# Patient Record
Sex: FEMALE | Race: WHITE | Employment: OTHER | URBAN - NONMETROPOLITAN AREA
[De-identification: names, ages, dates, MRNs, and addresses within clinical notes are randomized per-mention and may not be internally consistent; named-entity substitution may affect disease eponyms.]

---

## 2017-01-04 ENCOUNTER — TELEPHONE (OUTPATIENT)
Dept: PRIMARY CARE CLINIC | Age: 47
End: 2017-01-04

## 2017-01-09 ENCOUNTER — TELEPHONE (OUTPATIENT)
Dept: PRIMARY CARE CLINIC | Age: 47
End: 2017-01-09

## 2017-01-16 ENCOUNTER — OFFICE VISIT (OUTPATIENT)
Dept: PRIMARY CARE CLINIC | Age: 47
End: 2017-01-16
Payer: MEDICAID

## 2017-01-16 VITALS
DIASTOLIC BLOOD PRESSURE: 80 MMHG | TEMPERATURE: 98 F | OXYGEN SATURATION: 98 % | RESPIRATION RATE: 20 BRPM | SYSTOLIC BLOOD PRESSURE: 136 MMHG | WEIGHT: 267 LBS | BODY MASS INDEX: 50.41 KG/M2 | HEIGHT: 61 IN | HEART RATE: 70 BPM

## 2017-01-16 DIAGNOSIS — G89.29 CHRONIC PAIN OF LEFT KNEE: ICD-10-CM

## 2017-01-16 DIAGNOSIS — E11.65 TYPE 2 DIABETES MELLITUS WITH HYPERGLYCEMIA, WITHOUT LONG-TERM CURRENT USE OF INSULIN (HCC): ICD-10-CM

## 2017-01-16 DIAGNOSIS — M54.31 BILATERAL SCIATICA: Primary | ICD-10-CM

## 2017-01-16 DIAGNOSIS — M23.204 OLD COMPLEX TEAR OF MEDIAL MENISCUS OF LEFT KNEE: Primary | ICD-10-CM

## 2017-01-16 DIAGNOSIS — M54.32 BILATERAL SCIATICA: Primary | ICD-10-CM

## 2017-01-16 DIAGNOSIS — E66.01 MORBID OBESITY DUE TO EXCESS CALORIES (HCC): ICD-10-CM

## 2017-01-16 DIAGNOSIS — M25.562 CHRONIC PAIN OF LEFT KNEE: ICD-10-CM

## 2017-01-16 PROCEDURE — 99214 OFFICE O/P EST MOD 30 MIN: CPT | Performed by: FAMILY MEDICINE

## 2017-01-18 RX ORDER — HYDROCODONE BITARTRATE AND ACETAMINOPHEN 7.5; 325 MG/1; MG/1
1 TABLET ORAL EVERY 6 HOURS PRN
Qty: 45 TABLET | Refills: 0 | Status: SHIPPED | OUTPATIENT
Start: 2017-01-18 | End: 2017-01-31 | Stop reason: SDUPTHER

## 2017-01-18 RX ORDER — HYDROCODONE BITARTRATE AND ACETAMINOPHEN 10; 325 MG/1; MG/1
1 TABLET ORAL EVERY 6 HOURS PRN
Qty: 45 TABLET | Refills: 0 | Status: CANCELLED | OUTPATIENT
Start: 2017-01-18 | End: 2017-01-25

## 2017-01-30 RX ORDER — VERAPAMIL HYDROCHLORIDE 240 MG/1
240 TABLET, FILM COATED, EXTENDED RELEASE ORAL NIGHTLY
Qty: 30 TABLET | Refills: 11 | Status: SHIPPED | OUTPATIENT
Start: 2017-01-30 | End: 2018-02-18 | Stop reason: SDUPTHER

## 2017-02-03 RX ORDER — HYDROCODONE BITARTRATE AND ACETAMINOPHEN 7.5; 325 MG/1; MG/1
1 TABLET ORAL EVERY 6 HOURS PRN
Qty: 45 TABLET | Refills: 0 | Status: SHIPPED | OUTPATIENT
Start: 2017-02-03 | End: 2017-02-14 | Stop reason: SDUPTHER

## 2017-02-06 DIAGNOSIS — M23.204 DERANGEMENT OF MEDIAL MENISCUS OF LEFT KNEE DUE TO OLD INJURY: Primary | ICD-10-CM

## 2017-02-14 ENCOUNTER — TELEPHONE (OUTPATIENT)
Dept: PRIMARY CARE CLINIC | Age: 47
End: 2017-02-14

## 2017-02-15 ENCOUNTER — TELEPHONE (OUTPATIENT)
Dept: PRIMARY CARE CLINIC | Age: 47
End: 2017-02-15

## 2017-02-15 RX ORDER — HYDROCODONE BITARTRATE AND ACETAMINOPHEN 7.5; 325 MG/1; MG/1
1 TABLET ORAL EVERY 6 HOURS PRN
Qty: 45 TABLET | Refills: 0 | Status: SHIPPED | OUTPATIENT
Start: 2017-02-15 | End: 2017-02-27 | Stop reason: SDUPTHER

## 2017-02-27 ENCOUNTER — TELEPHONE (OUTPATIENT)
Dept: PRIMARY CARE CLINIC | Age: 47
End: 2017-02-27

## 2017-02-27 ENCOUNTER — CARE COORDINATOR VISIT (OUTPATIENT)
Dept: PRIMARY CARE CLINIC | Age: 47
End: 2017-02-27

## 2017-02-27 ENCOUNTER — OFFICE VISIT (OUTPATIENT)
Dept: PRIMARY CARE CLINIC | Age: 47
End: 2017-02-27
Payer: MEDICAID

## 2017-02-27 VITALS
HEIGHT: 61 IN | SYSTOLIC BLOOD PRESSURE: 128 MMHG | HEART RATE: 79 BPM | OXYGEN SATURATION: 98 % | RESPIRATION RATE: 18 BRPM | WEIGHT: 268 LBS | DIASTOLIC BLOOD PRESSURE: 76 MMHG | BODY MASS INDEX: 50.6 KG/M2

## 2017-02-27 DIAGNOSIS — E11.65 TYPE 2 DIABETES MELLITUS WITH HYPERGLYCEMIA, WITHOUT LONG-TERM CURRENT USE OF INSULIN (HCC): ICD-10-CM

## 2017-02-27 DIAGNOSIS — Z13.9 SCREENING: Primary | ICD-10-CM

## 2017-02-27 DIAGNOSIS — I10 ESSENTIAL HYPERTENSION: ICD-10-CM

## 2017-02-27 LAB
AMPHETAMINE SCREEN, URINE: NORMAL
BARBITURATE SCREEN, URINE: NORMAL
BENZODIAZEPINE SCREEN, URINE: NORMAL
COCAINE METABOLITE SCREEN URINE: NORMAL
HGB, POC: 12.2
MDMA URINE: NORMAL
METHADONE SCREEN, URINE: NORMAL
METHAMPHETAMINE, URINE: NORMAL
OPIATE SCREEN URINE: NORMAL
OXYCODONE SCREEN URINE: NORMAL
PHENCYCLIDINE SCREEN URINE: NORMAL
PROPOXYPHENE SCREEN, URINE: NORMAL
THC: NORMAL
TRICYCLIC ANTIDEPRESSANTS, UR: NORMAL

## 2017-02-27 PROCEDURE — 80305 DRUG TEST PRSMV DIR OPT OBS: CPT | Performed by: INTERNAL MEDICINE

## 2017-02-27 PROCEDURE — 99214 OFFICE O/P EST MOD 30 MIN: CPT | Performed by: INTERNAL MEDICINE

## 2017-02-27 PROCEDURE — 85018 HEMOGLOBIN: CPT | Performed by: INTERNAL MEDICINE

## 2017-02-27 RX ORDER — HYDROCODONE BITARTRATE AND ACETAMINOPHEN 7.5; 325 MG/1; MG/1
1 TABLET ORAL EVERY 6 HOURS PRN
Qty: 45 TABLET | Refills: 0 | Status: SHIPPED | OUTPATIENT
Start: 2017-02-27 | End: 2017-03-08 | Stop reason: SDUPTHER

## 2017-02-27 ASSESSMENT — ENCOUNTER SYMPTOMS
NAUSEA: 0
SHORTNESS OF BREATH: 0
BACK PAIN: 1
VOMITING: 0
CONSTIPATION: 0
COUGH: 0
DIARRHEA: 0

## 2017-02-28 ENCOUNTER — TELEPHONE (OUTPATIENT)
Dept: PRIMARY CARE CLINIC | Age: 47
End: 2017-02-28

## 2017-03-10 RX ORDER — HYDROCODONE BITARTRATE AND ACETAMINOPHEN 7.5; 325 MG/1; MG/1
1 TABLET ORAL EVERY 6 HOURS PRN
Qty: 45 TABLET | Refills: 0 | Status: SHIPPED | OUTPATIENT
Start: 2017-03-10 | End: 2017-03-21 | Stop reason: SDUPTHER

## 2017-03-14 ENCOUNTER — HOSPITAL ENCOUNTER (EMERGENCY)
Facility: HOSPITAL | Age: 47
Discharge: HOME | End: 2017-03-14
Admitting: EMERGENCY MEDICINE

## 2017-03-14 VITALS
SYSTOLIC BLOOD PRESSURE: 137 MMHG | RESPIRATION RATE: 18 BRPM | WEIGHT: 270 LBS | HEART RATE: 81 BPM | OXYGEN SATURATION: 97 % | TEMPERATURE: 98.4 F | HEIGHT: 61 IN | DIASTOLIC BLOOD PRESSURE: 97 MMHG | BODY MASS INDEX: 50.98 KG/M2

## 2017-03-14 DIAGNOSIS — J01.01 ACUTE RECURRENT MAXILLARY SINUSITIS: Primary | ICD-10-CM

## 2017-03-14 DIAGNOSIS — G43.009 MIGRAINE WITHOUT AURA AND WITHOUT STATUS MIGRAINOSUS, NOT INTRACTABLE: ICD-10-CM

## 2017-03-14 PROCEDURE — 99283 EMERGENCY DEPT VISIT LOW MDM: CPT

## 2017-03-14 PROCEDURE — 25010000002 PROMETHAZINE PER 50 MG: Performed by: EMERGENCY MEDICINE

## 2017-03-14 PROCEDURE — 96372 THER/PROPH/DIAG INJ SC/IM: CPT

## 2017-03-14 PROCEDURE — 25010000002 DEXAMETHASONE PER 1 MG: Performed by: EMERGENCY MEDICINE

## 2017-03-14 PROCEDURE — 25010000002 DIPHENHYDRAMINE PER 50 MG: Performed by: EMERGENCY MEDICINE

## 2017-03-14 RX ORDER — AZITHROMYCIN 250 MG/1
TABLET, FILM COATED ORAL
Qty: 6 TABLET | Refills: 0 | Status: SHIPPED | OUTPATIENT
Start: 2017-03-14 | End: 2017-05-06

## 2017-03-14 RX ORDER — PROMETHAZINE HYDROCHLORIDE 25 MG/ML
25 INJECTION, SOLUTION INTRAMUSCULAR; INTRAVENOUS ONCE
Status: COMPLETED | OUTPATIENT
Start: 2017-03-14 | End: 2017-03-14

## 2017-03-14 RX ORDER — CETIRIZINE HYDROCHLORIDE 10 MG/1
10 TABLET ORAL DAILY
Qty: 21 TABLET | Refills: 0 | Status: SHIPPED | OUTPATIENT
Start: 2017-03-14 | End: 2019-08-06

## 2017-03-14 RX ORDER — DEXAMETHASONE SODIUM PHOSPHATE 10 MG/ML
10 INJECTION INTRAMUSCULAR; INTRAVENOUS ONCE
Status: COMPLETED | OUTPATIENT
Start: 2017-03-14 | End: 2017-03-14

## 2017-03-14 RX ORDER — DIPHENHYDRAMINE HYDROCHLORIDE 50 MG/ML
25 INJECTION INTRAMUSCULAR; INTRAVENOUS ONCE
Status: COMPLETED | OUTPATIENT
Start: 2017-03-14 | End: 2017-03-14

## 2017-03-14 RX ADMIN — DIPHENHYDRAMINE HYDROCHLORIDE 25 MG: 50 INJECTION, SOLUTION INTRAMUSCULAR; INTRAVENOUS at 12:51

## 2017-03-14 RX ADMIN — PROMETHAZINE HYDROCHLORIDE 25 MG: 25 INJECTION INTRAMUSCULAR; INTRAVENOUS at 12:51

## 2017-03-14 RX ADMIN — DEXAMETHASONE SODIUM PHOSPHATE 10 MG: 10 INJECTION, SOLUTION INTRAMUSCULAR; INTRAVENOUS at 12:51

## 2017-03-14 NOTE — ED PROVIDER NOTES
Subjective   Patient is a 46 y.o. female presenting with URI.   URI   Presenting symptoms: congestion and cough    Presenting symptoms: no ear pain and no sore throat    Associated symptoms: headaches      Patient is a 46 year  female with chief complaint of sinus infection the progressed into a migraine headache.  Patient reports she has had a sinus infection past 2 days with head congestion, sinus pressure, and persistent postnasal drip.  She reports that often progresses to her having a migraine which developed last night.  She reports thjs is a typical migraine. She has been experiencnig migraines for years. She reports usually is controlled with verapamil and Dyazide.  She reports she does not need a CT scan of the head.  She states a sinus infection often causes a migraine exacerbation.  She does state Dilaudid or Stadol usually provides relief for her migraine.    Patient denies any fever, body aches, or chills.  She has ear pain or sore throat.  She denies any sick exposure. She reports she has Flonase at home which sometimes helps.       Review of Systems   Constitutional: Negative.    HENT: Positive for congestion and sinus pressure. Negative for ear discharge, ear pain and sore throat.    Eyes: Negative.    Respiratory: Positive for cough.         Mild dry cough.    Cardiovascular: Negative.    Genitourinary: Negative.    Neurological: Positive for headaches. Negative for weakness.       Past Medical History   Diagnosis Date   • Hypertension    • Knee pain    • Migraine    • Restless leg        Allergies   Allergen Reactions   • Augmentin [Amoxicillin-Pot Clavulanate]    • Codeine    • Hydroxyzine Hcl Itching   • Ibuprofen    • Meperidine Hives   • Norflex [Orphenadrine]    • Penicillins    • Toradol [Ketorolac Tromethamine]    • Vistaril [Hydroxyzine]    • Cephalexin Rash       Past Surgical History   Procedure Laterality Date   • Corneal transplant     • Cataract extraction     • Tubal  abdominal ligation     • Ectopic pregnancy     • Knee meniscal repair         History reviewed. No pertinent family history.    Social History     Social History   • Marital status: Single     Spouse name: N/A   • Number of children: N/A   • Years of education: N/A     Social History Main Topics   • Smoking status: Never Smoker   • Smokeless tobacco: None   • Alcohol use Yes   • Drug use: No   • Sexual activity: Defer     Other Topics Concern   • None     Social History Narrative   • None           Objective   Physical Exam   Constitutional: She is oriented to person, place, and time. She appears well-developed and well-nourished. No distress.   HENT:   Head: Normocephalic and atraumatic.   Left Ear: External ear normal.   Nose: Nose normal.   Mouth/Throat: Oropharynx is clear and moist. No oropharyngeal exudate.   estela has a double uvula.. She is tender to palpate on all her sinuses.    Eyes: Conjunctivae and EOM are normal. Pupils are equal, round, and reactive to light.   No photophobia   Neck: Normal range of motion. Neck supple. No tracheal deviation present.   Cardiovascular: Normal rate, regular rhythm, normal heart sounds and intact distal pulses.    No murmur heard.  Pulmonary/Chest: Effort normal and breath sounds normal.   Abdominal: Soft. Bowel sounds are normal. She exhibits no distension and no mass. There is no tenderness. There is no rebound and no guarding.   Musculoskeletal: Normal range of motion. She exhibits no edema.   Neurological: She is alert and oriented to person, place, and time.   Skin: Skin is warm and dry. She is not diaphoretic.   Psychiatric: She has a normal mood and affect. Her behavior is normal.   Nursing note and vitals reviewed.      Procedures         ED Course  ED Course                  MDM    Final diagnoses:   Acute recurrent maxillary sinusitis   Migraine without aura and without status migrainosus, not intractable            COLT Arguello  03/14/17  [id number]

## 2017-03-16 ENCOUNTER — TELEPHONE (OUTPATIENT)
Dept: PRIMARY CARE CLINIC | Age: 47
End: 2017-03-16

## 2017-03-16 RX ORDER — GUAIFENESIN AND DEXTROMETHORPHAN HYDROBROMIDE 100; 10 MG/5ML; MG/5ML
5 SOLUTION ORAL EVERY 4 HOURS PRN
Qty: 120 ML | Refills: 0 | Status: SHIPPED | OUTPATIENT
Start: 2017-03-16 | End: 2017-07-10 | Stop reason: ALTCHOICE

## 2017-03-20 ENCOUNTER — HOSPITAL ENCOUNTER (EMERGENCY)
Facility: HOSPITAL | Age: 47
Discharge: HOME | End: 2017-03-20
Attending: EMERGENCY MEDICINE | Admitting: EMERGENCY MEDICINE

## 2017-03-20 VITALS
TEMPERATURE: 97.9 F | BODY MASS INDEX: 50.98 KG/M2 | HEIGHT: 61 IN | WEIGHT: 270 LBS | OXYGEN SATURATION: 99 % | HEART RATE: 80 BPM | SYSTOLIC BLOOD PRESSURE: 159 MMHG | RESPIRATION RATE: 16 BRPM | DIASTOLIC BLOOD PRESSURE: 85 MMHG

## 2017-03-20 DIAGNOSIS — R51.9 CHRONIC NONINTRACTABLE HEADACHE, UNSPECIFIED HEADACHE TYPE: Primary | ICD-10-CM

## 2017-03-20 DIAGNOSIS — G89.29 CHRONIC NONINTRACTABLE HEADACHE, UNSPECIFIED HEADACHE TYPE: Primary | ICD-10-CM

## 2017-03-20 PROCEDURE — 25010000002 PROMETHAZINE PER 50 MG: Performed by: EMERGENCY MEDICINE

## 2017-03-20 PROCEDURE — 96372 THER/PROPH/DIAG INJ SC/IM: CPT

## 2017-03-20 PROCEDURE — 25010000002 HYDROMORPHONE PER 4 MG: Performed by: EMERGENCY MEDICINE

## 2017-03-20 PROCEDURE — 99283 EMERGENCY DEPT VISIT LOW MDM: CPT

## 2017-03-20 RX ORDER — PROMETHAZINE HYDROCHLORIDE 25 MG/ML
25 INJECTION, SOLUTION INTRAMUSCULAR; INTRAVENOUS ONCE
Status: COMPLETED | OUTPATIENT
Start: 2017-03-20 | End: 2017-03-20

## 2017-03-20 RX ADMIN — PROMETHAZINE HYDROCHLORIDE 25 MG: 25 INJECTION INTRAMUSCULAR; INTRAVENOUS at 04:05

## 2017-03-20 RX ADMIN — HYDROMORPHONE HYDROCHLORIDE 1 MG: 1 INJECTION, SOLUTION INTRAMUSCULAR; INTRAVENOUS; SUBCUTANEOUS at 04:05

## 2017-03-20 NOTE — ED PROVIDER NOTES
Subjective   Patient is a 46 y.o. female presenting with migraines.   Migraine   Pain location:  Generalized  Quality:  Dull  Radiates to:  Does not radiate  Severity currently:  9/10  Severity at highest:  9/10  Onset quality:  Gradual  Timing:  Constant  Progression:  Worsening  Chronicity:  New  Similar to prior headaches: yes    Context: bright light and loud noise    Context: not activity, not caffeine, not coughing, not eating, not stress, not exposure to cold air and not straining    Relieved by:  Nothing  Worsened by:  Nothing  Ineffective treatments:  None tried  Associated symptoms: no abdominal pain, no back pain, no blurred vision, no congestion, no cough, no diarrhea, no dizziness, no drainage, no fever, no focal weakness, no hearing loss, no loss of balance, no nausea, no neck pain, no neck stiffness, no numbness, no syncope, no tingling, no URI and no vomiting    Risk factors: no anger, no family hx of SAH, does not have insomnia and lifestyle not sedentary        Review of Systems   Constitutional: Negative.  Negative for fever.   HENT: Negative.  Negative for congestion, hearing loss and postnasal drip.    Eyes: Negative.  Negative for blurred vision.   Respiratory: Negative.  Negative for cough.    Cardiovascular: Negative.  Negative for syncope.   Gastrointestinal: Negative.  Negative for abdominal pain, diarrhea, nausea and vomiting.   Musculoskeletal: Negative.  Negative for back pain, neck pain and neck stiffness.   Skin: Negative.    Neurological: Negative.  Negative for dizziness, focal weakness, numbness and loss of balance.   All other systems reviewed and are negative.      Past Medical History   Diagnosis Date   • Diabetes mellitus    • Hypertension    • Knee pain    • Migraine    • Restless leg        Allergies   Allergen Reactions   • Augmentin [Amoxicillin-Pot Clavulanate]    • Codeine    • Hydroxyzine Hcl Itching   • Ibuprofen    • Meperidine Hives   • Norflex [Orphenadrine]    •  Penicillins    • Toradol [Ketorolac Tromethamine]    • Vistaril [Hydroxyzine]    • Cephalexin Rash       Past Surgical History   Procedure Laterality Date   • Corneal transplant     • Cataract extraction     • Tubal abdominal ligation     • Ectopic pregnancy     • Knee meniscal repair     • Knee arthroscopy w/ meniscal repair     • Cataract extraction         History reviewed. No pertinent family history.    Social History     Social History   • Marital status: Single     Spouse name: N/A   • Number of children: N/A   • Years of education: N/A     Social History Main Topics   • Smoking status: Never Smoker   • Smokeless tobacco: None   • Alcohol use Yes   • Drug use: No   • Sexual activity: Defer     Other Topics Concern   • None     Social History Narrative   • None           Objective   Physical Exam   Constitutional: She is oriented to person, place, and time. She appears well-developed.  Non-toxic appearance. No distress.   HENT:   Head: Normocephalic and atraumatic.   Mouth/Throat: Uvula is midline and mucous membranes are normal.   Eyes: Lids are normal. Pupils are equal, round, and reactive to light. Lids are everted and swept, no foreign bodies found.   Neck: Trachea normal, normal range of motion, full passive range of motion without pain and phonation normal. Neck supple. Normal carotid pulses and no JVD present. Carotid bruit is not present. No rigidity. No tracheal deviation present. No Brudzinski's sign and no Kernig's sign noted. No thyromegaly present.   Cardiovascular: Normal rate, regular rhythm, normal heart sounds, intact distal pulses and normal pulses.    Pulmonary/Chest: Effort normal and breath sounds normal. No stridor. No apnea and no tachypnea. No respiratory distress.   Abdominal: Soft. Normal appearance, normal aorta and bowel sounds are normal. She exhibits no distension and no mass. There is no hepatosplenomegaly. There is no tenderness. There is no guarding.   Musculoskeletal: Normal  range of motion.       Vascular Status -  Her exam exhibits right foot vasculature normal. Her exam exhibits no right foot edema. Her exam exhibits left foot vasculature normal. Her exam exhibits no left foot edema.  Neurological: She is alert and oriented to person, place, and time. She has normal strength and normal reflexes. She is not disoriented. She displays normal reflexes. No cranial nerve deficit or sensory deficit. She exhibits normal muscle tone. Coordination normal. GCS eye subscore is 4. GCS verbal subscore is 5. GCS motor subscore is 6.   Reflex Scores:       Tricep reflexes are 2+ on the right side and 2+ on the left side.       Bicep reflexes are 2+ on the right side and 2+ on the left side.       Patellar reflexes are 2+ on the right side and 2+ on the left side.       Achilles reflexes are 2+ on the right side and 2+ on the left side.  Skin: Skin is warm, dry and intact. She is not diaphoretic. No cyanosis. No pallor. Nails show no clubbing.   Nursing note and vitals reviewed.      Procedures         ED Course  ED Course   Comment By Time   Chronic headache John Mendoza MD 03/20 2487                  [facility]    Final diagnoses:   Chronic nonintractable headache, unspecified headache type            John Mendoza MD  03/20/17 [id number]

## 2017-03-22 RX ORDER — HYDROCODONE BITARTRATE AND ACETAMINOPHEN 7.5; 325 MG/1; MG/1
1 TABLET ORAL EVERY 6 HOURS PRN
Qty: 45 TABLET | Refills: 0 | Status: SHIPPED | OUTPATIENT
Start: 2017-03-22 | End: 2017-04-18 | Stop reason: SDUPTHER

## 2017-03-24 ENCOUNTER — OFFICE VISIT (OUTPATIENT)
Dept: PRIMARY CARE CLINIC | Age: 47
End: 2017-03-24
Payer: MEDICAID

## 2017-03-24 VITALS
HEART RATE: 94 BPM | RESPIRATION RATE: 18 BRPM | WEIGHT: 267 LBS | TEMPERATURE: 97.5 F | HEIGHT: 61 IN | OXYGEN SATURATION: 97 % | DIASTOLIC BLOOD PRESSURE: 90 MMHG | SYSTOLIC BLOOD PRESSURE: 144 MMHG | BODY MASS INDEX: 50.41 KG/M2

## 2017-03-24 DIAGNOSIS — J01.40 ACUTE NON-RECURRENT PANSINUSITIS: Primary | ICD-10-CM

## 2017-03-24 PROCEDURE — 99213 OFFICE O/P EST LOW 20 MIN: CPT | Performed by: NURSE PRACTITIONER

## 2017-03-24 RX ORDER — FLUCONAZOLE 150 MG/1
150 TABLET ORAL DAILY
Qty: 1 TABLET | Refills: 0 | Status: SHIPPED | OUTPATIENT
Start: 2017-03-24 | End: 2017-03-25

## 2017-03-24 RX ORDER — METHYLPREDNISOLONE 4 MG/1
TABLET ORAL
Qty: 1 KIT | Refills: 0 | Status: SHIPPED | OUTPATIENT
Start: 2017-03-24 | End: 2017-11-15 | Stop reason: SDUPTHER

## 2017-03-24 RX ORDER — FLUTICASONE PROPIONATE 50 MCG
1 SPRAY, SUSPENSION (ML) NASAL DAILY
Qty: 1 BOTTLE | Refills: 3 | Status: SHIPPED | OUTPATIENT
Start: 2017-03-24 | End: 2017-06-26 | Stop reason: SDUPTHER

## 2017-03-24 RX ORDER — CLINDAMYCIN HYDROCHLORIDE 300 MG/1
300 CAPSULE ORAL 2 TIMES DAILY
Qty: 20 CAPSULE | Refills: 0 | Status: SHIPPED | OUTPATIENT
Start: 2017-03-24 | End: 2017-04-03

## 2017-03-24 RX ORDER — INSULIN GLARGINE 100 [IU]/ML
20 INJECTION, SOLUTION SUBCUTANEOUS NIGHTLY
COMMUNITY
Start: 2017-02-28 | End: 2018-01-07

## 2017-03-24 ASSESSMENT — ENCOUNTER SYMPTOMS
GASTROINTESTINAL NEGATIVE: 1
SINUS PRESSURE: 1
RESPIRATORY NEGATIVE: 1
SINUS COMPLAINT: 1
ALLERGIC/IMMUNOLOGIC NEGATIVE: 1
SORE THROAT: 1
EYES NEGATIVE: 1

## 2017-03-31 RX ORDER — HYDROCODONE BITARTRATE AND ACETAMINOPHEN 7.5; 325 MG/1; MG/1
1 TABLET ORAL EVERY 6 HOURS PRN
Qty: 45 TABLET | Refills: 0 | Status: CANCELLED | OUTPATIENT
Start: 2017-03-31 | End: 2017-04-30

## 2017-04-03 DIAGNOSIS — B37.9 YEAST INFECTION: Primary | ICD-10-CM

## 2017-04-03 RX ORDER — FLUCONAZOLE 100 MG/1
150 TABLET ORAL DAILY
Qty: 1 TABLET | Refills: 1 | Status: SHIPPED | OUTPATIENT
Start: 2017-04-03 | End: 2017-04-05

## 2017-04-19 RX ORDER — HYDROCODONE BITARTRATE AND ACETAMINOPHEN 7.5; 325 MG/1; MG/1
1 TABLET ORAL EVERY 6 HOURS PRN
Qty: 45 TABLET | Refills: 0 | Status: SHIPPED | OUTPATIENT
Start: 2017-04-19 | End: 2017-05-09 | Stop reason: SDUPTHER

## 2017-05-06 ENCOUNTER — OFFICE VISIT (OUTPATIENT)
Dept: RETAIL CLINIC | Facility: CLINIC | Age: 47
End: 2017-05-06

## 2017-05-06 VITALS
WEIGHT: 262 LBS | TEMPERATURE: 98.2 F | HEIGHT: 61 IN | OXYGEN SATURATION: 99 % | RESPIRATION RATE: 20 BRPM | HEART RATE: 88 BPM | BODY MASS INDEX: 49.47 KG/M2 | DIASTOLIC BLOOD PRESSURE: 88 MMHG | SYSTOLIC BLOOD PRESSURE: 138 MMHG

## 2017-05-06 DIAGNOSIS — H66.005 RECURRENT ACUTE SUPPURATIVE OTITIS MEDIA WITHOUT SPONTANEOUS RUPTURE OF LEFT TYMPANIC MEMBRANE: ICD-10-CM

## 2017-05-06 DIAGNOSIS — J06.9 VIRAL UPPER RESPIRATORY TRACT INFECTION: Primary | ICD-10-CM

## 2017-05-06 PROCEDURE — 99213 OFFICE O/P EST LOW 20 MIN: CPT | Performed by: NURSE PRACTITIONER

## 2017-05-06 RX ORDER — AZITHROMYCIN 250 MG/1
TABLET, FILM COATED ORAL
Qty: 6 TABLET | Refills: 0 | Status: SHIPPED | OUTPATIENT
Start: 2017-05-06 | End: 2017-10-08

## 2017-05-06 RX ORDER — BENZONATATE 100 MG/1
200 CAPSULE ORAL 3 TIMES DAILY PRN
Qty: 14 CAPSULE | Refills: 0 | Status: SHIPPED | OUTPATIENT
Start: 2017-05-06 | End: 2017-10-08

## 2017-05-09 RX ORDER — HYDROCODONE BITARTRATE AND ACETAMINOPHEN 7.5; 325 MG/1; MG/1
1 TABLET ORAL EVERY 8 HOURS PRN
Qty: 10 TABLET | Refills: 0 | Status: SHIPPED | OUTPATIENT
Start: 2017-05-09 | End: 2017-05-16

## 2017-05-19 ENCOUNTER — OFFICE VISIT (OUTPATIENT)
Dept: PRIMARY CARE CLINIC | Age: 47
End: 2017-05-19
Payer: MEDICAID

## 2017-05-19 VITALS
RESPIRATION RATE: 18 BRPM | OXYGEN SATURATION: 98 % | BODY MASS INDEX: 50.31 KG/M2 | HEART RATE: 108 BPM | SYSTOLIC BLOOD PRESSURE: 124 MMHG | DIASTOLIC BLOOD PRESSURE: 86 MMHG | HEIGHT: 61 IN | WEIGHT: 266.5 LBS | TEMPERATURE: 98.5 F

## 2017-05-19 DIAGNOSIS — M79.605 PAIN IN BOTH LOWER EXTREMITIES: Primary | ICD-10-CM

## 2017-05-19 DIAGNOSIS — G62.9 NEUROPATHY: ICD-10-CM

## 2017-05-19 DIAGNOSIS — Z13.9 SCREENING: ICD-10-CM

## 2017-05-19 DIAGNOSIS — M79.604 PAIN IN BOTH LOWER EXTREMITIES: Primary | ICD-10-CM

## 2017-05-19 LAB
AMPHETAMINE SCREEN, URINE: ABNORMAL
BARBITURATE SCREEN, URINE: ABNORMAL
BENZODIAZEPINE SCREEN, URINE: ABNORMAL
COCAINE METABOLITE SCREEN URINE: ABNORMAL
MDMA URINE: ABNORMAL
METHADONE SCREEN, URINE: ABNORMAL
METHAMPHETAMINE, URINE: ABNORMAL
OPIATE SCREEN URINE: ABNORMAL
OXYCODONE SCREEN URINE: ABNORMAL
PHENCYCLIDINE SCREEN URINE: ABNORMAL
PROPOXYPHENE SCREEN, URINE: ABNORMAL
THC: ABNORMAL
TRICYCLIC ANTIDEPRESSANTS, UR: ABNORMAL

## 2017-05-19 PROCEDURE — 99214 OFFICE O/P EST MOD 30 MIN: CPT | Performed by: NURSE PRACTITIONER

## 2017-05-19 PROCEDURE — 80305 DRUG TEST PRSMV DIR OPT OBS: CPT | Performed by: NURSE PRACTITIONER

## 2017-05-19 RX ORDER — GABAPENTIN 100 MG/1
100 CAPSULE ORAL DAILY
Qty: 90 CAPSULE | Refills: 1 | Status: SHIPPED | OUTPATIENT
Start: 2017-05-19 | End: 2017-09-26 | Stop reason: DRUGHIGH

## 2017-05-19 ASSESSMENT — ENCOUNTER SYMPTOMS
RESPIRATORY NEGATIVE: 1
EYES NEGATIVE: 1
ALLERGIC/IMMUNOLOGIC NEGATIVE: 1
GASTROINTESTINAL NEGATIVE: 1

## 2017-05-23 ENCOUNTER — TELEPHONE (OUTPATIENT)
Dept: PRIMARY CARE CLINIC | Age: 47
End: 2017-05-23

## 2017-05-23 RX ORDER — SUMATRIPTAN 100 MG/1
TABLET, FILM COATED ORAL
Qty: 9 TABLET | Refills: 0 | Status: SHIPPED | OUTPATIENT
Start: 2017-05-23 | End: 2017-07-14 | Stop reason: SDUPTHER

## 2017-05-23 RX ORDER — SUMATRIPTAN 20 MG/1
SPRAY NASAL
Qty: 1 INHALER | Refills: 0 | Status: SHIPPED | OUTPATIENT
Start: 2017-05-23 | End: 2017-07-14 | Stop reason: SDUPTHER

## 2017-05-23 RX ORDER — PHENAZOPYRIDINE HYDROCHLORIDE 100 MG/1
100 TABLET, FILM COATED ORAL 3 TIMES DAILY PRN
Qty: 9 TABLET | Refills: 0 | Status: SHIPPED | OUTPATIENT
Start: 2017-05-23 | End: 2017-07-10 | Stop reason: ALTCHOICE

## 2017-05-24 ENCOUNTER — OFFICE VISIT (OUTPATIENT)
Dept: PRIMARY CARE CLINIC | Age: 47
End: 2017-05-24
Payer: MEDICAID

## 2017-05-24 DIAGNOSIS — N39.0 URINARY TRACT INFECTION, SITE UNSPECIFIED: Primary | ICD-10-CM

## 2017-05-24 LAB
BILIRUBIN, POC: NEGATIVE
BLOOD URINE, POC: ABNORMAL
CLARITY, POC: CLEAR
COLOR, POC: ABNORMAL
GLUCOSE URINE, POC: ABNORMAL
KETONES, POC: NEGATIVE
LEUKOCYTE EST, POC: NEGATIVE
NITRITE, POC: ABNORMAL
PH, POC: 5
PROTEIN, POC: NEGATIVE
SPECIFIC GRAVITY, POC: 1.02
UROBILINOGEN, POC: ABNORMAL

## 2017-05-24 PROCEDURE — 81002 URINALYSIS NONAUTO W/O SCOPE: CPT | Performed by: FAMILY MEDICINE

## 2017-05-24 RX ORDER — NITROFURANTOIN 25; 75 MG/1; MG/1
100 CAPSULE ORAL 2 TIMES DAILY
Qty: 10 CAPSULE | Refills: 0 | Status: SHIPPED | OUTPATIENT
Start: 2017-05-24 | End: 2017-05-29

## 2017-05-26 LAB — URINE CULTURE, ROUTINE: NORMAL

## 2017-06-26 DIAGNOSIS — J01.40 ACUTE NON-RECURRENT PANSINUSITIS: ICD-10-CM

## 2017-06-27 RX ORDER — FLUTICASONE PROPIONATE 50 MCG
SPRAY, SUSPENSION (ML) NASAL
Qty: 1 BOTTLE | Refills: 3 | Status: SHIPPED | OUTPATIENT
Start: 2017-06-27 | End: 2017-12-25 | Stop reason: SDUPTHER

## 2017-07-10 ENCOUNTER — HOSPITAL ENCOUNTER (OUTPATIENT)
Dept: PAIN MANAGEMENT | Age: 47
Discharge: HOME | End: 2017-07-10
Payer: MEDICAID

## 2017-07-10 VITALS
SYSTOLIC BLOOD PRESSURE: 132 MMHG | DIASTOLIC BLOOD PRESSURE: 99 MMHG | TEMPERATURE: 98 F | OXYGEN SATURATION: 98 % | WEIGHT: 257 LBS | RESPIRATION RATE: 18 BRPM | BODY MASS INDEX: 48.52 KG/M2 | HEIGHT: 61 IN | HEART RATE: 85 BPM

## 2017-07-10 DIAGNOSIS — M25.562 CHRONIC PAIN OF LEFT KNEE: Primary | ICD-10-CM

## 2017-07-10 DIAGNOSIS — M70.61 TROCHANTERIC BURSITIS OF RIGHT HIP: ICD-10-CM

## 2017-07-10 DIAGNOSIS — G89.29 CHRONIC PAIN OF LEFT KNEE: Primary | ICD-10-CM

## 2017-07-10 PROCEDURE — 99205 OFFICE O/P NEW HI 60 MIN: CPT

## 2017-07-10 PROCEDURE — 99215 OFFICE O/P EST HI 40 MIN: CPT | Performed by: NURSE PRACTITIONER

## 2017-07-10 RX ORDER — CYCLOBENZAPRINE HCL 10 MG
10 TABLET ORAL 3 TIMES DAILY PRN
COMMUNITY
End: 2018-01-19 | Stop reason: SDUPTHER

## 2017-07-10 RX ORDER — HYDROCODONE BITARTRATE AND ACETAMINOPHEN 5; 325 MG/1; MG/1
1 TABLET ORAL EVERY 8 HOURS PRN
Qty: 90 TABLET | Refills: 0 | Status: SHIPPED | OUTPATIENT
Start: 2017-07-10 | End: 2017-08-10 | Stop reason: SDUPTHER

## 2017-07-11 DIAGNOSIS — M70.61 TROCHANTERIC BURSITIS OF RIGHT HIP: ICD-10-CM

## 2017-07-12 RX ORDER — EMOLLIENT BASE
CREAM (GRAM) TOPICAL
Qty: 360 G | Refills: 3 | OUTPATIENT
Start: 2017-07-12 | End: 2020-09-11

## 2017-07-14 RX ORDER — SUMATRIPTAN 100 MG/1
TABLET, FILM COATED ORAL
Qty: 9 TABLET | Refills: 0 | Status: SHIPPED | OUTPATIENT
Start: 2017-07-14 | End: 2017-08-28 | Stop reason: SDUPTHER

## 2017-07-14 RX ORDER — SUMATRIPTAN 20 MG/1
SPRAY NASAL
Qty: 1 INHALER | Refills: 0 | Status: SHIPPED | OUTPATIENT
Start: 2017-07-14 | End: 2017-08-28 | Stop reason: SDUPTHER

## 2017-07-28 ENCOUNTER — HOSPITAL ENCOUNTER (OUTPATIENT)
Dept: GENERAL RADIOLOGY | Age: 47
Discharge: HOME | End: 2017-07-28
Payer: MEDICAID

## 2017-07-28 DIAGNOSIS — G89.29 CHRONIC PAIN OF LEFT KNEE: ICD-10-CM

## 2017-07-28 DIAGNOSIS — M25.562 CHRONIC PAIN OF LEFT KNEE: ICD-10-CM

## 2017-07-28 PROCEDURE — 73560 X-RAY EXAM OF KNEE 1 OR 2: CPT

## 2017-08-04 DIAGNOSIS — E13.9 DIABETES 1.5, MANAGED AS TYPE 2 (HCC): Primary | ICD-10-CM

## 2017-08-09 DIAGNOSIS — M79.605 PAIN IN BOTH LOWER EXTREMITIES: ICD-10-CM

## 2017-08-09 DIAGNOSIS — Z13.9 SCREENING FOR CONDITION: ICD-10-CM

## 2017-08-09 DIAGNOSIS — M79.604 PAIN IN BOTH LOWER EXTREMITIES: ICD-10-CM

## 2017-08-09 LAB
ALBUMIN SERPL-MCNC: 4.1 G/DL (ref 3.5–5.2)
ALP BLD-CCNC: 62 U/L (ref 35–104)
ALT SERPL-CCNC: 29 U/L (ref 5–33)
ANION GAP SERPL CALCULATED.3IONS-SCNC: 25 MMOL/L (ref 7–19)
AST SERPL-CCNC: 28 U/L (ref 5–32)
BILIRUB SERPL-MCNC: 0.6 MG/DL (ref 0.2–1.2)
BUN BLDV-MCNC: 10 MG/DL (ref 6–20)
CALCIUM SERPL-MCNC: 9.8 MG/DL (ref 8.6–10)
CHLORIDE BLD-SCNC: 92 MMOL/L (ref 98–111)
CO2: 22 MMOL/L (ref 22–29)
CREAT SERPL-MCNC: 0.8 MG/DL (ref 0.5–0.9)
GFR NON-AFRICAN AMERICAN: >60
GLUCOSE BLD-MCNC: 491 MG/DL (ref 74–109)
MAGNESIUM: 1.8 MG/DL (ref 1.6–2.6)
POTASSIUM SERPL-SCNC: 4.2 MMOL/L (ref 3.5–5)
SODIUM BLD-SCNC: 139 MMOL/L (ref 136–145)
TOTAL PROTEIN: 7.7 G/DL (ref 6.6–8.7)

## 2017-08-10 DIAGNOSIS — M70.61 TROCHANTERIC BURSITIS OF RIGHT HIP: ICD-10-CM

## 2017-08-14 RX ORDER — HYDROCODONE BITARTRATE AND ACETAMINOPHEN 5; 325 MG/1; MG/1
1 TABLET ORAL EVERY 8 HOURS PRN
Qty: 90 TABLET | Refills: 0 | Status: SHIPPED | OUTPATIENT
Start: 2017-08-14 | End: 2017-09-11 | Stop reason: SDUPTHER

## 2017-08-18 ENCOUNTER — TELEPHONE (OUTPATIENT)
Dept: PRIMARY CARE CLINIC | Age: 47
End: 2017-08-18

## 2017-08-22 ENCOUNTER — TELEPHONE (OUTPATIENT)
Dept: PRIMARY CARE CLINIC | Age: 47
End: 2017-08-22

## 2017-08-24 ENCOUNTER — TELEPHONE (OUTPATIENT)
Dept: PAIN MANAGEMENT | Age: 47
End: 2017-08-24

## 2017-08-24 DIAGNOSIS — M70.61 TROCHANTERIC BURSITIS OF RIGHT HIP: ICD-10-CM

## 2017-08-28 DIAGNOSIS — B37.9 YEAST INFECTION: ICD-10-CM

## 2017-08-28 RX ORDER — SUMATRIPTAN 20 MG/1
SPRAY NASAL
Qty: 1 INHALER | Refills: 0 | Status: SHIPPED | OUTPATIENT
Start: 2017-08-28 | End: 2017-11-07 | Stop reason: SDUPTHER

## 2017-08-28 RX ORDER — FLUCONAZOLE 100 MG/1
TABLET ORAL
Qty: 3 TABLET | Refills: 0 | OUTPATIENT
Start: 2017-08-28

## 2017-08-28 RX ORDER — PRAMIPEXOLE DIHYDROCHLORIDE 0.5 MG/1
TABLET ORAL
Qty: 60 TABLET | Refills: 0 | Status: SHIPPED | OUTPATIENT
Start: 2017-08-28 | End: 2017-11-13 | Stop reason: SDUPTHER

## 2017-08-28 RX ORDER — SUMATRIPTAN 100 MG/1
TABLET, FILM COATED ORAL
Qty: 9 TABLET | Refills: 0 | Status: SHIPPED | OUTPATIENT
Start: 2017-08-28 | End: 2017-11-07 | Stop reason: SDUPTHER

## 2017-09-11 DIAGNOSIS — M70.61 TROCHANTERIC BURSITIS OF RIGHT HIP: ICD-10-CM

## 2017-09-12 RX ORDER — HYDROCODONE BITARTRATE AND ACETAMINOPHEN 5; 325 MG/1; MG/1
1 TABLET ORAL EVERY 8 HOURS PRN
Qty: 42 TABLET | Refills: 0 | Status: SHIPPED | OUTPATIENT
Start: 2017-09-13 | End: 2017-10-19 | Stop reason: SDUPTHER

## 2017-09-26 ENCOUNTER — HOSPITAL ENCOUNTER (OUTPATIENT)
Dept: PAIN MANAGEMENT | Age: 47
Discharge: HOME | End: 2017-09-26
Payer: MEDICAID

## 2017-09-26 VITALS
OXYGEN SATURATION: 97 % | DIASTOLIC BLOOD PRESSURE: 87 MMHG | WEIGHT: 253 LBS | HEIGHT: 61 IN | BODY MASS INDEX: 47.77 KG/M2 | HEART RATE: 81 BPM | SYSTOLIC BLOOD PRESSURE: 134 MMHG | TEMPERATURE: 97.9 F | RESPIRATION RATE: 20 BRPM

## 2017-09-26 DIAGNOSIS — M70.61 TROCHANTERIC BURSITIS OF RIGHT HIP: ICD-10-CM

## 2017-09-26 LAB — SUMMARY COMPLIANCE DRUG ANAL, UR: NORMAL ML

## 2017-09-26 PROCEDURE — 99214 OFFICE O/P EST MOD 30 MIN: CPT | Performed by: NURSE PRACTITIONER

## 2017-09-26 PROCEDURE — 80307 DRUG TEST PRSMV CHEM ANLYZR: CPT

## 2017-09-26 PROCEDURE — 99213 OFFICE O/P EST LOW 20 MIN: CPT

## 2017-09-26 RX ORDER — HYDROCODONE BITARTRATE AND ACETAMINOPHEN 5; 325 MG/1; MG/1
1 TABLET ORAL EVERY 8 HOURS PRN
Qty: 90 TABLET | Refills: 0 | Status: SHIPPED | OUTPATIENT
Start: 2017-09-26 | End: 2017-10-19 | Stop reason: SDUPTHER

## 2017-09-26 RX ORDER — GABAPENTIN 300 MG/1
300 CAPSULE ORAL 3 TIMES DAILY
Qty: 90 CAPSULE | Refills: 2 | Status: SHIPPED | OUTPATIENT
Start: 2017-09-26 | End: 2017-11-20 | Stop reason: DRUGHIGH

## 2017-10-08 ENCOUNTER — OFFICE VISIT (OUTPATIENT)
Dept: RETAIL CLINIC | Facility: CLINIC | Age: 47
End: 2017-10-08

## 2017-10-08 VITALS
BODY MASS INDEX: 47.2 KG/M2 | HEIGHT: 61 IN | OXYGEN SATURATION: 97 % | HEART RATE: 90 BPM | SYSTOLIC BLOOD PRESSURE: 135 MMHG | DIASTOLIC BLOOD PRESSURE: 82 MMHG | TEMPERATURE: 98.8 F | RESPIRATION RATE: 20 BRPM | WEIGHT: 250 LBS

## 2017-10-08 DIAGNOSIS — J20.9 ACUTE BRONCHITIS, UNSPECIFIED ORGANISM: Primary | ICD-10-CM

## 2017-10-08 DIAGNOSIS — J02.9 PHARYNGITIS, UNSPECIFIED ETIOLOGY: ICD-10-CM

## 2017-10-08 PROCEDURE — 99213 OFFICE O/P EST LOW 20 MIN: CPT | Performed by: NURSE PRACTITIONER

## 2017-10-08 RX ORDER — DEXTROMETHORPHAN HYDROBROMIDE AND PROMETHAZINE HYDROCHLORIDE 15; 6.25 MG/5ML; MG/5ML
5 SYRUP ORAL 4 TIMES DAILY PRN
Qty: 180 ML | Refills: 0 | Status: SHIPPED | OUTPATIENT
Start: 2017-10-08 | End: 2019-08-06

## 2017-10-08 RX ORDER — METHYLPREDNISOLONE 4 MG/1
TABLET ORAL
Qty: 21 TABLET | Refills: 0 | Status: SHIPPED | OUTPATIENT
Start: 2017-10-08 | End: 2020-04-15

## 2017-10-08 RX ORDER — ALBUTEROL SULFATE 90 UG/1
2 AEROSOL, METERED RESPIRATORY (INHALATION) EVERY 4 HOURS PRN
Qty: 1 INHALER | Refills: 0 | Status: SHIPPED | OUTPATIENT
Start: 2017-10-08 | End: 2020-04-15

## 2017-10-08 RX ORDER — AZITHROMYCIN 250 MG/1
TABLET, FILM COATED ORAL
Qty: 6 TABLET | Refills: 0 | Status: SHIPPED | OUTPATIENT
Start: 2017-10-08 | End: 2019-08-06

## 2017-10-08 NOTE — PATIENT INSTRUCTIONS
Pharyngitis  Pharyngitis is redness, pain, and swelling (inflammation) of your pharynx.   CAUSES   Pharyngitis is usually caused by infection. Most of the time, these infections are from viruses (viral) and are part of a cold. However, sometimes pharyngitis is caused by bacteria (bacterial). Pharyngitis can also be caused by allergies. Viral pharyngitis may be spread from person to person by coughing, sneezing, and personal items or utensils (cups, forks, spoons, toothbrushes). Bacterial pharyngitis may be spread from person to person by more intimate contact, such as kissing.   SIGNS AND SYMPTOMS   Symptoms of pharyngitis include:    · Sore throat.    · Tiredness (fatigue).    · Low-grade fever.    · Headache.  · Joint pain and muscle aches.  · Skin rashes.  · Swollen lymph nodes.  · Plaque-like film on throat or tonsils (often seen with bacterial pharyngitis).  DIAGNOSIS   Your health care provider will ask you questions about your illness and your symptoms. Your medical history, along with a physical exam, is often all that is needed to diagnose pharyngitis. Sometimes, a rapid strep test is done. Other lab tests may also be done, depending on the suspected cause.   TREATMENT   Viral pharyngitis will usually get better in 3-4 days without the use of medicine. Bacterial pharyngitis is treated with medicines that kill germs (antibiotics).   HOME CARE INSTRUCTIONS   · Drink enough water and fluids to keep your urine clear or pale yellow.    · Only take over-the-counter or prescription medicines as directed by your health care provider:      If you are prescribed antibiotics, make sure you finish them even if you start to feel better.      Do not take aspirin.    · Get lots of rest.    · Gargle with 8 oz of salt water (½ tsp of salt per 1 qt of water) as often as every 1-2 hours to soothe your throat.    · Throat lozenges (if you are not at risk for choking) or sprays may be used to soothe your throat.  SEEK MEDICAL  CARE IF:   · You have large, tender lumps in your neck.  · You have a rash.  · You cough up green, yellow-brown, or bloody spit.  SEEK IMMEDIATE MEDICAL CARE IF:   · Your neck becomes stiff.  · You drool or are unable to swallow liquids.  · You vomit or are unable to keep medicines or liquids down.  · You have severe pain that does not go away with the use of recommended medicines.  · You have trouble breathing (not caused by a stuffy nose).  MAKE SURE YOU:   · Understand these instructions.  · Will watch your condition.  · Will get help right away if you are not doing well or get worse.     This information is not intended to replace advice given to you by your health care provider. Make sure you discuss any questions you have with your health care provider.     Document Released: 12/18/2006 Document Revised: 10/08/2014 Document Reviewed: 08/25/2014  [employer] Interactive Patient Education ©2017 [employer]  Acute Bronchitis  Bronchitis is inflammation of the airways that extend from the windpipe into the lungs (bronchi). The inflammation often causes mucus to develop. This leads to a cough, which is the most common symptom of bronchitis.   In acute bronchitis, the condition usually develops suddenly and goes away over time, usually in a couple weeks. Smoking, allergies, and asthma can make bronchitis worse. Repeated episodes of bronchitis may cause further lung problems.   CAUSES  Acute bronchitis is most often caused by the same virus that causes a cold. The virus can spread from person to person (contagious) through coughing, sneezing, and touching contaminated objects.  SIGNS AND SYMPTOMS   · Cough.    · Fever.    · Coughing up mucus.    · Body aches.    · Chest congestion.    · Chills.    · Shortness of breath.    · Sore throat.    DIAGNOSIS   Acute bronchitis is usually diagnosed through a physical exam. Your health care provider will also ask you questions about your medical history. Tests, such as chest  X-rays, are sometimes done to rule out other conditions.   TREATMENT   Acute bronchitis usually goes away in a couple weeks. Oftentimes, no medical treatment is necessary. Medicines are sometimes given for relief of fever or cough. Antibiotic medicines are usually not needed but may be prescribed in certain situations. In some cases, an inhaler may be recommended to help reduce shortness of breath and control the cough. A cool mist vaporizer may also be used to help thin bronchial secretions and make it easier to clear the chest.   HOME CARE INSTRUCTIONS  · Get plenty of rest.    · Drink enough fluids to keep your urine clear or pale yellow (unless you have a medical condition that requires fluid restriction). Increasing fluids may help thin your respiratory secretions (sputum) and reduce chest congestion, and it will prevent dehydration.    · Take medicines only as directed by your health care provider.  · If you were prescribed an antibiotic medicine, finish it all even if you start to feel better.  · Avoid smoking and secondhand smoke. Exposure to cigarette smoke or irritating chemicals will make bronchitis worse. If you are a smoker, consider using nicotine gum or skin patches to help control withdrawal symptoms. Quitting smoking will help your lungs heal faster.    · Reduce the chances of another bout of acute bronchitis by washing your hands frequently, avoiding people with cold symptoms, and trying not to touch your hands to your mouth, nose, or eyes.    · Keep all follow-up visits as directed by your health care provider.    SEEK MEDICAL CARE IF:  Your symptoms do not improve after 1 week of treatment.   SEEK IMMEDIATE MEDICAL CARE IF:  · You develop an increased fever or chills.    · You have chest pain.    · You have severe shortness of breath.  · You have bloody sputum.    · You develop dehydration.  · You faint or repeatedly feel like you are going to pass out.  · You develop repeated vomiting.  · You  develop a severe headache.  MAKE SURE YOU:   · Understand these instructions.  · Will watch your condition.  · Will get help right away if you are not doing well or get worse.     This information is not intended to replace advice given to you by your health care provider. Make sure you discuss any questions you have with your health care provider.     Document Released: 01/25/2006 Document Revised: 01/08/2016 Document Reviewed: 06/10/2014  [employer] Interactive Patient Education ©2017 [employer]

## 2017-10-08 NOTE — PROGRESS NOTES
Chief Complaint   Patient presents with   • Sore Throat     Subjective   Valeria Wilson is a 47 y.o. female who presents to the clinic today with complaints sore throat. She is coughing every few minutes. She has this in May and she had ventolin inhaler that she tried with some relief. She would like some promethazine DM for cough at night so she can sleep. 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   Sore Throat    This is a new problem. The current episode started in the past 7 days. The problem has been gradually worsening. Neither side of throat is experiencing more pain than the other. There has been no fever. The pain is moderate. Associated symptoms include congestion, coughing, headaches, a hoarse voice and shortness of breath. Pertinent negatives include no abdominal pain, diarrhea, drooling, ear discharge, ear pain, plugged ear sensation, stridor, swollen glands, trouble swallowing or vomiting. She has had no exposure to strep or mono. She has tried NSAIDs and cool liquids (proair inhaler) for the symptoms. The treatment provided mild relief.   Cough   This is a new problem. The current episode started in the past 7 days. The problem has been gradually worsening. The problem occurs every few minutes. The cough is non-productive. Associated symptoms include headaches, a sore throat, shortness of breath and wheezing. Pertinent negatives include no chest pain, chills, ear congestion, ear pain, fever, heartburn, myalgias, nasal congestion, postnasal drip, rash, rhinorrhea, sweats or weight loss. The symptoms are aggravated by lying down and exercise. She has tried a beta-agonist inhaler for the symptoms. The treatment provided mild relief. Her past medical history is significant for bronchitis. There is no history of asthma, bronchiectasis, COPD, emphysema, environmental allergies or pneumonia.         Current Outpatient Prescriptions:   •  Canagliflozin-Metformin HCl (INVOKAMET  PO), Take  by mouth., Disp: , Rfl:   •  cetirizine (zyrTEC) 10 MG tablet, Take 1 tablet by mouth Daily., Disp: 21 tablet, Rfl: 0  •  HYDROcodone-acetaminophen (NORCO) 7.5-325 MG per tablet, Take 1 tablet by mouth As Needed for moderate pain (4-6)., Disp: , Rfl:   •  pramipexole (MIRAPEX) 0.5 MG tablet, Take 0.5 mg by mouth Every Night., Disp: , Rfl:   •  SITagliptin (JANUVIA) 100 MG tablet, Take 100 mg by mouth Daily., Disp: , Rfl:   •  SUMAtriptan (IMITREX) 25 MG tablet, Take  by mouth 1 (One) Time As Needed for migraine., Disp: , Rfl:   •  triamterene-hydrochlorothiazide (DYAZIDE) 37.5-25 MG per capsule, Take 1 capsule by mouth Daily., Disp: , Rfl:   •  verapamil SR (CALAN-SR) 240 MG CR tablet, Take 240 mg by mouth Daily., Disp: , Rfl:   •  albuterol (PROVENTIL HFA;VENTOLIN HFA) 108 (90 Base) MCG/ACT inhaler, Inhale 2 puffs Every 4 (Four) Hours As Needed for Wheezing or Shortness of Air., Disp: 1 inhaler, Rfl: 0  •  azithromycin (ZITHROMAX Z-HENRIQUE) 250 MG tablet, Take 2 tablets the first day, then 1 tablet daily for 4 days., Disp: 6 tablet, Rfl: 0  •  MethylPREDNISolone (MEDROL, HENRIQUE,) 4 MG tablet, Take as directed on package instructions., Disp: 21 tablet, Rfl: 0  •  promethazine-dextromethorphan (PROMETHAZINE-DM) 6.25-15 MG/5ML syrup, Take 5 mL by mouth 4 (Four) Times a Day As Needed for Cough., Disp: 180 mL, Rfl: 0    Allergies:  Augmentin [amoxicillin-pot clavulanate]; Codeine; Hydroxyzine hcl; Ibuprofen; Meperidine; Norflex [orphenadrine]; Penicillins; Toradol [ketorolac tromethamine]; Vistaril [hydroxyzine]; and Cephalexin    Past Medical History:   Diagnosis Date   • Diabetes mellitus    • Hypertension    • Knee pain    • Migraine    • Restless leg      Past Surgical History:   Procedure Laterality Date   • CATARACT EXTRACTION     • CATARACT EXTRACTION     • CORNEAL TRANSPLANT     • ECTOPIC PREGNANCY     • JOINT REPLACEMENT     • KNEE ARTHROSCOPY W/ MENISCAL REPAIR     • KNEE MENISCAL REPAIR     • MENISCECTOMY  "Left 12/20/2016   • TUBAL ABDOMINAL LIGATION       Family History   Problem Relation Age of Onset   • No Known Problems Mother    • No Known Problems Father      Social History   Substance Use Topics   • Smoking status: Never Smoker   • Smokeless tobacco: Never Used   • Alcohol use Yes       Review of Systems  Review of Systems   Constitutional: Negative for chills, fever and weight loss.   HENT: Positive for congestion, hoarse voice and sore throat. Negative for drooling, ear discharge, ear pain, postnasal drip, rhinorrhea and trouble swallowing.    Respiratory: Positive for cough, shortness of breath and wheezing. Negative for stridor.    Cardiovascular: Negative for chest pain.   Gastrointestinal: Negative for abdominal pain, diarrhea, heartburn and vomiting.   Musculoskeletal: Negative for myalgias.   Skin: Negative for rash.   Allergic/Immunologic: Negative for environmental allergies.   Neurological: Positive for headaches.       Objective   /82 (BP Location: Left arm, Patient Position: Sitting, Cuff Size: Adult)  Pulse 90  Temp 98.8 °F (37.1 °C) (Tympanic)   Resp 20  Ht 61\" (154.9 cm)  Wt 250 lb (113 kg)  SpO2 97%  BMI 47.24 kg/m2      Physical Exam   Constitutional: She is oriented to person, place, and time. She appears well-developed and well-nourished.   HENT:   Head: Normocephalic.   Right Ear: Hearing, tympanic membrane, external ear and ear canal normal.   Left Ear: Hearing, tympanic membrane, external ear and ear canal normal.   Nose: Nose normal. Right sinus exhibits no maxillary sinus tenderness and no frontal sinus tenderness. Left sinus exhibits no maxillary sinus tenderness and no frontal sinus tenderness.   Mouth/Throat: Uvula is midline and mucous membranes are normal. Uvula swelling: bifurcated uvula. Posterior oropharyngeal erythema present. No oropharyngeal exudate or posterior oropharyngeal edema. Tonsils are 1+ on the right. Tonsils are 1+ on the left. No tonsillar exudate. "   Eyes: Pupils are equal, round, and reactive to light.   Neck: Normal range of motion. Neck supple.   Cardiovascular: Normal rate, regular rhythm and normal heart sounds.    Pulmonary/Chest: Effort normal and breath sounds normal. No stridor.   Lymphadenopathy:     She has no cervical adenopathy.   Neurological: She is alert and oriented to person, place, and time.   Psychiatric: She has a normal mood and affect. Her behavior is normal.   Vitals reviewed.      Assessment/Plan     Valeria was seen today for sore throat.    Diagnoses and all orders for this visit:    Bronchitis    Pharyngitis, unspecified etiology    Other orders  -     albuterol (PROVENTIL HFA;VENTOLIN HFA) 108 (90 Base) MCG/ACT inhaler; Inhale 2 puffs Every 4 (Four) Hours As Needed for Wheezing or Shortness of Air.  -     azithromycin (ZITHROMAX Z-HENRIQUE) 250 MG tablet; Take 2 tablets the first day, then 1 tablet daily for 4 days.  -     MethylPREDNISolone (MEDROL, HENRIQUE,) 4 MG tablet; Take as directed on package instructions.  -     promethazine-dextromethorphan (PROMETHAZINE-DM) 6.25-15 MG/5ML syrup; Take 5 mL by mouth 4 (Four) Times a Day As Needed for Cough.    Follow up if symptoms worsen or persist.

## 2017-10-19 DIAGNOSIS — M70.61 TROCHANTERIC BURSITIS OF RIGHT HIP: ICD-10-CM

## 2017-10-19 NOTE — TELEPHONE ENCOUNTER
Pt called to request refill on [facility]. Per rusty and pharmacy verification, pt due for refill on 10/27/2017

## 2017-10-23 RX ORDER — HYDROCODONE BITARTRATE AND ACETAMINOPHEN 5; 325 MG/1; MG/1
1 TABLET ORAL EVERY 8 HOURS PRN
Qty: 90 TABLET | Refills: 0 | Status: SHIPPED | OUTPATIENT
Start: 2017-10-27 | End: 2017-12-18 | Stop reason: DRUGHIGH

## 2017-10-24 ENCOUNTER — HOSPITAL ENCOUNTER (EMERGENCY)
Facility: HOSPITAL | Age: 47
Discharge: HOME | End: 2017-10-24
Admitting: EMERGENCY MEDICINE

## 2017-10-24 VITALS
HEIGHT: 61 IN | OXYGEN SATURATION: 100 % | SYSTOLIC BLOOD PRESSURE: 117 MMHG | WEIGHT: 256 LBS | HEART RATE: 89 BPM | TEMPERATURE: 98.2 F | RESPIRATION RATE: 18 BRPM | DIASTOLIC BLOOD PRESSURE: 91 MMHG | BODY MASS INDEX: 48.33 KG/M2

## 2017-10-24 DIAGNOSIS — B37.31 CANDIDA VAGINITIS: Primary | ICD-10-CM

## 2017-10-24 LAB
BILIRUB UR QL STRIP: NEGATIVE
CLARITY UR: CLEAR
COLOR UR: YELLOW
GLUCOSE UR STRIP-MCNC: ABNORMAL MG/DL
HGB UR QL STRIP.AUTO: NEGATIVE
KETONES UR QL STRIP: NEGATIVE
LEUKOCYTE ESTERASE UR QL STRIP.AUTO: NEGATIVE
NITRITE UR QL STRIP: NEGATIVE
PH UR STRIP.AUTO: 5.5 [PH] (ref 5–8)
PROT UR QL STRIP: NEGATIVE
SP GR UR STRIP: >1.03 (ref 1–1.03)
UROBILINOGEN UR QL STRIP: ABNORMAL

## 2017-10-24 PROCEDURE — 81003 URINALYSIS AUTO W/O SCOPE: CPT | Performed by: NURSE PRACTITIONER

## 2017-10-24 PROCEDURE — 99283 EMERGENCY DEPT VISIT LOW MDM: CPT

## 2017-10-24 RX ORDER — FLUCONAZOLE 150 MG/1
150 TABLET ORAL DAILY
Qty: 5 TABLET | Refills: 0 | Status: SHIPPED | OUTPATIENT
Start: 2017-10-24 | End: 2017-10-29

## 2017-10-24 NOTE — ED PROVIDER NOTES
Subjective   HPI Comments: Patient is a 47-year-old white female presents with dysuria for the past 3-4 days.  She denies any fever or chills.  She denies any nausea or vomiting.  She states she is also having some vaginal itching she states she just finished Zithromax about a week ago and the symptoms started after that.    Patient is a 47 y.o. female presenting with dysuria.   History provided by:  Patient   used: No    Female Dysuria       Review of Systems   Constitutional: Negative.    HENT: Negative.    Eyes: Negative.    Respiratory: Negative.    Cardiovascular: Negative.    Gastrointestinal: Negative.    Endocrine: Negative.    Genitourinary: Positive for dysuria.        Pt presents with dysuria for the past 3-4 days. She denies any nausea or vomiting. She denies fever or chills. She states that she also has had some vaginal itching within the last 3-4 days after completing zithromax   Musculoskeletal: Negative.    Skin: Negative.    Allergic/Immunologic: Negative.    Neurological: Negative.    Hematological: Negative.    Psychiatric/Behavioral: Negative.    All other systems reviewed and are negative.      Past Medical History:   Diagnosis Date   • Diabetes mellitus    • Hypertension    • Knee pain    • Migraine    • Restless leg        Allergies   Allergen Reactions   • Augmentin [Amoxicillin-Pot Clavulanate]    • Codeine    • Hydroxyzine Hcl Itching   • Ibuprofen    • Meperidine Hives   • Norflex [Orphenadrine]    • Penicillins    • Toradol [Ketorolac Tromethamine]    • Vistaril [Hydroxyzine]    • Cephalexin Rash       Past Surgical History:   Procedure Laterality Date   • CATARACT EXTRACTION     • CATARACT EXTRACTION     • CORNEAL TRANSPLANT     • ECTOPIC PREGNANCY     • JOINT REPLACEMENT     • KNEE ARTHROSCOPY W/ MENISCAL REPAIR     • KNEE MENISCAL REPAIR     • MENISCECTOMY Left 12/20/2016   • TUBAL ABDOMINAL LIGATION         Family History   Problem Relation Age of Onset   • No  Known Problems Mother    • No Known Problems Father        Social History     Social History   • Marital status: Single     Spouse name: N/A   • Number of children: N/A   • Years of education: N/A     Social History Main Topics   • Smoking status: Never Smoker   • Smokeless tobacco: Never Used   • Alcohol use Yes   • Drug use: No   • Sexual activity: Defer     Other Topics Concern   • None     Social History Narrative       Prior to Admission medications    Medication Sig Start Date End Date Taking? Authorizing Provider   albuterol (PROVENTIL HFA;VENTOLIN HFA) 108 (90 Base) MCG/ACT inhaler Inhale 2 puffs Every 4 (Four) Hours As Needed for Wheezing or Shortness of Air. 10/8/17   SHOLA Sanders   azithromycin (ZITHROMAX Z-HENRIQUE) 250 MG tablet Take 2 tablets the first day, then 1 tablet daily for 4 days. 10/8/17   SHOLA Sanders   Canagliflozin-Metformin HCl (INVOKAMET PO) Take  by mouth.    Historical Provider, MD   cetirizine (zyrTEC) 10 MG tablet Take 1 tablet by mouth Daily. 3/14/17   AsaCOLT Bowen   HYDROcodone-acetaminophen (NORCO) 7.5-325 MG per tablet Take 1 tablet by mouth As Needed for moderate pain (4-6).    Historical Provider, MD   MethylPREDNISolone (MEDROL, HENRIQUE,) 4 MG tablet Take as directed on package instructions. 10/8/17   SHOLA Sanders   pramipexole (MIRAPEX) 0.5 MG tablet Take 0.5 mg by mouth Every Night.    Historical Provider, MD   promethazine-dextromethorphan (PROMETHAZINE-DM) 6.25-15 MG/5ML syrup Take 5 mL by mouth 4 (Four) Times a Day As Needed for Cough. 10/8/17   SHOLA Sanders   SITagliptin (JANUVIA) 100 MG tablet Take 100 mg by mouth Daily.    Historical Provider, MD   SUMAtriptan (IMITREX) 25 MG tablet Take  by mouth 1 (One) Time As Needed for migraine.    Historical Provider, MD   triamterene-hydrochlorothiazide (DYAZIDE) 37.5-25 MG per capsule Take 1 capsule by mouth Daily.    Historical Provider, MD   verapamil SR  "(CALAN-SR) 240 MG CR tablet Take 240 mg by mouth Daily.    Historical Provider, MD       /91  Pulse 89  Temp 98.2 °F (36.8 °C)  Resp 18  Ht 61\" (154.9 cm)  Wt 256 lb (116 kg)  SpO2 100%  BMI 48.37 kg/m2    Objective   Physical Exam   Constitutional: She is oriented to person, place, and time. She appears well-developed and well-nourished.   HENT:   Head: Normocephalic and atraumatic.   Eyes: Conjunctivae and EOM are normal. Pupils are equal, round, and reactive to light.   Neck: Normal range of motion. Neck supple. No tracheal deviation present. No thyromegaly present.   Cardiovascular: Normal rate, regular rhythm, normal heart sounds and intact distal pulses.    Pulmonary/Chest: Effort normal and breath sounds normal. No respiratory distress. She has no wheezes. She has no rales. She exhibits no tenderness.   Abdominal: Soft. Bowel sounds are normal.   Musculoskeletal: Normal range of motion.   Neurological: She is alert and oriented to person, place, and time. She has normal reflexes. No cranial nerve deficit.   Skin: Skin is warm and dry.   Psychiatric: She has a normal mood and affect. Her behavior is normal. Judgment and thought content normal.   Nursing note and vitals reviewed.      Procedures         Lab Results (last 24 hours)     Procedure Component Value Units Date/Time    Urinalysis With / Culture If Indicated - Urine, Clean Catch [[id number]]  (Abnormal) Collected:  10/24/17 1025    Specimen:  Urine from Urine, Clean Catch Updated:  10/24/17 1041     Color, UA Yellow     Appearance, UA Clear     pH, UA 5.5     Specific Gravity, UA >1.030 (H)     Glucose, UA >=1000 mg/dL (3+) (A)     Ketones, UA Negative     Bilirubin, UA Negative     Blood, UA Negative     Protein, UA Negative     Leuk Esterase, UA Negative     Nitrite, UA Negative     Urobilinogen, UA 0.2 E.U./dL    Narrative:       Urine microscopic not indicated.          No orders to display       ED Course  ED Course   Comment By Time "   Reviewed results with pt. Will treat for candida. Will be discharged home soon in stable condition Janett Whittington, SHOLA 10/24 1047          MDM  Number of Diagnoses or Management Options  Candida vaginitis: minor     Amount and/or Complexity of Data Reviewed  Clinical lab tests: ordered and reviewed    Patient Progress  Patient progress: stable      Final diagnoses:   Candida vaginitis          Janett Whittington, SHOLA  10/24/17 5345

## 2017-10-27 ENCOUNTER — OFFICE VISIT (OUTPATIENT)
Dept: PRIMARY CARE CLINIC | Age: 47
End: 2017-10-27
Payer: MEDICAID

## 2017-10-27 VITALS
HEIGHT: 61 IN | BODY MASS INDEX: 48.33 KG/M2 | RESPIRATION RATE: 20 BRPM | HEART RATE: 88 BPM | SYSTOLIC BLOOD PRESSURE: 136 MMHG | OXYGEN SATURATION: 98 % | TEMPERATURE: 98 F | DIASTOLIC BLOOD PRESSURE: 84 MMHG | WEIGHT: 256 LBS

## 2017-10-27 DIAGNOSIS — R35.0 FREQUENCY OF URINATION: Primary | ICD-10-CM

## 2017-10-27 DIAGNOSIS — B37.31 VAGINAL YEAST INFECTION: ICD-10-CM

## 2017-10-27 DIAGNOSIS — R30.0 DYSURIA: ICD-10-CM

## 2017-10-27 LAB
BILIRUBIN, POC: ABNORMAL
BLOOD URINE, POC: ABNORMAL
CLARITY, POC: CLEAR
COLOR, POC: YELLOW
GLUCOSE URINE, POC: 500
KETONES, POC: ABNORMAL
LEUKOCYTE EST, POC: ABNORMAL
NITRITE, POC: ABNORMAL
PH, POC: 5.5
PROTEIN, POC: ABNORMAL
SPECIFIC GRAVITY, POC: 1.01
UROBILINOGEN, POC: 0.2

## 2017-10-27 PROCEDURE — G8427 DOCREV CUR MEDS BY ELIG CLIN: HCPCS | Performed by: NURSE PRACTITIONER

## 2017-10-27 PROCEDURE — 99213 OFFICE O/P EST LOW 20 MIN: CPT | Performed by: NURSE PRACTITIONER

## 2017-10-27 PROCEDURE — G8484 FLU IMMUNIZE NO ADMIN: HCPCS | Performed by: NURSE PRACTITIONER

## 2017-10-27 PROCEDURE — 81002 URINALYSIS NONAUTO W/O SCOPE: CPT | Performed by: NURSE PRACTITIONER

## 2017-10-27 PROCEDURE — 1036F TOBACCO NON-USER: CPT | Performed by: NURSE PRACTITIONER

## 2017-10-27 PROCEDURE — G8417 CALC BMI ABV UP PARAM F/U: HCPCS | Performed by: NURSE PRACTITIONER

## 2017-10-27 RX ORDER — NYSTATIN 100000 U/G
OINTMENT TOPICAL
Qty: 30 G | Refills: 1 | Status: SHIPPED | OUTPATIENT
Start: 2017-10-27 | End: 2018-04-03 | Stop reason: ALTCHOICE

## 2017-10-27 NOTE — PROGRESS NOTES
UA trace     Spec Grav, UA 1.010     Blood, UA POC -     pH, UA 5.5     Protein, UA POC -     Urobilinogen, UA 0.2     Leukocytes, UA -     Nitrite, UA -        Plan:     UA was negative for bacteria. Glucose was noted in urine. I did want to have A1c checked, but patient declined it at this time. We discussed the glucose in her urine and she still declined the a1c check. I am sending urine off to [facility] to check for STD and yeast.  We will call patient with these results once obtained. I am treating with nystatin. Educated patient on use of these ointment. She is to follow up within the next week for her DM management. Return in about 1 week (around 11/3/2017) for Diabetic Follow up. Orders Placed This Encounter   Procedures    POCT Urinalysis no Micro       Orders Placed This Encounter   Medications    nystatin (MYCOSTATIN) 365895 UNIT/GM ointment     Sig: Apply topically 2 times daily. Dispense:  30 g     Refill:  1        Patient given educational materials - see patient instructions. Discussed use, benefit, and side effects of prescribed medications. All patient questions answered. Pt voiced understanding. Reviewed health maintenance. Instructed to continue current medications, diet and exercise. Patient agreed with treatment plan. Follow up as directed.            Electronically signed by NANETTE Andrews on 10/30/2017 at 8:26 AM

## 2017-10-30 ASSESSMENT — ENCOUNTER SYMPTOMS
GASTROINTESTINAL NEGATIVE: 1
RESPIRATORY NEGATIVE: 1
EYES NEGATIVE: 1

## 2017-11-01 ENCOUNTER — TELEPHONE (OUTPATIENT)
Dept: PRIMARY CARE CLINIC | Age: 47
End: 2017-11-01

## 2017-11-01 RX ORDER — FLUCONAZOLE 150 MG/1
TABLET ORAL
Qty: 2 TABLET | Refills: 0 | Status: SHIPPED | OUTPATIENT
Start: 2017-11-01 | End: 2017-12-11

## 2017-11-01 RX ORDER — AZITHROMYCIN 500 MG/1
1000 TABLET, FILM COATED ORAL ONCE
Qty: 2 TABLET | Refills: 0 | Status: SHIPPED | OUTPATIENT
Start: 2017-11-01 | End: 2017-11-01

## 2017-11-01 RX ORDER — METRONIDAZOLE 500 MG/1
500 TABLET ORAL 2 TIMES DAILY
Qty: 20 TABLET | Refills: 0 | Status: SHIPPED | OUTPATIENT
Start: 2017-11-01 | End: 2017-11-11

## 2017-11-07 RX ORDER — SUMATRIPTAN 20 MG/1
SPRAY NASAL
Qty: 1 EACH | Refills: 1 | Status: SHIPPED | OUTPATIENT
Start: 2017-11-07 | End: 2017-12-11 | Stop reason: SDUPTHER

## 2017-11-07 RX ORDER — SUMATRIPTAN 100 MG/1
TABLET, FILM COATED ORAL
Qty: 9 TABLET | Refills: 0 | Status: SHIPPED | OUTPATIENT
Start: 2017-11-07 | End: 2017-12-02 | Stop reason: SDUPTHER

## 2017-11-10 ENCOUNTER — OFFICE VISIT (OUTPATIENT)
Dept: PRIMARY CARE CLINIC | Age: 47
End: 2017-11-10
Payer: MEDICAID

## 2017-11-10 VITALS
DIASTOLIC BLOOD PRESSURE: 86 MMHG | BODY MASS INDEX: 44.78 KG/M2 | TEMPERATURE: 98.6 F | SYSTOLIC BLOOD PRESSURE: 138 MMHG | WEIGHT: 237 LBS | OXYGEN SATURATION: 96 % | RESPIRATION RATE: 20 BRPM | HEART RATE: 76 BPM

## 2017-11-10 DIAGNOSIS — E11.65 TYPE 2 DIABETES MELLITUS WITH HYPERGLYCEMIA, WITHOUT LONG-TERM CURRENT USE OF INSULIN (HCC): ICD-10-CM

## 2017-11-10 DIAGNOSIS — M17.12 PRIMARY OSTEOARTHRITIS OF LEFT KNEE: Primary | ICD-10-CM

## 2017-11-10 PROCEDURE — 20610 DRAIN/INJ JOINT/BURSA W/O US: CPT | Performed by: FAMILY MEDICINE

## 2017-11-10 PROCEDURE — 1036F TOBACCO NON-USER: CPT | Performed by: FAMILY MEDICINE

## 2017-11-10 PROCEDURE — G8427 DOCREV CUR MEDS BY ELIG CLIN: HCPCS | Performed by: FAMILY MEDICINE

## 2017-11-10 PROCEDURE — G8417 CALC BMI ABV UP PARAM F/U: HCPCS | Performed by: FAMILY MEDICINE

## 2017-11-10 PROCEDURE — G8484 FLU IMMUNIZE NO ADMIN: HCPCS | Performed by: FAMILY MEDICINE

## 2017-11-10 PROCEDURE — 99213 OFFICE O/P EST LOW 20 MIN: CPT | Performed by: FAMILY MEDICINE

## 2017-11-10 PROCEDURE — 3046F HEMOGLOBIN A1C LEVEL >9.0%: CPT | Performed by: FAMILY MEDICINE

## 2017-11-10 NOTE — PATIENT INSTRUCTIONS
Get your labs checked in [address] of [address] building. Take it easy the first 2-3 days. Use ice and elevate the affected joint as needed. Call if you experience any fever or chills, spreading redness and rash from the injected area, or bleeding or large bruise or swelling or other abnormalities. Follow up in 1 months or sooner if needed.

## 2017-11-11 ENCOUNTER — HOSPITAL ENCOUNTER (EMERGENCY)
Age: 47
Discharge: HOME | End: 2017-11-11
Payer: MEDICAID

## 2017-11-11 VITALS
WEIGHT: 252 LBS | OXYGEN SATURATION: 97 % | BODY MASS INDEX: 47.58 KG/M2 | HEIGHT: 61 IN | DIASTOLIC BLOOD PRESSURE: 76 MMHG | RESPIRATION RATE: 20 BRPM | TEMPERATURE: 97.5 F | HEART RATE: 77 BPM | SYSTOLIC BLOOD PRESSURE: 119 MMHG

## 2017-11-11 DIAGNOSIS — R73.9 HYPERGLYCEMIA: Primary | ICD-10-CM

## 2017-11-11 DIAGNOSIS — G43.709 CHRONIC MIGRAINE WITHOUT AURA WITHOUT STATUS MIGRAINOSUS, NOT INTRACTABLE: ICD-10-CM

## 2017-11-11 LAB
GLUCOSE BLD-MCNC: 349 MG/DL
GLUCOSE BLD-MCNC: 349 MG/DL (ref 70–99)
GLUCOSE BLD-MCNC: 402 MG/DL
GLUCOSE BLD-MCNC: 402 MG/DL (ref 70–99)
PERFORMED ON: ABNORMAL
PERFORMED ON: ABNORMAL

## 2017-11-11 PROCEDURE — 99282 EMERGENCY DEPT VISIT SF MDM: CPT | Performed by: NURSE PRACTITIONER

## 2017-11-11 PROCEDURE — 96374 THER/PROPH/DIAG INJ IV PUSH: CPT

## 2017-11-11 PROCEDURE — 6360000002 HC RX W HCPCS: Performed by: NURSE PRACTITIONER

## 2017-11-11 PROCEDURE — 99283 EMERGENCY DEPT VISIT LOW MDM: CPT

## 2017-11-11 PROCEDURE — 6370000000 HC RX 637 (ALT 250 FOR IP): Performed by: NURSE PRACTITIONER

## 2017-11-11 PROCEDURE — 82948 REAGENT STRIP/BLOOD GLUCOSE: CPT

## 2017-11-11 PROCEDURE — 96372 THER/PROPH/DIAG INJ SC/IM: CPT

## 2017-11-11 RX ORDER — MORPHINE SULFATE 10 MG/ML
4 INJECTION, SOLUTION INTRAMUSCULAR; INTRAVENOUS ONCE
Status: DISCONTINUED | OUTPATIENT
Start: 2017-11-11 | End: 2017-11-11

## 2017-11-11 RX ORDER — ONDANSETRON 4 MG/1
4 TABLET, ORALLY DISINTEGRATING ORAL ONCE
Status: COMPLETED | OUTPATIENT
Start: 2017-11-11 | End: 2017-11-11

## 2017-11-11 RX ADMIN — ONDANSETRON 4 MG: 4 TABLET, ORALLY DISINTEGRATING ORAL at 21:37

## 2017-11-11 RX ADMIN — BUTORPHANOL TARTRATE 1 MG: 2 INJECTION, SOLUTION INTRAMUSCULAR; INTRAVENOUS at 21:36

## 2017-11-11 RX ADMIN — INSULIN HUMAN 10 UNITS: 100 INJECTION, SOLUTION PARENTERAL at 21:37

## 2017-11-11 RX ADMIN — BUTORPHANOL TARTRATE 1 MG: 2 INJECTION, SOLUTION INTRAMUSCULAR; INTRAVENOUS at 23:16

## 2017-11-11 ASSESSMENT — PAIN DESCRIPTION - FREQUENCY: FREQUENCY: CONTINUOUS

## 2017-11-11 ASSESSMENT — PAIN SCALES - GENERAL
PAINLEVEL_OUTOF10: 7
PAINLEVEL_OUTOF10: 6
PAINLEVEL_OUTOF10: 7
PAINLEVEL_OUTOF10: 6

## 2017-11-11 ASSESSMENT — ENCOUNTER SYMPTOMS
EYE PAIN: 1
VOMITING: 1
SORE THROAT: 0
BLURRED VISION: 1
NAUSEA: 1

## 2017-11-11 ASSESSMENT — PAIN DESCRIPTION - PAIN TYPE: TYPE: ACUTE PAIN

## 2017-11-11 ASSESSMENT — PAIN DESCRIPTION - LOCATION: LOCATION: HEAD

## 2017-11-12 NOTE — ED PROVIDER NOTES
[facility]  eMERGENCY dEPARTMENT eNCOUnter      Pt Name: Shorty Delgado  MRN: [id number]  [place] 1970  Date of evaluation: 11/11/2017  Provider: NANETTE Torres    CHIEF COMPLAINT       Chief Complaint   Patient presents with    Migraine         HISTORY OF PRESENT ILLNESS   (Location/Symptom, Timing/Onset, Context/Setting, Quality, Duration, Modifying Factors, Severity)  Note limiting factors. Shorty Delgado is a 41 y.o. female who presents to the emergency department with a headache that started yesterday. It is behind her left eye. SHe has a history of migraines. She took immitrex without relief. This feels like her usual. SHe has been under a lot of stress. She also had a steroid shot to her knee and her BS has been elevated. No neck pain. NO fever. No concerning neurologic symptoms    The history is provided by the patient. Headache   Pain location:  L temporal  Quality:  Sharp and stabbing  Radiates to:  Does not radiate  Onset quality:  Sudden  Duration:  1 day  Timing:  Constant  Progression:  Worsening  Chronicity:  New  Similar to prior headaches: yes    Ineffective treatments:  Prescription medications, NSAIDs and resting in a darkened room  Associated symptoms: blurred vision, eye pain, nausea and vomiting    Associated symptoms: no congestion, no loss of balance, no neck pain, no neck stiffness, no seizures and no sore throat        Nursing Notes were reviewed. REVIEW OF SYSTEMS    (2-9 systems for level 4, 10 or more for level 5)     Review of Systems   HENT: Negative for congestion and sore throat. Eyes: Positive for blurred vision and pain. Gastrointestinal: Positive for nausea and vomiting. Musculoskeletal: Negative for neck pain and neck stiffness. Neurological: Positive for headaches. Negative for seizures and loss of balance. Except as noted above the remainder of the review of systems was reviewed and negative.        PAST MEDICAL HISTORY     Past Medical History:   Diagnosis Date    Anxiety     Constipation     Depression     DM [facility]diabetes mellitus) ([facility] [address]     Headache(784.0)     Hyperlipidemia     Hypertension     Kidney stones     Kidney stones     Restless leg     Restless legs syndrome          SURGICAL HISTORY       Past Surgical History:   Procedure Laterality Date    ECTOPIC PREGNANCY SURGERY      EYE SURGERY      cornea transplant and cataracts removed    KNEE CARTILAGE SURGERY Left 12/20/2016    KNEE ARTHROSCOPIC PARTIAL MEDIAL MENISCECTOMY performed by Stacey Hannon MD at [address]       Previous Medications    BASAGLAR KWIKPEN 100 UNIT/ML INJECTION PEN    Inject 20 Units into the skin nightly     BLOOD GLUCOSE MONITORING SUPPL ([address]) W/DEVICE KIT    1 kit by Does not apply route daily as needed (Dx 250.00)    CANAGLIFLOZIN-METFORMIN HCL (INVOKAMET) 150-1000 MG TABLET    Take 1 tablet by mouth 2 times daily (with meals)    CELECOXIB (CELEBREX) 200 MG CAPSULE    TAKE 1 CAPSULE BY MOUTH DAILY    CREAM BASE CREA    Apply 1-2 pumps to affected area 3-4 times daily    CYCLOBENZAPRINE (FLEXERIL) 10 MG TABLET    Take 10 mg by mouth 3 times daily as needed for Muscle spasms    DICLOFENAC SODIUM (VOLTAREN) 1 % GEL    Apply 4 g topically 4 times daily    FLUCONAZOLE (DIFLUCAN) 150 MG TABLET    Take 1 tablet now and then 1 tablet after completion of antibiotics    FLUOXETINE (PROZAC) 40 MG CAPSULE    TAKE 1 CAPSULE BY MOUTH DAILY    FLUTICASONE (FLONASE) 50 MCG/ACT NASAL SPRAY    SHAKE LIQUID AND USE 1 SPRAY IN EACH NOSTRIL DAILY    FREESTYLE LANCETS [facility]    DISPENSE BRAND PER INSURANCE BENEFITS. .00    FUROSEMIDE (LASIX) 20 MG TABLET    Take 1 tablet by mouth daily    GABAPENTIN (NEURONTIN) 300 MG CAPSULE    Take 1 capsule by mouth 3 times daily    GLUCOSE BLOOD VI TEST STRIPS (ASCENSIA AUTODISC VI;ONE TOUCH ULTRA TEST VI) STRIP    1 each by In Vitro route daily As needed. HYDROCODONE-ACETAMINOPHEN (NORCO) 5-325 MG PER TABLET    Take 1 tablet by mouth every 8 hours as needed for Pain . INSULIN PEN NEEDLE 31G X 8 MM MISC    1 each by Does not apply route daily    METRONIDAZOLE (FLAGYL) 500 MG TABLET    Take 1 tablet by mouth 2 times daily for 10 days    NYSTATIN (MYCOSTATIN) 846819 UNIT/GM OINTMENT    Apply topically 2 times daily. ONE TOUCH LANCETS MISC    1 each by Does not apply route daily    POLYETHYLENE GLYCOL (MIRALAX) POWDER    Take 17 g by mouth daily for chronic constipation. PRAMIPEXOLE (MIRAPEX) 0.5 MG TABLET    TAKE 1 TABLET BY MOUTH TWICE DAILY    PROMETHAZINE (PHENERGAN) 25 MG TABLET    TAKE 1 TABLET BY MOUTH EVERY 6 HOURS AS NEEDED FOR NAUSEA    SITAGLIPTIN (JANUVIA) 100 MG TABLET    TAKE 1 TABLET BY MOUTH DAILY    SUMATRIPTAN (IMITREX) 100 MG TABLET    TAKE 1 TABLET BY MOUTH AS NEEDED FOR HEADACHE, MAY REPEAT 1 DOSE IF NEEDED. DO NOT EXCEED 2 TABLETS IN A 24 HOUR PERIOD    SUMATRIPTAN (IMITREX) 20 MG/ACT NASAL SPRAY    USE ONE SPRAY AT ONSET OF HEADACHE, MAY REPEAT IN ONE HOUR IF NEEDED. MAX TWO SPRAYS PER 24 HOURS    TRIAMTERENE-HYDROCHLOROTHIAZIDE (DYAZIDE) 37.5-25 MG PER CAPSULE    TAKE 1 CAPSULE BY MOUTH ONCE DAILY IN THE MORNING    VERAPAMIL (CALAN SR) 240 MG EXTENDED RELEASE TABLET    Take 1 tablet by mouth nightly    VITAMIN B-12 (CYANOCOBALAMIN) 1000 MCG TABLET    Take 1,000 mcg by mouth daily    VITAMIN D (CHOLECALCIFEROL) 1000 UNITS CAPS CAPSULE    Take 1,000 Units by mouth daily       ALLERGIES     Compazine [prochlorperazine maleate]; Demerol; Ibuprofen; Orphenadrine citrate; Penicillins; Vistaril [hydroxyzine hcl]; Avelox [moxifloxacin];  Codeine; Doxycycline; Keflex [cephalexin]; and Ketorolac tromethamine    FAMILY HISTORY       Family History   Problem Relation Age of Onset    Cancer Father     Cancer Maternal Grandmother     COPD Maternal Grandmother     Cancer Maternal Grandfather           SOCIAL HISTORY       Social History     Social (36.8 °C)   TempSrc: Temporal   SpO2: 99%   Weight: 252 lb (114.3 kg)   Height: 5' 1\" (1.549 m)           MDM      CONSULTS:  None    PROCEDURES:  Unless otherwise noted below, none     Procedures    FINAL IMPRESSION      1. Hyperglycemia    2.  Chronic migraine without aura without status migrainosus, not intractable          DISPOSITION/PLAN   DISPOSITION Decision To Discharge    PATIENT REFERRED TO:  Ginette Estrada MD  [address] [place] [address] [id number]  [place] [phone]            DISCHARGE MEDICATIONS:  New Prescriptions    No medications on file          (Please note that portions of this note were completed with a voice recognition program.  Efforts were made to edit the dictations but occasionally words are mis-transcribed.)    NANETTE Jacobsen (electronically signed)          NANETTE Jacobsen  11/11/17 0354

## 2017-11-14 RX ORDER — PRAMIPEXOLE DIHYDROCHLORIDE 0.5 MG/1
TABLET ORAL
Qty: 60 TABLET | Refills: 0 | Status: SHIPPED | OUTPATIENT
Start: 2017-11-14 | End: 2017-12-02 | Stop reason: SDUPTHER

## 2017-11-15 DIAGNOSIS — J01.40 ACUTE NON-RECURRENT PANSINUSITIS: ICD-10-CM

## 2017-11-16 RX ORDER — METHYLPREDNISOLONE 4 MG/1
TABLET ORAL
Qty: 1 KIT | Refills: 0 | Status: SHIPPED | OUTPATIENT
Start: 2017-11-16 | End: 2017-11-21

## 2017-11-18 RX ORDER — TRIAMCINOLONE ACETONIDE 40 MG/ML
40 INJECTION, SUSPENSION INTRA-ARTICULAR; INTRAMUSCULAR ONCE
Status: COMPLETED | OUTPATIENT
Start: 2017-11-18 | End: 2017-11-22

## 2017-11-18 ASSESSMENT — ENCOUNTER SYMPTOMS
COUGH: 0
VOMITING: 0
NAUSEA: 0
CHEST TIGHTNESS: 0
WHEEZING: 0
SHORTNESS OF BREATH: 0
DIARRHEA: 0
CONSTIPATION: 0
ABDOMINAL PAIN: 0

## 2017-11-19 NOTE — PROGRESS NOTES
Bran Ann is a 41 y.o. female who presents today for   Chief Complaint   Patient presents with    Joint Pain       HPI  Patient is here for L knee pain. Chronic, has h/o medial meniscus surgery which has helped overall but still remains. Also due for f/u DM as it is overdue. Not checking sugars daily. No change in PMH, family, social, or surgical history unless mentioned above. Review of Systems   Constitutional: Negative for chills and fever. Respiratory: Negative for cough, chest tightness, shortness of breath and wheezing. Cardiovascular: Negative for chest pain, palpitations and leg swelling. Gastrointestinal: Negative for abdominal pain, constipation, diarrhea, nausea and vomiting. Genitourinary: Negative for difficulty urinating, dysuria and frequency. Musculoskeletal: Positive for arthralgias and gait problem. Past Medical History:   Diagnosis Date    Anxiety     Constipation     Depression     DM [facility]diabetes mellitus) ([facility] [address]     Headache(784.0)     Hyperlipidemia     Hypertension     Kidney stones     Kidney stones     Restless leg     Restless legs syndrome        Current Outpatient Prescriptions   Medication Sig Dispense Refill    SUMAtriptan (IMITREX) 20 MG/ACT nasal spray USE ONE SPRAY AT ONSET OF HEADACHE, MAY REPEAT IN ONE HOUR IF NEEDED. MAX TWO SPRAYS PER 24 HOURS 1 each 1    SUMAtriptan (IMITREX) 100 MG tablet TAKE 1 TABLET BY MOUTH AS NEEDED FOR HEADACHE, MAY REPEAT 1 DOSE IF NEEDED. DO NOT EXCEED 2 TABLETS IN A 24 HOUR PERIOD 9 tablet 0    fluconazole (DIFLUCAN) 150 MG tablet Take 1 tablet now and then 1 tablet after completion of antibiotics 2 tablet 0    nystatin (MYCOSTATIN) 722649 UNIT/GM ointment Apply topically 2 times daily. 30 g 1    HYDROcodone-acetaminophen (NORCO) 5-325 MG per tablet Take 1 tablet by mouth every 8 hours as needed for Pain .  90 tablet 0    gabapentin (NEURONTIN) 300 MG capsule Take 1 capsule by mouth 3 times daily 90 mouth daily      Vitamin D (CHOLECALCIFEROL) 1000 UNITS CAPS capsule Take 1,000 Units by mouth daily      methylPREDNISolone (MEDROL DOSEPACK) 4 MG tablet Take by mouth. 1 kit 0    pramipexole (MIRAPEX) 0.5 MG tablet TAKE 1 TABLET BY MOUTH TWICE DAILY 60 tablet 0    diclofenac sodium (VOLTAREN) 1 % GEL Apply 4 g topically 4 times daily 5 Tube 0     No current facility-administered medications for this visit. Allergies   Allergen Reactions    Compazine [Prochlorperazine Maleate] Shortness Of Breath    Demerol Hives    Ibuprofen Nausea Only    Orphenadrine Citrate Itching    Penicillins Hives and Nausea Only    Vistaril [Hydroxyzine Hcl] Itching    Avelox [Moxifloxacin] Nausea And Vomiting    Codeine Nausea And Vomiting and Rash    Doxycycline Nausea And Vomiting    Keflex [Cephalexin] Rash    Ketorolac Tromethamine Itching and Nausea And Vomiting       Past Surgical History:   Procedure Laterality Date    ECTOPIC PREGNANCY SURGERY      EYE SURGERY      cornea transplant and cataracts removed    KNEE CARTILAGE SURGERY Left 12/20/2016    KNEE ARTHROSCOPIC PARTIAL MEDIAL MENISCECTOMY performed by Silvino Sarkar MD at [address] [place] [address]         Social History   Substance Use Topics    Smoking status: Never Smoker    Smokeless tobacco: Never Used    Alcohol use Yes      Comment: social       Family History   Problem Relation Age of Onset    Cancer Father     Cancer Maternal Grandmother     COPD Maternal Grandmother     Cancer Maternal Grandfather        /86   Pulse 76   Temp 98.6 °F (37 °C) (Temporal)   Resp 20   Wt 237 lb (107.5 kg)   SpO2 96%   BMI 44.78 kg/m²     Physical Exam   Constitutional: She is oriented to person, place, and time. She appears well-developed and well-nourished. No distress. HENT:   Head: Normocephalic and atraumatic. Cardiovascular: Normal rate, regular rhythm and normal heart sounds. No murmur heard.   Pulmonary/Chest: Effort normal and w/o issue or resistance. 8.0 cc of 1% lidocaine w/o epinephrine and 1.0 cc of 40 mg/mL kenalog was administered. A bandage was applied directly thereafter. All bleeding stopped. EBL - 0 cc. Pt was instructed on basic cleansing and rest of affected area. Pt noted some relief prior to leaving the office. Orders Placed This Encounter   Procedures    Hemoglobin A1C     Standing Status:   Future     Standing Expiration Date:   11/10/2018    Microalbumin / Creatinine Urine Ratio     Standing Status:   Future     Standing Expiration Date:   11/10/2018    Comprehensive Metabolic Panel     Standing Status:   Future     Standing Expiration Date:   11/10/2018     No orders of the defined types were placed in this encounter. There are no discontinued medications. Patient Instructions   Get your labs checked in [address]. Take it easy the first 2-3 days. Use ice and elevate the affected joint as needed. Call if you experience any fever or chills, spreading redness and rash from the injected area, or bleeding or large bruise or swelling or other abnormalities. Follow up in 1 months or sooner if needed. Patient given educational handouts and has had all questions answered. Patient voices understanding and agrees to plans along with risks and benefits of plan. Patient is instructed to continue prior meds, diet, and exercise plans unless instructed otherwise. Patient agrees to follow up as instructed and sooner if needed. Patient agrees to go to ER if condition becomes emergent. Notes may be completed with dictation device and spelling errors may occur. Return in about 4 weeks (around 12/8/2017) for DM, f/u labs.

## 2017-11-20 DIAGNOSIS — M70.61 TROCHANTERIC BURSITIS OF RIGHT HIP: ICD-10-CM

## 2017-11-20 NOTE — TELEPHONE ENCOUNTER
Pt called to report that Neurontin 300mg tid was causing her to have swelling. Pt states that she has taken 100mg in past with no difficulty. Pt also requested increase in pain medications. Per [facility] pt to be prescribed Neurontin 100mg 1 tablet in AM 1 tablet at noon and 2 tablets at HS. Pt also notified that per [facility] FITZPATRICK pt will be increased to Norco 5 QID prn at next refill. Pt verbalized understanding of this.  Pt due for refill on 11/26/2017

## 2017-11-21 RX ORDER — HYDROCODONE BITARTRATE AND ACETAMINOPHEN 5; 325 MG/1; MG/1
1 TABLET ORAL EVERY 6 HOURS PRN
Qty: 120 TABLET | Refills: 0 | Status: SHIPPED | OUTPATIENT
Start: 2017-11-26 | End: 2017-12-18 | Stop reason: SDUPTHER

## 2017-11-21 RX ORDER — GABAPENTIN 100 MG/1
CAPSULE ORAL
Qty: 120 CAPSULE | Refills: 2 | Status: SHIPPED | OUTPATIENT
Start: 2017-11-21 | End: 2018-03-01 | Stop reason: SDUPTHER

## 2017-11-22 RX ADMIN — TRIAMCINOLONE ACETONIDE 40 MG: 40 INJECTION, SUSPENSION INTRA-ARTICULAR; INTRAMUSCULAR at 16:50

## 2017-12-02 ENCOUNTER — TELEPHONE (OUTPATIENT)
Dept: PRIMARY CARE CLINIC | Age: 47
End: 2017-12-02

## 2017-12-03 RX ORDER — PRAMIPEXOLE DIHYDROCHLORIDE 0.5 MG/1
TABLET ORAL
Qty: 60 TABLET | Refills: 5 | Status: SHIPPED | OUTPATIENT
Start: 2017-12-03 | End: 2018-05-30 | Stop reason: SDUPTHER

## 2017-12-03 RX ORDER — SUMATRIPTAN 100 MG/1
TABLET, FILM COATED ORAL
Qty: 9 TABLET | Refills: 0 | Status: SHIPPED | OUTPATIENT
Start: 2017-12-03 | End: 2018-01-08 | Stop reason: SDUPTHER

## 2017-12-11 ENCOUNTER — OFFICE VISIT (OUTPATIENT)
Dept: PRIMARY CARE CLINIC | Age: 47
End: 2017-12-11
Payer: MEDICAID

## 2017-12-11 VITALS
OXYGEN SATURATION: 98 % | BODY MASS INDEX: 46.63 KG/M2 | DIASTOLIC BLOOD PRESSURE: 82 MMHG | SYSTOLIC BLOOD PRESSURE: 110 MMHG | RESPIRATION RATE: 20 BRPM | TEMPERATURE: 98 F | HEART RATE: 89 BPM | WEIGHT: 247 LBS | HEIGHT: 61 IN

## 2017-12-11 DIAGNOSIS — E11.65 TYPE 2 DIABETES MELLITUS WITH HYPERGLYCEMIA, WITHOUT LONG-TERM CURRENT USE OF INSULIN (HCC): ICD-10-CM

## 2017-12-11 DIAGNOSIS — E11.65 UNCONTROLLED TYPE 2 DIABETES MELLITUS WITH HYPERGLYCEMIA, WITHOUT LONG-TERM CURRENT USE OF INSULIN (HCC): Primary | ICD-10-CM

## 2017-12-11 DIAGNOSIS — M25.562 CHRONIC PAIN OF LEFT KNEE: ICD-10-CM

## 2017-12-11 DIAGNOSIS — I10 ESSENTIAL HYPERTENSION: ICD-10-CM

## 2017-12-11 DIAGNOSIS — G89.29 CHRONIC PAIN OF LEFT KNEE: ICD-10-CM

## 2017-12-11 PROBLEM — M70.61 TROCHANTERIC BURSITIS OF RIGHT HIP: Status: RESOLVED | Noted: 2017-07-10 | Resolved: 2017-12-11

## 2017-12-11 LAB
ALBUMIN SERPL-MCNC: 4.3 G/DL (ref 3.5–5.2)
ALP BLD-CCNC: 55 U/L (ref 35–104)
ALT SERPL-CCNC: 17 U/L (ref 5–33)
ANION GAP SERPL CALCULATED.3IONS-SCNC: 21 MMOL/L (ref 7–19)
AST SERPL-CCNC: 13 U/L (ref 5–32)
BILIRUB SERPL-MCNC: 0.5 MG/DL (ref 0.2–1.2)
BUN BLDV-MCNC: 10 MG/DL (ref 6–20)
CALCIUM SERPL-MCNC: 9.5 MG/DL (ref 8.6–10)
CHLORIDE BLD-SCNC: 93 MMOL/L (ref 98–111)
CO2: 24 MMOL/L (ref 22–29)
CREAT SERPL-MCNC: 0.7 MG/DL (ref 0.5–0.9)
CREATININE URINE: 68.6 MG/DL (ref 4.2–622)
GFR NON-AFRICAN AMERICAN: >60
GLUCOSE BLD-MCNC: 436 MG/DL (ref 74–109)
HBA1C MFR BLD: 13.5 %
MICROALBUMIN UR-MCNC: <1.2 MG/DL (ref 0–19)
MICROALBUMIN/CREAT UR-RTO: NORMAL MG/G
POTASSIUM SERPL-SCNC: 3.8 MMOL/L (ref 3.5–5)
SODIUM BLD-SCNC: 138 MMOL/L (ref 136–145)
TOTAL PROTEIN: 7.4 G/DL (ref 6.6–8.7)

## 2017-12-11 PROCEDURE — 99214 OFFICE O/P EST MOD 30 MIN: CPT | Performed by: FAMILY MEDICINE

## 2017-12-11 PROCEDURE — G8417 CALC BMI ABV UP PARAM F/U: HCPCS | Performed by: FAMILY MEDICINE

## 2017-12-11 PROCEDURE — G8484 FLU IMMUNIZE NO ADMIN: HCPCS | Performed by: FAMILY MEDICINE

## 2017-12-11 PROCEDURE — G8427 DOCREV CUR MEDS BY ELIG CLIN: HCPCS | Performed by: FAMILY MEDICINE

## 2017-12-11 PROCEDURE — 3046F HEMOGLOBIN A1C LEVEL >9.0%: CPT | Performed by: FAMILY MEDICINE

## 2017-12-11 PROCEDURE — 1036F TOBACCO NON-USER: CPT | Performed by: FAMILY MEDICINE

## 2017-12-11 RX ORDER — PROMETHAZINE HYDROCHLORIDE 25 MG/1
TABLET ORAL
Qty: 30 TABLET | Refills: 3 | Status: SHIPPED | OUTPATIENT
Start: 2017-12-11 | End: 2018-06-25 | Stop reason: SDUPTHER

## 2017-12-11 RX ORDER — SUMATRIPTAN 20 MG/1
SPRAY NASAL
Qty: 1 EACH | Refills: 1 | Status: SHIPPED | OUTPATIENT
Start: 2017-12-11 | End: 2018-03-12 | Stop reason: SDUPTHER

## 2017-12-11 RX ORDER — LANCETS 30 GAUGE
EACH MISCELLANEOUS
Qty: 100 EACH | Refills: 3 | Status: SHIPPED | OUTPATIENT
Start: 2017-12-11 | End: 2018-04-03 | Stop reason: SDUPTHER

## 2017-12-11 ASSESSMENT — ENCOUNTER SYMPTOMS
CHEST TIGHTNESS: 0
CONSTIPATION: 0
SHORTNESS OF BREATH: 0
VOMITING: 0
NAUSEA: 0
ABDOMINAL PAIN: 0
COUGH: 0
WHEEZING: 0
DIARRHEA: 0

## 2017-12-11 NOTE — PROGRESS NOTES
KWIKPEN 100 UNIT/ML injection pen Inject 20 Units into the skin nightly       Insulin Pen Needle 31G X 8 MM MISC 1 each by Does not apply route daily 100 each 3    verapamil (CALAN SR) 240 MG extended release tablet Take 1 tablet by mouth nightly 30 tablet 11    furosemide (LASIX) 20 MG tablet Take 1 tablet by mouth daily (Patient taking differently: Take 20 mg by mouth daily as needed ) 30 tablet 5    celecoxib (CELEBREX) 200 MG capsule TAKE 1 CAPSULE BY MOUTH DAILY 60 capsule 3    FLUoxetine (PROZAC) 40 MG capsule TAKE 1 CAPSULE BY MOUTH DAILY 30 capsule 11    polyethylene glycol (MIRALAX) powder Take 17 g by mouth daily for chronic constipation. 510 g 5    FREESTYLE LANCETS [facility] DISPENSE BRAND PER INSURANCE BENEFITS. .00 100 each 3    vitamin B-12 (CYANOCOBALAMIN) 1000 MCG tablet Take 1,000 mcg by mouth daily      Vitamin D (CHOLECALCIFEROL) 1000 UNITS CAPS capsule Take 1,000 Units by mouth daily      diclofenac sodium (VOLTAREN) 1 % GEL Apply 4 g topically 4 times daily 5 Tube 0     No current facility-administered medications for this visit.         Allergies   Allergen Reactions    Compazine [Prochlorperazine Maleate] Shortness Of Breath    Demerol Hives    Ibuprofen Nausea Only    Orphenadrine Citrate Itching    Penicillins Hives and Nausea Only    Vistaril [Hydroxyzine Hcl] Itching    Avelox [Moxifloxacin] Nausea And Vomiting    Codeine Nausea And Vomiting and Rash    Doxycycline Nausea And Vomiting    Keflex [Cephalexin] Rash    Ketorolac Tromethamine Itching and Nausea And Vomiting       Past Surgical History:   Procedure Laterality Date    ECTOPIC PREGNANCY SURGERY      EYE SURGERY      cornea transplant and cataracts removed    KNEE CARTILAGE SURGERY Left 12/20/2016    KNEE ARTHROSCOPIC PARTIAL MEDIAL MENISCECTOMY performed by Starr Moreno MD at [address] [place] [address]         Social History   Substance Use Topics    Smoking status: Never Smoker    Smokeless tobacco: Never Used    Alcohol use Yes      Comment: social       Family History   Problem Relation Age of Onset    Cancer Father     Cancer Maternal Grandmother     COPD Maternal Grandmother     Cancer Maternal Grandfather        /82   Pulse 89   Temp 98 °F (36.7 °C) (Temporal)   Resp 20   Ht 5' 1\" (1.549 m)   Wt 247 lb (112 kg)   LMP 10/20/2017   SpO2 98%   BMI 46.67 kg/m²     Physical Exam   Constitutional: She is oriented to person, place, and time. She appears well-developed and well-nourished. No distress. HENT:   Head: Normocephalic and atraumatic. Cardiovascular: Normal rate, regular rhythm and normal heart sounds. No murmur heard. Pulmonary/Chest: Effort normal and breath sounds normal. No respiratory distress. She has no wheezes. She has no rales. Abdominal: Soft. Bowel sounds are normal. She exhibits no distension. There is no tenderness. Musculoskeletal:        Left knee: She exhibits decreased range of motion. Tenderness found. Medial joint line and lateral joint line tenderness noted. No pretibial edema b/l. Neurological: She is alert and oriented to person, place, and time. Skin: Skin is warm and dry. Psychiatric: Her speech is normal and behavior is normal. Judgment normal. Her mood appears not anxious. Cognition and memory are normal. She does not exhibit a depressed mood. She expresses no homicidal and no suicidal ideation. Nursing note and vitals reviewed. Monofilament Exam Reveals:  Pulses: normal  Edema:normal  Skin Lesions:normal  Right Foot:    Left Foot:  Normal sensation at 1-10   Normal sensation at 1-10   Diminished sensation at n/a   Diminished sensation at n/a   No sensation at n/a    No sensation at n/a     Assessment:    ICD-10-CM ICD-9-CM    1. Uncontrolled type 2 diabetes mellitus with hyperglycemia, without long-term current use of insulin ([facility]) E11.65 250.02    2. Chronic pain of left knee M25.562 719.46     G89.29 338.29    3.  Essential Medications Discontinued During This Encounter   Medication Reason    fluconazole (DIFLUCAN) 150 MG tablet     sitaGLIPtin (JANUVIA) 100 MG tablet     SUMAtriptan (IMITREX) 20 MG/ACT nasal spray Reorder    promethazine (PHENERGAN) 25 MG tablet Reorder    canagliflozin-metformin HCl (INVOKAMET) 150-1000 MG tablet      Patient Instructions   Start insulin at night at 10 units. Increase by 2 units at night until 20 units obtained and continue. Check your blood sugar in the morning before breakfast every day. Start Behzad Danes in the morning before breakfast at 0.6 units. After 4 weeks increase to 1.2 units. Start metformin 1 pill a day with breakfast.  You may have some loose stools and nausea but this typically resolves after 2 weeks. Please arrive 15 minutes early to next follow up appointment in 1 months or schedule an appointment sooner if needed. Patient given educational handouts and has had all questions answered. Patient voices understanding and agrees to plans along with risks and benefits of plan. Patient is instructed to continue prior meds, diet, and exercise plans unless instructed otherwise. Patient agrees to follow up as instructed and sooner if needed. Patient agrees to go to ER if condition becomes emergent. Notes may be completed with dictation device and spelling errors may occur. Return in about 4 weeks (around 1/8/2018) for f/u insulin.

## 2017-12-14 ENCOUNTER — HOSPITAL ENCOUNTER (EMERGENCY)
Age: 47
Discharge: HOME | End: 2017-12-14
Payer: MEDICAID

## 2017-12-14 VITALS
HEIGHT: 61 IN | DIASTOLIC BLOOD PRESSURE: 94 MMHG | RESPIRATION RATE: 20 BRPM | OXYGEN SATURATION: 94 % | BODY MASS INDEX: 45.31 KG/M2 | SYSTOLIC BLOOD PRESSURE: 137 MMHG | HEART RATE: 91 BPM | WEIGHT: 240 LBS | TEMPERATURE: 96.3 F

## 2017-12-14 DIAGNOSIS — E11.65 TYPE 2 DIABETES MELLITUS WITH HYPERGLYCEMIA, WITHOUT LONG-TERM CURRENT USE OF INSULIN (HCC): ICD-10-CM

## 2017-12-14 DIAGNOSIS — G43.709 CHRONIC MIGRAINE WITHOUT AURA WITHOUT STATUS MIGRAINOSUS, NOT INTRACTABLE: Primary | ICD-10-CM

## 2017-12-14 LAB
GLUCOSE BLD-MCNC: 289 MG/DL
GLUCOSE BLD-MCNC: 289 MG/DL (ref 70–99)
PERFORMED ON: ABNORMAL

## 2017-12-14 PROCEDURE — 99283 EMERGENCY DEPT VISIT LOW MDM: CPT | Performed by: NURSE PRACTITIONER

## 2017-12-14 PROCEDURE — 99283 EMERGENCY DEPT VISIT LOW MDM: CPT

## 2017-12-14 PROCEDURE — 6360000002 HC RX W HCPCS: Performed by: NURSE PRACTITIONER

## 2017-12-14 PROCEDURE — 96372 THER/PROPH/DIAG INJ SC/IM: CPT

## 2017-12-14 PROCEDURE — 82948 REAGENT STRIP/BLOOD GLUCOSE: CPT

## 2017-12-14 RX ORDER — PROMETHAZINE HYDROCHLORIDE 25 MG/ML
25 INJECTION, SOLUTION INTRAMUSCULAR; INTRAVENOUS ONCE
Status: COMPLETED | OUTPATIENT
Start: 2017-12-14 | End: 2017-12-14

## 2017-12-14 RX ADMIN — PROMETHAZINE HYDROCHLORIDE 25 MG: 25 INJECTION INTRAMUSCULAR; INTRAVENOUS at 10:59

## 2017-12-14 RX ADMIN — BUTORPHANOL TARTRATE 2 MG: 2 INJECTION, SOLUTION INTRAMUSCULAR; INTRAVENOUS at 10:59

## 2017-12-14 ASSESSMENT — ENCOUNTER SYMPTOMS
DIARRHEA: 0
BACK PAIN: 0
SHORTNESS OF BREATH: 0
SORE THROAT: 0
VOMITING: 0
COUGH: 0
WHEEZING: 0
ABDOMINAL PAIN: 0
EYE DISCHARGE: 0
PHOTOPHOBIA: 1
NAUSEA: 0

## 2017-12-14 ASSESSMENT — PAIN SCALES - GENERAL: PAINLEVEL_OUTOF10: 7

## 2017-12-14 NOTE — ED PROVIDER NOTES
 Drug use: No    Sexual activity: Not on file     Other Topics Concern    Not on file     Social History Narrative    No narrative on file       SCREENINGS           PHYSICAL EXAM    (up to 7 for level 4, 8 or more for level 5)     ED Triage Vitals   BP Temp Temp Source Pulse Resp SpO2 Height Weight   12/14/17 1018 12/14/17 1017 12/14/17 1017 12/14/17 1017 12/14/17 1017 12/14/17 1017 12/14/17 1017 12/14/17 1017   (!) 137/94 96.3 °F (35.7 °C) Temporal 91 20 94 % 5' 1\" (1.549 m) 240 lb (108.9 kg)       Physical Exam   Constitutional: She is oriented to person, place, and time. She appears well-developed and well-nourished. No distress. HENT:   Head: Normocephalic and atraumatic. Right Ear: External ear normal.   Left Ear: External ear normal.   Mouth/Throat:       Eyes: Conjunctivae and EOM are normal. Pupils are equal, round, and reactive to light. Right eye exhibits no discharge. Left eye exhibits no discharge. Musculoskeletal: Normal range of motion. Neurological: She is alert and oriented to person, place, and time. No cranial nerve deficit. Coordination normal.   No neurological deficit. Normal finger to nose test without dysmetria. No pronator drift. Skin: Skin is warm and dry. She is not diaphoretic. No erythema. Psychiatric: She has a normal mood and affect. Nursing note and vitals reviewed.         DIAGNOSTIC RESULTS     RADIOLOGY:   Non-plain film images such as CT, Ultrasound and MRI are read by the radiologist. Plain radiographic images are visualized and preliminarily interpreted by No att. providers found with the below findings:        Interpretation per the Radiologist below, if available at the time of this note:    No orders to display       LABS:  Labs Reviewed   POCT GLUCOSE - Abnormal; Notable for the following:        Result Value    POC Glucose 289 (*)     All other components within normal limits   POCT GLUCOSE - Normal       All other labs were within normal range or not

## 2017-12-18 ENCOUNTER — TELEPHONE (OUTPATIENT)
Dept: PRIMARY CARE CLINIC | Age: 47
End: 2017-12-18

## 2017-12-18 RX ORDER — ONDANSETRON 4 MG/1
4 TABLET, FILM COATED ORAL DAILY PRN
Qty: 15 TABLET | Refills: 0 | Status: ON HOLD | OUTPATIENT
Start: 2017-12-18 | End: 2019-08-20

## 2017-12-18 NOTE — TELEPHONE ENCOUNTER
Patient called and request zofran rx for nausea until metformin kicks in and stops making her nauseated. Can't use phenergan during the day.  Rx sent to pharmacy

## 2017-12-18 NOTE — TELEPHONE ENCOUNTER
Pt called to request refill on Norco and Neurontin. Per rusty pt due for refill on 12/26/2017. Pt has refills on neurontin.

## 2017-12-19 RX ORDER — HYDROCODONE BITARTRATE AND ACETAMINOPHEN 5; 325 MG/1; MG/1
1 TABLET ORAL EVERY 6 HOURS PRN
Qty: 120 TABLET | Refills: 0 | Status: SHIPPED | OUTPATIENT
Start: 2017-12-26 | End: 2018-01-22 | Stop reason: SDUPTHER

## 2017-12-21 ENCOUNTER — TELEPHONE (OUTPATIENT)
Dept: PRIMARY CARE CLINIC | Age: 47
End: 2017-12-21

## 2017-12-21 NOTE — TELEPHONE ENCOUNTER
[facility] pharmacy called stating pts Angela Romo is approved and has been she can only recve 30 day supply at a time they had a request and wasn't sure why they were doing a courtesy call she said this is approved and until further notice.

## 2017-12-23 DIAGNOSIS — J01.40 ACUTE NON-RECURRENT PANSINUSITIS: ICD-10-CM

## 2017-12-25 RX ORDER — FLUTICASONE PROPIONATE 50 MCG
SPRAY, SUSPENSION (ML) NASAL
Qty: 1 BOTTLE | Refills: 3 | Status: SHIPPED | OUTPATIENT
Start: 2017-12-25 | End: 2018-04-03

## 2017-12-26 ENCOUNTER — HOSPITAL ENCOUNTER (EMERGENCY)
Age: 47
Discharge: HOME | End: 2017-12-26
Payer: MEDICAID

## 2017-12-26 VITALS
TEMPERATURE: 98.1 F | HEIGHT: 61 IN | HEART RATE: 83 BPM | DIASTOLIC BLOOD PRESSURE: 90 MMHG | SYSTOLIC BLOOD PRESSURE: 125 MMHG | WEIGHT: 250 LBS | BODY MASS INDEX: 47.2 KG/M2 | OXYGEN SATURATION: 97 %

## 2017-12-26 DIAGNOSIS — R51.9 CHRONIC NONINTRACTABLE HEADACHE, UNSPECIFIED HEADACHE TYPE: Primary | ICD-10-CM

## 2017-12-26 DIAGNOSIS — G89.29 CHRONIC NONINTRACTABLE HEADACHE, UNSPECIFIED HEADACHE TYPE: Primary | ICD-10-CM

## 2017-12-26 PROCEDURE — 99283 EMERGENCY DEPT VISIT LOW MDM: CPT

## 2017-12-26 PROCEDURE — 99282 EMERGENCY DEPT VISIT SF MDM: CPT | Performed by: NURSE PRACTITIONER

## 2017-12-26 PROCEDURE — 6360000002 HC RX W HCPCS: Performed by: NURSE PRACTITIONER

## 2017-12-26 PROCEDURE — 96372 THER/PROPH/DIAG INJ SC/IM: CPT

## 2017-12-26 RX ORDER — PROMETHAZINE HYDROCHLORIDE 25 MG/ML
25 INJECTION, SOLUTION INTRAMUSCULAR; INTRAVENOUS ONCE
Status: COMPLETED | OUTPATIENT
Start: 2017-12-26 | End: 2017-12-26

## 2017-12-26 RX ADMIN — PROMETHAZINE HYDROCHLORIDE 25 MG: 25 INJECTION INTRAMUSCULAR; INTRAVENOUS at 17:38

## 2017-12-26 RX ADMIN — BUTORPHANOL TARTRATE 1 MG: 2 INJECTION, SOLUTION INTRAMUSCULAR; INTRAVENOUS at 17:38

## 2017-12-26 ASSESSMENT — PAIN SCALES - GENERAL
PAINLEVEL_OUTOF10: 8
PAINLEVEL_OUTOF10: 7
PAINLEVEL_OUTOF10: 10

## 2017-12-26 ASSESSMENT — ENCOUNTER SYMPTOMS
PHOTOPHOBIA: 1
NAUSEA: 1
VOMITING: 0

## 2017-12-26 ASSESSMENT — PAIN DESCRIPTION - PAIN TYPE
TYPE: ACUTE PAIN
TYPE: ACUTE PAIN

## 2017-12-26 NOTE — ED PROVIDER NOTES
R-3, NormalMay substitute to generic for insurance      SUMAtriptan (IMITREX) 100 MG tablet TAKE 1 TABLET BY MOUTH AS NEEDED FOR HEADACHE, MAY REPEAT 1 DOSE IF NEEDED. DO NOT EXCEED 2 TABLETS IN A 24 HOUR PERIOD, Disp-9 tablet, R-0Normal      pramipexole (MIRAPEX) 0.5 MG tablet TAKE 1 TABLET BY MOUTH TWICE DAILY, Disp-60 tablet, R-5Normal      gabapentin (NEURONTIN) 100 MG capsule 1 tablet in AM, 1 tablet at noon and 2 tablets at HS, Disp-120 capsule, R-2Normal      nystatin (MYCOSTATIN) 147202 UNIT/GM ointment Apply topically 2 times daily. , Disp-30 g, R-1, Normal      !! glucose blood VI test strips (ASCENSIA AUTODISC VI;ONE TOUCH ULTRA TEST VI) strip DAILY Starting 8/4/2017, Until Discontinued, Disp-100 each, R-3, NormalAs needed. Blood Glucose Monitoring Suppl ([address]) w/Device KIT DAILY PRN Starting 8/4/2017, Until Discontinued, Disp-1 kit, R-0, Normal      !! ONE TOUCH LANCETS MISC DAILY Starting 8/4/2017, Until Discontinued, Disp-100 each, R-3, Normal      Cream Base CREA Disp-360 g, R-3, Phone InApply 1-2 pumps to affected area 3-4 times daily      cyclobenzaprine (FLEXERIL) 10 MG tablet Take 10 mg by mouth 3 times daily as needed for Muscle spasmsHistorical Med      diclofenac sodium (VOLTAREN) 1 % GEL Apply 4 g topically 4 times daily, Topical, 4 TIMES DAILY Starting 7/10/2017, Until Wed 8/9/17, Disp-5 Tube, R-0, Normal      triamterene-hydrochlorothiazide (DYAZIDE) 37.5-25 MG per capsule TAKE 1 CAPSULE BY MOUTH ONCE DAILY IN THE MORNING, Disp-30 capsule, R-11Normal      !! BASAGLAR KWIKPEN 100 UNIT/ML injection pen Inject 20 Units into the skin nightly , DAWHistorical Med      !!  Insulin Pen Needle 31G X 8 MM MISC DAILY Starting 2/28/2017, Until Discontinued, Disp-100 each, R-3, Normal      verapamil (CALAN SR) 240 MG extended release tablet Take 1 tablet by mouth nightly, Disp-30 tablet, R-11      furosemide (LASIX) 20 MG tablet Take 1 tablet by mouth daily, Disp-30 tablet, R-5 celecoxib (CELEBREX) 200 MG capsule TAKE 1 CAPSULE BY MOUTH DAILY, Disp-60 capsule, R-3      FLUoxetine (PROZAC) 40 MG capsule TAKE 1 CAPSULE BY MOUTH DAILY, Disp-30 capsule, R-11      polyethylene glycol (MIRALAX) powder Take 17 g by mouth daily for chronic constipation. , Disp-510 g, R-5      !! FREESTYLE LANCETS MISC Disp-100 each, R-3, NormalDISPENSE BRAND PER INSURANCE BENEFITS. .00      vitamin B-12 (CYANOCOBALAMIN) 1000 MCG tablet Take 1,000 mcg by mouth daily      Vitamin D (CHOLECALCIFEROL) 1000 UNITS CAPS capsule Take 1,000 Units by mouth daily       ! ! - Potential duplicate medications found. Please discuss with provider. ALLERGIES     Compazine [prochlorperazine maleate]; Demerol; Ibuprofen; Orphenadrine citrate; Penicillins; Vistaril [hydroxyzine hcl]; Avelox [moxifloxacin];  Codeine; Doxycycline; Keflex [cephalexin]; and Ketorolac tromethamine    FAMILY HISTORY       Family History   Problem Relation Age of Onset    Cancer Father     Cancer Maternal Grandmother     COPD Maternal Grandmother     Cancer Maternal Grandfather           SOCIAL HISTORY       Social History     Social History    Marital status: Single     Spouse name: N/A    Number of children: N/A    Years of education: N/A     Social History Main Topics    Smoking status: Never Smoker    Smokeless tobacco: Never Used    Alcohol use Yes      Comment: social    Drug use: No    Sexual activity: Not Asked     Other Topics Concern    None     Social History Narrative    None       SCREENINGS   NIH Stroke Scale  NIH Stroke Scale Assessed: NoGlasgow Coma Scale  Eye Opening: Spontaneous  Best Verbal Response: Oriented  Best Motor Response: Obeys commands  Manfred Coma Scale Score: 15      PHYSICAL EXAM    (up to 7 for level 4, 8 or more for level 5)     ED Triage Vitals [12/26/17 1358]   BP Temp Temp Source Pulse Resp SpO2 Height Weight   (!) 139/93 97.2 °F (36.2 °C) Oral 82 -- 92 % 5' 1\" (1.549 m) 250 lb (113.4 kg) Physical Exam   Constitutional: She is oriented to person, place, and time. She appears well-developed and well-nourished. No distress. HENT:   Head: Normocephalic and atraumatic. Right Ear: External ear normal.   Left Ear: External ear normal.   Mouth/Throat: No oropharyngeal exudate. Eyes: Conjunctivae and EOM are normal. Pupils are equal, round, and reactive to light. Right eye exhibits no discharge. Left eye exhibits no discharge. No scleral icterus. Neck: Normal range of motion. Neck supple. No thyromegaly present. Musculoskeletal: Normal range of motion. Neurological: She is oriented to person, place, and time. No cranial nerve deficit. Coordination normal.   Normal finger to nose test, no evidence of dysmetria. Skin: Skin is warm and dry. No rash noted. She is not diaphoretic. No erythema. Psychiatric: She has a normal mood and affect. Nursing note and vitals reviewed. DIAGNOSTIC RESULTS     RADIOLOGY:   Non-plain film images such as CT, Ultrasound and MRI are read by the radiologist. Plain radiographic images are visualized and preliminarily interpreted by No att. providers found with the below findings:        Interpretation per the Radiologist below, if available at the time of this note:    No orders to display       LABS:  Labs Reviewed - No data to display    All other labs were within normal range or not returned as of this dictation.     RE-ASSESSMENT          EMERGENCY DEPARTMENT COURSE and DIFFERENTIAL DIAGNOSIS/MDM:   Vitals:    Vitals:    12/26/17 1358 12/26/17 1601   BP: (!) 139/93 (!) 125/90   Pulse: 82 83   Temp: 97.2 °F (36.2 °C) 98.1 °F (36.7 °C)   TempSrc: Oral Temporal   SpO2: 92% 97%   Weight: 250 lb (113.4 kg)    Height: 5' 1\" (1.549 m)            MDM  Number of Diagnoses or Management Options  Chronic nonintractable headache, unspecified headache type:   Diagnosis management comments: Patient is seen in the ED, for chronic HA's no new neurological finding

## 2018-01-07 DIAGNOSIS — E11.65 UNCONTROLLED TYPE 2 DIABETES MELLITUS WITH HYPERGLYCEMIA, WITHOUT LONG-TERM CURRENT USE OF INSULIN (HCC): ICD-10-CM

## 2018-01-07 RX ORDER — INSULIN GLARGINE 100 [IU]/ML
INJECTION, SOLUTION SUBCUTANEOUS
Qty: 5 PEN | Refills: 1 | Status: SHIPPED | OUTPATIENT
Start: 2018-01-07 | End: 2018-04-03 | Stop reason: SDUPTHER

## 2018-01-08 RX ORDER — SUMATRIPTAN 100 MG/1
TABLET, FILM COATED ORAL
Qty: 9 TABLET | Refills: 0 | Status: SHIPPED | OUTPATIENT
Start: 2018-01-08 | End: 2018-01-26 | Stop reason: SDUPTHER

## 2018-01-08 NOTE — TELEPHONE ENCOUNTER
Patient called for a refill on Imitrex until appointment. Appointment had to be moved due to provider. E-script sent to pharmacy.

## 2018-01-10 ENCOUNTER — TELEPHONE (OUTPATIENT)
Dept: PAIN MANAGEMENT | Age: 48
End: 2018-01-10

## 2018-01-10 NOTE — TELEPHONE ENCOUNTER
Pt had office visit appointment today to discuss medications. Appointment cancelled due to provider being out. Pt rescheduled for 03/07/2018 pt requested that she receive an earlier appointment because she wants to discuss medications. Pt was offered an appointment on 01/22/2018 and 01/23/2018 however, pt states that she feels like she  cannot come to either of those appointment but if she changes her mind she will call office back.  Pt kept appointment on 03/07/2018

## 2018-01-15 RX ORDER — CELECOXIB 200 MG/1
CAPSULE ORAL
Qty: 60 CAPSULE | Refills: 0 | Status: SHIPPED | OUTPATIENT
Start: 2018-01-15 | End: 2019-02-08 | Stop reason: SDUPTHER

## 2018-01-17 ENCOUNTER — HOSPITAL ENCOUNTER (EMERGENCY)
Age: 48
Discharge: HOME | End: 2018-01-17
Payer: MEDICAID

## 2018-01-17 ENCOUNTER — APPOINTMENT (OUTPATIENT)
Dept: GENERAL RADIOLOGY | Age: 48
End: 2018-01-17
Payer: MEDICAID

## 2018-01-17 VITALS
HEIGHT: 61 IN | RESPIRATION RATE: 20 BRPM | HEART RATE: 90 BPM | SYSTOLIC BLOOD PRESSURE: 153 MMHG | BODY MASS INDEX: 45.31 KG/M2 | OXYGEN SATURATION: 98 % | DIASTOLIC BLOOD PRESSURE: 86 MMHG | WEIGHT: 240 LBS | TEMPERATURE: 97.9 F

## 2018-01-17 DIAGNOSIS — M25.552 LEFT HIP PAIN: Primary | ICD-10-CM

## 2018-01-17 PROCEDURE — 99283 EMERGENCY DEPT VISIT LOW MDM: CPT

## 2018-01-17 PROCEDURE — 6370000000 HC RX 637 (ALT 250 FOR IP): Performed by: NURSE PRACTITIONER

## 2018-01-17 PROCEDURE — 99283 EMERGENCY DEPT VISIT LOW MDM: CPT | Performed by: NURSE PRACTITIONER

## 2018-01-17 PROCEDURE — 73502 X-RAY EXAM HIP UNI 2-3 VIEWS: CPT

## 2018-01-17 RX ORDER — OXYCODONE HYDROCHLORIDE AND ACETAMINOPHEN 5; 325 MG/1; MG/1
2 TABLET ORAL ONCE
Status: COMPLETED | OUTPATIENT
Start: 2018-01-17 | End: 2018-01-17

## 2018-01-17 RX ORDER — CYCLOBENZAPRINE HCL 10 MG
10 TABLET ORAL 3 TIMES DAILY PRN
Qty: 30 TABLET | Refills: 0 | Status: SHIPPED | OUTPATIENT
Start: 2018-01-17 | End: 2018-01-19 | Stop reason: SDUPTHER

## 2018-01-17 RX ADMIN — OXYCODONE HYDROCHLORIDE AND ACETAMINOPHEN 2 TABLET: 5; 325 TABLET ORAL at 16:06

## 2018-01-17 ASSESSMENT — PAIN SCALES - GENERAL
PAINLEVEL_OUTOF10: 7
PAINLEVEL_OUTOF10: 7
PAINLEVEL_OUTOF10: 5

## 2018-01-17 ASSESSMENT — PAIN DESCRIPTION - LOCATION: LOCATION: HIP

## 2018-01-17 ASSESSMENT — PAIN DESCRIPTION - PAIN TYPE: TYPE: CHRONIC PAIN

## 2018-01-17 ASSESSMENT — PAIN DESCRIPTION - ORIENTATION: ORIENTATION: RIGHT

## 2018-01-17 NOTE — ED PROVIDER NOTES
capsule Take 1,000 Units by mouth daily       ! ! - Potential duplicate medications found. Please discuss with provider. ALLERGIES     Compazine [prochlorperazine maleate]; Demerol; Ibuprofen; Orphenadrine citrate; Penicillins; Vistaril [hydroxyzine hcl]; Avelox [moxifloxacin]; Codeine; Doxycycline; Keflex [cephalexin]; and Ketorolac tromethamine    FAMILY HISTORY       Family History   Problem Relation Age of Onset    Cancer Father     Cancer Maternal Grandmother     COPD Maternal Grandmother     Cancer Maternal Grandfather           SOCIAL HISTORY       Social History     Social History    Marital status: Single     Spouse name: N/A    Number of children: N/A    Years of education: N/A     Social History Main Topics    Smoking status: Never Smoker    Smokeless tobacco: Never Used    Alcohol use Yes      Comment: social    Drug use: No    Sexual activity: Not Asked     Other Topics Concern    None     Social History Narrative    None       SCREENINGS    Manfred Coma Scale  Eye Opening: Spontaneous  Best Verbal Response: Oriented  Best Motor Response: Obeys commands  Manfred Coma Scale Score: 15      PHYSICAL EXAM    (up to 7 for level 4, 8 or more for level 5)     ED Triage Vitals [01/17/18 1536]   BP Temp Temp Source Pulse Resp SpO2 Height Weight   (!) 153/86 97.9 °F (36.6 °C) Temporal 90 20 98 % 5' 1\" (1.549 m) 240 lb (108.9 kg)       Physical Exam   Constitutional: She is oriented to person, place, and time. She appears well-developed and well-nourished. No distress. HENT:   Head: Normocephalic. Mouth/Throat: No oropharyngeal exudate. Eyes: EOM are normal. Pupils are equal, round, and reactive to light. Right eye exhibits no discharge. Left eye exhibits no discharge. No scleral icterus. Neck: Normal range of motion. No tracheal deviation present. Cardiovascular: Normal rate, regular rhythm and normal heart sounds. No murmur heard.   Pulmonary/Chest: Effort normal and breath sounds normal. No stridor. No respiratory distress. She has no wheezes. Abdominal: Soft. Bowel sounds are normal. She exhibits no distension. There is no tenderness. Musculoskeletal: Normal range of motion. She exhibits tenderness. She exhibits no edema or deformity. Legs:  Lymphadenopathy:     She has no cervical adenopathy. Neurological: She is alert and oriented to person, place, and time. Skin: Skin is warm and dry. No rash noted. She is not diaphoretic. No erythema. No pallor. Psychiatric: She has a normal mood and affect. Her behavior is normal. Judgment and thought content normal.   Nursing note and vitals reviewed. DIAGNOSTIC RESULTS     RADIOLOGY:   Non-plain film images such as CT, Ultrasound and MRI are read by the radiologist. Plain radiographic images are visualized and preliminarily interpreted by No att. providers found with the below findings:        Interpretation per the Radiologist below, if available at the time of this note:    XR HIP 2-3 VW W PELVIS RIGHT   Final Result   1. No acute bony abnormality is seen. No evidence of acute fracture. 2. Mild enthesopathy of the pelvis. Soft tissue ossification adjacent   to the right iliac wing laterally is present on previous CT and may   represent an old injury. Signed by Dr Cece Edwards on 1/17/2018 4:19 PM          LABS:  Labs Reviewed - No data to display    All other labs were within normal range or not returned as of this dictation. RE-ASSESSMENT          EMERGENCY DEPARTMENT COURSE and DIFFERENTIAL DIAGNOSIS/MDM:   Vitals:    Vitals:    01/17/18 1536   BP: (!) 153/86   Pulse: 90   Resp: 20   Temp: 97.9 °F (36.6 °C)   TempSrc: Temporal   SpO2: 98%   Weight: 240 lb (108.9 kg)   Height: 5' 1\" (1.549 m)           MDM  Number of Diagnoses or Management Options  Diagnosis management comments: Briefly the patient presents to the ED with right hip pain.  Her x-rays are unremarkable for any acute bony abnormality, mild and

## 2018-01-19 ENCOUNTER — OFFICE VISIT (OUTPATIENT)
Dept: PRIMARY CARE CLINIC | Age: 48
End: 2018-01-19
Payer: MEDICAID

## 2018-01-19 VITALS
SYSTOLIC BLOOD PRESSURE: 108 MMHG | DIASTOLIC BLOOD PRESSURE: 76 MMHG | WEIGHT: 253 LBS | HEART RATE: 82 BPM | BODY MASS INDEX: 47.8 KG/M2 | OXYGEN SATURATION: 99 % | RESPIRATION RATE: 20 BRPM | TEMPERATURE: 96.1 F

## 2018-01-19 DIAGNOSIS — G43.909 MIGRAINE WITHOUT STATUS MIGRAINOSUS, NOT INTRACTABLE, UNSPECIFIED MIGRAINE TYPE: ICD-10-CM

## 2018-01-19 DIAGNOSIS — Z12.39 BREAST CANCER SCREENING: ICD-10-CM

## 2018-01-19 DIAGNOSIS — E11.65 UNCONTROLLED TYPE 2 DIABETES MELLITUS WITH HYPERGLYCEMIA, WITHOUT LONG-TERM CURRENT USE OF INSULIN (HCC): Primary | ICD-10-CM

## 2018-01-19 DIAGNOSIS — M25.562 CHRONIC PAIN OF LEFT KNEE: ICD-10-CM

## 2018-01-19 DIAGNOSIS — G89.29 CHRONIC PAIN OF LEFT KNEE: ICD-10-CM

## 2018-01-19 DIAGNOSIS — M70.61 GREATER TROCHANTERIC BURSITIS OF RIGHT HIP: ICD-10-CM

## 2018-01-19 PROCEDURE — G8427 DOCREV CUR MEDS BY ELIG CLIN: HCPCS | Performed by: FAMILY MEDICINE

## 2018-01-19 PROCEDURE — G8484 FLU IMMUNIZE NO ADMIN: HCPCS | Performed by: FAMILY MEDICINE

## 2018-01-19 PROCEDURE — 20610 DRAIN/INJ JOINT/BURSA W/O US: CPT | Performed by: FAMILY MEDICINE

## 2018-01-19 PROCEDURE — G8417 CALC BMI ABV UP PARAM F/U: HCPCS | Performed by: FAMILY MEDICINE

## 2018-01-19 PROCEDURE — 1036F TOBACCO NON-USER: CPT | Performed by: FAMILY MEDICINE

## 2018-01-19 PROCEDURE — 3046F HEMOGLOBIN A1C LEVEL >9.0%: CPT | Performed by: FAMILY MEDICINE

## 2018-01-19 PROCEDURE — 99214 OFFICE O/P EST MOD 30 MIN: CPT | Performed by: FAMILY MEDICINE

## 2018-01-19 RX ORDER — ONDANSETRON 4 MG/1
4 TABLET, ORALLY DISINTEGRATING ORAL EVERY 8 HOURS PRN
Qty: 20 TABLET | Refills: 0 | Status: SHIPPED | OUTPATIENT
Start: 2018-01-19 | End: 2018-04-03 | Stop reason: SDUPTHER

## 2018-01-19 RX ORDER — CYCLOBENZAPRINE HCL 10 MG
10 TABLET ORAL 3 TIMES DAILY PRN
Qty: 90 TABLET | Refills: 0 | Status: SHIPPED | OUTPATIENT
Start: 2018-01-19 | End: 2018-03-12 | Stop reason: SDUPTHER

## 2018-01-19 NOTE — PATIENT INSTRUCTIONS
Patient Education   increase your insulin 2 units per night until you reach 30 units. Call if blood sugar is less than 70 in the morning. Increase metformin to twice per day. In 2 weeks, increase victoza to 1.8 mg daily. Please call if you do not hear about your referal to neurology within 10 days. Take it easy the first 2-3 days. Use ice and elevate the affected joint as needed. Call if you experience any fever or chills, spreading redness and rash from the injected area, or bleeding or large bruise or swelling or other abnormalities. Get mammogram and x ray of L knee. Iliotibial Band Syndrome: Exercises  Your Care Instructions  Here are some examples of typical rehabilitation exercises for your condition. Start each exercise slowly. Ease off the exercise if you start to have pain. Your doctor or physical therapist will tell you when you can start these exercises and which ones will work best for you. How to do the exercises  Iliotibial band stretch    1. Lean sideways against a wall. If you are not steady on your feet, hold on to a chair or counter. 2. Stand on the leg with the affected hip, with that leg close to the wall. Then cross your other leg in front of it. 3. Let your affected hip drop out to the side of your body and against the wall. Then lean away from your affected hip until you feel a stretch. 4. Hold the stretch for 15 to 30 seconds. 5. Repeat 2 to 4 times. Piriformis stretch    1. Lie on your back with your legs straight. 2. Lift your affected leg and bend your knee. With your opposite hand, reach across your body, and then gently pull your knee toward your opposite shoulder. 3. Hold the stretch for 15 to 30 seconds. 4. Repeat 2 to 4 times. Hamstring wall stretch    1. Lie on your back in a doorway, with your good leg through the open door. 2. Slide your affected leg up the wall to straighten your knee.  You should feel a gentle stretch down the back of your

## 2018-01-22 ENCOUNTER — HOSPITAL ENCOUNTER (OUTPATIENT)
Dept: GENERAL RADIOLOGY | Age: 48
Discharge: HOME | End: 2018-01-22
Payer: MEDICAID

## 2018-01-22 ENCOUNTER — HOSPITAL ENCOUNTER (OUTPATIENT)
Dept: WOMENS IMAGING | Age: 48
Discharge: HOME | End: 2018-01-22
Payer: MEDICAID

## 2018-01-22 ENCOUNTER — TELEPHONE (OUTPATIENT)
Dept: PRIMARY CARE CLINIC | Age: 48
End: 2018-01-22

## 2018-01-22 DIAGNOSIS — M25.562 CHRONIC PAIN OF LEFT KNEE: ICD-10-CM

## 2018-01-22 DIAGNOSIS — Z12.39 BREAST CANCER SCREENING: ICD-10-CM

## 2018-01-22 DIAGNOSIS — G89.29 CHRONIC PAIN OF LEFT KNEE: ICD-10-CM

## 2018-01-22 PROCEDURE — 73560 X-RAY EXAM OF KNEE 1 OR 2: CPT

## 2018-01-22 PROCEDURE — 77063 BREAST TOMOSYNTHESIS BI: CPT

## 2018-01-22 NOTE — TELEPHONE ENCOUNTER
Patient called,left message that Eder Saul did an injection for hip pain and told her if no better to call and he would send in a medrol dose pack.

## 2018-01-23 ENCOUNTER — TELEPHONE (OUTPATIENT)
Dept: NEUROSURGERY | Age: 48
End: 2018-01-23

## 2018-01-23 RX ORDER — HYDROCODONE BITARTRATE AND ACETAMINOPHEN 5; 325 MG/1; MG/1
1 TABLET ORAL EVERY 6 HOURS PRN
Qty: 120 TABLET | Refills: 0 | Status: SHIPPED | OUTPATIENT
Start: 2018-01-25 | End: 2018-02-21 | Stop reason: SDUPTHER

## 2018-01-23 RX ORDER — PREDNISONE 20 MG/1
20 TABLET ORAL DAILY
Qty: 10 TABLET | Refills: 0 | Status: SHIPPED | OUTPATIENT
Start: 2018-01-23 | End: 2018-02-02

## 2018-01-25 RX ORDER — TRIAMCINOLONE ACETONIDE 40 MG/ML
40 INJECTION, SUSPENSION INTRA-ARTICULAR; INTRAMUSCULAR ONCE
Status: DISCONTINUED | OUTPATIENT
Start: 2018-01-25 | End: 2018-12-05

## 2018-01-25 ASSESSMENT — ENCOUNTER SYMPTOMS
CONSTIPATION: 0
CHEST TIGHTNESS: 0
WHEEZING: 0
SHORTNESS OF BREATH: 0
COUGH: 0
DIARRHEA: 0
ABDOMINAL PAIN: 0
VOMITING: 0
NAUSEA: 0

## 2018-01-25 NOTE — PROGRESS NOTES
times daily as needed for Muscle spasms 90 tablet 0    metFORMIN (GLUCOPHAGE) 500 MG tablet Take 1 tablet by mouth 2 times daily (with meals) 60 tablet 2    ondansetron (ZOFRAN ODT) 4 MG disintegrating tablet Take 1 tablet by mouth every 8 hours as needed for Nausea or Vomiting 20 tablet 0    insulin glargine (BASAGLAR KWIKPEN) 100 UNIT/ML injection pen Inject 30 Units into the skin nightly 5 pen 3    Liraglutide (VICTOZA) 18 MG/3ML SOPN SC injection Inject 1.8 mg into the skin daily Start 0.6 mg daily in the morning and increase to 1.2 mg daily after 4 weeks 3 pen 1    celecoxib (CELEBREX) 200 MG capsule TAKE 1 CAPSULE BY MOUTH EVERY DAY 60 capsule 0    SUMAtriptan (IMITREX) 100 MG tablet TAKE 1 TABLET BY MOUTH AS NEEDED FOR HEADACHE, MAY REPEAT 1 DOSE IF NEEDED. DO NOT EXCEED 2 TABLETS IN A 24 HOUR PERIOD 9 tablet 0    BASAGLAR KWIKPEN 100 UNIT/ML injection pen START 10 UNITS EVERY NIGHT AT BEDTIME INCREASE BY 2 UNITS EACH NIGHT UNTIL YOU REACH 20 UNITS NIGHTLY 5 pen 1    fluticasone (FLONASE) 50 MCG/ACT nasal spray SHAKE LIQUID AND USE 1 SPRAY IN EACH NOSTRIL DAILY 1 Bottle 3    ondansetron (ZOFRAN) 4 MG tablet Take 1 tablet by mouth daily as needed for Nausea or Vomiting 15 tablet 0    SUMAtriptan (IMITREX) 20 MG/ACT nasal spray USE ONE SPRAY AT ONSET OF HEADACHE, MAY REPEAT IN ONE HOUR IF NEEDED. MAX TWO SPRAYS PER 24 HOURS 1 each 1    promethazine (PHENERGAN) 25 MG tablet TAKE 1 TABLET BY MOUTH EVERY 6 HOURS AS NEEDED FOR NAUSEA 30 tablet 3    glucose blood VI test strips (EXACTECH TEST) strip 1 each by In Vitro route daily As needed.  100 each 3    Lancets MISC Daily Accu-Chek 100 each 3    Insulin Pen Needle 31G X 6 MM MISC 1 each by Does not apply route daily May substitute to generic for insurance 100 each 3    pramipexole (MIRAPEX) 0.5 MG tablet TAKE 1 TABLET BY MOUTH TWICE DAILY 60 tablet 5    gabapentin (NEURONTIN) 100 MG capsule 1 tablet in AM, 1 tablet at noon and 2 tablets at  into the skin nightly     Dispense:  5 pen     Refill:  3    Liraglutide (VICTOZA) 18 MG/3ML SOPN SC injection     Sig: Inject 1.8 mg into the skin daily Start 0.6 mg daily in the morning and increase to 1.2 mg daily after 4 weeks     Dispense:  3 pen     Refill:  1     Medications Discontinued During This Encounter   Medication Reason    cyclobenzaprine (FLEXERIL) 10 MG tablet Duplicate Order    Insulin Pen Needle 31G X 8 MM MISC Dose adjustment    Liraglutide (VICTOZA) 18 MG/3ML SOPN SC injection Duplicate Order    Vitamin D (CHOLECALCIFEROL) 1000 UNITS CAPS capsule Duplicate Order    FLUoxetine (PROZAC) 40 MG capsule     cyclobenzaprine (FLEXERIL) 10 MG tablet Reorder    metFORMIN (GLUCOPHAGE) 500 MG tablet Reorder    Liraglutide (VICTOZA) 18 MG/3ML SOPN SC injection Reorder    Liraglutide (VICTOZA) 18 MG/3ML SOPN SC injection Reorder     Patient Instructions   Patient Education   increase your insulin 2 units per night until you reach 30 units. Call if blood sugar is less than 70 in the morning. Increase metformin to twice per day. In 2 weeks, increase victoza to 1.8 mg daily. Please call if you do not hear about your referal to neurology within 10 days. Take it easy the first 2-3 days. Use ice and elevate the affected joint as needed. Call if you experience any fever or chills, spreading redness and rash from the injected area, or bleeding or large bruise or swelling or other abnormalities. Get mammogram and x ray of L knee. Iliotibial Band Syndrome: Exercises  Your Care Instructions  Here are some examples of typical rehabilitation exercises for your condition. Start each exercise slowly. Ease off the exercise if you start to have pain. Your doctor or physical therapist will tell you when you can start these exercises and which ones will work best for you. How to do the exercises  Iliotibial band stretch    1. Lean sideways against a wall.  If you are not steady on your take.  Where can you learn more? Go to [email or web address] and sign in to your [employer] account. Enter P252 in the [place] box to learn more about \"Iliotibial Band Syndrome: Exercises. \"     If you do not have an account, please click on the \"Sign Up Now\" link. Current as of: March 21, 2017  Content Version: 11.5  © 8576-2893 "[employer]. Care instructions adapted under license by [facility] ([facility]). If you have questions about a medical condition or this instruction, always ask your healthcare professional. [place] [address] any warranty or liability for your use of this information. Patient given educational handouts and has had all questions answered. Patient voices understanding and agrees to plans along with risks and benefits of plan. Patient is instructed to continue prior meds, diet, and exercise plans unless instructed otherwise. Patient agrees to follow up as instructed and sooner if needed. Patient agrees to go to ER if condition becomes emergent. Notes may be completed with dictation device and spelling errors may occur. Return for foot exam, f/u insulin, knee, and hip.

## 2018-01-26 NOTE — TELEPHONE ENCOUNTER
Requested Prescriptions     Pending Prescriptions Disp Refills    SUMAtriptan (IMITREX) 100 MG tablet [Pharmacy Med Name: SUMATRIPTAN 100MG TABLETS] 9 tablet 0     Sig: TAKE 1 TABLET BY MOUTH AS NEEDED FOR HEADACHE, MAY REPEAT 1 DOSE IF NEEDED.  DO NOT EXCEED 2 TABLETS IN A 24 HOUR PERIOD

## 2018-01-29 ENCOUNTER — TELEPHONE (OUTPATIENT)
Dept: PRIMARY CARE CLINIC | Age: 48
End: 2018-01-29

## 2018-01-30 RX ORDER — SUMATRIPTAN 100 MG/1
TABLET, FILM COATED ORAL
Qty: 9 TABLET | Refills: 0 | Status: SHIPPED | OUTPATIENT
Start: 2018-01-30 | End: 2018-03-12 | Stop reason: SDUPTHER

## 2018-02-19 ENCOUNTER — OFFICE VISIT (OUTPATIENT)
Dept: PRIMARY CARE CLINIC | Age: 48
End: 2018-02-19
Payer: MEDICAID

## 2018-02-19 VITALS
SYSTOLIC BLOOD PRESSURE: 118 MMHG | DIASTOLIC BLOOD PRESSURE: 80 MMHG | BODY MASS INDEX: 46.44 KG/M2 | WEIGHT: 246 LBS | TEMPERATURE: 98.3 F | OXYGEN SATURATION: 98 % | HEIGHT: 61 IN | HEART RATE: 84 BPM

## 2018-02-19 DIAGNOSIS — M25.551 CHRONIC PAIN OF RIGHT HIP: ICD-10-CM

## 2018-02-19 DIAGNOSIS — E11.65 UNCONTROLLED TYPE 2 DIABETES MELLITUS WITH HYPERGLYCEMIA, WITHOUT LONG-TERM CURRENT USE OF INSULIN (HCC): Primary | ICD-10-CM

## 2018-02-19 DIAGNOSIS — G89.29 CHRONIC PAIN OF RIGHT HIP: ICD-10-CM

## 2018-02-19 DIAGNOSIS — G89.29 CHRONIC PAIN OF RIGHT KNEE: ICD-10-CM

## 2018-02-19 DIAGNOSIS — M25.561 CHRONIC PAIN OF RIGHT KNEE: ICD-10-CM

## 2018-02-19 DIAGNOSIS — M23.204 OLD COMPLEX TEAR OF MEDIAL MENISCUS OF LEFT KNEE: ICD-10-CM

## 2018-02-19 PROCEDURE — G8427 DOCREV CUR MEDS BY ELIG CLIN: HCPCS | Performed by: FAMILY MEDICINE

## 2018-02-19 PROCEDURE — G8484 FLU IMMUNIZE NO ADMIN: HCPCS | Performed by: FAMILY MEDICINE

## 2018-02-19 PROCEDURE — 1036F TOBACCO NON-USER: CPT | Performed by: FAMILY MEDICINE

## 2018-02-19 PROCEDURE — G8417 CALC BMI ABV UP PARAM F/U: HCPCS | Performed by: FAMILY MEDICINE

## 2018-02-19 PROCEDURE — 3046F HEMOGLOBIN A1C LEVEL >9.0%: CPT | Performed by: FAMILY MEDICINE

## 2018-02-19 PROCEDURE — 99214 OFFICE O/P EST MOD 30 MIN: CPT | Performed by: FAMILY MEDICINE

## 2018-02-19 RX ORDER — PREDNISONE 10 MG/1
30 TABLET ORAL DAILY
Qty: 15 TABLET | Refills: 0 | Status: SHIPPED | OUTPATIENT
Start: 2018-02-19 | End: 2019-10-30 | Stop reason: SDUPTHER

## 2018-02-19 ASSESSMENT — ENCOUNTER SYMPTOMS
CONSTIPATION: 0
WHEEZING: 0
VOMITING: 0
ABDOMINAL PAIN: 0
CHEST TIGHTNESS: 0
NAUSEA: 0
SHORTNESS OF BREATH: 0
COUGH: 0
DIARRHEA: 0

## 2018-02-19 NOTE — PROGRESS NOTES
Francisco Michael is a 41 y.o. female who presents today for   Chief Complaint   Patient presents with    Follow-up     foot exam, knee and hip       HPI  Patient is here for pain of the foot, knee, and hip. She is still having pain. Steroids have helped. She is seeing pain management. She is doing better on basaglar 30 units and her sugars are improving on it. She is c/o foot pain but has bgood sensation. No change in PMH, family, social, or surgical history unless mentioned above. Review of Systems   Constitutional: Negative for chills and fever. Respiratory: Negative for cough, chest tightness, shortness of breath and wheezing. Cardiovascular: Negative for chest pain, palpitations and leg swelling. Gastrointestinal: Negative for abdominal pain, constipation, diarrhea, nausea and vomiting. Genitourinary: Negative for difficulty urinating, dysuria and frequency. Musculoskeletal: Positive for arthralgias and gait problem. Past Medical History:   Diagnosis Date    Anxiety     Constipation     Depression     DM (diabetes mellitus) ([facility] [address]     Headache(784.0)     Hyperlipidemia     Hypertension     Kidney stones     Kidney stones     Restless leg     Restless legs syndrome        Current Outpatient Prescriptions   Medication Sig Dispense Refill    insulin glargine (BASAGLAR KWIKPEN) 100 UNIT/ML injection pen Inject 35 Units into the skin nightly 5 pen 3    verapamil (CALAN SR) 240 MG extended release tablet TAKE 1 TABLET BY MOUTH EVERY NIGHT 30 tablet 5    SUMAtriptan (IMITREX) 100 MG tablet TAKE 1 TABLET BY MOUTH AS NEEDED FOR HEADACHE, MAY REPEAT 1 DOSE IF NEEDED. DO NOT EXCEED 2 TABLETS IN A 24 HOUR PERIOD 9 tablet 0    HYDROcodone-acetaminophen (NORCO) 5-325 MG per tablet Take 1 tablet by mouth every 6 hours as needed for Pain for up to 30 days.  120 tablet 0    cyclobenzaprine (FLEXERIL) 10 MG tablet Take 1 tablet by mouth 3 times daily as needed for Muscle spasms 90 tablet 0

## 2018-02-21 RX ORDER — HYDROCODONE BITARTRATE AND ACETAMINOPHEN 5; 325 MG/1; MG/1
1 TABLET ORAL EVERY 6 HOURS PRN
Qty: 120 TABLET | Refills: 0 | Status: SHIPPED | OUTPATIENT
Start: 2018-02-24 | End: 2018-03-19 | Stop reason: SDUPTHER

## 2018-03-05 RX ORDER — GABAPENTIN 100 MG/1
CAPSULE ORAL
Qty: 120 CAPSULE | Refills: 2 | Status: SHIPPED | OUTPATIENT
Start: 2018-03-05 | End: 2018-03-19 | Stop reason: DRUGHIGH

## 2018-03-07 ENCOUNTER — HOSPITAL ENCOUNTER (OUTPATIENT)
Dept: PAIN MANAGEMENT | Age: 48
Discharge: HOME | End: 2018-03-07
Payer: MEDICAID

## 2018-03-07 VITALS
HEIGHT: 61 IN | HEART RATE: 84 BPM | DIASTOLIC BLOOD PRESSURE: 99 MMHG | BODY MASS INDEX: 48.55 KG/M2 | RESPIRATION RATE: 16 BRPM | TEMPERATURE: 97.8 F | OXYGEN SATURATION: 100 % | WEIGHT: 257.13 LBS | SYSTOLIC BLOOD PRESSURE: 159 MMHG

## 2018-03-07 PROCEDURE — 99212 OFFICE O/P EST SF 10 MIN: CPT

## 2018-03-07 PROCEDURE — 99214 OFFICE O/P EST MOD 30 MIN: CPT | Performed by: NURSE PRACTITIONER

## 2018-03-07 NOTE — PROGRESS NOTES
Current Medication Regimen Working? Pt is currently taking Norco 5 #120, Gabapentin 100mg 1 tab in am, 1 tab at noon, and 2 tabs at HS, celebrex, and flexeril. Pt is wanting to discuss increasing Norco to 7.5mg and increasing Gabapentin to 300mg at HS. Current Pain Level (1-10 Scale)/Location: 4/10 right hip and left knee pain    Current Health Issues/Falls/ER Visit: pt has been seen by Dr. Judy Romeo for right hip pain    Previous Back or Neck Surgeries: No    Last Procedure Performed: None                                                                 Percentage of Pain Relief from Last procedure: NA%    How Long it Lasted: NA    Recent Radiology/Labs/ Physical Therapy: x-ray right hip 01/17/2018, x-ray right hip per Dr. Judy Romeo    Any change in bowel habits if on chronic narcotics No      If yes, do you use laxatives regularly     Current Visit Notes : Pt here for followup. Pt is wanting to discuss increase in medications. Pt has been seeing Dr. Judy Romeo for right hip pain.  States that he recommded injections but pt  Is unsure of what type for right hip    Has patient considered harming themselves to escape stress, pain , or problems:  ( ) YES or ( x) NO    (  ) Suspected Physical Abuse or Suicide Risk assessed : (See Suicide Risk Assessment progress note if checked)            Education Provided: (x  ) Review of [place]  (  ) Agreement Review    (  ) Compliance Issues Discussed  (  ) Cognitive Behavioral Needs   (  ) Exercise  (  ) Review of Test  (  ) Financial Issues  (  ) Tobacco/Alcohol Use  ( x ) Teaching  (  ) New Patient  (  ) Picture Obtained  ( ) Test Ordered    (  ) Outgoing Referral  ( ) Records Obtained
injection     CC: MD Shawn Da Silva MSN,CNOR,RNFA,ARNP,FNP-C  Narciso Fritz

## 2018-03-13 RX ORDER — SUMATRIPTAN 20 MG/1
SPRAY NASAL
Qty: 1 INHALER | Refills: 0 | Status: SHIPPED | OUTPATIENT
Start: 2018-03-13 | End: 2018-04-20

## 2018-03-13 RX ORDER — CYCLOBENZAPRINE HCL 10 MG
10 TABLET ORAL 3 TIMES DAILY PRN
Qty: 90 TABLET | Refills: 0 | Status: SHIPPED | OUTPATIENT
Start: 2018-03-13 | End: 2018-06-25 | Stop reason: SDUPTHER

## 2018-03-13 RX ORDER — SUMATRIPTAN 100 MG/1
TABLET, FILM COATED ORAL
Qty: 9 TABLET | Refills: 0 | Status: SHIPPED | OUTPATIENT
Start: 2018-03-13 | End: 2018-04-07 | Stop reason: SDUPTHER

## 2018-03-20 ENCOUNTER — OFFICE VISIT (OUTPATIENT)
Dept: PRIMARY CARE CLINIC | Age: 48
End: 2018-03-20
Payer: MEDICAID

## 2018-03-20 VITALS
HEART RATE: 90 BPM | WEIGHT: 256 LBS | BODY MASS INDEX: 48.37 KG/M2 | TEMPERATURE: 98 F | RESPIRATION RATE: 18 BRPM | SYSTOLIC BLOOD PRESSURE: 162 MMHG | DIASTOLIC BLOOD PRESSURE: 98 MMHG | OXYGEN SATURATION: 96 %

## 2018-03-20 DIAGNOSIS — F41.0 PANIC ATTACK AS REACTION TO STRESS: ICD-10-CM

## 2018-03-20 DIAGNOSIS — F42.8 OBSESSIONAL THOUGHTS: ICD-10-CM

## 2018-03-20 DIAGNOSIS — E11.65 UNCONTROLLED TYPE 2 DIABETES MELLITUS WITH HYPERGLYCEMIA, WITHOUT LONG-TERM CURRENT USE OF INSULIN (HCC): Primary | ICD-10-CM

## 2018-03-20 DIAGNOSIS — F43.0 PANIC ATTACK AS REACTION TO STRESS: ICD-10-CM

## 2018-03-20 DIAGNOSIS — F43.9 STRESS AT HOME: ICD-10-CM

## 2018-03-20 DIAGNOSIS — E66.01 MORBID OBESITY DUE TO EXCESS CALORIES (HCC): ICD-10-CM

## 2018-03-20 LAB — HBA1C MFR BLD: 6.9 %

## 2018-03-20 PROCEDURE — G8417 CALC BMI ABV UP PARAM F/U: HCPCS | Performed by: FAMILY MEDICINE

## 2018-03-20 PROCEDURE — 3044F HG A1C LEVEL LT 7.0%: CPT | Performed by: FAMILY MEDICINE

## 2018-03-20 PROCEDURE — G8484 FLU IMMUNIZE NO ADMIN: HCPCS | Performed by: FAMILY MEDICINE

## 2018-03-20 PROCEDURE — G8427 DOCREV CUR MEDS BY ELIG CLIN: HCPCS | Performed by: FAMILY MEDICINE

## 2018-03-20 PROCEDURE — 1036F TOBACCO NON-USER: CPT | Performed by: FAMILY MEDICINE

## 2018-03-20 PROCEDURE — 99214 OFFICE O/P EST MOD 30 MIN: CPT | Performed by: FAMILY MEDICINE

## 2018-03-20 PROCEDURE — 83036 HEMOGLOBIN GLYCOSYLATED A1C: CPT | Performed by: FAMILY MEDICINE

## 2018-03-20 RX ORDER — HYDROCODONE BITARTRATE AND ACETAMINOPHEN 5; 325 MG/1; MG/1
1 TABLET ORAL EVERY 6 HOURS PRN
Qty: 120 TABLET | Refills: 0 | Status: SHIPPED | OUTPATIENT
Start: 2018-03-26 | End: 2018-04-19 | Stop reason: SDUPTHER

## 2018-03-20 RX ORDER — GABAPENTIN 100 MG/1
CAPSULE ORAL
Qty: 60 CAPSULE | Refills: 2 | Status: SHIPPED | OUTPATIENT
Start: 2018-03-20 | End: 2019-06-17

## 2018-03-20 RX ORDER — GABAPENTIN 300 MG/1
300 CAPSULE ORAL NIGHTLY
Qty: 30 CAPSULE | Refills: 2 | Status: SHIPPED | OUTPATIENT
Start: 2018-03-20 | End: 2019-06-17

## 2018-03-20 RX ORDER — LORAZEPAM 1 MG/1
1 TABLET ORAL EVERY 8 HOURS PRN
Qty: 8 TABLET | Refills: 0 | Status: SHIPPED | OUTPATIENT
Start: 2018-03-20 | End: 2018-04-03

## 2018-03-20 ASSESSMENT — PATIENT HEALTH QUESTIONNAIRE - PHQ9
SUM OF ALL RESPONSES TO PHQ QUESTIONS 1-9: 1
SUM OF ALL RESPONSES TO PHQ9 QUESTIONS 1 & 2: 1
1. LITTLE INTEREST OR PLEASURE IN DOING THINGS: 1
2. FEELING DOWN, DEPRESSED OR HOPELESS: 0

## 2018-03-20 NOTE — PROGRESS NOTES
meds, diet, and exercise plans unless instructed otherwise. Patient agrees to follow up as instructed and sooner if needed. Patient agrees to go to ER if condition becomes emergent. Notes may be completed with dictation device and spelling errors may occur. Return in about 3 months (around 6/20/2018) for POCT A1C, DM.

## 2018-03-21 ENCOUNTER — TELEPHONE (OUTPATIENT)
Dept: PRIMARY CARE CLINIC | Age: 48
End: 2018-03-21

## 2018-03-21 DIAGNOSIS — E11.65 UNCONTROLLED TYPE 2 DIABETES MELLITUS WITH HYPERGLYCEMIA, WITHOUT LONG-TERM CURRENT USE OF INSULIN (HCC): ICD-10-CM

## 2018-03-21 RX ORDER — LIRAGLUTIDE 6 MG/ML
INJECTION SUBCUTANEOUS
Qty: 9 ML | Refills: 2 | Status: SHIPPED | OUTPATIENT
Start: 2018-03-21 | End: 2018-06-28 | Stop reason: SDUPTHER

## 2018-03-21 NOTE — TELEPHONE ENCOUNTER
Pt is requesting a refill on victoza. Last office visit 3/20/2018. Next office visit 6/22/18. Last fill per Dr. Jj Andersen 1/19/18.

## 2018-03-26 ENCOUNTER — TELEPHONE (OUTPATIENT)
Dept: PRIMARY CARE CLINIC | Age: 48
End: 2018-03-26

## 2018-03-26 DIAGNOSIS — G89.4 CHRONIC PAIN SYNDROME: Primary | ICD-10-CM

## 2018-03-26 ASSESSMENT — ENCOUNTER SYMPTOMS
WHEEZING: 0
ABDOMINAL PAIN: 0
CHEST TIGHTNESS: 0
COUGH: 0
NAUSEA: 0
CONSTIPATION: 0
DIARRHEA: 0
VOMITING: 0
SHORTNESS OF BREATH: 0

## 2018-03-28 RX ORDER — TRAZODONE HYDROCHLORIDE 50 MG/1
TABLET ORAL
Qty: 60 TABLET | Refills: 1 | Status: SHIPPED | OUTPATIENT
Start: 2018-03-28 | End: 2019-04-24

## 2018-04-03 ENCOUNTER — TELEPHONE (OUTPATIENT)
Dept: NEUROSURGERY | Age: 48
End: 2018-04-03

## 2018-04-03 ENCOUNTER — OFFICE VISIT (OUTPATIENT)
Dept: NEUROSURGERY | Age: 48
End: 2018-04-03
Payer: MEDICAID

## 2018-04-03 VITALS
DIASTOLIC BLOOD PRESSURE: 85 MMHG | HEART RATE: 77 BPM | BODY MASS INDEX: 48.33 KG/M2 | HEIGHT: 61 IN | WEIGHT: 256 LBS | SYSTOLIC BLOOD PRESSURE: 124 MMHG

## 2018-04-03 DIAGNOSIS — G43.009 MIGRAINE WITHOUT AURA AND WITHOUT STATUS MIGRAINOSUS, NOT INTRACTABLE: Primary | ICD-10-CM

## 2018-04-03 DIAGNOSIS — F41.1 GAD (GENERALIZED ANXIETY DISORDER): Chronic | ICD-10-CM

## 2018-04-03 DIAGNOSIS — G43.009 MIGRAINE WITHOUT AURA AND WITHOUT STATUS MIGRAINOSUS, NOT INTRACTABLE: ICD-10-CM

## 2018-04-03 LAB
ALBUMIN SERPL-MCNC: 3.9 G/DL (ref 3.5–5.2)
ALP BLD-CCNC: 43 U/L (ref 35–104)
ALT SERPL-CCNC: 12 U/L (ref 5–33)
ANION GAP SERPL CALCULATED.3IONS-SCNC: 11 MMOL/L (ref 7–19)
AST SERPL-CCNC: 14 U/L (ref 5–32)
BILIRUB SERPL-MCNC: 0.3 MG/DL (ref 0.2–1.2)
BUN BLDV-MCNC: 12 MG/DL (ref 6–20)
CALCIUM SERPL-MCNC: 9.5 MG/DL (ref 8.6–10)
CHLORIDE BLD-SCNC: 100 MMOL/L (ref 98–111)
CO2: 30 MMOL/L (ref 22–29)
CREAT SERPL-MCNC: 0.7 MG/DL (ref 0.5–0.9)
GFR NON-AFRICAN AMERICAN: >60
GLUCOSE BLD-MCNC: 116 MG/DL (ref 74–109)
HCT VFR BLD CALC: 42.8 % (ref 37–47)
HEMOGLOBIN: 14.2 G/DL (ref 12–16)
MCH RBC QN AUTO: 30.3 PG (ref 27–31)
MCHC RBC AUTO-ENTMCNC: 33.2 G/DL (ref 33–37)
MCV RBC AUTO: 91.5 FL (ref 81–99)
PDW BLD-RTO: 12.4 % (ref 11.5–14.5)
PLATELET # BLD: 173 K/UL (ref 130–400)
PMV BLD AUTO: 9.7 FL (ref 9.4–12.3)
POTASSIUM SERPL-SCNC: 4.4 MMOL/L (ref 3.5–5)
RBC # BLD: 4.68 M/UL (ref 4.2–5.4)
SODIUM BLD-SCNC: 141 MMOL/L (ref 136–145)
T4 FREE: 1.1 NG/DL (ref 0.9–1.7)
TOTAL PROTEIN: 7 G/DL (ref 6.6–8.7)
TSH SERPL DL<=0.05 MIU/L-ACNC: 2.41 UIU/ML (ref 0.27–4.2)
WBC # BLD: 6.7 K/UL (ref 4.8–10.8)

## 2018-04-03 PROCEDURE — 99204 OFFICE O/P NEW MOD 45 MIN: CPT | Performed by: NURSE PRACTITIONER

## 2018-04-03 RX ORDER — NORTRIPTYLINE HYDROCHLORIDE 10 MG/1
10 CAPSULE ORAL NIGHTLY
Qty: 30 CAPSULE | Refills: 2 | Status: SHIPPED | OUTPATIENT
Start: 2018-04-03 | End: 2018-05-04

## 2018-04-03 RX ORDER — ZOLMITRIPTAN 5 MG/1
5 TABLET, FILM COATED ORAL
Qty: 6 TABLET | Refills: 1 | Status: SHIPPED | OUTPATIENT
Start: 2018-04-03 | End: 2018-04-20

## 2018-04-04 ENCOUNTER — HOSPITAL ENCOUNTER (EMERGENCY)
Age: 48
Discharge: HOME | End: 2018-04-04
Payer: MEDICAID

## 2018-04-04 VITALS
WEIGHT: 256 LBS | TEMPERATURE: 98.6 F | SYSTOLIC BLOOD PRESSURE: 124 MMHG | HEIGHT: 61 IN | RESPIRATION RATE: 16 BRPM | BODY MASS INDEX: 48.33 KG/M2 | OXYGEN SATURATION: 96 % | HEART RATE: 88 BPM | DIASTOLIC BLOOD PRESSURE: 83 MMHG

## 2018-04-04 DIAGNOSIS — G43.709 CHRONIC MIGRAINE WITHOUT AURA WITHOUT STATUS MIGRAINOSUS, NOT INTRACTABLE: Primary | ICD-10-CM

## 2018-04-04 PROCEDURE — 99282 EMERGENCY DEPT VISIT SF MDM: CPT | Performed by: NURSE PRACTITIONER

## 2018-04-04 PROCEDURE — 99282 EMERGENCY DEPT VISIT SF MDM: CPT

## 2018-04-04 PROCEDURE — 96374 THER/PROPH/DIAG INJ IV PUSH: CPT

## 2018-04-04 PROCEDURE — 6360000002 HC RX W HCPCS: Performed by: NURSE PRACTITIONER

## 2018-04-04 PROCEDURE — 96375 TX/PRO/DX INJ NEW DRUG ADDON: CPT

## 2018-04-04 RX ORDER — PROMETHAZINE HYDROCHLORIDE 25 MG/ML
25 INJECTION, SOLUTION INTRAMUSCULAR; INTRAVENOUS ONCE
Status: COMPLETED | OUTPATIENT
Start: 2018-04-04 | End: 2018-04-04

## 2018-04-04 RX ADMIN — PROMETHAZINE HYDROCHLORIDE 25 MG: 25 INJECTION INTRAMUSCULAR; INTRAVENOUS at 12:00

## 2018-04-04 RX ADMIN — BUTORPHANOL TARTRATE 0.5 MG: 2 INJECTION, SOLUTION INTRAMUSCULAR; INTRAVENOUS at 12:00

## 2018-04-04 ASSESSMENT — PAIN DESCRIPTION - LOCATION: LOCATION: HEAD

## 2018-04-04 ASSESSMENT — PAIN DESCRIPTION - PAIN TYPE: TYPE: ACUTE PAIN

## 2018-04-04 ASSESSMENT — ENCOUNTER SYMPTOMS
EYE PAIN: 0
RESPIRATORY NEGATIVE: 1
EYE REDNESS: 0
NAUSEA: 1
PHOTOPHOBIA: 1

## 2018-04-04 ASSESSMENT — PAIN SCALES - GENERAL
PAINLEVEL_OUTOF10: 8
PAINLEVEL_OUTOF10: 7

## 2018-04-06 RX ORDER — ONDANSETRON 4 MG/1
TABLET, ORALLY DISINTEGRATING ORAL
Qty: 20 TABLET | Refills: 0 | Status: SHIPPED | OUTPATIENT
Start: 2018-04-06 | End: 2018-05-04 | Stop reason: SDUPTHER

## 2018-04-11 ENCOUNTER — OFFICE VISIT (OUTPATIENT)
Dept: PRIMARY CARE CLINIC | Age: 48
End: 2018-04-11
Payer: MEDICAID

## 2018-04-11 VITALS
WEIGHT: 266 LBS | DIASTOLIC BLOOD PRESSURE: 94 MMHG | SYSTOLIC BLOOD PRESSURE: 158 MMHG | HEART RATE: 94 BPM | BODY MASS INDEX: 50.26 KG/M2 | RESPIRATION RATE: 18 BRPM | OXYGEN SATURATION: 98 %

## 2018-04-11 DIAGNOSIS — G89.4 CHRONIC PAIN SYNDROME: ICD-10-CM

## 2018-04-11 DIAGNOSIS — R30.0 DYSURIA: ICD-10-CM

## 2018-04-11 DIAGNOSIS — G43.909 MIGRAINE SYNDROME: ICD-10-CM

## 2018-04-11 DIAGNOSIS — N30.01 ACUTE CYSTITIS WITH HEMATURIA: Primary | ICD-10-CM

## 2018-04-11 DIAGNOSIS — F41.9 ANXIETY AND DEPRESSION: ICD-10-CM

## 2018-04-11 DIAGNOSIS — N76.0 BACTERIAL VAGINOSIS: ICD-10-CM

## 2018-04-11 DIAGNOSIS — R53.82 CHRONIC FATIGUE: ICD-10-CM

## 2018-04-11 DIAGNOSIS — B96.89 BACTERIAL VAGINOSIS: ICD-10-CM

## 2018-04-11 DIAGNOSIS — F32.A ANXIETY AND DEPRESSION: ICD-10-CM

## 2018-04-11 DIAGNOSIS — R35.0 FREQUENCY OF URINATION: ICD-10-CM

## 2018-04-11 LAB
BILIRUBIN, POC: NORMAL
BLOOD URINE, POC: NORMAL
CLARITY, POC: NORMAL
COLOR, POC: YELLOW
GLUCOSE URINE, POC: NORMAL
KETONES, POC: NORMAL
LEUKOCYTE EST, POC: NORMAL
NITRITE, POC: NORMAL
PH, POC: 5.5
PROTEIN, POC: NORMAL
SPECIFIC GRAVITY, POC: 1.02
UROBILINOGEN, POC: 0.2

## 2018-04-11 PROCEDURE — 81002 URINALYSIS NONAUTO W/O SCOPE: CPT | Performed by: FAMILY MEDICINE

## 2018-04-11 PROCEDURE — 99214 OFFICE O/P EST MOD 30 MIN: CPT | Performed by: FAMILY MEDICINE

## 2018-04-11 PROCEDURE — 1036F TOBACCO NON-USER: CPT | Performed by: FAMILY MEDICINE

## 2018-04-11 PROCEDURE — G8427 DOCREV CUR MEDS BY ELIG CLIN: HCPCS | Performed by: FAMILY MEDICINE

## 2018-04-11 PROCEDURE — G8417 CALC BMI ABV UP PARAM F/U: HCPCS | Performed by: FAMILY MEDICINE

## 2018-04-11 RX ORDER — CIPROFLOXACIN 500 MG/1
500 TABLET, FILM COATED ORAL 2 TIMES DAILY
Qty: 6 TABLET | Refills: 0 | Status: SHIPPED | OUTPATIENT
Start: 2018-04-11 | End: 2018-04-14

## 2018-04-11 RX ORDER — FLUCONAZOLE 100 MG/1
100 TABLET ORAL
Qty: 2 TABLET | Refills: 0 | Status: SHIPPED | OUTPATIENT
Start: 2018-04-11 | End: 2018-04-15

## 2018-04-11 RX ORDER — METRONIDAZOLE 500 MG/1
500 TABLET ORAL 2 TIMES DAILY
Qty: 14 TABLET | Refills: 0 | Status: SHIPPED | OUTPATIENT
Start: 2018-04-11 | End: 2018-04-18

## 2018-04-11 RX ORDER — TOPIRAMATE 25 MG/1
TABLET ORAL
Qty: 70 TABLET | Refills: 0 | Status: SHIPPED | OUTPATIENT
Start: 2018-04-11 | End: 2018-05-08 | Stop reason: SDUPTHER

## 2018-04-17 ASSESSMENT — ENCOUNTER SYMPTOMS
DIARRHEA: 0
BACK PAIN: 1
WHEEZING: 0
COUGH: 0
SHORTNESS OF BREATH: 0
CONSTIPATION: 0
NAUSEA: 0
CHEST TIGHTNESS: 0
VOMITING: 0
ABDOMINAL PAIN: 0

## 2018-04-19 ENCOUNTER — TELEPHONE (OUTPATIENT)
Dept: NEUROLOGY | Age: 48
End: 2018-04-19

## 2018-04-20 RX ORDER — NARATRIPTAN 2.5 MG/1
2.5 TABLET ORAL
Qty: 9 TABLET | Refills: 3 | Status: SHIPPED | OUTPATIENT
Start: 2018-04-20 | End: 2018-12-05 | Stop reason: SDUPTHER

## 2018-04-22 DIAGNOSIS — E11.65 UNCONTROLLED TYPE 2 DIABETES MELLITUS WITH HYPERGLYCEMIA, WITHOUT LONG-TERM CURRENT USE OF INSULIN (HCC): ICD-10-CM

## 2018-04-23 RX ORDER — HYDROCODONE BITARTRATE AND ACETAMINOPHEN 5; 325 MG/1; MG/1
1 TABLET ORAL EVERY 6 HOURS PRN
Qty: 120 TABLET | Refills: 0 | Status: SHIPPED | OUTPATIENT
Start: 2018-04-25 | End: 2018-05-21 | Stop reason: SDUPTHER

## 2018-04-30 DIAGNOSIS — E11.65 UNCONTROLLED TYPE 2 DIABETES MELLITUS WITH HYPERGLYCEMIA, WITHOUT LONG-TERM CURRENT USE OF INSULIN (HCC): ICD-10-CM

## 2018-05-04 ENCOUNTER — OFFICE VISIT (OUTPATIENT)
Dept: PRIMARY CARE CLINIC | Age: 48
End: 2018-05-04
Payer: MEDICAID

## 2018-05-04 ENCOUNTER — TELEPHONE (OUTPATIENT)
Dept: PRIMARY CARE CLINIC | Age: 48
End: 2018-05-04

## 2018-05-04 VITALS
DIASTOLIC BLOOD PRESSURE: 88 MMHG | WEIGHT: 255 LBS | TEMPERATURE: 97.9 F | BODY MASS INDEX: 48.18 KG/M2 | SYSTOLIC BLOOD PRESSURE: 138 MMHG | OXYGEN SATURATION: 98 % | HEART RATE: 88 BPM

## 2018-05-04 DIAGNOSIS — L03.119 CELLULITIS OF LOWER EXTREMITY, UNSPECIFIED LATERALITY: Primary | Chronic | ICD-10-CM

## 2018-05-04 DIAGNOSIS — L03.119 CELLULITIS OF LOWER EXTREMITY, UNSPECIFIED LATERALITY: ICD-10-CM

## 2018-05-04 DIAGNOSIS — I10 RESISTANT HYPERTENSION: ICD-10-CM

## 2018-05-04 DIAGNOSIS — R60.0 EDEMA OF BOTH LEGS: ICD-10-CM

## 2018-05-04 LAB
ALBUMIN SERPL-MCNC: 4.4 G/DL (ref 3.5–5.2)
ALP BLD-CCNC: 42 U/L (ref 35–104)
ALT SERPL-CCNC: 13 U/L (ref 5–33)
ANION GAP SERPL CALCULATED.3IONS-SCNC: 13 MMOL/L (ref 7–19)
AST SERPL-CCNC: 17 U/L (ref 5–32)
BILIRUB SERPL-MCNC: <0.2 MG/DL (ref 0.2–1.2)
BUN BLDV-MCNC: 14 MG/DL (ref 6–20)
CALCIUM SERPL-MCNC: 9.5 MG/DL (ref 8.6–10)
CHLORIDE BLD-SCNC: 104 MMOL/L (ref 98–111)
CO2: 25 MMOL/L (ref 22–29)
CREAT SERPL-MCNC: 0.7 MG/DL (ref 0.5–0.9)
D DIMER: <0.27 UG/ML FEU (ref 0–0.48)
GFR NON-AFRICAN AMERICAN: >60
GLUCOSE BLD-MCNC: 167 MG/DL (ref 74–109)
HCT VFR BLD CALC: 44.7 % (ref 37–47)
HEMOGLOBIN: 14.5 G/DL (ref 12–16)
MCH RBC QN AUTO: 29.3 PG (ref 27–31)
MCHC RBC AUTO-ENTMCNC: 32.4 G/DL (ref 33–37)
MCV RBC AUTO: 90.3 FL (ref 81–99)
PDW BLD-RTO: 12.5 % (ref 11.5–14.5)
PLATELET # BLD: 185 K/UL (ref 130–400)
PMV BLD AUTO: 9.7 FL (ref 9.4–12.3)
POTASSIUM SERPL-SCNC: 4.2 MMOL/L (ref 3.5–5)
RBC # BLD: 4.95 M/UL (ref 4.2–5.4)
SODIUM BLD-SCNC: 142 MMOL/L (ref 136–145)
TOTAL PROTEIN: 7.6 G/DL (ref 6.6–8.7)
WBC # BLD: 6.4 K/UL (ref 4.8–10.8)

## 2018-05-04 PROCEDURE — G8427 DOCREV CUR MEDS BY ELIG CLIN: HCPCS | Performed by: NURSE PRACTITIONER

## 2018-05-04 PROCEDURE — G8417 CALC BMI ABV UP PARAM F/U: HCPCS | Performed by: NURSE PRACTITIONER

## 2018-05-04 PROCEDURE — 1036F TOBACCO NON-USER: CPT | Performed by: NURSE PRACTITIONER

## 2018-05-04 PROCEDURE — 99214 OFFICE O/P EST MOD 30 MIN: CPT | Performed by: NURSE PRACTITIONER

## 2018-05-04 RX ORDER — FUROSEMIDE 20 MG/1
20 TABLET ORAL DAILY
Qty: 30 TABLET | Refills: 2 | Status: SHIPPED | OUTPATIENT
Start: 2018-05-04 | End: 2019-08-16 | Stop reason: SDUPTHER

## 2018-05-04 RX ORDER — AZITHROMYCIN 500 MG/1
500 TABLET, FILM COATED ORAL DAILY
Qty: 1 PACKET | Refills: 0 | Status: SHIPPED | OUTPATIENT
Start: 2018-05-04 | End: 2018-05-07

## 2018-05-04 RX ORDER — ONDANSETRON 4 MG/1
TABLET, ORALLY DISINTEGRATING ORAL
Qty: 20 TABLET | Refills: 1 | Status: SHIPPED | OUTPATIENT
Start: 2018-05-04 | End: 2018-09-21 | Stop reason: SDUPTHER

## 2018-05-06 ASSESSMENT — ENCOUNTER SYMPTOMS
WHEEZING: 0
COUGH: 0
SHORTNESS OF BREATH: 0

## 2018-05-07 ENCOUNTER — TELEPHONE (OUTPATIENT)
Dept: NEUROLOGY | Age: 48
End: 2018-05-07

## 2018-05-08 ENCOUNTER — TELEPHONE (OUTPATIENT)
Dept: PRIMARY CARE CLINIC | Age: 48
End: 2018-05-08

## 2018-05-08 RX ORDER — TOPIRAMATE 25 MG/1
25 TABLET ORAL 2 TIMES DAILY
Qty: 60 TABLET | Refills: 1 | Status: SHIPPED | OUTPATIENT
Start: 2018-05-08 | End: 2018-06-25 | Stop reason: SDUPTHER

## 2018-05-11 ENCOUNTER — HOSPITAL ENCOUNTER (OUTPATIENT)
Dept: VASCULAR LAB | Age: 48
Discharge: HOME | End: 2018-05-11
Payer: MEDICAID

## 2018-05-11 DIAGNOSIS — L03.119 CELLULITIS OF LOWER EXTREMITY, UNSPECIFIED LATERALITY: Chronic | ICD-10-CM

## 2018-05-11 DIAGNOSIS — R60.0 EDEMA OF BOTH LEGS: ICD-10-CM

## 2018-05-11 PROCEDURE — 93970 EXTREMITY STUDY: CPT

## 2018-05-21 RX ORDER — HYDROCODONE BITARTRATE AND ACETAMINOPHEN 5; 325 MG/1; MG/1
1 TABLET ORAL EVERY 6 HOURS PRN
Qty: 120 TABLET | Refills: 0 | Status: SHIPPED | OUTPATIENT
Start: 2018-05-25 | End: 2018-06-24

## 2018-05-23 DIAGNOSIS — E11.65 UNCONTROLLED TYPE 2 DIABETES MELLITUS WITH HYPERGLYCEMIA, WITHOUT LONG-TERM CURRENT USE OF INSULIN (HCC): ICD-10-CM

## 2018-05-24 ENCOUNTER — OFFICE VISIT (OUTPATIENT)
Dept: PSYCHIATRY | Age: 48
End: 2018-05-24
Payer: MEDICAID

## 2018-05-24 VITALS
DIASTOLIC BLOOD PRESSURE: 98 MMHG | SYSTOLIC BLOOD PRESSURE: 142 MMHG | WEIGHT: 256 LBS | OXYGEN SATURATION: 97 % | HEART RATE: 90 BPM | BODY MASS INDEX: 48.33 KG/M2 | HEIGHT: 61 IN

## 2018-05-24 DIAGNOSIS — F41.1 GAD (GENERALIZED ANXIETY DISORDER): Primary | ICD-10-CM

## 2018-05-24 PROCEDURE — 90791 PSYCH DIAGNOSTIC EVALUATION: CPT | Performed by: COUNSELOR

## 2018-05-30 RX ORDER — PRAMIPEXOLE DIHYDROCHLORIDE 0.5 MG/1
TABLET ORAL
Qty: 60 TABLET | Refills: 0 | Status: SHIPPED | OUTPATIENT
Start: 2018-05-30 | End: 2018-06-28 | Stop reason: SDUPTHER

## 2018-06-10 ENCOUNTER — HOSPITAL ENCOUNTER (EMERGENCY)
Age: 48
Discharge: HOME | End: 2018-06-10
Payer: MEDICAID

## 2018-06-10 VITALS
HEART RATE: 73 BPM | RESPIRATION RATE: 18 BRPM | BODY MASS INDEX: 37.76 KG/M2 | OXYGEN SATURATION: 98 % | TEMPERATURE: 97.3 F | DIASTOLIC BLOOD PRESSURE: 84 MMHG | HEIGHT: 61 IN | WEIGHT: 200 LBS | SYSTOLIC BLOOD PRESSURE: 155 MMHG

## 2018-06-10 DIAGNOSIS — G43.709 CHRONIC MIGRAINE WITHOUT AURA WITHOUT STATUS MIGRAINOSUS, NOT INTRACTABLE: Primary | ICD-10-CM

## 2018-06-10 PROCEDURE — 99282 EMERGENCY DEPT VISIT SF MDM: CPT | Performed by: NURSE PRACTITIONER

## 2018-06-10 PROCEDURE — 99282 EMERGENCY DEPT VISIT SF MDM: CPT

## 2018-06-10 PROCEDURE — 96372 THER/PROPH/DIAG INJ SC/IM: CPT

## 2018-06-10 PROCEDURE — 6360000002 HC RX W HCPCS: Performed by: NURSE PRACTITIONER

## 2018-06-10 RX ORDER — PROMETHAZINE HYDROCHLORIDE 25 MG/ML
25 INJECTION, SOLUTION INTRAMUSCULAR; INTRAVENOUS ONCE
Status: COMPLETED | OUTPATIENT
Start: 2018-06-10 | End: 2018-06-10

## 2018-06-10 RX ADMIN — BUTORPHANOL TARTRATE 1 MG: 2 INJECTION, SOLUTION INTRAMUSCULAR; INTRAVENOUS at 20:50

## 2018-06-10 RX ADMIN — PROMETHAZINE HYDROCHLORIDE 25 MG: 25 INJECTION INTRAMUSCULAR; INTRAVENOUS at 20:50

## 2018-06-10 ASSESSMENT — PAIN DESCRIPTION - PAIN TYPE: TYPE: ACUTE PAIN

## 2018-06-10 ASSESSMENT — PAIN SCALES - GENERAL
PAINLEVEL_OUTOF10: 6
PAINLEVEL_OUTOF10: 7

## 2018-06-10 ASSESSMENT — ENCOUNTER SYMPTOMS
BLURRED VISION: 0
VOMITING: 1
PHOTOPHOBIA: 1

## 2018-06-10 ASSESSMENT — PAIN DESCRIPTION - LOCATION: LOCATION: HEAD

## 2018-06-15 DIAGNOSIS — E11.65 UNCONTROLLED TYPE 2 DIABETES MELLITUS WITH HYPERGLYCEMIA, WITHOUT LONG-TERM CURRENT USE OF INSULIN (HCC): ICD-10-CM

## 2018-06-22 ENCOUNTER — TELEPHONE (OUTPATIENT)
Dept: PRIMARY CARE CLINIC | Age: 48
End: 2018-06-22

## 2018-06-25 ENCOUNTER — OFFICE VISIT (OUTPATIENT)
Dept: PRIMARY CARE CLINIC | Age: 48
End: 2018-06-25
Payer: MEDICAID

## 2018-06-25 VITALS
DIASTOLIC BLOOD PRESSURE: 78 MMHG | WEIGHT: 242 LBS | HEART RATE: 88 BPM | OXYGEN SATURATION: 99 % | TEMPERATURE: 98 F | BODY MASS INDEX: 45.73 KG/M2 | SYSTOLIC BLOOD PRESSURE: 138 MMHG | RESPIRATION RATE: 20 BRPM

## 2018-06-25 DIAGNOSIS — Z13.220 SCREENING FOR HYPERLIPIDEMIA: ICD-10-CM

## 2018-06-25 DIAGNOSIS — E11.42 TYPE 2 DIABETES, CONTROLLED, WITH PERIPHERAL NEUROPATHY (HCC): Primary | ICD-10-CM

## 2018-06-25 DIAGNOSIS — E66.01 MORBID OBESITY WITH BMI OF 45.0-49.9, ADULT (HCC): ICD-10-CM

## 2018-06-25 DIAGNOSIS — M25.562 CHRONIC PAIN OF LEFT KNEE: ICD-10-CM

## 2018-06-25 DIAGNOSIS — E11.65 UNCONTROLLED TYPE 2 DIABETES MELLITUS WITH HYPERGLYCEMIA, WITHOUT LONG-TERM CURRENT USE OF INSULIN (HCC): Chronic | ICD-10-CM

## 2018-06-25 DIAGNOSIS — G89.29 CHRONIC PAIN OF LEFT KNEE: ICD-10-CM

## 2018-06-25 DIAGNOSIS — G43.009 MIGRAINE WITHOUT AURA AND WITHOUT STATUS MIGRAINOSUS, NOT INTRACTABLE: ICD-10-CM

## 2018-06-25 DIAGNOSIS — Z23 NEED FOR PROPHYLACTIC VACCINATION AGAINST STREPTOCOCCUS PNEUMONIAE (PNEUMOCOCCUS): ICD-10-CM

## 2018-06-25 LAB — HBA1C MFR BLD: 6.9 %

## 2018-06-25 PROCEDURE — G8427 DOCREV CUR MEDS BY ELIG CLIN: HCPCS | Performed by: FAMILY MEDICINE

## 2018-06-25 PROCEDURE — G8417 CALC BMI ABV UP PARAM F/U: HCPCS | Performed by: FAMILY MEDICINE

## 2018-06-25 PROCEDURE — 2022F DILAT RTA XM EVC RTNOPTHY: CPT | Performed by: FAMILY MEDICINE

## 2018-06-25 PROCEDURE — 99214 OFFICE O/P EST MOD 30 MIN: CPT | Performed by: FAMILY MEDICINE

## 2018-06-25 PROCEDURE — 1036F TOBACCO NON-USER: CPT | Performed by: FAMILY MEDICINE

## 2018-06-25 PROCEDURE — 3044F HG A1C LEVEL LT 7.0%: CPT | Performed by: FAMILY MEDICINE

## 2018-06-25 PROCEDURE — 83036 HEMOGLOBIN GLYCOSYLATED A1C: CPT | Performed by: FAMILY MEDICINE

## 2018-06-25 RX ORDER — PREDNISONE 20 MG/1
TABLET ORAL
Qty: 20 TABLET | Refills: 0 | Status: SHIPPED | OUTPATIENT
Start: 2018-06-25 | End: 2018-08-09 | Stop reason: SDUPTHER

## 2018-06-25 RX ORDER — HYDROCODONE BITARTRATE AND ACETAMINOPHEN 7.5; 325 MG/1; MG/1
1 TABLET ORAL EVERY 8 HOURS PRN
Qty: 12 TABLET | Refills: 0 | Status: SHIPPED | OUTPATIENT
Start: 2018-06-25 | End: 2018-06-29

## 2018-06-25 RX ORDER — CYCLOBENZAPRINE HCL 10 MG
10 TABLET ORAL 3 TIMES DAILY PRN
Qty: 90 TABLET | Refills: 0 | Status: SHIPPED | OUTPATIENT
Start: 2018-06-25 | End: 2019-01-05 | Stop reason: RX

## 2018-06-25 RX ORDER — PROMETHAZINE HYDROCHLORIDE 25 MG/1
TABLET ORAL
Qty: 30 TABLET | Refills: 3 | Status: SHIPPED | OUTPATIENT
Start: 2018-06-25 | End: 2018-07-27 | Stop reason: SDUPTHER

## 2018-06-25 RX ORDER — TOPIRAMATE 50 MG/1
50 TABLET, FILM COATED ORAL 2 TIMES DAILY
Qty: 60 TABLET | Refills: 2 | Status: SHIPPED | OUTPATIENT
Start: 2018-06-25 | End: 2018-12-10 | Stop reason: SDUPTHER

## 2018-06-25 ASSESSMENT — ENCOUNTER SYMPTOMS
CHEST TIGHTNESS: 0
NAUSEA: 0
ABDOMINAL PAIN: 0
CONSTIPATION: 0
SHORTNESS OF BREATH: 0
WHEEZING: 0
DIARRHEA: 0
VOMITING: 0
COUGH: 0

## 2018-06-28 DIAGNOSIS — E11.65 UNCONTROLLED TYPE 2 DIABETES MELLITUS WITH HYPERGLYCEMIA, WITHOUT LONG-TERM CURRENT USE OF INSULIN (HCC): ICD-10-CM

## 2018-06-28 RX ORDER — LIRAGLUTIDE 6 MG/ML
INJECTION SUBCUTANEOUS
Qty: 9 ML | Refills: 0 | Status: SHIPPED | OUTPATIENT
Start: 2018-06-28 | End: 2018-07-27 | Stop reason: SDUPTHER

## 2018-06-28 RX ORDER — PRAMIPEXOLE DIHYDROCHLORIDE 0.5 MG/1
TABLET ORAL
Qty: 60 TABLET | Refills: 0 | Status: SHIPPED | OUTPATIENT
Start: 2018-06-28 | End: 2018-07-27 | Stop reason: SDUPTHER

## 2018-07-13 DIAGNOSIS — E11.65 UNCONTROLLED TYPE 2 DIABETES MELLITUS WITH HYPERGLYCEMIA, WITHOUT LONG-TERM CURRENT USE OF INSULIN (HCC): ICD-10-CM

## 2018-07-27 DIAGNOSIS — E11.65 UNCONTROLLED TYPE 2 DIABETES MELLITUS WITH HYPERGLYCEMIA, WITHOUT LONG-TERM CURRENT USE OF INSULIN (HCC): ICD-10-CM

## 2018-07-27 RX ORDER — PRAMIPEXOLE DIHYDROCHLORIDE 0.5 MG/1
TABLET ORAL
Qty: 60 TABLET | Refills: 3 | Status: SHIPPED | OUTPATIENT
Start: 2018-07-27 | End: 2019-01-08 | Stop reason: SDUPTHER

## 2018-07-27 RX ORDER — TRIAMTERENE AND HYDROCHLOROTHIAZIDE 37.5; 25 MG/1; MG/1
CAPSULE ORAL
Qty: 30 CAPSULE | Refills: 3 | Status: SHIPPED | OUTPATIENT
Start: 2018-07-27 | End: 2019-02-08 | Stop reason: SDUPTHER

## 2018-07-27 RX ORDER — LIRAGLUTIDE 6 MG/ML
INJECTION SUBCUTANEOUS
Qty: 9 ML | Refills: 3 | Status: SHIPPED | OUTPATIENT
Start: 2018-07-27 | End: 2018-12-28 | Stop reason: SDUPTHER

## 2018-07-27 RX ORDER — PROMETHAZINE HYDROCHLORIDE 25 MG/1
TABLET ORAL
Qty: 30 TABLET | Refills: 3 | Status: SHIPPED | OUTPATIENT
Start: 2018-07-27 | End: 2019-03-30 | Stop reason: SDUPTHER

## 2018-08-09 ENCOUNTER — TELEPHONE (OUTPATIENT)
Dept: PRIMARY CARE CLINIC | Age: 48
End: 2018-08-09

## 2018-08-09 DIAGNOSIS — G89.29 CHRONIC PAIN OF LEFT KNEE: ICD-10-CM

## 2018-08-09 DIAGNOSIS — M25.562 CHRONIC PAIN OF LEFT KNEE: ICD-10-CM

## 2018-08-09 RX ORDER — PREDNISONE 20 MG/1
TABLET ORAL
Qty: 20 TABLET | Refills: 0 | Status: SHIPPED | OUTPATIENT
Start: 2018-08-09 | End: 2018-10-31 | Stop reason: SDUPTHER

## 2018-08-09 NOTE — TELEPHONE ENCOUNTER
Patient called with request for prednisone- has ordered in past and it is for her knee problems which at this time she is working with Ortho also. \" Simi Phelps

## 2018-08-10 DIAGNOSIS — E11.65 UNCONTROLLED TYPE 2 DIABETES MELLITUS WITH HYPERGLYCEMIA, WITHOUT LONG-TERM CURRENT USE OF INSULIN (HCC): ICD-10-CM

## 2018-09-01 ENCOUNTER — HOSPITAL ENCOUNTER (EMERGENCY)
Age: 48
Discharge: HOME | End: 2018-09-01
Payer: MEDICAID

## 2018-09-01 VITALS
HEIGHT: 61 IN | HEART RATE: 90 BPM | OXYGEN SATURATION: 95 % | DIASTOLIC BLOOD PRESSURE: 83 MMHG | SYSTOLIC BLOOD PRESSURE: 122 MMHG | BODY MASS INDEX: 49.09 KG/M2 | WEIGHT: 260 LBS | RESPIRATION RATE: 18 BRPM | TEMPERATURE: 98.2 F

## 2018-09-01 DIAGNOSIS — G43.909 NONINTRACTABLE CHRONIC MIGRAINE: Primary | ICD-10-CM

## 2018-09-01 PROCEDURE — 99284 EMERGENCY DEPT VISIT MOD MDM: CPT | Performed by: NURSE PRACTITIONER

## 2018-09-01 PROCEDURE — 99283 EMERGENCY DEPT VISIT LOW MDM: CPT

## 2018-09-01 PROCEDURE — 96372 THER/PROPH/DIAG INJ SC/IM: CPT

## 2018-09-01 PROCEDURE — 6360000002 HC RX W HCPCS: Performed by: NURSE PRACTITIONER

## 2018-09-01 RX ORDER — PROMETHAZINE HYDROCHLORIDE 25 MG/ML
25 INJECTION, SOLUTION INTRAMUSCULAR; INTRAVENOUS ONCE
Status: COMPLETED | OUTPATIENT
Start: 2018-09-01 | End: 2018-09-01

## 2018-09-01 RX ADMIN — BUTORPHANOL TARTRATE 1 MG: 2 INJECTION, SOLUTION INTRAMUSCULAR; INTRAVENOUS at 20:54

## 2018-09-01 RX ADMIN — PROMETHAZINE HYDROCHLORIDE 25 MG: 25 INJECTION INTRAMUSCULAR; INTRAVENOUS at 20:54

## 2018-09-01 ASSESSMENT — PAIN SCALES - GENERAL: PAINLEVEL_OUTOF10: 10

## 2018-09-01 ASSESSMENT — ENCOUNTER SYMPTOMS: PHOTOPHOBIA: 1

## 2018-09-02 NOTE — ED PROVIDER NOTES
SURGICAL HISTORY       Past Surgical History:   Procedure Laterality Date    ECTOPIC PREGNANCY SURGERY      EYE SURGERY      cornea transplant and cataracts removed    KNEE CARTILAGE SURGERY Left 12/20/2016    KNEE ARTHROSCOPIC PARTIAL MEDIAL MENISCECTOMY performed by Naima Weiss MD at [address]       Discharge Medication List as of 9/1/2018  8:35 PM      CONTINUE these medications which have NOT CHANGED    Details   ONE TOUCH ULTRA TEST strip TEST BLOOD SUGAR ONCE DAILY, Disp-100 strip, R-0Normal      predniSONE (DELTASONE) 20 MG tablet Take 4 tabs for 2 days, then 3 tabs for 2 days, then 2 tabs for 2 days, then 1 tab for 2 days. , Disp-20 tablet, R-0Normal      triamterene-hydrochlorothiazide (DYAZIDE) 37.5-25 MG per capsule TAKE 1 CAPSULE BY MOUTH ONCE DAILY IN THE MORNING, Disp-30 capsule, R-3Normal      promethazine (PHENERGAN) 25 MG tablet TAKE 1 TABLET BY MOUTH EVERY 6 HOURS AS NEEDED FOR NAUSEA, Disp-30 tablet, R-3Normal      pramipexole (MIRAPEX) 0.5 MG tablet TAKE 1 TABLET BY MOUTH TWICE DAILY, Disp-60 tablet, R-3Normal      VICTOZA 18 MG/3ML SOPN SC injection ADMINISTER 1.8 MG UNDER THE SKIN DAILY. , Disp-9 mL, R-3Normal      cyclobenzaprine (FLEXERIL) 10 MG tablet Take 1 tablet by mouth 3 times daily as needed for Muscle spasms, Disp-90 tablet, R-0Normal      topiramate (TOPAMAX) 50 MG tablet Take 1 tablet by mouth 2 times daily, Disp-60 tablet, R-2Normal      metFORMIN (GLUCOPHAGE) 500 MG tablet TAKE 1 TABLET BY MOUTH TWICE DAILY WITH MEALS, Disp-60 tablet, R-3Normal      insulin glargine (BASAGLAR KWIKPEN) 100 UNIT/ML injection pen Inject 40 Units into the skin nightly, Disp-5 pen, R-1Normal      ondansetron (ZOFRAN-ODT) 4 MG disintegrating tablet DISSOLVE 1 TABLET ON THE TONGUE EVERY 8 HOURS AS NEEDED FOR NAUSEA OR VOMITING, Disp-20 tablet, R-1Normal      furosemide (LASIX) 20 MG tablet Take 1 tablet by mouth daily, Disp-30 tablet, R-2Normal

## 2018-09-08 DIAGNOSIS — E11.65 UNCONTROLLED TYPE 2 DIABETES MELLITUS WITH HYPERGLYCEMIA, WITHOUT LONG-TERM CURRENT USE OF INSULIN (HCC): ICD-10-CM

## 2018-09-11 RX ORDER — INSULIN GLARGINE 100 [IU]/ML
INJECTION, SOLUTION SUBCUTANEOUS
Qty: 15 PEN | Refills: 0 | Status: SHIPPED | OUTPATIENT
Start: 2018-09-11 | End: 2018-10-18 | Stop reason: SDUPTHER

## 2018-09-23 RX ORDER — ONDANSETRON 4 MG/1
TABLET, ORALLY DISINTEGRATING ORAL
Qty: 20 TABLET | Refills: 0 | Status: SHIPPED | OUTPATIENT
Start: 2018-09-23 | End: 2018-12-10 | Stop reason: SDUPTHER

## 2018-10-03 ENCOUNTER — OFFICE VISIT (OUTPATIENT)
Dept: PRIMARY CARE CLINIC | Age: 48
End: 2018-10-03
Payer: MEDICAID

## 2018-10-03 VITALS
SYSTOLIC BLOOD PRESSURE: 126 MMHG | RESPIRATION RATE: 20 BRPM | TEMPERATURE: 98 F | DIASTOLIC BLOOD PRESSURE: 88 MMHG | HEART RATE: 97 BPM | OXYGEN SATURATION: 99 %

## 2018-10-03 DIAGNOSIS — L60.0 INFECTION, NAIL, INGROWING: Primary | ICD-10-CM

## 2018-10-03 PROCEDURE — G8427 DOCREV CUR MEDS BY ELIG CLIN: HCPCS | Performed by: FAMILY MEDICINE

## 2018-10-03 PROCEDURE — G8417 CALC BMI ABV UP PARAM F/U: HCPCS | Performed by: FAMILY MEDICINE

## 2018-10-03 PROCEDURE — G8484 FLU IMMUNIZE NO ADMIN: HCPCS | Performed by: FAMILY MEDICINE

## 2018-10-03 PROCEDURE — 99213 OFFICE O/P EST LOW 20 MIN: CPT | Performed by: FAMILY MEDICINE

## 2018-10-03 PROCEDURE — 1036F TOBACCO NON-USER: CPT | Performed by: FAMILY MEDICINE

## 2018-10-03 PROCEDURE — 11730 AVULSION NAIL PLATE SIMPLE 1: CPT | Performed by: FAMILY MEDICINE

## 2018-10-03 RX ORDER — CIPROFLOXACIN 500 MG/1
500 TABLET, FILM COATED ORAL 2 TIMES DAILY
Qty: 20 TABLET | Refills: 0 | Status: SHIPPED | OUTPATIENT
Start: 2018-10-03 | End: 2018-10-13

## 2018-10-03 RX ORDER — FLUCONAZOLE 100 MG/1
100 TABLET ORAL DAILY
Qty: 2 TABLET | Refills: 1 | Status: SHIPPED | OUTPATIENT
Start: 2018-10-03 | End: 2018-10-10

## 2018-10-08 NOTE — PROGRESS NOTES
SKIN DAILY. 9 mL 3    cyclobenzaprine (FLEXERIL) 10 MG tablet Take 1 tablet by mouth 3 times daily as needed for Muscle spasms 90 tablet 0    topiramate (TOPAMAX) 50 MG tablet Take 1 tablet by mouth 2 times daily 60 tablet 2    metFORMIN (GLUCOPHAGE) 500 MG tablet TAKE 1 TABLET BY MOUTH TWICE DAILY WITH MEALS 60 tablet 3    furosemide (LASIX) 20 MG tablet Take 1 tablet by mouth daily 30 tablet 2    traZODone (DESYREL) 50 MG tablet Take 1-2 tabs at night as directed 60 tablet 1    verapamil (CALAN SR) 240 MG extended release tablet TAKE 1 TABLET BY MOUTH EVERY NIGHT 30 tablet 5    celecoxib (CELEBREX) 200 MG capsule TAKE 1 CAPSULE BY MOUTH EVERY DAY 60 capsule 0    ondansetron (ZOFRAN) 4 MG tablet Take 1 tablet by mouth daily as needed for Nausea or Vomiting 15 tablet 0    Insulin Pen Needle 31G X 6 MM MISC 1 each by Does not apply route daily May substitute to generic for insurance 100 each 3    Blood Glucose Monitoring Suppl ([address]) w/Device KIT 1 kit by Does not apply route daily as needed (Dx 250.00) 1 kit 0    ONE TOUCH LANCETS MISC 1 each by Does not apply route daily 100 each 3    Cream Base CREA Apply 1-2 pumps to affected area 3-4 times daily 360 g 3    naratriptan (AMERGE) 2.5 MG tablet Take 1 tablet by mouth once as needed for Migraine 2.5 mg at onset of headache, may repeat in 4 hours if needed 9 tablet 3    gabapentin (NEURONTIN) 300 MG capsule Take 1 capsule by mouth nightly for 90 days. 30 capsule 2    gabapentin (NEURONTIN) 100 MG capsule Take 1 capsule in the am and take 1 capsule in the afternoon.  60 capsule 2     Current Facility-Administered Medications   Medication Dose Route Frequency Provider Last Rate Last Dose    triamcinolone acetonide (KENALOG-40) injection 40 mg  40 mg Intra-articular Once Herminio Higgins MD           Allergies   Allergen Reactions    Compazine [Prochlorperazine Maleate] Shortness Of Breath    Demerol Hives    Ibuprofen Nausea Only   

## 2018-10-12 DIAGNOSIS — E11.65 UNCONTROLLED TYPE 2 DIABETES MELLITUS WITH HYPERGLYCEMIA, WITHOUT LONG-TERM CURRENT USE OF INSULIN (HCC): ICD-10-CM

## 2018-10-15 DIAGNOSIS — E11.65 UNCONTROLLED TYPE 2 DIABETES MELLITUS WITH HYPERGLYCEMIA, WITHOUT LONG-TERM CURRENT USE OF INSULIN (HCC): ICD-10-CM

## 2018-10-17 ENCOUNTER — TELEPHONE (OUTPATIENT)
Dept: PRIMARY CARE CLINIC | Age: 48
End: 2018-10-17

## 2018-10-30 ENCOUNTER — OFFICE VISIT (OUTPATIENT)
Dept: PRIMARY CARE CLINIC | Age: 48
End: 2018-10-30
Payer: MEDICAID

## 2018-10-30 VITALS
OXYGEN SATURATION: 98 % | DIASTOLIC BLOOD PRESSURE: 80 MMHG | WEIGHT: 264 LBS | RESPIRATION RATE: 20 BRPM | TEMPERATURE: 97.8 F | HEIGHT: 61 IN | SYSTOLIC BLOOD PRESSURE: 122 MMHG | BODY MASS INDEX: 49.84 KG/M2 | HEART RATE: 98 BPM

## 2018-10-30 DIAGNOSIS — R05.9 COUGH: ICD-10-CM

## 2018-10-30 DIAGNOSIS — J01.10 ACUTE NON-RECURRENT FRONTAL SINUSITIS: Primary | ICD-10-CM

## 2018-10-30 PROCEDURE — 99213 OFFICE O/P EST LOW 20 MIN: CPT | Performed by: NURSE PRACTITIONER

## 2018-10-30 PROCEDURE — G8484 FLU IMMUNIZE NO ADMIN: HCPCS | Performed by: NURSE PRACTITIONER

## 2018-10-30 PROCEDURE — 1036F TOBACCO NON-USER: CPT | Performed by: NURSE PRACTITIONER

## 2018-10-30 PROCEDURE — G8417 CALC BMI ABV UP PARAM F/U: HCPCS | Performed by: NURSE PRACTITIONER

## 2018-10-30 PROCEDURE — G8427 DOCREV CUR MEDS BY ELIG CLIN: HCPCS | Performed by: NURSE PRACTITIONER

## 2018-10-30 RX ORDER — METHYLPREDNISOLONE ACETATE 40 MG/ML
40 INJECTION, SUSPENSION INTRA-ARTICULAR; INTRALESIONAL; INTRAMUSCULAR; SOFT TISSUE ONCE
Status: COMPLETED | OUTPATIENT
Start: 2018-10-30 | End: 2018-10-30

## 2018-10-30 RX ORDER — AZITHROMYCIN 250 MG/1
TABLET, FILM COATED ORAL
Qty: 1 PACKET | Refills: 0 | Status: SHIPPED | OUTPATIENT
Start: 2018-10-30 | End: 2018-11-03

## 2018-10-30 RX ORDER — FLUTICASONE PROPIONATE 50 MCG
1 SPRAY, SUSPENSION (ML) NASAL DAILY
Qty: 1 BOTTLE | Refills: 3 | Status: SHIPPED | OUTPATIENT
Start: 2018-10-30 | End: 2019-02-22 | Stop reason: SDUPTHER

## 2018-10-30 RX ORDER — PROMETHAZINE HYDROCHLORIDE AND PHENYLEPHRINE HYDROCHLORIDE 6.25; 5 MG/5ML; MG/5ML
5 SYRUP ORAL EVERY 6 HOURS
Qty: 280 ML | Refills: 0 | Status: SHIPPED | OUTPATIENT
Start: 2018-10-30 | End: 2019-02-22 | Stop reason: SDUPTHER

## 2018-10-30 RX ADMIN — METHYLPREDNISOLONE ACETATE 40 MG: 40 INJECTION, SUSPENSION INTRA-ARTICULAR; INTRALESIONAL; INTRAMUSCULAR; SOFT TISSUE at 16:11

## 2018-10-30 ASSESSMENT — ENCOUNTER SYMPTOMS
RHINORRHEA: 1
NAUSEA: 0
WHEEZING: 0
ABDOMINAL PAIN: 0
DIARRHEA: 0
SINUS PRESSURE: 1
COUGH: 1
SHORTNESS OF BREATH: 0
SINUS PAIN: 1
VOMITING: 0
SORE THROAT: 1

## 2018-10-30 NOTE — PROGRESS NOTES
[facility]   [address] [facility], [facility]  Phone:  [phone]  Fax:  [phone] is a 57 y.o. female who presents today for hermedical conditions/complaints as noted below. Tianna  is c/o of Pharyngitis and Cough      Chief Complaint   Patient presents with    Pharyngitis    Cough       HPI:     HPI    Tianna Trivedi presents today for laryngitis, pharyngitis, cough, and congestion. She has tried home remedies and these have not helped. This started Saturday. She denies fever. She also has a headache. She states she is unable to take many medications. She can only take azithromycin. She is unable to take Zyrtec D.      Past Medical History:   Diagnosis Date    Anxiety     Constipation     Depression     DM (diabetes mellitus) ([address]     Headache(784.0)     Hyperlipidemia     Hypertension     Kidney stones     Kidney stones     Restless leg     Restless legs syndrome         Past Surgical History:   Procedure Laterality Date    ECTOPIC PREGNANCY SURGERY      EYE SURGERY      cornea transplant and cataracts removed    KNEE CARTILAGE SURGERY Left 12/20/2016    KNEE ARTHROSCOPIC PARTIAL MEDIAL MENISCECTOMY performed by Irene Yin MD at [address] [place] [address] History   Substance Use Topics    Smoking status: Never Smoker    Smokeless tobacco: Never Used    Alcohol use No        Current Outpatient Prescriptions   Medication Sig Dispense Refill    fluticasone (FLONASE) 50 MCG/ACT nasal spray 1 spray by Each Nare route daily 1 Bottle 3    azithromycin (ZITHROMAX Z-JOSÉ) 250 MG tablet Take 2 tablets (500 mg) on Day 1, and then take 1 tablet (250 mg) on days 2 through 5. 1 packet 0    Promethazine-Phenylephrine (PHENERGAN-VC) 6.25-5 MG/5ML syrup Take 5 mLs by mouth every 6 hours 280 mL 0    insulin glargine (BASAGLAR KWIKPEN) 100 UNIT/ML injection pen Inject 45 Units into the skin nightly 15 pen 0    ONE TOUCH ULTRA TEST strip TEST Placed This Encounter   Medications    methylPREDNISolone acetate (DEPO-MEDROL) injection 40 mg    fluticasone (FLONASE) 50 MCG/ACT nasal spray     Si spray by Each Nare route daily     Dispense:  1 Bottle     Refill:  3    azithromycin (ZITHROMAX Z-JOSÉ) 250 MG tablet     Sig: Take 2 tablets (500 mg) on Day 1, and then take 1 tablet (250 mg) on days 2 through 5. Dispense:  1 packet     Refill:  0    Promethazine-Phenylephrine (PHENERGAN-VC) 6.25-5 MG/5ML syrup     Sig: Take 5 mLs by mouth every 6 hours     Dispense:  280 mL     Refill:  0        Patient offered educational materials - see patient instructions. Discussed use, benefit, and side effectsof prescribed medications. All patient questions answered. Pt voiced understanding. Reviewed health maintenance. Instructed to continue current medications, diet and exercise. Patient agreed with treatment plan. Followup as directed.            Electronically signed by NANETTE Johnson on 10/30/2018 at 2:51 PM

## 2018-10-31 DIAGNOSIS — G89.29 CHRONIC PAIN OF LEFT KNEE: ICD-10-CM

## 2018-10-31 DIAGNOSIS — M25.562 CHRONIC PAIN OF LEFT KNEE: ICD-10-CM

## 2018-11-01 RX ORDER — PREDNISONE 20 MG/1
TABLET ORAL
Qty: 20 TABLET | Refills: 0 | Status: SHIPPED | OUTPATIENT
Start: 2018-11-01 | End: 2018-12-05

## 2018-11-01 RX ORDER — EPINEPHRINE 0.3 MG/.3ML
0.3 INJECTION SUBCUTANEOUS ONCE
Qty: 0.3 ML | Refills: 0 | Status: SHIPPED | OUTPATIENT
Start: 2018-11-01 | End: 2020-10-27

## 2018-11-19 DIAGNOSIS — E11.65 UNCONTROLLED TYPE 2 DIABETES MELLITUS WITH HYPERGLYCEMIA, WITHOUT LONG-TERM CURRENT USE OF INSULIN (HCC): ICD-10-CM

## 2018-11-19 RX ORDER — INSULIN GLARGINE 100 [IU]/ML
INJECTION, SOLUTION SUBCUTANEOUS
Qty: 15 PEN | Refills: 0 | Status: SHIPPED | OUTPATIENT
Start: 2018-11-19 | End: 2018-12-28 | Stop reason: SDUPTHER

## 2018-12-05 ENCOUNTER — OFFICE VISIT (OUTPATIENT)
Dept: PRIMARY CARE CLINIC | Age: 48
End: 2018-12-05
Payer: MEDICAID

## 2018-12-05 VITALS
OXYGEN SATURATION: 98 % | SYSTOLIC BLOOD PRESSURE: 124 MMHG | RESPIRATION RATE: 14 BRPM | WEIGHT: 266.4 LBS | DIASTOLIC BLOOD PRESSURE: 84 MMHG | HEIGHT: 61 IN | BODY MASS INDEX: 50.3 KG/M2 | TEMPERATURE: 98 F | HEART RATE: 88 BPM

## 2018-12-05 DIAGNOSIS — B34.9 VIRAL INFECTION: Primary | ICD-10-CM

## 2018-12-05 DIAGNOSIS — G43.009 MIGRAINE WITHOUT AURA AND WITHOUT STATUS MIGRAINOSUS, NOT INTRACTABLE: ICD-10-CM

## 2018-12-05 PROCEDURE — G8427 DOCREV CUR MEDS BY ELIG CLIN: HCPCS | Performed by: NURSE PRACTITIONER

## 2018-12-05 PROCEDURE — G8417 CALC BMI ABV UP PARAM F/U: HCPCS | Performed by: NURSE PRACTITIONER

## 2018-12-05 PROCEDURE — 99213 OFFICE O/P EST LOW 20 MIN: CPT | Performed by: NURSE PRACTITIONER

## 2018-12-05 PROCEDURE — 1036F TOBACCO NON-USER: CPT | Performed by: NURSE PRACTITIONER

## 2018-12-05 PROCEDURE — G8484 FLU IMMUNIZE NO ADMIN: HCPCS | Performed by: NURSE PRACTITIONER

## 2018-12-05 RX ORDER — PREDNISONE 10 MG/1
10 TABLET ORAL DAILY
Qty: 7 TABLET | Refills: 0 | Status: SHIPPED | OUTPATIENT
Start: 2018-12-05 | End: 2019-06-07 | Stop reason: SDUPTHER

## 2018-12-05 RX ORDER — NARATRIPTAN 2.5 MG/1
2.5 TABLET ORAL
Qty: 9 TABLET | Refills: 3 | Status: SHIPPED | OUTPATIENT
Start: 2018-12-05 | End: 2019-09-24

## 2018-12-05 RX ORDER — ZOLMITRIPTAN 2.5 MG/1
2.5 TABLET, FILM COATED ORAL PRN
COMMUNITY
End: 2019-02-08

## 2018-12-05 ASSESSMENT — ENCOUNTER SYMPTOMS
SINUS PRESSURE: 1
GASTROINTESTINAL NEGATIVE: 1
SINUS PAIN: 1
EYES NEGATIVE: 1
COUGH: 1

## 2018-12-05 NOTE — PROGRESS NOTES
[facility]  [address]  [address] [place] [address]  Dept: [phone]  Dept Fax: [phone]  Loc: [phone]    Do Ansari is a 57 y.o. female who presents today for her medical conditions/complaints as noted below. Tianna  is c/o of Sinus Problem; Headache; and Congestion      Chief Complaint   Patient presents with    Sinus Problem    Headache    Congestion       HPI:     HPI  Patient here with complaints of sinus congestion, cough, and headaches. She reports that symptoms have been ongoing for a few days. She has tried some otc meds without relief. Patient did see Rachael Ceja on Oct 30 for sinusitis and was given zpack, flonase, and steroid injection. She reports symptoms improved and these just recently returned.      Past Medical History:   Diagnosis Date    Anxiety     Constipation     Depression     DM [facility]diabetes mellitus) ([facility] [address]     Headache(784.0)     Hyperlipidemia     Hypertension     Kidney stones     Kidney stones     Restless leg     Restless legs syndrome         Past Surgical History:   Procedure Laterality Date    ECTOPIC PREGNANCY SURGERY      EYE SURGERY      cornea transplant and cataracts removed    KNEE CARTILAGE SURGERY Left 12/20/2016    KNEE ARTHROSCOPIC PARTIAL MEDIAL MENISCECTOMY performed by Flores Capps MD at [facility] History   Substance Use Topics    Smoking status: Never Smoker    Smokeless tobacco: Never Used    Alcohol use No        Current Outpatient Prescriptions   Medication Sig Dispense Refill    ZOLMitriptan (ZOMIG) 2.5 MG tablet Take 2.5 mg by mouth as needed for Migraine      naratriptan (AMERGE) 2.5 MG tablet Take 1 tablet by mouth once as needed for Migraine 2.5 mg at onset of headache, may repeat in 4 hours if needed 9 tablet 3    predniSONE (DELTASONE) 10 MG tablet Take 1 tablet by mouth daily for 7 days 7 tablet 0    metFORMIN kit 0    ONE TOUCH LANCETS MISC 1 each by Does not apply route daily 100 each 3    Cream Base CREA Apply 1-2 pumps to affected area 3-4 times daily 360 g 3    EPINEPHrine (EPIPEN 2-JOSÉ) 0.3 MG/0.3ML SOAJ injection Inject 0.3 mLs into the muscle once for 1 dose Use as directed for allergic reaction 0.3 mL 0    gabapentin (NEURONTIN) 300 MG capsule Take 1 capsule by mouth nightly for 90 days. 30 capsule 2    gabapentin (NEURONTIN) 100 MG capsule Take 1 capsule in the am and take 1 capsule in the afternoon. 60 capsule 2     No current facility-administered medications for this visit. Allergies   Allergen Reactions    Compazine [Prochlorperazine Maleate] Shortness Of Breath    Demerol Hives    Ibuprofen Nausea Only    Nortriptyline Other (See Comments)    Orphenadrine Citrate Itching    Penicillins Hives and Nausea Only    Vistaril [Hydroxyzine Hcl] Itching    Avelox [Moxifloxacin] Nausea And Vomiting    Codeine Nausea And Vomiting and Rash    Doxycycline Nausea And Vomiting    Keflex [Cephalexin] Rash    Ketorolac Tromethamine Itching and Nausea And Vomiting       Family History   Problem Relation Age of Onset    Cancer Father     Cancer Maternal Grandmother     COPD Maternal Grandmother     Cancer Maternal Grandfather                Subjective:      Review of Systems   Constitutional: Negative. HENT: Positive for congestion, ear pain (left), sinus pain and sinus pressure. Eyes: Negative. Respiratory: Positive for cough. Cardiovascular: Negative. Gastrointestinal: Negative. Endocrine: Negative. Genitourinary: Negative. Musculoskeletal: Negative. Skin: Negative. Neurological: Negative. Hematological: Negative. Psychiatric/Behavioral: Negative. Objective:     Physical Exam   Constitutional: She is oriented to person, place, and time. Vital signs are normal. She appears well-developed and well-nourished. HENT:   Head: Normocephalic and atraumatic. Right Ear: Hearing and external ear normal.   Left Ear: Hearing and external ear normal.   Nose: Nose normal.   Mouth/Throat: Mucous membranes are normal. Posterior oropharyngeal erythema present. Eyes: Pupils are equal, round, and reactive to light. Conjunctivae, EOM and lids are normal.   Neck: Trachea normal and normal range of motion. Neck supple. No thyroid mass and no thyromegaly present. Cardiovascular: Normal rate, regular rhythm, normal heart sounds and intact distal pulses. Pulmonary/Chest: Effort normal and breath sounds normal.   Abdominal: Soft. Normal appearance and bowel sounds are normal.   Musculoskeletal: Normal range of motion. Cervical back: Normal. She exhibits normal range of motion and no tenderness. Thoracic back: Normal. She exhibits normal range of motion and no tenderness. Lumbar back: Normal. She exhibits normal range of motion and no tenderness. Neurological: She is alert and oriented to person, place, and time. She has normal strength. Skin: Skin is warm, dry and intact. Psychiatric: She has a normal mood and affect. Her speech is normal and behavior is normal. Judgment and thought content normal. Cognition and memory are normal.   Nursing note and vitals reviewed. /84   Pulse 88   Temp 98 °F (36.7 °C) (Temporal)   Resp 14   Ht 5' 1\" (1.549 m)   Wt 266 lb 6.4 oz (120.8 kg)   SpO2 98%   BMI 50.34 kg/m²     Assessment:      Diagnosis Orders   1. Viral infection  predniSONE (DELTASONE) 10 MG tablet   2. Migraine without aura and without status migrainosus, not intractable  naratriptan (AMERGE) 2.5 MG tablet       No results found for this visit on 12/05/18. Plan:       The patient that symptoms related to a viral infection. She has not a normal exam at this time. If symptoms continue over the next 5-7 days can prescribe if needed. I'm sending in a refill for Amerge for migraines.   Patient is to keep follow up appointment later

## 2018-12-10 DIAGNOSIS — E11.65 UNCONTROLLED TYPE 2 DIABETES MELLITUS WITH HYPERGLYCEMIA, WITHOUT LONG-TERM CURRENT USE OF INSULIN (HCC): ICD-10-CM

## 2018-12-10 RX ORDER — TOPIRAMATE 50 MG/1
TABLET, FILM COATED ORAL
Qty: 60 TABLET | Refills: 0 | Status: SHIPPED | OUTPATIENT
Start: 2018-12-10 | End: 2019-01-08 | Stop reason: SDUPTHER

## 2018-12-10 RX ORDER — ONDANSETRON 4 MG/1
TABLET, ORALLY DISINTEGRATING ORAL
Qty: 20 TABLET | Refills: 0 | Status: SHIPPED | OUTPATIENT
Start: 2018-12-10 | End: 2019-09-10

## 2018-12-17 ENCOUNTER — OFFICE VISIT (OUTPATIENT)
Dept: PRIMARY CARE CLINIC | Age: 48
End: 2018-12-17
Payer: MEDICAID

## 2018-12-17 VITALS
RESPIRATION RATE: 20 BRPM | HEIGHT: 61 IN | TEMPERATURE: 98 F | SYSTOLIC BLOOD PRESSURE: 110 MMHG | DIASTOLIC BLOOD PRESSURE: 68 MMHG | WEIGHT: 268 LBS | HEART RATE: 68 BPM | BODY MASS INDEX: 50.6 KG/M2

## 2018-12-17 DIAGNOSIS — E11.42 TYPE 2 DIABETES, CONTROLLED, WITH PERIPHERAL NEUROPATHY (HCC): ICD-10-CM

## 2018-12-17 DIAGNOSIS — E66.01 MORBID OBESITY WITH BMI OF 45.0-49.9, ADULT (HCC): ICD-10-CM

## 2018-12-17 DIAGNOSIS — Z13.220 SCREENING FOR HYPERLIPIDEMIA: ICD-10-CM

## 2018-12-17 DIAGNOSIS — Z00.00 ROUTINE GENERAL MEDICAL EXAMINATION AT A HEALTH CARE FACILITY: Primary | ICD-10-CM

## 2018-12-17 DIAGNOSIS — F41.1 GAD (GENERALIZED ANXIETY DISORDER): ICD-10-CM

## 2018-12-17 DIAGNOSIS — G89.29 CHRONIC KNEE PAIN, UNSPECIFIED LATERALITY: ICD-10-CM

## 2018-12-17 DIAGNOSIS — F32.0 MILD SINGLE CURRENT EPISODE OF MAJOR DEPRESSIVE DISORDER (HCC): Chronic | ICD-10-CM

## 2018-12-17 DIAGNOSIS — M25.569 CHRONIC KNEE PAIN, UNSPECIFIED LATERALITY: ICD-10-CM

## 2018-12-17 LAB
CREATININE URINE: 50.2 MG/DL (ref 4.2–622)
HBA1C MFR BLD: 11.3 % (ref 4–6)
MICROALBUMIN UR-MCNC: <1.2 MG/DL (ref 0–19)
MICROALBUMIN/CREAT UR-RTO: NORMAL MG/G

## 2018-12-17 PROCEDURE — 90756 CCIIV4 VACC ABX FREE IM: CPT | Performed by: FAMILY MEDICINE

## 2018-12-17 PROCEDURE — 90471 IMMUNIZATION ADMIN: CPT | Performed by: FAMILY MEDICINE

## 2018-12-17 PROCEDURE — 99396 PREV VISIT EST AGE 40-64: CPT | Performed by: FAMILY MEDICINE

## 2018-12-17 PROCEDURE — 90472 IMMUNIZATION ADMIN EACH ADD: CPT | Performed by: FAMILY MEDICINE

## 2018-12-17 PROCEDURE — 90732 PPSV23 VACC 2 YRS+ SUBQ/IM: CPT | Performed by: FAMILY MEDICINE

## 2018-12-17 PROCEDURE — G8482 FLU IMMUNIZE ORDER/ADMIN: HCPCS | Performed by: FAMILY MEDICINE

## 2018-12-17 ASSESSMENT — ENCOUNTER SYMPTOMS
NAUSEA: 0
COUGH: 0
CHEST TIGHTNESS: 0
DIARRHEA: 0
SHORTNESS OF BREATH: 0
ABDOMINAL PAIN: 0
CONSTIPATION: 0
WHEEZING: 0
VOMITING: 0

## 2018-12-17 NOTE — PROGRESS NOTES
Standing Status:   Future     Standing Expiration Date:   12/17/2019    Hemoglobin A1C     Every 6 months     Standing Status:   Standing     Number of Occurrences:   20     Standing Expiration Date:   12/17/2028    Ambulatory referral to Orthopedic Surgery     Referral Priority:   Routine     Referral Type:   Surgical     Requested Specialty:   Orthopedic Surgery     Number of Visits Requested:   [facility] [address] NANETTE Null     Referral Priority:   Routine     Referral Type:   Eval and Treat     Referral Reason:   Specialty Services Required     Referred to Provider:   NANETTE Cooley     Requested Specialty:   Psychiatry     Number of Visits Requested:   1     No orders of the defined types were placed in this encounter. There are no discontinued medications. Patient Instructions   Keep your appt w/ Dr. Latha Álvarez for discussion for weight loss. Get your labs checked in [address]. Your referral is in for Dr. Navjot Dhaliwal. Take 800 ibuprofen three times a day with 500 mg tylenol at each dose. Take with meals. Patient given educational handouts and has had all questions answered. Patient voices understanding and agrees to plans along with risks and benefits of plan. Patient isinstructed to continue prior meds, diet, and exercise plans unless instructed otherwise. Patient agrees to follow up as instructed and sooner if needed. Patient agrees to go to ER if condition becomes emergent. Notesmay be completed with dictation device and spelling errors may occur. Return in about 6 months (around 6/17/2019) for POCT A1C.

## 2018-12-28 ENCOUNTER — OFFICE VISIT (OUTPATIENT)
Dept: PRIMARY CARE CLINIC | Age: 48
End: 2018-12-28
Payer: MEDICAID

## 2018-12-28 DIAGNOSIS — N39.0 URINARY TRACT INFECTION WITHOUT HEMATURIA, SITE UNSPECIFIED: ICD-10-CM

## 2018-12-28 DIAGNOSIS — N30.01 ACUTE CYSTITIS WITH HEMATURIA: Primary | ICD-10-CM

## 2018-12-28 DIAGNOSIS — E11.65 UNCONTROLLED TYPE 2 DIABETES MELLITUS WITH HYPERGLYCEMIA, WITHOUT LONG-TERM CURRENT USE OF INSULIN (HCC): ICD-10-CM

## 2018-12-28 DIAGNOSIS — N30.01 ACUTE CYSTITIS WITH HEMATURIA: ICD-10-CM

## 2018-12-28 LAB
APPEARANCE FLUID: ABNORMAL
BILIRUBIN, POC: ABNORMAL
BLOOD URINE, POC: ABNORMAL
CLARITY, POC: CLEAR
COLOR, POC: ABNORMAL
GLUCOSE URINE, POC: 1000
KETONES, POC: ABNORMAL
LEUKOCYTE EST, POC: ABNORMAL
NITRITE, POC: ABNORMAL
PH, POC: 5
PROTEIN, POC: ABNORMAL
SPECIFIC GRAVITY, POC: 1.01
UROBILINOGEN, POC: 1

## 2018-12-28 PROCEDURE — 81002 URINALYSIS NONAUTO W/O SCOPE: CPT | Performed by: NURSE PRACTITIONER

## 2018-12-28 RX ORDER — LIRAGLUTIDE 6 MG/ML
INJECTION SUBCUTANEOUS
Qty: 9 ML | Refills: 0 | Status: SHIPPED | OUTPATIENT
Start: 2018-12-28 | End: 2019-05-18 | Stop reason: SDUPTHER

## 2018-12-28 RX ORDER — PHENAZOPYRIDINE HYDROCHLORIDE 100 MG/1
100 TABLET, FILM COATED ORAL 3 TIMES DAILY PRN
Qty: 9 TABLET | Refills: 0 | Status: SHIPPED | OUTPATIENT
Start: 2018-12-28 | End: 2018-12-31

## 2018-12-28 RX ORDER — NITROFURANTOIN 25; 75 MG/1; MG/1
100 CAPSULE ORAL 2 TIMES DAILY
Qty: 10 CAPSULE | Refills: 0 | Status: SHIPPED | OUTPATIENT
Start: 2018-12-28 | End: 2019-01-02

## 2018-12-28 RX ORDER — INSULIN GLARGINE 100 [IU]/ML
INJECTION, SOLUTION SUBCUTANEOUS
Qty: 3 PEN | Refills: 5 | Status: SHIPPED | OUTPATIENT
Start: 2018-12-28 | End: 2019-06-17 | Stop reason: SDUPTHER

## 2018-12-30 LAB — URINE CULTURE, ROUTINE: NORMAL

## 2019-01-03 ENCOUNTER — TELEPHONE (OUTPATIENT)
Dept: PRIMARY CARE CLINIC | Age: 49
End: 2019-01-03

## 2019-01-05 ENCOUNTER — HOSPITAL ENCOUNTER (EMERGENCY)
Age: 49
Discharge: HOME | End: 2019-01-05
Payer: MEDICAID

## 2019-01-05 VITALS
DIASTOLIC BLOOD PRESSURE: 78 MMHG | RESPIRATION RATE: 18 BRPM | TEMPERATURE: 98.6 F | WEIGHT: 247 LBS | OXYGEN SATURATION: 90 % | BODY MASS INDEX: 46.63 KG/M2 | SYSTOLIC BLOOD PRESSURE: 120 MMHG | HEART RATE: 78 BPM | HEIGHT: 61 IN

## 2019-01-05 DIAGNOSIS — G43.001 MIGRAINE WITHOUT AURA AND WITH STATUS MIGRAINOSUS, NOT INTRACTABLE: Primary | ICD-10-CM

## 2019-01-05 PROCEDURE — 2580000003 HC RX 258: Performed by: PHYSICIAN ASSISTANT

## 2019-01-05 PROCEDURE — 96374 THER/PROPH/DIAG INJ IV PUSH: CPT

## 2019-01-05 PROCEDURE — 96375 TX/PRO/DX INJ NEW DRUG ADDON: CPT

## 2019-01-05 PROCEDURE — 99284 EMERGENCY DEPT VISIT MOD MDM: CPT | Performed by: PHYSICIAN ASSISTANT

## 2019-01-05 PROCEDURE — 6360000002 HC RX W HCPCS: Performed by: PHYSICIAN ASSISTANT

## 2019-01-05 PROCEDURE — 99282 EMERGENCY DEPT VISIT SF MDM: CPT

## 2019-01-05 RX ORDER — CYCLOBENZAPRINE HCL 5 MG
5 TABLET ORAL 2 TIMES DAILY PRN
Qty: 30 TABLET | Refills: 0 | Status: SHIPPED | OUTPATIENT
Start: 2019-01-05 | End: 2019-01-05 | Stop reason: CLARIF

## 2019-01-05 RX ORDER — BUTORPHANOL TARTRATE 1 MG/ML
1 INJECTION, SOLUTION INTRAMUSCULAR; INTRAVENOUS ONCE
Status: COMPLETED | OUTPATIENT
Start: 2019-01-05 | End: 2019-01-05

## 2019-01-05 RX ORDER — 0.9 % SODIUM CHLORIDE 0.9 %
1000 INTRAVENOUS SOLUTION INTRAVENOUS ONCE
Status: COMPLETED | OUTPATIENT
Start: 2019-01-05 | End: 2019-01-05

## 2019-01-05 RX ORDER — HYDROCODONE BITARTRATE AND ACETAMINOPHEN 7.5; 325 MG/1; MG/1
1 TABLET ORAL 2 TIMES DAILY
COMMUNITY
End: 2020-10-27

## 2019-01-05 RX ORDER — LIDOCAINE 50 MG/G
1 PATCH TOPICAL DAILY
Qty: 30 PATCH | Refills: 0 | Status: SHIPPED | OUTPATIENT
Start: 2019-01-05 | End: 2019-01-05 | Stop reason: CLARIF

## 2019-01-05 RX ORDER — PROMETHAZINE HYDROCHLORIDE 25 MG/ML
25 INJECTION, SOLUTION INTRAMUSCULAR; INTRAVENOUS ONCE
Status: COMPLETED | OUTPATIENT
Start: 2019-01-05 | End: 2019-01-05

## 2019-01-05 RX ORDER — ORPHENADRINE CITRATE 30 MG/ML
60 INJECTION INTRAMUSCULAR; INTRAVENOUS ONCE
Status: DISCONTINUED | OUTPATIENT
Start: 2019-01-05 | End: 2019-01-05 | Stop reason: HOSPADM

## 2019-01-05 RX ORDER — METOCLOPRAMIDE HYDROCHLORIDE 5 MG/ML
10 INJECTION INTRAMUSCULAR; INTRAVENOUS ONCE
Status: COMPLETED | OUTPATIENT
Start: 2019-01-05 | End: 2019-01-05

## 2019-01-05 RX ORDER — DEXAMETHASONE SODIUM PHOSPHATE 10 MG/ML
10 INJECTION, SOLUTION INTRAMUSCULAR; INTRAVENOUS ONCE
Status: COMPLETED | OUTPATIENT
Start: 2019-01-05 | End: 2019-01-05

## 2019-01-05 RX ORDER — DIPHENHYDRAMINE HYDROCHLORIDE 50 MG/ML
25 INJECTION INTRAMUSCULAR; INTRAVENOUS ONCE
Status: COMPLETED | OUTPATIENT
Start: 2019-01-05 | End: 2019-01-05

## 2019-01-05 RX ORDER — MELOXICAM 15 MG/1
15 TABLET ORAL DAILY
Qty: 30 TABLET | Refills: 0 | Status: SHIPPED | OUTPATIENT
Start: 2019-01-05 | End: 2019-01-05 | Stop reason: CLARIF

## 2019-01-05 RX ADMIN — DEXAMETHASONE SODIUM PHOSPHATE 10 MG: 10 INJECTION, SOLUTION INTRAMUSCULAR; INTRAVENOUS at 13:29

## 2019-01-05 RX ADMIN — BUTORPHANOL TARTRATE 1 MG: 1 INJECTION, SOLUTION INTRAMUSCULAR; INTRAVENOUS at 14:30

## 2019-01-05 RX ADMIN — SODIUM CHLORIDE 1000 ML: 9 INJECTION, SOLUTION INTRAVENOUS at 13:21

## 2019-01-05 RX ADMIN — METOCLOPRAMIDE 10 MG: 5 INJECTION, SOLUTION INTRAMUSCULAR; INTRAVENOUS at 15:18

## 2019-01-05 RX ADMIN — DIPHENHYDRAMINE HYDROCHLORIDE 25 MG: 50 INJECTION, SOLUTION INTRAMUSCULAR; INTRAVENOUS at 13:27

## 2019-01-05 RX ADMIN — PROMETHAZINE HYDROCHLORIDE 25 MG: 25 INJECTION, SOLUTION INTRAMUSCULAR; INTRAVENOUS at 13:25

## 2019-01-05 ASSESSMENT — ENCOUNTER SYMPTOMS
EYE PAIN: 1
PHOTOPHOBIA: 1
COUGH: 0
RHINORRHEA: 0
BACK PAIN: 0
APNEA: 0
ABDOMINAL DISTENTION: 0
NAUSEA: 0
EYE REDNESS: 0
SORE THROAT: 0
SHORTNESS OF BREATH: 0
ABDOMINAL PAIN: 0
EYE DISCHARGE: 0
EYE ITCHING: 0
COLOR CHANGE: 0

## 2019-01-05 ASSESSMENT — PAIN DESCRIPTION - LOCATION: LOCATION: HEAD

## 2019-01-05 ASSESSMENT — PAIN DESCRIPTION - DESCRIPTORS: DESCRIPTORS: ACHING

## 2019-01-05 ASSESSMENT — PAIN SCALES - GENERAL
PAINLEVEL_OUTOF10: 8
PAINLEVEL_OUTOF10: 8

## 2019-01-05 ASSESSMENT — PAIN DESCRIPTION - PAIN TYPE: TYPE: ACUTE PAIN

## 2019-01-07 ENCOUNTER — TELEPHONE (OUTPATIENT)
Dept: PRIMARY CARE CLINIC | Age: 49
End: 2019-01-07

## 2019-01-10 DIAGNOSIS — E11.65 UNCONTROLLED TYPE 2 DIABETES MELLITUS WITH HYPERGLYCEMIA, WITHOUT LONG-TERM CURRENT USE OF INSULIN (HCC): ICD-10-CM

## 2019-01-10 RX ORDER — PEN NEEDLE, DIABETIC 31 GX5/16"
NEEDLE, DISPOSABLE MISCELLANEOUS
Qty: 100 EACH | Refills: 3 | Status: SHIPPED | OUTPATIENT
Start: 2019-01-10 | End: 2020-01-31

## 2019-02-08 ENCOUNTER — OFFICE VISIT (OUTPATIENT)
Dept: PRIMARY CARE CLINIC | Age: 49
End: 2019-02-08
Payer: MEDICAID

## 2019-02-08 VITALS
TEMPERATURE: 98 F | HEART RATE: 83 BPM | SYSTOLIC BLOOD PRESSURE: 130 MMHG | OXYGEN SATURATION: 99 % | DIASTOLIC BLOOD PRESSURE: 76 MMHG | BODY MASS INDEX: 48.71 KG/M2 | WEIGHT: 258 LBS | HEIGHT: 61 IN

## 2019-02-08 DIAGNOSIS — M54.42 CHRONIC LOW BACK PAIN WITH BILATERAL SCIATICA, UNSPECIFIED BACK PAIN LATERALITY: ICD-10-CM

## 2019-02-08 DIAGNOSIS — M54.41 CHRONIC LOW BACK PAIN WITH BILATERAL SCIATICA, UNSPECIFIED BACK PAIN LATERALITY: ICD-10-CM

## 2019-02-08 DIAGNOSIS — I10 ESSENTIAL HYPERTENSION: ICD-10-CM

## 2019-02-08 DIAGNOSIS — E11.42 TYPE 2 DIABETES, CONTROLLED, WITH PERIPHERAL NEUROPATHY (HCC): Primary | ICD-10-CM

## 2019-02-08 DIAGNOSIS — G89.29 CHRONIC LOW BACK PAIN WITH BILATERAL SCIATICA, UNSPECIFIED BACK PAIN LATERALITY: ICD-10-CM

## 2019-02-08 DIAGNOSIS — G43.909 MIGRAINE SYNDROME: ICD-10-CM

## 2019-02-08 DIAGNOSIS — Z13.220 SCREENING FOR HYPERLIPIDEMIA: ICD-10-CM

## 2019-02-08 DIAGNOSIS — M17.0 PRIMARY OSTEOARTHRITIS OF BOTH KNEES: ICD-10-CM

## 2019-02-08 DIAGNOSIS — E66.01 MORBID OBESITY WITH BMI OF 45.0-49.9, ADULT (HCC): ICD-10-CM

## 2019-02-08 DIAGNOSIS — Z71.3 DIETARY COUNSELING: ICD-10-CM

## 2019-02-08 PROCEDURE — 3046F HEMOGLOBIN A1C LEVEL >9.0%: CPT | Performed by: FAMILY MEDICINE

## 2019-02-08 PROCEDURE — 99214 OFFICE O/P EST MOD 30 MIN: CPT | Performed by: FAMILY MEDICINE

## 2019-02-08 PROCEDURE — 1036F TOBACCO NON-USER: CPT | Performed by: FAMILY MEDICINE

## 2019-02-08 PROCEDURE — 99401 PREV MED CNSL INDIV APPRX 15: CPT | Performed by: FAMILY MEDICINE

## 2019-02-08 PROCEDURE — G8482 FLU IMMUNIZE ORDER/ADMIN: HCPCS | Performed by: FAMILY MEDICINE

## 2019-02-08 PROCEDURE — 2022F DILAT RTA XM EVC RTNOPTHY: CPT | Performed by: FAMILY MEDICINE

## 2019-02-08 PROCEDURE — G8427 DOCREV CUR MEDS BY ELIG CLIN: HCPCS | Performed by: FAMILY MEDICINE

## 2019-02-08 PROCEDURE — G8417 CALC BMI ABV UP PARAM F/U: HCPCS | Performed by: FAMILY MEDICINE

## 2019-02-08 RX ORDER — TRIAMTERENE AND HYDROCHLOROTHIAZIDE 37.5; 25 MG/1; MG/1
CAPSULE ORAL
Qty: 30 CAPSULE | Refills: 3 | Status: SHIPPED | OUTPATIENT
Start: 2019-02-08 | End: 2019-07-11 | Stop reason: SDUPTHER

## 2019-02-08 RX ORDER — ZOLMITRIPTAN 5 MG/1
5 TABLET, FILM COATED ORAL PRN
Qty: 6 TABLET | Refills: 3 | Status: SHIPPED | OUTPATIENT
Start: 2019-02-08 | End: 2019-06-30 | Stop reason: SDUPTHER

## 2019-02-08 RX ORDER — PHENTERMINE HYDROCHLORIDE 37.5 MG/1
37.5 TABLET ORAL
Qty: 30 TABLET | Refills: 0 | Status: SHIPPED | OUTPATIENT
Start: 2019-02-08 | End: 2019-03-10

## 2019-02-08 RX ORDER — BUTORPHANOL TARTRATE 10 MG/ML
1 SPRAY, METERED NASAL EVERY 4 HOURS PRN
Qty: 1 BOTTLE | Refills: 0 | Status: SHIPPED | OUTPATIENT
Start: 2019-02-08 | End: 2019-03-01 | Stop reason: SDUPTHER

## 2019-02-08 RX ORDER — CELECOXIB 200 MG/1
CAPSULE ORAL
Qty: 60 CAPSULE | Refills: 0 | Status: SHIPPED | OUTPATIENT
Start: 2019-02-08 | End: 2019-04-01 | Stop reason: SDUPTHER

## 2019-02-08 ASSESSMENT — ENCOUNTER SYMPTOMS
DIARRHEA: 0
COUGH: 0
NAUSEA: 0
WHEEZING: 0
CONSTIPATION: 0
CHEST TIGHTNESS: 0
VOMITING: 0
ABDOMINAL PAIN: 0
BACK PAIN: 1
SHORTNESS OF BREATH: 0

## 2019-02-13 DIAGNOSIS — E11.65 UNCONTROLLED TYPE 2 DIABETES MELLITUS WITH HYPERGLYCEMIA, WITHOUT LONG-TERM CURRENT USE OF INSULIN (HCC): ICD-10-CM

## 2019-02-13 RX ORDER — PROMETHAZINE HYDROCHLORIDE 25 MG/1
25 SUPPOSITORY RECTAL EVERY 6 HOURS PRN
Qty: 12 SUPPOSITORY | Refills: 1 | Status: SHIPPED | OUTPATIENT
Start: 2019-02-13 | End: 2019-02-20

## 2019-02-15 ENCOUNTER — HOSPITAL ENCOUNTER (OUTPATIENT)
Dept: GENERAL RADIOLOGY | Age: 49
Discharge: HOME | End: 2019-02-15
Payer: MEDICAID

## 2019-02-15 DIAGNOSIS — M54.41 CHRONIC LOW BACK PAIN WITH BILATERAL SCIATICA, UNSPECIFIED BACK PAIN LATERALITY: ICD-10-CM

## 2019-02-15 DIAGNOSIS — G89.29 CHRONIC LOW BACK PAIN WITH BILATERAL SCIATICA, UNSPECIFIED BACK PAIN LATERALITY: ICD-10-CM

## 2019-02-15 DIAGNOSIS — M54.42 CHRONIC LOW BACK PAIN WITH BILATERAL SCIATICA, UNSPECIFIED BACK PAIN LATERALITY: ICD-10-CM

## 2019-02-15 PROCEDURE — 72100 X-RAY EXAM L-S SPINE 2/3 VWS: CPT

## 2019-02-22 ENCOUNTER — OFFICE VISIT (OUTPATIENT)
Dept: PRIMARY CARE CLINIC | Age: 49
End: 2019-02-22
Payer: MEDICAID

## 2019-02-22 VITALS
SYSTOLIC BLOOD PRESSURE: 126 MMHG | OXYGEN SATURATION: 97 % | TEMPERATURE: 97.3 F | HEIGHT: 61 IN | DIASTOLIC BLOOD PRESSURE: 80 MMHG | WEIGHT: 261 LBS | HEART RATE: 85 BPM | BODY MASS INDEX: 49.28 KG/M2

## 2019-02-22 DIAGNOSIS — J02.0 STREP PHARYNGITIS: Primary | ICD-10-CM

## 2019-02-22 LAB — S PYO AG THROAT QL: POSITIVE

## 2019-02-22 PROCEDURE — G8482 FLU IMMUNIZE ORDER/ADMIN: HCPCS | Performed by: NURSE PRACTITIONER

## 2019-02-22 PROCEDURE — 87880 STREP A ASSAY W/OPTIC: CPT | Performed by: NURSE PRACTITIONER

## 2019-02-22 PROCEDURE — G8427 DOCREV CUR MEDS BY ELIG CLIN: HCPCS | Performed by: NURSE PRACTITIONER

## 2019-02-22 PROCEDURE — 1036F TOBACCO NON-USER: CPT | Performed by: NURSE PRACTITIONER

## 2019-02-22 PROCEDURE — 99213 OFFICE O/P EST LOW 20 MIN: CPT | Performed by: NURSE PRACTITIONER

## 2019-02-22 PROCEDURE — G8417 CALC BMI ABV UP PARAM F/U: HCPCS | Performed by: NURSE PRACTITIONER

## 2019-02-22 RX ORDER — AZITHROMYCIN 250 MG/1
250 TABLET, FILM COATED ORAL SEE ADMIN INSTRUCTIONS
Qty: 6 TABLET | Refills: 0 | Status: SHIPPED | OUTPATIENT
Start: 2019-02-22 | End: 2019-02-27

## 2019-02-22 RX ORDER — METHYLPREDNISOLONE ACETATE 40 MG/ML
40 INJECTION, SUSPENSION INTRA-ARTICULAR; INTRALESIONAL; INTRAMUSCULAR; SOFT TISSUE ONCE
Status: COMPLETED | OUTPATIENT
Start: 2019-02-22 | End: 2019-02-22

## 2019-02-22 RX ORDER — PROMETHAZINE HYDROCHLORIDE AND PHENYLEPHRINE HYDROCHLORIDE 6.25; 5 MG/5ML; MG/5ML
5 SYRUP ORAL EVERY 6 HOURS
Qty: 280 ML | Refills: 0 | Status: SHIPPED | OUTPATIENT
Start: 2019-02-22 | End: 2019-05-13 | Stop reason: SDUPTHER

## 2019-02-22 RX ORDER — FLUTICASONE PROPIONATE 50 MCG
1 SPRAY, SUSPENSION (ML) NASAL DAILY
Qty: 1 BOTTLE | Refills: 3 | Status: SHIPPED | OUTPATIENT
Start: 2019-02-22 | End: 2020-01-10 | Stop reason: SDUPTHER

## 2019-02-22 RX ADMIN — METHYLPREDNISOLONE ACETATE 40 MG: 40 INJECTION, SUSPENSION INTRA-ARTICULAR; INTRALESIONAL; INTRAMUSCULAR; SOFT TISSUE at 16:16

## 2019-02-22 ASSESSMENT — ENCOUNTER SYMPTOMS
SINUS PAIN: 1
GASTROINTESTINAL NEGATIVE: 1
SORE THROAT: 1
SHORTNESS OF BREATH: 0
SINUS PRESSURE: 1
WHEEZING: 0
COUGH: 1

## 2019-02-27 ENCOUNTER — TELEPHONE (OUTPATIENT)
Dept: PRIMARY CARE CLINIC | Age: 49
End: 2019-02-27

## 2019-02-27 DIAGNOSIS — G43.909 MIGRAINE SYNDROME: ICD-10-CM

## 2019-02-27 RX ORDER — BUTORPHANOL TARTRATE 10 MG/ML
1 SPRAY, METERED NASAL EVERY 4 HOURS PRN
Qty: 1 BOTTLE | Refills: 0 | Status: CANCELLED | OUTPATIENT
Start: 2019-02-27 | End: 2019-03-29

## 2019-02-28 ENCOUNTER — TELEPHONE (OUTPATIENT)
Dept: PRIMARY CARE CLINIC | Age: 49
End: 2019-02-28

## 2019-03-01 DIAGNOSIS — R05.9 COUGH: Primary | ICD-10-CM

## 2019-03-01 DIAGNOSIS — G43.909 MIGRAINE SYNDROME: ICD-10-CM

## 2019-03-01 RX ORDER — BENZONATATE 100 MG/1
100-200 CAPSULE ORAL 3 TIMES DAILY PRN
Qty: 60 CAPSULE | Refills: 0 | Status: SHIPPED | OUTPATIENT
Start: 2019-03-01 | End: 2019-03-08

## 2019-03-05 RX ORDER — BUTORPHANOL TARTRATE 10 MG/ML
1 SPRAY, METERED NASAL EVERY 4 HOURS PRN
Qty: 1 BOTTLE | Refills: 0 | Status: SHIPPED | OUTPATIENT
Start: 2019-03-05 | End: 2019-04-15 | Stop reason: SDUPTHER

## 2019-03-13 ENCOUNTER — OFFICE VISIT (OUTPATIENT)
Dept: PRIMARY CARE CLINIC | Age: 49
End: 2019-03-13
Payer: MEDICAID

## 2019-03-13 VITALS
HEART RATE: 75 BPM | WEIGHT: 267 LBS | HEIGHT: 61 IN | DIASTOLIC BLOOD PRESSURE: 82 MMHG | RESPIRATION RATE: 20 BRPM | SYSTOLIC BLOOD PRESSURE: 128 MMHG | BODY MASS INDEX: 50.41 KG/M2 | OXYGEN SATURATION: 100 % | TEMPERATURE: 97.8 F

## 2019-03-13 DIAGNOSIS — F41.1 GAD (GENERALIZED ANXIETY DISORDER): Chronic | ICD-10-CM

## 2019-03-13 DIAGNOSIS — E66.01 MORBID OBESITY WITH BMI OF 45.0-49.9, ADULT (HCC): Primary | ICD-10-CM

## 2019-03-13 DIAGNOSIS — E11.42 TYPE 2 DIABETES, CONTROLLED, WITH PERIPHERAL NEUROPATHY (HCC): ICD-10-CM

## 2019-03-13 DIAGNOSIS — M23.204 OLD COMPLEX TEAR OF MEDIAL MENISCUS OF LEFT KNEE: ICD-10-CM

## 2019-03-13 PROCEDURE — G8427 DOCREV CUR MEDS BY ELIG CLIN: HCPCS | Performed by: FAMILY MEDICINE

## 2019-03-13 PROCEDURE — 3046F HEMOGLOBIN A1C LEVEL >9.0%: CPT | Performed by: FAMILY MEDICINE

## 2019-03-13 PROCEDURE — 2022F DILAT RTA XM EVC RTNOPTHY: CPT | Performed by: FAMILY MEDICINE

## 2019-03-13 PROCEDURE — G8482 FLU IMMUNIZE ORDER/ADMIN: HCPCS | Performed by: FAMILY MEDICINE

## 2019-03-13 PROCEDURE — 1036F TOBACCO NON-USER: CPT | Performed by: FAMILY MEDICINE

## 2019-03-13 PROCEDURE — G8417 CALC BMI ABV UP PARAM F/U: HCPCS | Performed by: FAMILY MEDICINE

## 2019-03-13 PROCEDURE — 99214 OFFICE O/P EST MOD 30 MIN: CPT | Performed by: FAMILY MEDICINE

## 2019-03-13 RX ORDER — PHENTERMINE HYDROCHLORIDE 37.5 MG/1
37.5 TABLET ORAL
Qty: 30 TABLET | Refills: 0 | Status: SHIPPED | OUTPATIENT
Start: 2019-03-13 | End: 2019-04-15 | Stop reason: SDUPTHER

## 2019-03-28 ASSESSMENT — ENCOUNTER SYMPTOMS
CHEST TIGHTNESS: 0
COUGH: 0
WHEEZING: 0
NAUSEA: 0
DIARRHEA: 0
VOMITING: 0
CONSTIPATION: 0
ABDOMINAL PAIN: 0
SHORTNESS OF BREATH: 0

## 2019-03-30 DIAGNOSIS — E11.65 UNCONTROLLED TYPE 2 DIABETES MELLITUS WITH HYPERGLYCEMIA, WITHOUT LONG-TERM CURRENT USE OF INSULIN (HCC): ICD-10-CM

## 2019-03-31 RX ORDER — PROMETHAZINE HYDROCHLORIDE 25 MG/1
TABLET ORAL
Qty: 30 TABLET | Refills: 0 | Status: SHIPPED | OUTPATIENT
Start: 2019-03-31 | End: 2020-01-02

## 2019-03-31 RX ORDER — VERAPAMIL HYDROCHLORIDE 240 MG/1
TABLET, FILM COATED, EXTENDED RELEASE ORAL
Qty: 30 TABLET | Refills: 0 | Status: SHIPPED | OUTPATIENT
Start: 2019-03-31 | End: 2019-07-11 | Stop reason: SDUPTHER

## 2019-04-01 DIAGNOSIS — M17.0 PRIMARY OSTEOARTHRITIS OF BOTH KNEES: ICD-10-CM

## 2019-04-01 RX ORDER — CELECOXIB 200 MG/1
CAPSULE ORAL
Qty: 60 CAPSULE | Refills: 0 | Status: SHIPPED | OUTPATIENT
Start: 2019-04-01 | End: 2019-06-10 | Stop reason: SDUPTHER

## 2019-04-12 DIAGNOSIS — E66.01 MORBID OBESITY WITH BMI OF 45.0-49.9, ADULT (HCC): ICD-10-CM

## 2019-04-12 DIAGNOSIS — G43.909 MIGRAINE SYNDROME: ICD-10-CM

## 2019-04-12 NOTE — TELEPHONE ENCOUNTER
Patient called requesting refill on medications below. Requested Prescriptions     Pending Prescriptions Disp Refills    phentermine (ADIPEX-P) 37.5 MG tablet 30 tablet 0     Sig: Take 1 tablet by mouth every morning (before breakfast) for 30 days.  butorphanol (STADOL) 10 MG/ML nasal spray 1 Bottle 0     Si spray by Nasal route every 4 hours as needed for Pain for up to 30 days.        Last UDS:   Last Th[facility] [place]: 19

## 2019-04-15 RX ORDER — PHENTERMINE HYDROCHLORIDE 37.5 MG/1
37.5 TABLET ORAL
Qty: 30 TABLET | Refills: 0 | Status: SHIPPED | OUTPATIENT
Start: 2019-04-15 | End: 2019-04-24 | Stop reason: SDUPTHER

## 2019-04-15 RX ORDER — BUTORPHANOL TARTRATE 10 MG/ML
1 SPRAY, METERED NASAL EVERY 4 HOURS PRN
Qty: 1 BOTTLE | Refills: 0 | Status: SHIPPED | OUTPATIENT
Start: 2019-04-15 | End: 2019-05-13 | Stop reason: SDUPTHER

## 2019-04-15 NOTE — TELEPHONE ENCOUNTER
GEN was reviewed today per office protocol. Report shows No discrepancies. Fill pattern is consistent from single provider(s) at single pharmacy(s).     Presciption Escribed

## 2019-04-22 ENCOUNTER — TELEPHONE (OUTPATIENT)
Dept: BARIATRICS/WEIGHT MGMT | Facility: CLINIC | Age: 49
End: 2019-04-22

## 2019-04-22 NOTE — TELEPHONE ENCOUNTER
Patient called in and wanted to r/s appointment that she cancelled 11/2018. I informed the patient that our new patient policy has since changed and that I would mail her a new patient packet today and as soon as we receive that back in the mail we will call her to schedule an appointment.

## 2019-04-24 ENCOUNTER — OFFICE VISIT (OUTPATIENT)
Dept: PRIMARY CARE CLINIC | Age: 49
End: 2019-04-24
Payer: MEDICAID

## 2019-04-24 VITALS
OXYGEN SATURATION: 98 % | DIASTOLIC BLOOD PRESSURE: 84 MMHG | HEIGHT: 61 IN | RESPIRATION RATE: 14 BRPM | HEART RATE: 87 BPM | WEIGHT: 256 LBS | SYSTOLIC BLOOD PRESSURE: 126 MMHG | TEMPERATURE: 98.1 F | BODY MASS INDEX: 48.33 KG/M2

## 2019-04-24 DIAGNOSIS — T50.905A MEDICATION SIDE EFFECT, INITIAL ENCOUNTER: ICD-10-CM

## 2019-04-24 DIAGNOSIS — M23.204 OLD COMPLEX TEAR OF MEDIAL MENISCUS OF LEFT KNEE: ICD-10-CM

## 2019-04-24 DIAGNOSIS — Z71.3 DIETARY COUNSELING: ICD-10-CM

## 2019-04-24 DIAGNOSIS — F32.0 MILD SINGLE CURRENT EPISODE OF MAJOR DEPRESSIVE DISORDER (HCC): Primary | Chronic | ICD-10-CM

## 2019-04-24 DIAGNOSIS — E66.01 MORBID OBESITY WITH BMI OF 45.0-49.9, ADULT (HCC): ICD-10-CM

## 2019-04-24 PROCEDURE — G8427 DOCREV CUR MEDS BY ELIG CLIN: HCPCS | Performed by: FAMILY MEDICINE

## 2019-04-24 PROCEDURE — 99401 PREV MED CNSL INDIV APPRX 15: CPT | Performed by: FAMILY MEDICINE

## 2019-04-24 PROCEDURE — 99214 OFFICE O/P EST MOD 30 MIN: CPT | Performed by: FAMILY MEDICINE

## 2019-04-24 PROCEDURE — 1036F TOBACCO NON-USER: CPT | Performed by: FAMILY MEDICINE

## 2019-04-24 PROCEDURE — G8417 CALC BMI ABV UP PARAM F/U: HCPCS | Performed by: FAMILY MEDICINE

## 2019-04-24 NOTE — PROGRESS NOTES
per tablet Take 1 tablet by mouth every 6 hours as needed for Pain. .      VICTOZA 18 MG/3ML SOPN SC injection ADMINISTER 1.8 MG UNDER THE SKIN DAILY. 9 mL 0    BASAGLAR KWIKPEN 100 UNIT/ML injection pen INJECT 45 UNITS SUBCUTANEOUSLY AT BEDTIME AS DIRECTED 3 pen 5    ondansetron (ZOFRAN-ODT) 4 MG disintegrating tablet DISSOLVE 1 TABLET ON THE TONGUE EVERY 8 HOURS AS NEEDED FOR NAUSEA OR VOMITING 20 tablet 0    furosemide (LASIX) 20 MG tablet Take 1 tablet by mouth daily 30 tablet 2    ondansetron (ZOFRAN) 4 MG tablet Take 1 tablet by mouth daily as needed for Nausea or Vomiting 15 tablet 0    Blood Glucose Monitoring Suppl ([address]) w/Device KIT 1 kit by Does not apply route daily as needed (Dx 250.00) 1 kit 0    ONE TOUCH LANCETS MISC 1 each by Does not apply route daily 100 each 3    Cream Base CREA Apply 1-2 pumps to affected area 3-4 times daily 360 g 3    ondansetron (ZOFRAN-ODT) 4 MG disintegrating tablet DISSOLVE 1 TABLET ON THE TONGUE EVERY 8 HOURS AS NEEDED FOR NAUSEA OR VOMITING 20 tablet 0    naratriptan (AMERGE) 2.5 MG tablet Take 1 tablet by mouth once as needed for Migraine 2.5 mg at onset of headache, may repeat in 4 hours if needed 9 tablet 3    EPINEPHrine (EPIPEN 2-JOSÉ) 0.3 MG/0.3ML SOAJ injection Inject 0.3 mLs into the muscle once for 1 dose Use as directed for allergic reaction 0.3 mL 0    gabapentin (NEURONTIN) 300 MG capsule Take 1 capsule by mouth nightly for 90 days. 30 capsule 2    gabapentin (NEURONTIN) 100 MG capsule Take 1 capsule in the am and take 1 capsule in the afternoon.  60 capsule 2     Current Facility-Administered Medications   Medication Dose Route Frequency Provider Last Rate Last Dose    tuberculin injection 5 Units  5 Units Intradermal Once Honor MD Jaimie           Allergies   Allergen Reactions    Compazine [Prochlorperazine Maleate] Shortness Of Breath    Demerol Hives    Ibuprofen Nausea Only    Nortriptyline Other (See Comments)    Orphenadrine Citrate Itching    Penicillins Hives and Nausea Only    Vistaril [Hydroxyzine Hcl] Itching    Avelox [Moxifloxacin] Nausea And Vomiting    Codeine Nausea And Vomiting and Rash    Doxycycline Nausea And Vomiting    Keflex [Cephalexin] Rash    Ketorolac Tromethamine Itching and Nausea And Vomiting       Past Surgical History:   Procedure Laterality Date    ECTOPIC PREGNANCY SURGERY      EYE SURGERY      cornea transplant and cataracts removed    KNEE CARTILAGE SURGERY Left 12/20/2016    KNEE ARTHROSCOPIC PARTIAL MEDIAL MENISCECTOMY performed by Reinaldo Ansari MD at [address] [place] [address]         Social History     Tobacco Use    Smoking status: Never Smoker    Smokeless tobacco: Never Used   Substance Use Topics    Alcohol use: No    Drug use: No       Family History   Problem Relation Age of Onset    Cancer Father     Cancer Maternal Grandmother     COPD Maternal Grandmother     Cancer Maternal Grandfather        /84   Pulse 87   Temp 98.1 °F (36.7 °C) (Temporal)   Resp 14   Ht 5' 1\" (1.549 m)   Wt 256 lb (116.1 kg)   SpO2 98%   BMI 48.37 kg/m²     Physical Exam   Constitutional: She is oriented to person, place, and time. She appears well-developed and well-nourished. No distress. HENT:   Head: Normocephalic and atraumatic. Cardiovascular: Normal rate, regular rhythm and normal heart sounds. No murmur heard. Pulmonary/Chest: Effort normal and breath sounds normal. No respiratory distress. She has no wheezes. She has no rales. Abdominal: Soft. Bowel sounds are normal. She exhibits no distension. There is no tenderness. Central obesity   Musculoskeletal:   No pretibial edema b/l. Neurological: She is alert and oriented to person, place, and time. Skin: Skin is warm and dry. She is not diaphoretic. No cyanosis. Psychiatric: Her affect is labile. She exhibits a depressed mood. Nursing note and vitals reviewed.       Assessment:    ICD-10-CM    1. Mild Dispense:  60 tablet     Refill:  1    tuberculin injection 5 Units        Medications Discontinued During This Encounter   Medication Reason    traZODone (DESYREL) 50 MG tablet     phentermine (ADIPEX-P) 37.5 MG tablet REORDER    sertraline (ZOLOFT) 50 MG tablet REORDER     Patient Instructions   Continue phentermine     Patient given educational handouts and has had all questions answered. Patient voices understanding and agrees to plans along with risks and benefits of plan. Patient isinstructed to continue prior meds, diet, and exercise plans unless instructed otherwise. Patient agrees to follow up as instructed and sooner if needed. Patient agrees to go to ER if condition becomes emergent. Notesmay be completed with dictation device and spelling errors may occur. Return in about 3 months (around 7/24/2019) for f/u phentermine.

## 2019-04-25 RX ORDER — PHENTERMINE HYDROCHLORIDE 37.5 MG/1
37.5 TABLET ORAL
Qty: 30 TABLET | Refills: 0 | Status: SHIPPED | OUTPATIENT
Start: 2019-04-25 | End: 2019-05-25

## 2019-04-25 RX ORDER — TRAZODONE HYDROCHLORIDE 50 MG/1
TABLET ORAL
Qty: 60 TABLET | Refills: 1 | Status: SHIPPED | OUTPATIENT
Start: 2019-04-25 | End: 2019-08-15

## 2019-04-25 RX ORDER — SERTRALINE HYDROCHLORIDE 100 MG/1
100 TABLET, FILM COATED ORAL DAILY
Qty: 30 TABLET | Refills: 1 | Status: SHIPPED | OUTPATIENT
Start: 2019-04-25 | End: 2019-05-29

## 2019-04-25 RX ORDER — PHENTERMINE HYDROCHLORIDE 37.5 MG/1
37.5 TABLET ORAL
Qty: 30 TABLET | Refills: 0 | Status: SHIPPED | OUTPATIENT
Start: 2019-05-24 | End: 2019-07-26 | Stop reason: SDUPTHER

## 2019-04-25 ASSESSMENT — ENCOUNTER SYMPTOMS
CHEST TIGHTNESS: 0
CONSTIPATION: 0
VOMITING: 0
ABDOMINAL PAIN: 0
WHEEZING: 0
DIARRHEA: 0
NAUSEA: 0
COUGH: 0
SHORTNESS OF BREATH: 0

## 2019-04-28 RX ORDER — ONDANSETRON 4 MG/1
TABLET, ORALLY DISINTEGRATING ORAL
Qty: 20 TABLET | Refills: 0 | Status: SHIPPED | OUTPATIENT
Start: 2019-04-28 | End: 2019-05-23 | Stop reason: SDUPTHER

## 2019-05-13 ENCOUNTER — OFFICE VISIT (OUTPATIENT)
Dept: PRIMARY CARE CLINIC | Age: 49
End: 2019-05-13
Payer: MEDICAID

## 2019-05-13 VITALS
TEMPERATURE: 97.4 F | OXYGEN SATURATION: 97 % | SYSTOLIC BLOOD PRESSURE: 124 MMHG | HEIGHT: 61 IN | DIASTOLIC BLOOD PRESSURE: 86 MMHG | BODY MASS INDEX: 47.35 KG/M2 | WEIGHT: 250.8 LBS | RESPIRATION RATE: 14 BRPM | HEART RATE: 86 BPM

## 2019-05-13 DIAGNOSIS — G43.909 MIGRAINE SYNDROME: ICD-10-CM

## 2019-05-13 DIAGNOSIS — J32.9 SINOBRONCHITIS: Primary | ICD-10-CM

## 2019-05-13 DIAGNOSIS — J40 SINOBRONCHITIS: Primary | ICD-10-CM

## 2019-05-13 PROCEDURE — 1036F TOBACCO NON-USER: CPT | Performed by: NURSE PRACTITIONER

## 2019-05-13 PROCEDURE — G8428 CUR MEDS NOT DOCUMENT: HCPCS | Performed by: NURSE PRACTITIONER

## 2019-05-13 PROCEDURE — 99213 OFFICE O/P EST LOW 20 MIN: CPT | Performed by: NURSE PRACTITIONER

## 2019-05-13 PROCEDURE — G8417 CALC BMI ABV UP PARAM F/U: HCPCS | Performed by: NURSE PRACTITIONER

## 2019-05-13 RX ORDER — BUTORPHANOL TARTRATE 10 MG/ML
1 SPRAY, METERED NASAL EVERY 4 HOURS PRN
Qty: 1 BOTTLE | Refills: 0 | Status: SHIPPED | OUTPATIENT
Start: 2019-05-13 | End: 2019-05-30 | Stop reason: SDUPTHER

## 2019-05-13 RX ORDER — METHYLPREDNISOLONE ACETATE 40 MG/ML
40 INJECTION, SUSPENSION INTRA-ARTICULAR; INTRALESIONAL; INTRAMUSCULAR; SOFT TISSUE ONCE
Status: COMPLETED | OUTPATIENT
Start: 2019-05-13 | End: 2019-05-13

## 2019-05-13 RX ORDER — PROMETHAZINE HYDROCHLORIDE AND PHENYLEPHRINE HYDROCHLORIDE 6.25; 5 MG/5ML; MG/5ML
5 SYRUP ORAL EVERY 6 HOURS
Qty: 280 ML | Refills: 0 | Status: ON HOLD | OUTPATIENT
Start: 2019-05-13 | End: 2019-08-20

## 2019-05-13 RX ORDER — AZITHROMYCIN 250 MG/1
250 TABLET, FILM COATED ORAL SEE ADMIN INSTRUCTIONS
Qty: 6 TABLET | Refills: 0 | Status: SHIPPED | OUTPATIENT
Start: 2019-05-13 | End: 2019-05-18

## 2019-05-13 RX ADMIN — METHYLPREDNISOLONE ACETATE 40 MG: 40 INJECTION, SUSPENSION INTRA-ARTICULAR; INTRALESIONAL; INTRAMUSCULAR; SOFT TISSUE at 17:25

## 2019-05-13 NOTE — PROGRESS NOTES
History of Present Illness     Patient Identification  Peter Trivedi is a 57 y.o. female. Chief Complaint   Sinus Problem (since Saturday ) and Headache      The patient complains of nasal congestion and headache. Onset of symptoms was gradual a few days ago and has been gradually worsening since that time. The patient denies been seen by another provider for this illness. Other symptoms include congestion, cough or Headache and nausea. Care prior to arrival consisted of rest and acetaminophen, with no relief. patient is complaining of significant maxillary tenderness. She does have a history of migraine headaches and states that she needs a refill for her state all my spray, she feels that the sinusitis is exacerbated her migraines. migraines. Past Medical History:   Diagnosis Date    Anxiety     Constipation     Depression     DM (diabetes mellitus) ([address]     Headache(784.0)     Hyperlipidemia     Hypertension     Kidney stones     Kidney stones     Restless leg     Restless legs syndrome      Family History   Problem Relation Age of Onset    Cancer Father     Cancer Maternal Grandmother     COPD Maternal Grandmother     Cancer Maternal Grandfather      Current Outpatient Medications   Medication Sig Dispense Refill    ondansetron (ZOFRAN-ODT) 4 MG disintegrating tablet DISSOLVE 1 TABLET ON THE TONGUE EVERY 8 HOURS AS NEEDED FOR NAUSEA OR VOMITING 20 tablet 0    [START ON 5/24/2019] phentermine (ADIPEX-P) 37.5 MG tablet Take 1 tablet by mouth every morning (before breakfast) for 30 days. 30 tablet 0    phentermine (ADIPEX-P) 37.5 MG tablet Take 1 tablet by mouth every morning (before breakfast) for 30 days.  30 tablet 0    sertraline (ZOLOFT) 100 MG tablet Take 1 tablet by mouth daily 30 tablet 1    traZODone (DESYREL) 50 MG tablet Take 1-2 tabs at night as directed 60 tablet 1    butorphanol (STADOL) 10 MG/ML nasal spray 1 spray by Nasal route every 4 hours as needed for Pain for up to not apply route daily as needed (Dx 250.00) 1 kit 0    ONE TOUCH LANCETS MISC 1 each by Does not apply route daily 100 each 3    Cream Base CREA Apply 1-2 pumps to affected area 3-4 times daily 360 g 3    ONE TOUCH ULTRA TEST strip TEST BLOOD SUGAR ONCE DAILY 100 strip 0    metFORMIN (GLUCOPHAGE) 500 MG tablet TAKE 1 TABLET BY MOUTH TWICE DAILY WITH MEALS 60 tablet 0    naratriptan (AMERGE) 2.5 MG tablet Take 1 tablet by mouth once as needed for Migraine 2.5 mg at onset of headache, may repeat in 4 hours if needed 9 tablet 3    EPINEPHrine (EPIPEN 2-JOSÉ) 0.3 MG/0.3ML SOAJ injection Inject 0.3 mLs into the muscle once for 1 dose Use as directed for allergic reaction 0.3 mL 0    gabapentin (NEURONTIN) 300 MG capsule Take 1 capsule by mouth nightly for 90 days. 30 capsule 2    gabapentin (NEURONTIN) 100 MG capsule Take 1 capsule in the am and take 1 capsule in the afternoon. 60 capsule 2     No current facility-administered medications for this visit.       Allergies   Allergen Reactions    Compazine [Prochlorperazine Maleate] Shortness Of Breath    Demerol Hives    Ibuprofen Nausea Only    Nortriptyline Other (See Comments)    Orphenadrine Citrate Itching    Penicillins Hives and Nausea Only    Vistaril [Hydroxyzine Hcl] Itching    Avelox [Moxifloxacin] Nausea And Vomiting    Codeine Nausea And Vomiting and Rash    Doxycycline Nausea And Vomiting    Keflex [Cephalexin] Rash    Ketorolac Tromethamine Itching and Nausea And Vomiting     Social History     Socioeconomic History    Marital status: Single     Spouse name: Not on file    Number of children: Not on file    Years of education: Not on file    Highest education level: Not on file   Occupational History    Not on file   Social Needs    Financial resource strain: Not on file    Food insecurity:     Worry: Not on file     Inability: Not on file    Transportation needs:     Medical: Not on file     Non-medical: Not on file   Tobacco Use  Smoking status: Never Smoker    Smokeless tobacco: Never Used   Substance and Sexual Activity    Alcohol use: No    Drug use: No    Sexual activity: Not on file   Lifestyle    Physical activity:     Days per week: Not on file     Minutes per session: Not on file    Stress: Not on file   Relationships    Social connections:     Talks on phone: Not on file     Gets together: Not on file     Attends Islam service: Not on file     Active member of club or organization: Not on file     Attends meetings of clubs or organizations: Not on file     Relationship status: Not on file    Intimate partner violence:     Fear of current or ex partner: Not on file     Emotionally abused: Not on file     Physically abused: Not on file     Forced sexual activity: Not on file   Other Topics Concern    Not on file   Social History Narrative    Not on file     Review of Systems   Constitutional: Negative for activity change, chills, fever and unexpected weight change. HENT: Positive for congestion, postnasal drip, sinus pressure and sinus pain. Respiratory: Positive for cough and wheezing. Negative for apnea, shortness of breath and stridor. Cardiovascular: Negative for chest pain, palpitations and leg swelling. Gastrointestinal: Positive for nausea. Musculoskeletal: Negative. Neurological: Positive for headaches. Sinus versus migraine         Physical Exam     /86   Pulse 86   Temp 97.4 °F (36.3 °C) (Temporal)   Resp 14   Ht 5' 1\" (1.549 m)   Wt 250 lb 12.8 oz (113.8 kg)   SpO2 97%   BMI 47.39 kg/m²   Physical Exam   Constitutional: She is oriented to person, place, and time. She appears well-developed and well-nourished. HENT:   Head: Normocephalic and atraumatic. Right Ear: Hearing, tympanic membrane, external ear and ear canal normal.   Left Ear: Hearing, tympanic membrane, external ear and ear canal normal.   Nose: Rhinorrhea present.  Right sinus exhibits maxillary sinus

## 2019-05-14 ASSESSMENT — ENCOUNTER SYMPTOMS
SHORTNESS OF BREATH: 0
COUGH: 1
SINUS PRESSURE: 1
SINUS PAIN: 1
WHEEZING: 1
NAUSEA: 1
APNEA: 0
STRIDOR: 0

## 2019-05-18 DIAGNOSIS — E11.65 UNCONTROLLED TYPE 2 DIABETES MELLITUS WITH HYPERGLYCEMIA, WITHOUT LONG-TERM CURRENT USE OF INSULIN (HCC): ICD-10-CM

## 2019-05-19 RX ORDER — LIRAGLUTIDE 6 MG/ML
INJECTION SUBCUTANEOUS
Qty: 9 ML | Refills: 0 | Status: SHIPPED | OUTPATIENT
Start: 2019-05-19 | End: 2019-07-24 | Stop reason: SDUPTHER

## 2019-05-23 RX ORDER — ONDANSETRON 4 MG/1
TABLET, ORALLY DISINTEGRATING ORAL
Qty: 20 TABLET | Refills: 0 | Status: SHIPPED | OUTPATIENT
Start: 2019-05-23 | End: 2019-06-10 | Stop reason: SDUPTHER

## 2019-05-28 ENCOUNTER — OFFICE VISIT (OUTPATIENT)
Dept: URGENT CARE | Age: 49
End: 2019-05-28
Payer: MEDICAID

## 2019-05-28 VITALS
HEIGHT: 61 IN | RESPIRATION RATE: 16 BRPM | HEART RATE: 78 BPM | OXYGEN SATURATION: 98 % | SYSTOLIC BLOOD PRESSURE: 120 MMHG | TEMPERATURE: 98 F | DIASTOLIC BLOOD PRESSURE: 80 MMHG | WEIGHT: 252 LBS | BODY MASS INDEX: 47.58 KG/M2

## 2019-05-28 DIAGNOSIS — Z87.442 PERSONAL HISTORY OF KIDNEY STONES: ICD-10-CM

## 2019-05-28 DIAGNOSIS — R35.0 URINE FREQUENCY: Primary | ICD-10-CM

## 2019-05-28 DIAGNOSIS — M54.50 ACUTE RIGHT-SIDED LOW BACK PAIN WITHOUT SCIATICA: ICD-10-CM

## 2019-05-28 LAB
APPEARANCE FLUID: CLEAR
BILIRUBIN, POC: NORMAL
BLOOD URINE, POC: NORMAL
CLARITY, POC: CLEAR
COLOR, POC: YELLOW
GLUCOSE URINE, POC: NORMAL
KETONES, POC: NORMAL
LEUKOCYTE EST, POC: NORMAL
NITRITE, POC: NORMAL
PH, POC: 7
PROTEIN, POC: NORMAL
SPECIFIC GRAVITY, POC: 1.02
UROBILINOGEN, POC: 0.2

## 2019-05-28 PROCEDURE — 81002 URINALYSIS NONAUTO W/O SCOPE: CPT | Performed by: NURSE PRACTITIONER

## 2019-05-28 PROCEDURE — 99213 OFFICE O/P EST LOW 20 MIN: CPT | Performed by: NURSE PRACTITIONER

## 2019-05-28 RX ORDER — TAMSULOSIN HYDROCHLORIDE 0.4 MG/1
0.4 CAPSULE ORAL DAILY
Qty: 10 CAPSULE | Refills: 0 | Status: SHIPPED | OUTPATIENT
Start: 2019-05-28 | End: 2019-08-15

## 2019-05-28 RX ORDER — GABAPENTIN 300 MG/1
400 CAPSULE ORAL 3 TIMES DAILY
Refills: 5 | COMMUNITY
Start: 2019-05-19

## 2019-05-28 RX ORDER — GABAPENTIN 100 MG/1
CAPSULE ORAL
Refills: 5 | COMMUNITY
Start: 2019-05-19 | End: 2019-06-17

## 2019-05-28 ASSESSMENT — ENCOUNTER SYMPTOMS
DIARRHEA: 0
BACK PAIN: 1
ABDOMINAL PAIN: 0
SINUS PRESSURE: 0
COUGH: 0
SHORTNESS OF BREATH: 0
NAUSEA: 0
ALLERGIC/IMMUNOLOGIC NEGATIVE: 1
EYES NEGATIVE: 1
SORE THROAT: 0
VOMITING: 0

## 2019-05-28 NOTE — PATIENT INSTRUCTIONS
Drink plenty of fluids  Follow-up tomorrow with x ray L spine, will call results  Strain urine to check for kidney stone  Discuss medication adjustment with Topamax with PCP next office visit  Follow-up with PCP if pain worsens or persists

## 2019-05-28 NOTE — PROGRESS NOTES
MG per tablet Take 1 tablet by mouth every 6 hours as needed for Pain. Carlo MAYFIELD 100 UNIT/ML injection pen INJECT 45 UNITS SUBCUTANEOUSLY AT BEDTIME AS DIRECTED 3 pen 5    ondansetron (ZOFRAN-ODT) 4 MG disintegrating tablet DISSOLVE 1 TABLET ON THE TONGUE EVERY 8 HOURS AS NEEDED FOR NAUSEA OR VOMITING 20 tablet 0    furosemide (LASIX) 20 MG tablet Take 1 tablet by mouth daily 30 tablet 2    ondansetron (ZOFRAN) 4 MG tablet Take 1 tablet by mouth daily as needed for Nausea or Vomiting 15 tablet 0    Blood Glucose Monitoring Suppl ([address]) w/Device KIT 1 kit by Does not apply route daily as needed (Dx 250.00) 1 kit 0    ONE TOUCH LANCETS MISC 1 each by Does not apply route daily 100 each 3    ondansetron (ZOFRAN-ODT) 4 MG disintegrating tablet DISSOLVE 1 TABLET ON THE TONGUE EVERY 8 HOURS AS NEEDED FOR NAUSEA OR VOMITING 20 tablet 0    sertraline (ZOLOFT) 100 MG tablet Take 1 tablet by mouth daily 30 tablet 1    traZODone (DESYREL) 50 MG tablet Take 1-2 tabs at night as directed 60 tablet 1    ONE TOUCH ULTRA TEST strip TEST BLOOD SUGAR ONCE DAILY 100 strip 0    metFORMIN (GLUCOPHAGE) 500 MG tablet TAKE 1 TABLET BY MOUTH TWICE DAILY WITH MEALS 60 tablet 0    cetaphil (CETAPHIL) cream May substitute for insurance best product with gabapentin if possible 3 Bottle 5    naratriptan (AMERGE) 2.5 MG tablet Take 1 tablet by mouth once as needed for Migraine 2.5 mg at onset of headache, may repeat in 4 hours if needed 9 tablet 3    EPINEPHrine (EPIPEN 2-JOSÉ) 0.3 MG/0.3ML SOAJ injection Inject 0.3 mLs into the muscle once for 1 dose Use as directed for allergic reaction 0.3 mL 0    gabapentin (NEURONTIN) 300 MG capsule Take 1 capsule by mouth nightly for 90 days. 30 capsule 2    gabapentin (NEURONTIN) 100 MG capsule Take 1 capsule in the am and take 1 capsule in the afternoon.  60 capsule 2    Cream Base CREA Apply 1-2 pumps to affected area 3-4 times daily 360 g 3     No current facility-administered medications for this visit. Allergies   Allergen Reactions    Compazine [Prochlorperazine Maleate] Shortness Of Breath    Ciprofloxacin Hives    Demerol Hives    Ibuprofen Nausea Only    Nortriptyline Other (See Comments)    Orphenadrine Citrate Itching    Penicillins Hives and Nausea Only    Vistaril [Hydroxyzine Hcl] Itching    Avelox [Moxifloxacin] Nausea And Vomiting    Codeine Nausea And Vomiting and Rash    Doxycycline Nausea And Vomiting    Keflex [Cephalexin] Rash    Ketorolac Tromethamine Itching and Nausea And Vomiting       Health Maintenance   Topic Date Due    Diabetic retinal exam  09/08/1982    HIV screen  09/08/1987    Hepatitis B Vaccine (1 of 3 - Risk 3-dose series) 09/08/1991    Cervical cancer screen  05/21/2017    Lipid screen  10/31/2017    A1C test (Diabetic or Prediabetic)  03/17/2019    Potassium monitoring  05/04/2019    Creatinine monitoring  05/04/2019    Diabetic microalbuminuria test  12/17/2019    Diabetic foot exam  02/08/2020    DTaP/Tdap/Td vaccine (2 - Td) 09/22/2021    Flu vaccine  Completed    Pneumococcal 0-64 years Vaccine  Completed       Subjective:     Review of Systems   Constitutional: Positive for chills. Negative for activity change, appetite change, fever and weight loss. HENT: Negative for congestion, ear discharge, sinus pressure and sore throat. Eyes: Negative. Respiratory: Negative for cough and shortness of breath. Cardiovascular: Negative. Negative for chest pain and leg swelling. Gastrointestinal: Negative for abdominal pain, diarrhea, nausea and vomiting. Endocrine: Negative. Genitourinary: Positive for dysuria. Negative for bladder incontinence, decreased urine volume, frequency, urgency, vaginal bleeding and vaginal pain. Musculoskeletal: Positive for arthralgias, back pain and myalgias. Low back and right hip pain   Skin: Negative for rash. Allergic/Immunologic: Negative. refill takes less than 2 seconds. No rash noted. No cyanosis or erythema. No pallor. Psychiatric: She has a normal mood and affect. Her speech is normal and behavior is normal. Judgment normal. Cognition and memory are normal.   Nursing note and vitals reviewed. /80   Pulse 78   Temp 98 °F (36.7 °C) (Temporal)   Resp 16   Ht 5' 1\" (1.549 m)   Wt 252 lb (114.3 kg)   SpO2 98%   BMI 47.61 kg/m²     :Assessment       Diagnosis Orders   1. Urine frequency  POCT Urinalysis no Micro   2. Acute right-sided low back pain without sciatica  XR LUMBAR SPINE (2-3 VIEWS)   3. Personal history of kidney stones         :Plan      Orders Placed This Encounter   Procedures    XR LUMBAR SPINE (2-3 VIEWS)     Standing Status:   Future     Standing Expiration Date:   5/28/2020     Order Specific Question:   Reason for exam:     Answer:   low back pain    POCT Urinalysis no Micro     Results for orders placed or performed in visit on 05/28/19   POCT Urinalysis no Micro   Result Value Ref Range    Color, UA yellow     Clarity, UA clear     Glucose, UA POC neg     Bilirubin, UA neg     Ketones, UA neg     Spec Grav, UA 1.020     Blood, UA POC neg     pH, UA 7.0     Protein, UA POC neg     Urobilinogen, UA 0.2     Leukocytes, UA neg     Nitrite, UA neg     Appearance, Fluid Clear Clear, Slightly Cloudy       Return if symptoms worsen or fail to improve. Orders Placed This Encounter   Medications    tamsulosin (FLOMAX) 0.4 MG capsule     Sig: Take 1 capsule by mouth daily for 10 days     Dispense:  10 capsule     Refill:  0        Patient Instructions   Drink plenty of fluids  Follow-up tomorrow with x ray L spine, will call results  Strain urine to check for kidney stone  Discuss medication adjustment with Topamax with PCP next office visit  Follow-up with PCP if pain worsens or persists       Patient given educational materials- see patient instructions.   Discussed use, benefit, and side effects of

## 2019-05-29 ENCOUNTER — HOSPITAL ENCOUNTER (OUTPATIENT)
Dept: GENERAL RADIOLOGY | Age: 49
Discharge: HOME | End: 2019-05-29
Payer: MEDICAID

## 2019-05-29 DIAGNOSIS — M54.50 ACUTE RIGHT-SIDED LOW BACK PAIN WITHOUT SCIATICA: ICD-10-CM

## 2019-05-29 DIAGNOSIS — G43.909 MIGRAINE SYNDROME: ICD-10-CM

## 2019-05-29 PROCEDURE — 72100 X-RAY EXAM L-S SPINE 2/3 VWS: CPT

## 2019-05-30 RX ORDER — BUTORPHANOL TARTRATE 10 MG/ML
1 SPRAY, METERED NASAL EVERY 4 HOURS PRN
Qty: 1 BOTTLE | Refills: 0 | Status: SHIPPED | OUTPATIENT
Start: 2019-05-30 | End: 2019-06-29

## 2019-05-30 RX ORDER — SERTRALINE HYDROCHLORIDE 100 MG/1
50 TABLET, FILM COATED ORAL DAILY
Qty: 30 TABLET | Refills: 1 | Status: SHIPPED | OUTPATIENT
Start: 2019-05-30 | End: 2019-08-15

## 2019-06-05 DIAGNOSIS — E11.65 UNCONTROLLED TYPE 2 DIABETES MELLITUS WITH HYPERGLYCEMIA, WITHOUT LONG-TERM CURRENT USE OF INSULIN (HCC): ICD-10-CM

## 2019-06-07 DIAGNOSIS — B34.9 VIRAL INFECTION: ICD-10-CM

## 2019-06-07 RX ORDER — PREDNISONE 10 MG/1
10 TABLET ORAL DAILY
Qty: 7 TABLET | Refills: 0 | Status: SHIPPED | OUTPATIENT
Start: 2019-06-07 | End: 2019-06-14 | Stop reason: ALTCHOICE

## 2019-06-10 DIAGNOSIS — M17.0 PRIMARY OSTEOARTHRITIS OF BOTH KNEES: ICD-10-CM

## 2019-06-10 DIAGNOSIS — E11.65 UNCONTROLLED TYPE 2 DIABETES MELLITUS WITH HYPERGLYCEMIA, WITHOUT LONG-TERM CURRENT USE OF INSULIN (HCC): ICD-10-CM

## 2019-06-11 RX ORDER — ONDANSETRON 4 MG/1
TABLET, ORALLY DISINTEGRATING ORAL
Qty: 20 TABLET | Refills: 0 | Status: SHIPPED | OUTPATIENT
Start: 2019-06-11 | End: 2019-06-30 | Stop reason: SDUPTHER

## 2019-06-11 RX ORDER — CELECOXIB 200 MG/1
CAPSULE ORAL
Qty: 60 CAPSULE | Refills: 0 | Status: SHIPPED | OUTPATIENT
Start: 2019-06-11 | End: 2019-09-03 | Stop reason: SDUPTHER

## 2019-06-14 ENCOUNTER — OFFICE VISIT (OUTPATIENT)
Dept: URGENT CARE | Age: 49
End: 2019-06-14
Payer: MEDICAID

## 2019-06-14 VITALS
OXYGEN SATURATION: 98 % | SYSTOLIC BLOOD PRESSURE: 139 MMHG | HEART RATE: 80 BPM | BODY MASS INDEX: 47.8 KG/M2 | WEIGHT: 253 LBS | RESPIRATION RATE: 18 BRPM | TEMPERATURE: 97.6 F | DIASTOLIC BLOOD PRESSURE: 89 MMHG

## 2019-06-14 DIAGNOSIS — J06.9 URI WITH COUGH AND CONGESTION: Primary | ICD-10-CM

## 2019-06-14 PROCEDURE — G8427 DOCREV CUR MEDS BY ELIG CLIN: HCPCS | Performed by: NURSE PRACTITIONER

## 2019-06-14 PROCEDURE — 99213 OFFICE O/P EST LOW 20 MIN: CPT | Performed by: NURSE PRACTITIONER

## 2019-06-14 PROCEDURE — G8417 CALC BMI ABV UP PARAM F/U: HCPCS | Performed by: NURSE PRACTITIONER

## 2019-06-14 PROCEDURE — 1036F TOBACCO NON-USER: CPT | Performed by: NURSE PRACTITIONER

## 2019-06-14 RX ORDER — PREDNISONE 10 MG/1
TABLET ORAL
Qty: 42 TABLET | Refills: 0 | Status: SHIPPED | OUTPATIENT
Start: 2019-06-14 | End: 2019-09-24

## 2019-06-14 RX ORDER — AZITHROMYCIN 250 MG/1
TABLET, FILM COATED ORAL
Qty: 1 PACKET | Refills: 0 | Status: SHIPPED | OUTPATIENT
Start: 2019-06-14 | End: 2019-06-24

## 2019-06-14 ASSESSMENT — ENCOUNTER SYMPTOMS
COUGH: 1
SHORTNESS OF BREATH: 1
SORE THROAT: 0

## 2019-06-14 NOTE — PROGRESS NOTES
[address] [facility]  [address] [facility] [place] [address]  Dept: [phone]  Loc: [phone]    Anahy Trivedi is a 57 y.o. female who presents today for her medical conditions/complaintsas noted below. Tianna  is c/o of Cough (X 2 weeks/ nonproductive ) and Fatigue        HPI:     Cough   This is a new problem. Episode onset: a week and a half ago. The problem has been gradually worsening. The problem occurs constantly. The cough is non-productive. Associated symptoms include chills, myalgias and shortness of breath. Pertinent negatives include no fever or sore throat. The symptoms are aggravated by lying down. Treatments tried: promethazine  The treatment provided mild relief. Fatigue   Associated symptoms include chills, coughing, fatigue and myalgias. Pertinent negatives include no fever or sore throat.        Past Medical History:   Diagnosis Date    Anxiety     Constipation     Depression     DM (diabetes mellitus) ([address]     Headache(784.0)     Hyperlipidemia     Hypertension     Kidney stones     Kidney stones     Restless leg     Restless legs syndrome      Past Surgical History:   Procedure Laterality Date    ECTOPIC PREGNANCY SURGERY      EYE SURGERY      cornea transplant and cataracts removed    KNEE CARTILAGE SURGERY Left 12/20/2016    KNEE ARTHROSCOPIC PARTIAL MEDIAL MENISCECTOMY performed by Angelica Cervantes MD at [facility] OR    TUBAL LIGATION         Family History   Problem Relation Age of Onset    Cancer Father     Cancer Maternal Grandmother     COPD Maternal Grandmother     Cancer Maternal Grandfather        Social History     Tobacco Use    Smoking status: Never Smoker    Smokeless tobacco: Never Used   Substance Use Topics    Alcohol use: No      Current Outpatient Medications   Medication Sig Dispense Refill    predniSONE (DELTASONE) 10 MG tablet Take 6 tablets for 2 days, 5 tabs for 2 days, 4 tabs for 2 days, 3 tabs for 2 days, 2 tabs for 2 days, 1 tab for 2 days. 42 tablet 0    azithromycin (ZITHROMAX Z-JOSÉ) 250 MG tablet Take as directed 1 packet 0    celecoxib (CELEBREX) 200 MG capsule TAKE 1 CAPSULE BY MOUTH EVERY DAY 60 capsule 0    metFORMIN (GLUCOPHAGE) 500 MG tablet TAKE 1 TABLET BY MOUTH TWICE DAILY WITH MEALS 60 tablet 0    ondansetron (ZOFRAN-ODT) 4 MG disintegrating tablet DISSOLVE 1 TABLET ON THE TONGUE EVERY 8 HOURS AS NEEDED FOR NAUSEA OR VOMITING 20 tablet 0    ONE TOUCH ULTRA TEST strip TEST BLOOD SUGAR ONCE DAILY 100 strip 0    sertraline (ZOLOFT) 100 MG tablet Take 0.5 tablets by mouth daily 30 tablet 1    butorphanol (STADOL) 10 MG/ML nasal spray 1 spray by Nasal route every 4 hours as needed for Pain for up to 30 days. 1 Bottle 0    gabapentin (NEURONTIN) 100 MG capsule TK ONE C PO Q 12 H  5    gabapentin (NEURONTIN) 300 MG capsule TK ONE C PO QHS  5    VICTOZA 18 MG/3ML SOPN SC injection ADMINISTER 1.8 MG UNDER THE SKIN DAILY. 9 mL 0    phentermine (ADIPEX-P) 37.5 MG tablet Take 1 tablet by mouth every morning (before breakfast) for 30 days.  30 tablet 0    verapamil (CALAN SR) 240 MG extended release tablet TAKE 1 TABLET BY MOUTH EVERY NIGHT 30 tablet 0    promethazine (PHENERGAN) 25 MG tablet TAKE 1 TABLET BY MOUTH EVERY 6 HOURS AS NEEDED FOR NAUSEA 30 tablet 0    fluticasone (FLONASE) 50 MCG/ACT nasal spray 1 spray by Each Nare route daily 1 Bottle 3    triamterene-hydrochlorothiazide (DYAZIDE) 37.5-25 MG per capsule TAKE 1 CAPSULE BY MOUTH ONCE DAILY IN THE MORNING 30 capsule 3    ZOLMitriptan (ZOMIG) 5 MG tablet Take 1 tablet by mouth as needed for Migraine 6 tablet 3    cetaphil (CETAPHIL) cream May substitute for insurance best product with gabapentin if possible 3 Bottle 5    B-D ULTRAFINE III SHORT PEN 31G X 8 MM MISC USE TO INJECT INSULIN ONCE DAILY 100 each 3    ONETOUCH DELICA LANCETS FINE MISC USE TO CHECK BLOOD SUGAR AS DIRECTED 100 each 3    pramipexole (MIRAPEX) 0.5 MG tablet TAKE 1 TABLET BY MOUTH TWICE DAILY 60 tablet 5    topiramate (TOPAMAX) 50 MG tablet TAKE 1 TABLET BY MOUTH TWICE DAILY 60 tablet 5    HYDROcodone-acetaminophen (NORCO) 7.5-325 MG per tablet Take 1 tablet by mouth every 6 hours as needed for Pain. Terrell Kwok KWIKPEN 100 UNIT/ML injection pen INJECT 45 UNITS SUBCUTANEOUSLY AT BEDTIME AS DIRECTED 3 pen 5    ondansetron (ZOFRAN-ODT) 4 MG disintegrating tablet DISSOLVE 1 TABLET ON THE TONGUE EVERY 8 HOURS AS NEEDED FOR NAUSEA OR VOMITING 20 tablet 0    furosemide (LASIX) 20 MG tablet Take 1 tablet by mouth daily 30 tablet 2    ondansetron (ZOFRAN) 4 MG tablet Take 1 tablet by mouth daily as needed for Nausea or Vomiting 15 tablet 0    Blood Glucose Monitoring Suppl ([address]) w/Device KIT 1 kit by Does not apply route daily as needed (Dx 250.00) 1 kit 0    ONE TOUCH LANCETS MISC 1 each by Does not apply route daily 100 each 3    Cream Base CREA Apply 1-2 pumps to affected area 3-4 times daily 360 g 3    tamsulosin (FLOMAX) 0.4 MG capsule Take 1 capsule by mouth daily for 10 days 10 capsule 0    Promethazine-Phenylephrine (PHENERGAN-VC) 6.25-5 MG/5ML syrup Take 5 mLs by mouth every 6 hours 280 mL 0    traZODone (DESYREL) 50 MG tablet Take 1-2 tabs at night as directed 60 tablet 1    naratriptan (AMERGE) 2.5 MG tablet Take 1 tablet by mouth once as needed for Migraine 2.5 mg at onset of headache, may repeat in 4 hours if needed 9 tablet 3    EPINEPHrine (EPIPEN 2-JOSÉ) 0.3 MG/0.3ML SOAJ injection Inject 0.3 mLs into the muscle once for 1 dose Use as directed for allergic reaction 0.3 mL 0    gabapentin (NEURONTIN) 300 MG capsule Take 1 capsule by mouth nightly for 90 days. 30 capsule 2    gabapentin (NEURONTIN) 100 MG capsule Take 1 capsule in the am and take 1 capsule in the afternoon. 60 capsule 2     No current facility-administered medications for this visit.       Allergies   Allergen Reactions    Compazine [Prochlorperazine Maleate] Shortness Of Breath    Ciprofloxacin Hives    Demerol Hives    Ibuprofen Nausea Only    Nortriptyline Other (See Comments)    Orphenadrine Citrate Itching    Penicillins Hives and Nausea Only    Vistaril [Hydroxyzine Hcl] Itching    Avelox [Moxifloxacin] Nausea And Vomiting    Codeine Nausea And Vomiting and Rash    Doxycycline Nausea And Vomiting    Keflex [Cephalexin] Rash    Ketorolac Tromethamine Itching and Nausea And Vomiting       Health Maintenance   Topic Date Due    Diabetic retinal exam  09/08/1982    HIV screen  09/08/1987    Hepatitis B Vaccine (1 of 3 - Risk 3-dose series) 09/08/1991    Cervical cancer screen  05/21/2017    Lipid screen  10/31/2017    A1C test (Diabetic or Prediabetic)  03/17/2019    Potassium monitoring  05/04/2019    Creatinine monitoring  05/04/2019    Diabetic microalbuminuria test  12/17/2019    Diabetic foot exam  02/08/2020    DTaP/Tdap/Td vaccine (2 - Td) 09/22/2021    Flu vaccine  Completed    Pneumococcal 0-64 years Vaccine  Completed       Subjective:     Review of Systems   Constitutional: Positive for activity change, chills and fatigue. Negative for fever. HENT: Negative for sore throat. Respiratory: Positive for cough and shortness of breath. Musculoskeletal: Positive for myalgias. All other systems reviewed and are negative.      :Objective      Physical Exam   Constitutional: She is oriented to person, place, and time. She appears well-developed and well-nourished. No distress. HENT:   Head: Normocephalic and atraumatic. Right Ear: Hearing, tympanic membrane, external ear and ear canal normal.   Left Ear: Hearing, tympanic membrane, external ear and ear canal normal.   Nose: Nose normal.   Mouth/Throat: Uvula is midline, oropharynx is clear and moist and mucous membranes are normal. No oropharyngeal exudate. Eyes: Pupils are equal, round, and reactive to light.  Conjunctivae are normal.   Neck: Normal causing your pleurisy. Treatment depends on the cause. Pleurisy may come and go for a few days, or it may continue if the cause has not been treated. Home treatment can help ease symptoms. Follow-up care is a key part of your treatment and safety. Be sure to make and go to all appointments, and call your doctor if you are having problems. It's also a good idea to know your test results and keep a list of the medicines you take. How can you care for yourself at home? · Take an over-the-counter pain medicine, such as acetaminophen (Tylenol), ibuprofen (Advil, Motrin), or naproxen (Aleve). Read and follow all instructions on the label. · Do not take two or more pain medicines at the same time unless the doctor told you to. Many pain medicines have acetaminophen, which is Tylenol. Too much acetaminophen (Tylenol) can be harmful. · If your doctor prescribed antibiotics, take them as directed. Do not stop taking them just because you feel better. You need to take the full course of antibiotics. · Take cough medicine as directed if your doctor recommends it. · Avoid activities that make the pain worse. When should you call for help? Call 911 anytime you think you may need emergency care. For example, call if:    · You have severe trouble breathing.     · You have severe chest pain.     · You passed out (lost consciousness).    Call your doctor now or seek immediate medical care if:    · You have a new or higher fever.    Watch closely for changes in your health, and be sure to contact your doctor if:    · You begin to cough up yellow or green mucus.     · You cough up blood.     · Your symptoms are not better in 3 or 4 days. Where can you learn more? Go to https://Funding GatespeFixes [employer] Kids.Linkovery. org and sign in to your [employer] account. Enter F346 in the [employer] box to learn more about \"Pleurisy: Care Instructions. \"     If you do not have an account, please click on the \"Sign Up Now\" link.   Current as of: September 5, 2018  Content Version: 12.0  © 9577-7680 Healthwise, Incorporated. Care instructions adapted under license by [facility] ([facility]). If you have questions about a medical condition or this instruction, always ask your healthcare professional. [address] any warranty or liability for your use of this information. 1. Take zithromax for full course  2. Medrol dose pack  3. Rest and stay hydrated   4. Monitor fever and treat as needed with Tylenol/Motrin  5.  If patient is not improving or developing any new/worsening symptoms then return to clinic as needed or follow up with PCP          Electronically signed by NANETTE Carmona on 6/14/2019 at 9:46 AM

## 2019-06-14 NOTE — PATIENT INSTRUCTIONS
Patient Education        Upper Respiratory Infection (Cold): Care Instructions  Your Care Instructions    An upper respiratory infection, or URI, is an infection of the nose, sinuses, or throat. URIs are spread by coughs, sneezes, and direct contact. The common cold is the most frequent kind of URI. The flu and sinus infections are other kinds of URIs. Almost all URIs are caused by viruses. Antibiotics won't cure them. But you can treat most infections with home care. This may include drinking lots of fluids and taking over-the-counter pain medicine. You will probably feel better in 4 to 10 days. The doctor has checked you carefully, but problems can develop later. If you notice any problems or new symptoms, get medical treatment right away. Follow-up care is a key part of your treatment and safety. Be sure to make and go to all appointments, and call your doctor if you are having problems. It's also a good idea to know your test results and keep a list of the medicines you take. How can you care for yourself at home? · To prevent dehydration, drink plenty of fluids, enough so that your urine is light yellow or clear like water. Choose water and other caffeine-free clear liquids until you feel better. If you have kidney, heart, or liver disease and have to limit fluids, talk with your doctor before you increase the amount of fluids you drink. · Take an over-the-counter pain medicine, such as acetaminophen (Tylenol), ibuprofen (Advil, Motrin), or naproxen (Aleve). Read and follow all instructions on the label. · Before you use cough and cold medicines, check the label. These medicines may not be safe for young children or for people with certain health problems. · Be careful when taking over-the-counter cold or flu medicines and Tylenol at the same time. Many of these medicines have acetaminophen, which is Tylenol. Read the labels to make sure that you are not taking more than the recommended dose.  Too much acetaminophen (Tylenol) can be harmful. · Get plenty of rest.  · Do not smoke or allow others to smoke around you. If you need help quitting, talk to your doctor about stop-smoking programs and medicines. These can increase your chances of quitting for good. When should you call for help? Call 911 anytime you think you may need emergency care. For example, call if:    · You have severe trouble breathing.    Call your doctor now or seek immediate medical care if:    · You seem to be getting much sicker.     · You have new or worse trouble breathing.     · You have a new or higher fever.     · You have a new rash.    Watch closely for changes in your health, and be sure to contact your doctor if:    · You have a new symptom, such as a sore throat, an earache, or sinus pain.     · You cough more deeply or more often, especially if you notice more mucus or a change in the color of your mucus.     · You do not get better as expected. Where can you learn more? Go to https://[employer].Sellf. org and sign in to your [employer] account. Enter S306 in the icomply box to learn more about \"Upper Respiratory Infection (Cold): Care Instructions. \"     If you do not have an account, please click on the \"Sign Up Now\" link. Current as of: September 5, 2018  Content Version: 12.0  © 5234-1851 Healthwise, Incorporated. Care instructions adapted under license by [facility] ([facility]). If you have questions about a medical condition or this instruction, always ask your healthcare professional. [place] [address] any warranty or liability for your use of this information. Patient Education        Pleurisy: Care Instructions  Your Care Instructions  Pleurisy is inflammation of the tissue that lines the inside of the chest and covers the lungs (pleura). Pleurisy is often caused by an infection, usually a virus. It also can be caused by other health problems, such as pneumonia or lupus. Pleurisy can cause sharp chest pain that gets worse when you cough or take a deep breath. You may need more tests to find out what is causing your pleurisy. Treatment depends on the cause. Pleurisy may come and go for a few days, or it may continue if the cause has not been treated. Home treatment can help ease symptoms. Follow-up care is a key part of your treatment and safety. Be sure to make and go to all appointments, and call your doctor if you are having problems. It's also a good idea to know your test results and keep a list of the medicines you take. How can you care for yourself at home? · Take an over-the-counter pain medicine, such as acetaminophen (Tylenol), ibuprofen (Advil, Motrin), or naproxen (Aleve). Read and follow all instructions on the label. · Do not take two or more pain medicines at the same time unless the doctor told you to. Many pain medicines have acetaminophen, which is Tylenol. Too much acetaminophen (Tylenol) can be harmful. · If your doctor prescribed antibiotics, take them as directed. Do not stop taking them just because you feel better. You need to take the full course of antibiotics. · Take cough medicine as directed if your doctor recommends it. · Avoid activities that make the pain worse. When should you call for help? Call 911 anytime you think you may need emergency care. For example, call if:    · You have severe trouble breathing.     · You have severe chest pain.     · You passed out (lost consciousness).    Call your doctor now or seek immediate medical care if:    · You have a new or higher fever.    Watch closely for changes in your health, and be sure to contact your doctor if:    · You begin to cough up yellow or green mucus.     · You cough up blood.     · Your symptoms are not better in 3 or 4 days. Where can you learn more? Go to https://chиринаeb.51fanli. org and sign in to your [employer] account.  Enter F346 in the Tamr box to learn more about \"Pleurisy: Care Instructions. \"     If you do not have an account, please click on the \"Sign Up Now\" link. Current as of: September 5, 2018  Content Version: 12.0  © 4265-9488 Healthwise, Incorporated. Care instructions adapted under license by [facility] ([facility]). If you have questions about a medical condition or this instruction, always ask your healthcare professional. [address] any warranty or liability for your use of this information. 1. Take zithromax for full course  2. Medrol dose pack  3. Rest and stay hydrated   4. Monitor fever and treat as needed with Tylenol/Motrin  5.  If patient is not improving or developing any new/worsening symptoms then return to clinic as needed or follow up with PCP

## 2019-06-17 ENCOUNTER — OFFICE VISIT (OUTPATIENT)
Dept: PRIMARY CARE CLINIC | Age: 49
End: 2019-06-17
Payer: MEDICAID

## 2019-06-17 VITALS
TEMPERATURE: 97.5 F | RESPIRATION RATE: 16 BRPM | BODY MASS INDEX: 46.93 KG/M2 | SYSTOLIC BLOOD PRESSURE: 124 MMHG | OXYGEN SATURATION: 99 % | HEIGHT: 61 IN | DIASTOLIC BLOOD PRESSURE: 84 MMHG | WEIGHT: 248.6 LBS | HEART RATE: 97 BPM

## 2019-06-17 DIAGNOSIS — E66.01 MORBID OBESITY WITH BMI OF 45.0-49.9, ADULT (HCC): ICD-10-CM

## 2019-06-17 DIAGNOSIS — M79.604 PAIN IN BOTH LOWER EXTREMITIES: ICD-10-CM

## 2019-06-17 DIAGNOSIS — M79.605 PAIN IN BOTH LOWER EXTREMITIES: ICD-10-CM

## 2019-06-17 LAB — HBA1C MFR BLD: 9.5 %

## 2019-06-17 PROCEDURE — G8427 DOCREV CUR MEDS BY ELIG CLIN: HCPCS | Performed by: FAMILY MEDICINE

## 2019-06-17 PROCEDURE — 99214 OFFICE O/P EST MOD 30 MIN: CPT | Performed by: FAMILY MEDICINE

## 2019-06-17 PROCEDURE — 1036F TOBACCO NON-USER: CPT | Performed by: FAMILY MEDICINE

## 2019-06-17 PROCEDURE — 83036 HEMOGLOBIN GLYCOSYLATED A1C: CPT | Performed by: FAMILY MEDICINE

## 2019-06-17 PROCEDURE — 2022F DILAT RTA XM EVC RTNOPTHY: CPT | Performed by: FAMILY MEDICINE

## 2019-06-17 PROCEDURE — 3046F HEMOGLOBIN A1C LEVEL >9.0%: CPT | Performed by: FAMILY MEDICINE

## 2019-06-17 PROCEDURE — G8417 CALC BMI ABV UP PARAM F/U: HCPCS | Performed by: FAMILY MEDICINE

## 2019-06-17 ASSESSMENT — ENCOUNTER SYMPTOMS
SHORTNESS OF BREATH: 0
CONSTIPATION: 0
DIARRHEA: 0
NAUSEA: 0
VOMITING: 0
COUGH: 0
ABDOMINAL PAIN: 0
CHEST TIGHTNESS: 0
WHEEZING: 0

## 2019-06-17 NOTE — PROGRESS NOTES
Mark Bonilla is a 57 y.o. female who presents today for   Chief Complaint   Patient presents with    Diabetes     6 month FU    Shortness of Breath    Leg Problem     cramping       HPI  Patient is here for f/u DM. Pt notes upcoming appt for knee pain next month. Saw Joy Enriquez recently for hip pain and pain mgt and she is wanting to start injections. Having some increased leg cramping. She is noting some inc dyspnea as well. Has had some naggy cough and chest tightness w/ this. DM was 11.3 recently and uncontrolled m 12/2018. It is uncontrolled today at 9.4 as well. She is debating on seeing bariatric surgery. She notes that she turned in her paperwork to have this done. She also notes that her pain is severe on her feet and is concerned about her ability to work. No change in PMH, family, social, or surgical history unless mentioned above. Review of Systems   Constitutional: Negative for chills and fever. Respiratory: Negative for cough, chest tightness, shortness of breath and wheezing. Cardiovascular: Negative for chest pain, palpitations and leg swelling. Gastrointestinal: Negative for abdominal pain, constipation, diarrhea, nausea and vomiting. Genitourinary: Negative for difficulty urinating, dysuria and frequency. Musculoskeletal: Positive for arthralgias and gait problem.           Past Medical History:   Diagnosis Date    Anxiety     Constipation     Depression     DM (diabetes mellitus) ([address]     Headache(784.0)     Hyperlipidemia     Hypertension     Kidney stones     Kidney stones     Restless leg     Restless legs syndrome        Current Outpatient Medications   Medication Sig Dispense Refill    insulin glargine (BASAGLAR KWIKPEN) 100 UNIT/ML injection pen Start 45 U and inc by 3 U nightly utnil 60 U obtained and continue 5 pen 5    predniSONE (DELTASONE) 10 MG tablet Take 6 tablets for 2 days, 5 tabs for 2 days, 4 tabs for 2 days, 3 tabs for 2 days, 2 tabs for 2 days, 1 tab for 2 days. 42 tablet 0    celecoxib (CELEBREX) 200 MG capsule TAKE 1 CAPSULE BY MOUTH EVERY DAY 60 capsule 0    metFORMIN (GLUCOPHAGE) 500 MG tablet TAKE 1 TABLET BY MOUTH TWICE DAILY WITH MEALS 60 tablet 0    ONE TOUCH ULTRA TEST strip TEST BLOOD SUGAR ONCE DAILY 100 strip 0    sertraline (ZOLOFT) 100 MG tablet Take 0.5 tablets by mouth daily 30 tablet 1    butorphanol (STADOL) 10 MG/ML nasal spray 1 spray by Nasal route every 4 hours as needed for Pain for up to 30 days. 1 Bottle 0    gabapentin (NEURONTIN) 300 MG capsule TK ONE C PO QHS  5    VICTOZA 18 MG/3ML SOPN SC injection ADMINISTER 1.8 MG UNDER THE SKIN DAILY. 9 mL 0    phentermine (ADIPEX-P) 37.5 MG tablet Take 1 tablet by mouth every morning (before breakfast) for 30 days.  30 tablet 0    traZODone (DESYREL) 50 MG tablet Take 1-2 tabs at night as directed 60 tablet 1    verapamil (CALAN SR) 240 MG extended release tablet TAKE 1 TABLET BY MOUTH EVERY NIGHT 30 tablet 0    promethazine (PHENERGAN) 25 MG tablet TAKE 1 TABLET BY MOUTH EVERY 6 HOURS AS NEEDED FOR NAUSEA 30 tablet 0    fluticasone (FLONASE) 50 MCG/ACT nasal spray 1 spray by Each Nare route daily 1 Bottle 3    triamterene-hydrochlorothiazide (DYAZIDE) 37.5-25 MG per capsule TAKE 1 CAPSULE BY MOUTH ONCE DAILY IN THE MORNING 30 capsule 3    ZOLMitriptan (ZOMIG) 5 MG tablet Take 1 tablet by mouth as needed for Migraine 6 tablet 3    cetaphil (CETAPHIL) cream May substitute for insurance best product with gabapentin if possible 3 Bottle 5    B-D ULTRAFINE III SHORT PEN 31G X 8 MM MISC USE TO INJECT INSULIN ONCE DAILY 100 each 3    ONETOUCH DELICA LANCETS FINE MISC USE TO CHECK BLOOD SUGAR AS DIRECTED 100 each 3    pramipexole (MIRAPEX) 0.5 MG tablet TAKE 1 TABLET BY MOUTH TWICE DAILY 60 tablet 5    topiramate (TOPAMAX) 50 MG tablet TAKE 1 TABLET BY MOUTH TWICE DAILY 60 tablet 5    HYDROcodone-acetaminophen (NORCO) 7.5-325 MG per tablet Take 1 tablet by mouth every 6 hours as needed for Pain. Daun Skiff furosemide (LASIX) 20 MG tablet Take 1 tablet by mouth daily 30 tablet 2    ondansetron (ZOFRAN) 4 MG tablet Take 1 tablet by mouth daily as needed for Nausea or Vomiting 15 tablet 0    Blood Glucose Monitoring Suppl ([address]) w/Device KIT 1 kit by Does not apply route daily as needed (Dx 250.00) 1 kit 0    ONE TOUCH LANCETS MISC 1 each by Does not apply route daily 100 each 3    Cream Base CREA Apply 1-2 pumps to affected area 3-4 times daily 360 g 3    azithromycin (ZITHROMAX Z-JOSÉ) 250 MG tablet Take as directed 1 packet 0    ondansetron (ZOFRAN-ODT) 4 MG disintegrating tablet DISSOLVE 1 TABLET ON THE TONGUE EVERY 8 HOURS AS NEEDED FOR NAUSEA OR VOMITING 20 tablet 0    tamsulosin (FLOMAX) 0.4 MG capsule Take 1 capsule by mouth daily for 10 days 10 capsule 0    Promethazine-Phenylephrine (PHENERGAN-VC) 6.25-5 MG/5ML syrup Take 5 mLs by mouth every 6 hours 280 mL 0    ondansetron (ZOFRAN-ODT) 4 MG disintegrating tablet DISSOLVE 1 TABLET ON THE TONGUE EVERY 8 HOURS AS NEEDED FOR NAUSEA OR VOMITING 20 tablet 0    naratriptan (AMERGE) 2.5 MG tablet Take 1 tablet by mouth once as needed for Migraine 2.5 mg at onset of headache, may repeat in 4 hours if needed 9 tablet 3    EPINEPHrine (EPIPEN 2-JOSÉ) 0.3 MG/0.3ML SOAJ injection Inject 0.3 mLs into the muscle once for 1 dose Use as directed for allergic reaction 0.3 mL 0     No current facility-administered medications for this visit.         Allergies   Allergen Reactions    Compazine [Prochlorperazine Maleate] Shortness Of Breath    Ciprofloxacin Hives    Demerol Hives    Ibuprofen Nausea Only    Nortriptyline Other (See Comments)    Orphenadrine Citrate Itching    Penicillins Hives and Nausea Only    Vistaril [Hydroxyzine Hcl] Itching    Avelox [Moxifloxacin] Nausea And Vomiting    Codeine Nausea And Vomiting and Rash    Doxycycline Nausea And Vomiting    Keflex [Cephalexin] Rash    Ketorolac M79.605        Plan:   I will do temporary disability paperwork for her as she does have severe osteoarthritis of the lower extremities. I do not believe that she is able to tolerate gainful employment at this time based on her mobility. She is to follow-up with bariatric surgery as this would also help her diabetes but also help her have possible joint replacement at a young age. I will increase her insulin from 45 units to 60 units at night and continue her other modifying insulin regimen. Orders Placed This Encounter   Procedures    Lipid Panel     Standing Status:   Standing     Number of Occurrences:   15     Standing Expiration Date:   5/30/2030     Order Specific Question:   Is Patient Fasting?/# of Hours     Answer:   yes/8 hrs    CBC     Standing Status:   Standing     Number of Occurrences:   15     Standing Expiration Date:   5/30/2030    Comprehensive Metabolic Panel     Every 11 months     Standing Status:   Standing     Number of Occurrences:   15     Standing Expiration Date:   5/30/2030    Urinalysis     Standing Status:   Standing     Number of Occurrences:   15     Standing Expiration Date:   6/14/2029    POCT glycosylated hemoglobin (Hb A1C)     Orders Placed This Encounter   Medications    insulin glargine (BASAGLAR KWIKPEN) 100 UNIT/ML injection pen     Sig: Start 45 U and inc by 3 U nightly utnil 60 U obtained and continue     Dispense:  5 pen     Refill:  5        Medications Discontinued During This Encounter   Medication Reason    gabapentin (NEURONTIN) 100 MG capsule     gabapentin (NEURONTIN) 300 MG capsule     gabapentin (NEURONTIN) 100 MG capsule     BASAGLAR KWIKPEN 100 UNIT/ML injection pen REORDER     Patient Instructions   Start a vitamin B6 supplement along with a magnesium supplement over-the-counter daily. Also increase your nighttime insulin by 3 units nightly. Increase it from 45 units to 60 units and continue at 60 units. Continue your other diabetes medicine.

## 2019-06-17 NOTE — PROGRESS NOTES
Advised pt to increase her nighttime insulin by 3 U nightly until 60 U obtained.   cont other meds and see print out for supplements otc, per Dr Massey Ards

## 2019-06-18 ENCOUNTER — TELEPHONE (OUTPATIENT)
Dept: PRIMARY CARE CLINIC | Age: 49
End: 2019-06-18

## 2019-06-18 DIAGNOSIS — E66.9 OBESITY, UNSPECIFIED CLASSIFICATION, UNSPECIFIED OBESITY TYPE, UNSPECIFIED WHETHER SERIOUS COMORBIDITY PRESENT: Primary | ICD-10-CM

## 2019-06-18 NOTE — TELEPHONE ENCOUNTER
----- Message from Parker Dumont MD sent at 6/17/2019  1:36 PM CDT -----  Regarding: can we check on Dr. Aman Cui appt for bariatrics please  Can we check around end of June for possible appt for Dr. Aman Cui bariatric surgery please?

## 2019-06-24 DIAGNOSIS — M79.604 PAIN IN BOTH LOWER EXTREMITIES: Primary | ICD-10-CM

## 2019-06-24 DIAGNOSIS — M79.605 PAIN IN BOTH LOWER EXTREMITIES: Primary | ICD-10-CM

## 2019-06-24 RX ORDER — LANOLIN ALCOHOL/MO/W.PET/CERES
50 CREAM (GRAM) TOPICAL DAILY
Qty: 30 TABLET | Refills: 3 | Status: SHIPPED | OUTPATIENT
Start: 2019-06-24 | End: 2019-10-03 | Stop reason: SDUPTHER

## 2019-06-24 RX ORDER — MULTIVITAMIN WITH IRON
250 TABLET ORAL DAILY
Qty: 90 TABLET | Refills: 0 | Status: SHIPPED | OUTPATIENT
Start: 2019-06-24 | End: 2020-06-10

## 2019-06-26 ENCOUNTER — TELEPHONE (OUTPATIENT)
Dept: PRIMARY CARE CLINIC | Age: 49
End: 2019-06-26

## 2019-06-27 RX ORDER — METHYLPREDNISOLONE 4 MG/1
TABLET ORAL
Qty: 1 KIT | Refills: 0 | Status: SHIPPED | OUTPATIENT
Start: 2019-06-27 | End: 2019-07-03

## 2019-06-28 RX ORDER — AZITHROMYCIN 250 MG/1
250 TABLET, FILM COATED ORAL DAILY
Qty: 6 TABLET | Refills: 0 | Status: SHIPPED | OUTPATIENT
Start: 2019-06-28 | End: 2019-07-03

## 2019-06-30 DIAGNOSIS — G43.909 MIGRAINE SYNDROME: Primary | ICD-10-CM

## 2019-06-30 DIAGNOSIS — G43.909 MIGRAINE SYNDROME: ICD-10-CM

## 2019-07-01 RX ORDER — ZOLMITRIPTAN 5 MG/1
TABLET, FILM COATED ORAL
Qty: 6 TABLET | Refills: 0 | Status: SHIPPED | OUTPATIENT
Start: 2019-07-01 | End: 2019-08-15

## 2019-07-01 RX ORDER — ONDANSETRON 4 MG/1
TABLET, ORALLY DISINTEGRATING ORAL
Qty: 20 TABLET | Refills: 0 | Status: ON HOLD | OUTPATIENT
Start: 2019-07-01 | End: 2019-08-20

## 2019-07-02 RX ORDER — BUTORPHANOL TARTRATE 10 MG/ML
SPRAY, METERED NASAL
Qty: 2.5 ML | Refills: 0 | OUTPATIENT
Start: 2019-07-02 | End: 2019-07-31 | Stop reason: SDUPTHER

## 2019-07-10 DIAGNOSIS — E11.65 UNCONTROLLED TYPE 2 DIABETES MELLITUS WITH HYPERGLYCEMIA, WITHOUT LONG-TERM CURRENT USE OF INSULIN (HCC): ICD-10-CM

## 2019-07-11 DIAGNOSIS — E11.65 UNCONTROLLED TYPE 2 DIABETES MELLITUS WITH HYPERGLYCEMIA, WITHOUT LONG-TERM CURRENT USE OF INSULIN (HCC): ICD-10-CM

## 2019-07-11 DIAGNOSIS — I10 ESSENTIAL HYPERTENSION: ICD-10-CM

## 2019-07-11 RX ORDER — VERAPAMIL HYDROCHLORIDE 240 MG/1
TABLET, FILM COATED, EXTENDED RELEASE ORAL
Qty: 30 TABLET | Refills: 0 | Status: SHIPPED | OUTPATIENT
Start: 2019-07-11 | End: 2019-08-04 | Stop reason: SDUPTHER

## 2019-07-11 RX ORDER — TRIAMTERENE AND HYDROCHLOROTHIAZIDE 37.5; 25 MG/1; MG/1
CAPSULE ORAL
Qty: 90 CAPSULE | Refills: 0 | Status: SHIPPED | OUTPATIENT
Start: 2019-07-11 | End: 2019-10-03 | Stop reason: SDUPTHER

## 2019-07-18 ENCOUNTER — HOSPITAL ENCOUNTER (OUTPATIENT)
Dept: GENERAL RADIOLOGY | Age: 49
Discharge: HOME | End: 2019-07-18
Payer: MEDICAID

## 2019-07-18 ENCOUNTER — OFFICE VISIT (OUTPATIENT)
Dept: PRIMARY CARE CLINIC | Age: 49
End: 2019-07-18
Payer: MEDICAID

## 2019-07-18 VITALS
DIASTOLIC BLOOD PRESSURE: 70 MMHG | OXYGEN SATURATION: 98 % | SYSTOLIC BLOOD PRESSURE: 110 MMHG | WEIGHT: 255 LBS | TEMPERATURE: 98 F | BODY MASS INDEX: 48.15 KG/M2 | HEART RATE: 85 BPM | HEIGHT: 61 IN

## 2019-07-18 DIAGNOSIS — K21.9 GASTROESOPHAGEAL REFLUX DISEASE, ESOPHAGITIS PRESENCE NOT SPECIFIED: ICD-10-CM

## 2019-07-18 DIAGNOSIS — J06.9 UPPER RESPIRATORY TRACT INFECTION, UNSPECIFIED TYPE: ICD-10-CM

## 2019-07-18 DIAGNOSIS — R09.1 PLEURISY: ICD-10-CM

## 2019-07-18 DIAGNOSIS — R06.00 DYSPNEA, UNSPECIFIED TYPE: ICD-10-CM

## 2019-07-18 DIAGNOSIS — R06.00 DYSPNEA, UNSPECIFIED TYPE: Primary | ICD-10-CM

## 2019-07-18 PROCEDURE — 71046 X-RAY EXAM CHEST 2 VIEWS: CPT

## 2019-07-18 PROCEDURE — G8427 DOCREV CUR MEDS BY ELIG CLIN: HCPCS | Performed by: NURSE PRACTITIONER

## 2019-07-18 PROCEDURE — 99213 OFFICE O/P EST LOW 20 MIN: CPT | Performed by: NURSE PRACTITIONER

## 2019-07-18 PROCEDURE — G8417 CALC BMI ABV UP PARAM F/U: HCPCS | Performed by: NURSE PRACTITIONER

## 2019-07-18 PROCEDURE — 1036F TOBACCO NON-USER: CPT | Performed by: NURSE PRACTITIONER

## 2019-07-18 RX ORDER — DEXAMETHASONE SODIUM PHOSPHATE 10 MG/ML
8 INJECTION INTRAMUSCULAR; INTRAVENOUS ONCE
Status: COMPLETED | OUTPATIENT
Start: 2019-07-18 | End: 2019-07-18

## 2019-07-18 RX ORDER — OMEPRAZOLE 20 MG/1
20 CAPSULE, DELAYED RELEASE ORAL
Qty: 180 CAPSULE | Refills: 1 | Status: SHIPPED | OUTPATIENT
Start: 2019-07-18 | End: 2020-06-28

## 2019-07-18 RX ORDER — DEXAMETHASONE SODIUM PHOSPHATE 4 MG/ML
4 INJECTION, SOLUTION INTRA-ARTICULAR; INTRALESIONAL; INTRAMUSCULAR; INTRAVENOUS; SOFT TISSUE ONCE
Qty: 1 ML | Refills: 0
Start: 2019-07-18 | End: 2019-07-18 | Stop reason: CLARIF

## 2019-07-18 RX ORDER — GABAPENTIN 100 MG/1
100 CAPSULE ORAL 2 TIMES DAILY
COMMUNITY
Start: 2019-07-14 | End: 2021-05-03

## 2019-07-18 RX ADMIN — DEXAMETHASONE SODIUM PHOSPHATE 8 MG: 10 INJECTION INTRAMUSCULAR; INTRAVENOUS at 16:31

## 2019-07-18 ASSESSMENT — ENCOUNTER SYMPTOMS
SHORTNESS OF BREATH: 1
COUGH: 1
SORE THROAT: 0

## 2019-07-18 NOTE — PROGRESS NOTES
None     Non-medical: None   Tobacco Use    Smoking status: Never Smoker    Smokeless tobacco: Never Used   Substance and Sexual Activity    Alcohol use: No    Drug use: No    Sexual activity: None   Lifestyle    Physical activity:     Days per week: None     Minutes per session: None    Stress: None   Relationships    Social connections:     Talks on phone: None     Gets together: None     Attends Mandaeism service: None     Active member of club or organization: None     Attends meetings of clubs or organizations: None     Relationship status: None    Intimate partner violence:     Fear of current or ex partner: None     Emotionally abused: None     Physically abused: None     Forced sexual activity: None   Other Topics Concern    None   Social History Narrative    None       Family History   Problem Relation Age of Onset    Cancer Father     Cancer Maternal Grandmother     COPD Maternal Grandmother     Cancer Maternal Grandfather        Current Outpatient Medications   Medication Sig Dispense Refill    gabapentin (NEURONTIN) 100 MG capsule Take 100 mg by mouth daily.       omeprazole (PRILOSEC) 20 MG delayed release capsule Take 1 capsule by mouth 2 times daily (before meals) 180 capsule 1    verapamil (CALAN SR) 240 MG extended release tablet TAKE 1 TABLET BY MOUTH EVERY NIGHT 30 tablet 0    metFORMIN (GLUCOPHAGE) 500 MG tablet TAKE 1 TABLET BY MOUTH TWICE DAILY WITH MEALS 180 tablet 0    triamterene-hydrochlorothiazide (DYAZIDE) 37.5-25 MG per capsule TAKE 1 CAPSULE BY MOUTH EVERY DAY IN THE MORNING 90 capsule 0    ONE TOUCH ULTRA TEST strip TEST BLOOD SUGAR ONCE DAILY 100 strip 0    butorphanol (STADOL) 10 MG/ML nasal spray USE ONE SPRAY IN ONE NOTSRIL AS DIRECTED EVERY 4 HOURS AS NEEDED 2.5 mL 0    ZOLMitriptan (ZOMIG) 5 MG tablet TAKE ONE TABLET BY MOUTH AS NEEDED FOR MIGRAINE 6 tablet 0    ondansetron (ZOFRAN-ODT) 4 MG disintegrating tablet DISSOLVE 1 TABLET ON THE TONGUE EVERY 8 Coordination normal.   Skin: Skin is warm and dry. She is not diaphoretic. No cyanosis. Nursing note and vitals reviewed. /70   Pulse 85   Temp 98 °F (36.7 °C)   Ht 5' 1\" (1.549 m)   Wt 255 lb (115.7 kg)   SpO2 98%   BMI 48.18 kg/m²     Assessment:      Diagnosis Orders   1. Dyspnea, unspecified type  XR CHEST STANDARD (2 VW)   2. Upper respiratory tract infection, unspecified type  DISCONTINUED: dexamethasone (DECADRON) 4 MG/ML injection   3. Pleurisy  XR CHEST STANDARD (2 VW)   4. Gastroesophageal reflux disease, esophagitis presence not specified  omeprazole (PRILOSEC) 20 MG delayed release capsule     No results found for this visit on 07/18/19. Plan:     1. Dyspnea, unspecified type    2. Upper respiratory tract infection, unspecified type    3. Pleurisy    4. Gastroesophageal reflux disease, esophagitis presence not specified      Discussion: Than 30 minutes of time was spent with patient discussing her symptoms. Due to elevations of blood sugar with steroids did not wish to proceed with additional steroids at time of appointment. With patient starting Dulera inhaler 2 inhalations twice daily to help with symptoms. Was in agreement with this plan and to report to the first floor for chest x-ray. Was also started on omeprazole 20 mg twice daily for Tums could possibly be related to esophageal spasm caused by out-of-control acid reflux. Then called office on Friday being steroid Dosepak. Staff  Discussed with patient that she had voiced issue with steroids and blood sugar. Pt states that she doesn't feel she will get better without steroids. Agreed to give pt medrol dose pack and she is to take less medication than directed by package instruction. Return in about 1 month (around 8/18/2019) for 30 min appointment, medication recheck.   Orders Placed This Encounter   Procedures    XR CHEST STANDARD (2 VW)     Standing Status:   Future     Number of Occurrences:   1     Standing Expiration Date:   7/17/2020     Order Specific Question:   Reason for exam:     Answer:   CHEST PAIN     Orders Placed This Encounter   Medications    DISCONTD: dexamethasone (DECADRON) 4 MG/ML injection     Sig: Inject 1 mL into the muscle once for 1 dose     Dispense:  1 mL     Refill:  0    omeprazole (PRILOSEC) 20 MG delayed release capsule     Sig: Take 1 capsule by mouth 2 times daily (before meals)     Dispense:  180 capsule     Refill:  1    dexamethasone (DECADRON) injection 8 mg       Patient Instructions   Take omeprazole 20mg twice daily    Have cxr on the 1st floor. Use inhaler twice daily, rinse mouth after use. Patient/family given educational materials - see patient instructions. Discussed use, benefit, and side effects of prescribed medications. All patient/family questions answered and voiced understanding. Instructed to continue current medications, diet and exercise. Pt/family agreed with treatment plan. Follow up as directed and sooner if needed. Patient/ family instructed that is symptoms worsen or persist they are to contact office or report to nearest ER. They voice understanding and agreement with this plan.      Electronically signed by NANETTE Handy on 7/19/2019 at 3:58 PM

## 2019-07-19 ENCOUNTER — TELEPHONE (OUTPATIENT)
Dept: PRIMARY CARE CLINIC | Age: 49
End: 2019-07-19

## 2019-07-19 PROBLEM — R09.1 PLEURISY: Status: ACTIVE | Noted: 2019-07-19

## 2019-07-19 PROBLEM — K21.9 GASTROESOPHAGEAL REFLUX DISEASE: Status: ACTIVE | Noted: 2019-07-19

## 2019-07-19 PROBLEM — R06.00 DYSPNEA: Status: ACTIVE | Noted: 2019-07-19

## 2019-07-19 PROBLEM — J06.9 UPPER RESPIRATORY TRACT INFECTION: Status: ACTIVE | Noted: 2019-07-19

## 2019-07-19 RX ORDER — METHYLPREDNISOLONE 4 MG/1
TABLET ORAL
Qty: 1 KIT | Refills: 0 | Status: SHIPPED | OUTPATIENT
Start: 2019-07-19 | End: 2019-07-25

## 2019-07-19 ASSESSMENT — ENCOUNTER SYMPTOMS
VOMITING: 0
ABDOMINAL PAIN: 0
CONSTIPATION: 0
RHINORRHEA: 0
EYE REDNESS: 0
WHEEZING: 0
CHEST TIGHTNESS: 0
BLOOD IN STOOL: 0
DIARRHEA: 0
VOICE CHANGE: 0
NAUSEA: 0
TROUBLE SWALLOWING: 0

## 2019-07-19 NOTE — TELEPHONE ENCOUNTER
Patient called stating that while she was in the office yesterday (7/18/19) that Damaris Wren told her she would send in a medrol-dose pack but it was not at the pharmacy. I reviewed the office not and it is not mentioned in the note about this. Pt states that she needs this because it is the only thing that is really helpful for her.

## 2019-07-19 NOTE — TELEPHONE ENCOUNTER
Contacted pt and clarified and sent in medrol dose pack per provider with agreement that pt will only take half tablet.

## 2019-07-26 DIAGNOSIS — E66.01 MORBID OBESITY WITH BMI OF 45.0-49.9, ADULT (HCC): ICD-10-CM

## 2019-07-26 RX ORDER — PHENTERMINE HYDROCHLORIDE 37.5 MG/1
37.5 TABLET ORAL
Qty: 30 TABLET | Refills: 0 | Status: SHIPPED | OUTPATIENT
Start: 2019-07-26 | End: 2019-08-29 | Stop reason: SDUPTHER

## 2019-07-26 RX ORDER — RIZATRIPTAN BENZOATE 10 MG/1
10 TABLET ORAL
Qty: 30 TABLET | Refills: 3 | Status: ON HOLD | OUTPATIENT
Start: 2019-07-26 | End: 2020-06-12

## 2019-07-31 DIAGNOSIS — G43.909 MIGRAINE SYNDROME: ICD-10-CM

## 2019-07-31 RX ORDER — BUTORPHANOL TARTRATE 10 MG/ML
1 SPRAY, METERED NASAL EVERY 6 HOURS PRN
Qty: 2.5 ML | Refills: 0 | OUTPATIENT
Start: 2019-07-31 | End: 2019-08-29 | Stop reason: SDUPTHER

## 2019-07-31 NOTE — TELEPHONE ENCOUNTER
Requested Prescriptions     Signed Prescriptions Disp Refills    butorphanol (STADOL) 10 MG/ML nasal spray 2.5 mL 0     Si spray by Nasal route every 6 hours as needed for Pain for up to 30 days.      Authorizing Provider: Silvio Russ     Ordering User: Ernesto Doan

## 2019-08-06 ENCOUNTER — APPOINTMENT (OUTPATIENT)
Dept: BARIATRICS/WEIGHT MGMT | Facility: HOSPITAL | Age: 49
End: 2019-08-06

## 2019-08-06 ENCOUNTER — OFFICE VISIT (OUTPATIENT)
Dept: BARIATRICS/WEIGHT MGMT | Facility: CLINIC | Age: 49
End: 2019-08-06

## 2019-08-06 VITALS
OXYGEN SATURATION: 97 % | SYSTOLIC BLOOD PRESSURE: 117 MMHG | WEIGHT: 254.8 LBS | HEIGHT: 60 IN | DIASTOLIC BLOOD PRESSURE: 76 MMHG | HEART RATE: 97 BPM | TEMPERATURE: 97.7 F | BODY MASS INDEX: 50.03 KG/M2

## 2019-08-06 DIAGNOSIS — I10 ESSENTIAL HYPERTENSION: ICD-10-CM

## 2019-08-06 DIAGNOSIS — E11.8 TYPE 2 DIABETES MELLITUS WITH COMPLICATION, UNSPECIFIED WHETHER LONG TERM INSULIN USE: ICD-10-CM

## 2019-08-06 DIAGNOSIS — E66.01 CLASS 3 SEVERE OBESITY DUE TO EXCESS CALORIES WITH SERIOUS COMORBIDITY AND BODY MASS INDEX (BMI) OF 45.0 TO 49.9 IN ADULT (HCC): Primary | ICD-10-CM

## 2019-08-06 PROBLEM — E66.813 CLASS 3 SEVERE OBESITY DUE TO EXCESS CALORIES WITH SERIOUS COMORBIDITY AND BODY MASS INDEX (BMI) OF 45.0 TO 49.9 IN ADULT: Status: ACTIVE | Noted: 2019-08-06

## 2019-08-06 PROBLEM — E11.9 DIABETES MELLITUS (HCC): Status: ACTIVE | Noted: 2019-08-06

## 2019-08-06 PROCEDURE — 99204 OFFICE O/P NEW MOD 45 MIN: CPT | Performed by: SURGERY

## 2019-08-06 RX ORDER — BUTORPHANOL TARTRATE 10 MG/ML
1 SPRAY, METERED NASAL AS NEEDED
COMMUNITY
Start: 2019-07-31 | End: 2019-08-30

## 2019-08-06 RX ORDER — OMEPRAZOLE 20 MG/1
20 CAPSULE, DELAYED RELEASE ORAL 2 TIMES DAILY
Status: ON HOLD | COMMUNITY
End: 2020-09-26

## 2019-08-06 RX ORDER — CYCLOBENZAPRINE HCL 10 MG
10 TABLET ORAL 3 TIMES DAILY PRN
COMMUNITY
End: 2021-11-03

## 2019-08-06 RX ORDER — GABAPENTIN 300 MG/1
600 CAPSULE ORAL 3 TIMES DAILY
Status: ON HOLD | COMMUNITY
Start: 2019-05-19 | End: 2022-11-22

## 2019-08-06 RX ORDER — PHENTERMINE HYDROCHLORIDE 37.5 MG/1
37.5 CAPSULE ORAL EVERY MORNING
Status: ON HOLD | COMMUNITY
End: 2020-09-26

## 2019-08-06 RX ORDER — GABAPENTIN 100 MG/1
100 CAPSULE ORAL 2 TIMES DAILY
Refills: 5 | Status: ON HOLD | COMMUNITY
Start: 2019-07-14 | End: 2020-09-26

## 2019-08-06 NOTE — PROGRESS NOTES
Patient Care Team:  Mode Rubio MD as PCP - General (Family Medicine)    Reason for Visit:  Surgical Weight loss    Subjective     Patient is a 48 y.o. female presents with morbid obesity and her Body mass index is 49.76 kg/m².     She is here for discussion of surgical weight loss options.  She stated she has been with the disease of obesity for year(s).  She stated she suffers from hypertension, diabetes and morbid obesity due to her weight gain.  She stated that weight loss helps alleviate these symptoms.   She stated that she has tried no structured dietary regimens and has been on phentermine to help with weight loss.  She stated that she has attempted these conservative methods for weight loss without maintaining long term success.  Today she would like to discuss surgical weight loss options such as the Laparoscopic Sleeve Gastrectomy or the Laparoscopic R - Y Gastric Bypass.     Review of Systems  Negative except the below listed  General ROS: positive for  - fatigue  Endocrine ROS: positive for - malaise/lethargy  Respiratory ROS: positive for - cough  Musculoskeletal ROS: positive for - joint pain  Neurological ROS: positive for - numbness/tingling and weakness    History  Past Medical History:   Diagnosis Date   • Arthritis     Osteoarthritis primarily left knee    • Back pain    • Diabetes mellitus (CMS/HCC)    • Hx of tear of meniscus of knee joint 11/21/2016   • Hypertension    • Joint pain    • Knee pain    • Migraine    • Migraine headache    • Pain     knee, left ankle, left ankle     • Pes anserinus bursitis 04/28/2015   • Restless leg      Past Surgical History:   Procedure Laterality Date   • CATARACT EXTRACTION     • CATARACT EXTRACTION     • CATARACT EXTRACTION     • CORNEAL TRANSPLANT      bilateral    • ECTOPIC PREGNANCY     • JOINT REPLACEMENT     • KNEE ARTHROSCOPY W/ MENISCAL REPAIR     • KNEE MENISCAL REPAIR     • MENISCECTOMY Left 12/20/2016   • TUBAL ABDOMINAL LIGATION        Family History   Problem Relation Age of Onset   • No Known Problems Mother    • No Known Problems Father      Social History     Tobacco Use   • Smoking status: Never Smoker   • Smokeless tobacco: Never Used   Substance Use Topics   • Alcohol use: No     Frequency: Never   • Drug use: No       (Not in a hospital admission)  Allergies:  Compazine [prochlorperazine edisylate]; Meperidine; Norflex [orphenadrine]; Nortriptyline; Prochlorperazine maleate; Codeine; Penicillins; Augmentin [amoxicillin-pot clavulanate]; Avelox [moxifloxacin hcl]; Cephalexin; Ciprofloxacin; Clindamycin/lincomycin; Doxycycline; Hydroxyzine hcl; Ibuprofen; Orphenadrine citrate; Tobramycin; Toradol [ketorolac tromethamine]; Tramadol; and Vistaril [hydroxyzine]      Current Outpatient Medications:   •  butorphanol (STADOL) 10 MG/ML nasal spray, 1 spray into the nostril(s) as directed by provider As Needed., Disp: , Rfl:   •  Casanthranol-Docusate Sodium (STOOL SOFTENER PLUS PO), Take  by mouth As Needed., Disp: , Rfl:   •  cyclobenzaprine (FLEXERIL) 10 MG tablet, Take 10 mg by mouth 3 (Three) Times a Day As Needed for Muscle Spasms., Disp: , Rfl:   •  gabapentin (NEURONTIN) 100 MG capsule, 100 mg As Needed., Disp: , Rfl: 5  •  gabapentin (NEURONTIN) 300 MG capsule, 300 mg Every Night., Disp: , Rfl:   •  HYDROcodone-acetaminophen (NORCO) 7.5-325 MG per tablet, Take 1 tablet by mouth As Needed for moderate pain (4-6)., Disp: , Rfl:   •  Insulin Glargine (BASAGLAR KWIKPEN SC), Inject 4.5 Units under the skin into the appropriate area as directed Every Night., Disp: , Rfl:   •  Liraglutide (VICTOZA) 18 MG/3ML solution pen-injector injection, Daily., Disp: , Rfl:   •  MethylPREDNISolone (MEDROL, HENRIQUE,) 4 MG tablet, Take as directed on package instructions., Disp: 21 tablet, Rfl: 0  •  NON FORMULARY, As Needed. [place] Pain Cream, Disp: , Rfl: 5  •  omeprazole (priLOSEC) 20 MG capsule, Take 20 mg by mouth 2 (Two) Times a Day., Disp: , Rfl:   •   phentermine 37.5 MG capsule, Take 37.5 mg by mouth Every Morning., Disp: , Rfl:   •  pramipexole (MIRAPEX) 0.5 MG tablet, Take 0.5 mg by mouth Every Night., Disp: , Rfl:   •  triamterene-hydrochlorothiazide (DYAZIDE) 37.5-25 MG per capsule, Take 1 capsule by mouth Daily., Disp: , Rfl:   •  verapamil SR (CALAN-SR) 240 MG CR tablet, Take 240 mg by mouth Daily., Disp: , Rfl:   •  albuterol (PROVENTIL HFA;VENTOLIN HFA) 108 (90 Base) MCG/ACT inhaler, Inhale 2 puffs Every 4 (Four) Hours As Needed for Wheezing or Shortness of Air., Disp: 1 inhaler, Rfl: 0    Objective     Vital Signs  Temp:  [97.7 °F (36.5 °C)] 97.7 °F (36.5 °C)  Heart Rate:  [97] 97  BP: (117)/(76) 117/76  Body mass index is 49.76 kg/m².      08/06/19  1435   Weight: 116 kg (254 lb 12.8 oz)       Physical Exam:      HEENT: extra ocular movement intact  Respiratory: appears well, vitals normal, no respiratory distress, acyanotic, normal RR, chest clear, no wheezing, crepitations, rhonchi, normal symmetric air entry  Cardiovascular: Regular rate and rhythm, S1, S2 normal, no murmur, click, rub or gallop  GI: Soft, non-tender, normal bowel sounds; no bruits, organomegaly or masses.  Abnormal shape: obese  Musculoskeletal: inspection - no abnormality  Neurologic: alert, oriented, normal speech, no focal findings or movement disorder noted       Results Review:   None        Assessment/Plan   Encounter Diagnoses   Name Primary?   • Class 3 severe obesity due to excess calories with serious comorbidity and body mass index (BMI) of 45.0 to 49.9 in adult (CMS/[facility]) Yes   • Essential hypertension    • Type 2 diabetes mellitus with complication, unspecified whether long term insulin use (CMS/[facility])        I believe this patient will be a good candidate for weight loss surgery.    She has chosen laparoscopic sleeve gastrectomy. I agree with this decision.  I have discussed the [place] - Y Gastric Bypass, laparoscopic sleeve gastrectomy and the Laparoscopic Gastric Band  "procedures to provide the alternatives which also includes non surgical weight loss options as well.  We discussed the benefits of the surgeries including the benefit of weight loss and the possible reversal of co-morbid conditions associated with morbid obesity. I explained to her that prior to making a definitive decision on the type of surgery she will require a study of the upper GI system.  I explained to her why the EGD is recommended.  She has been provided a structured dietary regimen based off of her behavior.  I discussed with the patient the etiology of the disease of obesity and the potential comorbid conditions associated with this disease.  She was instructed to follow the dietary regimen and follow-up with our program in 1 month's time with any additional questions as they may arise during this time.  We emphasized on focusing on proteins and meals high in fiber as well as adequate hydration that exceed 64 ounces of water daily.    I explained that I anticipate the patient to lose 6 pounds prior to her next monthly visit.  I have also explained that they need to record or document when they are going to have the \"cheat day\".  The patient reports her hypertension and diabetes have remained stable on her current echo regimen for treatment.  Anticipate reversal of these disease processes as we correct her morbid obesity.  I discussed the patient's findings and my recommendations with patient.     I have also recommended that she obtain preoperative laboratory work-up completion of a medically supervised weight loss program and psychological evaluation prior to surgery.    Dr. Tay Mcdonald MD [facility]    08/06/19  3:06 PM  Patient Care Team:  Mode Rubio MD as PCP - General (Family Medicine)    "

## 2019-08-15 ENCOUNTER — APPOINTMENT (OUTPATIENT)
Dept: GENERAL RADIOLOGY | Age: 49
End: 2019-08-15
Payer: MEDICAID

## 2019-08-15 ENCOUNTER — HOSPITAL ENCOUNTER (EMERGENCY)
Age: 49
Discharge: HOME | End: 2019-08-16
Attending: EMERGENCY MEDICINE
Payer: MEDICAID

## 2019-08-15 DIAGNOSIS — R60.9 PERIPHERAL EDEMA: Primary | ICD-10-CM

## 2019-08-15 DIAGNOSIS — I10 RESISTANT HYPERTENSION: ICD-10-CM

## 2019-08-15 LAB
ALBUMIN SERPL-MCNC: 4.3 G/DL (ref 3.5–5.2)
ALP BLD-CCNC: 60 U/L (ref 35–104)
ALT SERPL-CCNC: 16 U/L (ref 5–33)
ANION GAP SERPL CALCULATED.3IONS-SCNC: 14 MMOL/L (ref 7–19)
AST SERPL-CCNC: 16 U/L (ref 5–32)
BASOPHILS ABSOLUTE: 0 K/UL (ref 0–0.2)
BASOPHILS RELATIVE PERCENT: 0.4 % (ref 0–1)
BILIRUB SERPL-MCNC: <0.2 MG/DL (ref 0.2–1.2)
BUN BLDV-MCNC: 20 MG/DL (ref 6–20)
CALCIUM SERPL-MCNC: 9.6 MG/DL (ref 8.6–10)
CHLORIDE BLD-SCNC: 94 MMOL/L (ref 98–111)
CO2: 29 MMOL/L (ref 22–29)
CREAT SERPL-MCNC: 1.1 MG/DL (ref 0.5–0.9)
EOSINOPHILS ABSOLUTE: 0 K/UL (ref 0–0.6)
EOSINOPHILS RELATIVE PERCENT: 0.3 % (ref 0–5)
GFR NON-AFRICAN AMERICAN: 53
GLUCOSE BLD-MCNC: 407 MG/DL (ref 74–109)
HCT VFR BLD CALC: 41.2 % (ref 37–47)
HEMOGLOBIN: 13.9 G/DL (ref 12–16)
IMMATURE GRANULOCYTES #: 0 K/UL
LYMPHOCYTES ABSOLUTE: 1.9 K/UL (ref 1.1–4.5)
LYMPHOCYTES RELATIVE PERCENT: 25.7 % (ref 20–40)
MCH RBC QN AUTO: 29.7 PG (ref 27–31)
MCHC RBC AUTO-ENTMCNC: 33.7 G/DL (ref 33–37)
MCV RBC AUTO: 88 FL (ref 81–99)
MONOCYTES ABSOLUTE: 0.3 K/UL (ref 0–0.9)
MONOCYTES RELATIVE PERCENT: 4.5 % (ref 0–10)
NEUTROPHILS ABSOLUTE: 5 K/UL (ref 1.5–7.5)
NEUTROPHILS RELATIVE PERCENT: 68.8 % (ref 50–65)
PDW BLD-RTO: 12.6 % (ref 11.5–14.5)
PLATELET # BLD: 192 K/UL (ref 130–400)
PMV BLD AUTO: 10.7 FL (ref 9.4–12.3)
POTASSIUM SERPL-SCNC: 3.6 MMOL/L (ref 3.5–5)
PRO-BNP: 69 PG/ML (ref 0–450)
RBC # BLD: 4.68 M/UL (ref 4.2–5.4)
SODIUM BLD-SCNC: 137 MMOL/L (ref 136–145)
TOTAL PROTEIN: 7.5 G/DL (ref 6.6–8.7)
WBC # BLD: 7.3 K/UL (ref 4.8–10.8)

## 2019-08-15 PROCEDURE — 85025 COMPLETE CBC W/AUTO DIFF WBC: CPT

## 2019-08-15 PROCEDURE — 83880 ASSAY OF NATRIURETIC PEPTIDE: CPT

## 2019-08-15 PROCEDURE — 93005 ELECTROCARDIOGRAM TRACING: CPT

## 2019-08-15 PROCEDURE — 80053 COMPREHEN METABOLIC PANEL: CPT

## 2019-08-15 PROCEDURE — 36415 COLL VENOUS BLD VENIPUNCTURE: CPT

## 2019-08-15 PROCEDURE — 71045 X-RAY EXAM CHEST 1 VIEW: CPT

## 2019-08-15 PROCEDURE — 99285 EMERGENCY DEPT VISIT HI MDM: CPT

## 2019-08-16 ENCOUNTER — TELEPHONE (OUTPATIENT)
Dept: PRIMARY CARE CLINIC | Age: 49
End: 2019-08-16

## 2019-08-16 VITALS
BODY MASS INDEX: 47.2 KG/M2 | WEIGHT: 250 LBS | HEIGHT: 61 IN | HEART RATE: 75 BPM | TEMPERATURE: 97.9 F | OXYGEN SATURATION: 98 % | DIASTOLIC BLOOD PRESSURE: 82 MMHG | SYSTOLIC BLOOD PRESSURE: 118 MMHG | RESPIRATION RATE: 21 BRPM

## 2019-08-16 DIAGNOSIS — R53.83 FATIGUE, UNSPECIFIED TYPE: ICD-10-CM

## 2019-08-16 DIAGNOSIS — G47.33 OSA (OBSTRUCTIVE SLEEP APNEA): Primary | ICD-10-CM

## 2019-08-16 DIAGNOSIS — R60.9 EDEMA, UNSPECIFIED TYPE: ICD-10-CM

## 2019-08-16 LAB
EKG P AXIS: 44 DEGREES
EKG P-R INTERVAL: 196 MS
EKG Q-T INTERVAL: 400 MS
EKG QRS DURATION: 92 MS
EKG QTC CALCULATION (BAZETT): 424 MS
EKG T AXIS: 23 DEGREES

## 2019-08-16 PROCEDURE — 6370000000 HC RX 637 (ALT 250 FOR IP): Performed by: EMERGENCY MEDICINE

## 2019-08-16 RX ORDER — FUROSEMIDE 20 MG/1
40 TABLET ORAL DAILY
Qty: 30 TABLET | Refills: 2 | Status: SHIPPED | OUTPATIENT
Start: 2019-08-16 | End: 2019-09-10

## 2019-08-16 RX ORDER — HYDROCODONE BITARTRATE AND ACETAMINOPHEN 7.5; 325 MG/1; MG/1
1 TABLET ORAL ONCE
Status: COMPLETED | OUTPATIENT
Start: 2019-08-16 | End: 2019-08-16

## 2019-08-16 RX ADMIN — HYDROCODONE BITARTRATE AND ACETAMINOPHEN 1 TABLET: 7.5; 325 TABLET ORAL at 00:30

## 2019-08-16 ASSESSMENT — ENCOUNTER SYMPTOMS
COUGH: 0
VOMITING: 0
CHEST TIGHTNESS: 0
RHINORRHEA: 0
CONSTIPATION: 0
COLOR CHANGE: 0
EYE PAIN: 0
WHEEZING: 0
TROUBLE SWALLOWING: 0
BACK PAIN: 0
SHORTNESS OF BREATH: 1
ABDOMINAL PAIN: 0
PHOTOPHOBIA: 0
ABDOMINAL DISTENTION: 0
NAUSEA: 0
DIARRHEA: 0
SORE THROAT: 0

## 2019-08-16 ASSESSMENT — PAIN SCALES - GENERAL: PAINLEVEL_OUTOF10: 8

## 2019-08-16 NOTE — ED PROVIDER NOTES
[facility] EMERGENCY DEPT  eMERGENCY dEPARTMENT eNCOUnter      Pt Name: Arlin William  MRN: [id number]  [place] 1970  Date of evaluation: 8/15/2019  Provider: Cb Mitchell MD    [address] [place] [address]       Chief Complaint   Patient presents with    Leg Swelling     bilateral feet    Shortness of Breath         HISTORY OF PRESENT ILLNESS   (Location/Symptom, Timing/Onset,Context/Setting, Quality, Duration, Modifying Factors, Severity)  Note limiting factors. Arlin William is a 57 y.o. female who presents to the emergency department for bilateral LE edema and some \"mild\" SOB. She has taken Lasix for the LE edema. The swelling began about 5 days ago. She started taking Lasix 20 mg about 3 days after the initial edema. Pt has had a total of 60 mg of Lasix today. Pt reports that she began having SOB while she was at work. No prior DVT or PE. Is not having any CP. HPI    NursingNotes were reviewed. REVIEW OF SYSTEMS    (2-9 systems for level 4, 10 or more for level 5)     Review of Systems   Constitutional: Negative for activity change, appetite change, chills, fatigue and fever. HENT: Negative for congestion, ear pain, rhinorrhea, sore throat and trouble swallowing. Eyes: Negative for photophobia, pain and visual disturbance. Respiratory: Positive for shortness of breath. Negative for cough, chest tightness and wheezing. Cardiovascular: Positive for leg swelling (bilateral). Negative for chest pain and palpitations. Gastrointestinal: Negative for abdominal distention, abdominal pain, constipation, diarrhea, nausea and vomiting. Genitourinary: Negative for difficulty urinating, dysuria, flank pain, urgency, vaginal bleeding and vaginal discharge. Musculoskeletal: Negative for back pain, myalgias and neck stiffness. Skin: Negative for color change, pallor and rash. Neurological: Negative for dizziness, weakness, light-headedness, numbness and headaches.    Psychiatric/Behavioral: Negative for agitation, behavioral problems, confusion, hallucinations and suicidal ideas. PAST MEDICALHISTORY     Past Medical History:   Diagnosis Date    Anxiety     Constipation     Depression     DM (diabetes mellitus) ([facility]     Headache(784.0)     Hyperlipidemia     Hypertension     Kidney stones     Kidney stones     Restless leg     Restless legs syndrome          SURGICAL HISTORY       Past Surgical History:   Procedure Laterality Date    ECTOPIC PREGNANCY SURGERY      EYE SURGERY      cornea transplant and cataracts removed    KNEE CARTILAGE SURGERY Left 12/20/2016    KNEE ARTHROSCOPIC PARTIAL MEDIAL MENISCECTOMY performed by Kishan Navarro MD at [address]     Current Discharge Medication List      CONTINUE these medications which have NOT CHANGED    Details   topiramate (TOPAMAX) 50 MG tablet TAKE 1 TABLET BY MOUTH TWICE DAILY  Qty: 60 tablet, Refills: 5      verapamil (CALAN SR) 240 MG extended release tablet TAKE 1 TABLET BY MOUTH EVERY NIGHT  Qty: 30 tablet, Refills: 5      butorphanol (STADOL) 10 MG/ML nasal spray 1 spray by Nasal route every 6 hours as needed for Pain for up to 30 days. Qty: 2.5 mL, Refills: 0    Comments: Reduce doses taken as pain becomes manageable  Associated Diagnoses: Migraine syndrome      rizatriptan (MAXALT) 10 MG tablet Take 1 tablet by mouth once as needed for Migraine May repeat in 2 hours if needed  Qty: 30 tablet, Refills: 3      phentermine (ADIPEX-P) 37.5 MG tablet Take 1 tablet by mouth every morning (before breakfast) for 30 days. Qty: 30 tablet, Refills: 0    Associated Diagnoses: Morbid obesity with BMI of 45.0-49.9, adult (HCC)      VICTOZA 18 MG/3ML SOPN SC injection ADMINISTER 1.8 MG INTO THE SKIN EVERY DAY  Qty: 9 mL, Refills: 3    Associated Diagnoses: Uncontrolled type 2 diabetes mellitus with hyperglycemia, without long-term current use of insulin ([facility]      ! ! gabapentin exhibits edema (2+ bilateral) and tenderness. Neurological: She is alert and oriented to person, place, and time. No cranial nerve deficit. Skin: Skin is warm and dry. Capillary refill takes less than 2 seconds. Psychiatric: She has a normal mood and affect. Her behavior is normal. Thought content normal.   Nursing note and vitals reviewed. DIAGNOSTIC RESULTS     EKG: All EKG's areinterpreted by the Emergency Department Physician who either signs or Co-signs this chart in the absence of a cardiologist.    Sinus rhythm with a rate of 74, left axis deviation, no acute ST elevations or depressions noted. RADIOLOGY:  Non-plain film images such as CT, Ultrasound and MRI are read by the radiologist. Plain radiographic images are visualized and preliminarily interpreted bythe emergency physician with the below findings:    Normal cardiac silhouette, clear lung fields bilaterally, no pneumonia or pneumothorax. XR CHEST PORTABLE    (Results Pending)           LABS:  Labs Reviewed   CBC WITH AUTO DIFFERENTIAL - Abnormal; Notable for the following components:       Result Value    Neutrophils % 68.8 (*)     All other components within normal limits   COMPREHENSIVE METABOLIC PANEL - Abnormal; Notable for the following components:    Chloride 94 (*)     Glucose 407 (*)     CREATININE 1.1 (*)     GFR Non- 53 (*)     All other components within normal limits   BRAIN NATRIURETIC PEPTIDE       All other labs were within normal range or not returned as of this dictation.     EMERGENCY DEPARTMENT COURSE and DIFFERENTIAL DIAGNOSIS/MDM:   Vitals:    Vitals:    08/15/19 2230 08/15/19 2300 08/15/19 2330 08/16/19 0031   BP: 117/76 119/82 124/85 118/82   Pulse: 73 85 77 75   Resp: 17 17 20 21   Temp:   97.9 °F (36.6 °C) 97.9 °F (36.6 °C)   TempSrc:       SpO2: 94% 96% 97% 98%   Weight:       Height:           MDM  Number of Diagnoses or Management Options  Peripheral edema: new and requires

## 2019-08-19 ENCOUNTER — OFFICE VISIT (OUTPATIENT)
Dept: URGENT CARE | Age: 49
End: 2019-08-19
Payer: MEDICAID

## 2019-08-19 ENCOUNTER — HOSPITAL ENCOUNTER (OUTPATIENT)
Age: 49
Setting detail: OBSERVATION
Discharge: HOME | End: 2019-08-21
Attending: EMERGENCY MEDICINE | Admitting: FAMILY MEDICINE
Payer: MEDICAID

## 2019-08-19 ENCOUNTER — APPOINTMENT (OUTPATIENT)
Dept: GENERAL RADIOLOGY | Age: 49
End: 2019-08-19
Payer: MEDICAID

## 2019-08-19 VITALS
SYSTOLIC BLOOD PRESSURE: 126 MMHG | TEMPERATURE: 98.2 F | DIASTOLIC BLOOD PRESSURE: 88 MMHG | OXYGEN SATURATION: 98 % | HEIGHT: 61 IN | WEIGHT: 251 LBS | BODY MASS INDEX: 47.39 KG/M2 | HEART RATE: 91 BPM | RESPIRATION RATE: 16 BRPM

## 2019-08-19 DIAGNOSIS — Z79.4 TYPE 2 DIABETES MELLITUS WITH HYPOGLYCEMIA WITHOUT COMA, WITH LONG-TERM CURRENT USE OF INSULIN (HCC): ICD-10-CM

## 2019-08-19 DIAGNOSIS — E11.649 TYPE 2 DIABETES MELLITUS WITH HYPOGLYCEMIA WITHOUT COMA, WITH LONG-TERM CURRENT USE OF INSULIN (HCC): ICD-10-CM

## 2019-08-19 DIAGNOSIS — E87.6 HYPOKALEMIA: ICD-10-CM

## 2019-08-19 DIAGNOSIS — R60.9 PERIPHERAL EDEMA: ICD-10-CM

## 2019-08-19 DIAGNOSIS — E87.6 HYPOKALEMIA: Primary | ICD-10-CM

## 2019-08-19 DIAGNOSIS — M79.89 LOCALIZED SWELLING OF LOWER EXTREMITY: ICD-10-CM

## 2019-08-19 DIAGNOSIS — E16.2 HYPOGLYCEMIA: ICD-10-CM

## 2019-08-19 DIAGNOSIS — R51.9 ACUTE NONINTRACTABLE HEADACHE, UNSPECIFIED HEADACHE TYPE: ICD-10-CM

## 2019-08-19 DIAGNOSIS — R73.9 HYPERGLYCEMIA: Primary | ICD-10-CM

## 2019-08-19 LAB
ANION GAP SERPL CALCULATED.3IONS-SCNC: 13 MMOL/L (ref 7–19)
ANION GAP SERPL CALCULATED.3IONS-SCNC: 16 MMOL/L (ref 7–19)
BUN BLDV-MCNC: 16 MG/DL (ref 6–20)
BUN BLDV-MCNC: 17 MG/DL
BUN BLDV-MCNC: 17 MG/DL (ref 6–20)
CALCIUM SERPL-MCNC: 1.18 MG/DL
CALCIUM SERPL-MCNC: 10.1 MG/DL (ref 8.6–10)
CALCIUM SERPL-MCNC: 9.4 MG/DL (ref 8.6–10)
CHLORIDE BLD-SCNC: 89 MMOL/L
CHLORIDE BLD-SCNC: 94 MMOL/L (ref 98–111)
CHLORIDE BLD-SCNC: 95 MMOL/L (ref 98–111)
CO2: 30 MMOL/L (ref 22–29)
CO2: 32 MMOL/L (ref 22–29)
CREAT SERPL-MCNC: 1 MG/DL (ref 0.5–0.9)
CREAT SERPL-MCNC: 1 MG/DL (ref 0.5–0.9)
CREATININE: 1 MG/DL
GFR NON-AFRICAN AMERICAN: 59
GFR NON-AFRICAN AMERICAN: 59
GLUCOSE BLD-MCNC: 161 MG/DL (ref 74–109)
GLUCOSE BLD-MCNC: 43 MG/DL
GLUCOSE BLD-MCNC: 43 MG/DL (ref 70–99)
GLUCOSE BLD-MCNC: 50 MG/DL (ref 74–109)
GLUCOSE BLD-MCNC: 591 MG/DL
HCT VFR BLD CALC: 42.5 % (ref 37–47)
HCT, POC: 41
HEMOGLOBIN: 14 G/DL (ref 12–16)
HGB, POC: 13.9
MAGNESIUM: 1.9 MG/DL (ref 1.6–2.6)
MCH RBC QN AUTO: 29.2 PG (ref 27–31)
MCHC RBC AUTO-ENTMCNC: 32.9 G/DL (ref 33–37)
MCV RBC AUTO: 88.7 FL (ref 81–99)
PDW BLD-RTO: 12.3 % (ref 11.5–14.5)
PERFORMED ON: ABNORMAL
PLATELET # BLD: 230 K/UL (ref 130–400)
PMV BLD AUTO: 10.5 FL (ref 9.4–12.3)
POTASSIUM SERPL-SCNC: 2.3 MMOL/L (ref 3.5–5)
POTASSIUM SERPL-SCNC: 2.4 MMOL/L (ref 3.5–5)
POTASSIUM SERPL-SCNC: 2.9 MMOL/L
PRO-BNP: 23 PG/ML (ref 0–450)
RBC # BLD: 4.79 M/UL (ref 4.2–5.4)
SODIUM BLD-SCNC: 135 MMOL/L
SODIUM BLD-SCNC: 140 MMOL/L (ref 136–145)
SODIUM BLD-SCNC: 140 MMOL/L (ref 136–145)
WBC # BLD: 11.6 K/UL (ref 4.8–10.8)

## 2019-08-19 PROCEDURE — 83880 ASSAY OF NATRIURETIC PEPTIDE: CPT

## 2019-08-19 PROCEDURE — 36415 COLL VENOUS BLD VENIPUNCTURE: CPT

## 2019-08-19 PROCEDURE — 2580000003 HC RX 258: Performed by: EMERGENCY MEDICINE

## 2019-08-19 PROCEDURE — G8417 CALC BMI ABV UP PARAM F/U: HCPCS | Performed by: SPECIALIST

## 2019-08-19 PROCEDURE — 83735 ASSAY OF MAGNESIUM: CPT

## 2019-08-19 PROCEDURE — G8427 DOCREV CUR MEDS BY ELIG CLIN: HCPCS | Performed by: SPECIALIST

## 2019-08-19 PROCEDURE — 80048 BASIC METABOLIC PNL TOTAL CA: CPT

## 2019-08-19 PROCEDURE — 99285 EMERGENCY DEPT VISIT HI MDM: CPT

## 2019-08-19 PROCEDURE — 85027 COMPLETE CBC AUTOMATED: CPT

## 2019-08-19 PROCEDURE — 96365 THER/PROPH/DIAG IV INF INIT: CPT

## 2019-08-19 PROCEDURE — 6370000000 HC RX 637 (ALT 250 FOR IP): Performed by: EMERGENCY MEDICINE

## 2019-08-19 PROCEDURE — 99214 OFFICE O/P EST MOD 30 MIN: CPT | Performed by: SPECIALIST

## 2019-08-19 PROCEDURE — 71046 X-RAY EXAM CHEST 2 VIEWS: CPT

## 2019-08-19 PROCEDURE — 1036F TOBACCO NON-USER: CPT | Performed by: SPECIALIST

## 2019-08-19 PROCEDURE — 82948 REAGENT STRIP/BLOOD GLUCOSE: CPT

## 2019-08-19 PROCEDURE — 80047 BASIC METABLC PNL IONIZED CA: CPT | Performed by: SPECIALIST

## 2019-08-19 PROCEDURE — 6360000002 HC RX W HCPCS: Performed by: EMERGENCY MEDICINE

## 2019-08-19 RX ORDER — DIPHENHYDRAMINE HYDROCHLORIDE 50 MG/ML
25 INJECTION INTRAMUSCULAR; INTRAVENOUS ONCE
Status: COMPLETED | OUTPATIENT
Start: 2019-08-20 | End: 2019-08-20

## 2019-08-19 RX ORDER — POTASSIUM CHLORIDE 20 MEQ/1
40 TABLET, EXTENDED RELEASE ORAL ONCE
Status: COMPLETED | OUTPATIENT
Start: 2019-08-19 | End: 2019-08-19

## 2019-08-19 RX ORDER — DEXTROSE MONOHYDRATE 25 G/50ML
25 INJECTION, SOLUTION INTRAVENOUS ONCE
Status: DISCONTINUED | OUTPATIENT
Start: 2019-08-19 | End: 2019-08-19

## 2019-08-19 RX ORDER — DEXTROSE MONOHYDRATE 25 G/50ML
12.5 INJECTION, SOLUTION INTRAVENOUS ONCE
Status: COMPLETED | OUTPATIENT
Start: 2019-08-19 | End: 2019-08-19

## 2019-08-19 RX ORDER — POTASSIUM CHLORIDE 7.45 MG/ML
10 INJECTION INTRAVENOUS ONCE
Status: COMPLETED | OUTPATIENT
Start: 2019-08-19 | End: 2019-08-20

## 2019-08-19 RX ORDER — MAGNESIUM SULFATE IN WATER 40 MG/ML
2 INJECTION, SOLUTION INTRAVENOUS ONCE
Status: COMPLETED | OUTPATIENT
Start: 2019-08-19 | End: 2019-08-20

## 2019-08-19 RX ORDER — ACETAMINOPHEN 325 MG/1
650 TABLET ORAL ONCE
Status: COMPLETED | OUTPATIENT
Start: 2019-08-19 | End: 2019-08-19

## 2019-08-19 RX ADMIN — POTASSIUM CHLORIDE 10 MEQ: 7.46 INJECTION, SOLUTION INTRAVENOUS at 23:41

## 2019-08-19 RX ADMIN — DEXTROSE MONOHYDRATE 12.5 G: 500 INJECTION PARENTERAL at 22:42

## 2019-08-19 RX ADMIN — ACETAMINOPHEN 650 MG: 325 TABLET ORAL at 23:41

## 2019-08-19 RX ADMIN — POTASSIUM CHLORIDE 40 MEQ: 20 TABLET, EXTENDED RELEASE ORAL at 23:41

## 2019-08-19 ASSESSMENT — ENCOUNTER SYMPTOMS
SHORTNESS OF BREATH: 1
EYE PAIN: 0
RHINORRHEA: 0
VOICE CHANGE: 0
ABDOMINAL PAIN: 0
RESPIRATORY NEGATIVE: 1
EYE REDNESS: 0
DIARRHEA: 0
VOMITING: 0
COUGH: 0

## 2019-08-19 ASSESSMENT — PAIN DESCRIPTION - LOCATION: LOCATION: FOOT

## 2019-08-19 ASSESSMENT — PAIN SCALES - GENERAL
PAINLEVEL_OUTOF10: 6
PAINLEVEL_OUTOF10: 6

## 2019-08-19 NOTE — PROGRESS NOTES
[address] [facility] URGENT CARE  [address]  [place] [address]  Dept: [phone]  Loc: [phone]    Malka Trivedi is a 57 y.o. female who presents today for her medical conditions/complaintsas noted below. Tianna  is c/o of Leg Swelling (bilateral )        HPI:     Patient c/o she was seen in [facility] ED last week but swelling has not improved. Leg Pain    There was no injury mechanism. The pain is present in the right leg and left leg. The quality of the pain is described as aching. Pertinent negatives include no inability to bear weight, loss of motion, loss of sensation, muscle weakness, numbness or tingling. She reports no foreign bodies present. The symptoms are aggravated by weight bearing. She has tried nothing for the symptoms.        Past Medical History:   Diagnosis Date    Anxiety     Constipation     Depression     DM (diabetes mellitus) ([address]     Headache(784.0)     Hyperlipidemia     Hypertension     Kidney stones     Kidney stones     Restless leg     Restless legs syndrome      Past Surgical History:   Procedure Laterality Date    ECTOPIC PREGNANCY SURGERY      EYE SURGERY      cornea transplant and cataracts removed    KNEE CARTILAGE SURGERY Left 12/20/2016    KNEE ARTHROSCOPIC PARTIAL MEDIAL MENISCECTOMY performed by Theresa Catherine [facility] at [facility] OR    TUBAL LIGATION         Family History   Problem Relation Age of Onset    Cancer Father     Cancer Maternal Grandmother     COPD Maternal Grandmother     Cancer Maternal Grandfather        Social History     Tobacco Use    Smoking status: Never Smoker    Smokeless tobacco: Never Used   Substance Use Topics    Alcohol use: No      Current Outpatient Medications   Medication Sig Dispense Refill    furosemide (LASIX) 20 MG tablet Take 2 tablets by mouth daily 30 tablet 2    topiramate (TOPAMAX) 50 MG tablet TAKE 1 TABLET BY MOUTH TWICE DAILY 60 tablet 5    ONE TOUCH ULTRA TEST strip TEST BLOOD SUGAR ONCE DAILY 100 strip 5    verapamil (CALAN SR) 240 MG extended release tablet TAKE 1 TABLET BY MOUTH EVERY NIGHT 30 tablet 5    butorphanol (STADOL) 10 MG/ML nasal spray 1 spray by Nasal route every 6 hours as needed for Pain for up to 30 days. 2.5 mL 0    phentermine (ADIPEX-P) 37.5 MG tablet Take 1 tablet by mouth every morning (before breakfast) for 30 days. 30 tablet 0    VICTOZA 18 MG/3ML SOPN SC injection ADMINISTER 1.8 MG INTO THE SKIN EVERY DAY 9 mL 3    gabapentin (NEURONTIN) 100 MG capsule Take 100 mg by mouth daily.  triamterene-hydrochlorothiazide (DYAZIDE) 37.5-25 MG per capsule TAKE 1 CAPSULE BY MOUTH EVERY DAY IN THE MORNING 90 capsule 0    ondansetron (ZOFRAN-ODT) 4 MG disintegrating tablet DISSOLVE 1 TABLET ON THE TONGUE EVERY 8 HOURS AS NEEDED FOR NAUSEA OR VOMITING 20 tablet 0    pramipexole (MIRAPEX) 0.5 MG tablet TAKE 1 TABLET BY MOUTH TWICE DAILY 60 tablet 2    vitamin B-6 (PYRIDOXINE) 50 MG tablet Take 1 tablet by mouth daily 30 tablet 3    magnesium (MAGNESIUM-OXIDE) 250 MG TABS tablet Take 1 tablet by mouth daily 90 tablet 0    insulin glargine (BASAGLAR KWIKPEN) 100 UNIT/ML injection pen Start 45 U and inc by 3 U nightly utnil 60 U obtained and continue 5 pen 5    predniSONE (DELTASONE) 10 MG tablet Take 6 tablets for 2 days, 5 tabs for 2 days, 4 tabs for 2 days, 3 tabs for 2 days, 2 tabs for 2 days, 1 tab for 2 days.  42 tablet 0    celecoxib (CELEBREX) 200 MG capsule TAKE 1 CAPSULE BY MOUTH EVERY DAY 60 capsule 0    gabapentin (NEURONTIN) 300 MG capsule TK ONE C PO QHS  5    Promethazine-Phenylephrine (PHENERGAN-VC) 6.25-5 MG/5ML syrup Take 5 mLs by mouth every 6 hours 280 mL 0    promethazine (PHENERGAN) 25 MG tablet TAKE 1 TABLET BY MOUTH EVERY 6 HOURS AS NEEDED FOR NAUSEA 30 tablet 0    fluticasone (FLONASE) 50 MCG/ACT nasal spray 1 spray by Each Nare route daily (Patient taking differently: 1 spray by Each Nostril route daily as needed ) and affect. Her speech is normal and behavior is normal.   Nursing note and vitals reviewed. /88   Pulse 91   Temp 98.2 °F (36.8 °C)   Resp 16   Ht 5' 1\" (1.549 m)   Wt 251 lb (113.9 kg)   SpO2 98%   BMI 47.43 kg/m²     ASSESSMENT:       Diagnosis Orders   1. Hyperglycemia     2. Hypokalemia     3. Localized swelling of lower extremity  POCT [id number]     Results for orders placed or performed in visit on 08/19/19   POCT [id number]   Result Value Ref Range    Sodium 135 mmol/L    Potassium 2.9 mmol/L    Chloride 89 mmol/L    Calcium 1.18 mg/dL    Glucose 591 mg/dL    BUN 17 mg/dL    Creatinine 1.0 mg/dL    Hemoglobin 13.9     Hematocrit 41          PLAN:      I have reviewed patient's ED visit/labs from 8/15/19 at [facility]. I instructed her she needed to go to ED for further evaluation. She does not wish to do this. I advised her I could do istat in the office, however, according to results, she may still need to proceed to [facility] ED. She voices understanding. After istat was obtained, results were discussed with patient. I instructed her to go to [facility] ED for higher level of care, her glucose is 591, potassium is 2.9 and BUN/Creat are abnormal. She voices understanding. However, she states, \"I won't go right now but I will later. \"  I reiterated to patient again how she needs to go to ED. She voiced understanding. She left in stable condition. Orders Placed This Encounter   Procedures    POCT [id number]       Return if symptoms worsen or fail to improve. Orders Placed This Encounter   Procedures    POCT [id number]     No orders of the defined types were placed in this encounter. Patient given educationalmaterials - see patient instructions. Discussed use, benefit, and side effects of prescribed medications. All patient questions answered. Pt voiced understanding. Patient agreed with treatment plan. Follow up as directed.

## 2019-08-19 NOTE — PATIENT INSTRUCTIONS
Patient Education        Hypokalemia: Care Instructions  Your Care Instructions    Hypokalemia (say \"ld-vi-wgj-RONAL-ralph-uh\") is a low level of potassium. The heart, muscles, kidneys, and nervous system all need potassium to work well. This problem has many different causes. Kidney problems, diet, and medicines like diuretics and laxatives can cause it. So can vomiting or diarrhea. In some cases, cancer is the cause. Your doctor may do tests to find the cause of your low potassium levels. You may need medicines to bring your potassium levels back to normal. You may also need regular blood tests to check your potassium. If you have very low potassium, you may need intravenous (IV) medicines. You also may need tests to check the electrical activity of your heart. Heart problems caused by low potassium levels can be very serious. Follow-up care is a key part of your treatment and safety. Be sure to make and go to all appointments, and call your doctor if you are having problems. It's also a good idea to know your test results and keep a list of the medicines you take. How can you care for yourself at home? · If your doctor recommends it, eat foods that have a lot of potassium. These include fresh fruits, juices, and vegetables. They also include nuts, beans, and milk. · Be safe with medicines. If your doctor prescribes medicines or potassium supplements, take them exactly as directed. Call your doctor if you have any problems with your medicines. · Get your potassium levels tested as often as your doctor tells you. When should you call for help? Call 911 anytime you think you may need emergency care. For example, call if:    · You feel like your heart is missing beats.  Heart problems caused by low potassium can cause death.     · You passed out (lost consciousness).     · You have a seizure.    Call your doctor now or seek immediate medical care if:    · You feel weak or unusually tired.     · You have severe blood sugar may not cause any symptoms at all. Or it may make you feel very thirsty or very hungry. You may also urinate more often than usual, have blurry vision, or lose weight without trying. How is high blood sugar treated? You can take steps to lower your blood sugar level if you understand what makes it get higher. Your doctor may want you to learn how to test your blood sugar level at home. Then you can see how illness, stress, or different kinds of food or medicine raise or lower your blood sugar level. Other tests may be needed to see if you have diabetes. How can you prevent high blood sugar? · Watch your weight. If you're overweight, losing just a small amount of weight may help. Reducing fat around your waist is most important. · Limit the amount of calories, sweets, and unhealthy fat you eat. Ask your doctor if a dietitian can help you. A registered dietitian can help you create meal plans that fit your lifestyle. · Get at least 30 minutes of exercise on most days of the week. Exercise helps control your blood sugar. It also helps you maintain a healthy weight. Walking is a good choice. You also may want to do other activities, such as running, swimming, cycling, or playing tennis or team sports. · If your doctor prescribed medicines, take them exactly as prescribed. Call your doctor if you think you are having a problem with your medicine. You will get more details on the specific medicines your doctor prescribes. Follow-up care is a key part of your treatment and safety. Be sure to make and go to all appointments, and call your doctor if you are having problems. It's also a good idea to know your test results and keep a list of the medicines you take. Where can you learn more? Go to https://reanna.[employer]. org and sign in to your Site Tour account. Enter O108 in the Gtxh box to learn more about \"Learning About High Blood Sugar. \"     If you do not have an

## 2019-08-20 PROBLEM — E87.6 HYPOKALEMIA: Status: ACTIVE | Noted: 2019-08-20

## 2019-08-20 PROBLEM — E16.2 HYPOGLYCEMIA: Status: ACTIVE | Noted: 2019-08-20

## 2019-08-20 LAB
ANION GAP SERPL CALCULATED.3IONS-SCNC: 13 MMOL/L (ref 7–19)
BILIRUBIN URINE: NEGATIVE
BLOOD, URINE: NEGATIVE
BUN BLDV-MCNC: 14 MG/DL (ref 6–20)
CALCIUM SERPL-MCNC: 9.1 MG/DL (ref 8.6–10)
CHLORIDE BLD-SCNC: 98 MMOL/L (ref 98–111)
CLARITY: CLEAR
CO2: 30 MMOL/L (ref 22–29)
COLOR: YELLOW
CREAT SERPL-MCNC: 0.8 MG/DL (ref 0.5–0.9)
GFR NON-AFRICAN AMERICAN: >60
GLUCOSE BLD-MCNC: 107 MG/DL (ref 70–99)
GLUCOSE BLD-MCNC: 117 MG/DL (ref 70–99)
GLUCOSE BLD-MCNC: 169 MG/DL (ref 70–99)
GLUCOSE BLD-MCNC: 236 MG/DL (ref 74–109)
GLUCOSE BLD-MCNC: 246 MG/DL (ref 70–99)
GLUCOSE BLD-MCNC: 247 MG/DL (ref 70–99)
GLUCOSE BLD-MCNC: 264 MG/DL (ref 70–99)
GLUCOSE BLD-MCNC: 265 MG/DL (ref 70–99)
GLUCOSE BLD-MCNC: 267 MG/DL (ref 70–99)
GLUCOSE BLD-MCNC: 335 MG/DL (ref 70–99)
GLUCOSE BLD-MCNC: 335 MG/DL (ref 70–99)
GLUCOSE BLD-MCNC: 59 MG/DL (ref 70–99)
GLUCOSE URINE: NEGATIVE MG/DL
KETONES, URINE: NEGATIVE MG/DL
LEUKOCYTE ESTERASE, URINE: NEGATIVE
MAGNESIUM: 2.4 MG/DL (ref 1.6–2.6)
NITRITE, URINE: NEGATIVE
PERFORMED ON: ABNORMAL
PH UA: 6 (ref 5–8)
POTASSIUM REFLEX MAGNESIUM: 3.2 MMOL/L (ref 3.5–5)
PROTEIN UA: NEGATIVE MG/DL
SODIUM BLD-SCNC: 141 MMOL/L (ref 136–145)
SPECIFIC GRAVITY UA: 1.02 (ref 1–1.03)
UROBILINOGEN, URINE: 1 E.U./DL

## 2019-08-20 PROCEDURE — 6370000000 HC RX 637 (ALT 250 FOR IP): Performed by: INTERNAL MEDICINE

## 2019-08-20 PROCEDURE — 82948 REAGENT STRIP/BLOOD GLUCOSE: CPT

## 2019-08-20 PROCEDURE — 83735 ASSAY OF MAGNESIUM: CPT

## 2019-08-20 PROCEDURE — 96375 TX/PRO/DX INJ NEW DRUG ADDON: CPT

## 2019-08-20 PROCEDURE — G0378 HOSPITAL OBSERVATION PER HR: HCPCS

## 2019-08-20 PROCEDURE — 96366 THER/PROPH/DIAG IV INF ADDON: CPT

## 2019-08-20 PROCEDURE — 36415 COLL VENOUS BLD VENIPUNCTURE: CPT

## 2019-08-20 PROCEDURE — 81003 URINALYSIS AUTO W/O SCOPE: CPT

## 2019-08-20 PROCEDURE — 2580000003 HC RX 258: Performed by: INTERNAL MEDICINE

## 2019-08-20 PROCEDURE — 80048 BASIC METABOLIC PNL TOTAL CA: CPT

## 2019-08-20 PROCEDURE — 87081 CULTURE SCREEN ONLY: CPT

## 2019-08-20 PROCEDURE — 2580000003 HC RX 258: Performed by: EMERGENCY MEDICINE

## 2019-08-20 PROCEDURE — 6360000002 HC RX W HCPCS: Performed by: EMERGENCY MEDICINE

## 2019-08-20 PROCEDURE — 96376 TX/PRO/DX INJ SAME DRUG ADON: CPT

## 2019-08-20 PROCEDURE — 96367 TX/PROPH/DG ADDL SEQ IV INF: CPT

## 2019-08-20 PROCEDURE — 6360000002 HC RX W HCPCS: Performed by: INTERNAL MEDICINE

## 2019-08-20 RX ORDER — GABAPENTIN 300 MG/1
300 CAPSULE ORAL NIGHTLY
Status: DISCONTINUED | OUTPATIENT
Start: 2019-08-20 | End: 2019-08-21 | Stop reason: HOSPADM

## 2019-08-20 RX ORDER — DEXTROSE MONOHYDRATE 100 MG/ML
INJECTION, SOLUTION INTRAVENOUS CONTINUOUS
Status: DISCONTINUED | OUTPATIENT
Start: 2019-08-20 | End: 2019-08-20

## 2019-08-20 RX ORDER — POTASSIUM CHLORIDE 20 MEQ/1
40 TABLET, EXTENDED RELEASE ORAL PRN
Status: DISCONTINUED | OUTPATIENT
Start: 2019-08-20 | End: 2019-08-21 | Stop reason: HOSPADM

## 2019-08-20 RX ORDER — POTASSIUM CHLORIDE 20 MEQ/1
20 TABLET, EXTENDED RELEASE ORAL ONCE
Status: COMPLETED | OUTPATIENT
Start: 2019-08-20 | End: 2019-08-20

## 2019-08-20 RX ORDER — OMEPRAZOLE 20 MG/1
20 CAPSULE, DELAYED RELEASE ORAL
Status: DISCONTINUED | OUTPATIENT
Start: 2019-08-20 | End: 2019-08-20 | Stop reason: CLARIF

## 2019-08-20 RX ORDER — POTASSIUM CHLORIDE 7.45 MG/ML
10 INJECTION INTRAVENOUS PRN
Status: DISCONTINUED | OUTPATIENT
Start: 2019-08-20 | End: 2019-08-21 | Stop reason: HOSPADM

## 2019-08-20 RX ORDER — HYDROCODONE BITARTRATE AND ACETAMINOPHEN 7.5; 325 MG/1; MG/1
1 TABLET ORAL EVERY 6 HOURS PRN
Status: DISCONTINUED | OUTPATIENT
Start: 2019-08-20 | End: 2019-08-21 | Stop reason: HOSPADM

## 2019-08-20 RX ORDER — NICOTINE POLACRILEX 4 MG
15 LOZENGE BUCCAL PRN
Status: DISCONTINUED | OUTPATIENT
Start: 2019-08-20 | End: 2019-08-21 | Stop reason: HOSPADM

## 2019-08-20 RX ORDER — NICOTINE POLACRILEX 4 MG
15 LOZENGE BUCCAL PRN
Status: DISCONTINUED | OUTPATIENT
Start: 2019-08-20 | End: 2019-08-20 | Stop reason: SDUPTHER

## 2019-08-20 RX ORDER — PRAMIPEXOLE DIHYDROCHLORIDE 0.25 MG/1
0.5 TABLET ORAL 2 TIMES DAILY
Status: DISCONTINUED | OUTPATIENT
Start: 2019-08-20 | End: 2019-08-21 | Stop reason: HOSPADM

## 2019-08-20 RX ORDER — DEXTROSE MONOHYDRATE 50 MG/ML
100 INJECTION, SOLUTION INTRAVENOUS PRN
Status: DISCONTINUED | OUTPATIENT
Start: 2019-08-20 | End: 2019-08-21 | Stop reason: HOSPADM

## 2019-08-20 RX ORDER — BUTORPHANOL TARTRATE 1 MG/ML
1 INJECTION, SOLUTION INTRAMUSCULAR; INTRAVENOUS EVERY 4 HOURS PRN
Status: COMPLETED | OUTPATIENT
Start: 2019-08-20 | End: 2019-08-20

## 2019-08-20 RX ORDER — METOCLOPRAMIDE HYDROCHLORIDE 5 MG/ML
10 INJECTION INTRAMUSCULAR; INTRAVENOUS ONCE
Status: COMPLETED | OUTPATIENT
Start: 2019-08-20 | End: 2019-08-20

## 2019-08-20 RX ORDER — TOPIRAMATE 25 MG/1
50 TABLET ORAL 2 TIMES DAILY
Status: DISCONTINUED | OUTPATIENT
Start: 2019-08-20 | End: 2019-08-21 | Stop reason: HOSPADM

## 2019-08-20 RX ORDER — SUMATRIPTAN 100 MG/1
100 TABLET, FILM COATED ORAL PRN
Status: DISCONTINUED | OUTPATIENT
Start: 2019-08-20 | End: 2019-08-20

## 2019-08-20 RX ORDER — ONDANSETRON 2 MG/ML
4 INJECTION INTRAMUSCULAR; INTRAVENOUS EVERY 6 HOURS PRN
Status: DISCONTINUED | OUTPATIENT
Start: 2019-08-20 | End: 2019-08-21 | Stop reason: HOSPADM

## 2019-08-20 RX ORDER — DEXTROSE MONOHYDRATE 25 G/50ML
12.5 INJECTION, SOLUTION INTRAVENOUS PRN
Status: DISCONTINUED | OUTPATIENT
Start: 2019-08-20 | End: 2019-08-20 | Stop reason: SDUPTHER

## 2019-08-20 RX ORDER — RIZATRIPTAN BENZOATE 10 MG/1
10 TABLET ORAL
Status: DISCONTINUED | OUTPATIENT
Start: 2019-08-20 | End: 2019-08-20 | Stop reason: CLARIF

## 2019-08-20 RX ORDER — PANTOPRAZOLE SODIUM 40 MG/1
40 TABLET, DELAYED RELEASE ORAL
Status: DISCONTINUED | OUTPATIENT
Start: 2019-08-20 | End: 2019-08-21 | Stop reason: HOSPADM

## 2019-08-20 RX ORDER — DEXTROSE MONOHYDRATE 25 G/50ML
12.5 INJECTION, SOLUTION INTRAVENOUS PRN
Status: DISCONTINUED | OUTPATIENT
Start: 2019-08-20 | End: 2019-08-21 | Stop reason: HOSPADM

## 2019-08-20 RX ORDER — NARATRIPTAN 2.5 MG/1
2.5 TABLET ORAL
Status: DISCONTINUED | OUTPATIENT
Start: 2019-08-20 | End: 2019-08-20 | Stop reason: SDUPTHER

## 2019-08-20 RX ORDER — ACETAMINOPHEN 500 MG
500 TABLET ORAL EVERY 8 HOURS PRN
Status: DISCONTINUED | OUTPATIENT
Start: 2019-08-20 | End: 2019-08-21 | Stop reason: HOSPADM

## 2019-08-20 RX ORDER — GABAPENTIN 100 MG/1
100 CAPSULE ORAL DAILY
Status: DISCONTINUED | OUTPATIENT
Start: 2019-08-20 | End: 2019-08-21 | Stop reason: HOSPADM

## 2019-08-20 RX ORDER — LANOLIN ALCOHOL/MO/W.PET/CERES
250 CREAM (GRAM) TOPICAL DAILY
Status: DISCONTINUED | OUTPATIENT
Start: 2019-08-20 | End: 2019-08-21 | Stop reason: HOSPADM

## 2019-08-20 RX ORDER — DEXTROSE MONOHYDRATE 25 G/50ML
12.5 INJECTION, SOLUTION INTRAVENOUS ONCE
Status: COMPLETED | OUTPATIENT
Start: 2019-08-20 | End: 2019-08-20

## 2019-08-20 RX ORDER — SODIUM CHLORIDE 9 MG/ML
INJECTION, SOLUTION INTRAVENOUS CONTINUOUS
Status: DISCONTINUED | OUTPATIENT
Start: 2019-08-20 | End: 2019-08-20

## 2019-08-20 RX ORDER — PROMETHAZINE HYDROCHLORIDE 25 MG/1
25 TABLET ORAL EVERY 6 HOURS PRN
Status: DISCONTINUED | OUTPATIENT
Start: 2019-08-20 | End: 2019-08-21 | Stop reason: HOSPADM

## 2019-08-20 RX ORDER — BUTORPHANOL TARTRATE 10 MG/ML
1 SPRAY, METERED NASAL EVERY 6 HOURS PRN
Status: DISCONTINUED | OUTPATIENT
Start: 2019-08-20 | End: 2019-08-20

## 2019-08-20 RX ORDER — PYRIDOXINE HCL (VITAMIN B6) 25 MG
50 TABLET ORAL DAILY
Status: DISCONTINUED | OUTPATIENT
Start: 2019-08-20 | End: 2019-08-21 | Stop reason: HOSPADM

## 2019-08-20 RX ADMIN — PRAMIPEXOLE DIHYDROCHLORIDE 0.5 MG: 0.25 TABLET ORAL at 20:50

## 2019-08-20 RX ADMIN — DEXTROSE MONOHYDRATE 12.5 G: 500 INJECTION PARENTERAL at 01:45

## 2019-08-20 RX ADMIN — HYDROCODONE BITARTRATE AND ACETAMINOPHEN 1 TABLET: 7.5; 325 TABLET ORAL at 05:47

## 2019-08-20 RX ADMIN — GABAPENTIN 100 MG: 100 CAPSULE ORAL at 08:35

## 2019-08-20 RX ADMIN — DEXTROSE MONOHYDRATE: 100 INJECTION, SOLUTION INTRAVENOUS at 03:36

## 2019-08-20 RX ADMIN — GABAPENTIN 300 MG: 300 CAPSULE ORAL at 20:51

## 2019-08-20 RX ADMIN — BUTORPHANOL TARTRATE 1 MG: 1 INJECTION, SOLUTION INTRAMUSCULAR; INTRAVENOUS at 13:29

## 2019-08-20 RX ADMIN — PRAMIPEXOLE DIHYDROCHLORIDE 0.5 MG: 0.25 TABLET ORAL at 08:36

## 2019-08-20 RX ADMIN — Medication 50 MG: at 08:36

## 2019-08-20 RX ADMIN — TOPIRAMATE 50 MG: 25 TABLET, FILM COATED ORAL at 20:51

## 2019-08-20 RX ADMIN — Medication 300 MG: at 08:36

## 2019-08-20 RX ADMIN — POTASSIUM CHLORIDE 40 MEQ: 20 TABLET, EXTENDED RELEASE ORAL at 13:29

## 2019-08-20 RX ADMIN — POTASSIUM CHLORIDE 20 MEQ: 20 TABLET, EXTENDED RELEASE ORAL at 14:01

## 2019-08-20 RX ADMIN — HYDROCODONE BITARTRATE AND ACETAMINOPHEN 1 TABLET: 7.5; 325 TABLET ORAL at 21:40

## 2019-08-20 RX ADMIN — DIPHENHYDRAMINE HYDROCHLORIDE 25 MG: 50 INJECTION, SOLUTION INTRAMUSCULAR; INTRAVENOUS at 00:05

## 2019-08-20 RX ADMIN — TOPIRAMATE 50 MG: 25 TABLET, FILM COATED ORAL at 08:36

## 2019-08-20 RX ADMIN — PANTOPRAZOLE SODIUM 40 MG: 40 TABLET, DELAYED RELEASE ORAL at 08:40

## 2019-08-20 RX ADMIN — INSULIN LISPRO 8 UNITS: 100 INJECTION, SOLUTION INTRAVENOUS; SUBCUTANEOUS at 17:53

## 2019-08-20 RX ADMIN — HYDROCODONE BITARTRATE AND ACETAMINOPHEN 1 TABLET: 7.5; 325 TABLET ORAL at 15:14

## 2019-08-20 RX ADMIN — MAGNESIUM SULFATE HEPTAHYDRATE 2 G: 40 INJECTION, SOLUTION INTRAVENOUS at 02:48

## 2019-08-20 RX ADMIN — BUTORPHANOL TARTRATE 1 MG: 1 INJECTION, SOLUTION INTRAMUSCULAR; INTRAVENOUS at 19:16

## 2019-08-20 RX ADMIN — METOCLOPRAMIDE 10 MG: 5 INJECTION, SOLUTION INTRAMUSCULAR; INTRAVENOUS at 01:12

## 2019-08-20 RX ADMIN — INSULIN LISPRO 4 UNITS: 100 INJECTION, SOLUTION INTRAVENOUS; SUBCUTANEOUS at 20:58

## 2019-08-20 ASSESSMENT — ENCOUNTER SYMPTOMS
DOUBLE VISION: 0
HEARTBURN: 0
ORTHOPNEA: 0
ABDOMINAL PAIN: 0
SHORTNESS OF BREATH: 0
NAUSEA: 0
COUGH: 0
VOMITING: 0
PHOTOPHOBIA: 0
SPUTUM PRODUCTION: 0
DIARRHEA: 0
HEMOPTYSIS: 0
BLURRED VISION: 0
EYE PAIN: 0

## 2019-08-20 ASSESSMENT — PAIN SCALES - GENERAL
PAINLEVEL_OUTOF10: 0
PAINLEVEL_OUTOF10: 8
PAINLEVEL_OUTOF10: 4
PAINLEVEL_OUTOF10: 0
PAINLEVEL_OUTOF10: 6
PAINLEVEL_OUTOF10: 5
PAINLEVEL_OUTOF10: 5
PAINLEVEL_OUTOF10: 8
PAINLEVEL_OUTOF10: 6
PAINLEVEL_OUTOF10: 0
PAINLEVEL_OUTOF10: 4
PAINLEVEL_OUTOF10: 7

## 2019-08-20 ASSESSMENT — PAIN DESCRIPTION - FREQUENCY: FREQUENCY: INTERMITTENT

## 2019-08-20 ASSESSMENT — PAIN DESCRIPTION - PAIN TYPE
TYPE: ACUTE PAIN

## 2019-08-20 ASSESSMENT — PAIN - FUNCTIONAL ASSESSMENT: PAIN_FUNCTIONAL_ASSESSMENT: ACTIVITIES ARE NOT PREVENTED

## 2019-08-20 ASSESSMENT — PAIN DESCRIPTION - LOCATION
LOCATION: HEAD

## 2019-08-20 ASSESSMENT — PAIN DESCRIPTION - DESCRIPTORS
DESCRIPTORS: HEADACHE
DESCRIPTORS: ACHING;THROBBING

## 2019-08-20 ASSESSMENT — PAIN DESCRIPTION - PROGRESSION: CLINICAL_PROGRESSION: GRADUALLY WORSENING

## 2019-08-20 ASSESSMENT — PAIN DESCRIPTION - ONSET: ONSET: GRADUAL

## 2019-08-20 NOTE — ED NOTES
Pt ambulated to bathroom without assist. Pt tolerated without difficulty or distress.      Vahid Chandler RN  08/20/19 2784

## 2019-08-20 NOTE — ED PROVIDER NOTES
Psychiatric/Behavioral: Negative. All other systems reviewed and are negative. A complete review of systems was performed and is negative except as noted above in the HPI. PAST MEDICAL HISTORY     Past Medical History:   Diagnosis Date    Anxiety     Constipation     Depression     DM (diabetes mellitus) ([address]     Headache(784.0)     Hyperlipidemia     Hypertension     Kidney stones     Kidney stones     Restless leg     Restless legs syndrome          SURGICAL HISTORY       Past Surgical History:   Procedure Laterality Date    ECTOPIC PREGNANCY SURGERY      EYE SURGERY      cornea transplant and cataracts removed    KNEE CARTILAGE SURGERY Left 12/20/2016    KNEE ARTHROSCOPIC PARTIAL MEDIAL MENISCECTOMY performed by Sangeetha Whitehead MD at [address]       Previous Medications    B-D ULTRAFINE III SHORT PEN 31G X 8 MM MISC    USE TO INJECT INSULIN ONCE DAILY    BLOOD GLUCOSE MONITORING SUPPL (ONE TOUCH BASIC SYSTEM) W/DEVICE KIT    1 kit by Does not apply route daily as needed (Dx 250.00)    BUTORPHANOL (STADOL) 10 MG/ML NASAL SPRAY    1 spray by Nasal route every 6 hours as needed for Pain for up to 30 days. CELECOXIB (CELEBREX) 200 MG CAPSULE    TAKE 1 CAPSULE BY MOUTH EVERY DAY    CETAPHIL (CETAPHIL) CREAM    May substitute for insurance best product with gabapentin if possible    CREAM BASE CREA    Apply 1-2 pumps to affected area 3-4 times daily    EPINEPHRINE (EPIPEN 2-JOSÉ) 0.3 MG/0.3ML SOAJ INJECTION    Inject 0.3 mLs into the muscle once for 1 dose Use as directed for allergic reaction    FLUTICASONE (FLONASE) 50 MCG/ACT NASAL SPRAY    1 spray by Each Nare route daily    FUROSEMIDE (LASIX) 20 MG TABLET    Take 2 tablets by mouth daily    GABAPENTIN (NEURONTIN) 100 MG CAPSULE    Take 100 mg by mouth daily.     GABAPENTIN (NEURONTIN) 300 MG CAPSULE    TK ONE C PO QHS    HYDROCODONE-ACETAMINOPHEN (NORCO) 7.5-325 MG PER TABLET    Take 1 tablet by mouth every 6 hours as needed for Pain. .    INSULIN GLARGINE (BASAGLAR KWIKPEN) 100 UNIT/ML INJECTION PEN    Start 45 U and inc by 3 U nightly utnil 60 U obtained and continue    MAGNESIUM (MAGNESIUM-OXIDE) 250 MG TABS TABLET    Take 1 tablet by mouth daily    METFORMIN (GLUCOPHAGE) 500 MG TABLET    TAKE 1 TABLET BY MOUTH TWICE DAILY WITH MEALS    NARATRIPTAN (AMERGE) 2.5 MG TABLET    Take 1 tablet by mouth once as needed for Migraine 2.5 mg at onset of headache, may repeat in 4 hours if needed    OMEPRAZOLE (PRILOSEC) 20 MG DELAYED RELEASE CAPSULE    Take 1 capsule by mouth 2 times daily (before meals)    ONDANSETRON (ZOFRAN) 4 MG TABLET    Take 1 tablet by mouth daily as needed for Nausea or Vomiting    ONDANSETRON (ZOFRAN-ODT) 4 MG DISINTEGRATING TABLET    DISSOLVE 1 TABLET ON THE TONGUE EVERY 8 HOURS AS NEEDED FOR NAUSEA OR VOMITING    ONDANSETRON (ZOFRAN-ODT) 4 MG DISINTEGRATING TABLET    DISSOLVE 1 TABLET ON THE TONGUE EVERY 8 HOURS AS NEEDED FOR NAUSEA OR VOMITING    ONE TOUCH LANCETS MISC    1 each by Does not apply route daily    ONE TOUCH ULTRA TEST STRIP    TEST BLOOD SUGAR ONCE DAILY    ONETOUCH DELICA LANCETS FINE MISC    USE TO CHECK BLOOD SUGAR AS DIRECTED    PHENTERMINE (ADIPEX-P) 37.5 MG TABLET    Take 1 tablet by mouth every morning (before breakfast) for 30 days. PRAMIPEXOLE (MIRAPEX) 0.5 MG TABLET    TAKE 1 TABLET BY MOUTH TWICE DAILY    PREDNISONE (DELTASONE) 10 MG TABLET    Take 6 tablets for 2 days, 5 tabs for 2 days, 4 tabs for 2 days, 3 tabs for 2 days, 2 tabs for 2 days, 1 tab for 2 days.     PROMETHAZINE (PHENERGAN) 25 MG TABLET    TAKE 1 TABLET BY MOUTH EVERY 6 HOURS AS NEEDED FOR NAUSEA    PROMETHAZINE-PHENYLEPHRINE (PHENERGAN-VC) 6.25-5 MG/5ML SYRUP    Take 5 mLs by mouth every 6 hours    RIZATRIPTAN (MAXALT) 10 MG TABLET    Take 1 tablet by mouth once as needed for Migraine May repeat in 2 hours if needed    TOPIRAMATE (TOPAMAX) 50 MG TABLET    TAKE 1 TABLET BY MOUTH TWICE Intimate partner violence:     Fear of current or ex partner: None     Emotionally abused: None     Physically abused: None     Forced sexual activity: None   Other Topics Concern    None   Social History Narrative    None       SCREENINGS             PHYSICAL EXAM    (up to 7 for level 4, 8 or more for level 5)     ED Triage Vitals   BP Temp Temp Source Pulse Resp SpO2 Height Weight   08/19/19 2100 08/19/19 2100 08/19/19 2140 08/19/19 2100 08/19/19 2100 08/19/19 2100 -- --   (!) 145/89 98.9 °F (37.2 °C) Oral 95 15 99 %         Physical Exam   Constitutional: She is oriented to person, place, and time. She appears well-developed and well-nourished. Non-toxic appearance. No distress. HENT:   Head: Normocephalic and atraumatic. Mouth/Throat: Oropharynx is clear and moist.   Eyes: Pupils are equal, round, and reactive to light. EOM are normal. Right eye exhibits no discharge. Left eye exhibits no discharge. No scleral icterus. Neck: Normal range of motion. Cardiovascular: Normal rate, regular rhythm and normal pulses. Pulmonary/Chest: Effort normal and breath sounds normal. No stridor. No respiratory distress. She has no wheezes. Abdominal: Soft. Bowel sounds are normal. She exhibits no distension. Musculoskeletal: Normal range of motion. She exhibits no deformity. 1-2+ bilateral lower extremity edema to the distal calf   Neurological: She is alert and oriented to person, place, and time. No cranial nerve deficit. She exhibits normal muscle tone. GCS eye subscore is 4. GCS verbal subscore is 5. GCS motor subscore is 6. Skin: Skin is warm and dry. She is not diaphoretic. Psychiatric: She has a normal mood and affect. Her behavior is normal. Judgment and thought content normal.   Nursing note and vitals reviewed.       DIAGNOSTIC RESULTS     EKG: All EKG's are interpreted by the Emergency Department Physician who either signs or Co-signs this chart in the absence of a cardiologist.      RADIOLOGY: Non-plain film images such as CT, Ultrasound and MRI are read by the radiologist. Redd Bi images are visualized and preliminarily interpreted by the emergency physician with the below findings:    Interpretation per the Radiologist below, if available at the time of this note:    XR CHEST STANDARD (2 VW)    (Results Pending)         ED BEDSIDE ULTRASOUND:   Performed by ED Physician - none    LABS:  Labs Reviewed   BASIC METABOLIC PANEL - Abnormal; Notable for the following components:       Result Value    Potassium 2.4 (*)     Chloride 94 (*)     CO2 30 (*)     Glucose 50 (*)     CREATININE 1.0 (*)     GFR Non-African American 59 (*)     Calcium 10.1 (*)     All other components within normal limits    Narrative:     CALL  Wagner  KLED tel. ,  Chemistry results called to and read back by Davie Swenson RN in ED, 08/19/2019 22:34,  by [facility]   CBC - Abnormal; Notable for the following components:    WBC 11.6 (*)     MCHC 32.9 (*)     All other components within normal limits   BASIC METABOLIC PANEL - Abnormal; Notable for the following components:    Potassium 2.3 (*)     Chloride 95 (*)     CO2 32 (*)     Glucose 161 (*)     CREATININE 1.0 (*)     GFR Non- 59 (*)     All other components within normal limits    Narrative:     CALL  Wagner  KLED tel. ,  Chemistry results called to and read back by Davie Swenson RN in ED, 08/19/2019 23:19,  by [facility]   POCT GLUCOSE - Abnormal; Notable for the following components:    POC Glucose 43 (*)     All other components within normal limits   POCT GLUCOSE - Abnormal; Notable for the following components:    POC Glucose 59 (*)     All other components within normal limits   POCT GLUCOSE - Normal   URINALYSIS   BRAIN NATRIURETIC PEPTIDE   MAGNESIUM   BASIC METABOLIC PANEL W/ REFLEX TO MG FOR LOW K   POCT GLUCOSE   POCT GLUCOSE   POCT GLUCOSE       All other labs were within normal range or not returned as of this dictation.     Medications   magnesium sulfate 2 g in 50 mL IVPB premix (has no administration in time range)   glucose (GLUTOSE) 40 % oral gel 15 g (has no administration in time range)   dextrose 50 % IV solution (has no administration in time range)   glucagon (rDNA) injection 1 mg (has no administration in time range)   dextrose 5 % solution (has no administration in time range)   0.9 % sodium chloride infusion (has no administration in time range)   ondansetron (ZOFRAN) injection 4 mg (has no administration in time range)   enoxaparin (LOVENOX) injection 40 mg (has no administration in time range)   acetaminophen (TYLENOL) tablet 500 mg (has no administration in time range)   potassium chloride (KLOR-CON M) extended release tablet 40 mEq (has no administration in time range)     Or   potassium bicarb-citric acid (EFFER-K) effervescent tablet 40 mEq (has no administration in time range)     Or   potassium chloride 10 mEq/100 mL IVPB (Peripheral Line) (has no administration in time range)   dextrose 50 % IV solution (has no administration in time range)   potassium chloride 10 mEq/100 mL IVPB (Peripheral Line) (10 mEq Intravenous New Bag 8/19/19 2341)   potassium chloride (KLOR-CON M) extended release tablet 40 mEq (40 mEq Oral Given 8/19/19 2341)   dextrose 50 % IV solution (12.5 g Intravenous Given 8/19/19 2242)   acetaminophen (TYLENOL) tablet 650 mg (650 mg Oral Given 8/19/19 2341)   diphenhydrAMINE (BENADRYL) injection 25 mg (25 mg Intravenous Given 8/20/19 0005)   metoclopramide (REGLAN) injection 10 mg (10 mg Intravenous Given 8/20/19 0112)       EMERGENCY DEPARTMENT COURSE and DIFFERENTIALDIAGNOSIS/MDM:   Vitals:    Vitals:    08/19/19 2140 08/19/19 2203 08/19/19 2356 08/20/19 0001   BP: 130/64 (!) 142/84 (!) 143/80 129/77   Pulse: 92 86     Resp: 17      Temp: 98.3 °F (36.8 °C)      TempSrc: Oral      SpO2: 97% 95% 98% 99%       MDM  Number of Diagnoses or Management Options  Acute nonintractable headache, unspecified headache type: new and does not require workup  Hypoglycemia: new and requires workup  Hypokalemia: new and requires workup  Peripheral edema: established and worsening  Type 2 diabetes mellitus with hypoglycemia without coma, with long-term current use of insulin ([address]): new and does not require workup     Amount and/or Complexity of Data Reviewed  Clinical lab tests: ordered and reviewed  Tests in the radiology section of CPT®: ordered and reviewed  Tests in the medicine section of CPT®: ordered and reviewed  Obtain history from someone other than the patient: yes  Review and summarize past medical records: yes  Discuss the patient with other providers: yes  Independent visualization of images, tracings, or specimens: yes    Risk of Complications, Morbidity, and/or Mortality  Presenting problems: high  Diagnostic procedures: low  Management options: high    Patient Progress  Patient progress: stable           CONSULTS:  None    PROCEDURES:  Unless otherwise notedbelow, none     Procedures      FINAL IMPRESSION     1. Hypokalemia    2. Hypoglycemia    3. Type 2 diabetes mellitus with hypoglycemia without coma, with long-term current use of insulin ([facility]    4. Peripheral edema    5. Acute nonintractable headache, unspecified headache type        On arrival here, repeat labs were drawn which again demonstrated hypokalemia, found in clinic earlier today. Patient also now found to be hypoglycemic. Repeat BMP was sent for confirmation given the dramatic drop in potassium and blood glucose which was confirmatory. Patient did have D50 after fingerstick confirmed hypoglycemia after initial BMP. Blood glucose had improved on subsequent checks after administration of D50. Based on the evaluation and work-up here patient is felt to require further monitoring, work-up, or treatment that is available in the emergency department. Case was discussed with hospitalist who agrees for observation or admission for further management.   Treatment and stabilization as

## 2019-08-21 VITALS
RESPIRATION RATE: 20 BRPM | HEART RATE: 86 BPM | WEIGHT: 258.5 LBS | TEMPERATURE: 97.6 F | HEIGHT: 61 IN | OXYGEN SATURATION: 97 % | DIASTOLIC BLOOD PRESSURE: 88 MMHG | SYSTOLIC BLOOD PRESSURE: 130 MMHG | BODY MASS INDEX: 48.8 KG/M2

## 2019-08-21 LAB
ANION GAP SERPL CALCULATED.3IONS-SCNC: 8 MMOL/L (ref 7–19)
BASOPHILS ABSOLUTE: 0 K/UL (ref 0–0.2)
BASOPHILS RELATIVE PERCENT: 0.4 % (ref 0–1)
BUN BLDV-MCNC: 14 MG/DL (ref 6–20)
CALCIUM SERPL-MCNC: 8.7 MG/DL (ref 8.6–10)
CHLORIDE BLD-SCNC: 106 MMOL/L (ref 98–111)
CO2: 30 MMOL/L (ref 22–29)
CREAT SERPL-MCNC: 0.9 MG/DL (ref 0.5–0.9)
EOSINOPHILS ABSOLUTE: 0.2 K/UL (ref 0–0.6)
EOSINOPHILS RELATIVE PERCENT: 3.1 % (ref 0–5)
GFR NON-AFRICAN AMERICAN: >60
GLUCOSE BLD-MCNC: 131 MG/DL (ref 70–99)
GLUCOSE BLD-MCNC: 62 MG/DL (ref 74–109)
HCT VFR BLD CALC: 38.3 % (ref 37–47)
HEMOGLOBIN: 12.4 G/DL (ref 12–16)
IMMATURE GRANULOCYTES #: 0 K/UL
LYMPHOCYTES ABSOLUTE: 2.9 K/UL (ref 1.1–4.5)
LYMPHOCYTES RELATIVE PERCENT: 41.5 % (ref 20–40)
MAGNESIUM: 2.1 MG/DL (ref 1.6–2.6)
MCH RBC QN AUTO: 29.1 PG (ref 27–31)
MCHC RBC AUTO-ENTMCNC: 32.4 G/DL (ref 33–37)
MCV RBC AUTO: 89.9 FL (ref 81–99)
MONOCYTES ABSOLUTE: 0.6 K/UL (ref 0–0.9)
MONOCYTES RELATIVE PERCENT: 8.3 % (ref 0–10)
MRSA CULTURE ONLY: NORMAL
NEUTROPHILS ABSOLUTE: 3.3 K/UL (ref 1.5–7.5)
NEUTROPHILS RELATIVE PERCENT: 46.6 % (ref 50–65)
PDW BLD-RTO: 12.5 % (ref 11.5–14.5)
PERFORMED ON: ABNORMAL
PLATELET # BLD: 181 K/UL (ref 130–400)
PMV BLD AUTO: 10.5 FL (ref 9.4–12.3)
POTASSIUM REFLEX MAGNESIUM: 3.5 MMOL/L (ref 3.5–5)
RBC # BLD: 4.26 M/UL (ref 4.2–5.4)
SODIUM BLD-SCNC: 144 MMOL/L (ref 136–145)
WBC # BLD: 7 K/UL (ref 4.8–10.8)

## 2019-08-21 PROCEDURE — 85025 COMPLETE CBC W/AUTO DIFF WBC: CPT

## 2019-08-21 PROCEDURE — 36415 COLL VENOUS BLD VENIPUNCTURE: CPT

## 2019-08-21 PROCEDURE — 6370000000 HC RX 637 (ALT 250 FOR IP): Performed by: INTERNAL MEDICINE

## 2019-08-21 PROCEDURE — 80048 BASIC METABOLIC PNL TOTAL CA: CPT

## 2019-08-21 PROCEDURE — 82948 REAGENT STRIP/BLOOD GLUCOSE: CPT

## 2019-08-21 PROCEDURE — 83735 ASSAY OF MAGNESIUM: CPT

## 2019-08-21 PROCEDURE — G0378 HOSPITAL OBSERVATION PER HR: HCPCS

## 2019-08-21 RX ORDER — LANOLIN ALCOHOL/MO/W.PET/CERES
6 CREAM (GRAM) TOPICAL NIGHTLY PRN
Status: DISCONTINUED | OUTPATIENT
Start: 2019-08-21 | End: 2019-08-21

## 2019-08-21 RX ORDER — LANOLIN ALCOHOL/MO/W.PET/CERES
6 CREAM (GRAM) TOPICAL NIGHTLY PRN
Status: DISCONTINUED | OUTPATIENT
Start: 2019-08-21 | End: 2019-08-21 | Stop reason: HOSPADM

## 2019-08-21 RX ADMIN — GABAPENTIN 100 MG: 100 CAPSULE ORAL at 08:53

## 2019-08-21 RX ADMIN — TOPIRAMATE 50 MG: 25 TABLET, FILM COATED ORAL at 08:53

## 2019-08-21 RX ADMIN — Medication 300 MG: at 08:53

## 2019-08-21 RX ADMIN — ACETAMINOPHEN 500 MG: 500 TABLET, FILM COATED ORAL at 01:14

## 2019-08-21 RX ADMIN — Medication 50 MG: at 08:53

## 2019-08-21 RX ADMIN — PRAMIPEXOLE DIHYDROCHLORIDE 0.5 MG: 0.25 TABLET ORAL at 08:53

## 2019-08-21 RX ADMIN — HYDROCODONE BITARTRATE AND ACETAMINOPHEN 1 TABLET: 7.5; 325 TABLET ORAL at 04:15

## 2019-08-21 ASSESSMENT — PAIN SCALES - GENERAL
PAINLEVEL_OUTOF10: 4
PAINLEVEL_OUTOF10: 6

## 2019-08-21 NOTE — TELEPHONE ENCOUNTER
Called and notified her of the echo appointment and pt was currently admitted in the hospital.  Pt stated that her swelling became worse and she went to the ED and was admitted due to low K and her Glucose was in the 500\"s. Pt was taking 40 mg of lasix from a different provider but her swelling had gotten better but it lowered her potassium. She will have to reschedule the ECHO but is willing to do that along with the sleep study.

## 2019-08-22 ENCOUNTER — TELEPHONE (OUTPATIENT)
Dept: INTERNAL MEDICINE | Age: 49
End: 2019-08-22

## 2019-08-29 DIAGNOSIS — G43.909 MIGRAINE SYNDROME: ICD-10-CM

## 2019-08-29 DIAGNOSIS — E66.01 MORBID OBESITY WITH BMI OF 45.0-49.9, ADULT (HCC): ICD-10-CM

## 2019-08-29 RX ORDER — BUTORPHANOL TARTRATE 10 MG/ML
1 SPRAY, METERED NASAL EVERY 6 HOURS PRN
Qty: 2.5 ML | Refills: 0 | OUTPATIENT
Start: 2019-08-29 | End: 2019-09-24 | Stop reason: SDUPTHER

## 2019-08-29 RX ORDER — PHENTERMINE HYDROCHLORIDE 37.5 MG/1
37.5 TABLET ORAL
Qty: 30 TABLET | Refills: 0 | OUTPATIENT
Start: 2019-08-29 | End: 2019-09-28

## 2019-08-29 NOTE — TELEPHONE ENCOUNTER
Tianna  called to request a refill on her medication. Last office visit : 2019   Next office visit : 9/10/2019     Requested Prescriptions     Signed Prescriptions Disp Refills    butorphanol (STADOL) 10 MG/ML nasal spray 2.5 mL 0     Si spray by Nasal route every 6 hours as needed for Pain for up to 30 days. Authorizing Provider: Darien Neumann     Ordering User: POLO WINTERS    phentermine (ADIPEX-P) 37.5 MG tablet 30 tablet 0     Sig: Take 1 tablet by mouth every morning (before breakfast) for 30 days. Authorizing Provider: Darien Krishna User: Leyla WINTERS MA     Medication called into the pharmacy.

## 2019-09-03 DIAGNOSIS — M17.0 PRIMARY OSTEOARTHRITIS OF BOTH KNEES: ICD-10-CM

## 2019-09-03 RX ORDER — CELECOXIB 200 MG/1
CAPSULE ORAL
Qty: 60 CAPSULE | Refills: 5 | Status: SHIPPED | OUTPATIENT
Start: 2019-09-03 | End: 2020-09-04

## 2019-09-06 ENCOUNTER — TELEPHONE (OUTPATIENT)
Dept: PRIMARY CARE CLINIC | Age: 49
End: 2019-09-06

## 2019-09-10 ENCOUNTER — OFFICE VISIT (OUTPATIENT)
Dept: PRIMARY CARE CLINIC | Age: 49
End: 2019-09-10
Payer: MEDICAID

## 2019-09-10 VITALS
RESPIRATION RATE: 20 BRPM | WEIGHT: 264 LBS | BODY MASS INDEX: 49.88 KG/M2 | DIASTOLIC BLOOD PRESSURE: 82 MMHG | SYSTOLIC BLOOD PRESSURE: 120 MMHG

## 2019-09-10 DIAGNOSIS — T80.1XXA PHLEBITIS AFTER INFUSION, INITIAL ENCOUNTER: ICD-10-CM

## 2019-09-10 DIAGNOSIS — R53.82 CHRONIC FATIGUE: ICD-10-CM

## 2019-09-10 DIAGNOSIS — R60.0 LOWER EXTREMITY EDEMA: ICD-10-CM

## 2019-09-10 DIAGNOSIS — E87.6 HYPOKALEMIA: ICD-10-CM

## 2019-09-10 DIAGNOSIS — I80.9 PHLEBITIS AFTER INFUSION, INITIAL ENCOUNTER: ICD-10-CM

## 2019-09-10 DIAGNOSIS — L02.415 CELLULITIS AND ABSCESS OF RIGHT LOWER EXTREMITY: ICD-10-CM

## 2019-09-10 DIAGNOSIS — L03.115 CELLULITIS AND ABSCESS OF RIGHT LOWER EXTREMITY: ICD-10-CM

## 2019-09-10 LAB
ALBUMIN SERPL-MCNC: 4.2 G/DL (ref 3.5–5.2)
ALP BLD-CCNC: 51 U/L (ref 35–104)
ALT SERPL-CCNC: 19 U/L (ref 5–33)
ANION GAP SERPL CALCULATED.3IONS-SCNC: 12 MMOL/L (ref 7–19)
AST SERPL-CCNC: 19 U/L (ref 5–32)
BILIRUB SERPL-MCNC: <0.2 MG/DL (ref 0.2–1.2)
BUN BLDV-MCNC: 14 MG/DL (ref 6–20)
CALCIUM SERPL-MCNC: 9.4 MG/DL (ref 8.6–10)
CHLORIDE BLD-SCNC: 99 MMOL/L (ref 98–111)
CO2: 30 MMOL/L (ref 22–29)
CREAT SERPL-MCNC: 0.9 MG/DL (ref 0.5–0.9)
FOLATE: 12.2 NG/ML (ref 4.8–37.3)
GFR NON-AFRICAN AMERICAN: >60
GLUCOSE BLD-MCNC: 390 MG/DL (ref 74–109)
HBA1C MFR BLD: 9.5 % (ref 4–6)
MAGNESIUM: 1.8 MG/DL (ref 1.6–2.6)
POTASSIUM SERPL-SCNC: 4 MMOL/L (ref 3.5–5)
SODIUM BLD-SCNC: 141 MMOL/L (ref 136–145)
TOTAL PROTEIN: 7.3 G/DL (ref 6.6–8.7)
VITAMIN B-12: 554 PG/ML (ref 211–946)

## 2019-09-10 PROCEDURE — 2022F DILAT RTA XM EVC RTNOPTHY: CPT | Performed by: FAMILY MEDICINE

## 2019-09-10 PROCEDURE — G8427 DOCREV CUR MEDS BY ELIG CLIN: HCPCS | Performed by: FAMILY MEDICINE

## 2019-09-10 PROCEDURE — 1111F DSCHRG MED/CURRENT MED MERGE: CPT | Performed by: FAMILY MEDICINE

## 2019-09-10 PROCEDURE — 3046F HEMOGLOBIN A1C LEVEL >9.0%: CPT | Performed by: FAMILY MEDICINE

## 2019-09-10 PROCEDURE — 1036F TOBACCO NON-USER: CPT | Performed by: FAMILY MEDICINE

## 2019-09-10 PROCEDURE — G8417 CALC BMI ABV UP PARAM F/U: HCPCS | Performed by: FAMILY MEDICINE

## 2019-09-10 PROCEDURE — 99215 OFFICE O/P EST HI 40 MIN: CPT | Performed by: FAMILY MEDICINE

## 2019-09-10 RX ORDER — FUROSEMIDE 10 MG/ML
80 INJECTION INTRAMUSCULAR; INTRAVENOUS ONCE
Status: COMPLETED | OUTPATIENT
Start: 2019-09-10 | End: 2019-09-10

## 2019-09-10 RX ORDER — ONDANSETRON 4 MG/1
4 TABLET, ORALLY DISINTEGRATING ORAL EVERY 8 HOURS PRN
Qty: 20 TABLET | Refills: 0 | Status: SHIPPED | OUTPATIENT
Start: 2019-09-10 | End: 2019-10-15 | Stop reason: SDUPTHER

## 2019-09-10 RX ORDER — PRAMIPEXOLE DIHYDROCHLORIDE 0.5 MG/1
0.5 TABLET ORAL 2 TIMES DAILY
Qty: 60 TABLET | Refills: 2 | Status: SHIPPED | OUTPATIENT
Start: 2019-09-10 | End: 2019-11-07 | Stop reason: SDUPTHER

## 2019-09-10 RX ORDER — DOXYCYCLINE 100 MG/1
100 CAPSULE ORAL 2 TIMES DAILY
Qty: 20 CAPSULE | Refills: 0 | Status: SHIPPED | OUTPATIENT
Start: 2019-09-10 | End: 2019-09-20

## 2019-09-10 RX ORDER — TIZANIDINE 4 MG/1
4 TABLET ORAL NIGHTLY PRN
Qty: 10 TABLET | Refills: 0 | Status: SHIPPED | OUTPATIENT
Start: 2019-09-10 | End: 2019-09-19 | Stop reason: SDUPTHER

## 2019-09-10 RX ORDER — FUROSEMIDE 40 MG/1
40 TABLET ORAL 2 TIMES DAILY
Qty: 60 TABLET | Refills: 3 | Status: SHIPPED | OUTPATIENT
Start: 2019-09-10 | End: 2019-10-10 | Stop reason: SDUPTHER

## 2019-09-10 RX ADMIN — FUROSEMIDE 80 MG: 10 INJECTION INTRAMUSCULAR; INTRAVENOUS at 10:51

## 2019-09-10 ASSESSMENT — ENCOUNTER SYMPTOMS
CONSTIPATION: 0
COLOR CHANGE: 1
EYE ITCHING: 0
VOMITING: 0
DIARRHEA: 0
WHEEZING: 0
EYE DISCHARGE: 0
VOICE CHANGE: 0
SHORTNESS OF BREATH: 0
ABDOMINAL PAIN: 0
CHEST TIGHTNESS: 0
NAUSEA: 0
COUGH: 0

## 2019-09-10 NOTE — PROGRESS NOTES
naratriptan (AMERGE) 2.5 MG tablet Take 1 tablet by mouth once as needed for Migraine 2.5 mg at onset of headache, may repeat in 4 hours if needed 9 tablet 3    EPINEPHrine (EPIPEN 2-JOSÉ) 0.3 MG/0.3ML SOAJ injection Inject 0.3 mLs into the muscle once for 1 dose Use as directed for allergic reaction 0.3 mL 0     No current facility-administered medications for this visit.         Allergies   Allergen Reactions    Compazine [Prochlorperazine Maleate] Shortness Of Breath    Ciprofloxacin Hives    Amoxicillin-Pot Clavulanate     Demerol Hives    Hydroxyzine     Hydroxyzine Hcl Itching    Ibuprofen Nausea Only    Ketorolac Tromethamine     Meperidine Hives    Nortriptyline Other (See Comments)    Orphenadrine     Orphenadrine Citrate Itching    Penicillins Hives and Nausea Only    Prochlorperazine Edisylate      Will discuss with patient at next visit     Tobramycin      Will discuss with patient at next visit     Tramadol      Will discuss with patient at next visit     Vistaril [Hydroxyzine Hcl] Itching    Cephalexin Rash    Clindamycin/Lincomycin Rash    Codeine Nausea And Vomiting and Rash    Doxycycline Nausea And Vomiting    Keflex [Cephalexin] Rash    Ketorolac Tromethamine Itching and Nausea And Vomiting    Moxifloxacin Nausea And Vomiting     Will discuss with patient at visit        Past Surgical History:   Procedure Laterality Date    ECTOPIC PREGNANCY SURGERY      EYE SURGERY      cornea transplant and cataracts removed    KNEE CARTILAGE SURGERY Left 12/20/2016    KNEE ARTHROSCOPIC PARTIAL MEDIAL MENISCECTOMY performed by Deloris Carlos MD at [address] [place] [address]         Social History     Tobacco Use    Smoking status: Never Smoker    Smokeless tobacco: Never Used   Substance Use Topics    Alcohol use: No    Drug use: No       Family History   Problem Relation Age of Onset    Cancer Father     Cancer Maternal Grandmother     COPD Maternal Grandmother     Cancer Maternal Grandfather        /82   Resp 20   Wt 264 lb (119.7 kg)   BMI 49.88 kg/m²     Physical Exam   Constitutional: She is oriented to person, place, and time. She appears well-developed and well-nourished. No distress. HENT:   Head: Normocephalic and atraumatic. Right Ear: External ear normal.   Left Ear: External ear normal.   Nose: Nose normal.   Eyes: Conjunctivae are normal. Right eye exhibits no discharge. Left eye exhibits no discharge. No scleral icterus. Neck: No tracheal deviation present. No thyromegaly present. Cardiovascular: Normal rate, regular rhythm and normal heart sounds. No murmur heard. Pulmonary/Chest: Effort normal and breath sounds normal. No respiratory distress. She has no wheezes. She has no rales. Abdominal: Soft. Bowel sounds are normal. She exhibits no distension. There is no tenderness. Musculoskeletal: She exhibits edema (1+ b/l pitting) and tenderness (b/l LE ). Left hand: She exhibits tenderness (L vein swelling on L wrist on dorsal side) and swelling. She exhibits normal range of motion. No pretibial edema b/l. Neurological: She is alert and oriented to person, place, and time. Coordination normal.   Skin: Skin is warm and dry. Lesion noted. She is not diaphoretic. There is erythema (R mid distal tibial and distally). No cyanosis. Psychiatric: Her speech is normal and behavior is normal. Judgment normal. Her mood appears anxious. Cognition and memory are normal. She expresses no homicidal and no suicidal ideation. Nursing note and vitals reviewed. Assessment:    ICD-10-CM    1. Uncontrolled type 2 diabetes mellitus with diabetic neuropathy, with long-term current use of insulin ([facility]) E11.40 Hemoglobin A1C    Z79.4     E11.65    2. Hypokalemia E87.6 Comprehensive Metabolic Panel     Magnesium   3. Lower extremity edema R60.0    4. Cellulitis and abscess of right lower extremity L03.115     L02.415    5.  Chronic fatigue R53.82 Vitamin

## 2019-09-17 ENCOUNTER — TELEPHONE (OUTPATIENT)
Dept: PRIMARY CARE CLINIC | Age: 49
End: 2019-09-17

## 2019-09-19 RX ORDER — TIZANIDINE 4 MG/1
TABLET ORAL
Qty: 10 TABLET | Refills: 0 | Status: SHIPPED | OUTPATIENT
Start: 2019-09-19 | End: 2019-10-10 | Stop reason: SDUPTHER

## 2019-09-24 ENCOUNTER — HOSPITAL ENCOUNTER (EMERGENCY)
Age: 49
Discharge: HOME | End: 2019-09-25
Attending: EMERGENCY MEDICINE
Payer: MEDICAID

## 2019-09-24 DIAGNOSIS — M79.89 LEG SWELLING: Primary | ICD-10-CM

## 2019-09-24 DIAGNOSIS — E87.6 HYPOKALEMIA: ICD-10-CM

## 2019-09-24 DIAGNOSIS — G43.909 MIGRAINE SYNDROME: ICD-10-CM

## 2019-09-24 DIAGNOSIS — E11.42 DIABETIC POLYNEUROPATHY ASSOCIATED WITH TYPE 2 DIABETES MELLITUS (HCC): ICD-10-CM

## 2019-09-24 LAB
ALBUMIN SERPL-MCNC: 4.2 G/DL (ref 3.5–5.2)
ALP BLD-CCNC: 53 U/L (ref 35–104)
ALT SERPL-CCNC: 6 U/L (ref 5–33)
ANION GAP SERPL CALCULATED.3IONS-SCNC: 13 MMOL/L (ref 7–19)
AST SERPL-CCNC: 17 U/L (ref 5–32)
BASOPHILS ABSOLUTE: 0 K/UL (ref 0–0.2)
BASOPHILS RELATIVE PERCENT: 0.4 % (ref 0–1)
BILIRUB SERPL-MCNC: 0.3 MG/DL (ref 0.2–1.2)
BUN BLDV-MCNC: 25 MG/DL (ref 6–20)
CALCIUM SERPL-MCNC: 9.3 MG/DL (ref 8.6–10)
CHLORIDE BLD-SCNC: 96 MMOL/L (ref 98–111)
CO2: 32 MMOL/L (ref 22–29)
CREAT SERPL-MCNC: 1 MG/DL (ref 0.5–0.9)
D DIMER: 0.32 UG/ML FEU (ref 0–0.48)
EOSINOPHILS ABSOLUTE: 0.2 K/UL (ref 0–0.6)
EOSINOPHILS RELATIVE PERCENT: 2.1 % (ref 0–5)
GFR NON-AFRICAN AMERICAN: 59
GLUCOSE BLD-MCNC: 78 MG/DL (ref 74–109)
HCT VFR BLD CALC: 39.8 % (ref 37–47)
HEMOGLOBIN: 13.1 G/DL (ref 12–16)
IMMATURE GRANULOCYTES #: 0 K/UL
LYMPHOCYTES ABSOLUTE: 2.3 K/UL (ref 1.1–4.5)
LYMPHOCYTES RELATIVE PERCENT: 27.3 % (ref 20–40)
MAGNESIUM: 2 MG/DL (ref 1.6–2.6)
MCH RBC QN AUTO: 28.8 PG (ref 27–31)
MCHC RBC AUTO-ENTMCNC: 32.9 G/DL (ref 33–37)
MCV RBC AUTO: 87.5 FL (ref 81–99)
MONOCYTES ABSOLUTE: 0.6 K/UL (ref 0–0.9)
MONOCYTES RELATIVE PERCENT: 7.5 % (ref 0–10)
NEUTROPHILS ABSOLUTE: 5.3 K/UL (ref 1.5–7.5)
NEUTROPHILS RELATIVE PERCENT: 62.5 % (ref 50–65)
PDW BLD-RTO: 12.6 % (ref 11.5–14.5)
PLATELET # BLD: 231 K/UL (ref 130–400)
PMV BLD AUTO: 9.9 FL (ref 9.4–12.3)
POTASSIUM REFLEX MAGNESIUM: 2.8 MMOL/L (ref 3.5–5)
PRO-BNP: 24 PG/ML (ref 0–450)
RBC # BLD: 4.55 M/UL (ref 4.2–5.4)
SODIUM BLD-SCNC: 141 MMOL/L (ref 136–145)
TOTAL PROTEIN: 7.3 G/DL (ref 6.6–8.7)
WBC # BLD: 8.4 K/UL (ref 4.8–10.8)

## 2019-09-24 PROCEDURE — 6360000002 HC RX W HCPCS: Performed by: EMERGENCY MEDICINE

## 2019-09-24 PROCEDURE — 96365 THER/PROPH/DIAG IV INF INIT: CPT

## 2019-09-24 PROCEDURE — 96366 THER/PROPH/DIAG IV INF ADDON: CPT

## 2019-09-24 PROCEDURE — 36415 COLL VENOUS BLD VENIPUNCTURE: CPT

## 2019-09-24 PROCEDURE — 85025 COMPLETE CBC W/AUTO DIFF WBC: CPT

## 2019-09-24 PROCEDURE — 83880 ASSAY OF NATRIURETIC PEPTIDE: CPT

## 2019-09-24 PROCEDURE — 6370000000 HC RX 637 (ALT 250 FOR IP): Performed by: EMERGENCY MEDICINE

## 2019-09-24 PROCEDURE — 96375 TX/PRO/DX INJ NEW DRUG ADDON: CPT

## 2019-09-24 PROCEDURE — 85379 FIBRIN DEGRADATION QUANT: CPT

## 2019-09-24 PROCEDURE — 80053 COMPREHEN METABOLIC PANEL: CPT

## 2019-09-24 PROCEDURE — 99283 EMERGENCY DEPT VISIT LOW MDM: CPT

## 2019-09-24 PROCEDURE — 83735 ASSAY OF MAGNESIUM: CPT

## 2019-09-24 RX ORDER — BUTORPHANOL TARTRATE 10 MG/ML
1 SPRAY, METERED NASAL EVERY 6 HOURS PRN
Qty: 2.5 ML | Refills: 0 | OUTPATIENT
Start: 2019-09-29 | End: 2019-10-28 | Stop reason: SDUPTHER

## 2019-09-24 RX ORDER — ONDANSETRON 2 MG/ML
4 INJECTION INTRAMUSCULAR; INTRAVENOUS ONCE
Status: COMPLETED | OUTPATIENT
Start: 2019-09-24 | End: 2019-09-24

## 2019-09-24 RX ORDER — POTASSIUM CHLORIDE 7.45 MG/ML
10 INJECTION INTRAVENOUS ONCE
Status: COMPLETED | OUTPATIENT
Start: 2019-09-24 | End: 2019-09-25

## 2019-09-24 RX ADMIN — ONDANSETRON 4 MG: 2 INJECTION INTRAMUSCULAR; INTRAVENOUS at 21:15

## 2019-09-24 RX ADMIN — BUTORPHANOL TARTRATE 1 MG: 2 INJECTION, SOLUTION INTRAMUSCULAR; INTRAVENOUS at 21:15

## 2019-09-24 RX ADMIN — POTASSIUM BICARBONATE 40 MEQ: 782 TABLET, EFFERVESCENT ORAL at 22:52

## 2019-09-24 RX ADMIN — POTASSIUM CHLORIDE 10 MEQ: 7.46 INJECTION, SOLUTION INTRAVENOUS at 22:53

## 2019-09-24 ASSESSMENT — ENCOUNTER SYMPTOMS
ABDOMINAL PAIN: 0
APNEA: 0
CHOKING: 0
BLOOD IN STOOL: 0
SINUS PRESSURE: 0
DIARRHEA: 0
NAUSEA: 0
CONSTIPATION: 0
EYE DISCHARGE: 0
FACIAL SWELLING: 0
SORE THROAT: 0
SHORTNESS OF BREATH: 0
VOICE CHANGE: 0

## 2019-09-24 ASSESSMENT — PAIN SCALES - GENERAL
PAINLEVEL_OUTOF10: 6
PAINLEVEL_OUTOF10: 3
PAINLEVEL_OUTOF10: 10

## 2019-09-24 ASSESSMENT — PAIN DESCRIPTION - LOCATION: LOCATION: GENERALIZED

## 2019-09-25 ENCOUNTER — OFFICE VISIT (OUTPATIENT)
Dept: PRIMARY CARE CLINIC | Age: 49
End: 2019-09-25
Payer: MEDICAID

## 2019-09-25 VITALS
DIASTOLIC BLOOD PRESSURE: 58 MMHG | BODY MASS INDEX: 49.88 KG/M2 | HEIGHT: 61 IN | OXYGEN SATURATION: 98 % | HEART RATE: 70 BPM | RESPIRATION RATE: 15 BRPM | SYSTOLIC BLOOD PRESSURE: 112 MMHG | TEMPERATURE: 98.2 F

## 2019-09-25 DIAGNOSIS — R60.0 LOWER EXTREMITY EDEMA: Primary | ICD-10-CM

## 2019-09-25 LAB — POTASSIUM SERPL-SCNC: 3.3 MMOL/L (ref 3.5–5)

## 2019-09-25 PROCEDURE — 6360000002 HC RX W HCPCS: Performed by: EMERGENCY MEDICINE

## 2019-09-25 PROCEDURE — 84132 ASSAY OF SERUM POTASSIUM: CPT

## 2019-09-25 PROCEDURE — 96375 TX/PRO/DX INJ NEW DRUG ADDON: CPT

## 2019-09-25 PROCEDURE — 36415 COLL VENOUS BLD VENIPUNCTURE: CPT

## 2019-09-25 PROCEDURE — 96376 TX/PRO/DX INJ SAME DRUG ADON: CPT

## 2019-09-25 RX ORDER — POTASSIUM CHLORIDE 20 MEQ/1
20 TABLET, EXTENDED RELEASE ORAL 2 TIMES DAILY
Qty: 20 TABLET | Refills: 0 | Status: SHIPPED | OUTPATIENT
Start: 2019-09-25 | End: 2019-10-10 | Stop reason: SDUPTHER

## 2019-09-25 RX ORDER — FUROSEMIDE 10 MG/ML
40 INJECTION INTRAMUSCULAR; INTRAVENOUS ONCE
Status: COMPLETED | OUTPATIENT
Start: 2019-09-25 | End: 2019-09-25

## 2019-09-25 RX ADMIN — BUTORPHANOL TARTRATE 1 MG: 2 INJECTION, SOLUTION INTRAMUSCULAR; INTRAVENOUS at 00:50

## 2019-09-25 RX ADMIN — FUROSEMIDE 40 MG: 10 INJECTION, SOLUTION INTRAMUSCULAR; INTRAVENOUS at 00:50

## 2019-09-25 ASSESSMENT — PAIN SCALES - GENERAL: PAINLEVEL_OUTOF10: 8

## 2019-09-25 NOTE — ED PROVIDER NOTES
syncope. Psychiatric/Behavioral: Negative for behavioral problems, hallucinations and suicidal ideas. All other systems reviewed and are negative. A complete review of systems was performed and is negative except as noted above in the HPI. PAST MEDICAL HISTORY     Past Medical History:   Diagnosis Date    Anxiety     Constipation     Depression     DM (diabetes mellitus) ([address]     Headache(784.0)     Hyperlipidemia     Hypertension     Kidney stones     Kidney stones     Restless leg     Restless legs syndrome          SURGICAL HISTORY       Past Surgical History:   Procedure Laterality Date    ECTOPIC PREGNANCY SURGERY      EYE SURGERY      cornea transplant and cataracts removed    KNEE CARTILAGE SURGERY Left 12/20/2016    KNEE ARTHROSCOPIC PARTIAL MEDIAL MENISCECTOMY performed by Jessie Miranda MD at [address]       Previous Medications    B-D ULTRAFINE III SHORT PEN 31G X 8 MM MISC    USE TO INJECT INSULIN ONCE DAILY    BLOOD GLUCOSE MONITOR KIT AND SUPPLIES    Test 1 times a day & as needed for symptoms of irregular blood glucose. BLOOD GLUCOSE MONITORING SUPPL (ONE TOUCH BASIC SYSTEM) W/DEVICE KIT    1 kit by Does not apply route daily as needed (Dx 250.00)    BUTORPHANOL (STADOL) 10 MG/ML NASAL SPRAY    1 spray by Nasal route every 6 hours as needed for Pain for up to 30 days. CELECOXIB (CELEBREX) 200 MG CAPSULE    TAKE 1 CAPSULE BY MOUTH EVERY DAY    CREAM BASE CREA    Apply 1-2 pumps to affected area 3-4 times daily    EPINEPHRINE (EPIPEN 2-JOSÉ) 0.3 MG/0.3ML SOAJ INJECTION    Inject 0.3 mLs into the muscle once for 1 dose Use as directed for allergic reaction    FLUTICASONE (FLONASE) 50 MCG/ACT NASAL SPRAY    1 spray by Each Nare route daily    FUROSEMIDE (LASIX) 40 MG TABLET    Take 1 tablet by mouth 2 times daily    GABAPENTIN (NEURONTIN) 100 MG CAPSULE    Take 100 mg by mouth daily.     GABAPENTIN (NEURONTIN) 300 MG CAPSULE    TK ONE C PO QHS    HYDROCODONE-ACETAMINOPHEN (NORCO) 7.5-325 MG PER TABLET    Take 1 tablet by mouth every 6 hours as needed for Pain. .    INSULIN GLARGINE (BASAGLAR KWIKPEN) 100 UNIT/ML INJECTION PEN    Inject 30 U Nightly    MAGNESIUM (MAGNESIUM-OXIDE) 250 MG TABS TABLET    Take 1 tablet by mouth daily    METFORMIN (GLUCOPHAGE) 500 MG TABLET    TAKE 1 TABLET BY MOUTH TWICE DAILY WITH MEALS    OMEPRAZOLE (PRILOSEC) 20 MG DELAYED RELEASE CAPSULE    Take 1 capsule by mouth 2 times daily (before meals)    ONDANSETRON (ZOFRAN ODT) 4 MG DISINTEGRATING TABLET    Take 1 tablet by mouth every 8 hours as needed for Nausea or Vomiting    ONE TOUCH LANCETS MISC    1 each by Does not apply route daily    ONE TOUCH ULTRA TEST STRIP    TEST BLOOD SUGAR ONCE DAILY    ONETOUCH DELICA LANCETS FINE MISC    USE TO CHECK BLOOD SUGAR AS DIRECTED    PHENTERMINE (ADIPEX-P) 37.5 MG TABLET    Take 1 tablet by mouth every morning (before breakfast) for 30 days. PRAMIPEXOLE (MIRAPEX) 0.5 MG TABLET    Take 1 tablet by mouth 2 times daily    PROMETHAZINE (PHENERGAN) 25 MG TABLET    TAKE 1 TABLET BY MOUTH EVERY 6 HOURS AS NEEDED FOR NAUSEA    RIZATRIPTAN (MAXALT) 10 MG TABLET    Take 1 tablet by mouth once as needed for Migraine May repeat in 2 hours if needed    TIZANIDINE (ZANAFLEX) 4 MG TABLET    TAKE 1 TABLET BY MOUTH EVERY NIGHT AS NEEDED FOR CRAMPING    TOPIRAMATE (TOPAMAX) 50 MG TABLET    TAKE 1 TABLET BY MOUTH TWICE DAILY    TRIAMTERENE-HYDROCHLOROTHIAZIDE (DYAZIDE) 37.5-25 MG PER CAPSULE    TAKE 1 CAPSULE BY MOUTH EVERY DAY IN THE MORNING    VERAPAMIL (CALAN SR) 240 MG EXTENDED RELEASE TABLET    TAKE 1 TABLET BY MOUTH EVERY NIGHT    VICTOZA 18 MG/3ML SOPN SC INJECTION    ADMINISTER 1.8 MG INTO THE SKIN EVERY DAY    VITAMIN B-6 (PYRIDOXINE) 50 MG TABLET    Take 1 tablet by mouth daily       ALLERGIES     Compazine [prochlorperazine maleate]; Ciprofloxacin; Amoxicillin-pot clavulanate; Demerol; Hydroxyzine;  Hydroxyzine hcl;

## 2019-09-26 PROCEDURE — 96372 THER/PROPH/DIAG INJ SC/IM: CPT | Performed by: FAMILY MEDICINE

## 2019-09-26 RX ORDER — FUROSEMIDE 10 MG/ML
80 INJECTION INTRAMUSCULAR; INTRAVENOUS ONCE
Status: COMPLETED | OUTPATIENT
Start: 2019-09-25 | End: 2019-09-26

## 2019-09-26 RX ADMIN — FUROSEMIDE 80 MG: 10 INJECTION INTRAMUSCULAR; INTRAVENOUS at 08:03

## 2019-09-30 LAB
EKG P AXIS: 46 DEGREES
EKG P-R INTERVAL: 198 MS
EKG Q-T INTERVAL: 412 MS
EKG QRS DURATION: 104 MS
EKG QTC CALCULATION (BAZETT): 447 MS
EKG T AXIS: 43 DEGREES

## 2019-10-03 DIAGNOSIS — M79.604 PAIN IN BOTH LOWER EXTREMITIES: ICD-10-CM

## 2019-10-03 DIAGNOSIS — E11.65 UNCONTROLLED TYPE 2 DIABETES MELLITUS WITH HYPERGLYCEMIA, WITHOUT LONG-TERM CURRENT USE OF INSULIN (HCC): ICD-10-CM

## 2019-10-03 DIAGNOSIS — I10 ESSENTIAL HYPERTENSION: ICD-10-CM

## 2019-10-03 DIAGNOSIS — M79.605 PAIN IN BOTH LOWER EXTREMITIES: ICD-10-CM

## 2019-10-03 RX ORDER — TRIAMTERENE AND HYDROCHLOROTHIAZIDE 37.5; 25 MG/1; MG/1
CAPSULE ORAL
Qty: 90 CAPSULE | Refills: 0 | Status: SHIPPED | OUTPATIENT
Start: 2019-10-03 | End: 2020-06-21

## 2019-10-03 RX ORDER — LANOLIN ALCOHOL/MO/W.PET/CERES
CREAM (GRAM) TOPICAL
Qty: 30 TABLET | Refills: 0 | Status: SHIPPED | OUTPATIENT
Start: 2019-10-03 | End: 2020-06-10

## 2019-10-08 ENCOUNTER — TELEPHONE (OUTPATIENT)
Dept: BARIATRICS/WEIGHT MGMT | Facility: CLINIC | Age: 49
End: 2019-10-08

## 2019-10-08 NOTE — TELEPHONE ENCOUNTER
Called to confirm patient's apt for 10/09/2019. She said that she wants to wait until Jan to come back in as she is wanting to get her diabetes straightened out.

## 2019-10-10 ENCOUNTER — OFFICE VISIT (OUTPATIENT)
Dept: PRIMARY CARE CLINIC | Age: 49
End: 2019-10-10
Payer: MEDICAID

## 2019-10-10 VITALS
BODY MASS INDEX: 49.88 KG/M2 | RESPIRATION RATE: 20 BRPM | HEIGHT: 61 IN | SYSTOLIC BLOOD PRESSURE: 122 MMHG | OXYGEN SATURATION: 98 % | HEART RATE: 85 BPM | DIASTOLIC BLOOD PRESSURE: 78 MMHG

## 2019-10-10 DIAGNOSIS — M70.61 GREATER TROCHANTERIC BURSITIS OF BOTH HIPS: ICD-10-CM

## 2019-10-10 DIAGNOSIS — F41.1 GAD (GENERALIZED ANXIETY DISORDER): ICD-10-CM

## 2019-10-10 DIAGNOSIS — M70.62 GREATER TROCHANTERIC BURSITIS OF BOTH HIPS: ICD-10-CM

## 2019-10-10 DIAGNOSIS — M79.7 FIBROMYALGIA: ICD-10-CM

## 2019-10-10 DIAGNOSIS — I87.2 VENOUS INSUFFICIENCY (CHRONIC) (PERIPHERAL): ICD-10-CM

## 2019-10-10 DIAGNOSIS — F42.2 MIXED OBSESSIONAL THOUGHTS AND ACTS: ICD-10-CM

## 2019-10-10 PROCEDURE — 99214 OFFICE O/P EST MOD 30 MIN: CPT | Performed by: FAMILY MEDICINE

## 2019-10-10 PROCEDURE — 1036F TOBACCO NON-USER: CPT | Performed by: FAMILY MEDICINE

## 2019-10-10 PROCEDURE — G8417 CALC BMI ABV UP PARAM F/U: HCPCS | Performed by: FAMILY MEDICINE

## 2019-10-10 PROCEDURE — G8427 DOCREV CUR MEDS BY ELIG CLIN: HCPCS | Performed by: FAMILY MEDICINE

## 2019-10-10 PROCEDURE — G8484 FLU IMMUNIZE NO ADMIN: HCPCS | Performed by: FAMILY MEDICINE

## 2019-10-10 PROCEDURE — 2022F DILAT RTA XM EVC RTNOPTHY: CPT | Performed by: FAMILY MEDICINE

## 2019-10-10 PROCEDURE — 3046F HEMOGLOBIN A1C LEVEL >9.0%: CPT | Performed by: FAMILY MEDICINE

## 2019-10-10 RX ORDER — TRIAMCINOLONE ACETONIDE 40 MG/ML
40 INJECTION, SUSPENSION INTRA-ARTICULAR; INTRAMUSCULAR ONCE
Status: COMPLETED | OUTPATIENT
Start: 2019-10-10 | End: 2019-10-10

## 2019-10-10 RX ORDER — TIZANIDINE 4 MG/1
TABLET ORAL
Qty: 10 TABLET | Refills: 0 | Status: SHIPPED | OUTPATIENT
Start: 2019-10-10 | End: 2019-12-04 | Stop reason: SDUPTHER

## 2019-10-10 RX ORDER — POTASSIUM CHLORIDE 20 MEQ/1
20 TABLET, EXTENDED RELEASE ORAL 2 TIMES DAILY
Qty: 20 TABLET | Refills: 0 | Status: SHIPPED | OUTPATIENT
Start: 2019-10-10 | End: 2020-06-10

## 2019-10-10 RX ORDER — FUROSEMIDE 80 MG
80 TABLET ORAL 2 TIMES DAILY
Qty: 60 TABLET | Refills: 2 | Status: SHIPPED | OUTPATIENT
Start: 2019-10-10 | End: 2020-03-30

## 2019-10-10 RX ADMIN — TRIAMCINOLONE ACETONIDE 40 MG: 40 INJECTION, SUSPENSION INTRA-ARTICULAR; INTRAMUSCULAR at 10:47

## 2019-10-10 RX ADMIN — TRIAMCINOLONE ACETONIDE 40 MG: 40 INJECTION, SUSPENSION INTRA-ARTICULAR; INTRAMUSCULAR at 10:46

## 2019-10-10 ASSESSMENT — ENCOUNTER SYMPTOMS
DIARRHEA: 0
NAUSEA: 0
VOMITING: 0
CHEST TIGHTNESS: 0
SHORTNESS OF BREATH: 0
ABDOMINAL PAIN: 0
COUGH: 0
WHEEZING: 0
CONSTIPATION: 0

## 2019-10-15 RX ORDER — ONDANSETRON 4 MG/1
TABLET, ORALLY DISINTEGRATING ORAL
Qty: 20 TABLET | Refills: 0 | Status: SHIPPED | OUTPATIENT
Start: 2019-10-15 | End: 2020-03-31

## 2019-10-25 ENCOUNTER — OFFICE VISIT (OUTPATIENT)
Dept: URGENT CARE | Age: 49
End: 2019-10-25
Payer: MEDICAID

## 2019-10-25 ENCOUNTER — HOSPITAL ENCOUNTER (OUTPATIENT)
Dept: GENERAL RADIOLOGY | Age: 49
Discharge: HOME | End: 2019-10-25
Payer: MEDICAID

## 2019-10-25 VITALS
WEIGHT: 259 LBS | HEIGHT: 61 IN | HEART RATE: 80 BPM | TEMPERATURE: 98.5 F | RESPIRATION RATE: 18 BRPM | OXYGEN SATURATION: 99 % | BODY MASS INDEX: 48.9 KG/M2 | DIASTOLIC BLOOD PRESSURE: 82 MMHG | SYSTOLIC BLOOD PRESSURE: 128 MMHG

## 2019-10-25 DIAGNOSIS — M79.671 PAIN OF RIGHT HEEL: ICD-10-CM

## 2019-10-25 DIAGNOSIS — M77.31 HEEL SPUR, RIGHT: Primary | ICD-10-CM

## 2019-10-25 DIAGNOSIS — M79.671 ACUTE FOOT PAIN, RIGHT: ICD-10-CM

## 2019-10-25 PROCEDURE — 1036F TOBACCO NON-USER: CPT | Performed by: SPECIALIST

## 2019-10-25 PROCEDURE — G8484 FLU IMMUNIZE NO ADMIN: HCPCS | Performed by: SPECIALIST

## 2019-10-25 PROCEDURE — 73650 X-RAY EXAM OF HEEL: CPT

## 2019-10-25 PROCEDURE — 99213 OFFICE O/P EST LOW 20 MIN: CPT | Performed by: SPECIALIST

## 2019-10-25 PROCEDURE — 73620 X-RAY EXAM OF FOOT: CPT

## 2019-10-25 PROCEDURE — G8417 CALC BMI ABV UP PARAM F/U: HCPCS | Performed by: SPECIALIST

## 2019-10-25 PROCEDURE — G8427 DOCREV CUR MEDS BY ELIG CLIN: HCPCS | Performed by: SPECIALIST

## 2019-10-25 RX ORDER — DEXAMETHASONE SODIUM PHOSPHATE 10 MG/ML
10 INJECTION INTRAMUSCULAR; INTRAVENOUS ONCE
Status: COMPLETED | OUTPATIENT
Start: 2019-10-25 | End: 2019-10-25

## 2019-10-25 RX ADMIN — DEXAMETHASONE SODIUM PHOSPHATE 10 MG: 10 INJECTION INTRAMUSCULAR; INTRAVENOUS at 13:05

## 2019-10-28 DIAGNOSIS — G43.909 MIGRAINE SYNDROME: ICD-10-CM

## 2019-10-30 RX ORDER — BUTORPHANOL TARTRATE 10 MG/ML
1 SPRAY, METERED NASAL EVERY 6 HOURS PRN
Qty: 2.5 ML | Refills: 0 | Status: SHIPPED | OUTPATIENT
Start: 2019-10-30 | End: 2019-11-21 | Stop reason: SDUPTHER

## 2019-10-30 RX ORDER — BUTORPHANOL TARTRATE 10 MG/ML
1 SPRAY, METERED NASAL EVERY 6 HOURS PRN
Qty: 2.5 ML | Refills: 0 | Status: SHIPPED | OUTPATIENT
Start: 2019-11-28 | End: 2019-12-18 | Stop reason: SDUPTHER

## 2019-10-30 RX ORDER — PREDNISONE 10 MG/1
30 TABLET ORAL DAILY
Qty: 30 TABLET | Refills: 0 | Status: SHIPPED | OUTPATIENT
Start: 2019-10-30 | End: 2019-11-09

## 2019-11-02 DIAGNOSIS — E11.65 UNCONTROLLED TYPE 2 DIABETES MELLITUS WITH HYPERGLYCEMIA, WITHOUT LONG-TERM CURRENT USE OF INSULIN (HCC): ICD-10-CM

## 2019-11-03 RX ORDER — LANCETS 33 GAUGE
EACH MISCELLANEOUS
Qty: 100 EACH | Refills: 1 | Status: SHIPPED | OUTPATIENT
Start: 2019-11-03 | End: 2021-10-21 | Stop reason: SDUPTHER

## 2019-11-07 ENCOUNTER — OFFICE VISIT (OUTPATIENT)
Dept: PRIMARY CARE CLINIC | Age: 49
End: 2019-11-07
Payer: MEDICAID

## 2019-11-07 VITALS
HEIGHT: 61 IN | DIASTOLIC BLOOD PRESSURE: 78 MMHG | HEART RATE: 79 BPM | SYSTOLIC BLOOD PRESSURE: 128 MMHG | OXYGEN SATURATION: 98 % | BODY MASS INDEX: 50.03 KG/M2 | WEIGHT: 265 LBS | TEMPERATURE: 97.6 F

## 2019-11-07 DIAGNOSIS — M72.2 PLANTAR FASCIITIS: Primary | ICD-10-CM

## 2019-11-07 DIAGNOSIS — G25.81 RLS (RESTLESS LEGS SYNDROME): ICD-10-CM

## 2019-11-07 PROCEDURE — 1036F TOBACCO NON-USER: CPT | Performed by: FAMILY MEDICINE

## 2019-11-07 PROCEDURE — G8417 CALC BMI ABV UP PARAM F/U: HCPCS | Performed by: FAMILY MEDICINE

## 2019-11-07 PROCEDURE — 2022F DILAT RTA XM EVC RTNOPTHY: CPT | Performed by: FAMILY MEDICINE

## 2019-11-07 PROCEDURE — G8427 DOCREV CUR MEDS BY ELIG CLIN: HCPCS | Performed by: FAMILY MEDICINE

## 2019-11-07 PROCEDURE — G8484 FLU IMMUNIZE NO ADMIN: HCPCS | Performed by: FAMILY MEDICINE

## 2019-11-07 PROCEDURE — 3046F HEMOGLOBIN A1C LEVEL >9.0%: CPT | Performed by: FAMILY MEDICINE

## 2019-11-07 PROCEDURE — 99214 OFFICE O/P EST MOD 30 MIN: CPT | Performed by: FAMILY MEDICINE

## 2019-11-07 RX ORDER — PRAMIPEXOLE DIHYDROCHLORIDE 0.75 MG/1
0.75 TABLET ORAL 2 TIMES DAILY
Qty: 60 TABLET | Refills: 1 | Status: SHIPPED | OUTPATIENT
Start: 2019-11-07 | End: 2020-01-01 | Stop reason: SDUPTHER

## 2019-11-07 ASSESSMENT — ENCOUNTER SYMPTOMS
WHEEZING: 0
CHEST TIGHTNESS: 0
DIARRHEA: 0
SHORTNESS OF BREATH: 0
VOMITING: 0
COUGH: 0
NAUSEA: 0
CONSTIPATION: 0
ABDOMINAL PAIN: 0

## 2019-11-08 ENCOUNTER — TELEPHONE (OUTPATIENT)
Dept: PRIMARY CARE CLINIC | Age: 49
End: 2019-11-08

## 2019-11-21 DIAGNOSIS — G43.909 MIGRAINE SYNDROME: ICD-10-CM

## 2019-11-21 RX ORDER — BUTORPHANOL TARTRATE 10 MG/ML
1 SPRAY, METERED NASAL EVERY 6 HOURS PRN
Qty: 2.5 ML | Refills: 0 | Status: SHIPPED | OUTPATIENT
Start: 2019-11-26 | End: 2019-12-04 | Stop reason: SDUPTHER

## 2019-12-04 ENCOUNTER — OFFICE VISIT (OUTPATIENT)
Dept: PRIMARY CARE CLINIC | Age: 49
End: 2019-12-04
Payer: MEDICAID

## 2019-12-04 ENCOUNTER — HOSPITAL ENCOUNTER (EMERGENCY)
Age: 49
Discharge: HOME | End: 2019-12-05
Payer: MEDICAID

## 2019-12-04 ENCOUNTER — NURSE TRIAGE (OUTPATIENT)
Dept: OTHER | Facility: CLINIC | Age: 49
End: 2019-12-04

## 2019-12-04 ENCOUNTER — TELEPHONE (OUTPATIENT)
Dept: PRIMARY CARE CLINIC | Age: 49
End: 2019-12-04

## 2019-12-04 VITALS
BODY MASS INDEX: 52.11 KG/M2 | DIASTOLIC BLOOD PRESSURE: 82 MMHG | WEIGHT: 276 LBS | TEMPERATURE: 97.9 F | OXYGEN SATURATION: 97 % | HEIGHT: 61 IN | SYSTOLIC BLOOD PRESSURE: 130 MMHG | HEART RATE: 87 BPM

## 2019-12-04 DIAGNOSIS — R60.9 EDEMA, UNSPECIFIED TYPE: ICD-10-CM

## 2019-12-04 DIAGNOSIS — E16.2 HYPOGLYCEMIA: Primary | ICD-10-CM

## 2019-12-04 DIAGNOSIS — R60.0 LOWER EXTREMITY EDEMA: ICD-10-CM

## 2019-12-04 DIAGNOSIS — R60.0 LOWER EXTREMITY EDEMA: Primary | ICD-10-CM

## 2019-12-04 LAB
ALBUMIN SERPL-MCNC: 4.2 G/DL (ref 3.5–5.2)
ALP BLD-CCNC: 59 U/L (ref 35–104)
ALT SERPL-CCNC: 18 U/L (ref 5–33)
ANION GAP SERPL CALCULATED.3IONS-SCNC: 13 MMOL/L (ref 7–19)
AST SERPL-CCNC: 22 U/L (ref 5–32)
BASOPHILS ABSOLUTE: 0 K/UL (ref 0–0.2)
BASOPHILS RELATIVE PERCENT: 0.4 % (ref 0–1)
BILIRUB SERPL-MCNC: <0.2 MG/DL (ref 0.2–1.2)
BUN BLDV-MCNC: 14 MG/DL (ref 6–20)
CALCIUM SERPL-MCNC: 9.8 MG/DL (ref 8.6–10)
CHLORIDE BLD-SCNC: 101 MMOL/L (ref 98–111)
CO2: 30 MMOL/L (ref 22–29)
CREAT SERPL-MCNC: 0.8 MG/DL (ref 0.5–0.9)
EOSINOPHILS ABSOLUTE: 0.1 K/UL (ref 0–0.6)
EOSINOPHILS RELATIVE PERCENT: 0.8 % (ref 0–5)
GFR NON-AFRICAN AMERICAN: >60
GLUCOSE BLD-MCNC: 19 MG/DL (ref 74–109)
GLUCOSE BLD-MCNC: 47 MG/DL
GLUCOSE BLD-MCNC: 47 MG/DL (ref 70–99)
HCT VFR BLD CALC: 44.4 % (ref 37–47)
HEMOGLOBIN: 14.3 G/DL (ref 12–16)
IMMATURE GRANULOCYTES #: 0 K/UL
LYMPHOCYTES ABSOLUTE: 2.6 K/UL (ref 1.1–4.5)
LYMPHOCYTES RELATIVE PERCENT: 26.8 % (ref 20–40)
MCH RBC QN AUTO: 28.7 PG (ref 27–31)
MCHC RBC AUTO-ENTMCNC: 32.2 G/DL (ref 33–37)
MCV RBC AUTO: 89.2 FL (ref 81–99)
MONOCYTES ABSOLUTE: 0.5 K/UL (ref 0–0.9)
MONOCYTES RELATIVE PERCENT: 5 % (ref 0–10)
NEUTROPHILS ABSOLUTE: 6.4 K/UL (ref 1.5–7.5)
NEUTROPHILS RELATIVE PERCENT: 66.7 % (ref 50–65)
PDW BLD-RTO: 12.7 % (ref 11.5–14.5)
PERFORMED ON: ABNORMAL
PLATELET # BLD: 224 K/UL (ref 130–400)
PMV BLD AUTO: 10.2 FL (ref 9.4–12.3)
POTASSIUM SERPL-SCNC: 3.3 MMOL/L (ref 3.5–5)
RBC # BLD: 4.98 M/UL (ref 4.2–5.4)
SODIUM BLD-SCNC: 144 MMOL/L (ref 136–145)
TOTAL PROTEIN: 7.4 G/DL (ref 6.6–8.7)
WBC # BLD: 9.7 K/UL (ref 4.8–10.8)

## 2019-12-04 PROCEDURE — 1036F TOBACCO NON-USER: CPT | Performed by: NURSE PRACTITIONER

## 2019-12-04 PROCEDURE — 99214 OFFICE O/P EST MOD 30 MIN: CPT | Performed by: NURSE PRACTITIONER

## 2019-12-04 PROCEDURE — G8427 DOCREV CUR MEDS BY ELIG CLIN: HCPCS | Performed by: NURSE PRACTITIONER

## 2019-12-04 PROCEDURE — 99284 EMERGENCY DEPT VISIT MOD MDM: CPT

## 2019-12-04 PROCEDURE — G8417 CALC BMI ABV UP PARAM F/U: HCPCS | Performed by: NURSE PRACTITIONER

## 2019-12-04 PROCEDURE — 82948 REAGENT STRIP/BLOOD GLUCOSE: CPT

## 2019-12-04 PROCEDURE — G8484 FLU IMMUNIZE NO ADMIN: HCPCS | Performed by: NURSE PRACTITIONER

## 2019-12-04 RX ORDER — TIZANIDINE 4 MG/1
TABLET ORAL
Qty: 10 TABLET | Refills: 0 | Status: ON HOLD | OUTPATIENT
Start: 2019-12-04 | End: 2020-09-21

## 2019-12-04 ASSESSMENT — ENCOUNTER SYMPTOMS
VOMITING: 0
COUGH: 0
COLOR CHANGE: 0
SHORTNESS OF BREATH: 0
NAUSEA: 0
BACK PAIN: 0
VOICE CHANGE: 0
PHOTOPHOBIA: 0
RHINORRHEA: 0

## 2019-12-04 ASSESSMENT — PAIN SCALES - GENERAL: PAINLEVEL_OUTOF10: 6

## 2019-12-04 ASSESSMENT — PAIN DESCRIPTION - DESCRIPTORS: DESCRIPTORS: PATIENT UNABLE TO DESCRIBE

## 2019-12-04 ASSESSMENT — PAIN DESCRIPTION - LOCATION: LOCATION: HEAD

## 2019-12-05 VITALS
RESPIRATION RATE: 17 BRPM | HEIGHT: 61 IN | DIASTOLIC BLOOD PRESSURE: 70 MMHG | WEIGHT: 272 LBS | SYSTOLIC BLOOD PRESSURE: 133 MMHG | HEART RATE: 83 BPM | BODY MASS INDEX: 51.35 KG/M2 | TEMPERATURE: 98 F | OXYGEN SATURATION: 98 %

## 2019-12-05 LAB
ALBUMIN SERPL-MCNC: 3.8 G/DL (ref 3.5–5.2)
ALP BLD-CCNC: 54 U/L (ref 35–104)
ALT SERPL-CCNC: 16 U/L (ref 5–33)
ANION GAP SERPL CALCULATED.3IONS-SCNC: 9 MMOL/L (ref 7–19)
AST SERPL-CCNC: 23 U/L (ref 5–32)
BASOPHILS ABSOLUTE: 0 K/UL (ref 0–0.2)
BASOPHILS RELATIVE PERCENT: 0.4 % (ref 0–1)
BILIRUB SERPL-MCNC: <0.2 MG/DL (ref 0.2–1.2)
BUN BLDV-MCNC: 14 MG/DL (ref 6–20)
CALCIUM SERPL-MCNC: 9.5 MG/DL (ref 8.6–10)
CHLORIDE BLD-SCNC: 101 MMOL/L (ref 98–111)
CO2: 30 MMOL/L (ref 22–29)
CREAT SERPL-MCNC: 0.9 MG/DL (ref 0.5–0.9)
EOSINOPHILS ABSOLUTE: 0.1 K/UL (ref 0–0.6)
EOSINOPHILS RELATIVE PERCENT: 1.1 % (ref 0–5)
GFR NON-AFRICAN AMERICAN: >60
GLUCOSE BLD-MCNC: 129 MG/DL (ref 70–99)
GLUCOSE BLD-MCNC: 202 MG/DL (ref 70–99)
GLUCOSE BLD-MCNC: 76 MG/DL
GLUCOSE BLD-MCNC: 76 MG/DL (ref 70–99)
GLUCOSE BLD-MCNC: 91 MG/DL (ref 74–109)
HCT VFR BLD CALC: 39.5 % (ref 37–47)
HEMOGLOBIN: 12.9 G/DL (ref 12–16)
IMMATURE GRANULOCYTES #: 0 K/UL
LYMPHOCYTES ABSOLUTE: 2.4 K/UL (ref 1.1–4.5)
LYMPHOCYTES RELATIVE PERCENT: 29.4 % (ref 20–40)
MCH RBC QN AUTO: 29.1 PG (ref 27–31)
MCHC RBC AUTO-ENTMCNC: 32.7 G/DL (ref 33–37)
MCV RBC AUTO: 89 FL (ref 81–99)
MONOCYTES ABSOLUTE: 0.5 K/UL (ref 0–0.9)
MONOCYTES RELATIVE PERCENT: 6.3 % (ref 0–10)
NEUTROPHILS ABSOLUTE: 5.2 K/UL (ref 1.5–7.5)
NEUTROPHILS RELATIVE PERCENT: 62.4 % (ref 50–65)
PDW BLD-RTO: 12.6 % (ref 11.5–14.5)
PERFORMED ON: ABNORMAL
PERFORMED ON: ABNORMAL
PERFORMED ON: NORMAL
PLATELET # BLD: 194 K/UL (ref 130–400)
PMV BLD AUTO: 10.5 FL (ref 9.4–12.3)
POTASSIUM SERPL-SCNC: 3.6 MMOL/L (ref 3.5–5)
RBC # BLD: 4.44 M/UL (ref 4.2–5.4)
SODIUM BLD-SCNC: 140 MMOL/L (ref 136–145)
TOTAL PROTEIN: 6.5 G/DL (ref 6.6–8.7)
WBC # BLD: 8.3 K/UL (ref 4.8–10.8)

## 2019-12-05 PROCEDURE — 96374 THER/PROPH/DIAG INJ IV PUSH: CPT

## 2019-12-05 PROCEDURE — 85025 COMPLETE CBC W/AUTO DIFF WBC: CPT

## 2019-12-05 PROCEDURE — 80053 COMPREHEN METABOLIC PANEL: CPT

## 2019-12-05 PROCEDURE — 6370000000 HC RX 637 (ALT 250 FOR IP): Performed by: NURSE PRACTITIONER

## 2019-12-05 PROCEDURE — 36415 COLL VENOUS BLD VENIPUNCTURE: CPT

## 2019-12-05 PROCEDURE — 82948 REAGENT STRIP/BLOOD GLUCOSE: CPT

## 2019-12-05 PROCEDURE — 6360000002 HC RX W HCPCS: Performed by: NURSE PRACTITIONER

## 2019-12-05 PROCEDURE — 96375 TX/PRO/DX INJ NEW DRUG ADDON: CPT

## 2019-12-05 RX ORDER — FUROSEMIDE 10 MG/ML
40 INJECTION INTRAMUSCULAR; INTRAVENOUS ONCE
Status: COMPLETED | OUTPATIENT
Start: 2019-12-05 | End: 2019-12-05

## 2019-12-05 RX ORDER — ONDANSETRON 4 MG/1
4 TABLET, ORALLY DISINTEGRATING ORAL ONCE
Status: COMPLETED | OUTPATIENT
Start: 2019-12-05 | End: 2019-12-05

## 2019-12-05 RX ORDER — DEXTROSE MONOHYDRATE 25 G/50ML
12.5 INJECTION, SOLUTION INTRAVENOUS ONCE
Status: DISCONTINUED | OUTPATIENT
Start: 2019-12-05 | End: 2019-12-05

## 2019-12-05 RX ADMIN — ONDANSETRON 4 MG: 4 TABLET, ORALLY DISINTEGRATING ORAL at 01:55

## 2019-12-05 RX ADMIN — FUROSEMIDE 40 MG: 40 INJECTION, SOLUTION INTRAMUSCULAR; INTRAVENOUS at 03:20

## 2019-12-05 RX ADMIN — BUTORPHANOL TARTRATE 1 MG: 2 INJECTION, SOLUTION INTRAMUSCULAR; INTRAVENOUS at 01:55

## 2019-12-05 ASSESSMENT — ENCOUNTER SYMPTOMS
VISUAL CHANGE: 0
DIARRHEA: 0
VOMITING: 0

## 2019-12-05 ASSESSMENT — PAIN DESCRIPTION - LOCATION: LOCATION: HEAD

## 2019-12-05 ASSESSMENT — PAIN SCALES - GENERAL: PAINLEVEL_OUTOF10: 7

## 2019-12-05 ASSESSMENT — PAIN - FUNCTIONAL ASSESSMENT: PAIN_FUNCTIONAL_ASSESSMENT: 0-10

## 2019-12-13 ENCOUNTER — HOSPITAL ENCOUNTER (OUTPATIENT)
Dept: NON INVASIVE DIAGNOSTICS | Age: 49
Discharge: HOME | End: 2019-12-13
Payer: MEDICAID

## 2019-12-13 DIAGNOSIS — R60.9 EDEMA, UNSPECIFIED TYPE: ICD-10-CM

## 2019-12-13 DIAGNOSIS — R53.83 FATIGUE, UNSPECIFIED TYPE: ICD-10-CM

## 2019-12-13 LAB
LV EF: 58 %
LVEF MODALITY: NORMAL

## 2019-12-13 PROCEDURE — 93306 TTE W/DOPPLER COMPLETE: CPT

## 2019-12-17 DIAGNOSIS — G43.909 MIGRAINE SYNDROME: ICD-10-CM

## 2019-12-18 DIAGNOSIS — G43.909 MIGRAINE SYNDROME: ICD-10-CM

## 2019-12-18 RX ORDER — BUTORPHANOL TARTRATE 10 MG/ML
1 SPRAY, METERED NASAL EVERY 6 HOURS PRN
Qty: 2.5 ML | Refills: 0 | Status: SHIPPED | OUTPATIENT
Start: 2019-12-18 | End: 2019-12-18 | Stop reason: SDUPTHER

## 2019-12-18 RX ORDER — BUTORPHANOL TARTRATE 10 MG/ML
1 SPRAY, METERED NASAL EVERY 6 HOURS PRN
Qty: 2.5 ML | Refills: 0 | Status: SHIPPED | OUTPATIENT
Start: 2019-12-18 | End: 2020-04-30 | Stop reason: SDUPTHER

## 2019-12-23 DIAGNOSIS — R60.0 LOWER EXTREMITY EDEMA: Primary | ICD-10-CM

## 2019-12-23 DIAGNOSIS — M79.605 PAIN IN BOTH LOWER EXTREMITIES: ICD-10-CM

## 2019-12-23 DIAGNOSIS — M79.604 PAIN IN BOTH LOWER EXTREMITIES: ICD-10-CM

## 2019-12-23 DIAGNOSIS — E66.9 OBESITY WITH SERIOUS COMORBIDITY, UNSPECIFIED CLASSIFICATION, UNSPECIFIED OBESITY TYPE: ICD-10-CM

## 2020-01-01 RX ORDER — PRAMIPEXOLE DIHYDROCHLORIDE 0.75 MG/1
0.75 TABLET ORAL 2 TIMES DAILY
Qty: 60 TABLET | Refills: 1 | Status: SHIPPED | OUTPATIENT
Start: 2020-01-01 | End: 2020-02-28

## 2020-01-01 RX ORDER — PRAMIPEXOLE DIHYDROCHLORIDE 0.5 MG/1
TABLET ORAL
Qty: 60 TABLET | Refills: 5 | OUTPATIENT
Start: 2020-01-01

## 2020-01-02 RX ORDER — PROMETHAZINE HYDROCHLORIDE 25 MG/1
TABLET ORAL
Qty: 30 TABLET | Refills: 0 | Status: SHIPPED | OUTPATIENT
Start: 2020-01-02 | End: 2020-10-27

## 2020-01-07 ENCOUNTER — OFFICE VISIT (OUTPATIENT)
Dept: URGENT CARE | Age: 50
End: 2020-01-07
Payer: MEDICAID

## 2020-01-07 VITALS
HEART RATE: 85 BPM | WEIGHT: 270 LBS | RESPIRATION RATE: 18 BRPM | DIASTOLIC BLOOD PRESSURE: 82 MMHG | OXYGEN SATURATION: 99 % | SYSTOLIC BLOOD PRESSURE: 140 MMHG | BODY MASS INDEX: 50.98 KG/M2 | HEIGHT: 61 IN | TEMPERATURE: 98.1 F

## 2020-01-07 PROCEDURE — 99213 OFFICE O/P EST LOW 20 MIN: CPT | Performed by: NURSE PRACTITIONER

## 2020-01-07 RX ORDER — DEXAMETHASONE SODIUM PHOSPHATE 10 MG/ML
10 INJECTION INTRAMUSCULAR; INTRAVENOUS ONCE
Status: COMPLETED | OUTPATIENT
Start: 2020-01-07 | End: 2020-01-07

## 2020-01-07 RX ADMIN — DEXAMETHASONE SODIUM PHOSPHATE 10 MG: 10 INJECTION INTRAMUSCULAR; INTRAVENOUS at 12:19

## 2020-01-07 ASSESSMENT — ENCOUNTER SYMPTOMS
RHINORRHEA: 1
SORE THROAT: 0
SHORTNESS OF BREATH: 0
ABDOMINAL PAIN: 0
EYES NEGATIVE: 1
VOMITING: 0
EYE REDNESS: 0
NAUSEA: 0
COUGH: 1
SINUS PRESSURE: 1
DIARRHEA: 0
EYE PAIN: 0
BLURRED VISION: 0

## 2020-01-07 ASSESSMENT — VISUAL ACUITY: OU: 1

## 2020-01-07 NOTE — PROGRESS NOTES
[address] [facility] URGENT CARE  [address]  UNIT [place] [address]  Dept: [phone]  Loc: [phone]    Derek Tolliver  is a 54 y.o. female who presents today for her medical conditions/complaintsas noted below. Tianna  is c/o of Headache and Nasal Congestion        HPI:     Headache    This is a new problem. The current episode started in the past 7 days (3 days). The problem occurs daily. The problem has been unchanged. The pain is located in the frontal region. The pain does not radiate. The pain quality is similar to prior headaches. The quality of the pain is described as dull and throbbing. The pain is at a severity of 3/10. The pain is mild. Associated symptoms include coughing, drainage, rhinorrhea and sinus pressure. Pertinent negatives include no abdominal pain, anorexia, blurred vision, ear pain, eye pain, eye redness, fever, muscle aches, nausea, sore throat or vomiting. Associated symptoms comments: Nasal congestion. Nothing aggravates the symptoms. Treatments tried: Aleve cold and sinus. The treatment provided mild relief. Her past medical history is significant for migraine headaches. She says she was exposed to smoke over the weekend and has had headache and cough with runny nose since then. She took some Aleve cold and sinus but says normally these medications make her headache worse. She has been afebrile.   Past Medical History:   Diagnosis Date    Anxiety     Constipation     Depression     DM (diabetes mellitus) ([address]     Headache(784.0)     Hyperlipidemia     Hypertension     Kidney stones     Kidney stones     Restless leg     Restless legs syndrome      Past Surgical History:   Procedure Laterality Date    ECTOPIC PREGNANCY SURGERY      EYE SURGERY      cornea transplant and cataracts removed    KNEE CARTILAGE SURGERY Left 12/20/2016    KNEE ARTHROSCOPIC PARTIAL MEDIAL MENISCECTOMY performed by Edmond Florez MD at [address] [place] [address] Compazine [Prochlorperazine Maleate] Shortness Of Breath    Ciprofloxacin Hives    Amoxicillin-Pot Clavulanate     Demerol Hives    Hydroxyzine     Hydroxyzine Hcl Itching    Ibuprofen Nausea Only    Ketorolac Tromethamine     Meperidine Hives    Nortriptyline Other (See Comments)    Orphenadrine     Orphenadrine Citrate Itching    Penicillins Hives and Nausea Only    Prochlorperazine Edisylate      Will discuss with patient at next visit     Tobramycin      Will discuss with patient at next visit     Tramadol      Will discuss with patient at next visit     Vistaril [Hydroxyzine Hcl] Itching    Cephalexin Rash    Clindamycin/Lincomycin Rash    Codeine Nausea And Vomiting and Rash    Doxycycline Nausea And Vomiting    Keflex [Cephalexin] Rash    Ketorolac Tromethamine Itching and Nausea And Vomiting    Moxifloxacin Nausea And Vomiting     Will discuss with patient at visit        Health Maintenance   Topic Date Due    Diabetic retinal exam  09/08/1982    HIV screen  09/08/1987    Hepatitis B vaccine (1 of 3 - Risk 3-dose series) 09/08/1991    Cervical cancer screen  05/21/2017    Lipid screen  10/31/2017    Flu vaccine (1) 09/01/2019    A1C test (Diabetic or Prediabetic)  12/10/2019    Diabetic microalbuminuria test  12/17/2019    Diabetic foot exam  11/11/2020    Potassium monitoring  12/04/2020    Creatinine monitoring  12/04/2020    DTaP/Tdap/Td vaccine (2 - Td) 09/22/2021    Pneumococcal 0-64 years Vaccine  Completed       Subjective:     Review of Systems   Constitutional: Negative for activity change, appetite change, chills and fever. HENT: Positive for rhinorrhea and sinus pressure. Negative for congestion, ear discharge, ear pain and sore throat. Eyes: Negative. Negative for blurred vision, pain and redness. Respiratory: Positive for cough. Negative for shortness of breath. Cardiovascular: Negative.     Gastrointestinal: Negative for abdominal pain, anorexia, diarrhea, nausea and vomiting. Musculoskeletal: Negative for arthralgias and myalgias. Skin: Negative for rash. Allergic/Immunologic: Positive for environmental allergies. Neurological: Positive for headaches.       :Objective      Physical Exam  Vitals signs and nursing note reviewed. Constitutional:       General: She is awake. She is not in acute distress. Appearance: Normal appearance. She is well-developed and well-groomed. She is morbidly obese. She is not ill-appearing. HENT:      Head: Normocephalic. Right Ear: Hearing, tympanic membrane, ear canal and external ear normal.      Left Ear: Hearing, tympanic membrane, ear canal and external ear normal.      Nose: Rhinorrhea present. Rhinorrhea is clear. Right Sinus: No maxillary sinus tenderness or frontal sinus tenderness. Left Sinus: No maxillary sinus tenderness or frontal sinus tenderness. Mouth/Throat:      Lips: Pink. Mouth: Mucous membranes are moist.      Pharynx: Oropharynx is clear. Uvula midline. Posterior oropharyngeal erythema present. Tonsils: Swellin on the right. 0 on the left. Eyes:      General: Lids are normal. Vision grossly intact. Neck:      Musculoskeletal: Neck supple. Trachea: Phonation normal.   Cardiovascular:      Rate and Rhythm: Normal rate and regular rhythm. Heart sounds: Normal heart sounds, S1 normal and S2 normal. No murmur. No friction rub. No gallop. Pulmonary:      Effort: Pulmonary effort is normal. No respiratory distress. Breath sounds: Normal breath sounds and air entry. No wheezing, rhonchi or rales. Musculoskeletal:         General: No tenderness or deformity. Lymphadenopathy:      Head:      Right side of head: No tonsillar, preauricular or posterior auricular adenopathy. Left side of head: No tonsillar, preauricular or posterior auricular adenopathy. Skin:     General: Skin is warm and dry.       Capillary Refill: Capillary refill takes less than 2 seconds. Neurological:      General: No focal deficit present. Mental Status: She is alert, oriented to person, place, and time and easily aroused. Mental status is at baseline. Psychiatric:         Attention and Perception: Attention normal.         Mood and Affect: Mood normal.         Speech: Speech normal.         Behavior: Behavior normal. Behavior is cooperative. BP (!) 140/82   Pulse 85   Temp 98.1 °F (36.7 °C) (Oral)   Resp 18   Ht 5' 1\" (1.549 m)   Wt 270 lb (122.5 kg)   SpO2 99%   BMI 51.02 kg/m²     :Assessment       Diagnosis Orders   1. Viral URI with cough  dexamethasone (DECADRON) injection 10 mg       :Plan    No orders of the defined types were placed in this encounter. Return if symptoms worsen or fail to improve. Orders Placed This Encounter   Medications    dexamethasone (DECADRON) injection 10 mg        Patient Instructions     Plenty of fluids  Rest  OTC Tylenol or Motrin as needed  Dexamethasone 10 mg IM today- pt reports no previous reaction to this  Monitor glucose as this may cause your glucose to be elevated  Follow up with PCP or return to Urgent Care for worsening or unresolved symptoms. Patient Education        Viral Respiratory Infection: Care Instructions  Your Care Instructions    Viruses are very small organisms. They grow in number after they enter your body. There are many types that cause different illnesses, such as colds and the mumps. The symptoms of a viral respiratory infection often start quickly. They include a fever, sore throat, and runny nose. You may also just not feel well. Or you may not want to eat much. Most viral respiratory infections are not serious. They usually get better with time and self-care. Antibiotics are not used to treat a viral infection. That's because antibiotics will not help cure a viral illness. In some cases, antiviral medicine can help your body fight a serious viral infection.   Follow-up care is a key part of your treatment and safety. Be sure to make and go to all appointments, and call your doctor if you are having problems. It's also a good idea to know your test results and keep a list of the medicines you take. How can you care for yourself at home? · Rest as much as possible until you feel better. · Be safe with medicines. Take your medicine exactly as prescribed. Call your doctor if you think you are having a problem with your medicine. You will get more details on the specific medicine your doctor prescribes. · Take an over-the-counter pain medicine, such as acetaminophen (Tylenol), ibuprofen (Advil, Motrin), or naproxen (Aleve), as needed for pain and fever. Read and follow all instructions on the label. Do not give aspirin to anyone younger than 20. It has been linked to Reye syndrome, a serious illness. · Drink plenty of fluids, enough so that your urine is light yellow or clear like water. Hot fluids, such as tea or soup, may help relieve congestion in your nose and throat. If you have kidney, heart, or liver disease and have to limit fluids, talk with your doctor before you increase the amount of fluids you drink. · Try to clear mucus from your lungs by breathing deeply and coughing. · Gargle with warm salt water once an hour. This can help reduce swelling and throat pain. Use 1 teaspoon of salt mixed in 1 cup of warm water. · Do not smoke or allow others to smoke around you. If you need help quitting, talk to your doctor about stop-smoking programs and medicines. These can increase your chances of quitting for good. To avoid spreading the virus  · Cough or sneeze into a tissue. Then throw the tissue away. · If you don't have a tissue, use your hand to cover your cough or sneeze. Then clean your hand. You can also cough into your sleeve. · Wash your hands often. Use soap and warm water. Wash for 15 to 20 seconds each time.   · If you don't have soap and water near you, you can clean your hands with alcohol wipes or gel. When should you call for help? Call your doctor now or seek immediate medical care if:    · You have a new or higher fever.     · Your fever lasts more than 48 hours.     · You have trouble breathing.     · You have a fever with a stiff neck or a severe headache.     · You are sensitive to light.     · You feel very sleepy or confused.    Watch closely for changes in your health, and be sure to contact your doctor if:    · You do not get better as expected. Where can you learn more? Go to https://[employer]eb.[employer]ytRx. org and sign in to your [facility] account. Enter Y408 in the [employer] box to learn more about \"Viral Respiratory Infection: Care Instructions. \"     If you do not have an account, please click on the \"Sign Up Now\" link. Current as of: September 5, 2018  Content Version: 12.1  © 6783-9082 [employer]. Care instructions adapted under license by [facility] ([facility]). If you have questions about a medical condition or this instruction, always ask your healthcare professional. [place] [address] any warranty or liability for your use of this information. Patient given educational materials- see patient instructions. Discussed use, benefit, and side effects of prescribedmedications. All patient questions answered. Pt voiced understanding.        Electronically signed by NANETTE Zarco CNP on 1/7/2020 at 12:30 PM

## 2020-01-07 NOTE — PATIENT INSTRUCTIONS
Care instructions adapted under license by [facility] ([facility]). If you have questions about a medical condition or this instruction, always ask your healthcare professional. [address] any warranty or liability for your use of this information.

## 2020-01-09 ENCOUNTER — TELEPHONE (OUTPATIENT)
Dept: PRIMARY CARE CLINIC | Age: 50
End: 2020-01-09

## 2020-01-10 ENCOUNTER — OFFICE VISIT (OUTPATIENT)
Dept: PRIMARY CARE CLINIC | Age: 50
End: 2020-01-10
Payer: MEDICAID

## 2020-01-10 VITALS
HEART RATE: 68 BPM | WEIGHT: 281 LBS | TEMPERATURE: 96.6 F | RESPIRATION RATE: 20 BRPM | BODY MASS INDEX: 53.09 KG/M2 | OXYGEN SATURATION: 98 % | DIASTOLIC BLOOD PRESSURE: 68 MMHG | SYSTOLIC BLOOD PRESSURE: 110 MMHG

## 2020-01-10 LAB — HBA1C MFR BLD: 8 %

## 2020-01-10 PROCEDURE — 83036 HEMOGLOBIN GLYCOSYLATED A1C: CPT | Performed by: NURSE PRACTITIONER

## 2020-01-10 PROCEDURE — G8417 CALC BMI ABV UP PARAM F/U: HCPCS | Performed by: NURSE PRACTITIONER

## 2020-01-10 PROCEDURE — 99213 OFFICE O/P EST LOW 20 MIN: CPT | Performed by: NURSE PRACTITIONER

## 2020-01-10 PROCEDURE — G8484 FLU IMMUNIZE NO ADMIN: HCPCS | Performed by: NURSE PRACTITIONER

## 2020-01-10 PROCEDURE — G8427 DOCREV CUR MEDS BY ELIG CLIN: HCPCS | Performed by: NURSE PRACTITIONER

## 2020-01-10 PROCEDURE — 1036F TOBACCO NON-USER: CPT | Performed by: NURSE PRACTITIONER

## 2020-01-10 PROCEDURE — 2022F DILAT RTA XM EVC RTNOPTHY: CPT | Performed by: NURSE PRACTITIONER

## 2020-01-10 RX ORDER — FLUTICASONE PROPIONATE 50 MCG
1 SPRAY, SUSPENSION (ML) NASAL DAILY
Qty: 1 BOTTLE | Refills: 3 | Status: ON HOLD | OUTPATIENT
Start: 2020-01-10 | End: 2020-06-12

## 2020-01-10 RX ORDER — AZITHROMYCIN 250 MG/1
250 TABLET, FILM COATED ORAL SEE ADMIN INSTRUCTIONS
Qty: 6 TABLET | Refills: 0 | Status: SHIPPED | OUTPATIENT
Start: 2020-01-10 | End: 2020-01-20

## 2020-01-10 RX ORDER — METHYLPREDNISOLONE 4 MG/1
TABLET ORAL
Qty: 1 KIT | Refills: 0 | Status: SHIPPED | OUTPATIENT
Start: 2020-01-10 | End: 2020-01-16

## 2020-01-10 NOTE — PROGRESS NOTES
eSema Fernandez is a 54 y.o. female who presents today for   Chief Complaint   Patient presents with    Illness     sinus issues     Sinusitis       HPI  Patient is here for    Illness (sinus issues ) and Sinusitis  Patient reports she was seen by urgent care on 1/7/2020. Patient reports she has been having sinus pressure and pain that does not radiate and quality is like other headaches like she has had before. Pain is 5/10 now, worsened since last visit with urgent care. Denies nausea, vomiting, sore throat, and vision changes. Patient states she has nasal congestion. Patient was previously exposed to smoke and this has made the problem worse. Patient previously tried aleve cold and sinus, but it made her headache worse. No change in PMH, family, social, or surgical history unless mentioned above. Review of Systems   Constitutional: Positive for fatigue. HENT: Positive for congestion, postnasal drip, sinus pressure and sinus pain. Eyes: Negative. Respiratory: Positive for cough. Negative for shortness of breath. Cardiovascular: Negative. Gastrointestinal: Negative. Endocrine: Negative. Genitourinary: Negative. Musculoskeletal: Negative. Skin: Negative. Allergic/Immunologic: Positive for environmental allergies. Neurological: Positive for dizziness and headaches. Hematological: Negative. Psychiatric/Behavioral: Negative.         Past Medical History:   Diagnosis Date    Anxiety     Constipation     Depression     DM (diabetes mellitus) ([facility] [address]     Headache(784.0)     Hyperlipidemia     Hypertension     Kidney stones     Kidney stones     Restless leg     Restless legs syndrome        Current Outpatient Medications   Medication Sig Dispense Refill    azithromycin (ZITHROMAX) 250 MG tablet Take 1 tablet by mouth See Admin Instructions for 10 days 6 tablet 0    fluticasone (FLONASE) 50 MCG/ACT nasal spray 1 spray by Each Nostril route daily 1 Bottle 3    methylPREDNISolone (MEDROL DOSEPACK) 4 MG tablet Take by mouth. 1 kit 0    promethazine (PHENERGAN) 25 MG tablet TAKE 1 TABLET BY MOUTH EVERY 6 HOURS AS NEEDED FOR NAUSEA 30 tablet 0    pramipexole (MIRAPEX) 0.75 MG tablet Take 1 tablet by mouth 2 times daily 60 tablet 1    butorphanol (STADOL) 10 MG/ML nasal spray 1 spray by Nasal route every 6 hours as needed for Pain for up to 30 days. 2.5 mL 0    blood glucose test strips (ASCENSIA AUTODISC VI;ONE TOUCH ULTRA TEST VI) strip Check glucose TID and as needed for hypoglycemic events Dx E11.40 E11.66 Z79.4 150 each 5    tiZANidine (ZANAFLEX) 4 MG tablet TAKE 1 TABLET BY MOUTH EVERY NIGHT AS NEEDED FOR CRAMPING 10 tablet 0    Diabetic Shoe MISC by Does not apply route DISPENSE ONE PAIR OF DIABETIC SHOE AND 3 PAIRS HEAT MOLDED INSERTS 1 each 0    Lancets (ONETOUCH DELICA PLUS [id number]) MISC USE TO CHECK BLLOD SUGAR AS DIRECTED 100 each 1    ondansetron (ZOFRAN-ODT) 4 MG disintegrating tablet DISSOLVE 1 TABLET ON THE TONGUE EVERY 8 HOURS AS NEEDED FOR NAUSEA OR VOMITING 20 tablet 0    potassium chloride (KLOR-CON M) 20 MEQ extended release tablet Take 1 tablet by mouth 2 times daily 20 tablet 0    furosemide (LASIX) 80 MG tablet Take 1 tablet by mouth 2 times daily 60 tablet 2    vitamin B-6 (PYRIDOXINE) 50 MG tablet TAKE 1 TABLET BY MOUTH EVERY DAY 30 tablet 0    triamterene-hydrochlorothiazide (DYAZIDE) 37.5-25 MG per capsule TAKE 1 CAPSULE BY MOUTH EVERY DAY IN THE MORNING 90 capsule 0    celecoxib (CELEBREX) 200 MG capsule TAKE 1 CAPSULE BY MOUTH EVERY DAY 60 capsule 5    blood glucose monitor kit and supplies Test 1 times a day & as needed for symptoms of irregular blood glucose.  1 kit 0    insulin glargine (BASAGLAR KWIKPEN) 100 UNIT/ML injection pen Inject 30 U Nightly 5 pen 5    topiramate (TOPAMAX) 50 MG tablet TAKE 1 TABLET BY MOUTH TWICE DAILY 60 tablet 5    verapamil (CALAN SR) 240 MG extended release tablet TAKE 1 TABLET BY MOUTH EVERY NIGHT 30 tablet 5    VICTOZA 18 MG/3ML SOPN SC injection ADMINISTER 1.8 MG INTO THE SKIN EVERY DAY 9 mL 3    gabapentin (NEURONTIN) 100 MG capsule Take 100 mg by mouth daily.  magnesium (MAGNESIUM-OXIDE) 250 MG TABS tablet Take 1 tablet by mouth daily 90 tablet 0    gabapentin (NEURONTIN) 300 MG capsule TK ONE C PO QHS  5    B-D ULTRAFINE III SHORT PEN 31G X 8 MM MISC USE TO INJECT INSULIN ONCE DAILY 100 each 3    HYDROcodone-acetaminophen (NORCO) 7.5-325 MG per tablet Take 1 tablet by mouth every 6 hours as needed for Pain. Guy Valerie Blood Glucose Monitoring Suppl ([address]) w/Device KIT 1 kit by Does not apply route daily as needed (Dx 250.00) 1 kit 0    ONE TOUCH LANCETS MISC 1 each by Does not apply route daily 100 each 3    Cream Base CREA Apply 1-2 pumps to affected area 3-4 times daily 360 g 3    metFORMIN (GLUCOPHAGE) 500 MG tablet TAKE 1 TABLET BY MOUTH TWICE DAILY WITH MEALS 180 tablet 0    ONE TOUCH ULTRA TEST strip TEST BLOOD SUGAR ONCE DAILY 100 strip 5    rizatriptan (MAXALT) 10 MG tablet Take 1 tablet by mouth once as needed for Migraine May repeat in 2 hours if needed 30 tablet 3    omeprazole (PRILOSEC) 20 MG delayed release capsule Take 1 capsule by mouth 2 times daily (before meals) 180 capsule 1    EPINEPHrine (EPIPEN 2-JOSÉ) 0.3 MG/0.3ML SOAJ injection Inject 0.3 mLs into the muscle once for 1 dose Use as directed for allergic reaction 0.3 mL 0     No current facility-administered medications for this visit.         Allergies   Allergen Reactions    Compazine [Prochlorperazine Maleate] Shortness Of Breath    Ciprofloxacin Hives    Amoxicillin-Pot Clavulanate     Demerol Hives    Hydroxyzine     Hydroxyzine Hcl Itching    Ibuprofen Nausea Only    Ketorolac Tromethamine     Meperidine Hives    Nortriptyline Other (See Comments)    Orphenadrine     Orphenadrine Citrate Itching    Penicillins Hives and Nausea Only    Prochlorperazine Edisylate      Will discuss with patient at next visit     Tobramycin      Will discuss with patient at next visit     Tramadol      Will discuss with patient at next visit     Vistaril [Hydroxyzine Hcl] Itching    Cephalexin Rash    Clindamycin/Lincomycin Rash    Codeine Nausea And Vomiting and Rash    Doxycycline Nausea And Vomiting    Keflex [Cephalexin] Rash    Ketorolac Tromethamine Itching and Nausea And Vomiting    Moxifloxacin Nausea And Vomiting     Will discuss with patient at visit        Past Surgical History:   Procedure Laterality Date    ECTOPIC PREGNANCY SURGERY      EYE SURGERY      cornea transplant and cataracts removed    KNEE CARTILAGE SURGERY Left 12/20/2016    KNEE ARTHROSCOPIC PARTIAL MEDIAL MENISCECTOMY performed by Yadiel Giron MD at [address] [place] [address]         Social History     Tobacco Use    Smoking status: Never Smoker    Smokeless tobacco: Never Used   Substance Use Topics    Alcohol use: No    Drug use: No       Family History   Problem Relation Age of Onset    Cancer Father     Cancer Maternal Grandmother     COPD Maternal Grandmother     Cancer Maternal Grandfather        /68   Pulse 68   Temp 96.6 °F (35.9 °C)   Resp 20   Wt 281 lb (127.5 kg)   SpO2 98%   BMI 53.09 kg/m²     Physical Exam  Vitals signs and nursing note reviewed. Constitutional:       General: She is not in acute distress. Appearance: Normal appearance. She is well-developed. She is not diaphoretic. HENT:      Head: Normocephalic and atraumatic. Right Ear: Hearing, ear canal and external ear normal. A middle ear effusion is present. There is no impacted cerumen. Left Ear: Hearing, ear canal and external ear normal. A middle ear effusion is present. There is no impacted cerumen. Nose: Congestion present. Right Sinus: Frontal sinus tenderness present. Left Sinus: Frontal sinus tenderness present.       Mouth/Throat:      Mouth: Mucous membranes are moist.

## 2020-01-11 ASSESSMENT — ENCOUNTER SYMPTOMS
COUGH: 1
SINUS PRESSURE: 1
EYES NEGATIVE: 1
SINUS PAIN: 1
GASTROINTESTINAL NEGATIVE: 1
SHORTNESS OF BREATH: 0

## 2020-01-14 ENCOUNTER — OFFICE VISIT (OUTPATIENT)
Dept: PRIMARY CARE CLINIC | Age: 50
End: 2020-01-14
Payer: MEDICAID

## 2020-01-14 VITALS
OXYGEN SATURATION: 96 % | TEMPERATURE: 98 F | DIASTOLIC BLOOD PRESSURE: 78 MMHG | HEIGHT: 61 IN | SYSTOLIC BLOOD PRESSURE: 124 MMHG | HEART RATE: 91 BPM | BODY MASS INDEX: 53.09 KG/M2

## 2020-01-14 PROCEDURE — G8484 FLU IMMUNIZE NO ADMIN: HCPCS | Performed by: NURSE PRACTITIONER

## 2020-01-14 PROCEDURE — G8417 CALC BMI ABV UP PARAM F/U: HCPCS | Performed by: NURSE PRACTITIONER

## 2020-01-14 PROCEDURE — G8427 DOCREV CUR MEDS BY ELIG CLIN: HCPCS | Performed by: NURSE PRACTITIONER

## 2020-01-14 PROCEDURE — 1036F TOBACCO NON-USER: CPT | Performed by: NURSE PRACTITIONER

## 2020-01-14 PROCEDURE — 99214 OFFICE O/P EST MOD 30 MIN: CPT | Performed by: NURSE PRACTITIONER

## 2020-01-14 RX ORDER — BUMETANIDE 1 MG/1
1 TABLET ORAL 2 TIMES DAILY
Qty: 60 TABLET | Refills: 0 | Status: SHIPPED | OUTPATIENT
Start: 2020-01-14 | End: 2020-04-20

## 2020-01-14 NOTE — PROGRESS NOTES
Weight - 281 lb - 270 lb - -   Height 5' 1\" - - 5' 1\" - -   BMI (wt*703/ht~2) - - - 51.01 kg/m2 - -   Pain Level - - - - 7 6   Some recent data might be hidden       Family History   Problem Relation Age of Onset    Cancer Father     Cancer Maternal Grandmother     COPD Maternal Grandmother     Cancer Maternal Grandfather        Social History     Tobacco Use    Smoking status: Never Smoker    Smokeless tobacco: Never Used   Substance Use Topics    Alcohol use: No      Current Outpatient Medications   Medication Sig Dispense Refill    bumetanide (BUMEX) 1 MG tablet Take 1 tablet by mouth 2 times daily 60 tablet 0    azithromycin (ZITHROMAX) 250 MG tablet Take 1 tablet by mouth See Admin Instructions for 10 days 6 tablet 0    fluticasone (FLONASE) 50 MCG/ACT nasal spray 1 spray by Each Nostril route daily 1 Bottle 3    methylPREDNISolone (MEDROL DOSEPACK) 4 MG tablet Take by mouth. 1 kit 0    promethazine (PHENERGAN) 25 MG tablet TAKE 1 TABLET BY MOUTH EVERY 6 HOURS AS NEEDED FOR NAUSEA 30 tablet 0    pramipexole (MIRAPEX) 0.75 MG tablet Take 1 tablet by mouth 2 times daily 60 tablet 1    butorphanol (STADOL) 10 MG/ML nasal spray 1 spray by Nasal route every 6 hours as needed for Pain for up to 30 days.  2.5 mL 0    blood glucose test strips (ASCENSIA AUTODISC VI;ONE TOUCH ULTRA TEST VI) strip Check glucose TID and as needed for hypoglycemic events Dx E11.40 E11.66 Z79.4 150 each 5    tiZANidine (ZANAFLEX) 4 MG tablet TAKE 1 TABLET BY MOUTH EVERY NIGHT AS NEEDED FOR CRAMPING 10 tablet 0    Diabetic Shoe MISC by Does not apply route DISPENSE ONE PAIR OF DIABETIC SHOE AND 3 PAIRS HEAT MOLDED INSERTS 1 each 0    Lancets (ONETOUCH DELICA PLUS [id number]) MISC USE TO CHECK BLLOD SUGAR AS DIRECTED 100 each 1    ondansetron (ZOFRAN-ODT) 4 MG disintegrating tablet DISSOLVE 1 TABLET ON THE TONGUE EVERY 8 HOURS AS NEEDED FOR NAUSEA OR VOMITING 20 tablet 0    potassium chloride (KLOR-CON M) 20 MEQ extended nonsensical words or phrases may be inadvertentlytranscribed.   Although I have reviewed the note for such errors, some may still exist.

## 2020-01-14 NOTE — PATIENT INSTRUCTIONS
1. Discontinue Lasix 80 mg twice daily. 2. Begin Bumex 1 mg twice daily. 3. Labs in 4 weeks in [address].  4. Follow-up with Dr Manish Hubbard in 3 months. 5. Referral to [facility] neuro- will call with appointment.

## 2020-01-15 ASSESSMENT — ENCOUNTER SYMPTOMS
COLOR CHANGE: 0
VOICE CHANGE: 0
RHINORRHEA: 0
SHORTNESS OF BREATH: 0
BACK PAIN: 0
COUGH: 0
NAUSEA: 0
VOMITING: 0
PHOTOPHOBIA: 0

## 2020-01-22 ENCOUNTER — TELEPHONE (OUTPATIENT)
Dept: PRIMARY CARE CLINIC | Age: 50
End: 2020-01-22

## 2020-01-31 RX ORDER — PEN NEEDLE, DIABETIC 31 GX5/16"
NEEDLE, DISPOSABLE MISCELLANEOUS
Qty: 100 EACH | Refills: 3 | Status: SHIPPED | OUTPATIENT
Start: 2020-01-31 | End: 2021-10-21

## 2020-02-05 NOTE — PROGRESS NOTES
Patient in office with son for his appointment and is inquiring about vascular referral. In reviewing notes it appears vascular requires a venous scan prior to seeing patient. I have placed order for this.  Should it be negative they recommend lymphedema clinic referral.

## 2020-02-10 ENCOUNTER — TELEPHONE (OUTPATIENT)
Dept: PRIMARY CARE CLINIC | Age: 50
End: 2020-02-10

## 2020-02-10 ENCOUNTER — OFFICE VISIT (OUTPATIENT)
Dept: PRIMARY CARE CLINIC | Age: 50
End: 2020-02-10
Payer: MEDICAID

## 2020-02-10 VITALS
OXYGEN SATURATION: 98 % | HEIGHT: 61 IN | HEART RATE: 90 BPM | WEIGHT: 288 LBS | DIASTOLIC BLOOD PRESSURE: 70 MMHG | BODY MASS INDEX: 54.37 KG/M2 | RESPIRATION RATE: 16 BRPM | SYSTOLIC BLOOD PRESSURE: 122 MMHG | TEMPERATURE: 98.9 F

## 2020-02-10 LAB
INFLUENZA A ANTIBODY: ABNORMAL
INFLUENZA B ANTIBODY: ABNORMAL

## 2020-02-10 PROCEDURE — G8484 FLU IMMUNIZE NO ADMIN: HCPCS | Performed by: NURSE PRACTITIONER

## 2020-02-10 PROCEDURE — 87804 INFLUENZA ASSAY W/OPTIC: CPT | Performed by: NURSE PRACTITIONER

## 2020-02-10 PROCEDURE — 99214 OFFICE O/P EST MOD 30 MIN: CPT | Performed by: NURSE PRACTITIONER

## 2020-02-10 PROCEDURE — 1036F TOBACCO NON-USER: CPT | Performed by: NURSE PRACTITIONER

## 2020-02-10 PROCEDURE — G8417 CALC BMI ABV UP PARAM F/U: HCPCS | Performed by: NURSE PRACTITIONER

## 2020-02-10 PROCEDURE — G8427 DOCREV CUR MEDS BY ELIG CLIN: HCPCS | Performed by: NURSE PRACTITIONER

## 2020-02-10 RX ORDER — DEXTROMETHORPHAN HYDROBROMIDE AND PROMETHAZINE HYDROCHLORIDE 15; 6.25 MG/5ML; MG/5ML
5 SYRUP ORAL 4 TIMES DAILY PRN
Qty: 140 ML | Refills: 0 | Status: SHIPPED | OUTPATIENT
Start: 2020-02-10 | End: 2020-02-17

## 2020-02-10 RX ORDER — OSELTAMIVIR PHOSPHATE 75 MG/1
75 CAPSULE ORAL 2 TIMES DAILY
Qty: 10 CAPSULE | Refills: 0 | Status: SHIPPED | OUTPATIENT
Start: 2020-02-10 | End: 2020-02-15

## 2020-02-10 RX ORDER — PROMETHAZINE HYDROCHLORIDE AND PHENYLEPHRINE HYDROCHLORIDE 6.25; 5 MG/5ML; MG/5ML
5 SYRUP ORAL 4 TIMES DAILY PRN
Qty: 280 ML | Refills: 0 | Status: SHIPPED | OUTPATIENT
Start: 2020-02-10 | End: 2020-02-10

## 2020-02-10 ASSESSMENT — ENCOUNTER SYMPTOMS
COUGH: 1
SHORTNESS OF BREATH: 1
WHEEZING: 1
EYES NEGATIVE: 1
GASTROINTESTINAL NEGATIVE: 1
ALLERGIC/IMMUNOLOGIC NEGATIVE: 1

## 2020-02-10 NOTE — TELEPHONE ENCOUNTER
Patient called with request for Selena to change her cough medicine to Promethazine DM related to pharmacy is out of the other one she ordered today:Promethazine-Phenylephrine

## 2020-02-10 NOTE — PROGRESS NOTES
Kristie Ferraro is a 54 y.o. female who presents today for   Chief Complaint   Patient presents with    Headache     exposure to the flu    Congestion    Chills    Fever       Patient is here for:    URI    This is a new problem. The current episode started in the past 7 days. The problem has been gradually worsening. The maximum temperature recorded prior to her arrival was 101 - 101.9 F. The fever has been present for 1 to 2 days. Associated symptoms include congestion, coughing, headaches, joint pain, rhinorrhea, a sore throat and wheezing. She has tried nothing for the symptoms. The treatment provided no relief. Fever    This is a new problem. The current episode started in the past 7 days. The problem occurs daily. The problem has been unchanged. The maximum temperature noted was 101 to 101.9 F. The temperature was taken using an oral thermometer. Associated symptoms include congestion, coughing, headaches, muscle aches, a sore throat and wheezing. She has tried nothing for the symptoms. The treatment provided no relief. Risk factors: sick contacts (influenza)        No change in [facility] [address], family, social, or surgical history unless mentioned above. Review of Systems   Constitutional: Positive for fatigue and fever. HENT: Positive for congestion, postnasal drip, rhinorrhea and sore throat. Eyes: Negative. Respiratory: Positive for cough, shortness of breath and wheezing. Cardiovascular: Negative. Gastrointestinal: Negative. Endocrine: Negative. Genitourinary: Negative. Musculoskeletal: Positive for arthralgias, joint pain and myalgias. Skin: Negative. Allergic/Immunologic: Negative. Neurological: Positive for headaches. Psychiatric/Behavioral: Negative.         Past Medical History:   Diagnosis Date    Anxiety     Constipation     Depression     DM (diabetes mellitus) ([facility] [address]     Headache(784.0)     Hyperlipidemia     Hypertension     Kidney stones     Kidney stones  Restless leg     Restless legs syndrome        Current Outpatient Medications   Medication Sig Dispense Refill    oseltamivir (TAMIFLU) 75 MG capsule Take 1 capsule by mouth 2 times daily for 5 days 10 capsule 0    promethazine-dextromethorphan (PROMETHAZINE-DM) 6.25-15 MG/5ML syrup Take 5 mLs by mouth 4 times daily as needed for Cough 140 mL 0    B-D ULTRAFINE III SHORT PEN 31G X 8 MM MISC USE TO INJECT INSULIN ONCE DAILY 100 each 3    bumetanide (BUMEX) 1 MG tablet Take 1 tablet by mouth 2 times daily 60 tablet 0    fluticasone (FLONASE) 50 MCG/ACT nasal spray 1 spray by Each Nostril route daily 1 Bottle 3    promethazine (PHENERGAN) 25 MG tablet TAKE 1 TABLET BY MOUTH EVERY 6 HOURS AS NEEDED FOR NAUSEA 30 tablet 0    pramipexole (MIRAPEX) 0.75 MG tablet Take 1 tablet by mouth 2 times daily 60 tablet 1    blood glucose test strips (ASCENSIA AUTODISC VI;ONE TOUCH ULTRA TEST VI) strip Check glucose TID and as needed for hypoglycemic events Dx E11.40 E11.66 Z79.4 150 each 5    tiZANidine (ZANAFLEX) 4 MG tablet TAKE 1 TABLET BY MOUTH EVERY NIGHT AS NEEDED FOR CRAMPING 10 tablet 0    Diabetic Shoe MISC by Does not apply route DISPENSE ONE PAIR OF DIABETIC SHOE AND 3 PAIRS HEAT MOLDED INSERTS 1 each 0    Lancets (ONETOUCH DELICA PLUS [id number]) MISC USE TO CHECK BLLOD SUGAR AS DIRECTED 100 each 1    ondansetron (ZOFRAN-ODT) 4 MG disintegrating tablet DISSOLVE 1 TABLET ON THE TONGUE EVERY 8 HOURS AS NEEDED FOR NAUSEA OR VOMITING 20 tablet 0    potassium chloride (KLOR-CON M) 20 MEQ extended release tablet Take 1 tablet by mouth 2 times daily 20 tablet 0    furosemide (LASIX) 80 MG tablet Take 1 tablet by mouth 2 times daily 60 tablet 2    vitamin B-6 (PYRIDOXINE) 50 MG tablet TAKE 1 TABLET BY MOUTH EVERY DAY 30 tablet 0    triamterene-hydrochlorothiazide (DYAZIDE) 37.5-25 MG per capsule TAKE 1 CAPSULE BY MOUTH EVERY DAY IN THE MORNING 90 capsule 0    celecoxib (CELEBREX) 200 MG capsule TAKE 1 Amoxicillin-Pot Clavulanate     Demerol Hives    Hydroxyzine     Hydroxyzine Hcl Itching    Ibuprofen Nausea Only    Ketorolac Tromethamine     Meperidine Hives    Nortriptyline Other (See Comments)    Orphenadrine     Orphenadrine Citrate Itching    Penicillins Hives and Nausea Only    Prochlorperazine Edisylate      Will discuss with patient at next visit     Tobramycin      Will discuss with patient at next visit     Tramadol      Will discuss with patient at next visit     Vistaril [Hydroxyzine Hcl] Itching    Cephalexin Rash    Clindamycin/Lincomycin Rash    Codeine Nausea And Vomiting and Rash    Doxycycline Nausea And Vomiting    Keflex [Cephalexin] Rash    Ketorolac Tromethamine Itching and Nausea And Vomiting    Moxifloxacin Nausea And Vomiting     Will discuss with patient at visit        Past Surgical History:   Procedure Laterality Date    ECTOPIC PREGNANCY SURGERY      EYE SURGERY      cornea transplant and cataracts removed    KNEE CARTILAGE SURGERY Left 12/20/2016    KNEE ARTHROSCOPIC PARTIAL MEDIAL MENISCECTOMY performed by Veena Gutierrez MD at [address] [place] [address]         Social History     Tobacco Use    Smoking status: Never Smoker    Smokeless tobacco: Never Used   Substance Use Topics    Alcohol use: No    Drug use: No       Family History   Problem Relation Age of Onset    Cancer Father     Cancer Maternal Grandmother     COPD Maternal Grandmother     Cancer Maternal Grandfather        /70   Pulse 90   Temp 98.9 °F (37.2 °C) (Temporal)   Resp 16   Ht 5' 1\" (1.549 m)   Wt 288 lb (130.6 kg)   SpO2 98%   BMI 54.42 kg/m²     Physical Exam  Vitals signs and nursing note reviewed. Constitutional:       General: She is not in acute distress. Appearance: She is well-developed. She is ill-appearing. She is not diaphoretic. HENT:      Head: Normocephalic.       Right Ear: External ear normal.      Left Ear: External ear normal.      Nose: Congestion and rhinorrhea present. Mouth/Throat:      Pharynx: Posterior oropharyngeal erythema present. No oropharyngeal exudate. Eyes:      General:         Right eye: No discharge. Left eye: No discharge. Conjunctiva/sclera: Conjunctivae normal.      Pupils: Pupils are equal, round, and reactive to light. Neck:      Musculoskeletal: Normal range of motion. Trachea: No tracheal deviation. Cardiovascular:      Rate and Rhythm: Normal rate and regular rhythm. Pulses: Normal pulses. Heart sounds: Normal heart sounds. No murmur. Pulmonary:      Effort: Pulmonary effort is normal. No respiratory distress. Breath sounds: Normal breath sounds. No stridor. Abdominal:      General: Bowel sounds are normal.      Palpations: Abdomen is soft. Tenderness: There is no abdominal tenderness. Musculoskeletal: Normal range of motion. General: No tenderness or deformity. Lymphadenopathy:      Cervical: No cervical adenopathy. Skin:     General: Skin is warm and dry. Capillary Refill: Capillary refill takes less than 2 seconds. Findings: No rash. Neurological:      Mental Status: She is alert and oriented to person, place, and time. Cranial Nerves: No cranial nerve deficit. Psychiatric:         Behavior: Behavior normal.         Thought Content: Thought content normal.         Judgment: Judgment normal.         Results for orders placed or performed in visit on 02/10/20   POCT Influenza A/B   Result Value Ref Range    Influenza A Ab Pos     Influenza B Ab Neg        Assessment:    ICD-10-CM    1. Influenza A J10.1 oseltamivir (TAMIFLU) 75 MG capsule   2. Exposure to influenza Z20.828 POCT Influenza A/B   3. Fever, unspecified fever cause R50.9 POCT Influenza A/B   4. FEDERICA (generalized anxiety disorder) F41.1 External Referral To Psychiatry   5. Mood disorder ([facility] [address] F39 External Referral To Psychiatry   6.  Cough R05 promethazine-dextromethorphan (PROMETHAZINE-DM) 6.25-15 MG/5ML syrup       Plan:   1. Influenza A  - prescribed tamiflu    2. Exposure to influenza  - POCT Influenza A/B    3. Fever, unspecified fever cause  - POCT Influenza A/B  - supportive treatment    4. FEDERICA (generalized anxiety disorder)  - External Referral To Psychiatry - Dr. Fede Nguyen    5. Mood disorder [facility])  - External Referral To Psychiatry - Dr. Fede Ngyuen    6. Cough  - promethazine-DM syrup as prescribed     Over 50% of the total visit time of 25 minutes was spent on counseling and or coordination of care of influenza A, exposure to influenza, fever, FEDERICA, mood disorder, cough, POCT influenza test, external referral to psych, medications, and follow-up. .    Orders Placed This Encounter   Procedures    External Referral To Psychiatry     Referral Priority:   Routine     Referral Type:   Eval and Treat     Referral Reason:   Specialty Services Required     Referred to Provider:   Ryder Klein MD     Requested Specialty:   Psychiatry     Number of Visits Requested:   1    POCT Influenza A/B     Orders Placed This Encounter   Medications    oseltamivir (TAMIFLU) 75 MG capsule     Sig: Take 1 capsule by mouth 2 times daily for 5 days     Dispense:  10 capsule     Refill:  0    DISCONTD: Promethazine-Phenylephrine (PROMETHAZINE-PHENYLEPHRINE) 6.25-5 MG/5ML syrup     Sig: Take 5 mLs by mouth 4 times daily as needed for Cough     Dispense:  280 mL     Refill:  0    promethazine-dextromethorphan (PROMETHAZINE-DM) 6.25-15 MG/5ML syrup     Sig: Take 5 mLs by mouth 4 times daily as needed for Cough     Dispense:  140 mL     Refill:  0     Medications Discontinued During This Encounter   Medication Reason    Promethazine-Phenylephrine (PROMETHAZINE-PHENYLEPHRINE) 6.25-5 MG/5ML syrup Cost of medication     There are no Patient Instructions on file for this visit. Patient given educational handouts and has had all questions answered.   Patient voices understanding and agrees to plans along

## 2020-02-12 ASSESSMENT — ENCOUNTER SYMPTOMS
RHINORRHEA: 1
SORE THROAT: 1

## 2020-02-14 ENCOUNTER — TELEPHONE (OUTPATIENT)
Dept: PRIMARY CARE CLINIC | Age: 50
End: 2020-02-14

## 2020-02-14 NOTE — TELEPHONE ENCOUNTER
Per Selena no refill on cough syrup. Pt may take something OTC. Pt notified and voiced understanding.

## 2020-02-28 RX ORDER — PRAMIPEXOLE DIHYDROCHLORIDE 0.75 MG/1
TABLET ORAL
Qty: 60 TABLET | Refills: 1 | Status: SHIPPED | OUTPATIENT
Start: 2020-02-28 | End: 2020-04-27

## 2020-03-02 RX ORDER — INSULIN LISPRO 100 [IU]/ML
INJECTION, SOLUTION INTRAVENOUS; SUBCUTANEOUS
Qty: 18 ML | Refills: 1 | Status: SHIPPED | OUTPATIENT
Start: 2020-03-02 | End: 2020-06-21

## 2020-03-02 NOTE — TELEPHONE ENCOUNTER
[address] [facility] called to request a refill on her medication.       Last office visit : 2/10/2020   Next office visit : 4/14/2020     Requested Prescriptions     Pending Prescriptions Disp Refills    insulin lispro, 1 Unit Dial, (ADMELOG SOLOSTAR) 100 UNIT/ML SOPN [Pharmacy Med Name: ADMELOG 100U/ML SOLOSTAR INJ 3ML] 18 mL      Sig: INJECT [facility] AND [facility]CREASE [facility] Rajesh Aquino, [address] [facility]

## 2020-03-11 ENCOUNTER — OFFICE VISIT (OUTPATIENT)
Dept: URGENT CARE | Age: 50
End: 2020-03-11
Payer: MEDICAID

## 2020-03-11 ENCOUNTER — HOSPITAL ENCOUNTER (OUTPATIENT)
Dept: GENERAL RADIOLOGY | Age: 50
Discharge: HOME | End: 2020-03-11
Payer: MEDICAID

## 2020-03-11 VITALS
HEIGHT: 61 IN | BODY MASS INDEX: 53.05 KG/M2 | SYSTOLIC BLOOD PRESSURE: 121 MMHG | HEART RATE: 93 BPM | DIASTOLIC BLOOD PRESSURE: 79 MMHG | OXYGEN SATURATION: 97 % | RESPIRATION RATE: 18 BRPM | TEMPERATURE: 98.1 F | WEIGHT: 281 LBS

## 2020-03-11 LAB — POTASSIUM SERPL-SCNC: 2.8 MMOL/L (ref 3.5–5)

## 2020-03-11 PROCEDURE — 36415 COLL VENOUS BLD VENIPUNCTURE: CPT | Performed by: NURSE PRACTITIONER

## 2020-03-11 PROCEDURE — 99214 OFFICE O/P EST MOD 30 MIN: CPT | Performed by: NURSE PRACTITIONER

## 2020-03-11 PROCEDURE — 73630 X-RAY EXAM OF FOOT: CPT

## 2020-03-11 RX ORDER — POTASSIUM CHLORIDE 20 MEQ/1
40 TABLET, EXTENDED RELEASE ORAL DAILY
Qty: 6 TABLET | Refills: 0 | Status: ON HOLD | OUTPATIENT
Start: 2020-03-11 | End: 2020-06-12

## 2020-03-11 ASSESSMENT — ENCOUNTER SYMPTOMS
COUGH: 0
ABDOMINAL PAIN: 0
RESPIRATORY NEGATIVE: 1

## 2020-03-11 ASSESSMENT — VISUAL ACUITY: OU: 1

## 2020-03-11 NOTE — PROGRESS NOTES
[address] [facility] URGENT CARE  [address] [facility] [address]  [place] [address]  Dept: [phone]  Loc: [address] [place] is a 54 y.o. female who presents today for her medical conditions/complaintsas noted below. Alejandro Ramos is c/o of Foot Pain (swelling and pain in right foot; n known injury)        HPI:     Foot Pain    The pain is present in the right foot. This is a new problem. The current episode started in the past 7 days (since Saturday). There has been a history of trauma. The problem occurs constantly. The problem has been unchanged. The quality of the pain is described as dull and aching. The pain is at a severity of 4/10. The pain is moderate. Associated symptoms include joint swelling. Pertinent negatives include no fever, inability to bear weight, itching, joint locking, limited range of motion, stiffness or tingling. Associated symptoms comments: Bilateral lower ext edema. The symptoms are aggravated by standing. She has tried rest for the symptoms. The treatment provided mild relief. She also is here with complaint of fatigue and wants to have her potassium checked. She is not due to see Dr. Will Daniels until April and would like all of her lab work done today if possible. She has bilateral lower ext edema 2+ but feels like something is wrong with the top of her right foot but denies fall or injury. She recently changed from Bumex to Lasix and then back to Lasix 80 mg twice daily and was told she could take up to 100 mg  prn sparingly.   Past Medical History:   Diagnosis Date    Anxiety     Constipation     Depression     DM (diabetes mellitus) ([address]     Headache(784.0)     Hyperlipidemia     Hypertension     Kidney stones     Kidney stones     Restless leg     Restless legs syndrome      Past Surgical History:   Procedure Laterality Date    ECTOPIC PREGNANCY SURGERY      EYE SURGERY      cornea transplant and cataracts removed    KNEE

## 2020-03-13 DIAGNOSIS — E87.6 HYPOKALEMIA: ICD-10-CM

## 2020-03-13 LAB — POTASSIUM SERPL-SCNC: 3.1 MMOL/L (ref 3.5–5)

## 2020-03-16 ENCOUNTER — TELEPHONE (OUTPATIENT)
Dept: PRIMARY CARE CLINIC | Age: 50
End: 2020-03-16

## 2020-03-16 NOTE — TELEPHONE ENCOUNTER
Patient called requesting a referral to the orthopedic for bone spurs in heels on both feet.  [place] [facility] [place] and they tried to send a referral but her insurance would not accept it, it has to be from her PCP

## 2020-03-16 NOTE — TELEPHONE ENCOUNTER
MEDICATIONS:  No orders of the defined types were placed in this encounter.       ORDERS:  Orders Placed This Encounter   Procedures    Ambulatory referral to Orthopedic Surgery   Referral to Ortho has been made and they should call her with an appointment

## 2020-03-23 DIAGNOSIS — R60.0 LOWER EXTREMITY EDEMA: ICD-10-CM

## 2020-03-23 LAB
ALBUMIN SERPL-MCNC: 4.1 G/DL (ref 3.5–5.2)
ALP BLD-CCNC: 56 U/L (ref 35–104)
ALT SERPL-CCNC: 13 U/L (ref 5–33)
ANION GAP SERPL CALCULATED.3IONS-SCNC: 12 MMOL/L (ref 7–19)
AST SERPL-CCNC: 14 U/L (ref 5–32)
BILIRUB SERPL-MCNC: 0.5 MG/DL (ref 0.2–1.2)
BUN BLDV-MCNC: 18 MG/DL (ref 6–20)
CALCIUM SERPL-MCNC: 9.5 MG/DL (ref 8.6–10)
CHLORIDE BLD-SCNC: 91 MMOL/L (ref 98–111)
CO2: 35 MMOL/L (ref 22–29)
CREAT SERPL-MCNC: 0.9 MG/DL (ref 0.5–0.9)
GFR NON-AFRICAN AMERICAN: >60
GLUCOSE BLD-MCNC: 254 MG/DL (ref 74–109)
POTASSIUM SERPL-SCNC: 3.4 MMOL/L (ref 3.5–5)
SODIUM BLD-SCNC: 138 MMOL/L (ref 136–145)
TOTAL PROTEIN: 6.5 G/DL (ref 6.6–8.7)

## 2020-03-31 RX ORDER — ONDANSETRON 4 MG/1
TABLET, ORALLY DISINTEGRATING ORAL
Qty: 20 TABLET | Refills: 0 | Status: ON HOLD | OUTPATIENT
Start: 2020-03-31 | End: 2020-06-18 | Stop reason: SDUPTHER

## 2020-03-31 NOTE — TELEPHONE ENCOUNTER
[address] [facility] called to request a refill on her medication.       Last office visit : 2/10/2020   Next office visit : 4/14/2020     Requested Prescriptions     Pending Prescriptions Disp Refills    ondansetron (ZOFRAN-ODT) 4 MG disintegrating tablet [Pharmacy Med Name: ONDANSETRON ODT 4MG TABLETS] 20 tablet 0     Sig: DISSOLVE 1 TABLET ON THE TONGUE EVERY 8 HOURS AS NEEDED FOR NAUSEA OR VOMITING            Judy Carter, [address] [facility]

## 2020-04-03 ENCOUNTER — TELEPHONE (OUTPATIENT)
Dept: PRIMARY CARE CLINIC | Age: 50
End: 2020-04-03

## 2020-04-03 NOTE — TELEPHONE ENCOUNTER
Patient called and reports that her glucose meter broke and she needs a new one and to early for insurance to pay for  Will come to second floor and will get her a one touch meter

## 2020-04-04 ENCOUNTER — OFFICE VISIT (OUTPATIENT)
Dept: URGENT CARE | Age: 50
End: 2020-04-04
Payer: MEDICAID

## 2020-04-04 VITALS
RESPIRATION RATE: 18 BRPM | SYSTOLIC BLOOD PRESSURE: 132 MMHG | HEART RATE: 94 BPM | OXYGEN SATURATION: 99 % | BODY MASS INDEX: 55.17 KG/M2 | TEMPERATURE: 97.9 F | DIASTOLIC BLOOD PRESSURE: 72 MMHG | WEIGHT: 292 LBS

## 2020-04-04 PROCEDURE — 99212 OFFICE O/P EST SF 10 MIN: CPT | Performed by: NURSE PRACTITIONER

## 2020-04-04 PROCEDURE — G8428 CUR MEDS NOT DOCUMENT: HCPCS | Performed by: NURSE PRACTITIONER

## 2020-04-04 PROCEDURE — 1036F TOBACCO NON-USER: CPT | Performed by: NURSE PRACTITIONER

## 2020-04-04 PROCEDURE — G8417 CALC BMI ABV UP PARAM F/U: HCPCS | Performed by: NURSE PRACTITIONER

## 2020-04-04 RX ORDER — PERMETHRIN 50 MG/G
CREAM TOPICAL
Qty: 1 TUBE | Refills: 0 | Status: SHIPPED | OUTPATIENT
Start: 2020-04-04 | End: 2020-06-10

## 2020-04-04 ASSESSMENT — ENCOUNTER SYMPTOMS: SORE THROAT: 0

## 2020-04-04 NOTE — PATIENT INSTRUCTIONS
help?  Call your doctor now or seek immediate medical care if:    · You have signs of infection, such as:  ? Increased pain, swelling, warmth, or redness. ? Red streaks leading from the mite bites. ? Pus draining from a bite area. ? A fever.    Watch closely for changes in your health, and be sure to contact your doctor if:    · Anyone else in your family has itching.     · You do not get better within 2 weeks. Where can you learn more? Go to https://BellabeatpeTalentClick.Nabto. org and sign in to your [employer] account. Enter X200 in the [employer] box to learn more about \"Scabies: Care Instructions. \"     If you do not have an account, please click on the \"Sign Up Now\" link. Current as of: October 30, 2019Content Version: 12.4  © 8244-4852 Healthwise, Incorporated. Care instructions adapted under license by [facility] ([facility]). If you have questions about a medical condition or this instruction, always ask your healthcare professional. [address] any warranty or liability for your use of this information. 1. Thoroughly massage cream (30 g for average adult) from head to soles of feet; leave on for 8 to 14 hours before removing (shower or bath); 2. thoroughly clean and wash all linens. Vacuum carpets   3. If patient is not improving or developing any new/worsening symptoms then return to clinic as needed. Patient is to follow up with PCP as needed.

## 2020-04-04 NOTE — PROGRESS NOTES
Ciprofloxacin Hives    Amoxicillin-Pot Clavulanate     Demerol Hives    Hydroxyzine     Hydroxyzine Hcl Itching    Ibuprofen Nausea Only    Ketorolac Tromethamine     Meperidine Hives    Nortriptyline Other (See Comments)    Orphenadrine     Orphenadrine Citrate Itching    Penicillins Hives and Nausea Only    Prochlorperazine Edisylate      Will discuss with patient at next visit     Tobramycin      Will discuss with patient at next visit     Tramadol      Will discuss with patient at next visit     Vistaril [Hydroxyzine Hcl] Itching    Cephalexin Rash    Clindamycin/Lincomycin Rash    Codeine Nausea And Vomiting and Rash    Doxycycline Nausea And Vomiting    Keflex [Cephalexin] Rash    Ketorolac Tromethamine Itching and Nausea And Vomiting    Moxifloxacin Nausea And Vomiting     Will discuss with patient at visit        Health Maintenance   Topic Date Due    Diabetic retinal exam  09/08/1982    HIV screen  09/08/1987    Hepatitis B vaccine (1 of 3 - Risk 3-dose series) 09/08/1991    Cervical cancer screen  05/21/2017    Lipid screen  10/31/2017    Diabetic microalbuminuria test  12/17/2019    Flu vaccine (Season Ended) 09/01/2020    Diabetic foot exam  11/11/2020    A1C test (Diabetic or Prediabetic)  01/10/2021    Potassium monitoring  03/23/2021    Creatinine monitoring  03/23/2021    DTaP/Tdap/Td vaccine (2 - Td) 09/22/2021    Pneumococcal 0-64 years Vaccine  Completed    Hepatitis A vaccine  Aged Out    Hib vaccine  Aged Out    Meningococcal (ACWY) vaccine  Aged Out       Subjective:     Review of Systems   Constitutional: Negative for fever. HENT: Negative for sore throat. Skin: Positive for rash.       :Objective      Physical Exam  Vitals signs and nursing note reviewed. Constitutional:       Appearance: Normal appearance. She is not ill-appearing. HENT:      Head: Normocephalic and atraumatic.       Nose: Nose normal.   Eyes:      Conjunctiva/sclera: clothes, and bedding. Everyone in your household should be treated. Scabies is treated with medicine. Itching may last for several weeks after treatment. Follow-up care is a key part of your treatment and safety. Be sure to make and go to all appointments, and call your doctor if you are having problems. It's also a good idea to know your test results and keep a list of the medicines you take. How can you care for yourself at home? · Use the lotion or cream your doctor recommends or prescribes. One treatment usually cures scabies. Do not use the cream again unless your doctor tells you to. · Wash all clothes, bedding, and towels that you used in the 3 days before you started treatment. Use hot water, and use the hot cycle in the dryer. Another option is to dry-clean these items. Or seal them in a plastic bag for 3 to 7 days. · Take an oral antihistamine, such as loratadine (Claritin) or diphenhydramine (Benadryl), to help stop itching. You also can use a nonprescription anti-itch cream. Read and follow all instructions on the label. · Do not have physical contact with other people or let anyone use your personal items until you have finished treatment. Do not use other people's personal items until your treatment is done. Tell people with whom you have close contact that they will need treatment if they have symptoms. · Take an oatmeal bath to help relieve itching. Add a handful of oatmeal (ground to a powder) to your bath. Or you can try an oatmeal bath product, such as Aveeno. When should you call for help? Call your doctor now or seek immediate medical care if:    · You have signs of infection, such as:  ? Increased pain, swelling, warmth, or redness. ? Red streaks leading from the mite bites. ? Pus draining from a bite area.   ? A fever.    Watch closely for changes in your health, and be sure to contact your doctor if:    · Anyone else in your family has itching.     · You do not get better within 2 weeks.   Where can you learn more? Go to https://chpepiceweb.healthFoundation Software. org and sign in to your [employer] account. Enter W442 in the [facility] box to learn more about \"Scabies: Care Instructions. \"     If you do not have an account, please click on the \"Sign Up Now\" link. Current as of: October 30, 2019Content Version: 12.4  © 6806-3066 Healthwise, Incorporated. Care instructions adapted under license by [facility] ([facility]). If you have questions about a medical condition or this instruction, always ask your healthcare professional. [address] any warranty or liability for your use of this information. 1. Thoroughly massage cream (30 g for average adult) from head to soles of feet; leave on for 8 to 14 hours before removing (shower or bath); 2. thoroughly clean and wash all linens. Vacuum carpets   3. If patient is not improving or developing any new/worsening symptoms then return to clinic as needed. Patient is to follow up with PCP as needed.          Electronically signed by NANETTE Pratt on 4/4/2020 at 2:49 PM

## 2020-04-08 ENCOUNTER — VIRTUAL VISIT (OUTPATIENT)
Dept: PRIMARY CARE CLINIC | Age: 50
End: 2020-04-08
Payer: MEDICAID

## 2020-04-08 ENCOUNTER — NURSE TRIAGE (OUTPATIENT)
Dept: OTHER | Facility: CLINIC | Age: 50
End: 2020-04-08

## 2020-04-08 PROCEDURE — G8428 CUR MEDS NOT DOCUMENT: HCPCS | Performed by: NURSE PRACTITIONER

## 2020-04-08 PROCEDURE — G8417 CALC BMI ABV UP PARAM F/U: HCPCS | Performed by: NURSE PRACTITIONER

## 2020-04-08 PROCEDURE — 1036F TOBACCO NON-USER: CPT | Performed by: NURSE PRACTITIONER

## 2020-04-08 PROCEDURE — 99214 OFFICE O/P EST MOD 30 MIN: CPT | Performed by: NURSE PRACTITIONER

## 2020-04-08 RX ORDER — FUROSEMIDE 10 MG/ML
40 INJECTION INTRAMUSCULAR; INTRAVENOUS ONCE
Status: COMPLETED | OUTPATIENT
Start: 2020-04-08 | End: 2020-04-08

## 2020-04-08 RX ADMIN — FUROSEMIDE 40 MG: 10 INJECTION INTRAMUSCULAR; INTRAVENOUS at 12:08

## 2020-04-08 ASSESSMENT — ENCOUNTER SYMPTOMS
SHORTNESS OF BREATH: 0
COUGH: 0
PHOTOPHOBIA: 0
VOMITING: 0
RHINORRHEA: 0
BACK PAIN: 0
NAUSEA: 0
COLOR CHANGE: 0
VOICE CHANGE: 0

## 2020-04-08 NOTE — PROGRESS NOTES
[address] [address]             Phone:  [phone]  Fax:  [phone]       2020    TELEHEALTH EVALUATION -- Audio/Visual (During MPOLJ-16 public health emergency)    HPI:  Chief Complaint   Patient presents with    Leg Swelling         Tianna MAURER  (:  [birth year]) has requested an audio/video evaluation for the following concern(s):    Patient presents today for evaluation of lower extremity edema. She reports this has been present for 2 weeks. She is currently taking lasix 80 mg BID. She states she has tried calling and has not had anyone \"willing to help her\". She denies shortness of breath. She is voiding as usual. She has bilateral hand swelling now she states. She feels like her legs are so tight she can barely walk. She states typically lasix IM helps her. Review of Systems   Constitutional: Negative for chills and fever. HENT: Negative for ear pain, hearing loss, rhinorrhea and voice change. Eyes: Negative for photophobia and visual disturbance. Respiratory: Negative for cough and shortness of breath. Cardiovascular: Positive for leg swelling. Negative for chest pain and palpitations. Gastrointestinal: Negative for nausea and vomiting. Endocrine: Negative. Negative for cold intolerance and heat intolerance. Genitourinary: Negative for difficulty urinating and flank pain. Musculoskeletal: Negative for back pain and neck pain. Skin: Negative for color change and rash. Allergic/Immunologic: Negative for environmental allergies and food allergies. Neurological: Negative for dizziness, speech difficulty and headaches. Hematological: Does not bruise/bleed easily. Psychiatric/Behavioral: Negative for sleep disturbance and suicidal ideas. Prior to Visit Medications    Medication Sig Taking?  Authorizing Provider   permethrin (ELIMITE) 5 % cream Thoroughly massage cream (30 g for average adult) from head to soles of feet; leave

## 2020-04-11 ENCOUNTER — HOSPITAL ENCOUNTER (EMERGENCY)
Age: 50
Discharge: HOME | End: 2020-04-11
Attending: EMERGENCY MEDICINE
Payer: MEDICAID

## 2020-04-11 VITALS
HEIGHT: 61 IN | DIASTOLIC BLOOD PRESSURE: 87 MMHG | HEART RATE: 98 BPM | BODY MASS INDEX: 52.87 KG/M2 | TEMPERATURE: 98.1 F | SYSTOLIC BLOOD PRESSURE: 164 MMHG | WEIGHT: 280 LBS | RESPIRATION RATE: 20 BRPM | OXYGEN SATURATION: 96 %

## 2020-04-11 PROCEDURE — 6360000002 HC RX W HCPCS: Performed by: EMERGENCY MEDICINE

## 2020-04-11 PROCEDURE — 96372 THER/PROPH/DIAG INJ SC/IM: CPT

## 2020-04-11 PROCEDURE — 99283 EMERGENCY DEPT VISIT LOW MDM: CPT

## 2020-04-11 RX ORDER — PROMETHAZINE HYDROCHLORIDE 25 MG/ML
12.5 INJECTION, SOLUTION INTRAMUSCULAR; INTRAVENOUS ONCE
Status: COMPLETED | OUTPATIENT
Start: 2020-04-11 | End: 2020-04-11

## 2020-04-11 RX ADMIN — PROMETHAZINE HYDROCHLORIDE 12.5 MG: 25 INJECTION INTRAMUSCULAR; INTRAVENOUS at 03:10

## 2020-04-11 RX ADMIN — BUTORPHANOL TARTRATE 2 MG: 2 INJECTION, SOLUTION INTRAMUSCULAR; INTRAVENOUS at 03:10

## 2020-04-11 ASSESSMENT — PAIN SCALES - GENERAL
PAINLEVEL_OUTOF10: 3
PAINLEVEL_OUTOF10: 7
PAINLEVEL_OUTOF10: 7

## 2020-04-11 ASSESSMENT — ENCOUNTER SYMPTOMS
BLURRED VISION: 0
VOMITING: 0
VISUAL CHANGE: 0
TINGLING: 0
PHOTOPHOBIA: 1

## 2020-04-11 ASSESSMENT — PAIN DESCRIPTION - PAIN TYPE: TYPE: ACUTE PAIN

## 2020-04-11 ASSESSMENT — PAIN DESCRIPTION - LOCATION: LOCATION: HEAD

## 2020-04-11 ASSESSMENT — PAIN DESCRIPTION - ORIENTATION: ORIENTATION: ANTERIOR

## 2020-04-11 NOTE — ED PROVIDER NOTES
[facility] EMERGENCY DEPT  eMERGENCY dEPARTMENT eNCOUnter      Pt Name: Waldo Falcon  MRN: [id number]  [place] 1970  Date of evaluation: 4/11/2020  Provider: MD Elaina Dangelo       Chief Complaint   Patient presents with    Headache         HISTORY OF PRESENT ILLNESS   (Location/Symptom, Timing/Onset,Context/Setting, Quality, Duration, Modifying Factors, Severity)  Note limiting factors. Waldo Falcon is a 54 y.o. female who presents to the emergency department      The history is provided by the patient. Headache   Location: bilateral retroorbital.  Quality: throbbing. Severity currently:  7/10  Severity at highest:  7/10  Onset quality:  Sudden  Duration: \"this afternoon\"  Timing:  Intermittent  Chronicity:  Chronic  Similar to prior headaches: yes    Relieved by:  Nothing  Worsened by:  Light  Associated symptoms: photophobia    Associated symptoms: no blurred vision, no fever, no focal weakness, no loss of balance, no neck pain, no neck stiffness, no numbness, no paresthesias, no tingling, no visual change and no vomiting        NursingNotes were reviewed. REVIEW OF SYSTEMS    (2-9 systems for level 4, 10 or more for level 5)     Review of Systems   Constitutional: Negative for fever. Eyes: Positive for photophobia. Negative for blurred vision. Gastrointestinal: Negative for vomiting. Musculoskeletal: Negative for neck pain and neck stiffness. Neurological: Positive for headaches. Negative for focal weakness, numbness, paresthesias and loss of balance. All other systems reviewed and are negative. Except as noted above the remainder of the review of systems was reviewed and negative.        PAST MEDICAL HISTORY     Past Medical History:   Diagnosis Date    Anxiety     Constipation     Depression     DM (diabetes mellitus) ([address]     Headache(784.0)     Hyperlipidemia     Hypertension     Kidney stones     Kidney stones     Restless leg     Restless legs syndrome          SURGICALHISTORY       Past Surgical History:   Procedure Laterality Date    ECTOPIC PREGNANCY SURGERY      EYE SURGERY      cornea transplant and cataracts removed    KNEE CARTILAGE SURGERY Left 12/20/2016    KNEE ARTHROSCOPIC PARTIAL MEDIAL MENISCECTOMY performed by Maurizio Phillips MD at [address]       Discharge Medication List as of 4/11/2020  3:09 AM      CONTINUE these medications which have NOT CHANGED    Details   permethrin (ELIMITE) 5 % cream Thoroughly massage cream (30 g for average adult) from head to soles of feet; leave on for 8 to 14 hours before removing (shower or bath), Disp-1 Tube, R-0, Normal      blood glucose test strips (ASCENSIA AUTODISC VI;ONE TOUCH ULTRA TEST VI) strip Disp-150 each, R-5, NormalCheck glucose TID and as needed for hypoglycemic events Dx E11.40 E11.66 Z79.4  Use one touch verio flex      phentermine (ADIPEX-P) 37.5 MG tablet TAKE 1 TABLET BY MOUTH EVERY DAY, Disp-30 tablet, R-0Normal      ondansetron (ZOFRAN-ODT) 4 MG disintegrating tablet DISSOLVE 1 TABLET ON THE TONGUE EVERY 8 HOURS AS NEEDED FOR NAUSEA OR VOMITING, Disp-20 tablet, R-0Normal      furosemide (LASIX) 80 MG tablet TAKE 1 TABLET BY MOUTH TWICE DAILY, Disp-60 tablet, R-2Normal      insulin lispro, 1 Unit Dial, (ADMELOG SOLOSTAR) 100 UNIT/ML SOPN INJECT 45 UNITS INTO THE SKIN EVERY EVENING AND INCREASE BY 3 UNITS EACH NIGHT UNTIL 60 UNITS IS REACHED AND CONTINUE 60 UNITS EVERY EVENING, Disp-18 mL, R-1Normal      pramipexole (MIRAPEX) 0.75 MG tablet TAKE 1 TABLET BY MOUTH TWICE DAILY, Disp-60 tablet, R-1Normal      B-D ULTRAFINE III SHORT PEN 31G X 8 MM MISC Disp-100 each, R-3, Normal      bumetanide (BUMEX) 1 MG tablet Take 1 tablet by mouth 2 times daily, Disp-60 tablet, R-0Normal      fluticasone (FLONASE) 50 MCG/ACT nasal spray 1 spray by Each Nostril route daily, Disp-1 Bottle, R-3Normal      promethazine (PHENERGAN) 25 MG tablet TAKE 1 TABLET BY MOUTH EVERY 6 HOURS AS NEEDED FOR NAUSEA, Disp-30 tablet, R-0Normal      tiZANidine (ZANAFLEX) 4 MG tablet TAKE 1 TABLET BY MOUTH EVERY NIGHT AS NEEDED FOR CRAMPING, Disp-10 tablet, R-0Normal      Diabetic Shoe MISC Starting Wed 11/20/2019, Disp-1 each, R-0, PrintDISPENSE ONE PAIR OF DIABETIC SHOE AND 3 PAIRS HEAT MOLDED INSERTS      !! Lancets (ONETOUCH DELICA PLUS [id number]) MISC USE TO CHECK BLLOD SUGAR AS DIRECTED, Disp-100 each, R-1Normal      potassium chloride (KLOR-CON M) 20 MEQ extended release tablet Take 1 tablet by mouth 2 times daily, Disp-20 tablet, R-0Normal      vitamin B-6 (PYRIDOXINE) 50 MG tablet TAKE 1 TABLET BY MOUTH EVERY DAY, Disp-30 tablet, R-0Normal      triamterene-hydrochlorothiazide (DYAZIDE) 37.5-25 MG per capsule TAKE 1 CAPSULE BY MOUTH EVERY DAY IN THE MORNING, Disp-90 capsule, R-0Normal      metFORMIN (GLUCOPHAGE) 500 MG tablet TAKE 1 TABLET BY MOUTH TWICE DAILY WITH MEALS, Disp-180 tablet, R-0Normal      celecoxib (CELEBREX) 200 MG capsule TAKE 1 CAPSULE BY MOUTH EVERY DAY, Disp-60 capsule, R-5Normal      blood glucose monitor kit and supplies Test 1 times a day & as needed for symptoms of irregular blood glucose., Disp-1 kit, R-0, Normal      insulin glargine (BASAGLAR KWIKPEN) 100 UNIT/ML injection pen Inject 30 U Nightly, Disp-5 pen, R-5Normal      topiramate (TOPAMAX) 50 MG tablet TAKE 1 TABLET BY MOUTH TWICE DAILY, Disp-60 tablet, R-5Normal      verapamil (CALAN SR) 240 MG extended release tablet TAKE 1 TABLET BY MOUTH EVERY NIGHT, Disp-30 tablet, R-5Normal      rizatriptan (MAXALT) 10 MG tablet Take 1 tablet by mouth once as needed for Migraine May repeat in 2 hours if needed, Disp-30 tablet, R-3Normal      VICTOZA 18 MG/3ML SOPN SC injection ADMINISTER 1.8 MG INTO THE SKIN EVERY DAY, Disp-9 mL, R-3Normal      !! gabapentin (NEURONTIN) 100 MG capsule Take 100 mg by mouth daily. Historical Med      omeprazole (PRILOSEC) 20 MG delayed release capsule Take 1 capsule by unspecified headache type          DISPOSITION/PLAN   DISPOSITION        PATIENT REFERRED TO:  Loki Massey MD  [facility] [id number]  [place] [phone]      As needed      DISCHARGE MEDICATIONS:  Discharge Medication List as of 4/11/2020  3:09 AM             (Please note that portions of this note were completed with a voice recognition program.  Efforts were made to edit the dictations but occasionally words are mis-transcribed.)    Dav Guido MD (electronically signed)  Attending Emergency Physician          Dav Guido MD  04/11/20 [id number]

## 2020-04-13 ENCOUNTER — CARE COORDINATION (OUTPATIENT)
Dept: CARE COORDINATION | Age: 50
End: 2020-04-13

## 2020-04-13 NOTE — CARE COORDINATION
Patient contacted regarding recent discharge and COVID-19 risk   Care Transition Nurse/ Ambulatory Care Manager contacted the patient by telephone to perform post discharge assessment. Verified name and  with patient as identifiers. Patient has following risk factors of: Diabetes Mellitus. CTN/ACM reviewed discharge instructions, medical action plan and red flags related to discharge diagnosis. Reviewed and educated them on any new and changed medications related to discharge diagnosis. Advised obtaining a 90-day supply of all daily and as-needed medications. Education provided regarding infection prevention, and signs and symptoms of COVID-19 and when to seek medical attention with patient who verbalized understanding. Discussed exposure protocols and quarantine from [facility] you at higher risk for severe illness  and given an opportunity for questions and concerns. The patient agrees to contact the COVID-19 hotline [phone] or PCP office for questions related to their healthcare. CTN/ACM provided contact information for future reference. Patient/family/caregiver given [facility] and agrees to enroll yes  Patient's preferred e-mail:  [email or web address]  Patient's preferred phone number:   Based on Loop alert triggers, patient will be contacted by nurse care manager for worsening symptoms. Spoke with Tianna via phone call today for ED f/u. She states her symptoms are much improved today. She had an appt with Dr. Román Eagle, neuro, last week, but when she got to office and she was told he was off for the week. She is supposed to see him later this month. She normally takes Verapamil and topamax and has used stadol nasal spray in the past.  She would like a rx for this, as she hates going to ED and tries to avoid it unless necessary.   We discussed her risk factors for COVID-19, symptoms to watch for, hand/cough hygiene, home sanitization, and social

## 2020-04-15 ENCOUNTER — TELEPHONE (OUTPATIENT)
Dept: NEUROLOGY | Facility: CLINIC | Age: 50
End: 2020-04-15

## 2020-04-15 ENCOUNTER — OFFICE VISIT (OUTPATIENT)
Dept: NEUROLOGY | Facility: CLINIC | Age: 50
End: 2020-04-15

## 2020-04-15 VITALS — WEIGHT: 254 LBS | HEIGHT: 60 IN | BODY MASS INDEX: 49.87 KG/M2

## 2020-04-15 DIAGNOSIS — G43.819 OTHER MIGRAINE WITHOUT STATUS MIGRAINOSUS, INTRACTABLE: Primary | ICD-10-CM

## 2020-04-15 DIAGNOSIS — I10 ESSENTIAL HYPERTENSION: ICD-10-CM

## 2020-04-15 PROCEDURE — 99203 OFFICE O/P NEW LOW 30 MIN: CPT | Performed by: PHYSICIAN ASSISTANT

## 2020-04-15 RX ORDER — FLUTICASONE PROPIONATE 50 MCG
SPRAY, SUSPENSION (ML) NASAL
Status: ON HOLD | COMMUNITY
Start: 2020-01-10 | End: 2020-09-26

## 2020-04-15 RX ORDER — ONDANSETRON 4 MG/1
TABLET, ORALLY DISINTEGRATING ORAL
Status: ON HOLD | COMMUNITY
Start: 2020-03-31 | End: 2020-09-26

## 2020-04-15 RX ORDER — ZOLMITRIPTAN 5 MG/1
1 SPRAY NASAL AS NEEDED
Qty: 9 EACH | Refills: 0 | Status: SHIPPED | OUTPATIENT
Start: 2020-04-15 | End: 2020-04-15

## 2020-04-15 RX ORDER — RIZATRIPTAN BENZOATE 10 MG/1
10 TABLET, ORALLY DISINTEGRATING ORAL ONCE AS NEEDED
Status: ON HOLD | COMMUNITY
End: 2020-09-26

## 2020-04-15 RX ORDER — FUROSEMIDE 80 MG
80 TABLET ORAL 2 TIMES DAILY
Status: ON HOLD | COMMUNITY
Start: 2020-03-30 | End: 2020-09-26

## 2020-04-15 RX ORDER — INSULIN LISPRO 100 [IU]/ML
INJECTION, SOLUTION INTRAVENOUS; SUBCUTANEOUS
Status: ON HOLD | COMMUNITY
Start: 2020-03-02 | End: 2020-09-26

## 2020-04-15 RX ORDER — PERMETHRIN 50 MG/G
CREAM TOPICAL
Status: ON HOLD | COMMUNITY
Start: 2020-04-04 | End: 2020-09-26

## 2020-04-15 RX ORDER — POTASSIUM CHLORIDE 20 MEQ/1
TABLET, EXTENDED RELEASE ORAL
Status: ON HOLD | COMMUNITY
Start: 2020-03-11 | End: 2020-09-26

## 2020-04-15 NOTE — PROGRESS NOTES
"You have chosen to receive care through a telehealth visit.  Do you consent to use a video/audio connection for your medical care today? Yes        Neurology Progress Note    TELEMEDICINE ENCOUNTER    Primary Care:  Mode Rubio MD    Chief Complaint:    Migraine headache disorder    Subjective     Subjective:  Is a very pleasant 49-year-old female who is routinely cared for by Mode Rubio MD, who has longstanding history of headaches beginning approximately age 14.  Patient states that she has a known history of migraine starting around the time of her pregnancy and seemed to worsen following the birth of her children.  It also escalated following bilateral tubal ligation.  Patient now is experiencing approximately 3 severe migraines per month associated with triggers of smells of perfume or cigarette smoke and associated blurred vision, nausea and emesis, light and sound sensitivity.    In the past patient has tried the following medications:    Amerge  Imitrex  Fioricet (made headaches worse)  Maxalt (helped mildly)  Zomig (helped the most, but insurance would not cover)  Topamax 50 mg twice daily (marginal benefit)  Verapamil  Stadol    The patient recently came off of topiramate at her primary care providers recommendation.  Patient was referred here for further recommendations and evaluation.  Additionally, she uses concurrent promethazine suppository to help with the nausea at the onset of a headache along with her current triptan which is Maxalt.  She states she is unable to tolerate Compazine as she was given this as a \"migraine cocktail\" in the emergency room a while back and she had persistent feelings of chest tightness the following day.  She does not have a clear cut allergy to this.      Past Medical History:   Diagnosis Date   • Arthritis     Osteoarthritis primarily left knee    • Back pain    • Diabetes mellitus (CMS/[facility])    • Hx of tear of meniscus of knee joint 11/21/2016   • Hypertension    • " Joint pain    • Knee pain    • Migraine    • Migraine headache    • Pain     knee, left ankle, left ankle     • Pes anserinus bursitis 04/28/2015   • Restless leg      Past Surgical History:   Procedure Laterality Date   • CATARACT EXTRACTION     • CATARACT EXTRACTION     • CATARACT EXTRACTION     • CORNEAL TRANSPLANT      bilateral    • ECTOPIC PREGNANCY     • JOINT REPLACEMENT     • KNEE ARTHROSCOPY W/ MENISCAL REPAIR     • KNEE MENISCAL REPAIR     • MENISCECTOMY Left 12/20/2016   • TUBAL ABDOMINAL LIGATION       Family History   Problem Relation Age of Onset   • No Known Problems Mother    • No Known Problems Father      Social History     Tobacco Use   • Smoking status: Never Smoker   • Smokeless tobacco: Never Used   Substance Use Topics   • Alcohol use: No     Frequency: Never   • Drug use: No       Medications:  Current Outpatient Medications   Medication Sig Dispense Refill   • Blood Glucose Monitoring Suppl device Test 1 times a day & as needed for symptoms of irregular blood glucose.     • Casanthranol-Docusate Sodium (STOOL SOFTENER PLUS PO) Take  by mouth As Needed.     • cyclobenzaprine (FLEXERIL) 10 MG tablet Take 10 mg by mouth At Night As Needed for Muscle Spasms.     • diclofenac (VOLTAREN) 1 % gel gel      • fluticasone (FLONASE) 50 MCG/ACT nasal spray SHAKE LQ AND U 1 SPR IEN D     • furosemide (LASIX) 80 MG tablet Take 80 mg by mouth 2 (Two) Times a Day.     • gabapentin (NEURONTIN) 100 MG capsule Take 100 mg by mouth 2 (Two) Times a Day.  5   • gabapentin (NEURONTIN) 300 MG capsule 300 mg Every Night.     • HYDROcodone-acetaminophen (NORCO) 7.5-325 MG per tablet Take 1 tablet by mouth As Needed for moderate pain (4-6).     • Insulin Glargine (BASAGLAR KWIKPEN SC) Inject 4.5 Units under the skin into the appropriate area as directed Every Night.     • Insulin Lispro, 1 Unit Dial, (HUMALOG) 100 UNIT/ML solution pen-injector INJECT 45 UNITS INTO THE SKIN EVERY EVENING AND INCREASE BY 3 UNITS EACH  NIGHT UNTIL 60 UNITS IS REACHED AND CONTINUE 60 UNITS EVERY EVENING     • Liraglutide (VICTOZA) 18 MG/3ML solution pen-injector injection Daily.     • metFORMIN (GLUCOPHAGE) 500 MG tablet TAKE 1 TABLET BY MOUTH TWICE DAILY WITH MEALS     • NON FORMULARY As Needed. [place] Pain Cream  5   • omeprazole (priLOSEC) 20 MG capsule Take 20 mg by mouth 2 (Two) Times a Day.     • ondansetron ODT (ZOFRAN-ODT) 4 MG disintegrating tablet DIS 1 T ON THE TONGUE Q 8 H PRF NAUSEA OR VOM     • ONE TOUCH ULTRA TEST test strip USE TO CHECK BS TID  AND AS NEEDED FOR HYPOGLYCEMIC     • permethrin (ELIMITE) 5 % cream THOROUGHLY MASSAGE CREAM FROM HEAD TO SOLES OF FEET. LEAVE ON FOR 8 TO 14 H BEFORE SHOWER OR BATH     • phentermine 37.5 MG capsule Take 37.5 mg by mouth Every Morning.     • potassium chloride (K-DUR,KLOR-CON) 20 MEQ CR tablet TK 2 TS PO D FOR 3 DAYS     • pramipexole (MIRAPEX) 0.5 MG tablet Take 0.5 mg by mouth 2 (Two) Times a Day.     • rizatriptan MLT (MAXALT-MLT) 10 MG disintegrating tablet Place 10 mg on the tongue 1 (One) Time As Needed for Migraine. May repeat in 2 hours if needed     • triamterene-hydrochlorothiazide (DYAZIDE) 37.5-25 MG per capsule Take 1 capsule by mouth Daily.     • verapamil SR (CALAN-SR) 240 MG CR tablet Take 240 mg by mouth Daily.       No current facility-administered medications for this visit.        Allergies:    Compazine [prochlorperazine edisylate]; Meperidine; Norflex [orphenadrine]; Nortriptyline; Prochlorperazine maleate; Codeine; Penicillins; Augmentin [amoxicillin-pot clavulanate]; Avelox [moxifloxacin hcl]; Cephalexin; Ciprofloxacin; Clindamycin/lincomycin; Doxycycline; Hydroxyzine hcl; Ibuprofen; Orphenadrine citrate; Tobramycin; Toradol [ketorolac tromethamine]; Tramadol; and Vistaril [hydroxyzine]    Review of Systems:   -A 14 point review of systems is completed and is negative.      Objective      General Exam:  Head:  Normocephalic, atraumatic.  HEENT: LUCIANA.  Full EOM.   Hearing intact.  Neck: Full range of motion.  No mass.  No visible goiter.  Lungs:  No audible wheeze, rales or rhonchi.  Normal respiratory effort.  No distress.  Musculoskeletal: No crepitus.  No joint swelling.  No erythema.  Full extremities.  Extremities: No edema.  No cyanosis.  Skin: No rash.  Psych: Normal affect, mood, interaction and eye contact.  Normal judgement.      Neurologic Exam:  Mental Status:    -Awake. Alert. Oriented to person, place & time.  -No word finding difficulties.  -No aphasia.  -No dysarthria.  -Follows simple commands.      Assessment/Plan     Impression:  1.  Migraine headache disorder      Plan:  1.  Continue verapamil as a prophylactic agent  2.  Recommend trying to prior authorize Zomig as she had benefited most greatly from this as an abortive agent.  I would recommend giving a 5 mg nasal spray as the means of aborting this along with concurrent promethazine.  3.  Recommend prior authorization for Emgality 120 mg subcutaneous monthly to treat chronic migraine.  Discussed potential side effects, administration, benefit, mechanism of action.  Questions answered.  4.  Follow-up in 1 month and 3 months from the institution of Emgality to evaluate tolerability and clinical response.  5.  Discontinue other triptans including her current Maxalt if we get Zomig approved.  6.  Recommend daily anti-inflammatory diet.  Recommend good glycemic control.  7.  Recommend regular cardiovascular exercise in the form of walking 3-4 times per week at 30-40 minutes at a time.  Work on controlling weight and improving BMI as well as controlling hypertension.  8.  Call for acute migraine not successfully aborted with the aforementioned measures at which time I would recommend bringing her in for consideration of sphenopalatine ganglion block.    Patient will follow-up with me in 1 month either in person or via telephone conference.  Greater than 30 minutes spent in direct contact via telemedicine  today in the form of a telephone conversation.  Discussed the aforementioned measures detailed above.        Thank you, Dr. Rubio, for the referral and the opportunity to participate in the care of your patient.  I truly appreciate it.  Please call with any concerns or questions in the interim.          FANNY Mehta, PA-C, MT([facility])    04/15/20  08:36

## 2020-04-16 RX ORDER — ZOLMITRIPTAN 5 MG/1
SPRAY NASAL
Qty: 9 EACH | Refills: 0 | Status: SHIPPED | OUTPATIENT
Start: 2020-04-16 | End: 2020-05-15 | Stop reason: SDUPTHER

## 2020-04-20 ENCOUNTER — VIRTUAL VISIT (OUTPATIENT)
Dept: PRIMARY CARE CLINIC | Age: 50
End: 2020-04-20
Payer: MEDICAID

## 2020-04-20 ENCOUNTER — TELEPHONE (OUTPATIENT)
Dept: PRIMARY CARE CLINIC | Age: 50
End: 2020-04-20

## 2020-04-20 DIAGNOSIS — R25.2 LEG CRAMPING: ICD-10-CM

## 2020-04-20 DIAGNOSIS — R60.0 BILATERAL LOWER EXTREMITY EDEMA: ICD-10-CM

## 2020-04-20 LAB
ALBUMIN SERPL-MCNC: 3.8 G/DL (ref 3.5–5.2)
ALP BLD-CCNC: 60 U/L (ref 35–104)
ALT SERPL-CCNC: 14 U/L (ref 5–33)
ANION GAP SERPL CALCULATED.3IONS-SCNC: 15 MMOL/L (ref 7–19)
AST SERPL-CCNC: 17 U/L (ref 5–32)
BILIRUB SERPL-MCNC: 0.4 MG/DL (ref 0.2–1.2)
BUN BLDV-MCNC: 16 MG/DL (ref 6–20)
CALCIUM SERPL-MCNC: 9.4 MG/DL (ref 8.6–10)
CHLORIDE BLD-SCNC: 95 MMOL/L (ref 98–111)
CO2: 27 MMOL/L (ref 22–29)
CREAT SERPL-MCNC: 1 MG/DL (ref 0.5–0.9)
GFR NON-AFRICAN AMERICAN: 59
GLUCOSE BLD-MCNC: 395 MG/DL (ref 74–109)
POTASSIUM SERPL-SCNC: 4.2 MMOL/L (ref 3.5–5)
SODIUM BLD-SCNC: 137 MMOL/L (ref 136–145)
TOTAL PROTEIN: 7 G/DL (ref 6.6–8.7)

## 2020-04-20 PROCEDURE — G8417 CALC BMI ABV UP PARAM F/U: HCPCS | Performed by: NURSE PRACTITIONER

## 2020-04-20 PROCEDURE — 1036F TOBACCO NON-USER: CPT | Performed by: NURSE PRACTITIONER

## 2020-04-20 PROCEDURE — G8427 DOCREV CUR MEDS BY ELIG CLIN: HCPCS | Performed by: NURSE PRACTITIONER

## 2020-04-20 PROCEDURE — 99215 OFFICE O/P EST HI 40 MIN: CPT | Performed by: NURSE PRACTITIONER

## 2020-04-20 RX ORDER — CELECOXIB 100 MG/1
100 CAPSULE ORAL DAILY
Qty: 60 CAPSULE | Refills: 3 | Status: SHIPPED | OUTPATIENT
Start: 2020-04-20 | End: 2020-10-27

## 2020-04-20 RX ORDER — METOLAZONE 5 MG/1
5 TABLET ORAL DAILY
Qty: 3 TABLET | Refills: 0 | Status: SHIPPED | OUTPATIENT
Start: 2020-04-20 | End: 2020-06-10

## 2020-04-20 RX ORDER — BUSPIRONE HYDROCHLORIDE 5 MG/1
5 TABLET ORAL 3 TIMES DAILY
Qty: 90 TABLET | Refills: 0 | Status: SHIPPED | OUTPATIENT
Start: 2020-04-20 | End: 2020-05-20

## 2020-04-20 ASSESSMENT — ENCOUNTER SYMPTOMS
NAUSEA: 0
BACK PAIN: 0
RHINORRHEA: 0
SHORTNESS OF BREATH: 0
COLOR CHANGE: 0
VOICE CHANGE: 0
VOMITING: 0
COUGH: 0
PHOTOPHOBIA: 0

## 2020-04-20 NOTE — PROGRESS NOTES
environmental allergies and food allergies. Neurological: Negative for dizziness, speech difficulty and headaches. Hematological: Does not bruise/bleed easily. Psychiatric/Behavioral: Negative for sleep disturbance and suicidal ideas. Prior to Visit Medications    Medication Sig Taking?  Authorizing Provider   busPIRone (BUSPAR) 5 MG tablet Take 1 tablet by mouth 3 times daily Yes Roxianisabel Andersenen APRN   celecoxib (CELEBREX) 100 MG capsule Take 1 capsule by mouth daily Yes Roxianne Ambrose, APRN   metOLazone (ZAROXOLYN) 5 MG tablet Take 1 tablet by mouth daily  Roxianne Ambrose, APRN   permethrin (ELIMITE) 5 % cream Thoroughly massage cream (30 g for average adult) from head to soles of feet; leave on for 8 to 14 hours before removing (shower or bath)  NANETTE Bain   blood glucose test strips (ASCENSIA AUTODISC VI;ONE TOUCH ULTRA TEST VI) strip Check glucose TID and as needed for hypoglycemic events Dx E11.40 E11.66 Z79.4  Use one touch verio flex  Marielle Garcia MD   phentermine (ADIPEX-P) 37.5 MG tablet TAKE 1 TABLET BY MOUTH EVERY DAY  Marielle Garcia MD   ondansetron (ZOFRAN-ODT) 4 MG disintegrating tablet DISSOLVE 1 TABLET ON THE TONGUE EVERY 8 HOURS AS NEEDED FOR NAUSEA OR VOMITING  Marielle Garcia MD   furosemide (LASIX) 80 MG tablet TAKE 1 TABLET BY MOUTH TWICE DAILY  Marielle Garcia MD   potassium chloride (KLOR-CON M) 20 MEQ extended release tablet Take 2 tablets by mouth daily for 3 days  NANETTE Barber - CNP   insulin lispro, 1 Unit Dial, (ADMELOG SOLOSTAR) 100 UNIT/ML SOPN INJECT 45 UNITS INTO THE SKIN EVERY EVENING AND INCREASE BY 3 UNITS EACH NIGHT UNTIL 60 UNITS IS REACHED AND CONTINUE 60 UNITS EVERY Bess MD [place]   pramipexole (MIRAPEX) 0.75 MG tablet TAKE 1 TABLET BY MOUTH TWICE DAILY  MD DOMINICK Ambrose-MARNIE ULTRAFINE III SHORT PEN 31G X 8 MM MISC USE TO INJECT INSULIN ONCE DAILY  Marielle Garcia MD   fluticasone (FLONASE) 50 MCG/ACT nasal spray 1 spray by Each Moxifloxacin Nausea And Vomiting     Will discuss with patient at visit    ,   Past Medical History:   Diagnosis Date    Anxiety     Constipation     Depression     DM (diabetes mellitus) ([facility] [address]     Headache(784.0)     Hyperlipidemia     Hypertension     Kidney stones     Kidney stones     Restless leg     Restless legs syndrome    ,   Past Surgical History:   Procedure Laterality Date    ECTOPIC PREGNANCY SURGERY      EYE SURGERY      cornea transplant and cataracts removed    KNEE CARTILAGE SURGERY Left 12/20/2016    KNEE ARTHROSCOPIC PARTIAL MEDIAL MENISCECTOMY performed by Netta Camarillo MD at [address] [place] [address]     ,   Social History     Tobacco Use    Smoking status: Never Smoker    Smokeless tobacco: Never Used   Substance Use Topics    Alcohol use: No    Drug use: No   ,   Family History   Problem Relation Age of Onset    Cancer Father     Cancer Maternal Grandmother     COPD Maternal Grandmother     Cancer Maternal Grandfather    ,   Immunization History   Administered Date(s) Administered    Influenza, MDCK Quadv, with preserv IM (Flucelvax 4 yrs and older) 12/17/2018    Influenza, Quadv, IM, PF (6 mo and older Fluzone, Flulaval, Fluarix, and 3 yrs and older Afluria) 09/22/2016    PPD Test 05/09/2019    Pneumococcal Polysaccharide ([id number]) 12/17/2018    Tdap (Boostrix, Adacel) 09/22/2011   ,   Health Maintenance   Topic Date Due    Diabetic retinal exam  09/08/1982    HIV screen  09/08/1987    Hepatitis B vaccine (1 of 3 - Risk 3-dose series) 09/08/1991    Cervical cancer screen  05/21/2017    Lipid screen  10/31/2017    Diabetic microalbuminuria test  12/17/2019    Flu vaccine (Season Ended) 09/01/2020    Diabetic foot exam  11/11/2020    A1C test (Diabetic or Prediabetic)  01/10/2021    Potassium monitoring  03/23/2021    Creatinine monitoring  03/23/2021    DTaP/Tdap/Td vaccine (2 - Td) 09/22/2021    Pneumococcal 0-64 years Vaccine  Completed    Hepatitis A vaccine  Aged Out    Hib vaccine  Aged Out    Meningococcal (ACWY) vaccine  Aged Out       PHYSICAL EXAMINATION:  [ INSTRUCTIONS:  \"[x]\" Indicates a positive item  \"[]\" Indicates a negative item  -- DELETE ALL ITEMS NOT EXAMINED]  [x] Alert  [x] Oriented to person/place/time    [x] No apparent distress  [] Toxic appearing    [] Face flushed appearing [x] Sclera clear  [] Lips are cyanotic      [x] Breathing appears normal  [] Appears tachypneic      [] Rash on visible skin    [x] Cranial Nerves II-XII grossly intact    [x] Motor grossly intact in visible upper extremities    [x] Motor grossly intact in visible lower extremities    [x] Normal Mood  [] Anxious appearing    [] Depressed appearing  [] Confused appearing      [] Poor short term memory  [] Poor long term memory    [] OTHER:      Due to this being a TeleHealth encounter, evaluation of the following organ systems is limited: Vitals/Constitutional/EENT/Resp/CV/GI//MS/Neuro/Skin/Heme-Lymph-Imm. ASSESSMENT/PLAN:  1. Bilateral lower extremity edema  - Will need CMP before making medication adjustments. Patient non compliant with diet, elevating and wearing compression stockings. We could try metolazone for a few days; she reports bumex was less effective than lasix. - Comprehensive Metabolic Panel; Future    2. Leg cramping    - Comprehensive Metabolic Panel; Future    3. [place] (generalized anxiety disorder)  - Begin Buspar 5 mg TID. Return in about 4 weeks (around 5/18/2020) for anxiety/depression check. An  electronic signature was used to authenticate this note.     --NANETTE Wren on 4/20/2020 at 4:47 PM        Pursuant to the emergency declaration under the [facility] waiver authority and the [employer] Act, this Virtual  Visit was conducted, with patient's consent, to reduce the patient's risk of exposure to COVID-19 and provide continuity of care for an established patient. Services were provided through a video synchronous discussion virtually to substitute for in-person clinic visit.

## 2020-04-20 NOTE — TELEPHONE ENCOUNTER
----- Message from NANETTE Lou sent at 4/20/2020  3:15 PM CDT -----  Please inform patient of stable labs other than very high glucose which needs to be better controlled. We can have her hold lasix for 3 days and substitute with Metolazone 5 mg for 3 days then resume lasix to see if this will help with fluid retention.

## 2020-04-21 ENCOUNTER — TELEPHONE (OUTPATIENT)
Dept: PRIMARY CARE CLINIC | Age: 50
End: 2020-04-21

## 2020-04-21 NOTE — TELEPHONE ENCOUNTER
Pt states she tried the new water pill you prescribed and has only gone to the bathroom 5 times today. She does not feel it is working. She would like to know how you would like to proceed?

## 2020-04-24 ENCOUNTER — TELEPHONE (OUTPATIENT)
Dept: NEUROLOGY | Facility: CLINIC | Age: 50
End: 2020-04-24

## 2020-04-24 NOTE — TELEPHONE ENCOUNTER
PT CALLED IN AND SAID THE ZOMIG NASAL SPRAY IS NOT HELPING HER. SHE SAID  WANTED TO SEE WHAT COLT MCKEE WANTED HER TO DO.  HER FATHER IS CURRENTLY IN THE NURSING HOME AND SHE DOESN'T WANT TO GO TO THE ER TO GET THE SHOT, SHE SAYS.    CALL [phone]

## 2020-04-24 NOTE — TELEPHONE ENCOUNTER
PATIENT CALLED BECAUSE SHE HAS BEEN PRESCRIBED BUPROPION NASAL SPRAY BY DR HARTMAN HER PCP AND SHE WAS NEEDED A REFILL AND DR HARTMAN IS OUT OF OFFICE AND HAS BEEN FOR AWHILE. SHE IS EXPERIENCING A BAD HEADACHE AND ZOMIG IS NOT HELPING .     PLEASE SEND TO [facility] ON [address] IN [place]

## 2020-04-24 NOTE — TELEPHONE ENCOUNTER
To my knowledge, the bupropion does not come in a nasal spray.  Please clarify the medication to which she is referring.

## 2020-04-27 NOTE — TELEPHONE ENCOUNTER
Able to contact Valeria today, she requested a script for Stadol NS.  Notified Juan Luis declined prescribing this.  She said she just wanted to have it in case she needed it.  Her father is in the nursing home and is dying.  She doesn't want to go to ER.  She is better today and said she will contact Dr. Rubio's office and ask them for a script.  She said she thinks Zomig NS will work if she uses it at the onset of a migraine.

## 2020-04-28 ENCOUNTER — TELEPHONE (OUTPATIENT)
Dept: PRIMARY CARE CLINIC | Age: 50
End: 2020-04-28

## 2020-04-28 NOTE — TELEPHONE ENCOUNTER
Pt called state her father is in [facility] actively dying. She states she is having horrible HA due to the added stress, and really cannot afford to end up in the hospital for it right now she is requesting a one time refill on the stadol nasal spray. Please advise. Thank you.

## 2020-05-05 ENCOUNTER — HOSPITAL ENCOUNTER (OUTPATIENT)
Dept: ULTRASOUND IMAGING | Facility: HOSPITAL | Age: 50
Discharge: HOME | End: 2020-05-05
Admitting: PODIATRIST

## 2020-05-05 ENCOUNTER — TRANSCRIBE ORDERS (OUTPATIENT)
Dept: ADMINISTRATIVE | Facility: HOSPITAL | Age: 50
End: 2020-05-05

## 2020-05-05 DIAGNOSIS — I82.491: Primary | ICD-10-CM

## 2020-05-05 PROCEDURE — 93970 EXTREMITY STUDY: CPT

## 2020-05-08 ENCOUNTER — VIRTUAL VISIT (OUTPATIENT)
Dept: PRIMARY CARE CLINIC | Age: 50
End: 2020-05-08
Payer: MEDICAID

## 2020-05-08 PROCEDURE — 99213 OFFICE O/P EST LOW 20 MIN: CPT | Performed by: NURSE PRACTITIONER

## 2020-05-08 PROCEDURE — G8427 DOCREV CUR MEDS BY ELIG CLIN: HCPCS | Performed by: NURSE PRACTITIONER

## 2020-05-08 PROCEDURE — G8417 CALC BMI ABV UP PARAM F/U: HCPCS | Performed by: NURSE PRACTITIONER

## 2020-05-08 PROCEDURE — 1036F TOBACCO NON-USER: CPT | Performed by: NURSE PRACTITIONER

## 2020-05-08 ASSESSMENT — ENCOUNTER SYMPTOMS
COLOR CHANGE: 0
RHINORRHEA: 0
BACK PAIN: 0
VOMITING: 0
PHOTOPHOBIA: 0
SHORTNESS OF BREATH: 0
NAUSEA: 0
COUGH: 0
VOICE CHANGE: 0

## 2020-05-08 NOTE — PROGRESS NOTES
[address] [address]             Phone:  [phone]  Fax:  [phone]       2020    TELEHEALTH EVALUATION -- Audio/Visual (During AODHR-10 public health emergency)    HPI:  Chief Complaint   Patient presents with    Leg Swelling         Tianna Trivedi (:  [id number]) has requested an audio/video evaluation for the following concern(s):    Patient presents today for evaluation of lower extremity swelling. She states her legs are painful and she can barely walk. Patient has chronic concerns with BLE and noncompliance with diet and medications. She often requires diuresing. However, sh did have recent negative bilateral venous scan. Uncertain if some of the tissue swelling is more of a lymphedema than fluid excess. Patient will likely need evaluation in clinic in the future to assess this. Review of Systems   Constitutional: Negative for chills and fever. HENT: Negative for ear pain, hearing loss, rhinorrhea and voice change. Eyes: Negative for photophobia and visual disturbance. Respiratory: Negative for cough and shortness of breath. Cardiovascular: Positive for leg swelling. Negative for chest pain and palpitations. Gastrointestinal: Negative for nausea and vomiting. Endocrine: Negative. Negative for cold intolerance and heat intolerance. Genitourinary: Negative for difficulty urinating and flank pain. Musculoskeletal: Negative for back pain and neck pain. Skin: Negative for color change and rash. Allergic/Immunologic: Negative for environmental allergies and food allergies. Neurological: Negative for dizziness, speech difficulty and headaches. Hematological: Does not bruise/bleed easily. Psychiatric/Behavioral: Negative for sleep disturbance and suicidal ideas. Prior to Visit Medications    Medication Sig Taking?  Authorizing Provider   butorphanol (STADOL) 10 MG/ML nasal spray 1 spray by Nasal route every 6 hours as needed for PAIRS HEAT MOLDED INSERTS  Jacki Martinez MD   Lancets ([place]) Eula Zapata MD   potassium chloride (KLOR-CON M) 20 MEQ extended release tablet Take 1 tablet by mouth 2 times daily  Jacki Martinez MD   vitamin B-6 (PYRIDOXINE) 50 MG tablet TAKE 1 TABLET BY MOUTH EVERY DAY  Jacki Martinez MD   triamterene-hydrochlorothiazide (DYAZIDE) 37.5-25 MG per capsule TAKE 1 CAPSULE BY MOUTH EVERY DAY IN THE [place]   metFORMIN (GLUCOPHAGE) 500 MG tablet TAKE 1 TABLET BY MOUTH TWICE DAILY WITH MEALS  Jacki Martinez MD   celecoxib (CELEBREX) 200 MG capsule TAKE 1 CAPSULE BY MOUTH EVERY DAY  Jacki Martinez MD   blood glucose monitor kit and supplies Test 1 times a day & as needed for symptoms of irregular blood glucose. Jacki Martinez MD   insulin glargine [facility]) 100 UNIT/ML injection pen Inject 30 U Nightly  Harmony Noel MD   topiramate (TOPAMAX) 50 MG tablet TAKE 1 TABLET BY MOUTH TWICE DAILY  Jacki Martinez MD   ONE TOUCH ULTRA TEST strip TEST BLOOD SUGAR ONCE DAILY  Jacki Martinez MD   verapamil (CALAN SR) 240 MG extended release tablet TAKE 1 TABLET BY MOUTH EVERY NIGHT  Jacki Martinez MD   rizatriptan (MAXALT) 10 MG tablet Take 1 tablet by mouth once as needed for Migraine May repeat in 2 hours if needed  Jacki Martinez MD   VICTOZA 18 MG/3ML SOPN SC injection ADMINISTER 1.8 MG INTO THE SKIN EVERY DAY  Jacki Martinez MD   gabapentin (NEURONTIN) 100 MG capsule Take 100 mg by mouth daily.   Historical Provider, MD   omeprazole (PRILOSEC) 20 MG delayed release capsule Take 1 capsule by mouth 2 times daily (before meals)  NANETTE Easton   magnesium (MAGNESIUM-OXIDE) 250 MG TABS tablet Take 1 tablet by mouth daily  Jacki Martinez MD   gabapentin (NEURONTIN) 300 MG capsule TK ONE C PO QHS  Historical Provider, MD   HYDROcodone-acetaminophen (NORCO) 7.5-325 MG per tablet Take 1 tablet by mouth every 6 hours as needed for Pain. Jenelle Jackson   Historical Provider, MD   EPINEPHrine (EPIPEN 2-JOSÉ) 0.3 MG/0.3ML SOAJ injection Inject 0.3 mLs into the muscle once for 1 dose Use as directed for allergic reaction  Nedra Rivera MD   Blood Glucose Monitoring Suppl ([address]) w/Device KIT 1 kit by Does not apply route daily as needed (Dx 250.00)  Nedra Rivera MD   ONE TOUCH LANCETS MISC 1 each by Does not apply route daily  Nedra Rivera MD   Cream [employer] CREA Apply 1-2 pumps to affected area 3-4 times daily  NANETTE Membreno       Social History     Tobacco Use    Smoking status: Never Smoker    Smokeless tobacco: Never Used   Substance Use Topics    Alcohol use: No    Drug use: No        Allergies   Allergen Reactions    Compazine [Prochlorperazine Maleate] Shortness Of Breath    Ciprofloxacin Hives    Amoxicillin-Pot Clavulanate     Demerol Hives    Hydroxyzine     Hydroxyzine Hcl Itching    Ibuprofen Nausea Only    Ketorolac Tromethamine     Meperidine Hives    Nortriptyline Other (See Comments)    Orphenadrine     Orphenadrine Citrate Itching    Penicillins Hives and Nausea Only    Prochlorperazine Edisylate      Will discuss with patient at next visit     Tobramycin      Will discuss with patient at next visit     Tramadol      Will discuss with patient at next visit     Vistaril [Hydroxyzine Hcl] Itching    Cephalexin Rash    Clindamycin/Lincomycin Rash    Codeine Nausea And Vomiting and Rash    Doxycycline Nausea And Vomiting    Keflex [Cephalexin] Rash    Ketorolac Tromethamine Itching and Nausea And Vomiting    Moxifloxacin Nausea And Vomiting     Will discuss with patient at visit    ,   Past Medical History:   Diagnosis Date    Anxiety     Constipation     Depression     DM (diabetes mellitus) ([facility] [address]     Headache(784.0)     Hyperlipidemia     Hypertension     Kidney stones     Kidney stones     Restless leg     Restless legs syndrome    ,   Past Surgical History:   Procedure

## 2020-05-12 ENCOUNTER — APPOINTMENT (OUTPATIENT)
Dept: CT IMAGING | Age: 50
End: 2020-05-12
Payer: MEDICAID

## 2020-05-12 ENCOUNTER — HOSPITAL ENCOUNTER (EMERGENCY)
Age: 50
Discharge: HOME | End: 2020-05-12
Attending: EMERGENCY MEDICINE
Payer: MEDICAID

## 2020-05-12 VITALS
TEMPERATURE: 98.6 F | RESPIRATION RATE: 18 BRPM | DIASTOLIC BLOOD PRESSURE: 86 MMHG | SYSTOLIC BLOOD PRESSURE: 126 MMHG | OXYGEN SATURATION: 99 % | HEART RATE: 80 BPM

## 2020-05-12 LAB
BILIRUBIN URINE: NEGATIVE
BLOOD, URINE: NEGATIVE
CLARITY: CLEAR
COLOR: YELLOW
GLUCOSE URINE: NEGATIVE MG/DL
KETONES, URINE: NEGATIVE MG/DL
LEUKOCYTE ESTERASE, URINE: NEGATIVE
NITRITE, URINE: NEGATIVE
PH UA: 6 (ref 5–8)
PROTEIN UA: NEGATIVE MG/DL
SPECIFIC GRAVITY UA: 1.03 (ref 1–1.03)
UROBILINOGEN, URINE: 0.2 E.U./DL

## 2020-05-12 PROCEDURE — 6370000000 HC RX 637 (ALT 250 FOR IP): Performed by: EMERGENCY MEDICINE

## 2020-05-12 PROCEDURE — 72131 CT LUMBAR SPINE W/O DYE: CPT

## 2020-05-12 PROCEDURE — 81003 URINALYSIS AUTO W/O SCOPE: CPT

## 2020-05-12 PROCEDURE — 6360000002 HC RX W HCPCS: Performed by: EMERGENCY MEDICINE

## 2020-05-12 PROCEDURE — 99284 EMERGENCY DEPT VISIT MOD MDM: CPT

## 2020-05-12 PROCEDURE — 96372 THER/PROPH/DIAG INJ SC/IM: CPT

## 2020-05-12 RX ORDER — METHYLPREDNISOLONE 4 MG/1
TABLET ORAL
Qty: 1 KIT | Refills: 0 | Status: SHIPPED | OUTPATIENT
Start: 2020-05-12 | End: 2020-05-18

## 2020-05-12 RX ORDER — HYDROCODONE BITARTRATE AND ACETAMINOPHEN 5; 325 MG/1; MG/1
1 TABLET ORAL ONCE
Status: COMPLETED | OUTPATIENT
Start: 2020-05-12 | End: 2020-05-12

## 2020-05-12 RX ORDER — HYDROMORPHONE HYDROCHLORIDE 1 MG/ML
1 INJECTION, SOLUTION INTRAMUSCULAR; INTRAVENOUS; SUBCUTANEOUS ONCE
Status: COMPLETED | OUTPATIENT
Start: 2020-05-12 | End: 2020-05-12

## 2020-05-12 RX ORDER — LIDOCAINE 4 G/G
1 PATCH TOPICAL ONCE
Status: DISCONTINUED | OUTPATIENT
Start: 2020-05-12 | End: 2020-05-12 | Stop reason: HOSPADM

## 2020-05-12 RX ADMIN — HYDROCODONE BITARTRATE AND ACETAMINOPHEN 1 TABLET: 5; 325 TABLET ORAL at 13:31

## 2020-05-12 RX ADMIN — HYDROMORPHONE HYDROCHLORIDE 1 MG: 1 INJECTION, SOLUTION INTRAMUSCULAR; INTRAVENOUS; SUBCUTANEOUS at 12:36

## 2020-05-12 ASSESSMENT — ENCOUNTER SYMPTOMS
ABDOMINAL PAIN: 0
SHORTNESS OF BREATH: 0

## 2020-05-12 ASSESSMENT — PAIN DESCRIPTION - PAIN TYPE: TYPE: ACUTE PAIN

## 2020-05-12 ASSESSMENT — PAIN SCALES - GENERAL
PAINLEVEL_OUTOF10: 7
PAINLEVEL_OUTOF10: 7
PAINLEVEL_OUTOF10: 6

## 2020-05-15 DIAGNOSIS — G43.819 OTHER MIGRAINE WITHOUT STATUS MIGRAINOSUS, INTRACTABLE: ICD-10-CM

## 2020-05-15 RX ORDER — ZOLMITRIPTAN 5 MG/1
SPRAY NASAL
Qty: 6 EACH | Refills: 0 | Status: SHIPPED | OUTPATIENT
Start: 2020-05-15 | End: 2020-08-21 | Stop reason: SDUPTHER

## 2020-05-18 ENCOUNTER — TRANSCRIBE ORDERS (OUTPATIENT)
Dept: ADMINISTRATIVE | Facility: HOSPITAL | Age: 50
End: 2020-05-18

## 2020-05-18 DIAGNOSIS — R60.0 BILATERAL LEG EDEMA: Primary | ICD-10-CM

## 2020-05-20 ENCOUNTER — HOSPITAL ENCOUNTER (OUTPATIENT)
Dept: ULTRASOUND IMAGING | Facility: HOSPITAL | Age: 50
Discharge: HOME | End: 2020-05-20
Admitting: FAMILY MEDICINE

## 2020-05-20 DIAGNOSIS — R60.0 BILATERAL LEG EDEMA: ICD-10-CM

## 2020-05-20 PROCEDURE — 93923 UPR/LXTR ART STDY 3+ LVLS: CPT | Performed by: SURGERY

## 2020-05-20 PROCEDURE — 93923 UPR/LXTR ART STDY 3+ LVLS: CPT

## 2020-06-04 ENCOUNTER — TELEPHONE (OUTPATIENT)
Dept: PRIMARY CARE CLINIC | Age: 50
End: 2020-06-04

## 2020-06-05 ENCOUNTER — TRANSCRIBE ORDERS (OUTPATIENT)
Dept: ADMINISTRATIVE | Facility: HOSPITAL | Age: 50
End: 2020-06-05

## 2020-06-05 ENCOUNTER — LAB (OUTPATIENT)
Dept: LAB | Facility: HOSPITAL | Age: 50
End: 2020-06-05

## 2020-06-05 ENCOUNTER — TRANSCRIBE ORDERS (OUTPATIENT)
Dept: PHYSICAL THERAPY | Facility: CLINIC | Age: 50
End: 2020-06-05

## 2020-06-05 DIAGNOSIS — R60.0 BILATERAL LOWER EXTREMITY EDEMA: Primary | ICD-10-CM

## 2020-06-05 DIAGNOSIS — R60.0 LOCALIZED EDEMA: Primary | ICD-10-CM

## 2020-06-05 LAB
ALBUMIN SERPL-MCNC: 4.4 G/DL (ref 3.5–5)
ALBUMIN/GLOB SERPL: 1.3 G/DL (ref 1.1–2.5)
ALP SERPL-CCNC: 54 U/L (ref 24–120)
ALT SERPL W P-5'-P-CCNC: 19 U/L (ref 0–35)
ANION GAP SERPL CALCULATED.3IONS-SCNC: 13 MMOL/L (ref 4–13)
AST SERPL-CCNC: 27 U/L (ref 7–45)
AUTO MIXED CELLS #: 0.6 10*3/MM3 (ref 0.1–2.6)
AUTO MIXED CELLS %: 10 % (ref 0.1–24)
BACTERIA UR QL AUTO: ABNORMAL /HPF
BILIRUB SERPL-MCNC: 0.4 MG/DL (ref 0.1–1)
BILIRUB UR QL STRIP: NEGATIVE
BUN BLD-MCNC: 15 MG/DL (ref 5–21)
BUN/CREAT SERPL: 15.2
CALCIUM SPEC-SCNC: 9.9 MG/DL (ref 8.4–10.4)
CHLORIDE SERPL-SCNC: 97 MMOL/L (ref 98–110)
CLARITY UR: CLEAR
CO2 SERPL-SCNC: 33 MMOL/L (ref 24–31)
COLOR UR: YELLOW
CREAT BLD-MCNC: 0.99 MG/DL (ref 0.5–1.4)
ERYTHROCYTE [DISTWIDTH] IN BLOOD BY AUTOMATED COUNT: 13.2 % (ref 12.3–15.4)
ERYTHROCYTE [SEDIMENTATION RATE] IN BLOOD: 16 MM/HR (ref 0–20)
GFR SERPL CREATININE-BSD FRML MDRD: 60 ML/MIN/1.73
GLOBULIN UR ELPH-MCNC: 3.4 GM/DL
GLUCOSE BLD-MCNC: 96 MG/DL (ref 70–100)
GLUCOSE UR STRIP-MCNC: NEGATIVE MG/DL
HBA1C MFR BLD: 8.3 % (ref 4.8–5.9)
HCT VFR BLD AUTO: 41.3 % (ref 34–46.6)
HGB BLD-MCNC: 13.8 G/DL (ref 12–15.9)
HGB UR QL STRIP.AUTO: NEGATIVE
HYALINE CASTS UR QL AUTO: ABNORMAL /LPF
KETONES UR QL STRIP: NEGATIVE
LEUKOCYTE ESTERASE UR QL STRIP.AUTO: ABNORMAL
LYMPHOCYTES # BLD AUTO: 2.3 10*3/MM3 (ref 0.7–3.1)
LYMPHOCYTES NFR BLD AUTO: 36.7 % (ref 19.6–45.3)
MCH RBC QN AUTO: 28.6 PG (ref 26.6–33)
MCHC RBC AUTO-ENTMCNC: 33.4 G/DL (ref 31.5–35.7)
MCV RBC AUTO: 85.7 FL (ref 79–97)
NEUTROPHILS # BLD AUTO: 3.4 10*3/MM3 (ref 1.7–7)
NEUTROPHILS NFR BLD AUTO: 53.3 % (ref 42.7–76)
NITRITE UR QL STRIP: NEGATIVE
PH UR STRIP.AUTO: 5.5 [PH] (ref 5–8)
PLATELET # BLD AUTO: 188 10*3/MM3 (ref 140–450)
PMV BLD AUTO: 8.9 FL (ref 6–12)
POTASSIUM BLD-SCNC: 3.8 MMOL/L (ref 3.5–5.3)
PROT SERPL-MCNC: 7.8 G/DL (ref 6.3–8.7)
PROT UR QL STRIP: NEGATIVE
RBC # BLD AUTO: 4.82 10*6/MM3 (ref 3.77–5.28)
RBC # UR: ABNORMAL /HPF
REF LAB TEST METHOD: ABNORMAL
SODIUM BLD-SCNC: 143 MMOL/L (ref 135–145)
SP GR UR STRIP: 1.02 (ref 1–1.03)
SQUAMOUS #/AREA URNS HPF: ABNORMAL /HPF
UROBILINOGEN UR QL STRIP: ABNORMAL
WBC NRBC COR # BLD: 6.3 10*3/MM3 (ref 3.4–10.8)
WBC UR QL AUTO: ABNORMAL /HPF

## 2020-06-05 PROCEDURE — 87086 URINE CULTURE/COLONY COUNT: CPT | Performed by: NURSE PRACTITIONER

## 2020-06-05 PROCEDURE — 80053 COMPREHEN METABOLIC PANEL: CPT | Performed by: NURSE PRACTITIONER

## 2020-06-05 PROCEDURE — 85652 RBC SED RATE AUTOMATED: CPT | Performed by: NURSE PRACTITIONER

## 2020-06-05 PROCEDURE — 81001 URINALYSIS AUTO W/SCOPE: CPT | Performed by: NURSE PRACTITIONER

## 2020-06-05 PROCEDURE — 85025 COMPLETE CBC W/AUTO DIFF WBC: CPT | Performed by: NURSE PRACTITIONER

## 2020-06-05 PROCEDURE — 36415 COLL VENOUS BLD VENIPUNCTURE: CPT | Performed by: NURSE PRACTITIONER

## 2020-06-05 PROCEDURE — 83036 HEMOGLOBIN GLYCOSYLATED A1C: CPT | Performed by: NURSE PRACTITIONER

## 2020-06-06 LAB — BACTERIA SPEC AEROBE CULT: NO GROWTH

## 2020-06-10 ENCOUNTER — TREATMENT (OUTPATIENT)
Dept: PHYSICAL THERAPY | Facility: CLINIC | Age: 50
End: 2020-06-10

## 2020-06-10 ENCOUNTER — HOSPITAL ENCOUNTER (EMERGENCY)
Age: 50
Discharge: HOME | End: 2020-06-10
Attending: EMERGENCY MEDICINE
Payer: MEDICAID

## 2020-06-10 VITALS
DIASTOLIC BLOOD PRESSURE: 84 MMHG | RESPIRATION RATE: 16 BRPM | TEMPERATURE: 98.3 F | OXYGEN SATURATION: 98 % | HEIGHT: 61 IN | WEIGHT: 293 LBS | SYSTOLIC BLOOD PRESSURE: 156 MMHG | HEART RATE: 89 BPM | BODY MASS INDEX: 55.32 KG/M2

## 2020-06-10 DIAGNOSIS — I89.0 LYMPHEDEMA OF BOTH LOWER EXTREMITIES: Primary | ICD-10-CM

## 2020-06-10 LAB
ANION GAP SERPL CALCULATED.3IONS-SCNC: 10 MMOL/L (ref 7–19)
BASOPHILS ABSOLUTE: 0 K/UL (ref 0–0.2)
BASOPHILS RELATIVE PERCENT: 0.4 % (ref 0–1)
BILIRUBIN URINE: ABNORMAL
BLOOD, URINE: NEGATIVE
BUN BLDV-MCNC: 14 MG/DL (ref 6–20)
CALCIUM SERPL-MCNC: 9.5 MG/DL (ref 8.6–10)
CHLORIDE BLD-SCNC: 103 MMOL/L (ref 98–111)
CLARITY: CLEAR
CO2: 29 MMOL/L (ref 22–29)
COLOR: YELLOW
CREAT SERPL-MCNC: 1 MG/DL (ref 0.5–0.9)
EOSINOPHILS ABSOLUTE: 0.2 K/UL (ref 0–0.6)
EOSINOPHILS RELATIVE PERCENT: 4.3 % (ref 0–5)
GFR NON-AFRICAN AMERICAN: 59
GLUCOSE BLD-MCNC: 147 MG/DL (ref 70–99)
GLUCOSE BLD-MCNC: 54 MG/DL (ref 70–99)
GLUCOSE BLD-MCNC: 57 MG/DL (ref 74–109)
GLUCOSE URINE: NEGATIVE MG/DL
HCT VFR BLD CALC: 38.9 % (ref 37–47)
HEMOGLOBIN: 12.7 G/DL (ref 12–16)
IMMATURE GRANULOCYTES #: 0 K/UL
KETONES, URINE: NEGATIVE MG/DL
LEUKOCYTE ESTERASE, URINE: NEGATIVE
LYMPHOCYTES ABSOLUTE: 1.7 K/UL (ref 1.1–4.5)
LYMPHOCYTES RELATIVE PERCENT: 34.1 % (ref 20–40)
MCH RBC QN AUTO: 29.2 PG (ref 27–31)
MCHC RBC AUTO-ENTMCNC: 32.6 G/DL (ref 33–37)
MCV RBC AUTO: 89.4 FL (ref 81–99)
MONOCYTES ABSOLUTE: 0.4 K/UL (ref 0–0.9)
MONOCYTES RELATIVE PERCENT: 7.1 % (ref 0–10)
NEUTROPHILS ABSOLUTE: 2.7 K/UL (ref 1.5–7.5)
NEUTROPHILS RELATIVE PERCENT: 53.9 % (ref 50–65)
NITRITE, URINE: NEGATIVE
PDW BLD-RTO: 13.2 % (ref 11.5–14.5)
PERFORMED ON: ABNORMAL
PERFORMED ON: ABNORMAL
PH UA: 5.5 (ref 5–8)
PLATELET # BLD: 182 K/UL (ref 130–400)
PMV BLD AUTO: 9.4 FL (ref 9.4–12.3)
POTASSIUM REFLEX MAGNESIUM: 4.1 MMOL/L (ref 3.5–5)
PROTEIN UA: NEGATIVE MG/DL
RBC # BLD: 4.35 M/UL (ref 4.2–5.4)
SODIUM BLD-SCNC: 142 MMOL/L (ref 136–145)
SPECIFIC GRAVITY UA: 1.02 (ref 1–1.03)
UROBILINOGEN, URINE: 0.2 E.U./DL
WBC # BLD: 5.1 K/UL (ref 4.8–10.8)

## 2020-06-10 PROCEDURE — 99284 EMERGENCY DEPT VISIT MOD MDM: CPT

## 2020-06-10 PROCEDURE — 82947 ASSAY GLUCOSE BLOOD QUANT: CPT

## 2020-06-10 PROCEDURE — 99999 PR OFFICE/OUTPT VISIT,PROCEDURE ONLY: CPT | Performed by: EMERGENCY MEDICINE

## 2020-06-10 PROCEDURE — 97163 PT EVAL HIGH COMPLEX 45 MIN: CPT | Performed by: PHYSICAL THERAPIST

## 2020-06-10 PROCEDURE — 96375 TX/PRO/DX INJ NEW DRUG ADDON: CPT

## 2020-06-10 PROCEDURE — 81003 URINALYSIS AUTO W/O SCOPE: CPT

## 2020-06-10 PROCEDURE — 85025 COMPLETE CBC W/AUTO DIFF WBC: CPT

## 2020-06-10 PROCEDURE — 96374 THER/PROPH/DIAG INJ IV PUSH: CPT

## 2020-06-10 PROCEDURE — 80048 BASIC METABOLIC PNL TOTAL CA: CPT

## 2020-06-10 PROCEDURE — 36415 COLL VENOUS BLD VENIPUNCTURE: CPT

## 2020-06-10 PROCEDURE — 2580000003 HC RX 258: Performed by: EMERGENCY MEDICINE

## 2020-06-10 PROCEDURE — 6360000002 HC RX W HCPCS: Performed by: EMERGENCY MEDICINE

## 2020-06-10 RX ORDER — MORPHINE SULFATE 4 MG/ML
2 INJECTION, SOLUTION INTRAMUSCULAR; INTRAVENOUS ONCE
Status: COMPLETED | OUTPATIENT
Start: 2020-06-10 | End: 2020-06-10

## 2020-06-10 RX ORDER — ONDANSETRON 2 MG/ML
4 INJECTION INTRAMUSCULAR; INTRAVENOUS ONCE
Status: COMPLETED | OUTPATIENT
Start: 2020-06-10 | End: 2020-06-10

## 2020-06-10 RX ORDER — CEFDINIR 300 MG/1
300 CAPSULE ORAL 2 TIMES DAILY
Qty: 20 CAPSULE | Refills: 0 | Status: SHIPPED | OUTPATIENT
Start: 2020-06-10 | End: 2020-06-20

## 2020-06-10 RX ADMIN — CEFAZOLIN 1 G: 1 INJECTION, POWDER, FOR SOLUTION INTRAMUSCULAR; INTRAVENOUS at 13:32

## 2020-06-10 RX ADMIN — ONDANSETRON 4 MG: 2 INJECTION INTRAMUSCULAR; INTRAVENOUS at 14:51

## 2020-06-10 RX ADMIN — MORPHINE SULFATE 2 MG: 4 INJECTION INTRAVENOUS at 14:51

## 2020-06-10 ASSESSMENT — PAIN SCALES - GENERAL
PAINLEVEL_OUTOF10: 8
PAINLEVEL_OUTOF10: 7

## 2020-06-10 ASSESSMENT — ENCOUNTER SYMPTOMS
EYE REDNESS: 0
COUGH: 0
RHINORRHEA: 0
VOMITING: 0
ABDOMINAL PAIN: 0
SHORTNESS OF BREATH: 0
EYE PAIN: 0
DIARRHEA: 0
VOICE CHANGE: 0

## 2020-06-10 ASSESSMENT — PAIN DESCRIPTION - LOCATION: LOCATION: LEG

## 2020-06-10 ASSESSMENT — PAIN DESCRIPTION - DESCRIPTORS: DESCRIPTORS: BURNING;SORE

## 2020-06-10 ASSESSMENT — PAIN DESCRIPTION - PAIN TYPE: TYPE: ACUTE PAIN

## 2020-06-10 ASSESSMENT — PAIN DESCRIPTION - ORIENTATION: ORIENTATION: RIGHT;LEFT

## 2020-06-10 NOTE — PROGRESS NOTES
"Physical Therapy Lymphedema Initial Evaluation       Patient Name: Valeria Wilson  : 1970  MRN: [id number]  Today's Date: 6/10/2020      Visit Date: 06/10/2020    Visit Dx:    ICD-10-CM ICD-9-CM   1. Lymphedema of both lower extremities I89.0 457.1       Patient Active Problem List   Diagnosis   • Class 3 severe obesity due to excess calories with serious comorbidity and body mass index (BMI) of 45.0 to 49.9 in adult (CMS/[facility])   • Hypertension   • Diabetes mellitus (CMS/[facility])        Past Medical History:   Diagnosis Date   • Arthritis     Osteoarthritis primarily left knee    • Back pain    • Diabetes mellitus (CMS/[facility])    • Hx of tear of meniscus of knee joint 2016   • Hypertension    • Joint pain    • Knee pain    • Migraine    • Migraine headache    • Pain     knee, left ankle, left ankle     • Pes anserinus bursitis 2015   • Restless leg         Past Surgical History:   Procedure Laterality Date   • CATARACT EXTRACTION     • CATARACT EXTRACTION     • CATARACT EXTRACTION     • CORNEAL TRANSPLANT      bilateral    • ECTOPIC PREGNANCY     • JOINT REPLACEMENT     • KNEE ARTHROSCOPY W/ MENISCAL REPAIR     • KNEE MENISCAL REPAIR     • MENISCECTOMY Left 2016   • TUBAL ABDOMINAL LIGATION         Visit Dx:    ICD-10-CM ICD-9-CM   1. Lymphedema of both lower extremities I89.0 457.1       Patient History     Row Name 06/10/20 0800             History    Chief Complaint  Swelling;Pain  -HR      Type of Pain  Lower Extremity / Leg  -HR      Date Current Problem(s) Began  19  -HR      Brief Description of Current Complaint  She reports she has always been \"a big girl\" but her legs started swelling about 2 years ago which was new. She has had multiple other issues including difficulty controlling DMII. She currently has redness to both anterior lower legs with vesicles present but not currently weeping.   -HR      Previous treatment for THIS PROBLEM  Medication  -HR      Patient's Rating of " "General Health  Fair  -HR      Hand Dominance  right-handed  -HR         Pain     Pain Location  Leg;Knee  -HR      Pain at Present  7  -HR      Pain at Best  5  -HR      Pain at Worst  9  -HR      Pain Frequency  Constant/continuous  -HR      Pain Description  Tightness;Burning;Spasm  -HR        User Key  (r) = Recorded By, (t) = Taken By, (c) = Cosigned By    Initials Name Provider Type    HR Pauline Marks, PT, DPT, CLT-ESTEFANY Physical Therapist          Lymphedema     Row Name 06/10/20 0800             Subjective Pain    Able to rate subjective pain?  yes  -HR      Pre-Treatment Pain Level  7  -HR      Subjective Pain Comment  \"bad knee day\"  -HR         Subjective Comments    Subjective Comments  Other family members have had lymphedema in their legs too.   -HR         Lymphedema Assessment    Lymphedema Classification  RLE:;LLE:;Trunk:;stage 2 (Spontaneously Irreversible);stage 3 (Lymphostatic Elephantiasis)  -HR      Infections or Cellulitis?  yes  -HR      Infection/Cellulitis Treatment  just had a round of antibiotics but legs are still very red and blistering. Toild her to go to ER  -HR      Lymphedema Precautions  kidney disease;active infection not on antibiotics  -HR         General ROM    GENERAL ROM COMMENTS  restrictions from obesity and swelling in LEs  -HR         MMT (Manual Muscle Testing)    General MMT Comments  Functional for home and limited community mobility  -HR         Lymphedema Edema Assessment    Ptting Edema Category  By grade out of 3  -HR      Pitting Edema  + 2/3  -HR      Stemmer Sign  bilateral:;positive  -HR         Skin Changes/Observations    Location/Assessment  Lower Extremity  -HR      Lower Extremity Conditions  bilateral:;inflamed;fragile  -HR      Lower Extremity Color/Pigment  bilateral:;red;erythema;blanchable  -HR      Lower Extremity Skin Details  vesicles present and photographed on B lower legs  -HR      Skin Observations Comment  see pictures  -HR         " "Lymphedema Sensation    Lymphedema Sensation Comments  sensitive and sore to both Lower legs  -HR         Lymphedema Measurements    Measurement Type(s)  Circumferential  -HR      Circumferential Areas  Lower extremities  -HR         BLE Circumferential (cm)    Measurement Location 1  BOT  -HR      Left 1  24 cm  -HR      Right 1  24.8 cm  -HR      Measurement Location 2  +10  -HR      Left 2  30.2 cm  -HR      Right 2  29.1 cm  -HR      Measurement Location 3  +10  -HR      Left 3  45.4 cm  -HR      Right 3  45.6 cm  -HR      Measurement Location 4  +10  -HR      Left 4  56.6 cm  -HR      Right 4  56.2 cm  -HR      Measurement Location 5  +10  -HR      Left 5  56 cm  -HR      Right 5  52.9 cm  -HR      Measurement Location 6  +10  -HR      Left 6  59.8 cm  -HR      Right 6  67 cm  -HR      Measurement Location 7  +10  -HR      Left 7  75.4 cm  -HR      Right 7  75.5 cm  -HR      LLE Circumferential Total  347.4 cm  -HR      RLE Circumferential Total  351.1 cm  -HR        User Key  (r) = Recorded By, (t) = Taken By, (c) = Cosigned By    Initials Name Provider Type    HR Pauline Marks PT, DPT, CLJENNIFER Physical Therapist                          Therapy Education  Education Details: [employer] basic garments, compression leggings, Youtube videos for self massage  Given: Symptoms/condition management, Edema management  Program: New  How Provided: Verbal, Written  Provided to: Patient  Level of Understanding: Verbalized      OP Exercises     Row Name 06/10/20 0800             Subjective Comments    Subjective Comments  Other family members have had lymphedema in their legs too.   -HR         Subjective Pain    Able to rate subjective pain?  yes  -HR      Pre-Treatment Pain Level  7  -HR      Subjective Pain Comment  \"bad knee day\"  -HR        User Key  (r) = Recorded By, (t) = Taken By, (c) = Cosigned By    Initials Name Provider Type    HR Pauline Marks, PT, DPT, CLINGA-ESTEFANY Physical Therapist           R " lower medial leg      R lower leg      L anterior/medial lower leg      L lower leg                  PT OP Goals     Row Name 06/10/20 0800          PT Short Term Goals    STG Date to Achieve  06/24/20  -HR     STG 1  Patient will have no signs of infection in the legs or will actively be on antibiotic that is showing improvement before MLD initiated.  -HR     STG 1 Progress  New  -HR     STG 2  She will be independent with remedial HEP for BLE lymphedema.  -HR     STG 2 Progress  New  -HR     STG 3  She will be educated about the benefits of ketogenic diet for lymphedema.   -HR     STG 3 Progress  New  -HR        Long Term Goals    LTG 1  She will have appropriate compression garments for LEs and abdomen,  -HR     LTG 1 Progress  New  -HR     LTG 2  She will be independent with self massage with help from SO as needed.  -HR     LTG 2 Progress  New  -HR     LTG 3  She will be independent with use of FLexitouch pump for maintenance of lymphedema.  -HR     LTG 3 Progress  New  -HR     LTG 4  She will have a reduction in legs and weight loss of at least 10 pounds.  -HR     LTG 4 Progress  New  -HR        Time Calculation    PT Goal Re-Cert Due Date  07/10/20  -HR       User Key  (r) = Recorded By, (t) = Taken By, (c) = Cosigned By    Initials Name Provider Type    HR Pauline Marks, PT, DPT, CLT-ESTEFANY Physical Therapist          PT Assessment/Plan     Row Name 06/10/20 0800          PT Assessment    Functional Limitations  Impaired gait;Decreased safety during functional activities;Impaired locomotion;Limitation in home management;Limitations in community activities;Limitations in functional capacity and performance;Performance in leisure activities  -HR     Impairments  Edema;Pain;Impaired lymphatic circulation;Integumentary integrity  -HR     Assessment Comments  Valeria would benefit from a course of CDT to address her lymphedema. She has completed antibiotics but appears to still have cellulitis in both lower  legs today. She will go to ER today to address this as we can't begin treatment until this is addressed. We will work with her on MLD and self massage. I also gave her information about some compression options that she can look imto obtaining. She has had a 55 lb weight gain since last August which could be a result of the lymphedema or vice versa. We will work with her on all aspects of CDT and she should see a big improvement.   -HR     Rehab Potential  Good  -HR     Patient/caregiver participated in establishment of treatment plan and goals  Yes  -HR     Patient would benefit from skilled therapy intervention  Yes  -HR        PT Plan    PT Frequency  2x/week  -HR     Predicted Duration of Therapy Intervention (Therapy Eval)  8 weeks  -HR     Planned CPT's?  PT RE-EVAL: 13774;PT THER PROC EA 15 MIN: 79337;PT MANUAL THERAPY EA 15 MIN: 87936;PT SELF CARE/HOME MGMT/TRAIN EA 15: 61779  -HR     PT Plan Comments  PT will start once cellulitis subsides. We will see her for CDT to address lymphedema of BLEs and abdomen.  -HR       User Key  (r) = Recorded By, (t) = Taken By, (c) = Cosigned By    Initials Name Provider Type    HR Pauline Marks, PT, DPT, MAXWELL Physical Therapist                       Time Calculation:                     Pauline Marks, PT, DPT, MAXWELL  6/10/2020

## 2020-06-12 ENCOUNTER — HOSPITAL ENCOUNTER (OUTPATIENT)
Age: 50
Setting detail: OBSERVATION
Discharge: LEFT BEFORE DISCHARGE | End: 2020-06-12
Attending: EMERGENCY MEDICINE | Admitting: FAMILY MEDICINE
Payer: MEDICAID

## 2020-06-12 VITALS
WEIGHT: 293 LBS | TEMPERATURE: 96.5 F | HEIGHT: 61 IN | OXYGEN SATURATION: 96 % | SYSTOLIC BLOOD PRESSURE: 134 MMHG | DIASTOLIC BLOOD PRESSURE: 77 MMHG | RESPIRATION RATE: 16 BRPM | HEART RATE: 86 BPM | BODY MASS INDEX: 55.32 KG/M2

## 2020-06-12 PROBLEM — L03.119 RECURRENT CELLULITIS OF LOWER EXTREMITY: Status: ACTIVE | Noted: 2020-06-12

## 2020-06-12 LAB
ALBUMIN SERPL-MCNC: 4.4 G/DL (ref 3.5–5.2)
ALP BLD-CCNC: 58 U/L (ref 35–104)
ALT SERPL-CCNC: 13 U/L (ref 5–33)
ANION GAP SERPL CALCULATED.3IONS-SCNC: 11 MMOL/L (ref 7–19)
AST SERPL-CCNC: 18 U/L (ref 5–32)
BASOPHILS ABSOLUTE: 0 K/UL (ref 0–0.2)
BASOPHILS RELATIVE PERCENT: 0.6 % (ref 0–1)
BILIRUB SERPL-MCNC: <0.2 MG/DL (ref 0.2–1.2)
BUN BLDV-MCNC: 14 MG/DL (ref 6–20)
C-REACTIVE PROTEIN: 2.94 MG/DL (ref 0–0.5)
CALCIUM SERPL-MCNC: 9.9 MG/DL (ref 8.6–10)
CHLORIDE BLD-SCNC: 101 MMOL/L (ref 98–111)
CO2: 30 MMOL/L (ref 22–29)
CREAT SERPL-MCNC: 0.9 MG/DL (ref 0.5–0.9)
EOSINOPHILS ABSOLUTE: 0.2 K/UL (ref 0–0.6)
EOSINOPHILS RELATIVE PERCENT: 3.7 % (ref 0–5)
GFR NON-AFRICAN AMERICAN: >60
GLUCOSE BLD-MCNC: 135 MG/DL (ref 74–109)
GLUCOSE BLD-MCNC: 147 MG/DL (ref 70–99)
HBA1C MFR BLD: 7.8 % (ref 4–6)
HCT VFR BLD CALC: 42.2 % (ref 37–47)
HEMOGLOBIN: 13.5 G/DL (ref 12–16)
IMMATURE GRANULOCYTES #: 0 K/UL
LACTIC ACID: 1.1 MMOL/L (ref 0.5–1.9)
LYMPHOCYTES ABSOLUTE: 1.9 K/UL (ref 1.1–4.5)
LYMPHOCYTES RELATIVE PERCENT: 35.4 % (ref 20–40)
MCH RBC QN AUTO: 28.7 PG (ref 27–31)
MCHC RBC AUTO-ENTMCNC: 32 G/DL (ref 33–37)
MCV RBC AUTO: 89.6 FL (ref 81–99)
MONOCYTES ABSOLUTE: 0.5 K/UL (ref 0–0.9)
MONOCYTES RELATIVE PERCENT: 8.5 % (ref 0–10)
NEUTROPHILS ABSOLUTE: 2.8 K/UL (ref 1.5–7.5)
NEUTROPHILS RELATIVE PERCENT: 51.2 % (ref 50–65)
PDW BLD-RTO: 13.1 % (ref 11.5–14.5)
PERFORMED ON: ABNORMAL
PLATELET # BLD: 219 K/UL (ref 130–400)
PMV BLD AUTO: 9.3 FL (ref 9.4–12.3)
POTASSIUM SERPL-SCNC: 4.4 MMOL/L (ref 3.5–5)
PRO-BNP: 93 PG/ML (ref 0–450)
RBC # BLD: 4.71 M/UL (ref 4.2–5.4)
SEDIMENTATION RATE, ERYTHROCYTE: 33 MM/HR (ref 0–20)
SODIUM BLD-SCNC: 142 MMOL/L (ref 136–145)
TOTAL PROTEIN: 7.4 G/DL (ref 6.6–8.7)
WBC # BLD: 5.4 K/UL (ref 4.8–10.8)

## 2020-06-12 PROCEDURE — 83880 ASSAY OF NATRIURETIC PEPTIDE: CPT

## 2020-06-12 PROCEDURE — 87040 BLOOD CULTURE FOR BACTERIA: CPT

## 2020-06-12 PROCEDURE — 99284 EMERGENCY DEPT VISIT MOD MDM: CPT

## 2020-06-12 PROCEDURE — 6360000002 HC RX W HCPCS: Performed by: EMERGENCY MEDICINE

## 2020-06-12 PROCEDURE — 96372 THER/PROPH/DIAG INJ SC/IM: CPT

## 2020-06-12 PROCEDURE — 85025 COMPLETE CBC W/AUTO DIFF WBC: CPT

## 2020-06-12 PROCEDURE — 83036 HEMOGLOBIN GLYCOSYLATED A1C: CPT

## 2020-06-12 PROCEDURE — 6360000002 HC RX W HCPCS: Performed by: FAMILY MEDICINE

## 2020-06-12 PROCEDURE — 85652 RBC SED RATE AUTOMATED: CPT

## 2020-06-12 PROCEDURE — 36415 COLL VENOUS BLD VENIPUNCTURE: CPT

## 2020-06-12 PROCEDURE — 82947 ASSAY GLUCOSE BLOOD QUANT: CPT

## 2020-06-12 PROCEDURE — 80053 COMPREHEN METABOLIC PANEL: CPT

## 2020-06-12 PROCEDURE — 93923 UPR/LXTR ART STDY 3+ LVLS: CPT

## 2020-06-12 PROCEDURE — 86140 C-REACTIVE PROTEIN: CPT

## 2020-06-12 PROCEDURE — 83605 ASSAY OF LACTIC ACID: CPT

## 2020-06-12 PROCEDURE — G0378 HOSPITAL OBSERVATION PER HR: HCPCS

## 2020-06-12 PROCEDURE — 6370000000 HC RX 637 (ALT 250 FOR IP)

## 2020-06-12 PROCEDURE — 96365 THER/PROPH/DIAG IV INF INIT: CPT

## 2020-06-12 RX ORDER — ROPINIROLE 0.25 MG/1
0.5 TABLET, FILM COATED ORAL NIGHTLY
Status: DISCONTINUED | OUTPATIENT
Start: 2020-06-12 | End: 2020-06-12 | Stop reason: HOSPADM

## 2020-06-12 RX ORDER — HYDROCODONE BITARTRATE AND ACETAMINOPHEN 7.5; 325 MG/1; MG/1
1 TABLET ORAL EVERY 6 HOURS PRN
Status: DISCONTINUED | OUTPATIENT
Start: 2020-06-12 | End: 2020-06-12 | Stop reason: HOSPADM

## 2020-06-12 RX ORDER — SODIUM CHLORIDE 0.9 % (FLUSH) 0.9 %
10 SYRINGE (ML) INJECTION EVERY 12 HOURS SCHEDULED
Status: DISCONTINUED | OUTPATIENT
Start: 2020-06-12 | End: 2020-06-12 | Stop reason: HOSPADM

## 2020-06-12 RX ORDER — SODIUM CHLORIDE 0.9 % (FLUSH) 0.9 %
10 SYRINGE (ML) INJECTION PRN
Status: DISCONTINUED | OUTPATIENT
Start: 2020-06-12 | End: 2020-06-12 | Stop reason: HOSPADM

## 2020-06-12 RX ORDER — GABAPENTIN 300 MG/1
300 CAPSULE ORAL NIGHTLY
Status: DISCONTINUED | OUTPATIENT
Start: 2020-06-12 | End: 2020-06-12 | Stop reason: HOSPADM

## 2020-06-12 RX ORDER — POTASSIUM CHLORIDE 20 MEQ/1
40 TABLET, EXTENDED RELEASE ORAL PRN
Status: DISCONTINUED | OUTPATIENT
Start: 2020-06-12 | End: 2020-06-12 | Stop reason: HOSPADM

## 2020-06-12 RX ORDER — INSULIN GLARGINE 100 [IU]/ML
0.25 INJECTION, SOLUTION SUBCUTANEOUS NIGHTLY
Status: DISCONTINUED | OUTPATIENT
Start: 2020-06-12 | End: 2020-06-12 | Stop reason: HOSPADM

## 2020-06-12 RX ORDER — CYCLOBENZAPRINE HCL 10 MG
10 TABLET ORAL 3 TIMES DAILY PRN
Status: ON HOLD | COMMUNITY
End: 2021-04-12

## 2020-06-12 RX ORDER — POTASSIUM CHLORIDE 7.45 MG/ML
10 INJECTION INTRAVENOUS PRN
Status: DISCONTINUED | OUTPATIENT
Start: 2020-06-12 | End: 2020-06-12 | Stop reason: HOSPADM

## 2020-06-12 RX ORDER — FLUTICASONE PROPIONATE 50 MCG
1 SPRAY, SUSPENSION (ML) NASAL DAILY
Status: DISCONTINUED | OUTPATIENT
Start: 2020-06-12 | End: 2020-06-12 | Stop reason: HOSPADM

## 2020-06-12 RX ORDER — GABAPENTIN 100 MG/1
100 CAPSULE ORAL 2 TIMES DAILY
Status: DISCONTINUED | OUTPATIENT
Start: 2020-06-12 | End: 2020-06-12 | Stop reason: HOSPADM

## 2020-06-12 RX ORDER — BUSPIRONE HYDROCHLORIDE 10 MG/1
5 TABLET ORAL 2 TIMES DAILY
Status: DISCONTINUED | OUTPATIENT
Start: 2020-06-12 | End: 2020-06-12 | Stop reason: HOSPADM

## 2020-06-12 RX ORDER — DIPHENHYDRAMINE HYDROCHLORIDE 50 MG/ML
25 INJECTION INTRAMUSCULAR; INTRAVENOUS EVERY 6 HOURS PRN
Status: DISCONTINUED | OUTPATIENT
Start: 2020-06-12 | End: 2020-06-12 | Stop reason: HOSPADM

## 2020-06-12 RX ORDER — FUROSEMIDE 10 MG/ML
60 INJECTION INTRAMUSCULAR; INTRAVENOUS ONCE
Status: DISCONTINUED | OUTPATIENT
Start: 2020-06-12 | End: 2020-06-12 | Stop reason: HOSPADM

## 2020-06-12 RX ORDER — TIZANIDINE 4 MG/1
4 TABLET ORAL NIGHTLY
Status: DISCONTINUED | OUTPATIENT
Start: 2020-06-12 | End: 2020-06-12 | Stop reason: HOSPADM

## 2020-06-12 RX ORDER — BUSPIRONE HYDROCHLORIDE 5 MG/1
5 TABLET ORAL 2 TIMES DAILY
COMMUNITY
End: 2020-07-13

## 2020-06-12 RX ORDER — KETOROLAC TROMETHAMINE 30 MG/ML
15 INJECTION, SOLUTION INTRAMUSCULAR; INTRAVENOUS EVERY 6 HOURS PRN
Status: DISCONTINUED | OUTPATIENT
Start: 2020-06-12 | End: 2020-06-12 | Stop reason: HOSPADM

## 2020-06-12 RX ORDER — SULFAMETHOXAZOLE AND TRIMETHOPRIM 800; 160 MG/1; MG/1
TABLET ORAL
Status: ON HOLD | COMMUNITY
End: 2020-06-12

## 2020-06-12 RX ORDER — ROPINIROLE 0.5 MG/1
TABLET, FILM COATED ORAL
Status: ON HOLD | COMMUNITY
End: 2020-06-12

## 2020-06-12 RX ORDER — SODIUM CHLORIDE 9 MG/ML
INJECTION, SOLUTION INTRAVENOUS CONTINUOUS
Status: DISCONTINUED | OUTPATIENT
Start: 2020-06-12 | End: 2020-06-12

## 2020-06-12 RX ORDER — ZOLMITRIPTAN 5 MG/1
SPRAY, METERED NASAL
Status: ON HOLD | COMMUNITY
Start: 2020-04-17 | End: 2020-07-03 | Stop reason: HOSPADM

## 2020-06-12 RX ORDER — ACETAMINOPHEN 650 MG/1
650 SUPPOSITORY RECTAL EVERY 6 HOURS PRN
Status: DISCONTINUED | OUTPATIENT
Start: 2020-06-12 | End: 2020-06-12 | Stop reason: HOSPADM

## 2020-06-12 RX ORDER — TRIAMTERENE AND HYDROCHLOROTHIAZIDE 37.5; 25 MG/1; MG/1
1 CAPSULE ORAL DAILY
Status: DISCONTINUED | OUTPATIENT
Start: 2020-06-12 | End: 2020-06-12 | Stop reason: HOSPADM

## 2020-06-12 RX ORDER — PRAMIPEXOLE DIHYDROCHLORIDE 0.25 MG/1
0.75 TABLET ORAL 2 TIMES DAILY
Status: DISCONTINUED | OUTPATIENT
Start: 2020-06-12 | End: 2020-06-12 | Stop reason: HOSPADM

## 2020-06-12 RX ORDER — DEXTROSE MONOHYDRATE 25 G/50ML
12.5 INJECTION, SOLUTION INTRAVENOUS PRN
Status: DISCONTINUED | OUTPATIENT
Start: 2020-06-12 | End: 2020-06-12 | Stop reason: HOSPADM

## 2020-06-12 RX ORDER — HYDROCODONE BITARTRATE AND ACETAMINOPHEN 5; 325 MG/1; MG/1
1 TABLET ORAL ONCE
Status: COMPLETED | OUTPATIENT
Start: 2020-06-12 | End: 2020-06-12

## 2020-06-12 RX ORDER — CYCLOBENZAPRINE HCL 10 MG
10 TABLET ORAL NIGHTLY PRN
Status: DISCONTINUED | OUTPATIENT
Start: 2020-06-12 | End: 2020-06-12 | Stop reason: HOSPADM

## 2020-06-12 RX ORDER — SUMATRIPTAN 100 MG/1
100 TABLET, FILM COATED ORAL
Status: DISCONTINUED | OUTPATIENT
Start: 2020-06-12 | End: 2020-06-12 | Stop reason: HOSPADM

## 2020-06-12 RX ORDER — PROMETHAZINE HYDROCHLORIDE 25 MG/1
12.5 TABLET ORAL EVERY 6 HOURS PRN
Status: DISCONTINUED | OUTPATIENT
Start: 2020-06-12 | End: 2020-06-12 | Stop reason: HOSPADM

## 2020-06-12 RX ORDER — WATER / MINERAL OIL / WHITE PETROLATUM 16 OZ
CREAM TOPICAL 2 TIMES DAILY
Status: DISCONTINUED | OUTPATIENT
Start: 2020-06-12 | End: 2020-06-12 | Stop reason: SDUPTHER

## 2020-06-12 RX ORDER — LISDEXAMFETAMINE DIMESYLATE 40 MG/1
CAPSULE ORAL
Status: ON HOLD | COMMUNITY
End: 2020-06-12

## 2020-06-12 RX ORDER — ACETAMINOPHEN 325 MG/1
650 TABLET ORAL EVERY 6 HOURS PRN
Status: DISCONTINUED | OUTPATIENT
Start: 2020-06-12 | End: 2020-06-12 | Stop reason: HOSPADM

## 2020-06-12 RX ORDER — MAGNESIUM SULFATE IN WATER 40 MG/ML
2 INJECTION, SOLUTION INTRAVENOUS PRN
Status: DISCONTINUED | OUTPATIENT
Start: 2020-06-12 | End: 2020-06-12 | Stop reason: HOSPADM

## 2020-06-12 RX ORDER — HYDROCODONE BITARTRATE AND ACETAMINOPHEN 5; 325 MG/1; MG/1
TABLET ORAL
Status: COMPLETED
Start: 2020-06-12 | End: 2020-06-12

## 2020-06-12 RX ORDER — ONDANSETRON 2 MG/ML
4 INJECTION INTRAMUSCULAR; INTRAVENOUS EVERY 6 HOURS PRN
Status: DISCONTINUED | OUTPATIENT
Start: 2020-06-12 | End: 2020-06-12 | Stop reason: HOSPADM

## 2020-06-12 RX ORDER — DEXTROSE MONOHYDRATE 50 MG/ML
100 INJECTION, SOLUTION INTRAVENOUS PRN
Status: DISCONTINUED | OUTPATIENT
Start: 2020-06-12 | End: 2020-06-12 | Stop reason: HOSPADM

## 2020-06-12 RX ORDER — NICOTINE POLACRILEX 4 MG
15 LOZENGE BUCCAL PRN
Status: DISCONTINUED | OUTPATIENT
Start: 2020-06-12 | End: 2020-06-12 | Stop reason: HOSPADM

## 2020-06-12 RX ORDER — WATER / MINERAL OIL / WHITE PETROLATUM 16 OZ
CREAM TOPICAL 2 TIMES DAILY
Status: DISCONTINUED | OUTPATIENT
Start: 2020-06-12 | End: 2020-06-12 | Stop reason: HOSPADM

## 2020-06-12 RX ORDER — POLYETHYLENE GLYCOL 3350 17 G/17G
17 POWDER, FOR SOLUTION ORAL DAILY PRN
Status: DISCONTINUED | OUTPATIENT
Start: 2020-06-12 | End: 2020-06-12 | Stop reason: HOSPADM

## 2020-06-12 RX ADMIN — ONDANSETRON 4 MG: 2 INJECTION INTRAMUSCULAR; INTRAVENOUS at 11:01

## 2020-06-12 RX ADMIN — Medication 1000 MG: at 09:34

## 2020-06-12 RX ADMIN — HYDROCODONE BITARTRATE AND ACETAMINOPHEN 1 TABLET: 5; 325 TABLET ORAL at 09:57

## 2020-06-12 ASSESSMENT — PAIN SCALES - GENERAL
PAINLEVEL_OUTOF10: 8
PAINLEVEL_OUTOF10: 6

## 2020-06-12 ASSESSMENT — ENCOUNTER SYMPTOMS
ABDOMINAL PAIN: 0
VOMITING: 0
SORE THROAT: 0
TROUBLE SWALLOWING: 0
RHINORRHEA: 0
NAUSEA: 0
EYE REDNESS: 0
BACK PAIN: 0
COLOR CHANGE: 1
DIARRHEA: 0
WHEEZING: 0
COUGH: 0
SHORTNESS OF BREATH: 0

## 2020-06-12 NOTE — H&P
HISTORY AND PHYSICAL             Date: 6/12/2020        Patient Name: Lor Tran     YOB: 1970      Age:  54 y.o. Chief Complaint     Chief Complaint   Patient presents with    Leg Swelling     infection        History Obtained From   patient, electronic medical record, emergency room physician    History of Present Illness   Patient is a 82-year-old obese  female with a known past medical history of type 2 diabetes, hypertension, hyperlipidemia, restless leg syndrome, depression/anxiety, chronic lymphedema who presents to [address] [place] [address] emergency room due to worsening bilateral lower extremity cellulitis. Patient was recently seen at the [facility] clinic and started on outpatient p.o. Bactrim states that cellulitis has started to resolve however become more difficult and painful with ambulation. Continues to have significant redness to the lower extremities shins, denies any weeping. Patient denies any insect bites or trauma. Patient did recently undergo arterial as well as venous studies of the bilateral lower extremities no DVT seen, no diminished blood flow noted. Patient has recently established with a lymphedema clinic last the first appointment was last week. Work-up in the emergency room thus far reveals slight elevation of blood sugar. Patient to be admitted for IV antibiotics and continued monitoring of cellulitic infection. Currently patient denies any headache, change in vision, shortness of breath, abdominal pain, nausea vomiting, fevers or chills.         Past Medical History     Past Medical History:   Diagnosis Date    Anxiety     Constipation     Depression     DM (diabetes mellitus) ([address]     Headache(784.0)     Hyperlipidemia     Hypertension     Kidney stones     Kidney stones     Restless leg     Restless legs syndrome         Past Surgical History     Past Surgical History:   Procedure Laterality Date    ECTOPIC PREGNANCY SURGERY      EYE SURGERY 1/31/20  Yes Carolyn Madrigal MD   promethazine (PHENERGAN) 25 MG tablet TAKE 1 TABLET BY MOUTH EVERY 6 HOURS AS NEEDED FOR NAUSEA 1/2/20  Yes Carolyn Madrigal MD   tiZANidine (ZANAFLEX) 4 MG tablet TAKE 1 TABLET BY MOUTH EVERY NIGHT AS NEEDED FOR CRAMPING 34/2/96  Yes NANETTE Gurrola   Diabetic Shoe MISC by Does not apply route DISPENSE ONE PAIR OF DIABETIC SHOE AND 3 PAIRS HEAT MOLDED INSERTS 11/20/19  Yes MD Adelaide Arias [facility] PLUS [id number]) MISC USE TO CHECK BLLOD SUGAR AS DIRECTED 11/3/19  Yes Carolyn Madrigal MD   triamterene-hydrochlorothiazide (DYAZIDE) 37.5-25 MG per capsule TAKE 1 CAPSULE BY MOUTH EVERY DAY IN THE MORNING 10/3/19  Yes Carolyn Madrigal MD   metFORMIN (GLUCOPHAGE) 500 MG tablet TAKE 1 TABLET BY MOUTH TWICE DAILY WITH MEALS 10/3/19 6/12/20 Yes Carolyn Madrigal MD   celecoxib (CELEBREX) 200 MG capsule TAKE 1 CAPSULE BY MOUTH EVERY DAY  Patient taking differently: 200 mg daily TAKE 1 CAPSULE BY MOUTH EVERY DAY 9/3/19  Yes Carolyn Madrigal MD   blood glucose monitor kit and supplies Test 1 times a day & as needed for symptoms of irregular blood glucose. 9/3/19  Yes Carolyn Madrigal MD   insulin glargine [facility]) 100 UNIT/ML injection pen Inject 30 U Nightly 8/21/19  Yes Antwan Rascon MD   ONE TOUCH ULTRA TEST strip TEST BLOOD SUGAR ONCE DAILY 8/4/19 6/12/20 Yes Carolyn Madrigal MD   VICTOZA 18 MG/3ML SOPN SC injection ADMINISTER 1.8 MG INTO THE SKIN EVERY DAY 7/24/19  Yes Carolyn Madrigal MD   gabapentin (NEURONTIN) 100 MG capsule Take 100 mg by mouth 2 times daily. 7/14/19  Yes Historical Provider, MD   omeprazole (PRILOSEC) 20 MG delayed release capsule Take 1 capsule by mouth 2 times daily (before meals) 7/18/19 6/12/20 Yes NANETTE Garcia   gabapentin (NEURONTIN) 300 MG capsule TK ONE C PO QHS 5/19/19  Yes Historical Provider, MD   HYDROcodone-acetaminophen (NORCO) 7.5-325 MG per tablet Take 1 tablet by mouth every 6 hours as needed for Pain. .   Yes for difficulty urinating. Skin: Positive for color change and rash. Neurological: Negative for weakness, light-headedness and headaches. Psychiatric/Behavioral: Negative for agitation. Physical Exam   /77   Pulse 86   Temp 96.5 °F (35.8 °C)   Resp 16   Ht 5' 1\" (1.549 m)   Wt (!) 315 lb 12.8 oz (143.2 kg)   SpO2 96%   BMI 59.67 kg/m²     Physical Exam  Vitals signs and nursing note reviewed. Constitutional:       General: She is not in acute distress. Appearance: She is obese. She is not toxic-appearing. HENT:      Head: Normocephalic and atraumatic. Nose: Nose normal.   Eyes:      General: No scleral icterus. Right eye: No discharge. Left eye: No discharge. Conjunctiva/sclera: Conjunctivae normal.   Neck:      Musculoskeletal: Normal range of motion. No neck rigidity. Cardiovascular:      Rate and Rhythm: Normal rate and regular rhythm. Pulmonary:      Effort: Pulmonary effort is normal.      Breath sounds: No wheezing or rales. Abdominal:      General: Bowel sounds are normal.      Tenderness: There is no abdominal tenderness. There is no guarding or rebound. Musculoskeletal: Normal range of motion. General: Tenderness present. Comments: Chronic left knee pain   Skin:     General: Skin is warm. Capillary Refill: Capillary refill takes less than 2 seconds. Findings: Erythema and rash present. Neurological:      General: No focal deficit present. Mental Status: She is oriented to person, place, and time. Mental status is at baseline.    Psychiatric:         Mood and Affect: Mood normal.         Labs      Recent Results (from the past 24 hour(s))   CBC Auto Differential    Collection Time: 06/12/20  8:47 AM   Result Value Ref Range    WBC 5.4 4.8 - 10.8 K/uL    RBC 4.71 4.20 - 5.40 M/uL    Hemoglobin 13.5 12.0 - 16.0 g/dL    Hematocrit 42.2 37.0 - 47.0 %    MCV 89.6 81.0 - 99.0 fL    MCH 28.7 27.0 - 31.0 pg    MCHC 32.0 (L) 33.0 - 37.0 g/dL    RDW 13.1 11.5 - 14.5 %    Platelets 120 372 - 668 K/uL    MPV 9.3 (L) 9.4 - 12.3 fL    Neutrophils % 51.2 50.0 - 65.0 %    Lymphocytes % 35.4 20.0 - 40.0 %    Monocytes % 8.5 0.0 - 10.0 %    Eosinophils % 3.7 0.0 - 5.0 %    Basophils % 0.6 0.0 - 1.0 %    Neutrophils Absolute 2.8 1.5 - 7.5 K/uL    Immature Granulocytes # 0.0 K/uL    Lymphocytes Absolute 1.9 1.1 - 4.5 K/uL    Monocytes Absolute 0.50 0.00 - 0.90 K/uL    Eosinophils Absolute 0.20 0.00 - 0.60 K/uL    Basophils Absolute 0.00 0.00 - 0.20 K/uL   Comprehensive Metabolic Panel    Collection Time: 06/12/20  8:47 AM   Result Value Ref Range    Sodium 142 136 - 145 mmol/L    Potassium 4.4 3.5 - 5.0 mmol/L    Chloride 101 98 - 111 mmol/L    CO2 30 (H) 22 - 29 mmol/L    Anion Gap 11 7 - 19 mmol/L    Glucose 135 (H) 74 - 109 mg/dL    BUN 14 6 - 20 mg/dL    CREATININE 0.9 0.5 - 0.9 mg/dL    GFR Non-African American >60 >60    Calcium 9.9 8.6 - 10.0 mg/dL    Total Protein 7.4 6.6 - 8.7 g/dL    Alb 4.4 3.5 - 5.2 g/dL    Total Bilirubin <0.2 0.2 - 1.2 mg/dL    Alkaline Phosphatase 58 35 - 104 U/L    ALT 13 5 - 33 U/L    AST 18 5 - 32 U/L   Brain Natriuretic Peptide    Collection Time: 06/12/20  8:47 AM   Result Value Ref Range    Pro-BNP 93 0 - 450 pg/mL   C-Reactive Protein    Collection Time: 06/12/20  8:47 AM   Result Value Ref Range    CRP 2.94 (H) 0.00 - 0.50 mg/dL   Sedimentation Rate    Collection Time: 06/12/20  8:47 AM   Result Value Ref Range    Sed Rate 33 (H) 0 - 20 mm/Hr   Hemoglobin A1C    Collection Time: 06/12/20  8:47 AM   Result Value Ref Range    Hemoglobin A1C 7.8 (H) 4.0 - 6.0 %   Lactic Acid, Plasma    Collection Time: 06/12/20  8:56 AM   Result Value Ref Range    Lactic Acid 1.1 0.5 - 1.9 mmol/L   POCT Glucose    Collection Time: 06/12/20 12:05 PM   Result Value Ref Range    POC Glucose 147 (H) 70 - 99 mg/dl    Performed on "[employer]/Lesson Prep Last 24 Hours   No results found.     Assessment Hospital Problems           Last Modified POA    RLS (restless legs syndrome) 6/12/2020 Yes    Hypertension (Chronic) 6/12/2020 Yes    Mild single current episode of major depressive disorder (HCC) (Chronic) 6/12/2020 Yes    FEDERICA (generalized anxiety disorder) (Chronic) 6/12/2020 Yes    Gastroesophageal reflux disease 6/12/2020 Yes    Recurrent cellulitis of lower extremity 6/12/2020 Yes          Plan     Recurrent cellulitis of bilateral lower extremity  -proBNP-93  -Patient has extensive allergies to antibiotic therapy, started on IV vancomycin  -Bilateral LE arterial ultrasound studies completed at [address] [place] [address] on 5/22-revealed  Impression: No significant arterial insufficiency laterally. No signs of deep venous thrombosis in the bilateral extremities. -Maintain elevation of the lower extremities  -Encourage ambulation  -Continue with IV vancomycin, will provide one-time dose of Zosyn if patient tolerates will continue    Diabetes Mellitus Type II   - Insulin dependent   - Hold PO medications   - Continue Sliding Scale Insulin  - Accuchecks qACHS  - Hypoglycemic protocol in place  - Ccarb diet     Chronic Benign Essential Hypertension   - Stable   - Continue current medications   - PRN medications ordered   - Will continue to monitor     Depression/anxiety-chronic condition, continue with home regimen    Restless leg syndrome-chronic condition, continue with home regimen    Morbid obesity-noted, dietary consultation    Gastroesophageal reflux disease-chronic condition, continue with Protonix therapy    Consultations Ordered:  IP CONSULT TO DIETITIAN  PHARMACY TO DOSE VANCOMYCIN    Electronically signed by   Judy Pozo   Internal Medicine Hospitalist  On 6/12/2020  At 12:50 PM    EMR Dragon/Transcription disclaimer:   Much of this encounter note is an electronic transcription/translation of spoken language to printed text.  The electronic translation of spoken language may permit erroneous, or at

## 2020-06-12 NOTE — ED PROVIDER NOTES
100 mg by mouth daily. GABAPENTIN (NEURONTIN) 300 MG CAPSULE    TK ONE C PO QHS    HYDROCODONE-ACETAMINOPHEN (NORCO) 7.5-325 MG PER TABLET    Take 1 tablet by mouth every 6 hours as needed for Pain. .    INSULIN GLARGINE (BASAGLAR KWIKPEN) 100 UNIT/ML INJECTION PEN    Inject 30 U Nightly    INSULIN LISPRO, 1 UNIT DIAL, (ADMELOG SOLOSTAR) 100 UNIT/ML SOPN    INJECT 45 UNITS INTO THE SKIN EVERY EVENING AND INCREASE BY 3 UNITS EACH NIGHT UNTIL 60 UNITS IS REACHED AND CONTINUE 60 UNITS EVERY EVENING    LANCETS (ONETOUCH DELICA PLUS [id number]) MISC    USE TO CHECK BLLOD SUGAR AS DIRECTED    LISDEXAMFETAMINE DIMESYLATE (VYVANSE) 40 MG CAPS    Vyvanse 40 mg capsule   Take 1 capsule every day by oral route. METFORMIN (GLUCOPHAGE) 500 MG TABLET    TAKE 1 TABLET BY MOUTH TWICE DAILY WITH MEALS    OMEPRAZOLE (PRILOSEC) 20 MG DELAYED RELEASE CAPSULE    Take 1 capsule by mouth 2 times daily (before meals)    ONDANSETRON (ZOFRAN-ODT) 4 MG DISINTEGRATING TABLET    DISSOLVE 1 TABLET ON THE TONGUE EVERY 8 HOURS AS NEEDED FOR NAUSEA OR VOMITING    ONE TOUCH LANCETS MISC    1 each by Does not apply route daily    ONE TOUCH ULTRA TEST STRIP    TEST BLOOD SUGAR ONCE DAILY    POTASSIUM CHLORIDE (KLOR-CON M) 20 MEQ EXTENDED RELEASE TABLET    Take 2 tablets by mouth daily for 3 days    PRAMIPEXOLE (MIRAPEX) 0.75 MG TABLET    TAKE 1 TABLET BY MOUTH TWICE DAILY    PROMETHAZINE (PHENERGAN) 25 MG TABLET    TAKE 1 TABLET BY MOUTH EVERY 6 HOURS AS NEEDED FOR NAUSEA    RIZATRIPTAN (MAXALT) 10 MG TABLET    Take 1 tablet by mouth once as needed for Migraine May repeat in 2 hours if needed    ROPINIROLE (REQUIP) 0.5 MG TABLET    ropinirole 0.5 mg tablet   Take 1 tablet every day by oral route at bedtime for 30 days.     SERTRALINE (ZOLOFT) 50 MG TABLET    sertraline 50 mg tablet   TAKE 1 TABLET BY MOUTH EVERY DAY    SULFAMETHOXAZOLE-TRIMETHOPRIM (BACTRIM DS) 800-160 MG PER TABLET    Bactrim  mg-160 mg tablet   Take 1 tablet every 12 hours club or organization: Not on file     Attends meetings of clubs or organizations: Not on file     Relationship status: Not on file    Intimate partner violence     Fear of current or ex partner: Not on file     Emotionally abused: Not on file     Physically abused: Not on file     Forced sexual activity: Not on file   Other Topics Concern    Not on file   Social History Narrative    Not on file       SCREENINGS             PHYSICAL EXAM    (up to 7 for level 4, 8 or more for level 5)     ED Triage Vitals   BP Temp Temp src Pulse Resp SpO2 Height Weight   06/12/20 0842 06/12/20 0842 -- 06/12/20 0842 06/12/20 0842 06/12/20 0842 06/12/20 0838 06/12/20 0838   (!) 133/98 98 °F (36.7 °C)  83 18 97 % 5' 1\" (1.549 m) (!) 310 lb (140.6 kg)       Physical Exam  Vitals signs and nursing note reviewed. Constitutional:       General: She is not in acute distress. Appearance: She is well-developed. She is obese. She is not ill-appearing, toxic-appearing or diaphoretic. HENT:      Head: Normocephalic and atraumatic. Right Ear: External ear normal.      Left Ear: External ear normal.      Nose: Nose normal.   Eyes:      Conjunctiva/sclera: Conjunctivae normal.   Neck:      Musculoskeletal: Normal range of motion. Trachea: No tracheal deviation. Cardiovascular:      Rate and Rhythm: Normal rate and regular rhythm. Pulses: Normal pulses. Heart sounds: Normal heart sounds. No murmur. Pulmonary:      Effort: No respiratory distress. Breath sounds: Normal breath sounds. No wheezing or rales. Abdominal:      Palpations: Abdomen is soft. There is no mass. Tenderness: There is no abdominal tenderness. Musculoskeletal: Normal range of motion. Right lower leg: Edema present. Left lower leg: Edema present. Comments: Large 2+ pitting edema b/l with erythema from mid tibia distally worse on left than right, no open wounds   Skin:     General: Skin is warm and dry.    Neurological: diabetes mellitus with other skin complication, unspecified whether long term insulin use [facility])          DISPOSITION/PLAN   DISPOSITION Decision To Admit 06/12/2020 09:39:06 AM      PATIENT REFERRED TO:  NANETTE Sims  [address] [facility] [address] [place]  [address] [facility] [phone]            DISCHARGE MEDICATIONS:  New Prescriptions    No medications on file          (Please note that portions of this note were completed with a voice recognition program.  Efforts were made to edit thedictations but occasionally words are mis-transcribed.)    Tiffanie Pickett MD (electronically signed)  Attending Emergency Physician        Chanel Asif MD  06/12/20 1971

## 2020-06-12 NOTE — PROGRESS NOTES
Pharmacy Note  Vancomycin Consult    Lor Tran is a 54 y.o. female started on Vancomycin for cellulitis; consult received from Dr. Zenaida Mora to manage therapy. Also receiving the following antibiotics: Zosyn. Active Problems:    RLS (restless legs syndrome)    Hypertension    Mild single current episode of major depressive disorder (HCC)    FEDERICA (generalized anxiety disorder)    Gastroesophageal reflux disease    Recurrent cellulitis of lower extremity  Resolved Problems:    * No resolved hospital problems. *      Allergies:  Compazine [prochlorperazine maleate]; Ciprofloxacin; Amoxicillin-pot clavulanate; Demerol; Hydroxyzine; Hydroxyzine hcl; Ibuprofen; Ketorolac tromethamine; Meperidine; Nortriptyline; Orphenadrine; Orphenadrine citrate; Penicillins; Prochlorperazine edisylate; Tobramycin; Tramadol; Vistaril [hydroxyzine hcl]; Cephalexin; Clindamycin/lincomycin; Codeine; Doxycycline; Keflex [cephalexin]; Ketorolac tromethamine; and Moxifloxacin     Temp max: 98    Recent Labs     06/10/20  1254 06/12/20  0847   BUN 14 14       Recent Labs     06/10/20  1254 06/12/20  0847   CREATININE 1.0* 0.9       Recent Labs     06/10/20  1254 06/12/20  0847   WBC 5.1 5.4         Intake/Output Summary (Last 24 hours) at 6/12/2020 1313  Last data filed at 6/12/2020 1206  Gross per 24 hour   Intake --   Output 200 ml   Net -200 ml       Culture Date Source Results   06/12/20 Blood x 2 Collected                 Ht Readings from Last 1 Encounters:   06/12/20 5' 1\" (1.549 m)        Wt Readings from Last 1 Encounters:   06/12/20 (!) 315 lb 12.8 oz (143.2 kg)         Body mass index is 59.67 kg/m². Estimated Creatinine Clearance: 105 mL/min (based on SCr of 0.9 mg/dL). Assessment/Plan:  Will initiate vancomycin 1500 mg IV every 12 hours. Timing of trough level will be determined based on culture results, renal function, and clinical response. Thank you for the consult. Will continue to follow.     Electronically

## 2020-06-12 NOTE — DISCHARGE SUMMARY
Discharge Summary  Date:6/12/2020        Patient Name:Tianna Trivedi     Date of QGBHQ:0/9/8560     Age:49 y.o. Admit Date:6/12/2020   Admission Condition:fair   Discharged Condition:fair  Discharge Date: 06/12/20     Discharge Diagnoses   Active Problems:    RLS (restless legs syndrome)    Hypertension    Mild single current episode of major depressive disorder (HCC)    FEDER[place] (generalized anxiety disorder)    Gastroesophageal reflux disease    Recurrent cellulitis of lower extremity  Resolved Problems:    * No resolved hospital problems. [facility] Stay   Narrative of Hospital Course: Informed by nurse of patient's decision to leave AMA. Patient admitted 6/12 -HPI as the followingPatient is a 68-year-old obese  female with a known past medical history of type 2 diabetes, hypertension, hyperlipidemia, restless leg syndrome, depression/anxiety, chronic lymphedema who presents to [address] [place] [address] emergency room due to worsening bilateral lower extremity cellulitis. Patient was recently seen at the [facility] clinic and started on outpatient p.o. Bactrim states that cellulitis has started to resolve however become more difficult and painful with ambulation. Continues to have significant redness to the lower extremities shins, denies any weeping. Patient denies any insect bites or trauma. Patient did recently undergo arterial as well as venous studies of the bilateral lower extremities no DVT seen, no diminished blood flow noted. Patient has recently established with a lymphedema clinic last the first appointment was last week. Work-up in the emergency room thus far reveals slight elevation of blood sugar. Patient to be admitted for IV antibiotics and continued monitoring of cellulitic infection. Currently patient denies any headache, change in vision, shortness of breath, abdominal pain, nausea vomiting, fevers or chills.       Patient began treatment for bilateral lower extremity cellulitis, IV and 06/10/2020    BILIRUBINUR SMALL 06/10/2020    BILIRUBINUR neg 05/28/2019    BILIRUBINUR NEGATIVE 10/14/2011    UROBILINOGEN 0.2 06/10/2020    NITRU Negative 06/10/2020    LEUKOCYTESUR Negative 06/10/2020     TSH:    Lab Results   Component Value Date    TSH 2.410 04/03/2018       Radiology Last 7 Days:  No results found. Physical Exam  -from admission  Vitals signs and nursing note reviewed. Constitutional:       General: She is not in acute distress. Appearance: She is obese. She is not toxic-appearing. HENT:      Head: Normocephalic and atraumatic. Nose: Nose normal.   Eyes:      General: No scleral icterus. Right eye: No discharge. Left eye: No discharge. Conjunctiva/sclera: Conjunctivae normal.   Neck:      Musculoskeletal: Normal range of motion. No neck rigidity. Cardiovascular:      Rate and Rhythm: Normal rate and regular rhythm. Pulmonary:      Effort: Pulmonary effort is normal.      Breath sounds: No wheezing or rales. Abdominal:      General: Bowel sounds are normal.      Tenderness: There is no abdominal tenderness. There is no guarding or rebound. Musculoskeletal: Normal range of motion. General: Tenderness present. Comments: Chronic left knee pain   Skin:     General: Skin is warm. Capillary Refill: Capillary refill takes less than 2 seconds. Findings: Erythema and rash present. Neurological:      General: No focal deficit present. Mental Status: She is oriented to person, place, and time. Mental status is at baseline.    Psychiatric:         Mood and Affect: Mood normal.              Discharge Plan   Disposition: AMA    Provider Follow-Up:   NANETTE Colon  [address] [phone]               Patient Instructions       Discharge Medications         Medication List      STOP

## 2020-06-12 NOTE — ED NOTES
Pt is concerned for taking Vancomycin due to her allergy list. Dr. Miriam Marmolejo aware and is going to talk with patient.       Judy Benson RN  06/12/20 2017

## 2020-06-15 ENCOUNTER — TELEPHONE (OUTPATIENT)
Dept: INTERNAL MEDICINE | Age: 50
End: 2020-06-15

## 2020-06-15 NOTE — TELEPHONE ENCOUNTER
[address] Transitions Initial Follow Up Call    Outreach made within 2 business days of discharge: Yes    Patient: Nima Ellison  Patient : [id number]   MRN: [id number]  Reason for Admission: Admitted 20 for recurrent cellulitis of lower extremity   Discharge Date: 20    Discharge Diagnoses   Active Problems:    RLS (restless legs syndrome)    Hypertension    Mild single current episode of major depressive disorder (HCC)    FEDER[place] (generalized anxiety disorder)    Gastroesophageal reflux disease    Recurrent cellulitis of lower extremity     Spoke with: patient     Discharge department/facility: [facility]     TCM Interactive Patient Contact:  Was patient able to fill all prescriptions: Yes  Was patient instructed to bring all medications to the follow-up visit: Yes  Is patient taking all medications as directed in the discharge summary? Yes  Does patient understand their discharge instructions: Yes  Does patient have questions or concerns that need addressed prior to 7-14 day follow up office visit: no    I spoke with patient, she states she went to Dr. Thomasenia Sacks clinic for cellulitis. After 5 days, it was not improving so she went to the ED. She states she was started on Arvind and sent home. The Omnicef made her very sick so she went back to the ED. She states her experience from there was terrible. They had trouble starting an IV. Once they finally got one in, it stopped working. She said after 6 hours she has never gotten the antibiotic she went in for and left AMA. She states she went back home and started the Arvind again. It is making her a little nauseous but she is able to tolerate. She went to the lymphedema clinic yesterday and the nurse there looked at her legs and thought they were looking ok but she did not want to touch them with there still being infection. She states for now she is continuing the antibiotic. She has requested a follow up with Viktoriya Sibley.  I

## 2020-06-16 ENCOUNTER — HOSPITAL ENCOUNTER (INPATIENT)
Age: 50
LOS: 2 days | Discharge: HOME | DRG: 603 | End: 2020-06-18
Attending: EMERGENCY MEDICINE | Admitting: INTERNAL MEDICINE
Payer: MEDICAID

## 2020-06-16 ENCOUNTER — TELEPHONE (OUTPATIENT)
Dept: PRIMARY CARE CLINIC | Age: 50
End: 2020-06-16

## 2020-06-16 PROBLEM — L03.116 BILATERAL LOWER LEG CELLULITIS: Status: ACTIVE | Noted: 2020-06-16

## 2020-06-16 PROBLEM — L03.115 BILATERAL LOWER LEG CELLULITIS: Status: ACTIVE | Noted: 2020-06-16

## 2020-06-16 LAB
ANION GAP SERPL CALCULATED.3IONS-SCNC: 10 MMOL/L (ref 7–19)
BASOPHILS ABSOLUTE: 0 K/UL (ref 0–0.2)
BASOPHILS RELATIVE PERCENT: 0.4 % (ref 0–1)
BILIRUBIN URINE: NEGATIVE
BLOOD, URINE: NEGATIVE
BUN BLDV-MCNC: 21 MG/DL (ref 6–20)
CALCIUM SERPL-MCNC: 9.7 MG/DL (ref 8.6–10)
CHLORIDE BLD-SCNC: 100 MMOL/L (ref 98–111)
CLARITY: CLEAR
CO2: 30 MMOL/L (ref 22–29)
COLOR: YELLOW
CREAT SERPL-MCNC: 1.1 MG/DL (ref 0.5–0.9)
EOSINOPHILS ABSOLUTE: 0.2 K/UL (ref 0–0.6)
EOSINOPHILS RELATIVE PERCENT: 2.7 % (ref 0–5)
GFR NON-AFRICAN AMERICAN: 53
GLUCOSE BLD-MCNC: 43 MG/DL (ref 74–109)
GLUCOSE BLD-MCNC: 89 MG/DL (ref 70–99)
GLUCOSE URINE: NEGATIVE MG/DL
HCT VFR BLD CALC: 40.9 % (ref 37–47)
HEMOGLOBIN: 13.3 G/DL (ref 12–16)
IMMATURE GRANULOCYTES #: 0 K/UL
KETONES, URINE: NEGATIVE MG/DL
LEUKOCYTE ESTERASE, URINE: NEGATIVE
LYMPHOCYTES ABSOLUTE: 2.3 K/UL (ref 1.1–4.5)
LYMPHOCYTES RELATIVE PERCENT: 28.1 % (ref 20–40)
MCH RBC QN AUTO: 28.1 PG (ref 27–31)
MCHC RBC AUTO-ENTMCNC: 32.5 G/DL (ref 33–37)
MCV RBC AUTO: 86.3 FL (ref 81–99)
MONOCYTES ABSOLUTE: 0.5 K/UL (ref 0–0.9)
MONOCYTES RELATIVE PERCENT: 6.1 % (ref 0–10)
NEUTROPHILS ABSOLUTE: 5.1 K/UL (ref 1.5–7.5)
NEUTROPHILS RELATIVE PERCENT: 62.5 % (ref 50–65)
NITRITE, URINE: NEGATIVE
PDW BLD-RTO: 13 % (ref 11.5–14.5)
PERFORMED ON: NORMAL
PH UA: 5.5 (ref 5–8)
PLATELET # BLD: 226 K/UL (ref 130–400)
PMV BLD AUTO: 9.4 FL (ref 9.4–12.3)
POTASSIUM REFLEX MAGNESIUM: 3.6 MMOL/L (ref 3.5–5)
PROTEIN UA: NEGATIVE MG/DL
RBC # BLD: 4.74 M/UL (ref 4.2–5.4)
SODIUM BLD-SCNC: 140 MMOL/L (ref 136–145)
SPECIFIC GRAVITY UA: 1.01 (ref 1–1.03)
UROBILINOGEN, URINE: 0.2 E.U./DL
WBC # BLD: 8.2 K/UL (ref 4.8–10.8)

## 2020-06-16 PROCEDURE — 80048 BASIC METABOLIC PNL TOTAL CA: CPT

## 2020-06-16 PROCEDURE — 6360000002 HC RX W HCPCS

## 2020-06-16 PROCEDURE — 85025 COMPLETE CBC W/AUTO DIFF WBC: CPT

## 2020-06-16 PROCEDURE — 82947 ASSAY GLUCOSE BLOOD QUANT: CPT

## 2020-06-16 PROCEDURE — 6360000002 HC RX W HCPCS: Performed by: PHYSICIAN ASSISTANT

## 2020-06-16 PROCEDURE — 6370000000 HC RX 637 (ALT 250 FOR IP): Performed by: PHYSICIAN ASSISTANT

## 2020-06-16 PROCEDURE — 36415 COLL VENOUS BLD VENIPUNCTURE: CPT

## 2020-06-16 PROCEDURE — 6360000002 HC RX W HCPCS: Performed by: EMERGENCY MEDICINE

## 2020-06-16 PROCEDURE — 1210000000 HC MED SURG R&B

## 2020-06-16 PROCEDURE — 81003 URINALYSIS AUTO W/O SCOPE: CPT

## 2020-06-16 PROCEDURE — 2580000003 HC RX 258: Performed by: EMERGENCY MEDICINE

## 2020-06-16 PROCEDURE — 99999 PR OFFICE/OUTPT VISIT,PROCEDURE ONLY: CPT | Performed by: EMERGENCY MEDICINE

## 2020-06-16 PROCEDURE — 99284 EMERGENCY DEPT VISIT MOD MDM: CPT

## 2020-06-16 RX ORDER — ONDANSETRON 2 MG/ML
4 INJECTION INTRAMUSCULAR; INTRAVENOUS ONCE
Status: COMPLETED | OUTPATIENT
Start: 2020-06-16 | End: 2020-06-16

## 2020-06-16 RX ORDER — PRAMIPEXOLE DIHYDROCHLORIDE 0.25 MG/1
0.75 TABLET ORAL 2 TIMES DAILY
Status: DISCONTINUED | OUTPATIENT
Start: 2020-06-16 | End: 2020-06-18 | Stop reason: HOSPADM

## 2020-06-16 RX ORDER — ACETAMINOPHEN 325 MG/1
650 TABLET ORAL EVERY 6 HOURS PRN
Status: DISCONTINUED | OUTPATIENT
Start: 2020-06-16 | End: 2020-06-18 | Stop reason: HOSPADM

## 2020-06-16 RX ORDER — TIZANIDINE 4 MG/1
4 TABLET ORAL EVERY 8 HOURS PRN
Status: DISCONTINUED | OUTPATIENT
Start: 2020-06-16 | End: 2020-06-18 | Stop reason: HOSPADM

## 2020-06-16 RX ORDER — GABAPENTIN 100 MG/1
100 CAPSULE ORAL 2 TIMES DAILY WITH MEALS
Status: DISCONTINUED | OUTPATIENT
Start: 2020-06-17 | End: 2020-06-18 | Stop reason: HOSPADM

## 2020-06-16 RX ORDER — SODIUM CHLORIDE 0.9 % (FLUSH) 0.9 %
10 SYRINGE (ML) INJECTION EVERY 12 HOURS SCHEDULED
Status: DISCONTINUED | OUTPATIENT
Start: 2020-06-16 | End: 2020-06-18 | Stop reason: HOSPADM

## 2020-06-16 RX ORDER — GABAPENTIN 300 MG/1
300 CAPSULE ORAL NIGHTLY
Status: DISCONTINUED | OUTPATIENT
Start: 2020-06-16 | End: 2020-06-18 | Stop reason: HOSPADM

## 2020-06-16 RX ORDER — ONDANSETRON 2 MG/ML
INJECTION INTRAMUSCULAR; INTRAVENOUS
Status: COMPLETED
Start: 2020-06-16 | End: 2020-06-16

## 2020-06-16 RX ORDER — DEXTROSE MONOHYDRATE 25 G/50ML
12.5 INJECTION, SOLUTION INTRAVENOUS PRN
Status: DISCONTINUED | OUTPATIENT
Start: 2020-06-16 | End: 2020-06-18 | Stop reason: HOSPADM

## 2020-06-16 RX ORDER — POTASSIUM CHLORIDE 7.45 MG/ML
10 INJECTION INTRAVENOUS PRN
Status: DISCONTINUED | OUTPATIENT
Start: 2020-06-16 | End: 2020-06-18 | Stop reason: HOSPADM

## 2020-06-16 RX ORDER — POTASSIUM CHLORIDE 20 MEQ/1
40 TABLET, EXTENDED RELEASE ORAL PRN
Status: DISCONTINUED | OUTPATIENT
Start: 2020-06-16 | End: 2020-06-18 | Stop reason: HOSPADM

## 2020-06-16 RX ORDER — MORPHINE SULFATE 4 MG/ML
2 INJECTION, SOLUTION INTRAMUSCULAR; INTRAVENOUS ONCE
Status: COMPLETED | OUTPATIENT
Start: 2020-06-16 | End: 2020-06-16

## 2020-06-16 RX ORDER — HEPARIN SODIUM 5000 [USP'U]/ML
5000 INJECTION, SOLUTION INTRAVENOUS; SUBCUTANEOUS EVERY 8 HOURS SCHEDULED
Status: DISCONTINUED | OUTPATIENT
Start: 2020-06-16 | End: 2020-06-18 | Stop reason: HOSPADM

## 2020-06-16 RX ORDER — HYDRALAZINE HYDROCHLORIDE 20 MG/ML
5 INJECTION INTRAMUSCULAR; INTRAVENOUS EVERY 6 HOURS PRN
Status: DISCONTINUED | OUTPATIENT
Start: 2020-06-16 | End: 2020-06-18 | Stop reason: HOSPADM

## 2020-06-16 RX ORDER — NICOTINE POLACRILEX 4 MG
15 LOZENGE BUCCAL PRN
Status: DISCONTINUED | OUTPATIENT
Start: 2020-06-16 | End: 2020-06-18 | Stop reason: HOSPADM

## 2020-06-16 RX ORDER — BUSPIRONE HYDROCHLORIDE 5 MG/1
5 TABLET ORAL 2 TIMES DAILY
Status: DISCONTINUED | OUTPATIENT
Start: 2020-06-16 | End: 2020-06-18 | Stop reason: HOSPADM

## 2020-06-16 RX ORDER — ACETAMINOPHEN 650 MG/1
650 SUPPOSITORY RECTAL EVERY 6 HOURS PRN
Status: DISCONTINUED | OUTPATIENT
Start: 2020-06-16 | End: 2020-06-18 | Stop reason: HOSPADM

## 2020-06-16 RX ORDER — DEXTROSE MONOHYDRATE 50 MG/ML
100 INJECTION, SOLUTION INTRAVENOUS PRN
Status: DISCONTINUED | OUTPATIENT
Start: 2020-06-16 | End: 2020-06-18 | Stop reason: HOSPADM

## 2020-06-16 RX ORDER — PANTOPRAZOLE SODIUM 40 MG/1
40 TABLET, DELAYED RELEASE ORAL
Status: DISCONTINUED | OUTPATIENT
Start: 2020-06-17 | End: 2020-06-18 | Stop reason: HOSPADM

## 2020-06-16 RX ORDER — DEXTROSE, SODIUM CHLORIDE, SODIUM LACTATE, POTASSIUM CHLORIDE, AND CALCIUM CHLORIDE 5; .6; .31; .03; .02 G/100ML; G/100ML; G/100ML; G/100ML; G/100ML
INJECTION, SOLUTION INTRAVENOUS CONTINUOUS
Status: DISCONTINUED | OUTPATIENT
Start: 2020-06-16 | End: 2020-06-17

## 2020-06-16 RX ORDER — BUTORPHANOL TARTRATE 1 MG/ML
1 INJECTION, SOLUTION INTRAMUSCULAR; INTRAVENOUS EVERY 4 HOURS PRN
Status: DISCONTINUED | OUTPATIENT
Start: 2020-06-16 | End: 2020-06-18 | Stop reason: HOSPADM

## 2020-06-16 RX ORDER — HYDROCODONE BITARTRATE AND ACETAMINOPHEN 7.5; 325 MG/1; MG/1
1 TABLET ORAL EVERY 6 HOURS PRN
Status: DISCONTINUED | OUTPATIENT
Start: 2020-06-16 | End: 2020-06-18 | Stop reason: HOSPADM

## 2020-06-16 RX ORDER — SODIUM CHLORIDE 0.9 % (FLUSH) 0.9 %
10 SYRINGE (ML) INJECTION PRN
Status: DISCONTINUED | OUTPATIENT
Start: 2020-06-16 | End: 2020-06-18 | Stop reason: HOSPADM

## 2020-06-16 RX ADMIN — BUTORPHANOL TARTRATE 1 MG: 1 INJECTION, SOLUTION INTRAMUSCULAR; INTRAVENOUS at 21:30

## 2020-06-16 RX ADMIN — BUSPIRONE HYDROCHLORIDE 5 MG: 5 TABLET ORAL at 21:30

## 2020-06-16 RX ADMIN — MORPHINE SULFATE 2 MG: 4 INJECTION INTRAVENOUS at 18:11

## 2020-06-16 RX ADMIN — WATER 1 G: 1 INJECTION INTRAMUSCULAR; INTRAVENOUS; SUBCUTANEOUS at 18:10

## 2020-06-16 RX ADMIN — PRAMIPEXOLE DIHYDROCHLORIDE 0.75 MG: 0.25 TABLET ORAL at 21:30

## 2020-06-16 RX ADMIN — GABAPENTIN 300 MG: 300 CAPSULE ORAL at 21:29

## 2020-06-16 RX ADMIN — HYDROCODONE BITARTRATE AND ACETAMINOPHEN 1 TABLET: 7.5; 325 TABLET ORAL at 22:52

## 2020-06-16 RX ADMIN — SODIUM CHLORIDE, SODIUM LACTATE, POTASSIUM CHLORIDE, CALCIUM CHLORIDE AND DEXTROSE MONOHYDRATE 150 ML/HR: 5; 600; 310; 30; 20 INJECTION, SOLUTION INTRAVENOUS at 17:22

## 2020-06-16 RX ADMIN — ONDANSETRON 4 MG: 2 INJECTION INTRAMUSCULAR; INTRAVENOUS at 18:11

## 2020-06-16 RX ADMIN — CELECOXIB 300 MG: 100 CAPSULE ORAL at 21:29

## 2020-06-16 RX ADMIN — Medication 1000 MG: at 18:11

## 2020-06-16 ASSESSMENT — PAIN SCALES - GENERAL
PAINLEVEL_OUTOF10: 6
PAINLEVEL_OUTOF10: 6
PAINLEVEL_OUTOF10: 7
PAINLEVEL_OUTOF10: 4
PAINLEVEL_OUTOF10: 6
PAINLEVEL_OUTOF10: 6

## 2020-06-16 ASSESSMENT — ENCOUNTER SYMPTOMS
VOICE CHANGE: 0
VOMITING: 0
EYE PAIN: 0
DIARRHEA: 0
COUGH: 0
SHORTNESS OF BREATH: 0
RHINORRHEA: 0
EYE REDNESS: 0
ABDOMINAL PAIN: 0

## 2020-06-16 NOTE — ED PROVIDER NOTES
the HPI.        PAST MEDICAL HISTORY     Past Medical History:   Diagnosis Date    Anxiety     Constipation     Depression     DM [facility]diabetes mellitus) ([facility] [address]     Headache(784.0)     Hyperlipidemia     Hypertension     Kidney stones     Kidney stones     Restless leg     Restless legs syndrome          SURGICAL HISTORY       Past Surgical History:   Procedure Laterality Date    ECTOPIC PREGNANCY SURGERY      EYE SURGERY      cornea transplant and cataracts removed    KNEE CARTILAGE SURGERY Left 12/20/2016    KNEE ARTHROSCOPIC PARTIAL MEDIAL MENISCECTOMY performed by Antonio Nicholas MD at [address]       Current Discharge Medication List      CONTINUE these medications which have NOT CHANGED    Details   ZOMIG 5 MG nasal solution       diclofenac sodium (VOLTAREN) 1 % GEL Apply 2 g topically 2 times daily      cyclobenzaprine (FLEXERIL) 10 MG tablet Take 10 mg by mouth nightly as needed for Muscle spasms      Butorphanol Tartrate (STADOL NS NA) by Nasal route      busPIRone (BUSPAR) 5 MG tablet Take 5 mg by mouth 2 times daily      cefdinir (OMNICEF) 300 MG capsule Take 1 capsule by mouth 2 times daily for 10 days  Qty: 20 capsule, Refills: 0      pramipexole (MIRAPEX) 0.75 MG tablet TAKE 1 TABLET BY MOUTH TWICE DAILY  Qty: 60 tablet, Refills: 5    Associated Diagnoses: RLS (restless legs syndrome)      !! celecoxib (CELEBREX) 100 MG capsule Take 1 capsule by mouth daily  Qty: 60 capsule, Refills: 3      ondansetron (ZOFRAN-ODT) 4 MG disintegrating tablet DISSOLVE 1 TABLET ON THE TONGUE EVERY 8 HOURS AS NEEDED FOR NAUSEA OR VOMITING  Qty: 20 tablet, Refills: 0      insulin lispro, 1 Unit Dial, (ADMELOG SOLOSTAR) 100 UNIT/ML SOPN INJECT 45 UNITS INTO THE SKIN EVERY EVENING AND INCREASE BY 3 UNITS EACH NIGHT UNTIL 60 UNITS IS REACHED AND CONTINUE 60 UNITS EVERY EVENING  Qty: 18 mL, Refills: 1    Associated Diagnoses: Uncontrolled type 2 diabetes mellitus with discuss with provider. ALLERGIES     Compazine [prochlorperazine maleate]; Ciprofloxacin; Amoxicillin-pot clavulanate; Demerol; Hydroxyzine; Hydroxyzine hcl; Ibuprofen; Ketorolac tromethamine; Meperidine; Nortriptyline; Orphenadrine; Orphenadrine citrate; Penicillins; Prochlorperazine edisylate; Tobramycin; Tramadol; Vistaril [hydroxyzine hcl]; Cephalexin; Clindamycin/lincomycin; Codeine; Doxycycline; Keflex [cephalexin];  Ketorolac tromethamine; and Moxifloxacin    FAMILY HISTORY       Family History   Problem Relation Age of Onset    Cancer Father     Cancer Maternal Grandmother     COPD Maternal Grandmother     Cancer Maternal Grandfather           SOCIAL HISTORY       Social History     Socioeconomic History    Marital status: Single     Spouse name: None    Number of children: None    Years of education: None    Highest education level: None   Occupational History    None   Social Needs    Financial resource strain: None    Food insecurity     Worry: None     Inability: None    Transportation needs     Medical: None     Non-medical: None   Tobacco Use    Smoking status: Never Smoker    Smokeless tobacco: Never Used   Substance and Sexual Activity    Alcohol use: No    Drug use: No    Sexual activity: Yes     Partners: Male   Lifestyle    Physical activity     Days per week: None     Minutes per session: None    Stress: None   Relationships    Social connections     Talks on phone: None     Gets together: None     Attends Baptism service: None     Active member of club or organization: None     Attends meetings of clubs or organizations: None     Relationship status: None    Intimate partner violence     Fear of current or ex partner: None     Emotionally abused: None     Physically abused: None     Forced sexual activity: None   Other Topics Concern    None   Social History Narrative    None       SCREENINGS    [place] Coma Scale  Eye Opening: Spontaneous  Best Verbal Response: signs or Co-signs this chart in the absence of a cardiologist.      RADIOLOGY:   Non-plain film images such as CT, Ultrasound and MRI are read by the radiologist. Plainradiographic images are visualized and preliminarily interpreted by the emergency physician with the below findings:    Interpretation per the Radiologist below, if available at the time of this note:    No orders to display         ED BEDSIDE ULTRASOUND:   Performed by ED Physician - none    LABS:  Labs Reviewed   CBC WITH AUTO DIFFERENTIAL - Abnormal; Notable for the following components:       Result Value    MCHC 32.5 (*)     All other components within normal limits   BASIC METABOLIC PANEL W/ REFLEX TO MG FOR LOW K - Abnormal; Notable for the following components:    CO2 30 (*)     Glucose 43 (*)     BUN 21 (*)     CREATININE 1.1 (*)     GFR Non- 53 (*)     All other components within normal limits    Narrative:     [address] ,  Chemistry results called to and read back by Rosalee Choi RN in ED, 06/16/2020  17:03, by [facility]   URINE RT REFLEX TO CULTURE   BASIC METABOLIC PANEL W/ REFLEX TO MG FOR LOW K   CBC WITH AUTO DIFFERENTIAL   POCT GLUCOSE   POCT GLUCOSE   POCT GLUCOSE       All other labs were within normal range or not returned as of this dictation.     Medications   dextrose 5 % in lactated ringers infusion (150 mL/hr Intravenous New Bag 6/16/20 1722)   sodium chloride flush 0.9 % injection 10 mL (10 mLs Intravenous Not Given 6/16/20 2131)   sodium chloride flush 0.9 % injection 10 mL (has no administration in time range)   potassium chloride (KLOR-CON M) extended release tablet 40 mEq (has no administration in time range)     Or   potassium bicarb-citric acid (EFFER-K) effervescent tablet 40 mEq (has no administration in time range)     Or   potassium chloride 10 mEq/100 mL IVPB (Peripheral Line) (has no administration in time range)   acetaminophen (TYLENOL) tablet 650 mg (has no administration in time range)     Or

## 2020-06-16 NOTE — ED TRIAGE NOTES
Pt to ED with c/o bilat lower extremity edema Pt seen for symptoms recently and admitted for ABX therapy.  But pt left AMA prior to completion

## 2020-06-16 NOTE — H&P
capsule TAKE 1 CAPSULE BY MOUTH EVERY DAY  Patient taking differently: 200 mg daily TAKE 1 CAPSULE BY MOUTH EVERY DAY 9/3/19  Yes Lucian Bright MD   insulin glargine [facility] KWIKPEN) 100 UNIT/ML injection pen Inject 30 U Nightly 8/21/19  Yes Jud Niño MD   VICTOZA 18 MG/3ML SOPN SC injection ADMINISTER 1.8 MG INTO THE SKIN EVERY DAY 7/24/19  Yes Lucian Bright MD   gabapentin (NEURONTIN) 100 MG capsule Take 100 mg by mouth 2 times daily. 7/14/19  Yes Historical Provider, MD   omeprazole (PRILOSEC) 20 MG delayed release capsule Take 1 capsule by mouth 2 times daily (before meals) 7/18/19 6/16/20 Yes NANETTE Gardner   gabapentin (NEURONTIN) 300 MG capsule TK ONE C PO QHS 5/19/19  Yes Historical Provider, MD   HYDROcodone-acetaminophen (NORCO) 7.5-325 MG per tablet Take 1 tablet by mouth every 6 hours as needed for Pain. .   Yes Historical Provider, MD   EPINEPHrine (EPIPEN 2-JOSÉ) 0.3 MG/0.3ML SOAJ injection Inject 0.3 mLs into the muscle once for 1 dose Use as directed for allergic reaction 11/1/18 6/16/20 Yes Lucian Bright MD   blood glucose test strips (ASCENSIA AUTODISC VI;ONE TOUCH ULTRA TEST VI) strip Check glucose TID and as needed for hypoglycemic events Dx E11.40 E11.66 Z79.4  Use one touch verio flex 4/3/20   Lucian Bright MD   B-D ULTRAFINE III SHORT PEN 31G X 8 MM MISC USE TO INJECT INSULIN ONCE DAILY 1/31/20   Lucian Bright MD   Diabetic Shoe MISC by Does not apply route DISPENSE ONE PAIR OF DIABETIC SHOE AND 3 PAIRS HEAT MOLDED INSERTS 11/20/19   Lucian Bright MD   Lancets ([address]) [address] USE TO [facility] AS DIRECTED 11/3/19   Lucian Bright MD   blood glucose monitor kit and supplies Test 1 times a day & as needed for symptoms of irregular blood glucose.  9/3/19   Lucian Bright MD   ONE TOUCH ULTRA TEST strip TEST BLOOD SUGAR ONCE DAILY 8/4/19 6/12/20  Lucian Bright MD   Blood Glucose Monitoring Suppl ([address]) w/Device KIT 1 kit by Does not apply route daily as needed (Dx 250.00) 8/4/17   Lucian Bright MD   ONE TOUCH LANCETS MISC 1 each by Does not apply route daily 8/4/17   Lucian Bright MD   Cream Base CREA Apply 1-2 pumps to affected area 3-4 times daily 7/12/17   NANETTE Sanford     Allergies:    Compazine [prochlorperazine maleate]; Ciprofloxacin; Amoxicillin-pot clavulanate; Demerol; Hydroxyzine; Hydroxyzine hcl; Ibuprofen; Ketorolac tromethamine; Meperidine; Nortriptyline; Orphenadrine; Orphenadrine citrate; Penicillins; Prochlorperazine edisylate; Tobramycin; Tramadol; Vistaril [hydroxyzine hcl]; Cephalexin; Clindamycin/lincomycin; Codeine; Doxycycline; Keflex [cephalexin]; Ketorolac tromethamine; and Moxifloxacin    Social History:    The patient currently lives at home. Tobacco:   reports that she has never smoked. She has never used smokeless tobacco.  Alcohol:   reports no history of alcohol use.   Illicit Drugs: denies    Family History:      Problem Relation Age of Onset    Cancer Father     Cancer Maternal Grandmother     COPD Maternal Grandmother     Cancer Maternal Grandfather      Review of Systems:   Constitutional / general:  Denies fever / +chills / sweats +myalgias   Head:  Denies headache / neck stiffness / trauma / visual change  Eyes:  Denies blurry vision / acute visual change or loss / itching / redness  ENT: Denies sore throat / hoarseness / nasal drainage / ear pain  CV:  Denies chest pain / palpitations/ orthopnea   Respiratory:  Denies cough / shortness of breath / sputum / hemoptysis  GI: + nausea /+ vomiting / Denies abdominal pain / diarrhea / constipation  :  Denies dysuria / hesitancy / urgency / hematuria   Neuro: Denies paralysis / syncope / seizure / dysphagia / headache / paresthesias  Musculoskeletal:  Denies muscle weakness /+ joint stiffness / + pain  Vascular: Denies edema / claudication / varicosities  Heme / endocrine: Denies easy bruising / bleeding / excessive sweating / heat or cold intolerance  Psychiatric:  Denies depression / anxiety / insomnia / mood changes  Skin:  Denies new rashes / +cellulitic changes to BLE's    14 point review of systems is negative except as specifically addressed above. Physical Examination:  BP (!) 169/88   Pulse 102   Temp 98 °F (36.7 °C)   Resp (!) 93   Ht 5' 1\" (1.549 m)   Wt (!) 305 lb (138.3 kg)   LMP 05/27/2020   SpO2 93%   BMI 57.63 kg/m²   General appearance: alert, appears stated age, cooperative and no distress  Head: Normocephalic, without obvious abnormality, atraumatic  Eyes: conjunctivae/corneas clear. PERRL, EOM's intact. Ears: normal external ears and nose, throat without exudate  Neck: no adenopathy, no carotid bruit, no JVD, supple, symmetrical, trachea midline   Lungs: clear to auscultation bilaterally,no rales or wheezes   Heart: regular rate and rhythm, S1, S2 normal, no murmur  Abdomen:soft, obese, non-tender; non-distended, normal bowel sounds no masses, no organomegaly  Extremities:2+ bilateral lower extremity edema,  Circumferential erythema BLE's extending to knee with warmth, small scattered blisters with sanguinous drainage, mild BLE tenderness to palpation. IV RUE  Skin: Skin color, texture, turgor normal. No rashes. Cellulitic changes as above  Lymphatic: No palpable lymph node enlargment  Neurologic: Alert and oriented X 3, normal strength and tone. No focal deficits. Fluent speech. Follows commands. Answers questions appropriately  Psychiatric: Alert and oriented, thought content appropriate, normal insight, mood appropriate    Diagnostic Data:  CBC:  Recent Labs     06/16/20  1633   WBC 8.2   HGB 13.3   HCT 40.9        BMP:  Recent Labs     06/16/20  1633      K 3.6      CO2 30*   BUN 21*   CREATININE 1.1*   CALCIUM 9.7     Assessment/Plan:  Principal Problem:    Bilateral lower leg cellulitis-continue pharmacy to dose Vancomycin. Keep BLE's elevated.  If no improvement in the next 24 hours consider ID

## 2020-06-16 NOTE — TELEPHONE ENCOUNTER
Patient called. She stated she wanted to come into the office today to be seen. She has been recently hospitalized with lymph edema and cellulitis. At this time she described what sounded like wet edema, water coming out of the blisters on her leg. She was very rude and demanding. I tried to explain to her that an office visit would not be possible today. She continuously told me to transfer her to the  . I explained that she is not in today. She would not let me speak and rambled on about coming into the office. Niru Valdez finally came to the phone and spoke with her and advised her to return to the ER.    Amrita GAVIRIA

## 2020-06-17 LAB
ANION GAP SERPL CALCULATED.3IONS-SCNC: 12 MMOL/L (ref 7–19)
BASOPHILS ABSOLUTE: 0 K/UL (ref 0–0.2)
BASOPHILS RELATIVE PERCENT: 0.3 % (ref 0–1)
BLOOD CULTURE, ROUTINE: NORMAL
BUN BLDV-MCNC: 21 MG/DL (ref 6–20)
CALCIUM SERPL-MCNC: 9.2 MG/DL (ref 8.6–10)
CHLORIDE BLD-SCNC: 97 MMOL/L (ref 98–111)
CO2: 30 MMOL/L (ref 22–29)
CREAT SERPL-MCNC: 1.1 MG/DL (ref 0.5–0.9)
CULTURE, BLOOD 2: NORMAL
EOSINOPHILS ABSOLUTE: 0.3 K/UL (ref 0–0.6)
EOSINOPHILS RELATIVE PERCENT: 4.4 % (ref 0–5)
GFR NON-AFRICAN AMERICAN: 53
GLUCOSE BLD-MCNC: 177 MG/DL (ref 70–99)
GLUCOSE BLD-MCNC: 192 MG/DL (ref 70–99)
GLUCOSE BLD-MCNC: 203 MG/DL (ref 70–99)
GLUCOSE BLD-MCNC: 208 MG/DL (ref 70–99)
GLUCOSE BLD-MCNC: 382 MG/DL (ref 74–109)
GLUCOSE BLD-MCNC: 390 MG/DL (ref 70–99)
HCT VFR BLD CALC: 39.8 % (ref 37–47)
HEMOGLOBIN: 12.5 G/DL (ref 12–16)
IMMATURE GRANULOCYTES #: 0 K/UL
LYMPHOCYTES ABSOLUTE: 2.1 K/UL (ref 1.1–4.5)
LYMPHOCYTES RELATIVE PERCENT: 32.1 % (ref 20–40)
MCH RBC QN AUTO: 28.1 PG (ref 27–31)
MCHC RBC AUTO-ENTMCNC: 31.4 G/DL (ref 33–37)
MCV RBC AUTO: 89.4 FL (ref 81–99)
MONOCYTES ABSOLUTE: 0.4 K/UL (ref 0–0.9)
MONOCYTES RELATIVE PERCENT: 6.9 % (ref 0–10)
NEUTROPHILS ABSOLUTE: 3.6 K/UL (ref 1.5–7.5)
NEUTROPHILS RELATIVE PERCENT: 56 % (ref 50–65)
PDW BLD-RTO: 13.2 % (ref 11.5–14.5)
PERFORMED ON: ABNORMAL
PLATELET # BLD: 216 K/UL (ref 130–400)
PMV BLD AUTO: 9.8 FL (ref 9.4–12.3)
POTASSIUM REFLEX MAGNESIUM: 4.7 MMOL/L (ref 3.5–5)
RBC # BLD: 4.45 M/UL (ref 4.2–5.4)
SODIUM BLD-SCNC: 139 MMOL/L (ref 136–145)
WBC # BLD: 6.4 K/UL (ref 4.8–10.8)

## 2020-06-17 PROCEDURE — 6370000000 HC RX 637 (ALT 250 FOR IP): Performed by: PHYSICIAN ASSISTANT

## 2020-06-17 PROCEDURE — 36415 COLL VENOUS BLD VENIPUNCTURE: CPT

## 2020-06-17 PROCEDURE — 80048 BASIC METABOLIC PNL TOTAL CA: CPT

## 2020-06-17 PROCEDURE — 82947 ASSAY GLUCOSE BLOOD QUANT: CPT

## 2020-06-17 PROCEDURE — 2580000003 HC RX 258: Performed by: INTERNAL MEDICINE

## 2020-06-17 PROCEDURE — 85025 COMPLETE CBC W/AUTO DIFF WBC: CPT

## 2020-06-17 PROCEDURE — 6360000002 HC RX W HCPCS: Performed by: INTERNAL MEDICINE

## 2020-06-17 PROCEDURE — 6360000002 HC RX W HCPCS: Performed by: PHYSICIAN ASSISTANT

## 2020-06-17 PROCEDURE — 1210000000 HC MED SURG R&B

## 2020-06-17 RX ORDER — ONDANSETRON 2 MG/ML
4 INJECTION INTRAMUSCULAR; INTRAVENOUS EVERY 6 HOURS PRN
Status: DISCONTINUED | OUTPATIENT
Start: 2020-06-17 | End: 2020-06-18 | Stop reason: HOSPADM

## 2020-06-17 RX ORDER — INSULIN GLARGINE 100 [IU]/ML
10 INJECTION, SOLUTION SUBCUTANEOUS NIGHTLY
Status: DISCONTINUED | OUTPATIENT
Start: 2020-06-17 | End: 2020-06-18

## 2020-06-17 RX ADMIN — HYDROCODONE BITARTRATE AND ACETAMINOPHEN 1 TABLET: 7.5; 325 TABLET ORAL at 15:15

## 2020-06-17 RX ADMIN — VANCOMYCIN HYDROCHLORIDE 1250 MG: 10 INJECTION, POWDER, LYOPHILIZED, FOR SOLUTION INTRAVENOUS at 17:57

## 2020-06-17 RX ADMIN — PRAMIPEXOLE DIHYDROCHLORIDE 0.75 MG: 0.25 TABLET ORAL at 08:34

## 2020-06-17 RX ADMIN — BUTORPHANOL TARTRATE 1 MG: 1 INJECTION, SOLUTION INTRAMUSCULAR; INTRAVENOUS at 20:41

## 2020-06-17 RX ADMIN — GABAPENTIN 100 MG: 100 CAPSULE ORAL at 08:34

## 2020-06-17 RX ADMIN — VANCOMYCIN HYDROCHLORIDE 1250 MG: 10 INJECTION, POWDER, LYOPHILIZED, FOR SOLUTION INTRAVENOUS at 01:56

## 2020-06-17 RX ADMIN — INSULIN GLARGINE 10 UNITS: 100 INJECTION, SOLUTION SUBCUTANEOUS at 20:34

## 2020-06-17 RX ADMIN — BUTORPHANOL TARTRATE 1 MG: 1 INJECTION, SOLUTION INTRAMUSCULAR; INTRAVENOUS at 01:56

## 2020-06-17 RX ADMIN — HYDROCODONE BITARTRATE AND ACETAMINOPHEN 1 TABLET: 7.5; 325 TABLET ORAL at 23:12

## 2020-06-17 RX ADMIN — SODIUM CHLORIDE, PRESERVATIVE FREE 10 ML: 5 INJECTION INTRAVENOUS at 20:05

## 2020-06-17 RX ADMIN — GABAPENTIN 300 MG: 300 CAPSULE ORAL at 20:06

## 2020-06-17 RX ADMIN — GABAPENTIN 100 MG: 100 CAPSULE ORAL at 17:57

## 2020-06-17 RX ADMIN — INSULIN LISPRO 4 UNITS: 100 INJECTION, SOLUTION INTRAVENOUS; SUBCUTANEOUS at 17:58

## 2020-06-17 RX ADMIN — BUTORPHANOL TARTRATE 1 MG: 1 INJECTION, SOLUTION INTRAMUSCULAR; INTRAVENOUS at 09:50

## 2020-06-17 RX ADMIN — PANTOPRAZOLE SODIUM 40 MG: 40 TABLET, DELAYED RELEASE ORAL at 09:50

## 2020-06-17 RX ADMIN — BUSPIRONE HYDROCHLORIDE 5 MG: 5 TABLET ORAL at 08:34

## 2020-06-17 RX ADMIN — PRAMIPEXOLE DIHYDROCHLORIDE 0.75 MG: 0.25 TABLET ORAL at 20:05

## 2020-06-17 RX ADMIN — INSULIN LISPRO 2 UNITS: 100 INJECTION, SOLUTION INTRAVENOUS; SUBCUTANEOUS at 20:34

## 2020-06-17 RX ADMIN — BUSPIRONE HYDROCHLORIDE 5 MG: 5 TABLET ORAL at 20:05

## 2020-06-17 RX ADMIN — TIZANIDINE 4 MG: 4 TABLET ORAL at 18:52

## 2020-06-17 RX ADMIN — INSULIN LISPRO 2 UNITS: 100 INJECTION, SOLUTION INTRAVENOUS; SUBCUTANEOUS at 12:18

## 2020-06-17 RX ADMIN — HYDROCODONE BITARTRATE AND ACETAMINOPHEN 1 TABLET: 7.5; 325 TABLET ORAL at 08:33

## 2020-06-17 RX ADMIN — CELECOXIB 300 MG: 100 CAPSULE ORAL at 08:33

## 2020-06-17 RX ADMIN — TIZANIDINE 4 MG: 4 TABLET ORAL at 00:55

## 2020-06-17 RX ADMIN — VANCOMYCIN HYDROCHLORIDE 1250 MG: 10 INJECTION, POWDER, LYOPHILIZED, FOR SOLUTION INTRAVENOUS at 09:50

## 2020-06-17 ASSESSMENT — PAIN SCALES - GENERAL
PAINLEVEL_OUTOF10: 6
PAINLEVEL_OUTOF10: 1
PAINLEVEL_OUTOF10: 6
PAINLEVEL_OUTOF10: 1
PAINLEVEL_OUTOF10: 9
PAINLEVEL_OUTOF10: 5
PAINLEVEL_OUTOF10: 6
PAINLEVEL_OUTOF10: 4
PAINLEVEL_OUTOF10: 2
PAINLEVEL_OUTOF10: 5

## 2020-06-17 ASSESSMENT — PAIN DESCRIPTION - ORIENTATION: ORIENTATION: RIGHT;LEFT

## 2020-06-17 ASSESSMENT — PAIN DESCRIPTION - LOCATION
LOCATION: HEAD
LOCATION: LEG

## 2020-06-17 ASSESSMENT — PAIN DESCRIPTION - DESCRIPTORS
DESCRIPTORS: HEADACHE
DESCRIPTORS: BURNING;SORE

## 2020-06-17 NOTE — PROGRESS NOTES
Currently she is afebrile with normal white count. Complains of tension headache and was also hypoglycemic in ED. She was admitted to Hospitalist service, placed on IV Vancomycin.    06/17/2020 no acute overnight events. ID consulted     Review of Systems:   14 point review of systems is negative except as specifically addressed above. Objective:   VITALS:  /74   Pulse 74   Temp 97.4 °F (36.3 °C) (Temporal)   Resp 20   Ht 5' 1\" (1.549 m)   Wt (!) 312 lb (141.5 kg)   LMP 05/27/2020   SpO2 98%   BMI 58.95 kg/m²   24HR INTAKE/OUTPUT:  No intake or output data in the 24 hours ending 06/17/20 0907    General appearance: alert, appears stated age, cooperative and no distress, resting comfortably in bed   Head: Normocephalic, without obvious abnormality, atraumatic  Eyes: conjunctivae/corneas clear. PERRL, EOM's intact. Ears: normal external ears and nose, throat without exudate  Neck: no adenopathy, no carotid bruit, no JVD, supple, symmetrical, trachea midline   Lungs: clear throughout, no wheezes, rales or rhonchi   Heart: RRR, S1, S2 normal, no murmur  Abdomen:soft, obese, non-tender; non-distended, normal bowel sounds no masses, no organomegaly  Extremities:1-2+ bilateral lower extremity edema, erythema much improved overnight, mild tenderness to palpation BLE  Skin: Skin color, texture, turgor normal. No rashes  Lymphatic: BLE lymphedema  Neurologic: Alert and oriented X 3, normal strength and tone.  No focal deficits  Psychiatric: Alert and oriented, thought content appropriate, normal insight, mood appropriate    Medications:      dextrose        sodium chloride flush  10 mL Intravenous 2 times per day    heparin (porcine)  5,000 Units Subcutaneous 3 times per day    busPIRone  5 mg Oral BID    celecoxib  300 mg Oral Daily    gabapentin  100 mg Oral BID WC    gabapentin  300 mg Oral Nightly    pantoprazole  40 mg Oral QAM AC    pramipexole  0.75 mg Oral BID    insulin lispro  0-12 Units as OP by Orthopedic surgery      Uncontrolled type 2 diabetes mellitus with diabetic neuropathy, with long-term current use of insulin (HCC)-SSI, resume long acting as hypoglycemia has resolved.  Continue accuchecks, prn hypoglycemia tx orders      Gastroesophageal reflux disease-PPI      Hypoglycemia-resolved      Chronic headaches-prn Stadol    Further orders per clinical course/attending     Antibiotic: Vancomycin    DVT Prophylaxis: Heparin     GI prophylaxis: Protonix     Margarita Kamara PA-C

## 2020-06-17 NOTE — CONSULTS
CONSULTATION NOTE :ID       Patient - Cass Morales,  Age - 54 y.o.    - 1970      Room Number - 3831/164-47   MRN -  [id number]   Acct # - [de-identified]  Date of Admission -  2020  3:43 PM  Patient's PCP: Caryle Legions, APRN     Requesting Physician: Razia Fuentes MD    REASON FOR CONSULTATION   Lateral lower extremity cellulitis    HISTORY OF PRESENT ILLNESS       This is a very pleasant 54 y.o. female who was admitted to the hospital with a chief complaints of increased erythema bilateral lower extremities, blisters with water and lower extremities. Patient reports she was seen in the emergency department recently with bilateral lower extremity erythema. She states she was given a dose of cefazolin and then discharged on cefdinir. She originally had some diarrhea that she thought she could tolerate and was likely just secondary to the antibiotic. She then developed some nausea and vomiting. She presented back to the emergency department and was subsequently admitted. She reports she had problems with an antecubital IV and never actually received any of the vancomycin dose. She was frustrated by this and ended up signing out  [address] She went back home and try to take the cefdinir again but did not improve any and came back to the hospital.    Since admission she has received vancomycin, she has had the foot of the bed elevated some, she is allowed some wrapping of her lower extremities. She reports that the intensive the erythema is significantly improved. She did report having some chills at night before.     PAST MEDICAL AND SURGICAL HISTORY       Past Medical History:   Diagnosis Date    Anxiety     Constipation     Depression     DM (diabetes mellitus) ([address]     Headache(784.0)     Hyperlipidemia     Hypertension     Kidney stones     Kidney stones     Restless leg     Restless legs syndrome        Past Surgical History: Problem Relation Age of Onset    Cancer Father     Cancer Maternal Grandmother     COPD Maternal Grandmother     Cancer Maternal Grandfather        REVIEW OF SYSTEMS:     See HPI    She does have some chronic pain and joint stiffness. She sees pain management. PHYSICAL EXAM:     /75   Pulse 81   Temp 96.6 °F (35.9 °C) (Temporal)   Resp 14   Ht 5' 1\" (1.549 m)   Wt (!) 312 lb (141.5 kg)   LMP 2020   SpO2 99%   BMI 58.95 kg/m²  Temp (24hrs), Av.9 °F (36.1 °C), Min:96.4 °F (35.8 °C), Max:97.4 °F (36.3 °C)    General: Romeo obese female sitting in bed in no acute distress  HEENT: pink conjunctiva, anicteric sclera, moist oral mucosa. Neck: Supple  Respiratory: Effort even and unlabored  Extremities: Bilateral very symmetric edema greatest below the knee. Pale erythema involving the pretibial region. The patient does have 1 area of more intense erythema that is rounded about the size of a eraser tip and does not have any vesicular component to it. 3 similar areas on the left leg. Skin:  Warm and dry. See extremities above. CNS: Moves all extremities, speech clear, follows commands  PSYCH: alert, pleasant and cooperative. I      LABS:     CBC with DIFF:   Recent Labs     20  1633 20  0324   WBC 8.2 6.4   RBC 4.74 4.45   HGB 13.3 12.5   HCT 40.9 39.8   MCV 86.3 89.4   MCH 28.1 28.1   MCHC 32.5* 31.4*   RDW 13.0 13.2    216   MPV 9.4 9.8   NEUTOPHILPCT 62.5 56.0   LYMPHOPCT 28.1 32.1   MONOPCT 6.1 6.9   EOSRELPCT 2.7 4.4   BASOPCT 0.4 0.3   NEUTROABS 5.1 3.6   LYMPHSABS 2.3 2.1   MONOSABS 0.50 0.40   EOSABS 0.20 0.30   BASOSABS 0.00 0.00       CMP/BMP:  Recent Labs     20  1633 20  0324    139   K 3.6 4.7    97*   CO2 30* 30*   ANIONGAP 10 12   GLUCOSE 43* 382*   BUN 21* 21*   CREATININE 1.1* 1.1*   LABGLOM 53* 53*   CALCIUM 9.7 9.2          Culture: No results for input(s): BC, BLOODCULT2, ORG in the last 72 hours.           IMAGING: Result Impression   Impression:  1. No significant arterial insufficiency of the right lower extremity at  rest.  2. No significant arterial insufficiency of the left lower extremity at  rest.      This report was finalized on 05/22/2020 12:45 by Dr. Chalo Rodas MD.     Result Impression   1. No deep vein thrombosis identified within the bilateral lower  extremities. Bilateral lower extremity soft tissue edema. .    This report was finalized on 05/05/2020 13:20 by Dr. Chalo Rodas MD.   Result Narrative             ASSESSMENT AND PLAN     Patient Active Problem List   Diagnosis    RLS (restless legs syndrome)    Hypertension    Venous insufficiency of both lower extremities    Mild single current episode of major depressive disorder ([facility] [address]    FEDERICA (generalized anxiety disorder)    Old complex tear of medial meniscus of left knee    Migraine without aura and without status migrainosus, not intractable    Morbid obesity with BMI of 45.0-49.9, adult ([facility] [address]    Uncontrolled type 2 diabetes mellitus with diabetic neuropathy, with long-term current use of insulin (HCC)    Dyspnea    Upper respiratory tract infection    Pleurisy    Gastroesophageal reflux disease    Hypoglycemia    Hypokalemia    Recurrent cellulitis of lower extremity    Bilateral lower leg cellulitis   Unclear if she actually has a cellulitis given the fact that it is bilateral, she clearly has uncontrolled edema white count and no fever. She did however report chills at home. Additionally, she reports that the intensity of the erythema is significantly improved since hospitalization    · ELEVATE LEs  ABOVE THE LEVEL OF THE HEART - WHILE IN BED OR UP IN RECLINER                Explained to the patient that I do not think if she will ever improve if she is not able to consistently keep her legs above the level of her heart.   Explained to her how to place books, bricks, any firm item underneath the top mattress of her bed so that

## 2020-06-17 NOTE — PROGRESS NOTES
4 Eyes Skin Assessment    Tianna Trivedi is being assessed upon: Admission    I agree that I, Cassy Ortega, along with Lauren Burgos (either 2 RN's or 1 LPN and 1 RN) have performed a thorough Head to Toe Skin Assessment on the patient. ALL assessment sites listed below have been assessed. Areas assessed by both nurses:     [x]   Head, Face, and Ears   [x]   Shoulders, Back, and Chest  [x]   Arms, Elbows, and Hands   [x]   Coccyx, Sacrum, and Ischium  [x]   Legs, Feet, and Heels    Does the Patient have Skin Breakdown?  No    Prieto Prevention initiated: No  Wound Care Orders initiated: No    WO nurse consulted for Pressure Injury (Stage 3,4, Unstageable, DTI, NWPT, and Complex wounds) and New or Established Ostomies: No        Primary Nurse eSignature: Cassy Ortega RN on 6/16/2020 at 10:24 PM      Co-Signer eSignature: Electronically signed by Star Sung RN on 6/17/20 at 1:19 AM CDT

## 2020-06-18 VITALS
HEIGHT: 61 IN | DIASTOLIC BLOOD PRESSURE: 83 MMHG | WEIGHT: 293 LBS | RESPIRATION RATE: 22 BRPM | SYSTOLIC BLOOD PRESSURE: 136 MMHG | BODY MASS INDEX: 55.32 KG/M2 | TEMPERATURE: 98.1 F | HEART RATE: 83 BPM | OXYGEN SATURATION: 97 %

## 2020-06-18 LAB
ANION GAP SERPL CALCULATED.3IONS-SCNC: 10 MMOL/L (ref 7–19)
BASOPHILS ABSOLUTE: 0 K/UL (ref 0–0.2)
BASOPHILS RELATIVE PERCENT: 0.5 % (ref 0–1)
BUN BLDV-MCNC: 20 MG/DL (ref 6–20)
CALCIUM SERPL-MCNC: 8.9 MG/DL (ref 8.6–10)
CHLORIDE BLD-SCNC: 102 MMOL/L (ref 98–111)
CO2: 30 MMOL/L (ref 22–29)
CREAT SERPL-MCNC: 0.9 MG/DL (ref 0.5–0.9)
EOSINOPHILS ABSOLUTE: 0.2 K/UL (ref 0–0.6)
EOSINOPHILS RELATIVE PERCENT: 3.7 % (ref 0–5)
GFR NON-AFRICAN AMERICAN: >60
GLUCOSE BLD-MCNC: 113 MG/DL (ref 70–99)
GLUCOSE BLD-MCNC: 127 MG/DL (ref 70–99)
GLUCOSE BLD-MCNC: 188 MG/DL (ref 70–99)
GLUCOSE BLD-MCNC: 195 MG/DL (ref 70–99)
GLUCOSE BLD-MCNC: 48 MG/DL (ref 74–109)
HCT VFR BLD CALC: 35.9 % (ref 37–47)
HEMOGLOBIN: 11.8 G/DL (ref 12–16)
IMMATURE GRANULOCYTES #: 0 K/UL
LYMPHOCYTES ABSOLUTE: 1.8 K/UL (ref 1.1–4.5)
LYMPHOCYTES RELATIVE PERCENT: 33.5 % (ref 20–40)
MCH RBC QN AUTO: 28.8 PG (ref 27–31)
MCHC RBC AUTO-ENTMCNC: 32.9 G/DL (ref 33–37)
MCV RBC AUTO: 87.6 FL (ref 81–99)
MONOCYTES ABSOLUTE: 0.4 K/UL (ref 0–0.9)
MONOCYTES RELATIVE PERCENT: 8 % (ref 0–10)
NEUTROPHILS ABSOLUTE: 3 K/UL (ref 1.5–7.5)
NEUTROPHILS RELATIVE PERCENT: 54.1 % (ref 50–65)
PDW BLD-RTO: 13.1 % (ref 11.5–14.5)
PERFORMED ON: ABNORMAL
PLATELET # BLD: 190 K/UL (ref 130–400)
PMV BLD AUTO: 9.6 FL (ref 9.4–12.3)
POTASSIUM REFLEX MAGNESIUM: 3.8 MMOL/L (ref 3.5–5)
RBC # BLD: 4.1 M/UL (ref 4.2–5.4)
SODIUM BLD-SCNC: 142 MMOL/L (ref 136–145)
VANCOMYCIN TROUGH: 25.5 UG/ML (ref 10–20)
WBC # BLD: 5.5 K/UL (ref 4.8–10.8)

## 2020-06-18 PROCEDURE — 6370000000 HC RX 637 (ALT 250 FOR IP): Performed by: PHYSICIAN ASSISTANT

## 2020-06-18 PROCEDURE — 82947 ASSAY GLUCOSE BLOOD QUANT: CPT

## 2020-06-18 PROCEDURE — 6360000002 HC RX W HCPCS: Performed by: PHYSICIAN ASSISTANT

## 2020-06-18 PROCEDURE — 2580000003 HC RX 258: Performed by: INTERNAL MEDICINE

## 2020-06-18 PROCEDURE — 80048 BASIC METABOLIC PNL TOTAL CA: CPT

## 2020-06-18 PROCEDURE — 80202 ASSAY OF VANCOMYCIN: CPT

## 2020-06-18 PROCEDURE — 6360000002 HC RX W HCPCS: Performed by: INTERNAL MEDICINE

## 2020-06-18 PROCEDURE — 85025 COMPLETE CBC W/AUTO DIFF WBC: CPT

## 2020-06-18 PROCEDURE — 36415 COLL VENOUS BLD VENIPUNCTURE: CPT

## 2020-06-18 RX ORDER — ONDANSETRON 4 MG/1
4 TABLET, ORALLY DISINTEGRATING ORAL ONCE
Status: DISCONTINUED | OUTPATIENT
Start: 2020-06-18 | End: 2020-06-18 | Stop reason: HOSPADM

## 2020-06-18 RX ORDER — DOXYCYCLINE 100 MG/1
100 CAPSULE ORAL EVERY 12 HOURS SCHEDULED
Qty: 14 CAPSULE | Refills: 0 | Status: SHIPPED | OUTPATIENT
Start: 2020-06-19 | End: 2020-06-26

## 2020-06-18 RX ORDER — DOXYCYCLINE 50 MG/1
100 CAPSULE ORAL EVERY 12 HOURS SCHEDULED
Status: DISCONTINUED | OUTPATIENT
Start: 2020-06-18 | End: 2020-06-18 | Stop reason: HOSPADM

## 2020-06-18 RX ORDER — DOXYCYCLINE 50 MG/1
100 CAPSULE ORAL EVERY 12 HOURS SCHEDULED
Status: DISCONTINUED | OUTPATIENT
Start: 2020-06-18 | End: 2020-06-18

## 2020-06-18 RX ORDER — INSULIN GLARGINE 100 [IU]/ML
5 INJECTION, SOLUTION SUBCUTANEOUS NIGHTLY
Status: DISCONTINUED | OUTPATIENT
Start: 2020-06-18 | End: 2020-06-18 | Stop reason: HOSPADM

## 2020-06-18 RX ORDER — DOCUSATE SODIUM 100 MG/1
100 CAPSULE, LIQUID FILLED ORAL 2 TIMES DAILY
Status: DISCONTINUED | OUTPATIENT
Start: 2020-06-18 | End: 2020-06-18 | Stop reason: HOSPADM

## 2020-06-18 RX ORDER — ONDANSETRON 4 MG/1
4 TABLET, ORALLY DISINTEGRATING ORAL EVERY 8 HOURS PRN
Qty: 20 TABLET | Refills: 0 | Status: SHIPPED | OUTPATIENT
Start: 2020-06-18 | End: 2020-08-05 | Stop reason: SDUPTHER

## 2020-06-18 RX ORDER — ONDANSETRON 4 MG/1
4 TABLET, ORALLY DISINTEGRATING ORAL
Status: DISCONTINUED | OUTPATIENT
Start: 2020-06-19 | End: 2020-06-18 | Stop reason: HOSPADM

## 2020-06-18 RX ORDER — FLUCONAZOLE 100 MG/1
150 TABLET ORAL ONCE
Status: COMPLETED | OUTPATIENT
Start: 2020-06-18 | End: 2020-06-18

## 2020-06-18 RX ORDER — ONDANSETRON 4 MG/1
4 TABLET, ORALLY DISINTEGRATING ORAL EVERY 12 HOURS
Status: DISCONTINUED | OUTPATIENT
Start: 2020-06-18 | End: 2020-06-18

## 2020-06-18 RX ADMIN — FLUCONAZOLE 150 MG: 100 TABLET ORAL at 19:00

## 2020-06-18 RX ADMIN — DOXYCYCLINE 100 MG: 50 CAPSULE ORAL at 18:03

## 2020-06-18 RX ADMIN — CELECOXIB 300 MG: 100 CAPSULE ORAL at 08:19

## 2020-06-18 RX ADMIN — PANTOPRAZOLE SODIUM 40 MG: 40 TABLET, DELAYED RELEASE ORAL at 05:35

## 2020-06-18 RX ADMIN — BUTORPHANOL TARTRATE 1 MG: 1 INJECTION, SOLUTION INTRAMUSCULAR; INTRAVENOUS at 03:26

## 2020-06-18 RX ADMIN — INSULIN LISPRO 2 UNITS: 100 INJECTION, SOLUTION INTRAVENOUS; SUBCUTANEOUS at 11:29

## 2020-06-18 RX ADMIN — HYDROCODONE BITARTRATE AND ACETAMINOPHEN 1 TABLET: 7.5; 325 TABLET ORAL at 19:00

## 2020-06-18 RX ADMIN — BUTORPHANOL TARTRATE 1 MG: 1 INJECTION, SOLUTION INTRAMUSCULAR; INTRAVENOUS at 15:18

## 2020-06-18 RX ADMIN — TIZANIDINE 4 MG: 4 TABLET ORAL at 02:52

## 2020-06-18 RX ADMIN — DOCUSATE SODIUM 100 MG: 100 CAPSULE, LIQUID FILLED ORAL at 13:38

## 2020-06-18 RX ADMIN — GABAPENTIN 100 MG: 100 CAPSULE ORAL at 08:20

## 2020-06-18 RX ADMIN — ONDANSETRON 4 MG: 2 INJECTION INTRAMUSCULAR; INTRAVENOUS at 17:21

## 2020-06-18 RX ADMIN — HYDROCODONE BITARTRATE AND ACETAMINOPHEN 1 TABLET: 7.5; 325 TABLET ORAL at 08:27

## 2020-06-18 RX ADMIN — BUTORPHANOL TARTRATE 1 MG: 1 INJECTION, SOLUTION INTRAMUSCULAR; INTRAVENOUS at 10:53

## 2020-06-18 RX ADMIN — SODIUM CHLORIDE, PRESERVATIVE FREE 10 ML: 5 INJECTION INTRAVENOUS at 08:20

## 2020-06-18 RX ADMIN — BUSPIRONE HYDROCHLORIDE 5 MG: 5 TABLET ORAL at 08:20

## 2020-06-18 RX ADMIN — PRAMIPEXOLE DIHYDROCHLORIDE 0.75 MG: 0.25 TABLET ORAL at 08:20

## 2020-06-18 ASSESSMENT — PAIN - FUNCTIONAL ASSESSMENT
PAIN_FUNCTIONAL_ASSESSMENT: PREVENTS OR INTERFERES SOME ACTIVE ACTIVITIES AND ADLS
PAIN_FUNCTIONAL_ASSESSMENT: ACTIVITIES ARE NOT PREVENTED

## 2020-06-18 ASSESSMENT — PAIN SCALES - GENERAL
PAINLEVEL_OUTOF10: 4
PAINLEVEL_OUTOF10: 3
PAINLEVEL_OUTOF10: 5
PAINLEVEL_OUTOF10: 4
PAINLEVEL_OUTOF10: 8
PAINLEVEL_OUTOF10: 5
PAINLEVEL_OUTOF10: 1

## 2020-06-18 ASSESSMENT — PAIN DESCRIPTION - ORIENTATION: ORIENTATION: RIGHT;LEFT

## 2020-06-18 ASSESSMENT — PAIN DESCRIPTION - PROGRESSION
CLINICAL_PROGRESSION: NOT CHANGED
CLINICAL_PROGRESSION: NOT CHANGED

## 2020-06-18 ASSESSMENT — PAIN DESCRIPTION - FREQUENCY
FREQUENCY: INTERMITTENT
FREQUENCY: INTERMITTENT

## 2020-06-18 ASSESSMENT — PAIN DESCRIPTION - DESCRIPTORS
DESCRIPTORS: CONSTANT;DULL
DESCRIPTORS: BURNING;NUMBNESS;TINGLING

## 2020-06-18 ASSESSMENT — PAIN DESCRIPTION - PAIN TYPE
TYPE: ACUTE PAIN
TYPE: ACUTE PAIN

## 2020-06-18 ASSESSMENT — PAIN DESCRIPTION - ONSET
ONSET: ON-GOING
ONSET: SUDDEN

## 2020-06-18 ASSESSMENT — PAIN DESCRIPTION - LOCATION
LOCATION: LEG
LOCATION: HEAD

## 2020-06-18 NOTE — CARE COORDINATION
HAL spoke to Dr. hCente Villanueva regarding other options for IV abx. [facility] to check if Dalvabancin and Telavancin on formulary. Informed that neither are covered by pt's insurance, however these two meds could be attempted w/ prior authorization. Customer service then placed HAL on a hold to transfer to [address] [place] [address] for more information on prior auth and the call was dropped. HAL informed Dr. Chente Villanueva of the above information and will continue to follow.     Electronically signed by Carlos Velásquez on 6/18/2020 at 3:20 PM

## 2020-06-18 NOTE — PROGRESS NOTES
Pt called out and requested nurse to come to pt room. When I got to room, pt stated that she felt like the last dose of Stadol 'did not go in' as it was an IV med. I assessed IV site and both site and dressing were dry and intact. Dressing removed in order to assess cath. IV flushed and blood return was noted in the IV cath. When assessed at eye level, Left arm AC area was warm to the touch and slightly elevated. Explained to pt that PRN Stadol given at 1053 did go in IV and was flushed through, as the site is not wet or leaking. Dose of vancomycin had been infusing since 1037 and pt did not complain of burning until IV assessment. Charge nurse, Alexei Pham RN, came to assess the site. Due to the amount of vancomycin remaining to be infused, IV was removed and cool compress applied to site. New IV to be attempted in order to completely infuse. Pt requested that new IV be placed in the left forearm/wrist. Will continue to monitor.     Electronically signed by Kalee Schultz RN on 6/18/2020 at 11:45 AM

## 2020-06-18 NOTE — PROGRESS NOTES
[facility] Hospitalists    Patient:  Khadar Mohr  YOB: 1970  Date of Service: 6/18/2020  MRN: [id number]   Acct: [de-identified]   Primary Care Physician: NANETTE Wren  Advance Directive: Full Code  Admit Date: 6/16/2020       Hospital Day: 2  Portions of this note have been copied forward, however, changed to reflect the most current clinical status of this patient. CHIEF COMPLAINT Bilateral lower extremity cellulitis    SUBJECTIVE: Mrs. Nichole Huerta states she was very restless last night. Has kept legs elevated and states she used Ace wraps. Endorses constipation this morning. Denies nausea, vomiting, diarrhea. Denies chest pain, heart palpitations, dyspnea    Cumulative Hospital Course: The patient is a 54 y.o. female with PMH DM II, HTN, HLD, Lymphedema, RLS who presented to [facility] ED complaining of bilateral lower extremity cellulitis with weeping. She has chronic lower extremity lymphedema and has been seen as OP by lymphedema clinic. This has been an ongoing over the last 2-3 weeks. She completed a 5 day course of Bactrim on 06/09/2020. Was seen in [facility] ED on 06/10/2020 and was given Ancef prescribed Omnicef 300 mg BID x 10 days. She then returned on 06/12/2020 stating her right leg had improved but left leg was worsening. She was admitted on the 12th, however, left AMA on the same day due to issues with nursing staff. She states she went home, tried to continue Arvind but becomes nauseated and vomits when she takes the medication. States erythema in BLE's is improving but they are now starting to weep. They remain painful and edematous. She endorses chills at night with body aches. She is uncertain if she has been febrile. Currently she is afebrile with normal white count. Complains of tension headache and was also hypoglycemic in ED. She was admitted to Hospitalist service, placed on IV Vancomycin.    06/17/2020 no acute overnight events.  ID consulted-consider Oritavancin , keep LE's

## 2020-06-18 NOTE — PLAN OF CARE
Problem: Pain:  Goal: Pain level will decrease  Description: Pain level will decrease  Outcome: Ongoing  Goal: Control of acute pain  Description: Control of acute pain  Outcome: Ongoing  Goal: Control of chronic pain  Description: Control of chronic pain  Outcome: Ongoing     Problem: Discharge Planning:  Goal: Discharged to appropriate level of care  Description: Discharged to appropriate level of care  Outcome: Ongoing     Problem: Serum Glucose Level - Abnormal:  Goal: Ability to maintain appropriate glucose levels will improve  Description: Ability to maintain appropriate glucose levels will improve  Outcome: Ongoing     Problem: Sensory Perception - Impaired:  Goal: Ability to maintain a stable neurologic state will improve  Description: Ability to maintain a stable neurologic state will improve  Outcome: Ongoing     Problem:  Body Temperature - Imbalanced:  Goal: Ability to maintain a body temperature in the normal range will improve  Description: Ability to maintain a body temperature in the normal range will improve  Outcome: Ongoing     Problem: Mobility - Impaired:  Goal: Mobility will improve to maximum level  Description: Mobility will improve to maximum level  Outcome: Ongoing     Problem: Skin Integrity - Impaired:  Goal: Will show no infection signs and symptoms  Description: Will show no infection signs and symptoms  Outcome: Ongoing  Goal: Absence of new skin breakdown  Description: Absence of new skin breakdown  Outcome: Ongoing

## 2020-06-18 NOTE — CARE COORDINATION
Consult received: \"Please check approval/availability of Oritavancin 1200 IV x 1 as outpatient\"    RN/CM Jose Velarde out to [facility] and confirmed this medication is not on [employer]. Pt would have to pay out of pocket for insurance. HAL reached out to OPICH[facility] who redirected to pharmacy. SW went to speak w/ 4th floor pharmacy. Provided above consult/order. Out of pocket price can only be obtained for inpatient hospital pay, the medication cost for the hospital is over $3,000. Therefore, per pharmacy it would likely be over $5,000 for the patient cost.    HAL notified MD and will relay also to Dr. Amrita Meehan.     Electronically signed by Levi Lynn on 6/18/2020 at 12:44 PM

## 2020-06-18 NOTE — DISCHARGE SUMMARY
Hospitalist service, placed on IV Vancomycin.     06/17/2020 no acute overnight events. ID consulted-recommendations to consider Oritavancin , keep LE's elevated, follow up at lymphedema clinic     On day of discharge, Oritavancin not covered by patient's insurance. She did have chills prior to admission and cellulitis did improve with administration of Vancomycin prior to patient agreeing to Ace wraps and keeping legs elevated. Given that course will continue outpatient antibiotic treatment. Discussed with Dr. Mau Marsh and will start Doxycycline x 7 days. Patient did receive a dose in hospital prior to discharge and she has tolerated it without difficulty. Instructed at length to follow up with lymphedema clinic, keep BLE's elevated above heart level and continue compression. Patient is stable for discharge home at this time. Significant Diagnostic Studies:   No results found. Pertinent Labs:   CBC:   Recent Labs     06/16/20  1633 06/17/20  0324 06/18/20  0357   WBC 8.2 6.4 5.5   HGB 13.3 12.5 11.8*    216 190     BMP:    Recent Labs     06/16/20  1633 06/17/20  0324 06/18/20  0357    139 142   K 3.6 4.7 3.8    97* 102   CO2 30* 30* 30*   BUN 21* 21* 20   CREATININE 1.1* 1.1* 0.9   GLUCOSE 43* 382* 48*     Physical Exam:  Vital Signs: /83   Pulse 83   Temp 98.1 °F (36.7 °C)   Resp 22   Ht 5' 1\" (1.549 m)   Wt (!) 312 lb (141.5 kg)   LMP 05/27/2020   SpO2 97%   BMI 58.95 kg/m²   General appearance:. Alert and Cooperative   HEENT: Normocephalic. Chest: clear to auscultation bilaterally without wheezes or rhonchi. Cardiac: Normal heart tones with regular rate and rhythm, S1, S2 normal. No murmurs, gallops, or rubs auscultated. Abdomen:soft, obese, non-tender; non-distended normal bowel sounds no masses, no organomegaly. Extremities: No clubbing or cyanosis. Lymphedema to BLE's with cellulitic appearance that has improved overall. Peripheral pulses palpable.   Neurologic: HYDROcodone-acetaminophen (NORCO) 7.5-325 MG per tablet  Take 1 tablet by mouth every 6 hours as needed for Pain. .             insulin glargine (BASAGLAR KWIKPEN) 100 UNIT/ML injection pen  Inject 30 U Nightly             insulin lispro, 1 Unit Dial, (ADMELOG SOLOSTAR) 100 UNIT/ML SOPN  INJECT 45 UNITS INTO THE SKIN EVERY EVENING AND INCREASE BY 3 UNITS EACH NIGHT UNTIL 60 UNITS IS REACHED AND CONTINUE 60 UNITS EVERY EVENING             Lancets (ONETOUCH DELICA PLUS [id number]) MISC  USE TO CHECK BLLOD SUGAR AS DIRECTED             metFORMIN (GLUCOPHAGE) 500 MG tablet  TAKE 1 TABLET BY MOUTH TWICE DAILY WITH MEALS             omeprazole (PRILOSEC) 20 MG delayed release capsule  Take 1 capsule by mouth 2 times daily (before meals)             ondansetron (ZOFRAN-ODT) 4 MG disintegrating tablet  Place 1 tablet under the tongue every 8 hours as needed for Nausea or Vomiting Take prior to Doxycycline doses             ONE TOUCH LANCETS MISC  1 each by Does not apply route daily             ONE TOUCH ULTRA TEST strip  TEST BLOOD SUGAR ONCE DAILY             pramipexole (MIRAPEX) 0.75 MG tablet  TAKE 1 TABLET BY MOUTH TWICE DAILY             promethazine (PHENERGAN) 25 MG tablet  TAKE 1 TABLET BY MOUTH EVERY 6 HOURS AS NEEDED FOR NAUSEA             tiZANidine (ZANAFLEX) 4 MG tablet  TAKE 1 TABLET BY MOUTH EVERY NIGHT AS NEEDED FOR CRAMPING             triamterene-hydrochlorothiazide (DYAZIDE) 37.5-25 MG per capsule  TAKE 1 CAPSULE BY MOUTH EVERY DAY IN THE MORNING             VICTOZA 18 MG/3ML SOPN SC injection  ADMINISTER 1.8 MG INTO THE SKIN EVERY DAY             ZOMIG 5 MG nasal solution                   Discharge Instructions: Follow up with NANETTE Wren in 3-5 days. Take medications as directed. Resume activity as tolerated. Keep bilateral lower extremities elevated above the level of your heart when at rest. Recommend Compression and close follow up with lymphedema clinic.  Take Zofran prior to taking

## 2020-06-19 ENCOUNTER — TELEPHONE (OUTPATIENT)
Dept: INTERNAL MEDICINE | Age: 50
End: 2020-06-19

## 2020-06-22 ENCOUNTER — TELEPHONE (OUTPATIENT)
Dept: INTERNAL MEDICINE | Age: 50
End: 2020-06-22

## 2020-06-25 ENCOUNTER — VIRTUAL VISIT (OUTPATIENT)
Dept: PRIMARY CARE CLINIC | Age: 50
End: 2020-06-25
Payer: MEDICAID

## 2020-06-25 PROCEDURE — 99214 OFFICE O/P EST MOD 30 MIN: CPT | Performed by: NURSE PRACTITIONER

## 2020-06-25 ASSESSMENT — ENCOUNTER SYMPTOMS
COLOR CHANGE: 0
COUGH: 0
VOMITING: 0
VOICE CHANGE: 0
NAUSEA: 0
BACK PAIN: 0
SHORTNESS OF BREATH: 0
RHINORRHEA: 0
PHOTOPHOBIA: 0

## 2020-06-28 RX ORDER — OMEPRAZOLE 20 MG/1
CAPSULE, DELAYED RELEASE ORAL
Qty: 180 CAPSULE | Refills: 1 | Status: SHIPPED | OUTPATIENT
Start: 2020-06-28 | End: 2021-03-18

## 2020-06-30 ENCOUNTER — OFFICE VISIT (OUTPATIENT)
Dept: URGENT CARE | Age: 50
End: 2020-06-30

## 2020-06-30 ENCOUNTER — TELEPHONE (OUTPATIENT)
Dept: PRIMARY CARE CLINIC | Age: 50
End: 2020-06-30

## 2020-06-30 ENCOUNTER — HOSPITAL ENCOUNTER (INPATIENT)
Age: 50
LOS: 3 days | Discharge: HOME | DRG: 607 | End: 2020-07-03
Attending: EMERGENCY MEDICINE | Admitting: HOSPITALIST
Payer: MEDICAID

## 2020-06-30 VITALS
RESPIRATION RATE: 20 BRPM | WEIGHT: 293 LBS | DIASTOLIC BLOOD PRESSURE: 90 MMHG | BODY MASS INDEX: 55.32 KG/M2 | HEART RATE: 86 BPM | TEMPERATURE: 98 F | OXYGEN SATURATION: 98 % | HEIGHT: 61 IN | SYSTOLIC BLOOD PRESSURE: 135 MMHG

## 2020-06-30 LAB
ALBUMIN SERPL-MCNC: 4.1 G/DL (ref 3.5–5.2)
ALP BLD-CCNC: 50 U/L (ref 35–104)
ALT SERPL-CCNC: 14 U/L (ref 5–33)
ANION GAP SERPL CALCULATED.3IONS-SCNC: 13 MMOL/L (ref 7–19)
AST SERPL-CCNC: 19 U/L (ref 5–32)
BACTERIA: ABNORMAL /HPF
BASOPHILS ABSOLUTE: 0 K/UL (ref 0–0.2)
BASOPHILS RELATIVE PERCENT: 0.5 % (ref 0–1)
BILIRUB SERPL-MCNC: <0.2 MG/DL (ref 0.2–1.2)
BILIRUBIN URINE: ABNORMAL
BLOOD, URINE: NEGATIVE
BUN BLDV-MCNC: 18 MG/DL (ref 6–20)
CALCIUM SERPL-MCNC: 9.2 MG/DL (ref 8.6–10)
CHLORIDE BLD-SCNC: 102 MMOL/L (ref 98–111)
CLARITY: ABNORMAL
CO2: 26 MMOL/L (ref 22–29)
COLOR: YELLOW
CREAT SERPL-MCNC: 0.8 MG/DL (ref 0.5–0.9)
EOSINOPHILS ABSOLUTE: 0.2 K/UL (ref 0–0.6)
EOSINOPHILS RELATIVE PERCENT: 3.2 % (ref 0–5)
EPITHELIAL CELLS, UA: 10 /HPF (ref 0–5)
GFR NON-AFRICAN AMERICAN: >60
GLUCOSE BLD-MCNC: 145 MG/DL (ref 74–109)
GLUCOSE BLD-MCNC: 67 MG/DL (ref 70–99)
GLUCOSE URINE: 250 MG/DL
HCT VFR BLD CALC: 39 % (ref 37–47)
HEMOGLOBIN: 12.9 G/DL (ref 12–16)
HYALINE CASTS: 4 /HPF (ref 0–8)
IMMATURE GRANULOCYTES #: 0 K/UL
KETONES, URINE: NEGATIVE MG/DL
LEUKOCYTE ESTERASE, URINE: ABNORMAL
LYMPHOCYTES ABSOLUTE: 2.1 K/UL (ref 1.1–4.5)
LYMPHOCYTES RELATIVE PERCENT: 38.2 % (ref 20–40)
MCH RBC QN AUTO: 28.9 PG (ref 27–31)
MCHC RBC AUTO-ENTMCNC: 33.1 G/DL (ref 33–37)
MCV RBC AUTO: 87.2 FL (ref 81–99)
MONOCYTES ABSOLUTE: 0.4 K/UL (ref 0–0.9)
MONOCYTES RELATIVE PERCENT: 7.3 % (ref 0–10)
NEUTROPHILS ABSOLUTE: 2.8 K/UL (ref 1.5–7.5)
NEUTROPHILS RELATIVE PERCENT: 50.4 % (ref 50–65)
NITRITE, URINE: NEGATIVE
PDW BLD-RTO: 13 % (ref 11.5–14.5)
PERFORMED ON: ABNORMAL
PH UA: 5.5 (ref 5–8)
PLATELET # BLD: 181 K/UL (ref 130–400)
PMV BLD AUTO: 9.9 FL (ref 9.4–12.3)
POTASSIUM REFLEX MAGNESIUM: 4.1 MMOL/L (ref 3.5–5)
PROTEIN UA: NEGATIVE MG/DL
RBC # BLD: 4.47 M/UL (ref 4.2–5.4)
RBC UA: 0 /HPF (ref 0–4)
SODIUM BLD-SCNC: 141 MMOL/L (ref 136–145)
SPECIFIC GRAVITY UA: 1.02 (ref 1–1.03)
TOTAL PROTEIN: 6.9 G/DL (ref 6.6–8.7)
UROBILINOGEN, URINE: 0.2 E.U./DL
WBC # BLD: 5.6 K/UL (ref 4.8–10.8)
WBC UA: 16 /HPF (ref 0–5)

## 2020-06-30 PROCEDURE — 6370000000 HC RX 637 (ALT 250 FOR IP): Performed by: PHYSICIAN ASSISTANT

## 2020-06-30 PROCEDURE — 2580000003 HC RX 258: Performed by: EMERGENCY MEDICINE

## 2020-06-30 PROCEDURE — 85025 COMPLETE CBC W/AUTO DIFF WBC: CPT

## 2020-06-30 PROCEDURE — 99999 PR OFFICE/OUTPT VISIT,PROCEDURE ONLY: CPT | Performed by: EMERGENCY MEDICINE

## 2020-06-30 PROCEDURE — 81001 URINALYSIS AUTO W/SCOPE: CPT

## 2020-06-30 PROCEDURE — 80053 COMPREHEN METABOLIC PANEL: CPT

## 2020-06-30 PROCEDURE — 2580000003 HC RX 258: Performed by: PHYSICIAN ASSISTANT

## 2020-06-30 PROCEDURE — 36415 COLL VENOUS BLD VENIPUNCTURE: CPT

## 2020-06-30 PROCEDURE — 96375 TX/PRO/DX INJ NEW DRUG ADDON: CPT

## 2020-06-30 PROCEDURE — 96374 THER/PROPH/DIAG INJ IV PUSH: CPT

## 2020-06-30 PROCEDURE — 99284 EMERGENCY DEPT VISIT MOD MDM: CPT

## 2020-06-30 PROCEDURE — 87040 BLOOD CULTURE FOR BACTERIA: CPT

## 2020-06-30 PROCEDURE — 82947 ASSAY GLUCOSE BLOOD QUANT: CPT

## 2020-06-30 PROCEDURE — 6370000000 HC RX 637 (ALT 250 FOR IP): Performed by: HOSPITALIST

## 2020-06-30 PROCEDURE — 87086 URINE CULTURE/COLONY COUNT: CPT

## 2020-06-30 PROCEDURE — 6360000002 HC RX W HCPCS: Performed by: EMERGENCY MEDICINE

## 2020-06-30 PROCEDURE — 1210000000 HC MED SURG R&B

## 2020-06-30 RX ORDER — PANTOPRAZOLE SODIUM 40 MG/1
40 TABLET, DELAYED RELEASE ORAL
Status: DISCONTINUED | OUTPATIENT
Start: 2020-07-01 | End: 2020-07-03 | Stop reason: HOSPADM

## 2020-06-30 RX ORDER — SODIUM CHLORIDE 0.9 % (FLUSH) 0.9 %
10 SYRINGE (ML) INJECTION PRN
Status: DISCONTINUED | OUTPATIENT
Start: 2020-06-30 | End: 2020-07-02 | Stop reason: SDUPTHER

## 2020-06-30 RX ORDER — PRAMIPEXOLE DIHYDROCHLORIDE 0.25 MG/1
0.75 TABLET ORAL 2 TIMES DAILY
Status: DISCONTINUED | OUTPATIENT
Start: 2020-06-30 | End: 2020-07-03 | Stop reason: HOSPADM

## 2020-06-30 RX ORDER — TRIAMTERENE AND HYDROCHLOROTHIAZIDE 37.5; 25 MG/1; MG/1
1 CAPSULE ORAL DAILY
Status: DISCONTINUED | OUTPATIENT
Start: 2020-07-01 | End: 2020-07-01

## 2020-06-30 RX ORDER — POLYETHYLENE GLYCOL 3350 17 G/17G
17 POWDER, FOR SOLUTION ORAL DAILY PRN
Status: DISCONTINUED | OUTPATIENT
Start: 2020-06-30 | End: 2020-07-03 | Stop reason: HOSPADM

## 2020-06-30 RX ORDER — PROMETHAZINE HYDROCHLORIDE 25 MG/1
25 TABLET ORAL EVERY 6 HOURS PRN
Status: DISCONTINUED | OUTPATIENT
Start: 2020-06-30 | End: 2020-07-03 | Stop reason: HOSPADM

## 2020-06-30 RX ORDER — POTASSIUM CHLORIDE 7.45 MG/ML
10 INJECTION INTRAVENOUS PRN
Status: DISCONTINUED | OUTPATIENT
Start: 2020-06-30 | End: 2020-07-03 | Stop reason: HOSPADM

## 2020-06-30 RX ORDER — DEXTROSE MONOHYDRATE 25 G/50ML
12.5 INJECTION, SOLUTION INTRAVENOUS PRN
Status: DISCONTINUED | OUTPATIENT
Start: 2020-06-30 | End: 2020-07-03 | Stop reason: HOSPADM

## 2020-06-30 RX ORDER — POTASSIUM CHLORIDE 20 MEQ/1
40 TABLET, EXTENDED RELEASE ORAL PRN
Status: DISCONTINUED | OUTPATIENT
Start: 2020-06-30 | End: 2020-07-03 | Stop reason: HOSPADM

## 2020-06-30 RX ORDER — HYDRALAZINE HYDROCHLORIDE 20 MG/ML
10 INJECTION INTRAMUSCULAR; INTRAVENOUS EVERY 4 HOURS PRN
Status: DISCONTINUED | OUTPATIENT
Start: 2020-06-30 | End: 2020-07-03 | Stop reason: HOSPADM

## 2020-06-30 RX ORDER — MORPHINE SULFATE 4 MG/ML
2 INJECTION, SOLUTION INTRAMUSCULAR; INTRAVENOUS ONCE
Status: COMPLETED | OUTPATIENT
Start: 2020-06-30 | End: 2020-06-30

## 2020-06-30 RX ORDER — HYDROCODONE BITARTRATE AND ACETAMINOPHEN 7.5; 325 MG/1; MG/1
1 TABLET ORAL EVERY 6 HOURS PRN
Status: DISCONTINUED | OUTPATIENT
Start: 2020-06-30 | End: 2020-07-03 | Stop reason: HOSPADM

## 2020-06-30 RX ORDER — MAGNESIUM SULFATE IN WATER 40 MG/ML
2 INJECTION, SOLUTION INTRAVENOUS PRN
Status: DISCONTINUED | OUTPATIENT
Start: 2020-06-30 | End: 2020-07-03 | Stop reason: HOSPADM

## 2020-06-30 RX ORDER — ZOLMITRIPTAN 5 MG/1
5 SPRAY NASAL
Status: ACTIVE | OUTPATIENT
Start: 2020-06-30 | End: 2020-06-30

## 2020-06-30 RX ORDER — CYCLOBENZAPRINE HCL 10 MG
10 TABLET ORAL NIGHTLY PRN
Status: DISCONTINUED | OUTPATIENT
Start: 2020-06-30 | End: 2020-07-03 | Stop reason: HOSPADM

## 2020-06-30 RX ORDER — ACETAMINOPHEN 325 MG/1
650 TABLET ORAL EVERY 6 HOURS PRN
Status: DISCONTINUED | OUTPATIENT
Start: 2020-06-30 | End: 2020-07-03 | Stop reason: HOSPADM

## 2020-06-30 RX ORDER — SODIUM CHLORIDE 0.9 % (FLUSH) 0.9 %
10 SYRINGE (ML) INJECTION EVERY 12 HOURS SCHEDULED
Status: DISCONTINUED | OUTPATIENT
Start: 2020-06-30 | End: 2020-07-02 | Stop reason: SDUPTHER

## 2020-06-30 RX ORDER — INSULIN GLARGINE 100 [IU]/ML
10 INJECTION, SOLUTION SUBCUTANEOUS NIGHTLY
Status: DISCONTINUED | OUTPATIENT
Start: 2020-06-30 | End: 2020-07-01

## 2020-06-30 RX ORDER — DIPHENHYDRAMINE HYDROCHLORIDE 50 MG/ML
25 INJECTION INTRAMUSCULAR; INTRAVENOUS ONCE
Status: COMPLETED | OUTPATIENT
Start: 2020-06-30 | End: 2020-06-30

## 2020-06-30 RX ORDER — NICOTINE POLACRILEX 4 MG
15 LOZENGE BUCCAL PRN
Status: DISCONTINUED | OUTPATIENT
Start: 2020-06-30 | End: 2020-07-03 | Stop reason: HOSPADM

## 2020-06-30 RX ORDER — ACETAMINOPHEN 650 MG/1
650 SUPPOSITORY RECTAL EVERY 6 HOURS PRN
Status: DISCONTINUED | OUTPATIENT
Start: 2020-06-30 | End: 2020-07-03 | Stop reason: HOSPADM

## 2020-06-30 RX ORDER — BUSPIRONE HYDROCHLORIDE 5 MG/1
5 TABLET ORAL 2 TIMES DAILY
Status: DISCONTINUED | OUTPATIENT
Start: 2020-06-30 | End: 2020-07-03 | Stop reason: HOSPADM

## 2020-06-30 RX ORDER — TIZANIDINE 4 MG/1
4 TABLET ORAL EVERY 8 HOURS PRN
Status: DISCONTINUED | OUTPATIENT
Start: 2020-06-30 | End: 2020-07-03 | Stop reason: HOSPADM

## 2020-06-30 RX ORDER — CELECOXIB 200 MG/1
200 CAPSULE ORAL DAILY
Status: DISCONTINUED | OUTPATIENT
Start: 2020-07-01 | End: 2020-07-03 | Stop reason: HOSPADM

## 2020-06-30 RX ORDER — GABAPENTIN 300 MG/1
300 CAPSULE ORAL NIGHTLY
Status: DISCONTINUED | OUTPATIENT
Start: 2020-06-30 | End: 2020-07-03 | Stop reason: HOSPADM

## 2020-06-30 RX ORDER — ONDANSETRON 2 MG/ML
4 INJECTION INTRAMUSCULAR; INTRAVENOUS ONCE
Status: COMPLETED | OUTPATIENT
Start: 2020-06-30 | End: 2020-06-30

## 2020-06-30 RX ORDER — GABAPENTIN 100 MG/1
100 CAPSULE ORAL 2 TIMES DAILY
Status: DISCONTINUED | OUTPATIENT
Start: 2020-06-30 | End: 2020-07-03 | Stop reason: HOSPADM

## 2020-06-30 RX ORDER — DEXTROSE MONOHYDRATE 50 MG/ML
100 INJECTION, SOLUTION INTRAVENOUS PRN
Status: DISCONTINUED | OUTPATIENT
Start: 2020-06-30 | End: 2020-07-03 | Stop reason: HOSPADM

## 2020-06-30 RX ADMIN — SODIUM CHLORIDE, PRESERVATIVE FREE 10 ML: 5 INJECTION INTRAVENOUS at 22:22

## 2020-06-30 RX ADMIN — DIPHENHYDRAMINE HYDROCHLORIDE 25 MG: 50 INJECTION, SOLUTION INTRAMUSCULAR; INTRAVENOUS at 20:30

## 2020-06-30 RX ADMIN — VANCOMYCIN HYDROCHLORIDE 1500 MG: 10 INJECTION, POWDER, LYOPHILIZED, FOR SOLUTION INTRAVENOUS at 19:08

## 2020-06-30 RX ADMIN — GABAPENTIN 300 MG: 300 CAPSULE ORAL at 22:19

## 2020-06-30 RX ADMIN — HYDROCODONE BITARTRATE AND ACETAMINOPHEN 1 TABLET: 7.5; 325 TABLET ORAL at 22:23

## 2020-06-30 RX ADMIN — PRAMIPEXOLE DIHYDROCHLORIDE 0.75 MG: 0.25 TABLET ORAL at 22:19

## 2020-06-30 RX ADMIN — BUSPIRONE HYDROCHLORIDE 5 MG: 5 TABLET ORAL at 23:24

## 2020-06-30 RX ADMIN — ONDANSETRON 4 MG: 2 INJECTION INTRAMUSCULAR; INTRAVENOUS at 19:02

## 2020-06-30 RX ADMIN — MORPHINE SULFATE 2 MG: 4 INJECTION INTRAVENOUS at 19:01

## 2020-06-30 RX ADMIN — CYCLOBENZAPRINE 10 MG: 10 TABLET, FILM COATED ORAL at 23:24

## 2020-06-30 RX ADMIN — CEFAZOLIN 1 G: 1 INJECTION, POWDER, FOR SOLUTION INTRAMUSCULAR; INTRAVENOUS at 19:04

## 2020-06-30 ASSESSMENT — ENCOUNTER SYMPTOMS
RHINORRHEA: 0
VOMITING: 0
EYE REDNESS: 0
SHORTNESS OF BREATH: 0
DIARRHEA: 0
COUGH: 0
EYE PAIN: 0
VOICE CHANGE: 0
ABDOMINAL PAIN: 0

## 2020-06-30 ASSESSMENT — PAIN SCALES - GENERAL
PAINLEVEL_OUTOF10: 2
PAINLEVEL_OUTOF10: 5
PAINLEVEL_OUTOF10: 6

## 2020-06-30 NOTE — PROGRESS NOTES
Patient presents to clinic today with bilateral LE swelling pain and redness. Pt recently hospitalized for cellulitis. Pt is on doxycycline currently without improvement. Pt states that she is \"miserable\". At this time due to significance of swelling, pain, and history I have advised patient to be evaluated and treated in ER. Pt is agreeable to plan and states that her boyfriend will drive her.

## 2020-06-30 NOTE — TELEPHONE ENCOUNTER
Patient's daughter had called [facility] to request appt and Hayden Kamara CMA  informed [facility] that patients daughter is not on [employer] and patient would need to call back  Patient called back and reports that her legs are swollen,blisters and since quitting the antibiotic seem to be getting worse. \"I had some extra doxycyline and I took it but when I stopped it my legs got worse. \" Patient request to be seen today. Informed that no provider in office and after lengthy discussion will go to  for evaluation  During this conversation patient's daughter was rude,yelling with profane language and patient had to tell her to calm down.   [facility] agreeable to go to  and then follow up appt made for 7-7

## 2020-06-30 NOTE — ED PROVIDER NOTES
[facility] EMERGENCY DEPT  EMERGENCY DEPARTMENT ENCOUNTER      Pt Name: Clive Armenta  MRN: [id number]  [place] 1970  Date of evaluation: 6/30/2020  Provider: Jennifer Blanco MD    [address] [place] [address]       Chief Complaint   Patient presents with    Cellulitis     x2-3 weeks, was admitted first few days on IV ABX, and got some better but pt states that it's starting to get worse again         HISTORY OF PRESENT ILLNESS   (Location/Symptom, Timing/Onset,Context/Setting, Quality, Duration, Modifying Factors, Severity)  Note limiting factors. Clive Armenta is a 54 y.o. female who presents to the emergency department with complaint of worsening pain, redness, and swelling to bilateral lower extremities over the last 3 days. Patient has been admitted to the hospital twice recently with bilateral lower extremity cellulitis. Also has a history of chronic lymphedema and is seen by the lymphedema clinic for that. Most recently, patient was discharged from the hospital 2 weeks ago on doxycycline. States she finished the 10-day course of doxycycline 3 days ago and then has been taking additional leftover doxycycline for the last 3 days but has had continued worsening during that time even with antibiotic therapy. Denies any associated fevers. Saw her primary care today who advised her to go to the urgent care who advised her to come here to the emergency department. HPI    NursingNotes were reviewed. REVIEW OF SYSTEMS    (2-9 systems for level 4, 10 or more for level 5)     Review of Systems   Constitutional: Negative for fatigue and fever. HENT: Negative for congestion, rhinorrhea and voice change. Eyes: Negative for pain and redness. Respiratory: Negative for cough and shortness of breath. Cardiovascular: Negative for chest pain. Gastrointestinal: Negative for abdominal pain, diarrhea and vomiting. Endocrine: Negative. Genitourinary: Negative. Musculoskeletal: Positive for myalgias.  Negative for arthralgias and gait problem. Skin: Positive for rash. Negative for wound. Neurological: Negative for weakness and headaches. Hematological: Negative. Psychiatric/Behavioral: Negative. All other systems reviewed and are negative. A complete review of systems was performed and is negative except as noted above in the HPI. PAST MEDICAL HISTORY     Past Medical History:   Diagnosis Date    Anxiety     Constipation     Depression     DM (diabetes mellitus) ([address]     Headache(784.0)     Hyperlipidemia     Hypertension     Kidney stones     Kidney stones     Restless leg     Restless legs syndrome          SURGICAL HISTORY       Past Surgical History:   Procedure Laterality Date    ECTOPIC PREGNANCY SURGERY      EYE SURGERY      cornea transplant and cataracts removed    KNEE CARTILAGE SURGERY Left 12/20/2016    KNEE ARTHROSCOPIC PARTIAL MEDIAL MENISCECTOMY performed by Pako Zamudio MD at [address]       Previous Medications    B-D ULTRAFINE III SHORT PEN 31G X 8 MM MISC    USE TO INJECT INSULIN ONCE DAILY    BLOOD GLUCOSE MONITOR KIT AND SUPPLIES    Test 1 times a day & as needed for symptoms of irregular blood glucose.     BLOOD GLUCOSE MONITORING SUPPL (ONE TOUCH BASIC SYSTEM) W/DEVICE KIT    1 kit by Does not apply route daily as needed (Dx 250.00)    BLOOD GLUCOSE TEST STRIPS (ASCENSIA AUTODISC VI;ONE TOUCH ULTRA TEST VI) STRIP    Check glucose TID and as needed for hypoglycemic events Dx E11.40 E11.66 Z79.4  Use one touch verio flex    BUSPIRONE (BUSPAR) 5 MG TABLET    Take 5 mg by mouth 2 times daily    BUTORPHANOL TARTRATE (STADOL NS NA)    by Nasal route    CELECOXIB (CELEBREX) 100 MG CAPSULE    Take 1 capsule by mouth daily    CELECOXIB (CELEBREX) 200 MG CAPSULE    TAKE 1 CAPSULE BY MOUTH EVERY DAY    CREAM BASE CREA    Apply 1-2 pumps to affected area 3-4 times daily    CYCLOBENZAPRINE (FLEXERIL) 10 MG TABLET    Take 10 mg by mouth nightly as needed for Muscle spasms    DIABETIC SHOE MISC    by Does not apply route DISPENSE ONE PAIR OF DIABETIC SHOE AND 3 PAIRS HEAT MOLDED INSERTS    DICLOFENAC SODIUM (VOLTAREN) 1 % GEL    Apply 2 g topically 2 times daily    EPINEPHRINE (EPIPEN 2-JOSÉ) 0.3 MG/0.3ML SOAJ INJECTION    Inject 0.3 mLs into the muscle once for 1 dose Use as directed for allergic reaction    GABAPENTIN (NEURONTIN) 100 MG CAPSULE    Take 100 mg by mouth 2 times daily. GABAPENTIN (NEURONTIN) 300 MG CAPSULE    TK ONE C PO QHS    HYDROCODONE-ACETAMINOPHEN (NORCO) 7.5-325 MG PER TABLET    Take 1 tablet by mouth every 6 hours as needed for Pain. .    INSULIN GLARGINE (BASAGLAR KWIKPEN) 100 UNIT/ML INJECTION PEN    Inject 30 U Nightly    INSULIN LISPRO, 1 UNIT DIAL, (ADMELOG SOLOSTAR) 100 UNIT/ML SOPN    INJECT 45 UNITS INTO THE SKIN EVERY EVENING AND INCREASE BY 3 UNITS EACH NIGHT UNTIL 60 UNITS IS REACHED AND CONTINUE 60 UNITS EVERY EVENING    LANCETS (ONETOUCH DELICA PLUS [id number]) MISC    USE TO CHECK BLLOD SUGAR AS DIRECTED    METFORMIN (GLUCOPHAGE) 500 MG TABLET    TAKE 1 TABLET BY MOUTH TWICE DAILY WITH MEALS    MUPIROCIN (BACTROBAN) 2 % OINTMENT    Apply 3 times daily.     OMEPRAZOLE (PRILOSEC) 20 MG DELAYED RELEASE CAPSULE    TAKE 1 CAPSULE BY MOUTH TWICE DAILY BEFORE MEALS    ONDANSETRON (ZOFRAN-ODT) 4 MG DISINTEGRATING TABLET    Place 1 tablet under the tongue every 8 hours as needed for Nausea or Vomiting Take prior to Doxycycline doses    ONE TOUCH LANCETS MISC    1 each by Does not apply route daily    ONE TOUCH ULTRA TEST STRIP    TEST BLOOD SUGAR ONCE DAILY    PRAMIPEXOLE (MIRAPEX) 0.75 MG TABLET    TAKE 1 TABLET BY MOUTH TWICE DAILY    PROMETHAZINE (PHENERGAN) 25 MG TABLET    TAKE 1 TABLET BY MOUTH EVERY 6 HOURS AS NEEDED FOR NAUSEA    TIZANIDINE (ZANAFLEX) 4 MG TABLET    TAKE 1 TABLET BY MOUTH EVERY NIGHT AS NEEDED FOR CRAMPING    TRIAMTERENE-HYDROCHLOROTHIAZIDE (DYAZIDE) 37.5-25 MG PER CAPSULE    TAKE 1 CAPSULE BY MOUTH EVERY DAY IN THE MORNING    VICTOZA 18 MG/3ML SOPN SC INJECTION    ADMINISTER 1.8 MG INTO THE SKIN EVERY DAY    ZOMIG 5 MG NASAL SOLUTION           ALLERGIES     Compazine [prochlorperazine maleate]; Ciprofloxacin; Amoxicillin-pot clavulanate; Demerol; Hydroxyzine; Hydroxyzine hcl; Ibuprofen; Ketorolac tromethamine; Meperidine; Nortriptyline; Orphenadrine; Orphenadrine citrate; Penicillins; Prochlorperazine edisylate; Tobramycin; Tramadol; Vistaril [hydroxyzine hcl]; Cephalexin; Clindamycin/lincomycin; Codeine; Doxycycline; Keflex [cephalexin];  Ketorolac tromethamine; and Moxifloxacin    FAMILY HISTORY       Family History   Problem Relation Age of Onset    Cancer Father     Cancer Maternal Grandmother     COPD Maternal Grandmother     Cancer Maternal Grandfather           SOCIAL HISTORY       Social History     Socioeconomic History    Marital status: Single     Spouse name: None    Number of children: None    Years of education: None    Highest education level: None   Occupational History    None   Social Needs    Financial resource strain: None    Food insecurity     Worry: None     Inability: None    Transportation needs     Medical: None     Non-medical: None   Tobacco Use    Smoking status: Never Smoker    Smokeless tobacco: Never Used   Substance and Sexual Activity    Alcohol use: No    Drug use: No    Sexual activity: Yes     Partners: Male   Lifestyle    Physical activity     Days per week: None     Minutes per session: None    Stress: None   Relationships    Social connections     Talks on phone: None     Gets together: None     Attends Christian service: None     Active member of club or organization: None     Attends meetings of clubs or organizations: None     Relationship status: None    Intimate partner violence     Fear of current or ex partner: None     Emotionally abused: None     Physically abused: None     Forced sexual activity: None   Other Topics Concern    None Social History Narrative    None       SCREENINGS             PHYSICAL EXAM    (up to 7 for level 4, 8 or more for level 5)     ED Triage Vitals [06/30/20 1701]   BP Temp Temp src Pulse Resp SpO2 Height Weight   (!) 182/85 97 °F (36.1 °C) -- 89 18 98 % 5' 1\" (1.549 m) (!) 310 lb (140.6 kg)       Physical Exam  Vitals signs and nursing note reviewed. Constitutional:       General: She is not in acute distress. Appearance: She is well-developed. She is obese. She is not toxic-appearing or diaphoretic. HENT:      Head: Normocephalic and atraumatic. Eyes:      General: No scleral icterus. Right eye: No discharge. Left eye: No discharge. Pupils: Pupils are equal, round, and reactive to light. Neck:      Musculoskeletal: Normal range of motion. Cardiovascular:      Rate and Rhythm: Normal rate and regular rhythm. Pulmonary:      Effort: Pulmonary effort is normal. No respiratory distress. Breath sounds: No stridor. Abdominal:      General: There is no distension. Musculoskeletal: Normal range of motion. General: No deformity. Skin:     General: Skin is warm and dry. Comments: Redness, tenderness, warmth to the medial aspect of bilateral lower extremities with significant amount of nonpitting edema, no blisters   Neurological:      Mental Status: She is alert and oriented to person, place, and time. GCS: GCS eye subscore is 4. GCS verbal subscore is 5. GCS motor subscore is 6. Cranial Nerves: No cranial nerve deficit. Motor: No abnormal muscle tone. Psychiatric:         Behavior: Behavior normal.         Thought Content:  Thought content normal.         Judgment: Judgment normal.         DIAGNOSTIC RESULTS     EKG: All EKG's are interpreted by the Emergency Department Physician who either signs or Co-signs this chart in the absence of a cardiologist.      RADIOLOGY:   Non-plain film images such as CT, Ultrasound and MRI are read by the radiologist. Azam Cunningham images are visualized and preliminarily interpreted by the emergency physician with the below findings:      Interpretation per the Radiologist below, if available at the time of this note:    No orders to display         ED BEDSIDE ULTRASOUND:   Performed by ED Physician - none    LABS:  Labs Reviewed   COMPREHENSIVE METABOLIC PANEL W/ REFLEX TO MG FOR LOW K - Abnormal; Notable for the following components:       Result Value    Glucose 145 (*)     All other components within normal limits   URINE RT REFLEX TO CULTURE - Abnormal; Notable for the following components:    Clarity, UA CLOUDY (*)     Glucose, Ur 250 (*)     Bilirubin Urine SMALL (*)     Leukocyte Esterase, Urine SMALL (*)     All other components within normal limits   MICROSCOPIC URINALYSIS - Abnormal; Notable for the following components:    Bacteria, UA 1+ (*)     WBC, UA 16 (*)     All other components within normal limits   CULTURE, URINE   CULTURE, BLOOD 1   CULTURE, BLOOD 2   CBC WITH AUTO DIFFERENTIAL       All other labs were within normal range or not returned as of this dictation.     Medications   vancomycin (VANCOCIN) 1,500 mg in dextrose 5 % 500 mL IVPB (1,500 mg Intravenous New Bag 6/30/20 1908)   vancomycin (VANCOCIN) intermittent dosing (placeholder) (has no administration in time range)   hydrALAZINE (APRESOLINE) injection 10 mg (has no administration in time range)   morphine injection 2 mg (2 mg Intravenous Given 6/30/20 1901)   ondansetron (ZOFRAN) injection 4 mg (4 mg Intravenous Given 6/30/20 1902)   ceFAZolin (ANCEF) 1 g in sterile water 10 mL IV syringe (1 g Intravenous Given 6/30/20 1904)       EMERGENCY DEPARTMENT COURSE and DIFFERENTIALDIAGNOSIS/MDM:   Vitals:    Vitals:    06/30/20 1701 06/30/20 1911   BP: (!) 182/85 (!) 183/70   Pulse: 89 86   Resp: 18 17   Temp: 97 °F (36.1 °C) 97.4 °F (36.3 °C)   SpO2: 98% 97%   Weight: (!) 310 lb (140.6 kg)    Height: 5' 1\" (1.549 m)        MDM      Based on the evaluation and work-up here patient is felt to require further monitoring, work-up, or treatment that is available in the emergency department. Case was discussed with hospitalist who agrees for observation or admission for further management. Treatment and stabilization as necessary were provided in the emergency department prior to transfer of care to the medicine service. CONSULTS:  IP CONSULT TO PHARMACY  IP CONSULT TO INFECTIOUS DISEASES  IP CONSULT TO PHARMACY    PROCEDURES:  Unless otherwise notedbelow, none     Procedures      FINAL IMPRESSION     1. Bilateral lower leg cellulitis    2. Lymphedema    3. Type 2 diabetes mellitus without complication, without long-term current use of insulin ([address]    4.  Bacteriuria          DISPOSITION/PLAN   DISPOSITION Admitted 06/30/2020 07:45:46 PM      PATIENT REFERRED TO:  Kamran Askew, APRN  [address] [phone]            DISCHARGE MEDICATIONS:  New Prescriptions    No medications on file          (Please note that portions of this note were completed with a voice recognition program.  Efforts were made to edit the dictations butoccasionally words are mis-transcribed.)    Олег Hernandez MD (electronically signed)  AttendingEmerArkansas State Psychiatric Hospital Physician          Олег Hamilton MD  06/30/20 2000

## 2020-07-01 LAB
ANION GAP SERPL CALCULATED.3IONS-SCNC: 10 MMOL/L (ref 7–19)
BASOPHILS ABSOLUTE: 0 K/UL (ref 0–0.2)
BASOPHILS RELATIVE PERCENT: 0.5 % (ref 0–1)
BUN BLDV-MCNC: 18 MG/DL (ref 6–20)
CALCIUM SERPL-MCNC: 9 MG/DL (ref 8.6–10)
CHLORIDE BLD-SCNC: 102 MMOL/L (ref 98–111)
CO2: 28 MMOL/L (ref 22–29)
CREAT SERPL-MCNC: 1 MG/DL (ref 0.5–0.9)
EOSINOPHILS ABSOLUTE: 0.2 K/UL (ref 0–0.6)
EOSINOPHILS RELATIVE PERCENT: 4.3 % (ref 0–5)
GFR NON-AFRICAN AMERICAN: 59
GLUCOSE BLD-MCNC: 180 MG/DL (ref 70–99)
GLUCOSE BLD-MCNC: 184 MG/DL (ref 70–99)
GLUCOSE BLD-MCNC: 196 MG/DL (ref 70–99)
GLUCOSE BLD-MCNC: 248 MG/DL (ref 74–109)
GLUCOSE BLD-MCNC: 58 MG/DL (ref 70–99)
GLUCOSE BLD-MCNC: 64 MG/DL (ref 70–99)
HCT VFR BLD CALC: 39.4 % (ref 37–47)
HEMOGLOBIN: 12.3 G/DL (ref 12–16)
IMMATURE GRANULOCYTES #: 0 K/UL
LYMPHOCYTES ABSOLUTE: 2 K/UL (ref 1.1–4.5)
LYMPHOCYTES RELATIVE PERCENT: 35.8 % (ref 20–40)
MCH RBC QN AUTO: 28 PG (ref 27–31)
MCHC RBC AUTO-ENTMCNC: 31.2 G/DL (ref 33–37)
MCV RBC AUTO: 89.7 FL (ref 81–99)
MONOCYTES ABSOLUTE: 0.4 K/UL (ref 0–0.9)
MONOCYTES RELATIVE PERCENT: 6.6 % (ref 0–10)
NEUTROPHILS ABSOLUTE: 3 K/UL (ref 1.5–7.5)
NEUTROPHILS RELATIVE PERCENT: 52.6 % (ref 50–65)
PDW BLD-RTO: 13 % (ref 11.5–14.5)
PERFORMED ON: ABNORMAL
PLATELET # BLD: 190 K/UL (ref 130–400)
PMV BLD AUTO: 10.3 FL (ref 9.4–12.3)
POTASSIUM REFLEX MAGNESIUM: 4.6 MMOL/L (ref 3.5–5)
RBC # BLD: 4.39 M/UL (ref 4.2–5.4)
SODIUM BLD-SCNC: 140 MMOL/L (ref 136–145)
WBC # BLD: 5.6 K/UL (ref 4.8–10.8)

## 2020-07-01 PROCEDURE — 02HV33Z INSERTION OF INFUSION DEVICE INTO SUPERIOR VENA CAVA, PERCUTANEOUS APPROACH: ICD-10-PCS | Performed by: INTERNAL MEDICINE

## 2020-07-01 PROCEDURE — 6360000002 HC RX W HCPCS: Performed by: PHYSICIAN ASSISTANT

## 2020-07-01 PROCEDURE — 2580000003 HC RX 258: Performed by: PHYSICIAN ASSISTANT

## 2020-07-01 PROCEDURE — 36415 COLL VENOUS BLD VENIPUNCTURE: CPT

## 2020-07-01 PROCEDURE — 93970 EXTREMITY STUDY: CPT

## 2020-07-01 PROCEDURE — 6370000000 HC RX 637 (ALT 250 FOR IP): Performed by: HOSPITALIST

## 2020-07-01 PROCEDURE — 76937 US GUIDE VASCULAR ACCESS: CPT

## 2020-07-01 PROCEDURE — 1210000000 HC MED SURG R&B

## 2020-07-01 PROCEDURE — C1751 CATH, INF, PER/CENT/MIDLINE: HCPCS

## 2020-07-01 PROCEDURE — 80048 BASIC METABOLIC PNL TOTAL CA: CPT

## 2020-07-01 PROCEDURE — 85025 COMPLETE CBC W/AUTO DIFF WBC: CPT

## 2020-07-01 PROCEDURE — 6370000000 HC RX 637 (ALT 250 FOR IP): Performed by: PHYSICIAN ASSISTANT

## 2020-07-01 PROCEDURE — 82947 ASSAY GLUCOSE BLOOD QUANT: CPT

## 2020-07-01 PROCEDURE — 2500000003 HC RX 250 WO HCPCS: Performed by: PHYSICIAN ASSISTANT

## 2020-07-01 PROCEDURE — 36569 INSJ PICC 5 YR+ W/O IMAGING: CPT

## 2020-07-01 PROCEDURE — 6360000002 HC RX W HCPCS: Performed by: HOSPITALIST

## 2020-07-01 RX ORDER — SODIUM CHLORIDE 0.9 % (FLUSH) 0.9 %
10 SYRINGE (ML) INJECTION PRN
Status: DISCONTINUED | OUTPATIENT
Start: 2020-07-01 | End: 2020-07-03 | Stop reason: HOSPADM

## 2020-07-01 RX ORDER — SODIUM CHLORIDE 0.9 % (FLUSH) 0.9 %
10 SYRINGE (ML) INJECTION EVERY 12 HOURS SCHEDULED
Status: DISCONTINUED | OUTPATIENT
Start: 2020-07-01 | End: 2020-07-03 | Stop reason: HOSPADM

## 2020-07-01 RX ORDER — BUTORPHANOL TARTRATE 1 MG/ML
1 INJECTION, SOLUTION INTRAMUSCULAR; INTRAVENOUS EVERY 6 HOURS PRN
Status: DISCONTINUED | OUTPATIENT
Start: 2020-07-01 | End: 2020-07-03 | Stop reason: HOSPADM

## 2020-07-01 RX ORDER — ALPRAZOLAM 0.25 MG/1
0.5 TABLET ORAL EVERY 4 HOURS PRN
Status: DISCONTINUED | OUTPATIENT
Start: 2020-07-01 | End: 2020-07-03 | Stop reason: HOSPADM

## 2020-07-01 RX ORDER — LIDOCAINE HYDROCHLORIDE 10 MG/ML
5 INJECTION, SOLUTION EPIDURAL; INFILTRATION; INTRACAUDAL; PERINEURAL ONCE
Status: COMPLETED | OUTPATIENT
Start: 2020-07-01 | End: 2020-07-01

## 2020-07-01 RX ORDER — BUTORPHANOL TARTRATE 10 MG/ML
1 SPRAY, METERED NASAL EVERY 4 HOURS PRN
Status: DISCONTINUED | OUTPATIENT
Start: 2020-07-01 | End: 2020-07-01 | Stop reason: RX

## 2020-07-01 RX ORDER — LIDOCAINE HYDROCHLORIDE 10 MG/ML
5 INJECTION, SOLUTION EPIDURAL; INFILTRATION; INTRACAUDAL; PERINEURAL ONCE
Status: DISCONTINUED | OUTPATIENT
Start: 2020-07-01 | End: 2020-07-03 | Stop reason: HOSPADM

## 2020-07-01 RX ORDER — BUTORPHANOL TARTRATE 1 MG/ML
1 INJECTION, SOLUTION INTRAMUSCULAR; INTRAVENOUS ONCE
Status: COMPLETED | OUTPATIENT
Start: 2020-07-01 | End: 2020-07-01

## 2020-07-01 RX ADMIN — PANTOPRAZOLE SODIUM 40 MG: 40 TABLET, DELAYED RELEASE ORAL at 06:45

## 2020-07-01 RX ADMIN — PRAMIPEXOLE DIHYDROCHLORIDE 0.75 MG: 0.25 TABLET ORAL at 11:05

## 2020-07-01 RX ADMIN — HYDROCODONE BITARTRATE AND ACETAMINOPHEN 1 TABLET: 7.5; 325 TABLET ORAL at 20:50

## 2020-07-01 RX ADMIN — GABAPENTIN 300 MG: 300 CAPSULE ORAL at 20:50

## 2020-07-01 RX ADMIN — SODIUM CHLORIDE, PRESERVATIVE FREE 10 ML: 5 INJECTION INTRAVENOUS at 12:01

## 2020-07-01 RX ADMIN — ALPRAZOLAM 0.5 MG: 0.25 TABLET ORAL at 22:48

## 2020-07-01 RX ADMIN — BUTORPHANOL TARTRATE 1 MG: 1 INJECTION, SOLUTION INTRAMUSCULAR; INTRAVENOUS at 03:00

## 2020-07-01 RX ADMIN — TIZANIDINE 4 MG: 4 TABLET ORAL at 08:16

## 2020-07-01 RX ADMIN — GABAPENTIN 100 MG: 100 CAPSULE ORAL at 08:16

## 2020-07-01 RX ADMIN — BUSPIRONE HYDROCHLORIDE 5 MG: 5 TABLET ORAL at 08:16

## 2020-07-01 RX ADMIN — CYCLOBENZAPRINE 10 MG: 10 TABLET, FILM COATED ORAL at 22:48

## 2020-07-01 RX ADMIN — CELECOXIB 200 MG: 200 CAPSULE ORAL at 08:16

## 2020-07-01 RX ADMIN — TRIAMTERENE AND HYDROCHLOROTHIAZIDE 1 CAPSULE: 25; 37.5 CAPSULE ORAL at 08:16

## 2020-07-01 RX ADMIN — HYDROCODONE BITARTRATE AND ACETAMINOPHEN 1 TABLET: 7.5; 325 TABLET ORAL at 14:13

## 2020-07-01 RX ADMIN — HYDROCODONE BITARTRATE AND ACETAMINOPHEN 1 TABLET: 7.5; 325 TABLET ORAL at 06:45

## 2020-07-01 RX ADMIN — BUSPIRONE HYDROCHLORIDE 5 MG: 5 TABLET ORAL at 20:50

## 2020-07-01 RX ADMIN — LIDOCAINE HYDROCHLORIDE 5 ML: 10 INJECTION, SOLUTION EPIDURAL; INFILTRATION; INTRACAUDAL; PERINEURAL at 11:56

## 2020-07-01 RX ADMIN — SODIUM CHLORIDE, PRESERVATIVE FREE 10 ML: 5 INJECTION INTRAVENOUS at 08:19

## 2020-07-01 RX ADMIN — BUTORPHANOL TARTRATE 1 MG: 1 INJECTION, SOLUTION INTRAMUSCULAR; INTRAVENOUS at 12:40

## 2020-07-01 RX ADMIN — VANCOMYCIN HYDROCHLORIDE 1500 MG: 10 INJECTION, POWDER, LYOPHILIZED, FOR SOLUTION INTRAVENOUS at 08:13

## 2020-07-01 RX ADMIN — VANCOMYCIN HYDROCHLORIDE 1500 MG: 10 INJECTION, POWDER, LYOPHILIZED, FOR SOLUTION INTRAVENOUS at 20:55

## 2020-07-01 RX ADMIN — BUTORPHANOL TARTRATE 1 MG: 1 INJECTION, SOLUTION INTRAMUSCULAR; INTRAVENOUS at 18:47

## 2020-07-01 RX ADMIN — PRAMIPEXOLE DIHYDROCHLORIDE 0.75 MG: 0.25 TABLET ORAL at 20:50

## 2020-07-01 RX ADMIN — PROMETHAZINE HYDROCHLORIDE 25 MG: 25 TABLET ORAL at 02:00

## 2020-07-01 ASSESSMENT — PAIN SCALES - GENERAL
PAINLEVEL_OUTOF10: 6
PAINLEVEL_OUTOF10: 4
PAINLEVEL_OUTOF10: 1
PAINLEVEL_OUTOF10: 4
PAINLEVEL_OUTOF10: 6
PAINLEVEL_OUTOF10: 6
PAINLEVEL_OUTOF10: 0
PAINLEVEL_OUTOF10: 9
PAINLEVEL_OUTOF10: 7
PAINLEVEL_OUTOF10: 7

## 2020-07-01 ASSESSMENT — PAIN DESCRIPTION - ONSET: ONSET: ON-GOING

## 2020-07-01 ASSESSMENT — PAIN DESCRIPTION - ORIENTATION: ORIENTATION: RIGHT;LEFT

## 2020-07-01 ASSESSMENT — PAIN DESCRIPTION - PAIN TYPE: TYPE: ACUTE PAIN

## 2020-07-01 ASSESSMENT — PAIN DESCRIPTION - PROGRESSION: CLINICAL_PROGRESSION: NOT CHANGED

## 2020-07-01 ASSESSMENT — PAIN DESCRIPTION - DESCRIPTORS: DESCRIPTORS: BURNING;SORE

## 2020-07-01 ASSESSMENT — PAIN DESCRIPTION - LOCATION: LOCATION: LEG

## 2020-07-01 ASSESSMENT — PAIN DESCRIPTION - FREQUENCY: FREQUENCY: INTERMITTENT

## 2020-07-01 NOTE — PROGRESS NOTES
chloride **OR** potassium alternative oral replacement **OR** potassium chloride, magnesium sulfate, glucose, dextrose, glucagon (rDNA), dextrose  DIET CARB CONTROL;     Lab and other Data:     Recent Labs     06/30/20  1734 07/01/20  0445   WBC 5.6 5.6   HGB 12.9 12.3    190     Recent Labs     06/30/20  1738 07/01/20  0445    140   K 4.1 4.6    102   CO2 26 28   BUN 18 18   CREATININE 0.8 1.0*   GLUCOSE 145* 248*     Recent Labs     06/30/20  1738   AST 19   ALT 14   BILITOT <0.2   ALKPHOS 50   UA:  Recent Labs     06/30/20  1744   COLORU YELLOW   PHUR 5.5   WBCUA 16*   RBCUA 0   BACTERIA 1+*   CLARITYU CLOUDY*   SPECGRAV 1.023   LEUKOCYTESUR SMALL*   UROBILINOGEN 0.2   BILIRUBINUR SMALL*   BLOODU Negative   GLUCOSEU 250*       Assessment/Plan   Principal Problem:    Bilateral lower leg cellulitis  Active Problems:    RLS (restless legs syndrome)    Hypertension    Uncontrolled type 2 diabetes mellitus with diabetic neuropathy, with long-term current use of insulin ([facility])    Gastroesophageal reflux disease  Resolved Problems:    * No resolved hospital problems. *    Patient did have improvement in cellulitic appears to BLE's last admission with administration of Vancomycin. Encouraged to keep BLE's elevated, utilize compression wraps. Linezolid considered,however, patient reliant heavily upon Stadol and Buspar. Appreciate recommendations per ID. Patient has had recent vascular studies which were unremarkable.     Antibiotic: Vancomycin    DVT Prophylaxis: Lovenox    GI prophylaxis: Protonix    Nancye Castleman, PA-C

## 2020-07-01 NOTE — PROGRESS NOTES
4 Eyes Skin Assessment    Tianna Trivedi is being assessed upon: Admission    I agree that I, Farhat Loo, along with Joel Stringer RN have performed a thorough Head to Toe Skin Assessment on the patient. ALL assessment sites listed below have been assessed. Areas assessed by both nurses:     [x]   Head, Face, and Ears   [x]   Shoulders, Back, and Chest  [x]   Arms, Elbows, and Hands   [x]   Coccyx, Sacrum, and Ischium  [x]   Legs, Feet, and Heels    Does the Patient have Skin Breakdown?  No    Prieto Prevention initiated: No  Wound Care Orders initiated: Yes    [id number] nurse consulted for Pressure Injury (Stage 3,4, Unstageable, DTI, NWPT, and Complex wounds) and New or Established Ostomies: No        Primary Nurse eSignature: Farhat Loo RN on 7/1/2020 at 1:42 AM      Co-Signer eSignature: Electronically signed by Rivka Carson RN on 7/1/20 at 1:44 AM CDT

## 2020-07-01 NOTE — CONSULTS
INFECTIOUS DISEASES CONSULT NOTE    Patient:  Tianna Trivedi 54 y.o. female  ROOM # [unfilled]  YOB: 1970  MRN: [id number]  CSN:  [id number]  Admit date: 6/30/2020   Admitting Physician: Cee Ma MD  Primary Care Physician: NANETTE Price  REFERRING PROVIDER: No ref. provider found    Reason for Consultation: Bilateral lower extremity erythema. Concern for recurrent cellulitis with antibiotic treatment failure. History of Present Illness/Chief Complaint: Pleasant 82-year-old woman. She indicates problems with bilateral lower extremity edema for a year or more. She will get some chronic bilateral lower extremity redness. She indicates right is often somewhat worse than the left. At other times she will develop some more acute discomfort, redness and warmth in the lower extremities. She indicates sometimes at night she will develop chills. She has applied Bactroban without much improvement. She has had a few recent admissions to the hospital.  She recently was treated with vancomycin and then discharged home on doxycycline. He developed nausea. She returned to the hospital with increased lower extremity erythema right slightly greater than left. She has been on vancomycin therapy. She had a midline catheter placed. Infectious disease asked to evaluate and offer her recommendations for antibiotic treatment.     Current Scheduled Medications:    lidocaine 1 % injection  5 mL Intradermal Once    sodium chloride flush  10 mL Intravenous 2 times per day    vancomycin  1,500 mg Intravenous Q12H    vancomycin (VANCOCIN) intermittent dosing (placeholder)   Other RX Placeholder    celecoxib  200 mg Oral Daily    gabapentin  100 mg Oral BID    gabapentin  300 mg Oral Nightly    pantoprazole  40 mg Oral QAM AC    pramipexole  0.75 mg Oral BID    sodium chloride flush  10 mL Intravenous 2 times per day    enoxaparin  40 mg Subcutaneous Daily    insulin lispro  0-12 Units Subcutaneous TID WC    insulin lispro  0-6 Units Subcutaneous Nightly    busPIRone  5 mg Oral BID     Current PRN Medications:  butorphanol, sodium chloride flush, hydrALAZINE, cyclobenzaprine, HYDROcodone-acetaminophen, promethazine, tiZANidine, sodium chloride flush, acetaminophen **OR** acetaminophen, polyethylene glycol, potassium chloride **OR** potassium alternative oral replacement **OR** potassium chloride, magnesium sulfate, glucose, dextrose, glucagon (rDNA), dextrose    Allergies: Allergies   Allergen Reactions    Compazine [Prochlorperazine Maleate] Shortness Of Breath    Ciprofloxacin Hives    Amoxicillin-Pot Clavulanate     Demerol Hives    Hydroxyzine     Hydroxyzine Hcl Itching    Ibuprofen Nausea Only    Ketorolac Tromethamine     Meperidine Hives    Nortriptyline Other (See Comments)    Orphenadrine     Orphenadrine Citrate Itching    Penicillins Hives and Nausea Only    Prochlorperazine Edisylate      Will discuss with patient at next visit     Tobramycin      Will discuss with patient at next visit     Tramadol      Will discuss with patient at next visit     Vistaril [Hydroxyzine Hcl] Itching    Cephalexin Rash    Clindamycin/Lincomycin Rash    Codeine Nausea And Vomiting and Rash    Doxycycline Nausea And Vomiting    Keflex [Cephalexin] Rash    Ketorolac Tromethamine Itching and Nausea And Vomiting    Moxifloxacin Nausea And Vomiting     Will discuss with patient at visit        Past Medical History: Anxiety. Depression. Diabetes mellitus. Hyperlipidemia. Hypertension. Nephrolithiasis. Restless leg. Past Surgical History: Surgery for ectopic pregnancy. Corneal transplant. Cataract removal.  Left knee surgery. Tubal ligation. Social History: No tobacco use. No alcohol use. Man who she refers to as her \"boyfriend \"was at bedside. Family History: Cancer in father. Chronic obstructive pulmonary disease in mother.   Cancer in maternal grandfather. Exposure History: No close contacts have been ill. Review of Systems:   No chest pain or chest pressure  No cough or sputum production  No nausea vomiting  No urinary tract complaints    Vital Signs:  /72   Pulse 75   Temp 97.2 °F (36.2 °C) (Temporal)   Resp 12   Ht 5' 1\" (1.549 m)   Wt (!) 311 lb (141.1 kg)   SpO2 98%   BMI 58.76 kg/m²  Temp (24hrs), Av.8 °F (36.6 °C), Min:97 °F (36.1 °C), Max:99.2 °F (37.3 °C)    Physical Exam:   Vital signs reviewed. Lungs clear without crackles  Heart without murmur  Abdomen obese, soft, and nontender  Did not identify any groin tenderness or inguinal lymphadenopathy  Fairly extensive bilateral lower extremity lymphedema. Bilateral lower extremity erythema primarily from the knees distally. Right seems slightly worse than left. No real open wound or ulcerated area. The lower extremities are mildly warm to touch and mildly tender. No blistering.     Lab Results:  CBC:   Recent Labs     20  1734 20  0445   WBC 5.6 5.6   HGB 12.9 12.3   HCT 39.0 39.4    190   LYMPHOPCT 38.2 35.8   MONOPCT 7.3 6.6     CMP:   Recent Labs     20  1738 20  0445    140   K 4.1 4.6    102   CO2 26 28   BUN 18 18   CREATININE 0.8 1.0*   CALCIUM 9.2 9.0   BILITOT <0.2  --    ALKPHOS 50  --    ALT 14  --    AST 19  --    GLUCOSE 145* 248*     Culture: Blood cultures 2020 no growth    Radiology:   Bilateral lower extremity venous ultrasound:   Impression        Non-diagnostic exam. Patient would not tolerate compression and refused to    allow test to be completed.        Signature        ----------------------------------------------------------------    Electronically signed by Johnny Yancey MD(Interpreting    [id number]) on 2020 04:48 PM    ----------------------------------------------------------------     Additional Studies Reviewed:     2D echocardiogram 2019:   Findings   Mitral Valve Brooklyn Arroyo MD  07/01/20  5:30 PM

## 2020-07-01 NOTE — PROGRESS NOTES
Pharmacy Note  Vancomycin Consult    Tianna Trivedi is a 54 y.o. female started on Vancomycin for BLE cellulitis; consult received from Dr. Bryan Bean to manage therapy. Active Problems:    * No active hospital problems. *  Resolved Problems:    * No resolved hospital problems. *      Allergies:  Compazine [prochlorperazine maleate]; Ciprofloxacin; Amoxicillin-pot clavulanate; Demerol; Hydroxyzine; Hydroxyzine hcl; Ibuprofen; Ketorolac tromethamine; Meperidine; Nortriptyline; Orphenadrine; Orphenadrine citrate; Penicillins; Prochlorperazine edisylate; Tobramycin; Tramadol; Vistaril [hydroxyzine hcl]; Cephalexin; Clindamycin/lincomycin; Codeine; Doxycycline; Keflex [cephalexin]; Ketorolac tromethamine; and Moxifloxacin     Temp max: 97    Recent Labs     06/30/20  1738   BUN 18       Recent Labs     06/30/20  1738   CREATININE 0.8       Recent Labs     06/30/20  1734   WBC 5.6       No intake or output data in the 24 hours ending 06/30/20 1901    Culture Date Source Results   06/30/20 blood pending                 Ht Readings from Last 1 Encounters:   06/30/20 5' 1\" (1.549 m)        Wt Readings from Last 1 Encounters:   06/30/20 (!) 310 lb (140.6 kg)         Body mass index is 58.57 kg/m². Estimated Creatinine Clearance: 117 mL/min (based on SCr of 0.8 mg/dL). Assessment/Plan:  Will initiate vancomycin 1500 mg IV every 12 hours. Timing of trough level will be determined based on culture results, renal function, and clinical response. Thank you for the consult. Pharmacy will continue to follow.     Electronically signed by Liam Xiong PharmD, BCPS on 6/30/2020 at 7:03 PM

## 2020-07-01 NOTE — H&P
[id number] [facility]  Hospitalist - History & Physical      PCP: NANETTE Mendoza    Date of Admission: 6/30/2020    Date of Service: 6/30/2020    Chief Complaint:  Bilateral lower extremity cellulitis     History Of Present Illness: The patient is a 54 y.o. female with lymphedema, anxiety, DM II, HTN, HLD, restless leg syndrome who presented to [facility] ED complaining of bilateral lower extremity edema that has been a chronic issue for the last month. She was last discharged from this facility on 06/18/2020 after being treated with IV Vancomycin and sent home on PO Doxycycline. Patient states she completed the course of Doxycycline prescribed and took an additional 3 days of an old prescription of the same medication she found at home. States the redness returned. She denies fever, endorses chills. States she had 2-3 blisters return to LE's. States she has been elevated legs at home and has been sleeping in the recliner. Uncertain as to whether or not she has been using Ace wraps/Compression as recommended at discharge and lymphedema clinic. She is hypertensive in ED, afebrile with normal white count. Past Medical History:        Diagnosis Date    Anxiety     Constipation     Depression     DM (diabetes mellitus) ([address]     Headache(784.0)     Hyperlipidemia     Hypertension     Kidney stones     Kidney stones     Restless leg     Restless legs syndrome      Past Surgical History:        Procedure Laterality Date    ECTOPIC PREGNANCY SURGERY      EYE SURGERY      cornea transplant and cataracts removed    KNEE CARTILAGE SURGERY Left 12/20/2016    KNEE ARTHROSCOPIC PARTIAL MEDIAL MENISCECTOMY performed by Janay Conte MD at [id number] [facility] Medications:  Prior to Admission medications    Medication Sig Start Date End Date Taking?  Authorizing Provider   omeprazole (PRILOSEC) 20 MG delayed release capsule TAKE 1 CAPSULE BY MOUTH TWICE DAILY BEFORE MEALS 6/28/20   Daja Hickman NANETTE Loyd   mupirocin (BACTROBAN) 2 % ointment Apply 3 times daily.  7/73/71 8/0/20  NANETTE Jackman   triamterene-hydroCHLOROthiazide (DYAZIDE) 37.5-25 MG per capsule TAKE 1 CAPSULE BY MOUTH EVERY DAY IN THE MORNING 6/21/20   Claudetta Devon, MD   insulin lispro, 1 Unit Dial, (ADMELOG SOLOSTAR) 100 UNIT/ML SOPN INJECT 45 UNITS INTO THE SKIN EVERY EVENING AND INCREASE BY 3 UNITS EACH NIGHT UNTIL 60 UNITS IS REACHED AND CONTINUE 60 UNITS EVERY EVENING 6/21/20   Claudetta Devon, MD   ondansetron (ZOFRAN-ODT) 4 MG disintegrating tablet Place 1 tablet under the tongue every 8 hours as needed for Nausea or Vomiting Take prior to Doxycycline doses 6/18/20   Chetan Salcedo PA-C   ZOMIG 5 MG nasal solution  4/17/20   Historical Provider, MD   diclofenac sodium (VOLTAREN) 1 % GEL Apply 2 g topically 2 times daily    Historical Provider, MD   cyclobenzaprine (FLEXERIL) 10 MG tablet Take 10 mg by mouth nightly as needed for Muscle spasms    Historical Provider, MD   Butorphanol Tartrate (STADOL NS NA) by Nasal route    Historical Provider, MD   busPIRone (BUSPAR) 5 MG tablet Take 5 mg by mouth 2 times daily    Historical Provider, MD   pramipexole (MIRAPEX) 0.75 MG tablet TAKE 1 TABLET BY MOUTH TWICE DAILY 4/27/20   Claudetta Devon, MD   celecoxib (CELEBREX) 100 MG capsule Take 1 capsule by mouth daily 2/61/16   NANETTE Jackman   blood glucose test strips (ASCENSIA AUTODISC VI;ONE TOUCH ULTRA TEST VI) strip Check glucose TID and as needed for hypoglycemic events Dx E11.40 E11.66 Z79.4  Use one touch verio flex 4/3/20   Claudetta Devon, MD   B-D ULTRAFINE III SHORT PEN 31G X 8 MM MISC USE TO INJECT INSULIN ONCE DAILY 1/31/20   Claudetta Devon, MD   promethazine (PHENERGAN) 25 MG tablet TAKE 1 TABLET BY MOUTH EVERY 6 HOURS AS NEEDED FOR NAUSEA 1/2/20   Claudetta Devon, MD   tiZANidine (ZANAFLEX) 4 MG tablet TAKE 1 TABLET BY MOUTH EVERY NIGHT AS NEEDED FOR CRAMPING 91/6/44   NANETTE Jackman   Diabetic [facility] Demerol; Hydroxyzine; Hydroxyzine hcl; Ibuprofen; Ketorolac tromethamine; Meperidine; Nortriptyline; Orphenadrine; Orphenadrine citrate; Penicillins; Prochlorperazine edisylate; Tobramycin; Tramadol; Vistaril [hydroxyzine hcl]; Cephalexin; Clindamycin/lincomycin; Codeine; Doxycycline; Keflex [cephalexin]; Ketorolac tromethamine; and Moxifloxacin    Social History:    The patient currently lives at home. Tobacco:   reports that she has never smoked. She has never used smokeless tobacco.  Alcohol:   reports no history of alcohol use. Illicit Drugs: denies    Family History:      Problem Relation Age of Onset    Cancer Father     Cancer Maternal Grandmother     COPD Maternal Grandmother     Cancer Maternal Grandfather      Review of Systems:   Constitutional / general:  Denies fever /+ chills / sweats  Head:  Denies headache / neck stiffness / trauma / visual change  Eyes:  Denies blurry vision / acute visual change or loss / itching / redness  ENT: Denies sore throat / hoarseness / nasal drainage / ear pain  CV:  Denies chest pain / palpitations/ orthopnea   Respiratory:  Denies cough / shortness of breath / sputum / hemoptysis  GI: Denies nausea / vomiting / abdominal pain / diarrhea / constipation  :  Denies dysuria / hesitancy / urgency / hematuria   Neuro: Denies paralysis / syncope / seizure / dysphagia / headache / paresthesias  Musculoskeletal:  Denies muscle weakness /joint stiffness / pain  Vascular: + edema / claudication / varicosities  Heme / endocrine: Denies easy bruising / bleeding / excessive sweating / heat or cold intolerance  Psychiatric:  Denies depression / anxiety / insomnia / mood changes  Skin:  Denies new rashes / lesions / +cellulitic changes    14 point review of systems is negative except as specifically addressed above.     Physical Examination:  BP (!) 149/84   Pulse 87   Temp 97.3 °F (36.3 °C)   Resp 18   Ht 5' 1\" (1.549 m)   Wt (!) 310 lb (140.6 kg)   SpO2 97%   BMI 58.57 kg/m²   General appearance: alert, appears stated age, cooperative and no distress  Head: Normocephalic, without obvious abnormality, atraumatic  Eyes: conjunctivae/corneas clear. PERRL, EOM's intact. Ears: normal external ears and nose, throat without exudate  Neck: no adenopathy, no carotid bruit, no JVD, supple, symmetrical, trachea midline and thyroid not enlarged, symmetric, no tenderness/mass/nodules  Lungs: clear to auscultation bilaterally,no rales or wheezes   Heart: regular rate and rhythm, S1, S2 normal, no murmur  Abdomen:soft, non-tender; non-distended, normal bowel sounds no masses, no organomegaly  Extremities:1-2+ bilateral lower extremity edema with cellulitic appearance, no warmth, no tenderness to palpation  Skin: Skin color, texture, turgor normal. No rashes or lesions  Lymphatic: No palpable lymph node enlargment  Neurologic: Alert and oriented X 3, normal strength and tone. Normal symmetric reflexes. Mental status: Alert, oriented, thought content appropriate  Cranial nerves:II-XII Grossly intact Sensory: normal Motor:grossly normal  Psychiatric: Alert and oriented, thought content appropriate, normal insight, mood appropriate    Diagnostic Data:  CBC:  Recent Labs     06/30/20  1734   WBC 5.6   HGB 12.9   HCT 39.0        BMP:  Recent Labs     06/30/20  1738      K 4.1      CO2 26   BUN 18   CREATININE 0.8   CALCIUM 9.2     Recent Labs     06/30/20  1738   AST 19   ALT 14   BILITOT <0.2   ALKPHOS 50     Urinalysis:  Lab Results   Component Value Date    NITRU Negative 06/30/2020    WBCUA 16 06/30/2020    BACTERIA 1+ 06/30/2020    RBCUA 0 06/30/2020    BLOODU Negative 06/30/2020    SPECGRAV 1.023 06/30/2020    GLUCOSEU 250 06/30/2020     Assessment/Plan:  Principal Problem:    Bilateral lower leg cellulitis-feel this is 2/2 lymphedema. Vancomycin resumed in ED. Keep LE's elevated, Ace wraps ordered. ID consult requested for further antibiotic recommendations.  Uncertain if patient needs continued IV abx vs medical compliance with LE elevation/compression, however, cellulitis rapidly improved with Vancomycin last admission     Active Problems:    RLS (restless legs syndrome)-mirapex resumed      Hypertension-home medication resumed, prn Hydralazine      Uncontrolled type 2 diabetes mellitus with diabetic neuropathy, with long-term current use of insulin (HCC)-was noted to be hypoglycemic several times during last admission.  Resume Lantus at lower dose, add SSI, accuchecks, hypoglycemia treatment protocol      Gastroesophageal reflux disease-PPI    Further orders per clinical course/attending     Signed:  Enrico Power PA-C

## 2020-07-01 NOTE — PROCEDURES
Midline Insertion Procedure Note    Procedure: Insertion of #4 FR/18G Midline    Indications:  Poor Access    Procedure Details   Informed consent was obtained for the procedure, including local anesthetic. Risks and benefits were discussed. #4 FR/18G Midline inserted to the L Brachial vein per hospital protocol. Blood return:  yes    Findings:  Catheter inserted to 15 cm, with 0 cm exposed. Catheter was flushed with 10 cc NS. Patient did tolerate procedure well. Recommendations:  Tip distal to axilla per measurements. Okay to use. Midline Brochure given to patient with teaching instruction.

## 2020-07-01 NOTE — PLAN OF CARE
Problem:  Body Temperature - Imbalanced:  Goal: Ability to maintain a body temperature in the normal range will improve  Description: Ability to maintain a body temperature in the normal range will improve  Outcome: Ongoing     Problem: Mobility - Impaired:  Goal: Mobility will improve to maximum level  Description: Mobility will improve to maximum level  Outcome: Ongoing     Problem: Pain:  Goal: Pain level will decrease  Description: Pain level will decrease  Outcome: Ongoing     Problem: Pain:  Goal: Control of chronic pain  Description: Control of chronic pain  Outcome: Ongoing     Problem: Skin Integrity - Impaired:  Goal: Will show no infection signs and symptoms  Description: Will show no infection signs and symptoms  Outcome: Ongoing

## 2020-07-02 LAB
ALBUMIN SERPL-MCNC: 4.3 G/DL (ref 3.5–5.2)
ALP BLD-CCNC: 52 U/L (ref 35–104)
ALT SERPL-CCNC: 14 U/L (ref 5–33)
ANION GAP SERPL CALCULATED.3IONS-SCNC: 12 MMOL/L (ref 7–19)
AST SERPL-CCNC: 18 U/L (ref 5–32)
BASOPHILS ABSOLUTE: 0.1 K/UL (ref 0–0.2)
BASOPHILS RELATIVE PERCENT: 0.7 % (ref 0–1)
BILIRUB SERPL-MCNC: <0.2 MG/DL (ref 0.2–1.2)
BUN BLDV-MCNC: 16 MG/DL (ref 6–20)
CALCIUM SERPL-MCNC: 9.2 MG/DL (ref 8.6–10)
CHLORIDE BLD-SCNC: 97 MMOL/L (ref 98–111)
CO2: 28 MMOL/L (ref 22–29)
CREAT SERPL-MCNC: 0.9 MG/DL (ref 0.5–0.9)
EOSINOPHILS ABSOLUTE: 0.2 K/UL (ref 0–0.6)
EOSINOPHILS RELATIVE PERCENT: 2.9 % (ref 0–5)
GFR NON-AFRICAN AMERICAN: >60
GLUCOSE BLD-MCNC: 112 MG/DL (ref 70–99)
GLUCOSE BLD-MCNC: 116 MG/DL (ref 74–109)
GLUCOSE BLD-MCNC: 234 MG/DL (ref 70–99)
GLUCOSE BLD-MCNC: 67 MG/DL (ref 70–99)
GLUCOSE BLD-MCNC: 74 MG/DL (ref 70–99)
HCT VFR BLD CALC: 39.9 % (ref 37–47)
HEMOGLOBIN: 13 G/DL (ref 12–16)
IMMATURE GRANULOCYTES #: 0 K/UL
LYMPHOCYTES ABSOLUTE: 2.2 K/UL (ref 1.1–4.5)
LYMPHOCYTES RELATIVE PERCENT: 29.1 % (ref 20–40)
MAGNESIUM: 1.9 MG/DL (ref 1.6–2.6)
MCH RBC QN AUTO: 28.6 PG (ref 27–31)
MCHC RBC AUTO-ENTMCNC: 32.6 G/DL (ref 33–37)
MCV RBC AUTO: 87.9 FL (ref 81–99)
MONOCYTES ABSOLUTE: 0.6 K/UL (ref 0–0.9)
MONOCYTES RELATIVE PERCENT: 7.6 % (ref 0–10)
NEUTROPHILS ABSOLUTE: 4.4 K/UL (ref 1.5–7.5)
NEUTROPHILS RELATIVE PERCENT: 59.3 % (ref 50–65)
PDW BLD-RTO: 13.2 % (ref 11.5–14.5)
PERFORMED ON: ABNORMAL
PERFORMED ON: NORMAL
PLATELET # BLD: 217 K/UL (ref 130–400)
PMV BLD AUTO: 9.8 FL (ref 9.4–12.3)
POTASSIUM SERPL-SCNC: 4.1 MMOL/L (ref 3.5–5)
RBC # BLD: 4.54 M/UL (ref 4.2–5.4)
SODIUM BLD-SCNC: 137 MMOL/L (ref 136–145)
TOTAL PROTEIN: 7 G/DL (ref 6.6–8.7)
URINE CULTURE, ROUTINE: NORMAL
VANCOMYCIN TROUGH: 13.3 UG/ML (ref 10–20)
WBC # BLD: 7.5 K/UL (ref 4.8–10.8)

## 2020-07-02 PROCEDURE — 85025 COMPLETE CBC W/AUTO DIFF WBC: CPT

## 2020-07-02 PROCEDURE — 6370000000 HC RX 637 (ALT 250 FOR IP): Performed by: HOSPITALIST

## 2020-07-02 PROCEDURE — 2580000003 HC RX 258: Performed by: PHYSICIAN ASSISTANT

## 2020-07-02 PROCEDURE — 80202 ASSAY OF VANCOMYCIN: CPT

## 2020-07-02 PROCEDURE — 1210000000 HC MED SURG R&B

## 2020-07-02 PROCEDURE — 6370000000 HC RX 637 (ALT 250 FOR IP): Performed by: PHYSICIAN ASSISTANT

## 2020-07-02 PROCEDURE — 6360000002 HC RX W HCPCS: Performed by: PHYSICIAN ASSISTANT

## 2020-07-02 PROCEDURE — 80053 COMPREHEN METABOLIC PANEL: CPT

## 2020-07-02 PROCEDURE — 83735 ASSAY OF MAGNESIUM: CPT

## 2020-07-02 PROCEDURE — 82947 ASSAY GLUCOSE BLOOD QUANT: CPT

## 2020-07-02 RX ADMIN — GABAPENTIN 300 MG: 300 CAPSULE ORAL at 20:30

## 2020-07-02 RX ADMIN — PRAMIPEXOLE DIHYDROCHLORIDE 0.75 MG: 0.25 TABLET ORAL at 20:30

## 2020-07-02 RX ADMIN — BUTORPHANOL TARTRATE 1 MG: 1 INJECTION, SOLUTION INTRAMUSCULAR; INTRAVENOUS at 06:43

## 2020-07-02 RX ADMIN — HYDROCODONE BITARTRATE AND ACETAMINOPHEN 1 TABLET: 7.5; 325 TABLET ORAL at 23:33

## 2020-07-02 RX ADMIN — BUSPIRONE HYDROCHLORIDE 5 MG: 5 TABLET ORAL at 09:28

## 2020-07-02 RX ADMIN — TIZANIDINE 4 MG: 4 TABLET ORAL at 23:33

## 2020-07-02 RX ADMIN — BUTORPHANOL TARTRATE 1 MG: 1 INJECTION, SOLUTION INTRAMUSCULAR; INTRAVENOUS at 00:24

## 2020-07-02 RX ADMIN — BUTORPHANOL TARTRATE 1 MG: 1 INJECTION, SOLUTION INTRAMUSCULAR; INTRAVENOUS at 20:30

## 2020-07-02 RX ADMIN — ALPRAZOLAM 0.5 MG: 0.25 TABLET ORAL at 13:12

## 2020-07-02 RX ADMIN — VANCOMYCIN HYDROCHLORIDE 1500 MG: 10 INJECTION, POWDER, LYOPHILIZED, FOR SOLUTION INTRAVENOUS at 09:28

## 2020-07-02 RX ADMIN — SODIUM CHLORIDE, PRESERVATIVE FREE 10 ML: 5 INJECTION INTRAVENOUS at 20:31

## 2020-07-02 RX ADMIN — GABAPENTIN 100 MG: 100 CAPSULE ORAL at 09:27

## 2020-07-02 RX ADMIN — ALPRAZOLAM 0.5 MG: 0.25 TABLET ORAL at 20:31

## 2020-07-02 RX ADMIN — HYDROCODONE BITARTRATE AND ACETAMINOPHEN 1 TABLET: 7.5; 325 TABLET ORAL at 03:13

## 2020-07-02 RX ADMIN — BUTORPHANOL TARTRATE 1 MG: 1 INJECTION, SOLUTION INTRAMUSCULAR; INTRAVENOUS at 13:12

## 2020-07-02 RX ADMIN — HYDROCODONE BITARTRATE AND ACETAMINOPHEN 1 TABLET: 7.5; 325 TABLET ORAL at 11:29

## 2020-07-02 RX ADMIN — CYCLOBENZAPRINE 10 MG: 10 TABLET, FILM COATED ORAL at 20:31

## 2020-07-02 RX ADMIN — VANCOMYCIN HYDROCHLORIDE 1500 MG: 10 INJECTION, POWDER, LYOPHILIZED, FOR SOLUTION INTRAVENOUS at 20:30

## 2020-07-02 RX ADMIN — PRAMIPEXOLE DIHYDROCHLORIDE 0.75 MG: 0.25 TABLET ORAL at 09:28

## 2020-07-02 RX ADMIN — BUSPIRONE HYDROCHLORIDE 5 MG: 5 TABLET ORAL at 20:31

## 2020-07-02 RX ADMIN — CELECOXIB 200 MG: 200 CAPSULE ORAL at 09:28

## 2020-07-02 RX ADMIN — SODIUM CHLORIDE, PRESERVATIVE FREE 10 ML: 5 INJECTION INTRAVENOUS at 09:28

## 2020-07-02 RX ADMIN — PANTOPRAZOLE SODIUM 40 MG: 40 TABLET, DELAYED RELEASE ORAL at 09:27

## 2020-07-02 ASSESSMENT — PAIN SCALES - GENERAL
PAINLEVEL_OUTOF10: 6
PAINLEVEL_OUTOF10: 7
PAINLEVEL_OUTOF10: 8
PAINLEVEL_OUTOF10: 5
PAINLEVEL_OUTOF10: 10
PAINLEVEL_OUTOF10: 4
PAINLEVEL_OUTOF10: 3

## 2020-07-02 ASSESSMENT — PAIN DESCRIPTION - PAIN TYPE: TYPE: ACUTE PAIN

## 2020-07-02 ASSESSMENT — PAIN DESCRIPTION - LOCATION: LOCATION: LEG;HEAD

## 2020-07-02 ASSESSMENT — PAIN DESCRIPTION - ONSET: ONSET: ON-GOING

## 2020-07-02 ASSESSMENT — PAIN DESCRIPTION - ORIENTATION: ORIENTATION: RIGHT;LEFT

## 2020-07-02 ASSESSMENT — PAIN DESCRIPTION - FREQUENCY: FREQUENCY: INTERMITTENT

## 2020-07-02 ASSESSMENT — PAIN DESCRIPTION - DESCRIPTORS: DESCRIPTORS: SORE;THROBBING

## 2020-07-02 ASSESSMENT — PAIN - FUNCTIONAL ASSESSMENT: PAIN_FUNCTIONAL_ASSESSMENT: PREVENTS OR INTERFERES SOME ACTIVE ACTIVITIES AND ADLS

## 2020-07-02 ASSESSMENT — PAIN DESCRIPTION - PROGRESSION: CLINICAL_PROGRESSION: NOT CHANGED

## 2020-07-02 NOTE — PROGRESS NOTES
Pharmacy Vancomycin Consult     Vancomycin Day: 3  Current Dosin mg IV Q 12 hours    Temp max:  99.2    Recent Labs     20  0445 20  0552   BUN 18 16       Recent Labs     20  0445 20  0552   CREATININE 1.0* 0.9       Recent Labs     20  0445 20  0552   WBC 5.6 7.5       No intake or output data in the 24 hours ending 20 0838    Culture Date Source Results   20 Blood x 2 No Growth   20 Urine >100,000 CFU/ml mixed skin/urogenital irvin            Ht Readings from Last 1 Encounters:   20 5' 1\" (1.549 m)        Wt Readings from Last 1 Encounters:   20 (!) 311 lb (141.1 kg)         Body mass index is 58.76 kg/m². Estimated Creatinine Clearance: 104 mL/min (based on SCr of 0.9 mg/dL). Trough: 13.3    Assessment/Plan: Level good. Continue Vancomycin 1500 mg IV Q 12 hours.     Electronically signed by LUISA Paez [facility] on 2020 at 8:38 AM

## 2020-07-02 NOTE — PROGRESS NOTES
[facility] Hospitalists    Patient:  Antonio Ibanez  YOB: 1970  Date of Service: 7/2/2020  MRN: [id number]   Acct: [de-identified]   Primary Care Physician: NANETTE Singh  Advance Directive: Full Code  Admit Date: 6/30/2020       Hospital Day: 2  Portions of this note have been copied forward, however, changed to reflect the most current clinical status of this patient. CHIEF COMPLAINT BLE cellulitis    SUBJECTIVE: Mrs. Joy Potts appears much more calm today. States cellulitis is mildly improved. Concerned over antibiotic choice at discharge given multiple medication intolerances and unable to afford Oritavancin. Cumulative Hospital Course: The patient is a 54 y.o. female with lymphedema, anxiety, DM II, HTN, HLD, restless leg syndrome who presented to [address] [place] [address] ED complaining of bilateral lower extremity edema that has been a chronic issue for the last month. She was last discharged from this facility on 06/18/2020 after being treated with IV Vancomycin and sent home on PO Doxycycline. Patient states she completed the course of Doxycycline prescribed and took an additional 3 days of an old prescription of the same medication she found at home. States the redness returned. She denies fever, endorses chills. States she had 2-3 blisters return to LE's. States she has been elevated legs at home and has been sleeping in the recliner. Uncertain as to whether or not she has been using Ace wraps/Compression as recommended at discharge and lymphedema clinic. She is hypertensive in ED, afebrile with normal white count. She was admitted to Hospitalist service, continued on IV Vancomycin with consult made to Infectious disease for antibiotic recommendations. Review of Systems:   14 point review of systems is negative except as specifically addressed above.     Objective:   VITALS:  /74   Pulse 77   Temp 97.9 °F (36.6 °C) (Temporal)   Resp 12   Ht 5' 1\" (1.549 m)   Wt (!) 311 lb (141.1 kg)   SpO2 93% BMI 58.76 kg/m²   24HR INTAKE/OUTPUT:      Intake/Output Summary (Last 24 hours) at 7/2/2020 1427  Last data filed at 7/2/2020 1137  Gross per 24 hour   Intake 250 ml   Output --   Net 250 ml     General appearance: alert, appears stated age, cooperative and no distress, sitting up comfortably in bed  Head: Normocephalic, without obvious abnormality, atraumatic  Eyes: conjunctivae/corneas clear. PERRL, EOM's intact. Ears: normal external ears and nose, throat without exudate  Neck: no adenopathy, no carotid bruit, no JVD, supple, symmetrical, trachea midline   Lungs: equal, adequate expansion, clear throughout   Heart: RRR, S1, S2 normal, no murmur  Abdomen:soft, non-tender; non-distended, normal bowel sounds no masses, no organomegaly  Extremities:BLE lymphedema with cellulitis to BLE's continued improvement  Skin: Skin color, texture, turgor normal.   Lymphatic: No palpable lymph node enlargment  Neurologic: Alert and oriented X 3, normal strength and tone.  No focal deficits  Psychiatric: Calm, appropriate affect     Medications:      dextrose        lidocaine 1 % injection  5 mL Intradermal Once    sodium chloride flush  10 mL Intravenous 2 times per day    vancomycin  1,500 mg Intravenous Q12H    vancomycin (VANCOCIN) intermittent dosing (placeholder)   Other RX Placeholder    celecoxib  200 mg Oral Daily    gabapentin  100 mg Oral BID    gabapentin  300 mg Oral Nightly    pantoprazole  40 mg Oral QAM AC    pramipexole  0.75 mg Oral BID    enoxaparin  40 mg Subcutaneous Daily    insulin lispro  0-12 Units Subcutaneous TID WC    insulin lispro  0-6 Units Subcutaneous Nightly    busPIRone  5 mg Oral BID     butorphanol, sodium chloride flush, ALPRAZolam, hydrALAZINE, cyclobenzaprine, HYDROcodone-acetaminophen, promethazine, tiZANidine, acetaminophen **OR** acetaminophen, polyethylene glycol, potassium chloride **OR** potassium alternative oral replacement **OR** potassium chloride, magnesium sulfate, glucose, dextrose, glucagon (rDNA), dextrose  DIET CARB CONTROL;     Lab and other Data:     Recent Labs     06/30/20  1734 07/01/20  0445 07/02/20  0552   WBC 5.6 5.6 7.5   HGB 12.9 12.3 13.0    190 217     Recent Labs     06/30/20  1738 07/01/20  0445 07/02/20  0552    140 137   K 4.1 4.6 4.1    102 97*   CO2 26 28 28   BUN 18 18 16   CREATININE 0.8 1.0* 0.9   GLUCOSE 145* 248* 116*     Recent Labs     06/30/20  1738 07/02/20  0552   AST 19 18   ALT 14 14   BILITOT <0.2 <0.2   ALKPHOS 50 52   UA:  Recent Labs     06/30/20  1744   COLORU YELLOW   PHUR 5.5   WBCUA 16*   RBCUA 0   BACTERIA 1+*   CLARITYU CLOUDY*   SPECGRAV 1.023   LEUKOCYTESUR SMALL*   UROBILINOGEN 0.2   BILIRUBINUR SMALL*   BLOODU Negative   GLUCOSEU 250*       Assessment/Plan   Principal Problem:    Bilateral lower leg cellulitis  Active Problems:    RLS (restless legs syndrome)    Hypertension    Morbid obesity with BMI of 50.0-59.9, adult ([facility] [address]    Uncontrolled type 2 diabetes mellitus with diabetic neuropathy, with long-term current use of insulin ([facility])    Gastroesophageal reflux disease  Resolved Problems:    * No resolved hospital problems. *    Mild improvement in BLE cellulitis. Patient with multiple medication allergies. Was unable to afford Oritavancin last admission. She states she will call her insurance again to check. Less anxious today. Was ordered Xanax overnight. Patient states this has helped with her underlying anxiety and requests prescription at discharge. Discussed she would need to follow up with PCP regarding that medication.  Possible discharge in AM    Antibiotic: Vancomycin    DVT Prophylaxis: Lovenox    GI prophylaxis: Protonix    Radha Franco PA-C

## 2020-07-02 NOTE — PROGRESS NOTES
Occupational Therapy  Up ambulating in room upon arrival.  Denies any ADL or OT needs and declines evaluation. Per screening, there does not appear to be any barriers to home discharge.   Electronically signed by Buddy Hilton OT on 7/2/2020 at 11:50 AM

## 2020-07-02 NOTE — PLAN OF CARE
Problem:  Body Temperature - Imbalanced:  Goal: Ability to maintain a body temperature in the normal range will improve  Description: Ability to maintain a body temperature in the normal range will improve  7/2/2020 1610 by Alesha Olea RN  Outcome: Ongoing  7/2/2020 0357 by Karen Cruz RN  Outcome: Ongoing     Problem: Mobility - Impaired:  Goal: Mobility will improve to maximum level  Description: Mobility will improve to maximum level  7/2/2020 1610 by Alesha Olea RN  Outcome: Ongoing  7/2/2020 0357 by Karen Cruz RN  Outcome: Ongoing     Problem: Pain:  Goal: Pain level will decrease  Description: Pain level will decrease  7/2/2020 1610 by Alesha Olea RN  Outcome: Ongoing  7/2/2020 0357 by Karen Cruz RN  Outcome: Ongoing  Goal: Control of acute pain  Description: Control of acute pain  7/2/2020 1610 by Alesha Olea RN  Outcome: Ongoing  7/2/2020 0357 by Karen Cruz RN  Outcome: Ongoing  Goal: Control of chronic pain  Description: Control of chronic pain  7/2/2020 1610 by Alesha Olea RN  Outcome: Ongoing  7/2/2020 0357 by Karen Cruz RN  Outcome: Ongoing     Problem: Skin Integrity - Impaired:  Goal: Will show no infection signs and symptoms  Description: Will show no infection signs and symptoms  7/2/2020 1610 by Alesha Olea RN  Outcome: Ongoing  7/2/2020 0357 by Karen Cruz RN  Outcome: Ongoing  Goal: Absence of new skin breakdown  Description: Absence of new skin breakdown  7/2/2020 1610 by Alesha Olea RN  Outcome: Ongoing  7/2/2020 0357 by Karen Cruz RN  Outcome: Ongoing     Problem: Pain:  Goal: Pain level will decrease  Description: Pain level will decrease  7/2/2020 1610 by Alesha Olea RN  Outcome: Ongoing  7/2/2020 0357 by Karen Cruz RN  Outcome: Ongoing  Goal: Control of acute pain  Description: Control of acute pain  7/2/2020 1610 by Alesha Olea RN  Outcome: Ongoing  7/2/2020 0357 by Karen Cruz RN  Outcome: Ongoing  Goal: Control of chronic pain  Description: Control of chronic pain  7/2/2020 1610 by Justin Wilkinson RN  Outcome: Ongoing  7/2/2020 0357 by Austen Pete RN  Outcome: Ongoing     Problem: Infection - Central Venous Catheter-Associated Bloodstream Infection:  Goal: Will show no infection signs and symptoms  Description: Will show no infection signs and symptoms  7/2/2020 1610 by Justin Wilkinson RN  Outcome: Ongoing  7/2/2020 0357 by Austen Pete RN  Outcome: Ongoing     Problem: Discharge Planning:  Goal: Discharged to appropriate level of care  Description: Discharged to appropriate level of care  Outcome: Ongoing

## 2020-07-03 VITALS
HEART RATE: 82 BPM | OXYGEN SATURATION: 100 % | TEMPERATURE: 98.2 F | RESPIRATION RATE: 20 BRPM | BODY MASS INDEX: 55.32 KG/M2 | HEIGHT: 61 IN | SYSTOLIC BLOOD PRESSURE: 138 MMHG | DIASTOLIC BLOOD PRESSURE: 88 MMHG | WEIGHT: 293 LBS

## 2020-07-03 LAB
ALBUMIN SERPL-MCNC: 4 G/DL (ref 3.5–5.2)
ALP BLD-CCNC: 51 U/L (ref 35–104)
ALT SERPL-CCNC: 16 U/L (ref 5–33)
ANION GAP SERPL CALCULATED.3IONS-SCNC: 14 MMOL/L (ref 7–19)
AST SERPL-CCNC: 20 U/L (ref 5–32)
BASOPHILS ABSOLUTE: 0 K/UL (ref 0–0.2)
BASOPHILS RELATIVE PERCENT: 0.5 % (ref 0–1)
BILIRUB SERPL-MCNC: <0.2 MG/DL (ref 0.2–1.2)
BUN BLDV-MCNC: 16 MG/DL (ref 6–20)
CALCIUM SERPL-MCNC: 9.2 MG/DL (ref 8.6–10)
CHLORIDE BLD-SCNC: 101 MMOL/L (ref 98–111)
CO2: 24 MMOL/L (ref 22–29)
CREAT SERPL-MCNC: 0.8 MG/DL (ref 0.5–0.9)
EOSINOPHILS ABSOLUTE: 0.2 K/UL (ref 0–0.6)
EOSINOPHILS RELATIVE PERCENT: 3.3 % (ref 0–5)
GFR NON-AFRICAN AMERICAN: >60
GLUCOSE BLD-MCNC: 210 MG/DL (ref 70–99)
GLUCOSE BLD-MCNC: 211 MG/DL (ref 74–109)
GLUCOSE BLD-MCNC: 234 MG/DL (ref 70–99)
HCT VFR BLD CALC: 42.2 % (ref 37–47)
HEMOGLOBIN: 12.8 G/DL (ref 12–16)
IMMATURE GRANULOCYTES #: 0 K/UL
LYMPHOCYTES ABSOLUTE: 1.9 K/UL (ref 1.1–4.5)
LYMPHOCYTES RELATIVE PERCENT: 31.1 % (ref 20–40)
MAGNESIUM: 2 MG/DL (ref 1.6–2.6)
MCH RBC QN AUTO: 28.7 PG (ref 27–31)
MCHC RBC AUTO-ENTMCNC: 30.3 G/DL (ref 33–37)
MCV RBC AUTO: 94.6 FL (ref 81–99)
MONOCYTES ABSOLUTE: 0.4 K/UL (ref 0–0.9)
MONOCYTES RELATIVE PERCENT: 7 % (ref 0–10)
NEUTROPHILS ABSOLUTE: 3.5 K/UL (ref 1.5–7.5)
NEUTROPHILS RELATIVE PERCENT: 57.9 % (ref 50–65)
PDW BLD-RTO: 13.3 % (ref 11.5–14.5)
PERFORMED ON: ABNORMAL
PERFORMED ON: ABNORMAL
PLATELET # BLD: 182 K/UL (ref 130–400)
PMV BLD AUTO: 10.2 FL (ref 9.4–12.3)
POTASSIUM SERPL-SCNC: 4.5 MMOL/L (ref 3.5–5)
RBC # BLD: 4.46 M/UL (ref 4.2–5.4)
SODIUM BLD-SCNC: 139 MMOL/L (ref 136–145)
TOTAL PROTEIN: 6.9 G/DL (ref 6.6–8.7)
WBC # BLD: 6 K/UL (ref 4.8–10.8)

## 2020-07-03 PROCEDURE — 6360000002 HC RX W HCPCS: Performed by: PHYSICIAN ASSISTANT

## 2020-07-03 PROCEDURE — 80053 COMPREHEN METABOLIC PANEL: CPT

## 2020-07-03 PROCEDURE — 6370000000 HC RX 637 (ALT 250 FOR IP): Performed by: HOSPITALIST

## 2020-07-03 PROCEDURE — 82947 ASSAY GLUCOSE BLOOD QUANT: CPT

## 2020-07-03 PROCEDURE — 2580000003 HC RX 258: Performed by: PHYSICIAN ASSISTANT

## 2020-07-03 PROCEDURE — 85025 COMPLETE CBC W/AUTO DIFF WBC: CPT

## 2020-07-03 PROCEDURE — 6370000000 HC RX 637 (ALT 250 FOR IP): Performed by: PHYSICIAN ASSISTANT

## 2020-07-03 PROCEDURE — 83735 ASSAY OF MAGNESIUM: CPT

## 2020-07-03 RX ORDER — DOXYCYCLINE HYCLATE 100 MG/1
100 CAPSULE ORAL 2 TIMES DAILY
Qty: 20 CAPSULE | Refills: 0 | Status: SHIPPED | OUTPATIENT
Start: 2020-07-03 | End: 2020-07-13

## 2020-07-03 RX ADMIN — INSULIN LISPRO 4 UNITS: 100 INJECTION, SOLUTION INTRAVENOUS; SUBCUTANEOUS at 09:14

## 2020-07-03 RX ADMIN — BUSPIRONE HYDROCHLORIDE 5 MG: 5 TABLET ORAL at 08:35

## 2020-07-03 RX ADMIN — PANTOPRAZOLE SODIUM 40 MG: 40 TABLET, DELAYED RELEASE ORAL at 08:34

## 2020-07-03 RX ADMIN — PROMETHAZINE HYDROCHLORIDE 25 MG: 25 TABLET ORAL at 04:00

## 2020-07-03 RX ADMIN — SODIUM CHLORIDE, PRESERVATIVE FREE 10 ML: 5 INJECTION INTRAVENOUS at 08:41

## 2020-07-03 RX ADMIN — BUTORPHANOL TARTRATE 1 MG: 1 INJECTION, SOLUTION INTRAMUSCULAR; INTRAVENOUS at 03:43

## 2020-07-03 RX ADMIN — HYDROCODONE BITARTRATE AND ACETAMINOPHEN 1 TABLET: 7.5; 325 TABLET ORAL at 08:35

## 2020-07-03 RX ADMIN — GABAPENTIN 100 MG: 100 CAPSULE ORAL at 08:35

## 2020-07-03 RX ADMIN — VANCOMYCIN HYDROCHLORIDE 1500 MG: 10 INJECTION, POWDER, LYOPHILIZED, FOR SOLUTION INTRAVENOUS at 09:31

## 2020-07-03 RX ADMIN — CELECOXIB 200 MG: 200 CAPSULE ORAL at 08:35

## 2020-07-03 RX ADMIN — PRAMIPEXOLE DIHYDROCHLORIDE 0.75 MG: 0.25 TABLET ORAL at 08:34

## 2020-07-03 RX ADMIN — BUTORPHANOL TARTRATE 1 MG: 1 INJECTION, SOLUTION INTRAMUSCULAR; INTRAVENOUS at 11:50

## 2020-07-03 ASSESSMENT — PAIN SCALES - GENERAL
PAINLEVEL_OUTOF10: 3
PAINLEVEL_OUTOF10: 6
PAINLEVEL_OUTOF10: 9
PAINLEVEL_OUTOF10: 2
PAINLEVEL_OUTOF10: 5

## 2020-07-03 NOTE — PLAN OF CARE
RN  Outcome: Ongoing  Goal: Control of chronic pain  Description: Control of chronic pain  7/3/2020 1014 by Eva Jasso RN  Outcome: Ongoing  7/3/2020 0311 by Aly Mcgovern RN  Outcome: Ongoing     Problem: Infection - Central Venous Catheter-Associated Bloodstream Infection:  Goal: Will show no infection signs and symptoms  Description: Will show no infection signs and symptoms  7/3/2020 1014 by Eva Jasso RN  Outcome: Ongoing  7/3/2020 0311 by Aly Mcgovern RN  Outcome: Ongoing     Problem: Discharge Planning:  Goal: Discharged to appropriate level of care  Description: Discharged to appropriate level of care  7/3/2020 1014 by Eva Jasso RN  Outcome: Ongoing  7/3/2020 0311 by Aly Mcgovern RN  Outcome: Ongoing

## 2020-07-03 NOTE — PROGRESS NOTES
Infectious Diseases Progress Note    Patient:  Tianna Trivedi  YOB: 1970  MRN: [id number]   Admit date: 6/30/2020   Admitting Physician: Ellen Kang MD  Primary Care Physician: NANETTE Quezada    Chief Complaint/Interval History: She feels her legs are getting better. She had already seen physical therapy for lymphedema massage. They had not yet started treatment due to cellulitis. She has had no fevers or chills. No diarrhea or rash. No new symptoms. In/Out    Intake/Output Summary (Last 24 hours) at 7/3/2020 0948  Last data filed at 7/2/2020 1200  Gross per 24 hour   Intake 250 ml   Output --   Net 250 ml     Allergies:    Allergies   Allergen Reactions    Compazine [Prochlorperazine Maleate] Shortness Of Breath    Ciprofloxacin Hives    Amoxicillin-Pot Clavulanate     Demerol Hives    Hydroxyzine     Hydroxyzine Hcl Itching    Ibuprofen Nausea Only    Ketorolac Tromethamine     Meperidine Hives    Nortriptyline Other (See Comments)    Orphenadrine     Orphenadrine Citrate Itching    Penicillins Hives and Nausea Only    Prochlorperazine Edisylate      Will discuss with patient at next visit     Tobramycin      Will discuss with patient at next visit     Tramadol      Will discuss with patient at next visit     Vistaril [Hydroxyzine Hcl] Itching    Cephalexin Rash    Clindamycin/Lincomycin Rash    Codeine Nausea And Vomiting and Rash    Doxycycline Nausea And Vomiting    Keflex [Cephalexin] Rash    Ketorolac Tromethamine Itching and Nausea And Vomiting    Moxifloxacin Nausea And Vomiting     Will discuss with patient at visit      Current Meds: butorphanol (STADOL) injection 1 mg, Q6H PRN  lidocaine PF 1 % injection 5 mL, Once  sodium chloride flush 0.9 % injection 10 mL, 2 times per day  sodium chloride flush 0.9 % injection 10 mL, PRN  ALPRAZolam (XANAX) tablet 0.5 mg, Q4H PRN  vancomycin (VANCOCIN) 1,500 mg in dextrose 5 % 500 mL IVPB, Q12H  vancomycin (VANCOCIN) treatment. Lab Results:  CBC:   Recent Labs     07/01/20  0445 07/02/20  0552 07/03/20  0821   WBC 5.6 7.5 6.0   HGB 12.3 13.0 12.8    217 182     BMP:  Recent Labs     06/30/20  1738 07/01/20  0445 07/02/20  0552    140 137   K 4.1 4.6 4.1    102 97*   CO2 26 28 28   BUN 18 18 16   CREATININE 0.8 1.0* 0.9   GLUCOSE 145* 248* 116*     CultureResults: Blood cultures no growth      Radiology: None    Additional Studies Reviewed:  None    Impression:  1. Bilateral lower extremity lymphedema  2. Bilateral lower extremity cellulitis right greater than left-improved  3. Faint bilateral lower extremity erythema-likely more of a stasis dermatitis/dependent rubor. Feel cellulitis has improved significantly. 4.  Obesity  5. Diabetes mellitus    Recommendations:  Ideally would put her on suppressive/preventive Keflex or amoxicillin but she is reporting allergy. She also lists allergy to clindamycin.   She lists quinolone allergy  She is on BuSpar which makes it more difficult to use linezolid  Very limited options based on her allergy history    Would suggest:  Okay with me to discharge after this morning's dose of vancomycin  Suggest doxycycline 100 mg orally every 12 hours for 3 days  Suggest oritavancin 1200 mg IV 1 dose weekly for 2 weeks (if oritavancin can be arranged for today then she could receive oritavancin post discharge through outpatient area and you could cancel doxycycline recommendation)  Would begin oritavancin Monday or Tuesday of next week when it can be arranged through [facility] outpatient  Suggested she follow-up with [facility] physical therapy next week whom she has already seen-feel they can begin lymphedema massage treatment  Encouraged her to continue elevation of her legs at home to help improve edema  Suggested moisturization of her skin with Cetaphil, Lubriderm, or Eucerin to avoid dry cracked areas on her skin  Would like her to follow-up in 2 to 3 weeks    VIRI Rani Riojas MD

## 2020-07-03 NOTE — DISCHARGE SUMMARY
Tianna   :  [birth year]  MRN:  [id number]    Admit date:  2020  Discharge date:  2020    Discharging Physician: Lani Mckeon MD    Advance Directive: Full Code    Consults: Dr. Branch Christine disease     Primary Care Physician:  Savanah Garcia, NANETTE    Discharge Diagnoses:    Principal Problem:    Bilateral lower leg cellulitis  Active Problems:    RLS (restless legs syndrome)    Hypertension    Morbid obesity with BMI of 50.0-59.9, adult ([facility] [address]    Uncontrolled type 2 diabetes mellitus with diabetic neuropathy, with long-term current use of insulin ([facility])    Gastroesophageal reflux disease  Resolved Problems:    * No resolved hospital problems. *    Portions of this note have been copied forward, however, changed to reflect the most current clinical status of this patient. Hospital Course: The patient is a 54 y.o. female with lymphedema, anxiety, DM II, HTN, HLD, restless leg syndrome who presented to [facility] ED complaining of bilateral lower extremity edema that has been a chronic issue for the last month. She was last discharged from this facility on 2020 after being treated with IV Vancomycin and sent home on PO Doxycycline. Patient states she completed the course of Doxycycline prescribed and took an additional 3 days of an old prescription of the same medication she found at home. States the redness returned. She denies fever, endorses chills. States she had 2-3 blisters return to LE's. States she has been elevated legs at home and has been sleeping in the recliner. Uncertain as to whether or not she has been using Ace wraps/Compression as recommended at discharge and lymphedema clinic. She is hypertensive in ED, afebrile with normal white count. She was admitted to Hospitalist service, continued on IV Vancomycin with consult made to Infectious disease for antibiotic recommendations.     Significant Diagnostic Studies:   Vl Extremity Venous Bilateral   Impression   Non-diagnostic exam. (CELEBREX) 100 MG capsule  Take 1 capsule by mouth daily             celecoxib (CELEBREX) 200 MG capsule  TAKE 1 CAPSULE BY MOUTH EVERY DAY             [employer] CREA  Apply 1-2 pumps to affected area 3-4 times daily             cyclobenzaprine (FLEXERIL) 10 MG tablet  Take 10 mg by mouth nightly as needed for Muscle spasms             Diabetic Shoe MISC  by Does not apply route DISPENSE ONE PAIR OF DIABETIC SHOE AND 3 PAIRS HEAT MOLDED INSERTS             diclofenac sodium (VOLTAREN) 1 % GEL  Apply 2 g topically 2 times daily             doxycycline hyclate (VIBRAMYCIN) 100 MG capsule  Take 1 capsule by mouth 2 times daily for 10 days             EPINEPHrine (EPIPEN 2-JOSÉ) 0.3 MG/0.3ML SOAJ injection  Inject 0.3 mLs into the muscle once for 1 dose Use as directed for allergic reaction             gabapentin (NEURONTIN) 100 MG capsule  Take 100 mg by mouth 2 times daily. gabapentin (NEURONTIN) 300 MG capsule  TK ONE C PO QHS             HYDROcodone-acetaminophen (NORCO) 7.5-325 MG per tablet  Take 1 tablet by mouth every 6 hours as needed for Pain. .             Lancets (ONETOUCH DELICA PLUS [id number]) MISC  USE TO CHECK BLLOD SUGAR AS DIRECTED             metFORMIN (GLUCOPHAGE) 500 MG tablet  TAKE 1 TABLET BY MOUTH TWICE DAILY WITH MEALS             omeprazole (PRILOSEC) 20 MG delayed release capsule  TAKE 1 CAPSULE BY MOUTH TWICE DAILY BEFORE MEALS             ondansetron (ZOFRAN-ODT) 4 MG disintegrating tablet  Place 1 tablet under the tongue every 8 hours as needed for Nausea or Vomiting Take prior to Doxycycline doses             ONE TOUCH LANCETS MISC  1 each by Does not apply route daily             ONE TOUCH ULTRA TEST strip  TEST BLOOD SUGAR ONCE DAILY             oritavancin diphosphate (ORBACTIV) 400 MG SOLR  Infuse 1,200 mg intravenously once a week for 2 doses             pramipexole (MIRAPEX) 0.75 MG tablet  TAKE 1 TABLET BY MOUTH TWICE DAILY             promethazine (PHENERGAN) 25 MG tablet  TAKE 1 TABLET BY MOUTH EVERY 6 HOURS AS NEEDED FOR NAUSEA             tiZANidine (ZANAFLEX) 4 MG tablet  TAKE 1 TABLET BY MOUTH EVERY NIGHT AS NEEDED FOR CRAMPING             triamterene-hydroCHLOROthiazide (DYAZIDE) 37.5-25 MG per capsule  TAKE 1 CAPSULE BY MOUTH EVERY DAY IN THE MORNING             VICTOZA 18 MG/3ML SOPN SC injection  ADMINISTER 1.8 MG INTO THE SKIN EVERY DAY               Discharge Instructions: Follow up with NANETTE Pabon in 3-5 days. Take medications as directed. Resume activity as tolerated. Follow up with [facility] physical therapy next week -Infectious Disease feels you can begin lymphedema massage treatment  Continue elevation of legs at home to help improve edema  Suggested moisturization of skin with Cetaphil, Lubriderm, or Eucerin to avoid dry cracked areas  Follow-up in 2 to 3 weeks with      Continue Doxycycline until Oritavancin can be arranged as outpatient. Stop taking Doxycycline the evening prior to your Oritavancin infusion. Your insurance office was unable to be reached today by case management. On Monday, 07/06/2020, call [phone] and case management will assist you with Oritavancin prescription/outpatient infusion. Diet: DIET CARB CONTROL;     Disposition: Patient is medically stable and will be discharged home. Time spent on discharge 35 minutes spent in assessing patient, reviewing medications, discussion with nursing, confirming safe discharge plan and preparation of discharge summary.     Signed:  Venkata Ewing PA-C

## 2020-07-03 NOTE — PLAN OF CARE
and symptoms  7/3/2020 0311 by Anabelle Staples RN  Outcome: Ongoing  7/2/2020 1610 by Shana Morrison RN  Outcome: Ongoing  Goal: Absence of new skin breakdown  Description: Absence of new skin breakdown  7/3/2020 0311 by Anabelle Staples RN  Outcome: Ongoing  7/2/2020 1610 by Shana Morrison RN  Outcome: Ongoing     Problem: Discharge Planning:  Goal: Discharged to appropriate level of care  Description: Discharged to appropriate level of care  7/3/2020 0311 by Anabelle Staples RN  Outcome: Ongoing  7/2/2020 1610 by Shana Morrison RN  Outcome: Ongoing     Problem: Pain:  Description: Pain management should include both nonpharmacologic and pharmacologic interventions.   Goal: Pain level will decrease  Description: Pain level will decrease  7/3/2020 0311 by Anabelle Staples RN  Outcome: Ongoing  7/2/2020 1610 by Shana Morrison RN  Outcome: Ongoing  Goal: Control of acute pain  Description: Control of acute pain  7/3/2020 0311 by Anabelle Staples RN  Outcome: Ongoing  7/2/2020 1610 by Shana Morrison RN  Outcome: Ongoing  Goal: Control of chronic pain  Description: Control of chronic pain  7/3/2020 0311 by Anabelle Staples RN  Outcome: Ongoing  7/2/2020 1610 by Shaan Morrison RN  Outcome: Ongoing     Problem: Infection - Central Venous Catheter-Associated Bloodstream Infection:  Goal: Will show no infection signs and symptoms  Description: Will show no infection signs and symptoms  7/3/2020 0311 by Anabelle Staples RN  Outcome: Ongoing  7/2/2020 1610 by Shana Morrison RN  Outcome: Ongoing

## 2020-07-03 NOTE — PLAN OF CARE
Problem:  Body Temperature - Imbalanced:  Goal: Ability to maintain a body temperature in the normal range will improve  Description: Ability to maintain a body temperature in the normal range will improve  7/3/2020 1139 by Christiano Fall RN  Outcome: Completed  7/3/2020 1014 by Carie Yousif RN  Outcome: Ongoing  7/3/2020 0311 by Delroy Hardy RN  Outcome: Ongoing     Problem: Mobility - Impaired:  Goal: Mobility will improve to maximum level  Description: Mobility will improve to maximum level  7/3/2020 1139 by Christiano Fall RN  Outcome: Completed  7/3/2020 1014 by Carie Yousif RN  Outcome: Ongoing  7/3/2020 0311 by Delroy Hardy RN  Outcome: Ongoing     Problem: Pain:  Goal: Pain level will decrease  Description: Pain level will decrease  7/3/2020 1139 by Christiano Fall RN  Outcome: Completed  7/3/2020 1014 by Carie Yousif RN  Outcome: Ongoing  7/3/2020 0311 by Delroy Hardy RN  Outcome: Ongoing  Goal: Control of acute pain  Description: Control of acute pain  7/3/2020 1139 by Christiano Fall RN  Outcome: Completed  7/3/2020 1014 by Carie Yousif RN  Outcome: Ongoing  7/3/2020 0311 by Delroy Hardy RN  Outcome: Ongoing  Goal: Control of chronic pain  Description: Control of chronic pain  7/3/2020 1139 by Christiano Fall RN  Outcome: Completed  7/3/2020 1014 by Carie Yousif RN  Outcome: Ongoing  7/3/2020 0311 by Delroy Hardy RN  Outcome: Ongoing     Problem: Skin Integrity - Impaired:  Goal: Will show no infection signs and symptoms  Description: Will show no infection signs and symptoms  7/3/2020 1139 by Christiano Fall RN  Outcome: Completed  7/3/2020 1014 by Carie Yousif RN  Outcome: Ongoing  7/3/2020 0311 by Delroy Hardy RN  Outcome: Ongoing  Goal: Absence of new skin breakdown  Description: Absence of new skin breakdown  7/3/2020 1139 by Christiano Fall RN  Outcome: Completed  7/3/2020 1014 by Carie Yousif RN  Outcome: Ongoing  7/3/2020 0311 by Poly Owen RN  Outcome: Ongoing     Problem: Pain:  Goal: Pain level will decrease  Description: Pain level will decrease  7/3/2020 1139 by Nanci Berman RN  Outcome: Completed  7/3/2020 1014 by Cas Morales RN  Outcome: Ongoing  7/3/2020 0311 by Poly Owen RN  Outcome: Ongoing  Goal: Control of acute pain  Description: Control of acute pain  7/3/2020 1139 by Nanci Berman RN  Outcome: Completed  7/3/2020 1014 by Cas Morales RN  Outcome: Ongoing  7/3/2020 0311 by Poly Owen RN  Outcome: Ongoing  Goal: Control of chronic pain  Description: Control of chronic pain  7/3/2020 1139 by Nanci Berman RN  Outcome: Completed  7/3/2020 1014 by Cas Morales RN  Outcome: Ongoing  7/3/2020 0311 by Poly Owen RN  Outcome: Ongoing     Problem: Infection - Central Venous Catheter-Associated Bloodstream Infection:  Goal: Will show no infection signs and symptoms  Description: Will show no infection signs and symptoms  7/3/2020 1139 by Nanci Berman RN  Outcome: Completed  7/3/2020 1014 by Cas Morales RN  Outcome: Ongoing  7/3/2020 0311 by Poly Owen RN  Outcome: Ongoing     Problem: Discharge Planning:  Goal: Discharged to appropriate level of care  Description: Discharged to appropriate level of care  7/3/2020 1139 by Nanci Berman RN  Outcome: Completed  7/3/2020 1014 by Cas Morales RN  Outcome: Ongoing  7/3/2020 0311 by Poly Owen RN  Outcome: Ongoing

## 2020-07-05 LAB
BLOOD CULTURE, ROUTINE: NORMAL
CULTURE, BLOOD 2: NORMAL

## 2020-07-06 ENCOUNTER — TELEPHONE (OUTPATIENT)
Dept: INTERNAL MEDICINE | Age: 50
End: 2020-07-06

## 2020-07-06 NOTE — TELEPHONE ENCOUNTER
Aj 45 Transitions Initial Follow Up Call     Outreach made within 2 business days of discharge: Yes     Patient: [address]   Patient : 1970   MRN: [id number]  Reason for Admission: Admitted 20 harper bilateral lower leg cellulitis    Discharge Date: 7/3/20            Discharge Diagnoses:    Principal Problem:    Bilateral lower leg cellulitis  Active Problems:    RLS (restless legs syndrome)    Hypertension    Morbid obesity with BMI of 50.0-59.9, adult ([facility])    Uncontrolled type 2 diabetes mellitus with diabetic neuropathy, with long-term current use of insulin ([facility])    Gastroesophageal reflux disease                Spoke with: 2nd attempt to reach patient, no answer and no voicemail to leave a message.      Discharge department/facility: [facility]            Scheduled appointment with PCP within 7-14 days     Follow Up         Future Appointments   Date Time Provider Shital Landry   2541  4:79 AM NANETTE Mulligan [place], [place]

## 2020-07-06 NOTE — TELEPHONE ENCOUNTER
Aj 45 Transitions Initial Follow Up Call    Outreach made within 2 business days of discharge: Yes    Patient: [address]  Patient : [id number]   MRN: [id number]  Reason for Admission: Admitted 20 harper bilateral lower leg cellulitis    Discharge Date: 7/3/20    Discharge Diagnoses:    Principal Problem:    Bilateral lower leg cellulitis  Active Problems:    RLS (restless legs syndrome)    Hypertension    Morbid obesity with BMI of 50.0-59.9, adult ([facility] [address]    Uncontrolled type 2 diabetes mellitus with diabetic neuropathy, with long-term current use of insulin ([facility])    Gastroesophageal reflux disease     Spoke with: attempted to reach patient, no answer. No voicemail to leave a message. I will reach out again later today.      Discharge department/facility: [facility]         Scheduled appointment with PCP within 7-14 days    Follow Up  Future Appointments   Date Time Provider Shital Landry     0:77 AM NANETTE Horner [address], [address] [facility]

## 2020-07-07 ENCOUNTER — TELEPHONE (OUTPATIENT)
Dept: PRIMARY CARE CLINIC | Age: 50
End: 2020-07-07

## 2020-07-07 ENCOUNTER — OFFICE VISIT (OUTPATIENT)
Dept: PRIMARY CARE CLINIC | Age: 50
End: 2020-07-07
Payer: MEDICAID

## 2020-07-07 VITALS
HEIGHT: 61 IN | BODY MASS INDEX: 55.32 KG/M2 | TEMPERATURE: 97.6 F | WEIGHT: 293 LBS | HEART RATE: 87 BPM | OXYGEN SATURATION: 99 % | DIASTOLIC BLOOD PRESSURE: 86 MMHG | SYSTOLIC BLOOD PRESSURE: 128 MMHG

## 2020-07-07 DIAGNOSIS — L03.119 CELLULITIS OF LOWER EXTREMITY, UNSPECIFIED LATERALITY: ICD-10-CM

## 2020-07-07 LAB
ALBUMIN SERPL-MCNC: 4.4 G/DL (ref 3.5–5.2)
ALP BLD-CCNC: 56 U/L (ref 35–104)
ALT SERPL-CCNC: 13 U/L (ref 5–33)
ANION GAP SERPL CALCULATED.3IONS-SCNC: 19 MMOL/L (ref 7–19)
AST SERPL-CCNC: 15 U/L (ref 5–32)
BASOPHILS ABSOLUTE: 0 K/UL (ref 0–0.2)
BASOPHILS RELATIVE PERCENT: 0.5 % (ref 0–1)
BILIRUB SERPL-MCNC: <0.2 MG/DL (ref 0.2–1.2)
BUN BLDV-MCNC: 18 MG/DL (ref 6–20)
CALCIUM SERPL-MCNC: 9.8 MG/DL (ref 8.6–10)
CHLORIDE BLD-SCNC: 101 MMOL/L (ref 98–111)
CO2: 25 MMOL/L (ref 22–29)
CREAT SERPL-MCNC: 0.8 MG/DL (ref 0.5–0.9)
EOSINOPHILS ABSOLUTE: 0.3 K/UL (ref 0–0.6)
EOSINOPHILS RELATIVE PERCENT: 4.8 % (ref 0–5)
GFR NON-AFRICAN AMERICAN: >60
GLUCOSE BLD-MCNC: 93 MG/DL (ref 74–109)
HCT VFR BLD CALC: 39.1 % (ref 37–47)
HEMOGLOBIN: 12.8 G/DL (ref 12–16)
IMMATURE GRANULOCYTES #: 0 K/UL
LYMPHOCYTES ABSOLUTE: 2 K/UL (ref 1.1–4.5)
LYMPHOCYTES RELATIVE PERCENT: 33.5 % (ref 20–40)
MCH RBC QN AUTO: 29 PG (ref 27–31)
MCHC RBC AUTO-ENTMCNC: 32.7 G/DL (ref 33–37)
MCV RBC AUTO: 88.7 FL (ref 81–99)
MONOCYTES ABSOLUTE: 0.4 K/UL (ref 0–0.9)
MONOCYTES RELATIVE PERCENT: 7.2 % (ref 0–10)
NEUTROPHILS ABSOLUTE: 3.1 K/UL (ref 1.5–7.5)
NEUTROPHILS RELATIVE PERCENT: 53.7 % (ref 50–65)
PDW BLD-RTO: 13.2 % (ref 11.5–14.5)
PLATELET # BLD: 185 K/UL (ref 130–400)
PMV BLD AUTO: 10.2 FL (ref 9.4–12.3)
POTASSIUM SERPL-SCNC: 3.9 MMOL/L (ref 3.5–5)
RBC # BLD: 4.41 M/UL (ref 4.2–5.4)
SODIUM BLD-SCNC: 145 MMOL/L (ref 136–145)
TOTAL PROTEIN: 7.3 G/DL (ref 6.6–8.7)
WBC # BLD: 5.8 K/UL (ref 4.8–10.8)

## 2020-07-07 PROCEDURE — 3051F HG A1C>EQUAL 7.0%<8.0%: CPT | Performed by: NURSE PRACTITIONER

## 2020-07-07 PROCEDURE — G8427 DOCREV CUR MEDS BY ELIG CLIN: HCPCS | Performed by: NURSE PRACTITIONER

## 2020-07-07 PROCEDURE — 2022F DILAT RTA XM EVC RTNOPTHY: CPT | Performed by: NURSE PRACTITIONER

## 2020-07-07 PROCEDURE — 1111F DSCHRG MED/CURRENT MED MERGE: CPT | Performed by: NURSE PRACTITIONER

## 2020-07-07 PROCEDURE — G8417 CALC BMI ABV UP PARAM F/U: HCPCS | Performed by: NURSE PRACTITIONER

## 2020-07-07 PROCEDURE — 1036F TOBACCO NON-USER: CPT | Performed by: NURSE PRACTITIONER

## 2020-07-07 PROCEDURE — 99215 OFFICE O/P EST HI 40 MIN: CPT | Performed by: NURSE PRACTITIONER

## 2020-07-07 RX ORDER — CLONAZEPAM 0.5 MG/1
0.5 TABLET ORAL 2 TIMES DAILY
Qty: 45 TABLET | Refills: 0 | Status: SHIPPED | OUTPATIENT
Start: 2020-07-07 | End: 2020-08-19 | Stop reason: SDUPTHER

## 2020-07-07 RX ORDER — ZOLMITRIPTAN 5 MG/1
1 SPRAY NASAL
Qty: 1 EACH | Refills: 3 | Status: ON HOLD | OUTPATIENT
Start: 2020-07-07 | End: 2021-04-12

## 2020-07-07 ASSESSMENT — ENCOUNTER SYMPTOMS
BACK PAIN: 0
COUGH: 0
COLOR CHANGE: 0
VOICE CHANGE: 0
RHINORRHEA: 0
NAUSEA: 0
PHOTOPHOBIA: 0
VOMITING: 0
SHORTNESS OF BREATH: 0

## 2020-07-07 NOTE — TELEPHONE ENCOUNTER
Pt called said that she has not heard anything about this medication being approved. I told the pt on the phone that it typically only takes them about 24 hours for the approval. She made it sound as if Shen Cabrera was going to order this for her. I read pt discharge summary from the hospital and found that Dr. Crescencio Melvin is the one who recommended it. Pt then made it sound like she did not have an order for this. I called over to Infusion center to see if there was an order that had been sent there and to check the status of it. Infusion center stated they did not have the order. I then spoke with Shen Cabrera who informed pt pt actually has order in her hand. I attempted to reach pt to tell her to take that order over to the infusion center so that they can get the medication approved and infusions can be started ASAP. I was unable to reach pt and was unable to LM.

## 2020-07-07 NOTE — PROGRESS NOTES
Post-Discharge Transitional Care Management Services or Hospital Follow Up      Tianna Trivedi   YOB: 1970    Date of Office Visit:  7/7/2020  Date of Hospital Admission: 6/30/20  Date of Hospital Discharge: 7/3/20  Readmission Risk Score(high >=14%.  Medium >=10%):Readmission Risk Score: 18      Care management risk score Rising risk (score 2-5) and Complex Care (Scores >=6): 5     Non face to face  following discharge, date last encounter closed (first attempt may have been earlier): 7/6/2020  2:23 PM 7/6/2020  2:23 PM    Call initiated 2 business days of discharge: Yes     Patient Active Problem List   Diagnosis    RLS (restless legs syndrome)    Hypertension    Venous insufficiency of both lower extremities    Mild single current episode of major depressive disorder ([facility] [address]    FEDERICA (generalized anxiety disorder)    Old complex tear of medial meniscus of left knee    Migraine without aura and without status migrainosus, not intractable    Morbid obesity with BMI of 50.0-59.9, adult ([facility] [address]    Uncontrolled type 2 diabetes mellitus with diabetic neuropathy, with long-term current use of insulin (HCC)    Dyspnea    Upper respiratory tract infection    Pleurisy    Gastroesophageal reflux disease    Hypoglycemia    Hypokalemia    Recurrent cellulitis of lower extremity    Bilateral lower leg cellulitis       Allergies   Allergen Reactions    Compazine [Prochlorperazine Maleate] Shortness Of Breath    Ciprofloxacin Hives    Amoxicillin-Pot Clavulanate     Demerol Hives    Hydroxyzine     Hydroxyzine Hcl Itching    Ibuprofen Nausea Only    Ketorolac Tromethamine     Meperidine Hives    Nortriptyline Other (See Comments)    Orphenadrine     Orphenadrine Citrate Itching    Penicillins Hives and Nausea Only    Prochlorperazine Edisylate      Will discuss with patient at next visit     Tobramycin      Will discuss with patient at next visit     Tramadol      Will discuss with patient at next visit     Vistaril [Hydroxyzine Hcl] Itching    Cephalexin Rash    Clindamycin/Lincomycin Rash    Codeine Nausea And Vomiting and Rash    Doxycycline Nausea And Vomiting    Keflex [Cephalexin] Rash    Ketorolac Tromethamine Itching and Nausea And Vomiting    Moxifloxacin Nausea And Vomiting     Will discuss with patient at visit        Medications listed as ordered at the time of discharge from hospital   Tianna Trivedi Medication Instructions ADRIEL:    Printed on:07/07/20 8632   Medication Information                      B-D ULTRAFINE III SHORT PEN 31G X 8 MM MISC  USE TO INJECT INSULIN ONCE DAILY             blood glucose monitor kit and supplies  Test 1 times a day & as needed for symptoms of irregular blood glucose. Blood Glucose Monitoring Suppl ([address]) w/Device KIT  1 kit by Does not apply route daily as needed (Dx 250.00)             blood glucose test strips (ASCENSIA AUTODISC VI;ONE TOUCH ULTRA TEST VI) strip  Check glucose TID and as needed for hypoglycemic events Dx E11.40 E11.66 Z79.4  Use one touch verio flex             busPIRone (BUSPAR) 5 MG tablet  Take 5 mg by mouth 2 times daily             Butorphanol Tartrate (STADOL NS NA)  by Nasal route             celecoxib (CELEBREX) 100 MG capsule  Take 1 capsule by mouth daily             celecoxib (CELEBREX) 200 MG capsule  TAKE 1 CAPSULE BY MOUTH EVERY DAY             clonazePAM (KLONOPIN) 0.5 MG tablet  Take 1 tablet by mouth 2 times daily for 30 days.              Cream Base CREA  Apply 1-2 pumps to affected area 3-4 times daily             cyclobenzaprine (FLEXERIL) 10 MG tablet  Take 10 mg by mouth nightly as needed for Muscle spasms             Diabetic Shoe MISC  by Does not apply route DISPENSE ONE PAIR OF DIABETIC SHOE AND 3 PAIRS HEAT MOLDED INSERTS             diclofenac sodium (VOLTAREN) 1 % GEL  Apply 2 g topically 2 times daily             doxycycline hyclate (VIBRAMYCIN) 100 MG capsule  Take 1 capsule by mouth 2 times daily for 10 days             EPINEPHrine (EPIPEN 2-JOSÉ) 0.3 MG/0.3ML SOAJ injection  Inject 0.3 mLs into the muscle once for 1 dose Use as directed for allergic reaction             gabapentin (NEURONTIN) 100 MG capsule  Take 100 mg by mouth 2 times daily. gabapentin (NEURONTIN) 300 MG capsule  TK ONE C PO QHS             HYDROcodone-acetaminophen (NORCO) 7.5-325 MG per tablet  Take 1 tablet by mouth every 6 hours as needed for Pain. .             Lancets (ONETOUCH DELICA PLUS [id number]) MISC  USE TO CHECK BLLOD SUGAR AS DIRECTED             metFORMIN (GLUCOPHAGE) 500 MG tablet  TAKE 1 TABLET BY MOUTH TWICE DAILY WITH MEALS             omeprazole (PRILOSEC) 20 MG delayed release capsule  TAKE 1 CAPSULE BY MOUTH TWICE DAILY BEFORE MEALS             ondansetron (ZOFRAN-ODT) 4 MG disintegrating tablet  Place 1 tablet under the tongue every 8 hours as needed for Nausea or Vomiting Take prior to Doxycycline doses             ONE TOUCH LANCETS MISC  1 each by Does not apply route daily             ONE TOUCH ULTRA TEST strip  TEST BLOOD SUGAR ONCE DAILY             oritavancin diphosphate (ORBACTIV) 400 MG SOLR  Infuse 1,200 mg intravenously once a week for 2 doses             pramipexole (MIRAPEX) 0.75 MG tablet  TAKE 1 TABLET BY MOUTH TWICE DAILY             promethazine (PHENERGAN) 25 MG tablet  TAKE 1 TABLET BY MOUTH EVERY 6 HOURS AS NEEDED FOR NAUSEA             tiZANidine (ZANAFLEX) 4 MG tablet  TAKE 1 TABLET BY MOUTH EVERY NIGHT AS NEEDED FOR CRAMPING             triamterene-hydroCHLOROthiazide (DYAZIDE) 37.5-25 MG per capsule  TAKE 1 CAPSULE BY MOUTH EVERY DAY IN THE MORNING             VICTOZA 18 MG/3ML SOPN SC injection  ADMINISTER 1.8 MG INTO THE SKIN EVERY DAY             ZOLMitriptan (ZOMIG) 5 MG nasal solution  0.1 mLs by Nasal route once as needed for Migraine                   Medications marked \"taking\" at this time  Outpatient Medications Marked as 10 tablet 0    Diabetic Shoe MISC by Does not apply route DISPENSE ONE PAIR OF DIABETIC SHOE AND 3 PAIRS HEAT MOLDED INSERTS 1 each 0    Lancets (ONETOUCH DELICA PLUS [id number]) MISC USE TO CHECK BLLOD SUGAR AS DIRECTED 100 each 1    celecoxib (CELEBREX) 200 MG capsule TAKE 1 CAPSULE BY MOUTH EVERY DAY (Patient taking differently: 200 mg daily TAKE 1 CAPSULE BY MOUTH EVERY DAY) 60 capsule 5    blood glucose monitor kit and supplies Test 1 times a day & as needed for symptoms of irregular blood glucose. 1 kit 0    VICTOZA 18 MG/3ML SOPN SC injection ADMINISTER 1.8 MG INTO THE SKIN EVERY DAY 9 mL 3    gabapentin (NEURONTIN) 100 MG capsule Take 100 mg by mouth 2 times daily.  gabapentin (NEURONTIN) 300 MG capsule TK ONE C PO QHS  5    HYDROcodone-acetaminophen (NORCO) 7.5-325 MG per tablet Take 1 tablet by mouth every 6 hours as needed for Pain. Savana Hernandez Blood Glucose Monitoring Suppl ([address]) w/Device KIT 1 kit by Does not apply route daily as needed (Dx 250.00) 1 kit 0    ONE TOUCH LANCETS MISC 1 each by Does not apply route daily 100 each 3    Cream Base CREA Apply 1-2 pumps to affected area 3-4 times daily 360 g 3        Medications patient taking as of now reconciled against medications ordered at time of hospital discharge: Yes    Chief Complaint   Patient presents with    Follow-Up from Hospital     not doing well       HPI    Inpatient course: Discharge summary reviewed- see chart. Interval history/Current status:     Patient presents today for hospital discharge follow-up; she was recently admitted to [facility] for lower extremity cellulitis. She was given IV vancomycin which improved symptoms. She reports redness has begun to appear again, however, since discharge and last night she complains of having chills. Legs are warm to touch. There is no drainage or wounds. Blood sugars have been in 200s.      Patient has follow-up with Dr Briana Pka later this month; he has recommended she begin [place] IV infusions twice weekly. She is working with case management to get this approved through [facility]. Patient has also been set up with Lymphedema [facility] at [facility]; she has been wrapping legs with ace wraps. She states she will be returning once redness lessens and infection improves. She is to call both Dr Salvatore Holley office today and communicate with insurance about approval of antibiotics; she will need to get an alternative to [place] if insurance will not cover. Patient reports she is having increasing anxiety with mild to moderate events that seem to panic attacks. Buspar typically is effective but lately has not been helping. She has been on SSRIs and wellbutrin in the past which all seemed to trigger worsening migraines. She is requesting migraine medication refill today. Review of Systems   Constitutional: Negative for chills and fever. HENT: Negative for ear pain, hearing loss, rhinorrhea and voice change. Eyes: Negative for photophobia and visual disturbance. Respiratory: Negative for cough and shortness of breath. Cardiovascular: Negative for chest pain and palpitations. Gastrointestinal: Negative for nausea and vomiting. Endocrine: Negative. Negative for cold intolerance and heat intolerance. Genitourinary: Negative for difficulty urinating and flank pain. Musculoskeletal: Negative for back pain and neck pain. Skin: Negative for color change and rash. Allergic/Immunologic: Negative for environmental allergies and food allergies. Neurological: Negative for dizziness, speech difficulty and headaches. Hematological: Does not bruise/bleed easily. Psychiatric/Behavioral: Negative for sleep disturbance and suicidal ideas. Vitals:    07/07/20 0837   BP: 128/86   Pulse: 87   Temp: 97.6 °F (36.4 °C)   TempSrc: Temporal   SpO2: 99%   Weight: (!) 323 lb 6.4 oz (146.7 kg)   Height: 5' 1\" (1.549 m)     Body mass index is 61.11 kg/m².    Wt Readings from Last Patient to return in 1 month for weight check; discussed clean eating, diabetic diet, meal prepping and managing hunger and cravings. 5. Migraine without aura and without status migrainosus, not intractable  - Zomig refilled. 6. Uncontrolled type 2 diabetes mellitus with diabetic neuropathy, with long-term current use of insulin (HCC)  - Goal FBS <130. A1C is currently 7.8 which is an improvement.          Medical Decision Making: high complexity

## 2020-07-08 ENCOUNTER — TELEPHONE (OUTPATIENT)
Dept: PRIMARY CARE CLINIC | Age: 50
End: 2020-07-08

## 2020-07-08 NOTE — TELEPHONE ENCOUNTER
Zomig is not working pt requesting refill on Stadol nasal spray. However, Dr. Paradise Monahan did that as a courtesy to her the last time it was filled. I told pt I would send you a message to see if you would be willing to refill but it was likely you would not and she VU.

## 2020-07-09 ENCOUNTER — HOSPITAL ENCOUNTER (OUTPATIENT)
Dept: INFUSION THERAPY | Age: 50
Setting detail: INFUSION SERIES
Discharge: HOME | End: 2020-07-09
Payer: MEDICAID

## 2020-07-09 VITALS
OXYGEN SATURATION: 98 % | TEMPERATURE: 97.6 F | HEART RATE: 89 BPM | RESPIRATION RATE: 16 BRPM | BODY MASS INDEX: 55.32 KG/M2 | WEIGHT: 293 LBS | DIASTOLIC BLOOD PRESSURE: 74 MMHG | HEIGHT: 61 IN | SYSTOLIC BLOOD PRESSURE: 134 MMHG

## 2020-07-09 DIAGNOSIS — L03.115 BILATERAL LOWER LEG CELLULITIS: Primary | ICD-10-CM

## 2020-07-09 DIAGNOSIS — L03.116 BILATERAL LOWER LEG CELLULITIS: Primary | ICD-10-CM

## 2020-07-09 PROCEDURE — 2580000003 HC RX 258: Performed by: PHYSICIAN ASSISTANT

## 2020-07-09 PROCEDURE — 6360000002 HC RX W HCPCS: Performed by: PHYSICIAN ASSISTANT

## 2020-07-09 PROCEDURE — 96366 THER/PROPH/DIAG IV INF ADDON: CPT

## 2020-07-09 PROCEDURE — 96365 THER/PROPH/DIAG IV INF INIT: CPT

## 2020-07-09 RX ADMIN — ORITAVANCIN 1200 MG: 400 INJECTION, POWDER, LYOPHILIZED, FOR SOLUTION INTRAVENOUS at 11:03

## 2020-07-13 ENCOUNTER — OFFICE VISIT (OUTPATIENT)
Dept: URGENT CARE | Age: 50
End: 2020-07-13
Payer: MEDICAID

## 2020-07-13 VITALS
SYSTOLIC BLOOD PRESSURE: 122 MMHG | DIASTOLIC BLOOD PRESSURE: 74 MMHG | TEMPERATURE: 98.4 F | HEART RATE: 80 BPM | OXYGEN SATURATION: 100 % | RESPIRATION RATE: 18 BRPM

## 2020-07-13 LAB
BILIRUBIN, POC: NORMAL
BLOOD URINE, POC: NORMAL
CLARITY, POC: CLEAR
COLOR, POC: YELLOW
GLUCOSE URINE, POC: NORMAL
KETONES, POC: NORMAL
LEUKOCYTE EST, POC: NORMAL
NITRITE, POC: NORMAL
PH, POC: 5.5
PROTEIN, POC: NORMAL
SPECIFIC GRAVITY, POC: 1.01
UROBILINOGEN, POC: 0.2

## 2020-07-13 PROCEDURE — 81002 URINALYSIS NONAUTO W/O SCOPE: CPT | Performed by: NURSE PRACTITIONER

## 2020-07-13 PROCEDURE — 99213 OFFICE O/P EST LOW 20 MIN: CPT | Performed by: NURSE PRACTITIONER

## 2020-07-13 RX ORDER — BUSPIRONE HYDROCHLORIDE 5 MG/1
TABLET ORAL
Qty: 90 TABLET | Refills: 0 | Status: SHIPPED | OUTPATIENT
Start: 2020-07-13 | End: 2020-08-14

## 2020-07-13 ASSESSMENT — ENCOUNTER SYMPTOMS: BACK PAIN: 1

## 2020-07-13 NOTE — PROGRESS NOTES
[facility]  [address]  [address] [address]  Dept: [phone]  Dept Fax: [phone]: [phone]    Tianna  is a 54 y.o. female who presents today for her medical conditions/complaintsas noted below. Tianna  is c/o of Lower Back Pain        HPI:     Pt presents to clinic today with complaints of low back pain. History of kidney stones. Pt has recently been in hospital for BLE cellulitis. She states that she is unable to sleep. She is having anxiety. Past Medical History:   Diagnosis Date    Anxiety     Constipation     Depression     DM (diabetes mellitus) ([address]     Headache(784.0)     Hyperlipidemia     Hypertension     Kidney stones     Kidney stones     Restless leg     Restless legs syndrome      Past Surgical History:   Procedure Laterality Date    ECTOPIC PREGNANCY SURGERY      EYE SURGERY      cornea transplant and cataracts removed    KNEE CARTILAGE SURGERY Left 12/20/2016    KNEE ARTHROSCOPIC PARTIAL MEDIAL MENISCECTOMY performed by Joanne Bird MD at [address] [place] [address]         Family History   Problem Relation Age of Onset    Cancer Father     Cancer Maternal Grandmother     COPD Maternal Grandmother     Cancer Maternal Grandfather        Social History     Tobacco Use    Smoking status: Never Smoker    Smokeless tobacco: Never Used   Substance Use Topics    Alcohol use: No      Current Outpatient Medications   Medication Sig Dispense Refill    busPIRone (BUSPAR) 5 MG tablet TAKE 1 TABLET BY MOUTH THREE TIMES DAILY 90 tablet 0    clonazePAM (KLONOPIN) 0.5 MG tablet Take 1 tablet by mouth 2 times daily for 30 days.  45 tablet 0    ZOLMitriptan (ZOMIG) 5 MG nasal solution 0.1 mLs by Nasal route once as needed for Migraine 1 each 3    doxycycline hyclate (VIBRAMYCIN) 100 MG capsule Take 1 capsule by mouth 2 times daily for 10 days 20 capsule 0    omeprazole (PRILOSEC) 20 MG delayed release capsule TAKE 1 CAPSULE BY MOUTH TWICE DAILY BEFORE MEALS 180 capsule 1    triamterene-hydroCHLOROthiazide (DYAZIDE) 37.5-25 MG per capsule TAKE 1 CAPSULE BY MOUTH EVERY DAY IN THE MORNING 90 capsule 0    ondansetron (ZOFRAN-ODT) 4 MG disintegrating tablet Place 1 tablet under the tongue every 8 hours as needed for Nausea or Vomiting Take prior to Doxycycline doses 20 tablet 0    diclofenac sodium (VOLTAREN) 1 % GEL Apply 2 g topically 2 times daily      cyclobenzaprine (FLEXERIL) 10 MG tablet Take 10 mg by mouth nightly as needed for Muscle spasms      Butorphanol Tartrate (STADOL NS NA) by Nasal route      pramipexole (MIRAPEX) 0.75 MG tablet TAKE 1 TABLET BY MOUTH TWICE DAILY 60 tablet 5    celecoxib (CELEBREX) 100 MG capsule Take 1 capsule by mouth daily 60 capsule 3    blood glucose test strips (ASCENSIA AUTODISC VI;ONE TOUCH ULTRA TEST VI) strip Check glucose TID and as needed for hypoglycemic events Dx E11.40 E11.66 Z79.4  Use one touch verio flex 150 each 5    B-D ULTRAFINE III SHORT PEN 31G X 8 MM MISC USE TO INJECT INSULIN ONCE DAILY 100 each 3    promethazine (PHENERGAN) 25 MG tablet TAKE 1 TABLET BY MOUTH EVERY 6 HOURS AS NEEDED FOR NAUSEA 30 tablet 0    tiZANidine (ZANAFLEX) 4 MG tablet TAKE 1 TABLET BY MOUTH EVERY NIGHT AS NEEDED FOR CRAMPING 10 tablet 0    Diabetic Shoe MISC by Does not apply route DISPENSE ONE PAIR OF DIABETIC SHOE AND 3 PAIRS HEAT MOLDED INSERTS 1 each 0    Lancets (ONETOUCH DELICA PLUS [id number]) MISC USE TO CHECK BLLOD SUGAR AS DIRECTED 100 each 1    metFORMIN (GLUCOPHAGE) 500 MG tablet TAKE 1 TABLET BY MOUTH TWICE DAILY WITH MEALS 180 tablet 0    celecoxib (CELEBREX) 200 MG capsule TAKE 1 CAPSULE BY MOUTH EVERY DAY (Patient taking differently: 200 mg daily TAKE 1 CAPSULE BY MOUTH EVERY DAY) 60 capsule 5    blood glucose monitor kit and supplies Test 1 times a day & as needed for symptoms of irregular blood glucose.  1 kit 0    ONE TOUCH ULTRA TEST strip TEST BLOOD SUGAR ONCE DAILY 100 strip 5    VICTOZA 18 MG/3ML SOPN SC injection ADMINISTER 1.8 MG INTO THE SKIN EVERY DAY 9 mL 3    gabapentin (NEURONTIN) 100 MG capsule Take 100 mg by mouth 2 times daily.  gabapentin (NEURONTIN) 300 MG capsule TK ONE C PO QHS  5    HYDROcodone-acetaminophen (NORCO) 7.5-325 MG per tablet Take 1 tablet by mouth every 6 hours as needed for Pain. Fredrich Fetch EPINEPHrine (EPIPEN 2-JOSÉ) 0.3 MG/0.3ML SOAJ injection Inject 0.3 mLs into the muscle once for 1 dose Use as directed for allergic reaction 0.3 mL 0    Blood Glucose Monitoring Suppl ([address]) w/Device KIT 1 kit by Does not apply route daily as needed (Dx 250.00) 1 kit 0    ONE TOUCH LANCETS MISC 1 each by Does not apply route daily 100 each 3    Cream Base CREA Apply 1-2 pumps to affected area 3-4 times daily 360 g 3     No current facility-administered medications for this visit.       Allergies   Allergen Reactions    Compazine [Prochlorperazine Maleate] Shortness Of Breath    Ciprofloxacin Hives    Amoxicillin-Pot Clavulanate     Demerol Hives    Hydroxyzine     Hydroxyzine Hcl Itching    Ibuprofen Nausea Only    Ketorolac Tromethamine     Meperidine Hives    Nortriptyline Other (See Comments)    Orphenadrine     Orphenadrine Citrate Itching    Penicillins Hives and Nausea Only    Prochlorperazine Edisylate      Will discuss with patient at next visit     Tobramycin      Will discuss with patient at next visit     Tramadol      Will discuss with patient at next visit     Vistaril [Hydroxyzine Hcl] Itching    Cephalexin Rash    Clindamycin/Lincomycin Rash    Codeine Nausea And Vomiting and Rash    Doxycycline Nausea And Vomiting    Keflex [Cephalexin] Rash    Ketorolac Tromethamine Itching and Nausea And Vomiting    Moxifloxacin Nausea And Vomiting     Will discuss with patient at visit        Health Maintenance   Topic Date Due    Diabetic retinal exam  09/08/1982    HIV screen Results for orders placed or performed in visit on 07/13/20   POCT urinalysis dipstick   Result Value Ref Range    Color, UA yellow     Clarity, UA clear     Glucose, UA POC neg     Bilirubin, UA neg     Ketones, UA neg     Spec Grav, UA 1.010     Blood, UA POC neg     pH, UA 5.5     Protein, UA POC neg     Urobilinogen, UA 0.2     Leukocytes, UA neg     Nitrite, UA neg      Long discussion regarding patients symptoms and that we are unable to do imaging for kidney stone. She states that she is not going to the hospital. I feel that she needs to contact PCP in the am for further evaluation and maybe a VV to discuss patients anxiety. Return if symptoms worsen or fail to improve. No orders of the defined types were placed in this encounter. Patient given educational materials- see patient instructions. Discussed use, benefit, and side effects of prescribedmedications. All patient questions answered. Pt voiced understanding. Reviewedhealth maintenance. Instructed to continue current medications, diet and exercise. Patient agreed with treatment plan. Follow up as directed. Patient Instructions     Patient Education        Back Pain: Care Instructions  Your Care Instructions     Back pain has many possible causes. It is often related to problems with muscles and ligaments of the back. It may also be related to problems with the nerves, discs, or bones of the back. Moving, lifting, standing, sitting, or sleeping in an awkward way can strain the back. Sometimes you don't notice the injury until later. Arthritis is another common cause of back pain. Although it may hurt a lot, back pain usually improves on its own within several weeks. Most people recover in 12 weeks or less. Using good home treatment and being careful not to stress your back can help you feel better sooner. Follow-up care is a key part of your treatment and safety.  Be sure to make and go to all appointments, and call your doctor if you call for help? Call your doctor now or seek immediate medical care if:  · You have new or worsening numbness in your legs. · You have new or worsening weakness in your legs. (This could make it hard to stand up.)  · You lose control of your bladder or bowels. Watch closely for changes in your health, and be sure to contact your doctor if:  · You have a fever, lose weight, or don't feel well. · You do not get better as expected. Where can you learn more? Go to [email or web address]. org and sign in to your [employer] account. Enter U255 in the Vicamp[employer] box to learn more about \"Back Pain: Care Instructions. \"     If you do not have an account, please click on the \"Sign Up Now\" link. Current as of: March 2, 2020               Content Version: 12.5  © 2982-2251 [employer], [employer]. Care instructions adapted under license by [facility] ([facility]). If you have questions about a medical condition or this instruction, always ask your healthcare professional. [address] any warranty or liability for your use of this information.                Electronically signed by Jeannine Cushing, APRN - CNP on 7/13/2020 at 5:02 PM

## 2020-07-13 NOTE — PATIENT INSTRUCTIONS

## 2020-07-15 ENCOUNTER — TELEPHONE (OUTPATIENT)
Dept: PRIMARY CARE CLINIC | Age: 50
End: 2020-07-15

## 2020-07-15 NOTE — TELEPHONE ENCOUNTER
Pt called states her legs is still very infected she states they mostly only do 1 infusion for this and now she is having to do additional.  She states the clonazePam isnt working and she wants to see if its possible to get whatever they gave to her in the hospital for just a weeks worth until she can get over this hump she thinks it was xanax or valium please advise

## 2020-07-16 ENCOUNTER — HOSPITAL ENCOUNTER (OUTPATIENT)
Dept: INFUSION THERAPY | Age: 50
Setting detail: INFUSION SERIES
Discharge: HOME | End: 2020-07-16
Payer: MEDICAID

## 2020-07-16 VITALS
HEART RATE: 91 BPM | SYSTOLIC BLOOD PRESSURE: 138 MMHG | TEMPERATURE: 97.6 F | RESPIRATION RATE: 16 BRPM | OXYGEN SATURATION: 100 % | DIASTOLIC BLOOD PRESSURE: 81 MMHG

## 2020-07-16 DIAGNOSIS — L03.115 BILATERAL LOWER LEG CELLULITIS: Primary | ICD-10-CM

## 2020-07-16 DIAGNOSIS — L03.116 BILATERAL LOWER LEG CELLULITIS: Primary | ICD-10-CM

## 2020-07-16 PROCEDURE — 6360000002 HC RX W HCPCS: Performed by: PHYSICIAN ASSISTANT

## 2020-07-16 PROCEDURE — 96366 THER/PROPH/DIAG IV INF ADDON: CPT

## 2020-07-16 PROCEDURE — 2580000003 HC RX 258: Performed by: PHYSICIAN ASSISTANT

## 2020-07-16 PROCEDURE — 96365 THER/PROPH/DIAG IV INF INIT: CPT

## 2020-07-16 RX ADMIN — ORITAVANCIN 1200 MG: 400 INJECTION, POWDER, LYOPHILIZED, FOR SOLUTION INTRAVENOUS at 09:45

## 2020-07-17 ENCOUNTER — OFFICE VISIT (OUTPATIENT)
Age: 50
End: 2020-07-17

## 2020-07-17 VITALS — HEART RATE: 87 BPM | TEMPERATURE: 97.3 F | OXYGEN SATURATION: 97 %

## 2020-07-20 ENCOUNTER — TELEPHONE (OUTPATIENT)
Dept: PRIMARY CARE CLINIC | Age: 50
End: 2020-07-20

## 2020-07-20 LAB
REPORT: NORMAL
SARS-COV-2: NOT DETECTED
THIS TEST SENT TO: NORMAL

## 2020-07-20 NOTE — TELEPHONE ENCOUNTER
Pt mother called here stating that she is really concerned about the pt and states she needs something for her anxiety. Pt mother states that current medication is not working. I told pt mother if in fact the current medication is not working then the pt needs to call our office and set up for a VV with her provider to discuss this. She then tells me pt does not have ability to do video visit. I then told her telephone visits was an option as well at this time. She said she was \"afraid that pt was going to do something bad if she did not get help\". When she said that I told her since she feels the pt is in crisis then the patient needs to go to the ED for evaluation or to [facility] center, and she quickly said responded with no she is not going to do anything bad. I again told her that if pt was in crisis she needed to either go to ED or to .[facility], if pt was not in crisis then she need to schedule a Virtual visit with provider to discuss medication not working. I told her medication changes would not be made without a virtual visit with provider.

## 2020-07-20 NOTE — TELEPHONE ENCOUNTER
This is a correct response; thank you! She will need to be evaluated for medication adjustments or go to ED for crisis situation, suicidal complaints.

## 2020-07-21 ENCOUNTER — VIRTUAL VISIT (OUTPATIENT)
Dept: PRIMARY CARE CLINIC | Age: 50
End: 2020-07-21
Payer: MEDICAID

## 2020-07-21 PROCEDURE — 99443 PR PHYS/QHP TELEPHONE EVALUATION 21-30 MIN: CPT | Performed by: NURSE PRACTITIONER

## 2020-07-21 RX ORDER — MIRTAZAPINE 15 MG/1
15 TABLET, FILM COATED ORAL NIGHTLY
Qty: 30 TABLET | Refills: 0 | Status: SHIPPED | OUTPATIENT
Start: 2020-07-21 | End: 2020-08-23

## 2020-07-21 RX ORDER — FLUCONAZOLE 150 MG/1
TABLET ORAL
Qty: 2 TABLET | Refills: 0 | Status: ON HOLD | OUTPATIENT
Start: 2020-07-21 | End: 2020-09-21

## 2020-07-21 ASSESSMENT — ENCOUNTER SYMPTOMS
VOICE CHANGE: 0
NAUSEA: 0
COUGH: 0
VOMITING: 0
PHOTOPHOBIA: 0
RHINORRHEA: 0
COLOR CHANGE: 0
SHORTNESS OF BREATH: 0
BACK PAIN: 0

## 2020-07-21 NOTE — PROGRESS NOTES
[address] [address]            Phone:  [phone]  Fax:  [phone] is a 54 y.o. female evaluated via telephone on 7/21/2020. Consent:    She and/or health care decision maker is aware that that she may receive a bill for this telephone service, depending on her insurance coverage, and has provided verbal consent to proceed: Yes      HPI  Chief Complaint   Patient presents with    Anxiety    Leg Swelling       I communicated with the patient and/or health care decision maker about:    Patient presents today for evaluation of anxiety and depression. She reports she is crying all the time. She is currently being treated for cellulitis/dermatitis of lower extremities. She has been on IV antibiotics; she is seeing infectious disease for this. She reports this has her very upset. She is done with IV antibiotics; she will return in 2 months to see ID. She has been taking klonopin and reports this is not helping. She has also tried buspar without relief. She also has developed yeast infection due to so many antibiotics and needs medication for this. Review of Systems   Constitutional: Negative for chills and fever. HENT: Negative for ear pain, hearing loss, rhinorrhea and voice change. Eyes: Negative for photophobia and visual disturbance. Respiratory: Negative for cough and shortness of breath. Cardiovascular: Negative for chest pain and palpitations. Gastrointestinal: Negative for nausea and vomiting. Endocrine: Negative. Negative for cold intolerance and heat intolerance. Genitourinary: Negative for difficulty urinating and flank pain. Musculoskeletal: Negative for back pain and neck pain. Skin: Negative for color change and rash. Allergic/Immunologic: Negative for environmental allergies and food allergies. Neurological: Negative for dizziness, speech difficulty and headaches.    Hematological: Does not bruise/bleed easily. Psychiatric/Behavioral: Negative for sleep disturbance and suicidal ideas. Patient location: home    PLAN    Details of this discussion including any medical advice provided:     1. Lymphedema  - Follow-up with lymphedema clinic as scheduled. - silver sulfADIAZINE (SILVADENE) 1 % cream; Apply topically daily. Dispense: 400 g; Refill: 0    2. Anxiety and depression  - Referral to [facility].   - mirtazapine (REMERON) 15 MG tablet; Take 1 tablet by mouth nightly  Dispense: 30 tablet; Refill: [facility], Elaina Nu[facility]ez, [facility], Behavioral Medicine, [place]    3. Candidiasis    - fluconazole (DIFLUCAN) 150 MG tablet; Take 1 dose and may repeat in 3 days. Dispense: 2 tablet; Refill: 0       I affirm this is a Patient Initiated Episode with an Established Patient who has not had a related appointment within my department in the past 7 days or scheduled within the next 24 hours. Total Time: minutes: 21-30 minutes      Note: not billable if this call serves to triage the patient into an appointment for the relevant concern      [address] to the emergency declaration under the [facility], [facility]5 waiver authority and the [employer] Act, this Virtual  Visit was conducted, with patient's consent, to reduce the patient's risk of exposure to COVID-19 and provide continuity of care for an established patient.

## 2020-07-28 ENCOUNTER — HOSPITAL ENCOUNTER (INPATIENT)
Age: 50
LOS: 3 days | Discharge: HOME | DRG: 603 | End: 2020-07-31
Attending: EMERGENCY MEDICINE | Admitting: FAMILY MEDICINE
Payer: MEDICAID

## 2020-07-28 PROBLEM — L03.116 CELLULITIS OF BOTH LOWER EXTREMITIES: Status: ACTIVE | Noted: 2020-07-28

## 2020-07-28 PROBLEM — L03.115 CELLULITIS OF BOTH LOWER EXTREMITIES: Status: ACTIVE | Noted: 2020-07-28

## 2020-07-28 LAB
ALBUMIN SERPL-MCNC: 4.3 G/DL (ref 3.5–5.2)
ALP BLD-CCNC: 57 U/L (ref 35–104)
ALT SERPL-CCNC: 13 U/L (ref 5–33)
ANION GAP SERPL CALCULATED.3IONS-SCNC: 12 MMOL/L (ref 7–19)
AST SERPL-CCNC: 17 U/L (ref 5–32)
BASOPHILS ABSOLUTE: 0 K/UL (ref 0–0.2)
BASOPHILS RELATIVE PERCENT: 0.3 % (ref 0–1)
BILIRUB SERPL-MCNC: <0.2 MG/DL (ref 0.2–1.2)
BUN BLDV-MCNC: 14 MG/DL (ref 6–20)
C-REACTIVE PROTEIN: 5.84 MG/DL (ref 0–0.5)
CALCIUM SERPL-MCNC: 9.5 MG/DL (ref 8.6–10)
CHLORIDE BLD-SCNC: 96 MMOL/L (ref 98–111)
CO2: 31 MMOL/L (ref 22–29)
CREAT SERPL-MCNC: 1 MG/DL (ref 0.5–0.9)
EOSINOPHILS ABSOLUTE: 0.3 K/UL (ref 0–0.6)
EOSINOPHILS RELATIVE PERCENT: 4.8 % (ref 0–5)
GFR AFRICAN AMERICAN: >59
GFR NON-AFRICAN AMERICAN: 59
GLUCOSE BLD-MCNC: 150 MG/DL (ref 70–99)
GLUCOSE BLD-MCNC: 99 MG/DL (ref 74–109)
HCT VFR BLD CALC: 38.2 % (ref 37–47)
HEMOGLOBIN: 12.3 G/DL (ref 12–16)
IMMATURE GRANULOCYTES #: 0 K/UL
LACTIC ACID: 1.9 MMOL/L (ref 0.5–1.9)
LYMPHOCYTES ABSOLUTE: 2 K/UL (ref 1.1–4.5)
LYMPHOCYTES RELATIVE PERCENT: 32.6 % (ref 20–40)
MCH RBC QN AUTO: 27.6 PG (ref 27–31)
MCHC RBC AUTO-ENTMCNC: 32.2 G/DL (ref 33–37)
MCV RBC AUTO: 85.8 FL (ref 81–99)
MONOCYTES ABSOLUTE: 0.4 K/UL (ref 0–0.9)
MONOCYTES RELATIVE PERCENT: 7.1 % (ref 0–10)
NEUTROPHILS ABSOLUTE: 3.4 K/UL (ref 1.5–7.5)
NEUTROPHILS RELATIVE PERCENT: 54.9 % (ref 50–65)
PDW BLD-RTO: 13.2 % (ref 11.5–14.5)
PERFORMED ON: ABNORMAL
PLATELET # BLD: 252 K/UL (ref 130–400)
PMV BLD AUTO: 9.6 FL (ref 9.4–12.3)
POTASSIUM SERPL-SCNC: 3 MMOL/L (ref 3.5–5)
PRO-BNP: 156 PG/ML (ref 0–450)
RBC # BLD: 4.45 M/UL (ref 4.2–5.4)
SEDIMENTATION RATE, ERYTHROCYTE: 44 MM/HR (ref 0–20)
SODIUM BLD-SCNC: 139 MMOL/L (ref 136–145)
TOTAL PROTEIN: 7.9 G/DL (ref 6.6–8.7)
WBC # BLD: 6.2 K/UL (ref 4.8–10.8)

## 2020-07-28 PROCEDURE — 85652 RBC SED RATE AUTOMATED: CPT

## 2020-07-28 PROCEDURE — 6360000002 HC RX W HCPCS: Performed by: EMERGENCY MEDICINE

## 2020-07-28 PROCEDURE — 2580000003 HC RX 258: Performed by: PHYSICIAN ASSISTANT

## 2020-07-28 PROCEDURE — 1210000000 HC MED SURG R&B

## 2020-07-28 PROCEDURE — 96374 THER/PROPH/DIAG INJ IV PUSH: CPT

## 2020-07-28 PROCEDURE — 85025 COMPLETE CBC W/AUTO DIFF WBC: CPT

## 2020-07-28 PROCEDURE — 6370000000 HC RX 637 (ALT 250 FOR IP): Performed by: PHYSICIAN ASSISTANT

## 2020-07-28 PROCEDURE — 6360000002 HC RX W HCPCS: Performed by: PHYSICIAN ASSISTANT

## 2020-07-28 PROCEDURE — 86140 C-REACTIVE PROTEIN: CPT

## 2020-07-28 PROCEDURE — 2580000003 HC RX 258: Performed by: EMERGENCY MEDICINE

## 2020-07-28 PROCEDURE — 83880 ASSAY OF NATRIURETIC PEPTIDE: CPT

## 2020-07-28 PROCEDURE — 82947 ASSAY GLUCOSE BLOOD QUANT: CPT

## 2020-07-28 PROCEDURE — 93970 EXTREMITY STUDY: CPT

## 2020-07-28 PROCEDURE — 83605 ASSAY OF LACTIC ACID: CPT

## 2020-07-28 PROCEDURE — 6370000000 HC RX 637 (ALT 250 FOR IP): Performed by: EMERGENCY MEDICINE

## 2020-07-28 PROCEDURE — 87040 BLOOD CULTURE FOR BACTERIA: CPT

## 2020-07-28 PROCEDURE — 80053 COMPREHEN METABOLIC PANEL: CPT

## 2020-07-28 PROCEDURE — 36415 COLL VENOUS BLD VENIPUNCTURE: CPT

## 2020-07-28 PROCEDURE — 96375 TX/PRO/DX INJ NEW DRUG ADDON: CPT

## 2020-07-28 PROCEDURE — 99284 EMERGENCY DEPT VISIT MOD MDM: CPT

## 2020-07-28 RX ORDER — SODIUM CHLORIDE 0.9 % (FLUSH) 0.9 %
10 SYRINGE (ML) INJECTION PRN
Status: DISCONTINUED | OUTPATIENT
Start: 2020-07-28 | End: 2020-07-31 | Stop reason: HOSPADM

## 2020-07-28 RX ORDER — ACETAMINOPHEN 325 MG/1
650 TABLET ORAL EVERY 6 HOURS PRN
Status: DISCONTINUED | OUTPATIENT
Start: 2020-07-28 | End: 2020-07-31 | Stop reason: HOSPADM

## 2020-07-28 RX ORDER — DIPHENHYDRAMINE HYDROCHLORIDE 50 MG/ML
25 INJECTION INTRAMUSCULAR; INTRAVENOUS ONCE
Status: COMPLETED | OUTPATIENT
Start: 2020-07-28 | End: 2020-07-28

## 2020-07-28 RX ORDER — PROMETHAZINE HYDROCHLORIDE 25 MG/1
25 TABLET ORAL EVERY 6 HOURS PRN
Status: DISCONTINUED | OUTPATIENT
Start: 2020-07-28 | End: 2020-07-31 | Stop reason: HOSPADM

## 2020-07-28 RX ORDER — MIRTAZAPINE 15 MG/1
15 TABLET, FILM COATED ORAL NIGHTLY
Status: DISCONTINUED | OUTPATIENT
Start: 2020-07-28 | End: 2020-07-31 | Stop reason: HOSPADM

## 2020-07-28 RX ORDER — MAGNESIUM SULFATE IN WATER 40 MG/ML
2 INJECTION, SOLUTION INTRAVENOUS PRN
Status: DISCONTINUED | OUTPATIENT
Start: 2020-07-28 | End: 2020-07-31 | Stop reason: HOSPADM

## 2020-07-28 RX ORDER — POLYETHYLENE GLYCOL 3350 17 G/17G
17 POWDER, FOR SOLUTION ORAL DAILY PRN
Status: DISCONTINUED | OUTPATIENT
Start: 2020-07-28 | End: 2020-07-31 | Stop reason: HOSPADM

## 2020-07-28 RX ORDER — HYDROCODONE BITARTRATE AND ACETAMINOPHEN 7.5; 325 MG/1; MG/1
1 TABLET ORAL EVERY 6 HOURS PRN
Status: DISCONTINUED | OUTPATIENT
Start: 2020-07-28 | End: 2020-07-31 | Stop reason: HOSPADM

## 2020-07-28 RX ORDER — ONDANSETRON 2 MG/ML
4 INJECTION INTRAMUSCULAR; INTRAVENOUS ONCE
Status: COMPLETED | OUTPATIENT
Start: 2020-07-28 | End: 2020-07-28

## 2020-07-28 RX ORDER — CLONAZEPAM 0.5 MG/1
0.5 TABLET ORAL 2 TIMES DAILY
Status: DISCONTINUED | OUTPATIENT
Start: 2020-07-28 | End: 2020-07-31 | Stop reason: HOSPADM

## 2020-07-28 RX ORDER — CELECOXIB 200 MG/1
200 CAPSULE ORAL DAILY
Status: DISCONTINUED | OUTPATIENT
Start: 2020-07-29 | End: 2020-07-29

## 2020-07-28 RX ORDER — PANTOPRAZOLE SODIUM 40 MG/1
40 TABLET, DELAYED RELEASE ORAL
Status: DISCONTINUED | OUTPATIENT
Start: 2020-07-29 | End: 2020-07-31 | Stop reason: HOSPADM

## 2020-07-28 RX ORDER — GABAPENTIN 100 MG/1
100 CAPSULE ORAL 2 TIMES DAILY
Status: DISCONTINUED | OUTPATIENT
Start: 2020-07-28 | End: 2020-07-31 | Stop reason: HOSPADM

## 2020-07-28 RX ORDER — POTASSIUM CHLORIDE 7.45 MG/ML
10 INJECTION INTRAVENOUS PRN
Status: DISCONTINUED | OUTPATIENT
Start: 2020-07-28 | End: 2020-07-31 | Stop reason: HOSPADM

## 2020-07-28 RX ORDER — FUROSEMIDE 10 MG/ML
60 INJECTION INTRAMUSCULAR; INTRAVENOUS ONCE
Status: COMPLETED | OUTPATIENT
Start: 2020-07-28 | End: 2020-07-28

## 2020-07-28 RX ORDER — DEXTROSE MONOHYDRATE 25 G/50ML
12.5 INJECTION, SOLUTION INTRAVENOUS PRN
Status: DISCONTINUED | OUTPATIENT
Start: 2020-07-28 | End: 2020-07-31 | Stop reason: HOSPADM

## 2020-07-28 RX ORDER — SODIUM CHLORIDE 0.9 % (FLUSH) 0.9 %
10 SYRINGE (ML) INJECTION EVERY 12 HOURS SCHEDULED
Status: DISCONTINUED | OUTPATIENT
Start: 2020-07-28 | End: 2020-07-31 | Stop reason: HOSPADM

## 2020-07-28 RX ORDER — ACETAMINOPHEN 325 MG/1
650 TABLET ORAL ONCE
Status: COMPLETED | OUTPATIENT
Start: 2020-07-28 | End: 2020-07-28

## 2020-07-28 RX ORDER — GABAPENTIN 300 MG/1
300 CAPSULE ORAL NIGHTLY
Status: DISCONTINUED | OUTPATIENT
Start: 2020-07-28 | End: 2020-07-31 | Stop reason: HOSPADM

## 2020-07-28 RX ORDER — NICOTINE POLACRILEX 4 MG
15 LOZENGE BUCCAL PRN
Status: DISCONTINUED | OUTPATIENT
Start: 2020-07-28 | End: 2020-07-31 | Stop reason: HOSPADM

## 2020-07-28 RX ORDER — HYDROMORPHONE HYDROCHLORIDE 1 MG/ML
1 INJECTION, SOLUTION INTRAMUSCULAR; INTRAVENOUS; SUBCUTANEOUS ONCE
Status: COMPLETED | OUTPATIENT
Start: 2020-07-28 | End: 2020-07-28

## 2020-07-28 RX ORDER — PRAMIPEXOLE DIHYDROCHLORIDE 0.25 MG/1
0.75 TABLET ORAL 2 TIMES DAILY
Status: DISCONTINUED | OUTPATIENT
Start: 2020-07-28 | End: 2020-07-31 | Stop reason: HOSPADM

## 2020-07-28 RX ORDER — FUROSEMIDE 10 MG/ML
40 INJECTION INTRAMUSCULAR; INTRAVENOUS 2 TIMES DAILY
Status: DISCONTINUED | OUTPATIENT
Start: 2020-07-28 | End: 2020-07-29

## 2020-07-28 RX ORDER — BUTORPHANOL TARTRATE 1 MG/ML
1 INJECTION, SOLUTION INTRAMUSCULAR; INTRAVENOUS EVERY 6 HOURS PRN
Status: DISCONTINUED | OUTPATIENT
Start: 2020-07-28 | End: 2020-07-29

## 2020-07-28 RX ORDER — ONDANSETRON 2 MG/ML
4 INJECTION INTRAMUSCULAR; INTRAVENOUS EVERY 6 HOURS PRN
Status: DISCONTINUED | OUTPATIENT
Start: 2020-07-28 | End: 2020-07-31 | Stop reason: HOSPADM

## 2020-07-28 RX ORDER — DEXTROSE MONOHYDRATE 50 MG/ML
100 INJECTION, SOLUTION INTRAVENOUS PRN
Status: DISCONTINUED | OUTPATIENT
Start: 2020-07-28 | End: 2020-07-31 | Stop reason: HOSPADM

## 2020-07-28 RX ORDER — POTASSIUM CHLORIDE 20 MEQ/1
40 TABLET, EXTENDED RELEASE ORAL PRN
Status: DISCONTINUED | OUTPATIENT
Start: 2020-07-28 | End: 2020-07-31 | Stop reason: HOSPADM

## 2020-07-28 RX ORDER — PROMETHAZINE HYDROCHLORIDE 25 MG/1
12.5 TABLET ORAL EVERY 6 HOURS PRN
Status: DISCONTINUED | OUTPATIENT
Start: 2020-07-28 | End: 2020-07-31 | Stop reason: HOSPADM

## 2020-07-28 RX ORDER — CYCLOBENZAPRINE HCL 10 MG
10 TABLET ORAL NIGHTLY PRN
Status: DISCONTINUED | OUTPATIENT
Start: 2020-07-28 | End: 2020-07-31 | Stop reason: HOSPADM

## 2020-07-28 RX ORDER — BUSPIRONE HYDROCHLORIDE 10 MG/1
5 TABLET ORAL 3 TIMES DAILY
Status: DISCONTINUED | OUTPATIENT
Start: 2020-07-28 | End: 2020-07-31 | Stop reason: HOSPADM

## 2020-07-28 RX ORDER — ACETAMINOPHEN 650 MG/1
650 SUPPOSITORY RECTAL EVERY 6 HOURS PRN
Status: DISCONTINUED | OUTPATIENT
Start: 2020-07-28 | End: 2020-07-31 | Stop reason: HOSPADM

## 2020-07-28 RX ADMIN — FUROSEMIDE 60 MG: 10 INJECTION, SOLUTION INTRAMUSCULAR; INTRAVENOUS at 16:58

## 2020-07-28 RX ADMIN — CYCLOBENZAPRINE 10 MG: 10 TABLET, FILM COATED ORAL at 22:07

## 2020-07-28 RX ADMIN — BUTORPHANOL TARTRATE 1 MG: 1 INJECTION, SOLUTION INTRAMUSCULAR; INTRAVENOUS at 22:21

## 2020-07-28 RX ADMIN — GABAPENTIN 100 MG: 100 CAPSULE ORAL at 22:10

## 2020-07-28 RX ADMIN — INSULIN LISPRO 1 UNITS: 100 INJECTION, SOLUTION INTRAVENOUS; SUBCUTANEOUS at 22:08

## 2020-07-28 RX ADMIN — ACETAMINOPHEN 650 MG: 325 TABLET, FILM COATED ORAL at 19:38

## 2020-07-28 RX ADMIN — ONDANSETRON 4 MG: 2 INJECTION INTRAMUSCULAR; INTRAVENOUS at 15:51

## 2020-07-28 RX ADMIN — DIPHENHYDRAMINE HYDROCHLORIDE 25 MG: 50 INJECTION, SOLUTION INTRAMUSCULAR; INTRAVENOUS at 18:34

## 2020-07-28 RX ADMIN — CLONAZEPAM 0.5 MG: 0.5 TABLET ORAL at 22:07

## 2020-07-28 RX ADMIN — SODIUM CHLORIDE, PRESERVATIVE FREE 10 ML: 5 INJECTION INTRAVENOUS at 22:15

## 2020-07-28 RX ADMIN — VANCOMYCIN HYDROCHLORIDE 1750 MG: 1 INJECTION, POWDER, LYOPHILIZED, FOR SOLUTION INTRAVENOUS at 16:37

## 2020-07-28 RX ADMIN — FUROSEMIDE 40 MG: 10 INJECTION, SOLUTION INTRAMUSCULAR; INTRAVENOUS at 22:10

## 2020-07-28 RX ADMIN — DIPHENHYDRAMINE HYDROCHLORIDE 25 MG: 50 INJECTION, SOLUTION INTRAMUSCULAR; INTRAVENOUS at 16:37

## 2020-07-28 RX ADMIN — MIRTAZAPINE 15 MG: 15 TABLET, FILM COATED ORAL at 22:07

## 2020-07-28 RX ADMIN — DICLOFENAC SODIUM 2 G: 10 GEL TOPICAL at 22:08

## 2020-07-28 RX ADMIN — HYDROMORPHONE HYDROCHLORIDE 1 MG: 1 INJECTION, SOLUTION INTRAMUSCULAR; INTRAVENOUS; SUBCUTANEOUS at 15:52

## 2020-07-28 RX ADMIN — BUSPIRONE HYDROCHLORIDE 5 MG: 10 TABLET ORAL at 22:07

## 2020-07-28 RX ADMIN — GABAPENTIN 300 MG: 300 CAPSULE ORAL at 22:07

## 2020-07-28 RX ADMIN — PRAMIPEXOLE DIHYDROCHLORIDE 0.75 MG: 0.25 TABLET ORAL at 22:21

## 2020-07-28 ASSESSMENT — PAIN SCALES - GENERAL
PAINLEVEL_OUTOF10: 3
PAINLEVEL_OUTOF10: 9
PAINLEVEL_OUTOF10: 7
PAINLEVEL_OUTOF10: 10
PAINLEVEL_OUTOF10: 9
PAINLEVEL_OUTOF10: 7

## 2020-07-28 ASSESSMENT — ENCOUNTER SYMPTOMS
SORE THROAT: 0
COLOR CHANGE: 1
DIARRHEA: 0
BACK PAIN: 0
RHINORRHEA: 0
ABDOMINAL PAIN: 0
SHORTNESS OF BREATH: 0
VOMITING: 0
NAUSEA: 0

## 2020-07-28 ASSESSMENT — PAIN DESCRIPTION - LOCATION: LOCATION: LEG

## 2020-07-28 ASSESSMENT — PAIN DESCRIPTION - PAIN TYPE: TYPE: ACUTE PAIN

## 2020-07-28 ASSESSMENT — PAIN DESCRIPTION - ORIENTATION: ORIENTATION: LEFT;RIGHT;LOWER

## 2020-07-28 NOTE — ED PROVIDER NOTES
[facility]  eMERGENCY dEPARTMENT eNCOUnter      Pt Name: Dav Jo  MRN: [id number]  [place] 1970  Date of evaluation: 7/28/2020  Provider: Juan Davis MD    [address] [place] [address]       Chief Complaint   Patient presents with    Cellulitis     lymphadema bilateral lower extremities         HISTORY OF PRESENT ILLNESS   (Location/Symptom, Timing/Onset,Context/Setting, Quality, Duration, Modifying Factors, Severity)  Note limiting factors. Dav Jo is a 54 y.o. female who presents to the emergency department with bilateral lower extremity swelling. Patient has history of lymphedema. Her symptoms have been present since last August however have worsened recently. She was getting some oritavancin infusions with Dr. Ayala Harkins. Her last dose was last week but her symptoms have worsened. She has had subjective fever. Increased swelling and weeping of her legs as well as redness and warmth. HPI    NursingNotes were reviewed. REVIEW OF SYSTEMS    (2-9 systems for level 4, 10 or more for level 5)     Review of Systems   Constitutional: Negative for chills and fever. HENT: Negative for rhinorrhea and sore throat. Respiratory: Negative for shortness of breath. Cardiovascular: Positive for leg swelling. Negative for chest pain. Gastrointestinal: Negative for abdominal pain, diarrhea, nausea and vomiting. Genitourinary: Negative for difficulty urinating. Musculoskeletal: Negative for back pain and neck pain. Skin: Positive for color change and wound. Negative for rash. Neurological: Negative for weakness and headaches. Psychiatric/Behavioral: Negative for confusion. A complete review of systems was performed and is negative except as noted above in the HPI.        PAST MEDICAL HISTORY     Past Medical History:   Diagnosis Date    Anxiety     Constipation     Depression     DM (diabetes mellitus) ([address]     Headache(784.0)     Hyperlipidemia     Hypertension     Kidney stones     Kidney stones     Restless leg     Restless legs syndrome          SURGICAL HISTORY       Past Surgical History:   Procedure Laterality Date    ECTOPIC PREGNANCY SURGERY      EYE SURGERY      cornea transplant and cataracts removed    KNEE CARTILAGE SURGERY Left 12/20/2016    KNEE ARTHROSCOPIC PARTIAL MEDIAL MENISCECTOMY performed by Juliette Jarvis MD at [address]       Previous Medications    B-D ULTRAFINE III SHORT PEN 31G X 8 MM MISC    USE TO INJECT INSULIN ONCE DAILY    BLOOD GLUCOSE MONITOR KIT AND SUPPLIES    Test 1 times a day & as needed for symptoms of irregular blood glucose. BLOOD GLUCOSE MONITORING SUPPL (ONE TOUCH BASIC SYSTEM) W/DEVICE KIT    1 kit by Does not apply route daily as needed (Dx 250.00)    BLOOD GLUCOSE TEST STRIPS (ASCENSIA AUTODISC VI;ONE TOUCH ULTRA TEST VI) STRIP    Check glucose TID and as needed for hypoglycemic events Dx E11.40 E11.66 Z79.4  Use one touch verio flex    BUSPIRONE (BUSPAR) 5 MG TABLET    TAKE 1 TABLET BY MOUTH THREE TIMES DAILY    BUTORPHANOL TARTRATE (STADOL NS NA)    by Nasal route    CELECOXIB (CELEBREX) 100 MG CAPSULE    Take 1 capsule by mouth daily    CELECOXIB (CELEBREX) 200 MG CAPSULE    TAKE 1 CAPSULE BY MOUTH EVERY DAY    CLONAZEPAM (KLONOPIN) 0.5 MG TABLET    Take 1 tablet by mouth 2 times daily for 30 days.     CREAM BASE CREA    Apply 1-2 pumps to affected area 3-4 times daily    CYCLOBENZAPRINE (FLEXERIL) 10 MG TABLET    Take 10 mg by mouth nightly as needed for Muscle spasms    DIABETIC SHOE MISC    by Does not apply route DISPENSE ONE PAIR OF DIABETIC SHOE AND 3 PAIRS HEAT MOLDED INSERTS    DICLOFENAC SODIUM (VOLTAREN) 1 % GEL    Apply 2 g topically 2 times daily    EPINEPHRINE (EPIPEN 2-JOSÉ) 0.3 MG/0.3ML SOAJ INJECTION    Inject 0.3 mLs into the muscle once for 1 dose Use as directed for allergic reaction    FLUCONAZOLE (DIFLUCAN) 150 MG TABLET    Take 1 dose and may repeat in 3 days.    GABAPENTIN (NEURONTIN) 100 MG CAPSULE    Take 100 mg by mouth 2 times daily. GABAPENTIN (NEURONTIN) 300 MG CAPSULE    TK ONE C PO QHS    HYDROCODONE-ACETAMINOPHEN (NORCO) 7.5-325 MG PER TABLET    Take 1 tablet by mouth every 6 hours as needed for Pain. Roly Lester LANCETS (ONETOUCH DELICA PLUS [id number]) MISC    USE TO CHECK BLLOD SUGAR AS DIRECTED    METFORMIN (GLUCOPHAGE) 500 MG TABLET    TAKE 1 TABLET BY MOUTH TWICE DAILY WITH MEALS    MIRTAZAPINE (REMERON) 15 MG TABLET    Take 1 tablet by mouth nightly    MUPIROCIN (BACTROBAN) 2 % OINTMENT    APPLY EXTERNALLY TO THE AFFECTED AREA THREE TIMES DAILY    OMEPRAZOLE (PRILOSEC) 20 MG DELAYED RELEASE CAPSULE    TAKE 1 CAPSULE BY MOUTH TWICE DAILY BEFORE MEALS    ONDANSETRON (ZOFRAN-ODT) 4 MG DISINTEGRATING TABLET    Place 1 tablet under the tongue every 8 hours as needed for Nausea or Vomiting Take prior to Doxycycline doses    ONE TOUCH LANCETS MISC    1 each by Does not apply route daily    ONE TOUCH ULTRA TEST STRIP    TEST BLOOD SUGAR ONCE DAILY    PRAMIPEXOLE (MIRAPEX) 0.75 MG TABLET    TAKE 1 TABLET BY MOUTH TWICE DAILY    PROMETHAZINE (PHENERGAN) 25 MG TABLET    TAKE 1 TABLET BY MOUTH EVERY 6 HOURS AS NEEDED FOR NAUSEA    SILVER SULFADIAZINE (SILVADENE) 1 % CREAM    Apply topically daily. TIZANIDINE (ZANAFLEX) 4 MG TABLET    TAKE 1 TABLET BY MOUTH EVERY NIGHT AS NEEDED FOR CRAMPING    TRIAMTERENE-HYDROCHLOROTHIAZIDE (DYAZIDE) 37.5-25 MG PER CAPSULE    TAKE 1 CAPSULE BY MOUTH EVERY DAY IN THE MORNING    VICTOZA 18 MG/3ML SOPN SC INJECTION    ADMINISTER 1.8 MG INTO THE SKIN EVERY DAY    ZOLMITRIPTAN (ZOMIG) 5 MG NASAL SOLUTION    0.1 mLs by Nasal route once as needed for Migraine       ALLERGIES     Compazine [prochlorperazine maleate]; Ciprofloxacin; Amoxicillin-pot clavulanate; Demerol; Hydroxyzine;  Hydroxyzine hcl; Ibuprofen; Ketorolac tromethamine; Meperidine; Nortriptyline; Orphenadrine; Orphenadrine citrate; Penicillins; Prochlorperazine edisylate; Tobramycin; Tramadol; Vistaril [hydroxyzine hcl]; Cephalexin; Clindamycin/lincomycin; Codeine; Doxycycline; Keflex [cephalexin];  Ketorolac tromethamine; and Moxifloxacin    FAMILY HISTORY       Family History   Problem Relation Age of Onset    Cancer Father     Cancer Maternal Grandmother     COPD Maternal Grandmother     Cancer Maternal Grandfather           SOCIAL HISTORY       Social History     Socioeconomic History    Marital status: Single     Spouse name: Not on file    Number of children: Not on file    Years of education: Not on file    Highest education level: Not on file   Occupational History    Not on file   Social Needs    Financial resource strain: Not on file    Food insecurity     Worry: Not on file     Inability: Not on file    Transportation needs     Medical: Not on file     Non-medical: Not on file   Tobacco Use    Smoking status: Never Smoker    Smokeless tobacco: Never Used   Substance and Sexual Activity    Alcohol use: No    Drug use: No    Sexual activity: Yes     Partners: Male   Lifestyle    Physical activity     Days per week: Not on file     Minutes per session: Not on file    Stress: Not on file   Relationships    Social connections     Talks on phone: Not on file     Gets together: Not on file     Attends Buddhism service: Not on file     Active member of club or organization: Not on file     Attends meetings of clubs or organizations: Not on file     Relationship status: Not on file    Intimate partner violence     Fear of current or ex partner: Not on file     Emotionally abused: Not on file     Physically abused: Not on file     Forced sexual activity: Not on file   Other Topics Concern    Not on file   Social History Narrative    Not on file       SCREENINGS    Ada Coma Scale  Eye Opening: Spontaneous  Best Verbal Response: Oriented  Best Motor Response: Obeys commands  Ada Coma Scale Score: 15        PHYSICAL EXAM    (up to 7 for level 4, 8 or more for level 5)     ED Triage Vitals [07/28/20 1423]   BP Temp Temp Source Pulse Resp SpO2 Height Weight   -- 98 °F (36.7 °C) Oral 96 20 93 % 5' 1\" (1.549 m) (!) 323 lb (146.5 kg)       Physical Exam  Vitals signs and nursing note reviewed. Constitutional:       General: She is not in acute distress. Appearance: She is well-developed. She is not diaphoretic. HENT:      Head: Normocephalic and atraumatic. Eyes:      Pupils: Pupils are equal, round, and reactive to light. Neck:      Musculoskeletal: Normal range of motion and neck supple. Cardiovascular:      Rate and Rhythm: Normal rate and regular rhythm. Heart sounds: Normal heart sounds. Pulmonary:      Effort: Pulmonary effort is normal. No respiratory distress. Breath sounds: Normal breath sounds. Abdominal:      General: Bowel sounds are normal. There is no distension. Palpations: Abdomen is soft. Tenderness: There is no abdominal tenderness. Musculoskeletal: Normal range of motion. General: Swelling and tenderness present. Right lower leg: Edema present. Left lower leg: Edema present. Comments: Erythema, warmth, Pitting edema bilateral LE with weeping noted L>R   Skin:     General: Skin is warm and dry. Findings: No rash. Neurological:      Mental Status: She is alert and oriented to person, place, and time. Cranial Nerves: No cranial nerve deficit. Motor: No abnormal muscle tone.       Coordination: Coordination normal.   Psychiatric:         Behavior: Behavior normal.         DIAGNOSTIC RESULTS     EKG: All EKG's are interpreted by the Emergency Department Physician who either signs or Co-signs this chart in the absence of a cardiologist.        RADIOLOGY:   Non-plain film images such as CT, Ultrasound and MRI are read by the radiologist. Plainradiographic images are visualized and preliminarily interpreted by the emergency physician with the below findings:        Interpretation per the Radiologist below, if available at the time of this note:    No orders to display         ED BEDSIDE ULTRASOUND:   Performed by ED Physician - none    LABS:  Labs Reviewed   CBC WITH AUTO DIFFERENTIAL - Abnormal; Notable for the following components:       Result Value    MCHC 32.2 (*)     All other components within normal limits   COMPREHENSIVE METABOLIC PANEL - Abnormal; Notable for the following components:    Potassium 3.0 (*)     Chloride 96 (*)     CO2 31 (*)     CREATININE 1.0 (*)     GFR Non- 59 (*)     All other components within normal limits   C-REACTIVE PROTEIN - Abnormal; Notable for the following components:    CRP 5.84 (*)     All other components within normal limits   CULTURE, BLOOD 1   CULTURE, BLOOD 2   LACTIC ACID, PLASMA   BRAIN NATRIURETIC PEPTIDE   SEDIMENTATION RATE       All other labs were within normal range or not returned as of this dictation. EMERGENCY DEPARTMENT COURSE and DIFFERENTIALDIAGNOSIS/MDM:   Vitals:    Vitals:    07/28/20 1423 07/28/20 1534 07/28/20 1546   BP:  128/65    Pulse: 96  94   Resp: 20     Temp: 98 °F (36.7 °C)     TempSrc: Oral     SpO2: 93% 97%    Weight: (!) 323 lb (146.5 kg)     Height: 5' 1\" (1.549 m)         MDM  Ordered vascular studies, but pt refused. Looks like she had suboptimal and nondiagnostic study at this beginning of this month    D/w Dr Grecia Wilkerson with infectious disease. Recommended vanc, elevation of legs, and diuresis. She may eventually need referral to tertiary care facility. D/w Dr Donal Nam for admission    CONSULTS:  Xavi 82 TO INFECTIOUS DISEASES    PROCEDURES:  Unless otherwise notedbelow, none     Procedures    FINAL IMPRESSION     1. Cellulitis of lower extremity, unspecified laterality    2.  Lymphedema          DISPOSITION/PLAN   DISPOSITION Decision To Admit 07/28/2020 04:50:11 PM      PATIENT REFERRED TO:  @FUP@    DISCHARGE MEDICATIONS:  New Prescriptions    No medications on file (Please note that portions of this note were completed with a voice recognition program.  Efforts were made to edit the dictations butoccasionally words are mis-transcribed.)    Steve Enriquez MD (electronically signed)  AttendingEmergency Physician         Steve Enriquez MD  07/28/20 [id number]

## 2020-07-28 NOTE — H&P
[facility]  Hospitalist - History & Physical      PCP: NANETTE Jackman    Date of Admission: 7/28/2020    Date of Service: 7/28/2020    Chief Complaint:  Bilateral lower extremity cellulitis     History Of Present Illness: The patient is a 54 y.o. female with PMH lymphedema, anxiety, DM II, HTN, HLD, restless leg syndrome who presented to [facility] ED complaining of recurrent bilateral lower extremity cellulitis. She was last discharged from this facility on 07/03/2020 after receiving a course of IV Vancomycin. She was seen by Infectious Disease that admission was set up with 2 doses of Oritavancin. States her last dose was last Tuesday or Wednesday and had recurrence of erythema 4 days ago, worse in LLE with blister formation. She has had subjective fever and nightly chills. Has been using bactroban cream on creases in legs where skin breakdown is starting to occur. Prior to discharge on 07/03/2020 she was also seen for the same complaint and discharged on 06/18/2020 after receiving a course of Vancomycin and was discharged that admission on Doxycycline. She is followed as an outpatient by the lymphedema clinic. Past Medical History:        Diagnosis Date    Anxiety     Constipation     Depression     DM (diabetes mellitus) ([address]     Headache(784.0)     Hyperlipidemia     Hypertension     Kidney stones     Kidney stones     Restless leg     Restless legs syndrome      Past Surgical History:        Procedure Laterality Date    ECTOPIC PREGNANCY SURGERY      EYE SURGERY      cornea transplant and cataracts removed    KNEE CARTILAGE SURGERY Left 12/20/2016    KNEE ARTHROSCOPIC PARTIAL MEDIAL MENISCECTOMY performed by Joanne Bird MD at [address] [facility] Medications:  Prior to Admission medications    Medication Sig Start Date End Date Taking? Authorizing Provider   fluconazole (DIFLUCAN) 150 MG tablet Take 1 dose and may repeat in 3 days.  7/88/96   Macho Sanders 8/4/17   Lexa Motley MD   [employer] CREA Apply 1-2 pumps to affected area 3-4 times daily 7/12/17   NANETTE Hong     Allergies:    Compazine [prochlorperazine maleate]; Ciprofloxacin; Amoxicillin-pot clavulanate; Demerol; Hydroxyzine; Hydroxyzine hcl; Ibuprofen; Ketorolac tromethamine; Meperidine; Nortriptyline; Orphenadrine; Orphenadrine citrate; Penicillins; Prochlorperazine edisylate; Tobramycin; Tramadol; Vistaril [hydroxyzine hcl]; Cephalexin; Clindamycin/lincomycin; Codeine; Doxycycline; Keflex [cephalexin]; Ketorolac tromethamine; and Moxifloxacin    Social History:    The patient currently lives at home. Tobacco:   reports that she has never smoked. She has never used smokeless tobacco.  Alcohol:   reports no history of alcohol use.   Illicit Drugs: denies    Family History:      Problem Relation Age of Onset    Cancer Father     Cancer Maternal Grandmother     COPD Maternal Grandmother     Cancer Maternal Grandfather      Review of Systems:   Constitutional / general: + fever / + chills / Denies sweats  Head:  Denies headache / neck stiffness / trauma / visual change  Eyes:  Denies blurry vision / acute visual change or loss / itching / redness  ENT: Denies sore throat / hoarseness / nasal drainage / ear pain  CV:  Denies chest pain / palpitations/ orthopnea   Respiratory:  Denies cough / shortness of breath / sputum / hemoptysis  GI: Denies nausea / vomiting / abdominal pain / diarrhea / constipation  :  Denies dysuria / hesitancy / urgency / hematuria   Neuro: Denies paralysis / syncope / seizure / dysphagia / headache / paresthesias  Musculoskeletal:  Denies muscle weakness /joint stiffness / pain  Vascular: +edema / Denies claudication / varicosities  Heme / endocrine: Denies easy bruising / bleeding / excessive sweating / heat or cold intolerance  Psychiatric:  Denies depression / anxiety / insomnia / mood changes  Skin:  Denies new rashes / +skin changes    14 point review of systems is negative except as specifically addressed above. Physical Examination:  /65   Pulse 94   Temp 98 °F (36.7 °C) (Oral)   Resp 20   Ht 5' 1\" (1.549 m)   Wt (!) 323 lb (146.5 kg)   SpO2 97%   BMI 61.03 kg/m²   General appearance: alert, appears stated age, cooperative and no distress  Head: Normocephalic, without obvious abnormality, atraumatic  Eyes: conjunctivae/corneas clear. PERRL, EOM's intact. Ears: normal external ears and nose, throat without exudate  Neck: no adenopathy, no carotid bruit, no JVD, supple, symmetrical, trachea midline   Lungs: clear to auscultation bilaterally,no rales or wheezes   Heart: regular rate and rhythm, S1, S2 normal, no murmur  Abdomen:soft,obese, non-tender; non-distended, normal bowel sounds no masses, no organomegaly  Extremities:BLE lymphedema with cellulitic changes below knee bilaterally, L>R with a few small blisters noted on anterior tibial area on left,  There is tenderness to slight palpation  Skin: Warm, dry. Lymphatic: No palpable lymph node enlargment  Neurologic: Alert and oriented X 3, normal strength and tone.  No focal deficits  Psychiatric: Anxious    Diagnostic Data:  CBC:  Recent Labs     07/28/20  1530   WBC 6.2   HGB 12.3   HCT 38.2        BMP:  Recent Labs     07/28/20  1530      K 3.0*   CL 96*   CO2 31*   BUN 14   CREATININE 1.0*   CALCIUM 9.5     Recent Labs     07/28/20  1530   AST 17   ALT 13   BILITOT <0.2   ALKPHOS 57     Assessment/Plan:  Principal Problem:    Cellulitis of both lower extremities-IV Vancomycin, IV Lasix, Elevated BLE's, Ace wraps, ID consult    Active Problems:    RLS (restless legs syndrome)-home medications resumed      [place] (generalized anxiety disorder)-Klonopin, Buspar resumed      Migraine without aura and without status migrainosus, not intractable-Stadol prn      Uncontrolled type 2 diabetes mellitus with diabetic neuropathy, with long-term current use of insulin (HCC)-Has been hypoglycemic when receiving Lantus on previous admissions.  Will order SSI, accuchecks, hypoglycemia tx orders      Gastroesophageal reflux disease-PPI    Further orders per clinical course/attending   Signed:  Davida Suárez PA-C

## 2020-07-29 LAB
ANION GAP SERPL CALCULATED.3IONS-SCNC: 17 MMOL/L (ref 7–19)
BUN BLDV-MCNC: 14 MG/DL (ref 6–20)
CALCIUM SERPL-MCNC: 8.5 MG/DL (ref 8.6–10)
CHLORIDE BLD-SCNC: 98 MMOL/L (ref 98–111)
CO2: 30 MMOL/L (ref 22–29)
CREAT SERPL-MCNC: 1 MG/DL (ref 0.5–0.9)
GFR AFRICAN AMERICAN: >59
GFR NON-AFRICAN AMERICAN: 59
GLUCOSE BLD-MCNC: 165 MG/DL (ref 70–99)
GLUCOSE BLD-MCNC: 305 MG/DL (ref 70–99)
GLUCOSE BLD-MCNC: 316 MG/DL (ref 70–99)
GLUCOSE BLD-MCNC: 64 MG/DL (ref 74–109)
HCT VFR BLD CALC: 33.6 % (ref 37–47)
HEMOGLOBIN: 10.8 G/DL (ref 12–16)
MAGNESIUM: 1.5 MG/DL (ref 1.6–2.6)
MCH RBC QN AUTO: 27.6 PG (ref 27–31)
MCHC RBC AUTO-ENTMCNC: 32.1 G/DL (ref 33–37)
MCV RBC AUTO: 85.9 FL (ref 81–99)
PDW BLD-RTO: 13.2 % (ref 11.5–14.5)
PERFORMED ON: ABNORMAL
PLATELET # BLD: 217 K/UL (ref 130–400)
PMV BLD AUTO: 9.7 FL (ref 9.4–12.3)
POTASSIUM REFLEX MAGNESIUM: 3.1 MMOL/L (ref 3.5–5)
RBC # BLD: 3.91 M/UL (ref 4.2–5.4)
SODIUM BLD-SCNC: 145 MMOL/L (ref 136–145)
WBC # BLD: 5.5 K/UL (ref 4.8–10.8)

## 2020-07-29 PROCEDURE — 83735 ASSAY OF MAGNESIUM: CPT

## 2020-07-29 PROCEDURE — 2580000003 HC RX 258: Performed by: PHYSICIAN ASSISTANT

## 2020-07-29 PROCEDURE — 6360000002 HC RX W HCPCS: Performed by: HOSPITALIST

## 2020-07-29 PROCEDURE — 80048 BASIC METABOLIC PNL TOTAL CA: CPT

## 2020-07-29 PROCEDURE — 6370000000 HC RX 637 (ALT 250 FOR IP): Performed by: PHYSICIAN ASSISTANT

## 2020-07-29 PROCEDURE — 85027 COMPLETE CBC AUTOMATED: CPT

## 2020-07-29 PROCEDURE — 6360000002 HC RX W HCPCS: Performed by: PHYSICIAN ASSISTANT

## 2020-07-29 PROCEDURE — 82947 ASSAY GLUCOSE BLOOD QUANT: CPT

## 2020-07-29 PROCEDURE — 1210000000 HC MED SURG R&B

## 2020-07-29 PROCEDURE — 6370000000 HC RX 637 (ALT 250 FOR IP): Performed by: FAMILY MEDICINE

## 2020-07-29 PROCEDURE — 36415 COLL VENOUS BLD VENIPUNCTURE: CPT

## 2020-07-29 PROCEDURE — 2580000003 HC RX 258: Performed by: EMERGENCY MEDICINE

## 2020-07-29 PROCEDURE — 6360000002 HC RX W HCPCS: Performed by: EMERGENCY MEDICINE

## 2020-07-29 RX ORDER — CLONAZEPAM 0.5 MG/1
0.5 TABLET ORAL ONCE
Status: COMPLETED | OUTPATIENT
Start: 2020-07-29 | End: 2020-07-29

## 2020-07-29 RX ORDER — BUTORPHANOL TARTRATE 1 MG/ML
1 INJECTION, SOLUTION INTRAMUSCULAR; INTRAVENOUS EVERY 6 HOURS PRN
Status: DISCONTINUED | OUTPATIENT
Start: 2020-07-29 | End: 2020-07-31 | Stop reason: HOSPADM

## 2020-07-29 RX ORDER — FUROSEMIDE 10 MG/ML
40 INJECTION INTRAMUSCULAR; INTRAVENOUS 2 TIMES DAILY
Status: DISCONTINUED | OUTPATIENT
Start: 2020-07-29 | End: 2020-07-30

## 2020-07-29 RX ORDER — POTASSIUM CHLORIDE 20 MEQ/1
20 TABLET, EXTENDED RELEASE ORAL DAILY
Status: DISCONTINUED | OUTPATIENT
Start: 2020-07-29 | End: 2020-07-31 | Stop reason: HOSPADM

## 2020-07-29 RX ORDER — GABAPENTIN 100 MG/1
100 CAPSULE ORAL ONCE
Status: COMPLETED | OUTPATIENT
Start: 2020-07-29 | End: 2020-07-29

## 2020-07-29 RX ORDER — BUMETANIDE 0.25 MG/ML
1 INJECTION, SOLUTION INTRAMUSCULAR; INTRAVENOUS 2 TIMES DAILY
Status: DISCONTINUED | OUTPATIENT
Start: 2020-07-29 | End: 2020-07-29 | Stop reason: ALTCHOICE

## 2020-07-29 RX ADMIN — POTASSIUM CHLORIDE 40 MEQ: 1500 TABLET, EXTENDED RELEASE ORAL at 10:14

## 2020-07-29 RX ADMIN — HYDROCODONE BITARTRATE AND ACETAMINOPHEN 1 TABLET: 7.5; 325 TABLET ORAL at 10:15

## 2020-07-29 RX ADMIN — BUTORPHANOL TARTRATE 1 MG: 1 INJECTION, SOLUTION INTRAMUSCULAR; INTRAVENOUS at 05:29

## 2020-07-29 RX ADMIN — MAGNESIUM SULFATE HEPTAHYDRATE 2 G: 40 INJECTION, SOLUTION INTRAVENOUS at 15:06

## 2020-07-29 RX ADMIN — BUSPIRONE HYDROCHLORIDE 5 MG: 10 TABLET ORAL at 15:06

## 2020-07-29 RX ADMIN — ONDANSETRON 4 MG: 2 INJECTION INTRAMUSCULAR; INTRAVENOUS at 03:24

## 2020-07-29 RX ADMIN — CLONAZEPAM 0.5 MG: 0.5 TABLET ORAL at 17:31

## 2020-07-29 RX ADMIN — BUTORPHANOL TARTRATE 1 MG: 1 INJECTION, SOLUTION INTRAMUSCULAR; INTRAVENOUS at 12:31

## 2020-07-29 RX ADMIN — POTASSIUM CHLORIDE 20 MEQ: 1500 TABLET, EXTENDED RELEASE ORAL at 22:15

## 2020-07-29 RX ADMIN — INSULIN LISPRO 2 UNITS: 100 INJECTION, SOLUTION INTRAVENOUS; SUBCUTANEOUS at 20:28

## 2020-07-29 RX ADMIN — SODIUM CHLORIDE, PRESERVATIVE FREE 10 ML: 5 INJECTION INTRAVENOUS at 10:17

## 2020-07-29 RX ADMIN — VANCOMYCIN HYDROCHLORIDE 1750 MG: 1 INJECTION, POWDER, LYOPHILIZED, FOR SOLUTION INTRAVENOUS at 20:24

## 2020-07-29 RX ADMIN — MIRTAZAPINE 15 MG: 15 TABLET, FILM COATED ORAL at 20:24

## 2020-07-29 RX ADMIN — GABAPENTIN 300 MG: 300 CAPSULE ORAL at 20:25

## 2020-07-29 RX ADMIN — CLONAZEPAM 0.5 MG: 0.5 TABLET ORAL at 10:15

## 2020-07-29 RX ADMIN — BUSPIRONE HYDROCHLORIDE 5 MG: 10 TABLET ORAL at 10:15

## 2020-07-29 RX ADMIN — HYDROCODONE BITARTRATE AND ACETAMINOPHEN 1 TABLET: 7.5; 325 TABLET ORAL at 22:16

## 2020-07-29 RX ADMIN — PANTOPRAZOLE SODIUM 40 MG: 40 TABLET, DELAYED RELEASE ORAL at 05:30

## 2020-07-29 RX ADMIN — GABAPENTIN 100 MG: 100 CAPSULE ORAL at 17:31

## 2020-07-29 RX ADMIN — GABAPENTIN 100 MG: 100 CAPSULE ORAL at 10:15

## 2020-07-29 RX ADMIN — CLONAZEPAM 0.5 MG: 0.5 TABLET ORAL at 20:25

## 2020-07-29 RX ADMIN — HYDROCODONE BITARTRATE AND ACETAMINOPHEN 1 TABLET: 7.5; 325 TABLET ORAL at 15:06

## 2020-07-29 RX ADMIN — GABAPENTIN 100 MG: 100 CAPSULE ORAL at 15:06

## 2020-07-29 RX ADMIN — MUPIROCIN: 20 OINTMENT TOPICAL at 10:18

## 2020-07-29 RX ADMIN — BUSPIRONE HYDROCHLORIDE 5 MG: 10 TABLET ORAL at 20:25

## 2020-07-29 RX ADMIN — PRAMIPEXOLE DIHYDROCHLORIDE 0.75 MG: 0.25 TABLET ORAL at 10:14

## 2020-07-29 RX ADMIN — PRAMIPEXOLE DIHYDROCHLORIDE 0.75 MG: 0.25 TABLET ORAL at 20:25

## 2020-07-29 RX ADMIN — CYCLOBENZAPRINE 10 MG: 10 TABLET, FILM COATED ORAL at 20:25

## 2020-07-29 RX ADMIN — BUTORPHANOL TARTRATE 1 MG: 1 INJECTION, SOLUTION INTRAMUSCULAR; INTRAVENOUS at 20:25

## 2020-07-29 RX ADMIN — HYDROCODONE BITARTRATE AND ACETAMINOPHEN 1 TABLET: 7.5; 325 TABLET ORAL at 03:24

## 2020-07-29 RX ADMIN — VANCOMYCIN HYDROCHLORIDE 1750 MG: 1 INJECTION, POWDER, LYOPHILIZED, FOR SOLUTION INTRAVENOUS at 05:29

## 2020-07-29 ASSESSMENT — PAIN SCALES - GENERAL
PAINLEVEL_OUTOF10: 3
PAINLEVEL_OUTOF10: 0
PAINLEVEL_OUTOF10: 7
PAINLEVEL_OUTOF10: 3
PAINLEVEL_OUTOF10: 1
PAINLEVEL_OUTOF10: 7
PAINLEVEL_OUTOF10: 6
PAINLEVEL_OUTOF10: 5
PAINLEVEL_OUTOF10: 6
PAINLEVEL_OUTOF10: 0
PAINLEVEL_OUTOF10: 9
PAINLEVEL_OUTOF10: 0
PAINLEVEL_OUTOF10: 10
PAINLEVEL_OUTOF10: 7

## 2020-07-29 NOTE — PLAN OF CARE
Problem: Falls - Risk of:  Goal: Will remain free from falls  Description: Will remain free from falls  7/29/2020 0111 by [address], LPN  Outcome: Ongoing  7/29/2020 0108 by [address], LPN  Outcome: Ongoing  Goal: Absence of physical injury  Description: Absence of physical injury  7/29/2020 0111 by [address], LPN  Outcome: Ongoing  7/29/2020 0108 by [address], LPN  Outcome: Ongoing     Problem: Pain:  Goal: Pain level will decrease  Description: Pain level will decrease  7/29/2020 0111 by [address], LPN  Outcome: Ongoing  7/29/2020 0108 by [address], LPN  Outcome: Ongoing  Goal: Control of acute pain  Description: Control of acute pain  7/29/2020 0111 by [address], LPN  Outcome: Ongoing  7/29/2020 0108 by [address], LPN  Outcome: Ongoing  Goal: Control of chronic pain  Description: Control of chronic pain  7/29/2020 0111 by [address], LPN  Outcome: Ongoing  7/29/2020 0108 by [address], LPN  Outcome: Ongoing     Problem: Discharge Planning:  Goal: Discharged to appropriate level of care  Description: Discharged to appropriate level of care  Outcome: Ongoing     Problem:  Body Temperature - Imbalanced:  Goal: Ability to maintain a body temperature in the normal range will improve  Description: Ability to maintain a body temperature in the normal range will improve  Outcome: Ongoing     Problem: Mobility - Impaired:  Goal: Mobility will improve to maximum level  Description: Mobility will improve to maximum level  Outcome: Ongoing     Problem: Pain:  Goal: Pain level will decrease  Description: Pain level will decrease  7/29/2020 0111 by [address], LPN  Outcome: Ongoing  7/29/2020 0108 by [address], LPN  Outcome: Ongoing  Goal: Control of acute pain  Description: Control of acute pain  Outcome: Ongoing  Goal: Control of chronic pain  Description: Control of chronic pain  Outcome: Ongoing     Problem: Skin Integrity - Impaired:  Goal: Will show no infection signs and symptoms  Description: Will show no infection signs and symptoms  Outcome: Ongoing  Goal: Absence of new skin breakdown  Description: Absence of new skin breakdown  Outcome: Ongoing

## 2020-07-29 NOTE — PROGRESS NOTES
[id number] [facility]    Patient:  Andie Melgar  YOB: 1970  Date of Service: 7/29/2020  MRN: [id number]   Acct: [de-identified]   Primary Care Physician: NANETTE Salas  Advance Directive: Full Code  Admit Date: 7/28/2020       Hospital Day: 1  Portions of this note have been copied forward, however, changed to reflect the most current clinical status of this patient. CHIEF COMPLAINT BLE cellulitis    SUBJECTIVE: Mrs. Pepe Munoz is tearful stating her legs are painful. She describes stinging/burning to bilateral feet/calves. She has been ambulatory today and seen walking the floor with spouse by this PA-C. States she elevated her legs after lunch and that's when the stinging sensation started. Cumulative Hospital Course: The patient is a 54 y.o. female with PMH lymphedema, anxiety, DM II, HTN, HLD, restless leg syndrome who presented to [address] [place] [address] ED complaining of recurrent bilateral lower extremity cellulitis. She was last discharged from this facility on 07/03/2020 after receiving a course of IV Vancomycin. She was seen by Infectious Disease that admission was set up with 2 doses of Oritavancin. States her last dose was last Tuesday or Wednesday and had recurrence of erythema 4 days ago, worse in LLE with blister formation. She has had subjective fever and nightly chills. Has been using bactroban cream on creases in legs where skin breakdown is starting to occur. Prior to discharge on 07/03/2020 she was also seen for the same complaint and discharged on 06/18/2020 after receiving a course of Vancomycin and was discharged that admission on Doxycycline. She is followed as an outpatient by the lymphedema clinic. Mrs. Trivedi was admitted to Hospitalist service, started on IV Vancomycin as well as IV diuretic therapy with orders to keep BLE's elevated. Consult placed to ID for continuity of care.      Review of Systems:   14 point review of systems is negative except as specifically addressed above. Objective:   VITALS:  /65   Pulse 82   Temp 97.7 °F (36.5 °C) (Temporal)   Resp 16   Ht 5' 1\" (1.549 m)   Wt (!) 323 lb (146.5 kg)   SpO2 96%   BMI 61.03 kg/m²   24HR INTAKE/OUTPUT:      Intake/Output Summary (Last 24 hours) at 7/29/2020 1445  Last data filed at 7/29/2020 1017  Gross per 24 hour   Intake 130 ml   Output --   Net 130 ml     General appearance: alert, appears stated age, cooperative, laying in bed with BLE's elevated  Head: Normocephalic, without obvious abnormality, atraumatic  Eyes: conjunctivae/corneas clear. PERRL, EOM's intact. Ears: normal external ears and nose, throat without exudate  Neck: no adenopathy, no carotid bruit, no JVD, supple, symmetrical, trachea midline   Lungs: clear to auscultation bilaterally,no rales or wheezes   Heart: regular rate and rhythm, S1, S2 normal, no murmur  Abdomen:soft,obese, non-tender; non-distended, normal bowel sounds no masses, no organomegaly  Extremities:BLE lymphedema-unchanged,  Mild improvement in erythema. Skin: Warm, dry. Cellulitic changes to BLE's-improving  Lymphatic: No palpable lymph node enlargment  Neurologic: Alert and oriented X 3, normal strength and tone.  No focal deficits  Psychiatric: Anxious, tearful    Medications:      dextrose        bumetanide  1 mg Intravenous BID    vancomycin  1,750 mg Intravenous Q12H    vancomycin (VANCOCIN) intermittent dosing (placeholder)   Other RX Placeholder    busPIRone  5 mg Oral TID    clonazePAM  0.5 mg Oral BID    diclofenac sodium  2 g Topical BID    gabapentin  100 mg Oral BID    gabapentin  300 mg Oral Nightly    mirtazapine  15 mg Oral Nightly    mupirocin   Topical Daily    pantoprazole  40 mg Oral QAM AC    pramipexole  0.75 mg Oral BID    sodium chloride flush  10 mL Intravenous 2 times per day    enoxaparin  40 mg Subcutaneous Daily    insulin lispro  0-6 Units Subcutaneous TID WC    insulin lispro  0-3 Units Subcutaneous Nightly butorphanol, cyclobenzaprine, HYDROcodone-acetaminophen, promethazine, sodium chloride flush, acetaminophen **OR** acetaminophen, polyethylene glycol, promethazine **OR** ondansetron, glucose, dextrose, glucagon (rDNA), dextrose, potassium chloride **OR** potassium alternative oral replacement **OR** potassium chloride, magnesium sulfate  DIET CARB CONTROL;     Lab and other Data:     Recent Labs     07/28/20  1530 07/29/20  0258   WBC 6.2 5.5   HGB 12.3 10.8*    217     Recent Labs     07/28/20  1530 07/29/20  0258    145   K 3.0* 3.1*   CL 96* 98   CO2 31* 30*   BUN 14 14   CREATININE 1.0* 1.0*   GLUCOSE 99 64*     Recent Labs     07/28/20  1530   AST 17   ALT 13   BILITOT <0.2   ALKPHOS 57     Micro: Blood cultures pendng    Assessment/Plan   Principal Problem:    Cellulitis of both lower extremities-increase neuropathic pain today. Give second dose Neurontin now.  Continue Vancomycin, keep BLE's elevated, Ace wraps    Active Problems:    RLS (restless legs syndrome)-home medications continued      FEDERICA (generalized anxiety disorder)-Klonopin, Buspar resumed, would recommend outpatient Psychiatry follow up      Migraine without aura and without status migrainosus, not intractable-prn Stadol      Uncontrolled type 2 diabetes mellitus with diabetic neuropathy, with long-term current use of insulin ([facility])-SSI, hypoglycemia tx orders, accuchecks      Gastroesophageal reflux disease-PPI      Hypokalemia-replace      Hypomagnesemia-replace     Antibiotic: Vancomycin    DVT Prophylaxis: Lovenox    GI prophylaxis: Protonix    Lalo Lea PA-C

## 2020-07-29 NOTE — CONSULTS
INFECTIOUS DISEASES CONSULT NOTE    Patient:  Tianna Trivedi 54 y.o. female  ROOM # [unfilled]  YOB: 1970  MRN: [id number]  CSN:  [id number]  Admit date: 7/28/2020   Admitting Physician: Suzy Fuentes DO  Primary Care Physician: NANETTE Orellana  REFERRING PROVIDER: No ref. provider found    Reason for Consultation: Cellulitis, lymphedema. History of Present Illness/Chief Complaint: Pleasant 28-year-old woman. Known to me from previous consultation. She has had severe bilateral lower extremity lymphedema. She has had problems with bilateral chronic lower extremity erythema. At times she feels the erythema gets somewhat worse. Her legs become more uncomfortable. She has been treated for cellulitis. Her treatment is complicated because she lists multiple antibiotic allergies. Following her last admission she received 2 doses of oritavancin as an outpatient. She had been stable. She had been referred for lymphedema massage treatment. She had not yet started treatment. She felt some increasing lower extremity tightness. She indicates 2 very small open areas developed on her anterior left leg. There was some clear fluid weeping. She has not had significant fever. She has had discomfort. She presented to the emergency room yesterday. She was admitted for further management. She has not elevated her legs above her heart consistently. She did elevate somewhat last night. She also received Lasix. Legs feel a little bit less tight today. She has been started empirically on vancomycin. She was without fever and was hemodynamically stable overnight.     Current Scheduled Medications:    furosemide  40 mg Intravenous BID    potassium chloride  20 mEq Oral Daily    vancomycin  1,750 mg Intravenous Q12H    vancomycin (VANCOCIN) intermittent dosing (placeholder)   Other RX Placeholder    busPIRone  5 mg Oral TID    clonazePAM  0.5 mg Oral BID    diclofenac sodium  2 g Topical BID    gabapentin  100 mg Oral BID    gabapentin  300 mg Oral Nightly    mirtazapine  15 mg Oral Nightly    mupirocin   Topical Daily    pantoprazole  40 mg Oral QAM AC    pramipexole  0.75 mg Oral BID    sodium chloride flush  10 mL Intravenous 2 times per day    enoxaparin  40 mg Subcutaneous Daily    insulin lispro  0-6 Units Subcutaneous TID WC    insulin lispro  0-3 Units Subcutaneous Nightly     Current PRN Medications:  butorphanol, cyclobenzaprine, HYDROcodone-acetaminophen, promethazine, sodium chloride flush, acetaminophen **OR** acetaminophen, polyethylene glycol, promethazine **OR** ondansetron, glucose, dextrose, glucagon (rDNA), dextrose, potassium chloride **OR** potassium alternative oral replacement **OR** potassium chloride, magnesium sulfate    Allergies: Allergies   Allergen Reactions    Compazine [Prochlorperazine Maleate] Shortness Of Breath    Ciprofloxacin Hives    Amoxicillin-Pot Clavulanate     Demerol Hives    Hydroxyzine     Hydroxyzine Hcl Itching    Ibuprofen Nausea Only    Ketorolac Tromethamine     Meperidine Hives    Nortriptyline Other (See Comments)    Orphenadrine     Orphenadrine Citrate Itching    Penicillins Hives and Nausea Only    Prochlorperazine Edisylate      Will discuss with patient at next visit     Tobramycin      Will discuss with patient at next visit     Tramadol      Will discuss with patient at next visit     Vistaril [Hydroxyzine Hcl] Itching    Cephalexin Rash    Clindamycin/Lincomycin Rash    Codeine Nausea And Vomiting and Rash    Doxycycline Nausea And Vomiting    Keflex [Cephalexin] Rash    Ketorolac Tromethamine Itching and Nausea And Vomiting    Moxifloxacin Nausea And Vomiting     Will discuss with patient at visit      Past Medical History: Anxiety. Depression. Diabetes mellitus. Hyperlipidemia. Hypertension. Nephrolithiasis. Restless leg.     Past Surgical History: Surgery for ectopic pregnancy. Corneal transplant. Cataract removal.  Left knee surgery. Tubal ligation.     Social History: No tobacco use. No alcohol use. Man who she refers to as her \"boyfriend \"was at bedside.     Family History: Cancer in father. Chronic obstructive pulmonary disease in mother. Cancer in maternal grandfather.     Exposure History: No close contacts have been ill. Review of Systems: No cardiopulmonary symptoms. No GI symptoms. Vital Signs:  /65   Pulse 82   Temp 97.7 °F (36.5 °C) (Temporal)   Resp 16   Ht 5' 1\" (1.549 m)   Wt (!) 323 lb (146.5 kg)   SpO2 96%   BMI 61.03 kg/m²  Temp (24hrs), Av.8 °F (36.6 °C), Min:97.6 °F (36.4 °C), Max:98 °F (36.7 °C)    Physical Exam:   Vital signs reviewed. Alert, oriented, no distress  Lungs without crackles  Heart without murmur  Abdomen obese. Soft. Nontender. Extremities severe bilateral lower extremity lymphedema. There are some skin wrinkles developing distal left lower extremity. I would suggest there was some improvement with Lasix and elevation last night. Her legs are symmetrical.  The erythema is mildly tender. The area is not severely tender. There is not a lot of warmth to touch. There is some blanching of the erythema. With elevation of her legs above the heart erythema does show some fading. Lab Results:  CBC:   Recent Labs     20  1530 20  0258   WBC 6.2 5.5   HGB 12.3 10.8*   HCT 38.2 33.6*    217   LYMPHOPCT 32.6  --    MONOPCT 7.1  --      CMP:   Recent Labs     20  1530 20  0258    145   K 3.0* 3.1*   CL 96* 98   CO2 31* 30*   BUN 14 14   CREATININE 1.0* 1.0*   CALCIUM 9.5 8.5*   BILITOT <0.2  --    ALKPHOS 57  --    ALT 13  --    AST 17  --    GLUCOSE 99 64*     Culture:     Radiology:     Additional Studies Reviewed:     Impression:   1. Severe bilateral lower extremity lymphedema which is chronic. 2.  Chronic bilateral lower extremity erythema. Feel a lot of the issue of stasis dermatitis/dependent rubor. There may be some element of cellulitis as well. 3.  Multiple antibiotic allergies    Recommendations:    Would recommend continuing vancomycin at present  Would suggest vigilance with elevation of her legs above the level of the heart to help improve lymphedema  Would continue diuresis  If over the next few days we get significant improvement in her lower extremity swelling and erythema, would recommend discharge on oral antibiotic treatment with close outpatient early follow-up for lymphedema massage. I feel as if the primary issue here is trying to help her control edema more than it is an antibiotic related issue. When ready for discharge we could consider oritavancin again. Oral doxycycline might be an option as well. She has had this previously. She tolerated doxycycline in the past without evidence of allergy.     Bhavana Carver MD  07/29/20  6:09 PM

## 2020-07-29 NOTE — PROGRESS NOTES
4 Eyes Skin Assessment    Tianna Trivedi is being assessed upon: Admission    I agree that I, Allen Harris, along with [employer] (either 2 RN's or 1 LPN and 1 RN) have performed a thorough Head to Toe Skin Assessment on the patient. ALL assessment sites listed below have been assessed. Areas assessed by both nurses:     [x]   Head, Face, and Ears   [x]   Shoulders, Back, and Chest  [x]   Arms, Elbows, and Hands   [x]   Coccyx, Sacrum, and Ischium  [x]   Legs, Feet, and Heels    Does the Patient have Skin Breakdown?  No    Bilateral cellulitis and edema    Prieto Prevention initiated: No  Wound Care Orders initiated: No    WOC nurse consulted for Pressure Injury (Stage 3,4, Unstageable, DTI, NWPT, and Complex wounds) and New or Established Ostomies: No        Primary Nurse eSignature: Allen Harris RN on 7/28/2020 at 10:57 PM      Co-Signer eSignature: {Esignature:[id number]}

## 2020-07-29 NOTE — PROGRESS NOTES
Tianna  arrived to room # 320. Presented with: cellulitis and bilateral edema  Mental Status: Patient is oriented, alert, coherent, logical, thought processes intact and able to concentrate and follow conversation. Vitals:    07/28/20 1940   BP: 120/69   Pulse: 90   Resp: 18   Temp: 98 °F (36.7 °C)   SpO2: 96%     Patient safety contract and falls prevention contract reviewed with patient Yes. Oriented Patient and Family to room. Call light within reach. Yes.   Needs, issues or concerns expressed at this time: no.      Electronically signed by Onelia Hahn RN on 7/28/2020 at 10:57 PM

## 2020-07-30 LAB
ANION GAP SERPL CALCULATED.3IONS-SCNC: 15 MMOL/L (ref 7–19)
BUN BLDV-MCNC: 12 MG/DL (ref 6–20)
CALCIUM SERPL-MCNC: 9.2 MG/DL (ref 8.6–10)
CHLORIDE BLD-SCNC: 103 MMOL/L (ref 98–111)
CO2: 25 MMOL/L (ref 22–29)
CREAT SERPL-MCNC: 1 MG/DL (ref 0.5–0.9)
GFR AFRICAN AMERICAN: >59
GFR NON-AFRICAN AMERICAN: 59
GLUCOSE BLD-MCNC: 144 MG/DL (ref 70–99)
GLUCOSE BLD-MCNC: 298 MG/DL (ref 70–99)
GLUCOSE BLD-MCNC: 338 MG/DL (ref 70–99)
GLUCOSE BLD-MCNC: 354 MG/DL (ref 70–99)
GLUCOSE BLD-MCNC: 96 MG/DL (ref 74–109)
PERFORMED ON: ABNORMAL
POTASSIUM REFLEX MAGNESIUM: 4.2 MMOL/L (ref 3.5–5)
SODIUM BLD-SCNC: 143 MMOL/L (ref 136–145)

## 2020-07-30 PROCEDURE — 36415 COLL VENOUS BLD VENIPUNCTURE: CPT

## 2020-07-30 PROCEDURE — 6360000002 HC RX W HCPCS: Performed by: PHYSICIAN ASSISTANT

## 2020-07-30 PROCEDURE — 2580000003 HC RX 258: Performed by: PHYSICIAN ASSISTANT

## 2020-07-30 PROCEDURE — 6360000002 HC RX W HCPCS: Performed by: HOSPITALIST

## 2020-07-30 PROCEDURE — 2580000003 HC RX 258: Performed by: EMERGENCY MEDICINE

## 2020-07-30 PROCEDURE — 6360000002 HC RX W HCPCS: Performed by: EMERGENCY MEDICINE

## 2020-07-30 PROCEDURE — 6370000000 HC RX 637 (ALT 250 FOR IP): Performed by: INTERNAL MEDICINE

## 2020-07-30 PROCEDURE — 1210000000 HC MED SURG R&B

## 2020-07-30 PROCEDURE — 80048 BASIC METABOLIC PNL TOTAL CA: CPT

## 2020-07-30 PROCEDURE — 82947 ASSAY GLUCOSE BLOOD QUANT: CPT

## 2020-07-30 PROCEDURE — 6370000000 HC RX 637 (ALT 250 FOR IP): Performed by: FAMILY MEDICINE

## 2020-07-30 PROCEDURE — 6370000000 HC RX 637 (ALT 250 FOR IP): Performed by: PHYSICIAN ASSISTANT

## 2020-07-30 RX ORDER — DOXYCYCLINE HYCLATE 100 MG/1
100 CAPSULE ORAL EVERY 12 HOURS SCHEDULED
Status: DISCONTINUED | OUTPATIENT
Start: 2020-07-30 | End: 2020-07-31 | Stop reason: HOSPADM

## 2020-07-30 RX ORDER — DULOXETIN HYDROCHLORIDE 30 MG/1
30 CAPSULE, DELAYED RELEASE ORAL 2 TIMES DAILY
Status: DISCONTINUED | OUTPATIENT
Start: 2020-07-30 | End: 2020-07-31 | Stop reason: HOSPADM

## 2020-07-30 RX ORDER — FUROSEMIDE 40 MG/1
80 TABLET ORAL 2 TIMES DAILY
Status: DISCONTINUED | OUTPATIENT
Start: 2020-07-30 | End: 2020-07-31 | Stop reason: HOSPADM

## 2020-07-30 RX ADMIN — CLONAZEPAM 0.5 MG: 0.5 TABLET ORAL at 09:46

## 2020-07-30 RX ADMIN — CLONAZEPAM 0.5 MG: 0.5 TABLET ORAL at 20:11

## 2020-07-30 RX ADMIN — GABAPENTIN 100 MG: 100 CAPSULE ORAL at 09:48

## 2020-07-30 RX ADMIN — FUROSEMIDE 40 MG: 10 INJECTION, SOLUTION INTRAMUSCULAR; INTRAVENOUS at 09:47

## 2020-07-30 RX ADMIN — BUSPIRONE HYDROCHLORIDE 5 MG: 10 TABLET ORAL at 20:10

## 2020-07-30 RX ADMIN — PANTOPRAZOLE SODIUM 40 MG: 40 TABLET, DELAYED RELEASE ORAL at 06:31

## 2020-07-30 RX ADMIN — PRAMIPEXOLE DIHYDROCHLORIDE 0.75 MG: 0.25 TABLET ORAL at 09:47

## 2020-07-30 RX ADMIN — DICLOFENAC SODIUM 2 G: 10 GEL TOPICAL at 23:14

## 2020-07-30 RX ADMIN — DULOXETINE HYDROCHLORIDE 30 MG: 30 CAPSULE, DELAYED RELEASE ORAL at 10:04

## 2020-07-30 RX ADMIN — BUSPIRONE HYDROCHLORIDE 5 MG: 10 TABLET ORAL at 15:45

## 2020-07-30 RX ADMIN — FUROSEMIDE 80 MG: 40 TABLET ORAL at 20:11

## 2020-07-30 RX ADMIN — MIRTAZAPINE 15 MG: 15 TABLET, FILM COATED ORAL at 20:11

## 2020-07-30 RX ADMIN — BUTORPHANOL TARTRATE 1 MG: 1 INJECTION, SOLUTION INTRAMUSCULAR; INTRAVENOUS at 04:00

## 2020-07-30 RX ADMIN — BUSPIRONE HYDROCHLORIDE 5 MG: 10 TABLET ORAL at 09:48

## 2020-07-30 RX ADMIN — PRAMIPEXOLE DIHYDROCHLORIDE 0.75 MG: 0.25 TABLET ORAL at 20:10

## 2020-07-30 RX ADMIN — POTASSIUM CHLORIDE 20 MEQ: 1500 TABLET, EXTENDED RELEASE ORAL at 09:47

## 2020-07-30 RX ADMIN — GABAPENTIN 300 MG: 300 CAPSULE ORAL at 20:11

## 2020-07-30 RX ADMIN — INSULIN LISPRO 3 UNITS: 100 INJECTION, SOLUTION INTRAVENOUS; SUBCUTANEOUS at 23:03

## 2020-07-30 RX ADMIN — VANCOMYCIN HYDROCHLORIDE 1750 MG: 1 INJECTION, POWDER, LYOPHILIZED, FOR SOLUTION INTRAVENOUS at 09:48

## 2020-07-30 RX ADMIN — MUPIROCIN: 20 OINTMENT TOPICAL at 10:28

## 2020-07-30 RX ADMIN — GABAPENTIN 100 MG: 100 CAPSULE ORAL at 15:45

## 2020-07-30 RX ADMIN — DULOXETINE HYDROCHLORIDE 30 MG: 30 CAPSULE, DELAYED RELEASE ORAL at 20:11

## 2020-07-30 RX ADMIN — BUTORPHANOL TARTRATE 1 MG: 1 INJECTION, SOLUTION INTRAMUSCULAR; INTRAVENOUS at 10:04

## 2020-07-30 RX ADMIN — HYDROCODONE BITARTRATE AND ACETAMINOPHEN 1 TABLET: 7.5; 325 TABLET ORAL at 15:44

## 2020-07-30 RX ADMIN — DOXYCYCLINE HYCLATE 100 MG: 100 CAPSULE ORAL at 20:11

## 2020-07-30 RX ADMIN — SODIUM CHLORIDE, PRESERVATIVE FREE 10 ML: 5 INJECTION INTRAVENOUS at 10:04

## 2020-07-30 RX ADMIN — BUTORPHANOL TARTRATE 1 MG: 1 INJECTION, SOLUTION INTRAMUSCULAR; INTRAVENOUS at 23:03

## 2020-07-30 ASSESSMENT — PAIN SCALES - GENERAL
PAINLEVEL_OUTOF10: 5
PAINLEVEL_OUTOF10: 7
PAINLEVEL_OUTOF10: 0
PAINLEVEL_OUTOF10: 5
PAINLEVEL_OUTOF10: 0
PAINLEVEL_OUTOF10: 6

## 2020-07-30 NOTE — PROGRESS NOTES
Patient called this nurse into the room where I found her with her IV half out. She was crying and stated her boyfriend had been taken down to the ER. She said \"Do you want to finish taking this out or do you want me to? \" I encouraged patient to keep the IV in, but she stated she no longer wanted it. She stated she will stay in the room for now but may leave if need be d/t her boyfriend. Also patient has been found walking around the floor and in her room many times today. Reminded patient that she needs to keep her feet elevated. She stated she thought she \"over did it. \"    Electronically signed by Lynette Majano RN on 7/30/2020 at 6:30 PM

## 2020-07-30 NOTE — PROGRESS NOTES
[facility] Hospitalists    Patient:  Sharda Jon  YOB: 1970  Date of Service: 7/30/2020  MRN: [id number]   Acct: [de-identified]   Primary Care Physician: NANETTE Bruce  Advance Directive: Full Code  Admit Date: 7/28/2020       Hospital Day: 2  Portions of this note have been copied forward, however, changed to reflect the most current clinical status of this patient. CHIEF COMPLAINT BLE cellulitis    SUBJECTIVE: Mrs. Yarelis Fisher is tearful again this morning. States she feels depressed. Neuropathic pain mildly improved today. Discussed addition of Cymbalta. States she has taken it before and done well. Unsure why she stopped taking it. Would like to try it again. She has kept her legs elevated overnight. Lost IV access yesterday. Discussed PICC line. Would like to hold off for now unless needs prolonged IV medication. Cumulative Hospital Course: The patient is a 54 y.o. female with PMH lymphedema, anxiety, DM II, HTN, HLD, restless leg syndrome who presented to [facility] ED complaining of recurrent bilateral lower extremity cellulitis. She was last discharged from this facility on 07/03/2020 after receiving a course of IV Vancomycin. She was seen by Infectious Disease that admission was set up with 2 doses of Oritavancin. States her last dose was last Tuesday or Wednesday and had recurrence of erythema 4 days ago, worse in LLE with blister formation. She has had subjective fever and nightly chills. Has been using bactroban cream on creases in legs where skin breakdown is starting to occur. Prior to discharge on 07/03/2020 she was also seen for the same complaint and discharged on 06/18/2020 after receiving a course of Vancomycin and was discharged that admission on Doxycycline. She is followed as an outpatient by the lymphedema clinic. Mrs. Trivedi was admitted to Hospitalist service, started on IV Vancomycin as well as IV diuretic therapy with orders to keep BLE's elevated.  Consult placed to ID for continuity of care. Cymbalta added 07/30/2020. Review of Systems:   14 point review of systems is negative except as specifically addressed above. Objective:   VITALS:  /68   Pulse 88   Temp 98.9 °F (37.2 °C) (Temporal)   Resp 16   Ht 5' 1\" (1.549 m)   Wt (!) 323 lb (146.5 kg)   SpO2 94%   BMI 61.03 kg/m²   24HR INTAKE/OUTPUT:      Intake/Output Summary (Last 24 hours) at 7/30/2020 0915  Last data filed at 7/30/2020 0244  Gross per 24 hour   Intake 1100 ml   Output 300 ml   Net 800 ml     General appearance: alert, appears stated age, cooperative, sitting up in bed with legs elevated  Head: Normocephalic, without obvious abnormality, atraumatic  Eyes: conjunctivae/corneas clear. PERRL, EOM's intact. Ears: normal external ears and nose, throat without exudate  Neck: no adenopathy, no carotid bruit, no JVD, supple, symmetrical, trachea midline   Lungs: clear throughout no wheezes, rales or rhonchi   Heart: RRR, S1, S2 normal, no murmur  Abdomen:soft,obese, non-tender; non-distended, normal bowel sounds no masses, no organomegaly  Extremities:BLE lymphedema-slowly improving,  Continued improvement in erythema. Skin: Warm, dry. Cellulitic changes to BLE's-improving  Lymphatic: No palpable lymph node enlargment  Neurologic: Alert and oriented X 3, normal strength and tone.  No focal deficits  Psychiatric: Anxious, tearful    Medications:      dextrose        DULoxetine  30 mg Oral BID    furosemide  40 mg Intravenous BID    potassium chloride  20 mEq Oral Daily    vancomycin  1,750 mg Intravenous Q12H    vancomycin (VANCOCIN) intermittent dosing (placeholder)   Other RX Placeholder    busPIRone  5 mg Oral TID    clonazePAM  0.5 mg Oral BID    diclofenac sodium  2 g Topical BID    gabapentin  100 mg Oral BID    gabapentin  300 mg Oral Nightly    mirtazapine  15 mg Oral Nightly    mupirocin   Topical Daily    pantoprazole  40 mg Oral QAM AC    pramipexole  0.75 mg Oral BID    sodium chloride flush  10 mL Intravenous 2 times per day    enoxaparin  40 mg Subcutaneous Daily    insulin lispro  0-6 Units Subcutaneous TID WC    insulin lispro  0-3 Units Subcutaneous Nightly     butorphanol, cyclobenzaprine, HYDROcodone-acetaminophen, promethazine, sodium chloride flush, acetaminophen **OR** acetaminophen, polyethylene glycol, promethazine **OR** ondansetron, glucose, dextrose, glucagon (rDNA), dextrose, potassium chloride **OR** potassium alternative oral replacement **OR** potassium chloride, magnesium sulfate  DIET CARB CONTROL;     Lab and other Data:     Recent Labs     07/28/20  1530 07/29/20  0258   WBC 6.2 5.5   HGB 12.3 10.8*    217     Recent Labs     07/28/20  1530 07/29/20  0258    145   K 3.0* 3.1*   CL 96* 98   CO2 31* 30*   BUN 14 14   CREATININE 1.0* 1.0*   GLUCOSE 99 64*     Recent Labs     07/28/20  1530   AST 17   ALT 13   BILITOT <0.2   ALKPHOS 57     Micro: Blood cultures pendng    Assessment/Plan   Principal Problem:    Cellulitis of both lower extremities-slow to improve, continue IV Vancomycin for now, await ID recommendations regarding timing of discharge/discharge meds. Encouraged patient to keep BLE's elevated. Cymbalta started.      Active Problems:    RLS (restless legs syndrome)-home medications continued      FEDER[place] (generalized anxiety disorder)-Klonopin, Buspar resumed, would recommend outpatient Psychiatry follow up, Cymbalta started       Migraine without aura and without status migrainosus, not intractable-prn Stadol      Uncontrolled type 2 diabetes mellitus with diabetic neuropathy, with long-term current use of insulin ([facility])-SSI, hypoglycemia tx orders, accuchecks, stable      Gastroesophageal reflux disease-PPI      Hypokalemia-repeat BMP refused by patient this AM      Hypomagnesemia-replaced, repeat BMP refused     Antibiotic: Vancomycin    DVT Prophylaxis: Lovenox    GI prophylaxis: Protonix    Benjie Mccall PA-C

## 2020-07-30 NOTE — PROGRESS NOTES
Infectious Diseases Progress Note    Patient:  Tianna Trivedi  YOB: 1970  MRN: [id number]   Admit date: 7/28/2020   Admitting Physician: Lien Krishnamurthy DO  Primary Care Physician: NANETTE Gil    Chief Complaint/Interval History: She is improving. Less edema. She feels legs are less tight. She does not have IV access. She has a difficult stick. I do not think she needs long-term antibiotic treatment. I explained to her I think the issue is primarily edema and stasis dermatitis more than cellulitis. In/Out    Intake/Output Summary (Last 24 hours) at 7/30/2020 1748  Last data filed at 7/30/2020 1300  Gross per 24 hour   Intake 1470 ml   Output 300 ml   Net 1170 ml     Allergies:    Allergies   Allergen Reactions    Compazine [Prochlorperazine Maleate] Shortness Of Breath    Ciprofloxacin Hives    Amoxicillin-Pot Clavulanate     Demerol Hives    Hydroxyzine     Hydroxyzine Hcl Itching    Ibuprofen Nausea Only    Ketorolac Tromethamine     Meperidine Hives    Nortriptyline Other (See Comments)    Orphenadrine     Orphenadrine Citrate Itching    Penicillins Hives and Nausea Only    Prochlorperazine Edisylate      Will discuss with patient at next visit     Tobramycin      Will discuss with patient at next visit     Tramadol      Will discuss with patient at next visit     Vistaril [Hydroxyzine Hcl] Itching    Cephalexin Rash    Clindamycin/Lincomycin Rash    Codeine Nausea And Vomiting and Rash    Doxycycline Nausea And Vomiting    Keflex [Cephalexin] Rash    Ketorolac Tromethamine Itching and Nausea And Vomiting    Moxifloxacin Nausea And Vomiting     Will discuss with patient at visit      Current Meds: DULoxetine (CYMBALTA) extended release capsule 30 mg, BID  butorphanol (STADOL) injection 1 mg, Q6H PRN  furosemide (LASIX) injection 40 mg, BID  potassium chloride (KLOR-CON M) extended release tablet 20 mEq, Daily  vancomycin (VANCOCIN) 1,750 mg in dextrose 5 % 500 mL IVPB, Q12H  vancomycin (VANCOCIN) intermittent dosing (placeholder), RX Placeholder  busPIRone (BUSPAR) tablet 5 mg, TID  clonazePAM (KLONOPIN) tablet 0.5 mg, BID  cyclobenzaprine (FLEXERIL) tablet 10 mg, Nightly PRN  diclofenac sodium (VOLTAREN) 1 % gel 2 g, BID  gabapentin (NEURONTIN) capsule 100 mg, BID  gabapentin (NEURONTIN) capsule 300 mg, Nightly  HYDROcodone-acetaminophen (NORCO) 7.5-325 MG per tablet 1 tablet, Q6H PRN  mirtazapine (REMERON) tablet 15 mg, Nightly  mupirocin (BACTROBAN) 2 % ointment, Daily  pantoprazole (PROTONIX) tablet 40 mg, QAM AC  pramipexole (MIRAPEX) tablet 0.75 mg, BID  promethazine (PHENERGAN) tablet 25 mg, Q6H PRN  sodium chloride flush 0.9 % injection 10 mL, 2 times per day  sodium chloride flush 0.9 % injection 10 mL, PRN  acetaminophen (TYLENOL) tablet 650 mg, Q6H PRN    Or  acetaminophen (TYLENOL) suppository 650 mg, Q6H PRN  polyethylene glycol (GLYCOLAX) packet 17 g, Daily PRN  promethazine (PHENERGAN) tablet 12.5 mg, Q6H PRN    Or  ondansetron (ZOFRAN) injection 4 mg, Q6H PRN  enoxaparin (LOVENOX) injection 40 mg, Daily  insulin lispro (HUMALOG) injection vial 0-6 Units, TID WC  insulin lispro (HUMALOG) injection vial 0-3 Units, Nightly  glucose (GLUTOSE) 40 % oral gel 15 g, PRN  dextrose 50 % IV solution, PRN  glucagon (rDNA) injection 1 mg, PRN  dextrose 5 % solution, PRN  potassium chloride (KLOR-CON M) extended release tablet 40 mEq, PRN    Or  potassium bicarb-citric acid (EFFER-K) effervescent tablet 40 mEq, PRN    Or  potassium chloride 10 mEq/100 mL IVPB (Peripheral Line), PRN  magnesium sulfate 2 g in 50 mL IVPB premix, PRN      Review of Systems no diarrhea or rash    VitalSigns:  BP (!) 144/86   Pulse 93   Temp 99.6 °F (37.6 °C) (Temporal)   Resp 18   Ht 5' 1\" (1.549 m)   Wt (!) 323 lb (146.5 kg)   SpO2 96%   BMI 61.03 kg/m²      Physical Exam  Line/IV site: No erythema, warmth, induration, or tenderness.   Definite improvement in bilateral lower extremity erythema. Findings yesterday were mild. Improved today. More wrinkles and skin involving both lower extremities. Decrease in edema. Lab Results:  CBC:   Recent Labs     07/28/20  1530 07/29/20  0258   WBC 6.2 5.5   HGB 12.3 10.8*    217     BMP:  Recent Labs     07/28/20  1530 07/29/20  0258 07/30/20  1004    145 143   K 3.0* 3.1* 4.2   CL 96* 98 103   CO2 31* 30* 25   BUN 14 14 12   CREATININE 1.0* 1.0* 1.0*   GLUCOSE 99 64* 96     Radiology: None    Additional Studies Reviewed:  None    Impression:  1. Bilateral lower extremity edema  2. Bilateral lower extremity stasis dermatitis  3. Resolving mild element of cellulitis    Recommendations:  No IV access currently  She indicates she is on Lasix 80 mg twice daily at home  I went ahead and changed her Lasix to p.o.   Discontinue vancomycin  Transition to oral doxycycline  Encouraged elevation of her legs  Nurses going to try some graded gentle Ace wraps tonight as well  Okay with me for discharge tomorrow if ongoing improvement  Would really like her to get started with outpatient lymphedema massage therapy first thing next week    Sanjana Bhatt MD

## 2020-07-31 VITALS
WEIGHT: 293 LBS | HEIGHT: 61 IN | DIASTOLIC BLOOD PRESSURE: 93 MMHG | SYSTOLIC BLOOD PRESSURE: 146 MMHG | HEART RATE: 87 BPM | BODY MASS INDEX: 55.32 KG/M2 | RESPIRATION RATE: 18 BRPM | TEMPERATURE: 97.7 F | OXYGEN SATURATION: 95 %

## 2020-07-31 LAB
GLUCOSE BLD-MCNC: 77 MG/DL (ref 70–99)
GLUCOSE BLD-MCNC: 88 MG/DL (ref 70–99)
PERFORMED ON: NORMAL
PERFORMED ON: NORMAL

## 2020-07-31 PROCEDURE — 6370000000 HC RX 637 (ALT 250 FOR IP): Performed by: FAMILY MEDICINE

## 2020-07-31 PROCEDURE — 6360000002 HC RX W HCPCS: Performed by: PHYSICIAN ASSISTANT

## 2020-07-31 PROCEDURE — 6370000000 HC RX 637 (ALT 250 FOR IP): Performed by: INTERNAL MEDICINE

## 2020-07-31 PROCEDURE — 82947 ASSAY GLUCOSE BLOOD QUANT: CPT

## 2020-07-31 PROCEDURE — 2580000003 HC RX 258: Performed by: PHYSICIAN ASSISTANT

## 2020-07-31 PROCEDURE — 6360000002 HC RX W HCPCS: Performed by: HOSPITALIST

## 2020-07-31 PROCEDURE — 6370000000 HC RX 637 (ALT 250 FOR IP): Performed by: PHYSICIAN ASSISTANT

## 2020-07-31 RX ORDER — FUROSEMIDE 80 MG
80 TABLET ORAL 2 TIMES DAILY
Qty: 60 TABLET | Refills: 0 | Status: SHIPPED | OUTPATIENT
Start: 2020-07-31 | End: 2021-05-21 | Stop reason: DRUGHIGH

## 2020-07-31 RX ORDER — DULOXETIN HYDROCHLORIDE 30 MG/1
30 CAPSULE, DELAYED RELEASE ORAL 2 TIMES DAILY
Qty: 60 CAPSULE | Refills: 0 | Status: SHIPPED | OUTPATIENT
Start: 2020-07-31 | End: 2020-09-02 | Stop reason: SDUPTHER

## 2020-07-31 RX ORDER — POTASSIUM CHLORIDE 20 MEQ/1
20 TABLET, EXTENDED RELEASE ORAL DAILY
Qty: 30 TABLET | Refills: 0 | Status: SHIPPED | OUTPATIENT
Start: 2020-07-31 | End: 2021-01-20 | Stop reason: SDUPTHER

## 2020-07-31 RX ORDER — DOXYCYCLINE HYCLATE 100 MG/1
100 CAPSULE ORAL EVERY 12 HOURS SCHEDULED
Qty: 20 CAPSULE | Refills: 0 | Status: SHIPPED | OUTPATIENT
Start: 2020-07-31 | End: 2020-08-10

## 2020-07-31 RX ADMIN — DULOXETINE HYDROCHLORIDE 30 MG: 30 CAPSULE, DELAYED RELEASE ORAL at 09:40

## 2020-07-31 RX ADMIN — SODIUM CHLORIDE, PRESERVATIVE FREE 10 ML: 5 INJECTION INTRAVENOUS at 10:04

## 2020-07-31 RX ADMIN — PANTOPRAZOLE SODIUM 40 MG: 40 TABLET, DELAYED RELEASE ORAL at 05:26

## 2020-07-31 RX ADMIN — CLONAZEPAM 0.5 MG: 0.5 TABLET ORAL at 09:40

## 2020-07-31 RX ADMIN — HYDROCODONE BITARTRATE AND ACETAMINOPHEN 1 TABLET: 7.5; 325 TABLET ORAL at 10:09

## 2020-07-31 RX ADMIN — GABAPENTIN 100 MG: 100 CAPSULE ORAL at 09:40

## 2020-07-31 RX ADMIN — POTASSIUM CHLORIDE 20 MEQ: 1500 TABLET, EXTENDED RELEASE ORAL at 09:40

## 2020-07-31 RX ADMIN — BUSPIRONE HYDROCHLORIDE 5 MG: 10 TABLET ORAL at 09:40

## 2020-07-31 RX ADMIN — PRAMIPEXOLE DIHYDROCHLORIDE 0.75 MG: 0.25 TABLET ORAL at 09:39

## 2020-07-31 RX ADMIN — DOXYCYCLINE HYCLATE 100 MG: 100 CAPSULE ORAL at 09:39

## 2020-07-31 RX ADMIN — BUTORPHANOL TARTRATE 1 MG: 1 INJECTION, SOLUTION INTRAMUSCULAR; INTRAVENOUS at 05:26

## 2020-07-31 RX ADMIN — FUROSEMIDE 80 MG: 40 TABLET ORAL at 05:37

## 2020-07-31 ASSESSMENT — PAIN SCALES - GENERAL
PAINLEVEL_OUTOF10: 7
PAINLEVEL_OUTOF10: 6
PAINLEVEL_OUTOF10: 0

## 2020-07-31 NOTE — PROGRESS NOTES
Patient's boyfriend was tested for COVID-19 in ER and there was concern regarding him potentially being positive. The patient's boyfriend has been rooming in during her hospital stay. Due to potential exposure patient is being moved to [facility] for COVID rule out.    Electronically signed by Sumanth Arenas RN on 7/30/2020 at 7:00 PM

## 2020-07-31 NOTE — PROGRESS NOTES
Ace wrap applied to bilateral lower extremities from ankle to just below knee as per order; foot of bed elevated

## 2020-07-31 NOTE — PROGRESS NOTES
Infectious Diseases Progress Note    Patient:  Tianna Trivedi  YOB: 1970  MRN: [id number]   Admit date: 7/28/2020   Admitting Physician: Ricky Higgins MD  Primary Care Physician: NANETTE Price    Chief Complaint/Interval History: She feels okay. She was moved to the [facility] rule out unit overnight. She is not sure what is going on. She wondered if her boyfriend who was admitted to the hospital yesterday had tested positive. She just does not know at this point. She has no cough, sputum production, or dyspnea. She has no shortness of breath  She has no sore throat or other upper respiratory symptoms  She remains without fever  She feels her legs are improving. She indicates they feel much less tight. They are not uncomfortable at present. She try to keep the Ace wraps on. Unable to keep the Ace wrap on her right leg. In/Out    Intake/Output Summary (Last 24 hours) at 7/31/2020 0755  Last data filed at 7/30/2020 1300  Gross per 24 hour   Intake 240 ml   Output --   Net 240 ml     Allergies:    Allergies   Allergen Reactions    Compazine [Prochlorperazine Maleate] Shortness Of Breath    Ciprofloxacin Hives    Amoxicillin-Pot Clavulanate     Demerol Hives    Hydroxyzine     Hydroxyzine Hcl Itching    Ibuprofen Nausea Only    Ketorolac Tromethamine     Meperidine Hives    Nortriptyline Other (See Comments)    Orphenadrine     Orphenadrine Citrate Itching    Penicillins Hives and Nausea Only    Prochlorperazine Edisylate      Will discuss with patient at next visit     Tobramycin      Will discuss with patient at next visit     Tramadol      Will discuss with patient at next visit     Vistaril [Hydroxyzine Hcl] Itching    Cephalexin Rash    Clindamycin/Lincomycin Rash    Codeine Nausea And Vomiting and Rash    Doxycycline Nausea And Vomiting    Keflex [Cephalexin] Rash    Ketorolac Tromethamine Itching and Nausea And Vomiting    Moxifloxacin Nausea And Vomiting Will discuss with patient at visit      Current Meds: DULoxetine (CYMBALTA) extended release capsule 30 mg, BID  furosemide (LASIX) tablet 80 mg, BID  doxycycline hyclate (VIBRAMYCIN) capsule 100 mg, 2 times per day  butorphanol (STADOL) injection 1 mg, Q6H PRN  potassium chloride (KLOR-CON M) extended release tablet 20 mEq, Daily  busPIRone (BUSPAR) tablet 5 mg, TID  clonazePAM (KLONOPIN) tablet 0.5 mg, BID  cyclobenzaprine (FLEXERIL) tablet 10 mg, Nightly PRN  diclofenac sodium (VOLTAREN) 1 % gel 2 g, BID  gabapentin (NEURONTIN) capsule 100 mg, BID  gabapentin (NEURONTIN) capsule 300 mg, Nightly  HYDROcodone-acetaminophen (NORCO) 7.5-325 MG per tablet 1 tablet, Q6H PRN  mirtazapine (REMERON) tablet 15 mg, Nightly  mupirocin (BACTROBAN) 2 % ointment, Daily  pantoprazole (PROTONIX) tablet 40 mg, QAM AC  pramipexole (MIRAPEX) tablet 0.75 mg, BID  promethazine (PHENERGAN) tablet 25 mg, Q6H PRN  sodium chloride flush 0.9 % injection 10 mL, 2 times per day  sodium chloride flush 0.9 % injection 10 mL, PRN  acetaminophen (TYLENOL) tablet 650 mg, Q6H PRN    Or  acetaminophen (TYLENOL) suppository 650 mg, Q6H PRN  polyethylene glycol (GLYCOLAX) packet 17 g, Daily PRN  promethazine (PHENERGAN) tablet 12.5 mg, Q6H PRN    Or  ondansetron (ZOFRAN) injection 4 mg, Q6H PRN  enoxaparin (LOVENOX) injection 40 mg, Daily  insulin lispro (HUMALOG) injection vial 0-6 Units, TID WC  insulin lispro (HUMALOG) injection vial 0-3 Units, Nightly  glucose (GLUTOSE) 40 % oral gel 15 g, PRN  dextrose 50 % IV solution, PRN  glucagon (rDNA) injection 1 mg, PRN  dextrose 5 % solution, PRN  potassium chloride (KLOR-CON M) extended release tablet 40 mEq, PRN    Or  potassium bicarb-citric acid (EFFER-K) effervescent tablet 40 mEq, PRN    Or  potassium chloride 10 mEq/100 mL IVPB (Peripheral Line), PRN  magnesium sulfate 2 g in 50 mL IVPB premix, PRN      Review of Systems    VitalSigns:  BP (!) 141/78   Pulse 94   Temp 97.7 °F (36.5 °C) (Oral) Resp 20   Ht 5' 1\" (1.549 m)   Wt (!) 328 lb 4.8 oz (148.9 kg)   SpO2 96%   BMI 62.03 kg/m²      Physical Exam  Both lower extremities continue have significant edema but they look improved. There is less erythema. There is no tenderness. Feel any contribution from cellulitis minimal.    Lab Results:  CBC:   Recent Labs     07/28/20  1530 07/29/20  0258   WBC 6.2 5.5   HGB 12.3 10.8*    217     BMP:  Recent Labs     07/28/20  1530 07/29/20  0258 07/30/20  1004    145 143   K 3.0* 3.1* 4.2   CL 96* 98 103   CO2 31* 30* 25   BUN 14 14 12   CREATININE 1.0* 1.0* 1.0*   GLUCOSE 99 64* 96     Radiology: None    Additional Studies Reviewed:  None    Impression:  1. Bilateral lower extremity edema-improving  2. Bilateral lower extremity stasis dermatitis  3.   Resolved mild cellulitis    Recommendations:  Okay with me for discharge home  If she has had COVID exposure, nature of the exposure needs to be clarified and she needs to receive guidance from the health department as to how to proceed with quarantine at discharge  Would suggest doxycycline 100 mg every 12 hours for 10 days  Avoid sun exposure on doxycycline  If out in the sun should wear high potency sunscreen and should avoid peak sun times and should wear protective hat and clothing  Feel it would be very beneficial for her to get into lymphedema massage therapy as soon as possible depending upon what would be allowed from quarantine standpoint  Follow-up appointment with me in 3 weeks  She should keep follow-up with her primary care physician for ongoing diuretic adjustments    Melania Veliz MD

## 2020-07-31 NOTE — PLAN OF CARE
Problem: Falls - Risk of:  Goal: Will remain free from falls  Description: Will remain free from falls  Outcome: Ongoing  Goal: Absence of physical injury  Description: Absence of physical injury  Outcome: Ongoing     Problem: Pain:  Goal: Pain level will decrease  Description: Pain level will decrease  Outcome: Ongoing  Goal: Control of acute pain  Description: Control of acute pain  Outcome: Ongoing  Goal: Control of chronic pain  Description: Control of chronic pain  Outcome: Ongoing     Problem: Discharge Planning:  Goal: Discharged to appropriate level of care  Description: Discharged to appropriate level of care  Outcome: Ongoing     Problem:  Body Temperature - Imbalanced:  Goal: Ability to maintain a body temperature in the normal range will improve  Description: Ability to maintain a body temperature in the normal range will improve  Outcome: Ongoing     Problem: Mobility - Impaired:  Goal: Mobility will improve to maximum level  Description: Mobility will improve to maximum level  Outcome: Ongoing     Problem: Pain:  Goal: Pain level will decrease  Description: Pain level will decrease  Outcome: Ongoing  Goal: Control of acute pain  Description: Control of acute pain  Outcome: Ongoing  Goal: Control of chronic pain  Description: Control of chronic pain  Outcome: Ongoing     Problem: Skin Integrity - Impaired:  Goal: Will show no infection signs and symptoms  Description: Will show no infection signs and symptoms  Outcome: Ongoing  Goal: Absence of new skin breakdown  Description: Absence of new skin breakdown  Outcome: Ongoing

## 2020-07-31 NOTE — DISCHARGE SUMMARY
Tianna Trivedi  :  [birth year]  MRN:  [id number]    Admit date:  2020  Discharge date:  2020    Discharging Physician:  Ophelia Yarbrough MD    Advance Directive: Full Code    Consults: Dr. Jacky Cannon    Primary Care Physician:  NANETTE Torres    Discharge Diagnoses:    Principal Problem:    Cellulitis of both lower extremities  Active Problems:    RLS (restless legs syndrome)    FEDERICA (generalized anxiety disorder)    Migraine without aura and without status migrainosus, not intractable    Uncontrolled type 2 diabetes mellitus with diabetic neuropathy, with long-term current use of insulin (HCC)    Gastroesophageal reflux disease  Resolved Problems:    * No resolved hospital problems. *    Portions of this note have been copied forward, however, changed to reflect the most current clinical status of this patient. Hospital Course: The patient is a 54 y.o. female with PMH lymphedema, anxiety, DM II, HTN, HLD, restless leg syndrome who presented to [facility] ED complaining of recurrent bilateral lower extremity cellulitis. She was last discharged from this facility on 2020 after receiving a course of IV Vancomycin. She was seen by Infectious Disease that admission was set up with 2 doses of Oritavancin. States her last dose was last Tuesday or Wednesday and had recurrence of erythema 4 days ago, worse in LLE with blister formation. She has had subjective fever and nightly chills. Has been using bactroban cream on creases in legs where skin breakdown is starting to occur.               Prior to discharge on 2020 she was also seen for the same complaint and discharged on 2020 after receiving a course of Vancomycin and was discharged that admission on Doxycycline. She is followed as an outpatient by the lymphedema clinic. Mrs. Trivedi was admitted to Hospitalist service, started on IV Vancomycin as well as IV diuretic therapy with orders to keep BLE's elevated.  Consult placed to ID for continuity of care. Cymbalta added 07/30/2020. Ms. Radha Walls was placed in the COVID unit on the evening of 07/30/2020 when her significant other started vomiting, complained of headache and was subsequently sent to ED COVID rule out. Patient has no signs or symptoms of COVID-19. She believes patient's vomiting and headache were caused from chronic issues and they have had no other known exposure. At this time she is stable for discharge home and has been counseled on need to self isolate until certain her significant other is negative for COVID-19. She will be discharged home on Lasix 80 mg BID, Cymbalta 30 mg BID, Doxycycline 100 mg BID x 10 days and potassium supplementation. She has been encouraged to continue to elevate BLE's, use compression wraps and follow up with lymphedema clinic as well as Dr. Yanni Loving. Significant Diagnostic Studies:   No results found. Pertinent Labs:   CBC:   Recent Labs     07/28/20  1530 07/29/20  0258   WBC 6.2 5.5   HGB 12.3 10.8*    217     BMP:    Recent Labs     07/28/20  1530 07/29/20  0258 07/30/20  1004    145 143   K 3.0* 3.1* 4.2   CL 96* 98 103   CO2 31* 30* 25   BUN 14 14 12   CREATININE 1.0* 1.0* 1.0*   GLUCOSE 99 64* 96     Physical Exam:  Vital Signs: BP (!) 146/93   Pulse 87   Temp 97.7 °F (36.5 °C) (Oral)   Resp 18   Ht 5' 1\" (1.549 m)   Wt (!) 328 lb 4.8 oz (148.9 kg)   SpO2 95%   BMI 62.03 kg/m²   General appearance:. Alert and Cooperative   HEENT: Normocephalic. Chest: clear to auscultation bilaterally without wheezes or rhonchi. Cardiac: Normal heart tones with regular rate and rhythm, S1, S2 normal. No murmurs, gallops, or rubs auscultated. Abdomen:soft,obese, non-tender; non-distended normal bowel sounds no masses, no organomegaly. Extremities: No clubbing or cyanosis. Lymphedema BLE's, improving cellulitic changes. Peripheral pulses palpable. Neurologic: Grossly intact.     Discharge Medications:       [facility] Medication Instructions ADRIEL:[id number]    Printed on:07/31/20 0315   Medication Information                      B-D ULTRAFINE III SHORT PEN 31G X 8 MM MISC  USE TO INJECT INSULIN ONCE DAILY             blood glucose monitor kit and supplies  Test 1 times a day & as needed for symptoms of irregular blood glucose. Blood Glucose Monitoring Suppl ([address]) w/Device KIT  1 kit by Does not apply route daily as needed (Dx 250.00)             blood glucose test strips (ASCENSIA AUTODISC VI;ONE TOUCH ULTRA TEST VI) strip  Check glucose TID and as needed for hypoglycemic events Dx E11.40 E11.66 Z79.4  Use one touch verio flex             busPIRone (BUSPAR) 5 MG tablet  TAKE 1 TABLET BY MOUTH THREE TIMES DAILY             Butorphanol Tartrate (STADOL NS NA)  by Nasal route             celecoxib (CELEBREX) 100 MG capsule  Take 1 capsule by mouth daily             celecoxib (CELEBREX) 200 MG capsule  TAKE 1 CAPSULE BY MOUTH EVERY DAY             clonazePAM (KLONOPIN) 0.5 MG tablet  Take 1 tablet by mouth 2 times daily for 30 days. Cream Base CREA  Apply 1-2 pumps to affected area 3-4 times daily             cyclobenzaprine (FLEXERIL) 10 MG tablet  Take 10 mg by mouth nightly as needed for Muscle spasms             Diabetic Shoe MISC  by Does not apply route DISPENSE ONE PAIR OF DIABETIC SHOE AND 3 PAIRS HEAT MOLDED INSERTS             diclofenac sodium (VOLTAREN) 1 % GEL  Apply 2 g topically 2 times daily             doxycycline hyclate (VIBRAMYCIN) 100 MG capsule  Take 1 capsule by mouth every 12 hours for 10 days             DULoxetine (CYMBALTA) 30 MG extended release capsule  Take 1 capsule by mouth 2 times daily             EPINEPHrine (EPIPEN 2-JOSÉ) 0.3 MG/0.3ML SOAJ injection  Inject 0.3 mLs into the muscle once for 1 dose Use as directed for allergic reaction             fluconazole (DIFLUCAN) 150 MG tablet  Take 1 dose and may repeat in 3 days.              furosemide (LASIX) 80 MG tablet  Take 1 tablet by mouth 2 times daily             gabapentin (NEURONTIN) 100 MG capsule  Take 100 mg by mouth 2 times daily. gabapentin (NEURONTIN) 300 MG capsule  TK ONE C PO QHS             HYDROcodone-acetaminophen (NORCO) 7.5-325 MG per tablet  Take 1 tablet by mouth every 6 hours as needed for Pain. .             Lancets (ONETOUCH DELICA PLUS [id number]) MISC  USE TO CHECK BLLOD SUGAR AS DIRECTED             metFORMIN (GLUCOPHAGE) 500 MG tablet  TAKE 1 TABLET BY MOUTH TWICE DAILY WITH MEALS             mirtazapine (REMERON) 15 MG tablet  Take 1 tablet by mouth nightly             mupirocin (BACTROBAN) 2 % ointment  APPLY EXTERNALLY TO THE AFFECTED AREA THREE TIMES DAILY             omeprazole (PRILOSEC) 20 MG delayed release capsule  TAKE 1 CAPSULE BY MOUTH TWICE DAILY BEFORE MEALS             ondansetron (ZOFRAN-ODT) 4 MG disintegrating tablet  Place 1 tablet under the tongue every 8 hours as needed for Nausea or Vomiting Take prior to Doxycycline doses             ONE TOUCH LANCETS MISC  1 each by Does not apply route daily             ONE TOUCH ULTRA TEST strip  TEST BLOOD SUGAR ONCE DAILY             potassium chloride (KLOR-CON M) 20 MEQ extended release tablet  Take 1 tablet by mouth daily             pramipexole (MIRAPEX) 0.75 MG tablet  TAKE 1 TABLET BY MOUTH TWICE DAILY             promethazine (PHENERGAN) 25 MG tablet  TAKE 1 TABLET BY MOUTH EVERY 6 HOURS AS NEEDED FOR NAUSEA             silver sulfADIAZINE (SILVADENE) 1 % cream  Apply topically daily. tiZANidine (ZANAFLEX) 4 MG tablet  TAKE 1 TABLET BY MOUTH EVERY NIGHT AS NEEDED FOR CRAMPING             VICTOZA 18 MG/3ML SOPN SC injection  ADMINISTER 1.8 MG INTO THE SKIN EVERY DAY             ZOLMitriptan (ZOMIG) 5 MG nasal solution  0.1 mLs by Nasal route once as needed for Migraine               Discharge Instructions: Follow up with NANETTE Jackman in 3-5 days. Take medications as directed.   Resume activity as tolerated. Diet: DIET CARB CONTROL; Safety Tray; Safety Tray (Disposables)     Disposition: Patient is medically stable and will be discharged home. Time spent on discharge 35 minutes spent in assessing patient, reviewing medications, discussion with nursing, confirming safe discharge plan and preparation of discharge summary.     Signed:  Ene Patel PA-C

## 2020-08-02 LAB
BLOOD CULTURE, ROUTINE: NORMAL
CULTURE, BLOOD 2: NORMAL

## 2020-08-03 ENCOUNTER — TELEPHONE (OUTPATIENT)
Dept: INTERNAL MEDICINE | Age: 50
End: 2020-08-03

## 2020-08-03 NOTE — TELEPHONE ENCOUNTER
Patient report she was prescribed Lasix 80mg twice daily at discharge. She is concerned about taking this much and is only taking it once daily. She would like to know what dose Jordan Alves feels is appropriate without harming her kidneys. Please advise. ..

## 2020-08-03 NOTE — TELEPHONE ENCOUNTER
[facility] Transitions Initial Follow Up Call    Outreach made within 2 business days of discharge: Yes    Patient: Antonio Ibanez Patient : [birth year]   MRN: [id number]    Reason for Admission:   Admitted 20 for cellulitis of lower extremity lymphedema   Discharge Date: 20    Discharge Diagnoses:    Principal Problem:    Cellulitis of both lower extremities  Active Problems:    RLS (restless legs syndrome)    FEDERICA (generalized anxiety disorder)    Migraine without aura and without status migrainosus, not intractable    Uncontrolled type 2 diabetes mellitus with diabetic neuropathy, with long-term current use of insulin ([facility])    Gastroesophageal reflux disease     Spoke with: patient     Discharge department/facility: [facility]     TCM Interactive Patient Contact:  Was patient able to fill all prescriptions: Yes  Was patient instructed to bring all medications to the follow-up visit: Yes  Is patient taking all medications as directed in the discharge summary? Yes  Does patient understand their discharge instructions: Yes  Does patient have questions or concerns that need addressed prior to 7-14 day follow up office visit: no    I spoke with Mrs. Trivedi. She states her legs are still very swollen. She states the doctor does not feel like it is an infection. She was set up with the lymphedema clinic. She reports her legs are still weeping. She is keeping them elevated as much as possible. She states she was prescribed Lasix 80mg twice daily but is afraid to take this much. She is only taking it once daily. She would like to ask Roenl Nichols about this. I am sending an encounter over to \[facility]\"". Patient is otherwise doing well. Appetite is good. Bowels and bladder are moving ok. She report she has lost 10 pounds of what she feels like is fluid. She will follow up next week as scheduled.      Scheduled appointment with PCP within 7-14 days    Follow Up  Future Appointments   Date Time Provider Shital Landry   0/4/4582  0:86 AM NANETTE Babin [facility]   3/2/6646  1:38 PM NANETTE Babin [facility]   8/18/2020  9:30 AM Cecilia Pringle [place] [id number] [address] [facility], [address] [facility]

## 2020-08-03 NOTE — TELEPHONE ENCOUNTER
Attempted to notify patient, no answer. Voicemail has not been set up so I was unable to leave a message.

## 2020-08-05 RX ORDER — ONDANSETRON 4 MG/1
4 TABLET, ORALLY DISINTEGRATING ORAL EVERY 8 HOURS PRN
Qty: 20 TABLET | Refills: 0 | Status: SHIPPED | OUTPATIENT
Start: 2020-08-05 | End: 2020-10-11

## 2020-08-07 ENCOUNTER — TREATMENT (OUTPATIENT)
Dept: PHYSICAL THERAPY | Facility: CLINIC | Age: 50
End: 2020-08-07

## 2020-08-07 DIAGNOSIS — I89.0 LYMPHEDEMA OF BOTH LOWER EXTREMITIES: Primary | ICD-10-CM

## 2020-08-07 PROCEDURE — 97164 PT RE-EVAL EST PLAN CARE: CPT | Performed by: PHYSICAL THERAPIST

## 2020-08-07 PROCEDURE — 97140 MANUAL THERAPY 1/> REGIONS: CPT | Performed by: PHYSICAL THERAPIST

## 2020-08-07 NOTE — PROGRESS NOTES
Physical Therapy Lymphedema Re-Evaluation       Patient Name: Valeria CORADO   : 1970  MRN: [id number]  Today's Date: 2020      Visit Date: 2020    Visit Dx:    ICD-10-CM ICD-9-CM   1. Lymphedema of both lower extremities I89.0 457.1       Patient Active Problem List   Diagnosis   • Class 3 severe obesity due to excess calories with serious comorbidity and body mass index (BMI) of 45.0 to 49.9 in adult (CMS/[facility])   • Hypertension   • Diabetes mellitus (CMS/[facility])        Past Medical History:   Diagnosis Date   • Arthritis     Osteoarthritis primarily left knee    • Back pain    • Diabetes mellitus (CMS/[facility])    • Hx of tear of meniscus of knee joint 2016   • Hypertension    • Joint pain    • Knee pain    • Migraine    • Migraine headache    • Pain     knee, left ankle, left ankle     • Pes anserinus bursitis 2015   • Restless leg         Past Surgical History:   Procedure Laterality Date   • CATARACT EXTRACTION     • CATARACT EXTRACTION     • CATARACT EXTRACTION     • CORNEAL TRANSPLANT      bilateral    • ECTOPIC PREGNANCY     • JOINT REPLACEMENT     • KNEE ARTHROSCOPY W/ MENISCAL REPAIR     • KNEE MENISCAL REPAIR     • MENISCECTOMY Left 2016   • TUBAL ABDOMINAL LIGATION         Visit Dx:    ICD-10-CM ICD-9-CM   1. Lymphedema of both lower extremities I89.0 457.1           Lymphedema     Row Name 20 1300             Subjective Pain    Able to rate subjective pain?  yes  -HR      Pre-Treatment Pain Level  4  -HR         Subjective Comments    Subjective Comments  States she has been in the hospital 3 times since she was here last time.   -HR         Lymphedema Measurements    Measurement Type(s)  Circumferential  -HR      Circumferential Areas  Lower extremities  -HR         BLE Circumferential (cm)    Measurement Location 1  BOT  -HR      Left 1  24.3 cm  -HR      Right 1  24 cm  -HR      Measurement Location 2  +10  -HR      Left 2  31.5 cm  -HR      Right 2  30.5 cm  -HR       "Measurement Location 3  +10  -HR      Left 3  44.3 cm  -HR      Right 3  46.8 cm  -HR      Measurement Location 4  +10  -HR      Left 4  56 cm  -HR      Right 4  55.9 cm  -HR      Measurement Location 5  +10  -HR      Left 5  50 cm  -HR      Right 5  50.2 cm  -HR      Measurement Location 6  +10  -HR      Left 6  52.7 cm  -HR      Right 6  53.7 cm  -HR      Measurement Location 7  +10  -HR      Left 7  67 cm  -HR      Right 7  70.2 cm  -HR      LLE Circumferential Total  325.8 cm  -HR      RLE Circumferential Total  331.3 cm  -HR         Manual Lymphatic Drainage    Manual Lymphatic Drainage  initial sequence;opened regional lymph nodes;opened anastamoses;extremity treatment  -HR      Initial Sequence  short neck;abdomen;diaphragmatic breathing  -HR      Abdomen  superficial;deep  -HR      Abdomen Comment  deep with abdominal breathing  -HR      Diaphragmatic Breathing  9  -HR      Opened Regional Lymph Nodes  axillary;inguinal  -HR      Axillary  right;left  -HR      Inguinal  right;left  -HR      Opened Anastamoses  inguino-axillary  -HR      Inguino-Axillary  right;left  -HR      Extremity Treatment  MLD to full limb  -HR      MLD to Full Limb  BLE  -HR      Manual Lymphatic Drainage Comments  Lighter touch to L lower leg but she tolerated full sequence without much discomfort  -HR         Compression/Skin Care    Compression/Skin Care  skin care;wrapping location;bandaging;remove bandages  -HR      Skin Care  lotion applied  -HR      Wrapping Location  lower extremity  -HR      Wrapping Location LE  left:;foot to knee  -HR      Bandage Layers  cotton liner;padding/fluff layer;short-stretch bandages (comment size/quantity)  -HR      Bandaging Comments  4\" stockinette, 2 rolls artiflex, one 8cm, one 10cm and one 12 cm short stretch bandage from foot to knee   -HR      Bandaging Technique  moderate compression  -HR      Remove Bandages  24 hours to assess skin  -HR        User Key  (r) = Recorded By, (t) = Taken " By, (c) = Cosigned By    Initials Name Provider Type    HR Pauline Marks, PT, DPT, CLT-ESTEFANY Physical Therapist                          Therapy Education  Education Details: wear wraps only 24 hours and tehn inspect skin.Also asked about flexitouch so I had her sign consent to send info to [employer] to check coverage      OP Exercises     Row Name 08/07/20 1300             Subjective Comments    Subjective Comments  States she has been in the hospital 3 times since she was here last time.   -HR         Subjective Pain    Able to rate subjective pain?  yes  -HR      Pre-Treatment Pain Level  4  -HR         Total Minutes    19319 - PT Manual Therapy Minutes  70  -HR        User Key  (r) = Recorded By, (t) = Taken By, (c) = Cosigned By    Initials Name Provider Type    HR Pauline Marks, PT, DPT, CLT-ESTEFANY Physical Therapist                     Manual Rx (last 36 hours)      Manual Treatments     Row Name 08/07/20 1300             Total Minutes    91988 - PT Manual Therapy Minutes  70  -HR        User Key  (r) = Recorded By, (t) = Taken By, (c) = Cosigned By    Initials Name Provider Type    HR Pauline Marks, PT, DPT, CLT-ESTEFANY Physical Therapist          PT OP Goals     Row Name 08/07/20 1300          PT Short Term Goals    STG Date to Achieve  09/06/20  -HR     STG 1  Patient will have no signs of infection in the legs or will actively be on antibiotic that is showing improvement before MLD initiated.  -HR     STG 1 Progress  Ongoing  -HR     STG 2  She will be independent with remedial HEP for BLE lymphedema.  -HR     STG 2 Progress  New  -HR     STG 3  She will be educated about the benefits of ketogenic diet for lymphedema.   -HR     STG 3 Progress  Ongoing  -HR     STG 3 Progress Comments  Explained this to her today  -HR        Long Term Goals    LTG Date to Achieve  11/05/20  -HR     LTG 1  She will have appropriate compression garments for LEs and abdomen,  -HR     LTG 1 Progress  Ongoing   -HR     LTG 1 Progress Comments  If she tolerates the wraps, she will need to order farrow garments  -HR     LTG 2  She will be independent with self massage with help from SO as needed.  -HR     LTG 2 Progress  New  -HR     LTG 3  She will be independent with use of FLexitouch pump for maintenance of lymphedema.  -HR     LTG 3 Progress  New  -HR     LTG 4  She will have a reduction in legs and weight loss of at least 10 pounds.  -HR     LTG 4 Progress  New  -HR        Time Calculation    PT Goal Re-Cert Due Date  09/06/20  -HR       User Key  (r) = Recorded By, (t) = Taken By, (c) = Cosigned By    Initials Name Provider Type    HR Pauline Marks, PT, DPT, CLT-ESTEFANY Physical Therapist          PT Assessment/Plan     Row Name 08/07/20 1400          PT Assessment    Functional Limitations  Impaired gait;Decreased safety during functional activities;Impaired locomotion;Limitation in home management;Limitations in community activities;Limitations in functional capacity and performance;Performance in leisure activities  -HR     Impairments  Edema;Pain;Impaired lymphatic circulation;Integumentary integrity  -HR     Assessment Comments  Her legs are not as red and irritated upon assessment today. In fact, they are down about 20cm each since 6/10/2020. She has been hospitalized and on strong antibiotics since then. She is now being followed by Dr. Mann, an infectious disease doctor, which is a good thing.  Her L lower leg still has a couple spots that want to break open and weep occasionally. The L lower leg is much more tender to touch than the R. I was able to do full MLD sequence today and apply muilti-layer compression bandages to the L lower leg today. Will see how she tolerates this to move forward with compression.   -HR     Rehab Potential  Good  -HR     Patient/caregiver participated in establishment of treatment plan and goals  Yes  -HR     Patient would benefit from skilled therapy intervention  Yes  -HR         PT Plan    PT Frequency  2x/week  -HR     Predicted Duration of Therapy Intervention (Therapy Eval)  8 weeks  -HR     Planned CPT's?  PT RE-EVAL: 66806;PT THER PROC EA 15 MIN: 22028;PT MANUAL THERAPY EA 15 MIN: 33929;PT SELF CARE/HOME MGMT/TRAIN EA 15: 96258  -HR     PT Plan Comments  We will see her for CDT to address lymphedema of BLEs and abdomen.  -HR       User Key  (r) = Recorded By, (t) = Taken By, (c) = Cosigned By    Initials Name Provider Type    HR Pauline Marks, PT, DPT, CLINGA-ESTEFANY Physical Therapist                       Time Calculation:                     Pauline Marks PT, DPT, CLJENNIFER  8/7/2020

## 2020-08-08 ENCOUNTER — TELEPHONE (OUTPATIENT)
Dept: PRIMARY CARE CLINIC | Age: 50
End: 2020-08-08

## 2020-08-08 RX ORDER — PHENAZOPYRIDINE HYDROCHLORIDE 100 MG/1
100 TABLET, FILM COATED ORAL 3 TIMES DAILY PRN
Qty: 9 TABLET | Refills: 0 | Status: SHIPPED | OUTPATIENT
Start: 2020-08-08 | End: 2020-08-11

## 2020-08-08 RX ORDER — NITROFURANTOIN 25; 75 MG/1; MG/1
100 CAPSULE ORAL 2 TIMES DAILY
Qty: 20 CAPSULE | Refills: 0 | Status: SHIPPED | OUTPATIENT
Start: 2020-08-08 | End: 2020-08-18

## 2020-08-09 PROCEDURE — 99441 PR PHYS/QHP TELEPHONE EVALUATION 5-10 MIN: CPT | Performed by: NURSE PRACTITIONER

## 2020-08-09 NOTE — PROGRESS NOTES
Patient called with complaints of dysuria, urinary frequency, with lessened cloudy urine. She has recently been hospitalized with cellulitis and is seeing Dr. Cesia Miramontes. She has recently taken vancomycin and clindamycin. She states she has also been treated for yeast infection with diflucan. She denies any flank pain. She has multiple allergies. She has had a UTI before. Macrobid and pyridium typically help these symptoms, per patient. Diagnosis Orders   1. Acute cystitis without hematuria       Approximately 6 minutes was spent on telephone encounter. Return if symptoms worsen or fail to improve.

## 2020-08-10 ENCOUNTER — VIRTUAL VISIT (OUTPATIENT)
Dept: PRIMARY CARE CLINIC | Age: 50
End: 2020-08-10
Payer: MEDICAID

## 2020-08-10 PROCEDURE — 99215 OFFICE O/P EST HI 40 MIN: CPT | Performed by: NURSE PRACTITIONER

## 2020-08-10 PROCEDURE — 1111F DSCHRG MED/CURRENT MED MERGE: CPT | Performed by: NURSE PRACTITIONER

## 2020-08-10 RX ORDER — POLYETHYLENE GLYCOL 3350 17 G/17G
17 POWDER, FOR SOLUTION ORAL DAILY
Qty: 1530 G | Refills: 1 | Status: SHIPPED | OUTPATIENT
Start: 2020-08-10 | End: 2020-09-09

## 2020-08-10 NOTE — PROGRESS NOTES
Post-Discharge Transitional Care Management Services or Hospital Follow Up      Tianna Trivedi   YOB: 1970    Date of Office Visit:  8/10/2020  Date of Hospital Admission: 7/28/20  Date of Hospital Discharge: 7/31/20  Readmission Risk Score(high >=14%.  Medium >=10%):Readmission Risk Score: 27      Care management risk score Rising risk (score 2-5) and Complex Care (Scores >=6): 5     Non face to face  following discharge, date last encounter closed (first attempt may have been earlier): 8/3/2020  1:45 PM 8/3/2020  1:45 PM    Call initiated 2 business days of discharge: Yes     Patient Active Problem List   Diagnosis    RLS (restless legs syndrome)    Hypertension    Venous insufficiency of both lower extremities    Mild single current episode of major depressive disorder ([facility]    FEDERICA (generalized anxiety disorder)    Old complex tear of medial meniscus of left knee    Migraine without aura and without status migrainosus, not intractable    Morbid obesity with BMI of 50.0-59.9, adult ([facility]    Uncontrolled type 2 diabetes mellitus with diabetic neuropathy, with long-term current use of insulin (HCC)    Dyspnea    Upper respiratory tract infection    Pleurisy    Gastroesophageal reflux disease    Hypoglycemia    Hypokalemia    Recurrent cellulitis of lower extremity    Bilateral lower leg cellulitis    Cellulitis of both lower extremities       Allergies   Allergen Reactions    Compazine [Prochlorperazine Maleate] Shortness Of Breath    Ciprofloxacin Hives    Amoxicillin-Pot Clavulanate     Demerol Hives    Hydroxyzine     Hydroxyzine Hcl Itching    Ibuprofen Nausea Only    Ketorolac Tromethamine     Meperidine Hives    Nortriptyline Other (See Comments)    Orphenadrine     Orphenadrine Citrate Itching    Penicillins Hives and Nausea Only    Prochlorperazine Edisylate      Will discuss with patient at next visit     Tobramycin      Will discuss with patient at next visit DULoxetine (CYMBALTA) 30 MG extended release capsule  Take 1 capsule by mouth 2 times daily             EPINEPHrine (EPIPEN 2-JOSÉ) 0.3 MG/0.3ML SOAJ injection  Inject 0.3 mLs into the muscle once for 1 dose Use as directed for allergic reaction             fluconazole (DIFLUCAN) 150 MG tablet  Take 1 dose and may repeat in 3 days. furosemide (LASIX) 80 MG tablet  Take 1 tablet by mouth 2 times daily             gabapentin (NEURONTIN) 100 MG capsule  Take 100 mg by mouth 2 times daily. gabapentin (NEURONTIN) 300 MG capsule  TK ONE C PO QHS             HYDROcodone-acetaminophen (NORCO) 7.5-325 MG per tablet  Take 1 tablet by mouth every 6 hours as needed for Pain. .             Lancets (ONETOUCH DELICA PLUS [id number]) MISC  USE TO CHECK BLLOD SUGAR AS DIRECTED             metFORMIN (GLUCOPHAGE) 500 MG tablet  TAKE 1 TABLET BY MOUTH TWICE DAILY WITH MEALS             mirtazapine (REMERON) 15 MG tablet  Take 1 tablet by mouth nightly             mupirocin (BACTROBAN) 2 % ointment  APPLY EXTERNALLY TO THE AFFECTED AREA THREE TIMES DAILY             nitrofurantoin, macrocrystal-monohydrate, (MACROBID) 100 MG capsule  Take 1 capsule by mouth 2 times daily for 10 days             omeprazole (PRILOSEC) 20 MG delayed release capsule  TAKE 1 CAPSULE BY MOUTH TWICE DAILY BEFORE MEALS             ondansetron (ZOFRAN-ODT) 4 MG disintegrating tablet  Place 1 tablet under the tongue every 8 hours as needed for Nausea or Vomiting Take prior to Doxycycline doses             ONE TOUCH LANCETS MISC  1 each by Does not apply route daily             ONE TOUCH ULTRA TEST strip  TEST BLOOD SUGAR ONCE DAILY             polyethylene glycol (GLYCOLAX) 17 GM/SCOOP powder  Take 17 g by mouth daily             potassium chloride (KLOR-CON M) 20 MEQ extended release tablet  Take 1 tablet by mouth daily             pramipexole (MIRAPEX) 0.75 MG tablet  TAKE 1 TABLET BY MOUTH TWICE DAILY             promethazine Review of Systems   Constitutional: Negative for chills and fever. HENT: Negative for ear pain, hearing loss, rhinorrhea and voice change. Eyes: Negative for photophobia and visual disturbance. Respiratory: Negative for cough and shortness of breath. Cardiovascular: Positive for leg swelling. Negative for chest pain and palpitations. Gastrointestinal: Positive for constipation. Negative for nausea and vomiting. Endocrine: Negative. Negative for cold intolerance and heat intolerance. Genitourinary: Negative for difficulty urinating and flank pain. Musculoskeletal: Negative for back pain and neck pain. Skin: Negative for color change and rash. Allergic/Immunologic: Negative for environmental allergies and food allergies. Neurological: Positive for numbness and headaches. Negative for dizziness and speech difficulty. Hematological: Does not bruise/bleed easily. Psychiatric/Behavioral: Negative for sleep disturbance and suicidal ideas. There were no vitals filed for this visit. There is no height or weight on file to calculate BMI. Wt Readings from Last 3 Encounters:   07/31/20 (!) 328 lb 4.8 oz (148.9 kg)   07/09/20 (!) 323 lb (146.5 kg)   07/07/20 (!) 323 lb 6.4 oz (146.7 kg)     BP Readings from Last 3 Encounters:   07/31/20 (!) 146/93   07/16/20 138/81   07/13/20 122/74       Physical Exam  Constitutional:       Appearance: Normal appearance. HENT:      Nose: Nose normal.   Neck:      Musculoskeletal: Normal range of motion. Pulmonary:      Effort: Pulmonary effort is normal.   Musculoskeletal: Normal range of motion. Right lower leg: Edema present. Left lower leg: Edema present. Neurological:      Mental Status: She is alert and oriented to person, place, and time. Psychiatric:         Mood and Affect: Mood normal.         Behavior: Behavior normal.             Assessment/Plan:  1.  Hospital discharge follow-up    - [place] DISCHARGE MEDS RECONCILED W/ CURRENT OUTPATIENT MED LIST    2. Cellulitis, unspecified cellulitis site  - Patient has follow-up with infectious disease and lymphedema clinic.   - [place] DISCHARGE MEDS RECONCILED W/ CURRENT OUTPATIENT MED LIST  - Comprehensive Metabolic Panel; Future    3. Numbness and tingling in both hands    - Amb External Referral To Neurology    4. Constipation, unspecified constipation type    - polyethylene glycol (GLYCOLAX) 17 GM/SCOOP powder;  Take 17 g by mouth daily  Dispense: 1530 g; Refill: 1        Medical Decision Making: high complexity

## 2020-08-11 ASSESSMENT — ENCOUNTER SYMPTOMS
CONSTIPATION: 1
VOICE CHANGE: 0
PHOTOPHOBIA: 0
VOMITING: 0
RHINORRHEA: 0
BACK PAIN: 0
COUGH: 0
NAUSEA: 0
SHORTNESS OF BREATH: 0
COLOR CHANGE: 0

## 2020-08-13 ENCOUNTER — TELEPHONE (OUTPATIENT)
Dept: PHYSICAL THERAPY | Facility: CLINIC | Age: 50
End: 2020-08-13

## 2020-08-14 RX ORDER — BUSPIRONE HYDROCHLORIDE 5 MG/1
TABLET ORAL
Qty: 90 TABLET | Refills: 0 | Status: ON HOLD | OUTPATIENT
Start: 2020-08-14 | End: 2020-09-21

## 2020-08-17 ENCOUNTER — TREATMENT (OUTPATIENT)
Dept: PHYSICAL THERAPY | Facility: CLINIC | Age: 50
End: 2020-08-17

## 2020-08-17 DIAGNOSIS — L03.90 CELLULITIS, UNSPECIFIED CELLULITIS SITE: ICD-10-CM

## 2020-08-17 DIAGNOSIS — I89.0 LYMPHEDEMA OF BOTH LOWER EXTREMITIES: Primary | ICD-10-CM

## 2020-08-17 LAB
ALBUMIN SERPL-MCNC: 3.8 G/DL (ref 3.5–5.2)
ALP BLD-CCNC: 57 U/L (ref 35–104)
ALT SERPL-CCNC: 14 U/L (ref 5–33)
ANION GAP SERPL CALCULATED.3IONS-SCNC: 10 MMOL/L (ref 7–19)
AST SERPL-CCNC: 19 U/L (ref 5–32)
BILIRUB SERPL-MCNC: 0.3 MG/DL (ref 0.2–1.2)
BILIRUBIN URINE: NEGATIVE
BLOOD, URINE: NEGATIVE
BUN BLDV-MCNC: 16 MG/DL (ref 6–20)
CALCIUM SERPL-MCNC: 9.4 MG/DL (ref 8.6–10)
CHLORIDE BLD-SCNC: 103 MMOL/L (ref 98–111)
CHOLESTEROL, TOTAL: 169 MG/DL (ref 160–199)
CLARITY: ABNORMAL
CO2: 29 MMOL/L (ref 22–29)
COLOR: ABNORMAL
CREAT SERPL-MCNC: 0.9 MG/DL (ref 0.5–0.9)
GFR AFRICAN AMERICAN: >59
GFR NON-AFRICAN AMERICAN: >60
GLUCOSE BLD-MCNC: 117 MG/DL (ref 74–109)
GLUCOSE URINE: NEGATIVE MG/DL
HCT VFR BLD CALC: 38.6 % (ref 37–47)
HDLC SERPL-MCNC: 44 MG/DL (ref 65–121)
HEMOGLOBIN: 12.1 G/DL (ref 12–16)
KETONES, URINE: NEGATIVE MG/DL
LDL CHOLESTEROL CALCULATED: 98 MG/DL
LEUKOCYTE ESTERASE, URINE: NEGATIVE
MCH RBC QN AUTO: 27.7 PG (ref 27–31)
MCHC RBC AUTO-ENTMCNC: 31.3 G/DL (ref 33–37)
MCV RBC AUTO: 88.3 FL (ref 81–99)
NITRITE, URINE: NEGATIVE
PDW BLD-RTO: 13.5 % (ref 11.5–14.5)
PH UA: 5.5 (ref 5–8)
PLATELET # BLD: 195 K/UL (ref 130–400)
PMV BLD AUTO: 9.9 FL (ref 9.4–12.3)
POTASSIUM SERPL-SCNC: 3.7 MMOL/L (ref 3.5–5)
PROTEIN UA: NEGATIVE MG/DL
RBC # BLD: 4.37 M/UL (ref 4.2–5.4)
SODIUM BLD-SCNC: 142 MMOL/L (ref 136–145)
SPECIFIC GRAVITY UA: 1.02 (ref 1–1.03)
TOTAL PROTEIN: 7.2 G/DL (ref 6.6–8.7)
TRIGL SERPL-MCNC: 134 MG/DL (ref 0–149)
UROBILINOGEN, URINE: 0.2 E.U./DL
WBC # BLD: 5.4 K/UL (ref 4.8–10.8)

## 2020-08-17 PROCEDURE — 97140 MANUAL THERAPY 1/> REGIONS: CPT | Performed by: PHYSICAL THERAPIST

## 2020-08-17 PROCEDURE — 97535 SELF CARE MNGMENT TRAINING: CPT | Performed by: PHYSICAL THERAPIST

## 2020-08-17 NOTE — PROGRESS NOTES
Outpatient Physical Therapy Lymphedema Treatment Note       Patient Name: Valeria Wilson  : 1970  MRN: [id number]  Today's Date: 2020        Visit Date: 2020    Visit Dx:    ICD-10-CM ICD-9-CM   1. Lymphedema of both lower extremities I89.0 457.1       Patient Active Problem List   Diagnosis   • Class 3 severe obesity due to excess calories with serious comorbidity and body mass index (BMI) of 45.0 to 49.9 in adult (CMS/[facility])   • Hypertension   • Diabetes mellitus (CMS/HCC)        Lymphedema     Row Name 20 0930             Subjective Pain    Able to rate subjective pain?  yes  -AL      Pre-Treatment Pain Level  5  -AL      Subjective Pain Comment  Ankles and the topf of her feet.   -AL         Subjective Comments    Subjective Comments  Her ankles hurt and the top of her feet.  The wraps felt like they were digging into her feet after six and a half hours so she took them off. She states her leg looked smaller after she took the wraps off.  Her S/O has been wrapping the L lower leg.   The legs are still red, she sees Dr. Blackburn this week.  Her legs have not drained in the past week.  She states she is running late today because she had blood work done and they were running behind.   -AL         Manual Lymphatic Drainage    Manual Lymphatic Drainage  initial sequence;opened regional lymph nodes;opened anastamoses;extremity treatment  -AL      Initial Sequence  short neck;abdomen;diaphragmatic breathing  -AL      Abdomen  superficial;deep  -AL      Diaphragmatic Breathing  x 9 with superficial abdominals  -AL      Opened Regional Lymph Nodes  axillary;inguinal  -AL      Axillary  right;left  -AL      Inguinal  right;left  -AL      Opened Anastamoses  inguino-axillary  -AL      Inguino-Axillary  right;left  -AL      Extremity Treatment  MLD to full limb  -AL      MLD to Full Limb  B LE  -AL      Manual Lymphatic Drainage Comments  Instructed on self MLD and HEP  -AL         Compression/Skin  "Care    Compression/Skin Care  skin care;wrapping location;bandaging;remove bandages  -AL      Skin Care  lotion applied  -AL      Wrapping Location  lower extremity  -AL      Wrapping Location LE  left:;foot to knee  -AL      Bandage Layers  cotton liner;padding/fluff layer;short-stretch bandages (comment size/quantity)  -AL      Bandaging Comments  4\" stockinette, 2 rolls artiflex, one 8cm, one 10cm and one 12 cm short stretch bandage from foot to knee   -AL      Bandaging Technique  moderate compression  -AL      Remove Bandages  24 hours to assess skin,  Have S/O wrap leg as needed.   -AL        User Key  (r) = Recorded By, (t) = Taken By, (c) = Cosigned By    Initials Name Provider Type    Lien Martini PTA, CLT-LANA Physical Therapy Assistant                        PT Assessment/Plan     Row Name 08/17/20 3713          PT Assessment    Assessment Comments  Patient will start working on the MLD and HEP.  She will have her S/O wrap the L leg.  Will start wrapping the R lower leg if she does ok with the L lower leg wraps.  Will measure the LE's next treatment.   -AL        PT Plan    PT Plan Comments  Continue with CDT  -AL       User Key  (r) = Recorded By, (t) = Taken By, (c) = Cosigned By    Initials Name Provider Type    Lien Martini PTA, CLT-LANA Physical Therapy Assistant             OP Exercises     Row Name 08/17/20 9513             Subjective Comments    Subjective Comments  Her ankles hurt and the top of her feet.  The wraps felt like they were digging into her feet after six and a half hours so she took them off. She states her leg looked smaller after she took the wraps off.  Her S/O has been wrapping the L lower leg.   The legs are still red, she sees Dr. Blackburn this week.  Her legs have not drained in the past week.  She states she is running late today because she had blood work done and they were running behind.   -AL         Subjective Pain    Able to rate subjective pain?  yes  -AL   "    Pre-Treatment Pain Level  5  -AL      Subjective Pain Comment  Ankles and the topf of her feet.   -AL         Total Minutes    76340 - PT Manual Therapy Minutes  60  -AL        User Key  (r) = Recorded By, (t) = Taken By, (c) = Cosigned By    Initials Name Provider Type    Lien Martini KEYANNA, PTA, CLT-ESTEFANY Physical Therapy Assistant                     Manual Rx (last 36 hours)      Manual Treatments     Row Name 08/17/20 0930             Total Minutes    46368 - PT Manual Therapy Minutes  60  -AL        User Key  (r) = Recorded By, (t) = Taken By, (c) = Cosigned By    Initials Name Provider Type    Lien Martini KEYANNA, PTA, CLT-ESTEFANY Physical Therapy Assistant          PT OP Goals     Row Name 08/17/20 0930          PT Short Term Goals    STG Date to Achieve  09/06/20  -AL     STG 1  Patient will have no signs of infection in the legs or will actively be on antibiotic that is showing improvement before MLD initiated.  -AL     STG 1 Progress  Ongoing  -AL     STG 2  She will be independent with remedial HEP for BLE lymphedema.  -AL     STG 2 Progress  Ongoing  -AL     STG 2 Progress Comments  Instructed on the HEP  -AL     STG 3  She will be educated about the benefits of ketogenic diet for lymphedema.   -AL     STG 3 Progress  Ongoing  -AL        Long Term Goals    LTG Date to Achieve  11/05/20  -AL     LTG 1  She will have appropriate compression garments for LEs and abdomen,  -AL     LTG 1 Progress  Ongoing  -AL     LTG 2  She will be independent with self massage with help from SO as needed.  -AL     LTG 2 Progress  Ongoing  -AL     LTG 2 Progress Comments  Instructed on the MLD  -AL     LTG 3  She will be independent with use of FLexitouch pump for maintenance of lymphedema.  -AL     LTG 3 Progress  New  -AL     LTG 4  She will have a reduction in legs and weight loss of at least 10 pounds.  -AL     LTG 4 Progress  New  -AL        Time Calculation    PT Goal Re-Cert Due Date  09/05/20  -AL       User Key  (r) =  Recorded By, (t) = Taken By, (c) = Cosigned By    Initials Name Provider Type    Lien Martini PTA, CLT-LANA Physical Therapy Assistant          Therapy Education  Education Details: Work on MLD and HEP  Given: HEP, Edema management  Program: New  How Provided: Verbal, Demonstration, Written  Provided to: Patient  Level of Understanding: Verbalized, Demonstrated  29957 - PT Self Care/Mgmt Minutes: 15              Time Calculation:                     Lien Glover PTA, CLT-LANA  8/17/2020

## 2020-08-18 ENCOUNTER — TELEPHONE (OUTPATIENT)
Dept: INTERNAL MEDICINE | Age: 50
End: 2020-08-18

## 2020-08-19 ENCOUNTER — TELEPHONE (OUTPATIENT)
Dept: PHYSICAL THERAPY | Facility: CLINIC | Age: 50
End: 2020-08-19

## 2020-08-19 RX ORDER — CLONAZEPAM 0.5 MG/1
0.5 TABLET ORAL 2 TIMES DAILY
Qty: 45 TABLET | Refills: 0 | Status: SHIPPED | OUTPATIENT
Start: 2020-08-19 | End: 2020-09-28 | Stop reason: SDUPTHER

## 2020-08-19 NOTE — TELEPHONE ENCOUNTER
Daphne from Dr. Mann's office called to make sure Valeria had been referred to [facility] for a pump.  I told her that she had and that a form may have been faxed to his office today for signature from [employer].  She also asked about the inelastic garments that they have used in the past. I gave her the name of [employer] and that [facility] is the  that comes to a pharmacy in [facility].

## 2020-08-19 NOTE — TELEPHONE ENCOUNTER
Tianna  called to request a refill on her medication. Last office visit : 8/10/2020   Next office visit : 11/9/2020     Last Moralesdanni Liebermanfelter: 08/19/2020  Medication Contract: [id number]  Last Fill: 07/07/2020    Last UDS:   Amphetamine Screen, Urine   Date Value Ref Range Status   05/19/2017 neg  Final     Barbiturate Screen, Urine   Date Value Ref Range Status   05/19/2017 neg  Final     Benzodiazepine Screen, Urine   Date Value Ref Range Status   05/19/2017 neg  Final     Cocaine Metabolite Screen, Urine   Date Value Ref Range Status   05/19/2017 neg  Final     MDMA, Urine   Date Value Ref Range Status   05/19/2017 neg  Final     Methamphetamine, Urine   Date Value Ref Range Status   05/19/2017 neg  Final     Opiate Scrn, Ur   Date Value Ref Range Status   05/19/2017 neg  Final     Oxycodone Screen, Ur   Date Value Ref Range Status   05/19/2017 pos  Final     Comment:     MOP - Positive     PCP Screen, Urine   Date Value Ref Range Status   05/19/2017 neg  Final     Propoxyphene Screen, Urine   Date Value Ref Range Status   05/19/2017 neg  Final     Tricyclic Antidepressants, Urine   Date Value Ref Range Status   05/19/2017 neg  Final                     Requested Prescriptions     Pending Prescriptions Disp Refills    clonazePAM (KLONOPIN) 0.5 MG tablet 45 tablet 0     Sig: Take 1 tablet by mouth 2 times daily for 30 days. Please approve or refuse this medication.    Cece Martinez MA

## 2020-08-19 NOTE — TELEPHONE ENCOUNTER
GEN was reviewed today per office protocol. Report shows No discrepancies. Fill pattern is consistent from single provider(s) at single pharmacy(s).

## 2020-08-20 ENCOUNTER — TREATMENT (OUTPATIENT)
Dept: PHYSICAL THERAPY | Facility: CLINIC | Age: 50
End: 2020-08-20

## 2020-08-20 DIAGNOSIS — I89.0 LYMPHEDEMA OF BOTH LOWER EXTREMITIES: Primary | ICD-10-CM

## 2020-08-20 PROCEDURE — 97140 MANUAL THERAPY 1/> REGIONS: CPT | Performed by: PHYSICAL THERAPIST

## 2020-08-20 NOTE — PROGRESS NOTES
Outpatient Physical Therapy Lymphedema Treatment Note       Patient Name: Valeria Wilson  : 1970  MRN: [id number]  Today's Date: 2020        Visit Date: 2020    Visit Dx:    ICD-10-CM ICD-9-CM   1. Lymphedema of both lower extremities I89.0 457.1       Patient Active Problem List   Diagnosis   • Class 3 severe obesity due to excess calories with serious comorbidity and body mass index (BMI) of 45.0 to 49.9 in adult (CMS/[facility])   • Hypertension   • Diabetes mellitus (CMS/[facility])        Lymphedema     Row Name 20 5770             Subjective Pain    Able to rate subjective pain?  yes  -AL      Pre-Treatment Pain Level  7  -AL      Subjective Pain Comment  anterior lower legs  -AL         Subjective Comments    Subjective Comments  She states she has more pain today and feels like her legs are more red.  She did see Dr. Blackburn yesterday and he took her off of the antibiotic.  She states he was happy with the way the legs looked.   She states she did take a water pill yesterday.  She did a few exercises as well as working on the MLD. She was able to keep the wraps on for 8-9 hours after her last treatment.   -AL         Lymphedema Measurements    Measurement Type(s)  Circumferential  -AL      Circumferential Areas  Lower extremities  -AL         BLE Circumferential (cm)    Measurement Location 1  BOT  -AL      Left 1  25.3 cm  -AL      Right 1  24.9 cm  -AL      Measurement Location 2  +10  -AL      Left 2  31.5 cm  -AL      Right 2  30.9 cm  -AL      Measurement Location 3  +10  -AL      Left 3  44.8 cm  -AL      Right 3  42.8 cm  -AL      Measurement Location 4  +10  -AL      Left 4  57.4 cm  -AL      Right 4  55.9 cm  -AL      Measurement Location 5  +10  -AL      Left 5  57 cm  -AL      Right 5  51 cm  -AL      Measurement Location 6  +10  -AL      Left 6  53 cm  -AL      Right 6  65.5 cm  -AL      Measurement Location 7  +10  -AL      Left 7  68.2 cm  -AL      Right 7  73 cm  -AL      LLE  "Circumferential Total  337.2 cm  -AL      RLE Circumferential Total  344 cm  -AL         RLE Circumferential (cm)    Measurement Location 1  BOT  -AL      Measurement Location 2  +10  -AL      Measurement Location 3  +10  -AL      Measurement Location 4  +10  -AL      Measurement Location 5  +10  -AL      Measurement Location 6  +10  -AL      Measurement Location 7  +10  -AL         Manual Lymphatic Drainage    Manual Lymphatic Drainage  initial sequence;opened regional lymph nodes;opened anastamoses;extremity treatment  -AL      Initial Sequence  short neck;abdomen;diaphragmatic breathing  -AL      Abdomen  superficial;deep  -AL      Diaphragmatic Breathing  x 9 with superficial abdominals  -AL      Opened Regional Lymph Nodes  axillary;inguinal  -AL      Axillary  right;left  -AL      Inguinal  right;left  -AL      Opened Anastamoses  inguino-axillary  -AL      Inguino-Axillary  right;left  -AL      Extremity Treatment  MLD to full limb  -AL      MLD to Full Limb  B LE  -AL      Manual Lymphatic Drainage Comments  Used a lighter touch anterior lower legs as she was sensitive in these areas today.   -AL      Manual Therapy  She had the Flexitouch demo just beofre treatment today.   -AL         Compression/Skin Care    Compression/Skin Care  skin care;wrapping location;bandaging;remove bandages  -AL      Skin Care  lotion applied  -AL      Wrapping Location  lower extremity  -AL      Wrapping Location LE  bilateral:;foot to knee  -AL      Bandage Layers  cotton liner;padding/fluff layer;short-stretch bandages (comment size/quantity)  -AL      Bandaging Comments  4\" stockinette, 2 rolls artiflex, one 8cm, one 10cm and one 12 cm short stretch bandage from foot to knee   -AL      Bandaging Technique  moderate compression  -AL      Remove Bandages  24 hours to assess skin,  Have S/O wrap leg as needed.   -AL        User Key  (r) = Recorded By, (t) = Taken By, (c) = Cosigned By    Initials Name Provider Type    AL Belen, " Lien BRICEÑO PTA, [facility] Physical Therapy Assistant                        PT Assessment/Plan     Row Name 08/20/20 1250          PT Assessment    Assessment Comments  She has more pain today.  She did see Dr. Blackburn yesterday and he took her off of the antibiotic.  She knows to watch for cellulitis.  She has been on her legs running errands today.  She was able to keep the compression wraps on longer after her last treatment.  Will instruct S/O on the MLD next treament.   Patient had the Flexitouch demo today.   She has had an increase in size both LE's.   -AL        PT Plan    PT Plan Comments  Continue with CDT, have S/O wrap the lower legs  -AL       User Key  (r) = Recorded By, (t) = Taken By, (c) = Cosigned By    Initials Name Provider Type    Lien Martini PTA, [facility] Physical Therapy Assistant             OP Exercises     Row Name 08/20/20 1250             Subjective Comments    Subjective Comments  She states she has more pain today and feels like her legs are more red.  She did see Dr. Blackburn yesterday and he took her off of the antibiotic.  She states he was happy with the way the legs looked.   She states she did take a water pill yesterday.  She did a few exercises as well as working on the MLD. She was able to keep the wraps on for 8-9 hours after her last treatment.   -AL         Subjective Pain    Able to rate subjective pain?  yes  -AL      Pre-Treatment Pain Level  7  -AL      Subjective Pain Comment  anterior lower legs  -AL         Total Minutes    85318 - PT Manual Therapy Minutes  90  -AL        User Key  (r) = Recorded By, (t) = Taken By, (c) = Cosigned By    Initials Name Provider Type    Lien Martini PTA, [facility] Physical Therapy Assistant                     Manual Rx (last 36 hours)      Manual Treatments     Row Name 08/20/20 1250             Total Minutes    57403 - PT Manual Therapy Minutes  90  -AL        User Key  (r) = Recorded By, (t) = Taken By, (c) = Cosigned By     Initials Name Provider Type    Lien Martini PTA, [facility] Physical Therapy Assistant          PT OP Goals     Row Name 08/20/20 1250          PT Short Term Goals    STG Date to Achieve  09/06/20  -AL     STG 1  Patient will have no signs of infection in the legs or will actively be on antibiotic that is showing improvement before MLD initiated.  -AL     STG 1 Progress  Ongoing  -AL     STG 1 Progress Comments  She has redness both lower legs today  -AL     STG 2  She will be independent with remedial HEP for BLE lymphedema.  -AL     STG 2 Progress  Ongoing  -AL     STG 2 Progress Comments  She is working on the HEP  -AL     STG 3  She will be educated about the benefits of ketogenic diet for lymphedema.   -AL     STG 3 Progress  Ongoing  -AL        Long Term Goals    LTG Date to Achieve  11/05/20  -AL     LTG 1  She will have appropriate compression garments for LEs and abdomen,  -AL     LTG 1 Progress  Ongoing  -AL     LTG 2  She will be independent with self massage with help from SO as needed.  -AL     LTG 2 Progress  Ongoing  -AL     LTG 2 Progress Comments  Her S/O will come next treatment to learn how to do the MLD  -AL     LTG 3  She will be independent with use of FLexitouch pump for maintenance of lymphedema.  -AL     LTG 3 Progress  Ongoing  -AL     LTG 3 Progress Comments  She had th Flexitouch demo today  -AL     LTG 4  She will have a reduction in legs and weight loss of at least 10 pounds.  -AL     LTG 4 Progress  New  -AL        Time Calculation    PT Goal Re-Cert Due Date  09/05/20  -AL       User Key  (r) = Recorded By, (t) = Taken By, (c) = Cosigned By    Initials Name Provider Type    Lien Martini PTA, [facility] Physical Therapy Assistant          Therapy Education  Education Details: Work on CDT, have S/O wrap the lower legs over the weekend.   Given: Edema management  Program: Reinforced, Progressed  How Provided: Verbal, Demonstration, Written  Provided to: Patient  Level of  Understanding: Verbalized, Demonstrated              Time Calculation:                     Lien Glover, PTA, [facility]  8/20/2020

## 2020-08-21 DIAGNOSIS — G43.819 OTHER MIGRAINE WITHOUT STATUS MIGRAINOSUS, INTRACTABLE: ICD-10-CM

## 2020-08-21 RX ORDER — ZOLMITRIPTAN 5 MG/1
SPRAY NASAL
Qty: 6 EACH | Refills: 0 | Status: ON HOLD | OUTPATIENT
Start: 2020-08-21 | End: 2020-09-26

## 2020-08-25 ENCOUNTER — TREATMENT (OUTPATIENT)
Dept: PHYSICAL THERAPY | Facility: CLINIC | Age: 50
End: 2020-08-25

## 2020-08-25 DIAGNOSIS — I89.0 LYMPHEDEMA OF BOTH LOWER EXTREMITIES: Primary | ICD-10-CM

## 2020-08-25 PROCEDURE — 97535 SELF CARE MNGMENT TRAINING: CPT | Performed by: PHYSICAL THERAPIST

## 2020-08-25 PROCEDURE — 97140 MANUAL THERAPY 1/> REGIONS: CPT | Performed by: PHYSICAL THERAPIST

## 2020-08-25 NOTE — PROGRESS NOTES
Outpatient Physical Therapy Lymphedema Treatment Note       Patient Name: Valeria Wilson  : 1970  MRN: [id number]  Today's Date: 2020        Visit Date: 2020    Visit Dx:    ICD-10-CM ICD-9-CM   1. Lymphedema of both lower extremities I89.0 457.1       Patient Active Problem List   Diagnosis   • Class 3 severe obesity due to excess calories with serious comorbidity and body mass index (BMI) of 45.0 to 49.9 in adult (CMS/[facility])   • Hypertension   • Diabetes mellitus (CMS/[facility])        Lymphedema     Row Name 20 0800             Subjective Pain    Able to rate subjective pain?  yes  -HR      Pre-Treatment Pain Level  3  -HR      Subjective Pain Comment  thighs and tops of feet  -HR         Subjective Comments    Subjective Comments  The tops of her feet are really sore. She is also c/o more soreness in her upper thighs.  -HR         Manual Lymphatic Drainage    Manual Lymphatic Drainage  initial sequence;opened regional lymph nodes;opened anastamoses;extremity treatment  -HR      Initial Sequence  short neck;abdomen;diaphragmatic breathing  -HR      Abdomen  superficial;deep  -HR      Opened Regional Lymph Nodes  axillary;inguinal  -HR      Axillary  right;left  -HR      Inguinal  right;left  -HR      Opened Anastamoses  inguino-axillary  -HR      Inguino-Axillary  right;left  -HR      Extremity Treatment  MLD to full limb  -HR      MLD to Full Limb  B LE  -HR      Manual Lymphatic Drainage Comments  She could not tolerate MLD to areas along the distal legs and dorsal feet. I just had to let her tell me when I needed to move my hands.  -HR         Compression/Skin Care    Compression/Skin Care  skin care;wrapping location;bandaging;remove bandages  -HR      Skin Care  lotion applied  -HR      Wrapping Location  lower extremity  -HR      Wrapping Location LE  foot to knee;left:  -HR      Bandage Layers  cotton liner;padding/fluff layer;short-stretch bandages (comment size/quantity)  -HR       "Bandaging Comments  4\" kj, 2 rolls artiflex, one 8cm, one 10cm and one 12 cm short stretch bandage from foot to knee   -HR      Bandaging Technique  light compression;moderate compression  -HR      Compression/Skin Care Comments  She has a blister to the R distal leg. I didn't wrap that leg so she can keep an eye on it as she is no longer on any antibiotics.   -HR        User Key  (r) = Recorded By, (t) = Taken By, (c) = Cosigned By    Initials Name Provider Type    Pauline Parry, PT, DPT, [facility] Physical Therapist                        PT Assessment/Plan     Row Name 08/25/20 1000          PT Assessment    Assessment Comments  Her S.O came with her today to watch and learn how to help her with self massage. He asked appropriate questions and did appear to have a good general understanding of the sequence and the pressure. She had more pain in the distal legs and feet today and could not tolerate touch to some areas.   -HR        PT Plan    PT Plan Comments  Cont with current plan. Awaiting notice of approval for pump. She is supposed to order the compression leggings. Will work on the inelastic garments as we can.   -HR       User Key  (r) = Recorded By, (t) = Taken By, (c) = Cosigned By    Initials Name Provider Type    Pauline Parry, PT, DPINGA, [facility] Physical Therapist             OP Exercises     Row Name 08/25/20 0800             Subjective Comments    Subjective Comments  The tops of her feet are really sore. She is also c/o more soreness in her upper thighs.  -HR         Subjective Pain    Able to rate subjective pain?  yes  -HR      Pre-Treatment Pain Level  3  -HR      Subjective Pain Comment  thighs and tops of feet  -HR         Total Minutes    64894 - PT Manual Therapy Minutes  85  -HR        User Key  (r) = Recorded By, (t) = Taken By, (c) = Cosigned By    Initials Name Provider Type    Pauline Parry, PT, DPT, [facility] Physical Therapist                   "   Manual Rx (last 36 hours)      Manual Treatments     Row Name 08/25/20 0800             Total Minutes    80961 - PT Manual Therapy Minutes  85  -HR        User Key  (r) = Recorded By, (t) = Taken By, (c) = Cosigned By    Initials Name Provider Type    HR Pauline Marks, PT, DPT, [facility] Physical Therapist          PT OP Goals     Row Name 08/25/20 0800          PT Short Term Goals    STG Date to Achieve  09/06/20  -HR     STG 1  Patient will have no signs of infection in the legs or will actively be on antibiotic that is showing improvement before MLD initiated.  -HR     STG 1 Progress  Ongoing  -HR     STG 2  She will be independent with remedial HEP for BLE lymphedema.  -HR     STG 2 Progress  Ongoing  -HR     STG 3  She will be educated about the benefits of ketogenic diet for lymphedema.   -HR     STG 3 Progress  Ongoing  -HR        Long Term Goals    LTG Date to Achieve  11/05/20  -HR     LTG 1  She will have appropriate compression garments for LEs and abdomen,  -HR     LTG 1 Progress  Ongoing  -HR     LTG 1 Progress Comments  Encouraged her to get the leggings at least now.   -HR     LTG 2  She will be independent with self massage with help from SO as needed.  -HR     LTG 2 Progress  Ongoing  -HR     LTG 3  She will be independent with use of FLexitouch pump for maintenance of lymphedema.  -HR     LTG 3 Progress  Ongoing  -HR     LTG 3 Progress Comments  She was surprised that the flexitouch was actually very comfortable.   -HR     LTG 4  She will have a reduction in legs and weight loss of at least 10 pounds.  -HR     LTG 4 Progress  New  -HR        Time Calculation    PT Goal Re-Cert Due Date  09/04/20  -HR       User Key  (r) = Recorded By, (t) = Taken By, (c) = Cosigned By    Initials Name Provider Type    HR Pauline Marks, PT, DPT, [facility] Physical Therapist          Therapy Education  Education Details: S.O. observed MLD. Gave printout of the [employer] compression leggings that she  can order. Watch the blister on the R lower leg  Given: Edema management  Program: Reinforced, Progressed  How Provided: Verbal, Demonstration, Written  Provided to: Patient, Caregiver  Level of Understanding: Verbalized, Demonstrated              Time Calculation:                     Pauline Marks, PT, DPT, [facility]  8/25/2020

## 2020-08-31 ENCOUNTER — TELEPHONE (OUTPATIENT)
Dept: PHYSICAL THERAPY | Facility: CLINIC | Age: 50
End: 2020-08-31

## 2020-09-01 RX ORDER — PRAMIPEXOLE DIHYDROCHLORIDE 0.75 MG/1
TABLET ORAL
Qty: 60 TABLET | Refills: 5 | Status: ON HOLD | OUTPATIENT
Start: 2020-09-01 | End: 2020-09-21

## 2020-09-01 NOTE — TELEPHONE ENCOUNTER
Patient called and is almost out of mirapex and has looked all over for the bottle and can't find it. Patient requesting new script so she can pay cash for it  [employer] and patients prescription was filled 4-27 for a 90 day supply. Then Was filled on 7-5 for 8 tablets related to pharmacy didn't have full script and rest of the 172 was filled on 7-7.(which was already early fill)  [facility] reports this has happened with this patient before where she has \"lost the prescription\" and requested early refills on different medicines and they had told patient they would not fill without providers approval for cash price.

## 2020-09-02 ENCOUNTER — NURSE TRIAGE (OUTPATIENT)
Dept: OTHER | Facility: CLINIC | Age: 50
End: 2020-09-02

## 2020-09-02 ENCOUNTER — VIRTUAL VISIT (OUTPATIENT)
Dept: PRIMARY CARE CLINIC | Age: 50
End: 2020-09-02
Payer: MEDICAID

## 2020-09-02 PROCEDURE — 2022F DILAT RTA XM EVC RTNOPTHY: CPT | Performed by: NURSE PRACTITIONER

## 2020-09-02 PROCEDURE — 3051F HG A1C>EQUAL 7.0%<8.0%: CPT | Performed by: NURSE PRACTITIONER

## 2020-09-02 PROCEDURE — G8428 CUR MEDS NOT DOCUMENT: HCPCS | Performed by: NURSE PRACTITIONER

## 2020-09-02 PROCEDURE — 1036F TOBACCO NON-USER: CPT | Performed by: NURSE PRACTITIONER

## 2020-09-02 PROCEDURE — 99214 OFFICE O/P EST MOD 30 MIN: CPT | Performed by: NURSE PRACTITIONER

## 2020-09-02 PROCEDURE — G8417 CALC BMI ABV UP PARAM F/U: HCPCS | Performed by: NURSE PRACTITIONER

## 2020-09-02 RX ORDER — CEPHALEXIN 500 MG/1
500 CAPSULE ORAL 4 TIMES DAILY
Qty: 40 CAPSULE | Refills: 0 | Status: ON HOLD | OUTPATIENT
Start: 2020-09-02 | End: 2020-09-21

## 2020-09-02 RX ORDER — DULOXETIN HYDROCHLORIDE 30 MG/1
30 CAPSULE, DELAYED RELEASE ORAL 2 TIMES DAILY
Qty: 60 CAPSULE | Refills: 5 | Status: ON HOLD | OUTPATIENT
Start: 2020-09-02 | End: 2020-09-21

## 2020-09-02 RX ORDER — PRAMIPEXOLE DIHYDROCHLORIDE 0.75 MG/1
0.75 TABLET ORAL 2 TIMES DAILY
Qty: 60 TABLET | Refills: 0 | Status: SHIPPED | OUTPATIENT
Start: 2020-09-02 | End: 2021-04-18

## 2020-09-02 RX ORDER — FLUCONAZOLE 150 MG/1
150 TABLET ORAL ONCE
Qty: 2 TABLET | Refills: 0 | Status: SHIPPED | OUTPATIENT
Start: 2020-09-02 | End: 2020-09-02

## 2020-09-02 ASSESSMENT — ENCOUNTER SYMPTOMS
COLOR CHANGE: 0
PHOTOPHOBIA: 0
SHORTNESS OF BREATH: 0
NAUSEA: 0
BACK PAIN: 0
COUGH: 0
VOMITING: 0
VOICE CHANGE: 0
RHINORRHEA: 0

## 2020-09-02 NOTE — TELEPHONE ENCOUNTER
Reason for Disposition   Patient wants to be seen    Answer Assessment - Initial Assessment Questions  1. ONSET: \"When did the swelling start? \" (e.g., minutes, hours, days)      PT reports hx of lymphedema, worse yesterday. 2. LOCATION: \"What part of the leg is swollen? \"  \"Are both legs swollen or just one leg? \"      Bilateral  3. SEVERITY: \"How bad is the swelling? \" (e.g., localized; mild, moderate, severe)   - Localized - small area of swelling localized to one leg   - MILD pedal edema - swelling limited to foot and ankle, pitting edema < 1/4 inch (6 mm) deep, rest and elevation eliminate most or all swelling   - MODERATE edema - swelling of lower leg to knee, pitting edema > 1/4 inch (6 mm) deep, rest and elevation only partially reduce swelling   - SEVERE edema - swelling extends above knee, facial or hand swelling present       Moderate  4. REDNESS: \"Does the swelling look red or infected? \"     Redness if present, with weeping  5. PAIN: \"Is the swelling painful to touch? \" If so, ask: \"How painful is it? \"   (Scale 1-10; mild, moderate or severe)      5/10   6. FEVER: \"Do you have a fever? \" If so, ask: \"What is it, how was it measured, and when did it start? \"       Denies   7. CAUSE: \"What do you think is causing the leg swelling? \"     Cellulitis  8. MEDICAL HISTORY: \"Do you have a history of heart failure, kidney disease, liver failure, or cancer? \"      Lymphedema  9. RECURRENT SYMPTOM: \"Have you had leg swelling before? \" If so, ask: \"When was the last time? \" \"What happened that time? \"      Yes within last 3 months. 10. OTHER SYMPTOMS: \"Do you have any other symptoms? \" (e.g., chest pain, difficulty breathing)        N/A  11. PREGNANCY: \"Is there any chance you are pregnant? \" \"When was your last menstrual period? \"        LMP 2 weeks ago. Protocols used: LEG SWELLING AND EDEMA-ADULT-OH    Pt reports bilateral lower leg swelling, redness and weeping. States hx of cellulitis. Would like to be seen. Reviewed dispostion to be seen today in office. Pt agreeable. Warm transfer to [place] at [place]. Caller provided care advice and instructed to call back with worsening symptoms. Please do not respond to the triage nurse through this encounter. Any subsequent communication should be directly with the patient.

## 2020-09-02 NOTE — PROGRESS NOTES
[address] [address]             Phone:  [phone]  Fax:  [phone]       2020    TELEHEALTH EVALUATION -- Audio/Visual (During SKQDN-02 public health emergency)    HPI:  Chief Complaint   Patient presents with    Leg Swelling         Tianna Trivedi (:  [id number]) has requested an audio/video evaluation for the following concern(s):    Patient presents today for evaluation of lower extremity edema and redness. She states they are warm. She has history of chronic cellulitis and lymphedema. She is being seen by infectious disease; Dr Denisse Gonzalez. She states she has a few areas that are weeping. She recently saw Dr Denisse Gonzalez and was taken off doxycycline due to her infection being better. Review of Systems   Constitutional: Negative for chills and fever. HENT: Negative for ear pain, hearing loss, rhinorrhea and voice change. Eyes: Negative for photophobia and visual disturbance. Respiratory: Negative for cough and shortness of breath. Cardiovascular: Negative for chest pain and palpitations. Gastrointestinal: Negative for nausea and vomiting. Endocrine: Negative. Negative for cold intolerance and heat intolerance. Genitourinary: Negative for difficulty urinating and flank pain. Musculoskeletal: Negative for back pain and neck pain. Skin: Negative for color change and rash. Allergic/Immunologic: Negative for environmental allergies and food allergies. Neurological: Negative for dizziness, speech difficulty and headaches. Hematological: Does not bruise/bleed easily. Psychiatric/Behavioral: Negative for sleep disturbance and suicidal ideas. Prior to Visit Medications    Medication Sig Taking?  Authorizing Provider   pramipexole (MIRAPEX) 0.75 MG tablet Take 1 tablet by mouth 2 times daily Yes NANETTE Gil   tiZANidine (ZANAFLEX) 4 MG tablet Take 4 mg by mouth daily as needed  Historical Provider, MD   docusate sodium (COLACE) 100 Leticia Crump MD   mupirocin (BACTROBAN) 2 % ointment APPLY EXTERNALLY TO THE AFFECTED AREA THREE TIMES DAILY  Nalini Plain, APRN   ZOLMitriptan (ZOMIG) 5 MG nasal solution 0.1 mLs by Nasal route once as needed for Migraine  Nalini Plain, APRN   omeprazole (PRILOSEC) 20 MG delayed release capsule TAKE 1 CAPSULE BY MOUTH TWICE DAILY BEFORE MEALS  Patient taking differently: Take 40 mg by mouth Daily   NANETTE Riley   diclofenac sodium (VOLTAREN) 1 % GEL Apply 2 g topically 2 times daily  Historical Provider, MD   cyclobenzaprine (FLEXERIL) 10 MG tablet Take 10 mg by mouth 3 times daily as needed for Muscle spasms   Historical Provider, MD   Butorphanol Tartrate (STADOL NS NA) by Nasal route  Historical Provider, MD   celecoxib (CELEBREX) 100 MG capsule Take 1 capsule by mouth daily  Nalini Plain, APRN   blood glucose test strips (ASCENSIA AUTODISC VI;ONE TOUCH ULTRA TEST VI) strip Check glucose TID and as needed for hypoglycemic events Dx E11.40 E11.66 Z79.4  Use one touch verio flex  MD DOMINICK Flores-MARNIE ULTRAFINE III SHORT PEN 31G X 8 MM MISC USE TO INJECT INSULIN ONCE DAILY  David Britt MD   promethazine (PHENERGAN) 25 MG tablet TAKE 1 TABLET BY MOUTH EVERY 6 HOURS AS NEEDED FOR NAUSEA  Patient taking differently: every 8 hours as needed TAKE 1 TABLET BY MOUTH EVERY 6 HOURS AS NEEDED FOR NAUSEA  David Britt MD   Diabetic Shoe MISC by Does not apply route DISPENSE ONE PAIR OF DIABETIC SHOE AND 3 PAIRS HEAT MOLDED INSERTS  David Britt MD   Lancets ([place]) [facility] USE TO [facility] AS Rekha Carpenter MD   blood glucose monitor kit and supplies Test 1 times a day & as needed for symptoms of irregular blood glucose.   David Britt MD   ONE TOUCH ULTRA TEST strip TEST BLOOD SUGAR ONCE DAILY  David Britt MD   VICTOZA 18 MG/3ML SOPN SC injection ADMINISTER 1.8 MG INTO THE SKIN EVERY DAY  David Britt MD   gabapentin (NEURONTIN) 100 MG capsule Take 100 mg by mouth 2 times daily. Am and 2 pm  Historical Provider, MD   gabapentin (NEURONTIN) 300 MG capsule TK ONE C PO QHS  Historical Provider, MD   HYDROcodone-acetaminophen (NORCO) 7.5-325 MG per tablet Take 1 tablet by mouth 2 times daily.    Historical Provider, MD   EPINEPHrine (EPIPEN 2-JOSÉ) 0.3 MG/0.3ML SOAJ injection Inject 0.3 mLs into the muscle once for 1 dose Use as directed for allergic reaction  Julia Fontana MD   Blood Glucose Monitoring Suppl ([address]) w/Device KIT 1 kit by Does not apply route daily as needed (Dx 250.00)  Julia Fontana MD   ONE TOUCH LANCETS MISC 1 each by Does not apply route daily  Julia Fontana MD       Social History     Tobacco Use    Smoking status: Never Smoker    Smokeless tobacco: Never Used   Substance Use Topics    Alcohol use: No    Drug use: No        Allergies   Allergen Reactions    Compazine [Prochlorperazine Maleate] Shortness Of Breath    Ciprofloxacin Hives    Amoxicillin-Pot Clavulanate     Compazine [Prochlorperazine]     Demerol Hives    Hydroxyzine     Hydroxyzine Hcl Itching    Ibuprofen Nausea Only    Ketorolac Tromethamine     Meperidine Hives    Nortriptyline Other (See Comments)    Orphenadrine     Orphenadrine Citrate Itching    Penicillins Hives and Nausea Only    Prochlorperazine Edisylate      Will discuss with patient at next visit     Tobramycin      Will discuss with patient at next visit     Tramadol      Will discuss with patient at next visit     Vistaril [Hydroxyzine Hcl] Itching    Cephalexin Rash    Clindamycin/Lincomycin Rash    Codeine Nausea And Vomiting and Rash    Doxycycline Nausea And Vomiting    Keflex [Cephalexin] Rash    Ketorolac Tromethamine Itching and Nausea And Vomiting    Moxifloxacin Nausea And Vomiting     Will discuss with patient at visit    ,   Past Medical History:   Diagnosis Date    Anxiety     Constipation     Depression     DM (diabetes mellitus) ([facility] [address]  Headache(784.0)     Hyperlipidemia     Hypertension     Kidney stones     Kidney stones     Restless leg     Restless legs syndrome    ,   Past Surgical History:   Procedure Laterality Date    ECTOPIC PREGNANCY SURGERY      EYE SURGERY      cornea transplant and cataracts removed    KNEE CARTILAGE SURGERY Left 12/20/2016    KNEE ARTHROSCOPIC PARTIAL MEDIAL MENISCECTOMY performed by Adonay Gregory MD at [address] [place] [address]     ,   Social History     Tobacco Use    Smoking status: Never Smoker    Smokeless tobacco: Never Used   Substance Use Topics    Alcohol use: No    Drug use: No   ,   Family History   Problem Relation Age of Onset    Cancer Father     Cancer Maternal Grandmother     COPD Maternal Grandmother     Cancer Maternal Grandfather    ,   Immunization History   Administered Date(s) Administered    Influenza, MDCK Quadv, with preserv IM (Flucelvax 4 yrs and older) 12/17/2018    Influenza, Quadv, IM, PF (6 mo and older Fluzone, Flulaval, Fluarix, and 3 yrs and older Afluria) 09/22/2016    PPD Test 05/09/2019    Pneumococcal Polysaccharide (Bulngclap09) 12/17/2018    Tdap (Boostrix, Adacel) 09/22/2011   ,   Health Maintenance   Topic Date Due    Statin Therapy  1970    Diabetic retinal exam  09/08/1982    HIV screen  09/08/1987    Hepatitis B vaccine (1 of 3 - Risk 3-dose series) 09/08/1991    Cervical cancer screen  05/21/2017    Diabetic microalbuminuria test  12/17/2019    Flu vaccine (1) 09/01/2020    Diabetic foot exam  11/11/2020    A1C test (Diabetic or Prediabetic)  06/12/2021    Lipid screen  08/17/2021    Potassium monitoring  09/21/2021    Creatinine monitoring  09/21/2021    DTaP/Tdap/Td vaccine (2 - Td) 09/22/2021    Pneumococcal 0-64 years Vaccine  Completed    Hepatitis A vaccine  Aged Out    Hib vaccine  Aged Out    Meningococcal (ACWY) vaccine  Aged Out       PHYSICAL EXAMINATION:  [ INSTRUCTIONS:  \"[x]\" Indicates a positive item  \"[]\" to substitute for in-person clinic visit.

## 2020-09-04 RX ORDER — INSULIN GLARGINE 100 [IU]/ML
INJECTION, SOLUTION SUBCUTANEOUS
Qty: 15 ML | Refills: 1 | Status: SHIPPED | OUTPATIENT
Start: 2020-09-04 | End: 2021-01-06

## 2020-09-04 RX ORDER — DULOXETIN HYDROCHLORIDE 60 MG/1
60 CAPSULE, DELAYED RELEASE ORAL DAILY
Qty: 30 CAPSULE | Refills: 5 | Status: SHIPPED | OUTPATIENT
Start: 2020-09-04 | End: 2020-10-12 | Stop reason: SDUPTHER

## 2020-09-04 RX ORDER — CELECOXIB 200 MG/1
CAPSULE ORAL
Qty: 60 CAPSULE | Refills: 5 | Status: SHIPPED | OUTPATIENT
Start: 2020-09-04

## 2020-09-11 ENCOUNTER — TREATMENT (OUTPATIENT)
Dept: PHYSICAL THERAPY | Facility: CLINIC | Age: 50
End: 2020-09-11

## 2020-09-11 DIAGNOSIS — I89.0 LYMPHEDEMA OF BOTH LOWER EXTREMITIES: Primary | ICD-10-CM

## 2020-09-11 PROCEDURE — 97140 MANUAL THERAPY 1/> REGIONS: CPT | Performed by: PHYSICAL THERAPIST

## 2020-09-11 RX ORDER — EMOLLIENT BASE
CREAM (GRAM) TOPICAL
Qty: 30 G | Refills: 5 | Status: SHIPPED | OUTPATIENT
Start: 2020-09-11

## 2020-09-11 NOTE — PROGRESS NOTES
Outpatient Physical Therapy Lymphedema Progress Note       Patient Name: Valeria Wilson  : 1970  MRN: [id number]  Today's Date: 2020        Visit Date: 2020    Visit Dx:    ICD-10-CM ICD-9-CM   1. Lymphedema of both lower extremities I89.0 457.1       Patient Active Problem List   Diagnosis   • Class 3 severe obesity due to excess calories with serious comorbidity and body mass index (BMI) of 45.0 to 49.9 in adult (CMS/[facility])   • Hypertension   • Diabetes mellitus (CMS/[facility])        Lymphedema     Row Name 20 0850             Subjective Pain    Able to rate subjective pain?  yes  -AL      Pre-Treatment Pain Level  3  -AL      Subjective Pain Comment  Both lower legs  -AL         Subjective Comments    Subjective Comments  She states the legs are red, but better than last week.  She has two spots draining on the L leg.  She has small bumps on both lower legs.  -AL         Lymphedema Measurements    Measurement Type(s)  Circumferential  -AL      Circumferential Areas  Lower extremities;Trunk  -AL         BLE Circumferential (cm)    Measurement Location 1  BOT  -AL      Left 1  25.4 cm  -AL      Right 1  25.6 cm  -AL      Measurement Location 2  +10  -AL      Left 2  32.8 cm  -AL      Right 2  33 cm  -AL      Measurement Location 3  +10  -AL      Left 3  45.4 cm  -AL      Right 3  48.4 cm  -AL      Measurement Location 4  +10  -AL      Left 4  59.2 cm  -AL      Right 4  60.9 cm  -AL      Measurement Location 5  +10  -AL      Left 5  63.2 cm  -AL      Right 5  54.9 cm  -AL      Measurement Location 6  +10  -AL      Left 6  63.4 cm  -AL      Right 6  61.4 cm  -AL      Measurement Location 7  +10  -AL      Left 7  77 cm  -AL      Right 7  76 cm  -AL      LLE Circumferential Total  366.4 cm  -AL      RLE Circumferential Total  360.2 cm  -AL         RLE Circumferential (cm)    Measurement Location 1  BOT  -AL      Measurement Location 2  +10  -AL      Measurement Location 3  +10  -AL      Measurement  "Location 4  +10  -AL      Measurement Location 5  +10  -AL      Measurement Location 6  +10  -AL      Measurement Location 7  +10  -AL         Trunk Circumferential (cm)    Measurement Location 1  Umbilicus  -AL      Trunk 1  146.2 cm  -AL      Trunk Circumferential Total  146.2 cm  -AL         Manual Lymphatic Drainage    Manual Lymphatic Drainage  --  -AL      Initial Sequence  --  -AL      Abdomen  --  -AL      Opened Regional Lymph Nodes  --  -AL      Axillary  --  -AL      Inguinal  --  -AL      Opened Anastamoses  --  -AL      Inguino-Axillary  --  -AL      Extremity Treatment  --  -AL      Manual Therapy  Assessed goals and discussed that we will have to do a basic pump trial next week to work towards getting Flexitouch pump,   -AL         Compression/Skin Care    Compression/Skin Care  skin care;wrapping location;bandaging;remove bandages  -AL      Skin Care  lotion applied  -AL      Wrapping Location  lower extremity  -AL      Wrapping Location LE  bilateral:;foot to knee  -AL      Bandage Layers  cotton liner;padding/fluff layer;short-stretch bandages (comment size/quantity)  -AL      Bandaging Comments  4\" stockinette, 2 rolls artiflex, one 8cm, one 10cm and one 12 cm short stretch bandage from foot to knee   -AL      Bandaging Technique  moderate compression  -AL      Compression/Skin Care Comments  Kept the bandaid she had on in place.  I did give her an Optio foam to use on the open areas when she removed the wraps.  She is to have her S/O wrap the legs for her daily.   -AL        User Key  (r) = Recorded By, (t) = Taken By, (c) = Cosigned By    Initials Name Provider Type    AL Lien Glover PTA, MAXWELL Physical Therapy Assistant                        PT Assessment/Plan     Row Name 09/11/20 0850          PT Assessment    Functional Limitations  Impaired gait;Decreased safety during functional activities;Impaired locomotion;Limitation in home management;Limitations in community " activities;Limitations in functional capacity and performance;Performance in leisure activities  -HR     Impairments  Edema;Pain;Impaired lymphatic circulation;Integumentary integrity  -HR     Assessment Comments  Patient has had an increase in size in both LE's.  She has weeping from the L lower leg.   Patient will have her S/O wrap the legs daily.  She elevates the legs to help with swelling as well.  Patient will have to have a basic pump trial, will do this next treatment.   -AL     Rehab Potential  Good  -HR     Patient/caregiver participated in establishment of treatment plan and goals  Yes  -HR     Patient would benefit from skilled therapy intervention  Yes  -HR        PT Plan    PT Frequency  2x/week  -HR     Predicted Duration of Therapy Intervention (Therapy Eval)  4 weeks  -HR     Planned CPT's?  PT RE-EVAL: 85061;PT THER PROC EA 15 MIN: 48262;PT MANUAL THERAPY EA 15 MIN: 26556;PT SELF CARE/HOME MGMT/TRAIN EA 15: 30320  -HR     PT Plan Comments  Continue with CDT, do basic pump trial next visit.   -AL       User Key  (r) = Recorded By, (t) = Taken By, (c) = Cosigned By    Initials Name Provider Type    Lien Martini PTA, CLT-ESTEFANY Physical Therapy Assistant    HR Pauline Marks, PT, DPT, CLT-ESTEFANY Physical Therapist             OP Exercises     Row Name 09/11/20 0805             Subjective Comments    Subjective Comments  She states the legs are red, but better than last week.  She has two spots draining on the L leg.  She has small bumps on both lower legs.  -AL         Subjective Pain    Able to rate subjective pain?  yes  -AL      Pre-Treatment Pain Level  3  -AL      Subjective Pain Comment  Both lower legs  -AL         Total Minutes    07638 - PT Therapeutic Exercise Minutes  --  -AL      05019 - PT Manual Therapy Minutes  40  -AL        User Key  (r) = Recorded By, (t) = Taken By, (c) = Cosigned By    Initials Name Provider Type    Lien Martini PTA, CLT-ESTEFANY Physical Therapy  Assistant                     Manual Rx (last 36 hours)      Manual Treatments     Row Name 09/11/20 0850             Total Minutes    31095 - PT Manual Therapy Minutes  40  -AL        User Key  (r) = Recorded By, (t) = Taken By, (c) = Cosigned By    Initials Name Provider Type    Lien Martini, PTA, HYUNTMARISELA Physical Therapy Assistant          PT OP Goals     Row Name 09/11/20 0850          PT Short Term Goals    STG Date to Achieve  09/06/20  -AL     STG 1  Patient will have no signs of infection in the legs or will actively be on antibiotic that is showing improvement before MLD initiated.  -AL     STG 1 Progress  Ongoing  -AL     STG 1 Progress Comments  She has redness both lower legs and weeping from the Llower leg  -AL     STG 2  She will be independent with remedial HEP for BLE lymphedema.  -AL     STG 2 Progress  Ongoing  -AL     STG 2 Progress Comments  She is working on the HEP  -AL     STG 3  She will be educated about the benefits of ketogenic diet for lymphedema.   -AL     STG 3 Progress  Ongoing  -AL     STG 3 Progress Comments  She has been informed about the Keto diet  -AL        Long Term Goals    LTG Date to Achieve  11/05/20  -AL     LTG 1  She will have appropriate compression garments for LEs and abdomen,  -AL     LTG 1 Progress  Ongoing  -AL     LTG 1 Progress Comments  She will look for comrpession leggings  -AL     LTG 2  She will be independent with self massage with help from SO as needed.  -AL     LTG 2 Progress  Ongoing  -AL     LTG 2 Progress Comments  Her S/O has been trained on how to the the MLD  -AL     LTG 3  She will be independent with use of FLexitouch pump for maintenance of lymphedema.  -AL     LTG 3 Progress  Ongoing  -AL     LTG 3 Progress Comments  We will do a basic pump trial next treament  -AL     LTG 4  She will have a reduction in legs and weight loss of at least 10 pounds.  -AL     LTG 4 Progress  Ongoing  -AL     LTG 4 Progress Comments  She has had an increase  in size both LE's.   -AL        Time Calculation    PT Goal Re-Cert Due Date  10/11/20  -AL       User Key  (r) = Recorded By, (t) = Taken By, (c) = Cosigned By    Initials Name Provider Type    Lien Martini, PTA, MAXWELL Physical Therapy Assistant          Therapy Education  Education Details: Have S/O wrap legs daily  Given: Edema management  Program: Reinforced  How Provided: Verbal  Provided to: Patient  Level of Understanding: Verbalized              Time Calculation:                     Pauline Marks, PT, DPT, MAXWELL  9/11/2020

## 2020-09-21 ENCOUNTER — HOSPITAL ENCOUNTER (INPATIENT)
Age: 50
LOS: 1 days | Discharge: LEFT BEFORE DISCHARGE | DRG: 603 | End: 2020-09-21
Attending: EMERGENCY MEDICINE | Admitting: FAMILY MEDICINE
Payer: MEDICAID

## 2020-09-21 VITALS
RESPIRATION RATE: 20 BRPM | TEMPERATURE: 97.9 F | BODY MASS INDEX: 55.32 KG/M2 | HEIGHT: 61 IN | HEART RATE: 109 BPM | WEIGHT: 293 LBS | OXYGEN SATURATION: 92 %

## 2020-09-21 PROBLEM — I89.0 LYMPHEDEMA OF BOTH LOWER EXTREMITIES: Status: ACTIVE | Noted: 2020-09-21

## 2020-09-21 LAB
ALBUMIN SERPL-MCNC: 4.7 G/DL (ref 3.5–5.2)
ALP BLD-CCNC: 85 U/L (ref 35–104)
ALT SERPL-CCNC: 14 U/L (ref 5–33)
ANION GAP SERPL CALCULATED.3IONS-SCNC: 17 MMOL/L (ref 7–19)
AST SERPL-CCNC: 21 U/L (ref 5–32)
BASOPHILS ABSOLUTE: 0 K/UL (ref 0–0.2)
BASOPHILS RELATIVE PERCENT: 0.7 % (ref 0–1)
BILIRUB SERPL-MCNC: 0.3 MG/DL (ref 0.2–1.2)
BUN BLDV-MCNC: 16 MG/DL (ref 6–20)
CALCIUM SERPL-MCNC: 9.9 MG/DL (ref 8.6–10)
CHLORIDE BLD-SCNC: 98 MMOL/L (ref 98–111)
CO2: 29 MMOL/L (ref 22–29)
CREAT SERPL-MCNC: 0.9 MG/DL (ref 0.5–0.9)
EOSINOPHILS ABSOLUTE: 0.2 K/UL (ref 0–0.6)
EOSINOPHILS RELATIVE PERCENT: 2.6 % (ref 0–5)
GFR AFRICAN AMERICAN: >59
GFR NON-AFRICAN AMERICAN: >60
GLUCOSE BLD-MCNC: 108 MG/DL (ref 74–109)
GLUCOSE BLD-MCNC: 56 MG/DL (ref 70–99)
GLUCOSE BLD-MCNC: 80 MG/DL (ref 70–99)
HCG QUALITATIVE: NEGATIVE
HCT VFR BLD CALC: 39.3 % (ref 37–47)
HEMOGLOBIN: 12.4 G/DL (ref 12–16)
IMMATURE GRANULOCYTES #: 0 K/UL
LACTIC ACID: 2.7 MMOL/L (ref 0.5–1.9)
LIPASE: 18 U/L (ref 13–60)
LYMPHOCYTES ABSOLUTE: 1.8 K/UL (ref 1.1–4.5)
LYMPHOCYTES RELATIVE PERCENT: 31.1 % (ref 20–40)
MCH RBC QN AUTO: 26.9 PG (ref 27–31)
MCHC RBC AUTO-ENTMCNC: 31.6 G/DL (ref 33–37)
MCV RBC AUTO: 85.2 FL (ref 81–99)
MONOCYTES ABSOLUTE: 0.2 K/UL (ref 0–0.9)
MONOCYTES RELATIVE PERCENT: 4 % (ref 0–10)
NEUTROPHILS ABSOLUTE: 3.5 K/UL (ref 1.5–7.5)
NEUTROPHILS RELATIVE PERCENT: 60.9 % (ref 50–65)
PDW BLD-RTO: 13.2 % (ref 11.5–14.5)
PERFORMED ON: ABNORMAL
PERFORMED ON: NORMAL
PLATELET # BLD: 225 K/UL (ref 130–400)
PMV BLD AUTO: 9.6 FL (ref 9.4–12.3)
POTASSIUM SERPL-SCNC: 3.5 MMOL/L (ref 3.5–5)
PRO-BNP: 327 PG/ML (ref 0–450)
RBC # BLD: 4.61 M/UL (ref 4.2–5.4)
SODIUM BLD-SCNC: 144 MMOL/L (ref 136–145)
TOTAL PROTEIN: 8.4 G/DL (ref 6.6–8.7)
WBC # BLD: 5.7 K/UL (ref 4.8–10.8)

## 2020-09-21 PROCEDURE — 85025 COMPLETE CBC W/AUTO DIFF WBC: CPT

## 2020-09-21 PROCEDURE — 96365 THER/PROPH/DIAG IV INF INIT: CPT

## 2020-09-21 PROCEDURE — 99285 EMERGENCY DEPT VISIT HI MDM: CPT

## 2020-09-21 PROCEDURE — 36415 COLL VENOUS BLD VENIPUNCTURE: CPT

## 2020-09-21 PROCEDURE — G0378 HOSPITAL OBSERVATION PER HR: HCPCS

## 2020-09-21 PROCEDURE — 80053 COMPREHEN METABOLIC PANEL: CPT

## 2020-09-21 PROCEDURE — 83880 ASSAY OF NATRIURETIC PEPTIDE: CPT

## 2020-09-21 PROCEDURE — 83605 ASSAY OF LACTIC ACID: CPT

## 2020-09-21 PROCEDURE — 83690 ASSAY OF LIPASE: CPT

## 2020-09-21 PROCEDURE — 96376 TX/PRO/DX INJ SAME DRUG ADON: CPT

## 2020-09-21 PROCEDURE — 96375 TX/PRO/DX INJ NEW DRUG ADDON: CPT

## 2020-09-21 PROCEDURE — 6360000002 HC RX W HCPCS

## 2020-09-21 PROCEDURE — 6360000002 HC RX W HCPCS: Performed by: EMERGENCY MEDICINE

## 2020-09-21 PROCEDURE — 99284 EMERGENCY DEPT VISIT MOD MDM: CPT

## 2020-09-21 PROCEDURE — 6370000000 HC RX 637 (ALT 250 FOR IP): Performed by: FAMILY MEDICINE

## 2020-09-21 PROCEDURE — 87040 BLOOD CULTURE FOR BACTERIA: CPT

## 2020-09-21 PROCEDURE — 82947 ASSAY GLUCOSE BLOOD QUANT: CPT

## 2020-09-21 PROCEDURE — 99999 PR OFFICE/OUTPT VISIT,PROCEDURE ONLY: CPT | Performed by: EMERGENCY MEDICINE

## 2020-09-21 PROCEDURE — 6360000002 HC RX W HCPCS: Performed by: FAMILY MEDICINE

## 2020-09-21 PROCEDURE — 1210000000 HC MED SURG R&B

## 2020-09-21 PROCEDURE — 84703 CHORIONIC GONADOTROPIN ASSAY: CPT

## 2020-09-21 RX ORDER — LORAZEPAM 2 MG/ML
1 INJECTION INTRAMUSCULAR ONCE
Status: COMPLETED | OUTPATIENT
Start: 2020-09-21 | End: 2020-09-21

## 2020-09-21 RX ORDER — GABAPENTIN 300 MG/1
300 CAPSULE ORAL NIGHTLY
Status: DISCONTINUED | OUTPATIENT
Start: 2020-09-21 | End: 2020-09-22 | Stop reason: HOSPADM

## 2020-09-21 RX ORDER — TRIAMTERENE AND HYDROCHLOROTHIAZIDE 37.5; 25 MG/1; MG/1
1 TABLET ORAL DAILY
COMMUNITY
End: 2021-01-20 | Stop reason: SDUPTHER

## 2020-09-21 RX ORDER — HYDROCODONE BITARTRATE AND ACETAMINOPHEN 7.5; 325 MG/1; MG/1
1 TABLET ORAL EVERY 6 HOURS PRN
Status: DISCONTINUED | OUTPATIENT
Start: 2020-09-21 | End: 2020-09-22 | Stop reason: HOSPADM

## 2020-09-21 RX ORDER — PROMETHAZINE HYDROCHLORIDE 25 MG/1
25 SUPPOSITORY RECTAL EVERY 6 HOURS PRN
COMMUNITY
End: 2020-10-27

## 2020-09-21 RX ORDER — ONDANSETRON 4 MG/1
4 TABLET, ORALLY DISINTEGRATING ORAL EVERY 8 HOURS PRN
Status: DISCONTINUED | OUTPATIENT
Start: 2020-09-21 | End: 2020-09-22 | Stop reason: HOSPADM

## 2020-09-21 RX ORDER — POTASSIUM CHLORIDE 20 MEQ/1
20 TABLET, EXTENDED RELEASE ORAL DAILY
Status: DISCONTINUED | OUTPATIENT
Start: 2020-09-21 | End: 2020-09-22 | Stop reason: HOSPADM

## 2020-09-21 RX ORDER — GABAPENTIN 100 MG/1
100 CAPSULE ORAL 2 TIMES DAILY
Status: DISCONTINUED | OUTPATIENT
Start: 2020-09-21 | End: 2020-09-22 | Stop reason: HOSPADM

## 2020-09-21 RX ORDER — PANTOPRAZOLE SODIUM 40 MG/1
40 TABLET, DELAYED RELEASE ORAL
Status: DISCONTINUED | OUTPATIENT
Start: 2020-09-22 | End: 2020-09-22 | Stop reason: HOSPADM

## 2020-09-21 RX ORDER — DEXTROSE MONOHYDRATE 50 MG/ML
100 INJECTION, SOLUTION INTRAVENOUS PRN
Status: DISCONTINUED | OUTPATIENT
Start: 2020-09-21 | End: 2020-09-22 | Stop reason: HOSPADM

## 2020-09-21 RX ORDER — DOCUSATE SODIUM 100 MG/1
100 CAPSULE, LIQUID FILLED ORAL 2 TIMES DAILY
COMMUNITY
End: 2020-10-27

## 2020-09-21 RX ORDER — ZOLMITRIPTAN 5 MG/1
5 SPRAY NASAL
Status: DISCONTINUED | OUTPATIENT
Start: 2020-09-21 | End: 2020-09-21 | Stop reason: HOSPADM

## 2020-09-21 RX ORDER — HYDROMORPHONE HYDROCHLORIDE 1 MG/ML
0.5 INJECTION, SOLUTION INTRAMUSCULAR; INTRAVENOUS; SUBCUTANEOUS ONCE
Status: COMPLETED | OUTPATIENT
Start: 2020-09-21 | End: 2020-09-21

## 2020-09-21 RX ORDER — SUMATRIPTAN 100 MG/1
100 TABLET, FILM COATED ORAL ONCE
Status: DISCONTINUED | OUTPATIENT
Start: 2020-09-21 | End: 2020-09-22 | Stop reason: HOSPADM

## 2020-09-21 RX ORDER — HYDROMORPHONE HYDROCHLORIDE 1 MG/ML
0.5 INJECTION, SOLUTION INTRAMUSCULAR; INTRAVENOUS; SUBCUTANEOUS ONCE
Status: DISCONTINUED | OUTPATIENT
Start: 2020-09-21 | End: 2020-09-22 | Stop reason: HOSPADM

## 2020-09-21 RX ORDER — MORPHINE SULFATE 4 MG/ML
INJECTION, SOLUTION INTRAMUSCULAR; INTRAVENOUS
Status: COMPLETED
Start: 2020-09-21 | End: 2020-09-21

## 2020-09-21 RX ORDER — SODIUM CHLORIDE 0.9 % (FLUSH) 0.9 %
10 SYRINGE (ML) INJECTION EVERY 12 HOURS SCHEDULED
Status: DISCONTINUED | OUTPATIENT
Start: 2020-09-21 | End: 2020-09-22 | Stop reason: HOSPADM

## 2020-09-21 RX ORDER — MIRTAZAPINE 15 MG/1
15 TABLET, FILM COATED ORAL NIGHTLY
Status: DISCONTINUED | OUTPATIENT
Start: 2020-09-21 | End: 2020-09-22 | Stop reason: HOSPADM

## 2020-09-21 RX ORDER — CYCLOBENZAPRINE HCL 10 MG
10 TABLET ORAL NIGHTLY PRN
Status: DISCONTINUED | OUTPATIENT
Start: 2020-09-21 | End: 2020-09-22 | Stop reason: HOSPADM

## 2020-09-21 RX ORDER — ONDANSETRON 2 MG/ML
INJECTION INTRAMUSCULAR; INTRAVENOUS
Status: COMPLETED
Start: 2020-09-21 | End: 2020-09-21

## 2020-09-21 RX ORDER — TIZANIDINE 4 MG/1
4 TABLET ORAL DAILY PRN
COMMUNITY
End: 2022-03-07

## 2020-09-21 RX ORDER — MORPHINE SULFATE 4 MG/ML
4 INJECTION, SOLUTION INTRAMUSCULAR; INTRAVENOUS ONCE
Status: COMPLETED | OUTPATIENT
Start: 2020-09-21 | End: 2020-09-21

## 2020-09-21 RX ORDER — FUROSEMIDE 40 MG/1
80 TABLET ORAL 2 TIMES DAILY
Status: DISCONTINUED | OUTPATIENT
Start: 2020-09-21 | End: 2020-09-22 | Stop reason: HOSPADM

## 2020-09-21 RX ORDER — POLYETHYLENE GLYCOL 3350 17 G/17G
17 POWDER, FOR SOLUTION ORAL DAILY PRN
Status: DISCONTINUED | OUTPATIENT
Start: 2020-09-21 | End: 2020-09-22 | Stop reason: HOSPADM

## 2020-09-21 RX ORDER — DEXTROSE MONOHYDRATE 25 G/50ML
12.5 INJECTION, SOLUTION INTRAVENOUS PRN
Status: DISCONTINUED | OUTPATIENT
Start: 2020-09-21 | End: 2020-09-22 | Stop reason: HOSPADM

## 2020-09-21 RX ORDER — FUROSEMIDE 10 MG/ML
40 INJECTION INTRAMUSCULAR; INTRAVENOUS ONCE
Status: COMPLETED | OUTPATIENT
Start: 2020-09-21 | End: 2020-09-21

## 2020-09-21 RX ORDER — CELECOXIB 100 MG/1
100 CAPSULE ORAL DAILY
Status: DISCONTINUED | OUTPATIENT
Start: 2020-09-21 | End: 2020-09-22 | Stop reason: HOSPADM

## 2020-09-21 RX ORDER — PRAMIPEXOLE DIHYDROCHLORIDE 0.25 MG/1
0.75 TABLET ORAL 2 TIMES DAILY
Status: DISCONTINUED | OUTPATIENT
Start: 2020-09-21 | End: 2020-09-22 | Stop reason: HOSPADM

## 2020-09-21 RX ORDER — CLONAZEPAM 0.5 MG/1
0.5 TABLET ORAL 2 TIMES DAILY
Status: DISCONTINUED | OUTPATIENT
Start: 2020-09-21 | End: 2020-09-22 | Stop reason: HOSPADM

## 2020-09-21 RX ORDER — DULOXETIN HYDROCHLORIDE 30 MG/1
30 CAPSULE, DELAYED RELEASE ORAL 2 TIMES DAILY
Status: DISCONTINUED | OUTPATIENT
Start: 2020-09-21 | End: 2020-09-22 | Stop reason: HOSPADM

## 2020-09-21 RX ORDER — LORAZEPAM 2 MG/ML
INJECTION INTRAMUSCULAR
Status: COMPLETED
Start: 2020-09-21 | End: 2020-09-21

## 2020-09-21 RX ORDER — INSULIN GLARGINE 100 [IU]/ML
30 INJECTION, SOLUTION SUBCUTANEOUS NIGHTLY
Status: DISCONTINUED | OUTPATIENT
Start: 2020-09-21 | End: 2020-09-22 | Stop reason: HOSPADM

## 2020-09-21 RX ORDER — ONDANSETRON 2 MG/ML
4 INJECTION INTRAMUSCULAR; INTRAVENOUS EVERY 6 HOURS PRN
Status: DISCONTINUED | OUTPATIENT
Start: 2020-09-21 | End: 2020-09-22 | Stop reason: HOSPADM

## 2020-09-21 RX ORDER — SODIUM CHLORIDE 0.9 % (FLUSH) 0.9 %
10 SYRINGE (ML) INJECTION PRN
Status: DISCONTINUED | OUTPATIENT
Start: 2020-09-21 | End: 2020-09-22 | Stop reason: HOSPADM

## 2020-09-21 RX ORDER — ACETAMINOPHEN 650 MG/1
650 SUPPOSITORY RECTAL EVERY 6 HOURS PRN
Status: DISCONTINUED | OUTPATIENT
Start: 2020-09-21 | End: 2020-09-22 | Stop reason: HOSPADM

## 2020-09-21 RX ORDER — ACETAMINOPHEN 325 MG/1
650 TABLET ORAL EVERY 6 HOURS PRN
Status: DISCONTINUED | OUTPATIENT
Start: 2020-09-21 | End: 2020-09-22 | Stop reason: HOSPADM

## 2020-09-21 RX ORDER — NICOTINE POLACRILEX 4 MG
15 LOZENGE BUCCAL PRN
Status: DISCONTINUED | OUTPATIENT
Start: 2020-09-21 | End: 2020-09-22 | Stop reason: HOSPADM

## 2020-09-21 RX ORDER — BUSPIRONE HYDROCHLORIDE 5 MG/1
5 TABLET ORAL 3 TIMES DAILY
Status: DISCONTINUED | OUTPATIENT
Start: 2020-09-21 | End: 2020-09-22 | Stop reason: HOSPADM

## 2020-09-21 RX ORDER — ONDANSETRON 2 MG/ML
4 INJECTION INTRAMUSCULAR; INTRAVENOUS ONCE
Status: COMPLETED | OUTPATIENT
Start: 2020-09-21 | End: 2020-09-21

## 2020-09-21 RX ADMIN — MORPHINE SULFATE 4 MG: 4 INJECTION, SOLUTION INTRAMUSCULAR; INTRAVENOUS at 15:44

## 2020-09-21 RX ADMIN — Medication 1000 MG: at 15:02

## 2020-09-21 RX ADMIN — ONDANSETRON 4 MG: 2 INJECTION INTRAMUSCULAR; INTRAVENOUS at 15:44

## 2020-09-21 RX ADMIN — LORAZEPAM 1 MG: 2 INJECTION INTRAMUSCULAR at 15:58

## 2020-09-21 RX ADMIN — LORAZEPAM 1 MG: 2 INJECTION INTRAMUSCULAR; INTRAVENOUS at 15:58

## 2020-09-21 RX ADMIN — FUROSEMIDE 40 MG: 10 INJECTION, SOLUTION INTRAMUSCULAR; INTRAVENOUS at 15:01

## 2020-09-21 RX ADMIN — HYDROCODONE BITARTRATE AND ACETAMINOPHEN 1 TABLET: 7.5; 325 TABLET ORAL at 17:53

## 2020-09-21 RX ADMIN — HYDROMORPHONE HYDROCHLORIDE 0.5 MG: 1 INJECTION, SOLUTION INTRAMUSCULAR; INTRAVENOUS; SUBCUTANEOUS at 15:01

## 2020-09-21 RX ADMIN — ONDANSETRON 4 MG: 2 INJECTION INTRAMUSCULAR; INTRAVENOUS at 17:02

## 2020-09-21 ASSESSMENT — PAIN SCALES - GENERAL
PAINLEVEL_OUTOF10: 6
PAINLEVEL_OUTOF10: 7
PAINLEVEL_OUTOF10: 7

## 2020-09-21 ASSESSMENT — ENCOUNTER SYMPTOMS
NAUSEA: 0
DIARRHEA: 0
COUGH: 0
RHINORRHEA: 0
SORE THROAT: 0
VOMITING: 0
BACK PAIN: 0
COLOR CHANGE: 1
SHORTNESS OF BREATH: 0
ABDOMINAL PAIN: 0

## 2020-09-21 NOTE — H&P
(DIFLUCAN) 150 MG tablet Take 1 dose and may repeat in 3 days. 0/29/63   Bevelyn Crooked, APRN   silver sulfADIAZINE (SILVADENE) 1 % cream Apply topically daily. 2/45/32   Bevelyn Crooked, APRN   mupirocin (BACTROBAN) 2 % ointment APPLY EXTERNALLY TO THE AFFECTED AREA THREE TIMES DAILY 0/84/67   Bevelyn Crooked, APRN   ZOLMitriptan (ZOMIG) 5 MG nasal solution 0.1 mLs by Nasal route once as needed for Migraine 2/6/58 8/3/79  Bevelyn Crooked, APRN   omeprazole (PRILOSEC) 20 MG delayed release capsule TAKE 1 CAPSULE BY MOUTH TWICE DAILY BEFORE MEALS 6/28/20   NANETTE Platt   diclofenac sodium (VOLTAREN) 1 % GEL Apply 2 g topically 2 times daily    Historical Provider, MD   cyclobenzaprine (FLEXERIL) 10 MG tablet Take 10 mg by mouth nightly as needed for Muscle spasms    Historical Provider, MD   Butorphanol Tartrate (STADOL NS NA) by Nasal route    Historical Provider, MD   celecoxib (CELEBREX) 100 MG capsule Take 1 capsule by mouth daily 1/97/96   Bevelyn Crooked, APRN   blood glucose test strips (ASCENSIA AUTODISC VI;ONE TOUCH ULTRA TEST VI) strip Check glucose TID and as needed for hypoglycemic events Dx E11.40 E11.66 Z79.4  Use one touch verio flex 4/3/20   Ale Landers MD   B-D ULTRAFINE III SHORT PEN 31G X 8 MM MISC USE TO INJECT INSULIN ONCE DAILY 1/31/20   Ale Landers MD   promethazine (PHENERGAN) 25 MG tablet TAKE 1 TABLET BY MOUTH EVERY 6 HOURS AS NEEDED FOR NAUSEA 1/2/20   Ale Landers MD   tiZANidine (ZANAFLEX) 4 MG tablet TAKE 1 TABLET BY MOUTH EVERY NIGHT AS NEEDED FOR CRAMPING 27/1/20   Bevelyn Carleyoked, APRN   Diabetic Shoe MISC by Does not apply route DISPENSE ONE PAIR OF DIABETIC SHOE AND 3 PAIRS HEAT MOLDED INSERTS 11/20/19   Ale Landers MD   Lancets ([address]) [facility] USE TO CHECK BLLOD SUGAR AS DIRECTED 11/3/19   Ale Landers MD   blood glucose monitor kit and supplies Test 1 times a day & as needed for symptoms of irregular blood glucose.  9/3/19 Marva Pierson MD   ONE TOUCH ULTRA TEST strip TEST BLOOD SUGAR ONCE DAILY 8/4/19 6/12/20  Marva Pierson MD   VICTOZA 18 MG/3ML SOPN SC injection ADMINISTER 1.8 MG INTO THE SKIN EVERY DAY 7/24/19   Marva Pierson MD   gabapentin (NEURONTIN) 100 MG capsule Take 100 mg by mouth 2 times daily. 7/14/19   Historical Provider, MD   gabapentin (NEURONTIN) 300 MG capsule TK ONE C PO QHS 5/19/19   Historical Provider, MD   HYDROcodone-acetaminophen (NORCO) 7.5-325 MG per tablet Take 1 tablet by mouth every 6 hours as needed for Pain. Alisha Puentes Historical Provider, MD   EPINEPHrine (EPIPEN 2-JOSÉ) 0.3 MG/0.3ML SOAJ injection Inject 0.3 mLs into the muscle once for 1 dose Use as directed for allergic reaction 11/1/18 6/16/20  Marva Pierson MD   Blood Glucose Monitoring Suppl ([address]) w/Device KIT 1 kit by Does not apply route daily as needed (Dx 250.00) 8/4/17   Marva Pierson MD   ONE TOUCH LANCETS MISC 1 each by Does not apply route daily 8/4/17   Marva Pierson MD       Allergies:  Compazine [prochlorperazine maleate]; Ciprofloxacin; Amoxicillin-pot clavulanate; Demerol; Hydroxyzine; Hydroxyzine hcl; Ibuprofen; Ketorolac tromethamine; Meperidine; Nortriptyline; Orphenadrine; Orphenadrine citrate; Penicillins; Prochlorperazine edisylate; Tobramycin; Tramadol; Vistaril [hydroxyzine hcl]; Cephalexin; Clindamycin/lincomycin; Codeine; Doxycycline; Keflex [cephalexin]; Ketorolac tromethamine; and Moxifloxacin    Social History:   TOBACCO:   reports that she has never smoked. She has never used smokeless tobacco.  ETOH:   reports no history of alcohol use.     Family History:       Problem Relation Age of Onset    Cancer Father     Cancer Maternal Grandmother     COPD Maternal Grandmother     Cancer Maternal Grandfather            Physical Exam:    Vitals: Pulse 90   Temp 98.8 °F (37.1 °C) (Oral)   Resp 16   Ht 5' 1\" (1.549 m)   Wt (!) 328 lb (148.8 kg)   SpO2 99%   BMI 61.98 kg/m²   24HR INTAKE/OUTPUT:  No intake or output data in the 24 hours ending 09/21/20 1631  General appearance: alert and cooperative with exam  HEENT: atraumatic, eyes with clear conjunctiva and normal lids, pupils and irises normal, external ears and nose are normal, lips normal. Neck without masses, lympadenopathy, bruit, thyroid normal  Lungs: no increased work of breathing, clear to auscultation bilaterally without rales, rhonchi or wheezes  Heart: regular rate and rhythm, S1, S2 normal, no murmur, click, rub or gallop  Abdomen: morbidly obese, distant bowel sounds, unable to appreciate organomegaly  Extremities: massive lymphedema, generalized tenderness  Neurologic: No focal neurologic deficits, normal sensation, alert and oriented, anxious and crying. Skin: bilateral erythema of the legs, 2 wounds medially    CBC:   Recent Labs     09/21/20  1435   WBC 5.7   HGB 12.4        BMP:    Recent Labs     09/21/20  1435      K 3.5   CL 98   CO2 29   BUN 16   CREATININE 0.9   GLUCOSE 108     Hepatic:   Recent Labs     09/21/20  1435   AST 21   ALT 14   BILITOT 0.3   ALKPHOS 85     Troponin T: No results for input(s): TROPONINI in the last 72 hours. ABGs: No results for input(s): PH, PCO2, PO2, HCO3, BE, O2SAT in the last 72 hours. INR: No results for input(s): INR in the last 72 hours. Lactic Acid:   Recent Labs     09/21/20  1435   LACTA 2.7*     -----------------------------------------------------------------  Assessment and Plan   Principal Problem:    Cellulitis of both lower extremities  Active Problems:    Hypertension    Headache    [place] (generalized anxiety disorder)    Uncontrolled type 2 diabetes mellitus with hyperglycemia, without long-term current use of insulin (HCC)    Morbid obesity with BMI of 60.0-69.9, adult (HCC)    Lymphedema of both lower extremities  Resolved Problems:    * No resolved hospital problems. *      Admit to med/surg. Day #1 vancomycin empiric therapy for cellulitis of bilateral lower limbs. When clinical improvement is apparent, she has tolerated doxycycline despite listed medication intolerance. Non-narcotic analgesia. Home medications reconciled. This is a longstanding problem due primarily to uncontrolled lymphedema. Elevate limbs. Counseled on importance of getting lymphedema pumps in home.     David Grey DO  Admitting Hospitalist

## 2020-09-21 NOTE — PROGRESS NOTES
Pharmacy Note  Vancomycin Consult    Tianna Trivedi is a 52 y.o. female started on Vancomycin for empiric therapy for cellulitis of bilateral lower limbs. consult received from Dr. Layla Dyson to manage therapy. Principal Problem:    Cellulitis of both lower extremities  Active Problems:    Hypertension    Headache    [place] (generalized anxiety disorder)    Uncontrolled type 2 diabetes mellitus with hyperglycemia, without long-term current use of insulin (HCC)    Morbid obesity with BMI of 60.0-69.9, adult (HCC)    Lymphedema of both lower extremities  Resolved Problems:    * No resolved hospital problems. *      Allergies:  Compazine [prochlorperazine maleate]; Ciprofloxacin; Amoxicillin-pot clavulanate; Compazine [prochlorperazine]; Demerol; Hydroxyzine; Hydroxyzine hcl; Ibuprofen; Ketorolac tromethamine; Meperidine; Nortriptyline; Orphenadrine; Orphenadrine citrate; Penicillins; Prochlorperazine edisylate; Tobramycin; Tramadol; Vistaril [hydroxyzine hcl]; Cephalexin; Clindamycin/lincomycin; Codeine; Doxycycline; Keflex [cephalexin]; Ketorolac tromethamine; and Moxifloxacin     Temp max: 98.8    Recent Labs     09/21/20  1435   BUN 16       Recent Labs     09/21/20  1435   CREATININE 0.9       Recent Labs     09/21/20  1435   WBC 5.7       No intake or output data in the 24 hours ending 09/21/20 1705    Culture Date Source Results   09/21/20 blood sent                 Ht Readings from Last 1 Encounters:   09/21/20 5' 1\" (1.549 m)        Wt Readings from Last 1 Encounters:   09/21/20 (!) 328 lb (148.8 kg)         Body mass index is 61.98 kg/m². Estimated Creatinine Clearance: 106 mL/min (based on SCr of 0.9 mg/dL). Assessment/Plan:  Will initiate vancomycin 1500 mg IV every 12 hours. Timing of trough level will be determined based on culture results, renal function, and clinical response. Thank you for the consult. Will continue to follow.     Electronically signed by Immanuel Rivera [facility] on 9/21/2020 at 5:05 PM

## 2020-09-21 NOTE — ED PROVIDER NOTES
[facility] 3 HARESH/VAS/MED  eMERGENCY dEPARTMENT eNCOUnter      Pt Name: Christiano Angel  MRN: [id number]  [place] 1970  Date of evaluation: 9/21/2020  Provider: Shyann Olmstead MD    CHIEF COMPLAINT       Chief Complaint   Patient presents with    Leg Swelling     getting worse over past 2 weeks. HISTORY OF PRESENT ILLNESS   (Location/Symptom, Timing/Onset,Context/Setting, Quality, Duration, Modifying Factors, Severity)  Note limiting factors. Christiano Angel is a 52 y.o. female who presents to the emergency department for increasing bilateral lower extremity swelling over the past 2 weeks. She tells me her legs are always swollen and she is currently going to the lymphedema clinic at [facility] and she is supposed to be getting a machine for home but this has not been approved by insurance yet. She denies any shortness of breath or chest pain. She states that her legs have gotten so swollen that she has 2 places on her left leg but it busted open and are draining fluid. She has been admitted in the past for her swelling and cellulitis. Tells me her legs are always red but they are more red than typical.  No fevers or chills. She had an echocardiogram December 2019 with 55 to 60% ejection fraction mild concentric left ventricular hypertrophy and no wall motion abnormalities. HPI    NursingNotes were reviewed. REVIEW OF SYSTEMS    (2-9 systems for level 4, 10 or more for level 5)     Review of Systems   Constitutional: Negative for chills and fever. HENT: Negative for rhinorrhea and sore throat. Respiratory: Negative for cough and shortness of breath. Cardiovascular: Positive for leg swelling. Negative for chest pain. Gastrointestinal: Negative for abdominal pain, diarrhea, nausea and vomiting. Genitourinary: Negative for dysuria, frequency and urgency. Musculoskeletal: Negative for back pain and neck pain. Skin: Positive for color change and rash.    Neurological: Negative for dizziness SKIN EVERY EVENING  Qty: 15 mL, Refills: 1    Associated Diagnoses: Uncontrolled type 2 diabetes mellitus with diabetic neuropathy, with long-term current use of insulin ([facility])      DULoxetine (CYMBALTA) 60 MG extended release capsule Take 1 capsule by mouth daily  Qty: 30 capsule, Refills: 5      clonazePAM (KLONOPIN) 0.5 MG tablet Take 1 tablet by mouth 2 times daily for 30 days.   Qty: 45 tablet, Refills: 0    Associated Diagnoses: Anxiety      ondansetron (ZOFRAN-ODT) 4 MG disintegrating tablet Place 1 tablet under the tongue every 8 hours as needed for Nausea or Vomiting Take prior to Doxycycline doses  Qty: 20 tablet, Refills: 0      metFORMIN (GLUCOPHAGE) 500 MG tablet TAKE 1 TABLET BY MOUTH TWICE DAILY WITH MEALS  Qty: 180 tablet, Refills: 0    Associated Diagnoses: Uncontrolled type 2 diabetes mellitus with hyperglycemia, without long-term current use of insulin ([facility])      ZOLMitriptan (ZOMIG) 5 MG nasal solution 0.1 mLs by Nasal route once as needed for Migraine  Qty: 1 each, Refills: 3      omeprazole (PRILOSEC) 20 MG delayed release capsule TAKE 1 CAPSULE BY MOUTH TWICE DAILY BEFORE MEALS  Qty: 180 capsule, Refills: 1    Associated Diagnoses: Gastroesophageal reflux disease, esophagitis presence not specified      diclofenac sodium (VOLTAREN) 1 % GEL Apply 2 g topically 2 times daily      cyclobenzaprine (FLEXERIL) 10 MG tablet Take 10 mg by mouth 3 times daily as needed for Muscle spasms       Butorphanol Tartrate (STADOL NS NA) by Nasal route      !! celecoxib (CELEBREX) 100 MG capsule Take 1 capsule by mouth daily  Qty: 60 capsule, Refills: 3      promethazine (PHENERGAN) 25 MG tablet TAKE 1 TABLET BY MOUTH EVERY 6 HOURS AS NEEDED FOR NAUSEA  Qty: 30 tablet, Refills: 0      VICTOZA 18 MG/3ML SOPN SC injection ADMINISTER 1.8 MG INTO THE SKIN EVERY DAY  Qty: 9 mL, Refills: 3    Associated Diagnoses: Uncontrolled type 2 diabetes mellitus with hyperglycemia, without long-term current use of insulin ([facility]) !! gabapentin (NEURONTIN) 100 MG capsule Take 100 mg by mouth 2 times daily. Am and 2 pm      !! gabapentin (NEURONTIN) 300 MG capsule TK ONE C PO QHS  Refills: 5      HYDROcodone-acetaminophen (NORCO) 7.5-325 MG per tablet Take 1 tablet by mouth 2 times daily. Cream Base CREA APPLY ONE PUMP (GRAM) TO AFFECTED AREA(S) THREE TO FOUR TIMES A DAY TO THE APPENDAGES AND TRUNK AS DIRECTED  Qty: 30 g, Refills: 5      pramipexole (MIRAPEX) 0.75 MG tablet Take 1 tablet by mouth 2 times daily  Qty: 60 tablet, Refills: 0      furosemide (LASIX) 80 MG tablet Take 1 tablet by mouth 2 times daily  Qty: 60 tablet, Refills: 0      potassium chloride (KLOR-CON M) 20 MEQ extended release tablet Take 1 tablet by mouth daily  Qty: 30 tablet, Refills: 0      mupirocin (BACTROBAN) 2 % ointment APPLY EXTERNALLY TO THE AFFECTED AREA THREE TIMES DAILY  Qty: 22 g, Refills: 1      blood glucose test strips (ASCENSIA AUTODISC VI;ONE TOUCH ULTRA TEST VI) strip Check glucose TID and as needed for hypoglycemic events Dx E11.40 E11.66 Z79.4  Use one touch verio flex  Qty: 150 each, Refills: 5    Associated Diagnoses: Uncontrolled type 2 diabetes mellitus with diabetic neuropathy, with long-term current use of insulin ([facility])      B-D ULTRAFINE III SHORT PEN 31G X 8 MM MISC USE TO INJECT INSULIN ONCE DAILY  Qty: 100 each, Refills: 3    Associated Diagnoses: Uncontrolled type 2 diabetes mellitus with hyperglycemia, without long-term current use of insulin ([facility])      Diabetic Shoe MISC by Does not apply route DISPENSE ONE PAIR OF DIABETIC SHOE AND 3 PAIRS HEAT MOLDED INSERTS  Qty: 1 each, Refills: 0      !!  Lancets (ONETOUCH DELICA PLUS [id number]) MISC USE TO CHECK BLLOD SUGAR AS DIRECTED  Qty: 100 each, Refills: 1    Associated Diagnoses: Uncontrolled type 2 diabetes mellitus with hyperglycemia, without long-term current use of insulin ([facility])      blood glucose monitor kit and supplies Test 1 times a day & as needed for symptoms of irregular Activity    Alcohol use: No    Drug use: No    Sexual activity: Yes     Partners: Male   Lifestyle    Physical activity     Days per week: None     Minutes per session: None    Stress: None   Relationships    Social connections     Talks on phone: None     Gets together: None     Attends [employer] service: None     Active member of club or organization: None     Attends meetings of clubs or organizations: None     Relationship status: None    Intimate partner violence     Fear of current or ex partner: None     Emotionally abused: None     Physically abused: None     Forced sexual activity: None   Other Topics Concern    None   Social History Narrative    None       SCREENINGS    Manfred Coma Scale  Eye Opening: Spontaneous  Best Verbal Response: Oriented  Best Motor Response: Obeys commands  Manfred Coma Scale Score: 15        PHYSICAL EXAM    (up to 7 for level 4, 8 or more for level 5)     ED Triage Vitals [09/21/20 1336]   BP Temp Temp Source Pulse Resp SpO2 Height Weight   -- 98.8 °F (37.1 °C) Oral 96 16 99 % 5' 1\" (1.549 m) (!) 328 lb (148.8 kg)       Physical Exam  Vitals signs and nursing note reviewed. Constitutional:       General: She is not in acute distress. Appearance: Normal appearance. She is well-developed. She is obese. She is not ill-appearing, toxic-appearing or diaphoretic. HENT:      Head: Normocephalic and atraumatic. Right Ear: External ear normal.      Left Ear: External ear normal.      Nose: Nose normal.   Eyes:      Conjunctiva/sclera: Conjunctivae normal.   Neck:      Musculoskeletal: Normal range of motion. Trachea: No tracheal deviation. Cardiovascular:      Rate and Rhythm: Normal rate and regular rhythm. Heart sounds: Normal heart sounds. No murmur. Pulmonary:      Effort: No respiratory distress. Breath sounds: Normal breath sounds. No wheezing or rales. Abdominal:      Palpations: Abdomen is soft. There is no mass. Tenderness:  There is no abdominal tenderness. Musculoskeletal: Normal range of motion. Right lower leg: Edema present. Left lower leg: Edema present. Comments: Massive bilateral lower extremity edema tense to palpation, has 2 weeping wounds on her medial left lower leg, erythema noted to bilateral anterior and medial lateral tibial areas, warm to touch, no obvious palpable cords   Skin:     General: Skin is warm and dry. Neurological:      Mental Status: She is alert and oriented to person, place, and time. GCS: GCS eye subscore is 4. GCS verbal subscore is 5. GCS motor subscore is 6. DIAGNOSTIC RESULTS             No orders to display           LABS:  Labs Reviewed   CBC WITH AUTO DIFFERENTIAL - Abnormal; Notable for the following components:       Result Value    MCH 26.9 (*)     MCHC 31.6 (*)     All other components within normal limits   LACTIC ACID, PLASMA - Abnormal; Notable for the following components:    Lactic Acid 2.7 (*)     All other components within normal limits    Narrative:     Hema Alvarado tel. ,  Chemistry results called to and read back by [facility] RN AT [address], 09/21/2020  15:10, by ENGJE   CULTURE, BLOOD 1   CULTURE, BLOOD 2   COMPREHENSIVE METABOLIC PANEL   BRAIN NATRIURETIC PEPTIDE   HCG, SERUM, QUALITATIVE   LIPASE   BASIC METABOLIC PANEL W/ REFLEX TO MG FOR LOW K   CBC   POCT GLUCOSE       All other labs were within normal range or not returned as of this dictation.     EMERGENCY DEPARTMENT COURSE and DIFFERENTIAL DIAGNOSIS/MDM:   Vitals:    Vitals:    09/21/20 1336 09/21/20 1532 09/21/20 1637 09/21/20 1847   Pulse: 96 90 110 109   Resp: 16  20 20   Temp: 98.8 °F (37.1 °C)  96.9 °F (36.1 °C) 97.9 °F (36.6 °C)   TempSrc: Oral  Temporal Temporal   SpO2: 99%  94% 92%   Weight: (!) 328 lb (148.8 kg)      Height: 5' 1\" (1.549 m)          MDM  Number of Diagnoses or Management Options     Amount and/or Complexity of Data Reviewed  Clinical lab tests: ordered and

## 2020-09-21 NOTE — PROGRESS NOTES
Tianna  arrived to room # 325. Presented with: bilateral cellulitis  Mental Status: Patient is oriented, alert, coherent, logical, thought processes intact and able to concentrate and follow conversation. Vitals:    09/21/20 1637   Pulse: 110   Resp: 20   Temp: 96.9 °F (36.1 °C)   SpO2: 94%     Patient safety contract and falls prevention contract reviewed with patient Yes. Oriented Patient and Family to room. Call light within reach. Yes. Needs, issues or concerns expressed at this time: yes, complaints of pain.       Electronically signed by Jake Garcia RN on 9/21/2020 at 5:39 PM

## 2020-09-22 ASSESSMENT — PATIENT HEALTH QUESTIONNAIRE - PHQ9
SUM OF ALL RESPONSES TO PHQ9 QUESTIONS 1 & 2: 0
1. LITTLE INTEREST OR PLEASURE IN DOING THINGS: 0
SUM OF ALL RESPONSES TO PHQ QUESTIONS 1-9: 0
2. FEELING DOWN, DEPRESSED OR HOPELESS: 0
SUM OF ALL RESPONSES TO PHQ QUESTIONS 1-9: 0

## 2020-09-26 ENCOUNTER — HOSPITAL ENCOUNTER (OUTPATIENT)
Facility: HOSPITAL | Age: 50
Setting detail: OBSERVATION
Discharge: HOME | End: 2020-09-26
Attending: EMERGENCY MEDICINE | Admitting: INTERNAL MEDICINE

## 2020-09-26 ENCOUNTER — APPOINTMENT (OUTPATIENT)
Dept: CARDIOLOGY | Facility: HOSPITAL | Age: 50
End: 2020-09-26

## 2020-09-26 ENCOUNTER — APPOINTMENT (OUTPATIENT)
Dept: GENERAL RADIOLOGY | Facility: HOSPITAL | Age: 50
End: 2020-09-26

## 2020-09-26 VITALS
HEART RATE: 86 BPM | RESPIRATION RATE: 18 BRPM | BODY MASS INDEX: 55.32 KG/M2 | TEMPERATURE: 98.7 F | DIASTOLIC BLOOD PRESSURE: 54 MMHG | OXYGEN SATURATION: 94 % | WEIGHT: 293 LBS | SYSTOLIC BLOOD PRESSURE: 119 MMHG | HEIGHT: 61 IN

## 2020-09-26 DIAGNOSIS — L03.90 CELLULITIS, UNSPECIFIED CELLULITIS SITE: Primary | ICD-10-CM

## 2020-09-26 PROBLEM — E66.01 OBESITY, MORBID, BMI 50 OR HIGHER: Status: ACTIVE | Noted: 2020-09-26

## 2020-09-26 PROBLEM — I87.2 STASIS DERMATITIS: Status: ACTIVE | Noted: 2020-09-26

## 2020-09-26 PROBLEM — N18.30 STAGE 3 CHRONIC KIDNEY DISEASE: Status: ACTIVE | Noted: 2020-09-26

## 2020-09-26 PROBLEM — E11.65 TYPE 2 DIABETES MELLITUS WITH HYPERGLYCEMIA, WITHOUT LONG-TERM CURRENT USE OF INSULIN: Status: ACTIVE | Noted: 2019-08-06

## 2020-09-26 LAB
ANION GAP SERPL CALCULATED.3IONS-SCNC: 7 MMOL/L (ref 5–15)
BASOPHILS # BLD AUTO: 0.04 10*3/MM3 (ref 0–0.2)
BASOPHILS NFR BLD AUTO: 0.8 % (ref 0–1.5)
BH CV ECHO MEAS - AO MAX PG (FULL): 3.9 MMHG
BH CV ECHO MEAS - AO MAX PG: 10.1 MMHG
BH CV ECHO MEAS - AO MEAN PG (FULL): 0.5 MMHG
BH CV ECHO MEAS - AO MEAN PG: 4.5 MMHG
BH CV ECHO MEAS - AO ROOT AREA (BSA CORRECTED): 1.5
BH CV ECHO MEAS - AO ROOT AREA: 10.2 CM^2
BH CV ECHO MEAS - AO ROOT DIAM: 3.6 CM
BH CV ECHO MEAS - AO V2 MAX: 159 CM/SEC
BH CV ECHO MEAS - AO V2 MEAN: 97 CM/SEC
BH CV ECHO MEAS - AO V2 VTI: 29.2 CM
BH CV ECHO MEAS - AVA(I,A): 3.8 CM^2
BH CV ECHO MEAS - AVA(I,D): 3.8 CM^2
BH CV ECHO MEAS - AVA(V,A): 3 CM^2
BH CV ECHO MEAS - AVA(V,D): 3 CM^2
BH CV ECHO MEAS - BSA(HAYCOCK): 2.7 M^2
BH CV ECHO MEAS - BSA: 2.3 M^2
BH CV ECHO MEAS - BZI_BMI: 63.1 KILOGRAMS/M^2
BH CV ECHO MEAS - BZI_METRIC_HEIGHT: 154.9 CM
BH CV ECHO MEAS - BZI_METRIC_WEIGHT: 151.5 KG
BH CV ECHO MEAS - EDV(CUBED): 61.6 ML
BH CV ECHO MEAS - EDV(MOD-SP4): 120 ML
BH CV ECHO MEAS - EDV(TEICH): 67.9 ML
BH CV ECHO MEAS - EF(CUBED): 86.6 %
BH CV ECHO MEAS - EF(TEICH): 80.8 %
BH CV ECHO MEAS - ESV(CUBED): 8.2 ML
BH CV ECHO MEAS - ESV(MOD-SP4): 57.8 ML
BH CV ECHO MEAS - ESV(TEICH): 13.1 ML
BH CV ECHO MEAS - FS: 48.9 %
BH CV ECHO MEAS - IVS/LVPW: 1.1
BH CV ECHO MEAS - IVSD: 1.5 CM
BH CV ECHO MEAS - LA DIMENSION: 3.6 CM
BH CV ECHO MEAS - LA/AO: 1
BH CV ECHO MEAS - LAT PEAK E' VEL: 11.2 CM/SEC
BH CV ECHO MEAS - LV DIASTOLIC VOL/BSA (35-75): 51.1 ML/M^2
BH CV ECHO MEAS - LV MASS(C)D: 208.6 GRAMS
BH CV ECHO MEAS - LV MASS(C)DI: 88.8 GRAMS/M^2
BH CV ECHO MEAS - LV MAX PG: 6.2 MMHG
BH CV ECHO MEAS - LV MEAN PG: 4 MMHG
BH CV ECHO MEAS - LV SYSTOLIC VOL/BSA (12-30): 24.6 ML/M^2
BH CV ECHO MEAS - LV V1 MAX: 124.5 CM/SEC
BH CV ECHO MEAS - LV V1 MEAN: 96.6 CM/SEC
BH CV ECHO MEAS - LV V1 VTI: 28.9 CM
BH CV ECHO MEAS - LVIDD: 4 CM
BH CV ECHO MEAS - LVIDS: 2 CM
BH CV ECHO MEAS - LVLD AP4: 7.8 CM
BH CV ECHO MEAS - LVLS AP4: 6.6 CM
BH CV ECHO MEAS - LVOT AREA (M): 3.8 CM^2
BH CV ECHO MEAS - LVOT AREA: 3.8 CM^2
BH CV ECHO MEAS - LVOT DIAM: 2.2 CM
BH CV ECHO MEAS - LVPWD: 1.4 CM
BH CV ECHO MEAS - MED PEAK E' VEL: 8.59 CM/SEC
BH CV ECHO MEAS - MV A MAX VEL: 104 CM/SEC
BH CV ECHO MEAS - MV DEC TIME: 0.28 SEC
BH CV ECHO MEAS - MV E MAX VEL: 127 CM/SEC
BH CV ECHO MEAS - MV E/A: 1.2
BH CV ECHO MEAS - SI(AO): 126.3 ML/M^2
BH CV ECHO MEAS - SI(CUBED): 22.7 ML/M^2
BH CV ECHO MEAS - SI(LVOT): 46.8 ML/M^2
BH CV ECHO MEAS - SI(MOD-SP4): 26.5 ML/M^2
BH CV ECHO MEAS - SI(TEICH): 23.4 ML/M^2
BH CV ECHO MEAS - SV(AO): 296.7 ML
BH CV ECHO MEAS - SV(CUBED): 53.4 ML
BH CV ECHO MEAS - SV(LVOT): 109.9 ML
BH CV ECHO MEAS - SV(MOD-SP4): 62.2 ML
BH CV ECHO MEAS - SV(TEICH): 54.9 ML
BH CV ECHO MEASUREMENTS AVERAGE E/E' RATIO: 12.83
BLOOD CULTURE, ROUTINE: NORMAL
BUN SERPL-MCNC: 20 MG/DL (ref 6–20)
BUN/CREAT SERPL: 18.5 (ref 7–25)
CALCIUM SPEC-SCNC: 9.3 MG/DL (ref 8.6–10.5)
CHLORIDE SERPL-SCNC: 101 MMOL/L (ref 98–107)
CK SERPL-CCNC: 98 U/L (ref 20–180)
CO2 SERPL-SCNC: 32 MMOL/L (ref 22–29)
CREAT SERPL-MCNC: 1.08 MG/DL (ref 0.57–1)
CULTURE, BLOOD 2: NORMAL
D-LACTATE SERPL-SCNC: 1.2 MMOL/L (ref 0.5–2)
DEPRECATED RDW RBC AUTO: 40.5 FL (ref 37–54)
EOSINOPHIL # BLD AUTO: 0.18 10*3/MM3 (ref 0–0.4)
EOSINOPHIL NFR BLD AUTO: 3.8 % (ref 0.3–6.2)
ERYTHROCYTE [DISTWIDTH] IN BLOOD BY AUTOMATED COUNT: 13.2 % (ref 12.3–15.4)
GFR SERPL CREATININE-BSD FRML MDRD: 54 ML/MIN/1.73
GLUCOSE BLDC GLUCOMTR-MCNC: 197 MG/DL (ref 70–130)
GLUCOSE BLDC GLUCOMTR-MCNC: 226 MG/DL (ref 70–130)
GLUCOSE BLDC GLUCOMTR-MCNC: 228 MG/DL (ref 70–130)
GLUCOSE BLDC GLUCOMTR-MCNC: 230 MG/DL (ref 70–130)
GLUCOSE BLDC GLUCOMTR-MCNC: 261 MG/DL (ref 70–130)
GLUCOSE BLDC GLUCOMTR-MCNC: 267 MG/DL (ref 70–130)
GLUCOSE SERPL-MCNC: 194 MG/DL (ref 65–99)
HCT VFR BLD AUTO: 31.9 % (ref 34–46.6)
HGB BLD-MCNC: 10 G/DL (ref 12–15.9)
IMM GRANULOCYTES # BLD AUTO: 0.04 10*3/MM3 (ref 0–0.05)
IMM GRANULOCYTES NFR BLD AUTO: 0.8 % (ref 0–0.5)
LEFT ATRIUM VOLUME INDEX: 23.8 ML/M2
LEFT ATRIUM VOLUME: 56 CM3
LYMPHOCYTES # BLD AUTO: 1.6 10*3/MM3 (ref 0.7–3.1)
LYMPHOCYTES NFR BLD AUTO: 33.8 % (ref 19.6–45.3)
MAXIMAL PREDICTED HEART RATE: 172 BPM
MCH RBC QN AUTO: 26.7 PG (ref 26.6–33)
MCHC RBC AUTO-ENTMCNC: 31.3 G/DL (ref 31.5–35.7)
MCV RBC AUTO: 85.3 FL (ref 79–97)
MONOCYTES # BLD AUTO: 0.48 10*3/MM3 (ref 0.1–0.9)
MONOCYTES NFR BLD AUTO: 10.1 % (ref 5–12)
NEUTROPHILS NFR BLD AUTO: 2.4 10*3/MM3 (ref 1.7–7)
NEUTROPHILS NFR BLD AUTO: 50.7 % (ref 42.7–76)
NRBC BLD AUTO-RTO: 0 /100 WBC (ref 0–0.2)
PLATELET # BLD AUTO: 209 10*3/MM3 (ref 140–450)
PMV BLD AUTO: 8.9 FL (ref 6–12)
POTASSIUM SERPL-SCNC: 4.3 MMOL/L (ref 3.5–5.2)
RBC # BLD AUTO: 3.74 10*6/MM3 (ref 3.77–5.28)
SARS-COV-2 RDRP RESP QL NAA+PROBE: NOT DETECTED
SODIUM SERPL-SCNC: 140 MMOL/L (ref 136–145)
STRESS TARGET HR: 146 BPM
WBC # BLD AUTO: 4.74 10*3/MM3 (ref 3.4–10.8)

## 2020-09-26 PROCEDURE — 94799 UNLISTED PULMONARY SVC/PX: CPT

## 2020-09-26 PROCEDURE — 87635 SARS-COV-2 COVID-19 AMP PRB: CPT | Performed by: EMERGENCY MEDICINE

## 2020-09-26 PROCEDURE — 82962 GLUCOSE BLOOD TEST: CPT

## 2020-09-26 PROCEDURE — G0378 HOSPITAL OBSERVATION PER HR: HCPCS

## 2020-09-26 PROCEDURE — 25010000002 ONDANSETRON PER 1 MG: Performed by: EMERGENCY MEDICINE

## 2020-09-26 PROCEDURE — 93306 TTE W/DOPPLER COMPLETE: CPT

## 2020-09-26 PROCEDURE — 25010000002 VANCOMYCIN 10 G RECONSTITUTED SOLUTION: Performed by: INTERNAL MEDICINE

## 2020-09-26 PROCEDURE — 25010000002 BUTORPHANOL PER 1 MG: Performed by: INTERNAL MEDICINE

## 2020-09-26 PROCEDURE — 63710000001 DIPHENHYDRAMINE PER 50 MG: Performed by: INTERNAL MEDICINE

## 2020-09-26 PROCEDURE — 73590 X-RAY EXAM OF LOWER LEG: CPT

## 2020-09-26 PROCEDURE — 85025 COMPLETE CBC W/AUTO DIFF WBC: CPT | Performed by: EMERGENCY MEDICINE

## 2020-09-26 PROCEDURE — 25010000002 FUROSEMIDE PER 20 MG: Performed by: INTERNAL MEDICINE

## 2020-09-26 PROCEDURE — 25010000002 DIPHENHYDRAMINE PER 50 MG: Performed by: EMERGENCY MEDICINE

## 2020-09-26 PROCEDURE — 25010000002 VANCOMYCIN 1 G RECONSTITUTED SOLUTION 1 EACH VIAL: Performed by: EMERGENCY MEDICINE

## 2020-09-26 PROCEDURE — 93306 TTE W/DOPPLER COMPLETE: CPT | Performed by: INTERNAL MEDICINE

## 2020-09-26 PROCEDURE — 63710000001 INSULIN LISPRO (HUMAN) PER 5 UNITS: Performed by: INTERNAL MEDICINE

## 2020-09-26 PROCEDURE — 99283 EMERGENCY DEPT VISIT LOW MDM: CPT

## 2020-09-26 PROCEDURE — 80048 BASIC METABOLIC PNL TOTAL CA: CPT | Performed by: EMERGENCY MEDICINE

## 2020-09-26 PROCEDURE — 96375 TX/PRO/DX INJ NEW DRUG ADDON: CPT

## 2020-09-26 PROCEDURE — 25010000002 MORPHINE PER 10 MG: Performed by: EMERGENCY MEDICINE

## 2020-09-26 PROCEDURE — C9803 HOPD COVID-19 SPEC COLLECT: HCPCS

## 2020-09-26 PROCEDURE — 99284 EMERGENCY DEPT VISIT MOD MDM: CPT

## 2020-09-26 PROCEDURE — 25010000002 PERFLUTREN 6.52 MG/ML SUSPENSION: Performed by: INTERNAL MEDICINE

## 2020-09-26 PROCEDURE — 25010000002 METOCLOPRAMIDE PER 10 MG: Performed by: EMERGENCY MEDICINE

## 2020-09-26 PROCEDURE — 83605 ASSAY OF LACTIC ACID: CPT | Performed by: EMERGENCY MEDICINE

## 2020-09-26 PROCEDURE — 63710000001 ONDANSETRON PER 8 MG: Performed by: INTERNAL MEDICINE

## 2020-09-26 PROCEDURE — 87040 BLOOD CULTURE FOR BACTERIA: CPT | Performed by: EMERGENCY MEDICINE

## 2020-09-26 PROCEDURE — 82550 ASSAY OF CK (CPK): CPT | Performed by: EMERGENCY MEDICINE

## 2020-09-26 PROCEDURE — 96365 THER/PROPH/DIAG IV INF INIT: CPT

## 2020-09-26 RX ORDER — PROMETHAZINE HYDROCHLORIDE 25 MG/1
25 TABLET ORAL EVERY 8 HOURS PRN
Status: ON HOLD | COMMUNITY
End: 2022-06-06

## 2020-09-26 RX ORDER — ONDANSETRON 2 MG/ML
4 INJECTION INTRAMUSCULAR; INTRAVENOUS ONCE
Status: COMPLETED | OUTPATIENT
Start: 2020-09-26 | End: 2020-09-26

## 2020-09-26 RX ORDER — CELECOXIB 200 MG/1
200 CAPSULE ORAL DAILY
COMMUNITY
End: 2022-05-31

## 2020-09-26 RX ORDER — LIRAGLUTIDE 6 MG/ML
1.8 INJECTION SUBCUTANEOUS DAILY
COMMUNITY
End: 2022-12-30

## 2020-09-26 RX ORDER — EPINEPHRINE 0.3 MG/.3ML
INJECTION SUBCUTANEOUS AS NEEDED
Status: ON HOLD | COMMUNITY
End: 2022-06-06

## 2020-09-26 RX ORDER — GABAPENTIN 300 MG/1
300 CAPSULE ORAL NIGHTLY
Status: DISCONTINUED | OUTPATIENT
Start: 2020-09-26 | End: 2020-09-27 | Stop reason: HOSPADM

## 2020-09-26 RX ORDER — DEXTROSE MONOHYDRATE 25 G/50ML
25 INJECTION, SOLUTION INTRAVENOUS
Status: DISCONTINUED | OUTPATIENT
Start: 2020-09-26 | End: 2020-09-27 | Stop reason: HOSPADM

## 2020-09-26 RX ORDER — INSULIN LISPRO 100 U/ML
INJECTION, SOLUTION SUBCUTANEOUS
COMMUNITY
End: 2021-11-22 | Stop reason: SDUPTHER

## 2020-09-26 RX ORDER — SODIUM CHLORIDE 0.9 % (FLUSH) 0.9 %
10 SYRINGE (ML) INJECTION EVERY 12 HOURS SCHEDULED
Status: DISCONTINUED | OUTPATIENT
Start: 2020-09-26 | End: 2020-09-27 | Stop reason: HOSPADM

## 2020-09-26 RX ORDER — FUROSEMIDE 10 MG/ML
40 INJECTION INTRAMUSCULAR; INTRAVENOUS ONCE
Status: COMPLETED | OUTPATIENT
Start: 2020-09-26 | End: 2020-09-26

## 2020-09-26 RX ORDER — ONDANSETRON 2 MG/ML
4 INJECTION INTRAMUSCULAR; INTRAVENOUS EVERY 6 HOURS PRN
Status: DISCONTINUED | OUTPATIENT
Start: 2020-09-26 | End: 2020-09-27 | Stop reason: HOSPADM

## 2020-09-26 RX ORDER — HYDROCODONE BITARTRATE AND ACETAMINOPHEN 7.5; 325 MG/1; MG/1
1 TABLET ORAL 2 TIMES DAILY
COMMUNITY
End: 2021-10-23 | Stop reason: HOSPADM

## 2020-09-26 RX ORDER — ONDANSETRON 4 MG/1
4 TABLET, ORALLY DISINTEGRATING ORAL EVERY 8 HOURS PRN
COMMUNITY
End: 2021-11-22 | Stop reason: SDUPTHER

## 2020-09-26 RX ORDER — PROMETHAZINE HYDROCHLORIDE 12.5 MG/1
25 SUPPOSITORY RECTAL EVERY 6 HOURS PRN
COMMUNITY
End: 2021-11-03

## 2020-09-26 RX ORDER — TIZANIDINE 4 MG/1
4 TABLET ORAL 3 TIMES DAILY
COMMUNITY
End: 2022-02-16

## 2020-09-26 RX ORDER — NICOTINE POLACRILEX 4 MG
15 LOZENGE BUCCAL
Status: DISCONTINUED | OUTPATIENT
Start: 2020-09-26 | End: 2020-09-27 | Stop reason: HOSPADM

## 2020-09-26 RX ORDER — DIPHENHYDRAMINE HYDROCHLORIDE 50 MG/ML
25 INJECTION INTRAMUSCULAR; INTRAVENOUS ONCE
Status: COMPLETED | OUTPATIENT
Start: 2020-09-26 | End: 2020-09-26

## 2020-09-26 RX ORDER — MORPHINE SULFATE 15 MG/1
15 TABLET, FILM COATED, EXTENDED RELEASE ORAL 2 TIMES DAILY
COMMUNITY
End: 2021-07-15

## 2020-09-26 RX ORDER — TRIAMTERENE AND HYDROCHLOROTHIAZIDE 37.5; 25 MG/1; MG/1
1 CAPSULE ORAL DAILY
COMMUNITY
End: 2021-11-03

## 2020-09-26 RX ORDER — ZOLMITRIPTAN 5 MG/1
1 SPRAY NASAL AS NEEDED
COMMUNITY
End: 2021-11-03

## 2020-09-26 RX ORDER — METOCLOPRAMIDE HYDROCHLORIDE 5 MG/ML
10 INJECTION INTRAMUSCULAR; INTRAVENOUS ONCE
Status: COMPLETED | OUTPATIENT
Start: 2020-09-26 | End: 2020-09-26

## 2020-09-26 RX ORDER — FUROSEMIDE 80 MG
40 TABLET ORAL 2 TIMES DAILY PRN
Status: ON HOLD | COMMUNITY
End: 2022-11-23

## 2020-09-26 RX ORDER — ACETAMINOPHEN 325 MG/1
650 TABLET ORAL EVERY 4 HOURS PRN
Status: DISCONTINUED | OUTPATIENT
Start: 2020-09-26 | End: 2020-09-27 | Stop reason: HOSPADM

## 2020-09-26 RX ORDER — DULOXETIN HYDROCHLORIDE 60 MG/1
30 CAPSULE, DELAYED RELEASE ORAL 2 TIMES DAILY
COMMUNITY
Start: 2022-12-01

## 2020-09-26 RX ORDER — CLONAZEPAM 0.5 MG/1
0.5 TABLET ORAL 2 TIMES DAILY PRN
COMMUNITY
End: 2021-11-03

## 2020-09-26 RX ORDER — CELECOXIB 100 MG/1
200 CAPSULE ORAL DAILY
COMMUNITY
End: 2021-11-01 | Stop reason: SDUPTHER

## 2020-09-26 RX ORDER — DOCUSATE SODIUM 100 MG/1
100 CAPSULE, LIQUID FILLED ORAL 2 TIMES DAILY
COMMUNITY

## 2020-09-26 RX ORDER — HYDROCODONE BITARTRATE AND ACETAMINOPHEN 10; 325 MG/1; MG/1
1 TABLET ORAL EVERY 4 HOURS PRN
Status: DISCONTINUED | OUTPATIENT
Start: 2020-09-26 | End: 2020-09-27 | Stop reason: HOSPADM

## 2020-09-26 RX ORDER — GABAPENTIN 100 MG/1
100 CAPSULE ORAL 2 TIMES DAILY
Status: DISCONTINUED | OUTPATIENT
Start: 2020-09-26 | End: 2020-09-27 | Stop reason: HOSPADM

## 2020-09-26 RX ORDER — MORPHINE SULFATE 10 MG/ML
8 INJECTION INTRAMUSCULAR; INTRAVENOUS; SUBCUTANEOUS ONCE
Status: COMPLETED | OUTPATIENT
Start: 2020-09-26 | End: 2020-09-26

## 2020-09-26 RX ORDER — HYDROCODONE BITARTRATE AND ACETAMINOPHEN 7.5; 325 MG/1; MG/1
1 TABLET ORAL EVERY 4 HOURS PRN
Status: DISCONTINUED | OUTPATIENT
Start: 2020-09-26 | End: 2020-09-27 | Stop reason: HOSPADM

## 2020-09-26 RX ORDER — DIPHENHYDRAMINE HCL 25 MG
25 CAPSULE ORAL EVERY 6 HOURS PRN
Status: DISCONTINUED | OUTPATIENT
Start: 2020-09-26 | End: 2020-09-27 | Stop reason: HOSPADM

## 2020-09-26 RX ORDER — PRAMIPEXOLE DIHYDROCHLORIDE 0.75 MG/1
0.75 TABLET ORAL 2 TIMES DAILY
COMMUNITY

## 2020-09-26 RX ORDER — INSULIN GLARGINE 100 [IU]/ML
30 INJECTION, SOLUTION SUBCUTANEOUS NIGHTLY
COMMUNITY
End: 2021-10-23 | Stop reason: HOSPADM

## 2020-09-26 RX ORDER — BUTORPHANOL TARTRATE 1 MG/ML
2 INJECTION, SOLUTION INTRAMUSCULAR; INTRAVENOUS ONCE
Status: COMPLETED | OUTPATIENT
Start: 2020-09-26 | End: 2020-09-26

## 2020-09-26 RX ORDER — OMEPRAZOLE 40 MG/1
40 CAPSULE, DELAYED RELEASE ORAL 2 TIMES DAILY
COMMUNITY

## 2020-09-26 RX ORDER — ONDANSETRON 4 MG/1
4 TABLET, FILM COATED ORAL EVERY 6 HOURS PRN
Status: DISCONTINUED | OUTPATIENT
Start: 2020-09-26 | End: 2020-09-27 | Stop reason: HOSPADM

## 2020-09-26 RX ORDER — GABAPENTIN 100 MG/1
100 CAPSULE ORAL 2 TIMES DAILY
COMMUNITY
End: 2021-11-01 | Stop reason: SDUPTHER

## 2020-09-26 RX ORDER — SODIUM CHLORIDE 0.9 % (FLUSH) 0.9 %
10 SYRINGE (ML) INJECTION AS NEEDED
Status: DISCONTINUED | OUTPATIENT
Start: 2020-09-26 | End: 2020-09-27 | Stop reason: HOSPADM

## 2020-09-26 RX ORDER — MORPHINE SULFATE 15 MG/1
15 TABLET, FILM COATED, EXTENDED RELEASE ORAL EVERY 12 HOURS SCHEDULED
Status: DISCONTINUED | OUTPATIENT
Start: 2020-09-26 | End: 2020-09-27 | Stop reason: HOSPADM

## 2020-09-26 RX ADMIN — VANCOMYCIN HYDROCHLORIDE 3000 MG: 1 INJECTION, POWDER, LYOPHILIZED, FOR SOLUTION INTRAVENOUS at 05:56

## 2020-09-26 RX ADMIN — MORPHINE SULFATE 8 MG: 10 INJECTION, SOLUTION INTRAMUSCULAR; INTRAVENOUS at 05:34

## 2020-09-26 RX ADMIN — ONDANSETRON HYDROCHLORIDE 4 MG: 2 SOLUTION INTRAMUSCULAR; INTRAVENOUS at 05:34

## 2020-09-26 RX ADMIN — METRONIDAZOLE 500 MG: 500 INJECTION, SOLUTION INTRAVENOUS at 07:15

## 2020-09-26 RX ADMIN — PERFLUTREN: 6.52 INJECTION, SUSPENSION INTRAVENOUS at 14:31

## 2020-09-26 RX ADMIN — MORPHINE SULFATE 15 MG: 15 TABLET, FILM COATED, EXTENDED RELEASE ORAL at 15:14

## 2020-09-26 RX ADMIN — SODIUM CHLORIDE, PRESERVATIVE FREE 10 ML: 5 INJECTION INTRAVENOUS at 11:49

## 2020-09-26 RX ADMIN — INSULIN LISPRO 4 UNITS: 100 INJECTION, SOLUTION INTRAVENOUS; SUBCUTANEOUS at 09:52

## 2020-09-26 RX ADMIN — SODIUM CHLORIDE 2 G: 900 INJECTION INTRAVENOUS at 05:39

## 2020-09-26 RX ADMIN — GABAPENTIN 300 MG: 300 CAPSULE ORAL at 21:15

## 2020-09-26 RX ADMIN — VANCOMYCIN HYDROCHLORIDE 1250 MG: 10 INJECTION, POWDER, LYOPHILIZED, FOR SOLUTION INTRAVENOUS at 19:00

## 2020-09-26 RX ADMIN — BACITRACIN: 500 OINTMENT TOPICAL at 21:15

## 2020-09-26 RX ADMIN — HYDROCODONE BITARTRATE AND ACETAMINOPHEN 1 TABLET: 10; 325 TABLET ORAL at 13:42

## 2020-09-26 RX ADMIN — METOCLOPRAMIDE 10 MG: 5 INJECTION, SOLUTION INTRAMUSCULAR; INTRAVENOUS at 07:11

## 2020-09-26 RX ADMIN — DIPHENHYDRAMINE HYDROCHLORIDE 25 MG: 25 CAPSULE ORAL at 13:42

## 2020-09-26 RX ADMIN — ONDANSETRON 4 MG: 4 TABLET, FILM COATED ORAL at 13:42

## 2020-09-26 RX ADMIN — BUTORPHANOL TARTRATE 2 MG: 1 INJECTION, SOLUTION INTRAMUSCULAR; INTRAVENOUS at 18:45

## 2020-09-26 RX ADMIN — INSULIN LISPRO 4 UNITS: 100 INJECTION, SOLUTION INTRAVENOUS; SUBCUTANEOUS at 17:31

## 2020-09-26 RX ADMIN — INSULIN LISPRO 4 UNITS: 100 INJECTION, SOLUTION INTRAVENOUS; SUBCUTANEOUS at 11:47

## 2020-09-26 RX ADMIN — DIPHENHYDRAMINE HYDROCHLORIDE 25 MG: 50 INJECTION, SOLUTION INTRAMUSCULAR; INTRAVENOUS at 07:13

## 2020-09-26 RX ADMIN — GABAPENTIN 100 MG: 100 CAPSULE ORAL at 15:14

## 2020-09-26 RX ADMIN — HYDROCODONE BITARTRATE AND ACETAMINOPHEN 1 TABLET: 10; 325 TABLET ORAL at 09:40

## 2020-09-26 RX ADMIN — MUPIROCIN 1 APPLICATION: 20 OINTMENT TOPICAL at 13:45

## 2020-09-26 RX ADMIN — FUROSEMIDE 40 MG: 10 INJECTION, SOLUTION INTRAMUSCULAR; INTRAVENOUS at 11:47

## 2020-09-27 ENCOUNTER — READMISSION MANAGEMENT (OUTPATIENT)
Dept: CALL CENTER | Facility: HOSPITAL | Age: 50
End: 2020-09-27

## 2020-09-27 NOTE — OUTREACH NOTE
Prep Survey      Responses   [facility] facility patient discharged from?  [place]   Is LACE score < 7 ?  No   Eligibility  Not Eligible   What are the reasons patient is not eligible?  Other [Left AMA]   COVID-19 Test Status  Negative   Does the patient have one of the following disease processes/diagnoses(primary or secondary)?  Other   Prep survey completed?  Yes          Zenaida Arnold RN

## 2020-09-28 ENCOUNTER — TELEPHONE (OUTPATIENT)
Dept: INTERNAL MEDICINE | Age: 50
End: 2020-09-28

## 2020-09-28 RX ORDER — CLONAZEPAM 0.5 MG/1
0.5 TABLET ORAL 2 TIMES DAILY PRN
Qty: 45 TABLET | Refills: 0 | Status: SHIPPED | OUTPATIENT
Start: 2020-09-28 | End: 2021-01-20 | Stop reason: SDUPTHER

## 2020-09-28 NOTE — TELEPHONE ENCOUNTER
[facility] Transitions Initial Follow Up Call    Outreach made within 2 business days of discharge: Yes    Patient: Yue Haro  Patient : [id number]   MRN: [id number]    Reason for Admission:   Presenting Problem/History of Present Illness:  Leg pain     Final Discharge Diagnoses: Active Hospital Problems   Diagnosis    Stasis dermatitis of both lower extremties with possible bacterial superinfection    Obesity, morbid, BMI 50 or higher (CMS/[facility])    Cellulitis, suspected    Stage 3 chronic kidney disease    Type 2 diabetes mellitus with hyperglycemia, without long-term current use of insulin (CMS/[facility])    Hypertension        Date of Admission: 2020  Date of Discharge: 2020    Spoke with: Unable to reach patient first attempt. No voicemail set up, unable to leave message.      Discharge department/facility: [facility]      Scheduled appointment with PCP within 7-14 days    Follow Up  Future Appointments   Date Time Provider Shital Landry   10/02/7081  0:88 AM [facility]     1:64 [facility]        Aileen Severs, LPN

## 2020-09-28 NOTE — TELEPHONE ENCOUNTER
[facility] Transitions Initial Follow Up Call    Outreach made within 2 business days of discharge: Yes    Patient: Dangelo Garcia  Patient : [id number]   MRN: [id number]    Reason for Admission:   Presenting Problem/History of Present Illness:  Leg pain     Final Discharge Diagnoses: Active Hospital Problems   Diagnosis    Stasis dermatitis of both lower extremties with possible bacterial superinfection    Obesity, morbid, BMI 50 or higher (CMS/[facility])    Cellulitis, suspected    Stage 3 chronic kidney disease    Type 2 diabetes mellitus with hyperglycemia, without long-term current use of insulin (CMS/[facility])    Hypertension          Date of Admission: 2020  Date of Discharge: 2020     Spoke with: patient    Discharge department/facility: [facility]    TCM Interactive Patient Contact:  Was patient able to fill all prescriptions: Yes  Was patient instructed to bring all medications to the follow-up visit: Yes  Is patient taking all medications as directed in the discharge summary? Yes  Does patient understand their discharge instructions: Yes  Does patient have questions or concerns that need addressed prior to 7-14 day follow up office visit: no    Per patient she is about the same. She said she went into the hospital for a leg infection and it's about the same. She reports she had horrible experiences at both hospitals. She went on to say that her pain level is usually at a 10, and that she is very upset that someone would take her medications. I thought she meant someone at the hospital, but she said no. She just found out someone in her home has been taking her medications. She said her appt on the  is for a med refill until she can get in with Dr. Thamas Lennox. She said his practice has relocated and that when she called they suggested that she see her PCP for one month supply. She did report that her appetite is ok, but has had some nausea.   She has been taking the Klonopin and Cymbalta. She reports the Cymbalta has been helping, but she might need the Klonopin changed to something else. She is unable to do video on her phone, and no longer will be able to use her boyfriend's for VV. She is agreeable to a telephone visit on Thursday with Ailin March at 96:48EQ.  She will discuss her medication needs with Ailin March at that time. Advised her to call if she needs anything before then.       Scheduled appointment with PCP within 7-14 days    Follow Up  Future Appointments   Date Time Provider Shital Landry   65/86/[id number]  4:73 AM NANETTE Mendoza [facility]   47/9/9189  7:90 AM [facility]       April MARNIE Orozco LPN

## 2020-10-01 ENCOUNTER — TELEPHONE (OUTPATIENT)
Dept: PRIMARY CARE CLINIC | Age: 50
End: 2020-10-01

## 2020-10-01 LAB
BACTERIA SPEC AEROBE CULT: NORMAL
BACTERIA SPEC AEROBE CULT: NORMAL

## 2020-10-11 RX ORDER — BUSPIRONE HYDROCHLORIDE 5 MG/1
TABLET ORAL
Qty: 90 TABLET | Refills: 0 | OUTPATIENT
Start: 2020-10-11

## 2020-10-11 RX ORDER — ONDANSETRON 4 MG/1
TABLET, ORALLY DISINTEGRATING ORAL
Qty: 20 TABLET | Refills: 0 | Status: SHIPPED | OUTPATIENT
Start: 2020-10-11 | End: 2021-03-18

## 2020-10-12 ENCOUNTER — VIRTUAL VISIT (OUTPATIENT)
Dept: PRIMARY CARE CLINIC | Age: 50
End: 2020-10-12
Payer: MEDICAID

## 2020-10-12 PROCEDURE — G8427 DOCREV CUR MEDS BY ELIG CLIN: HCPCS | Performed by: NURSE PRACTITIONER

## 2020-10-12 PROCEDURE — 1111F DSCHRG MED/CURRENT MED MERGE: CPT | Performed by: NURSE PRACTITIONER

## 2020-10-12 PROCEDURE — 1036F TOBACCO NON-USER: CPT | Performed by: NURSE PRACTITIONER

## 2020-10-12 PROCEDURE — G8484 FLU IMMUNIZE NO ADMIN: HCPCS | Performed by: NURSE PRACTITIONER

## 2020-10-12 PROCEDURE — G8417 CALC BMI ABV UP PARAM F/U: HCPCS | Performed by: NURSE PRACTITIONER

## 2020-10-12 PROCEDURE — 99214 OFFICE O/P EST MOD 30 MIN: CPT | Performed by: NURSE PRACTITIONER

## 2020-10-12 RX ORDER — DULOXETIN HYDROCHLORIDE 60 MG/1
60 CAPSULE, DELAYED RELEASE ORAL 2 TIMES DAILY
Qty: 30 CAPSULE | Refills: 5 | Status: SHIPPED | OUTPATIENT
Start: 2020-10-12 | End: 2021-01-20 | Stop reason: SDUPTHER

## 2020-10-12 ASSESSMENT — ENCOUNTER SYMPTOMS
PHOTOPHOBIA: 0
COLOR CHANGE: 0
NAUSEA: 0
SHORTNESS OF BREATH: 0
COUGH: 0
VOMITING: 0
BACK PAIN: 0
RHINORRHEA: 0
VOICE CHANGE: 0

## 2020-10-12 NOTE — PROGRESS NOTES
[address] [address]             Phone:  [phone]  Fax:  [phone]       10/12/2020    TELEHEALTH EVALUATION -- Audio/Visual (During VRWMA-79 public health emergency)    HPI:  Chief Complaint   Patient presents with    Leg Swelling         Tianna Trivedi (:  ) has requested an audio/video evaluation for the following concern(s):    Patient presents today to discuss leg swelling/redness. This is an ongoing/chronic issue for this patient. She has seen infectious disease and been hospitalized numerous times for this. She has been going to lymphedema clinic as well. She is not seeing any real progress. She is having some weeping from legs at this time and reports they are burning. Review of Systems   Constitutional: Negative for chills and fever. HENT: Negative for ear pain, hearing loss, rhinorrhea and voice change. Eyes: Negative for photophobia and visual disturbance. Respiratory: Negative for cough and shortness of breath. Cardiovascular: Positive for leg swelling. Negative for chest pain and palpitations. Gastrointestinal: Negative for nausea and vomiting. Endocrine: Negative. Negative for cold intolerance and heat intolerance. Genitourinary: Negative for difficulty urinating and flank pain. Musculoskeletal: Negative for back pain and neck pain. Skin: Negative for color change and rash. Allergic/Immunologic: Negative for environmental allergies and food allergies. Neurological: Negative for dizziness, speech difficulty and headaches. Hematological: Does not bruise/bleed easily. Psychiatric/Behavioral: Negative for sleep disturbance and suicidal ideas. Prior to Visit Medications    Medication Sig Taking?  Authorizing Provider   Zinc Oxide 6 % CREA Apply 1 Units topically 3 times daily JESUSITA'S CREAM  Zinc oxided 20%, hydrocortisone, nystatin, bacitracin Yes NANETTE Fierro   DULoxetine (CYMBALTA) 60 MG extended release capsule Take 1 capsule by mouth 2 times daily Yes NANETTE Osman   ondansetron (ZOFRAN-ODT) 4 MG disintegrating tablet DISSOLVE 1 TABLET UNDER THE TONGUE EVERY 8 HOURS AS NEEDED FOR NAUSEA AND VOMITING(TAKE PRIOR TO DOXYCYLINE DOSES)  NANETTE Osman   clonazePAM (KLONOPIN) 0.5 MG tablet Take 1 tablet by mouth 2 times daily as needed for Anxiety for up to 30 days. NANETTE Osman   tiZANidine (ZANAFLEX) 4 MG tablet Take 4 mg by mouth daily as needed  Historical Provider, MD   docusate sodium (COLACE) 100 MG capsule Take 100 mg by mouth 2 times daily  Historical Provider, MD   triamterene-hydroCHLOROthiazide (MAXZIDE-25) 37.5-25 MG per tablet Take 1 tablet by mouth daily  Historical Provider, MD LOPEZORMBROOKE Prednisone eye drops 1 drop in each eye daily  Historical Provider, MD   Morphine Sulfate ER 15 MG T12A Take 15 mg by mouth 2 times daily. Historical Provider, MD   promethazine (PHENERGAN) 25 MG suppository Place 25 mg rectally every 6 hours as needed for Nausea  Historical Provider, MD   [place] CREA APPLY ONE PUMP (GRAM) TO AFFECTED AREA(S) THREE TO FOUR TIMES A DAY TO THE APPENDAGES AND TRUNK AS DIRECTED  Dannie Hill MD   insulin lispro, 1 Unit Dial, (ADMELOG SOLOSTAR) 100 UNIT/ML SOPN ADMINISTER 45 UNITS UNDER THE SKIN EVERY EVENING. INCREASE BY 3 UNITS EVERY NIGHT UNTIL 60 UNITS IS REACHED AND.  [address] Elsa Johansen MD   celecoxib (CELEBREX) 200 MG capsule TAKE 1 CAPSULE BY MOUTH EVERY DAY  Dannie Hill MD   insulin glargine [facility] AUTHORITY KWIKPEN) 100 UNIT/ML injection pen INJECT 30 UNITS INTO THE SKIN EVERY EVENING  NANETTE Osman   pramipexole (MIRAPEX) 0.75 MG tablet Take 1 tablet by mouth 2 times daily  NANETTE Osman   furosemide (LASIX) 80 MG tablet Take 1 tablet by mouth 2 times daily  Patient taking differently: Take 80 mg by mouth 2 times daily as needed   Nasreen Gay PA-C   potassium chloride (KLOR-CON M) 20 MEQ extended release tablet Take 1 tablet by mouth daily  Luna Worrell PA-C   metFORMIN (GLUCOPHAGE) 500 MG tablet TAKE 1 TABLET BY MOUTH TWICE DAILY WITH MEALS  Alexi Burrows MD   mupirocin (BACTROBAN) 2 % ointment APPLY EXTERNALLY TO THE AFFECTED AREA THREE TIMES DAILY  NANETTE Pineda   ZOLMitriptan (ZOMIG) 5 MG nasal solution 0.1 mLs by Nasal route once as needed for Migraine  NANETTE Pineda   omeprazole (PRILOSEC) 20 MG delayed release capsule TAKE 1 CAPSULE BY MOUTH TWICE DAILY BEFORE MEALS  Patient taking differently: Take 40 mg by mouth Daily   NANETTE Churchill   diclofenac sodium (VOLTAREN) 1 % GEL Apply 2 g topically 2 times daily  Historical Provider, MD   cyclobenzaprine (FLEXERIL) 10 MG tablet Take 10 mg by mouth 3 times daily as needed for Muscle spasms   Historical Provider, MD   Butorphanol Tartrate (STADOL NS NA) by Nasal route  Historical Provider, MD   celecoxib (CELEBREX) 100 MG capsule Take 1 capsule by mouth daily  NANETTE Pineda   blood glucose test strips (ASCENSIA AUTODISC VI;ONE TOUCH ULTRA TEST VI) strip Check glucose TID and as needed for hypoglycemic events Dx E11.40 E11.66 Z79.4  Use one touch verio flex  MD FER Tesfaye ULTRAFINE III SHORT PEN 31G X 8 MM MISC USE TO INJECT INSULIN ONCE DAILY  Alexi Burrows MD   promethazine (PHENERGAN) 25 MG tablet TAKE 1 TABLET BY MOUTH EVERY 6 HOURS AS NEEDED FOR NAUSEA  Patient taking differently: every 8 hours as needed TAKE 1 TABLET BY MOUTH EVERY 6 HOURS AS NEEDED FOR NAUSEA  Alexi Burrows MD   Diabetic Shoe MISC by Does not apply route DISPENSE ONE PAIR OF DIABETIC SHOE AND 3 PAIRS HEAT MOLDED INSERTS  Alexi Burrows MD   Lancets (Cecilio Mac) [address] USE TO [facility] AS Stephanie Zuleta MD   blood glucose monitor kit and supplies Test 1 times a day & as needed for symptoms of irregular blood glucose.   Alexi Burrows MD   ONE TOUCH ULTRA TEST strip TEST BLOOD SUGAR ONCE Silvana Ansari MD   VICTOZA 18 MG/3ML SOPN SC injection ADMINISTER 1.8 MG INTO THE SKIN EVERY DAY  Dannie Hill MD   gabapentin (NEURONTIN) 100 MG capsule Take 100 mg by mouth 2 times daily. Am and 2 pm  Historical Provider, MD   gabapentin (NEURONTIN) 300 MG capsule TK ONE C PO QHS  Historical Provider, MD   HYDROcodone-acetaminophen (NORCO) 7.5-325 MG per tablet Take 1 tablet by mouth 2 times daily.    Historical Provider, MD   EPINEPHrine (EPIPEN 2-JOSÉ) 0.3 MG/0.3ML SOAJ injection Inject 0.3 mLs into the muscle once for 1 dose Use as directed for allergic reaction  Dannie Hill MD   Blood Glucose Monitoring Suppl ([address]) w/Device KIT 1 kit by Does not apply route daily as needed (Dx 250.00)  Dannie Hill MD   ONE TOUCH LANCETS MISC 1 each by Does not apply route daily  Dannie Hill MD       Social History     Tobacco Use    Smoking status: Never Smoker    Smokeless tobacco: Never Used   Substance Use Topics    Alcohol use: No    Drug use: No        Allergies   Allergen Reactions    Compazine [Prochlorperazine Maleate] Shortness Of Breath    Ciprofloxacin Hives    Amoxicillin-Pot Clavulanate     Compazine [Prochlorperazine]     Demerol Hives    Hydroxyzine     Hydroxyzine Hcl Itching    Ibuprofen Nausea Only    Ketorolac Tromethamine     Meperidine Hives    Nortriptyline Other (See Comments)    Orphenadrine     Orphenadrine Citrate Itching    Penicillins Hives and Nausea Only    Prochlorperazine Edisylate      Will discuss with patient at next visit     Tobramycin      Will discuss with patient at next visit     Tramadol      Will discuss with patient at next visit     Vistaril [Hydroxyzine Hcl] Itching    Cephalexin Rash    Clindamycin/Lincomycin Rash    Codeine Nausea And Vomiting and Rash    Doxycycline Nausea And Vomiting    Keflex [Cephalexin] Rash    Ketorolac Tromethamine Itching and Nausea And Vomiting    Moxifloxacin Nausea And Vomiting     Will discuss with patient at visit    ,   Past Medical History:   Diagnosis Date    Anxiety     Constipation     Depression     DM (diabetes mellitus) ([address]     Headache(784.0)     Hyperlipidemia     Hypertension     Kidney stones     Kidney stones     Restless leg     Restless legs syndrome    ,   Past Surgical History:   Procedure Laterality Date    ECTOPIC PREGNANCY SURGERY      EYE SURGERY      cornea transplant and cataracts removed    KNEE CARTILAGE SURGERY Left 12/20/2016    KNEE ARTHROSCOPIC PARTIAL MEDIAL MENISCECTOMY performed by Mar Mattson MD at  [facility]     ,   Social History     Tobacco Use    Smoking status: Never Smoker    Smokeless tobacco: Never Used   Substance Use Topics    Alcohol use: No    Drug use: No   ,   Family History   Problem Relation Age of Onset    Cancer Father     Cancer Maternal Grandmother     COPD Maternal Grandmother     Cancer Maternal Grandfather    ,   Immunization History   Administered Date(s) Administered    Influenza, MDCK Quadv, with preserv IM (Flucelvax 4 yrs and older) 12/17/2018    Influenza, Quadv, IM, PF (6 mo and older Fluzone, Flulaval, Fluarix, and 3 yrs and older Afluria) 09/22/2016    PPD Test 05/09/2019    Pneumococcal Polysaccharide (Softpqrrp51) 12/17/2018    Tdap (Boostrix, Adacel) 09/22/2011   ,   Health Maintenance   Topic Date Due    Diabetic retinal exam  09/08/1982    HIV screen  09/08/1987    Hepatitis B vaccine (1 of 3 - Risk 3-dose series) 09/08/1991    Cervical cancer screen  05/21/2017    Diabetic microalbuminuria test  12/17/2019    Flu vaccine (1) 09/01/2020    Diabetic foot exam  11/11/2020    A1C test (Diabetic or Prediabetic)  06/12/2021    Lipid screen  08/17/2021    Potassium monitoring  09/21/2021    Creatinine monitoring  09/21/2021    DTaP/Tdap/Td vaccine (2 - Td) 09/22/2021    Pneumococcal 0-64 years Vaccine  Completed    Hepatitis A vaccine  Aged Out    Hib vaccine  Aged Out  Meningococcal (ACWY) vaccine  Aged Out       PHYSICAL EXAMINATION:  [ INSTRUCTIONS:  \"[x]\" Indicates a positive item  \"[]\" Indicates a negative item  -- DELETE ALL ITEMS NOT EXAMINED]  [x] Alert  [x] Oriented to person/place/time    [x] No apparent distress  [] Toxic appearing    [] Face flushed appearing [x] Sclera clear  [] Lips are cyanotic      [x] Breathing appears normal  [] Appears tachypneic      [] Rash on visible skin    [x] Cranial Nerves II-XII grossly intact    [x] Motor grossly intact in visible upper extremities    [x] Motor grossly intact in visible lower extremities    [x] Normal Mood  [] Anxious appearing    [] Depressed appearing  [] Confused appearing      [] Poor short term memory  [] Poor long term memory    [] OTHER:      Due to this being a TeleHealth encounter, evaluation of the following organ systems is limited: Vitals/Constitutional/EENT/Resp/CV/GI//MS/Neuro/Skin/Heme-Lymph-Imm. ASSESSMENT/PLAN:  1. Chronic venous stasis dermatitis    - Zinc Oxide 6 % CREA; Apply 1 Units topically 3 times daily JESUSITA'S CREAM  Zinc oxided 20%, hydrocortisone, nystatin, bacitracin  Dispense: 1 Bottle; Refill: 3  - Teena Olivier MD, Wound Care, [place]    2. Lymphedema    - Zinc Oxide 6 % CREA; Apply 1 Units topically 3 times daily JESUSITA'S CREAM  Zinc oxided 20%, hydrocortisone, nystatin, bacitracin  Dispense: 1 Bottle; Refill: 3    3. Anxiety    - DULoxetine (CYMBALTA) 60 MG extended release capsule; Take 1 capsule by mouth 2 times daily  Dispense: 30 capsule; Refill: 5  - External Referral To Counseling Services    Will refer to wound care for further evaluation of legs as she is beginning to develop ulcerated areas and has significant weeping and drainage. Return in about 1 month (around 11/12/2020) for video visit or , in office. An  electronic signature was used to authenticate this note.     --NANETTE Montgomery on 10/25/2020 at 3:38 PM        Pursuant to the emergency declaration under the [facility] waiver authority and the [employer] and [employer] Act, this Virtual  Visit was conducted, with patient's consent, to reduce the patient's risk of exposure to COVID-19 and provide continuity of care for an established patient. Services were provided through a video synchronous discussion virtually to substitute for in-person clinic visit.

## 2020-10-13 ENCOUNTER — TREATMENT (OUTPATIENT)
Dept: PHYSICAL THERAPY | Facility: CLINIC | Age: 50
End: 2020-10-13

## 2020-10-13 DIAGNOSIS — L03.119 CELLULITIS OF LOWER EXTREMITY, UNSPECIFIED LATERALITY: ICD-10-CM

## 2020-10-13 DIAGNOSIS — I89.0 LYMPHEDEMA OF BOTH LOWER EXTREMITIES: Primary | ICD-10-CM

## 2020-10-13 PROCEDURE — 97164 PT RE-EVAL EST PLAN CARE: CPT | Performed by: PHYSICAL THERAPIST

## 2020-10-13 NOTE — PROGRESS NOTES
Outpatient Physical Therapy Lymphedema Progress Note/Recert       Patient Name: Valeria Wilson  : 1970  MRN: [id number]  Today's Date: 10/13/2020        Visit Date: 10/13/2020    Visit Dx:    ICD-10-CM ICD-9-CM   1. Lymphedema of both lower extremities  I89.0 457.1   2. Cellulitis of lower extremity, unspecified laterality  L03.119 682.6       Patient Active Problem List   Diagnosis   • Class 3 severe obesity due to excess calories with serious comorbidity and body mass index (BMI) of 45.0 to 49.9 in adult (CMS/[facility])   • Hypertension   • Type 2 diabetes mellitus with hyperglycemia, without long-term current use of insulin (CMS/[facility])   • Cellulitis, suspected    • Stasis dermatitis of both lower extremties with possible bacterial superinfection   • Obesity, morbid, BMI 50 or higher (CMS/[facility])   • Stage 3 chronic kidney disease        Lymphedema     Row Name 10/13/20 1100             Subjective Pain    Able to rate subjective pain?  yes  -HR      Pre-Treatment Pain Level  4  -HR         Subjective Comments    Subjective Comments  She has been back in the hospital for cellulitis in her legs.   -HR         Lymphedema Measurements    Measurement Type(s)  Circumferential  -HR      Circumferential Areas  Lower extremities;Trunk  -HR         BLE Circumferential (cm)    Measurement Location 1  BOT  -HR      Left 1  25.5 cm  -HR      Right 1  24.3 cm  -HR      Measurement Location 2  +10  -HR      Left 2  33.2 cm  -HR      Right 2  34.5 cm  -HR      Measurement Location 3  +10  -HR      Left 3  46 cm  -HR      Right 3  47.9 cm  -HR      Measurement Location 4  +10  -HR      Left 4  62 cm  -HR      Right 4  63.9 cm  -HR      Measurement Location 5  +10  -HR      Left 5  66 cm  -HR      Right 5  63 cm  -HR      Measurement Location 6  +10  -HR      Left 6  65 cm  -HR      Right 6  62.9 cm  -HR      Measurement Location 7  +10  -HR      Left 7  81 cm  -HR      Right 7  77.5 cm  -HR      LLE Circumferential Total  378.7 cm   -HR      RLE Circumferential Total  374 cm  -HR         Trunk Circumferential (cm)    Measurement Location 1  Umbilicus  -HR      Trunk 1  149 cm  -HR      Measurement Location 2  hips  -HR      Trunk 2  169 cm  -HR      Trunk Circumferential Total  318 cm  -HR        User Key  (r) = Recorded By, (t) = Taken By, (c) = Cosigned By    Initials Name Provider Type    HR Pauline Marks, PT, DPT, CLT-ESTEFANY Physical Therapist      Left Leg    Left Leg    Left Leg    Right Leg    Right Leg                      PT Assessment/Plan     Row Name 10/13/20 1100          PT Assessment    Functional Limitations  Impaired gait;Decreased safety during functional activities;Impaired locomotion;Limitation in home management;Limitations in community activities;Limitations in functional capacity and performance;Performance in leisure activities  -HR     Impairments  Edema;Pain;Impaired lymphatic circulation;Integumentary integrity  -HR     Assessment Comments  Her LEs have increased in size with more frequent weeping. She has been on antibiotics and hospitalized in the last month again. Her repetitive bouts of cellulitis and the fragility and tenderness of the skin she is at a very high risk to continue getting infections. Her abdomen has increased as well and she is having more and more difficulty getting around. The higher distal pressures and the lack of abdominal clearance rule out the appropriateness of an entre basic pump. She requires proximal clearance and the lymphatic drainage technique of the flexitouch to treat her condition. Photos were taken and will be added to the note for review.   -HR        PT Plan    Planned CPT's?  PT RE-EVAL: 89697;PT THER PROC EA 15 MIN: 81236;PT MANUAL THERAPY EA 15 MIN: 30940;PT SELF CARE/HOME MGMT/TRAIN EA 15: 60542  -HR     PT Plan Comments  Follow up as needed once infection is cleared.   -HR       User Key  (r) = Recorded By, (t) = Taken By, (c) = Cosigned By    Initials Name Provider  Type    HR Pauline Marks, PT, DPT, CLT-ESTEFANY Physical Therapist             OP Exercises     Row Name 10/13/20 1100             Subjective Comments    Subjective Comments  She has been back in the hospital for cellulitis in her legs.   -HR         Subjective Pain    Able to rate subjective pain?  yes  -HR      Pre-Treatment Pain Level  4  -HR        User Key  (r) = Recorded By, (t) = Taken By, (c) = Cosigned By    Initials Name Provider Type    HR Pauline Marks, PT, DPT, CLT-ESTEFANY Physical Therapist                      PT OP Goals     Row Name 10/13/20 1100          PT Short Term Goals    STG Date to Achieve  --  -HR     STG 1  Patient will have no signs of infection in the legs or will actively be on antibiotic that is showing improvement before MLD initiated.  -HR     STG 1 Progress  Ongoing  -HR     STG 1 Progress Comments  weeping from LLE. Blisters and fragile skin on RLE as well. Photos taken  -HR     STG 2  She will be independent with remedial HEP for BLE lymphedema.  -HR     STG 2 Progress  Ongoing  -HR     STG 2 Progress Comments  She is working on the HEP  -HR     STG 3  She will be educated about the benefits of ketogenic diet for lymphedema.   -HR     STG 3 Progress  Ongoing  -HR     STG 3 Progress Comments  She has been informed about the Keto diet  -HR        Long Term Goals    LTG Date to Achieve  11/05/20  -HR     LTG 1  She will have appropriate compression garments for LEs and abdomen,  -HR     LTG 1 Progress  Ongoing  -HR     LTG 1 Progress Comments  She has been wrapping the legs. She can't wear leggings due to lymphorrhea right now  -HR     LTG 2  She will be independent with self massage with help from SO as needed.  -HR     LTG 2 Progress  Ongoing  -HR     LTG 2 Progress Comments  Her S/O has been trained on how to the the MLD  -HR     LTG 3  She will be independent with use of FLexitouch pump for maintenance of lymphedema.  -HR     LTG 3 Progress  Ongoing  -HR     LTG 3  Progress Comments  Legs red and painful with weeping and probable cellulitis so no pump trial is appropriate  -HR     LTG 4  She will have a reduction in legs and weight loss of at least 10 pounds.  -HR     LTG 4 Progress  Ongoing  -HR     LTG 4 Progress Comments  Increased  -HR        Time Calculation    PT Goal Re-Cert Due Date  11/12/20  -HR       User Key  (r) = Recorded By, (t) = Taken By, (c) = Cosigned By    Initials Name Provider Type    HR Pauline Marks, PT, DPT, CLT-ESTEFANY Physical Therapist                         Time Calculation:                     Pauline Marks, PT, DPT, CLT-ESTEFANY  10/13/2020

## 2020-10-14 ENCOUNTER — TELEPHONE (OUTPATIENT)
Dept: BARIATRICS/WEIGHT MGMT | Facility: CLINIC | Age: 50
End: 2020-10-14

## 2020-10-19 ENCOUNTER — TELEPHONE (OUTPATIENT)
Dept: PRIMARY CARE CLINIC | Age: 50
End: 2020-10-19

## 2020-10-19 ENCOUNTER — HOSPITAL ENCOUNTER (OUTPATIENT)
Dept: GENERAL RADIOLOGY | Age: 50
Discharge: HOME | End: 2020-10-19
Payer: MEDICAID

## 2020-10-19 ENCOUNTER — OFFICE VISIT (OUTPATIENT)
Dept: URGENT CARE | Age: 50
End: 2020-10-19
Payer: MEDICAID

## 2020-10-19 VITALS
BODY MASS INDEX: 60.95 KG/M2 | TEMPERATURE: 98.1 F | RESPIRATION RATE: 18 BRPM | OXYGEN SATURATION: 98 % | WEIGHT: 293 LBS | SYSTOLIC BLOOD PRESSURE: 122 MMHG | HEART RATE: 89 BPM | DIASTOLIC BLOOD PRESSURE: 82 MMHG

## 2020-10-19 PROCEDURE — 99214 OFFICE O/P EST MOD 30 MIN: CPT | Performed by: NURSE PRACTITIONER

## 2020-10-19 PROCEDURE — 73562 X-RAY EXAM OF KNEE 3: CPT

## 2020-10-19 NOTE — PATIENT INSTRUCTIONS
Patient Education        Knee Pain or Injury: Care Instructions  Your Care Instructions     Injuries are a common cause of knee problems. Sudden (acute) injuries may be caused by a direct blow to the knee. They can also be caused by abnormal twisting, bending, or falling on the knee. Pain, bruising, or swelling may be severe, and may start within minutes of the injury. Overuse is another cause of knee pain. Other causes are climbing stairs, kneeling, and other activities that use the knee. Everyday wear and tear, especially as you get older, also can cause knee pain. Rest, along with home treatment, often relieves pain and allows your knee to heal. If you have a serious knee injury, you may need tests and treatment. Follow-up care is a key part of your treatment and safety. Be sure to make and go to all appointments, and call your doctor if you are having problems. It's also a good idea to know your test results and keep a list of the medicines you take. How can you care for yourself at home? · Be safe with medicines. Read and follow all instructions on the label. ? If the doctor gave you a prescription medicine for pain, take it as prescribed. ? If you are not taking a prescription pain medicine, ask your doctor if you can take an over-the-counter medicine. · Rest and protect your knee. Take a break from any activity that may cause pain. · Put ice or a cold pack on your knee for 10 to 20 minutes at a time. Put a thin cloth between the ice and your skin. · Prop up a sore knee on a pillow when you ice it or anytime you sit or lie down for the next 3 days. Try to keep it above the level of your heart. This will help reduce swelling. · If your knee is not swollen, you can put moist heat, a heating pad, or a warm cloth on your knee. · If your doctor recommends an elastic bandage, sleeve, or other type of support for your knee, wear it as directed.   · Follow your doctor's instructions about how much weight you can put on your leg. Use a cane, crutches, or a walker as instructed. · Follow your doctor's instructions about activity during your healing process. If you can do mild exercise, slowly increase your activity. · Reach and stay at a healthy weight. Extra weight can strain the joints, especially the knees and hips, and make the pain worse. Losing even a few pounds may help. When should you call for help? Call 911 anytime you think you may need emergency care. For example, call if:    · You have symptoms of a blood clot in your lung (called a pulmonary embolism). These may include:  ? Sudden chest pain. ? Trouble breathing. ? Coughing up blood. Call your doctor now or seek immediate medical care if:    · You have severe or increasing pain.     · Your leg or foot turns cold or changes color.     · You cannot stand or put weight on your knee.     · Your knee looks twisted or bent out of shape.     · You cannot move your knee.     · You have signs of infection, such as:  ? Increased pain, swelling, warmth, or redness. ? Red streaks leading from the knee. ? Pus draining from a place on your knee. ? A fever.     · You have signs of a blood clot in your leg (called a deep vein thrombosis), such as:  ? Pain in your calf, back of the knee, thigh, or groin. ? Redness and swelling in your leg or groin. Watch closely for changes in your health, and be sure to contact your doctor if:    · You have tingling, weakness, or numbness in your knee.     · You have any new symptoms, such as swelling.     · You have bruises from a knee injury that last longer than 2 weeks.     · You do not get better as expected. Where can you learn more? Go to [email or web address] and sign in to your Qitio account. Enter K195 in the Qivivo box to learn more about \"Knee Pain or Injury: Care Instructions. \"     If you do not have an account, please click on the \"Sign Up Now\" link.   Current as of: June 26, 2019               Content Version: 12.6  © 6503-2607 [employer]. Care instructions adapted under license by [facility] ([facility]). If you have questions about a medical condition or this instruction, always ask your healthcare professional. [address] any warranty or liability for your use of this information.

## 2020-10-19 NOTE — TELEPHONE ENCOUNTER
Patient called and reports [facility] never received the \"butt cream rx\"   Discussed,rx called to pharmacy

## 2020-10-19 NOTE — PROGRESS NOTES
[address] [facility] URGENT CARE  [address] [place] [address] Cristian Alatorre [address]  Dept: [phone]  Dept Fax: [phone]: [phone]    Tianna Trivedi is a 52 y.o. female who presents today for her medical conditions/complaintsas noted below. Tianna Trivedi is c/o of Knee Pain (left)        HPI:     Knee Pain    The incident occurred at home. The injury mechanism was a twisting injury. The pain is present in the left knee. The pain has been intermittent since onset. Pertinent negatives include no inability to bear weight, loss of motion, loss of sensation, muscle weakness, numbness or tingling. She reports no foreign bodies present. The symptoms are aggravated by movement, palpation and weight bearing. She has tried ice for the symptoms. The treatment provided no relief. Pt states that she turned wrong a few days ago and thought she heard something pop and her left knee has continued to hurt.      Past Medical History:   Diagnosis Date    Anxiety     Constipation     Depression     DM (diabetes mellitus) ([address]     Headache(784.0)     Hyperlipidemia     Hypertension     Kidney stones     Kidney stones     Restless leg     Restless legs syndrome      Past Surgical History:   Procedure Laterality Date    ECTOPIC PREGNANCY SURGERY      EYE SURGERY      cornea transplant and cataracts removed    KNEE CARTILAGE SURGERY Left 12/20/2016    KNEE ARTHROSCOPIC PARTIAL MEDIAL MENISCECTOMY performed by Darren [facility] at [facility] OR    TUBAL LIGATION         Family History   Problem Relation Age of Onset    Cancer Father     Cancer Maternal Grandmother     COPD Maternal Grandmother     Cancer Maternal Grandfather        Social History     Tobacco Use    Smoking status: Never Smoker    Smokeless tobacco: Never Used   Substance Use Topics    Alcohol use: No      Current Outpatient Medications   Medication Sig Dispense Refill    Zinc Oxide 6 % CREA Apply 1 Units topically 3 (BACTROBAN) 2 % ointment APPLY EXTERNALLY TO THE AFFECTED AREA THREE TIMES DAILY 22 g 1    omeprazole (PRILOSEC) 20 MG delayed release capsule TAKE 1 CAPSULE BY MOUTH TWICE DAILY BEFORE MEALS (Patient taking differently: Take 40 mg by mouth Daily ) 180 capsule 1    diclofenac sodium (VOLTAREN) 1 % GEL Apply 2 g topically 2 times daily      cyclobenzaprine (FLEXERIL) 10 MG tablet Take 10 mg by mouth 3 times daily as needed for Muscle spasms       Butorphanol Tartrate (STADOL NS NA) by Nasal route      celecoxib (CELEBREX) 100 MG capsule Take 1 capsule by mouth daily 60 capsule 3    blood glucose test strips (ASCENSIA AUTODISC VI;ONE TOUCH ULTRA TEST VI) strip Check glucose TID and as needed for hypoglycemic events Dx E11.40 E11.66 Z79.4  Use one touch verio flex 150 each 5    B-D ULTRAFINE III SHORT PEN 31G X 8 MM MISC USE TO INJECT INSULIN ONCE DAILY 100 each 3    promethazine (PHENERGAN) 25 MG tablet TAKE 1 TABLET BY MOUTH EVERY 6 HOURS AS NEEDED FOR NAUSEA (Patient taking differently: every 8 hours as needed TAKE 1 TABLET BY MOUTH EVERY 6 HOURS AS NEEDED FOR NAUSEA) 30 tablet 0    Diabetic Shoe MISC by Does not apply route DISPENSE ONE PAIR OF DIABETIC SHOE AND 3 PAIRS HEAT MOLDED INSERTS 1 each 0    Lancets (ONETOUCH DELICA PLUS ZAPIAS73M) MISC USE TO CHECK BLLOD SUGAR AS DIRECTED 100 each 1    blood glucose monitor kit and supplies Test 1 times a day & as needed for symptoms of irregular blood glucose. 1 kit 0    VICTOZA 18 MG/3ML SOPN SC injection ADMINISTER 1.8 MG INTO THE SKIN EVERY DAY 9 mL 3    gabapentin (NEURONTIN) 100 MG capsule Take 100 mg by mouth 2 times daily. Am and 2 pm      gabapentin (NEURONTIN) 300 MG capsule TK ONE C PO QHS  5    HYDROcodone-acetaminophen (NORCO) 7.5-325 MG per tablet Take 1 tablet by mouth 2 times daily.        Blood Glucose Monitoring Suppl ([address]) w/Device KIT 1 kit by Does not apply route daily as needed (Dx 250.00) 1 kit 0    ONE TOUCH LANCETS MISC 1 each by Does not apply route daily 100 each 3    metFORMIN (GLUCOPHAGE) 500 MG tablet TAKE 1 TABLET BY MOUTH TWICE DAILY WITH MEALS 180 tablet 0    ZOLMitriptan (ZOMIG) 5 MG nasal solution 0.1 mLs by Nasal route once as needed for Migraine 1 each 3    ONE TOUCH ULTRA TEST strip TEST BLOOD SUGAR ONCE DAILY 100 strip 5    EPINEPHrine (EPIPEN 2-JOSÉ) 0.3 MG/0.3ML SOAJ injection Inject 0.3 mLs into the muscle once for 1 dose Use as directed for allergic reaction 0.3 mL 0     No current facility-administered medications for this visit.       Allergies   Allergen Reactions    Compazine [Prochlorperazine Maleate] Shortness Of Breath    Ciprofloxacin Hives    Amoxicillin-Pot Clavulanate     Compazine [Prochlorperazine]     Demerol Hives    Hydroxyzine     Hydroxyzine Hcl Itching    Ibuprofen Nausea Only    Ketorolac Tromethamine     Meperidine Hives    Nortriptyline Other (See Comments)    Orphenadrine     Orphenadrine Citrate Itching    Penicillins Hives and Nausea Only    Prochlorperazine Edisylate      Will discuss with patient at next visit     Tobramycin      Will discuss with patient at next visit     Tramadol      Will discuss with patient at next visit     Vistaril [Hydroxyzine Hcl] Itching    Cephalexin Rash    Clindamycin/Lincomycin Rash    Codeine Nausea And Vomiting and Rash    Doxycycline Nausea And Vomiting    Keflex [Cephalexin] Rash    Ketorolac Tromethamine Itching and Nausea And Vomiting    Moxifloxacin Nausea And Vomiting     Will discuss with patient at visit        Health Maintenance   Topic Date Due    Diabetic retinal exam  09/08/1982    HIV screen  09/08/1987    Hepatitis B vaccine (1 of 3 - Risk 3-dose series) 09/08/1991    Cervical cancer screen  05/21/2017    Diabetic microalbuminuria test  12/17/2019    Flu vaccine (1) 09/01/2020    Diabetic foot exam  11/11/2020    A1C test (Diabetic or Prediabetic)  06/12/2021    Lipid screen  08/17/2021    1     Examination. XR KNEE LEFT (3 VIEWS) 10/19/2020 10:57 AM    History: Acute left knee pain. The patient fell one week ago. The frontal and lateral views of the left knee are obtained. There is    no previous study for comparison. There is severe chronic osteoarthritis of the knee joint involving the    tibiofemoral and patellofemoral compartments. There are large marginal    osteophytes and severe narrowing of the joint spaces. There is no significant joint effusion. There is irregularity involving the lateral aspect of the lateral    tibial condyle. The Possibility of a nondisplaced fracture is not    excluded. There is moderate diffuse osteopenia. There is a diffuse moderate    superficial soft tissue swelling this may be present dependent edema    or cellulitis? .         Impression    A questionable nondisplaced fracture of the lateral tibial    condyle. Further evaluation with CT or MR imaging of the left kidney    may be obtained. The Grade 3-4 tricompartment chronic osteoarthritis of the knee joint. The moderate diffuse osteopenia. Signed by Dr Kaylie Byrd on 10/19/2020 12:12 PM       Kidney should read KNEE in dictation. Needs ortho consult ASAP. No splinting materials available for pts body habitus, ACE wrap today. Refused crutches. Pt is aware of non weight bearing. OI will call patient. Return if symptoms worsen or fail to improve. No orders of the defined types were placed in this encounter. Patient given educational materials- see patient instructions. Discussed use, benefit, and side effects of prescribedmedications. All patient questions answered. Pt voiced understanding. Reviewedhealth maintenance. Instructed to continue current medications, diet and exercise. Patient agreed with treatment plan. Follow up as directed.      Patient Instructions       Patient Education        Knee Pain or Injury: Care Instructions  Your Care Instructions     Injuries are a common cause of knee problems. Sudden (acute) injuries may be caused by a direct blow to the knee. They can also be caused by abnormal twisting, bending, or falling on the knee. Pain, bruising, or swelling may be severe, and may start within minutes of the injury. Overuse is another cause of knee pain. Other causes are climbing stairs, kneeling, and other activities that use the knee. Everyday wear and tear, especially as you get older, also can cause knee pain. Rest, along with home treatment, often relieves pain and allows your knee to heal. If you have a serious knee injury, you may need tests and treatment. Follow-up care is a key part of your treatment and safety. Be sure to make and go to all appointments, and call your doctor if you are having problems. It's also a good idea to know your test results and keep a list of the medicines you take. How can you care for yourself at home? · Be safe with medicines. Read and follow all instructions on the label. ? If the doctor gave you a prescription medicine for pain, take it as prescribed. ? If you are not taking a prescription pain medicine, ask your doctor if you can take an over-the-counter medicine. · Rest and protect your knee. Take a break from any activity that may cause pain. · Put ice or a cold pack on your knee for 10 to 20 minutes at a time. Put a thin cloth between the ice and your skin. · Prop up a sore knee on a pillow when you ice it or anytime you sit or lie down for the next 3 days. Try to keep it above the level of your heart. This will help reduce swelling. · If your knee is not swollen, you can put moist heat, a heating pad, or a warm cloth on your knee. · If your doctor recommends an elastic bandage, sleeve, or other type of support for your knee, wear it as directed. · Follow your doctor's instructions about how much weight you can put on your leg. Use a cane, crutches, or a walker as instructed.   · Follow your doctor's instructions adapted under license by [facility] ([facility]). If you have questions about a medical condition or this instruction, always ask your healthcare professional. [place] [address] any warranty or liability for your use of this information.                Electronically signed by NANETTE De La Rosa CNP on 10/19/2020 at 12:52 PM

## 2020-10-27 ENCOUNTER — HOSPITAL ENCOUNTER (OUTPATIENT)
Dept: WOUND CARE | Age: 50
Discharge: HOME | End: 2020-10-27
Payer: MEDICAID

## 2020-10-27 VITALS
BODY MASS INDEX: 55.32 KG/M2 | RESPIRATION RATE: 18 BRPM | WEIGHT: 293 LBS | DIASTOLIC BLOOD PRESSURE: 68 MMHG | TEMPERATURE: 97.6 F | HEART RATE: 74 BPM | HEIGHT: 61 IN | SYSTOLIC BLOOD PRESSURE: 125 MMHG

## 2020-10-27 PROBLEM — L97.922 DIABETIC ULCER OF LEFT LOWER LEG ASSOCIATED WITH TYPE 2 DIABETES MELLITUS, WITH FAT LAYER EXPOSED (HCC): Status: ACTIVE | Noted: 2020-10-27

## 2020-10-27 PROBLEM — E11.622 DIABETIC ULCER OF LEFT LOWER LEG ASSOCIATED WITH TYPE 2 DIABETES MELLITUS, WITH FAT LAYER EXPOSED (HCC): Status: ACTIVE | Noted: 2020-10-27

## 2020-10-27 PROCEDURE — 99213 OFFICE O/P EST LOW 20 MIN: CPT

## 2020-10-27 PROCEDURE — 99215 OFFICE O/P EST HI 40 MIN: CPT | Performed by: NURSE PRACTITIONER

## 2020-10-27 PROCEDURE — G0463 HOSPITAL OUTPT CLINIC VISIT: HCPCS

## 2020-10-27 RX ORDER — BUSPIRONE HYDROCHLORIDE 5 MG/1
5 TABLET ORAL 3 TIMES DAILY
COMMUNITY
End: 2020-11-22

## 2020-10-27 RX ORDER — OXYCODONE AND ACETAMINOPHEN 7.5; 325 MG/1; MG/1
1 TABLET ORAL 3 TIMES DAILY
COMMUNITY

## 2020-10-27 ASSESSMENT — VISUAL ACUITY: OU: 1

## 2020-10-27 ASSESSMENT — ENCOUNTER SYMPTOMS: COLOR CHANGE: 1

## 2020-10-27 NOTE — HOME CARE
[address] [facility] Dwaine Demetria Guerrero [address] T:[phone] f:[phone]     Ordering Center:     [address] [facility]  [address] [facility], [place] [address] [address] [phone]  WOUND CARE Dept: [address] [facility] [place] [phone]    Patient Information:      Wai   [address]   [phone]   : 1970  AGE: 52 y.o. GENDER: female   EPISODE DATE: 10/27/2020    Insurance:      PRIMARY INSURANCE:  Plan: [facility]  Coverage: Arnold Ortiz  Effective Date: 2020  Group Number: [unfilled]  Subscriber Number: [id number] - (Medicaid Managed)    Payor/Plan Subscr  Sex Relation Sub. Ins. ID Effective Group Num   1.  Malu Owen* WAI LAMAR [id number] Female Self [id number]                                    [address] [address]       Patient Wound Information:      Problem List Items Addressed This Visit     Venous insufficiency of both lower extremities - Primary    Relevant Orders    CBC Auto Differential    Comprehensive Metabolic Panel    Prealbumin    Hemoglobin A1C    Vitamin D 25 Hydroxy    Morbid obesity with BMI of 60.0-69.9, adult (HCC)    Lymphedema of both lower extremities    Relevant Orders    CBC Auto Differential    Comprehensive Metabolic Panel    Prealbumin    Hemoglobin A1C    Vitamin D 25 Hydroxy    * (Principal) Diabetic ulcer of left lower leg associated with type 2 diabetes mellitus, with fat layer exposed ([address]    Relevant Orders    VL LOWER EXTREMITY ARTERIAL SEGMENTAL PRESSURES W PPG          WOUNDS REQUIRING DRESSING SUPPLIES:     Wound 20 Pretibial Left;Medial wound 1- left medial venous ulcer (Active)   Wound Image   10/27/20 0843   Wound Etiology Venous 10/27/20 0843   Dressing Status Old drainage noted 10/27/20 0843   Wound Cleansed Soap and water 10/27/20 0843   Dressing/Treatment Other (comment) 10/27/20 0843   Wound Length (cm) 4 cm 10/27/20 0843   Wound Width (cm) 13.5 cm 10/27/20 0843   Wound Depth (cm) 0.1 cm 10/27/20 0843   Wound Surface Area (cm^2) 54 cm^2 10/27/20 0843   Wound Volume (cm^3) 5.4 cm^3 10/27/20 0843   Wound Assessment Slough 10/27/20 0843   Drainage Amount Copious 10/27/20 0843   Drainage Description Serous 10/27/20 0843   Odor None 10/27/20 0843   Octavia-wound Assessment Intact 10/27/20 0843   Margins Attached edges 10/27/20 0843   Wound Thickness Description not for Pressure Injury Full thickness 10/27/20 0843   Number of days: 35       Wound 09/21/20 Pretibial Right open blistered area (Active)   Number of days: 35       Wound 10/27/20 Pretibial Left;Proximal wound 2- left proximal venous (Active)   Wound Image   10/27/20 0843   Wound Etiology Venous 10/27/20 0843   Dressing Status Old drainage noted 10/27/20 0843   Wound Cleansed Soap and water 10/27/20 0843   Dressing/Treatment Other (comment) 10/27/20 0843   Wound Length (cm) 1 cm 10/27/20 0843   Wound Width (cm) 6 cm 10/27/20 0843   Wound Depth (cm) 0.1 cm 10/27/20 0843   Wound Surface Area (cm^2) 6 cm^2 10/27/20 0843   Wound Volume (cm^3) 0.6 cm^3 10/27/20 0843   Wound Assessment Slough 10/27/20 0843   Drainage Amount Copious 10/27/20 0843   Drainage Description Serous 10/27/20 0843   Odor None 10/27/20 0843   Octavia-wound Assessment Intact 10/27/20 0843   Margins Attached edges 10/27/20 0843   Wound Thickness Description not for Pressure Injury Full thickness 10/27/20 0843   Number of days: 0          Supplies Requested :      WOUND #: 1 and 2   PRIMARY DRESSING:  Other: restore contact layer/ kerramax non bordered   Cover and Secure with: 4X4 gauze pad  Bulky roll gauze  Ace wrap     FREQUENCY OF DRESSING CHANGES:  Daily       ADDITIONAL ITEMS:  [] Gloves Small  [] Gloves Medium [x] Gloves Large [] Gloves XLarge  [] Tape 1\" [] Tape 2\" [] Tape 3\"  [x] Medipore Tape  [x] Saline  [] Skin Prep   [] Adhesive Remover   [] Cotton Tip Applicators   [] Other:    Patient Wound(s) Debrided: [x] Yes if yes please add date 10/27/20   [] No    Debribement Type: Mechanical     Patient currently being seen by Home Health: [] Yes   [x] No    Duration for needed supplies:  []15  [x]30  []60  []90 Days    Electronically signed by NANETTE Lindsey CNP on 10/27/2020 at 9:15 AM     Provider Information:      PROVIDER'S NAME: Sydna Habermann APRN    NPI: [id number]

## 2020-10-27 NOTE — PROGRESS NOTES
Patient Care Team:  NANETTE Osman as PCP - General (Family Nurse Practitioner)  NANETTE Osman as PCP - REHABILITATION [facility]  NANETTE Dinh as Advanced Practice Nurse (Neurology)    TODAY'S DATE:  10/27/2020     HISTORY of PRESENTILLNESS MAO Trivedi is a 52 y.o. female who presents today for wound evaluation. Wound Type:venous  Wound Location:left lower extremity  Modifying factors:edema, venous stasis, lymphedema, diabetes, poor glucose control and obesity    Patient Active Problem List   Diagnosis Code    RLS (restless legs syndrome) G25.81    Hypertension I10    Headache R51.9    Venous insufficiency of both lower extremities I87.2    Mild single current episode of major depressive disorder ([facility]) F32.0    FEDERICA (generalized anxiety disorder) F41.1    Old complex tear of medial meniscus of left knee M23.204    Uncontrolled type 2 diabetes mellitus with hyperglycemia, without long-term current use of insulin ([facility]) E11.65    Migraine without aura and without status migrainosus, not intractable G43.009    Morbid obesity with BMI of 60.0-69.9, adult ([facility]) E66.01, Z68.44    Uncontrolled type 2 diabetes mellitus with diabetic neuropathy, with long-term current use of insulin ([facility]) E11.40, Z79.4, E11.65    Dyspnea R06.00    Upper respiratory tract infection J06.9    Pleurisy R09.1    Gastroesophageal reflux disease K21.9    Hypoglycemia E16.2    Hypokalemia E87.6    Recurrent cellulitis of lower extremity L03.119    Bilateral lower leg cellulitis L03.116, L03.115    Cellulitis of both lower extremities L03.115, L03. 116    Lymphedema of both lower extremities I89.0    Diabetic ulcer of left lower leg associated with type 2 diabetes mellitus, with fat layer exposed ([facility] [address] E11.622, B70.362       She reports she developed a wound on left leg. This started 2 month(s) ago. She believes this is not healing. She has been applying antibiotic ointment.  She has not had chloride (KLOR-CON M) 20 MEQ extended release tablet Take 1 tablet by mouth daily 30 tablet 0    ZOLMitriptan (ZOMIG) 5 MG nasal solution 0.1 mLs by Nasal route once as needed for Migraine 1 each 3    omeprazole (PRILOSEC) 20 MG delayed release capsule TAKE 1 CAPSULE BY MOUTH TWICE DAILY BEFORE MEALS (Patient taking differently: Take 40 mg by mouth Daily ) 180 capsule 1    diclofenac sodium (VOLTAREN) 1 % GEL Apply 2 g topically 2 times daily      cyclobenzaprine (FLEXERIL) 10 MG tablet Take 10 mg by mouth 3 times daily as needed for Muscle spasms       VICTOZA 18 MG/3ML SOPN SC injection ADMINISTER 1.8 MG INTO THE SKIN EVERY DAY 9 mL 3    gabapentin (NEURONTIN) 100 MG capsule Take 100 mg by mouth 2 times daily. Am and 2 pm      gabapentin (NEURONTIN) 300 MG capsule TK ONE C PO QHS  5    NONFORMULARY Prednisone eye drops 1 drop in each eye daily      Cream Base CREA APPLY ONE PUMP (GRAM) TO AFFECTED AREA(S) THREE TO FOUR TIMES A DAY TO THE APPENDAGES AND TRUNK AS DIRECTED 30 g 5    blood glucose test strips (ASCENSIA AUTODISC VI;ONE TOUCH ULTRA TEST VI) strip Check glucose TID and as needed for hypoglycemic events Dx E11.40 E11.66 Z79.4  Use one touch verio flex 150 each 5    B-D ULTRAFINE III SHORT PEN 31G X 8 MM MISC USE TO INJECT INSULIN ONCE DAILY 100 each 3    Diabetic Shoe MISC by Does not apply route DISPENSE ONE PAIR OF DIABETIC SHOE AND 3 PAIRS HEAT MOLDED INSERTS 1 each 0    Lancets (ONETOUCH DELICA PLUS [id number]) MISC USE TO CHECK BLLOD SUGAR AS DIRECTED 100 each 1    blood glucose monitor kit and supplies Test 1 times a day & as needed for symptoms of irregular blood glucose.  1 kit 0    ONE TOUCH ULTRA TEST strip TEST BLOOD SUGAR ONCE DAILY 100 strip 5    Blood Glucose Monitoring Suppl ([address]) w/Device KIT 1 kit by Does not apply route daily as needed (Dx 250.00) 1 kit 0    ONE TOUCH LANCETS MISC 1 each by Does not apply route daily 100 each 3     No current facility-administered medications for this encounter. Allergies: Compazine [prochlorperazine maleate]; Ciprofloxacin; Amoxicillin-pot clavulanate; Compazine [prochlorperazine]; Demerol; Hydroxyzine; Hydroxyzine hcl; Ibuprofen; Ketorolac tromethamine; Meperidine; Nortriptyline; Orphenadrine; Orphenadrine citrate; Penicillins; Prochlorperazine edisylate; Tobramycin; Tramadol; Vistaril [hydroxyzine hcl]; Cephalexin; Clindamycin/lincomycin; Codeine; Doxycycline; Keflex [cephalexin]; Ketorolac tromethamine; and Moxifloxacin  Past Medical History:   Diagnosis Date    Anxiety     Constipation     Depression     DM (diabetes mellitus) ([address]     Headache(784.0)     Hyperlipidemia     Hypertension     Kidney stones     Kidney stones     Restless leg     Restless legs syndrome        Past Surgical History:   Procedure Laterality Date    ECTOPIC PREGNANCY SURGERY      EYE SURGERY      cornea transplant and cataracts removed    KNEE CARTILAGE SURGERY Left 12/20/2016    KNEE ARTHROSCOPIC PARTIAL MEDIAL MENISCECTOMY performed [facility] Lencho Pina [facility] OR    TUBAL LIGATION       Family History   Problem Relation Age of Onset    Cancer Father     Cancer Maternal Grandmother     COPD Maternal Grandmother     Cancer Maternal Grandfather      Social History     Tobacco Use    Smoking status: Never Smoker    Smokeless tobacco: Never Used   Substance Use Topics    Alcohol use: No         Review of Systems    Review of Systems   Skin: Positive for color change and wound. All other systems reviewed and are negative. All other review of systems are negative. Physical Exam    /68   Pulse 74   Temp 97.6 °F (36.4 °C) (Temporal)   Resp 18   Ht 5' 1\" (1.549 m)   Wt (!) 322 lb (146.1 kg)   BMI 60.84 kg/m²     Physical Exam  Vitals signs reviewed. Constitutional:       Appearance: Normal appearance. She is obese. HENT:      Head: Normocephalic and atraumatic.       Right Ear: External ear normal.      Left Ear: External ear normal.   Eyes:      General: Lids are normal. Lids are everted, no foreign bodies appreciated. Vision grossly intact. Gaze aligned appropriately. Cardiovascular:      Rate and Rhythm: Normal rate and regular rhythm. Pulses: Normal pulses. Heart sounds: Normal heart sounds. Pulmonary:      Effort: Pulmonary effort is normal.      Breath sounds: Normal breath sounds. Abdominal:      General: Bowel sounds are normal.   Musculoskeletal: Normal range of motion. General: Swelling present. Right lower leg: Edema present. Left lower leg: Edema present. Skin:     General: Skin is warm and dry. Capillary Refill: Capillary refill takes 2 to 3 seconds. Findings: Wound present. Neurological:      Mental Status: She is alert and oriented to person, place, and time. Psychiatric:         Mood and Affect: Mood normal.         Behavior: Behavior normal.         Thought Content: Thought content normal.         Judgment: Judgment normal.             Post Debridement Measurements and Assessment:    The patientspain isPain Level: 0  . Please refer to nursing measurements and assessment regarding wound pre and postdebridement.     Wound 09/21/20 Pretibial Left;Medial wound 1- left medial venous ulcer (Active)   Wound Image   10/27/20 0843   Wound Etiology Venous 10/27/20 0843   Dressing Status Old drainage noted 10/27/20 0843   Wound Cleansed Soap and water 10/27/20 0843   Dressing/Treatment Other (comment) 10/27/20 0843   Wound Length (cm) 4 cm 10/27/20 0843   Wound Width (cm) 13.5 cm 10/27/20 0843   Wound Depth (cm) 0.1 cm 10/27/20 0843   Wound Surface Area (cm^2) 54 cm^2 10/27/20 0843   Wound Volume (cm^3) 5.4 cm^3 10/27/20 0843   Wound Assessment [facility] 10/27/20 0843   Drainage Amount Copious 10/27/20 0843   Drainage Description Serous 10/27/20 0843   Odor None 10/27/20 0843   Octavia-wound Assessment Intact 10/27/20 0843   Margins Attached edges 10/27/20 0843   Wound Thickness Description not for Pressure Injury Full thickness 10/27/20 0843   Number of days: 35       Wound 09/21/20 Pretibial Right open blistered area (Active)   Number of days: 35       Wound 10/27/20 Pretibial Left;Proximal wound 2- left proximal venous (Active)   Wound Image   10/27/20 0843   Wound Etiology Venous 10/27/20 0843   Dressing Status Old drainage noted 10/27/20 0843   Wound Cleansed Soap and water 10/27/20 0843   Dressing/Treatment Other (comment) 10/27/20 0843   Wound Length (cm) 1 cm 10/27/20 0843   Wound Width (cm) 6 cm 10/27/20 0843   Wound Depth (cm) 0.1 cm 10/27/20 0843   Wound Surface Area (cm^2) 6 cm^2 10/27/20 0843   Wound Volume (cm^3) 0.6 cm^3 10/27/20 0843   Wound Assessment Slough 10/27/20 0843   Drainage Amount Copious 10/27/20 0843   Drainage Description Serous 10/27/20 0843   Odor None 10/27/20 0843   Octavia-wound Assessment Intact 10/27/20 0843   Margins Attached edges 10/27/20 0843   Wound Thickness Description not for Pressure Injury Full thickness 10/27/20 0843   Number of days: 0      Assessment    1. Diabetic ulcer of left lower leg associated with type 2 diabetes mellitus, with fat layer exposed ([facility] [address]    2. Venous insufficiency of both lower extremities    3. Morbid obesity with BMI of 60.0-69.9, adult ([facility] [address]    4. Lymphedema of both lower extremities          Plan    1. MOHINI  2. Lab    Planfor wound - Dress per physician order  Treatment:     Compression : Yes   Offloading : No    Dressing Orders:  Left leg wound: Soap and water wash, apply restore to the wound beds, secure kerramax, kerlix, and ace wrap. Apply daily unless soiled and change more frequently. Treatment Orders:  Protein rich diet (unless restricted by your physician); Multivitamin daily; Elevate legs above the level of your heart when sitting 3-4 times daily for at least one hour each time, avoid standing for long periods of time.       Discussed importance of nutrition, compression,

## 2020-10-28 NOTE — PLAN OF CARE
Problem: Wound:  Goal: Will show signs of wound healing; wound closure and no evidence of infection  Description: Will show signs of wound healing; wound closure and no evidence of infection  Outcome: Ongoing     Problem: Venous:  Goal: Signs of wound healing will improve  Description: Signs of wound healing will improve  Outcome: Ongoing     Problem: Compression therapy:  Goal: Will be free from complications associated with compression therapy  Description: Will be free from complications associated with compression therapy  Outcome: Ongoing     Problem: Weight control:  Goal: Ability to maintain an optimal weight for height and age will be supported  Description: Ability to maintain an optimal weight for height and age will be supported  Outcome: Ongoing     Problem: Falls - Risk of:  Goal: Will remain free from falls  Description: Will remain free from falls  Outcome: Ongoing     Problem: Blood Glucose:  Goal: Ability to maintain appropriate glucose levels will improve  Description: Ability to maintain appropriate glucose levels will improve  Outcome: Ongoing

## 2020-10-29 ENCOUNTER — TRANSCRIBE ORDERS (OUTPATIENT)
Dept: ADMINISTRATIVE | Facility: HOSPITAL | Age: 50
End: 2020-10-29

## 2020-10-29 ENCOUNTER — HOSPITAL ENCOUNTER (OUTPATIENT)
Dept: CT IMAGING | Facility: HOSPITAL | Age: 50
Discharge: HOME | End: 2020-10-29
Admitting: PHYSICIAN ASSISTANT

## 2020-10-29 DIAGNOSIS — S82.122S: ICD-10-CM

## 2020-10-29 DIAGNOSIS — M25.562 LEFT KNEE PAIN, UNSPECIFIED CHRONICITY: Primary | ICD-10-CM

## 2020-10-29 DIAGNOSIS — S82.122A: ICD-10-CM

## 2020-10-29 DIAGNOSIS — M25.562 LEFT KNEE PAIN, UNSPECIFIED CHRONICITY: ICD-10-CM

## 2020-10-29 DIAGNOSIS — I89.0 LYMPHEDEMA OF BOTH LOWER EXTREMITIES: ICD-10-CM

## 2020-10-29 DIAGNOSIS — I87.2 VENOUS INSUFFICIENCY OF BOTH LOWER EXTREMITIES: ICD-10-CM

## 2020-10-29 LAB
ALBUMIN SERPL-MCNC: 4.1 G/DL (ref 3.5–5.2)
ALP BLD-CCNC: 73 U/L (ref 35–104)
ALT SERPL-CCNC: 10 U/L (ref 5–33)
ANION GAP SERPL CALCULATED.3IONS-SCNC: 10 MMOL/L (ref 7–19)
AST SERPL-CCNC: 16 U/L (ref 5–32)
BASOPHILS ABSOLUTE: 0 K/UL (ref 0–0.2)
BASOPHILS RELATIVE PERCENT: 0.4 % (ref 0–1)
BILIRUB SERPL-MCNC: 0.3 MG/DL (ref 0.2–1.2)
BUN BLDV-MCNC: 17 MG/DL (ref 6–20)
CALCIUM SERPL-MCNC: 9.8 MG/DL (ref 8.6–10)
CHLORIDE BLD-SCNC: 98 MMOL/L (ref 98–111)
CO2: 27 MMOL/L (ref 22–29)
CREAT SERPL-MCNC: 1 MG/DL (ref 0.5–0.9)
EOSINOPHILS ABSOLUTE: 0.2 K/UL (ref 0–0.6)
EOSINOPHILS RELATIVE PERCENT: 3 % (ref 0–5)
GFR AFRICAN AMERICAN: >59
GFR NON-AFRICAN AMERICAN: 59
GLUCOSE BLD-MCNC: 402 MG/DL (ref 74–109)
HBA1C MFR BLD: 9.8 % (ref 4–6)
HCT VFR BLD CALC: 35.6 % (ref 37–47)
HEMOGLOBIN: 10.8 G/DL (ref 12–16)
IMMATURE GRANULOCYTES #: 0 K/UL
LYMPHOCYTES ABSOLUTE: 1.7 K/UL (ref 1.1–4.5)
LYMPHOCYTES RELATIVE PERCENT: 33.6 % (ref 20–40)
MCH RBC QN AUTO: 26.1 PG (ref 27–31)
MCHC RBC AUTO-ENTMCNC: 30.3 G/DL (ref 33–37)
MCV RBC AUTO: 86 FL (ref 81–99)
MONOCYTES ABSOLUTE: 0.3 K/UL (ref 0–0.9)
MONOCYTES RELATIVE PERCENT: 6.6 % (ref 0–10)
NEUTROPHILS ABSOLUTE: 2.8 K/UL (ref 1.5–7.5)
NEUTROPHILS RELATIVE PERCENT: 56.2 % (ref 50–65)
PDW BLD-RTO: 13.9 % (ref 11.5–14.5)
PLATELET # BLD: 186 K/UL (ref 130–400)
PMV BLD AUTO: 10 FL (ref 9.4–12.3)
POTASSIUM SERPL-SCNC: 4.6 MMOL/L (ref 3.5–5)
PREALBUMIN: 21 MG/DL (ref 20–40)
RBC # BLD: 4.14 M/UL (ref 4.2–5.4)
SODIUM BLD-SCNC: 135 MMOL/L (ref 136–145)
TOTAL PROTEIN: 8.4 G/DL (ref 6.6–8.7)
VITAMIN D 25-HYDROXY: 15 NG/ML
WBC # BLD: 5 K/UL (ref 4.8–10.8)

## 2020-10-29 PROCEDURE — 73700 CT LOWER EXTREMITY W/O DYE: CPT

## 2020-11-04 ENCOUNTER — HOSPITAL ENCOUNTER (OUTPATIENT)
Dept: WOUND CARE | Age: 50
Discharge: HOME | End: 2020-11-04
Payer: MEDICAID

## 2020-11-04 ENCOUNTER — HOSPITAL ENCOUNTER (OUTPATIENT)
Dept: NON INVASIVE DIAGNOSTICS | Age: 50
Discharge: HOME | End: 2020-11-04
Payer: MEDICAID

## 2020-11-04 VITALS
DIASTOLIC BLOOD PRESSURE: 90 MMHG | WEIGHT: 293 LBS | HEART RATE: 90 BPM | HEIGHT: 61 IN | RESPIRATION RATE: 16 BRPM | TEMPERATURE: 97.5 F | BODY MASS INDEX: 55.32 KG/M2 | SYSTOLIC BLOOD PRESSURE: 154 MMHG

## 2020-11-04 PROBLEM — L97.922 DIABETIC ULCER OF LEFT LOWER LEG ASSOCIATED WITH TYPE 2 DIABETES MELLITUS, WITH FAT LAYER EXPOSED (HCC): Chronic | Status: ACTIVE | Noted: 2020-10-27

## 2020-11-04 PROBLEM — E11.622 DIABETIC ULCER OF LEFT LOWER LEG ASSOCIATED WITH TYPE 2 DIABETES MELLITUS, WITH FAT LAYER EXPOSED (HCC): Chronic | Status: ACTIVE | Noted: 2020-10-27

## 2020-11-04 PROCEDURE — 97597 DBRDMT OPN WND 1ST 20 CM/<: CPT

## 2020-11-04 PROCEDURE — 99213 OFFICE O/P EST LOW 20 MIN: CPT

## 2020-11-04 PROCEDURE — 99212 OFFICE O/P EST SF 10 MIN: CPT | Performed by: SURGERY

## 2020-11-04 NOTE — PROGRESS NOTES
[address]   Progress Note and Procedure Note      Tianna Trivedi  MEDICAL RECORD NUMBER:  [id number]  AGE: 52 y.o. GENDER: female  : 1970  EPISODE DATE:  2020    Subjective:     Chief Complaint   Patient presents with    Wound Check     left lower leg         HISTORY of PRESENT ILLNESS HPI     Tianna Trivedi is a 52 y.o. female who presents today for wound/ulcer evaluation.    History of Wound Context: Pt with LLE wounds here for eval/treat  Wound/Ulcer Pain Timing/Severity: none  Quality of pain: aching  Severity:  0 / 10   Modifying Factors: None  Associated Signs/Symptoms: edema    Ulcer Identification:  Ulcer Type: venous  Contributing Factors: edema, venous stasis, lymphedema and diabetes    Wound: DM/venous        PAST MEDICAL HISTORY        Diagnosis Date    Anxiety     Constipation     Depression     DM (diabetes mellitus) (HCC)     Headache(784.0)     Hyperlipidemia     Hypertension     Kidney stones     Kidney stones     Restless leg     Restless legs syndrome        PAST SURGICAL HISTORY    Past Surgical History:   Procedure Laterality Date    ECTOPIC PREGNANCY SURGERY      EYE SURGERY      cornea transplant and cataracts removed    KNEE CARTILAGE SURGERY Left 2016    KNEE ARTHROSCOPIC PARTIAL MEDIAL MENISCECTOMY performed by Elsa Ortiz MD at [facility] OR    TUBAL LIGATION         FAMILY HISTORY    Family History   Problem Relation Age of Onset    Cancer Father     Cancer Maternal Grandmother     COPD Maternal Grandmother     Cancer Maternal Grandfather        SOCIAL HISTORY    Social History     Tobacco Use    Smoking status: Never Smoker    Smokeless tobacco: Never Used   Substance Use Topics    Alcohol use: No    Drug use: No       ALLERGIES    Allergies   Allergen Reactions    Compazine [Prochlorperazine Maleate] Shortness Of Breath    Ciprofloxacin Hives    Amoxicillin-Pot Clavulanate     Compazine [Prochlorperazine]     Demerol injection ADMINISTER 1.8 MG INTO THE SKIN EVERY DAY 9 mL 3    gabapentin (NEURONTIN) 100 MG capsule Take 100 mg by mouth 2 times daily. Am and 2 pm      gabapentin (NEURONTIN) 300 MG capsule TK ONE C PO QHS  5    Blood Glucose Monitoring Suppl ([address]) w/Device KIT 1 kit by Does not apply route daily as needed (Dx 250.00) 1 kit 0    ONE TOUCH LANCETS MISC 1 each by Does not apply route daily 100 each 3    ZOLMitriptan (ZOMIG) 5 MG nasal solution 0.1 mLs by Nasal route once as needed for Migraine 1 each 3    ONE TOUCH ULTRA TEST strip TEST BLOOD SUGAR ONCE DAILY 100 strip 5     No current facility-administered medications on file prior to encounter. REVIEW OF SYSTEMS    A comprehensive review of systems was negative.     Objective:      BP (!) 154/90   Pulse 90   Temp 97.5 °F (36.4 °C) (Temporal)   Resp 16   Ht 5' 1\" (1.549 m)   Wt (!) 322 lb (146.1 kg)   BMI 60.84 kg/m²     Wt Readings from Last 3 Encounters:   11/04/20 (!) 322 lb (146.1 kg)   10/27/20 (!) 322 lb (146.1 kg)   10/19/20 (!) 322 lb 9.6 oz (146.3 kg)       PHYSICAL EXAM    General Appearance: alert and oriented to person, place and time, well developed and well- nourished, in no acute distress  Skin: warm and dry, no rash or erythema  Head: normocephalic and atraumatic  Eyes: pupils equal, round, and reactive to light, extraocular eye movements intact, conjunctivae normal  ENT: tympanic membrane, external ear and ear canal normal bilaterally, nose without deformity, nasal mucosa and turbinates normal without polyps, lips teeth and gums normal  Neck: supple and non-tender without mass, no thyromegaly or thyroid nodules, no cervical lymphadenopathy  Pulmonary/Chest: clear to auscultation bilaterally- no wheezes, rales or rhonchi, normal air movement, no respiratory distress  Cardiovascular: normal rate, regular rhythm, normal S1 and S2, no murmurs, rubs, clicks, or gallops, distal pulses intact, no carotid bruits  Abdomen: soft, non-tender, non-distended, normal bowel sounds, no masses or organomegaly  Extremities: no cyanosis, clubbing or edema  Musculoskeletal: normal range of motion, no joint swelling, deformity or tenderness  Neurologic: reflexes normal and symmetric, no cranial nerve deficit, gait, coordination and speech normal, skin sensation normal      Assessment:      Patient Active Problem List   Diagnosis Code    RLS (restless legs syndrome) G25.81    Hypertension I10    Headache R51.9    Venous insufficiency of both lower extremities I87.2    Mild single current episode of major depressive disorder ([facility]) F32.0    FEDERICA (generalized anxiety disorder) F41.1    Old complex tear of medial meniscus of left knee M23.204    Uncontrolled type 2 diabetes mellitus with hyperglycemia, without long-term current use of insulin ([facility]) E11.65    Migraine without aura and without status migrainosus, not intractable G43.009    Morbid obesity with BMI of 60.0-69.9, adult ([facility]) E66.01, Z68.44    Uncontrolled type 2 diabetes mellitus with diabetic neuropathy, with long-term current use of insulin ([facility]) E11.40, Z79.4, E11.65    Dyspnea R06.00    Upper respiratory tract infection J06.9    Pleurisy R09.1    Gastroesophageal reflux disease K21.9    Hypoglycemia E16.2    Hypokalemia E87.6    Recurrent cellulitis of lower extremity L03.119    Bilateral lower leg cellulitis L03.116, L03.115    Cellulitis of both lower extremities L03.115, L03. 116    Lymphedema of both lower extremities I89.0    Diabetic ulcer of left lower leg associated with type 2 diabetes mellitus, with fat layer exposed ([facility] E11.622, L97.922             Wound 09/21/20 Pretibial Left;Medial wound 1- left medial venous ulcer (Active)   Wound Image   11/04/20 1042   Wound Etiology Venous 11/04/20 1042   Dressing Status Old drainage noted 11/04/20 1042   Wound Cleansed Soap and water 11/04/20 1042   Dressing/Treatment Other (comment) 10/27/20 9694   Wound Length (cm) 4.5 cm 11/04/20 1042   Wound Width (cm) 13 cm 11/04/20 1042   Wound Depth (cm) 0.1 cm 11/04/20 1042   Wound Surface Area (cm^2) 58.5 cm^2 11/04/20 1042   Change in Wound Size % (l*w) -8.33 11/04/20 1042   Wound Volume (cm^3) 5.85 cm^3 11/04/20 1042   Wound Healing % -8 11/04/20 1042   Distance Tunneling (cm) 0 cm 11/04/20 1042   Tunneling Position ___ O'Clock 0 11/04/20 1042   Undermining Starts ___ O'Clock 0 11/04/20 1042   Undermining Ends___ O'Clock 0 11/04/20 1042   Undermining Maxium Distance (cm) 0 11/04/20 1042   Wound Assessment Slough 11/04/20 1042   Drainage Amount Copious 11/04/20 1042   Drainage Description Serous 11/04/20 1042   Odor None 11/04/20 1042   Octavia-wound Assessment Intact 11/04/20 1042   Margins Attached edges 11/04/20 1042   Wound Thickness Description not for Pressure Injury Full thickness 11/04/20 1042   Number of days: 43       Wound 09/21/20 Pretibial Right open blistered area (Active)   Number of days: 43       Wound 10/27/20 Pretibial Left;Proximal wound 2- left proximal venous (Active)   Wound Image   11/04/20 1042   Wound Etiology Venous 11/04/20 1042   Dressing Status Old drainage noted 11/04/20 1042   Wound Cleansed Soap and water 11/04/20 1042   Dressing/Treatment Other (comment) 10/27/20 0843   Wound Length (cm) 1.5 cm 11/04/20 1042   Wound Width (cm) 5.5 cm 11/04/20 1042   Wound Depth (cm) 0.1 cm 11/04/20 1042   Wound Surface Area (cm^2) 8.25 cm^2 11/04/20 1042   Change in Wound Size % (l*w) -37.5 11/04/20 1042   Wound Volume (cm^3) 0.82 cm^3 11/04/20 1042   Wound Healing % -37 11/04/20 1042   Distance Tunneling (cm) 0 cm 11/04/20 1042   Tunneling Position ___ O'Clock 0 11/04/20 1042   Undermining Starts ___ O'Clock 0 11/04/20 1042   Undermining Ends___ O'Clock 0 11/04/20 1042   Undermining Maxium Distance (cm) 0 11/04/20 1042   Wound Assessment [facility] 11/04/20 1042   Drainage Amount Copious 11/04/20 1042   Drainage Description Serous 11/04/20 1042 Odor None 11/04/20 1042   Octavia-wound Assessment Intact 11/04/20 1042   Margins Attached edges 11/04/20 1042   Wound Thickness Description not for Pressure Injury Full thickness 11/04/20 1042   Number of days: 8             Plan:     Problem List Items Addressed This Visit     Uncontrolled type 2 diabetes mellitus with hyperglycemia, without long-term current use of insulin (HCC) - Primary (Chronic)    * (Principal) Diabetic ulcer of left lower leg associated with type 2 diabetes mellitus, with fat layer exposed (HCC) (Chronic)    Lymphedema of both lower extremities        Pt with lymphedema and stockings with wounds. No debridement MOHINI OK start compression. Treatment Note please see attached Discharge Instructions    In my professional opinion this patient would benefit from HBO Therapy: No    Written patient dismissal instructions given to patient and signed by patient or POA. Discharge Instructions       . [address] and [facility] O[facility] Therapy   Physician Orders and Discharge Instructions  [address]  Telephone: [phone]    NAME:  Yury Hair:  1970  MEDICAL RECORD NUMBER:  [id number]  DATE:  11/4/2020    Discharge condition: Stable    Discharge to: Home    Left via:Private automobile    Accompanied by: Self    Dressing Orders: Left Lower Ext Ulcer  Xeroform to open areas  Copa to pad as needed, Calamine Coflex Unnaboot, Keep clean and Dry  Elevate feet to level or above heart 3-4 times daily and as needed to for 30 minutes to reduce swelling  Remove wraps and call office if- Wraps get wet, Cause increased pain, Slide down or you are unable to make your next scheduled appointment   Multi Vitamin, High Protein diet as tolerated  Use Lymphedema Pumps as Instructed     Treatment Orders:  Protein rich diet (unless restricted by your physician); Multivitamin daily;  Elevate legs above the level of your heart when  sitting 3-4 times daily for at least one hour each time, avoid standing for long periods of time    [facility] followup visit ____________1 week_________________  (Please note your next appointment above and if you are unable to keep, kindly give a 24 hour notice. Thank you.)    If you experience any of the following, please call the [address] during business hours:    * Increase in Pain  * Temperature over 101  * Increase in drainage from your wound  * Drainage with a foul odor  * Bleeding  * Increase in swelling  * Need for compression bandage changes due to slippage, breakthrough drainage. If you need medical attention outside of the business hours of the [address] please contact your PCP or go to the nearest emergency room.         Electronically signed by Ashley Loera MD on 11/4/2020 at 11:10 AM

## 2020-11-05 ENCOUNTER — HOSPITAL ENCOUNTER (OUTPATIENT)
Dept: WOUND CARE | Age: 50
Discharge: HOME | End: 2020-11-05
Payer: MEDICAID

## 2020-11-05 VITALS
WEIGHT: 293 LBS | SYSTOLIC BLOOD PRESSURE: 152 MMHG | RESPIRATION RATE: 18 BRPM | DIASTOLIC BLOOD PRESSURE: 95 MMHG | HEART RATE: 90 BPM | BODY MASS INDEX: 55.32 KG/M2 | HEIGHT: 61 IN | TEMPERATURE: 97 F

## 2020-11-05 PROCEDURE — 29580 STRAPPING UNNA BOOT: CPT

## 2020-11-05 PROCEDURE — 99212 OFFICE O/P EST SF 10 MIN: CPT | Performed by: SURGERY

## 2020-11-05 RX ORDER — MINOCYCLINE HYDROCHLORIDE 100 MG/1
100 CAPSULE ORAL 2 TIMES DAILY
Qty: 60 CAPSULE | Refills: 0 | Status: SHIPPED | OUTPATIENT
Start: 2020-11-05 | End: 2020-12-05

## 2020-11-05 ASSESSMENT — PAIN SCALES - GENERAL: PAINLEVEL_OUTOF10: 4

## 2020-11-05 ASSESSMENT — PAIN DESCRIPTION - ONSET: ONSET: ON-GOING

## 2020-11-05 ASSESSMENT — PAIN DESCRIPTION - FREQUENCY: FREQUENCY: CONTINUOUS

## 2020-11-05 ASSESSMENT — PAIN DESCRIPTION - ORIENTATION: ORIENTATION: RIGHT;LEFT

## 2020-11-05 ASSESSMENT — PAIN DESCRIPTION - DESCRIPTORS: DESCRIPTORS: ACHING;BURNING;SORE;TENDER

## 2020-11-05 ASSESSMENT — PAIN DESCRIPTION - PROGRESSION: CLINICAL_PROGRESSION: NOT CHANGED

## 2020-11-05 ASSESSMENT — PAIN DESCRIPTION - LOCATION: LOCATION: LEG

## 2020-11-05 ASSESSMENT — PAIN DESCRIPTION - PAIN TYPE: TYPE: ACUTE PAIN

## 2020-11-05 NOTE — PROGRESS NOTES
[address]   Progress Note and Procedure Note      Tianna Trivedi  MEDICAL RECORD NUMBER:  [id number]  AGE: 52 y.o. GENDER: female  : 1970  EPISODE DATE:  2020    Subjective:     Chief Complaint   Patient presents with    Wound Check     follow up         HISTORY of PRESENT ILLNESS HPI     Tianna Trivedi is a 52 y.o. female who presents today for wound/ulcer evaluation.    History of Wound Context: Pt with lymphedema and LLE wounds here for eval/treat  Wound/Ulcer Pain Timing/Severity: none  Quality of pain: N/A  Severity:  0 / 10   Modifying Factors: None  Associated Signs/Symptoms: edema and drainage    Ulcer Identification:  Ulcer Type: venous  Contributing Factors: edema, venous stasis and lymphedema    Wound: venous        PAST MEDICAL HISTORY        Diagnosis Date    Anxiety     Constipation     Depression     DM (diabetes mellitus) (HCC)     Headache(784.0)     Hyperlipidemia     Hypertension     Kidney stones     Kidney stones     Restless leg     Restless legs syndrome        PAST SURGICAL HISTORY    Past Surgical History:   Procedure Laterality Date    ECTOPIC PREGNANCY SURGERY      EYE SURGERY      cornea transplant and cataracts removed    KNEE CARTILAGE SURGERY Left 2016    KNEE ARTHROSCOPIC PARTIAL MEDIAL MENISCECTOMY performed by Bradford [facility] at [facility] OR    TUBAL LIGATION         FAMILY HISTORY    Family History   Problem Relation Age of Onset    Cancer Father     Cancer Maternal Grandmother     COPD Maternal Grandmother     Cancer Maternal Grandfather        SOCIAL HISTORY    Social History     Tobacco Use    Smoking status: Never Smoker    Smokeless tobacco: Never Used   Substance Use Topics    Alcohol use: No    Drug use: No       ALLERGIES    Allergies   Allergen Reactions    Compazine [Prochlorperazine Maleate] Shortness Of Breath    Ciprofloxacin Hives    Amoxicillin-Pot Clavulanate     Compazine [Prochlorperazine]     Demerol Hives    Hydroxyzine     Hydroxyzine Hcl Itching    Ibuprofen Nausea Only    Ketorolac Tromethamine     Meperidine Hives    Nortriptyline Other (See Comments)    Orphenadrine     Orphenadrine Citrate Itching    Penicillins Hives and Nausea Only    Prochlorperazine Edisylate      Will discuss with patient at next visit     Tobramycin      Will discuss with patient at next visit     Tramadol      Will discuss with patient at next visit     Vistaril [Hydroxyzine Hcl] Itching    Cephalexin Rash    Clindamycin/Lincomycin Rash    Codeine Nausea And Vomiting and Rash    Doxycycline Nausea And Vomiting    Keflex [Cephalexin] Rash    Ketorolac Tromethamine Itching and Nausea And Vomiting    Moxifloxacin Nausea And Vomiting     Will discuss with patient at visit        MEDICATIONS    Current Outpatient Medications on File Prior to Encounter   Medication Sig Dispense Refill    busPIRone (BUSPAR) 5 MG tablet Take 5 mg by mouth 3 times daily      oxyCODONE-acetaminophen (PERCOCET) 7.5-325 MG per tablet Take 1 tablet by mouth 2 times daily.  Zinc Oxide 6 % CREA Apply 1 Units topically 3 times daily JESUSITA'S CREAM  Zinc oxided 20%, hydrocortisone, nystatin, bacitracin 1 Bottle 3    DULoxetine (CYMBALTA) 60 MG extended release capsule Take 1 capsule by mouth 2 times daily 30 capsule 5    ondansetron (ZOFRAN-ODT) 4 MG disintegrating tablet DISSOLVE 1 TABLET UNDER THE TONGUE EVERY 8 HOURS AS NEEDED FOR NAUSEA AND VOMITING(TAKE PRIOR TO DOXYCYLINE DOSES) 20 tablet 0    tiZANidine (ZANAFLEX) 4 MG tablet Take 4 mg by mouth daily as needed      triamterene-hydroCHLOROthiazide (MAXZIDE-25) 37.5-25 MG per tablet Take 1 tablet by mouth daily      NONFORMULARY Prednisone eye drops 1 drop in each eye daily      Morphine Sulfate ER 15 MG T12A Take 15 mg by mouth 2 times daily.       Cream Base CREA APPLY ONE PUMP (GRAM) TO AFFECTED AREA(S) THREE TO FOUR TIMES A DAY TO THE APPENDAGES AND TRUNK AS DIRECTED 30 g 5    insulin lispro, 1 Unit Dial, (ADMELOG SOLOSTAR) 100 UNIT/ML SOPN ADMINISTER 45 UNITS UNDER THE SKIN EVERY EVENING. INCREASE BY 3 UNITS EVERY NIGHT UNTIL 60 UNITS IS REACHED AND. CONTINUE 60 UNITS EVERY EVENING 18 mL 5    celecoxib (CELEBREX) 200 MG capsule TAKE 1 CAPSULE BY MOUTH EVERY DAY 60 capsule 5    insulin glargine (BASAGLAR KWIKPEN) 100 UNIT/ML injection pen INJECT 30 UNITS INTO THE SKIN EVERY EVENING 15 mL 1    pramipexole (MIRAPEX) 0.75 MG tablet Take 1 tablet by mouth 2 times daily 60 tablet 0    furosemide (LASIX) 80 MG tablet Take 1 tablet by mouth 2 times daily (Patient taking differently: Take 80 mg by mouth 2 times daily as needed ) 60 tablet 0    potassium chloride (KLOR-CON M) 20 MEQ extended release tablet Take 1 tablet by mouth daily 30 tablet 0    omeprazole (PRILOSEC) 20 MG delayed release capsule TAKE 1 CAPSULE BY MOUTH TWICE DAILY BEFORE MEALS (Patient taking differently: Take 40 mg by mouth Daily ) 180 capsule 1    diclofenac sodium (VOLTAREN) 1 % GEL Apply 2 g topically 2 times daily      cyclobenzaprine (FLEXERIL) 10 MG tablet Take 10 mg by mouth 3 times daily as needed for Muscle spasms       blood glucose test strips (ASCENSIA AUTODISC VI;ONE TOUCH ULTRA TEST VI) strip Check glucose TID and as needed for hypoglycemic events Dx E11.40 E11.66 Z79.4  Use one touch verio flex 150 each 5    B-D ULTRAFINE III SHORT PEN 31G X 8 MM MISC USE TO INJECT INSULIN ONCE DAILY 100 each 3    Diabetic Shoe MISC by Does not apply route DISPENSE ONE PAIR OF DIABETIC SHOE AND 3 PAIRS HEAT MOLDED INSERTS 1 each 0    Lancets (ONETOUCH DELICA PLUS [id number]) MISC USE TO CHECK BLLOD SUGAR AS DIRECTED 100 each 1    blood glucose monitor kit and supplies Test 1 times a day & as needed for symptoms of irregular blood glucose.  1 kit 0    VICTOZA 18 MG/3ML SOPN SC injection ADMINISTER 1.8 MG INTO THE SKIN EVERY DAY 9 mL 3    gabapentin (NEURONTIN) 100 MG capsule Take 100 mg by mouth 2 times daily. Am and 2 pm      gabapentin (NEURONTIN) 300 MG capsule TK ONE C PO QHS  5    Blood Glucose Monitoring Suppl ([address]) w/Device KIT 1 kit by Does not apply route daily as needed (Dx 250.00) 1 kit 0    ONE TOUCH LANCETS MISC 1 each by Does not apply route daily 100 each 3    clonazePAM (KLONOPIN) 0.5 MG tablet Take 1 tablet by mouth 2 times daily as needed for Anxiety for up to 30 days. 45 tablet 0    ZOLMitriptan (ZOMIG) 5 MG nasal solution 0.1 mLs by Nasal route once as needed for Migraine 1 each 3    ONE TOUCH ULTRA TEST strip TEST BLOOD SUGAR ONCE DAILY 100 strip 5     No current facility-administered medications on file prior to encounter. REVIEW OF SYSTEMS    A comprehensive review of systems was negative.     Objective:      BP (!) 152/95   Pulse 90   Temp 97 °F (36.1 °C) (Temporal)   Resp 18   Ht 5' 1\" (1.549 m)   Wt (!) 322 lb (146.1 kg)   BMI 60.84 kg/m²     Wt Readings from Last 3 Encounters:   11/05/20 (!) 322 lb (146.1 kg)   11/04/20 (!) 322 lb (146.1 kg)   10/27/20 (!) 322 lb (146.1 kg)       PHYSICAL EXAM    General Appearance: alert and oriented to person, place and time, well developed and well- nourished, in no acute distress  Skin: warm and dry, no rash or erythema  Head: normocephalic and atraumatic  Eyes: pupils equal, round, and reactive to light, extraocular eye movements intact, conjunctivae normal  ENT: tympanic membrane, external ear and ear canal normal bilaterally, nose without deformity, nasal mucosa and turbinates normal without polyps, lips teeth and gums normal  Neck: supple and non-tender without mass, no thyromegaly or thyroid nodules, no cervical lymphadenopathy  Pulmonary/Chest: clear to auscultation bilaterally- no wheezes, rales or rhonchi, normal air movement, no respiratory distress  Cardiovascular: normal rate, regular rhythm, normal S1 and S2, no murmurs, rubs, clicks, or gallops, distal pulses intact, no carotid bruits  Abdomen: soft, non-tender, non-distended, normal bowel sounds, no masses or organomegaly  Extremities: no cyanosis, clubbing or edema  Musculoskeletal: normal range of motion, no joint swelling, deformity or tenderness  Neurologic: reflexes normal and symmetric, no cranial nerve deficit, gait, coordination and speech normal, skin sensation normal      Assessment:      Patient Active Problem List   Diagnosis Code    RLS (restless legs syndrome) G25.81    Hypertension I10    Headache R51.9    Venous insufficiency of both lower extremities I87.2    Mild single current episode of major depressive disorder ([facility]) F32.0    [place] (generalized anxiety disorder) F41.1    Old complex tear of medial meniscus of left knee M23.204    Uncontrolled type 2 diabetes mellitus with hyperglycemia, without long-term current use of insulin ([facility]) E11.65    Migraine without aura and without status migrainosus, not intractable G43.009    Morbid obesity with BMI of 60.0-69.9, adult ([facility]) E66.01, Z68.44    Uncontrolled type 2 diabetes mellitus with diabetic neuropathy, with long-term current use of insulin ([facility]) E11.40, Z79.4, E11.65    Dyspnea R06.00    Upper respiratory tract infection J06.9    Pleurisy R09.1    Gastroesophageal reflux disease K21.9    Hypoglycemia E16.2    Hypokalemia E87.6    Recurrent cellulitis of lower extremity L03.119    Bilateral lower leg cellulitis L03.116, L03.115    Cellulitis of both lower extremities L03.115, L03. 116    Lymphedema of both lower extremities I89.0    Diabetic ulcer of left lower leg associated with type 2 diabetes mellitus, with fat layer exposed ([facility] [place] E11.622, L97.922             Wound 09/21/20 Pretibial Left;Medial wound 1- left medial venous ulcer (Active)   Wound Image   11/04/20 1042   Wound Etiology Venous 11/05/20 1159   Dressing Status Old drainage noted 11/05/20 1159   Wound Cleansed Soap and water 11/05/20 1159   Dressing/Treatment Other (comment) 10/27/20 0843   Wound Length (cm) 1159   Octavia-wound Assessment Intact 11/05/20 1159   Margins Attached edges 11/05/20 1159   Wound Thickness Description not for Pressure Injury Full thickness 11/05/20 1159   Number of days: 9             Plan:     Problem List Items Addressed This Visit     Uncontrolled type 2 diabetes mellitus with hyperglycemia, without long-term current use of insulin (HCC) (Chronic)    * (Principal) Diabetic ulcer of left lower leg associated with type 2 diabetes mellitus, with fat layer exposed ([address] - Primary (Chronic)    Relevant Orders    Supply: Wound Cleanser    Supply: Wound Dressings    Supply: Pack Wound    Supply: Cover and Secure    Supply: Edema Control    Lymphedema of both lower extremities    Relevant Orders    Supply: Wound Cleanser    Supply: Wound Dressings    Supply: Pack Wound    Supply: Cover and Secure    Supply: Edema Control        Pt comes for increased drainage requiring taking wrap off and here for abx and rewrap  Minocycline ordered for 30 days. Allergy noted and discussed with pt    Treatment Note please see attached Discharge Instructions    In my professional opinion this patient would benefit from HBO Therapy: No    Written patient dismissal instructions given to patient and signed by patient or POA.          Discharge [address] and Hyperbaric Oxygen Therapy   Physician Orders and Discharge Instructions  [facility]  [address]  Telephone: [phone]    NAME:  Yury Hair:  1970  MEDICAL RECORD NUMBER:  [id number]  DATE:  11/5/2020    Discharge condition: Stable    Discharge to: Home    Left via:Private automobile    Accompanied by: Self     Dressing Orders: Right and Left Lower Ext Ulcer  Xeroform to open areas  Copa to pad as needed, (Optilock for drainage)  Blue Coflex Unnaboot, Keep clean and Dry  Elevate feet to level or above heart 3-4 times daily and as needed to for 30 minutes to reduce swelling  Remove wraps and call office if- Wraps get wet, Cause increased pain, Slide down or you are unable to make your next scheduled appointment   Multi Vitamin, High Protein diet as tolerated  Use Lymphedema Pumps as Instructed   Take Antibiotic as tolerated     Treatment Orders:  Protein rich diet (unless restricted by your physician); Multivitamin daily; Elevate legs above the level of your heart when  sitting 3-4 times daily for at least one hour each time, avoid standing for long periods of     [facility] follow up visit _____________1 week________________  (Please note your next appointment above and if you are unable to keep, kindly give a 24 hour notice. Thank you.)    If you experience any of the following, please call the [facility] during business hours:    * Increase in Pain  * Temperature over 101  * Increase in drainage from your wound  * Drainage with a foul odor  * Bleeding  * Increase in swelling  * Need for compression bandage changes due to slippage, breakthrough drainage. If you need medical attention outside of the business hours of the [address] please contact your PCP or go to the nearest emergency room.         Electronically signed by Angelica Catherine MD on 11/5/2020 at 1:29 PM

## 2020-11-05 NOTE — PLAN OF CARE
[place] Application   Below Knee    NAME:  Tianna Trivedi  YOB: 1970  MEDICAL RECORD NUMBER:  [id number]  DATE:  11/5/2020     [x] Applied moisturizing agent to dry skin as needed.  [x] Appied primary and secondary dressing as ordered     [x] Applied Unna roll from toes to knee overlapping each time.  [x] Applied ace wrap or coban from toes to below the knee. Arlyn Brooks [x] Instructed patient/caregiver to keep dressing dry and intact. DO NOT REMOVE DRESSING.  [x] Instructed pt/family/caregiver to report excessive draining, loose bandage, wet dressing, severe pain or tingling in toes.  [x] Applied [place] dressing below the knee to Bilateral lower leg(s)    [x]  Unna Boot(s) were applied per  Guidelines.      Electronically signed by Tal Chadwick RN on 11/5/2020 at 5:23 PM

## 2020-11-09 ENCOUNTER — HOSPITAL ENCOUNTER (OUTPATIENT)
Dept: WOUND CARE | Age: 50
Discharge: HOME | End: 2020-11-09
Payer: MEDICAID

## 2020-11-09 VITALS
RESPIRATION RATE: 16 BRPM | BODY MASS INDEX: 55.32 KG/M2 | DIASTOLIC BLOOD PRESSURE: 81 MMHG | HEIGHT: 61 IN | WEIGHT: 293 LBS | TEMPERATURE: 98 F | SYSTOLIC BLOOD PRESSURE: 134 MMHG | HEART RATE: 94 BPM

## 2020-11-09 PROCEDURE — 97598 DBRDMT OPN WND ADDL 20CM/<: CPT

## 2020-11-09 PROCEDURE — 97598 DBRDMT OPN WND ADDL 20CM/<: CPT | Performed by: SURGERY

## 2020-11-09 PROCEDURE — 97597 DBRDMT OPN WND 1ST 20 CM/<: CPT

## 2020-11-09 PROCEDURE — 97597 DBRDMT OPN WND 1ST 20 CM/<: CPT | Performed by: SURGERY

## 2020-11-09 ASSESSMENT — PAIN DESCRIPTION - DESCRIPTORS: DESCRIPTORS: ACHING;SORE

## 2020-11-09 ASSESSMENT — PAIN DESCRIPTION - PROGRESSION: CLINICAL_PROGRESSION: NOT CHANGED

## 2020-11-09 ASSESSMENT — PAIN DESCRIPTION - ONSET: ONSET: ON-GOING

## 2020-11-09 ASSESSMENT — PAIN SCALES - GENERAL: PAINLEVEL_OUTOF10: 4

## 2020-11-09 ASSESSMENT — PAIN DESCRIPTION - FREQUENCY: FREQUENCY: CONTINUOUS

## 2020-11-09 ASSESSMENT — PAIN DESCRIPTION - LOCATION: LOCATION: LEG

## 2020-11-09 ASSESSMENT — PAIN DESCRIPTION - PAIN TYPE: TYPE: ACUTE PAIN

## 2020-11-09 ASSESSMENT — PAIN DESCRIPTION - ORIENTATION: ORIENTATION: LEFT

## 2020-11-09 NOTE — PROGRESS NOTES
[address]   Progress Note and Procedure Note      Tianna Trivedi  MEDICAL RECORD NUMBER:  [id number]  AGE: 52 y.o. GENDER: female  : 1970  EPISODE DATE:  2020    Subjective:     Chief Complaint   Patient presents with    Wound Infection     left leg wounds         HISTORY of PRESENT ILLNESS HPI     Tianna Trivedi is a 52 y.o. female who presents today for wound/ulcer evaluation.    Wound Context: Pt with LLE wound here for eval/treat  Wound/Ulcer Pain Timing/Severity: none  Quality of pain: N/A  Severity:  0 / 10   Modifying Factors: None  Associated Signs/Symptoms: edema    Ulcer Identification:  Ulcer Type: venous  Contributing Factors: edema, venous stasis and lymphedema    Wound: venous        PAST MEDICAL HISTORY        Diagnosis Date    Anxiety     Constipation     Depression     DM (diabetes mellitus) (HCC)     Headache(784.0)     Hyperlipidemia     Hypertension     Kidney stones     Kidney stones     Restless leg     Restless legs syndrome        PAST SURGICAL HISTORY    Past Surgical History:   Procedure Laterality Date    ECTOPIC PREGNANCY SURGERY      EYE SURGERY      cornea transplant and cataracts removed    KNEE CARTILAGE SURGERY Left 2016    KNEE ARTHROSCOPIC PARTIAL MEDIAL MENISCECTOMY performed by Marta Yang MD at [facility] OR    TUBAL LIGATION         FAMILY HISTORY    Family History   Problem Relation Age of Onset    Cancer Father     Cancer Maternal Grandmother     COPD Maternal Grandmother     Cancer Maternal Grandfather        SOCIAL HISTORY    Social History     Tobacco Use    Smoking status: Never Smoker    Smokeless tobacco: Never Used   Substance Use Topics    Alcohol use: No    Drug use: No       ALLERGIES    Allergies   Allergen Reactions    Compazine [Prochlorperazine Maleate] Shortness Of Breath    Ciprofloxacin Hives    Amoxicillin-Pot Clavulanate     Compazine [Prochlorperazine]     Demerol Hives    Hydroxyzine AREA(S) THREE TO FOUR TIMES A DAY TO THE APPENDAGES AND TRUNK AS DIRECTED 30 g 5    insulin lispro, 1 Unit Dial, (ADMELOG SOLOSTAR) 100 UNIT/ML SOPN ADMINISTER 45 UNITS UNDER THE SKIN EVERY EVENING. INCREASE BY 3 UNITS EVERY NIGHT UNTIL 60 UNITS IS REACHED AND. CONTINUE 60 UNITS EVERY EVENING 18 mL 5    celecoxib (CELEBREX) 200 MG capsule TAKE 1 CAPSULE BY MOUTH EVERY DAY 60 capsule 5    insulin glargine (BASAGLAR KWIKPEN) 100 UNIT/ML injection pen INJECT 30 UNITS INTO THE SKIN EVERY EVENING 15 mL 1    pramipexole (MIRAPEX) 0.75 MG tablet Take 1 tablet by mouth 2 times daily 60 tablet 0    furosemide (LASIX) 80 MG tablet Take 1 tablet by mouth 2 times daily (Patient taking differently: Take 80 mg by mouth 2 times daily as needed ) 60 tablet 0    potassium chloride (KLOR-CON M) 20 MEQ extended release tablet Take 1 tablet by mouth daily 30 tablet 0    omeprazole (PRILOSEC) 20 MG delayed release capsule TAKE 1 CAPSULE BY MOUTH TWICE DAILY BEFORE MEALS (Patient taking differently: Take 40 mg by mouth Daily ) 180 capsule 1    diclofenac sodium (VOLTAREN) 1 % GEL Apply 2 g topically 2 times daily      cyclobenzaprine (FLEXERIL) 10 MG tablet Take 10 mg by mouth 3 times daily as needed for Muscle spasms       blood glucose test strips (ASCENSIA AUTODISC VI;ONE TOUCH ULTRA TEST VI) strip Check glucose TID and as needed for hypoglycemic events Dx E11.40 E11.66 Z79.4  Use one touch verio flex 150 each 5    B-D ULTRAFINE III SHORT PEN 31G X 8 MM MISC USE TO INJECT INSULIN ONCE DAILY 100 each 3    Diabetic Shoe MISC by Does not apply route DISPENSE ONE PAIR OF DIABETIC SHOE AND 3 PAIRS HEAT MOLDED INSERTS 1 each 0    Lancets (ONETOUCH DELICA PLUS [id number]) MISC USE TO CHECK BLLOD SUGAR AS DIRECTED 100 each 1    blood glucose monitor kit and supplies Test 1 times a day & as needed for symptoms of irregular blood glucose.  1 kit 0    VICTOZA 18 MG/3ML SOPN SC injection ADMINISTER 1.8 MG INTO THE SKIN EVERY DAY 9 mL 3    gabapentin (NEURONTIN) 100 MG capsule Take 100 mg by mouth 2 times daily. Am and 2 pm      gabapentin (NEURONTIN) 300 MG capsule TK ONE C PO QHS  5    Blood Glucose Monitoring Suppl ([address]) w/Device KIT 1 kit by Does not apply route daily as needed (Dx 250.00) 1 kit 0    ONE TOUCH LANCETS MISC 1 each by Does not apply route daily 100 each 3    clonazePAM (KLONOPIN) 0.5 MG tablet Take 1 tablet by mouth 2 times daily as needed for Anxiety for up to 30 days. 45 tablet 0    ZOLMitriptan (ZOMIG) 5 MG nasal solution 0.1 mLs by Nasal route once as needed for Migraine 1 each 3    ONE TOUCH ULTRA TEST strip TEST BLOOD SUGAR ONCE DAILY 100 strip 5     No current facility-administered medications on file prior to encounter. REVIEW OF SYSTEMS    A comprehensive review of systems was negative.     Objective:      /81   Pulse 94   Temp 98 °F (36.7 °C) (Temporal)   Resp 16   Ht 5' 1\" (1.549 m)   Wt (!) 322 lb (146.1 kg)   BMI 60.84 kg/m²     Wt Readings from Last 3 Encounters:   11/09/20 (!) 322 lb (146.1 kg)   11/05/20 (!) 322 lb (146.1 kg)   11/04/20 (!) 322 lb (146.1 kg)       PHYSICAL EXAM    General Appearance: alert and oriented to person, place and time, well developed and well- nourished, in no acute distress  Skin: warm and dry, no rash or erythema  Head: normocephalic and atraumatic  Eyes: pupils equal, round, and reactive to light, extraocular eye movements intact, conjunctivae normal  ENT: tympanic membrane, external ear and ear canal normal bilaterally, nose without deformity, nasal mucosa and turbinates normal without polyps, lips teeth and gums normal  Neck: supple and non-tender without mass, no thyromegaly or thyroid nodules, no cervical lymphadenopathy  Pulmonary/Chest: clear to auscultation bilaterally- no wheezes, rales or rhonchi, normal air movement, no respiratory distress  Cardiovascular: normal rate, regular rhythm, normal S1 and S2, no murmurs, rubs, clicks, or gallops, distal pulses intact, no carotid bruits  Abdomen: soft, non-tender, non-distended, normal bowel sounds, no masses or organomegaly  Extremities: no cyanosis, clubbing or edema  Musculoskeletal: normal range of motion, no joint swelling, deformity or tenderness  Neurologic: reflexes normal and symmetric, no cranial nerve deficit, gait, coordination and speech normal, skin sensation normal      Assessment:      Problem List Items Addressed This Visit     Uncontrolled type 2 diabetes mellitus with hyperglycemia, without long-term current use of insulin (HCC) (Chronic)    * (Principal) Diabetic ulcer of left lower leg associated with type 2 diabetes mellitus, with fat layer exposed (HCC) - Primary (Chronic)    Relevant Orders    Supply: Wound Cleanser    Supply: Wound Dressings    Supply: Cover and Secure    Supply: Edema Control    Lymphedema of both lower extremities    Relevant Orders    Supply: Wound Cleanser    Supply: Wound Dressings    Supply: Cover and Secure    Supply: Edema Control           Procedure Note  Indications:  Based on my examination of this patient's wound(s)/ulcer(s) today, debridement is required to promote healing and evaluate the wound base. Performed by: Regine Toth MD    Consent obtained:  Yes    Time out taken:  Yes    Pain Control:         Debridement:Non-excisional Debridement    Using curette the wound(s)/ulcer(s) was/were sharply debrided down through and including the removal of epidermis and dermis.         Devitalized Tissue Debrided:  fibrin, biofilm, slough, necrotic/eschar and exudate      Pre Debridement Measurements:  Are located in the Wound/Ulcer Documentation Flow Sheet    Wound/Ulcer #: 1 and 2    Percent of Wound(s)/Ulcer(s) Debrided: 100%    Total Surface Area Debrided:  52 sq cm       Diabetic/Pressure/Non Pressure Ulcers only:  Ulcer: Diabetic ulcer, fat layer exposed             Post Debridement Measurements:    Wound/Ulcer Descriptions are Pre Debridement --EXCEPT MEASUREMENTS    Wound 09/21/20 Pretibial Left;Medial wound 1- left medial venous ulcer (Active)   Wound Image   11/09/20 1400   Wound Etiology Venous 11/09/20 1400   Dressing Status Old drainage noted 11/09/20 1400   Wound Cleansed Soap and water 11/09/20 1400   Dressing/Treatment Xeroform 11/05/20 1230   Wound Length (cm) 4 cm 11/09/20 1400   Wound Width (cm) 13 cm 11/09/20 1400   Wound Depth (cm) 0.1 cm 11/09/20 1400   Wound Surface Area (cm^2) 52 cm^2 11/09/20 1400   Change in Wound Size % (l*w) 3.7 11/09/20 1400   Wound Volume (cm^3) 5.2 cm^3 11/09/20 1400   Wound Healing % 4 11/09/20 1400   Post-Procedure Length (cm) 4 cm 11/09/20 1424   Post-Procedure Width (cm) 13 cm 11/09/20 1424   Post-Procedure Depth (cm) 0.1 cm 11/09/20 1424   Post-Procedure Surface Area (cm^2) 52 cm^2 11/09/20 1424   Post-Procedure Volume (cm^3) 5.2 cm^3 11/09/20 1424   Distance Tunneling (cm) 0 cm 11/09/20 1400   Tunneling Position ___ O'Clock 0 11/09/20 1400   Undermining Starts ___ O'Clock 0 11/09/20 1400   Undermining Ends___ O'Clock 0 11/09/20 1400   Undermining Maxium Distance (cm) 0 11/09/20 1400   Wound Assessment Slough 11/09/20 1400   Drainage Amount Copious 11/09/20 1400   Drainage Description Serous 11/09/20 1400   Odor None 11/09/20 1400   Octavia-wound Assessment Intact 11/09/20 1400   Margins Attached edges 11/09/20 1400   Wound Thickness Description not for Pressure Injury Full thickness 11/09/20 1400   Number of days: 48       Wound 09/21/20 Pretibial Right open blistered area (Active)   Number of days: 48       Wound 10/27/20 Pretibial Left;Proximal wound 2- left proximal venous (Active)   Wound Image   11/09/20 1400   Wound Etiology Venous 11/09/20 1400   Dressing Status Old drainage noted 11/09/20 1400   Wound Cleansed Cleansed with saline 11/05/20 1159   Dressing/Treatment Xeroform 11/05/20 1230   Wound Length (cm) 1 cm 11/09/20 1400   Wound Width (cm) 0 cm 11/09/20 1400   Wound Depth (cm) 0.1 cm 11/09/20 1400   Wound Surface Area (cm^2) 0 cm^2 11/09/20 1400   Change in Wound Size % (l*w) 100 11/09/20 1400   Wound Volume (cm^3) 0 cm^3 11/09/20 1400   Wound Healing % 100 11/09/20 1400   Post-Procedure Length (cm) 1 cm 11/09/20 1424   Post-Procedure Width (cm) 0.1 cm 11/09/20 1424   Post-Procedure Depth (cm) 0.1 cm 11/09/20 1424   Post-Procedure Surface Area (cm^2) 0.1 cm^2 11/09/20 1424   Post-Procedure Volume (cm^3) 0.01 cm^3 11/09/20 1424   Distance Tunneling (cm) 0 cm 11/09/20 1400   Tunneling Position ___ O'Clock 0 11/09/20 1400   Undermining Starts ___ O'Clock 0 11/09/20 1400   Undermining Ends___ O'Clock 0 11/09/20 1400   Undermining Maxium Distance (cm) 0 11/09/20 1400   Wound Assessment Slough 11/09/20 1400   Drainage Amount Copious 11/09/20 1400   Drainage Description Serous 11/09/20 1400   Odor None 11/09/20 1400   Octavia-wound Assessment Intact 11/09/20 1400   Margins Attached edges 11/09/20 1400   Wound Thickness Description not for Pressure Injury Full thickness 11/09/20 1400   Number of days: 13             Estimated Blood Loss:  Minimal    Hemostasis Achieved:  by pressure and by silver nitrate stick    Procedural Pain:  0  / 10     Post Procedural Pain:  0 / 10     Response to treatment:  Well tolerated by patient.          Plan:     Problem List Items Addressed This Visit     Uncontrolled type 2 diabetes mellitus with hyperglycemia, without long-term current use of insulin (HCC) (Chronic)    * (Principal) Diabetic ulcer of left lower leg associated with type 2 diabetes mellitus, with fat layer exposed ([address] - Primary (Chronic)    Relevant Orders    Supply: Wound Cleanser    Supply: Wound Dressings    Supply: Cover and Secure    Supply: Edema Control    Lymphedema of both lower extremities    Relevant Orders    Supply: Wound Cleanser    Supply: Wound Dressings    Supply: Cover and Secure    Supply: Edema Control          Treatment Note please see attached Discharge Instructions    In my professional opinion this patient would benefit from HBO Therapy: No    Written patient dismissal instructions given to patient and signed by patient or POA. Discharge [address] and Hyperbaric Oxygen Therapy   Physician Orders and Discharge Instructions  [address]  Telephone: [phone]    NAME:  Artis Barbernut:  1970  MEDICAL RECORD NUMBER:  [id number]  DATE:  11/9/2020    Discharge condition: Stable    Discharge to: Home    Left via:Private automobile    Accompanied by: Self     Dressing Orders: Right and Left Lower Ext Ulcer  Xeroform to open areas  Copa to pad as needed, (Optilock for drainage)  Blue Coflex Unnaboot, Keep clean and Dry  Elevate feet to level or above heart 3-4 times daily and as needed to for 30 minutes to reduce swelling  Remove wraps and call office if- Wraps get wet, Cause increased pain, Slide down or you are unable to make your next scheduled appointment   Multi Vitamin, High Protein diet as tolerated  Use Lymphedema Pumps as Instructed   Take Antibiotic as tolerated      Treatment Orders:  Protein rich diet (unless restricted by your physician); Multivitamin daily; Elevate legs above the level of your heart when  sitting 3-4 times daily for at least one hour each time, avoid standing for long periods of     [facility] follow up visit ___________Nurse visit Thursday, Dr visit Monday________________  (Please note your next appointment above and if you are unable to keep, kindly give a 24 hour notice. Thank you.)    If you experience any of the following, please call the [facility] during business hours:    * Increase in Pain  * Temperature over 101  * Increase in drainage from your wound  * Drainage with a foul odor  * Bleeding  * Increase in swelling  * Need for compression bandage changes due to slippage, breakthrough drainage.     If you need medical attention outside of the business hours of the [address] [place] [address] please contact your PCP or go to the nearest emergency room.         Electronically signed by Heriberto Oneill MD on 11/9/2020 at 2:26 PM

## 2020-11-12 ENCOUNTER — HOSPITAL ENCOUNTER (OUTPATIENT)
Dept: WOUND CARE | Age: 50
Discharge: HOME | End: 2020-11-12
Payer: MEDICAID

## 2020-11-12 VITALS
TEMPERATURE: 97.7 F | HEIGHT: 61 IN | DIASTOLIC BLOOD PRESSURE: 84 MMHG | WEIGHT: 293 LBS | HEART RATE: 87 BPM | BODY MASS INDEX: 55.32 KG/M2 | SYSTOLIC BLOOD PRESSURE: 165 MMHG | RESPIRATION RATE: 16 BRPM

## 2020-11-12 PROCEDURE — 29580 STRAPPING UNNA BOOT: CPT

## 2020-11-12 NOTE — PLAN OF CARE
Brush-[place] Application   Below Knee    NAME:  Tianna Trivedi  YOB: 1970  MEDICAL RECORD NUMBER:  [id number]  DATE:  11/12/2020     [x] Applied moisturizing agent to dry skin as needed.  [x] Appied primary and secondary dressing as ordered     [x] Applied Unna roll from toes to knee overlapping each time.  [x] Applied ace wrap or coban from toes to below the knee.  [x] Instructed patient/caregiver to keep dressing dry and intact. DO NOT REMOVE DRESSING.  [x] Instructed pt/family/caregiver to report excessive draining, loose bandage, wet dressing, severe pain or tingling in toes.  [x] Applied Br[place] dressing below the knee to Bilateral lower leg(s)    [x]  Unna Boot(s) were applied per  Guidelines.      Electronically signed by Jennie Oliver RN on 11/12/2020 at 9:04 AM no...

## 2020-11-16 ENCOUNTER — HOSPITAL ENCOUNTER (OUTPATIENT)
Dept: WOUND CARE | Age: 50
Discharge: HOME | End: 2020-11-16
Payer: MEDICAID

## 2020-11-16 VITALS
WEIGHT: 293 LBS | HEART RATE: 80 BPM | TEMPERATURE: 98 F | RESPIRATION RATE: 16 BRPM | HEIGHT: 61 IN | BODY MASS INDEX: 55.32 KG/M2

## 2020-11-16 PROCEDURE — 29580 STRAPPING UNNA BOOT: CPT

## 2020-11-16 NOTE — PLAN OF CARE
Problem: Wound:  Goal: Will show signs of wound healing; wound closure and no evidence of infection  Description: Will show signs of wound healing; wound closure and no evidence of infection  Outcome: Ongoing     Problem: Venous:  Goal: Signs of wound healing will improve  Description: Signs of wound healing will improve  Outcome: Ongoing     Problem: Compression therapy:  Goal: Will be free from complications associated with compression therapy  Description: Will be free from complications associated with compression therapy  Outcome: Ongoing     Problem: Weight control:  Goal: Ability to maintain an optimal weight for height and age will be supported  Description: Ability to maintain an optimal weight for height and age will be supported  Outcome: Ongoing     Problem: Falls - Risk of:  Goal: Will remain free from falls  Description: Will remain free from falls  Outcome: Ongoing     Problem: Blood Glucose:  Goal: Ability to maintain appropriate glucose levels will improve  Description: Ability to maintain appropriate glucose levels will improve  Outcome: Ongoing     Problem: Blood Glucose:  Goal: Ability to maintain appropriate glucose levels will improve  Description: Ability to maintain appropriate glucose levels will improve  Outcome: Ongoing

## 2020-11-16 NOTE — PLAN OF CARE
[place] Application   Below Knee    NAME:  Tianna Trivedi  YOB: 1970  MEDICAL RECORD NUMBER:  [id number]  DATE:  11/16/2020     [x] Applied moisturizing agent to dry skin as needed.  [x] Appied primary and secondary dressing as ordered    [x] Applied Unna roll from toes to knee overlapping each time.  [x] Applied ace wrap or coban from toes to below the knee.  [x] Instructed patient/caregiver to keep dressing dry and intact. DO NOT REMOVE DRESSING.  [x] Instructed pt/family/caregiver to report excessive draining, loose bandage, wet dressing, severe pain or tingling in toes.  [x] Applied Br[place] dressing below the knee to Bilateral lower leg(s)    [x]  Unna Boot(s) were applied per  Guidelines.      Electronically signed by Kenda Cockayne, RN on 11/16/2020 at 2:50 PM

## 2020-11-19 ENCOUNTER — HOSPITAL ENCOUNTER (OUTPATIENT)
Dept: WOUND CARE | Age: 50
Discharge: HOME | End: 2020-11-19
Payer: MEDICAID

## 2020-11-19 VITALS — WEIGHT: 293 LBS | HEIGHT: 61 IN | BODY MASS INDEX: 55.32 KG/M2

## 2020-11-19 PROCEDURE — 99212 OFFICE O/P EST SF 10 MIN: CPT | Performed by: SURGERY

## 2020-11-19 PROCEDURE — 97597 DBRDMT OPN WND 1ST 20 CM/<: CPT

## 2020-11-19 PROCEDURE — 99213 OFFICE O/P EST LOW 20 MIN: CPT

## 2020-11-19 ASSESSMENT — PAIN DESCRIPTION - ONSET: ONSET: ON-GOING

## 2020-11-19 ASSESSMENT — PAIN DESCRIPTION - DESCRIPTORS: DESCRIPTORS: ACHING

## 2020-11-19 ASSESSMENT — PAIN DESCRIPTION - PROGRESSION: CLINICAL_PROGRESSION: NOT CHANGED

## 2020-11-19 ASSESSMENT — PAIN DESCRIPTION - ORIENTATION: ORIENTATION: LEFT

## 2020-11-19 ASSESSMENT — PAIN DESCRIPTION - FREQUENCY: FREQUENCY: CONTINUOUS

## 2020-11-19 ASSESSMENT — PAIN DESCRIPTION - PAIN TYPE: TYPE: ACUTE PAIN

## 2020-11-19 ASSESSMENT — PAIN SCALES - GENERAL: PAINLEVEL_OUTOF10: 5

## 2020-11-19 ASSESSMENT — PAIN DESCRIPTION - LOCATION: LOCATION: LEG

## 2020-11-19 NOTE — PROGRESS NOTES
[address]   Progress Note and Procedure Note      Tianna Trivedi  MEDICAL RECORD NUMBER:  [id number]  AGE: 52 y.o. GENDER: female  : 1970  EPISODE DATE:  2020    Subjective:     Chief Complaint   Patient presents with    Wound Check     follow up         HISTORY of PRESENT ILLNESS HPI     Tianna Trivedi is a 52 y.o. female who presents today for wound/ulcer evaluation.    History of Wound Context: Pt  Has LLE wound here for eval/treat  Wound/Ulcer Pain Timing/Severity: none  Quality of pain: N/A  Severity:  0 / 10   Modifying Factors: None  Associated Signs/Symptoms: none    Ulcer Identification:  Ulcer Type: venous  Contributing Factors: edema, venous stasis and lymphedema    Wound: venous        PAST MEDICAL HISTORY        Diagnosis Date    Anxiety     Constipation     Depression     DM (diabetes mellitus) (HCC)     Headache(784.0)     Hyperlipidemia     Hypertension     Kidney stones     Kidney stones     Restless leg     Restless legs syndrome        PAST SURGICAL HISTORY    Past Surgical History:   Procedure Laterality Date    ECTOPIC PREGNANCY SURGERY      EYE SURGERY      cornea transplant and cataracts removed    KNEE CARTILAGE SURGERY Left 2016    KNEE ARTHROSCOPIC PARTIAL MEDIAL MENISCECTOMY performed [facility] Judy [facility]al [facility] OR    TUBAL LIGATION         FAMILY HISTORY    Family History   Problem Relation Age of Onset    Cancer Father     Cancer Maternal Grandmother     COPD Maternal Grandmother     Cancer Maternal Grandfather        SOCIAL HISTORY    Social History     Tobacco Use    Smoking status: Never Smoker    Smokeless tobacco: Never Used   Substance Use Topics    Alcohol use: No    Drug use: No       ALLERGIES    Allergies   Allergen Reactions    Compazine [Prochlorperazine Maleate] Shortness Of Breath    Ciprofloxacin Hives    Amoxicillin-Pot Clavulanate     Compazine [Prochlorperazine]     Demerol Hives    Hydroxyzine  Hydroxyzine Hcl Itching    Ibuprofen Nausea Only    Ketorolac Tromethamine     Meperidine Hives    Nortriptyline Other (See Comments)    Orphenadrine     Orphenadrine Citrate Itching    Penicillins Hives and Nausea Only    Prochlorperazine Edisylate      Will discuss with patient at next visit     Tobramycin      Will discuss with patient at next visit     Tramadol      Will discuss with patient at next visit     Vistaril [Hydroxyzine Hcl] Itching    Cephalexin Rash    Clindamycin/Lincomycin Rash    Codeine Nausea And Vomiting and Rash    Doxycycline Nausea And Vomiting    Keflex [Cephalexin] Rash    Ketorolac Tromethamine Itching and Nausea And Vomiting    Moxifloxacin Nausea And Vomiting     Will discuss with patient at visit        MEDICATIONS    Current Outpatient Medications on File Prior to Encounter   Medication Sig Dispense Refill    minocycline (MINOCIN;DYNACIN) 100 MG capsule Take 1 capsule by mouth 2 times daily 60 capsule 0    busPIRone (BUSPAR) 5 MG tablet Take 5 mg by mouth 3 times daily      oxyCODONE-acetaminophen (PERCOCET) 7.5-325 MG per tablet Take 1 tablet by mouth 2 times daily.  Zinc Oxide 6 % CREA Apply 1 Units topically 3 times daily JESUSITA'S CREAM  Zinc oxided 20%, hydrocortisone, nystatin, bacitracin 1 Bottle 3    DULoxetine (CYMBALTA) 60 MG extended release capsule Take 1 capsule by mouth 2 times daily 30 capsule 5    ondansetron (ZOFRAN-ODT) 4 MG disintegrating tablet DISSOLVE 1 TABLET UNDER THE TONGUE EVERY 8 HOURS AS NEEDED FOR NAUSEA AND VOMITING(TAKE PRIOR TO DOXYCYLINE DOSES) 20 tablet 0    tiZANidine (ZANAFLEX) 4 MG tablet Take 4 mg by mouth daily as needed      triamterene-hydroCHLOROthiazide (MAXZIDE-25) 37.5-25 MG per tablet Take 1 tablet by mouth daily      NONFORMULARY Prednisone eye drops 1 drop in each eye daily      Morphine Sulfate ER 15 MG T12A Take 15 mg by mouth 2 times daily.       Cream Base CREA APPLY ONE PUMP (GRAM) TO AFFECTED 3    gabapentin (NEURONTIN) 100 MG capsule Take 100 mg by mouth 2 times daily. Am and 2 pm      gabapentin (NEURONTIN) 300 MG capsule TK ONE C PO QHS  5    Blood Glucose Monitoring Suppl ([address]) w/Device KIT 1 kit by Does not apply route daily as needed (Dx 250.00) 1 kit 0    ONE TOUCH LANCETS MISC 1 each by Does not apply route daily 100 each 3    clonazePAM (KLONOPIN) 0.5 MG tablet Take 1 tablet by mouth 2 times daily as needed for Anxiety for up to 30 days. 45 tablet 0    ZOLMitriptan (ZOMIG) 5 MG nasal solution 0.1 mLs by Nasal route once as needed for Migraine 1 each 3    ONE TOUCH ULTRA TEST strip TEST BLOOD SUGAR ONCE DAILY 100 strip 5     No current facility-administered medications on file prior to encounter. REVIEW OF SYSTEMS    A comprehensive review of systems was negative.     Objective:      Ht 5' 1\" (1.549 m)   Wt (!) 322 lb (146.1 kg)   BMI 60.84 kg/m²     Wt Readings from Last 3 Encounters:   11/19/20 (!) 322 lb (146.1 kg)   11/16/20 (!) 322 lb (146.1 kg)   11/12/20 (!) 322 lb (146.1 kg)       PHYSICAL EXAM    General Appearance: alert and oriented to person, place and time, well developed and well- nourished, in no acute distress  Skin: warm and dry, no rash or erythema  Head: normocephalic and atraumatic  Eyes: pupils equal, round, and reactive to light, extraocular eye movements intact, conjunctivae normal  ENT: tympanic membrane, external ear and ear canal normal bilaterally, nose without deformity, nasal mucosa and turbinates normal without polyps, lips teeth and gums normal  Neck: supple and non-tender without mass, no thyromegaly or thyroid nodules, no cervical lymphadenopathy  Pulmonary/Chest: clear to auscultation bilaterally- no wheezes, rales or rhonchi, normal air movement, no respiratory distress  Cardiovascular: normal rate, regular rhythm, normal S1 and S2, no murmurs, rubs, clicks, or gallops, distal pulses intact, no carotid bruits  Abdomen: soft, 11/19/20 1207   Wound Width (cm) 15 cm 11/19/20 1207   Wound Depth (cm) 0.1 cm 11/19/20 1207   Wound Surface Area (cm^2) 67.5 cm^2 11/19/20 1207   Change in Wound Size % (l*w) -25 11/19/20 1207   Wound Volume (cm^3) 6.75 cm^3 11/19/20 1207   Wound Healing % -25 11/19/20 1207   Post-Procedure Length (cm) 4 cm 11/09/20 1424   Post-Procedure Width (cm) 13 cm 11/09/20 1424   Post-Procedure Depth (cm) 0.1 cm 11/09/20 1424   Post-Procedure Surface Area (cm^2) 52 cm^2 11/09/20 1424   Post-Procedure Volume (cm^3) 5.2 cm^3 11/09/20 1424   Distance Tunneling (cm) 0 cm 11/19/20 1207   Tunneling Position ___ O'Clock 0 11/19/20 1207   Undermining Starts ___ O'Clock 0 11/19/20 1207   Undermining Ends___ O'Clock 0 11/19/20 1207   Undermining Maxium Distance (cm) 0 11/19/20 1207   Wound Assessment Pale granulation tissue 11/19/20 1207   Drainage Amount Moderate 11/19/20 1207   Drainage Description Serous 11/19/20 1207   Odor None 11/19/20 1207   Octavia-wound Assessment Intact 11/19/20 1207   Margins Attached edges 11/19/20 1207   Wound Thickness Description not for Pressure Injury Full thickness 11/19/20 1207   Number of days: 58       Wound 09/21/20 Pretibial Right open blistered area (Active)   Number of days: 58       Wound 10/27/20 Pretibial Left;Proximal wound 2- left proximal venous (Active)   Wound Image   11/19/20 1207   Wound Etiology Venous 11/19/20 1207   Dressing Status Old drainage noted 11/19/20 1207   Wound Cleansed Soap and water 11/16/20 1453   Dressing/Treatment Xeroform 11/16/20 1453   Wound Length (cm) 1 cm 11/19/20 1207   Wound Width (cm) 2 cm 11/19/20 1207   Wound Depth (cm) 0.1 cm 11/19/20 1207   Wound Surface Area (cm^2) 2 cm^2 11/19/20 1207   Change in Wound Size % (l*w) 66.67 11/19/20 1207   Wound Volume (cm^3) 0.2 cm^3 11/19/20 1207   Wound Healing % 67 11/19/20 1207   Post-Procedure Length (cm) 1 cm 11/09/20 1424   Post-Procedure Width (cm) 0.1 cm 11/09/20 1424   Post-Procedure Depth (cm) 0.1 cm 11/09/20 1424   Post-Procedure Surface Area (cm^2) 0.1 cm^2 11/09/20 1424   Post-Procedure Volume (cm^3) 0.01 cm^3 11/09/20 1424   Distance Tunneling (cm) 0 cm 11/19/20 1207   Tunneling Position ___ O'Clock 0 11/19/20 1207   Undermining Starts ___ O'Clock 0 11/19/20 1207   Undermining Ends___ O'Clock 0 11/19/20 1207   Undermining Maxium Distance (cm) 0 11/19/20 1207   Wound Assessment Pale granulation tissue 11/19/20 1207   Drainage Amount Moderate 11/19/20 1207   Drainage Description Serous 11/19/20 1207   Odor None 11/19/20 1207   Octavia-wound Assessment Intact 11/16/20 1453   Margins Attached edges 11/16/20 1453   Wound Thickness Description not for Pressure Injury Full thickness 11/16/20 1453   Number of days: 23             Plan:     Problem List Items Addressed This Visit     Uncontrolled type 2 diabetes mellitus with hyperglycemia, without long-term current use of insulin (HCC) (Chronic)    * (Principal) Diabetic ulcer of left lower leg associated with type 2 diabetes mellitus, with fat layer exposed ([address] - Primary (Chronic)    Relevant Orders    Supply: Wound Cleanser    Supply: Wound Dressings    Supply: Cover and Secure    Supply: Edema Control    Lymphedema of both lower extremities    Relevant Orders    Supply: Wound Cleanser    Supply: Wound Dressings    Supply: Cover and Secure    Supply: Edema Control        Pt refuses treatment by debridement today. We will cont compression therapy she is directing her care       Treatment Note please see attached Discharge Instructions    In my professional opinion this patient would benefit from HBO Therapy: No    Written patient dismissal instructions given to patient and signed by patient or POA.          Discharge [address] and Hyperbaric Oxygen Therapy   Physician Orders and Discharge Instructions  [address]  Telephone: [phone]    NAME:  Aye Guerrero

## 2020-11-23 ENCOUNTER — TELEPHONE (OUTPATIENT)
Dept: PRIMARY CARE CLINIC | Age: 50
End: 2020-11-23

## 2020-11-23 ENCOUNTER — HOSPITAL ENCOUNTER (OUTPATIENT)
Dept: WOUND CARE | Age: 50
Discharge: HOME | End: 2020-11-23

## 2020-11-23 NOTE — TELEPHONE ENCOUNTER
Called into [facility]. I attempted to reach pt to notify her this has been called in however, VM has not been set up. If pt calls back she will need to be told to call the pharmacy to determine when this will be ready for  as they are running hours behind on filling compounds at this time.

## 2020-11-23 NOTE — TELEPHONE ENCOUNTER
Pt states whoever sent in her butt cream compound to [facility] sent it in wrong. She is requesting a prescription be called in for the following:    Equal Parts: (30 grams each)    Bacitracin 500 unit/gram ointment  Hydrocortisone 1% cream  Zinc Oxide 20% ointment  Nystatin 100,000 gram cream      Pt states the name of this cream is [employer]a's Amazing Butt Cream.     Please advise. Thank you.

## 2020-12-01 ENCOUNTER — TELEPHONE (OUTPATIENT)
Dept: PRIMARY CARE CLINIC | Age: 50
End: 2020-12-01

## 2020-12-01 NOTE — TELEPHONE ENCOUNTER
Called patient,informed will need to keep wound care appointments. Must be home bound for home health.  SOY

## 2020-12-01 NOTE — TELEPHONE ENCOUNTER
Patient is wanting to see about getting home health to come out for her wound care. Is this something that could be considered for this patient? Or should she just continue seeing Dr. Ariana Membreno? She has canceled her last 2 appointments with him and does not have another one scheduled at this time.

## 2020-12-15 ENCOUNTER — TELEPHONE (OUTPATIENT)
Dept: PRIMARY CARE CLINIC | Age: 50
End: 2020-12-15

## 2020-12-15 ENCOUNTER — HOSPITAL ENCOUNTER (OUTPATIENT)
Dept: GENERAL RADIOLOGY | Age: 50
Discharge: HOME | End: 2020-12-15
Payer: MEDICAID

## 2020-12-15 ENCOUNTER — OFFICE VISIT (OUTPATIENT)
Dept: URGENT CARE | Age: 50
End: 2020-12-15
Payer: MEDICAID

## 2020-12-15 VITALS
HEART RATE: 86 BPM | RESPIRATION RATE: 20 BRPM | SYSTOLIC BLOOD PRESSURE: 158 MMHG | OXYGEN SATURATION: 99 % | TEMPERATURE: 98.6 F | HEIGHT: 61 IN | WEIGHT: 293 LBS | DIASTOLIC BLOOD PRESSURE: 88 MMHG | BODY MASS INDEX: 55.32 KG/M2

## 2020-12-15 PROCEDURE — G8427 DOCREV CUR MEDS BY ELIG CLIN: HCPCS | Performed by: NURSE PRACTITIONER

## 2020-12-15 PROCEDURE — G8484 FLU IMMUNIZE NO ADMIN: HCPCS | Performed by: NURSE PRACTITIONER

## 2020-12-15 PROCEDURE — 1036F TOBACCO NON-USER: CPT | Performed by: NURSE PRACTITIONER

## 2020-12-15 PROCEDURE — 99213 OFFICE O/P EST LOW 20 MIN: CPT | Performed by: NURSE PRACTITIONER

## 2020-12-15 PROCEDURE — 73630 X-RAY EXAM OF FOOT: CPT

## 2020-12-15 PROCEDURE — 73610 X-RAY EXAM OF ANKLE: CPT

## 2020-12-15 PROCEDURE — G8417 CALC BMI ABV UP PARAM F/U: HCPCS | Performed by: NURSE PRACTITIONER

## 2020-12-15 RX ORDER — DIPHENHYDRAMINE HCL 25 MG
25 TABLET ORAL ONCE
Status: COMPLETED | OUTPATIENT
Start: 2020-12-15 | End: 2020-12-15

## 2020-12-15 RX ORDER — KETOROLAC TROMETHAMINE 30 MG/ML
30 INJECTION, SOLUTION INTRAMUSCULAR; INTRAVENOUS ONCE
Status: COMPLETED | OUTPATIENT
Start: 2020-12-15 | End: 2020-12-15

## 2020-12-15 RX ORDER — KETOROLAC TROMETHAMINE 30 MG/ML
30 INJECTION, SOLUTION INTRAMUSCULAR; INTRAVENOUS ONCE
Qty: 1 ML | Refills: 0
Start: 2020-12-15 | End: 2020-12-15 | Stop reason: CLARIF

## 2020-12-15 RX ADMIN — Medication 25 MG: at 09:33

## 2020-12-15 RX ADMIN — KETOROLAC TROMETHAMINE 30 MG: 30 INJECTION, SOLUTION INTRAMUSCULAR; INTRAVENOUS at 09:31

## 2020-12-15 ASSESSMENT — ENCOUNTER SYMPTOMS
DIARRHEA: 0
CHEST TIGHTNESS: 0
EYES NEGATIVE: 1
WHEEZING: 0
CONSTIPATION: 0
SHORTNESS OF BREATH: 0
SORE THROAT: 0
NAUSEA: 0
VOMITING: 0

## 2020-12-15 NOTE — PROGRESS NOTES
[address] [facility]  [address] [address] [place] [address]  Dept: [phone]  Dept Fax: [phone]: [phone]    Tianna Trivedi is a 52 y.o. female who presents today for her medical conditions/complaintsas noted below. Tianna  is c/o of Foot Pain (Right) and Knee Pain (Left)        HPI:     Foot Pain   Pain location: right foot and ankle. This is a new problem. Episode onset: 2 days ago. There has been a history of trauma. The problem occurs daily. The problem has been gradually worsening. The quality of the pain is described as aching. The pain is at a severity of 8/10. The pain is moderate. Associated symptoms include joint swelling. Pertinent negatives include no fever, inability to bear weight or numbness. The symptoms are aggravated by activity and standing. She has tried rest, oral narcotics and NSAIDS for the symptoms. The treatment provided no relief. Knee Pain   The incident occurred more than 1 week ago. The pain is present in the left knee. The pain is moderate. The pain has been fluctuating since onset. Pertinent negatives include no inability to bear weight or numbness. She reports no foreign bodies present. The symptoms are aggravated by movement and weight bearing. Treatments tried: has been seen by Dr. Judy Babcock at [address] [facility] for this.        Past Medical History:   Diagnosis Date    Anxiety     Constipation     Depression     DM (diabetes mellitus) ([address]     Headache(784.0)     Hyperlipidemia     Hypertension     Kidney stones     Kidney stones     Restless leg     Restless legs syndrome      Past Surgical History:   Procedure Laterality Date    ECTOPIC PREGNANCY SURGERY      EYE SURGERY      cornea transplant and cataracts removed    KNEE CARTILAGE SURGERY Left 12/20/2016    KNEE ARTHROSCOPIC PARTIAL MEDIAL MENISCECTOMY performed by Jessica Lawton MD at [address] [place] [address]         Family History Problem Relation Age of Onset    Cancer Father     Cancer Maternal Grandmother     COPD Maternal Grandmother     Cancer Maternal Grandfather        Social History     Tobacco Use    Smoking status: Never Smoker    Smokeless tobacco: Never Used   Substance Use Topics    Alcohol use: No      Current Outpatient Medications   Medication Sig Dispense Refill    MINOCYCLINE HCL PO Take by mouth      busPIRone (BUSPAR) 5 MG tablet TAKE 1 TABLET BY MOUTH THREE TIMES DAILY 90 tablet 3    oxyCODONE-acetaminophen (PERCOCET) 7.5-325 MG per tablet Take 1 tablet by mouth 2 times daily.  Zinc Oxide 6 % CREA Apply 1 Units topically 3 times daily JESUSITA'S CREAM  Zinc oxided 20%, hydrocortisone, nystatin, bacitracin 1 Bottle 3    DULoxetine (CYMBALTA) 60 MG extended release capsule Take 1 capsule by mouth 2 times daily 30 capsule 5    ondansetron (ZOFRAN-ODT) 4 MG disintegrating tablet DISSOLVE 1 TABLET UNDER THE TONGUE EVERY 8 HOURS AS NEEDED FOR NAUSEA AND VOMITING(TAKE PRIOR TO DOXYCYLINE DOSES) 20 tablet 0    tiZANidine (ZANAFLEX) 4 MG tablet Take 4 mg by mouth daily as needed      triamterene-hydroCHLOROthiazide (MAXZIDE-25) 37.5-25 MG per tablet Take 1 tablet by mouth daily      NONFORMULARY Prednisone eye drops 1 drop in each eye daily      Morphine Sulfate ER 15 MG T12A Take 15 mg by mouth 2 times daily.  Cream Base CREA APPLY ONE PUMP (GRAM) TO AFFECTED AREA(S) THREE TO FOUR TIMES A DAY TO THE APPENDAGES AND TRUNK AS DIRECTED 30 g 5    insulin lispro, 1 Unit Dial, (ADMELOG SOLOSTAR) 100 UNIT/ML SOPN ADMINISTER 45 UNITS UNDER THE SKIN EVERY EVENING. INCREASE BY 3 UNITS EVERY NIGHT UNTIL 60 UNITS IS REACHED AND.  CONTINUE 60 UNITS EVERY EVENING 18 mL 5    celecoxib (CELEBREX) 200 MG capsule TAKE 1 CAPSULE BY MOUTH EVERY DAY 60 capsule 5    insulin glargine (BASAGLAR KWIKPEN) 100 UNIT/ML injection pen INJECT 30 UNITS INTO THE SKIN EVERY EVENING 15 mL 1  ZOLMitriptan (ZOMIG) 5 MG nasal solution 0.1 mLs by Nasal route once as needed for Migraine 1 each 3    ONE TOUCH ULTRA TEST strip TEST BLOOD SUGAR ONCE DAILY 100 strip 5     Current Facility-Administered Medications   Medication Dose Route Frequency Provider Last Rate Last Admin    diphenhydrAMINE (BENADRYL) tablet 25 mg  25 mg Oral Once Oleta Mutters, APRN - CNP        ketorolac (TORADOL) injection 30 mg  30 mg Intramuscular Once Oleta Mutters, APRN - CNP         Allergies   Allergen Reactions    Compazine [Prochlorperazine Maleate] Shortness Of Breath    Ciprofloxacin Hives    Amoxicillin-Pot Clavulanate     Compazine [Prochlorperazine]     Demerol Hives    Hydroxyzine     Hydroxyzine Hcl Itching    Ibuprofen Nausea Only    Meperidine Hives    Nortriptyline Other (See Comments)    Orphenadrine     Orphenadrine Citrate Itching    Penicillins Hives and Nausea Only    Prochlorperazine Edisylate      Will discuss with patient at next visit     Tobramycin      Will discuss with patient at next visit     Tramadol      Will discuss with patient at next visit     Vistaril [Hydroxyzine Hcl] Itching    Cephalexin Rash    Clindamycin/Lincomycin Rash    Codeine Nausea And Vomiting and Rash    Doxycycline Nausea And Vomiting    Keflex [Cephalexin] Rash    Moxifloxacin Nausea And Vomiting     Will discuss with patient at visit        Health Maintenance   Topic Date Due    Diabetic retinal exam  09/08/1982    HIV screen  09/08/1987    Hepatitis B vaccine (1 of 3 - Risk 3-dose series) 09/08/1991    Cervical cancer screen  05/21/2017    Diabetic microalbuminuria test  12/17/2019    Flu vaccine (1) 09/01/2020    Diabetic foot exam  11/11/2020    A1C test (Diabetic or Prediabetic)  01/29/2021    Lipid screen  08/17/2021    DTaP/Tdap/Td vaccine (2 - Td) 09/22/2021    Potassium monitoring  10/29/2021    Creatinine monitoring  10/29/2021    Pneumococcal 0-64 years Vaccine  Completed  Hepatitis A vaccine  Aged Out    Hib vaccine  Aged Out    Meningococcal (ACWY) vaccine  Aged Out       Subjective:     Review of Systems   Constitutional: Negative for fever. HENT: Negative for congestion and sore throat. Eyes: Negative. Respiratory: Negative for chest tightness, shortness of breath and wheezing. Cardiovascular: Negative for chest pain and palpitations. Gastrointestinal: Negative for constipation, diarrhea, nausea and vomiting. Endocrine: Negative. Genitourinary: Negative. Musculoskeletal: Positive for arthralgias, gait problem and joint swelling. Skin: Negative. Neurological: Negative for dizziness, speech difficulty, weakness and numbness. Psychiatric/Behavioral: Negative for confusion. All other systems reviewed and are negative. Objective:     Physical Exam  Vitals signs and nursing note reviewed. Constitutional:       General: She is not in acute distress. Appearance: Normal appearance. She is obese. She is not ill-appearing or toxic-appearing. HENT:      Head: Normocephalic and atraumatic. Right Ear: External ear normal.      Left Ear: External ear normal.   Eyes:      Extraocular Movements: Extraocular movements intact. Conjunctiva/sclera: Conjunctivae normal.      Pupils: Pupils are equal, round, and reactive to light. Cardiovascular:      Rate and Rhythm: Normal rate. Pulmonary:      Effort: Pulmonary effort is normal.   Musculoskeletal:         General: No swelling or signs of injury. Comments: Pain to right ankle and foot; unable to appreciate acute swelling as patient has severe chronic lymphedema and venous insufficiency. No ecchymosis. Erythema appearing chronic. Skin:     General: Skin is warm and dry. Findings: No rash. Neurological:      General: No focal deficit present. Mental Status: She is alert and oriented to person, place, and time. Motor: No weakness.    Psychiatric: Patient given educational materials- see patient instructions. Discussed use, benefit, and side effects of prescribedmedications. All patient questions answered. Pt voiced understanding. Reviewedhealth maintenance. Instructed to continue current medications, diet and exercise. Patient agreed with treatment plan. Follow up as directed. There are no Patient Instructions on file for this visit.       Electronically signed by NANETTE Musa CNP on 12/15/2020 at 9:22 AM

## 2020-12-15 NOTE — PROGRESS NOTES
Toradol 30mg given intramuscularly to right gluteal. Pt tolerated well. Pt denies any allergy to this medication and states she has itching at injection site. Pt also administered 25mg Benadryl orally per NP orders for c/o itching around injection site.

## 2020-12-15 NOTE — TELEPHONE ENCOUNTER
Pt called requesting results of last A1C. Pt also wanted labs done. MA stated she would first have to have an appointment to get those done. Scheduled pt for VV 85/25/7288 @ 8am w/ Kirill Goss, APRN. Pt also stated she has lymphedema and would like to be referred to a different wound care. MA stated she would also have to have an appointment first to be able to do that. Pt SOY.

## 2020-12-19 ENCOUNTER — HOSPITAL ENCOUNTER (EMERGENCY)
Age: 50
Discharge: HOME | End: 2020-12-19
Payer: MEDICAID

## 2020-12-19 VITALS
TEMPERATURE: 98.8 F | WEIGHT: 293 LBS | HEART RATE: 73 BPM | HEIGHT: 61 IN | SYSTOLIC BLOOD PRESSURE: 197 MMHG | DIASTOLIC BLOOD PRESSURE: 96 MMHG | OXYGEN SATURATION: 96 % | BODY MASS INDEX: 55.32 KG/M2 | RESPIRATION RATE: 18 BRPM

## 2020-12-19 PROCEDURE — 6360000002 HC RX W HCPCS: Performed by: NURSE PRACTITIONER

## 2020-12-19 PROCEDURE — 96372 THER/PROPH/DIAG INJ SC/IM: CPT

## 2020-12-19 PROCEDURE — 99283 EMERGENCY DEPT VISIT LOW MDM: CPT

## 2020-12-19 PROCEDURE — 99999 PR OFFICE/OUTPT VISIT,PROCEDURE ONLY: CPT | Performed by: NURSE PRACTITIONER

## 2020-12-19 RX ORDER — PROMETHAZINE HYDROCHLORIDE 25 MG/ML
12.5 INJECTION, SOLUTION INTRAMUSCULAR; INTRAVENOUS ONCE
Status: COMPLETED | OUTPATIENT
Start: 2020-12-19 | End: 2020-12-19

## 2020-12-19 RX ADMIN — PROMETHAZINE HYDROCHLORIDE 12.5 MG: 25 INJECTION INTRAMUSCULAR; INTRAVENOUS at 15:39

## 2020-12-19 RX ADMIN — BUTORPHANOL TARTRATE 2 MG: 2 INJECTION, SOLUTION INTRAMUSCULAR; INTRAVENOUS at 15:38

## 2020-12-19 ASSESSMENT — PAIN SCALES - GENERAL
PAINLEVEL_OUTOF10: 8
PAINLEVEL_OUTOF10: 8
PAINLEVEL_OUTOF10: 6

## 2020-12-19 ASSESSMENT — ENCOUNTER SYMPTOMS
COUGH: 0
TROUBLE SWALLOWING: 0
PHOTOPHOBIA: 1
RHINORRHEA: 0
NAUSEA: 1
ABDOMINAL PAIN: 0
SHORTNESS OF BREATH: 0
VOMITING: 0
SINUS PRESSURE: 0
SORE THROAT: 0
DIARRHEA: 0
CONSTIPATION: 0

## 2020-12-19 ASSESSMENT — PAIN DESCRIPTION - PAIN TYPE: TYPE: ACUTE PAIN

## 2020-12-19 ASSESSMENT — PAIN DESCRIPTION - LOCATION: LOCATION: HEAD

## 2020-12-19 NOTE — ED PROVIDER NOTES
[facility] EMERGENCY DEPT  eMERGENCY dEPARTMENT eNCOUnter      Pt Name: Calvin Murillo  MRN: [id number]  [place] 1970  Date of evaluation: 12/19/2020  Provider: NANETTE Ware    CHIEF COMPLAINT       Chief Complaint   Patient presents with    Migraine     Started yesterday, hx of migraines         HISTORY OF PRESENT ILLNESS   (Location/Symptom, Timing/Onset,Context/Setting, Quality, Duration, Modifying Factors, Severity)  Note limiting factors. Calvin Murillo is a 52 y.o. female who presents to the emergency department with HA with hx of migraines. Onset yesterday. Headache is like prior migraines. It is worse with lights and noise. She has taken Zomig, Percocet and over-the-counter medications without relief. She has had some nausea with no vomiting. She is followed by neurology. Headache is described as throbbing in nature and is located frontally. She denies any cough, congestion or fevers. HPI    NursingNotes were reviewed. REVIEW OF SYSTEMS    (2-9 systems for level 4, 10 or more for level 5)     Review of Systems   Constitutional: Negative for activity change, appetite change, fatigue, fever and unexpected weight change. HENT: Negative for congestion, hearing loss, rhinorrhea, sinus pressure, sore throat and trouble swallowing. Eyes: Positive for photophobia. Negative for visual disturbance. Respiratory: Negative for cough and shortness of breath. Cardiovascular: Negative for chest pain, palpitations and leg swelling. Gastrointestinal: Positive for nausea. Negative for abdominal pain, constipation, diarrhea and vomiting. Endocrine: Negative for cold intolerance and heat intolerance. Genitourinary: Negative for flank pain, menstrual problem, pelvic pain, urgency and vaginal discharge. Musculoskeletal: Negative for arthralgias. Skin: Negative for rash. Neurological: Positive for headaches.    Psychiatric/Behavioral: Negative for dysphoric mood and sleep disturbance. The patient is not nervous/anxious. A complete review of systems was performed and is negative except as noted above in the HPI. PAST MEDICAL HISTORY     Past Medical History:   Diagnosis Date    Anxiety     Constipation     Depression     DM (diabetes mellitus) ([address]     Headache(784.0)     Hyperlipidemia     Hypertension     Kidney stones     Kidney stones     Restless leg     Restless legs syndrome          SURGICAL HISTORY       Past Surgical History:   Procedure Laterality Date    ECTOPIC PREGNANCY SURGERY      EYE SURGERY      cornea transplant and cataracts removed    KNEE CARTILAGE SURGERY Left 12/20/2016    KNEE ARTHROSCOPIC PARTIAL MEDIAL MENISCECTOMY performed by Alla Garcia MD at [address]       Previous Medications    B-D ULTRAFINE III SHORT PEN 31G X 8 MM MISC    USE TO INJECT INSULIN ONCE DAILY    BLOOD GLUCOSE MONITOR KIT AND SUPPLIES    Test 1 times a day & as needed for symptoms of irregular blood glucose. BLOOD GLUCOSE MONITORING SUPPL (ONE TOUCH BASIC SYSTEM) W/DEVICE KIT    1 kit by Does not apply route daily as needed (Dx 250.00)    BLOOD GLUCOSE TEST STRIPS (ASCENSIA AUTODISC VI;ONE TOUCH ULTRA TEST VI) STRIP    Check glucose TID and as needed for hypoglycemic events Dx E11.40 E11.66 Z79.4  Use one touch verio flex    BUSPIRONE (BUSPAR) 5 MG TABLET    TAKE 1 TABLET BY MOUTH THREE TIMES DAILY    CELECOXIB (CELEBREX) 200 MG CAPSULE    TAKE 1 CAPSULE BY MOUTH EVERY DAY    CLONAZEPAM (KLONOPIN) 0.5 MG TABLET    Take 1 tablet by mouth 2 times daily as needed for Anxiety for up to 30 days.     CREAM BASE CREA    APPLY ONE PUMP (GRAM) TO AFFECTED AREA(S) THREE TO FOUR TIMES A DAY TO THE APPENDAGES AND TRUNK AS DIRECTED    CYCLOBENZAPRINE (FLEXERIL) 10 MG TABLET    Take 10 mg by mouth 3 times daily as needed for Muscle spasms     DIABETIC SHOE MISC    by Does not apply route DISPENSE ONE PAIR OF DIABETIC SHOE AND 3 PAIRS HEAT MOLDED INSERTS    DICLOFENAC SODIUM (VOLTAREN) 1 % GEL    Apply 2 g topically 2 times daily    DULOXETINE (CYMBALTA) 60 MG EXTENDED RELEASE CAPSULE    Take 1 capsule by mouth 2 times daily    FUROSEMIDE (LASIX) 80 MG TABLET    Take 1 tablet by mouth 2 times daily    GABAPENTIN (NEURONTIN) 100 MG CAPSULE    Take 100 mg by mouth 2 times daily. Am and 2 pm    GABAPENTIN (NEURONTIN) 300 MG CAPSULE    TK ONE C PO QHS    INSULIN GLARGINE (BASAGLAR KWIKPEN) 100 UNIT/ML INJECTION PEN    INJECT 30 UNITS INTO THE SKIN EVERY EVENING    INSULIN LISPRO, 1 UNIT DIAL, (ADMELOG SOLOSTAR) 100 UNIT/ML SOPN    ADMINISTER 45 UNITS UNDER THE SKIN EVERY EVENING. INCREASE BY 3 UNITS EVERY NIGHT UNTIL 60 UNITS IS REACHED AND. CONTINUE 60 UNITS EVERY EVENING    LANCETS (ONETOUCH DELICA PLUS [id number]) MISC    USE TO CHECK BLLOD SUGAR AS DIRECTED    MINOCYCLINE HCL PO    Take by mouth    MORPHINE SULFATE ER 15 MG T12A    Take 15 mg by mouth 2 times daily. NONFORMULARY    Prednisone eye drops 1 drop in each eye daily    OMEPRAZOLE (PRILOSEC) 20 MG DELAYED RELEASE CAPSULE    TAKE 1 CAPSULE BY MOUTH TWICE DAILY BEFORE MEALS    ONDANSETRON (ZOFRAN-ODT) 4 MG DISINTEGRATING TABLET    DISSOLVE 1 TABLET UNDER THE TONGUE EVERY 8 HOURS AS NEEDED FOR NAUSEA AND VOMITING(TAKE PRIOR TO DOXYCYLINE DOSES)    ONE TOUCH LANCETS MISC    1 each by Does not apply route daily    ONE TOUCH ULTRA TEST STRIP    TEST BLOOD SUGAR ONCE DAILY    OXYCODONE-ACETAMINOPHEN (PERCOCET) 7.5-325 MG PER TABLET    Take 1 tablet by mouth 2 times daily.     POTASSIUM CHLORIDE (KLOR-CON M) 20 MEQ EXTENDED RELEASE TABLET    Take 1 tablet by mouth daily    PRAMIPEXOLE (MIRAPEX) 0.75 MG TABLET    Take 1 tablet by mouth 2 times daily    TIZANIDINE (ZANAFLEX) 4 MG TABLET    Take 4 mg by mouth daily as needed    TRIAMTERENE-HYDROCHLOROTHIAZIDE (MAXZIDE-25) 37.5-25 MG PER TABLET    Take 1 tablet by mouth daily    VICTOZA 18 MG/3ML SOPN SC INJECTION    ADMINISTER 1.8 MG INTO THE SKIN EVERY DAY    ZINC OXIDE 6 % CREA    Apply 1 Units topically 3 times daily JESUSITA'S CREAM  Zinc oxided 20%, hydrocortisone, nystatin, bacitracin    ZOLMITRIPTAN (ZOMIG) 5 MG NASAL SOLUTION    0.1 mLs by Nasal route once as needed for Migraine       ALLERGIES     Compazine [prochlorperazine maleate], Ciprofloxacin, Amoxicillin-pot clavulanate, Compazine [prochlorperazine], Demerol, Hydroxyzine, Hydroxyzine hcl, Ibuprofen, Meperidine, Nortriptyline, Orphenadrine, Orphenadrine citrate, Penicillins, Prochlorperazine edisylate, Tobramycin, Tramadol, Vistaril [hydroxyzine hcl], Cephalexin, Clindamycin/lincomycin, Codeine, Doxycycline, Keflex [cephalexin], and Moxifloxacin    FAMILY HISTORY       Family History   Problem Relation Age of Onset    Cancer Father     Cancer Maternal Grandmother     COPD Maternal Grandmother     Cancer Maternal Grandfather           SOCIAL HISTORY       Social History     Socioeconomic History    Marital status: Single     Spouse name: None    Number of children: None    Years of education: None    Highest education level: None   Occupational History    None   Social Needs    Financial resource strain: None    Food insecurity     Worry: None     Inability: None    Transportation needs     Medical: None     Non-medical: None   Tobacco Use    Smoking status: Never Smoker    Smokeless tobacco: Never Used   Substance and Sexual Activity    Alcohol use: No    Drug use: No    Sexual activity: Yes     Partners: Male   Lifestyle    Physical activity     Days per week: None     Minutes per session: None    Stress: None   Relationships    Social connections     Talks on phone: None     Gets together: None     Attends [employer] service: None     Active member of club or organization: None     Attends meetings of clubs or organizations: None     Relationship status: None    Intimate partner violence     Fear of current or ex partner: None     Emotionally abused: None Physically abused: None     Forced sexual activity: None   Other Topics Concern    None   Social History Narrative    None       SCREENINGS             PHYSICAL EXAM    (up to 7 for level 4, 8 or more for level 5)     ED Triage Vitals [12/19/20 1504]   BP Temp Temp src Pulse Resp SpO2 Height Weight   (!) 197/96 98.8 °F (37.1 °C) -- 73 18 96 % 5' 1\" (1.549 m) (!) 322 lb (146.1 kg)       Physical Exam  Vitals signs reviewed. Constitutional:       Appearance: Normal appearance. HENT:      Head: Normocephalic and atraumatic. Right Ear: Tympanic membrane, ear canal and external ear normal.      Left Ear: Tympanic membrane, ear canal and external ear normal.      Nose: Nose normal.      Mouth/Throat:      Mouth: Mucous membranes are moist.      Pharynx: Oropharynx is clear. Eyes:      Conjunctiva/sclera: Conjunctivae normal.      Pupils: Pupils are equal, round, and reactive to light. Neck:      Musculoskeletal: Normal range of motion and neck supple. Cardiovascular:      Rate and Rhythm: Normal rate and regular rhythm. Pulses: Normal pulses. Heart sounds: Normal heart sounds. Pulmonary:      Effort: Pulmonary effort is normal.      Breath sounds: Normal breath sounds. Abdominal:      Palpations: Abdomen is soft. Musculoskeletal: Normal range of motion. Skin:     General: Skin is warm. Neurological:      Mental Status: She is alert and oriented to person, place, and time. GCS: GCS eye subscore is 4. GCS verbal subscore is 5. GCS motor subscore is 6. Cranial Nerves: Cranial nerves are intact. Sensory: Sensation is intact. Motor: Motor function is intact. Coordination: Coordination is intact.          DIAGNOSTIC RESULTS     EKG: All EKG's are interpreted by the Emergency Department Physician who either signs or Co-signs this chart in the absence of a cardiologist.        RADIOLOGY:   Non-plain film images such as CT, Ultrasound and MRI are read by the radiologist.

## 2020-12-22 ENCOUNTER — OFFICE VISIT (OUTPATIENT)
Age: 50
End: 2020-12-22

## 2020-12-22 ENCOUNTER — OFFICE VISIT (OUTPATIENT)
Dept: URGENT CARE | Age: 50
End: 2020-12-22
Payer: MEDICAID

## 2020-12-22 VITALS
WEIGHT: 293 LBS | RESPIRATION RATE: 22 BRPM | HEART RATE: 80 BPM | OXYGEN SATURATION: 98 % | BODY MASS INDEX: 55.32 KG/M2 | TEMPERATURE: 97.8 F | DIASTOLIC BLOOD PRESSURE: 100 MMHG | SYSTOLIC BLOOD PRESSURE: 162 MMHG | HEIGHT: 61 IN

## 2020-12-22 PROCEDURE — G8484 FLU IMMUNIZE NO ADMIN: HCPCS | Performed by: NURSE PRACTITIONER

## 2020-12-22 PROCEDURE — G8427 DOCREV CUR MEDS BY ELIG CLIN: HCPCS | Performed by: NURSE PRACTITIONER

## 2020-12-22 PROCEDURE — 99213 OFFICE O/P EST LOW 20 MIN: CPT | Performed by: NURSE PRACTITIONER

## 2020-12-22 PROCEDURE — G8417 CALC BMI ABV UP PARAM F/U: HCPCS | Performed by: NURSE PRACTITIONER

## 2020-12-22 PROCEDURE — 1036F TOBACCO NON-USER: CPT | Performed by: NURSE PRACTITIONER

## 2020-12-22 RX ORDER — KETOROLAC TROMETHAMINE 30 MG/ML
30 INJECTION, SOLUTION INTRAMUSCULAR; INTRAVENOUS ONCE
Status: COMPLETED | OUTPATIENT
Start: 2020-12-22 | End: 2020-12-22

## 2020-12-22 RX ADMIN — KETOROLAC TROMETHAMINE 30 MG: 30 INJECTION, SOLUTION INTRAMUSCULAR; INTRAVENOUS at 11:07

## 2020-12-22 ASSESSMENT — ENCOUNTER SYMPTOMS
COUGH: 0
VOMITING: 1
SINUS PRESSURE: 0
SHORTNESS OF BREATH: 0
NAUSEA: 1
ALLERGIC/IMMUNOLOGIC NEGATIVE: 1
SORE THROAT: 0
ABDOMINAL PAIN: 1
DIARRHEA: 0

## 2020-12-22 ASSESSMENT — VISUAL ACUITY: OU: 1

## 2020-12-22 NOTE — PROGRESS NOTES
[address] [facility] URGENT [facility]ARE  [address] [address] [place] [address]  Dept: [phone]  Dept Fax: [phone]: [phone]    Tianna Trivedi is a 52 y.o. female who presents today for her medical conditions/complaintsas noted below. Tianna  is c/o of Headache, Diarrhea, Nausea & Vomiting, Fatigue, Taste Change, and Sinus Problem (nose bleed)        HPI:     HPI   Tianna is here with concern for COVID. Since Thursday she has developed headache, nausea, vomiting, fatigue, chills, change in taste and yesterday had a nose bleed from a possible sinus problem. She has had no cough or shortness of breath noted. She is drinking Sprite and keeping it down but has vomited with dry heaving this morning. She is not eating and has poor appetite. She went to ER and got a shot over the weekend and tells me this was a migraine but now she just has a persistent headache that will not go away. She has had no known exposure to COVID but is in and out of doctor's offices frequently.     Past Medical History:   Diagnosis Date    Anxiety     Constipation     Depression     DM (diabetes mellitus) ([facility] [address]     Headache(784.0)     Hyperlipidemia     Hypertension     Kidney stones     Kidney stones     Restless leg     Restless legs syndrome      Past Surgical History:   Procedure Laterality Date    ECTOPIC PREGNANCY SURGERY      EYE SURGERY      cornea transplant and cataracts removed    KNEE CARTILAGE SURGERY Left 12/20/2016    KNEE ARTHROSCOPIC PARTIAL MEDIAL MENISCECTOMY performed by Judy Grewal [facility] at [facility] OR    TUBAL LIGATION         Family History   Problem Relation Age of Onset    Cancer Father     Cancer Maternal Grandmother     COPD Maternal Grandmother     Cancer Maternal Grandfather        Social History     Tobacco Use    Smoking status: Never Smoker    Smokeless tobacco: Never Used   Substance Use Topics    Alcohol use: No      Current Outpatient Medications Medication Sig Dispense Refill    MINOCYCLINE HCL PO Take by mouth      busPIRone (BUSPAR) 5 MG tablet TAKE 1 TABLET BY MOUTH THREE TIMES DAILY 90 tablet 3    oxyCODONE-acetaminophen (PERCOCET) 7.5-325 MG per tablet Take 1 tablet by mouth 2 times daily.  Zinc Oxide 6 % CREA Apply 1 Units topically 3 times daily JESUSITA'S CREAM  Zinc oxided 20%, hydrocortisone, nystatin, bacitracin 1 Bottle 3    DULoxetine (CYMBALTA) 60 MG extended release capsule Take 1 capsule by mouth 2 times daily 30 capsule 5    ondansetron (ZOFRAN-ODT) 4 MG disintegrating tablet DISSOLVE 1 TABLET UNDER THE TONGUE EVERY 8 HOURS AS NEEDED FOR NAUSEA AND VOMITING(TAKE PRIOR TO DOXYCYLINE DOSES) 20 tablet 0    tiZANidine (ZANAFLEX) 4 MG tablet Take 4 mg by mouth daily as needed      triamterene-hydroCHLOROthiazide (MAXZIDE-25) 37.5-25 MG per tablet Take 1 tablet by mouth daily      NONFORMULARY Prednisone eye drops 1 drop in each eye daily      Morphine Sulfate ER 15 MG T12A Take 15 mg by mouth 2 times daily.  Cream Base CREA APPLY ONE PUMP (GRAM) TO AFFECTED AREA(S) THREE TO FOUR TIMES A DAY TO THE APPENDAGES AND TRUNK AS DIRECTED 30 g 5    insulin lispro, 1 Unit Dial, (ADMELOG SOLOSTAR) 100 UNIT/ML SOPN ADMINISTER 45 UNITS UNDER THE SKIN EVERY EVENING. INCREASE BY 3 UNITS EVERY NIGHT UNTIL 60 UNITS IS REACHED AND.  CONTINUE 60 UNITS EVERY EVENING 18 mL 5    celecoxib (CELEBREX) 200 MG capsule TAKE 1 CAPSULE BY MOUTH EVERY DAY 60 capsule 5    insulin glargine (BASAGLAR KWIKPEN) 100 UNIT/ML injection pen INJECT 30 UNITS INTO THE SKIN EVERY EVENING 15 mL 1    pramipexole (MIRAPEX) 0.75 MG tablet Take 1 tablet by mouth 2 times daily 60 tablet 0    furosemide (LASIX) 80 MG tablet Take 1 tablet by mouth 2 times daily (Patient taking differently: Take 80 mg by mouth 2 times daily as needed ) 60 tablet 0    potassium chloride (KLOR-CON M) 20 MEQ extended release tablet Take 1 tablet by mouth daily 30 tablet 0  omeprazole (PRILOSEC) 20 MG delayed release capsule TAKE 1 CAPSULE BY MOUTH TWICE DAILY BEFORE MEALS (Patient taking differently: Take 40 mg by mouth Daily ) 180 capsule 1    diclofenac sodium (VOLTAREN) 1 % GEL Apply 2 g topically 2 times daily      cyclobenzaprine (FLEXERIL) 10 MG tablet Take 10 mg by mouth 3 times daily as needed for Muscle spasms       blood glucose test strips (ASCENSIA AUTODISC VI;ONE TOUCH ULTRA TEST VI) strip Check glucose TID and as needed for hypoglycemic events Dx E11.40 E11.66 Z79.4  Use one touch verio flex 150 each 5    B-D ULTRAFINE III SHORT PEN 31G X 8 MM MISC USE TO INJECT INSULIN ONCE DAILY 100 each 3    Diabetic Shoe MISC by Does not apply route DISPENSE ONE PAIR OF DIABETIC SHOE AND 3 PAIRS HEAT MOLDED INSERTS 1 each 0    Lancets (ONETOUCH DELICA PLUS [id number]) MISC USE TO CHECK BLLOD SUGAR AS DIRECTED 100 each 1    blood glucose monitor kit and supplies Test 1 times a day & as needed for symptoms of irregular blood glucose. 1 kit 0    VICTOZA 18 MG/3ML SOPN SC injection ADMINISTER 1.8 MG INTO THE SKIN EVERY DAY 9 mL 3    gabapentin (NEURONTIN) 100 MG capsule Take 100 mg by mouth 2 times daily. Am and 2 pm      gabapentin (NEURONTIN) 300 MG capsule TK ONE C PO QHS  5    Blood Glucose Monitoring Suppl ([address]) w/Device KIT 1 kit by Does not apply route daily as needed (Dx 250.00) 1 kit 0    ONE TOUCH LANCETS MISC 1 each by Does not apply route daily 100 each 3    clonazePAM (KLONOPIN) 0.5 MG tablet Take 1 tablet by mouth 2 times daily as needed for Anxiety for up to 30 days. 45 tablet 0    ZOLMitriptan (ZOMIG) 5 MG nasal solution 0.1 mLs by Nasal route once as needed for Migraine 1 each 3    ONE TOUCH ULTRA TEST strip TEST BLOOD SUGAR ONCE DAILY 100 strip 5     No current facility-administered medications for this visit.       Allergies   Allergen Reactions    Compazine [Prochlorperazine Maleate] Shortness Of Breath    Ciprofloxacin Hives  Amoxicillin-Pot Clavulanate     Compazine [Prochlorperazine]     Demerol Hives    Hydroxyzine     Hydroxyzine Hcl Itching    Ibuprofen Nausea Only    Meperidine Hives    Nortriptyline Other (See Comments)    Orphenadrine     Orphenadrine Citrate Itching    Penicillins Hives and Nausea Only    Prochlorperazine Edisylate      Will discuss with patient at next visit     Tobramycin      Will discuss with patient at next visit     Tramadol      Will discuss with patient at next visit     Vistaril [Hydroxyzine Hcl] Itching    Cephalexin Rash    Clindamycin/Lincomycin Rash    Codeine Nausea And Vomiting and Rash    Doxycycline Nausea And Vomiting    Keflex [Cephalexin] Rash    Moxifloxacin Nausea And Vomiting     Will discuss with patient at visit        Health Maintenance   Topic Date Due    Diabetic retinal exam  09/08/1982    HIV screen  09/08/1987    Hepatitis B vaccine (1 of 3 - Risk 3-dose series) 09/08/1991    Cervical cancer screen  05/21/2017    Diabetic microalbuminuria test  12/17/2019    Flu vaccine (1) 09/01/2020    Diabetic foot exam  11/11/2020    A1C test (Diabetic or Prediabetic)  01/29/2021    Lipid screen  08/17/2021    DTaP/Tdap/Td vaccine (2 - Td) 09/22/2021    Potassium monitoring  10/29/2021    Creatinine monitoring  10/29/2021    Pneumococcal 0-64 years Vaccine  Completed    Hepatitis A vaccine  Aged Out    Hib vaccine  Aged Out    Meningococcal (ACWY) vaccine  Aged Out       Subjective:     Review of Systems   Constitutional: Positive for appetite change, chills and fatigue. Negative for activity change and fever. HENT: Positive for nosebleeds. Negative for congestion, ear discharge, ear pain, sinus pressure and sore throat. Respiratory: Negative for cough and shortness of breath. Cardiovascular: Negative. Gastrointestinal: Positive for abdominal pain, nausea and vomiting. Negative for diarrhea. Capillary Refill: Capillary refill takes less than 2 seconds. Neurological:      General: No focal deficit present. Mental Status: She is alert, oriented to person, place, and time and easily aroused. Psychiatric:         Attention and Perception: Attention normal.         Mood and Affect: Mood normal.         Speech: Speech normal.         Behavior: Behavior normal. Behavior is cooperative. BP (!) 162/100   Pulse 80   Temp 97.8 °F (36.6 °C)   Resp 22   Ht 5' 1\" (1.549 m)   Wt (!) 314 lb 9.6 oz (142.7 kg)   SpO2 98%   BMI 59.44 kg/m²     :Assessment       Diagnosis Orders   1. Acute nonintractable headache, unspecified headache type     2. Nausea and vomiting, intractability of vomiting not specified, unspecified vomiting type     3. Fatigue, unspecified type     4. Suspected 2019 novel coronavirus infection         :Plan    No orders of the defined types were placed in this encounter. Pt does tolerate Toradol with Zofran and tells me it makes her nauseated but has Zofran at home to take with this today. She wants a Toradol shot for her persistent headache. I have agreed to this in office. Return if symptoms worsen or fail to improve.     Orders Placed This Encounter   Medications    ketorolac (TORADOL) injection 30 mg        Patient Instructions     Plenty of fluids- drink plenty of water  Rest  OTC Tylenol or Motrin as needed  Use Zofran you have at home for nausea and vomiting as directed   Stay home and stay in until we call with results  Toradol 30 mg IM today, monitor BP at home today, as it is elevated today  Worsening symptoms with fever, nausea, vomiting inability to keep anything down pt is advised to go to ER    Patient Education        Coronavirus (COVID-19): Care Instructions  Overview The coronavirus disease (COVID-19) is caused by a virus. Symptoms may include a fever, a cough, and shortness of breath. It mainly spreads person-to-person through droplets from coughing and sneezing. The virus also can spread when people are in close contact with someone who is infected. Most people have mild symptoms and can take care of themselves at home. If their symptoms get worse, they may need care in a hospital. Treatment may include medicines to reduce symptoms, plus breathing support such as oxygen therapy or a ventilator. It's important to not spread the virus to others. If you have COVID-19, wear a face cover anytime you are around other people. You need to isolate yourself while you are sick. Leave your home only if you need to get medical care or testing. Follow-up care is a key part of your treatment and safety. Be sure to make and go to all appointments, and call your doctor if you are having problems. It's also a good idea to know your test results and keep a list of the medicines you take. How can you care for yourself at home? · Get extra rest. It can help you feel better. · Drink plenty of fluids. This helps replace fluids lost from fever. Fluids also help ease a scratchy throat. Water, soup, fruit juice, and hot tea with lemon are good choices. · Take acetaminophen (such as Tylenol) to reduce a fever. It may also help with muscle aches. Read and follow all instructions on the label. · Use petroleum jelly on sore skin. This can help if the skin around your nose and lips becomes sore from rubbing a lot with tissues. Tips for self-isolation  · Limit contact with people in your home. If possible, stay in a separate bedroom and use a separate bathroom. · Wear a cloth face cover when you are around other people. It can help stop the spread of the virus when you cough or sneeze. · If you have to leave home, avoid crowds and try to stay at least 6 feet away from other people. · Avoid contact with pets and other animals. · Cover your mouth and nose with a tissue when you cough or sneeze. Then throw it in the trash right away. · Wash your hands often, especially after you cough or sneeze. Use soap and water, and scrub for at least 20 seconds. If soap and water aren't available, use an alcohol-based hand . · Don't share personal household items. These include bedding, towels, cups and glasses, and eating utensils. · [address] in the warmest water allowed for the fabric type, and dry it completely. It's okay to wash other people's laundry with yours. · Clean and disinfect your home every day. Use household  and disinfectant wipes or sprays. Take special care to clean things that you grab with your hands. These include doorknobs, remote controls, phones, and handles on your refrigerator and microwave. And don't forget countertops, tabletops, bathrooms, and computer keyboards. When you can end self-isolation  · If you know or suspect that you have COVID-19, stay in self-isolation until:  ? You haven't had a fever for 3 days, and  ? Your symptoms have improved, and  ? It's been at least 10 days since your symptoms started. · Talk to your doctor about whether you also need testing, especially if you have a weakened immune system. When should you call for help? Call 911 anytime you think you may need emergency care. For example, call if you have life-threatening symptoms, such as:    · You have severe trouble breathing. (You can't talk at all.)     · You have constant chest pain or pressure.     · You are severely dizzy or lightheaded.     · You are confused or can't think clearly.     · Your face and lips have a blue color.     · You pass out (lose consciousness) or are very hard to wake up. Call your doctor now or seek immediate medical care if:    · You have moderate trouble breathing.  (You can't speak a full sentence.) Mild illness can be treated at home, but more serious illness needs to be treated in the hospital. Treatment may include medicines to reduce symptoms, plus breathing support such as oxygen therapy or a ventilator. Other treatments, such as antiviral medicines, may help people who have COVID-19. What can you do to protect yourself from COVID-19? The best way to protect yourself from getting sick is to:  · Avoid areas where there is an outbreak. · Avoid contact with people who may be infected. · Avoid crowds and try to stay at least 6 feet away from other people. · Wash your hands often, especially after you cough or sneeze. Use soap and water, and scrub for at least 20 seconds. If soap and water aren't available, use an alcohol-based hand . · Avoid touching your mouth, nose, and eyes. What can you do to avoid spreading the virus to others? To help avoid spreading the virus to others:  · Wash your hands often with soap or alcohol-based hand sanitizers. · Cover your mouth with a tissue when you cough or sneeze. Then throw the tissue in the trash. · Use a disinfectant to clean things that you touch often. These include doorknobs, remote controls, phones, and handles on your refrigerator and microwave. And don't forget countertops, tabletops, bathrooms, and computer keyboards. · Wear a cloth face cover if you have to go to public areas. If you know or suspect that you have COVID-19:  · Stay home. Don't go to school, work, or public areas. And don't use public transportation, ride-shares, or taxis unless you have no choice. · Leave your home only if you need to get medical care or testing. But call the doctor's office first so they know you're coming. And wear a face cover. · Limit contact with people in your home. If possible, stay in a separate bedroom and use a separate bathroom. · Wear a face cover whenever you're around other people. It can help stop the spread of the virus when you cough or sneeze. · Clean and disinfect your home every day. Use household  and disinfectant wipes or sprays. Take special care to clean things that you grab with your hands. · Self-isolate until it's safe to be around others again. ? If you have symptoms, it's safe when you haven't had a fever for 3 days and your symptoms have improved and it's been at least 10 days since your symptoms started. ? If you were exposed to the virus but don't have symptoms, it's safe to be around others 14 days after exposure. ? Talk to your doctor about whether you also need testing, especially if you have a weakened immune system. When to call for help  Call 911 anytime you think you may need emergency care. For example, call if:  · You have severe trouble breathing. (You can't talk at all.)  · You have constant chest pain or pressure. · You are severely dizzy or lightheaded. · You are confused or can't think clearly. · Your face and lips have a blue color. · You passed out (lost consciousness) or are very hard to wake up. Call your doctor now if you develop symptoms such as:  · Shortness of breath. · Fever. · Cough. If you need to get care, call ahead to the doctor's office for instructions before you go. Make sure you wear a face cover to prevent exposing other people to the virus. Where can you get the latest information? The following health organizations are tracking and studying this virus. Their websites contain the most up-to-date information. [place] also learn what to do if you think you may have been exposed to the virus. · U.S. Centers for Disease Control and Prevention (CDC): The CDC provides updated news about the disease and travel advice. The website also tells you how to prevent the spread of infection.  www.cdc.gov · World Health Organization [facility]): WHO offers information about the virus outbreaks. WHO also has travel advice. www.who.int  Current as of: July 10, 2020               Content Version: 12.6  © 2006-2020 [employer]. Care instructions adapted under license by [facility] ([facility]). If you have questions about a medical condition or this instruction, always ask your healthcare professional. [address] any warranty or liability for your use of this information. Patient Education        Acute High Blood Pressure: Care Instructions  Your Care Instructions     Acute high blood pressure is very high blood pressure. It's a serious problem. Very high blood pressure can damage your brain, heart, eyes, and kidneys. You may have been given medicines to lower your blood pressure. You may have gotten them as pills or through a needle in one of your veins. This is called an IV. And maybe you were given other medicines too. These can be needed when high blood pressure causes other problems. To keep your blood pressure at a lower level, you may need to make healthy lifestyle changes. And you will probably need to take medicines. Be sure to follow up with your doctor about your blood pressure and what you can do about it. Follow-up care is a key part of your treatment and safety. Be sure to make and go to all appointments, and call your doctor if you are having problems. It's also a good idea to know your test results and keep a list of the medicines you take. How can you care for yourself at home? · See your doctor as often as he or she recommends. This is to make sure your blood pressure is under control. · Take your blood pressure medicine exactly as prescribed. You may take one or more types. They include diuretics, beta-blockers, ACE inhibitors, calcium channel blockers, and angiotensin II receptor blockers. Call your doctor if you think you are having a problem with your medicine. · If you take blood pressure medicine, talk to your doctor before you take decongestants or anti-inflammatory medicine, such as ibuprofen. These can raise blood pressure. · Learn how to check your blood pressure at home. Check it often. · Ask your doctor if it's okay to drink alcohol. · Talk to your doctor about lifestyle changes that can help blood pressure. These include being active and managing your weight. · Don't smoke. Smoking increases your risk for heart attack and stroke. When should you call for help? Call  911 anytime you think you may need emergency care. This may mean having symptoms that suggest that your blood pressure is causing a serious heart or blood vessel problem. Your blood pressure may be over 180/120. For example, call 911 if:    · You have symptoms of a heart attack. These may include:  ? Chest pain or pressure, or a strange feeling in the chest.  ? Sweating. ? Shortness of breath. ? Nausea or vomiting. ? Pain, pressure, or a strange feeling in the back, neck, jaw, or upper belly or in one or both shoulders or arms. ? Lightheadedness or sudden weakness. ? A fast or irregular heartbeat.     · You have symptoms of a stroke. These may include:  ? Sudden numbness, tingling, weakness, or loss of movement in your face, arm, or leg, especially on only one side of your body. ? Sudden vision changes. ? Sudden trouble speaking. ? Sudden confusion or trouble understanding simple statements. ? Sudden problems with walking or balance. ? A sudden, severe headache that is different from past headaches.     · You have severe back or belly pain. Do not wait until your blood pressure comes down on its own. Get help right away. Call your doctor now or seek immediate care if:    · Your blood pressure is much higher than normal (such as 180/120 or higher), but you don't have symptoms.     · You think high blood pressure is causing symptoms, such as:  ? Severe headache.  ? Blurry vision. Watch closely for changes in your health, and be sure to contact your doctor if:    · Your blood pressure measures higher than your doctor recommends at least 2 times. That means the top number is higher or the bottom number is higher, or both.     · You think you may be having side effects from your blood pressure medicine. Where can you learn more? Go to https://chpeadrieweb.idemama. org and sign in to your [employer] account. Enter B755 in the RFEyeD box to learn more about \"Acute High Blood Pressure: Care Instructions. \"     If you do not have an account, please click on the \"Sign Up Now\" link. Current as of: December 16, 2019               Content Version: 12.6  © 9288-7618 [employer]. Care instructions adapted under license by [facility] ([facility]). If you have questions about a medical condition or this instruction, always ask your healthcare professional. [place] [address] any warranty or liability for your use of this information. Based on patient's symptoms today, it is highly suspected that they have COVID 19. Since pt is being tested for COVID pt has been instructed to quarantine from contacts until testing has been resulted. Further instructions will follow, as of now, this is 14 days unless otherwise specified when results are back. If SOB or worsening sx's develop, need to go to ED or return to clinic, pt voiced understanding. Pt was given printed instructions today on Possible COVID-19 infection with self-quarantine and management of symptoms    Call or return to clinic prn if these symptoms worsen or fail to improve as anticipated. Patient given educational materials- see patient instructions. Discussed use, benefit, and side effects of prescribedmedications. All patient questions answered. Pt voiced understanding.        Electronically signed by NANETTE Marinelli CNP on 12/22/2020 at 11:13 AM

## 2020-12-22 NOTE — PATIENT INSTRUCTIONS
Plenty of fluids- drink plenty of water  Rest  OTC Tylenol or Motrin as needed  Use Zofran you have at home for nausea and vomiting as directed   Stay home and stay in until we call with results  Toradol 30 mg IM today, monitor BP at home today, as it is elevated today  Worsening symptoms with fever, nausea, vomiting inability to keep anything down pt is advised to go to ER    Patient Education        Coronavirus (COVID-19): Care Instructions  Overview  The coronavirus disease (COVID-19) is caused by a virus. Symptoms may include a fever, a cough, and shortness of breath. It mainly spreads person-to-person through droplets from coughing and sneezing. The virus also can spread when people are in close contact with someone who is infected. Most people have mild symptoms and can take care of themselves at home. If their symptoms get worse, they may need care in a hospital. Treatment may include medicines to reduce symptoms, plus breathing support such as oxygen therapy or a ventilator. It's important to not spread the virus to others. If you have COVID-19, wear a face cover anytime you are around other people. You need to isolate yourself while you are sick. Leave your home only if you need to get medical care or testing. Follow-up care is a key part of your treatment and safety. Be sure to make and go to all appointments, and call your doctor if you are having problems. It's also a good idea to know your test results and keep a list of the medicines you take. How can you care for yourself at home? · Get extra rest. It can help you feel better. · Drink plenty of fluids. This helps replace fluids lost from fever. Fluids also help ease a scratchy throat. Water, soup, fruit juice, and hot tea with lemon are good choices. · Take acetaminophen (such as Tylenol) to reduce a fever. It may also help with muscle aches. Read and follow all instructions on the label. · Use petroleum jelly on sore skin. This can help if the skin around your nose and lips becomes sore from rubbing a lot with tissues. Tips for self-isolation  · Limit contact with people in your home. If possible, stay in a separate bedroom and use a separate bathroom. · Wear a cloth face cover when you are around other people. It can help stop the spread of the virus when you cough or sneeze. · If you have to leave home, avoid crowds and try to stay at least 6 feet away from other people. · Avoid contact with pets and other animals. · Cover your mouth and nose with a tissue when you cough or sneeze. Then throw it in the trash right away. · Wash your hands often, especially after you cough or sneeze. Use soap and water, and scrub for at least 20 seconds. If soap and water aren't available, use an alcohol-based hand . · Don't share personal household items. These include bedding, towels, cups and glasses, and eating utensils. · [address] in the warmest water allowed for the fabric type, and dry it completely. It's okay to wash other people's laundry with yours. · Clean and disinfect your home every day. Use household  and disinfectant wipes or sprays. Take special care to clean things that you grab with your hands. These include doorknobs, remote controls, phones, and handles on your refrigerator and microwave. And don't forget countertops, tabletops, bathrooms, and computer keyboards. When you can end self-isolation  · If you know or suspect that you have COVID-19, stay in self-isolation until:  ? You haven't had a fever for 3 days, and  ? Your symptoms have improved, and  ? It's been at least 10 days since your symptoms started. · Talk to your doctor about whether you also need testing, especially if you have a weakened immune system. When should you call for help? Call 911 anytime you think you may need emergency care. For example, call if you have life-threatening symptoms, such as:    · You have severe trouble breathing. (You can't talk at all.)     · You have constant chest pain or pressure.     · You are severely dizzy or lightheaded.     · You are confused or can't think clearly.     · Your face and lips have a blue color.     · You pass out (lose consciousness) or are very hard to wake up. Call your doctor now or seek immediate medical care if:    · You have moderate trouble breathing. (You can't speak a full sentence.)     · You are coughing up blood (more than about 1 teaspoon).     · You have signs of low blood pressure. These include feeling lightheaded; being too weak to stand; and having cold, pale, clammy skin. Watch closely for changes in your health, and be sure to contact your doctor if:    · Your symptoms get worse.     · You are not getting better as expected. Call before you go to the doctor's office. Follow their instructions. And wear a cloth face cover. Current as of: July 10, 2020               Content Version: 12.6  © 5443-8367 [employer]. Care instructions adapted under license by [facility] ([facility]). If you have questions about a medical condition or this instruction, always ask your healthcare professional. [address] any warranty or liability for your use of this information. Patient Education        Learning About Coronavirus [phone])  Coronavirus [phone]): Overview  What is coronavirus (FILUE-36)? The coronavirus disease (COVID-19) is caused by a virus. It is an illness that was first found in December 2019. It has since spread worldwide. The virus can cause fever, cough, and trouble breathing. In severe cases, it can cause pneumonia and make it hard to breathe without help. It can cause death. · Wear a cloth face cover if you have to go to public areas. If you know or suspect that you have COVID-19:  · Stay home. Don't go to school, work, or public areas. And don't use public transportation, ride-shares, or taxis unless you have no choice. · Leave your home only if you need to get medical care or testing. But call the doctor's office first so they know you're coming. And wear a face cover. · Limit contact with people in your home. If possible, stay in a separate bedroom and use a separate bathroom. · Wear a face cover whenever you're around other people. It can help stop the spread of the virus when you cough or sneeze. · Clean and disinfect your home every day. Use household  and disinfectant wipes or sprays. Take special care to clean things that you grab with your hands. · Self-isolate until it's safe to be around others again. ? If you have symptoms, it's safe when you haven't had a fever for 3 days and your symptoms have improved and it's been at least 10 days since your symptoms started. ? If you were exposed to the virus but don't have symptoms, it's safe to be around others 14 days after exposure. ? Talk to your doctor about whether you also need testing, especially if you have a weakened immune system. When to call for help  Call 911 anytime you think you may need emergency care. For example, call if:  · You have severe trouble breathing. (You can't talk at all.)  · You have constant chest pain or pressure. · You are severely dizzy or lightheaded. · You are confused or can't think clearly. · Your face and lips have a blue color. · You passed out (lost consciousness) or are very hard to wake up. Call your doctor now if you develop symptoms such as:  · Shortness of breath. · Fever. · Cough. If you need to get care, call ahead to the doctor's office for instructions before you go. Make sure you wear a face cover to prevent exposing other people to the virus. Where can you get the latest information? The following health organizations are tracking and studying this virus. Their websites contain the most up-to-date information. Holli Banerjee also learn what to do if you think you may have been exposed to the virus. · U.[place]. Centers for Disease Control and Prevention (CDC): The CDC provides updated news about the disease and travel advice. The website also tells you how to prevent the spread of infection. www.cdc.gov  · World Health Organization [facility]): WHO offers information about the virus outbreaks. WHO also has travel advice. www.who.int  Current as of: July 10, 2020               Content Version: 12.6  © 2006-2020 [employer]. Care instructions adapted under license by [facility] ([facility]). If you have questions about a medical condition or this instruction, always ask your healthcare professional. [address] any warranty or liability for your use of this information. Patient Education        Acute High Blood Pressure: Care Instructions  Your Care Instructions     Acute high blood pressure is very high blood pressure. It's a serious problem. Very high blood pressure can damage your brain, heart, eyes, and kidneys. You may have been given medicines to lower your blood pressure. You may have gotten them as pills or through a needle in one of your veins. This is called an IV. And maybe you were given other medicines too. These can be needed when high blood pressure causes other problems. To keep your blood pressure at a lower level, you may need to make healthy lifestyle changes. And you will probably need to take medicines. Be sure to follow up with your doctor about your blood pressure and what you can do about it. Follow-up care is a key part of your treatment and safety. Be sure to make and go to all appointments, and call your doctor if you are having problems. It's also a good idea to know your test results and keep a list of the medicines you take. How can you care for yourself at home? · See your doctor as often as he or she recommends. This is to make sure your blood pressure is under control. · Take your blood pressure medicine exactly as prescribed. You may take one or more types. They include diuretics, beta-blockers, ACE inhibitors, calcium channel blockers, and angiotensin II receptor blockers. Call your doctor if you think you are having a problem with your medicine. · If you take blood pressure medicine, talk to your doctor before you take decongestants or anti-inflammatory medicine, such as ibuprofen. These can raise blood pressure. · Learn how to check your blood pressure at home. Check it often. · Ask your doctor if it's okay to drink alcohol. · Talk to your doctor about lifestyle changes that can help blood pressure. These include being active and managing your weight. · Don't smoke. Smoking increases your risk for heart attack and stroke. When should you call for help? Call  911 anytime you think you may need emergency care. This may mean having symptoms that suggest that your blood pressure is causing a serious heart or blood vessel problem. Your blood pressure may be over 180/120. For example, call 911 if:    · You have symptoms of a heart attack. These may include:  ? Chest pain or pressure, or a strange feeling in the chest.  ? Sweating. ? Shortness of breath. ? Nausea or vomiting. ? Pain, pressure, or a strange feeling in the back, neck, jaw, or upper belly or in one or both shoulders or arms. ? Lightheadedness or sudden weakness. ? A fast or irregular heartbeat.     · You have symptoms of a stroke.  These may include: ? Sudden numbness, tingling, weakness, or loss of movement in your face, arm, or leg, especially on only one side of your body. ? Sudden vision changes. ? Sudden trouble speaking. ? Sudden confusion or trouble understanding simple statements. ? Sudden problems with walking or balance. ? A sudden, severe headache that is different from past headaches.     · You have severe back or belly pain. Do not wait until your blood pressure comes down on its own. Get help right away. Call your doctor now or seek immediate care if:    · Your blood pressure is much higher than normal (such as 180/120 or higher), but you don't have symptoms.     · You think high blood pressure is causing symptoms, such as:  ? Severe headache.  ? Blurry vision. Watch closely for changes in your health, and be sure to contact your doctor if:    · Your blood pressure measures higher than your doctor recommends at least 2 times. That means the top number is higher or the bottom number is higher, or both.     · You think you may be having side effects from your blood pressure medicine. Where can you learn more? Go to [email or web address]://[employer]. org and sign in to your [facility] account. Enter I811 in the Te[employer] box to learn more about \"Acute High Blood Pressure: Care Instructions. \"     If you do not have an account, please click on the \"Sign Up Now\" link. Current as of: December 16, 2019               Content Version: 12.6  © 7631-5046 [employer]. Care instructions adapted under license by [facility] ([facility]). If you have questions about a medical condition or this instruction, always ask your healthcare professional. [place] [address] any warranty or liability for your use of this information.

## 2020-12-24 LAB — SARS-COV-2, NAA: NOT DETECTED

## 2021-01-04 ENCOUNTER — TELEPHONE (OUTPATIENT)
Dept: PRIMARY CARE CLINIC | Age: 51
End: 2021-01-04

## 2021-01-04 DIAGNOSIS — L97.922 DIABETIC ULCER OF LEFT LOWER LEG ASSOCIATED WITH TYPE 2 DIABETES MELLITUS, WITH FAT LAYER EXPOSED (HCC): Chronic | ICD-10-CM

## 2021-01-04 DIAGNOSIS — E11.622 DIABETIC ULCER OF LEFT LOWER LEG ASSOCIATED WITH TYPE 2 DIABETES MELLITUS, WITH FAT LAYER EXPOSED (HCC): Chronic | ICD-10-CM

## 2021-01-04 DIAGNOSIS — I87.2 CHRONIC VENOUS STASIS DERMATITIS: Primary | ICD-10-CM

## 2021-01-04 NOTE — TELEPHONE ENCOUNTER
Pt needs a referral for wound care other than , has two spots in back of legs that arent healing, Pt also need an appt in person for fooollow up, please call, thanks

## 2021-01-06 RX ORDER — INSULIN GLARGINE 100 [IU]/ML
INJECTION, SOLUTION SUBCUTANEOUS
Qty: 15 ML | Refills: 1 | Status: SHIPPED | OUTPATIENT
Start: 2021-01-06 | End: 2021-08-04

## 2021-01-11 DIAGNOSIS — F41.9 ANXIETY AND DEPRESSION: ICD-10-CM

## 2021-01-11 DIAGNOSIS — F32.A ANXIETY AND DEPRESSION: ICD-10-CM

## 2021-01-12 NOTE — TELEPHONE ENCOUNTER
Referral has been placed to [address] [place] [address] patient aware she will be contacted by phone to schedule visit.

## 2021-01-13 RX ORDER — MIRTAZAPINE 15 MG/1
TABLET, FILM COATED ORAL
Qty: 30 TABLET | Refills: 3 | Status: SHIPPED | OUTPATIENT
Start: 2021-01-13 | End: 2021-08-31

## 2021-01-20 ENCOUNTER — OFFICE VISIT (OUTPATIENT)
Dept: PRIMARY CARE CLINIC | Age: 51
End: 2021-01-20
Payer: MEDICAID

## 2021-01-20 VITALS
OXYGEN SATURATION: 96 % | DIASTOLIC BLOOD PRESSURE: 86 MMHG | TEMPERATURE: 97.2 F | HEIGHT: 61 IN | WEIGHT: 293 LBS | HEART RATE: 76 BPM | BODY MASS INDEX: 55.32 KG/M2 | SYSTOLIC BLOOD PRESSURE: 152 MMHG | RESPIRATION RATE: 20 BRPM

## 2021-01-20 DIAGNOSIS — E66.01 CLASS 3 SEVERE OBESITY DUE TO EXCESS CALORIES WITH SERIOUS COMORBIDITY AND BODY MASS INDEX (BMI) OF 50.0 TO 59.9 IN ADULT (HCC): ICD-10-CM

## 2021-01-20 DIAGNOSIS — I87.2 VENOUS INSUFFICIENCY OF BOTH LOWER EXTREMITIES: ICD-10-CM

## 2021-01-20 DIAGNOSIS — F41.9 ANXIETY: ICD-10-CM

## 2021-01-20 DIAGNOSIS — E11.65 UNCONTROLLED TYPE 2 DIABETES MELLITUS WITH HYPERGLYCEMIA, WITHOUT LONG-TERM CURRENT USE OF INSULIN (HCC): Chronic | ICD-10-CM

## 2021-01-20 DIAGNOSIS — I89.0 LYMPHEDEMA OF BOTH LOWER EXTREMITIES: ICD-10-CM

## 2021-01-20 DIAGNOSIS — L03.115 CELLULITIS OF BOTH LOWER EXTREMITIES: ICD-10-CM

## 2021-01-20 DIAGNOSIS — L03.116 CELLULITIS OF BOTH LOWER EXTREMITIES: ICD-10-CM

## 2021-01-20 PROCEDURE — 1036F TOBACCO NON-USER: CPT | Performed by: NURSE PRACTITIONER

## 2021-01-20 PROCEDURE — 2022F DILAT RTA XM EVC RTNOPTHY: CPT | Performed by: NURSE PRACTITIONER

## 2021-01-20 PROCEDURE — G8427 DOCREV CUR MEDS BY ELIG CLIN: HCPCS | Performed by: NURSE PRACTITIONER

## 2021-01-20 PROCEDURE — G8417 CALC BMI ABV UP PARAM F/U: HCPCS | Performed by: NURSE PRACTITIONER

## 2021-01-20 PROCEDURE — G8484 FLU IMMUNIZE NO ADMIN: HCPCS | Performed by: NURSE PRACTITIONER

## 2021-01-20 PROCEDURE — 3046F HEMOGLOBIN A1C LEVEL >9.0%: CPT | Performed by: NURSE PRACTITIONER

## 2021-01-20 PROCEDURE — 99214 OFFICE O/P EST MOD 30 MIN: CPT | Performed by: NURSE PRACTITIONER

## 2021-01-20 PROCEDURE — 80305 DRUG TEST PRSMV DIR OPT OBS: CPT | Performed by: NURSE PRACTITIONER

## 2021-01-20 RX ORDER — DULOXETIN HYDROCHLORIDE 60 MG/1
60 CAPSULE, DELAYED RELEASE ORAL 2 TIMES DAILY
Qty: 30 CAPSULE | Refills: 5 | Status: SHIPPED | OUTPATIENT
Start: 2021-01-20 | End: 2021-04-27

## 2021-01-20 RX ORDER — RIZATRIPTAN BENZOATE 10 MG/1
10 TABLET ORAL
Qty: 30 TABLET | Refills: 3 | Status: SHIPPED | OUTPATIENT
Start: 2021-01-20 | End: 2022-03-07

## 2021-01-20 RX ORDER — POTASSIUM CHLORIDE 20 MEQ/1
20 TABLET, EXTENDED RELEASE ORAL DAILY
Qty: 30 TABLET | Refills: 0 | Status: SHIPPED | OUTPATIENT
Start: 2021-01-20 | End: 2021-09-13 | Stop reason: SDUPTHER

## 2021-01-20 RX ORDER — PHENTERMINE HYDROCHLORIDE 37.5 MG/1
37.5 TABLET ORAL
Qty: 30 TABLET | Refills: 0 | Status: SHIPPED | OUTPATIENT
Start: 2021-01-20 | End: 2021-02-19

## 2021-01-20 RX ORDER — TRIAMTERENE AND HYDROCHLOROTHIAZIDE 37.5; 25 MG/1; MG/1
1 TABLET ORAL DAILY
Qty: 30 TABLET | Refills: 2 | Status: SHIPPED | OUTPATIENT
Start: 2021-01-20 | End: 2021-04-27

## 2021-01-20 RX ORDER — CLONAZEPAM 0.5 MG/1
0.5 TABLET ORAL 2 TIMES DAILY PRN
Qty: 45 TABLET | Refills: 0 | Status: ON HOLD | OUTPATIENT
Start: 2021-01-20 | End: 2021-04-14 | Stop reason: SDUPTHER

## 2021-01-20 ASSESSMENT — ENCOUNTER SYMPTOMS
VOMITING: 0
SHORTNESS OF BREATH: 0
RHINORRHEA: 0
NAUSEA: 0
COUGH: 0
COLOR CHANGE: 0
VOICE CHANGE: 0
PHOTOPHOBIA: 0
BACK PAIN: 0

## 2021-01-20 NOTE — PROGRESS NOTES
[facility] PRIMARY CARE  [address] [id number]  [address] [address]  Dept: [phone]  Dept Fax: [phone]: [phone]    Albert Marie is a 52 y.o. female who presents today for her medical conditions/complaints as noted below. Tianna  is c/o of Leg Swelling (wants to see about getting home health to come and take care of her wound; she is unable to take care of it herself and cannot make it in to wound care frequently) and Migraine ([employer] no longer covered, woud like to go back to [place])        HPI:     HPI   Chief Complaint   Patient presents with    Leg Swelling     wants to see about getting home health to come and take care of her wound; she is unable to take care of it herself and cannot make it in to wound care frequently    Migraine     Zomig no longer covered, woud like to go back to [facility]     Patient presents today for evaluation of BLE edema. She had been seeing Dr Peterson Garcia but states she had a falling out with him and no longer feels comfortable going there. She has history of cellulitis and lymphedema. She had also been doing the lymphedema clinic; she now has an at home machine to use. Her last visit was last month with Dr Peterson Garcia. She complains of some clear drainage. She states that [employer] Cream usually works the best for her; silvadene seemed to make it worse. She reports she has been out of BP meds; so her BP is elevated today. She is concerned with weight; she states she has been working hard to lose weight. She has lost 13lbs on her own.      Past Medical History:   Diagnosis Date    Anxiety     Constipation     Depression     DM (diabetes mellitus) ([facility] [address]     Headache(784.0)     Hyperlipidemia     Hypertension     Kidney stones     Kidney stones     Restless leg     Restless legs syndrome       Past Surgical History:   Procedure Laterality Date    ECTOPIC PREGNANCY SURGERY      EYE SURGERY injection pen INJECT 30 UNITS INTO THE SKIN EVERY EVENING 15 mL 1    busPIRone (BUSPAR) 5 MG tablet TAKE 1 TABLET BY MOUTH THREE TIMES DAILY 90 tablet 3    oxyCODONE-acetaminophen (PERCOCET) 7.5-325 MG per tablet Take 1 tablet by mouth 2 times daily.  Zinc Oxide 6 % CREA Apply 1 Units topically 3 times daily JESUSITA'S CREAM  Zinc oxided 20%, hydrocortisone, nystatin, bacitracin 1 Bottle 3    ondansetron (ZOFRAN-ODT) 4 MG disintegrating tablet DISSOLVE 1 TABLET UNDER THE TONGUE EVERY 8 HOURS AS NEEDED FOR NAUSEA AND VOMITING(TAKE PRIOR TO DOXYCYLINE DOSES) 20 tablet 0    tiZANidine (ZANAFLEX) 4 MG tablet Take 4 mg by mouth daily as needed      Morphine Sulfate ER 15 MG T12A Take 15 mg by mouth 2 times daily.  Cream Base CREA APPLY ONE PUMP (GRAM) TO AFFECTED AREA(S) THREE TO FOUR TIMES A DAY TO THE APPENDAGES AND TRUNK AS DIRECTED 30 g 5    insulin lispro, 1 Unit Dial, (ADMELOG SOLOSTAR) 100 UNIT/ML SOPN ADMINISTER 45 UNITS UNDER THE SKIN EVERY EVENING. INCREASE BY 3 UNITS EVERY NIGHT UNTIL 60 UNITS IS REACHED AND.  CONTINUE 60 UNITS EVERY EVENING 18 mL 5    celecoxib (CELEBREX) 200 MG capsule TAKE 1 CAPSULE BY MOUTH EVERY DAY 60 capsule 5    pramipexole (MIRAPEX) 0.75 MG tablet Take 1 tablet by mouth 2 times daily 60 tablet 0    omeprazole (PRILOSEC) 20 MG delayed release capsule TAKE 1 CAPSULE BY MOUTH TWICE DAILY BEFORE MEALS (Patient taking differently: Take 40 mg by mouth Daily ) 180 capsule 1    diclofenac sodium (VOLTAREN) 1 % GEL Apply 2 g topically 2 times daily      blood glucose test strips (ASCENSIA AUTODISC VI;ONE TOUCH ULTRA TEST VI) strip Check glucose TID and as needed for hypoglycemic events Dx E11.40 E11.66 Z79.4  Use one touch verio flex 150 each 5    B-D ULTRAFINE III SHORT PEN 31G X 8 MM MISC USE TO INJECT INSULIN ONCE DAILY 100 each 3    Lancets (ONETOUCH DELICA PLUS [id number]) MISC USE TO CHECK BLLOD SUGAR AS DIRECTED 100 each 1    blood glucose monitor kit and supplies Test 1 times a day & as needed for symptoms of irregular blood glucose. 1 kit 0    VICTOZA 18 MG/3ML SOPN SC injection ADMINISTER 1.8 MG INTO THE SKIN EVERY DAY 9 mL 3    gabapentin (NEURONTIN) 300 MG capsule Take 300 mg by mouth 3 times daily. 5    Blood Glucose Monitoring Suppl ([address]) w/Device KIT 1 kit by Does not apply route daily as needed (Dx 250.00) 1 kit 0    ONE TOUCH LANCETS MISC 1 each by Does not apply route daily 100 each 3    MINOCYCLINE HCL PO Take by mouth      NONFORMULARY Prednisone eye drops 1 drop in each eye daily      furosemide (LASIX) 80 MG tablet Take 1 tablet by mouth 2 times daily (Patient not taking: Reported on 1/20/2021) 60 tablet 0    ZOLMitriptan (ZOMIG) 5 MG nasal solution 0.1 mLs by Nasal route once as needed for Migraine 1 each 3    cyclobenzaprine (FLEXERIL) 10 MG tablet Take 10 mg by mouth 3 times daily as needed for Muscle spasms       ONE TOUCH ULTRA TEST strip TEST BLOOD SUGAR ONCE DAILY 100 strip 5    gabapentin (NEURONTIN) 100 MG capsule Take 100 mg by mouth 2 times daily. Am and 2 pm       No current facility-administered medications for this visit.       Allergies   Allergen Reactions    Compazine [Prochlorperazine Maleate] Shortness Of Breath    Ciprofloxacin Hives    Amoxicillin-Pot Clavulanate     Compazine [Prochlorperazine]     Demerol Hives    Hydroxyzine     Hydroxyzine Hcl Itching    Ibuprofen Nausea Only    Meperidine Hives    Nortriptyline Other (See Comments)    Orphenadrine     Orphenadrine Citrate Itching    Penicillins Hives and Nausea Only    Prochlorperazine Edisylate      Will discuss with patient at next visit     Tobramycin      Will discuss with patient at next visit     Tramadol      Will discuss with patient at next visit     Vistaril [Hydroxyzine Hcl] Itching    Cephalexin Rash    Clindamycin/Lincomycin Rash    Codeine Nausea And Vomiting and Rash    Doxycycline Nausea And Vomiting    Keflex Left Ear: External ear normal.      Nose: Nose normal.   Eyes:      Conjunctiva/sclera: Conjunctivae normal.      Pupils: Pupils are equal, round, and reactive to light. Neck:      Musculoskeletal: Normal range of motion and neck supple. Cardiovascular:      Rate and Rhythm: Normal rate and regular rhythm. Heart sounds: Normal heart sounds, S1 normal and S2 normal.   Pulmonary:      Effort: Pulmonary effort is normal.      Breath sounds: Normal breath sounds. Abdominal:      General: Bowel sounds are normal.      Palpations: Abdomen is soft. Musculoskeletal: Normal range of motion. Right foot: Deformity present. Left foot: Deformity present. Feet:      Right foot:      Protective Sensation: 10 sites tested. 5 sites sensed. Skin integrity: Erythema and dry skin present. Left foot:      Protective Sensation: 10 sites tested. 5 sites sensed. Skin integrity: Erythema and dry skin present. Skin:     General: Skin is warm and dry. Comments: Significant edema and erythema/warm to touch   Neurological:      Mental Status: She is alert and oriented to person, place, and time. Psychiatric:         Behavior: Behavior normal.       BP (!) 152/86   Pulse 76   Temp 97.2 °F (36.2 °C) (Temporal)   Resp 20   Ht 5' 1\" (1.549 m)   Wt (!) 310 lb (140.6 kg)   SpO2 96%   BMI 58.57 kg/m²     Assessment:       Diagnosis Orders   1. Uncontrolled type 2 diabetes mellitus with diabetic neuropathy, with long-term current use of insulin ([address]  Diabetic Foot Exam    External Referral To Wound Clinic    CBC Auto Differential    Comprehensive Metabolic Panel    Lipid Panel    Hemoglobin A1C    TSH without Reflex    phentermine (ADIPEX-P) 37.5 MG tablet    Diabetic Shoe MISC   2.  Anxiety  DULoxetine (CYMBALTA) 60 MG extended release capsule    clonazePAM (KLONOPIN) 0.5 MG tablet    External Referral To Wound Clinic    CBC Auto Differential    Comprehensive Metabolic Panel    Lipid Panel    Hemoglobin A1C    TSH without Reflex    phentermine (ADIPEX-P) 37.5 MG tablet    POCT Rapid Drug Screen   3. Venous insufficiency of both lower extremities  External Referral To Wound Clinic   4. Uncontrolled type 2 diabetes mellitus with hyperglycemia, without long-term current use of insulin [facility])  External Referral To Wound Clinic   5. Cellulitis of both lower extremities  External Referral To Wound Clinic   6. Lymphedema of both lower extremities  External Referral To Wound Clinic   7. Class 3 severe obesity due to excess calories with serious comorbidity and body mass index (BMI) of 50.0 to 59.9 in adult [facility])  phentermine (ADIPEX-P) 37.5 MG tablet    POCT Rapid Drug Screen     Results for orders placed or performed in visit on 01/20/21   POCT Rapid Drug Screen   Result Value Ref Range    Alcohol, Urine -     Amphetamine Screen, Urine -     Barbiturate Screen, Urine -     Benzodiazepine Screen, Urine -     Buprenorphine Urine -     Cocaine Metabolite Screen, Urine -     FENTANYL SCREEN, URINE -     Gabapentin Screen, Urine      MDMA, Urine -     Methadone Screen, Urine -     Methamphetamine, Urine -     Opiate Scrn, Ur      Oxycodone Screen, Ur +     PCP Screen, Urine -     Propoxyphene Screen, Urine      Synthetic Cannabinoids (K2) Screen, Urine -     THC Screen, Urine -     Tramadol Scrn, Ur -     Tricyclic Antidepressants, Urine           Plan:   More than 50% of the time was spent counseling and coordinating care for a total time of 30 min face to face. Okay to resume phentermine; patient's obesity is largely contributing to her health conditions. She will have to follow-up in 1 month for weight check. Will refer to [facility] wound care; continue with dressing changes as she is doing. No obvious signs of infection today warranting antibiotics. Resume BP meds and monitor blood pressure; cannot take phentermine if BP is up. GEN was reviewed today per office protocol.  Report shows No discrepancies. Fill pattern is consistent from single provider(s) at single pharmacy(s). Patient given educational materials -see patient instructions. Discussed use, benefit, and side effects of prescribed medications. All patient questions answered. Pt voiced understanding. Reviewed health maintenance. Instructed to continue currentmedications, diet and exercise. Patient agreed with treatment plan. Follow up as directed. MEDICATIONS:  Orders Placed This Encounter   Medications    DULoxetine (CYMBALTA) 60 MG extended release capsule     Sig: Take 1 capsule by mouth 2 times daily     Dispense:  30 capsule     Refill:  5    clonazePAM (KLONOPIN) 0.5 MG tablet     Sig: Take 1 tablet by mouth 2 times daily as needed for Anxiety for up to 30 days. Dispense:  45 tablet     Refill:  0    rizatriptan (MAXALT) 10 MG tablet     Sig: Take 1 tablet by mouth once as needed for Migraine May repeat in 2 hours if needed     Dispense:  30 tablet     Refill:  3    potassium chloride (KLOR-CON M) 20 MEQ extended release tablet     Sig: Take 1 tablet by mouth daily     Dispense:  30 tablet     Refill:  0    triamterene-hydroCHLOROthiazide (MAXZIDE-25) 37.5-25 MG per tablet     Sig: Take 1 tablet by mouth daily     Dispense:  30 tablet     Refill:  2    phentermine (ADIPEX-P) 37.5 MG tablet     Sig: Take 1 tablet by mouth every morning (before breakfast) for 30 days. Dispense:  30 tablet     Refill:  0    Diabetic Shoe MISC     Sig: by Does not apply route DISPENSE ONE PAIR OF DIABETIC SHOE AND 3 PAIRS HEAT MOLDED INSERTS     Dispense:  1 each     Refill:  0         ORDERS:  Orders Placed This Encounter   Procedures    CBC Auto Differential    Comprehensive Metabolic Panel    Lipid Panel    Hemoglobin A1C    TSH without Reflex    External Referral To Wound Clinic    POCT Rapid Drug Screen    Diabetic Foot Exam       Follow-up:  Return in about 4 weeks (around 2/17/2021) for in office weight check. PATIENT INSTRUCTIONS:  There are no Patient Instructions on file for this visit. Electronically signed by NANETTE Hernandez on 1/20/2021 at 3:58 PM    EMR Dragon/transcription disclaimer:  Much of thisencounter note is electronic transcription/translation of spoken language to printed texts. The electronic translation of spoken language may be erroneous, or at times, nonsensical words or phrases may be inadvertentlytranscribed. Although I have reviewed the note for such errors, some may still exist.      I, Dr. Brittney Curry M.D. on 3/25/2021 , was involved with direct supervision of the care provided by the aforementioned nurse practitioner. I agree with the above medical plan and agree based on the findings which have been validated by myself that the patient needs diabetic shoes.

## 2021-02-12 ENCOUNTER — APPOINTMENT (OUTPATIENT)
Dept: WOUND CARE | Facility: HOSPITAL | Age: 51
End: 2021-02-12

## 2021-02-18 ENCOUNTER — VIRTUAL VISIT (OUTPATIENT)
Dept: PRIMARY CARE CLINIC | Age: 51
End: 2021-02-18
Payer: MEDICAID

## 2021-02-18 DIAGNOSIS — J01.90 ACUTE NON-RECURRENT SINUSITIS, UNSPECIFIED LOCATION: Primary | ICD-10-CM

## 2021-02-18 DIAGNOSIS — R51.9 ACUTE NONINTRACTABLE HEADACHE, UNSPECIFIED HEADACHE TYPE: ICD-10-CM

## 2021-02-18 PROCEDURE — 1036F TOBACCO NON-USER: CPT | Performed by: NURSE PRACTITIONER

## 2021-02-18 PROCEDURE — G8417 CALC BMI ABV UP PARAM F/U: HCPCS | Performed by: NURSE PRACTITIONER

## 2021-02-18 PROCEDURE — 99213 OFFICE O/P EST LOW 20 MIN: CPT | Performed by: NURSE PRACTITIONER

## 2021-02-18 PROCEDURE — G8484 FLU IMMUNIZE NO ADMIN: HCPCS | Performed by: NURSE PRACTITIONER

## 2021-02-18 PROCEDURE — G8427 DOCREV CUR MEDS BY ELIG CLIN: HCPCS | Performed by: NURSE PRACTITIONER

## 2021-02-18 RX ORDER — BUTORPHANOL TARTRATE 10 MG/ML
1 SPRAY, METERED NASAL EVERY 4 HOURS PRN
Qty: 1 BOTTLE | Refills: 0 | Status: SHIPPED | OUTPATIENT
Start: 2021-02-18 | End: 2021-02-21

## 2021-02-18 RX ORDER — METHYLPREDNISOLONE 4 MG/1
TABLET ORAL
Qty: 1 KIT | Refills: 0 | Status: SHIPPED | OUTPATIENT
Start: 2021-02-18 | End: 2021-04-19

## 2021-02-18 RX ORDER — AZITHROMYCIN 250 MG/1
250 TABLET, FILM COATED ORAL SEE ADMIN INSTRUCTIONS
Qty: 6 TABLET | Refills: 0 | Status: SHIPPED | OUTPATIENT
Start: 2021-02-18 | End: 2021-02-23

## 2021-02-18 ASSESSMENT — ENCOUNTER SYMPTOMS
VOMITING: 0
VOICE CHANGE: 0
PHOTOPHOBIA: 0
COUGH: 0
RHINORRHEA: 1
SINUS PRESSURE: 1
NAUSEA: 0
COLOR CHANGE: 0
BACK PAIN: 0
SHORTNESS OF BREATH: 0

## 2021-02-18 NOTE — PROGRESS NOTES
[address] [address]            Phone:  [phone]  Fax:  [phone] is a 52 y.o. female evaluated via telephone on 2/18/2021. Consent:    She and/or health care decision maker is aware that that she may receive a bill for this telephone service, depending on her insurance coverage, and has provided verbal consent to proceed: Yes      HPI  Chief Complaint   Patient presents with    Sinus Problem       Patient presents today for evaluation of sinus pressure, congestion, headache, and runny nose. She reports her symptoms have been present for several days and she has tried various OTC medicine without relief. She complains of headache that is not being relieved with triptan; she is requesting stadol nasal spray. She denies fever or cough. Review of Systems   Constitutional: Negative for chills and fever. HENT: Positive for congestion, rhinorrhea and sinus pressure. Negative for ear pain, hearing loss and voice change. Eyes: Negative for photophobia and visual disturbance. Respiratory: Negative for cough and shortness of breath. Cardiovascular: Negative for chest pain and palpitations. Gastrointestinal: Negative for nausea and vomiting. Endocrine: Negative. Negative for cold intolerance and heat intolerance. Genitourinary: Negative for difficulty urinating and flank pain. Musculoskeletal: Negative for back pain and neck pain. Skin: Negative for color change and rash. Allergic/Immunologic: Negative for environmental allergies and food allergies. Neurological: Negative for dizziness, speech difficulty and headaches. Hematological: Does not bruise/bleed easily. Psychiatric/Behavioral: Negative for sleep disturbance and suicidal ideas. Patient location: home    PLAN    Details of this discussion including any medical advice provided:     1.  Acute non-recurrent sinusitis, unspecified location - methylPREDNISolone (MEDROL DOSEPACK) 4 MG tablet; Take by mouth. Dispense: 1 kit; Refill: 0  - azithromycin (ZITHROMAX) 250 MG tablet; Take 1 tablet by mouth See Admin Instructions for 5 days 500mg on day 1 followed by 250mg on days 2 - 5  Dispense: 6 tablet; Refill: 0    2. Acute nonintractable headache, unspecified headache type    - butorphanol (STADOL) 10 MG/ML nasal spray; 1 spray by Nasal route every 4 hours as needed for Pain for up to 3 days. Dispense: 1 Bottle; Refill: 0       I affirm this is a Patient Initiated Episode with an Established Patient who has not had a related appointment within my department in the past 7 days or scheduled within the next 24 hours. Total Time: minutes: 21-30 minutes      Note: not billable if this call serves to triage the patient into an appointment for the relevant concern      [address] to the emergency declaration under the [facility], 1135 waiver authority and the [employer] Act, this Virtual  Visit was conducted, with patient's consent, to reduce the patient's risk of exposure to COVID-19 and provide continuity of care for an established patient.

## 2021-03-02 ENCOUNTER — OFFICE VISIT (OUTPATIENT)
Dept: URGENT CARE | Age: 51
End: 2021-03-02
Payer: MEDICAID

## 2021-03-02 ENCOUNTER — HOSPITAL ENCOUNTER (OUTPATIENT)
Dept: GENERAL RADIOLOGY | Age: 51
Discharge: HOME | End: 2021-03-02
Payer: MEDICAID

## 2021-03-02 VITALS
HEIGHT: 61 IN | DIASTOLIC BLOOD PRESSURE: 80 MMHG | SYSTOLIC BLOOD PRESSURE: 160 MMHG | OXYGEN SATURATION: 100 % | BODY MASS INDEX: 55.32 KG/M2 | WEIGHT: 293 LBS | HEART RATE: 102 BPM | RESPIRATION RATE: 20 BRPM | TEMPERATURE: 98.4 F

## 2021-03-02 DIAGNOSIS — S69.92XA INJURY OF LEFT HAND, INITIAL ENCOUNTER: Primary | ICD-10-CM

## 2021-03-02 DIAGNOSIS — S69.92XA INJURY OF LEFT HAND, INITIAL ENCOUNTER: ICD-10-CM

## 2021-03-02 DIAGNOSIS — M25.562 LEFT KNEE PAIN, UNSPECIFIED CHRONICITY: ICD-10-CM
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
## 2021-03-02 DIAGNOSIS — S89.92XA KNEE INJURIES, LEFT, INITIAL ENCOUNTER: ICD-10-CM

## 2021-03-02 DIAGNOSIS — M79.645 PAIN IN FINGER OF LEFT HAND: ICD-10-CM

## 2021-03-02 PROCEDURE — G8427 DOCREV CUR MEDS BY ELIG CLIN: HCPCS | Performed by: NURSE PRACTITIONER

## 2021-03-02 PROCEDURE — G8417 CALC BMI ABV UP PARAM F/U: HCPCS | Performed by: NURSE PRACTITIONER

## 2021-03-02 PROCEDURE — G8484 FLU IMMUNIZE NO ADMIN: HCPCS | Performed by: NURSE PRACTITIONER

## 2021-03-02 PROCEDURE — 1036F TOBACCO NON-USER: CPT | Performed by: NURSE PRACTITIONER

## 2021-03-02 PROCEDURE — 73562 X-RAY EXAM OF KNEE 3: CPT

## 2021-03-02 PROCEDURE — 73130 X-RAY EXAM OF HAND: CPT

## 2021-03-02 PROCEDURE — 99213 OFFICE O/P EST LOW 20 MIN: CPT | Performed by: NURSE PRACTITIONER

## 2021-03-02 RX ORDER — KETOROLAC TROMETHAMINE 30 MG/ML
60 INJECTION, SOLUTION INTRAMUSCULAR; INTRAVENOUS ONCE
Status: COMPLETED | OUTPATIENT
Start: 2021-03-02 | End: 2021-03-02

## 2021-03-02 RX ADMIN — KETOROLAC TROMETHAMINE 60 MG: 30 INJECTION, SOLUTION INTRAMUSCULAR; INTRAVENOUS at 16:08

## 2021-03-02 ASSESSMENT — ENCOUNTER SYMPTOMS: COLOR CHANGE: 1

## 2021-03-02 NOTE — PATIENT INSTRUCTIONS
1. If you think you are injured after a fall or you cannot get up, try not to panic. 2. Call out for help. 3. If you have a phone within reach or you have an emergency call device, use it to call for help. 4. If you do not have a phone within reach, try to slide yourself toward it. If you cannot get to the phone, try to slide toward a door or window or a place where you think you can be heard. 5. [place] or use an object to make noise so someone might hear you. 6. If you can reach something that you can use for a pillow, place it under your head. Try to stay warm by covering yourself with a blanket or clothing while you wait for help. When should you call for help? Call 911 anytime you think you may need emergency care. For example, call if:    · You passed out (lost consciousness).     · You cannot get up after a fall.     · You have severe pain. Call your doctor now or seek immediate medical care if:    · You have new or worse pain.     · You are dizzy or lightheaded.     · You hit your head. Watch closely for changes in your health, and be sure to contact your doctor if:    · You do not get better as expected. Where can you learn more? Go to https://[employer]. org and sign in to your [employer] account. Enter X292 in the State mental health facility box to learn more about \"How to Get Up Safely After a Fall: Care Instructions. \"     If you do not have an account, please click on the \"Sign Up Now\" link. Current as of: April 15, 2020               Content Version: 12.6  © 6190-0568 [employer]. Care instructions adapted under license by [facility] ([facility]). If you have questions about a medical condition or this instruction, always ask your healthcare professional. [address] [address] any warranty or liability for your use of this information. Patient Education        Learning About RICE (Rest, Ice, Compression, and Elevation)  What is RICE? RICE is a way to care for an injury. RICE helps relieve pain and swelling. It may also help with healing and flexibility. RICE stands for:  · R est and protect the injured or sore area. · I ce or a cold pack used as soon as possible. · C ompression, or wrapping the injured or sore area with an elastic bandage. · E levation (propping up) the injured or sore area. How do you do RICE? You can use RICE for home treatment when you have general aches and pains or after an injury or surgery. Rest  · Do not put weight on the injury for at least 24 to 48 hours. · Use crutches for a badly sprained knee or ankle. · Support a sprained wrist, elbow, or shoulder with a sling. Ice  · Put ice or a cold pack on the injury right away to reduce pain and swelling. Frozen vegetables will also work as an ice pack. Put a thin cloth between the ice or cold pack and your skin. The cloth protects the injured area from getting too cold. · Use ice for 10 to 15 minutes at a time for the first 48 to 72 hours. Compression  · Use compression for sprains, strains, and surgeries of the arms and legs. · Wrap the injured area with an elastic bandage or compression sleeve to reduce swelling. · Don't wrap it too tightly. If the area below it feels numb, tingles, or feels cool, loosen the wrap. Elevation  · Use elevation for areas of the body that can be propped up, such as arms and legs. · Prop up the injured area on pillows whenever you use ice. Keep it propped up anytime you sit or lie down. · Try to keep the injured area at or above the level of your heart. This will help reduce swelling and bruising. Where can you learn more? Go to https://[employer]. org and sign in to your [employer] account. Enter T408 in the [employer] box to learn more about \"Learning About RICE (Rest, Ice, Compression, and Elevation). \"     If you do not have an account, please click on the \"Sign Up Now\" link. Current as of: March 2, 2020               Content Version: 12.6  © 9289-6543 [employer]. Care instructions adapted under license by [facility] ([facility]). If you have questions about a medical condition or this instruction, always ask your healthcare professional. [address] any warranty or liability for your use of this information.

## 2021-03-02 NOTE — PROGRESS NOTES
[address] [facility]  [address] [address] Cristian Alatorre [address]  Dept: [phone]  Dept Fax: [phone]: [phone]    Tainna Trivedi is a 52 y.o. female who presents today for her medical conditions/complaintsas noted below. Tianna  is c/o of Hand Injury (Left), Knee Pain (Left), and Fall        HPI:     Hand Injury   Incident onset: yesterday. The injury mechanism was a fall and a direct blow. The pain is present in the left hand. The quality of the pain is described as aching. The pain is moderate. The pain has been constant since the incident. Pertinent negatives include no numbness or tingling. The symptoms are aggravated by palpation and movement. Treatments tried: patient is on percocet and morphine. The treatment provided no relief. Knee Pain   Incident onset: yesterday. The injury mechanism was a fall and a direct blow. The pain is present in the left knee. The quality of the pain is described as aching. The pain is moderate. Pertinent negatives include no inability to bear weight, loss of sensation, numbness or tingling. The symptoms are aggravated by movement and palpation. Fall  Incident onset: yesterday. The fall occurred while walking (per patient toe of house shoe got caught and she fell forward). There was no blood loss. Point of impact: outstretched hands and knees. The pain is present in the left knee and left hand. The pain is moderate. Pertinent negatives include no loss of consciousness, numbness or tingling.        Past Medical History:   Diagnosis Date    Anxiety     Constipation     Depression     DM (diabetes mellitus) ([address]     Headache(784.0)     Hyperlipidemia     Hypertension     Kidney stones     Kidney stones     Restless leg     Restless legs syndrome      Past Surgical History:   Procedure Laterality Date    ECTOPIC PREGNANCY SURGERY      EYE SURGERY      cornea transplant and cataracts removed  KNEE CARTILAGE SURGERY Left 12/20/2016    KNEE ARTHROSCOPIC PARTIAL MEDIAL MENISCECTOMY performed by Kayode Franco [facility] at [facility] OR    TUBAL LIGATION         Family History   Problem Relation Age of Onset    Cancer Father     Cancer Maternal Grandmother     COPD Maternal Grandmother     Cancer Maternal Grandfather        Social History     Tobacco Use    Smoking status: Never Smoker    Smokeless tobacco: Never Used   Substance Use Topics    Alcohol use: No      Current Outpatient Medications   Medication Sig Dispense Refill    methylPREDNISolone (MEDROL DOSEPACK) 4 MG tablet Take by mouth. 1 kit 0    Diabetic Shoe MISC by Does not apply route DISPENSE ONE PAIR OF DIABETIC SHOE AND 3 PAIRS HEAT MOLDED INSERTS 1 each 0    DULoxetine (CYMBALTA) 60 MG extended release capsule Take 1 capsule by mouth 2 times daily 30 capsule 5    potassium chloride (KLOR-CON M) 20 MEQ extended release tablet Take 1 tablet by mouth daily 30 tablet 0    triamterene-hydroCHLOROthiazide (MAXZIDE-25) 37.5-25 MG per tablet Take 1 tablet by mouth daily 30 tablet 2    mirtazapine (REMERON) 15 MG tablet TAKE 1 TABLET BY MOUTH EVERY NIGHT 30 tablet 3    BASAGLAR KWIKPEN 100 UNIT/ML injection pen INJECT 30 UNITS INTO THE SKIN EVERY EVENING 15 mL 1    MINOCYCLINE HCL PO Take by mouth      busPIRone (BUSPAR) 5 MG tablet TAKE 1 TABLET BY MOUTH THREE TIMES DAILY 90 tablet 3    oxyCODONE-acetaminophen (PERCOCET) 7.5-325 MG per tablet Take 1 tablet by mouth 2 times daily.       Zinc Oxide 6 % CREA Apply 1 Units topically 3 times daily JESUSITA'S CREAM  Zinc oxided 20%, hydrocortisone, nystatin, bacitracin 1 Bottle 3    ondansetron (ZOFRAN-ODT) 4 MG disintegrating tablet DISSOLVE 1 TABLET UNDER THE TONGUE EVERY 8 HOURS AS NEEDED FOR NAUSEA AND VOMITING(TAKE PRIOR TO DOXYCYLINE DOSES) 20 tablet 0    tiZANidine (ZANAFLEX) 4 MG tablet Take 4 mg by mouth daily as needed      NONFORMULARY Prednisone eye drops 1 drop in each eye daily  Morphine Sulfate ER 15 MG T12A Take 15 mg by mouth 2 times daily.  Cream Base CREA APPLY ONE PUMP (GRAM) TO AFFECTED AREA(S) THREE TO FOUR TIMES A DAY TO THE APPENDAGES AND TRUNK AS DIRECTED 30 g 5    insulin lispro, 1 Unit Dial, (ADMELOG SOLOSTAR) 100 UNIT/ML SOPN ADMINISTER 45 UNITS UNDER THE SKIN EVERY EVENING. INCREASE BY 3 UNITS EVERY NIGHT UNTIL 60 UNITS IS REACHED AND. CONTINUE 60 UNITS EVERY EVENING 18 mL 5    celecoxib (CELEBREX) 200 MG capsule TAKE 1 CAPSULE BY MOUTH EVERY DAY 60 capsule 5    pramipexole (MIRAPEX) 0.75 MG tablet Take 1 tablet by mouth 2 times daily 60 tablet 0    furosemide (LASIX) 80 MG tablet Take 1 tablet by mouth 2 times daily 60 tablet 0    omeprazole (PRILOSEC) 20 MG delayed release capsule TAKE 1 CAPSULE BY MOUTH TWICE DAILY BEFORE MEALS (Patient taking differently: Take 40 mg by mouth Daily ) 180 capsule 1    diclofenac sodium (VOLTAREN) 1 % GEL Apply 2 g topically 2 times daily      cyclobenzaprine (FLEXERIL) 10 MG tablet Take 10 mg by mouth 3 times daily as needed for Muscle spasms       blood glucose test strips (ASCENSIA AUTODISC VI;ONE TOUCH ULTRA TEST VI) strip Check glucose TID and as needed for hypoglycemic events Dx E11.40 E11.66 Z79.4  Use one touch verio flex 150 each 5    B-D ULTRAFINE III SHORT PEN 31G X 8 MM MISC USE TO INJECT INSULIN ONCE DAILY 100 each 3    Lancets (ONETOUCH DELICA PLUS [id number]) MISC USE TO CHECK BLLOD SUGAR AS DIRECTED 100 each 1    blood glucose monitor kit and supplies Test 1 times a day & as needed for symptoms of irregular blood glucose. 1 kit 0    VICTOZA 18 MG/3ML SOPN SC injection ADMINISTER 1.8 MG INTO THE SKIN EVERY DAY 9 mL 3    gabapentin (NEURONTIN) 100 MG capsule Take 100 mg by mouth 2 times daily. Am and 2 pm      gabapentin (NEURONTIN) 300 MG capsule Take 300 mg by mouth 3 times daily.    5  Blood Glucose Monitoring Suppl ([address]) w/Device KIT 1 kit by Does not apply route daily as needed (Dx 250.00) 1 kit 0    ONE TOUCH LANCETS MISC 1 each by Does not apply route daily 100 each 3    clonazePAM (KLONOPIN) 0.5 MG tablet Take 1 tablet by mouth 2 times daily as needed for Anxiety for up to 30 days.  45 tablet 0    rizatriptan (MAXALT) 10 MG tablet Take 1 tablet by mouth once as needed for Migraine May repeat in 2 hours if needed 30 tablet 3    ZOLMitriptan (ZOMIG) 5 MG nasal solution 0.1 mLs by Nasal route once as needed for Migraine 1 each 3    ONE TOUCH ULTRA TEST strip TEST BLOOD SUGAR ONCE DAILY 100 strip 5     Current Facility-Administered Medications   Medication Dose Route Frequency Provider Last Rate Last Admin    ketorolac (TORADOL) injection 60 mg  60 mg Intramuscular Once daNANETTE Mary - NEGRITA         Allergies   Allergen Reactions    Compazine [Prochlorperazine Maleate] Shortness Of Breath    Ciprofloxacin Hives    Amoxicillin-Pot Clavulanate     Compazine [Prochlorperazine]     Demerol Hives    Hydroxyzine     Hydroxyzine Hcl Itching    Ibuprofen Nausea Only    Meperidine Hives    Nortriptyline Other (See Comments)    Orphenadrine     Orphenadrine Citrate Itching    Penicillins Hives and Nausea Only    Prochlorperazine Edisylate      Will discuss with patient at next visit     Tobramycin      Will discuss with patient at next visit     Tramadol      Will discuss with patient at next visit     Vistaril [Hydroxyzine Hcl] Itching    Cephalexin Rash    Clindamycin/Lincomycin Rash    Codeine Nausea And Vomiting and Rash    Doxycycline Nausea And Vomiting    Keflex [Cephalexin] Rash    Moxifloxacin Nausea And Vomiting     Will discuss with patient at visit        Health Maintenance   Topic Date Due    Hepatitis C screen  Never done    Diabetic retinal exam  Never done    HIV screen  Never done    COVID-19 Vaccine (1 of 2) Never done Motor: No weakness. Psychiatric:         Mood and Affect: Mood normal.       BP (!) 160/80   Pulse 102   Temp 98.4 °F (36.9 °C)   Resp 20   Ht 5' 1\" (1.549 m)   Wt (!) 313 lb (142 kg)   SpO2 100%   BMI 59.14 kg/m²     Assessment:       Diagnosis Orders   1. Injury of left hand, initial encounter  ketorolac (TORADOL) injection 60 mg    Apply ace wrap   2. Fall, initial encounter  XR KNEE LEFT (3 VIEWS)   3. Knee injuries, left, initial encounter  XR KNEE LEFT (3 VIEWS)    ketorolac (TORADOL) injection 60 mg       Plan:      Orders Placed This Encounter   Procedures    XR KNEE LEFT (3 VIEWS)     Standing Status:   Future     Number of Occurrences:   1     Standing Expiration Date:   3/2/2022    Apply ace wrap     Left hand        Narrative   EXAMINATION: XR HAND LEFT (MIN 3 VIEWS) 3/2/2021 3:44 PM   HISTORY: Injury, pain. Report: 3 views of the left hand were obtained. COMPARISON: There are no correlative imaging studies for comparison. Osseous alignment is normal, no fracture is identified. The joint   spaces are preserved. Dorsal soft tissue swelling is present over the   hand and wrist. No radiopaque foreign body is identified.       Impression   No acute osseous abnormality. Dorsal soft tissue swelling   is noted over the proximal hand and wrist.   Signed by Dr Torey Heath on 3/2/2021 3:45 PM     Narrative   Exam: XR KNEE LEFT (3 VIEWS) - 3/2/2021 2:12 PM   Indication: Fall, LEFT knee pain   Comparison: 10/19/2020. Findings:   Tibial plateau is maintained. No acute fracture or dislocation. No   evidence of suprapatellar knee joint effusion. Moderate to severe   tricompartmental osteoarthritic change of the knee. Soft tissue edema   throughout the visualized thigh, knee, and calf.       Impression   Impression:   1.  No acute osseous finding. 2.  Moderate-severe tricompartmental osteoarthritis of the knee. Signed by Dr Les Valle on 3/2/2021 3:50 PM       No follow-ups on file. Orders Placed This Encounter   Medications    ketorolac (TORADOL) injection 60 mg       Patient given educational materials- see patient instructions. Discussed use, benefit, and side effects of prescribedmedications. All patient questions answered. Pt voiced understanding. Reviewedhealth maintenance. Instructed to continue current medications, diet and exercise. Patient agreed with treatment plan. Follow up as directed. Patient Instructions     1. Rest knee and hand today and tomorrow. 2. Elevate hand and use ice to both areas as needed. Patient Education        How to Get Up Safely After a Fall: Care Instructions  Your Care Instructions     If you have injuries, health problems, or other reasons that may make it easy for you to fall at home, it is a good idea to learn how to get up safely after a fall. Learning how to get up correctly can help you avoid making an injury worse. Also, knowing what to do if you cannot get up can help you stay safe until help arrives. Follow-up care is a key part of your treatment and safety. Be sure to make and go to all appointments, and call your doctor if you are having problems. It's also a good idea to know your test results and keep a list of the medicines you take. How can you care for yourself after a fall? If you think you can get up  First lie still for a few minutes and think about how you feel. If your body feels okay and you think you can get up safely, follow the rest of the steps below:  1. Look for a chair or other piece of furniture that is close to you. 2. Roll onto your side and rest. Roll by turning your head in the direction you want to roll, move your shoulder and arm, then hip and leg in the same direction.   3. Lie still for a moment to let your blood pressure adjust.  4. Slowly push your upper body up, lift your head, and take a moment to rest. 5. Slowly get up on your hands and knees, and crawl to the chair or other stable piece of furniture. 6. Put your hands on the chair. 7. Move one foot forward, and place it flat on the floor. Your other leg should be bent with the knee on the floor. 8. Rise slowly, turn your body, and sit in the chair. Stay seated for a bit and think about how you feel. Call for help. Even if you feel okay, let someone know what happened to you. You might not know that you have a serious injury. If you cannot get up  1. If you think you are injured after a fall or you cannot get up, try not to panic. 2. Call out for help. 3. If you have a phone within reach or you have an emergency call device, use it to call for help. 4. If you do not have a phone within reach, try to slide yourself toward it. If you cannot get to the phone, try to slide toward a door or window or a place where you think you can be heard. 5. [place] or use an object to make noise so someone might hear you. 6. If you can reach something that you can use for a pillow, place it under your head. Try to stay warm by covering yourself with a blanket or clothing while you wait for help. When should you call for help? Call 911 anytime you think you may need emergency care. For example, call if:    · You passed out (lost consciousness).     · You cannot get up after a fall.     · You have severe pain. Call your doctor now or seek immediate medical care if:    · You have new or worse pain.     · You are dizzy or lightheaded.     · You hit your head. Watch closely for changes in your health, and be sure to contact your doctor if:    · You do not get better as expected. Where can you learn more? Go to https://chmoody.Clinicient. org and sign in to your [employer] account. Enter U115 in the [employer] box to learn more about \"How to Get Up Safely After a Fall: Care Instructions. \" If you do not have an account, please click on the \"Sign Up Now\" link. Current as of: April 15, 2020               Content Version: 12.6  © 2006-2020 [employer]. Care instructions adapted under license by [facility] ([facility]). If you have questions about a medical condition or this instruction, always ask your healthcare professional. [address] any warranty or liability for your use of this information. Patient Education        Learning About RICE (Rest, Ice, Compression, and Elevation)  What is RICE? RICE is a way to care for an injury. RICE helps relieve pain and swelling. It may also help with healing and flexibility. RICE stands for:  · R est and protect the injured or sore area. · I ce or a cold pack used as soon as possible. · C ompression, or wrapping the injured or sore area with an elastic bandage. · E levation (propping up) the injured or sore area. How do you do RICE? You can use RICE for home treatment when you have general aches and pains or after an injury or surgery. Rest  · Do not put weight on the injury for at least 24 to 48 hours. · Use crutches for a badly sprained knee or ankle. · Support a sprained wrist, elbow, or shoulder with a sling. Ice  · Put ice or a cold pack on the injury right away to reduce pain and swelling. Frozen vegetables will also work as an ice pack. Put a thin cloth between the ice or cold pack and your skin. The cloth protects the injured area from getting too cold. · Use ice for 10 to 15 minutes at a time for the first 48 to 72 hours. Compression  · Use compression for sprains, strains, and surgeries of the arms and legs. · Wrap the injured area with an elastic bandage or compression sleeve to reduce swelling. · Don't wrap it too tightly. If the area below it feels numb, tingles, or feels cool, loosen the wrap. Elevation  · Use elevation for areas of the body that can be propped up, such as arms and legs. · Prop up the injured area on pillows whenever you use ice. Keep it propped up anytime you sit or lie down. · Try to keep the injured area at or above the level of your heart. This will help reduce swelling and bruising. Where can you learn more? Go to https://chpepiceweb.healthNCLC. org and sign in to your LittleL[employer] account. Enter A534 in the [employer] box to learn more about \"Learning About RICE (Rest, Ice, Compression, and Elevation). \"     If you do not have an account, please click on the \"Sign Up Now\" link. Current as of: March 2, 2020               Content Version: 12.6  © 7378-4086 [employer]. Care instructions adapted under license by [facility] ([facility]). If you have questions about a medical condition or this instruction, always ask your healthcare professional. [place] [address] any warranty or liability for your use of this information.                Electronically signed by NANETTE Haynes CNP on 3/2/2021 at 4:02 PM

## 2021-03-03 ENCOUNTER — TELEPHONE (OUTPATIENT)
Dept: PRIMARY CARE CLINIC | Age: 51
End: 2021-03-03

## 2021-03-03 NOTE — TELEPHONE ENCOUNTER
Pt needs an appointment with Dr. Johnnie Velasquez so that she may have a sign off on her diabetic footwear, as she needs authorization from an MD rather than an APRN. Please call pt back and advise on next steps and appointment availability. Thank you!

## 2021-03-03 NOTE — TELEPHONE ENCOUNTER
Patient called in to check on status of PA for Stadol. Attempted to call back with results. No answer, no voicemail. PA was denied.

## 2021-03-04 ENCOUNTER — TELEPHONE (OUTPATIENT)
Dept: PRIMARY CARE CLINIC | Age: 51
End: 2021-03-04

## 2021-03-04 NOTE — TELEPHONE ENCOUNTER
PA has been resubmitted and will notify patient when a result has been given. Spoke with patient about a foot exam with Dr. Christine Painting. Will address at her appointment on 3/9.

## 2021-03-04 NOTE — TELEPHONE ENCOUNTER
PA approved for Stadol nasal spray. The patient has been notified of this information and all questions answered.

## 2021-03-04 NOTE — TELEPHONE ENCOUNTER
Tianna  called to request a refill on her medication.       Last office visit : 2/18/2021   Next office visit : 3/9/2021     Requested Prescriptions     Pending Prescriptions Disp Refills    Diabetic Shoe MISC 1 each 0     Sig: by Does not apply route DISPENSE ONE PAIR OF DIABETIC SHOE AND 3 PAIRS HEAT MOLDED INSERTS            Venus Morris MA

## 2021-03-08 PROBLEM — N18.30 STAGE 3 CHRONIC KIDNEY DISEASE (HCC): Status: ACTIVE | Noted: 2020-09-26

## 2021-03-08 PROBLEM — I87.2 STASIS DERMATITIS: Status: ACTIVE | Noted: 2020-09-26

## 2021-03-17 DIAGNOSIS — R26.81 UNSTEADY GAIT WHEN WALKING: Primary | ICD-10-CM

## 2021-03-17 DIAGNOSIS — M17.0 PRIMARY OSTEOARTHRITIS OF BOTH KNEES: ICD-10-CM

## 2021-03-18 DIAGNOSIS — H65.193 ACUTE EFFUSION OF BOTH MIDDLE EARS: ICD-10-CM

## 2021-03-18 DIAGNOSIS — K21.9 GASTROESOPHAGEAL REFLUX DISEASE: ICD-10-CM

## 2021-03-18 RX ORDER — OMEPRAZOLE 20 MG/1
CAPSULE, DELAYED RELEASE ORAL
Qty: 180 CAPSULE | Refills: 1 | Status: SHIPPED | OUTPATIENT
Start: 2021-03-18

## 2021-03-18 RX ORDER — FLUTICASONE PROPIONATE 50 MCG
SPRAY, SUSPENSION (ML) NASAL
Qty: 16 G | Refills: 0 | Status: SHIPPED | OUTPATIENT
Start: 2021-03-18 | End: 2021-04-26 | Stop reason: SDUPTHER

## 2021-03-18 RX ORDER — ONDANSETRON 4 MG/1
TABLET, ORALLY DISINTEGRATING ORAL
Qty: 20 TABLET | Refills: 0 | Status: SHIPPED | OUTPATIENT
Start: 2021-03-18 | End: 2021-04-26 | Stop reason: SDUPTHER

## 2021-03-19 ENCOUNTER — OFFICE VISIT (OUTPATIENT)
Dept: URGENT CARE | Age: 51
End: 2021-03-19
Payer: MEDICAID

## 2021-03-19 ENCOUNTER — NURSE TRIAGE (OUTPATIENT)
Dept: OTHER | Facility: CLINIC | Age: 51
End: 2021-03-19

## 2021-03-19 VITALS
OXYGEN SATURATION: 96 % | WEIGHT: 293 LBS | BODY MASS INDEX: 61.03 KG/M2 | SYSTOLIC BLOOD PRESSURE: 152 MMHG | TEMPERATURE: 97.4 F | HEART RATE: 89 BPM | DIASTOLIC BLOOD PRESSURE: 88 MMHG

## 2021-03-19 DIAGNOSIS — M54.50 ACUTE LOW BACK PAIN, UNSPECIFIED BACK PAIN LATERALITY, UNSPECIFIED WHETHER SCIATICA PRESENT: ICD-10-CM

## 2021-03-19 DIAGNOSIS — N30.00 ACUTE CYSTITIS WITHOUT HEMATURIA: Primary | ICD-10-CM

## 2021-03-19 PROCEDURE — 99213 OFFICE O/P EST LOW 20 MIN: CPT | Performed by: NURSE PRACTITIONER

## 2021-03-19 PROCEDURE — G8417 CALC BMI ABV UP PARAM F/U: HCPCS | Performed by: NURSE PRACTITIONER

## 2021-03-19 PROCEDURE — 1036F TOBACCO NON-USER: CPT | Performed by: NURSE PRACTITIONER

## 2021-03-19 PROCEDURE — G8484 FLU IMMUNIZE NO ADMIN: HCPCS | Performed by: NURSE PRACTITIONER

## 2021-03-19 PROCEDURE — G8427 DOCREV CUR MEDS BY ELIG CLIN: HCPCS | Performed by: NURSE PRACTITIONER

## 2021-03-19 RX ORDER — KETOROLAC TROMETHAMINE 30 MG/ML
30 INJECTION, SOLUTION INTRAMUSCULAR; INTRAVENOUS ONCE
Status: COMPLETED | OUTPATIENT
Start: 2021-03-19 | End: 2021-03-19

## 2021-03-19 RX ORDER — NITROFURANTOIN 25; 75 MG/1; MG/1
100 CAPSULE ORAL 2 TIMES DAILY
Qty: 14 CAPSULE | Refills: 0 | Status: SHIPPED | OUTPATIENT
Start: 2021-03-19 | End: 2021-03-26

## 2021-03-19 RX ORDER — PHENAZOPYRIDINE HYDROCHLORIDE 200 MG/1
200 TABLET, FILM COATED ORAL 3 TIMES DAILY PRN
Qty: 6 TABLET | Refills: 0 | Status: SHIPPED | OUTPATIENT
Start: 2021-03-19 | End: 2021-03-21

## 2021-03-19 RX ADMIN — KETOROLAC TROMETHAMINE 30 MG: 30 INJECTION, SOLUTION INTRAMUSCULAR; INTRAVENOUS at 15:30

## 2021-03-19 NOTE — PROGRESS NOTES
[address] [facility] URGENT CARE  [address] [address] [place] [address]  Dept: [phone]  Dept Fax: [phone]: [phone]    Tianna Trivedi is a 52 y.o. female who presents today for her medical conditions/complaintsas noted below. Tianna  is c/o of Urinary Frequency (three day and AZO is not helping per pt)        HPI:     HPI  Tianna presents today with urinary frequency and dysuria. Symptoms started about three days ago. She also reports right sided low back pain. No fever that she is aware of. Has tried otc azo without relief of symptoms.  She also reports that macrodantin has helped her UTIs in the past.   Past Medical History:   Diagnosis Date    Anxiety     Constipation     Depression     DM (diabetes mellitus) ([address]     Headache(784.0)     Hyperlipidemia     Hypertension     Kidney stones     Kidney stones     Restless leg     Restless legs syndrome      Past Surgical History:   Procedure Laterality Date    ECTOPIC PREGNANCY SURGERY      EYE SURGERY      cornea transplant and cataracts removed    KNEE CARTILAGE SURGERY Left 12/20/2016    KNEE ARTHROSCOPIC PARTIAL MEDIAL MENISCECTOMY performed by Bere [facility] at [facility] OR    TUBAL LIGATION         Family History   Problem Relation Age of Onset    Cancer Father     Cancer Maternal Grandmother     COPD Maternal Grandmother     Cancer Maternal Grandfather        Social History     Tobacco Use    Smoking status: Never Smoker    Smokeless tobacco: Never Used   Substance Use Topics    Alcohol use: No      Current Outpatient Medications   Medication Sig Dispense Refill    nitrofurantoin, macrocrystal-monohydrate, (MACROBID) 100 MG capsule Take 1 capsule by mouth 2 times daily for 7 days 14 capsule 0    phenazopyridine (PYRIDIUM) 200 MG tablet Take 1 tablet by mouth 3 times daily as needed for Pain 6 tablet 0    omeprazole (PRILOSEC) 20 MG delayed release capsule TAKE ONE CAPSULE BY MOUTH 60 UNITS EVERY EVENING 18 mL 5    celecoxib (CELEBREX) 200 MG capsule TAKE 1 CAPSULE BY MOUTH EVERY DAY 60 capsule 5    pramipexole (MIRAPEX) 0.75 MG tablet Take 1 tablet by mouth 2 times daily 60 tablet 0    furosemide (LASIX) 80 MG tablet Take 1 tablet by mouth 2 times daily 60 tablet 0    diclofenac sodium (VOLTAREN) 1 % GEL Apply 2 g topically 2 times daily      cyclobenzaprine (FLEXERIL) 10 MG tablet Take 10 mg by mouth 3 times daily as needed for Muscle spasms       blood glucose test strips (ASCENSIA AUTODISC VI;ONE TOUCH ULTRA TEST VI) strip Check glucose TID and as needed for hypoglycemic events Dx E11.40 E11.66 Z79.4  Use one touch verio flex 150 each 5    B-D ULTRAFINE III SHORT PEN 31G X 8 MM MISC USE TO INJECT INSULIN ONCE DAILY 100 each 3    Lancets (ONETOUCH DELICA PLUS [id number]) MISC USE TO CHECK BLLOD SUGAR AS DIRECTED 100 each 1    blood glucose monitor kit and supplies Test 1 times a day & as needed for symptoms of irregular blood glucose. 1 kit 0    VICTOZA 18 MG/3ML SOPN SC injection ADMINISTER 1.8 MG INTO THE SKIN EVERY DAY 9 mL 3    gabapentin (NEURONTIN) 100 MG capsule Take 100 mg by mouth 2 times daily. Am and 2 pm      gabapentin (NEURONTIN) 300 MG capsule Take 300 mg by mouth 3 times daily. 5    Blood Glucose Monitoring Suppl ([address]) w/Device KIT 1 kit by Does not apply route daily as needed (Dx 250.00) 1 kit 0    ONE TOUCH LANCETS MISC 1 each by Does not apply route daily 100 each 3    clonazePAM (KLONOPIN) 0.5 MG tablet Take 1 tablet by mouth 2 times daily as needed for Anxiety for up to 30 days.  45 tablet 0    rizatriptan (MAXALT) 10 MG tablet Take 1 tablet by mouth once as needed for Migraine May repeat in 2 hours if needed 30 tablet 3    ZOLMitriptan (ZOMIG) 5 MG nasal solution 0.1 mLs by Nasal route once as needed for Migraine 1 each 3    ONE TOUCH ULTRA TEST strip TEST BLOOD SUGAR ONCE DAILY 100 strip 5     No current facility-administered medications for this visit. Allergies   Allergen Reactions    Compazine [Prochlorperazine Maleate] Shortness Of Breath    Ciprofloxacin Hives    Amoxicillin-Pot Clavulanate     Compazine [Prochlorperazine]     Demerol Hives    Hydroxyzine     Hydroxyzine Hcl Itching    Ibuprofen Nausea Only    Meperidine Hives    Nortriptyline Other (See Comments)    Orphenadrine     Orphenadrine Citrate Itching    Penicillins Hives and Nausea Only    Prochlorperazine Edisylate      Will discuss with patient at next visit     Tobramycin      Will discuss with patient at next visit     Tramadol      Will discuss with patient at next visit     Vistaril [Hydroxyzine Hcl] Itching    Cephalexin Rash    Clindamycin/Lincomycin Rash    Codeine Nausea And Vomiting and Rash    Doxycycline Nausea And Vomiting    Keflex [Cephalexin] Rash    Moxifloxacin Nausea And Vomiting     Will discuss with patient at visit        Health Maintenance   Topic Date Due    Hepatitis C screen  Never done    Diabetic retinal exam  Never done    HIV screen  Never done    COVID-19 Vaccine (1) Never done    Hepatitis B vaccine (1 of 3 - Risk 3-dose series) Never done    Cervical cancer screen  05/21/2017    Diabetic microalbuminuria test  12/17/2019    A1C test (Diabetic or Prediabetic)  01/29/2021    Flu vaccine (1) 01/20/2022 (Originally 9/1/2020)    Lipid screen  08/17/2021    DTaP/Tdap/Td vaccine (2 - Td) 09/22/2021    Potassium monitoring  10/29/2021    Creatinine monitoring  10/29/2021    Diabetic foot exam  01/20/2022    Pneumococcal 0-64 years Vaccine  Completed    Hepatitis A vaccine  Aged Out    Hib vaccine  Aged Out    Meningococcal (ACWY) vaccine  Aged Out       Subjective:     Review of Systems   Constitutional: Negative for fever. Genitourinary: Positive for dysuria, frequency and urgency.    All other systems reviewed and are negative.      :Objective      Physical Exam  Vitals signs and nursing note reviewed. Constitutional:       General: She is not in acute distress. Appearance: Normal appearance. She is well-developed. She is not ill-appearing or diaphoretic. HENT:      Head: Normocephalic and atraumatic. Eyes:      Pupils: Pupils are equal, round, and reactive to light. Neck:      Musculoskeletal: Normal range of motion. Cardiovascular:      Rate and Rhythm: Normal rate and regular rhythm. Heart sounds: Normal heart sounds. No murmur. Pulmonary:      Effort: Pulmonary effort is normal. No respiratory distress. Breath sounds: Normal breath sounds. No wheezing. Skin:     General: Skin is warm and dry. Neurological:      Mental Status: She is alert and oriented to person, place, and time. Psychiatric:         Mood and Affect: Mood normal.         Behavior: Behavior normal.       BP (!) 152/88   Pulse 89   Temp 97.4 °F (36.3 °C)   Wt (!) 323 lb (146.5 kg)   SpO2 96%   BMI 61.03 kg/m²     :Assessment       Diagnosis Orders   1. Acute cystitis without hematuria  nitrofurantoin, macrocrystal-monohydrate, (MACROBID) 100 MG capsule    phenazopyridine (PYRIDIUM) 200 MG tablet    Culture, Urine   2. Acute low back pain, unspecified back pain laterality, unspecified whether sciatica present  ketorolac (TORADOL) injection 30 mg       :Plan    Pt requests toradol IM to help with her back pain today. Unable to do a UA today due to AZO use as it will flag everything on the machine. Will send urine for culture. 1. Take full course of antibiotics  2. Increase water  3. Avoid baths or hot tubs  4. We will call with urine culture results  5. Patient is to follow up with PCP as needed  6. If patient is not improving or developing any new/worsening symptoms then return to clinic as needed or go to ER  Orders Placed This Encounter   Procedures    Culture, Urine     Order Specific Question:   Specify (ex-cath, midstream, cysto, etc)? Answer:   midstream       No follow-ups on file. disclaims any warranty or liability for your use of this information. 1. Take full course of antibiotics  2. Increase water  3. Avoid baths or hot tubs  4. We will call with urine culture results  5. Patient is to follow up with PCP as needed  6.  If patient is not improving or developing any new/worsening symptoms then return to clinic as needed or go to ER          Electronically signed by Kerrin Lanes, APRN on 3/19/2021 at 4:27 PM Patient

## 2021-03-19 NOTE — TELEPHONE ENCOUNTER
any chance you are pregnant? \" \"When was your last menstrual period? \"   LMP - none    Protocols used: URINATION PAIN - FEMALE-ADULT-[place]

## 2021-03-19 NOTE — PATIENT INSTRUCTIONS
Patient Education        Urinary Tract Infection (UTI) in Women: Care Instructions  Overview     A urinary tract infection, or UTI, is a general term for an infection anywhere between the kidneys and the urethra (where urine comes out). Most UTIs are bladder infections. They often cause pain or burning when you urinate. UTIs are caused by bacteria and can be cured with antibiotics. Be sure to complete your treatment so that the infection does not get worse. Follow-up care is a key part of your treatment and safety. Be sure to make and go to all appointments, and call your doctor if you are having problems. It's also a good idea to know your test results and keep a list of the medicines you take. How can you care for yourself at home? · Take your antibiotics as directed. Do not stop taking them just because you feel better. You need to take the full course of antibiotics. · Drink extra water and other fluids for the next day or two. This will help make the urine less concentrated and help wash out the bacteria that are causing the infection. (If you have kidney, heart, or liver disease and have to limit fluids, talk with your doctor before you increase the amount of fluids you drink.)  · Avoid drinks that are carbonated or have caffeine. They can irritate the bladder. · Urinate often. Try to empty your bladder each time. · To relieve pain, take a hot bath or lay a heating pad set on low over your lower belly or genital area. Never go to sleep with a heating pad in place. To prevent UTIs  · Drink plenty of water each day. This helps you urinate often, which clears bacteria from your system. (If you have kidney, heart, or liver disease and have to limit fluids, talk with your doctor before you increase the amount of fluids you drink.)  · Urinate when you need to. · If you are sexually active, urinate right after you have sex. · Change sanitary pads often.   · Avoid douches, bubble baths, feminine hygiene sprays, and other feminine hygiene products that have deodorants. · After going to the bathroom, wipe from front to back. When should you call for help? Call your doctor now or seek immediate medical care if:    · Symptoms such as fever, chills, nausea, or vomiting get worse or appear for the first time.     · You have new pain in your back just below your rib cage. This is called flank pain.     · There is new blood or pus in your urine.     · You have any problems with your antibiotic medicine. Watch closely for changes in your health, and be sure to contact your doctor if:    · You are not getting better after taking an antibiotic for 2 days.     · Your symptoms go away but then come back. Where can you learn more? Go to https://[employer]. org and sign in to your [employer] account. Enter K051 in the My 1% box to learn more about \"Urinary Tract Infection (UTI) in Women: Care Instructions. \"     If you do not have an account, please click on the \"Sign Up Now\" link. Current as of: June 29, 2020               Content Version: 12.8  © 6778-0279 [employer]. Care instructions adapted under license by [facility] ([facility]). If you have questions about a medical condition or this instruction, always ask your healthcare professional. [address] any warranty or liability for your use of this information. 1. Take full course of antibiotics  2. Increase water  3. Avoid baths or hot tubs  4. We will call with urine culture results  5. Patient is to follow up with PCP as needed  6.  If patient is not improving or developing any new/worsening symptoms then return to clinic as needed or go to ER

## 2021-03-21 DIAGNOSIS — N39.0 ACUTE UTI: Primary | ICD-10-CM

## 2021-03-21 LAB
ORGANISM: ABNORMAL
URINE CULTURE, ROUTINE: ABNORMAL
URINE CULTURE, ROUTINE: ABNORMAL

## 2021-03-21 RX ORDER — SULFAMETHOXAZOLE AND TRIMETHOPRIM 800; 160 MG/1; MG/1
1 TABLET ORAL 2 TIMES DAILY
Qty: 20 TABLET | Refills: 0 | Status: SHIPPED | OUTPATIENT
Start: 2021-03-21 | End: 2021-03-21

## 2021-03-21 RX ORDER — FLUCONAZOLE 150 MG/1
150 TABLET ORAL
Qty: 2 TABLET | Refills: 0 | Status: SHIPPED | OUTPATIENT
Start: 2021-03-21 | End: 2021-03-27

## 2021-03-21 RX ORDER — SULFAMETHOXAZOLE AND TRIMETHOPRIM 800; 160 MG/1; MG/1
1 TABLET ORAL 2 TIMES DAILY
Qty: 20 TABLET | Refills: 0 | Status: SHIPPED | OUTPATIENT
Start: 2021-03-21 | End: 2021-03-21 | Stop reason: SINTOL

## 2021-03-24 ENCOUNTER — TELEPHONE (OUTPATIENT)
Dept: PRIMARY CARE CLINIC | Age: 51
End: 2021-03-24

## 2021-03-24 NOTE — TELEPHONE ENCOUNTER
Patient uses [facility] for diabetic supplies and needs a new pair of shoes. She had a visit on 95/84/28 with Radha Willis which diabetic exam was completed but they need an MD to sign notes.  Please advise

## 2021-03-25 NOTE — TELEPHONE ENCOUNTER
I went ahead and cosign her note, lets see if that works. If not then I would advise that the use a different shoe store first.  If unable to then we can get them in for an MD exam.  However, I do believe it is completely unfair to our patients for [facility] did not except to cosign no as this increases the cost for the patient and the cost for the healthcare system versus them taking on a very minimal and essentially nonexistent risk as I am the one signing for it and therefore putting myself at risk.

## 2021-04-06 ENCOUNTER — VIRTUAL VISIT (OUTPATIENT)
Dept: PRIMARY CARE CLINIC | Age: 51
End: 2021-04-06
Payer: MEDICAID

## 2021-04-06 DIAGNOSIS — F41.9 ANXIETY: Primary | ICD-10-CM

## 2021-04-06 DIAGNOSIS — M25.562 ACUTE PAIN OF LEFT KNEE: ICD-10-CM

## 2021-04-06 DIAGNOSIS — E66.01 CLASS 3 SEVERE OBESITY DUE TO EXCESS CALORIES WITH SERIOUS COMORBIDITY AND BODY MASS INDEX (BMI) OF 60.0 TO 69.9 IN ADULT (HCC): ICD-10-CM

## 2021-04-06 PROCEDURE — 99443 PR PHYS/QHP TELEPHONE EVALUATION 21-30 MIN: CPT | Performed by: NURSE PRACTITIONER

## 2021-04-06 ASSESSMENT — ENCOUNTER SYMPTOMS
PHOTOPHOBIA: 0
VOMITING: 0
BACK PAIN: 0
COLOR CHANGE: 0
RHINORRHEA: 0
NAUSEA: 0
SHORTNESS OF BREATH: 0
VOICE CHANGE: 0
COUGH: 0

## 2021-04-06 NOTE — PROGRESS NOTES
tablet by mouth 2 times daily as needed for Anxiety for up to 30 days. Dispense: 45 tablet; Refill: 0    2. Acute pain of left knee  - Ice and elevate; will get imaging if not improving. 3. Class 3 severe obesity due to excess calories with serious comorbidity and body mass index (BMI) of 60.0 to 69.9 in adult [facility])    - External Referral To Bariatrics       I affirm this is a Patient Initiated Episode with an Established Patient who has not had a related appointment within my department in the past 7 days or scheduled within the next 24 hours. Total Time: minutes: 21-30 minutes      Note: not billable if this call serves to triage the patient into an appointment for the relevant concern      [address] to the emergency declaration under the [facility], [facility]5 waiver authority and the [employer] and [employer] Act, this Virtual  Visit was conducted, with patient's consent, to reduce the patient's risk of exposure to COVID-19 and provide continuity of care for an established patient.

## 2021-04-08 ENCOUNTER — TELEPHONE (OUTPATIENT)
Dept: PRIMARY CARE CLINIC | Age: 51
End: 2021-04-08

## 2021-04-08 DIAGNOSIS — M25.562 ACUTE PAIN OF LEFT KNEE: Primary | ICD-10-CM

## 2021-04-08 NOTE — TELEPHONE ENCOUNTER
Patient is requesting an order for a walking cane be sent to [address] [address]. Order placed and faxed to [address] [address]. Fax confirmation in media.

## 2021-04-11 ENCOUNTER — HOSPITAL ENCOUNTER (INPATIENT)
Age: 51
LOS: 4 days | Discharge: HOME | DRG: 603 | End: 2021-04-15
Attending: EMERGENCY MEDICINE | Admitting: FAMILY MEDICINE
Payer: MEDICAID

## 2021-04-11 DIAGNOSIS — G43.909 MIGRAINE WITHOUT STATUS MIGRAINOSUS, NOT INTRACTABLE, UNSPECIFIED MIGRAINE TYPE: ICD-10-CM

## 2021-04-11 DIAGNOSIS — I89.0 LYMPHEDEMA: ICD-10-CM

## 2021-04-11 DIAGNOSIS — L03.119 CELLULITIS OF LOWER EXTREMITY, UNSPECIFIED LATERALITY: Primary | ICD-10-CM

## 2021-04-11 PROBLEM — G89.29 CHRONIC PAIN: Status: ACTIVE | Noted: 2021-04-11

## 2021-04-11 LAB
ALBUMIN SERPL-MCNC: 4 G/DL (ref 3.5–5.2)
ALP BLD-CCNC: 74 U/L (ref 35–104)
ALT SERPL-CCNC: 7 U/L (ref 5–33)
ANION GAP SERPL CALCULATED.3IONS-SCNC: 10 MMOL/L (ref 7–19)
AST SERPL-CCNC: 16 U/L (ref 5–32)
BASOPHILS ABSOLUTE: 0 K/UL (ref 0–0.2)
BASOPHILS RELATIVE PERCENT: 0.4 % (ref 0–1)
BILIRUB SERPL-MCNC: 0.3 MG/DL (ref 0.2–1.2)
BILIRUBIN URINE: NEGATIVE
BLOOD, URINE: NEGATIVE
BUN BLDV-MCNC: 13 MG/DL (ref 6–20)
C-REACTIVE PROTEIN: 6.77 MG/DL (ref 0–0.5)
CALCIUM SERPL-MCNC: 9.9 MG/DL (ref 8.6–10)
CHLORIDE BLD-SCNC: 99 MMOL/L (ref 98–111)
CLARITY: CLEAR
CO2: 29 MMOL/L (ref 22–29)
COLOR: YELLOW
CREAT SERPL-MCNC: 0.9 MG/DL (ref 0.5–0.9)
EOSINOPHILS ABSOLUTE: 0.1 K/UL (ref 0–0.6)
EOSINOPHILS RELATIVE PERCENT: 2.2 % (ref 0–5)
GFR AFRICAN AMERICAN: >59
GFR NON-AFRICAN AMERICAN: >60
GLUCOSE BLD-MCNC: 165 MG/DL (ref 70–99)
GLUCOSE BLD-MCNC: 341 MG/DL (ref 70–99)
GLUCOSE BLD-MCNC: 88 MG/DL (ref 74–109)
GLUCOSE URINE: NEGATIVE MG/DL
HCT VFR BLD CALC: 37.3 % (ref 37–47)
HEMOGLOBIN: 11.4 G/DL (ref 12–16)
IMMATURE GRANULOCYTES #: 0 K/UL
KETONES, URINE: NEGATIVE MG/DL
LACTIC ACID: 1.6 MMOL/L (ref 0.5–1.9)
LEUKOCYTE ESTERASE, URINE: NEGATIVE
LYMPHOCYTES ABSOLUTE: 1.8 K/UL (ref 1.1–4.5)
LYMPHOCYTES RELATIVE PERCENT: 33.2 % (ref 20–40)
MCH RBC QN AUTO: 26.8 PG (ref 27–31)
MCHC RBC AUTO-ENTMCNC: 30.6 G/DL (ref 33–37)
MCV RBC AUTO: 87.6 FL (ref 81–99)
MONOCYTES ABSOLUTE: 0.5 K/UL (ref 0–0.9)
MONOCYTES RELATIVE PERCENT: 8.2 % (ref 0–10)
NEUTROPHILS ABSOLUTE: 3.1 K/UL (ref 1.5–7.5)
NEUTROPHILS RELATIVE PERCENT: 55.6 % (ref 50–65)
NITRITE, URINE: NEGATIVE
PDW BLD-RTO: 15.2 % (ref 11.5–14.5)
PERFORMED ON: ABNORMAL
PERFORMED ON: ABNORMAL
PH UA: 5 (ref 5–8)
PLATELET # BLD: 213 K/UL (ref 130–400)
PMV BLD AUTO: 9.4 FL (ref 9.4–12.3)
POTASSIUM SERPL-SCNC: 4.1 MMOL/L (ref 3.5–5)
PRO-BNP: 554 PG/ML (ref 0–450)
PROTEIN UA: NEGATIVE MG/DL
RBC # BLD: 4.26 M/UL (ref 4.2–5.4)
SARS-COV-2, NAAT: NOT DETECTED
SEDIMENTATION RATE, ERYTHROCYTE: 74 MM/HR (ref 0–20)
SODIUM BLD-SCNC: 138 MMOL/L (ref 136–145)
SPECIFIC GRAVITY UA: 1.02 (ref 1–1.03)
TOTAL PROTEIN: 8.1 G/DL (ref 6.6–8.7)
UROBILINOGEN, URINE: 0.2 E.U./DL
WBC # BLD: 5.5 K/UL (ref 4.8–10.8)

## 2021-04-11 PROCEDURE — 6360000002 HC RX W HCPCS: Performed by: EMERGENCY MEDICINE

## 2021-04-11 PROCEDURE — 96365 THER/PROPH/DIAG IV INF INIT: CPT

## 2021-04-11 PROCEDURE — 83880 ASSAY OF NATRIURETIC PEPTIDE: CPT

## 2021-04-11 PROCEDURE — 1210000000 HC MED SURG R&B

## 2021-04-11 PROCEDURE — 81003 URINALYSIS AUTO W/O SCOPE: CPT

## 2021-04-11 PROCEDURE — 6360000002 HC RX W HCPCS: Performed by: FAMILY MEDICINE

## 2021-04-11 PROCEDURE — 85025 COMPLETE CBC W/AUTO DIFF WBC: CPT

## 2021-04-11 PROCEDURE — 6370000000 HC RX 637 (ALT 250 FOR IP): Performed by: FAMILY MEDICINE

## 2021-04-11 PROCEDURE — 2580000003 HC RX 258: Performed by: FAMILY MEDICINE

## 2021-04-11 PROCEDURE — 6370000000 HC RX 637 (ALT 250 FOR IP): Performed by: EMERGENCY MEDICINE

## 2021-04-11 PROCEDURE — 85652 RBC SED RATE AUTOMATED: CPT

## 2021-04-11 PROCEDURE — 83605 ASSAY OF LACTIC ACID: CPT

## 2021-04-11 PROCEDURE — 82947 ASSAY GLUCOSE BLOOD QUANT: CPT

## 2021-04-11 PROCEDURE — 86140 C-REACTIVE PROTEIN: CPT

## 2021-04-11 PROCEDURE — 36415 COLL VENOUS BLD VENIPUNCTURE: CPT

## 2021-04-11 PROCEDURE — 87040 BLOOD CULTURE FOR BACTERIA: CPT

## 2021-04-11 PROCEDURE — 2580000003 HC RX 258: Performed by: EMERGENCY MEDICINE

## 2021-04-11 PROCEDURE — 87635 SARS-COV-2 COVID-19 AMP PRB: CPT

## 2021-04-11 PROCEDURE — 96375 TX/PRO/DX INJ NEW DRUG ADDON: CPT

## 2021-04-11 PROCEDURE — 99284 EMERGENCY DEPT VISIT MOD MDM: CPT

## 2021-04-11 PROCEDURE — 80053 COMPREHEN METABOLIC PANEL: CPT

## 2021-04-11 RX ORDER — CLONAZEPAM 1 MG/1
0.5 TABLET ORAL 2 TIMES DAILY PRN
Status: DISCONTINUED | OUTPATIENT
Start: 2021-04-11 | End: 2021-04-15 | Stop reason: HOSPADM

## 2021-04-11 RX ORDER — HYDROMORPHONE HYDROCHLORIDE 1 MG/ML
1 INJECTION, SOLUTION INTRAMUSCULAR; INTRAVENOUS; SUBCUTANEOUS ONCE
Status: DISCONTINUED | OUTPATIENT
Start: 2021-04-11 | End: 2021-04-11

## 2021-04-11 RX ORDER — BUSPIRONE HYDROCHLORIDE 5 MG/1
5 TABLET ORAL 3 TIMES DAILY
Status: DISCONTINUED | OUTPATIENT
Start: 2021-04-11 | End: 2021-04-15 | Stop reason: HOSPADM

## 2021-04-11 RX ORDER — MORPHINE SULFATE 15 MG/1
15 TABLET, FILM COATED, EXTENDED RELEASE ORAL 2 TIMES DAILY
Status: DISCONTINUED | OUTPATIENT
Start: 2021-04-11 | End: 2021-04-15 | Stop reason: HOSPADM

## 2021-04-11 RX ORDER — ONDANSETRON 2 MG/ML
4 INJECTION INTRAMUSCULAR; INTRAVENOUS ONCE
Status: COMPLETED | OUTPATIENT
Start: 2021-04-11 | End: 2021-04-11

## 2021-04-11 RX ORDER — PROMETHAZINE HYDROCHLORIDE 12.5 MG/1
12.5 TABLET ORAL EVERY 6 HOURS PRN
Status: DISCONTINUED | OUTPATIENT
Start: 2021-04-11 | End: 2021-04-15 | Stop reason: HOSPADM

## 2021-04-11 RX ORDER — ONDANSETRON 2 MG/ML
4 INJECTION INTRAMUSCULAR; INTRAVENOUS EVERY 6 HOURS PRN
Status: DISCONTINUED | OUTPATIENT
Start: 2021-04-11 | End: 2021-04-15 | Stop reason: HOSPADM

## 2021-04-11 RX ORDER — CYCLOBENZAPRINE HCL 10 MG
10 TABLET ORAL 3 TIMES DAILY PRN
Status: DISCONTINUED | OUTPATIENT
Start: 2021-04-11 | End: 2021-04-12

## 2021-04-11 RX ORDER — ACETAMINOPHEN 325 MG/1
650 TABLET ORAL EVERY 6 HOURS PRN
Status: DISCONTINUED | OUTPATIENT
Start: 2021-04-11 | End: 2021-04-15 | Stop reason: HOSPADM

## 2021-04-11 RX ORDER — MIRTAZAPINE 15 MG/1
15 TABLET, FILM COATED ORAL NIGHTLY
Status: DISCONTINUED | OUTPATIENT
Start: 2021-04-11 | End: 2021-04-15 | Stop reason: HOSPADM

## 2021-04-11 RX ORDER — CLONAZEPAM 1 MG/1
0.5 TABLET ORAL ONCE
Status: COMPLETED | OUTPATIENT
Start: 2021-04-11 | End: 2021-04-11

## 2021-04-11 RX ORDER — CELECOXIB 200 MG/1
200 CAPSULE ORAL DAILY
Status: DISCONTINUED | OUTPATIENT
Start: 2021-04-12 | End: 2021-04-13

## 2021-04-11 RX ORDER — SODIUM CHLORIDE 0.9 % (FLUSH) 0.9 %
5-40 SYRINGE (ML) INJECTION EVERY 12 HOURS SCHEDULED
Status: DISCONTINUED | OUTPATIENT
Start: 2021-04-11 | End: 2021-04-12 | Stop reason: SDUPTHER

## 2021-04-11 RX ORDER — FUROSEMIDE 40 MG/1
80 TABLET ORAL 2 TIMES DAILY
Status: DISCONTINUED | OUTPATIENT
Start: 2021-04-11 | End: 2021-04-14

## 2021-04-11 RX ORDER — PROMETHAZINE HYDROCHLORIDE 25 MG/ML
12.5 INJECTION, SOLUTION INTRAMUSCULAR; INTRAVENOUS ONCE
Status: COMPLETED | OUTPATIENT
Start: 2021-04-11 | End: 2021-04-11

## 2021-04-11 RX ORDER — INSULIN GLARGINE 100 [IU]/ML
30 INJECTION, SOLUTION SUBCUTANEOUS NIGHTLY
Status: DISCONTINUED | OUTPATIENT
Start: 2021-04-11 | End: 2021-04-12

## 2021-04-11 RX ORDER — POTASSIUM CHLORIDE 20 MEQ/1
20 TABLET, EXTENDED RELEASE ORAL DAILY
Status: DISCONTINUED | OUTPATIENT
Start: 2021-04-11 | End: 2021-04-15 | Stop reason: HOSPADM

## 2021-04-11 RX ORDER — DEXTROSE MONOHYDRATE 50 MG/ML
100 INJECTION, SOLUTION INTRAVENOUS PRN
Status: DISCONTINUED | OUTPATIENT
Start: 2021-04-11 | End: 2021-04-15 | Stop reason: HOSPADM

## 2021-04-11 RX ORDER — TRIAMTERENE AND HYDROCHLOROTHIAZIDE 37.5; 25 MG/1; MG/1
1 TABLET ORAL DAILY
Status: DISCONTINUED | OUTPATIENT
Start: 2021-04-12 | End: 2021-04-15 | Stop reason: HOSPADM

## 2021-04-11 RX ORDER — DEXTROSE MONOHYDRATE 25 G/50ML
12.5 INJECTION, SOLUTION INTRAVENOUS PRN
Status: DISCONTINUED | OUTPATIENT
Start: 2021-04-11 | End: 2021-04-15 | Stop reason: HOSPADM

## 2021-04-11 RX ORDER — SODIUM CHLORIDE 9 MG/ML
25 INJECTION, SOLUTION INTRAVENOUS PRN
Status: DISCONTINUED | OUTPATIENT
Start: 2021-04-11 | End: 2021-04-12 | Stop reason: SDUPTHER

## 2021-04-11 RX ORDER — ACETAMINOPHEN 650 MG/1
650 SUPPOSITORY RECTAL EVERY 6 HOURS PRN
Status: DISCONTINUED | OUTPATIENT
Start: 2021-04-11 | End: 2021-04-15 | Stop reason: HOSPADM

## 2021-04-11 RX ORDER — PANTOPRAZOLE SODIUM 40 MG/1
40 TABLET, DELAYED RELEASE ORAL
Status: DISCONTINUED | OUTPATIENT
Start: 2021-04-12 | End: 2021-04-15 | Stop reason: HOSPADM

## 2021-04-11 RX ORDER — DULOXETIN HYDROCHLORIDE 60 MG/1
60 CAPSULE, DELAYED RELEASE ORAL 2 TIMES DAILY
Status: DISCONTINUED | OUTPATIENT
Start: 2021-04-11 | End: 2021-04-15 | Stop reason: HOSPADM

## 2021-04-11 RX ORDER — SUMATRIPTAN 100 MG/1
100 TABLET, FILM COATED ORAL ONCE
Refills: 3 | Status: DISCONTINUED | OUTPATIENT
Start: 2021-04-11 | End: 2021-04-15 | Stop reason: HOSPADM

## 2021-04-11 RX ORDER — NICOTINE POLACRILEX 4 MG
15 LOZENGE BUCCAL PRN
Status: DISCONTINUED | OUTPATIENT
Start: 2021-04-11 | End: 2021-04-15 | Stop reason: HOSPADM

## 2021-04-11 RX ORDER — OXYCODONE AND ACETAMINOPHEN 7.5; 325 MG/1; MG/1
1 TABLET ORAL 2 TIMES DAILY PRN
Status: DISCONTINUED | OUTPATIENT
Start: 2021-04-11 | End: 2021-04-15 | Stop reason: HOSPADM

## 2021-04-11 RX ORDER — FLUTICASONE PROPIONATE 50 MCG
1 SPRAY, SUSPENSION (ML) NASAL DAILY
Status: DISCONTINUED | OUTPATIENT
Start: 2021-04-11 | End: 2021-04-15 | Stop reason: HOSPADM

## 2021-04-11 RX ORDER — POLYETHYLENE GLYCOL 3350 17 G/17G
17 POWDER, FOR SOLUTION ORAL DAILY PRN
Status: DISCONTINUED | OUTPATIENT
Start: 2021-04-11 | End: 2021-04-15 | Stop reason: HOSPADM

## 2021-04-11 RX ORDER — SODIUM CHLORIDE 0.9 % (FLUSH) 0.9 %
5-40 SYRINGE (ML) INJECTION PRN
Status: DISCONTINUED | OUTPATIENT
Start: 2021-04-11 | End: 2021-04-12 | Stop reason: SDUPTHER

## 2021-04-11 RX ORDER — GABAPENTIN 300 MG/1
300 CAPSULE ORAL 3 TIMES DAILY
Status: DISCONTINUED | OUTPATIENT
Start: 2021-04-11 | End: 2021-04-15 | Stop reason: HOSPADM

## 2021-04-11 RX ORDER — PRAMIPEXOLE DIHYDROCHLORIDE 0.25 MG/1
0.75 TABLET ORAL 2 TIMES DAILY
Status: DISCONTINUED | OUTPATIENT
Start: 2021-04-11 | End: 2021-04-15 | Stop reason: HOSPADM

## 2021-04-11 RX ADMIN — POTASSIUM CHLORIDE 20 MEQ: 1500 TABLET, EXTENDED RELEASE ORAL at 13:11

## 2021-04-11 RX ADMIN — BUSPIRONE HYDROCHLORIDE 5 MG: 5 TABLET ORAL at 13:11

## 2021-04-11 RX ADMIN — MIRTAZAPINE 15 MG: 15 TABLET, FILM COATED ORAL at 21:10

## 2021-04-11 RX ADMIN — GABAPENTIN 300 MG: 300 CAPSULE ORAL at 21:10

## 2021-04-11 RX ADMIN — SODIUM CHLORIDE, PRESERVATIVE FREE 10 ML: 5 INJECTION INTRAVENOUS at 21:12

## 2021-04-11 RX ADMIN — OXYCODONE HYDROCHLORIDE AND ACETAMINOPHEN 1 TABLET: 7.5; 325 TABLET ORAL at 15:42

## 2021-04-11 RX ADMIN — GABAPENTIN 300 MG: 300 CAPSULE ORAL at 13:11

## 2021-04-11 RX ADMIN — BUTORPHANOL TARTRATE 1 MG: 2 INJECTION, SOLUTION INTRAMUSCULAR; INTRAVENOUS at 08:44

## 2021-04-11 RX ADMIN — Medication 30 UNITS: at 21:11

## 2021-04-11 RX ADMIN — CLONAZEPAM 0.5 MG: 1 TABLET ORAL at 23:55

## 2021-04-11 RX ADMIN — BUSPIRONE HYDROCHLORIDE 5 MG: 5 TABLET ORAL at 21:10

## 2021-04-11 RX ADMIN — CLONAZEPAM 0.5 MG: 1 TABLET ORAL at 10:54

## 2021-04-11 RX ADMIN — PRAMIPEXOLE DIHYDROCHLORIDE 0.75 MG: 0.25 TABLET ORAL at 21:10

## 2021-04-11 RX ADMIN — MORPHINE SULFATE 15 MG: 15 TABLET, FILM COATED, EXTENDED RELEASE ORAL at 21:10

## 2021-04-11 RX ADMIN — ACETAMINOPHEN 650 MG: 325 TABLET ORAL at 23:55

## 2021-04-11 RX ADMIN — BUTORPHANOL TARTRATE 0.5 MG: 2 INJECTION, SOLUTION INTRAMUSCULAR; INTRAVENOUS at 10:43

## 2021-04-11 RX ADMIN — ONDANSETRON HYDROCHLORIDE 4 MG: 2 SOLUTION INTRAMUSCULAR; INTRAVENOUS at 08:45

## 2021-04-11 RX ADMIN — DULOXETINE 60 MG: 60 CAPSULE, DELAYED RELEASE ORAL at 21:10

## 2021-04-11 RX ADMIN — PROMETHAZINE HYDROCHLORIDE 12.5 MG: 25 INJECTION INTRAMUSCULAR; INTRAVENOUS at 10:43

## 2021-04-11 RX ADMIN — VANCOMYCIN HYDROCHLORIDE 2500 MG: 1 INJECTION, POWDER, LYOPHILIZED, FOR SOLUTION INTRAVENOUS at 09:05

## 2021-04-11 RX ADMIN — INSULIN LISPRO 12 UNITS: 100 INJECTION, SOLUTION INTRAVENOUS; SUBCUTANEOUS at 18:04

## 2021-04-11 ASSESSMENT — ENCOUNTER SYMPTOMS
COLOR CHANGE: 1
SHORTNESS OF BREATH: 0
RHINORRHEA: 0
VOMITING: 0
ABDOMINAL PAIN: 0
NAUSEA: 0
BACK PAIN: 0
SORE THROAT: 0
DIARRHEA: 0

## 2021-04-11 ASSESSMENT — PAIN SCALES - GENERAL
PAINLEVEL_OUTOF10: 6
PAINLEVEL_OUTOF10: 8
PAINLEVEL_OUTOF10: 10

## 2021-04-11 ASSESSMENT — PAIN DESCRIPTION - DESCRIPTORS: DESCRIPTORS: CONSTANT

## 2021-04-11 ASSESSMENT — PAIN DESCRIPTION - ONSET: ONSET: AWAKENED FROM SLEEP

## 2021-04-11 ASSESSMENT — PAIN DESCRIPTION - LOCATION: LOCATION: LEG;HEAD

## 2021-04-11 ASSESSMENT — PAIN DESCRIPTION - FREQUENCY: FREQUENCY: CONTINUOUS

## 2021-04-11 ASSESSMENT — PAIN - FUNCTIONAL ASSESSMENT: PAIN_FUNCTIONAL_ASSESSMENT: ACTIVITIES ARE NOT PREVENTED

## 2021-04-11 NOTE — ED PROVIDER NOTES
[facility]  eMERGENCY dEPARTMENT eNCOUnter      Pt Name: Daily Altamirano  MRN: [id number]  Nakiatrongfpatrice 1970  Date of evaluation: 4/11/2021  Provider: Franchesca Olivera MD    [address] [place] [address]       Chief Complaint   Patient presents with    Leg Swelling    Cellulitis     x1 week to bilateral extremities. pt took some doxycyline but not helping. HISTORY OF PRESENT ILLNESS   (Location/Symptom, Timing/Onset,Context/Setting, Quality, Duration, Modifying Factors, Severity)  Note limiting factors. Daily Altamirano is a 52 y.o. female who presents to the emergency department with bilateral lower extremity redness swelling and pain. Patient has a history of chronic lymphedema however the redness heat and pain is new. No definite fever but she has had chills. Patient finished a 7-day course of doxycycline states she has no improvement in her leg seem to be worsening. Patient also has a history of migraines and states she has a migraine at this time. She reports her blood sugar is low at 77 and she needs to eat. HPI    NursingNotes were reviewed. REVIEW OF SYSTEMS    (2-9 systems for level 4, 10 or more for level 5)     Review of Systems   Constitutional: Negative for chills and fever. HENT: Negative for rhinorrhea and sore throat. Respiratory: Negative for shortness of breath. Cardiovascular: Positive for leg swelling. Negative for chest pain. Gastrointestinal: Negative for abdominal pain, diarrhea, nausea and vomiting. Genitourinary: Negative for difficulty urinating. Musculoskeletal: Negative for back pain and neck pain. Skin: Positive for color change. Negative for rash. Neurological: Positive for headaches. Negative for weakness. Psychiatric/Behavioral: Negative for confusion. A complete review of systems was performed and is negative except as noted above in the HPI.        PAST MEDICAL HISTORY     Past Medical History:   Diagnosis Date    Anxiety     Constipation     Depression     DM (diabetes mellitus) ([facility] [address]     Headache(784.0)     Hyperlipidemia     Hypertension     Kidney stones     Kidney stones     Restless leg     Restless legs syndrome          SURGICAL HISTORY       Past Surgical History:   Procedure Laterality Date    ECTOPIC PREGNANCY SURGERY      EYE SURGERY      cornea transplant and cataracts removed    KNEE CARTILAGE SURGERY Left 12/20/2016    KNEE ARTHROSCOPIC PARTIAL MEDIAL MENISCECTOMY performed by Roselie Schaumann, MD at [address]       Previous Medications    B-D ULTRAFINE III SHORT PEN 31G X 8 MM MISC    USE TO INJECT INSULIN ONCE DAILY    BASAGLAR KWIKPEN 100 UNIT/ML INJECTION PEN    INJECT 30 UNITS INTO THE SKIN EVERY EVENING    BLOOD GLUCOSE MONITOR KIT AND SUPPLIES    Test 1 times a day & as needed for symptoms of irregular blood glucose. BLOOD GLUCOSE MONITORING SUPPL (ONE TOUCH BASIC SYSTEM) W/DEVICE KIT    1 kit by Does not apply route daily as needed (Dx 250.00)    BLOOD GLUCOSE TEST STRIPS (ASCENSIA AUTODISC VI;ONE TOUCH ULTRA TEST VI) STRIP    Check glucose TID and as needed for hypoglycemic events Dx E11.40 E11.66 Z79.4  Use one touch verio flex    BUSPIRONE (BUSPAR) 5 MG TABLET    TAKE 1 TABLET BY MOUTH THREE TIMES DAILY    CELECOXIB (CELEBREX) 200 MG CAPSULE    TAKE 1 CAPSULE BY MOUTH EVERY DAY    CLONAZEPAM (KLONOPIN) 0.5 MG TABLET    Take 1 tablet by mouth 2 times daily as needed for Anxiety for up to 30 days.     CREAM BASE CREA    APPLY ONE PUMP (GRAM) TO AFFECTED AREA(S) THREE TO FOUR TIMES A DAY TO THE APPENDAGES AND TRUNK AS DIRECTED    CYCLOBENZAPRINE (FLEXERIL) 10 MG TABLET    Take 10 mg by mouth 3 times daily as needed for Muscle spasms     DIABETIC SHOE MISC    by Does not apply route DISPENSE ONE PAIR OF DIABETIC SHOE AND 3 PAIRS HEAT MOLDED INSERTS    DICLOFENAC SODIUM (VOLTAREN) 1 % GEL    Apply 2 g topically 2 times daily    DULOXETINE (CYMBALTA) 60 MG EXTENDED RELEASE CAPSULE    Take 1 capsule by mouth 2 times daily    FLUTICASONE (FLONASE) 50 MCG/ACT NASAL SPRAY    SHAKE LIQUID AND USE 1 SPRAY IN EACH NOSTRIL DAILY    FUROSEMIDE (LASIX) 80 MG TABLET    Take 1 tablet by mouth 2 times daily    GABAPENTIN (NEURONTIN) 100 MG CAPSULE    Take 100 mg by mouth 2 times daily. Am and 2 pm    GABAPENTIN (NEURONTIN) 300 MG CAPSULE    Take 300 mg by mouth 3 times daily. INSULIN LISPRO, 1 UNIT DIAL, (ADMELOG SOLOSTAR) 100 UNIT/ML SOPN    ADMINISTER 45 UNITS UNDER THE SKIN EVERY EVENING. INCREASE BY 3 UNITS EVERY NIGHT UNTIL 60 UNITS IS REACHED AND. CONTINUE 60 UNITS EVERY EVENING    LANCETS (ONETOUCH DELICA PLUS [id number]) MISC    USE TO CHECK BLLOD SUGAR AS DIRECTED    METHYLPREDNISOLONE (MEDROL DOSEPACK) 4 MG TABLET    Take by mouth. MINOCYCLINE HCL PO    Take by mouth    MIRTAZAPINE (REMERON) 15 MG TABLET    TAKE 1 TABLET BY MOUTH EVERY NIGHT    MORPHINE SULFATE ER 15 MG T12A    Take 15 mg by mouth 2 times daily. NONFORMULARY    Prednisone eye drops 1 drop in each eye daily    OMEPRAZOLE (PRILOSEC) 20 MG DELAYED RELEASE CAPSULE    TAKE ONE CAPSULE BY MOUTH TWICE DAILY BEFORE MEALS    ONDANSETRON (ZOFRAN-ODT) 4 MG DISINTEGRATING TABLET    DISSOLVE 1 TABLET UNDER THE TONGUE EVERY 8 HOURS AS NEEDED FOR NAUSEA AND VOMITING(TAKE PRIOR TO DOXYCYLINE DOSES)    ONE TOUCH LANCETS MISC    1 each by Does not apply route daily    OXYCODONE-ACETAMINOPHEN (PERCOCET) 7.5-325 MG PER TABLET    Take 1 tablet by mouth 2 times daily.     POTASSIUM CHLORIDE (KLOR-CON M) 20 MEQ EXTENDED RELEASE TABLET    Take 1 tablet by mouth daily    PRAMIPEXOLE (MIRAPEX) 0.75 MG TABLET    Take 1 tablet by mouth 2 times daily    RIZATRIPTAN (MAXALT) 10 MG TABLET    Take 1 tablet by mouth once as needed for Migraine May repeat in 2 hours if needed    TIZANIDINE (ZANAFLEX) 4 MG TABLET    Take 4 mg by mouth daily as needed    TRIAMTERENE-HYDROCHLOROTHIAZIDE (MAXZIDE-25) 37.5-25 MG PER TABLET    Take 1 tablet by mouth daily    VICTOZA 18 MG/3ML SOPN SC INJECTION    ADMINISTER 1.8 MG INTO THE SKIN EVERY DAY    ZINC OXIDE 6 % CREA    Apply 1 Units topically 3 times daily JESUSITA'S CREAM  Zinc oxided 20%, hydrocortisone, nystatin, bacitracin    ZOLMITRIPTAN (ZOMIG) 5 MG NASAL SOLUTION    0.1 mLs by Nasal route once as needed for Migraine       ALLERGIES     Compazine [prochlorperazine maleate], Ciprofloxacin, Amoxicillin-pot clavulanate, Ancef [cefazolin], Bactrim [sulfamethoxazole-trimethoprim], Compazine [prochlorperazine], Demerol, Hydroxyzine, Hydroxyzine hcl, Ibuprofen, Meperidine, Nortriptyline, Orphenadrine, Orphenadrine citrate, Penicillins, Prochlorperazine edisylate, Tobramycin, Tramadol, Vistaril [hydroxyzine hcl], Cephalexin, Clindamycin/lincomycin, Codeine, Doxycycline, Keflex [cephalexin], and Moxifloxacin    FAMILY HISTORY       Family History   Problem Relation Age of Onset    Cancer Father     Cancer Maternal Grandmother     COPD Maternal Grandmother     Cancer Maternal Grandfather           SOCIAL HISTORY       Social History     Socioeconomic History    Marital status: Single     Spouse name: Not on file    Number of children: Not on file    Years of education: Not on file    Highest education level: Not on file   Occupational History    Not on file   Social Needs    Financial resource strain: Not on file    Food insecurity     Worry: Not on file     Inability: Not on file    Transportation needs     Medical: Not on file     Non-medical: Not on file   Tobacco Use    Smoking status: Never Smoker    Smokeless tobacco: Never Used   Substance and Sexual Activity    Alcohol use: No    Drug use: No    Sexual activity: Yes     Partners: Male   Lifestyle    Physical activity     Days per week: Not on file     Minutes per session: Not on file    Stress: Not on file   Relationships    Social connections     Talks on phone: Not on file     Gets together: Not on file     Attends [employer] service: Not on file     Active member of club or organization: Not on file     Attends meetings of clubs or organizations: Not on file     Relationship status: Not on file    Intimate partner violence     Fear of current or ex partner: Not on file     Emotionally abused: Not on file     Physically abused: Not on file     Forced sexual activity: Not on file   Other Topics Concern    Not on file   Social History Narrative    Not on file       SCREENINGS             PHYSICAL EXAM    (up to 7 for level 4, 8 or more for level 5)     ED Triage Vitals [04/11/21 0405]   BP Temp Temp Source Pulse Resp SpO2 Height Weight   (!) 195/91 98.1 °F (36.7 °C) Oral 91 18 94 % 5' 3\" (1.6 m) (!) 315 lb (142.9 kg)       Physical Exam  Vitals signs and nursing note reviewed. Constitutional:       General: She is not in acute distress. Appearance: She is well-developed. She is not diaphoretic. HENT:      Head: Normocephalic and atraumatic. Eyes:      Pupils: Pupils are equal, round, and reactive to light. Neck:      Musculoskeletal: Normal range of motion and neck supple. Cardiovascular:      Rate and Rhythm: Normal rate and regular rhythm. Heart sounds: Normal heart sounds. Pulmonary:      Effort: Pulmonary effort is normal. No respiratory distress. Breath sounds: Normal breath sounds. Abdominal:      General: Bowel sounds are normal. There is no distension. Palpations: Abdomen is soft. Tenderness: There is no abdominal tenderness. Musculoskeletal: Normal range of motion. General: Swelling and tenderness present. Right lower leg: Edema present. Left lower leg: Edema present. Comments: Erythema, edema, tenderness, warmth to bilateral lower extremities   Skin:     General: Skin is warm and dry. Findings: No rash. Neurological:      Mental Status: She is alert and oriented to person, place, and time. Cranial Nerves: No cranial nerve deficit. Motor: No abnormal muscle tone. Coordination: Coordination normal.   Psychiatric:         Behavior: Behavior normal.         DIAGNOSTIC RESULTS     EKG: All EKG's are interpreted by the Emergency Department Physician who either signs or Co-signs this chart in the absence of a cardiologist.        RADIOLOGY:   Non-plain film images such as CT, Ultrasound and MRI are read by the radiologist. Helena Papo images are visualized and preliminarily interpreted by the emergency physician with the below findings:    Interpretation per the Radiologist below, if available at the time of this note:    No orders to display         ED BEDSIDE ULTRASOUND:   Performed by ED Physician - none    LABS:  Labs Reviewed   CBC WITH AUTO DIFFERENTIAL - Abnormal; Notable for the following components:       Result Value    Hemoglobin 11.4 (*)     MCH 26.8 (*)     MCHC 30.6 (*)     RDW 15.2 (*)     All other components within normal limits   SEDIMENTATION RATE - Abnormal; Notable for the following components:    Sed Rate 74 (*)     All other components within normal limits   C-REACTIVE PROTEIN - Abnormal; Notable for the following components:    CRP 6.77 (*)     All other components within normal limits   BRAIN NATRIURETIC PEPTIDE - Abnormal; Notable for the following components:    Pro- (*)     All other components within normal limits   CULTURE, BLOOD 1   CULTURE, BLOOD 2   COMPREHENSIVE METABOLIC PANEL   URINE RT REFLEX TO CULTURE   LACTIC ACID, PLASMA       All other labs were within normal range or not returned as of this dictation. EMERGENCY DEPARTMENT COURSE and DIFFERENTIALDIAGNOSIS/MDM:   Vitals:    Vitals:    04/11/21 0405 04/11/21 0821   BP: (!) 195/91 (!) 148/83   Pulse: 91    Resp: 18    Temp: 98.1 °F (36.7 °C)    TempSrc: Oral    SpO2: 94%    Weight: (!) 315 lb (142.9 kg)    Height: 5' 3\" (1.6 m)        MDM  D/w Dr Gisela Rea for admission. Pt has failed outpatient abx. Will admit for iv abx.  D/w Dr Gisela Rea for admission    CONSULTS:  PHARMACY TO DOSE VANCOMYCIN    PROCEDURES:  Unless otherwise notedbelow, none     Procedures    FINAL IMPRESSION     1. Cellulitis of lower extremity, unspecified laterality    2. Lymphedema    3.  Migraine without status migrainosus, not intractable, unspecified migraine type          DISPOSITION/PLAN   DISPOSITION Admitted 04/11/2021 09:48:20 AM      PATIENT REFERRED TO:  @FUP@    DISCHARGE MEDICATIONS:  New Prescriptions    No medications on file          (Please note that portions of this note were completed with a voice recognition program.  Efforts were made to edit the dictations butoccasionally words are mis-transcribed.)    Irving Kocher, MD (electronically signed)  AttendingEmergency Physician         Irving Kocher, MD  04/11/21 1051

## 2021-04-11 NOTE — ED NOTES
Called 3rd floor to tell the nurse about this patient, she requested I wait a few minutes before bringing up this patient.       Ynes Saravia, RN  04/11/21 1037

## 2021-04-11 NOTE — PROGRESS NOTES
Pharmacy Note  Vancomycin Consult    Tianna Trivedi is a 52 y.o. female started on Vancomycin for cellulitis; consult received from Dr. Radha David to manage therapy. Active Problems:    * No active hospital problems. *  Resolved Problems:    * No resolved hospital problems. *      Allergies:  Compazine [prochlorperazine maleate], Ciprofloxacin, Amoxicillin-pot clavulanate, Ancef [cefazolin], Bactrim [sulfamethoxazole-trimethoprim], Compazine [prochlorperazine], Demerol, Hydroxyzine, Hydroxyzine hcl, Ibuprofen, Meperidine, Nortriptyline, Orphenadrine, Orphenadrine citrate, Penicillins, Prochlorperazine edisylate, Tobramycin, Tramadol, Vistaril [hydroxyzine hcl], Cephalexin, Clindamycin/lincomycin, Codeine, Doxycycline, Keflex [cephalexin], and Moxifloxacin     Temp max: 98.1    Recent Labs     04/11/21  0756   BUN 13       Recent Labs     04/11/21  0756   CREATININE 0.9       Recent Labs     04/11/21  0756   WBC 5.5       No intake or output data in the 24 hours ending 04/11/21 0859    Culture Date Source Results   04/11/21 Blood x 2 Sent                 Ht Readings from Last 1 Encounters:   04/11/21 5' 3\" (1.6 m)        Wt Readings from Last 1 Encounters:   04/11/21 (!) 315 lb (142.9 kg)         Body mass index is 55.8 kg/m². Estimated Creatinine Clearance: 107 mL/min (based on SCr of 0.9 mg/dL). Assessment/Plan:  Will initiate vancomycin 2500 mg IV x 1 dose then 1500 mg IV every 18 hours. Timing of trough level will be determined based on culture results, renal function, and clinical response. Thank you for the consult. Will continue to follow.     ANTONY MEZA, PHARM D, 4/11/2021, 9:00 AM

## 2021-04-11 NOTE — ED NOTES
Pt co pain to IV site during vanc admin, pt requesting vanc be paused at this time. vanc paused.     Pt ambulatory to restroom with one assist.      Ambar Calles RN  04/11/21 1274

## 2021-04-11 NOTE — PROGRESS NOTES
Tianna  arrived to room # 326. Presented with: Bilateral Cellulitis   Mental Status: Patient is oriented, alert, coherent, logical, thought processes intact and able to concentrate and follow conversation. Vitals:    04/11/21 1221   BP: (!) 142/79   Pulse: 102   Resp: 18   Temp: 98.1 °F (36.7 °C)   SpO2: 93%     Patient safety contract and falls prevention contract reviewed with patient Yes. Oriented Patient to room. Call light within reach. Yes.   Needs, issues or concerns expressed at this time: no.      Electronically signed by Cecilio Freitas RN on 4/11/2021 at 12:39 PM

## 2021-04-11 NOTE — ED NOTES
Pt back to room.  vanc started again at 100ml/hr  Pt requesting more pain meds for Rogerio Ren RN  04/11/21 3699

## 2021-04-11 NOTE — H&P
Hospital Medicine  History and Physical    Patient:  Sim Fothergill   MRN: [id number]    CHIEF COMPLAINT:  Swelling and pain of lower extremities    History Obtained From: patient, chart    PCP: NANETTE Jeffers    HISTORY OF PRESENT ILLNESS:  52year old morbidly obese white female with known history of lymphedema with frequent soft tissue infections presents to the emergency department with complaint of worsening swelling, pain, and redness of the bilateral legs for the last week. She has lymphedema pumps at home which she claims to use at least twice daily, but states she has only been using daily for the last week due to increased tenderness. Legs are weeping. Had some chills but no fever. Some nausea this morning with severe headache. Patient with history of chronic cephalgia. She took doxycycline at home without any improvement. No exacerbating factors, no significant relief with lymphedema pumps. She complained of severe headache upon arrival and was given butorphanol without significant relief. She is requesting to leave [address] if she does not receive another dose. Promethazine given after lack of relief with ondansetron. REVIEW OF SYSTEMS:  14 systems reviewed and negative except as noted above. Past Medical History:      Diagnosis Date    Anxiety     Constipation     Depression     DM (diabetes mellitus) ([address]     Headache(784.0)     Hyperlipidemia     Hypertension     Kidney stones     Kidney stones     Restless leg     Restless legs syndrome        Past Surgical History:      Procedure Laterality Date    ECTOPIC PREGNANCY SURGERY      EYE SURGERY      cornea transplant and cataracts removed    KNEE CARTILAGE SURGERY Left 12/20/2016    KNEE ARTHROSCOPIC PARTIAL MEDIAL MENISCECTOMY performed by Kellen Howard MD at [address] [place] [address]         Medications Prior to Admission:    Prior to Admission medications    Medication Sig Start Date End Date Taking? Authorizing Provider   blood glucose test strips (ASCENSIA AUTODISC VI;ONE TOUCH ULTRA TEST VI) strip Check glucose TID and as needed for hypoglycemic events Dx E11.40 E11.66 Z79.4  Use one touch verio flex 7/7/07   NANETTE Clifton   omeprazole (PRILOSEC) 20 MG delayed release capsule TAKE ONE CAPSULE BY MOUTH TWICE DAILY BEFORE MEALS 0/83/09   NANETTE Clifton   fluticasone (FLONASE) 50 MCG/ACT nasal spray SHAKE LIQUID AND USE 1 SPRAY IN [facility] NOSTRIL DAILY 5/48/75   NANETTE Clifton   ondansetron (ZOFRAN-ODT) 4 MG disintegrating tablet DISSOLVE 1 TABLET UNDER THE TONGUE EVERY 8 HOURS AS NEEDED FOR NAUSEA AND VOMITING(TAKE PRIOR TO DOXYCYLINE DOSES) 8/61/34   NANETTE Clifton   Diabetic Shoe MISC by Does not apply route DISPENSE ONE PAIR OF DIABETIC SHOE AND 3 PAIRS HEAT MOLDED INSERTS 3/4/21   Amandeep Jacobson MD   methylPREDNISolone (MEDROL DOSEPACK) 4 MG tablet Take by mouth. 4/11/56   NANETTE Clifton   Diabetic Shoe MISC by Does not apply route DISPENSE ONE PAIR OF DIABETIC SHOE AND 3 PAIRS HEAT MOLDED INSERTS 2/9/21 3/4/21  Amandeep Jacobson MD   DULoxetine (CYMBALTA) 60 MG extended release capsule Take 1 capsule by mouth 2 times daily 5/02/01   NANETTE Clifton   clonazePAM (KLONOPIN) 0.5 MG tablet Take 1 tablet by mouth 2 times daily as needed for Anxiety for up to 30 days.  7/80/71 0/80/78  NANETTE Clifton   rizatriptan (MAXALT) 10 MG tablet Take 1 tablet by mouth once as needed for Migraine May repeat in 2 hours if needed 1/65/32 4/63/98  NANETTE Clifton   potassium chloride (KLOR-CON M) 20 MEQ extended release tablet Take 1 tablet by mouth daily 1/44/37   NANETTE Clifton   triamterene-hydroCHLOROthiazide [facility]) 37.5-25 MG per tablet Take 1 tablet by mouth daily 4/35/14   NANETTE Clifton   mirtazapine (REMERON) 15 MG tablet TAKE 1 TABLET BY MOUTH EVERY NIGHT 0/72/50   Amish Rist, APRN   BASAGLAR KWIKPEN 100 UNIT/ML injection pen [employer] 30 UNITS INTO THE SKIN EVERY EVENING 9/5/67   Nickola Habermann, APRN   MINOCYCLINE HCL PO Take by mouth    Historical Provider, MD   busPIRone (BUSPAR) 5 MG tablet TAKE 1 TABLET BY MOUTH THREE TIMES DAILY 10/17/67   Nickola Habermann, APRN   oxyCODONE-acetaminophen (PERCOCET) 7.5-325 MG per tablet Take 1 tablet by mouth 2 times daily. Historical Provider, MD   Zinc Oxide 6 % CREA Apply 1 Units topically 3 times daily JESUSITA'S CREAM  Zinc oxided 20%, hydrocortisone, nystatin, bacitracin 60/42/20   Nickola Habermann, APRN   tiZANidine (ZANAFLEX) 4 MG tablet Take 4 mg by mouth daily as needed    Historical Provider, MD   NONFORMULARY Prednisone eye drops 1 drop in each eye daily    Historical Provider, MD   Morphine Sulfate ER 15 MG T12A Take 15 mg by mouth 2 times daily. Historical Provider, MD   Cream Base CREA APPLY ONE PUMP (GRAM) TO AFFECTED AREA(S) THREE TO FOUR TIMES A DAY TO THE APPENDAGES AND TRUNK AS DIRECTED 9/11/20   Michele Romero MD   insulin lispro, 1 Unit Dial, (ADMELOG SOLOSTAR) 100 UNIT/ML SOPN ADMINISTER 45 UNITS UNDER THE SKIN EVERY EVENING. INCREASE BY 3 UNITS EVERY NIGHT UNTIL 60 UNITS IS REACHED AND.  [address] 9/8/20   Michele Romero MD   celecoxib (CELEBREX) 200 MG capsule TAKE 1 CAPSULE BY MOUTH EVERY DAY 9/4/20   Michele Romero MD   pramipexole (MIRAPEX) 0.75 MG tablet Take 1 tablet by mouth 2 times daily 6/9/68   Nickola Habermann, APRN   furosemide (LASIX) 80 MG tablet Take 1 tablet by mouth 2 times daily 7/31/20   Barbara Alvarado PA-C   ZOLMitriptan (ZOMIG) 5 MG nasal solution 0.1 mLs by Nasal route once as needed for Migraine 2/5/41 32/45/81  Nickola Habermann, APRN   diclofenac sodium (VOLTAREN) 1 % GEL Apply 2 g topically 2 times daily    Historical Provider, MD   cyclobenzaprine (FLEXERIL) 10 MG tablet Take 10 mg by mouth 3 times daily as needed for Muscle spasms     Historical Provider, MD MONROE ULTRAFINE III SHORT PEN 31G X 8 MM MISC USE TO INJECT INSULIN ONCE DAILY 1/31/20   Acacia Coker MD   Lancets ([address]) [address] AS DIRECTED 11/3/19   Acacia Coker MD   blood glucose monitor kit and supplies Test 1 times a day & as needed for symptoms of irregular blood glucose. 9/3/19   Acacia Coker MD   VICTOZA 18 MG/3ML SOPN SC injection ADMINISTER 1.8 MG INTO THE SKIN EVERY DAY 7/24/19   Acacia Coker MD   gabapentin (NEURONTIN) 100 MG capsule Take 100 mg by mouth 2 times daily. Am and 2 pm 7/14/19   Historical Provider, MD   gabapentin (NEURONTIN) 300 MG capsule Take 300 mg by mouth 3 times daily. 5/19/19   Historical Provider, MD   Blood Glucose Monitoring Suppl ([address]) w/Device KIT 1 kit by Does not apply route daily as needed (Dx 250.00) 8/4/17   Acacia Coker MD   ONE TOUCH LANCETS MISC 1 each by Does not apply route daily 8/4/17   Acacia Coker MD       Allergies:  Compazine [prochlorperazine maleate], Ciprofloxacin, Amoxicillin-pot clavulanate, Ancef [cefazolin], Bactrim [sulfamethoxazole-trimethoprim], Compazine [prochlorperazine], Demerol, Hydroxyzine, Hydroxyzine hcl, Ibuprofen, Meperidine, Nortriptyline, Orphenadrine, Orphenadrine citrate, Penicillins, Prochlorperazine edisylate, Tobramycin, Tramadol, Vistaril [hydroxyzine hcl], Cephalexin, Clindamycin/lincomycin, Codeine, Doxycycline, Keflex [cephalexin], and Moxifloxacin    Social History:   TOBACCO:   reports that she has never smoked. She has never used smokeless tobacco.  ETOH:   reports no history of alcohol use.     Family History:       Problem Relation Age of Onset    Cancer Father     Cancer Maternal Grandmother     COPD Maternal Grandmother     Cancer Maternal Grandfather            Physical Exam:    Vitals: BP (!) 148/83   Pulse 91   Temp 98.1 °F (36.7 °C) (Oral)   Resp 18   Ht 5' 3\" (1.6 m)   Wt (!) 315 lb (142.9 kg)   SpO2 94%   BMI 55.80 kg/m²   24HR INTAKE/OUTPUT:  No intake or output data in the 24 hours ending 04/11/21 1041  General appearance: alert and cooperative with exam  HEENT: atraumatic, eyes with clear conjunctiva and normal lids, pupils and irises normal, external ears and nose are normal, lips normal. Neck without masses, lympadenopathy, bruit, thyroid normal  Lungs: no increased work of breathing, diminished breath sounds bilaterally  Heart: muffled tones, regular rate and rhythm  Abdomen: soft, morbidly obese, bowel sounds present  Extremities: massive lymphedema, erythema, transudative weeping  Neurologic: no focal neurologic deficits, normal sensation, alert and oriented, affect tearful  Skin: no rashes, nodules    CBC:   Recent Labs     04/11/21  0756   WBC 5.5   HGB 11.4*        BMP:    Recent Labs     04/11/21  0756      K 4.1   CL 99   CO2 29   BUN 13   CREATININE 0.9   GLUCOSE 88     Hepatic:   Recent Labs     04/11/21  0756   AST 16   ALT 7   BILITOT 0.3   ALKPHOS 74     Troponin T: No results for input(s): TROPONINI in the last 72 hours. ABGs: No results for input(s): PH, PCO2, PO2, HCO3, BE, O2SAT in the last 72 hours. INR: No results for input(s): INR in the last 72 hours. Lactic Acid:   Recent Labs     04/11/21  0756   LACTA 1.6     -----------------------------------------------------------------    Assessment and Plan   Principal Problem:    Cellulitis of both lower extremities  Active Problems:    Hypertension    Uncontrolled type 2 diabetes mellitus with hyperglycemia, without long-term current use of insulin (HCC)    Migraine without aura and without status migrainosus, not intractable    Morbid obesity with BMI of 50.0-59.9, adult ([facility]    Uncontrolled type 2 diabetes mellitus with diabetic neuropathy, with long-term current use of insulin (HCC)    Diabetic ulcer of left lower leg associated with type 2 diabetes mellitus, with fat layer exposed ([facility] [address]    Stage 3 chronic kidney disease    Chronic pain  Resolved Problems:    * No resolved hospital problems.  *      Admit to med/surg for cellulitis refractory to outpatient antibiotic therapy. Day #1 vancomycin empiric therapy for cellulitis of the bilateral lower limbs. Cultures pending to guide therapy. Repeat butorphanol 0.5 mg IV x1. Promethazine 12.5 mg IV x1. Elevate legs. No need for ID consult. They have seen the patient multiple times. Home medications reconciled.     Crissy Villeda DO  Admitting Hospitalist

## 2021-04-11 NOTE — PROGRESS NOTES
4 Eyes Skin Assessment    Tianna Trivedi is being assessed upon: Admission    I agree that I, Clarke Peterson, along with Sabra Stauffer (either 2 RN's or 1 LPN and 1 RN) have performed a thorough Head to Toe Skin Assessment on the patient. ALL assessment sites listed below have been assessed. Areas assessed by both nurses:     [x]   Head, Face, and Ears   [x]   Shoulders, Back, and Chest  [x]   Arms, Elbows, and Hands   [x]   Coccyx, Sacrum, and Ischium, per patient  [x]   Legs, Feet, and Heels    Does the Patient have Skin Breakdown?  No    Prieto Prevention initiated: No  Wound Care Orders initiated: No    WO nurse consulted for Pressure Injury (Stage 3,4, Unstageable, DTI, NWPT, and Complex wounds) and New or Established Ostomies: No        Primary Nurse eSignature: Clarke Peterson RN on 4/11/2021 at 12:38 PM      Co-Signer eSignature: Electronically signed by Sabra Stauffer RN on 4/11/21 at 12:47 PM CDT

## 2021-04-12 ENCOUNTER — TELEPHONE (OUTPATIENT)
Dept: PRIMARY CARE CLINIC | Age: 51
End: 2021-04-12

## 2021-04-12 LAB
ANION GAP SERPL CALCULATED.3IONS-SCNC: 10 MMOL/L (ref 7–19)
BASOPHILS ABSOLUTE: 0 K/UL (ref 0–0.2)
BASOPHILS RELATIVE PERCENT: 0.5 % (ref 0–1)
BUN BLDV-MCNC: 14 MG/DL (ref 6–20)
C-REACTIVE PROTEIN: 12.81 MG/DL (ref 0–0.5)
CALCIUM SERPL-MCNC: 8.9 MG/DL (ref 8.6–10)
CHLORIDE BLD-SCNC: 101 MMOL/L (ref 98–111)
CO2: 26 MMOL/L (ref 22–29)
CREAT SERPL-MCNC: 1 MG/DL (ref 0.5–0.9)
EOSINOPHILS ABSOLUTE: 0.1 K/UL (ref 0–0.6)
EOSINOPHILS RELATIVE PERCENT: 1.8 % (ref 0–5)
GFR AFRICAN AMERICAN: >59
GFR NON-AFRICAN AMERICAN: 59
GLUCOSE BLD-MCNC: 105 MG/DL (ref 70–99)
GLUCOSE BLD-MCNC: 110 MG/DL (ref 70–99)
GLUCOSE BLD-MCNC: 193 MG/DL (ref 70–99)
GLUCOSE BLD-MCNC: 258 MG/DL (ref 70–99)
GLUCOSE BLD-MCNC: 295 MG/DL (ref 70–99)
GLUCOSE BLD-MCNC: 57 MG/DL (ref 70–99)
GLUCOSE BLD-MCNC: 60 MG/DL (ref 70–99)
GLUCOSE BLD-MCNC: 60 MG/DL (ref 74–109)
HCT VFR BLD CALC: 33.8 % (ref 37–47)
HEMOGLOBIN: 10 G/DL (ref 12–16)
IMMATURE GRANULOCYTES #: 0 K/UL
LYMPHOCYTES ABSOLUTE: 1.2 K/UL (ref 1.1–4.5)
LYMPHOCYTES RELATIVE PERCENT: 19.4 % (ref 20–40)
MCH RBC QN AUTO: 26.3 PG (ref 27–31)
MCHC RBC AUTO-ENTMCNC: 29.6 G/DL (ref 33–37)
MCV RBC AUTO: 88.9 FL (ref 81–99)
MONOCYTES ABSOLUTE: 0.3 K/UL (ref 0–0.9)
MONOCYTES RELATIVE PERCENT: 5.5 % (ref 0–10)
NEUTROPHILS ABSOLUTE: 4.4 K/UL (ref 1.5–7.5)
NEUTROPHILS RELATIVE PERCENT: 72.6 % (ref 50–65)
PDW BLD-RTO: 15.5 % (ref 11.5–14.5)
PERFORMED ON: ABNORMAL
PLATELET # BLD: 207 K/UL (ref 130–400)
PMV BLD AUTO: 9.5 FL (ref 9.4–12.3)
POTASSIUM REFLEX MAGNESIUM: 4.4 MMOL/L (ref 3.5–5)
RBC # BLD: 3.8 M/UL (ref 4.2–5.4)
SODIUM BLD-SCNC: 137 MMOL/L (ref 136–145)
WBC # BLD: 6 K/UL (ref 4.8–10.8)

## 2021-04-12 PROCEDURE — 6370000000 HC RX 637 (ALT 250 FOR IP): Performed by: FAMILY MEDICINE

## 2021-04-12 PROCEDURE — 6360000002 HC RX W HCPCS: Performed by: FAMILY MEDICINE

## 2021-04-12 PROCEDURE — 1210000000 HC MED SURG R&B

## 2021-04-12 PROCEDURE — 36415 COLL VENOUS BLD VENIPUNCTURE: CPT

## 2021-04-12 PROCEDURE — C1751 CATH, INF, PER/CENT/MIDLINE: HCPCS

## 2021-04-12 PROCEDURE — 05HA33Z INSERTION OF INFUSION DEVICE INTO LEFT BRACHIAL VEIN, PERCUTANEOUS APPROACH: ICD-10-PCS | Performed by: HOSPITALIST

## 2021-04-12 PROCEDURE — 36569 INSJ PICC 5 YR+ W/O IMAGING: CPT

## 2021-04-12 PROCEDURE — 82947 ASSAY GLUCOSE BLOOD QUANT: CPT

## 2021-04-12 PROCEDURE — 2500000003 HC RX 250 WO HCPCS: Performed by: FAMILY MEDICINE

## 2021-04-12 PROCEDURE — 85025 COMPLETE CBC W/AUTO DIFF WBC: CPT

## 2021-04-12 PROCEDURE — 80048 BASIC METABOLIC PNL TOTAL CA: CPT

## 2021-04-12 PROCEDURE — 76937 US GUIDE VASCULAR ACCESS: CPT

## 2021-04-12 PROCEDURE — 2580000003 HC RX 258: Performed by: FAMILY MEDICINE

## 2021-04-12 PROCEDURE — 6370000000 HC RX 637 (ALT 250 FOR IP): Performed by: INTERNAL MEDICINE

## 2021-04-12 PROCEDURE — 86140 C-REACTIVE PROTEIN: CPT

## 2021-04-12 RX ORDER — LIDOCAINE HYDROCHLORIDE 10 MG/ML
5 INJECTION, SOLUTION EPIDURAL; INFILTRATION; INTRACAUDAL; PERINEURAL ONCE
Status: COMPLETED | OUTPATIENT
Start: 2021-04-12 | End: 2021-04-12

## 2021-04-12 RX ORDER — TIZANIDINE 4 MG/1
4 TABLET ORAL NIGHTLY PRN
Status: DISCONTINUED | OUTPATIENT
Start: 2021-04-12 | End: 2021-04-14

## 2021-04-12 RX ORDER — SODIUM CHLORIDE 9 MG/ML
25 INJECTION, SOLUTION INTRAVENOUS PRN
Status: DISCONTINUED | OUTPATIENT
Start: 2021-04-12 | End: 2021-04-15 | Stop reason: HOSPADM

## 2021-04-12 RX ORDER — SODIUM CHLORIDE 0.9 % (FLUSH) 0.9 %
5-40 SYRINGE (ML) INJECTION EVERY 12 HOURS SCHEDULED
Status: DISCONTINUED | OUTPATIENT
Start: 2021-04-12 | End: 2021-04-15 | Stop reason: HOSPADM

## 2021-04-12 RX ORDER — SODIUM CHLORIDE 0.9 % (FLUSH) 0.9 %
5-40 SYRINGE (ML) INJECTION PRN
Status: DISCONTINUED | OUTPATIENT
Start: 2021-04-12 | End: 2021-04-15 | Stop reason: HOSPADM

## 2021-04-12 RX ORDER — TIZANIDINE 4 MG/1
4 TABLET ORAL NIGHTLY
Status: DISCONTINUED | OUTPATIENT
Start: 2021-04-12 | End: 2021-04-12

## 2021-04-12 RX ORDER — TIZANIDINE 4 MG/1
4 TABLET ORAL ONCE
Status: COMPLETED | OUTPATIENT
Start: 2021-04-12 | End: 2021-04-12

## 2021-04-12 RX ADMIN — INSULIN LISPRO 9 UNITS: 100 INJECTION, SOLUTION INTRAVENOUS; SUBCUTANEOUS at 14:42

## 2021-04-12 RX ADMIN — OXYCODONE HYDROCHLORIDE AND ACETAMINOPHEN 1 TABLET: 7.5; 325 TABLET ORAL at 16:31

## 2021-04-12 RX ADMIN — GABAPENTIN 300 MG: 300 CAPSULE ORAL at 14:41

## 2021-04-12 RX ADMIN — GABAPENTIN 300 MG: 300 CAPSULE ORAL at 19:57

## 2021-04-12 RX ADMIN — MIRTAZAPINE 15 MG: 15 TABLET, FILM COATED ORAL at 19:58

## 2021-04-12 RX ADMIN — DULOXETINE 60 MG: 60 CAPSULE, DELAYED RELEASE ORAL at 08:48

## 2021-04-12 RX ADMIN — MORPHINE SULFATE 15 MG: 15 TABLET, FILM COATED, EXTENDED RELEASE ORAL at 19:57

## 2021-04-12 RX ADMIN — TIZANIDINE 4 MG: 4 TABLET ORAL at 04:58

## 2021-04-12 RX ADMIN — BUSPIRONE HYDROCHLORIDE 5 MG: 5 TABLET ORAL at 19:57

## 2021-04-12 RX ADMIN — PANTOPRAZOLE SODIUM 40 MG: 40 TABLET, DELAYED RELEASE ORAL at 08:48

## 2021-04-12 RX ADMIN — LIDOCAINE HYDROCHLORIDE 5 ML: 10 INJECTION, SOLUTION EPIDURAL; INFILTRATION; INTRACAUDAL; PERINEURAL at 10:32

## 2021-04-12 RX ADMIN — MORPHINE SULFATE 15 MG: 15 TABLET, FILM COATED, EXTENDED RELEASE ORAL at 08:48

## 2021-04-12 RX ADMIN — PRAMIPEXOLE DIHYDROCHLORIDE 0.75 MG: 0.25 TABLET ORAL at 08:48

## 2021-04-12 RX ADMIN — POTASSIUM CHLORIDE 20 MEQ: 1500 TABLET, EXTENDED RELEASE ORAL at 08:49

## 2021-04-12 RX ADMIN — GABAPENTIN 300 MG: 300 CAPSULE ORAL at 08:49

## 2021-04-12 RX ADMIN — FUROSEMIDE 80 MG: 40 TABLET ORAL at 18:17

## 2021-04-12 RX ADMIN — CELECOXIB 200 MG: 200 CAPSULE ORAL at 08:49

## 2021-04-12 RX ADMIN — PROMETHAZINE HYDROCHLORIDE 12.5 MG: 12.5 TABLET ORAL at 03:36

## 2021-04-12 RX ADMIN — BUSPIRONE HYDROCHLORIDE 5 MG: 5 TABLET ORAL at 14:41

## 2021-04-12 RX ADMIN — PRAMIPEXOLE DIHYDROCHLORIDE 0.75 MG: 0.25 TABLET ORAL at 19:56

## 2021-04-12 RX ADMIN — FUROSEMIDE 80 MG: 40 TABLET ORAL at 08:48

## 2021-04-12 RX ADMIN — BUSPIRONE HYDROCHLORIDE 5 MG: 5 TABLET ORAL at 08:49

## 2021-04-12 RX ADMIN — SODIUM CHLORIDE, PRESERVATIVE FREE 10 ML: 5 INJECTION INTRAVENOUS at 10:55

## 2021-04-12 RX ADMIN — PROMETHAZINE HYDROCHLORIDE 12.5 MG: 12.5 TABLET ORAL at 21:59

## 2021-04-12 RX ADMIN — FLUTICASONE PROPIONATE 1 SPRAY: 50 SPRAY, METERED NASAL at 08:49

## 2021-04-12 RX ADMIN — TRIAMTERENE AND HYDROCHLOROTHIAZIDE 1 TABLET: 37.5; 25 TABLET ORAL at 08:49

## 2021-04-12 RX ADMIN — CLONAZEPAM 0.5 MG: 1 TABLET ORAL at 23:14

## 2021-04-12 RX ADMIN — SODIUM CHLORIDE, PRESERVATIVE FREE 10 ML: 5 INJECTION INTRAVENOUS at 19:58

## 2021-04-12 RX ADMIN — OXYCODONE HYDROCHLORIDE AND ACETAMINOPHEN 1 TABLET: 7.5; 325 TABLET ORAL at 03:36

## 2021-04-12 RX ADMIN — VANCOMYCIN HYDROCHLORIDE 1500 MG: 10 INJECTION, POWDER, LYOPHILIZED, FOR SOLUTION INTRAVENOUS at 10:57

## 2021-04-12 RX ADMIN — DULOXETINE 60 MG: 60 CAPSULE, DELAYED RELEASE ORAL at 19:58

## 2021-04-12 RX ADMIN — INSULIN LISPRO 3 UNITS: 100 INJECTION, SOLUTION INTRAVENOUS; SUBCUTANEOUS at 18:05

## 2021-04-12 ASSESSMENT — PAIN SCALES - GENERAL
PAINLEVEL_OUTOF10: 6
PAINLEVEL_OUTOF10: 7
PAINLEVEL_OUTOF10: 6
PAINLEVEL_OUTOF10: 9

## 2021-04-12 ASSESSMENT — PAIN DESCRIPTION - DESCRIPTORS: DESCRIPTORS: DISCOMFORT;CONSTANT;HEADACHE

## 2021-04-12 ASSESSMENT — PAIN DESCRIPTION - PAIN TYPE: TYPE: CHRONIC PAIN

## 2021-04-12 ASSESSMENT — PAIN - FUNCTIONAL ASSESSMENT: PAIN_FUNCTIONAL_ASSESSMENT: ACTIVITIES ARE NOT PREVENTED

## 2021-04-12 ASSESSMENT — PAIN DESCRIPTION - PROGRESSION: CLINICAL_PROGRESSION: NOT CHANGED

## 2021-04-12 NOTE — PROGRESS NOTES
Hospitalist Progress Note  4/12/2021 12:46 PM  Subjective:   Admit Date: 9/34/3620  PCP: NANETTE Muñoz    Chief Complaint: swelling and pain of lower extremities    Subjective: The patient states she is about the same today, with continued pain in her legs. Headache continues, patient requesting home Stadol nasal but this is not available unless she brings it in. She also refused Imitrex as she states only Maxalt will work. History is otherwise unchanged. [facility] Hospital History:   4-11: Presents to ED with worsening swelling, pain, redness of lower extremities. Massive lymphedema chronically for years requiring occasional antibiotics. Took doxycycline without relief at home. Has lymphedema pumps at home but has not been able to use as often due to tenderness. Acute on chronic headache. Admitted to med/surg on vancomycin alone due to numerous allergies. 4-12: Patient still has headache, is requesting Maxalt and nasal Stadol, which are not on formulary. Explained that they can be given if she has them brought from home. No significant change in lower extremities yet. ROS: 14 point review of systems is negative except as specifically addressed above.     DIET CARB CONTROL; Carb Control: 3 carb choices (45 gms)/meal; Low Sodium (2 GM)    Intake/Output Summary (Last 24 hours) at 4/12/2021 1246  Last data filed at 4/12/2021 1119  Gross per 24 hour   Intake 490 ml   Output 1150 ml   Net -660 ml     Medications:   sodium chloride      dextrose       Current Facility-Administered Medications   Medication Dose Route Frequency Provider Last Rate Last Admin    tiZANidine (ZANAFLEX) tablet 4 mg  4 mg Oral Nightly PRN Radha Saha MD        sodium chloride flush 0.9 % injection 5-40 mL  5-40 mL Intravenous 2 times per day Lalit Dao, DO   10 mL at 04/12/21 1055    sodium chloride flush 0.9 % injection 5-40 mL  5-40 mL Intravenous PRN Jj Wagner DO        0.9 % sodium chloride infusion  25 mL Intravenous PRN Jabari Enriquez, DO        insulin glargine (LANTUS;BASAGLAR) injection pen 30 Units  30 Units Subcutaneous QPM Jabari Enriquez DO        vancomycin [facility]) intermittent dosing (placeholder)   Other RX Placeholder Irving Kocher, MD        vancomycin (VANCOCIN) 1,500 mg in dextrose 5 % 500 mL IVPB  1,500 mg Intravenous Q18H [place] Wagner,  mL/hr at 04/12/21 1057 1,500 mg at 04/12/21 1057    busPIRone (BUSPAR) tablet 5 mg  5 mg Oral TID Jabari Enriquez, DO   5 mg at 04/12/21 0849    celecoxib (CELEBREX) capsule 200 mg  200 mg Oral Daily [place] Wagner, DO   200 mg at 04/12/21 0849    clonazePAM (KLONOPIN) tablet 0.5 mg  0.5 mg Oral BID PRN Jabari Enriquez, DO   0.5 mg at 04/11/21 2355    DULoxetine (CYMBALTA) extended release capsule 60 mg  60 mg Oral BID [place] Wagner, DO   60 mg at 04/12/21 0848    fluticasone (FLONASE) 50 MCG/ACT nasal spray 1 spray  1 spray Each Nostril Daily [place] Wagner, DO   1 spray at 04/12/21 0849    furosemide (LASIX) tablet 80 mg  80 mg Oral BID [place] Wagner, DO   80 mg at 04/12/21 0848    gabapentin (NEURONTIN) capsule 300 mg  300 mg Oral TID Jabari Enriquez, DO   300 mg at 04/12/21 0849    mirtazapine (REMERON) tablet 15 mg  15 mg Oral Nightly [place] Wagner, DO   15 mg at 04/11/21 2110    morphine (MS CONTIN) extended release tablet 15 mg  15 mg Oral BID [place] Wagner, DO   15 mg at 04/12/21 0848    pantoprazole (PROTONIX) tablet 40 mg  40 mg Oral QAM AC [place] Wagner, DO   40 mg at 04/12/21 0848    potassium chloride (KLOR-CON M) extended release tablet 20 mEq  20 mEq Oral Daily [place] Wagner, DO   20 mEq at 04/12/21 0849    pramipexole (MIRAPEX) tablet 0.75 mg  0.75 mg Oral BID [facility] Wagner, DO   0.75 mg at 04/12/21 0848    triamterene-hydroCHLOROthiazide (MAXZIDE-25) 37.5-25 MG per tablet 1 tablet  1 tablet Oral Daily Jabari Enriquez DO   1 tablet at 04/12/21 0849    Liraglutide (VICTOZA) SC injection 1.8 mg  1.8 mg Subcutaneous Daily Jj Wagner DO   1.8 mg at 04/12/21 0851    enoxaparin (LOVENOX) injection 40 mg hours.  INR: No results for input(s): INR in the last 72 hours. Lactic Acid:   Recent Labs     04/11/21  0756   LACTA 1.6       Objective:   Vitals: BP 97/65   Pulse 75   Temp 97.7 °F (36.5 °C) (Temporal)   Resp 16   Ht 5' 3\" (1.6 m)   Wt (!) 342 lb 9.6 oz (155.4 kg)   SpO2 94%   BMI 60.69 kg/m²   24HR INTAKE/OUTPUT:      Intake/Output Summary (Last 24 hours) at 4/12/2021 1246  Last data filed at 4/12/2021 1119  Gross per 24 hour   Intake 490 ml   Output 1150 ml   Net -660 ml     General appearance: alert and cooperative with exam  HEENT: atraumatic, eyes with clear conjunctiva and normal lids, pupils and irises normal, external ears and nose are normal, lips normal  Neck without masses, lympadenopathy, bruit, thyroid normal  Lungs: no increased work of breathing, diminished breath sounds bilaterally  Heart: distant tones, regular rate and rhythm  Abdomen: nondistended, morbidly obese, bowel sounds distant  Extremities: massive lymphedema, no significant changes  Neurologic: no focal neurologic deficits, normal sensation, alert and oriented, affect and mood appropriate  Skin: erythema, less transudative weeping    Assessment and Plan:   Principal Problem:    Cellulitis of both lower extremities  Active Problems:    Hypertension    Uncontrolled type 2 diabetes mellitus with hyperglycemia, without long-term current use of insulin ([facility])    Migraine without aura and without status migrainosus, not intractable    Morbid obesity with BMI of 50.0-59.9, adult ([facility])    Uncontrolled type 2 diabetes mellitus with diabetic neuropathy, with long-term current use of insulin ([facility])    Diabetic ulcer of left lower leg associated with type 2 diabetes mellitus, with fat layer exposed ([facility] [address]    Stage 3 chronic kidney disease    Chronic pain  Resolved Problems:    * No resolved hospital problems. *      Day #2 vancomycin empiric therapy for cellulitis of the bilateral lower limbs. Cultures pending to guide therapy.  Multiple allergies to preclude combination antibiotic therapy, usually responds to vancomycin alone. Elevate legs. Discussed treatment of migraine. If she has home medications brought, they can be administered but they are off formulary.     Advance Directive: Full Code    DVT prophylaxis: enoxaparin    Discharge planning: BISI Zavala DO  Rounding Hospitalist

## 2021-04-12 NOTE — TELEPHONE ENCOUNTER
Attempted to contact patient about the below unable to leave message as voicemail was not set up. Amanda (nurse at [facility]) called stating they are unable to get stadol at the hospital and patient was requesting a prescription patient stated she received script from PCP. I checked with julius whom verified the information with [facility]. Per FP she is not to be taking the medication it was on an as needed basis. IF she is needing it more she will need an appointment.

## 2021-04-12 NOTE — PROGRESS NOTES
Multiple IV attempts made by this nurse and charge nurse with assistance of IV Finder. Patient still does not have IV access.

## 2021-04-12 NOTE — PROGRESS NOTES
Physician Progress Note      PATIENTConradu Ruzi  CSN #:                  [id number]  :                       1970  ADMIT DATE:       2021 5:47 AM  DISCH DATE:  RESPONDING  PROVIDER #:        MINI TOVAR DO          QUERY TEXT:    Pt admitted with bilateral lower extremity cellulitis. Pt noted to have DM,   uncontrolled type 2. If possible, please document in progress notes and   discharge summary the relationship, if any, between cellulitis and DM. The medical record reflects the following:  Risk Factors: uncontrolled type 2 diabetes with hyperglycemia with diabetic   neuropathy  Clinical Indicators: cellulitis of both lower extremities, failed outpatient   doxycycline  Treatment: blood cultures, Vancomycin IV, insulin protocol    Thank you,  Jose Francisco Aranda RN, CDS  [phone]  Options provided:  -- Bilateral lower extremities cellulitis associated with Diabetes  -- Bilateral lower extremities cellulitis unrelated to Diabetes  -- Other - I will add my own diagnosis  -- Disagree - Not applicable / Not valid  -- Disagree - Clinically unable to determine / Unknown  -- Refer to Clinical Documentation Reviewer    PROVIDER RESPONSE TEXT:    Bilateral lower extremities cellulitis unrelated to Diabetes.     Query created by: Brandi Sauceda on 2021 8:50 AM      Electronically signed by:  James Garcia DO 2021 8:54 AM

## 2021-04-12 NOTE — CARE COORDINATION
Date / Time of Evaluation: 4/12/2021 2:31 PM  Assessment Completed by: Harjeet Short    Patient Admission Status: Inpatient [101]    [facility]  [phone] (home)   Telephone Information:   Mobile [phone]       (Best Practice:  Have patient / caregiver verify above address and phone number by stating out loud their current address and reachable phone number.)  Is above information correct? yes      Current PCP:  NANETTE Baron  PCP verified? yes    Initial Assessment Completed at bedside with:  patient    Emergency Contacts:  Extended Emergency Contact Information  Primary Emergency Contact: [address] Phone: [phone]  Relation: Parent  Secondary Emergency Contact: lelia matos  Mobile Phone: [phone]  Relation: Other    Advance Directives: Code Status:  Full Code    Financial:  Payor: Neo Evans / Plan: Andi Bonilla / Product Type: *No Product type* /     Pre-Cert required for SNF:  na    Have Long Term Care Insurance:  na    Pharmacy:   [facility] #[id number] [facility], [address] [phone] - F [phone]  [address] [facility]  [address] [place] [address]  Phone: [phone] Fax: [phone]    Ginny Palomo [phone] - f [phone]  [address]   [place] [address]  Phone: [phone] Fax: [phone]    [address] [place] [address] [address] Toan Girard [address] [phone] [facility] [address] [place] [address] [address]  Phone: [phone] Fax: Heather [address] 12[address] [facility] 23  [facility] 49  [address] [place] [address]  Phone: [phone] Fax: [phone]      Potential assistance purchasing medications?   no    ADLS:  Support System:       Current Home Environment:  Home alone-  Pt did state she has an adult son that stays there on occaison as well as a boyfriend that stays with her some as well. Steps:  no    Plans to RETURN to current housing: yes  Barriers to RETURNING to current housing:      Currently ACTIVE with Home Health CARE:  no  [facility]:      DME Provider:  Has a cane, lift chair and lymph edema pumps     Has a pulse oximetry unit at home: no    Had [facility] prior to admission:  no  Oxygen Company:    Informed of need to bring portable home O2 tank to hospital on day of DISCHARGE:    Name of person committed to bringing portable tank at discharge: Active with HD/PD prior to admission: no  Nephrologist:    HD Center:      Transition Plan:       Transportation PLAN for Discharge:  Family or friend     Factors facilitating achievement of predicted outcomes:     Barriers to discharge: Additional CM/SW Notes: spoke with pt re: dc plans. Pt basically lives alone- has and adult son and boyfirend that stay with her at times. Pt has all equipment at home she needs. Pt did mention that she can drive but has a hard time getting to places due to her legs. PT would like HH upon dc. Will cont to follow for any further needs. Pt was very drowsy and kept falling asleep during our conversation. Electronically signed by Willa Ge RN on 4/12/2021 at 2:40 PM            Tianna and/or her family were provided with choice of provider.         Willa Ge RN  [facility]  Care Management Department  Ph:  [phone]   Fax:

## 2021-04-12 NOTE — PROGRESS NOTES
Dr. Nati Montoya stated that pt could use her home stadol nasal spray medication. Pt stated that her prescription needs to refilled and that she sees NANETTE Restrepo. Called placed to their office and they stated they would call the pt once speaking with [place]. Pt notified.

## 2021-04-12 NOTE — PLAN OF CARE
Problem: Falls - Risk of:  Goal: Will remain free from falls  Description: Will remain free from falls  Outcome: Ongoing  Goal: Absence of physical injury  Description: Absence of physical injury  Outcome: Ongoing     Problem: Pain:  Goal: Pain level will decrease  Description: Pain level will decrease  Outcome: Ongoing  Goal: Control of acute pain  Description: Control of acute pain  Outcome: Ongoing  Goal: Control of chronic pain  Description: Control of chronic pain  Outcome: Ongoing     Problem: SAFETY  Goal: Free from accidental physical injury  Outcome: Ongoing  Goal: Free from intentional harm  Outcome: Ongoing     Problem: DAILY CARE  Goal: Daily care needs are met  Outcome: Ongoing     Problem: PAIN  Goal: Patient's pain/discomfort is manageable  Outcome: Ongoing     Problem: SKIN INTEGRITY  Goal: Skin integrity is maintained or improved  Outcome: Ongoing     Problem: KNOWLEDGE DEFICIT  Goal: Patient/S.O. demonstrates understanding of disease process, treatment plan, medications, and discharge instructions. Outcome: Ongoing     Problem: DISCHARGE BARRIERS  Goal: Patient's continuum of care needs are met  Outcome: Ongoing     Problem:  Activity:  Goal: Risk for activity intolerance will decrease  Description: Risk for activity intolerance will decrease  Outcome: Ongoing     Problem: Coping:  Goal: Ability to adjust to condition or change in health will improve  Description: Ability to adjust to condition or change in health will improve  Outcome: Ongoing     Problem: Fluid Volume:  Goal: Ability to maintain a balanced intake and output will improve  Description: Ability to maintain a balanced intake and output will improve  Outcome: Ongoing     Problem: Health Behavior:  Goal: Ability to identify and utilize available resources and services will improve  Description: Ability to identify and utilize available resources and services will improve  Outcome: Ongoing  Goal: Ability to manage health-related needs will improve  Description: Ability to manage health-related needs will improve  Outcome: Ongoing     Problem: Metabolic:  Goal: Ability to maintain appropriate glucose levels will improve  Description: Ability to maintain appropriate glucose levels will improve  Outcome: Ongoing     Problem: Nutritional:  Goal: Maintenance of adequate nutrition will improve  Description: Maintenance of adequate nutrition will improve  Outcome: Ongoing  Goal: Progress toward achieving an optimal weight will improve  Description: Progress toward achieving an optimal weight will improve  Outcome: Ongoing     Problem: Physical Regulation:  Goal: Complications related to the disease process, condition or treatment will be avoided or minimized  Description: Complications related to the disease process, condition or treatment will be avoided or minimized  Outcome: Ongoing  Goal: Diagnostic test results will improve  Description: Diagnostic test results will improve  Outcome: Ongoing     Problem: Skin Integrity:  Goal: Risk for impaired skin integrity will decrease  Description: Risk for impaired skin integrity will decrease  Outcome: Ongoing     Problem: Tissue Perfusion:  Goal: Adequacy of tissue perfusion will improve  Description: Adequacy of tissue perfusion will improve  Outcome: Ongoing     Problem: Discharge Planning:  Goal: Discharged to appropriate level of care  Description: Discharged to appropriate level of care  Outcome: Ongoing     Problem:  Body Temperature - Imbalanced:  Goal: Ability to maintain a body temperature in the normal range will improve  Description: Ability to maintain a body temperature in the normal range will improve  Outcome: Ongoing     Problem: Mobility - Impaired:  Goal: Mobility will improve to maximum level  Description: Mobility will improve to maximum level  Outcome: Ongoing     Problem: Pain:  Goal: Pain level will decrease  Description: Pain level will decrease  Outcome: Ongoing  Goal: Control of

## 2021-04-12 NOTE — TELEPHONE ENCOUNTER
Pt called back and has been notified she will need an appointment. Pt is currently admitted to hospital and was instructed she could not schedule visit until she is discharged from hospital and VU.

## 2021-04-12 NOTE — PROCEDURES
Midline Insertion Procedure Note    Procedure: Insertion of #4. 5FR/17G Midline    Indications:  Poor Access    Procedure Details   Informed consent was obtained for the procedure, including local anesthetic. Risks and benefits were discussed. #4.5 FR/17G Midline inserted to the L brachial vein per hospital protocol. Blood return:  yes    Findings:  Catheter inserted to 15 cm, with 0 cm exposed. Catheter was flushed with 10 cc NS. Patient did tolerate procedure well. Recommendations:  Tip distal to axilla per measurements. Okay to use. Midline Brochure given to patient with teaching instruction.

## 2021-04-13 LAB
ANION GAP SERPL CALCULATED.3IONS-SCNC: 8 MMOL/L (ref 7–19)
BASOPHILS ABSOLUTE: 0 K/UL (ref 0–0.2)
BASOPHILS RELATIVE PERCENT: 0.4 % (ref 0–1)
BUN BLDV-MCNC: 22 MG/DL (ref 6–20)
C-REACTIVE PROTEIN: 17.28 MG/DL (ref 0–0.5)
CALCIUM SERPL-MCNC: 8.8 MG/DL (ref 8.6–10)
CHLORIDE BLD-SCNC: 99 MMOL/L (ref 98–111)
CO2: 29 MMOL/L (ref 22–29)
CREAT SERPL-MCNC: 1.3 MG/DL (ref 0.5–0.9)
EOSINOPHILS ABSOLUTE: 0.3 K/UL (ref 0–0.6)
EOSINOPHILS RELATIVE PERCENT: 5.6 % (ref 0–5)
GFR AFRICAN AMERICAN: 53
GFR NON-AFRICAN AMERICAN: 44
GLUCOSE BLD-MCNC: 121 MG/DL (ref 70–99)
GLUCOSE BLD-MCNC: 157 MG/DL (ref 70–99)
GLUCOSE BLD-MCNC: 181 MG/DL (ref 74–109)
GLUCOSE BLD-MCNC: 199 MG/DL (ref 70–99)
GLUCOSE BLD-MCNC: 201 MG/DL (ref 70–99)
GLUCOSE BLD-MCNC: 43 MG/DL (ref 70–99)
HCT VFR BLD CALC: 30.2 % (ref 37–47)
HEMOGLOBIN: 9 G/DL (ref 12–16)
IMMATURE GRANULOCYTES #: 0 K/UL
LYMPHOCYTES ABSOLUTE: 1.1 K/UL (ref 1.1–4.5)
LYMPHOCYTES RELATIVE PERCENT: 18.5 % (ref 20–40)
MCH RBC QN AUTO: 26.2 PG (ref 27–31)
MCHC RBC AUTO-ENTMCNC: 29.8 G/DL (ref 33–37)
MCV RBC AUTO: 88 FL (ref 81–99)
MONOCYTES ABSOLUTE: 0.5 K/UL (ref 0–0.9)
MONOCYTES RELATIVE PERCENT: 9.3 % (ref 0–10)
NEUTROPHILS ABSOLUTE: 3.7 K/UL (ref 1.5–7.5)
NEUTROPHILS RELATIVE PERCENT: 65.7 % (ref 50–65)
PDW BLD-RTO: 15.6 % (ref 11.5–14.5)
PERFORMED ON: ABNORMAL
PLATELET # BLD: 152 K/UL (ref 130–400)
PMV BLD AUTO: 9.4 FL (ref 9.4–12.3)
POTASSIUM REFLEX MAGNESIUM: 4.6 MMOL/L (ref 3.5–5)
RBC # BLD: 3.43 M/UL (ref 4.2–5.4)
SODIUM BLD-SCNC: 136 MMOL/L (ref 136–145)
VANCOMYCIN TROUGH: 68.5 UG/ML (ref 10–20)
WBC # BLD: 5.7 K/UL (ref 4.8–10.8)

## 2021-04-13 PROCEDURE — 80048 BASIC METABOLIC PNL TOTAL CA: CPT

## 2021-04-13 PROCEDURE — 1210000000 HC MED SURG R&B

## 2021-04-13 PROCEDURE — 85025 COMPLETE CBC W/AUTO DIFF WBC: CPT

## 2021-04-13 PROCEDURE — 2580000003 HC RX 258: Performed by: FAMILY MEDICINE

## 2021-04-13 PROCEDURE — 36415 COLL VENOUS BLD VENIPUNCTURE: CPT

## 2021-04-13 PROCEDURE — 6360000002 HC RX W HCPCS: Performed by: FAMILY MEDICINE

## 2021-04-13 PROCEDURE — 86140 C-REACTIVE PROTEIN: CPT

## 2021-04-13 PROCEDURE — 82947 ASSAY GLUCOSE BLOOD QUANT: CPT

## 2021-04-13 PROCEDURE — 6370000000 HC RX 637 (ALT 250 FOR IP): Performed by: INTERNAL MEDICINE

## 2021-04-13 PROCEDURE — 80202 ASSAY OF VANCOMYCIN: CPT

## 2021-04-13 PROCEDURE — 6370000000 HC RX 637 (ALT 250 FOR IP): Performed by: FAMILY MEDICINE

## 2021-04-13 RX ADMIN — TIZANIDINE 4 MG: 4 TABLET ORAL at 12:07

## 2021-04-13 RX ADMIN — CELECOXIB 200 MG: 200 CAPSULE ORAL at 08:27

## 2021-04-13 RX ADMIN — MORPHINE SULFATE 15 MG: 15 TABLET, FILM COATED, EXTENDED RELEASE ORAL at 20:41

## 2021-04-13 RX ADMIN — BUSPIRONE HYDROCHLORIDE 5 MG: 5 TABLET ORAL at 20:41

## 2021-04-13 RX ADMIN — FLUTICASONE PROPIONATE 1 SPRAY: 50 SPRAY, METERED NASAL at 12:08

## 2021-04-13 RX ADMIN — GABAPENTIN 300 MG: 300 CAPSULE ORAL at 20:41

## 2021-04-13 RX ADMIN — PRAMIPEXOLE DIHYDROCHLORIDE 0.75 MG: 0.25 TABLET ORAL at 08:31

## 2021-04-13 RX ADMIN — BUSPIRONE HYDROCHLORIDE 5 MG: 5 TABLET ORAL at 08:27

## 2021-04-13 RX ADMIN — BUSPIRONE HYDROCHLORIDE 5 MG: 5 TABLET ORAL at 14:15

## 2021-04-13 RX ADMIN — CLONAZEPAM 0.5 MG: 1 TABLET ORAL at 20:41

## 2021-04-13 RX ADMIN — MORPHINE SULFATE 15 MG: 15 TABLET, FILM COATED, EXTENDED RELEASE ORAL at 08:27

## 2021-04-13 RX ADMIN — FUROSEMIDE 80 MG: 40 TABLET ORAL at 17:34

## 2021-04-13 RX ADMIN — GABAPENTIN 300 MG: 300 CAPSULE ORAL at 08:27

## 2021-04-13 RX ADMIN — DULOXETINE 60 MG: 60 CAPSULE, DELAYED RELEASE ORAL at 20:41

## 2021-04-13 RX ADMIN — OXYCODONE HYDROCHLORIDE AND ACETAMINOPHEN 1 TABLET: 7.5; 325 TABLET ORAL at 08:28

## 2021-04-13 RX ADMIN — GABAPENTIN 300 MG: 300 CAPSULE ORAL at 14:15

## 2021-04-13 RX ADMIN — TRIAMTERENE AND HYDROCHLOROTHIAZIDE 1 TABLET: 37.5; 25 TABLET ORAL at 08:27

## 2021-04-13 RX ADMIN — SODIUM CHLORIDE, PRESERVATIVE FREE 10 ML: 5 INJECTION INTRAVENOUS at 08:31

## 2021-04-13 RX ADMIN — PRAMIPEXOLE DIHYDROCHLORIDE 0.75 MG: 0.25 TABLET ORAL at 20:41

## 2021-04-13 RX ADMIN — TIZANIDINE 4 MG: 4 TABLET ORAL at 20:41

## 2021-04-13 RX ADMIN — INSULIN LISPRO 6 UNITS: 100 INJECTION, SOLUTION INTRAVENOUS; SUBCUTANEOUS at 08:33

## 2021-04-13 RX ADMIN — IBUPROFEN 600 MG: 400 TABLET, FILM COATED ORAL at 20:41

## 2021-04-13 RX ADMIN — FUROSEMIDE 80 MG: 40 TABLET ORAL at 08:27

## 2021-04-13 RX ADMIN — MIRTAZAPINE 15 MG: 15 TABLET, FILM COATED ORAL at 20:41

## 2021-04-13 RX ADMIN — POTASSIUM CHLORIDE 20 MEQ: 1500 TABLET, EXTENDED RELEASE ORAL at 08:27

## 2021-04-13 RX ADMIN — DULOXETINE 60 MG: 60 CAPSULE, DELAYED RELEASE ORAL at 08:27

## 2021-04-13 RX ADMIN — PANTOPRAZOLE SODIUM 40 MG: 40 TABLET, DELAYED RELEASE ORAL at 08:28

## 2021-04-13 RX ADMIN — ACETAMINOPHEN 650 MG: 325 TABLET ORAL at 02:43

## 2021-04-13 ASSESSMENT — PAIN DESCRIPTION - LOCATION: LOCATION: GENERALIZED

## 2021-04-13 ASSESSMENT — PAIN SCALES - GENERAL
PAINLEVEL_OUTOF10: 5
PAINLEVEL_OUTOF10: 0
PAINLEVEL_OUTOF10: 5

## 2021-04-13 ASSESSMENT — PAIN DESCRIPTION - PAIN TYPE: TYPE: CHRONIC PAIN

## 2021-04-13 NOTE — PLAN OF CARE
Problem: Falls - Risk of:  Goal: Will remain free from falls  Description: Will remain free from falls  Outcome: Ongoing  Goal: Absence of physical injury  Description: Absence of physical injury  Outcome: Ongoing     Problem: Pain:  Goal: Pain level will decrease  Description: Pain level will decrease  Outcome: Ongoing  Goal: Control of acute pain  Description: Control of acute pain  Outcome: Ongoing  Goal: Control of chronic pain  Description: Control of chronic pain  Outcome: Ongoing     Problem: DAILY CARE  Goal: Daily care needs are met  Outcome: Ongoing     Problem: PAIN  Goal: Patient's pain/discomfort is manageable  Outcome: Ongoing     Problem: KNOWLEDGE DEFICIT  Goal: Patient/S.O. demonstrates understanding of disease process, treatment plan, medications, and discharge instructions.   Outcome: Ongoing     Problem: SKIN INTEGRITY  Goal: Skin integrity is maintained or improved  Outcome: Ongoing     Problem: DISCHARGE BARRIERS  Goal: Patient's continuum of care needs are met  Outcome: Ongoing     Problem: Coping:  Goal: Ability to adjust to condition or change in health will improve  Description: Ability to adjust to condition or change in health will improve  Outcome: Ongoing     Problem: Fluid Volume:  Goal: Ability to maintain a balanced intake and output will improve  Description: Ability to maintain a balanced intake and output will improve  Outcome: Ongoing     Problem: Metabolic:  Goal: Ability to maintain appropriate glucose levels will improve  Description: Ability to maintain appropriate glucose levels will improve  Outcome: Ongoing     Problem: Physical Regulation:  Goal: Complications related to the disease process, condition or treatment will be avoided or minimized  Description: Complications related to the disease process, condition or treatment will be avoided or minimized  Outcome: Ongoing  Goal: Diagnostic test results will improve  Description: Diagnostic test results will improve  Outcome: Ongoing     Problem: Skin Integrity:  Goal: Risk for impaired skin integrity will decrease  Description: Risk for impaired skin integrity will decrease  Outcome: Ongoing  Goal: Will show no infection signs and symptoms  Description: Will show no infection signs and symptoms  Outcome: Ongoing  Goal: Absence of new skin breakdown  Description: Absence of new skin breakdown  Outcome: Ongoing     Problem: Discharge Planning:  Goal: Discharged to appropriate level of care  Description: Discharged to appropriate level of care  Outcome: Ongoing     Problem:  Body Temperature - Imbalanced:  Goal: Ability to maintain a body temperature in the normal range will improve  Description: Ability to maintain a body temperature in the normal range will improve  Outcome: Ongoing     Problem: Mobility - Impaired:  Goal: Mobility will improve to maximum level  Description: Mobility will improve to maximum level  Outcome: Ongoing     Problem: Infection - Central Venous Catheter-Associated Bloodstream Infection:  Goal: Will show no infection signs and symptoms  Description: Will show no infection signs and symptoms  Outcome: Ongoing     Problem: Skin Integrity - Impaired:  Goal: Will show no infection signs and symptoms  Description: Will show no infection signs and symptoms  Outcome: Ongoing  Goal: Absence of new skin breakdown  Description: Absence of new skin breakdown  Outcome: Ongoing

## 2021-04-13 NOTE — PROGRESS NOTES
SIRISHA Castillo found patient laid across bed with legs dangling off bed. Patient was very hard to arouse, per aid. . Patient denied anything wrong when finally aroused. Patient is becoming more high risk for fall.   LE remain 4++ firm edema b/l, and has refused to elevate

## 2021-04-13 NOTE — PROGRESS NOTES
Hospitalist Progress Note  4/13/2021 12:57 PM  Subjective:   Admit Date: 7/47/8094  PCP: NANETTE Mcqueen    Chief Complaint: swelling and pain of lower extremities    Subjective: The patient requests changing celecoxib to ibuprofen as she feels it works better for her at home. States that she knows she needs to elevate her legs but they are down when I examine her. History is otherwise unchanged. Cumulative Hospital History:   4-11: Presents to ED with worsening swelling, pain, redness of lower extremities. Massive lymphedema chronically for years requiring occasional antibiotics. Took doxycycline without relief at home. Has lymphedema pumps at home but has not been able to use as often due to tenderness. Acute on chronic headache. Admitted to med/surg on vancomycin alone due to numerous allergies. 4-12: Patient still has headache, is requesting Maxalt and nasal Stadol, which are not on formulary. Explained that they can be given if she has them brought from home. No significant change in lower extremities yet. 4-13: Change celecoxib to ibuprofen for better synergistic effects with opiate pain medication. Reinforced the importance of leg elevation while in bed. Vancomycin trough very high this morning but I suspect it was not a true trough. Vancomycin held due to slight increase in creatinine. ROS: 14 point review of systems is negative except as specifically addressed above.     DIET CARB CONTROL; Carb Control: 3 carb choices (45 gms)/meal; Low Sodium (2 GM)    Intake/Output Summary (Last 24 hours) at 4/13/2021 1257  Last data filed at 4/13/2021 1253  Gross per 24 hour   Intake 1799 ml   Output 500 ml   Net 1299 ml     Medications:   sodium chloride      dextrose       Current Facility-Administered Medications   Medication Dose Route Frequency Provider Last Rate Last Admin    ibuprofen (ADVIL;MOTRIN) tablet 600 mg  600 mg Oral Q8H PRN Particia DO [place]        tiZANidine (ZANAFLEX) tablet 4 mg  4 mg Oral Nightly PRN Ken Bergman MD   4 mg at 04/13/21 1207    sodium chloride flush 0.9 % injection 5-40 mL  5-40 mL Intravenous 2 times per day Berneice Caprice, DO   10 mL at 04/13/21 0831    sodium chloride flush 0.9 % injection 5-40 mL  5-40 mL Intravenous PRN Jj Wagner, DO        0.9 % sodium chloride infusion  25 mL Intravenous PRN Bernce Caprice, DO        insulin glargine (LANTUS;BASAGLAR) injection pen 30 Units  30 Units Subcutaneous QPM Berneice Caprice, DO   30 Units at 04/12/21 1805    vancomycin (VANCOCIN) intermittent dosing (placeholder)   Other RX Placeholder Bonnie Kay MD        busPIRone (BUSPAR) tablet 5 mg  5 mg Oral TID Bernce Caprice, DO   5 mg at 04/13/21 0827    clonazePAM (KLONOPIN) tablet 0.5 mg  0.5 mg Oral BID PRN [facility], [facility]   0.5 mg at 04/12/21 2314    DULoxetine (CYMBALTA) extended release capsule 60 mg  60 mg Oral BID Jj Wagner, DO   60 mg at 04/13/21 0827    fluticasone (FLONASE) 50 MCG/ACT nasal spray 1 spray  1 spray Each Nostril Daily Bessemer Wagner, DO   1 spray at 04/13/21 1208    furosemide (LASIX) tablet 80 mg  80 mg Oral BID Bessemer Wagner, DO   80 mg at 04/13/21 0827    gabapentin (NEURONTIN) capsule 300 mg  300 mg Oral TID [facility], [facility]   300 mg at 04/13/21 0827    mirtazapine (REMERON) tablet 15 mg  15 mg Oral Nightly Jj Wagner, DO   15 mg at 04/12/21 1958    morphine (MS CONTIN) extended release tablet 15 mg  15 mg Oral BID Bessemer Wagner, DO   15 mg at 04/13/21 0827    pantoprazole (PROTONIX) tablet 40 mg  40 mg Oral QAM AC Jj Wagner, DO   40 mg at 04/13/21 7419    potassium chloride (KLOR-CON M) extended release tablet 20 mEq  20 mEq Oral Daily Bessemer Wagner, DO   20 mEq at 04/13/21 0827    pramipexole (MIRAPEX) tablet 0.75 mg  0.75 mg Oral BID Chetan Wagner DO   0.75 mg at 04/13/21 0831    triamterene-hydroCHLOROthiazide (MAXZIDE-25) 37.5-25 MG per tablet 1 tablet  1 tablet Oral Daily Anjali Vasques DO   1 tablet at 04/13/21 0827    Liraglutide (VICTOZA) SC injection 1.8 mg  1.8 mg 04/11/21  0756   AST 16   ALT 7   BILITOT 0.3   ALKPHOS 74     ABGs:No results for input(s): PH, PO2, PCO2, HCO3, BE, O2SAT in the last 72 hours. Troponin T: No results for input(s): TROPONINI in the last 72 hours. INR: No results for input(s): INR in the last 72 hours.   Lactic Acid:   Recent Labs     04/11/21  0756   LACTA 1.6       Objective:   Vitals: /67   Pulse 78   Temp 96.9 °F (36.1 °C) (Temporal)   Resp 20   Ht 5' 3\" (1.6 m)   Wt (!) 343 lb 9.6 oz (155.9 kg)   SpO2 91%   BMI 60.87 kg/m²   24HR INTAKE/OUTPUT:      Intake/Output Summary (Last 24 hours) at 4/13/2021 1257  Last data filed at 4/13/2021 1253  Gross per 24 hour   Intake 1799 ml   Output 500 ml   Net 1299 ml     General appearance: alert and cooperative with exam  HEENT: atraumatic, eyes with clear conjunctiva and normal lids, pupils and irises normal, external ears and nose are normal, lips normal  Neck without masses, lympadenopathy, bruit, thyroid normal  Lungs: no increased work of breathing, diminished breath sounds bilaterally  Heart: distant tones, regular rate and rhythm  Abdomen: nondistended, morbidly obese, bowel sounds distant  Extremities: massive lymphedema, no significant changes  Neurologic: no focal neurologic deficits, normal sensation, alert and oriented, affect and mood appropriate  Skin: erythema, less transudative weeping    Assessment and Plan:   Principal Problem:    Cellulitis of both lower extremities  Active Problems:    Hypertension    Uncontrolled type 2 diabetes mellitus with hyperglycemia, without long-term current use of insulin ([facility])    Migraine without aura and without status migrainosus, not intractable    Morbid obesity with BMI of 50.0-59.9, adult ([facility])    Uncontrolled type 2 diabetes mellitus with diabetic neuropathy, with long-term current use of insulin ([facility])    Diabetic ulcer of left lower leg associated with type 2 diabetes mellitus, with fat layer exposed ([facility])    Stage 3 chronic kidney disease    Chronic pain  Resolved Problems:    * No resolved hospital problems. *      Day #3 vancomycin empiric therapy for cellulitis of the bilateral lower limbs. Cultures pending to guide therapy. Multiple allergies to preclude combination antibiotic therapy, usually responds to vancomycin alone. Elevate legs. Change celecoxib to ibuprofen for better synergistic effects with opioids.     Advance Directive: Full Code    DVT prophylaxis: enoxaparin    Discharge planning: TBD Sabas Homans, DO Rounding Hospitalist

## 2021-04-13 NOTE — PROGRESS NOTES
Orders given to keep patient legs elevated when in chair. Patient is aware, and refuses to elevate legs.   Edema remains non-pitting 4+

## 2021-04-14 LAB
ANION GAP SERPL CALCULATED.3IONS-SCNC: 11 MMOL/L (ref 7–19)
BASOPHILS ABSOLUTE: 0 K/UL (ref 0–0.2)
BASOPHILS RELATIVE PERCENT: 0.4 % (ref 0–1)
BUN BLDV-MCNC: 25 MG/DL (ref 6–20)
C-REACTIVE PROTEIN: 16.75 MG/DL (ref 0–0.5)
CALCIUM SERPL-MCNC: 8.6 MG/DL (ref 8.6–10)
CHLORIDE BLD-SCNC: 101 MMOL/L (ref 98–111)
CO2: 29 MMOL/L (ref 22–29)
CREAT SERPL-MCNC: 1.4 MG/DL (ref 0.5–0.9)
EOSINOPHILS ABSOLUTE: 0.3 K/UL (ref 0–0.6)
EOSINOPHILS RELATIVE PERCENT: 6.1 % (ref 0–5)
GFR AFRICAN AMERICAN: 48
GFR NON-AFRICAN AMERICAN: 40
GLUCOSE BLD-MCNC: 100 MG/DL (ref 70–99)
GLUCOSE BLD-MCNC: 131 MG/DL (ref 70–99)
GLUCOSE BLD-MCNC: 170 MG/DL (ref 70–99)
GLUCOSE BLD-MCNC: 182 MG/DL (ref 70–99)
GLUCOSE BLD-MCNC: 93 MG/DL (ref 74–109)
HCT VFR BLD CALC: 31 % (ref 37–47)
HEMOGLOBIN: 9.4 G/DL (ref 12–16)
IMMATURE GRANULOCYTES #: 0 K/UL
LYMPHOCYTES ABSOLUTE: 1 K/UL (ref 1.1–4.5)
LYMPHOCYTES RELATIVE PERCENT: 21 % (ref 20–40)
MCH RBC QN AUTO: 26.7 PG (ref 27–31)
MCHC RBC AUTO-ENTMCNC: 30.3 G/DL (ref 33–37)
MCV RBC AUTO: 88.1 FL (ref 81–99)
MONOCYTES ABSOLUTE: 0.5 K/UL (ref 0–0.9)
MONOCYTES RELATIVE PERCENT: 11.2 % (ref 0–10)
NEUTROPHILS ABSOLUTE: 2.9 K/UL (ref 1.5–7.5)
NEUTROPHILS RELATIVE PERCENT: 61.1 % (ref 50–65)
PDW BLD-RTO: 15.5 % (ref 11.5–14.5)
PERFORMED ON: ABNORMAL
PLATELET # BLD: 174 K/UL (ref 130–400)
PMV BLD AUTO: 9.8 FL (ref 9.4–12.3)
POTASSIUM REFLEX MAGNESIUM: 4 MMOL/L (ref 3.5–5)
RBC # BLD: 3.52 M/UL (ref 4.2–5.4)
SODIUM BLD-SCNC: 141 MMOL/L (ref 136–145)
VANCOMYCIN RANDOM: 49.6 UG/ML
WBC # BLD: 4.7 K/UL (ref 4.8–10.8)

## 2021-04-14 PROCEDURE — 2580000003 HC RX 258: Performed by: FAMILY MEDICINE

## 2021-04-14 PROCEDURE — 85025 COMPLETE CBC W/AUTO DIFF WBC: CPT

## 2021-04-14 PROCEDURE — 6370000000 HC RX 637 (ALT 250 FOR IP): Performed by: HOSPITALIST

## 2021-04-14 PROCEDURE — 86140 C-REACTIVE PROTEIN: CPT

## 2021-04-14 PROCEDURE — 6370000000 HC RX 637 (ALT 250 FOR IP): Performed by: FAMILY MEDICINE

## 2021-04-14 PROCEDURE — 80048 BASIC METABOLIC PNL TOTAL CA: CPT

## 2021-04-14 PROCEDURE — 1210000000 HC MED SURG R&B

## 2021-04-14 PROCEDURE — 82947 ASSAY GLUCOSE BLOOD QUANT: CPT

## 2021-04-14 PROCEDURE — 36415 COLL VENOUS BLD VENIPUNCTURE: CPT

## 2021-04-14 PROCEDURE — 80202 ASSAY OF VANCOMYCIN: CPT

## 2021-04-14 RX ORDER — FUROSEMIDE 40 MG/1
40 TABLET ORAL 2 TIMES DAILY
Status: DISCONTINUED | OUTPATIENT
Start: 2021-04-14 | End: 2021-04-15 | Stop reason: HOSPADM

## 2021-04-14 RX ORDER — CLONAZEPAM 0.5 MG/1
0.5 TABLET ORAL 2 TIMES DAILY PRN
Qty: 45 TABLET | Refills: 0 | Status: SHIPPED | OUTPATIENT
Start: 2021-04-14 | End: 2021-07-13 | Stop reason: SDUPTHER

## 2021-04-14 RX ORDER — TIZANIDINE 4 MG/1
4 TABLET ORAL EVERY 8 HOURS PRN
Status: DISCONTINUED | OUTPATIENT
Start: 2021-04-14 | End: 2021-04-15 | Stop reason: HOSPADM

## 2021-04-14 RX ADMIN — OXYCODONE HYDROCHLORIDE AND ACETAMINOPHEN 1 TABLET: 7.5; 325 TABLET ORAL at 02:40

## 2021-04-14 RX ADMIN — CLONAZEPAM 0.5 MG: 1 TABLET ORAL at 16:29

## 2021-04-14 RX ADMIN — DULOXETINE 60 MG: 60 CAPSULE, DELAYED RELEASE ORAL at 21:24

## 2021-04-14 RX ADMIN — BUSPIRONE HYDROCHLORIDE 5 MG: 5 TABLET ORAL at 21:25

## 2021-04-14 RX ADMIN — GABAPENTIN 300 MG: 300 CAPSULE ORAL at 10:19

## 2021-04-14 RX ADMIN — MIRTAZAPINE 15 MG: 15 TABLET, FILM COATED ORAL at 21:25

## 2021-04-14 RX ADMIN — GABAPENTIN 300 MG: 300 CAPSULE ORAL at 16:02

## 2021-04-14 RX ADMIN — SODIUM CHLORIDE, PRESERVATIVE FREE 10 ML: 5 INJECTION INTRAVENOUS at 10:23

## 2021-04-14 RX ADMIN — SODIUM CHLORIDE, PRESERVATIVE FREE 10 ML: 5 INJECTION INTRAVENOUS at 21:25

## 2021-04-14 RX ADMIN — DULOXETINE 60 MG: 60 CAPSULE, DELAYED RELEASE ORAL at 10:19

## 2021-04-14 RX ADMIN — GABAPENTIN 300 MG: 300 CAPSULE ORAL at 21:25

## 2021-04-14 RX ADMIN — FUROSEMIDE 80 MG: 40 TABLET ORAL at 10:19

## 2021-04-14 RX ADMIN — PANTOPRAZOLE SODIUM 40 MG: 40 TABLET, DELAYED RELEASE ORAL at 07:32

## 2021-04-14 RX ADMIN — BUSPIRONE HYDROCHLORIDE 5 MG: 5 TABLET ORAL at 16:02

## 2021-04-14 RX ADMIN — OXYCODONE HYDROCHLORIDE AND ACETAMINOPHEN 1 TABLET: 7.5; 325 TABLET ORAL at 16:29

## 2021-04-14 RX ADMIN — TIZANIDINE 4 MG: 4 TABLET ORAL at 21:31

## 2021-04-14 RX ADMIN — BUSPIRONE HYDROCHLORIDE 5 MG: 5 TABLET ORAL at 10:19

## 2021-04-14 RX ADMIN — TRIAMTERENE AND HYDROCHLOROTHIAZIDE 1 TABLET: 37.5; 25 TABLET ORAL at 10:19

## 2021-04-14 RX ADMIN — FLUTICASONE PROPIONATE 1 SPRAY: 50 SPRAY, METERED NASAL at 10:26

## 2021-04-14 RX ADMIN — MORPHINE SULFATE 15 MG: 15 TABLET, FILM COATED, EXTENDED RELEASE ORAL at 21:24

## 2021-04-14 RX ADMIN — PRAMIPEXOLE DIHYDROCHLORIDE 0.75 MG: 0.25 TABLET ORAL at 21:24

## 2021-04-14 RX ADMIN — PRAMIPEXOLE DIHYDROCHLORIDE 0.75 MG: 0.25 TABLET ORAL at 10:18

## 2021-04-14 RX ADMIN — MORPHINE SULFATE 15 MG: 15 TABLET, FILM COATED, EXTENDED RELEASE ORAL at 10:18

## 2021-04-14 RX ADMIN — INSULIN LISPRO 3 UNITS: 100 INJECTION, SOLUTION INTRAVENOUS; SUBCUTANEOUS at 17:51

## 2021-04-14 RX ADMIN — POTASSIUM CHLORIDE 20 MEQ: 1500 TABLET, EXTENDED RELEASE ORAL at 10:18

## 2021-04-14 ASSESSMENT — PAIN SCALES - GENERAL
PAINLEVEL_OUTOF10: 8
PAINLEVEL_OUTOF10: 9
PAINLEVEL_OUTOF10: 6

## 2021-04-14 NOTE — PROGRESS NOTES
Pharmacy Vancomycin Consult     Vancomycin Day: 4  Current Dosing: Pulse dose secondary to supra-therapeutic troughs    Temp max:  97.7    Recent Labs     04/13/21  0404 04/14/21  0324   BUN 22* 25*       Recent Labs     04/13/21  0404 04/14/21  0324   CREATININE 1.3* 1.4*       Recent Labs     04/13/21  0404 04/14/21  0324   WBC 5.7 4.7*         Intake/Output Summary (Last 24 hours) at 4/14/2021 1140  Last data filed at 4/14/2021 0943  Gross per 24 hour   Intake 360 ml   Output 650 ml   Net -290 ml       Culture Date Source Results   04/11/21 Blood x 2 No growth to date       Ht Readings from Last 1 Encounters:   04/11/21 5' 3\" (1.6 m)        Wt Readings from Last 1 Encounters:   04/14/21 (!) 351 lb 4.8 oz (159.3 kg)         Body mass index is 62.23 kg/m². Estimated Creatinine Clearance: 74 mL/min (A) (based on SCr of 1.4 mg/dL (H)). Random level:  49.6    Assessment/Plan: Continue to hold Vancomycin. Draw Vancomycin random level with AM labs tomorrow. Thank you for the consult. Will continue to follow.      Electronically signed by Blanca Eden, [address] [facility] on 4/14/2021 at 11:40 AM

## 2021-04-14 NOTE — PROGRESS NOTES
Bilateral lower legs washed with dial soap and water, pat dry. Roll gauze then ace wrap applied from toes to below knees bilaterally. Patient tolerated well.     Electronically signed by Renetta Mckeon RN on 4/14/2021 at 12:42 PM

## 2021-04-14 NOTE — CARE COORDINATION
Noted new CM consult to see pt re: possible nursing home placement. Went to re-evaluate pt and she was not in room. Per nurse, pt just left the floor per elevator \"to go to the gift shop\". Pt with no obvious issues of ambulation and no skilled needs noted. From assessment that was done already by writer, pt has family support and Lymphedema pumps at home. Please advise if any further needs noted.   Electronically signed by Leidy Mcgill RN on 4/14/2021 at 1:39 PM

## 2021-04-15 VITALS
HEART RATE: 88 BPM | HEIGHT: 63 IN | WEIGHT: 293 LBS | TEMPERATURE: 96.1 F | DIASTOLIC BLOOD PRESSURE: 82 MMHG | BODY MASS INDEX: 51.91 KG/M2 | SYSTOLIC BLOOD PRESSURE: 151 MMHG | RESPIRATION RATE: 18 BRPM | OXYGEN SATURATION: 93 %

## 2021-04-15 PROBLEM — Z79.899 POLYPHARMACY: Status: ACTIVE | Noted: 2021-04-15

## 2021-04-15 LAB
ANION GAP SERPL CALCULATED.3IONS-SCNC: 12 MMOL/L (ref 7–19)
BASOPHILS ABSOLUTE: 0 K/UL (ref 0–0.2)
BASOPHILS RELATIVE PERCENT: 0.5 % (ref 0–1)
BUN BLDV-MCNC: 23 MG/DL (ref 6–20)
C-REACTIVE PROTEIN: 15 MG/DL (ref 0–0.5)
CALCIUM SERPL-MCNC: 8.8 MG/DL (ref 8.6–10)
CHLORIDE BLD-SCNC: 98 MMOL/L (ref 98–111)
CO2: 28 MMOL/L (ref 22–29)
CREAT SERPL-MCNC: 1.2 MG/DL (ref 0.5–0.9)
EOSINOPHILS ABSOLUTE: 0.2 K/UL (ref 0–0.6)
EOSINOPHILS RELATIVE PERCENT: 5.8 % (ref 0–5)
GFR AFRICAN AMERICAN: 58
GFR NON-AFRICAN AMERICAN: 48
GLUCOSE BLD-MCNC: 152 MG/DL (ref 70–99)
GLUCOSE BLD-MCNC: 163 MG/DL (ref 70–99)
GLUCOSE BLD-MCNC: 194 MG/DL (ref 74–109)
GLUCOSE BLD-MCNC: 259 MG/DL (ref 70–99)
HCT VFR BLD CALC: 30 % (ref 37–47)
HEMOGLOBIN: 9.2 G/DL (ref 12–16)
IMMATURE GRANULOCYTES #: 0 K/UL
LYMPHOCYTES ABSOLUTE: 1 K/UL (ref 1.1–4.5)
LYMPHOCYTES RELATIVE PERCENT: 24.3 % (ref 20–40)
MAGNESIUM: 1.8 MG/DL (ref 1.6–2.6)
MCH RBC QN AUTO: 26.6 PG (ref 27–31)
MCHC RBC AUTO-ENTMCNC: 30.7 G/DL (ref 33–37)
MCV RBC AUTO: 86.7 FL (ref 81–99)
MONOCYTES ABSOLUTE: 0.5 K/UL (ref 0–0.9)
MONOCYTES RELATIVE PERCENT: 12.1 % (ref 0–10)
NEUTROPHILS ABSOLUTE: 2.4 K/UL (ref 1.5–7.5)
NEUTROPHILS RELATIVE PERCENT: 57.1 % (ref 50–65)
PDW BLD-RTO: 15.4 % (ref 11.5–14.5)
PERFORMED ON: ABNORMAL
PHOSPHORUS: 4.5 MG/DL (ref 2.5–4.5)
PLATELET # BLD: 214 K/UL (ref 130–400)
PMV BLD AUTO: 9.8 FL (ref 9.4–12.3)
POTASSIUM REFLEX MAGNESIUM: 4.1 MMOL/L (ref 3.5–5)
RBC # BLD: 3.46 M/UL (ref 4.2–5.4)
SODIUM BLD-SCNC: 138 MMOL/L (ref 136–145)
VANCOMYCIN RANDOM: 37.4 UG/ML
WBC # BLD: 4.1 K/UL (ref 4.8–10.8)

## 2021-04-15 PROCEDURE — 6370000000 HC RX 637 (ALT 250 FOR IP): Performed by: FAMILY MEDICINE

## 2021-04-15 PROCEDURE — 97161 PT EVAL LOW COMPLEX 20 MIN: CPT

## 2021-04-15 PROCEDURE — 84100 ASSAY OF PHOSPHORUS: CPT

## 2021-04-15 PROCEDURE — 82947 ASSAY GLUCOSE BLOOD QUANT: CPT

## 2021-04-15 PROCEDURE — 85025 COMPLETE CBC W/AUTO DIFF WBC: CPT

## 2021-04-15 PROCEDURE — 83735 ASSAY OF MAGNESIUM: CPT

## 2021-04-15 PROCEDURE — 36415 COLL VENOUS BLD VENIPUNCTURE: CPT

## 2021-04-15 PROCEDURE — 6370000000 HC RX 637 (ALT 250 FOR IP): Performed by: HOSPITALIST

## 2021-04-15 PROCEDURE — 80048 BASIC METABOLIC PNL TOTAL CA: CPT

## 2021-04-15 PROCEDURE — 97530 THERAPEUTIC ACTIVITIES: CPT

## 2021-04-15 PROCEDURE — 86140 C-REACTIVE PROTEIN: CPT

## 2021-04-15 PROCEDURE — 80202 ASSAY OF VANCOMYCIN: CPT

## 2021-04-15 PROCEDURE — 97110 THERAPEUTIC EXERCISES: CPT

## 2021-04-15 RX ORDER — DOXYCYCLINE HYCLATE 100 MG
100 TABLET ORAL 2 TIMES DAILY
Qty: 20 TABLET | Refills: 0 | Status: SHIPPED | OUTPATIENT
Start: 2021-04-15 | End: 2021-04-25

## 2021-04-15 RX ADMIN — DULOXETINE 60 MG: 60 CAPSULE, DELAYED RELEASE ORAL at 10:21

## 2021-04-15 RX ADMIN — PRAMIPEXOLE DIHYDROCHLORIDE 0.75 MG: 0.25 TABLET ORAL at 12:41

## 2021-04-15 RX ADMIN — GABAPENTIN 300 MG: 300 CAPSULE ORAL at 15:35

## 2021-04-15 RX ADMIN — PANTOPRAZOLE SODIUM 40 MG: 40 TABLET, DELAYED RELEASE ORAL at 06:20

## 2021-04-15 RX ADMIN — POTASSIUM CHLORIDE 20 MEQ: 1500 TABLET, EXTENDED RELEASE ORAL at 10:20

## 2021-04-15 RX ADMIN — INSULIN LISPRO 9 UNITS: 100 INJECTION, SOLUTION INTRAVENOUS; SUBCUTANEOUS at 12:59

## 2021-04-15 RX ADMIN — MORPHINE SULFATE 15 MG: 15 TABLET, FILM COATED, EXTENDED RELEASE ORAL at 10:21

## 2021-04-15 RX ADMIN — IBUPROFEN 600 MG: 400 TABLET, FILM COATED ORAL at 15:39

## 2021-04-15 RX ADMIN — TRIAMTERENE AND HYDROCHLOROTHIAZIDE 1 TABLET: 37.5; 25 TABLET ORAL at 10:21

## 2021-04-15 RX ADMIN — BUSPIRONE HYDROCHLORIDE 5 MG: 5 TABLET ORAL at 15:35

## 2021-04-15 RX ADMIN — GABAPENTIN 300 MG: 300 CAPSULE ORAL at 10:20

## 2021-04-15 RX ADMIN — OXYCODONE HYDROCHLORIDE AND ACETAMINOPHEN 1 TABLET: 7.5; 325 TABLET ORAL at 06:22

## 2021-04-15 RX ADMIN — TIZANIDINE 4 MG: 4 TABLET ORAL at 15:39

## 2021-04-15 RX ADMIN — FUROSEMIDE 40 MG: 40 TABLET ORAL at 10:21

## 2021-04-15 RX ADMIN — FLUTICASONE PROPIONATE 1 SPRAY: 50 SPRAY, METERED NASAL at 10:24

## 2021-04-15 RX ADMIN — BUSPIRONE HYDROCHLORIDE 5 MG: 5 TABLET ORAL at 10:21

## 2021-04-15 ASSESSMENT — PAIN SCALES - GENERAL
PAINLEVEL_OUTOF10: 8
PAINLEVEL_OUTOF10: 4

## 2021-04-15 ASSESSMENT — PAIN DESCRIPTION - LOCATION: LOCATION: GENERALIZED;KNEE

## 2021-04-15 ASSESSMENT — PAIN DESCRIPTION - PAIN TYPE: TYPE: CHRONIC PAIN

## 2021-04-15 ASSESSMENT — PAIN DESCRIPTION - FREQUENCY: FREQUENCY: INTERMITTENT

## 2021-04-15 NOTE — PLAN OF CARE
Problem: Falls - Risk of:  Goal: Will remain free from falls  Description: Will remain free from falls  Outcome: Ongoing  Goal: Absence of physical injury  Description: Absence of physical injury  Outcome: Ongoing     Problem: Pain:  Goal: Pain level will decrease  Description: Pain level will decrease  Outcome: Ongoing  Goal: Control of acute pain  Description: Control of acute pain  Outcome: Ongoing  Goal: Control of chronic pain  Description: Control of chronic pain  Outcome: Ongoing     Problem: SAFETY  Goal: Free from accidental physical injury  Outcome: Ongoing  Goal: Free from intentional harm  Outcome: Ongoing     Problem: DAILY CARE  Goal: Daily care needs are met  Outcome: Ongoing     Problem: PAIN  Goal: Patient's pain/discomfort is manageable  Outcome: Ongoing     Problem: SKIN INTEGRITY  Goal: Skin integrity is maintained or improved  Outcome: Ongoing     Problem: KNOWLEDGE DEFICIT  Goal: Patient/S.O. demonstrates understanding of disease process, treatment plan, medications, and discharge instructions. Outcome: Ongoing     Problem: DISCHARGE BARRIERS  Goal: Patient's continuum of care needs are met  Outcome: Ongoing     Problem:  Activity:  Goal: Risk for activity intolerance will decrease  Description: Risk for activity intolerance will decrease  Outcome: Ongoing     Problem: Coping:  Goal: Ability to adjust to condition or change in health will improve  Description: Ability to adjust to condition or change in health will improve  Outcome: Ongoing     Problem: Fluid Volume:  Goal: Ability to maintain a balanced intake and output will improve  Description: Ability to maintain a balanced intake and output will improve  Outcome: Ongoing     Problem: Health Behavior:  Goal: Ability to identify and utilize available resources and services will improve  Description: Ability to identify and utilize available resources and services will improve  Outcome: Ongoing  Goal: Ability to manage health-related needs will improve  Description: Ability to manage health-related needs will improve  Outcome: Ongoing     Problem: Metabolic:  Goal: Ability to maintain appropriate glucose levels will improve  Description: Ability to maintain appropriate glucose levels will improve  Outcome: Ongoing     Problem: Nutritional:  Goal: Maintenance of adequate nutrition will improve  Description: Maintenance of adequate nutrition will improve  Outcome: Ongoing  Goal: Progress toward achieving an optimal weight will improve  Description: Progress toward achieving an optimal weight will improve  Outcome: Ongoing     Problem: Physical Regulation:  Goal: Complications related to the disease process, condition or treatment will be avoided or minimized  Description: Complications related to the disease process, condition or treatment will be avoided or minimized  Outcome: Ongoing  Goal: Diagnostic test results will improve  Description: Diagnostic test results will improve  Outcome: Ongoing     Problem: Skin Integrity:  Goal: Risk for impaired skin integrity will decrease  Description: Risk for impaired skin integrity will decrease  Outcome: Ongoing  Goal: Will show no infection signs and symptoms  Description: Will show no infection signs and symptoms  Outcome: Ongoing  Goal: Absence of new skin breakdown  Description: Absence of new skin breakdown  Outcome: Ongoing     Problem: Tissue Perfusion:  Goal: Adequacy of tissue perfusion will improve  Description: Adequacy of tissue perfusion will improve  Outcome: Ongoing     Problem: Discharge Planning:  Goal: Discharged to appropriate level of care  Description: Discharged to appropriate level of care  Outcome: Ongoing     Problem:  Body Temperature - Imbalanced:  Goal: Ability to maintain a body temperature in the normal range will improve  Description: Ability to maintain a body temperature in the normal range will improve  Outcome: Ongoing     Problem: Mobility - Impaired:  Goal: Mobility will improve to maximum level  Description: Mobility will improve to maximum level  Outcome: Ongoing     Problem: Pain:  Goal: Pain level will decrease  Description: Pain level will decrease  Outcome: Ongoing  Goal: Control of acute pain  Description: Control of acute pain  Outcome: Ongoing  Goal: Control of chronic pain  Description: Control of chronic pain  Outcome: Ongoing     Problem: Skin Integrity - Impaired:  Goal: Will show no infection signs and symptoms  Description: Will show no infection signs and symptoms  Outcome: Ongoing  Goal: Absence of new skin breakdown  Description: Absence of new skin breakdown  Outcome: Ongoing     Problem: Infection - Central Venous Catheter-Associated Bloodstream Infection:  Goal: Will show no infection signs and symptoms  Description: Will show no infection signs and symptoms  Outcome: Ongoing   Electronically signed by Ger Warner RN on 4/15/2021 at 3:51 AM

## 2021-04-15 NOTE — PROGRESS NOTES
Physician Progress Note      Rodriguez Page  [facility] #:                  [id number]  :                       1970  ADMIT DATE:       2021 5:47 AM  DISCH DATE:  RESPONDING  PROVIDER #:        JONI FONG MD          QUERY TEXT:    Pt admitted with cellulitis of bilateral lower limbs. Pt noted to have   abnormal creatinine. If possible, please document in the progress notes and   discharge summary if you are evaluating and/or treating any of the following: The medical record reflects the following:  Risk Factors: hypertension, diabetic, Lasix usage, cellulitis with Vancomycin   tx  Clinical Indicators: crea 1.0 on  which chevy to 1.4 on   Treatment: decrease Lasix dosage, hold Vancomycin IV, repeat creatinine    Thank you,  Flores Barfield RN, CDS  [phone]  Options provided:  -- Acute kidney failure  -- Acute kidney injury  -- Other - I will add my own diagnosis  -- Disagree - Not applicable / Not valid  -- Disagree - Clinically unable to determine / Unknown  -- Refer to Clinical Documentation Reviewer    PROVIDER RESPONSE TEXT:    This patient was in Acute kidney failure.     Query created by: William Padilla on 4/15/2021 7:29 AM      Electronically signed by:  Ardelle Cushing MD 4/15/2021 8:35 AM

## 2021-04-15 NOTE — DISCHARGE SUMMARY
[facility], [address] [place] [address]    DEPARTMENT OF HOSPITALIST MEDICINE      DISCHARGE SUMMARY:      PATIENT NAME:  Corrina Nye  :    MRN:  [id number]    Admission Date:   2021  5:47 AM Attending: Cate Salamanca MD   Discharge Date:   4/15/2021              PCP: NANETTE Bailey  Length of Stay: 4 days     Chief Complaint on Admission:   Chief Complaint   Patient presents with    Leg Swelling    Cellulitis     x1 week to bilateral extremities. pt took some doxycyline but not helping. Consultants:     PHARMACY TO DOSE VANCOMYCIN  PHARMACY TO DOSE VANCOMYCIN  IP CONSULT TO CASE MANAGEMENT       Discharge Problem List:   Principal Problem:    Cellulitis of both lower extremities  Active Problems:    RLS (restless legs syndrome)    Hypertension    Venous insufficiency of both lower extremities    [place] (generalized anxiety disorder)    Morbid obesity due to excess calories ([address]    Uncontrolled type 2 diabetes mellitus with hyperglycemia, without long-term current use of insulin (HCC)    Migraine without aura and without status migrainosus, not intractable    Morbid obesity with BMI of 50.0-59.9, adult ([address]    Uncontrolled type 2 diabetes mellitus with diabetic neuropathy, with long-term current use of insulin (HCC)    Diabetic ulcer of left lower leg associated with type 2 diabetes mellitus, with fat layer exposed ([address]    Stage 3 chronic kidney disease    Chronic pain    Polypharmacy  Resolved Problems:    * No resolved hospital problems. *      CUMULATIVE  HOSPITAL  COURSE  AND  TREATMENT:    4-11: Presents to ED with worsening swelling, pain, redness of lower extremities. Massive lymphedema chronically for years requiring occasional antibiotics. Took doxycycline without relief at home. Has lymphedema pumps at home but has not been able to use as often due to tenderness. Acute on chronic headache. Admitted to med/surg on vancomycin alone due to numerous allergies.   4-12: Patient still has headache, is requesting Maxalt and nasal Stadol, which are not on formulary. Explained that they can be given if she has them brought from home. No significant change in lower extremities yet. 4-13: Change celecoxib to ibuprofen for better synergistic effects with opiate pain medication. Reinforced the importance of leg elevation while in bed. Vancomycin trough very high this morning but I suspect it was not a true trough. Vancomycin held due to slight increase in creatinine.     4/14/2021: I spoke with the patient in detail at the bedside and discussed about consideration for admitting in skilled nursing facility for a couple of weeks until her legs are in better shape before she can be discharged home. She was insisting going home with home health care but nevertheless agreed for Case management consult for DC planning to skilled nursing facility. She is continuing with dressing changes and antibiotics.     4/15/2021  Patient is feeling better today. Renal functions are improving. She is ambulating well without assistance and requesting to go back home with home health care. She has received 5 days of IV antibiotics in the form of vancomycin in the hospital.  I will discharge her on 10 days of p.o. doxycycline to complete a 2 weeks course. She has lymphedema pumps at home which she is advised to use as recommended. Advised patient to follow-up closely with lymphedema clinic and wound care as an outpatient. Patient is also on multiple medications and at high risk for polypharmacy. I would request the PCP to please work with the patient by tailoring medications to the least medications required for maximum clinical benefit. She is being discharged home with home health care in stable condition.     OBJECTIVE:  BP (!) 151/82   Pulse 88   Temp 96.1 °F (35.6 °C) (Temporal)   Resp 18   Ht 5' 3\" (1.6 m)   Wt (!) 351 lb 4.8 oz (159.3 kg)   SpO2 93%   BMI 62.23 kg/m²       Heart: RRR   Lungs: Bilateral decreased air entry   Abdomen: Soft, non-tender   Extremities: ++++ Bilateral lower leg lymphedema with mild erythema   Neurologic: Alert and oriented   Skin: Warm and dry          Laboratory Data:  Recent Labs     04/13/21  0404 04/14/21  0324 04/15/21  0240   WBC 5.7 4.7* 4.1*   HGB 9.0* 9.4* 9.2*    174 214     Recent Labs     04/13/21  0404 04/14/21  0324 04/15/21  0240    141 138   K 4.6 4.0 4.1   CL 99 101 98   CO2 29 29 28   BUN 22* 25* 23*   CREATININE 1.3* 1.4* 1.2*   GLUCOSE 181* 93 194*     No results for input(s): AST, ALT, ALB, BILITOT, ALKPHOS in the last 72 hours. Troponin T: No results for input(s): TROPONINI in the last 72 hours. Pro-BNP: No results for input(s): BNP in the last 72 hours. INR: No results for input(s): INR in the last 72 hours. UA:No results for input(s): NITRITE, COLORU, PHUR, LABCAST, WBCUA, RBCUA, MUCUS, TRICHOMONAS, YEAST, BACTERIA, CLARITYU, SPECGRAV, LEUKOCYTESUR, UROBILINOGEN, BILIRUBINUR, BLOODU, GLUCOSEU, AMORPHOUS in the last 72 hours. Invalid input(s): [place]  A1C: No results for input(s): LABA1C in the last 72 hours. ABG:No results for input(s): PHART, WNF9BHB, PO2ART, OKW6MUH, BEART, HGBAE, S1JMUSHQ, CARBOXHGBART in the last 72 hours. Impressions of imaging performed in 48 hours before discharge:    No results found. Tianna Trivedi   Home Medication Instructions ADRIEL:[id number]    Printed on:04/15/21 1535   Medication Information                      B-D ULTRAFINE III SHORT PEN 31G X 8 MM MISC  USE TO INJECT INSULIN ONCE DAILY             JENSENAGLAR KWIKPEN 100 UNIT/ML injection pen  INJECT 30 UNITS INTO THE SKIN EVERY EVENING             blood glucose monitor kit and supplies  Test 1 times a day & as needed for symptoms of irregular blood glucose.              Blood Glucose Monitoring Suppl ([address]) w/Device KIT  1 kit by Does not apply route daily as needed (Dx 250.00)             blood glucose test strips (ASCENSIA AUTODISC VI;ONE TOUCH ULTRA TEST VI) strip  Check glucose TID and as needed for hypoglycemic events Dx E11.40 E11.66 Z79.4  Use one touch verio flex             busPIRone (BUSPAR) 5 MG tablet  TAKE 1 TABLET BY MOUTH THREE TIMES DAILY             Butorphanol Tartrate (STADOL NS NA)  1 spray by Nasal route 4 times daily as needed (migraines)             celecoxib (CELEBREX) 200 MG capsule  TAKE 1 CAPSULE BY MOUTH EVERY DAY             clonazePAM (KLONOPIN) 0.5 MG tablet  Take 1 tablet by mouth 2 times daily as needed for Anxiety for up to 30 days. Cream Base CREA  APPLY ONE PUMP (GRAM) TO AFFECTED AREA(S) THREE TO FOUR TIMES A DAY TO THE APPENDAGES AND TRUNK AS DIRECTED             Diabetic Shoe MISC  by Does not apply route DISPENSE ONE PAIR OF DIABETIC SHOE AND 3 PAIRS HEAT MOLDED INSERTS             diclofenac sodium (VOLTAREN) 1 % GEL  Apply 2 g topically 2 times daily             doxycycline hyclate (VIBRA-TABS) 100 MG tablet  Take 1 tablet by mouth 2 times daily for 10 days             DULoxetine (CYMBALTA) 60 MG extended release capsule  Take 1 capsule by mouth 2 times daily             fluticasone (FLONASE) 50 MCG/ACT nasal spray  SHAKE LIQUID AND USE 1 SPRAY IN EACH NOSTRIL DAILY             furosemide (LASIX) 80 MG tablet  Take 1 tablet by mouth 2 times daily             gabapentin (NEURONTIN) 100 MG capsule  Take 100 mg by mouth 2 times daily. Am and 2 pm             gabapentin (NEURONTIN) 300 MG capsule  Take 300 mg by mouth 3 times daily. insulin lispro, 1 Unit Dial, (ADMELOG SOLOSTAR) 100 UNIT/ML SOPN  ADMINISTER 45 UNITS UNDER THE SKIN EVERY EVENING. INCREASE BY 3 UNITS EVERY NIGHT UNTIL 60 UNITS IS REACHED AND.  CONTINUE 60 UNITS EVERY EVENING             Lancets (ONETOUCH DELICA PLUS [id number]) MISC  USE TO CHECK BLLOD SUGAR AS DIRECTED             methylPREDNISolone (MEDROL DOSEPACK) 4 MG tablet  Take by mouth.             mirtazapine (REMERON) 15 MG tablet  TAKE 1 TABLET BY MOUTH EVERY NIGHT             Morphine Sulfate ER 15 MG T12A  Take 15 mg by mouth 2 times daily. NONFORMULARY  Prednisone eye drops 1 drop in each eye daily             omeprazole (PRILOSEC) 20 MG delayed release capsule  TAKE ONE CAPSULE BY MOUTH TWICE DAILY BEFORE MEALS             ondansetron (ZOFRAN-ODT) 4 MG disintegrating tablet  DISSOLVE 1 TABLET UNDER THE TONGUE EVERY 8 HOURS AS NEEDED FOR NAUSEA AND VOMITING(TAKE PRIOR TO DOXYCYLINE DOSES)             ONE TOUCH LANCETS MISC  1 each by Does not apply route daily             oxyCODONE-acetaminophen (PERCOCET) 7.5-325 MG per tablet  Take 1 tablet by mouth 2 times daily. potassium chloride (KLOR-CON M) 20 MEQ extended release tablet  Take 1 tablet by mouth daily             pramipexole (MIRAPEX) 0.75 MG tablet  Take 1 tablet by mouth 2 times daily             rizatriptan (MAXALT) 10 MG tablet  Take 1 tablet by mouth once as needed for Migraine May repeat in 2 hours if needed             tiZANidine (ZANAFLEX) 4 MG tablet  Take 4 mg by mouth daily as needed             triamterene-hydroCHLOROthiazide (MAXZIDE-25) 37.5-25 MG per tablet  Take 1 tablet by mouth daily             VICTOZA 18 MG/3ML SOPN SC injection  ADMINISTER 1.8 MG INTO THE SKIN EVERY DAY             Zinc Oxide 6 % CREA  Apply 1 Units topically 3 times daily JESUSITA'S CREAM  Zinc oxided 20%, hydrocortisone, nystatin, bacitracin                   ISSUES TO BE ADDRESSED AT HOSPITAL FOLLOW-UP VISIT:    Advised patient to follow-up closely with PCP upon discharge for management of chronic medical issues  Please see the detailed discharge directions delivered from earlier today! Condition on Discharge: gradually improving  Discharge Disposition: Home with [address] [facility]    Recommended Follow Up:  No follow-up provider specified.   Followup Appointments Scheduled at Time of Discharge:  Future Appointments   Date Time Provider Shital Landry   4/5/5292 67:34 AM Marisa Piña Ne[facility] Fam, [facility]        Discharge Instructions:   Please see the discharge paperwork. Patient was seen at bedside today, and the examination shows improvement since yesterday. Detailed discharge directions delivered to the patient by myself and our nursing staff, who verbalizes understanding and is very happy and satisfied with the plan. Patient has been advised to continue all medications as prescribed and advised, and f/u with PCP within 1 week. Patient is stable from medical standpoint to be discharged. Total time spent during patient evaluation and assessment, discussion with the nurse/family, addressing discharge medications/scripts and coordination of care for safe discharge was in excess of 35 minutes.       Signed Electronically:    David Rdz MD  3:32 PM 4/15/2021

## 2021-04-15 NOTE — PROGRESS NOTES
Hospitalist Progress Note  4/14/2021 9:02 PM  Subjective:   Admit Date: 3/65/7688  PCP: NANETTE Khoury    Chief Complaint: swelling and pain of lower extremities    Subjective: I saw and evaluated the patient at the bedside. She was lying in bed making her pom-poms. Cumulative Hospital History:   4-11: Presents to ED with worsening swelling, pain, redness of lower extremities. Massive lymphedema chronically for years requiring occasional antibiotics. Took doxycycline without relief at home. Has lymphedema pumps at home but has not been able to use as often due to tenderness. Acute on chronic headache. Admitted to med/surg on vancomycin alone due to numerous allergies. 4-12: Patient still has headache, is requesting Maxalt and nasal Stadol, which are not on formulary. Explained that they can be given if she has them brought from home. No significant change in lower extremities yet. 4-13: Change celecoxib to ibuprofen for better synergistic effects with opiate pain medication. Reinforced the importance of leg elevation while in bed. Vancomycin trough very high this morning but I suspect it was not a true trough. Vancomycin held due to slight increase in creatinine. 4/14/2021: I spoke with the patient in detail at the bedside and discussed about consideration for admitting in skilled nursing facility for a couple of weeks until her legs are in better shape before she can be discharged home. She was insisting going home with home health care but nevertheless agreed for Case management consult for DC planning to skilled nursing facility. She is continuing with dressing changes and antibiotics. ROS: 14 point review of systems is negative except as specifically addressed above.     DIET CARB CONTROL; Carb Control: 3 carb choices (45 gms)/meal; Low Sodium (2 GM)    Intake/Output Summary (Last 24 hours) at 4/14/2021 2102  Last data filed at 4/14/2021 1821  Gross per 24 hour   Intake 360 ml   Output 2000 ml   Net -1640 ml     Medications:   sodium chloride      dextrose       Current Facility-Administered Medications   Medication Dose Route Frequency Provider Last Rate Last Admin    tiZANidine (ZANAFLEX) tablet 4 mg  4 mg Oral Q8H PRN Carlos Eduardo Small MD        ibuprofen (ADVIL;MOTRIN) tablet 600 mg  600 mg Oral Q8H PRN Manjeet Dumont, DO   600 mg at 04/13/21 2041    sodium chloride flush 0.9 % injection 5-40 mL  5-40 mL Intravenous 2 times per day Manjeet Dumont, DO   10 mL at 04/14/21 1023    sodium chloride flush 0.9 % injection 5-40 mL  5-40 mL Intravenous PRN Jj Wagner, DO        0.9 % sodium chloride infusion  25 mL Intravenous PRN Manjeet Dumont, DO        insulin glargine (LANTUS;BASAGLAR) injection pen 30 Units  30 Units Subcutaneous QPM Sumner Wagner, DO   30 Units at 04/13/21 1734    vancomycin (VANCOCIN) intermittent dosing (placeholder)   Other RX Placeholder Cassy Pritchard MD        busPIRone (BUSPAR) tablet 5 mg  5 mg Oral TID [place] Tim, DO   5 mg at 04/14/21 1602    clonazePAM (KLONOPIN) tablet 0.5 mg  0.5 mg Oral BID PRN Manjeet Dumont, DO   0.5 mg at 04/14/21 1629    DULoxetine (CYMBALTA) extended release capsule 60 mg  60 mg Oral BID Sumner Wagner, DO   60 mg at 04/14/21 1019    fluticasone (FLONASE) 50 MCG/ACT nasal spray 1 spray  1 spray Each Nostril Daily Sumner Wagner, DO   1 spray at 04/14/21 1026    furosemide (LASIX) tablet 80 mg  80 mg Oral BID Jj Wagner, DO   80 mg at 04/14/21 1019    gabapentin (NEURONTIN) capsule 300 mg  300 mg Oral TID [facility] Tim, DO   300 mg at 04/14/21 1602    mirtazapine (REMERON) tablet 15 mg  15 mg Oral Nightly Jj Wagner, DO   15 mg at 04/13/21 2041    morphine (MS CONTIN) extended release tablet 15 mg  15 mg Oral BID Sumner Wagner, DO   15 mg at 04/14/21 1018    pantoprazole (PROTONIX) tablet 40 mg  40 mg Oral QAM AC Jj Wagner, DO   40 mg at 04/14/21 0732    potassium chloride (KLOR-CON M) extended release tablet 20 mEq  20 mEq Oral Daily [facility], DO   20 mEq at 04/14/21 1019 Labs     04/12/21  0135 04/13/21  0404 04/14/21  0324    136 141   K 4.4 4.6 4.0   ANIONGAP 10 8 11    99 101   CO2 26 29 29   BUN 14 22* 25*   CREATININE 1.0* 1.3* 1.4*   GLUCOSE 60* 181* 93   CALCIUM 8.9 8.8 8.6     No results for input(s): MG, PHOS in the last 72 hours. No results for input(s): AST, ALT, ALB, BILITOT, ALKPHOS, ALB in the last 72 hours. ABGs:No results for input(s): PH, PO2, PCO2, HCO3, BE, O2SAT in the last 72 hours. Troponin T: No results for input(s): TROPONINI in the last 72 hours. INR: No results for input(s): INR in the last 72 hours. Lactic Acid:   No results for input(s): LACTA in the last 72 hours.     Objective:   Vitals: /78   Pulse 88   Temp 98.1 °F (36.7 °C) (Temporal)   Resp 16   Ht 5' 3\" (1.6 m)   Wt (!) 351 lb 4.8 oz (159.3 kg)   SpO2 95%   BMI 62.23 kg/m²   24HR INTAKE/OUTPUT:      Intake/Output Summary (Last 24 hours) at 4/14/2021 2102  Last data filed at 4/14/2021 1821  Gross per 24 hour   Intake 360 ml   Output 2000 ml   Net -1640 ml     General appearance: alert and cooperative with exam  HEENT: atraumatic, eyes with clear conjunctiva and normal lids, pupils and irises normal, external ears and nose are normal, lips normal  Neck without masses, lympadenopathy, bruit, thyroid normal  Lungs: no increased work of breathing, diminished breath sounds bilaterally  Heart: distant tones, regular rate and rhythm  Abdomen: nondistended, morbidly obese, bowel sounds distant  Extremities: massive lymphedema, no significant changes  Neurologic: no focal neurologic deficits, normal sensation, alert and oriented, affect and mood appropriate  Skin: erythema, less transudative weeping    Assessment and Plan:   Principal Problem:    Cellulitis of both lower extremities  Active Problems:    Hypertension    Uncontrolled type 2 diabetes mellitus with hyperglycemia, without long-term current use of insulin (HCC)    Migraine without aura and without status migrainosus,

## 2021-04-15 NOTE — PROGRESS NOTES
Physical Therapy    Facility/Department: [facility]/VAS/MED  Initial Assessment    NAME: Tianna Trivedi  : [id number]  MRN: [id number]    Date of Service: 4/15/2021    Discharge Recommendations:  24 hour supervision or assist        Assessment   Body structures, Functions, Activity limitations: Decreased functional mobility ; Decreased balance  Assessment: Pt. most likely at functional baseline. Pt. states she needs knee surgery and thinks that she could lose weight if she could have surgery. Anticipate pt could go home with spouse and [facility] for [facility] care. Pt. needs to use cane for amb. Treatment Diagnosis: SBA mobility/gait  Prognosis: Fair  Decision Making: Low Complexity  PT Education: PT Role;General Safety  Patient Education: pt instructed on amb. as able  Barriers to Learning: none noted  No Skilled PT: Safe to return home  REQUIRES PT FOLLOW UP: No  Activity Tolerance  Activity Tolerance: Patient Tolerated treatment well       Patient Diagnosis(es): The primary encounter diagnosis was Cellulitis of lower extremity, unspecified laterality. Diagnoses of Lymphedema and Migraine without status migrainosus, not intractable, unspecified migraine type were also pertinent to this visit. has a past medical history of Anxiety, Constipation, Depression, DM (diabetes mellitus) ([facility] [address] [facility] Headache(784.0), Hyperlipidemia, Hypertension, Kidney stones, Kidney stones, Restless leg, and Restless legs syndrome. has a past surgical history that includes Tubal ligation; Ectopic pregnancy surgery; Eye surgery; and Knee cartilage surgery (Left, 2016).     Restrictions  Restrictions/Precautions  Restrictions/Precautions: Fall Risk, General Precautions  Required Braces or Orthoses?: No  Vision/Hearing  Vision: Within Functional Limits  Hearing: Within functional limits     Subjective  General  Chart Reviewed: Yes  Patient assessed for rehabilitation services?: Yes  Response To Previous Treatment: Not applicable  Family / Caregiver Present: Yes(spouse)  Referring Practitioner: Frida Mendez MD  Referral Date : 04/14/21  Diagnosis: cellulitis BLEs  Follows Commands: Within Functional Limits  General Comment  Comments: RN, Dayton Halsted PT. Subjective  Subjective: Pt. willing to work with therapy, but states she feels like she is getting around just like normal. Reports a recent fall.   Pain Screening  Patient Currently in Pain: Yes  Pain Assessment  Pain Assessment: 0-10  Pain Level: 4  Pain Type: Chronic pain  Pain Location: Generalized;Knee  Pain Orientation: Right;Left  Pain Frequency: Intermittent  Response to Pain Intervention: Patient Satisfied  Vital Signs  Patient Currently in Pain: Yes  Pre Treatment Pain Screening  Intervention List: Patient able to continue with treatment    Orientation  Orientation  Overall Orientation Status: Within Functional Limits  Social/Functional History  Social/Functional History  Lives With: Spouse  Type of Home: Trailer  Home Layout: One level  Home Access: Ramped entrance  Bathroom Toilet: Standard  Home Equipment: Lift chair(states she needs to  her cane from [id number])  Receives Help From: Family  ADL Assistance: Needs assistance(spouse assists her with LB dressing and LE wraps)  Ambulation Assistance: Independent  Transfer Assistance: Independent  Active : Yes  Leisure & Hobbies: going to visit her mom  Additional Comments: was getting wound care with Dr. Sherley Chandler  Overall Cognitive Status: WNL    Objective     Observation/Palpation  Posture: Good  Observation: BLEs unwrapped, pt wearing street clothes  Edema: BLEs edematous and red    AROM RLE (degrees)  RLE AROM: WFL  RLE General AROM: hips and knees flex to 90 deg, ankles to neutral DF  AROM LLE (degrees)  LLE AROM : WFL  LLE General AROM: hips and knees flex to 90 deg, ankles to neutral DF  Strength RLE  Strength RLE: WFL  Comment: grossly 4-/5  Strength LLE  Strength LLE: WFL  Comment: grossly 4-/5

## 2021-04-15 NOTE — PROGRESS NOTES
Pharmacy Vancomycin Consult     Vancomycin Day: 5  Current Dosing: Held due to elevated trough    Temp max:  98.1    Recent Labs     04/14/21  0324 04/15/21  0240   BUN 25* 23*       Recent Labs     04/14/21  0324 04/15/21  0240   CREATININE 1.4* 1.2*       Recent Labs     04/14/21  0324 04/15/21  0240   WBC 4.7* 4.1*         Intake/Output Summary (Last 24 hours) at 4/15/2021 0507  Last data filed at 4/14/2021 1821  Gross per 24 hour   Intake 360 ml   Output 1350 ml   Net -990 ml     Culture Date Source Results   04/11/21 Blood x 2 No growth to date         Ht Readings from Last 1 Encounters:   04/11/21 5' 3\" (1.6 m)        Wt Readings from Last 1 Encounters:   04/14/21 (!) 351 lb 4.8 oz (159.3 kg)         Body mass index is 62.23 kg/m². Estimated Creatinine Clearance: 86 mL/min (A) (based on SCr of 1.2 mg/dL (H)). Random level: 37.4    Assessment/Plan: Continue to hold Vancomycin. Random level ordered with morning labs on 04/16.     Electronically signed by Don Joya, [facility] on 4/15/2021 at 5:07 AM

## 2021-04-16 LAB
BLOOD CULTURE, ROUTINE: NORMAL
CULTURE, BLOOD 2: NORMAL

## 2021-04-19 ENCOUNTER — VIRTUAL VISIT (OUTPATIENT)
Dept: PRIMARY CARE CLINIC | Age: 51
End: 2021-04-19
Payer: MEDICAID

## 2021-04-19 DIAGNOSIS — Z09 HOSPITAL DISCHARGE FOLLOW-UP: Primary | ICD-10-CM

## 2021-04-19 DIAGNOSIS — L03.116 CELLULITIS OF BOTH LOWER EXTREMITIES: ICD-10-CM

## 2021-04-19 DIAGNOSIS — R60.0 BILATERAL LOWER EXTREMITY EDEMA: ICD-10-CM

## 2021-04-19 DIAGNOSIS — E66.01 MORBID OBESITY DUE TO EXCESS CALORIES (HCC): Chronic | ICD-10-CM

## 2021-04-19 DIAGNOSIS — L03.115 CELLULITIS OF BOTH LOWER EXTREMITIES: ICD-10-CM

## 2021-04-19 PROCEDURE — 99215 OFFICE O/P EST HI 40 MIN: CPT | Performed by: NURSE PRACTITIONER

## 2021-04-19 PROCEDURE — 1111F DSCHRG MED/CURRENT MED MERGE: CPT | Performed by: NURSE PRACTITIONER

## 2021-04-19 RX ORDER — BUSPIRONE HYDROCHLORIDE 10 MG/1
TABLET ORAL
Qty: 90 TABLET | Refills: 3 | Status: SHIPPED | OUTPATIENT
Start: 2021-04-19 | End: 2021-08-31

## 2021-04-19 ASSESSMENT — ENCOUNTER SYMPTOMS
BACK PAIN: 0
SHORTNESS OF BREATH: 0
VOMITING: 0
VOICE CHANGE: 0
COUGH: 0
PHOTOPHOBIA: 0
NAUSEA: 0
RHINORRHEA: 0
COLOR CHANGE: 0

## 2021-04-19 NOTE — PROGRESS NOTES
Post-Discharge Transitional Care Management Services or Hospital Follow Up      Tianna Trivedi   YOB: 1970    Date of Office Visit:  4/19/2021  Date of Hospital Admission: 4/11/21  Date of Hospital Discharge: 4/15/21  Readmission Risk Score(high >=14%.  Medium >=10%):Readmission Risk Score: 24      Care management risk score Rising risk (score 2-5) and Complex Care (Scores >=6): 6     Non face to face  following discharge, date last encounter closed (first attempt may have been earlier): *No documented post hospital discharge outreach found in the last 14 days *No documented post hospital discharge outreach found in the last 14 days    Call initiated 2 business days of discharge: *No response recorded in the last 14 days     Patient Active Problem List   Diagnosis    RLS (restless legs syndrome)    Hypertension    Headache    Venous insufficiency of both lower extremities    Mild single current episode of major depressive disorder ([address]    FEDERICA (generalized anxiety disorder)    Morbid obesity due to excess calories ([address]    Old complex tear of medial meniscus of left knee    Uncontrolled type 2 diabetes mellitus with hyperglycemia, without long-term current use of insulin ([address]    Migraine without aura and without status migrainosus, not intractable    Morbid obesity with BMI of 50.0-59.9, adult ([address]    Uncontrolled type 2 diabetes mellitus with diabetic neuropathy, with long-term current use of insulin (HCC)    Dyspnea    Upper respiratory tract infection    Pleurisy    Gastroesophageal reflux disease    Hypoglycemia    Hypokalemia    Recurrent cellulitis of lower extremity    Bilateral lower leg cellulitis    Cellulitis of both lower extremities    Lymphedema of both lower extremities    Diabetic ulcer of left lower leg associated with type 2 diabetes mellitus, with fat layer exposed ([address]    Stage 3 chronic kidney disease    Stasis dermatitis    Chronic pain    Polypharmacy       Allergies   Allergen Reactions    Compazine [Prochlorperazine Maleate] Shortness Of Breath    Ciprofloxacin Hives    Amoxicillin-Pot Clavulanate     Ancef [Cefazolin]     Bactrim [Sulfamethoxazole-Trimethoprim]     Compazine [Prochlorperazine]     Demerol Hives    Hydroxyzine     Hydroxyzine Hcl Itching    Ibuprofen Nausea Only    Meperidine Hives    Nortriptyline Other (See Comments)    Orphenadrine     Orphenadrine Citrate Itching    Penicillins Hives and Nausea Only    Prochlorperazine Edisylate      Will discuss with patient at next visit     Tobramycin      Will discuss with patient at next visit     Tramadol      Will discuss with patient at next visit     Vistaril [Hydroxyzine Hcl] Itching    Cephalexin Rash    Clindamycin/Lincomycin Rash    Codeine Nausea And Vomiting and Rash    Doxycycline Nausea And Vomiting    Keflex [Cephalexin] Rash    Moxifloxacin Nausea And Vomiting     Will discuss with patient at visit        Medications listed as ordered at the time of discharge from hospital   Tianna   Kb Medication Instructions ADRIEL:    Printed on:04/19/21 1226   Medication Information                      B-D ULTRAFINE III SHORT PEN 31G X 8 MM MISC  USE TO INJECT INSULIN ONCE DAILY             JENSENAGLAR KWIKPEN 100 UNIT/ML injection pen  INJECT 30 UNITS INTO THE SKIN EVERY EVENING             blood glucose monitor kit and supplies  Test 1 times a day & as needed for symptoms of irregular blood glucose.              Blood Glucose Monitoring Suppl ([address]) w/Device KIT  1 kit by Does not apply route daily as needed (Dx 250.00)             blood glucose test strips (ASCENSIA AUTODISC VI;ONE TOUCH ULTRA TEST VI) strip  Check glucose TID and as needed for hypoglycemic events Dx E11.40 E11.66 Z79.4  Use one touch verio flex             busPIRone (BUSPAR) 10 MG tablet  TAKE 1 TABLET BY MOUTH THREE TIMES DAILY             Butorphanol Tartrate (STADOL NS NA)  1 spray by Nasal route 4 times daily as needed (migraines)             celecoxib (CELEBREX) 200 MG capsule  TAKE 1 CAPSULE BY MOUTH EVERY DAY             clonazePAM (KLONOPIN) 0.5 MG tablet  Take 1 tablet by mouth 2 times daily as needed for Anxiety for up to 30 days. Cream Base CREA  APPLY ONE PUMP (GRAM) TO AFFECTED AREA(S) THREE TO FOUR TIMES A DAY TO THE APPENDAGES AND TRUNK AS DIRECTED             Diabetic Shoe MISC  by Does not apply route DISPENSE ONE PAIR OF DIABETIC SHOE AND 3 PAIRS HEAT MOLDED INSERTS             diclofenac sodium (VOLTAREN) 1 % GEL  Apply 2 g topically 2 times daily             doxycycline hyclate (VIBRA-TABS) 100 MG tablet  Take 1 tablet by mouth 2 times daily for 10 days             DULoxetine (CYMBALTA) 60 MG extended release capsule  Take 1 capsule by mouth 2 times daily             fluticasone (FLONASE) 50 MCG/ACT nasal spray  SHAKE LIQUID AND USE 1 SPRAY IN EACH NOSTRIL DAILY             furosemide (LASIX) 80 MG tablet  Take 1 tablet by mouth 2 times daily             gabapentin (NEURONTIN) 100 MG capsule  Take 100 mg by mouth 2 times daily. Am and 2 pm             gabapentin (NEURONTIN) 300 MG capsule  Take 300 mg by mouth 3 times daily. insulin lispro, 1 Unit Dial, (ADMELOG SOLOSTAR) 100 UNIT/ML SOPN  ADMINISTER 45 UNITS UNDER THE SKIN EVERY EVENING. INCREASE BY 3 UNITS EVERY NIGHT UNTIL 60 UNITS IS REACHED AND. CONTINUE 60 UNITS EVERY EVENING             Lancets (ONETOUCH DELICA PLUS ZECALE06N) MISC  USE TO CHECK BLLOD SUGAR AS DIRECTED             mirtazapine (REMERON) 15 MG tablet  TAKE 1 TABLET BY MOUTH EVERY NIGHT             Morphine Sulfate ER 15 MG T12A  Take 15 mg by mouth 2 times daily.              NONFORMULARY  Prednisone eye drops 1 drop in each eye daily             omeprazole (PRILOSEC) 20 MG delayed release capsule  TAKE ONE CAPSULE BY MOUTH TWICE DAILY BEFORE MEALS             ondansetron (ZOFRAN-ODT) 4 MG disintegrating tablet DISSOLVE 1 TABLET UNDER THE TONGUE EVERY 8 HOURS AS NEEDED FOR NAUSEA AND VOMITING(TAKE PRIOR TO DOXYCYLINE DOSES)             ONE TOUCH LANCETS MISC  1 each by Does not apply route daily             oxyCODONE-acetaminophen (PERCOCET) 7.5-325 MG per tablet  Take 1 tablet by mouth 2 times daily. potassium chloride (KLOR-CON M) 20 MEQ extended release tablet  Take 1 tablet by mouth daily             pramipexole (MIRAPEX) 0.75 MG tablet  TAKE 1 TABLET BY MOUTH TWICE DAILY             rizatriptan (MAXALT) 10 MG tablet  Take 1 tablet by mouth once as needed for Migraine May repeat in 2 hours if needed             tiZANidine (ZANAFLEX) 4 MG tablet  Take 4 mg by mouth daily as needed             triamterene-hydroCHLOROthiazide (MAXZIDE-25) 37.5-25 MG per tablet  Take 1 tablet by mouth daily             VICTOZA 18 MG/3ML SOPN SC injection  ADMINISTER 1.8 MG INTO THE SKIN EVERY DAY             Zinc Oxide 6 % CREA  Apply 1 Units topically 3 times daily JESUSITA'S CREAM  Zinc oxided 20%, hydrocortisone, nystatin, bacitracin                   Medications marked \"taking\" at this time  Outpatient Medications Marked as Taking for the 4/19/21 encounter (Virtual Visit) with NANETTE Brannon   Medication Sig Dispense Refill    busPIRone (BUSPAR) 10 MG tablet TAKE 1 TABLET BY MOUTH THREE TIMES DAILY 90 tablet 3        Medications patient taking as of now reconciled against medications ordered at time of hospital discharge: Yes    Chief Complaint   Patient presents with    Follow-Up from Hospital       HPI    Inpatient course: Discharge summary reviewed- see chart. Interval history/Current status:     Patient presents today for hospital discharge follow-up. She was admitted to [facility] for cellulitis from 4/11/21 to 4/15/21. She states she still has lower extremity edema and erythema.  She states that the redness is as bad as when she went into the hospital. She states today that she spoke with [facility] nurse and it was recommended for home health to do wound care/dressing changes. She was sent home on doxycycline. She states her anxiety is elevated; she is taking klonazepam but does not like how it makes her feel. She does have buspar 5 mg she takes TID; she states this is typically helpful for her. She complains that she often does not make it to the restroom in time and has urinary incontinence. She is requesting pads/diapers to be ordered through her insurance. ([employer]- [phone]; this company will be sending us a request to write Rx for supplies.)     Review of Systems   Constitutional: Negative for chills and fever. HENT: Negative for ear pain, hearing loss, rhinorrhea and voice change. Eyes: Negative for photophobia and visual disturbance. Respiratory: Negative for cough and shortness of breath. Cardiovascular: Positive for leg swelling. Negative for chest pain and palpitations. Gastrointestinal: Negative for nausea and vomiting. Endocrine: Negative. Negative for cold intolerance and heat intolerance. Genitourinary: Negative for difficulty urinating and flank pain. Musculoskeletal: Negative for back pain and neck pain. Skin: Negative for color change and rash. Allergic/Immunologic: Negative for environmental allergies and food allergies. Neurological: Negative for dizziness, speech difficulty and headaches. Hematological: Does not bruise/bleed easily. Psychiatric/Behavioral: Negative for sleep disturbance and suicidal ideas. The patient is nervous/anxious. There were no vitals filed for this visit. There is no height or weight on file to calculate BMI. Wt Readings from Last 3 Encounters:   04/14/21 (!) 351 lb 4.8 oz (159.3 kg)   03/19/21 (!) 323 lb (146.5 kg)   03/02/21 (!) 313 lb (142 kg)     BP Readings from Last 3 Encounters:   04/15/21 (!) 151/82   03/19/21 (!) 152/88   03/02/21 (!) 160/80       Physical Exam  Vitals signs and nursing note reviewed. Constitutional:       Appearance: She is well-developed. HENT:      Head: Atraumatic. Right Ear: External ear normal.      Left Ear: External ear normal.      Nose: Nose normal.   Eyes:      Conjunctiva/sclera: Conjunctivae normal.      Pupils: Pupils are equal, round, and reactive to light. Neck:      Musculoskeletal: Normal range of motion. Cardiovascular:      Heart sounds: S1 normal and S2 normal.   Pulmonary:      Effort: Pulmonary effort is normal.   Musculoskeletal: Normal range of motion. Neurological:      Mental Status: She is alert and oriented to person, place, and time. Psychiatric:         Behavior: Behavior normal.             Assessment/Plan:  1. Hospital discharge follow-up    - MI DISCHARGE MEDS RECONCILED W/ CURRENT OUTPATIENT MED LIST    2. Uncontrolled type 2 diabetes mellitus with diabetic neuropathy, with long-term current use of insulin ([address]    - [facility]    3. Morbid obesity due to excess calories [facility])    - [address] [place] [address]    4. Bilateral lower extremity edema    - [facility]    5. Cellulitis of both lower extremities    - [facility]      6. Anxiety    - Increase buspar to 10 mg TID.      Medical Decision Making: high complexity

## 2021-04-20 ENCOUNTER — TELEPHONE (OUTPATIENT)
Dept: PRIMARY CARE CLINIC | Age: 51
End: 2021-04-20

## 2021-04-20 LAB — HBA1C MFR BLD: 9.6 % (ref 4–6)

## 2021-04-20 NOTE — TELEPHONE ENCOUNTER
Tommy Powell from [facility] called to see if it was okay to do a unna boot.  Checked with Lulú Mckinnon and she gave verbal it was okay to proceed with the unna boot [facility] is aware

## 2021-04-22 ENCOUNTER — TELEPHONE (OUTPATIENT)
Dept: INTERNAL MEDICINE CLINIC | Age: 51
End: 2021-04-22

## 2021-04-22 NOTE — TELEPHONE ENCOUNTER
[facility] PT reporting that pt stated she does not want or need PT or OT services , pt states she is \" doing all she can do \"  Pt does have nursing but denies need for therapy at this time   [facility]

## 2021-04-23 ENCOUNTER — TELEPHONE (OUTPATIENT)
Dept: INTERNAL MEDICINE CLINIC | Age: 51
End: 2021-04-23

## 2021-04-26 ENCOUNTER — TELEPHONE (OUTPATIENT)
Dept: INTERNAL MEDICINE CLINIC | Age: 51
End: 2021-04-26

## 2021-04-26 DIAGNOSIS — F41.9 ANXIETY: ICD-10-CM

## 2021-04-26 NOTE — TELEPHONE ENCOUNTER
Wound culture still in progress but [facility] nurse wanted to give you a  Heads up it is showing multiple organisms

## 2021-04-27 ENCOUNTER — VIRTUAL VISIT (OUTPATIENT)
Dept: PRIMARY CARE CLINIC | Age: 51
End: 2021-04-27
Payer: MEDICAID

## 2021-04-27 DIAGNOSIS — S81.802D OPEN WOUND OF LOWER LIMB, LEFT, SUBSEQUENT ENCOUNTER: ICD-10-CM

## 2021-04-27 DIAGNOSIS — L03.116 CELLULITIS OF LEFT LOWER EXTREMITY: Primary | ICD-10-CM

## 2021-04-27 DIAGNOSIS — E11.65 UNCONTROLLED TYPE 2 DIABETES MELLITUS WITH HYPERGLYCEMIA, WITHOUT LONG-TERM CURRENT USE OF INSULIN (HCC): ICD-10-CM

## 2021-04-27 PROCEDURE — 99442 PR PHYS/QHP TELEPHONE EVALUATION 11-20 MIN: CPT | Performed by: NURSE PRACTITIONER

## 2021-04-27 RX ORDER — ONDANSETRON 4 MG/1
TABLET, ORALLY DISINTEGRATING ORAL
Qty: 20 TABLET | Refills: 0 | Status: SHIPPED | OUTPATIENT
Start: 2021-04-27 | End: 2021-06-28 | Stop reason: SDUPTHER

## 2021-04-27 RX ORDER — DULOXETIN HYDROCHLORIDE 60 MG/1
CAPSULE, DELAYED RELEASE ORAL
Qty: 30 CAPSULE | Refills: 5 | Status: SHIPPED | OUTPATIENT
Start: 2021-04-27

## 2021-04-27 RX ORDER — DOXYCYCLINE HYCLATE 100 MG/1
100 CAPSULE ORAL 2 TIMES DAILY
Qty: 20 CAPSULE | Refills: 0 | Status: SHIPPED | OUTPATIENT
Start: 2021-04-27 | End: 2021-05-07

## 2021-04-27 RX ORDER — INSULIN LISPRO 100 [IU]/ML
INJECTION, SOLUTION INTRAVENOUS; SUBCUTANEOUS
Qty: 18 ML | Refills: 5 | Status: SHIPPED | OUTPATIENT
Start: 2021-04-27 | End: 2021-08-04

## 2021-04-27 RX ORDER — TRIAMTERENE AND HYDROCHLOROTHIAZIDE 37.5; 25 MG/1; MG/1
1 TABLET ORAL DAILY
Qty: 30 TABLET | Refills: 2 | Status: SHIPPED | OUTPATIENT
Start: 2021-04-27 | End: 2022-03-07

## 2021-04-27 RX ORDER — FLUTICASONE PROPIONATE 50 MCG
SPRAY, SUSPENSION (ML) NASAL
Qty: 16 G | Refills: 0 | Status: SHIPPED | OUTPATIENT
Start: 2021-04-27 | End: 2021-06-21

## 2021-04-27 RX ORDER — LIRAGLUTIDE 6 MG/ML
INJECTION SUBCUTANEOUS
Qty: 9 ML | Refills: 3 | Status: SHIPPED | OUTPATIENT
Start: 2021-04-27 | End: 2021-08-31

## 2021-04-27 ASSESSMENT — ENCOUNTER SYMPTOMS
VOICE CHANGE: 0
SHORTNESS OF BREATH: 0
RHINORRHEA: 0
NAUSEA: 0
COUGH: 0
VOMITING: 0
BACK PAIN: 0
PHOTOPHOBIA: 0
COLOR CHANGE: 0

## 2021-04-27 NOTE — PROGRESS NOTES
[address] [address]            Phone:  [phone]  Fax:  [phone] is a 52 y.o. female evaluated via telephone on 4/27/2021. Consent:    She and/or health care decision maker is aware that that she may receive a bill for this telephone service, depending on her insurance coverage, and has provided verbal consent to proceed: Yes      HPI  Chief Complaint   Patient presents with    Generalized Body Aches       Patient presents today for evaluation of body aches and chills that are occurring occasionally since she was discharged from the hospital for lower extremity cellulitis. She denies fever, nausea, dizziness. She states home health has been coming for dressing changes. A wound culture was taken and is showing positive for various organisms; unfortunately patient has over 25 allergies to medications and states she can only take doxycycline which she has completed or azithryomycin. In the past she has seen Dr Ev Quintanilla for cellulitis. She was offered to see him while inpatient but refused; today she states she would like to see him again. She also had been seeing wound care but would like to see someone other than Dr Saintclair Spoon due to personality conflict; agreeable to seeing Dr Abram Bond if possible. She refused SNF placement at discharge and wanted to go home with [place], however, she is requiring more and more care for her cellulitis. She is not happy with [facility] home health has placed. She has specific dressings she is requesting. Discussed she must go see wound care and they cannot come to her house-- she has to go to the appointment. She is able to leave her home to visit her mother. She states she just does not want to go to wound care 3 days a week if possible. I discussed that she would need initial evaluation and then they could possibly work something out with Jack Cortes for dressing changes if needed.      Review of Systems   Constitutional: Negative for chills and fever. HENT: Negative for ear pain, hearing loss, rhinorrhea and voice change. Eyes: Negative for photophobia and visual disturbance. Respiratory: Negative for cough and shortness of breath. Cardiovascular: Negative for chest pain and palpitations. Gastrointestinal: Negative for nausea and vomiting. Endocrine: Negative. Negative for cold intolerance and heat intolerance. Genitourinary: Negative for difficulty urinating and flank pain. Musculoskeletal: Negative for back pain and neck pain. Skin: Positive for wound. Negative for color change and rash. Allergic/Immunologic: Negative for environmental allergies and food allergies. Neurological: Negative for dizziness, speech difficulty and headaches. Hematological: Does not bruise/bleed easily. Psychiatric/Behavioral: Negative for sleep disturbance and suicidal ideas. Patient location: home    PLAN    Details of this discussion including any medical advice provided:     1. Cellulitis of left lower extremity    - doxycycline hyclate (VIBRAMYCIN) 100 MG capsule; Take 1 capsule by mouth 2 times daily for 10 days  Dispense: 20 capsule; Refill: 0  - External Referral To Infectious Disease  - Crystal Sepulveda MD, Wound Care, [place]    2. Open wound of lower limb, left, subsequent encounter    - doxycycline hyclate (VIBRAMYCIN) 100 MG capsule; Take 1 capsule by mouth 2 times daily for 10 days  Dispense: 20 capsule; Refill: 0    3. Uncontrolled type 2 diabetes mellitus with diabetic neuropathy, with long-term current use of insulin ([facility])    - insulin lispro, 1 Unit Dial, (ADMELOG SOLOSTAR) 100 UNIT/ML SOPN; ADMINISTER 60 UNITS  EVERY EVENING  Dispense: 18 mL; Refill: 5    4.  Uncontrolled type 2 diabetes mellitus with hyperglycemia, without long-term current use of insulin ([facility])    - Liraglutide (VICTOZA) 18 MG/3ML SOPN SC injection; ADMINISTER 1.8 MG INTO THE SKIN EVERY DAY  Dispense: 9 mL; Refill: 3       I affirm this is a Patient Initiated Episode with an Established Patient who has not had a related appointment within my department in the past 7 days or scheduled within the next 24 hours. Total Time: minutes: 11-20 minutes      Note: not billable if this call serves to triage the patient into an appointment for the relevant concern      Shen[address] 86 to the emergency declaration under the [address] [place] [address] waiver authority and the [employer] and [employer] Act, this Virtual  Visit was conducted, with patient's consent, to reduce the patient's risk of exposure to COVID-19 and provide continuity of care for an established patient.

## 2021-04-29 ENCOUNTER — TELEPHONE (OUTPATIENT)
Dept: PRIMARY CARE CLINIC | Age: 51
End: 2021-04-29

## 2021-04-29 LAB
GRAM STAIN RESULT: ABNORMAL
ORGANISM: ABNORMAL
WOUND/ABSCESS: ABNORMAL

## 2021-04-30 ENCOUNTER — TELEPHONE (OUTPATIENT)
Dept: PRIMARY CARE CLINIC | Age: 51
End: 2021-04-30

## 2021-04-30 NOTE — TELEPHONE ENCOUNTER
Pateint called stating she is needing [employer] Non-Adhesive Dressing to be used with the [facility]. Per NANETTE Foley this is approved.

## 2021-05-03 ENCOUNTER — HOSPITAL ENCOUNTER (INPATIENT)
Age: 51
LOS: 1 days | Discharge: LEFT BEFORE DISCHARGE | DRG: 603 | End: 2021-05-04
Attending: EMERGENCY MEDICINE | Admitting: HOSPITALIST
Payer: MEDICAID

## 2021-05-03 DIAGNOSIS — L03.119 CELLULITIS OF LOWER EXTREMITY, UNSPECIFIED LATERALITY: Primary | ICD-10-CM

## 2021-05-03 DIAGNOSIS — E16.2 HYPOGLYCEMIA: ICD-10-CM

## 2021-05-03 DIAGNOSIS — T14.8XXA OPEN WOUND: ICD-10-CM

## 2021-05-03 LAB
ALBUMIN SERPL-MCNC: 4 G/DL (ref 3.5–5.2)
ALP BLD-CCNC: 73 U/L (ref 35–104)
ALT SERPL-CCNC: 9 U/L (ref 5–33)
ANION GAP SERPL CALCULATED.3IONS-SCNC: 13 MMOL/L (ref 7–19)
AST SERPL-CCNC: 16 U/L (ref 5–32)
BILIRUB SERPL-MCNC: 0.3 MG/DL (ref 0.2–1.2)
BUN BLDV-MCNC: 16 MG/DL (ref 6–20)
CALCIUM SERPL-MCNC: 9.8 MG/DL (ref 8.6–10)
CHLORIDE BLD-SCNC: 99 MMOL/L (ref 98–111)
CO2: 27 MMOL/L (ref 22–29)
CREAT SERPL-MCNC: 1 MG/DL (ref 0.5–0.9)
GFR AFRICAN AMERICAN: >59
GFR NON-AFRICAN AMERICAN: 59
GLUCOSE BLD-MCNC: 40 MG/DL (ref 70–99)
GLUCOSE BLD-MCNC: 45 MG/DL
GLUCOSE BLD-MCNC: 45 MG/DL (ref 70–99)
GLUCOSE BLD-MCNC: 51 MG/DL (ref 74–109)
GLUCOSE BLD-MCNC: 90 MG/DL
GLUCOSE BLD-MCNC: 90 MG/DL (ref 70–99)
HBA1C MFR BLD: 9.6 % (ref 4–6)
HCG QUALITATIVE: NEGATIVE
HCT VFR BLD CALC: 37.4 % (ref 37–47)
HEMOGLOBIN: 11.4 G/DL (ref 12–16)
MAGNESIUM: 1.6 MG/DL (ref 1.6–2.6)
MCH RBC QN AUTO: 26.3 PG (ref 27–31)
MCHC RBC AUTO-ENTMCNC: 30.5 G/DL (ref 33–37)
MCV RBC AUTO: 86.2 FL (ref 81–99)
PDW BLD-RTO: 14.8 % (ref 11.5–14.5)
PERFORMED ON: ABNORMAL
PERFORMED ON: ABNORMAL
PERFORMED ON: NORMAL
PLATELET # BLD: 217 K/UL (ref 130–400)
PMV BLD AUTO: 10 FL (ref 9.4–12.3)
POTASSIUM REFLEX MAGNESIUM: 3.3 MMOL/L (ref 3.5–5)
RBC # BLD: 4.34 M/UL (ref 4.2–5.4)
SARS-COV-2, NAAT: NOT DETECTED
SODIUM BLD-SCNC: 139 MMOL/L (ref 136–145)
TOTAL PROTEIN: 9.1 G/DL (ref 6.6–8.7)
WBC # BLD: 7.3 K/UL (ref 4.8–10.8)

## 2021-05-03 PROCEDURE — 83036 HEMOGLOBIN GLYCOSYLATED A1C: CPT

## 2021-05-03 PROCEDURE — 6360000002 HC RX W HCPCS

## 2021-05-03 PROCEDURE — 82947 ASSAY GLUCOSE BLOOD QUANT: CPT

## 2021-05-03 PROCEDURE — 87040 BLOOD CULTURE FOR BACTERIA: CPT

## 2021-05-03 PROCEDURE — 80053 COMPREHEN METABOLIC PANEL: CPT

## 2021-05-03 PROCEDURE — 6370000000 HC RX 637 (ALT 250 FOR IP): Performed by: EMERGENCY MEDICINE

## 2021-05-03 PROCEDURE — 2580000003 HC RX 258: Performed by: EMERGENCY MEDICINE

## 2021-05-03 PROCEDURE — 84703 CHORIONIC GONADOTROPIN ASSAY: CPT

## 2021-05-03 PROCEDURE — 83735 ASSAY OF MAGNESIUM: CPT

## 2021-05-03 PROCEDURE — 6360000002 HC RX W HCPCS: Performed by: EMERGENCY MEDICINE

## 2021-05-03 PROCEDURE — 36415 COLL VENOUS BLD VENIPUNCTURE: CPT

## 2021-05-03 PROCEDURE — 85027 COMPLETE CBC AUTOMATED: CPT

## 2021-05-03 PROCEDURE — 2000000000 HC ICU R&B

## 2021-05-03 RX ORDER — INSULIN GLARGINE 100 [IU]/ML
30 INJECTION, SOLUTION SUBCUTANEOUS NIGHTLY
Status: DISCONTINUED | OUTPATIENT
Start: 2021-05-03 | End: 2021-05-04 | Stop reason: HOSPADM

## 2021-05-03 RX ORDER — FUROSEMIDE 40 MG/1
80 TABLET ORAL 2 TIMES DAILY
Status: DISCONTINUED | OUTPATIENT
Start: 2021-05-04 | End: 2021-05-04 | Stop reason: HOSPADM

## 2021-05-03 RX ORDER — TRIAMTERENE AND HYDROCHLOROTHIAZIDE 37.5; 25 MG/1; MG/1
1 TABLET ORAL DAILY
Status: DISCONTINUED | OUTPATIENT
Start: 2021-05-04 | End: 2021-05-04 | Stop reason: HOSPADM

## 2021-05-03 RX ORDER — PRAMIPEXOLE DIHYDROCHLORIDE 0.25 MG/1
0.75 TABLET ORAL 2 TIMES DAILY
Status: DISCONTINUED | OUTPATIENT
Start: 2021-05-03 | End: 2021-05-04 | Stop reason: HOSPADM

## 2021-05-03 RX ORDER — POTASSIUM CHLORIDE 20 MEQ/1
20 TABLET, EXTENDED RELEASE ORAL ONCE
Status: COMPLETED | OUTPATIENT
Start: 2021-05-03 | End: 2021-05-03

## 2021-05-03 RX ORDER — PHENTERMINE HYDROCHLORIDE 37.5 MG/1
37.5 CAPSULE ORAL EVERY MORNING
COMMUNITY
End: 2021-05-03

## 2021-05-03 RX ORDER — DOXYCYCLINE HYCLATE 100 MG/1
100 CAPSULE ORAL 2 TIMES DAILY
Status: DISCONTINUED | OUTPATIENT
Start: 2021-05-04 | End: 2021-05-04 | Stop reason: HOSPADM

## 2021-05-03 RX ORDER — CLONAZEPAM 1 MG/1
0.5 TABLET ORAL 2 TIMES DAILY PRN
Status: DISCONTINUED | OUTPATIENT
Start: 2021-05-03 | End: 2021-05-04 | Stop reason: HOSPADM

## 2021-05-03 RX ORDER — ONDANSETRON 2 MG/ML
4 INJECTION INTRAMUSCULAR; INTRAVENOUS ONCE
Status: COMPLETED | OUTPATIENT
Start: 2021-05-03 | End: 2021-05-03

## 2021-05-03 RX ORDER — ONDANSETRON 2 MG/ML
INJECTION INTRAMUSCULAR; INTRAVENOUS
Status: COMPLETED
Start: 2021-05-03 | End: 2021-05-03

## 2021-05-03 RX ORDER — BUSPIRONE HYDROCHLORIDE 10 MG/1
10 TABLET ORAL 3 TIMES DAILY
Status: DISCONTINUED | OUTPATIENT
Start: 2021-05-03 | End: 2021-05-04 | Stop reason: HOSPADM

## 2021-05-03 RX ORDER — MORPHINE SULFATE 4 MG/ML
4 INJECTION, SOLUTION INTRAMUSCULAR; INTRAVENOUS ONCE
Status: COMPLETED | OUTPATIENT
Start: 2021-05-03 | End: 2021-05-03

## 2021-05-03 RX ORDER — PANTOPRAZOLE SODIUM 40 MG/1
40 TABLET, DELAYED RELEASE ORAL
Status: DISCONTINUED | OUTPATIENT
Start: 2021-05-04 | End: 2021-05-04 | Stop reason: HOSPADM

## 2021-05-03 RX ORDER — DEXTROSE MONOHYDRATE 100 MG/ML
INJECTION, SOLUTION INTRAVENOUS CONTINUOUS
Status: DISCONTINUED | OUTPATIENT
Start: 2021-05-03 | End: 2021-05-04 | Stop reason: HOSPADM

## 2021-05-03 RX ORDER — FLUTICASONE PROPIONATE 50 MCG
1 SPRAY, SUSPENSION (ML) NASAL DAILY
Status: DISCONTINUED | OUTPATIENT
Start: 2021-05-04 | End: 2021-05-04 | Stop reason: HOSPADM

## 2021-05-03 RX ORDER — DEXTROSE MONOHYDRATE 25 G/50ML
25 INJECTION, SOLUTION INTRAVENOUS ONCE
Status: COMPLETED | OUTPATIENT
Start: 2021-05-03 | End: 2021-05-03

## 2021-05-03 RX ADMIN — POTASSIUM CHLORIDE 20 MEQ: 1500 TABLET, EXTENDED RELEASE ORAL at 22:53

## 2021-05-03 RX ADMIN — ONDANSETRON HYDROCHLORIDE 4 MG: 2 SOLUTION INTRAMUSCULAR; INTRAVENOUS at 23:06

## 2021-05-03 RX ADMIN — ONDANSETRON 4 MG: 2 INJECTION INTRAMUSCULAR; INTRAVENOUS at 23:06

## 2021-05-03 RX ADMIN — DEXTROSE MONOHYDRATE 25 G: 500 INJECTION PARENTERAL at 22:02

## 2021-05-03 RX ADMIN — MORPHINE SULFATE 4 MG: 4 INJECTION, SOLUTION INTRAMUSCULAR; INTRAVENOUS at 22:53

## 2021-05-03 RX ADMIN — MEROPENEM 1000 MG: 1 INJECTION, POWDER, FOR SOLUTION INTRAVENOUS at 22:52

## 2021-05-03 RX ADMIN — VANCOMYCIN HYDROCHLORIDE 1750 MG: 5 INJECTION, POWDER, LYOPHILIZED, FOR SOLUTION INTRAVENOUS at 23:46

## 2021-05-03 ASSESSMENT — PAIN DESCRIPTION - PAIN TYPE: TYPE: ACUTE PAIN

## 2021-05-03 ASSESSMENT — PAIN DESCRIPTION - LOCATION: LOCATION: LEG

## 2021-05-03 ASSESSMENT — PAIN SCALES - GENERAL: PAINLEVEL_OUTOF10: 7

## 2021-05-03 ASSESSMENT — PAIN DESCRIPTION - ORIENTATION: ORIENTATION: LEFT;RIGHT

## 2021-05-04 VITALS
DIASTOLIC BLOOD PRESSURE: 83 MMHG | SYSTOLIC BLOOD PRESSURE: 154 MMHG | TEMPERATURE: 98.7 F | HEIGHT: 63 IN | OXYGEN SATURATION: 95 % | WEIGHT: 293 LBS | RESPIRATION RATE: 16 BRPM | BODY MASS INDEX: 51.91 KG/M2 | HEART RATE: 107 BPM

## 2021-05-04 LAB
GLUCOSE BLD-MCNC: 57 MG/DL (ref 70–99)
GLUCOSE BLD-MCNC: 72 MG/DL (ref 70–99)
PERFORMED ON: ABNORMAL
PERFORMED ON: NORMAL

## 2021-05-04 PROCEDURE — 87070 CULTURE OTHR SPECIMN AEROBIC: CPT

## 2021-05-04 PROCEDURE — 87186 SC STD MICRODIL/AGAR DIL: CPT

## 2021-05-04 PROCEDURE — 6360000002 HC RX W HCPCS: Performed by: HOSPITALIST

## 2021-05-04 PROCEDURE — 87635 SARS-COV-2 COVID-19 AMP PRB: CPT

## 2021-05-04 PROCEDURE — 87205 SMEAR GRAM STAIN: CPT

## 2021-05-04 PROCEDURE — 6360000002 HC RX W HCPCS

## 2021-05-04 PROCEDURE — 87077 CULTURE AEROBIC IDENTIFY: CPT

## 2021-05-04 PROCEDURE — 82947 ASSAY GLUCOSE BLOOD QUANT: CPT

## 2021-05-04 PROCEDURE — 2580000003 HC RX 258: Performed by: HOSPITALIST

## 2021-05-04 RX ORDER — DEXTROSE MONOHYDRATE 50 MG/ML
100 INJECTION, SOLUTION INTRAVENOUS PRN
Status: DISCONTINUED | OUTPATIENT
Start: 2021-05-04 | End: 2021-05-04 | Stop reason: HOSPADM

## 2021-05-04 RX ORDER — NICOTINE POLACRILEX 4 MG
15 LOZENGE BUCCAL PRN
Status: DISCONTINUED | OUTPATIENT
Start: 2021-05-04 | End: 2021-05-04 | Stop reason: HOSPADM

## 2021-05-04 RX ORDER — MIRTAZAPINE 15 MG/1
15 TABLET, FILM COATED ORAL NIGHTLY
Status: DISCONTINUED | OUTPATIENT
Start: 2021-05-04 | End: 2021-05-04

## 2021-05-04 RX ORDER — GABAPENTIN 300 MG/1
300 CAPSULE ORAL 3 TIMES DAILY
Status: DISCONTINUED | OUTPATIENT
Start: 2021-05-04 | End: 2021-05-04 | Stop reason: HOSPADM

## 2021-05-04 RX ORDER — ONDANSETRON 4 MG/1
4 TABLET, ORALLY DISINTEGRATING ORAL EVERY 8 HOURS PRN
Status: DISCONTINUED | OUTPATIENT
Start: 2021-05-04 | End: 2021-05-04 | Stop reason: HOSPADM

## 2021-05-04 RX ORDER — TIZANIDINE 4 MG/1
4 TABLET ORAL DAILY PRN
Status: DISCONTINUED | OUTPATIENT
Start: 2021-05-04 | End: 2021-05-04 | Stop reason: HOSPADM

## 2021-05-04 RX ORDER — MORPHINE SULFATE 4 MG/ML
2 INJECTION, SOLUTION INTRAMUSCULAR; INTRAVENOUS EVERY 4 HOURS PRN
Status: DISCONTINUED | OUTPATIENT
Start: 2021-05-04 | End: 2021-05-04 | Stop reason: HOSPADM

## 2021-05-04 RX ORDER — ACETAMINOPHEN 325 MG/1
650 TABLET ORAL EVERY 6 HOURS PRN
Status: DISCONTINUED | OUTPATIENT
Start: 2021-05-04 | End: 2021-05-04 | Stop reason: HOSPADM

## 2021-05-04 RX ORDER — MECOBALAMIN 5000 MCG
5 TABLET,DISINTEGRATING ORAL NIGHTLY PRN
Status: DISCONTINUED | OUTPATIENT
Start: 2021-05-04 | End: 2021-05-04 | Stop reason: HOSPADM

## 2021-05-04 RX ORDER — SODIUM CHLORIDE 0.9 % (FLUSH) 0.9 %
5-40 SYRINGE (ML) INJECTION PRN
Status: DISCONTINUED | OUTPATIENT
Start: 2021-05-04 | End: 2021-05-04 | Stop reason: HOSPADM

## 2021-05-04 RX ORDER — ACETAMINOPHEN 650 MG/1
650 SUPPOSITORY RECTAL EVERY 6 HOURS PRN
Status: DISCONTINUED | OUTPATIENT
Start: 2021-05-04 | End: 2021-05-04 | Stop reason: HOSPADM

## 2021-05-04 RX ORDER — METOCLOPRAMIDE HYDROCHLORIDE 5 MG/ML
10 INJECTION INTRAMUSCULAR; INTRAVENOUS ONCE
Status: COMPLETED | OUTPATIENT
Start: 2021-05-04 | End: 2021-05-04

## 2021-05-04 RX ORDER — OXYCODONE HYDROCHLORIDE 5 MG/1
7.5 TABLET ORAL ONCE
Status: DISCONTINUED | OUTPATIENT
Start: 2021-05-04 | End: 2021-05-04 | Stop reason: HOSPADM

## 2021-05-04 RX ORDER — SODIUM CHLORIDE 9 MG/ML
25 INJECTION, SOLUTION INTRAVENOUS PRN
Status: DISCONTINUED | OUTPATIENT
Start: 2021-05-04 | End: 2021-05-04 | Stop reason: HOSPADM

## 2021-05-04 RX ORDER — MIRTAZAPINE 15 MG/1
15 TABLET, FILM COATED ORAL NIGHTLY
Status: DISCONTINUED | OUTPATIENT
Start: 2021-05-04 | End: 2021-05-04 | Stop reason: HOSPADM

## 2021-05-04 RX ORDER — DULOXETIN HYDROCHLORIDE 60 MG/1
60 CAPSULE, DELAYED RELEASE ORAL 2 TIMES DAILY
Status: DISCONTINUED | OUTPATIENT
Start: 2021-05-04 | End: 2021-05-04 | Stop reason: HOSPADM

## 2021-05-04 RX ORDER — MORPHINE SULFATE 4 MG/ML
4 INJECTION, SOLUTION INTRAMUSCULAR; INTRAVENOUS EVERY 4 HOURS PRN
Status: DISCONTINUED | OUTPATIENT
Start: 2021-05-04 | End: 2021-05-04 | Stop reason: HOSPADM

## 2021-05-04 RX ORDER — MORPHINE SULFATE 4 MG/ML
1 INJECTION, SOLUTION INTRAMUSCULAR; INTRAVENOUS EVERY 4 HOURS PRN
Status: DISCONTINUED | OUTPATIENT
Start: 2021-05-04 | End: 2021-05-04 | Stop reason: HOSPADM

## 2021-05-04 RX ORDER — CELECOXIB 200 MG/1
200 CAPSULE ORAL DAILY
Status: DISCONTINUED | OUTPATIENT
Start: 2021-05-04 | End: 2021-05-04 | Stop reason: HOSPADM

## 2021-05-04 RX ORDER — NALOXONE HYDROCHLORIDE 0.4 MG/ML
0.4 INJECTION, SOLUTION INTRAMUSCULAR; INTRAVENOUS; SUBCUTANEOUS PRN
Status: DISCONTINUED | OUTPATIENT
Start: 2021-05-04 | End: 2021-05-04 | Stop reason: HOSPADM

## 2021-05-04 RX ORDER — POLYETHYLENE GLYCOL 3350 17 G/17G
17 POWDER, FOR SOLUTION ORAL DAILY PRN
Status: DISCONTINUED | OUTPATIENT
Start: 2021-05-04 | End: 2021-05-04 | Stop reason: HOSPADM

## 2021-05-04 RX ORDER — ONDANSETRON 2 MG/ML
4 INJECTION INTRAMUSCULAR; INTRAVENOUS EVERY 6 HOURS PRN
Status: DISCONTINUED | OUTPATIENT
Start: 2021-05-04 | End: 2021-05-04 | Stop reason: HOSPADM

## 2021-05-04 RX ORDER — SODIUM CHLORIDE 0.9 % (FLUSH) 0.9 %
5-40 SYRINGE (ML) INJECTION EVERY 12 HOURS SCHEDULED
Status: DISCONTINUED | OUTPATIENT
Start: 2021-05-04 | End: 2021-05-04 | Stop reason: HOSPADM

## 2021-05-04 RX ORDER — MORPHINE SULFATE 15 MG/1
15 TABLET, FILM COATED, EXTENDED RELEASE ORAL 2 TIMES DAILY
Status: DISCONTINUED | OUTPATIENT
Start: 2021-05-04 | End: 2021-05-04 | Stop reason: HOSPADM

## 2021-05-04 RX ORDER — ONDANSETRON 2 MG/ML
INJECTION INTRAMUSCULAR; INTRAVENOUS
Status: COMPLETED
Start: 2021-05-04 | End: 2021-05-04

## 2021-05-04 RX ORDER — DEXTROSE MONOHYDRATE 25 G/50ML
12.5 INJECTION, SOLUTION INTRAVENOUS PRN
Status: DISCONTINUED | OUTPATIENT
Start: 2021-05-04 | End: 2021-05-04 | Stop reason: HOSPADM

## 2021-05-04 RX ADMIN — METOCLOPRAMIDE 10 MG: 5 INJECTION, SOLUTION INTRAMUSCULAR; INTRAVENOUS at 01:20

## 2021-05-04 RX ADMIN — ONDANSETRON HYDROCHLORIDE 4 MG: 2 SOLUTION INTRAMUSCULAR; INTRAVENOUS at 01:15

## 2021-05-04 RX ADMIN — DEXTROSE MONOHYDRATE: 10 INJECTION, SOLUTION INTRAVENOUS at 00:30

## 2021-05-04 SDOH — HEALTH STABILITY: MENTAL HEALTH: HOW MANY STANDARD DRINKS CONTAINING ALCOHOL DO YOU HAVE ON A TYPICAL DAY?: NOT ASKED

## 2021-05-04 SDOH — ECONOMIC STABILITY: INCOME INSECURITY: HOW HARD IS IT FOR YOU TO PAY FOR THE VERY BASICS LIKE FOOD, HOUSING, MEDICAL CARE, AND HEATING?: NOT ASKED

## 2021-05-04 SDOH — HEALTH STABILITY: MENTAL HEALTH: HOW OFTEN DO YOU HAVE A DRINK CONTAINING ALCOHOL?: NOT ASKED

## 2021-05-04 SDOH — ECONOMIC STABILITY: FOOD INSECURITY: WITHIN THE PAST 12 MONTHS, YOU WORRIED THAT YOUR FOOD WOULD RUN OUT BEFORE YOU GOT MONEY TO BUY MORE.: NOT ASKED

## 2021-05-04 SDOH — ECONOMIC STABILITY: TRANSPORTATION INSECURITY
IN THE PAST 12 MONTHS, HAS THE LACK OF TRANSPORTATION KEPT YOU FROM MEDICAL APPOINTMENTS OR FROM GETTING MEDICATIONS?: NOT ASKED

## 2021-05-04 SDOH — ECONOMIC STABILITY: TRANSPORTATION INSECURITY
IN THE PAST 12 MONTHS, HAS LACK OF TRANSPORTATION KEPT YOU FROM MEETINGS, WORK, OR FROM GETTING THINGS NEEDED FOR DAILY LIVING?: NOT ASKED

## 2021-05-04 SDOH — ECONOMIC STABILITY: FOOD INSECURITY: WITHIN THE PAST 12 MONTHS, THE FOOD YOU BOUGHT JUST DIDN'T LAST AND YOU DIDN'T HAVE MONEY TO GET MORE.: NOT ASKED

## 2021-05-04 ASSESSMENT — ENCOUNTER SYMPTOMS
CONSTIPATION: 0
EYE PAIN: 0
ABDOMINAL PAIN: 0
DIARRHEA: 0
SHORTNESS OF BREATH: 0
VOMITING: 0
NAUSEA: 1

## 2021-05-04 NOTE — PROGRESS NOTES
Pharmacy Note  Vancomycin Consult    Tianna Trivedi is a 52 y.o. female started on Vancomycin for Skin/Soft Tissue Infection; consult received from Dr. Prashant Wang to manage therapy. Also receiving the following antibiotics: Merrem. Active Problems:    Bilateral lower leg cellulitis  Resolved Problems:    * No resolved hospital problems. *      Allergies:  Compazine [prochlorperazine maleate], Ciprofloxacin, Amoxicillin-pot clavulanate, Ancef [cefazolin], Bactrim [sulfamethoxazole-trimethoprim], Compazine [prochlorperazine], Demerol, Hydroxyzine, Hydroxyzine hcl, Ibuprofen, Meperidine, Nortriptyline, Orphenadrine, Orphenadrine citrate, Penicillins, Prochlorperazine edisylate, Tobramycin, Tramadol, Vistaril [hydroxyzine hcl], Cephalexin, Clindamycin/lincomycin, Codeine, Doxycycline, Keflex [cephalexin], and Moxifloxacin     Temp max: 98.4    Recent Labs     05/03/21  2141   BUN 16       Recent Labs     05/03/21  2141   CREATININE 1.0*       Recent Labs     05/03/21  2141   WBC 7.3       No intake or output data in the 24 hours ending 05/04/21 0048    No current cultures at this time  Culture Date Source Results                      Ht Readings from Last 1 Encounters:   04/11/21 5' 3\" (1.6 m)        Wt Readings from Last 1 Encounters:   05/03/21 (!) 351 lb (159.2 kg)         Body mass index is 62.18 kg/m². CrCl cannot be calculated (Unknown ideal weight. ). Assessment/Plan:  Will initiate vancomycin 1750 mg IV every 12 hours. Timing of trough level will be determined based on culture results, renal function, and clinical response. Thank you for the consult. Will continue to follow.     Electronically signed by Edel Davenport, [facility] on 5/4/2021 at 12:48 AM

## 2021-05-04 NOTE — PROGRESS NOTES
Patient left AMA, family to  patient at ed. Dr. Yola Nichole talked to patient about all risk of leaving AMA. Patient understands. Witnessed by me and another nurse.    Electronically signed by Awilda Coates RN on 5/4/2021 at 2:20 AM

## 2021-05-04 NOTE — DISCHARGE SUMMARY
Physician Discharge Summary     Patient ID:  Alice Willis  [id number]  52 y.o.  1970    Admit date: 5/3/2021    Discharge AGAINST MEDICAL ADVICE date and time: No discharge date for patient encounter. Admitting Physician: Ciro Robledo MD     Discharge Physician: Ciro Robledo MD    Admission Diagnoses: Bilateral lower leg cellulitis [L03.116, L03.115], Hypoglycemia    Discharge Diagnoses: Bilateral lower leg cellulitis [L03.116, J44.069] due to MDRO, Hypoglycemia    Admission Condition: severely ill    Discharged Condition: severely ill    Indication for Admission: Bilateral lower leg cellulitis [L03.116, L03.115], Hypoglycemia    Hospital Course:   HPI: ([id number])  Mrs. Alice Willis is a pleasant 52year old  american lady from home. She has been having leg problems for the last year and a half. The left side is more severe, and was where all this started. She states that she does not know how it began, she never  the skin nor had any injuries. She states that despite hospital visits and multiple antibiotics it has never gone away. She has seen Dr. Nelia Allen for this, she has an appointment with them for May 6th. When she was last seen by him she states that he did not think she had an infection. She states that she has had drainage from the backs of her calves. She states that it has periodically seemed to be getting better but then comes right back again. Today: (just past 1:30am on [id number])  Patient states \"it's just time for me to go\" and will not state what is the matter. She acknowledges that leaving now could result in disability such as losing a limb or even death but is unwilling to stay. She has agreed to call Dr Amy Jeronimo office (she is already established with Infectious Disease) early in the morning today. She has family that will stay with her tonight. She understands that we also have great concern about hypoglycemia in her case and will eat carbs tonight.     Consults: Infectious diet  Wound Care: keep wound (drainage sites) clean and dry    Follow-up with Infectious Diseases and Primary Care both ASAP.  (ASAP = as soon as possible)    Signed:  Levi Currie  5/4/2021  2:37 AM     Care Time <30 minutes ( use 29 minutes)

## 2021-05-04 NOTE — ED PROVIDER NOTES
[facility] ICU  eMERGENCY dEPARTMENT eNCOUnter      Pt Name: Anny Mendez  MRN: [id number]  [place] 1970  Date of evaluation: 5/3/2021  Provider: Roxanne Walker MD    CHIEF COMPLAINT       Chief Complaint   Patient presents with    Cellulitis         HISTORY OF PRESENT ILLNESS   (Location/Symptom, Timing/Onset,Context/Setting, Quality, Duration, Modifying Factors, Severity)  Note limiting factors. Anny Mendez is a 52 y.o. female who presents to the emergency department due to cellulitis. Patient was recently hospitalized for cellulitis in [facility] and went home little over 2 weeks ago. Had been admitted and received vancomycin for 5 days went home on doxycycline. Had another round of doxycycline which she has almost completed. Feels like swelling and redness are about the same as when she left the hospital but it has a new wound on the back of her left calf. On exam it looks like she has area where the swelling has gotten severe enough to cause some weeping and she has an open wound. HPI    NursingNotes were reviewed. REVIEW OF SYSTEMS    (2-9 systems for level 4, 10 or more for level 5)     Review of Systems   Constitutional: Positive for chills. Negative for fever. Eyes: Negative for pain. Respiratory: Negative for shortness of breath. Cardiovascular: Positive for leg swelling. Negative for chest pain and palpitations. Gastrointestinal: Negative for abdominal pain, diarrhea and vomiting. Genitourinary: Negative for dysuria. Skin: Negative for rash. Neurological: Negative for weakness and headaches. All other systems reviewed and are negative. A complete review of systems was performed and is negative except as noted above in the HPI.        PAST MEDICAL HISTORY     Past Medical History:   Diagnosis Date    Anxiety     Constipation     Depression     Headache(784.0)     Hyperlipidemia     Hypertension     Kidney stones     Kidney stones     Morbid obesity due to excess calories ([facility]     Restless legs syndrome     Type 2 diabetes mellitus ([facility] [address] [facility]          SURGICAL HISTORY       Past Surgical History:   Procedure Laterality Date    ECTOPIC PREGNANCY SURGERY  2002    EYE SURGERY Bilateral     2005 & 2007: cornea transplant and cataracts removed    KNEE CARTILAGE SURGERY Left 12/20/2016    KNEE ARTHROSCOPIC PARTIAL MEDIAL MENISCECTOMY performed by Hafsa Araujo MD at [address] [place] [address]       Discharge Medication List as of 5/4/2021  3:41 AM      CONTINUE these medications which have NOT CHANGED    Details   triamterene-hydroCHLOROthiazide (MAXZIDE-25) 37.5-25 MG per tablet TAKE 1 TABLET BY MOUTH DAILY, Disp-30 tablet, R-2Normal      DULoxetine (CYMBALTA) 60 MG extended release capsule TAKE 1 CAPSULE BY MOUTH TWICE DAILY, Disp-30 capsule, R-5Normal      fluticasone (FLONASE) 50 MCG/ACT nasal spray SHAKE LIQUID AND USE 1 SPRAY IN EACH NOSTRIL DAILY, Disp-16 g, R-0Normal      ondansetron (ZOFRAN-ODT) 4 MG disintegrating tablet DISSOLVE 1 TABLET UNDER THE TONGUE EVERY 8 HOURS AS NEEDED FOR NAUSEA AND VOMITING(TAKE PRIOR TO DOXYCYLINE DOSES), Disp-20 tablet, R-0Normal      Liraglutide (VICTOZA) 18 MG/3ML SOPN SC injection ADMINISTER 1.8 MG INTO THE SKIN EVERY DAY, Disp-9 mL, R-3Normal      doxycycline hyclate (VIBRAMYCIN) 100 MG capsule Take 1 capsule by mouth 2 times daily for 10 days, Disp-20 capsule, R-0Normal      busPIRone (BUSPAR) 10 MG tablet TAKE 1 TABLET BY MOUTH THREE TIMES DAILY, Disp-90 tablet, R-3Normal      pramipexole (MIRAPEX) 0.75 MG tablet TAKE 1 TABLET BY MOUTH TWICE DAILY, Disp-60 tablet, R-5Normal      clonazePAM (KLONOPIN) 0.5 MG tablet Take 1 tablet by mouth 2 times daily as needed for Anxiety for up to 30 days. , Disp-45 tablet, R-0Normal      omeprazole (PRILOSEC) 20 MG delayed release capsule TAKE ONE CAPSULE BY MOUTH TWICE DAILY BEFORE MEALS, Disp-180 capsule, R-1Normal      rizatriptan (MAXALT) 10 MG tablet Take 1 tablet by mouth once as needed for Migraine May repeat in 2 hours if needed, Disp-30 tablet, R-3Normal      potassium chloride (KLOR-CON M) 20 MEQ extended release tablet Take 1 tablet by mouth daily, Disp-30 tablet, R-0Normal      mirtazapine (REMERON) 15 MG tablet TAKE 1 TABLET BY MOUTH EVERY NIGHT, Disp-30 tablet, R-3Normal      BASAGLAR KWIKPEN 100 UNIT/ML injection pen INJECT 30 UNITS INTO THE SKIN EVERY EVENING, Disp-15 mL, R-1Normal      oxyCODONE-acetaminophen (PERCOCET) 7.5-325 MG per tablet Take 1 tablet by mouth 2 times daily. Historical Med      tiZANidine (ZANAFLEX) 4 MG tablet Take 4 mg by mouth daily as neededHistorical Med      NONFORMULARY Prednisone eye drops 1 drop in each eye dailyHistorical Med      Morphine Sulfate ER 15 MG T12A Take 15 mg by mouth 2 times daily. Historical Med      Cream Base CREA Disp-30 g,R-5, NormalAPPLY ONE PUMP (GRAM) TO AFFECTED AREA(S) THREE TO FOUR TIMES A DAY TO THE APPENDAGES AND TRUNK AS DIRECTED      celecoxib (CELEBREX) 200 MG capsule TAKE 1 CAPSULE BY MOUTH EVERY DAY, Disp-60 capsule,R-5Normal      furosemide (LASIX) 80 MG tablet Take 1 tablet by mouth 2 times daily, Disp-60 tablet,R-0Normal      gabapentin (NEURONTIN) 300 MG capsule Take 300 mg by mouth 3 times daily. , R-5Historical Med      insulin lispro, 1 Unit Dial, (ADMELOG SOLOSTAR) 100 UNIT/ML SOPN ADMINISTER 60 UNITS  EVERY EVENING, Disp-18 mL, R-5Normal      Butorphanol Tartrate (STADOL NS NA) 1 spray by Nasal route 4 times daily as needed (migraines)Historical Med      blood glucose test strips (ASCENSIA AUTODISC VI;ONE TOUCH ULTRA TEST VI) strip Disp-150 each, R-5, NormalCheck glucose TID and as needed for hypoglycemic events Dx E11.40 E11.66 Z79.4  Use one touch verio flex      Diabetic Shoe MISC Starting Thu 3/4/2021, Disp-1 each, R-0, PrintDISPENSE ONE PAIR OF DIABETIC SHOE AND 3 PAIRS HEAT MOLDED INSERTS      B-D ULTRAFINE III SHORT PEN 31G X 8 MM MISC Disp-100 each, R-3, Normal      !!  Lancets (ONETOUCH DELICA PLUS [id number]) MISC USE TO CHECK BLLOD SUGAR AS DIRECTED, Disp-100 each, R-1Normal      blood glucose monitor kit and supplies Test 1 times a day & as needed for symptoms of irregular blood glucose., Disp-1 kit, R-0, Normal      Blood Glucose Monitoring Suppl ([address]) w/Device KIT DAILY PRN Starting 2017, Until Discontinued, Disp-1 kit, R-0, Normal      !! ONE TOUCH LANCETS MISC DAILY Starting 2017, Until Discontinued, Disp-100 each, R-3, Normal       !! - Potential duplicate medications found. Please discuss with provider.           ALLERGIES     Compazine [prochlorperazine], Ciprofloxacin, Prochlorperazine edisylate, Tobramycin, Tramadol, Amoxicillin-pot clavulanate, Ancef [cefazolin], Bactrim [sulfamethoxazole-trimethoprim], Cephalexin, Clindamycin/lincomycin, Codeine, Demerol, Doxycycline, Hydroxyzine hcl, Ibuprofen, Keflex [cephalexin], Meperidine, Moxifloxacin, Nortriptyline, Orphenadrine citrate, Penicillins, and Vistaril [hydroxyzine hcl]    FAMILY HISTORY       Family History   Problem Relation Age of Onset    Obesity Mother     Arthritis Mother         has had knees replaced    Cancer Father         \"adrenal gland & lung\"    Parkinsonism Father     Cancer Maternal Grandmother     COPD Maternal Grandmother     Obesity Maternal Grandmother     Heart Failure Maternal Grandmother     Cancer Maternal Grandfather     Other Sister         spina biffida    No Known Problems Son     No Known Problems Son     No Known Problems Daughter     Obesity Maternal Aunt     Other Maternal Aunt          of sepsis related to a spider bite          SOCIAL HISTORY       Social History     Socioeconomic History    Marital status: Single     Spouse name: None    Number of children: 3    Years of education: None    Highest education level: None   Occupational History    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    Occupation: worked as a [employer] for 5 years Social Needs    Financial resource strain: None    Food insecurity     Worry: None     Inability: None    Transportation needs     Medical: None     Non-medical: None   Tobacco Use    Smoking status: Never Smoker    Smokeless tobacco: Never Used   Substance and Sexual Activity    Alcohol use: Not Currently     Comment: \"socially, not recently though\"    Drug use: Never    Sexual activity: Yes     Partners: Male     Comment: has 3 kids   Lifestyle    Physical activity     Days per week: None     Minutes per session: None    Stress: None   Relationships    Social connections     Talks on phone: None     Gets together: None     Attends [employer] service: None     Active member of club or organization: None     Attends meetings of clubs or organizations: None     Relationship status: None    Intimate partner violence     Fear of current or ex partner: None     Emotionally abused: None     Physically abused: None     Forced sexual activity: None   Other Topics Concern    None   Social History Narrative    CODE STATUS: Limited Code - NO INTUBATION    HEALTH CARE PROXY: Her Mother, Mrs. Alfreda Robins, [phone]    AMBULATES: independently    DOMICILED: lives in a private home alone, no stairs, no steps to enter home, has a cat       SCREENINGS    [place] Coma Scale  Eye Opening: Spontaneous  Best Verbal Response: Oriented  Best Motor Response: Obeys commands  [place] Coma Scale Score: 15        PHYSICAL EXAM    (up to 7 for level 4, 8 or more for level 5)     ED Triage Vitals   BP Temp Temp src Pulse Resp SpO2 Height Weight   05/03/21 2019 05/03/21 2017 -- 05/03/21 2017 05/03/21 2017 05/03/21 2017 -- 05/03/21 2017   117/82 98.4 °F (36.9 °C)  93 16 97 %  (!) 351 lb (159.2 kg)       Physical Exam  Vitals signs reviewed. Constitutional:       General: She is not in acute distress. Appearance: She is well-developed. She is obese. HENT:      Head: Normocephalic and atraumatic.    Eyes:      General: components within normal limits   POCT GLUCOSE - Abnormal; Notable for the following components:    POC Glucose 45 (*)     All other components within normal limits   POCT GLUCOSE - Abnormal; Notable for the following components:    POC Glucose 57 (*)     All other components within normal limits   POCT GLUCOSE - Normal   POCT GLUCOSE - Normal   COVID-19, RAPID   CULTURE, BLOOD 1   CULTURE, BLOOD 2   CULTURE, WOUND    Narrative:     ORDER#: [id number]                          ORDERED BY: Ruddy Kaiser  SOURCE: Leg Left Lower Leg                 COLLECTED:  05/03/21 23:40  ANTIBIOTICS AT [facility].:                      RECEIVED :  05/04/21 00:04   HCG, SERUM, QUALITATIVE   MAGNESIUM   POCT GLUCOSE   POCT GLUCOSE       All other labs were within normal range or not returned as of this dictation. EMERGENCY DEPARTMENT COURSE and DIFFERENTIALDIAGNOSIS/MDM:   Vitals:    Vitals:    05/03/21 2350 05/04/21 0100 05/04/21 0131 05/04/21 0144   BP: (!) 150/60  (!) 171/101 (!) 154/83   Pulse: 96 103 105 107   Resp: 16 20 25 16   Temp: 97.6 °F (36.4 °C)  98.7 °F (37.1 °C)    TempSrc: Infrared  Temporal    SpO2: 98% 95% 98% 95%   Weight:   (!) 344 lb 11.2 oz (156.4 kg)    Height:   5' 3\" (1.6 m)        MDM  Recent wound cultures from leg wound grew out the following    Klebsiella aerogenes Enterobacter hormaechei Enterococcus  faecalis     Patient has multiple allergies. Vancomycin ordered but will not cover everything. Spoke with Author Cesario, pharmacist, who is agreeable with plan for Merrem empirically and said that given patient's allergies is very unlikely that this will cause a problem. Patient said that she did not miss any meals and took her insulin like normal but felt shaky earlier. Blood sugar was 40 initially and she had juice and fruit and blood sugar now 45. D50 ordered. Sugar improving. Glucose 90. Patient's case discussed with hospitalist, and Karen Dodd, is agreeable plan of care and admission.   Patient updated about plan. CONSULTS:  IP CONSULT TO PHARMACY  IP CONSULT TO INFECTIOUS DISEASES    PROCEDURES:  Unless otherwise notedbelow, none     Procedures    FINAL IMPRESSION     1. Cellulitis of lower extremity, unspecified laterality    2. Open wound    3.  Hypoglycemia          DISPOSITION/PLAN   DISPOSITION Admitted 05/03/2021 10:48:36 PM      PATIENT REFERRED TO:  @FUP@    DISCHARGE MEDICATIONS:  Discharge Medication List as of 5/4/2021  3:41 AM             (Please note that portions of this note were completed with a voice recognition program.  Efforts were made to edit the dictations butoccasionally words are mis-transcribed.)    Nestor Medina MD (electronically signed)  AttendingEmergency Physician         Nestor Medina MD  05/04/21 0974

## 2021-05-04 NOTE — TELEPHONE ENCOUNTER
See below and also HH is asking for the ok for resuming care since pt left  Hospital AMA  - is it ok for resuming [facility] nursing  ?

## 2021-05-04 NOTE — H&P
Patient Information:  Patient: Jacey Moore  MRN: [id number]   Acct: [de-identified]  YOB: 1970  Admit Date: 5/3/2021       Primary Care Physician: NANETTE Martinez  Advance Directive: Limited - Do Not [place] Proxy: Her Mother, Mrs. Kurt Tolliver, [phone]        SUBJECTIVE:    Chief Complaint   Patient presents with    Cellulitis     EP Sign Out:  MDRO Cellulitis of Legs: failed Tx after D/C  Vanco to be dosed by PK  Merrem 1g IV Q8h  Will complete last 8 doses of Doxy  Follow up Cxx  ID consult in AM  Contact precautions  Blood sugars keep dropping    HPI:  Mrs. Jacey Moore is a pleasant 52year old  american lady from home. She has been having leg problems for the last year and a half. The left side is more severe, and was where all this started. She states that she does not know how it began, she never  the skin nor had any injuries. She states that despite hospital visits and multiple antibiotics it has never gone away. She has seen Dr. Kurt Borja for this, she has an appointment with them for May 6th. When she was last seen by him she states that he did not think she had an infection. She states that she has had drainage from the backs of her calves. She states that it has periodically seemed to be getting better but then comes right back again. Review of Systems:   Review of Systems   Constitutional: Positive for chills. Negative for diaphoresis, fatigue and fever. Respiratory: Negative for shortness of breath. Cardiovascular: Negative for chest pain, palpitations and leg swelling. Gastrointestinal: Positive for nausea. Negative for abdominal pain, constipation, diarrhea and vomiting. Genitourinary: Negative for dysuria and frequency. Has chronic urinary urgency   Neurological: Negative for weakness and light-headedness. Psychiatric/Behavioral: Negative for confusion.      Past Medical History:   Diagnosis Date    Anxiety     patient at next visit     Tobramycin Other (See Comments)     States had corneal transplants - and was told not to    Tramadol      Will discuss with patient at next visit     Amoxicillin-Pot Clavulanate Hives, Itching and Rash    Ancef [Cefazolin] Nausea And Vomiting    Bactrim [Sulfamethoxazole-Trimethoprim] Nausea And Vomiting and Other (See Comments)     States she also gets yeast infections with it    Cephalexin Rash    Clindamycin/Lincomycin Rash    Codeine Nausea And Vomiting and Rash    Demerol Hives    Doxycycline Nausea And Vomiting    Hydroxyzine Hcl Itching    Ibuprofen Nausea Only    Keflex [Cephalexin] Rash    Meperidine Hives    Moxifloxacin Nausea And Vomiting    Nortriptyline Hives, Itching and Rash    Orphenadrine Citrate Itching    Penicillins Hives and Nausea Only    Vistaril [Hydroxyzine Hcl] Itching     Home Medications:  Prior to Admission medications    Medication Sig Start Date End Date Taking?  Authorizing Provider   triamterene-hydroCHLOROthiazide (MAXZIDE-25) 37.5-25 MG per tablet TAKE 1 TABLET BY MOUTH DAILY 7/36/55  Yes NANETTE Leonardo   DULoxetine (CYMBALTA) 60 MG extended release capsule TAKE 1 CAPSULE BY MOUTH TWICE DAILY 1/36/88  Yes NANETTE Leonardo   fluticasone (FLONASE) 50 MCG/ACT nasal spray SHAKE LIQUID AND USE 1 SPRAY IN EACH NOSTRIL DAILY 0/37/68  Yes NANETTE Leonardo   ondansetron (ZOFRAN-ODT) 4 MG disintegrating tablet DISSOLVE 1 TABLET UNDER THE TONGUE EVERY 8 HOURS AS NEEDED FOR NAUSEA AND VOMITING(TAKE PRIOR TO DOXYCYLINE DOSES) 6/41/77  Yes NANETTE Leonardo   Liraglutide (VICTOZA) 18 MG/3ML SOPN SC injection ADMINISTER 1.8 MG INTO THE SKIN EVERY DAY 7/74/26  Yes NANETTE Leonardo   doxycycline hyclate (VIBRAMYCIN) 100 MG capsule Take 1 capsule by mouth 2 times daily for 10 days 6/32/27 6/9/16 Yes NANETTE Leonardo   busPIRone (BUSPAR) 10 MG tablet TAKE 1 TABLET BY MOUTH THREE TIMES DAILY 3/70/53  Yes David Riley NANETTE Haque   pramipexole (MIRAPEX) 0.75 MG tablet TAKE 1 TABLET BY MOUTH TWICE DAILY 5/92/04  Yes NANETTE Stephens   clonazePAM (KLONOPIN) 0.5 MG tablet Take 1 tablet by mouth 2 times daily as needed for Anxiety for up to 30 days. 6/90/64 1/74/90 Yes NANETTE Stephens   omeprazole (PRILOSEC) 20 MG delayed release capsule TAKE ONE CAPSULE BY MOUTH TWICE DAILY BEFORE MEALS 6/37/64  Yes NANETTE Stephens   rizatriptan (MAXALT) 10 MG tablet Take 1 tablet by mouth once as needed for Migraine May repeat in 2 hours if needed 9/54/12 6/9/89 Yes NANETTE Stephens   potassium chloride (KLOR-CON M) 20 MEQ extended release tablet Take 1 tablet by mouth daily  Patient taking differently: Take 20 mEq by mouth daily as needed  5/93/17  Yes NANETTE Stephens   mirtazapine (REMERON) 15 MG tablet TAKE 1 TABLET BY MOUTH EVERY NIGHT 5/15/07  Yes NANETTE Stephens   BASAGLAR KWIKPEN 100 UNIT/ML injection pen INJECT 30 UNITS INTO THE SKIN EVERY EVENING 7/3/28  Yes NANETTE Stephens   oxyCODONE-acetaminophen (PERCOCET) 7.5-325 MG per tablet Take 1 tablet by mouth 2 times daily. Yes Historical Provider, MD   tiZANidine (ZANAFLEX) 4 MG tablet Take 4 mg by mouth daily as needed   Yes Historical Provider, MD   NONFORMULARY Prednisone eye drops 1 drop in each eye daily   Yes Historical Provider, MD   Morphine Sulfate ER 15 MG T12A Take 15 mg by mouth 2 times daily. Yes Historical Provider, MD   Cream Base CREA APPLY ONE PUMP (GRAM) TO AFFECTED AREA(S) THREE TO FOUR TIMES A DAY TO THE APPENDAGES AND TRUNK AS DIRECTED 9/11/20  Yes Mary Engel MD   celecoxib (CELEBREX) 200 MG capsule TAKE 1 CAPSULE BY MOUTH EVERY DAY 9/4/20  Yes Mary Engel MD   furosemide (LASIX) 80 MG tablet Take 1 tablet by mouth 2 times daily  Patient taking differently: Take 80 mg by mouth 2 times daily as needed  7/31/20  Yes Nico Virgen PA-C   gabapentin (NEURONTIN) 300 MG capsule Take 300 mg by mouth 3 times daily. Appearance: She is obese. She is ill-appearing. She is not toxic-appearing. HENT:      Head: Normocephalic and atraumatic. Nose: No congestion or rhinorrhea. Eyes:      General:         Right eye: No discharge. Left eye: No discharge. Neck:      Musculoskeletal: Neck supple. Comments: symmetric  Cardiovascular:      Rate and Rhythm: Regular rhythm. Tachycardia present. Heart sounds: No murmur. No friction rub. No gallop. Pulmonary:      Effort: No respiratory distress. Breath sounds: No stridor. No wheezing, rhonchi or rales. Chest:      Chest wall: No tenderness. Abdominal:      General: Bowel sounds are normal.      Tenderness: There is no abdominal tenderness. There is no guarding or rebound. Comments: Has abdominal and upper thigh and buttock tendencies for her obesity   Musculoskeletal:         General: Tenderness and deformity present. Comments: No wasting of muscle stores, extreme fat stores   Skin:     General: Skin is warm. Comments: Nondiaphoretic, has erthematous callorous hot skin bilaterally on lower legs with left slightly more severe than right   Neurological:      Mental Status: She is alert. Cranial Nerves: No dysarthria. Motor: No tremor or seizure activity.    Psychiatric:         Speech: Speech normal.         Behavior: Behavior normal.      Comments: Intermittently making crying noises that cease completely when engaged in any conversation       LABORATORY DATA:    CBC:   Recent Labs     05/03/21 2141   WBC 7.3   HGB 11.4*   HCT 37.4        BMP:   Recent Labs     05/03/21 2141      K 3.3*   CL 99   CO2 27   BUN 16   CREATININE 1.0*   CALCIUM 9.8     Hepatic Profile:   Recent Labs     05/03/21 2141   AST 16   ALT 9   BILITOT 0.3   ALKPHOS 73     A1C:   Recent Labs     05/03/21 2141   LABA1C 9.6*       Urinalysis:   Lab Results   Component Value Date    NITRU Negative 04/11/2021    WBCUA 16 06/30/2020 BACTERIA 1+ 06/30/2020    RBCUA 0 06/30/2020    BLOODU Negative 04/11/2021    SPECGRAV 1.020 04/11/2021    GLUCOSEU Negative 04/11/2021       IMAGING:  No results found. ASESSMENTS & PLANS:    Hypoglycemia: with recurrences in the ED  Admit to ICU for close monitoring  POCT Scheduled and PRN  Hypoglycemics Orders Set  D10 infusion  Continue   insulin glargine  30 Units Subcutaneous Nightly   And as needed    insulin lispro  0-18 Units Subcutaneous TID WC    insulin lispro  0-9 Units Subcutaneous Nightly     Bilateral Calf Cellulitis due to MDRO: with molly, a vanco sensitive & 2 merrem sesnitive bugs as per EP  Pharmacy to dose vancomycin  Merrem 1g IV Q8h  Follow up ED Cxx  Doxy 100mg PO Q12h for 8 more doses to complete home course  ID consultation in AM  Demarcate borders of cellulitis with sharpie  Continue home lomg acting narcotic and neurontin  Morphine pain scale  Narcan PRN  Continue Celebrex 200mg PO BID    Chronic Home Medical Problems:  Continue home regimen   busPIRone  10 mg Oral TID    furosemide  80 mg Oral BID    fluticasone  1 spray Each Nostril Daily    pantoprazole  40 mg Oral BID AC    pramipexole  0.75 mg Oral BID   &   triamterene-hydroCHLOROthiazide  1 tablet Oral Daily   &   DULoxetine  60 mg Oral BID    mirtazapine  15 mg Oral Nightly     Supoportive and Prophylactic Txx:  DVT Prophylaxis: Lovenox 40mg SQ BID as per BMI>50 and Mass >100kg  GI (PUD) Ppx: not indicated as such but covered as per home regimen  PT consult for evalutation and Txx as indicated: as per critical illness  DIET CARDIAC; Carb Control: 3 carb choices (45 gms)/meal; Low Sodium (2 GM)  glucose, dextrose, glucagon (rDNA), dextrose, sodium chloride flush, sodium chloride, acetaminophen **OR** acetaminophen, ondansetron **OR** ondansetron, polyethylene glycol, melatonin, naloxone, clonazePAM      Critical Care time of >55 minutes  Pt seen/examined and admitted to inpatient status.   Inpatient status is used for patients with an expected LOS extending past two midnights due to medical therapy and or critical care needs, otherwise patients are placed to OBServation status. Signed:  Electronically signed by Daniela Lee MD on 5/4/21 at 01:55 AM CDT.

## 2021-05-04 NOTE — TELEPHONE ENCOUNTER
She needs to see infectious disease/wound care for further treatment. Ana María Hodgson for Jack Cortes.

## 2021-05-04 NOTE — TELEPHONE ENCOUNTER
I am not seeing where anything was ordered for that culture , however pt has been to ED last night and had another wound culture done

## 2021-05-05 ENCOUNTER — TELEPHONE (OUTPATIENT)
Dept: INTERNAL MEDICINE CLINIC | Age: 51
End: 2021-05-05

## 2021-05-05 NOTE — TELEPHONE ENCOUNTER
[address] [id number] nurse requesting if it would be ok to just use ACE wraps for the edema to RLE . Geno Amador Pt is taking unna boots off RLE before they are supposed to come off amd having cgs re wrap them so they are using more supplies that are needed     Can they change the RLE wound care to ace wraps and continue the unna boot tot he LLE ?

## 2021-05-06 ENCOUNTER — HOSPITAL ENCOUNTER (OUTPATIENT)
Dept: INFUSION THERAPY | Age: 51
Setting detail: INFUSION SERIES
Discharge: HOME | End: 2021-05-06
Payer: MEDICAID

## 2021-05-06 VITALS
OXYGEN SATURATION: 99 % | DIASTOLIC BLOOD PRESSURE: 78 MMHG | TEMPERATURE: 97.2 F | HEART RATE: 85 BPM | RESPIRATION RATE: 18 BRPM | SYSTOLIC BLOOD PRESSURE: 146 MMHG

## 2021-05-06 DIAGNOSIS — I87.2 PERIPHERAL VENOUS INSUFFICIENCY: Primary | ICD-10-CM

## 2021-05-06 DIAGNOSIS — Z88.1 PERSONAL HISTORY OF ALLERGY TO ANTIBIOTIC AGENT: ICD-10-CM

## 2021-05-06 DIAGNOSIS — L03.115 BILATERAL LOWER LEG CELLULITIS: ICD-10-CM

## 2021-05-06 DIAGNOSIS — L03.116 BILATERAL LOWER LEG CELLULITIS: ICD-10-CM

## 2021-05-06 DIAGNOSIS — I89.0 LYMPHEDEMA OF BOTH LOWER EXTREMITIES: ICD-10-CM

## 2021-05-06 DIAGNOSIS — I87.2 PERIPHERAL VENOUS INSUFFICIENCY: ICD-10-CM

## 2021-05-06 PROCEDURE — C1751 CATH, INF, PER/CENT/MIDLINE: HCPCS

## 2021-05-06 PROCEDURE — 2580000003 HC RX 258: Performed by: INTERNAL MEDICINE

## 2021-05-06 PROCEDURE — 36410 VNPNXR 3YR/> PHY/QHP DX/THER: CPT

## 2021-05-06 PROCEDURE — 6360000002 HC RX W HCPCS: Performed by: INTERNAL MEDICINE

## 2021-05-06 PROCEDURE — 76937 US GUIDE VASCULAR ACCESS: CPT

## 2021-05-06 PROCEDURE — 96365 THER/PROPH/DIAG IV INF INIT: CPT

## 2021-05-06 RX ORDER — HEPARIN SODIUM (PORCINE) LOCK FLUSH IV SOLN 100 UNIT/ML 100 UNIT/ML
500 SOLUTION INTRAVENOUS PRN
Status: CANCELLED | OUTPATIENT
Start: 2021-05-06

## 2021-05-06 RX ORDER — METHYLPREDNISOLONE SODIUM SUCCINATE 125 MG/2ML
125 INJECTION, POWDER, LYOPHILIZED, FOR SOLUTION INTRAMUSCULAR; INTRAVENOUS ONCE
Status: CANCELLED | OUTPATIENT
Start: 2021-05-06 | End: 2021-05-06

## 2021-05-06 RX ORDER — DIPHENHYDRAMINE HYDROCHLORIDE 50 MG/ML
50 INJECTION INTRAMUSCULAR; INTRAVENOUS ONCE
Status: CANCELLED | OUTPATIENT
Start: 2021-05-06 | End: 2021-05-06

## 2021-05-06 RX ORDER — METHYLPREDNISOLONE SODIUM SUCCINATE 125 MG/2ML
125 INJECTION, POWDER, LYOPHILIZED, FOR SOLUTION INTRAMUSCULAR; INTRAVENOUS ONCE
Status: CANCELLED | OUTPATIENT
Start: 2021-05-07 | End: 2021-05-07

## 2021-05-06 RX ORDER — HEPARIN SODIUM (PORCINE) LOCK FLUSH IV SOLN 100 UNIT/ML 100 UNIT/ML
500 SOLUTION INTRAVENOUS PRN
Status: CANCELLED | OUTPATIENT
Start: 2021-05-07

## 2021-05-06 RX ORDER — EPINEPHRINE 1 MG/ML
0.3 INJECTION, SOLUTION, CONCENTRATE INTRAVENOUS PRN
Status: CANCELLED | OUTPATIENT
Start: 2021-05-06

## 2021-05-06 RX ORDER — SODIUM CHLORIDE 0.9 % (FLUSH) 0.9 %
5-40 SYRINGE (ML) INJECTION PRN
Status: DISCONTINUED | OUTPATIENT
Start: 2021-05-06 | End: 2021-05-07 | Stop reason: HOSPADM

## 2021-05-06 RX ORDER — SODIUM CHLORIDE 9 MG/ML
25 INJECTION, SOLUTION INTRAVENOUS PRN
Status: CANCELLED | OUTPATIENT
Start: 2021-05-06

## 2021-05-06 RX ORDER — SODIUM CHLORIDE 0.9 % (FLUSH) 0.9 %
5-40 SYRINGE (ML) INJECTION PRN
Status: CANCELLED | OUTPATIENT
Start: 2021-05-07

## 2021-05-06 RX ORDER — SODIUM CHLORIDE 9 MG/ML
INJECTION, SOLUTION INTRAVENOUS CONTINUOUS
Status: CANCELLED | OUTPATIENT
Start: 2021-05-07

## 2021-05-06 RX ORDER — SODIUM CHLORIDE 9 MG/ML
INJECTION, SOLUTION INTRAVENOUS CONTINUOUS
Status: CANCELLED | OUTPATIENT
Start: 2021-05-06

## 2021-05-06 RX ORDER — DIPHENHYDRAMINE HYDROCHLORIDE 50 MG/ML
50 INJECTION INTRAMUSCULAR; INTRAVENOUS ONCE
Status: CANCELLED | OUTPATIENT
Start: 2021-05-07 | End: 2021-05-07

## 2021-05-06 RX ORDER — EPINEPHRINE 1 MG/ML
0.3 INJECTION, SOLUTION, CONCENTRATE INTRAVENOUS PRN
Status: CANCELLED | OUTPATIENT
Start: 2021-05-07

## 2021-05-06 RX ORDER — SODIUM CHLORIDE 0.9 % (FLUSH) 0.9 %
5-40 SYRINGE (ML) INJECTION PRN
Status: CANCELLED | OUTPATIENT
Start: 2021-05-06

## 2021-05-06 RX ORDER — SODIUM CHLORIDE 9 MG/ML
25 INJECTION, SOLUTION INTRAVENOUS PRN
Status: CANCELLED | OUTPATIENT
Start: 2021-05-07

## 2021-05-06 RX ADMIN — SODIUM CHLORIDE 1000 MG: 9 INJECTION INTRAMUSCULAR; INTRAVENOUS; SUBCUTANEOUS at 14:57

## 2021-05-06 RX ADMIN — Medication 10 ML: at 15:30

## 2021-05-06 NOTE — PROCEDURES
Midline Insertion Procedure Note    Procedure: Insertion of #4.5 FR/17G Midline    Indications:  Poor Access/IV antibiotics x 9 days    Procedure Details   Informed consent was obtained for the procedure, including local anesthetic. Risks and benefits were discussed. #4.5 FR/17G PICC inserted to the L Brachial vein per hospital protocol. Blood return:  yes    Findings:  Catheter inserted to 15 cm, with 0 cm exposed. Catheter was flushed with 10 cc NS. Patient did tolerate procedure well. Recommendations:  Tip distal to axilla per measurements. Okay to use. Midline Brochure given to patient with teaching instruction.

## 2021-05-07 ENCOUNTER — HOSPITAL ENCOUNTER (OUTPATIENT)
Dept: INFUSION THERAPY | Age: 51
Setting detail: INFUSION SERIES
Discharge: HOME | End: 2021-05-07
Payer: MEDICAID

## 2021-05-07 VITALS
HEART RATE: 76 BPM | OXYGEN SATURATION: 97 % | RESPIRATION RATE: 18 BRPM | DIASTOLIC BLOOD PRESSURE: 81 MMHG | TEMPERATURE: 98.1 F | SYSTOLIC BLOOD PRESSURE: 139 MMHG

## 2021-05-07 DIAGNOSIS — Z88.1 PERSONAL HISTORY OF ALLERGY TO ANTIBIOTIC AGENT: ICD-10-CM

## 2021-05-07 DIAGNOSIS — I89.0 LYMPHEDEMA OF BOTH LOWER EXTREMITIES: ICD-10-CM

## 2021-05-07 DIAGNOSIS — L03.116 BILATERAL LOWER LEG CELLULITIS: ICD-10-CM

## 2021-05-07 DIAGNOSIS — I87.2 PERIPHERAL VENOUS INSUFFICIENCY: Primary | ICD-10-CM

## 2021-05-07 DIAGNOSIS — L03.115 BILATERAL LOWER LEG CELLULITIS: ICD-10-CM

## 2021-05-07 PROCEDURE — 2580000003 HC RX 258: Performed by: INTERNAL MEDICINE

## 2021-05-07 PROCEDURE — 6360000002 HC RX W HCPCS: Performed by: INTERNAL MEDICINE

## 2021-05-07 PROCEDURE — 96374 THER/PROPH/DIAG INJ IV PUSH: CPT

## 2021-05-07 RX ORDER — HEPARIN SODIUM (PORCINE) LOCK FLUSH IV SOLN 100 UNIT/ML 100 UNIT/ML
500 SOLUTION INTRAVENOUS PRN
Status: CANCELLED | OUTPATIENT
Start: 2021-05-08

## 2021-05-07 RX ORDER — METHYLPREDNISOLONE SODIUM SUCCINATE 125 MG/2ML
125 INJECTION, POWDER, LYOPHILIZED, FOR SOLUTION INTRAMUSCULAR; INTRAVENOUS ONCE
Status: CANCELLED | OUTPATIENT
Start: 2021-05-08 | End: 2021-05-08

## 2021-05-07 RX ORDER — SODIUM CHLORIDE 9 MG/ML
25 INJECTION, SOLUTION INTRAVENOUS PRN
Status: CANCELLED | OUTPATIENT
Start: 2021-05-08

## 2021-05-07 RX ORDER — SODIUM CHLORIDE 9 MG/ML
INJECTION, SOLUTION INTRAVENOUS CONTINUOUS
Status: CANCELLED | OUTPATIENT
Start: 2021-05-08

## 2021-05-07 RX ORDER — DIPHENHYDRAMINE HYDROCHLORIDE 50 MG/ML
50 INJECTION INTRAMUSCULAR; INTRAVENOUS ONCE
Status: CANCELLED | OUTPATIENT
Start: 2021-05-08 | End: 2021-05-08

## 2021-05-07 RX ORDER — SODIUM CHLORIDE 0.9 % (FLUSH) 0.9 %
5-40 SYRINGE (ML) INJECTION PRN
Status: CANCELLED | OUTPATIENT
Start: 2021-05-08

## 2021-05-07 RX ORDER — EPINEPHRINE 1 MG/ML
0.3 INJECTION, SOLUTION, CONCENTRATE INTRAVENOUS PRN
Status: CANCELLED | OUTPATIENT
Start: 2021-05-08

## 2021-05-07 RX ADMIN — SODIUM CHLORIDE, PRESERVATIVE FREE 1000 MG: 5 INJECTION INTRAVENOUS at 14:00

## 2021-05-08 ENCOUNTER — HOSPITAL ENCOUNTER (OUTPATIENT)
Age: 51
Discharge: HOME | End: 2021-05-08
Attending: INTERNAL MEDICINE | Admitting: INTERNAL MEDICINE
Payer: MEDICAID

## 2021-05-08 DIAGNOSIS — I87.2 PERIPHERAL VENOUS INSUFFICIENCY: Primary | ICD-10-CM

## 2021-05-08 DIAGNOSIS — L03.116 BILATERAL LOWER LEG CELLULITIS: ICD-10-CM

## 2021-05-08 DIAGNOSIS — Z88.1 PERSONAL HISTORY OF ALLERGY TO ANTIBIOTIC AGENT: ICD-10-CM

## 2021-05-08 DIAGNOSIS — L03.115 BILATERAL LOWER LEG CELLULITIS: ICD-10-CM

## 2021-05-08 DIAGNOSIS — I89.0 LYMPHEDEMA OF BOTH LOWER EXTREMITIES: ICD-10-CM

## 2021-05-08 LAB
GRAM STAIN RESULT: ABNORMAL
ORGANISM: ABNORMAL
WOUND/ABSCESS: ABNORMAL

## 2021-05-08 PROCEDURE — 96374 THER/PROPH/DIAG INJ IV PUSH: CPT

## 2021-05-08 PROCEDURE — 2580000003 HC RX 258: Performed by: INTERNAL MEDICINE

## 2021-05-08 PROCEDURE — 6360000002 HC RX W HCPCS: Performed by: INTERNAL MEDICINE

## 2021-05-08 RX ORDER — SODIUM CHLORIDE 9 MG/ML
INJECTION, SOLUTION INTRAVENOUS CONTINUOUS
Status: CANCELLED | OUTPATIENT
Start: 2021-05-09

## 2021-05-08 RX ORDER — SODIUM CHLORIDE 0.9 % (FLUSH) 0.9 %
5-40 SYRINGE (ML) INJECTION PRN
Status: CANCELLED | OUTPATIENT
Start: 2021-05-09

## 2021-05-08 RX ORDER — METHYLPREDNISOLONE SODIUM SUCCINATE 125 MG/2ML
125 INJECTION, POWDER, LYOPHILIZED, FOR SOLUTION INTRAMUSCULAR; INTRAVENOUS ONCE
Status: CANCELLED | OUTPATIENT
Start: 2021-05-09 | End: 2021-05-09

## 2021-05-08 RX ORDER — HEPARIN SODIUM (PORCINE) LOCK FLUSH IV SOLN 100 UNIT/ML 100 UNIT/ML
500 SOLUTION INTRAVENOUS PRN
Status: CANCELLED | OUTPATIENT
Start: 2021-05-09

## 2021-05-08 RX ORDER — EPINEPHRINE 1 MG/ML
0.3 INJECTION, SOLUTION, CONCENTRATE INTRAVENOUS PRN
Status: CANCELLED | OUTPATIENT
Start: 2021-05-09

## 2021-05-08 RX ORDER — SODIUM CHLORIDE 9 MG/ML
25 INJECTION, SOLUTION INTRAVENOUS PRN
Status: CANCELLED | OUTPATIENT
Start: 2021-05-09

## 2021-05-08 RX ORDER — DIPHENHYDRAMINE HYDROCHLORIDE 50 MG/ML
50 INJECTION INTRAMUSCULAR; INTRAVENOUS ONCE
Status: CANCELLED | OUTPATIENT
Start: 2021-05-09 | End: 2021-05-09

## 2021-05-08 RX ADMIN — SODIUM CHLORIDE 1000 MG: 9 INJECTION, SOLUTION INTRAMUSCULAR; INTRAVENOUS; SUBCUTANEOUS at 14:13

## 2021-05-09 ENCOUNTER — HOSPITAL ENCOUNTER (OUTPATIENT)
Age: 51
Discharge: HOME | End: 2021-05-09
Attending: INTERNAL MEDICINE | Admitting: INTERNAL MEDICINE
Payer: MEDICAID

## 2021-05-09 DIAGNOSIS — I87.2 PERIPHERAL VENOUS INSUFFICIENCY: Primary | ICD-10-CM

## 2021-05-09 DIAGNOSIS — Z88.1 PERSONAL HISTORY OF ALLERGY TO ANTIBIOTIC AGENT: ICD-10-CM

## 2021-05-09 DIAGNOSIS — L03.115 BILATERAL LOWER LEG CELLULITIS: ICD-10-CM

## 2021-05-09 DIAGNOSIS — I89.0 LYMPHEDEMA OF BOTH LOWER EXTREMITIES: ICD-10-CM

## 2021-05-09 DIAGNOSIS — L03.116 BILATERAL LOWER LEG CELLULITIS: ICD-10-CM

## 2021-05-09 LAB
BLOOD CULTURE, ROUTINE: NORMAL
CULTURE, BLOOD 2: NORMAL

## 2021-05-09 PROCEDURE — 2580000003 HC RX 258: Performed by: INTERNAL MEDICINE

## 2021-05-09 PROCEDURE — 96375 TX/PRO/DX INJ NEW DRUG ADDON: CPT

## 2021-05-09 PROCEDURE — 6360000002 HC RX W HCPCS: Performed by: INTERNAL MEDICINE

## 2021-05-09 PROCEDURE — 96365 THER/PROPH/DIAG IV INF INIT: CPT

## 2021-05-09 RX ORDER — KETOROLAC TROMETHAMINE 30 MG/ML
15 INJECTION, SOLUTION INTRAMUSCULAR; INTRAVENOUS ONCE
Status: COMPLETED | OUTPATIENT
Start: 2021-05-09 | End: 2021-05-09

## 2021-05-09 RX ORDER — METHYLPREDNISOLONE SODIUM SUCCINATE 125 MG/2ML
125 INJECTION, POWDER, LYOPHILIZED, FOR SOLUTION INTRAMUSCULAR; INTRAVENOUS ONCE
Status: CANCELLED | OUTPATIENT
Start: 2021-05-10 | End: 2021-05-10

## 2021-05-09 RX ORDER — DIPHENHYDRAMINE HYDROCHLORIDE 50 MG/ML
50 INJECTION INTRAMUSCULAR; INTRAVENOUS ONCE
Status: CANCELLED | OUTPATIENT
Start: 2021-05-10 | End: 2021-05-10

## 2021-05-09 RX ORDER — HEPARIN SODIUM (PORCINE) LOCK FLUSH IV SOLN 100 UNIT/ML 100 UNIT/ML
500 SOLUTION INTRAVENOUS PRN
Status: CANCELLED | OUTPATIENT
Start: 2021-05-10

## 2021-05-09 RX ORDER — SODIUM CHLORIDE 9 MG/ML
25 INJECTION, SOLUTION INTRAVENOUS PRN
Status: CANCELLED | OUTPATIENT
Start: 2021-05-10

## 2021-05-09 RX ORDER — EPINEPHRINE 1 MG/ML
0.3 INJECTION, SOLUTION, CONCENTRATE INTRAVENOUS PRN
Status: CANCELLED | OUTPATIENT
Start: 2021-05-10

## 2021-05-09 RX ORDER — SODIUM CHLORIDE 0.9 % (FLUSH) 0.9 %
5-40 SYRINGE (ML) INJECTION PRN
Status: CANCELLED | OUTPATIENT
Start: 2021-05-10

## 2021-05-09 RX ORDER — SODIUM CHLORIDE 9 MG/ML
INJECTION, SOLUTION INTRAVENOUS CONTINUOUS
Status: CANCELLED | OUTPATIENT
Start: 2021-05-10

## 2021-05-09 RX ADMIN — SODIUM CHLORIDE 1000 MG: 900 INJECTION INTRAVENOUS at 15:15

## 2021-05-09 RX ADMIN — KETOROLAC TROMETHAMINE 15 MG: 30 INJECTION, SOLUTION INTRAMUSCULAR; INTRAVENOUS at 15:15

## 2021-05-09 ASSESSMENT — PAIN DESCRIPTION - PAIN TYPE: TYPE: CHRONIC PAIN

## 2021-05-09 ASSESSMENT — PAIN DESCRIPTION - ORIENTATION: ORIENTATION: LEFT

## 2021-05-09 ASSESSMENT — PAIN DESCRIPTION - LOCATION: LOCATION: LEG

## 2021-05-10 ENCOUNTER — TELEPHONE (OUTPATIENT)
Dept: PRIMARY CARE CLINIC | Age: 51
End: 2021-05-10

## 2021-05-10 ENCOUNTER — HOSPITAL ENCOUNTER (OUTPATIENT)
Dept: INFUSION THERAPY | Age: 51
Setting detail: INFUSION SERIES
Discharge: HOME | End: 2021-05-10
Payer: MEDICAID

## 2021-05-10 VITALS
TEMPERATURE: 97.1 F | OXYGEN SATURATION: 94 % | HEART RATE: 74 BPM | RESPIRATION RATE: 20 BRPM | DIASTOLIC BLOOD PRESSURE: 67 MMHG | SYSTOLIC BLOOD PRESSURE: 121 MMHG

## 2021-05-10 DIAGNOSIS — I89.0 LYMPHEDEMA OF BOTH LOWER EXTREMITIES: ICD-10-CM

## 2021-05-10 DIAGNOSIS — L03.115 BILATERAL LOWER LEG CELLULITIS: ICD-10-CM

## 2021-05-10 DIAGNOSIS — Z88.1 PERSONAL HISTORY OF ALLERGY TO ANTIBIOTIC AGENT: ICD-10-CM

## 2021-05-10 DIAGNOSIS — I87.2 PERIPHERAL VENOUS INSUFFICIENCY: Primary | ICD-10-CM

## 2021-05-10 DIAGNOSIS — L03.116 BILATERAL LOWER LEG CELLULITIS: ICD-10-CM

## 2021-05-10 PROCEDURE — 2580000003 HC RX 258: Performed by: INTERNAL MEDICINE

## 2021-05-10 PROCEDURE — 96374 THER/PROPH/DIAG INJ IV PUSH: CPT

## 2021-05-10 PROCEDURE — 6360000002 HC RX W HCPCS: Performed by: INTERNAL MEDICINE

## 2021-05-10 RX ORDER — SODIUM CHLORIDE 9 MG/ML
25 INJECTION, SOLUTION INTRAVENOUS PRN
Status: CANCELLED | OUTPATIENT
Start: 2021-05-11

## 2021-05-10 RX ORDER — DIPHENHYDRAMINE HYDROCHLORIDE 50 MG/ML
50 INJECTION INTRAMUSCULAR; INTRAVENOUS ONCE
Status: CANCELLED | OUTPATIENT
Start: 2021-05-11 | End: 2021-05-11

## 2021-05-10 RX ORDER — METHYLPREDNISOLONE SODIUM SUCCINATE 125 MG/2ML
125 INJECTION, POWDER, LYOPHILIZED, FOR SOLUTION INTRAMUSCULAR; INTRAVENOUS ONCE
Status: CANCELLED | OUTPATIENT
Start: 2021-05-11 | End: 2021-05-11

## 2021-05-10 RX ORDER — EPINEPHRINE 1 MG/ML
0.3 INJECTION, SOLUTION, CONCENTRATE INTRAVENOUS PRN
Status: CANCELLED | OUTPATIENT
Start: 2021-05-11

## 2021-05-10 RX ORDER — HEPARIN SODIUM (PORCINE) LOCK FLUSH IV SOLN 100 UNIT/ML 100 UNIT/ML
500 SOLUTION INTRAVENOUS PRN
Status: CANCELLED | OUTPATIENT
Start: 2021-05-11

## 2021-05-10 RX ORDER — SODIUM CHLORIDE 9 MG/ML
INJECTION, SOLUTION INTRAVENOUS CONTINUOUS
Status: CANCELLED | OUTPATIENT
Start: 2021-05-11

## 2021-05-10 RX ORDER — SODIUM CHLORIDE 0.9 % (FLUSH) 0.9 %
5-40 SYRINGE (ML) INJECTION PRN
Status: CANCELLED | OUTPATIENT
Start: 2021-05-11

## 2021-05-10 RX ADMIN — SODIUM CHLORIDE, PRESERVATIVE FREE 1000 MG: 5 INJECTION INTRAVENOUS at 14:24

## 2021-05-10 NOTE — TELEPHONE ENCOUNTER
The patient has been notified of this information and all questions answered. Patient does have a current neuro doctor and they will not prescribe either. She states they would rather do a procedure than prescribe this medication. Informed patient we would not be able to fill this medication and she voice understood.

## 2021-05-11 ENCOUNTER — HOSPITAL ENCOUNTER (OUTPATIENT)
Dept: INFUSION THERAPY | Age: 51
Setting detail: INFUSION SERIES
Discharge: HOME | End: 2021-05-11
Payer: MEDICAID

## 2021-05-11 VITALS
SYSTOLIC BLOOD PRESSURE: 127 MMHG | OXYGEN SATURATION: 95 % | DIASTOLIC BLOOD PRESSURE: 78 MMHG | HEART RATE: 78 BPM | TEMPERATURE: 97.7 F | RESPIRATION RATE: 20 BRPM

## 2021-05-11 DIAGNOSIS — L03.116 BILATERAL LOWER LEG CELLULITIS: ICD-10-CM

## 2021-05-11 DIAGNOSIS — L03.115 BILATERAL LOWER LEG CELLULITIS: ICD-10-CM

## 2021-05-11 DIAGNOSIS — Z88.1 PERSONAL HISTORY OF ALLERGY TO ANTIBIOTIC AGENT: ICD-10-CM

## 2021-05-11 DIAGNOSIS — I87.2 PERIPHERAL VENOUS INSUFFICIENCY: Primary | ICD-10-CM

## 2021-05-11 DIAGNOSIS — I89.0 LYMPHEDEMA OF BOTH LOWER EXTREMITIES: ICD-10-CM

## 2021-05-11 PROCEDURE — 96365 THER/PROPH/DIAG IV INF INIT: CPT

## 2021-05-11 PROCEDURE — 6360000002 HC RX W HCPCS: Performed by: INTERNAL MEDICINE

## 2021-05-11 PROCEDURE — 2580000003 HC RX 258: Performed by: INTERNAL MEDICINE

## 2021-05-11 RX ORDER — HEPARIN SODIUM (PORCINE) LOCK FLUSH IV SOLN 100 UNIT/ML 100 UNIT/ML
500 SOLUTION INTRAVENOUS PRN
Status: CANCELLED | OUTPATIENT
Start: 2021-05-12

## 2021-05-11 RX ORDER — SODIUM CHLORIDE 9 MG/ML
INJECTION, SOLUTION INTRAVENOUS CONTINUOUS
Status: CANCELLED | OUTPATIENT
Start: 2021-05-12

## 2021-05-11 RX ORDER — SODIUM CHLORIDE 9 MG/ML
25 INJECTION, SOLUTION INTRAVENOUS PRN
Status: CANCELLED | OUTPATIENT
Start: 2021-05-12

## 2021-05-11 RX ORDER — SODIUM CHLORIDE 0.9 % (FLUSH) 0.9 %
5-40 SYRINGE (ML) INJECTION PRN
Status: CANCELLED | OUTPATIENT
Start: 2021-05-12

## 2021-05-11 RX ORDER — SODIUM CHLORIDE 0.9 % (FLUSH) 0.9 %
5-40 SYRINGE (ML) INJECTION PRN
Status: DISCONTINUED | OUTPATIENT
Start: 2021-05-11 | End: 2021-05-12 | Stop reason: HOSPADM

## 2021-05-11 RX ORDER — EPINEPHRINE 1 MG/ML
0.3 INJECTION, SOLUTION, CONCENTRATE INTRAVENOUS PRN
Status: CANCELLED | OUTPATIENT
Start: 2021-05-12

## 2021-05-11 RX ORDER — METHYLPREDNISOLONE SODIUM SUCCINATE 125 MG/2ML
125 INJECTION, POWDER, LYOPHILIZED, FOR SOLUTION INTRAMUSCULAR; INTRAVENOUS ONCE
Status: CANCELLED | OUTPATIENT
Start: 2021-05-12 | End: 2021-05-12

## 2021-05-11 RX ORDER — DIPHENHYDRAMINE HYDROCHLORIDE 50 MG/ML
50 INJECTION INTRAMUSCULAR; INTRAVENOUS ONCE
Status: CANCELLED | OUTPATIENT
Start: 2021-05-12 | End: 2021-05-12

## 2021-05-11 RX ADMIN — SODIUM CHLORIDE, PRESERVATIVE FREE 10 ML: 5 INJECTION INTRAVENOUS at 14:57

## 2021-05-11 RX ADMIN — SODIUM CHLORIDE 1000 MG: 9 INJECTION INTRAMUSCULAR; INTRAVENOUS; SUBCUTANEOUS at 14:25

## 2021-05-12 ENCOUNTER — HOSPITAL ENCOUNTER (OUTPATIENT)
Dept: INFUSION THERAPY | Age: 51
Setting detail: INFUSION SERIES
Discharge: HOME | End: 2021-05-12
Payer: MEDICAID

## 2021-05-12 VITALS
DIASTOLIC BLOOD PRESSURE: 80 MMHG | RESPIRATION RATE: 20 BRPM | SYSTOLIC BLOOD PRESSURE: 151 MMHG | TEMPERATURE: 97.5 F | OXYGEN SATURATION: 98 % | HEART RATE: 91 BPM

## 2021-05-12 DIAGNOSIS — Z88.1 PERSONAL HISTORY OF ALLERGY TO ANTIBIOTIC AGENT: ICD-10-CM

## 2021-05-12 DIAGNOSIS — L03.115 BILATERAL LOWER LEG CELLULITIS: ICD-10-CM

## 2021-05-12 DIAGNOSIS — I87.2 PERIPHERAL VENOUS INSUFFICIENCY: Primary | ICD-10-CM

## 2021-05-12 DIAGNOSIS — I89.0 LYMPHEDEMA OF BOTH LOWER EXTREMITIES: ICD-10-CM

## 2021-05-12 DIAGNOSIS — L03.116 BILATERAL LOWER LEG CELLULITIS: ICD-10-CM

## 2021-05-12 PROCEDURE — 96365 THER/PROPH/DIAG IV INF INIT: CPT

## 2021-05-12 PROCEDURE — 2580000003 HC RX 258: Performed by: INTERNAL MEDICINE

## 2021-05-12 PROCEDURE — 6360000002 HC RX W HCPCS: Performed by: INTERNAL MEDICINE

## 2021-05-12 RX ORDER — METHYLPREDNISOLONE SODIUM SUCCINATE 125 MG/2ML
125 INJECTION, POWDER, LYOPHILIZED, FOR SOLUTION INTRAMUSCULAR; INTRAVENOUS ONCE
Status: CANCELLED | OUTPATIENT
Start: 2021-05-13 | End: 2021-05-13

## 2021-05-12 RX ORDER — SODIUM CHLORIDE 9 MG/ML
25 INJECTION, SOLUTION INTRAVENOUS PRN
Status: CANCELLED | OUTPATIENT
Start: 2021-05-13

## 2021-05-12 RX ORDER — SODIUM CHLORIDE 9 MG/ML
INJECTION, SOLUTION INTRAVENOUS CONTINUOUS
Status: CANCELLED | OUTPATIENT
Start: 2021-05-13

## 2021-05-12 RX ORDER — SODIUM CHLORIDE 0.9 % (FLUSH) 0.9 %
5-40 SYRINGE (ML) INJECTION PRN
Status: CANCELLED | OUTPATIENT
Start: 2021-05-13

## 2021-05-12 RX ORDER — EPINEPHRINE 1 MG/ML
0.3 INJECTION, SOLUTION, CONCENTRATE INTRAVENOUS PRN
Status: CANCELLED | OUTPATIENT
Start: 2021-05-13

## 2021-05-12 RX ORDER — HEPARIN SODIUM (PORCINE) LOCK FLUSH IV SOLN 100 UNIT/ML 100 UNIT/ML
500 SOLUTION INTRAVENOUS PRN
Status: CANCELLED | OUTPATIENT
Start: 2021-05-13

## 2021-05-12 RX ORDER — DIPHENHYDRAMINE HYDROCHLORIDE 50 MG/ML
50 INJECTION INTRAMUSCULAR; INTRAVENOUS ONCE
Status: CANCELLED | OUTPATIENT
Start: 2021-05-13 | End: 2021-05-13

## 2021-05-12 RX ADMIN — SODIUM CHLORIDE 1000 MG: 9 INJECTION INTRAMUSCULAR; INTRAVENOUS; SUBCUTANEOUS at 13:20

## 2021-05-16 ENCOUNTER — HOSPITAL ENCOUNTER (OUTPATIENT)
Age: 51
Discharge: HOME | End: 2021-05-16
Attending: INTERNAL MEDICINE | Admitting: INTERNAL MEDICINE
Payer: MEDICAID

## 2021-05-16 DIAGNOSIS — I89.0 LYMPHEDEMA OF BOTH LOWER EXTREMITIES: ICD-10-CM

## 2021-05-16 DIAGNOSIS — L03.116 BILATERAL LOWER LEG CELLULITIS: ICD-10-CM

## 2021-05-16 DIAGNOSIS — Z88.1 PERSONAL HISTORY OF ALLERGY TO ANTIBIOTIC AGENT: ICD-10-CM

## 2021-05-16 DIAGNOSIS — L03.115 BILATERAL LOWER LEG CELLULITIS: ICD-10-CM

## 2021-05-16 DIAGNOSIS — I87.2 PERIPHERAL VENOUS INSUFFICIENCY: Primary | ICD-10-CM

## 2021-05-16 PROCEDURE — 6360000002 HC RX W HCPCS: Performed by: INTERNAL MEDICINE

## 2021-05-16 PROCEDURE — 96365 THER/PROPH/DIAG IV INF INIT: CPT

## 2021-05-16 PROCEDURE — 2580000003 HC RX 258: Performed by: INTERNAL MEDICINE

## 2021-05-16 RX ORDER — SODIUM CHLORIDE 9 MG/ML
25 INJECTION, SOLUTION INTRAVENOUS PRN
Status: CANCELLED | OUTPATIENT
Start: 2021-05-17

## 2021-05-16 RX ORDER — SODIUM CHLORIDE 0.9 % (FLUSH) 0.9 %
5-40 SYRINGE (ML) INJECTION PRN
Status: DISCONTINUED | OUTPATIENT
Start: 2021-05-16 | End: 2021-05-16 | Stop reason: HOSPADM

## 2021-05-16 RX ORDER — SODIUM CHLORIDE 0.9 % (FLUSH) 0.9 %
5-40 SYRINGE (ML) INJECTION PRN
Status: CANCELLED | OUTPATIENT
Start: 2021-05-17

## 2021-05-16 RX ORDER — DIPHENHYDRAMINE HYDROCHLORIDE 50 MG/ML
50 INJECTION INTRAMUSCULAR; INTRAVENOUS ONCE
Status: CANCELLED | OUTPATIENT
Start: 2021-05-17 | End: 2021-05-17

## 2021-05-16 RX ORDER — HEPARIN SODIUM (PORCINE) LOCK FLUSH IV SOLN 100 UNIT/ML 100 UNIT/ML
500 SOLUTION INTRAVENOUS PRN
Status: DISCONTINUED | OUTPATIENT
Start: 2021-05-16 | End: 2021-05-16 | Stop reason: HOSPADM

## 2021-05-16 RX ORDER — SODIUM CHLORIDE 9 MG/ML
25 INJECTION, SOLUTION INTRAVENOUS PRN
Status: DISCONTINUED | OUTPATIENT
Start: 2021-05-16 | End: 2021-05-16 | Stop reason: HOSPADM

## 2021-05-16 RX ORDER — METHYLPREDNISOLONE SODIUM SUCCINATE 125 MG/2ML
125 INJECTION, POWDER, LYOPHILIZED, FOR SOLUTION INTRAMUSCULAR; INTRAVENOUS ONCE
Status: CANCELLED | OUTPATIENT
Start: 2021-05-17 | End: 2021-05-17

## 2021-05-16 RX ORDER — SODIUM CHLORIDE 9 MG/ML
INJECTION, SOLUTION INTRAVENOUS CONTINUOUS
Status: CANCELLED | OUTPATIENT
Start: 2021-05-17

## 2021-05-16 RX ORDER — HEPARIN SODIUM (PORCINE) LOCK FLUSH IV SOLN 100 UNIT/ML 100 UNIT/ML
500 SOLUTION INTRAVENOUS PRN
Status: CANCELLED | OUTPATIENT
Start: 2021-05-17

## 2021-05-16 RX ORDER — EPINEPHRINE 1 MG/ML
0.3 INJECTION, SOLUTION, CONCENTRATE INTRAVENOUS PRN
Status: CANCELLED | OUTPATIENT
Start: 2021-05-17

## 2021-05-16 RX ADMIN — SODIUM CHLORIDE 1000 MG: 900 INJECTION INTRAVENOUS at 14:30

## 2021-05-18 RX ORDER — DIPHENHYDRAMINE HYDROCHLORIDE 50 MG/ML
50 INJECTION INTRAMUSCULAR; INTRAVENOUS ONCE
Status: CANCELLED | OUTPATIENT
Start: 2021-05-20 | End: 2021-05-20

## 2021-05-18 RX ORDER — SODIUM CHLORIDE 9 MG/ML
INJECTION, SOLUTION INTRAVENOUS CONTINUOUS
Status: CANCELLED | OUTPATIENT
Start: 2021-05-20

## 2021-05-18 RX ORDER — METHYLPREDNISOLONE SODIUM SUCCINATE 125 MG/2ML
125 INJECTION, POWDER, LYOPHILIZED, FOR SOLUTION INTRAMUSCULAR; INTRAVENOUS ONCE
Status: CANCELLED | OUTPATIENT
Start: 2021-05-20 | End: 2021-05-20

## 2021-05-18 RX ORDER — SODIUM CHLORIDE 0.9 % (FLUSH) 0.9 %
5-40 SYRINGE (ML) INJECTION PRN
Status: CANCELLED | OUTPATIENT
Start: 2021-05-20

## 2021-05-19 ENCOUNTER — HOSPITAL ENCOUNTER (EMERGENCY)
Age: 51
Discharge: LEFT BEFORE DISCHARGE | End: 2021-05-20
Payer: MEDICAID

## 2021-05-19 VITALS
HEART RATE: 90 BPM | RESPIRATION RATE: 18 BRPM | OXYGEN SATURATION: 96 % | SYSTOLIC BLOOD PRESSURE: 172 MMHG | DIASTOLIC BLOOD PRESSURE: 89 MMHG | TEMPERATURE: 97.8 F

## 2021-05-20 ENCOUNTER — TELEPHONE (OUTPATIENT)
Dept: PRIMARY CARE CLINIC | Age: 51
End: 2021-05-20

## 2021-05-20 ENCOUNTER — HOSPITAL ENCOUNTER (OUTPATIENT)
Dept: INFUSION THERAPY | Age: 51
Setting detail: INFUSION SERIES
Discharge: HOME | End: 2021-05-20
Payer: MEDICAID

## 2021-05-20 VITALS
RESPIRATION RATE: 18 BRPM | SYSTOLIC BLOOD PRESSURE: 147 MMHG | TEMPERATURE: 97 F | DIASTOLIC BLOOD PRESSURE: 90 MMHG | HEART RATE: 92 BPM | OXYGEN SATURATION: 98 %

## 2021-05-20 DIAGNOSIS — L03.116 BILATERAL LOWER LEG CELLULITIS: ICD-10-CM

## 2021-05-20 DIAGNOSIS — I87.2 PERIPHERAL VENOUS INSUFFICIENCY: Primary | ICD-10-CM

## 2021-05-20 DIAGNOSIS — Z88.1 PERSONAL HISTORY OF ALLERGY TO ANTIBIOTIC AGENT: ICD-10-CM

## 2021-05-20 DIAGNOSIS — I89.0 LYMPHEDEMA OF BOTH LOWER EXTREMITIES: ICD-10-CM

## 2021-05-20 DIAGNOSIS — L03.115 BILATERAL LOWER LEG CELLULITIS: ICD-10-CM

## 2021-05-20 PROCEDURE — 6360000002 HC RX W HCPCS: Performed by: INTERNAL MEDICINE

## 2021-05-20 PROCEDURE — 96365 THER/PROPH/DIAG IV INF INIT: CPT

## 2021-05-20 PROCEDURE — 2580000003 HC RX 258: Performed by: INTERNAL MEDICINE

## 2021-05-20 PROCEDURE — 96366 THER/PROPH/DIAG IV INF ADDON: CPT

## 2021-05-20 RX ORDER — DIPHENHYDRAMINE HYDROCHLORIDE 50 MG/ML
50 INJECTION INTRAMUSCULAR; INTRAVENOUS ONCE
Status: CANCELLED | OUTPATIENT
Start: 2021-05-20 | End: 2021-05-20

## 2021-05-20 RX ORDER — SODIUM CHLORIDE 0.9 % (FLUSH) 0.9 %
5-40 SYRINGE (ML) INJECTION PRN
Status: CANCELLED | OUTPATIENT
Start: 2021-05-20

## 2021-05-20 RX ORDER — METHYLPREDNISOLONE SODIUM SUCCINATE 125 MG/2ML
125 INJECTION, POWDER, LYOPHILIZED, FOR SOLUTION INTRAMUSCULAR; INTRAVENOUS ONCE
Status: CANCELLED | OUTPATIENT
Start: 2021-05-20 | End: 2021-05-20

## 2021-05-20 RX ORDER — SODIUM CHLORIDE 9 MG/ML
INJECTION, SOLUTION INTRAVENOUS CONTINUOUS
Status: CANCELLED | OUTPATIENT
Start: 2021-05-20

## 2021-05-20 RX ADMIN — ORITAVANCIN 1200 MG: 400 INJECTION, POWDER, LYOPHILIZED, FOR SOLUTION INTRAVENOUS at 10:25

## 2021-05-20 RX ADMIN — DEXTROSE MONOHYDRATE 500 ML: 50 INJECTION, SOLUTION INTRAVENOUS at 10:02

## 2021-05-20 NOTE — TELEPHONE ENCOUNTER
Patient called stating Dr. Latha Deal wanted her to check on changing her Lasix regimen. She is currently taking 80mg BID and thinks this is way too much. Please advise.

## 2021-05-20 NOTE — TELEPHONE ENCOUNTER
It looks like she was discharged from the hospital at some point with this Rx. I would advise decrease to 80 mg total in one day; can do 2 doses of 40 mg.

## 2021-05-21 ENCOUNTER — TELEPHONE (OUTPATIENT)
Dept: PRIMARY CARE CLINIC | Age: 51
End: 2021-05-21

## 2021-05-21 RX ORDER — FLUCONAZOLE 150 MG/1
150 TABLET ORAL ONCE
Qty: 1 TABLET | Refills: 0 | Status: SHIPPED | OUTPATIENT
Start: 2021-05-21 | End: 2021-05-21

## 2021-05-21 RX ORDER — FUROSEMIDE 80 MG
40 TABLET ORAL 2 TIMES DAILY
Qty: 60 TABLET | Refills: 0 | Status: SHIPPED
Start: 2021-05-21

## 2021-05-21 NOTE — TELEPHONE ENCOUNTER
The patient has been notified of this information and all questions answered. RX adjusted. Detail Level: Detailed Quality 226: Preventive Care And Screening: Tobacco Use: Screening And Cessation Intervention: Patient screened for tobacco use and is an ex/non-smoker

## 2021-05-21 NOTE — PROGRESS NOTES
Tianna  called to request a refill on her medication.       Last office visit : 4/27/2021   Next office visit : 7/7/2021     Requested Prescriptions     Signed Prescriptions Disp Refills    fluconazole (DIFLUCAN) 150 MG tablet 1 tablet 0     Sig: Take 1 tablet by mouth once for 1 dose            Sharad Woods MA

## 2021-06-09 ENCOUNTER — TELEPHONE (OUTPATIENT)
Dept: INTERNAL MEDICINE CLINIC | Age: 51
End: 2021-06-09

## 2021-06-11 ENCOUNTER — TELEPHONE (OUTPATIENT)
Dept: INTERNAL MEDICINE CLINIC | Age: 51
End: 2021-06-11

## 2021-06-11 DIAGNOSIS — I87.2 VENOUS INSUFFICIENCY OF BOTH LOWER EXTREMITIES: Primary | ICD-10-CM

## 2021-06-11 NOTE — TELEPHONE ENCOUNTER
[address] [facility] reporting that pt cancelled her nurse visit for today for unna boot change   Pt takes her dressings off and has cg wrap them ( and does them ankle to knee ) pt has run out of supplies because she uses more than the orders say due to changing them in between nurse visits which are 3 X week. Is there an alternative dressing that they can do  such as compression stockings or ace wraps since they are not being used as indicated   ?      Please advise

## 2021-06-14 NOTE — TELEPHONE ENCOUNTER
Patient is going to need to see wound care for these dressing changes and instructions. Without seeing the patient and with her level of non-compliance I would prefer they see her and give her any further dressing change instructions.

## 2021-06-14 NOTE — TELEPHONE ENCOUNTER
Received call at 10:05am from Christian Nieves RN with [facility]. RN states that she saw patient today at her home. States Recert Visit completed, to certify patient for more home health services. Home Health nurse states this patient needs to be seen by [facility]. States patient has been seen by [facility] before, and does not want to go there again. Patient requesting referral to [facility]. Home health nurse states patient has odor to the wound, yeast infection and possible fungus. If you can please refer this patient to [address] [place] [address] per patient request.  Thank you.   Electronically signed by Abdias Morales RN on 6/14/2021 at 10:12 AM

## 2021-06-15 ENCOUNTER — APPOINTMENT (OUTPATIENT)
Dept: WOUND CARE | Facility: HOSPITAL | Age: 51
End: 2021-06-15

## 2021-06-16 ENCOUNTER — OFFICE VISIT (OUTPATIENT)
Dept: URGENT CARE | Age: 51
End: 2021-06-16
Payer: MEDICAID

## 2021-06-16 ENCOUNTER — HOSPITAL ENCOUNTER (OUTPATIENT)
Dept: GENERAL RADIOLOGY | Age: 51
Discharge: HOME | End: 2021-06-16
Payer: MEDICAID

## 2021-06-16 VITALS
WEIGHT: 293 LBS | OXYGEN SATURATION: 96 % | SYSTOLIC BLOOD PRESSURE: 166 MMHG | HEART RATE: 94 BPM | TEMPERATURE: 97.3 F | BODY MASS INDEX: 55.32 KG/M2 | HEIGHT: 61 IN | RESPIRATION RATE: 18 BRPM | DIASTOLIC BLOOD PRESSURE: 89 MMHG

## 2021-06-16 DIAGNOSIS — M79.672 LEFT FOOT PAIN: Primary | ICD-10-CM

## 2021-06-16 DIAGNOSIS — M79.672 LEFT FOOT PAIN: ICD-10-CM

## 2021-06-16 DIAGNOSIS — I89.0 LYMPHEDEMA OF BOTH LOWER EXTREMITIES: ICD-10-CM

## 2021-06-16 PROCEDURE — G8427 DOCREV CUR MEDS BY ELIG CLIN: HCPCS | Performed by: NURSE PRACTITIONER

## 2021-06-16 PROCEDURE — 99213 OFFICE O/P EST LOW 20 MIN: CPT | Performed by: NURSE PRACTITIONER

## 2021-06-16 PROCEDURE — 73630 X-RAY EXAM OF FOOT: CPT

## 2021-06-16 PROCEDURE — G8417 CALC BMI ABV UP PARAM F/U: HCPCS | Performed by: NURSE PRACTITIONER

## 2021-06-16 PROCEDURE — 1036F TOBACCO NON-USER: CPT | Performed by: NURSE PRACTITIONER

## 2021-06-16 RX ORDER — KETOROLAC TROMETHAMINE 30 MG/ML
60 INJECTION, SOLUTION INTRAMUSCULAR; INTRAVENOUS ONCE
Status: COMPLETED | OUTPATIENT
Start: 2021-06-16 | End: 2021-06-16

## 2021-06-16 RX ADMIN — KETOROLAC TROMETHAMINE 60 MG: 30 INJECTION, SOLUTION INTRAMUSCULAR; INTRAVENOUS at 16:10

## 2021-06-16 ASSESSMENT — ENCOUNTER SYMPTOMS
SHORTNESS OF BREATH: 0
COUGH: 0

## 2021-06-16 ASSESSMENT — VISUAL ACUITY: OU: 1

## 2021-06-16 NOTE — PATIENT INSTRUCTIONS
Rest  Follow-up with Home Health and Wound Care as directed  Toradol 60 mg Im today per pt request  Follow up with PCP or return to Urgent Care for worsening or unresolved symptoms. Patient Education        Lymphedema: Care Instructions  Your Care Instructions     Lymphedema is fluid that builds up in the arms or legs. It is often caused by surgery to remove lymph nodes during cancer treatment, especially breast cancer surgery, which can cause fluid to build up in the arm. It can happen after radiation treatment to an area that involves lymph nodes. It also can be caused by a fractured bone or surgery to fix a fracture. And some medicines also can cause lymphedema. Some people get it for unknown reasons. Normally, lymph nodes trap bacteria and other substances as fluid flows through them. Then, the white cells in the body's defense, or immune, system can destroy the substances. But if there are few or no lymph nodesor if the lymph system in an arm or leg has been damagedfluid can build up in the affected arm or leg. You can take simple steps at home to help treat or prevent fluid buildup. Treatment may include raising the arm or leg to let gravity drain the fluid. You also can wear compression stockings or sleeves. Follow-up care is a key part of your treatment and safety. Be sure to make and go to all appointments, and call your doctor if you are having problems. It's also a good idea to know your test results and keep a list of the medicines you take. How can you care for yourself at home? · Wear a compression stocking or sleeve as your doctor suggests. It can help keep fluid from pooling in an arm or leg. Wear it during air travel. · Prop up the swollen arm or leg on a pillow anytime you sit or lie down. Try to keep it above the level of your heart. This will help reduce swelling. · Avoid crossing your legs if your legs are swollen. · Get some exercise on most days of the week.  Increase the intensity of exercise slowly. Water aerobics can help reduce swelling by helping fluid move around. Wear your compression stocking or sleeve during exercise, but not during water exercise. · See a physical therapist. He or she can teach you how to do self-massage to help fluid move around. You also can learn what activities would be best for you. · Keep your feet clean and wear clean socks or stockings every day. Check your feet often for signs of infection, such as redness or heat. Do not walk barefoot. · If you have had lymph nodes removed from under your arm:  ? Do not have blood drawn from the arm on the side of the lymph node surgery. ? Do not allow a blood pressure cuff to be placed on that arm. If you are in the hospital, make sure your nurse and other hospital staff know of your condition. ? Wear gloves when gardening or doing other activities that may lead to cuts on your fingers or hands. · If you have had lymph nodes removed from your groin:  ? Bathe your feet daily in lukewarm, not hot, water. Use a mild soap that has a moisturizer, or use a moisturizer separately. ? Check your feet for blisters or cuts. ? Wear comfortable and supportive shoes that fit properly. ? Wear the correct size of panty hose and stockings. Avoid garters or knee-high or thigh-high stockings. · Ask your doctor how to treat any cuts, scratches, insect bites, or other injuries that may occur. · Use sunscreen and insect repellent when outdoors to protect your skin from sunburn and insect bites. · Wear medical alert jewelry that says you have lymphedema. You can buy these at most [employer] and on the Internet. When should you call for help? Call your doctor now or seek immediate medical care if:    · You have signs of infection, such as:  ? Increased pain, swelling, warmth, or redness. ? Red streaks leading from the area. ? Pus draining from the area. ? A fever.    Watch closely for changes in your health, and be sure to contact your doctor if:    · You have new or worse symptoms from lymphedema.     · You do not get better as expected. Where can you learn more? Go to [email or web address]://[email or web address] org and sign in to your [employer] account. Enter V398 in the [facility] box to learn more about \"Lymphedema: Care Instructions. \"     If you do not have an account, please click on the \"Sign Up Now\" link. Current as of: December 17, 2020               Content Version: 12.9  © 6648-4660 [employer]. Care instructions adapted under license by [facility] ([facility]). If you have questions about a medical condition or this instruction, always ask your healthcare professional. [address] any warranty or liability for your use of this information. Patient Education        Foot Pain: Care Instructions  Your Care Instructions     Foot injuries that cause pain and swelling are fairly common. Almost all sports or home repair projects can cause a misstep that ends up as foot pain. Normal wear and tear, especially as you get older, also can cause foot pain. Most minor foot injuries will heal on their own, and home treatment is usually all you need to do. If you have a severe injury, you may need tests and treatment. Follow-up care is a key part of your treatment and safety. Be sure to make and go to all appointments, and call your doctor if you are having problems. It's also a good idea to know your test results and keep a list of the medicines you take. How can you care for yourself at home? · Take pain medicines exactly as directed. ? If the doctor gave you a prescription medicine for pain, take it as prescribed. ? If you are not taking a prescription pain medicine, ask your doctor if you can take an over-the-counter medicine. · Rest and protect your foot. Take a break from any activity that may cause pain. · Put ice or a cold pack on your foot for 10 to 20 minutes at a time.  Put a thin cloth between the ice and your skin. · Prop up the sore foot on a pillow when you ice it or anytime you sit or lie down during the next 3 days. Try to keep it above the level of your heart. This will help reduce swelling. · Your doctor may recommend that you wrap your foot with an elastic bandage. Keep your foot wrapped for as long as your doctor advises. · If your doctor recommends crutches, use them as directed. · Wear roomy footwear. · As soon as pain and swelling end, begin gentle exercises of your foot. Your doctor can tell you which exercises will help. When should you call for help? Call 911 anytime you think you may need emergency care. For example, call if:    · Your foot turns pale, white, blue, or cold. Call your doctor now or seek immediate medical care if:    · You cannot move or stand on your foot.     · Your foot looks twisted or out of its normal position.     · Your foot is not stable when you step down.     · You have signs of infection, such as:  ? Increased pain, swelling, warmth, or redness. ? Red streaks leading from the sore area. ? Pus draining from a place on your foot. ? A fever.     · Your foot is numb or tingly. Watch closely for changes in your health, and be sure to contact your doctor if:    · You do not get better as expected.     · You have bruises from an injury that last longer than 2 weeks. Where can you learn more? Go to https://[employer]atter. org and sign in to your [employer] account. Enter R340 in the [facility] box to learn more about \"Foot Pain: Care Instructions. \"     If you do not have an account, please click on the \"Sign Up Now\" link. Current as of: November 16, 2020               Content Version: 12.9  © 4442-5500 [employer]. Care instructions adapted under license by [facility] [place] [facility] ([facility]).  If you have questions about a medical condition or this instruction, always ask your healthcare professional. Jimbo Nichols

## 2021-06-16 NOTE — PROGRESS NOTES
[address] [facility]  [address] [address] Cristian Alatorre [address]  Dept: [phone]  Dept Fax: [phone]: [phone]    Tianna Trivedi is a 52 y.o. female who presents today for her medical conditions/complaintsas noted below. Tianna Trivedi is c/o of left foot pain. HPI:     Foot Pain   The pain is present in the left foot and left toes. This is a new problem. The current episode started yesterday. There has been no history of extremity trauma. The problem occurs constantly. The problem has been unchanged. The quality of the pain is described as sharp. The pain is at a severity of 6/10. The pain is moderate. Associated symptoms include joint swelling and a limited range of motion. Pertinent negatives include no fever or inability to bear weight. The symptoms are aggravated by standing. She has tried rest for the symptoms. The treatment provided mild relief. Lymphedema     Christiano Apodaca is here with worsening edema and pain of left foot today. She contacted home health about her foot pain and they advised her to come here to be seen. She has lymphedema and has very severe swelling in both her legs and feet. Today her left foot appeared more swollen and tender. Now it seems to be a little better. She is due to have her compression dressings placed on her legs but has them wrapped in ace wraps. She is already seeing wound care and has been taking IV antibiotics.   Past Medical History:   Diagnosis Date    Anxiety     Constipation     Depression     Headache(784.0)     Hyperlipidemia     Hypertension     Kidney stones     Kidney stones     Morbid obesity due to excess calories (HCC)     Restless legs syndrome     Type 2 diabetes mellitus ([facility] [address]      Past Surgical History:   Procedure Laterality Date    ECTOPIC PREGNANCY SURGERY  2002    EYE SURGERY Bilateral     2005 & 2007: cornea transplant and cataracts removed    KNEE CARTILAGE SURGERY Left 12/20/2016 KNEE ARTHROSCOPIC PARTIAL MEDIAL MENISCECTOMY performed by Bronwyn Philip MD at [address]  2001       Family History   Problem Relation Age of Onset    Obesity Mother     Arthritis Mother         has had knees replaced    Cancer Father         \"adrenal gland & lung\"    Parkinsonism Father     Cancer Maternal Grandmother     COPD Maternal Grandmother     Obesity Maternal Grandmother     Heart Failure Maternal Grandmother     Cancer Maternal Grandfather     Other Sister         spina biffida    No Known Problems Son     No Known Problems Son     No Known Problems Daughter     Obesity Maternal Aunt     Other Maternal Aunt          of sepsis related to a spider bite       Social History     Tobacco Use    Smoking status: Never Smoker    Smokeless tobacco: Never Used   Substance Use Topics    Alcohol use: Not Currently     Comment: \"socially, not recently though\"      Current Outpatient Medications   Medication Sig Dispense Refill    furosemide (LASIX) 80 MG tablet Take 0.5 tablets by mouth 2 times daily 60 tablet 0    triamterene-hydroCHLOROthiazide (MAXZIDE-25) 37.5-25 MG per tablet TAKE 1 TABLET BY MOUTH DAILY 30 tablet 2    DULoxetine (CYMBALTA) 60 MG extended release capsule TAKE 1 CAPSULE BY MOUTH TWICE DAILY 30 capsule 5    fluticasone (FLONASE) 50 MCG/ACT nasal spray SHAKE LIQUID AND USE 1 SPRAY IN EACH NOSTRIL DAILY 16 g 0    insulin lispro, 1 Unit Dial, (ADMELOG SOLOSTAR) 100 UNIT/ML SOPN ADMINISTER 60 UNITS  EVERY EVENING (Patient taking differently: Sliding scale) 18 mL 5    ondansetron (ZOFRAN-ODT) 4 MG disintegrating tablet DISSOLVE 1 TABLET UNDER THE TONGUE EVERY 8 HOURS AS NEEDED FOR NAUSEA AND VOMITING(TAKE PRIOR TO DOXYCYLINE DOSES) 20 tablet 0    Liraglutide (VICTOZA) 18 MG/3ML SOPN SC injection ADMINISTER 1.8 MG INTO THE SKIN EVERY DAY 9 mL 3    busPIRone (BUSPAR) 10 MG tablet TAKE 1 TABLET BY MOUTH THREE TIMES DAILY 90 tablet 3    pramipexole (MIRAPEX) 0.75 MG tablet TAKE 1 TABLET BY MOUTH TWICE DAILY 60 tablet 5    Butorphanol Tartrate (STADOL NS NA) 1 spray by Nasal route 4 times daily as needed (migraines)      blood glucose test strips (ASCENSIA AUTODISC VI;ONE TOUCH ULTRA TEST VI) strip Check glucose TID and as needed for hypoglycemic events Dx E11.40 E11.66 Z79.4  Use one touch verio flex 150 each 5    omeprazole (PRILOSEC) 20 MG delayed release capsule TAKE ONE CAPSULE BY MOUTH TWICE DAILY BEFORE MEALS 180 capsule 1    Diabetic Shoe MISC by Does not apply route DISPENSE ONE PAIR OF DIABETIC SHOE AND 3 PAIRS HEAT MOLDED INSERTS 1 each 0    potassium chloride (KLOR-CON M) 20 MEQ extended release tablet Take 1 tablet by mouth daily (Patient taking differently: Take 20 mEq by mouth daily as needed ) 30 tablet 0    mirtazapine (REMERON) 15 MG tablet TAKE 1 TABLET BY MOUTH EVERY NIGHT 30 tablet 3    BASAGLAR KWIKPEN 100 UNIT/ML injection pen INJECT 30 UNITS INTO THE SKIN EVERY EVENING 15 mL 1    oxyCODONE-acetaminophen (PERCOCET) 7.5-325 MG per tablet Take 1 tablet by mouth 2 times daily.  tiZANidine (ZANAFLEX) 4 MG tablet Take 4 mg by mouth daily as needed      NONFORMULARY Prednisone eye drops 1 drop in each eye daily      Morphine Sulfate ER 15 MG T12A Take 15 mg by mouth 2 times daily.  Cream Base CREA APPLY ONE PUMP (GRAM) TO AFFECTED AREA(S) THREE TO FOUR TIMES A DAY TO THE APPENDAGES AND TRUNK AS DIRECTED 30 g 5    celecoxib (CELEBREX) 200 MG capsule TAKE 1 CAPSULE BY MOUTH EVERY DAY 60 capsule 5    B-D ULTRAFINE III SHORT PEN 31G X 8 MM MISC USE TO INJECT INSULIN ONCE DAILY 100 each 3    Lancets (ONETOUCH DELICA PLUS [id number]) MISC USE TO CHECK BLLOD SUGAR AS DIRECTED 100 each 1    blood glucose monitor kit and supplies Test 1 times a day & as needed for symptoms of irregular blood glucose. 1 kit 0    gabapentin (NEURONTIN) 300 MG capsule Take 300 mg by mouth 3 times daily.    5    Blood Glucose Monitoring Suppl (ONE [address] [facility]) w/Device KIT 1 kit by Does not apply route daily as needed (Dx 250.00) 1 kit 0    ONE TOUCH LANCETS MISC 1 each by Does not apply route daily 100 each 3    clonazePAM (KLONOPIN) 0.5 MG tablet Take 1 tablet by mouth 2 times daily as needed for Anxiety for up to 30 days. 45 tablet 0    rizatriptan (MAXALT) 10 MG tablet Take 1 tablet by mouth once as needed for Migraine May repeat in 2 hours if needed 30 tablet 3     No current facility-administered medications for this visit.      Allergies   Allergen Reactions    Compazine [Prochlorperazine] Shortness Of Breath    Ciprofloxacin Hives    Prochlorperazine Edisylate      Will discuss with patient at next visit     Tobramycin Other (See Comments)     States had corneal transplants - and was told not to    Tramadol      Will discuss with patient at next visit     Amoxicillin-Pot Clavulanate Hives, Itching and Rash    Ancef [Cefazolin] Nausea And Vomiting    Bactrim [Sulfamethoxazole-Trimethoprim] Nausea And Vomiting and Other (See Comments)     States she also gets yeast infections with it    Cephalexin Rash    Clindamycin/Lincomycin Rash    Codeine Nausea And Vomiting and Rash    Demerol Hives    Doxycycline Nausea And Vomiting    Hydroxyzine Hcl Itching    Ibuprofen Nausea Only    Keflex [Cephalexin] Rash    Meperidine Hives    Moxifloxacin Nausea And Vomiting    Nortriptyline Hives, Itching and Rash    Orphenadrine Citrate Itching    Penicillins Hives and Nausea Only    Vistaril [Hydroxyzine Hcl] Itching       Health Maintenance   Topic Date Due    Hepatitis C screen  Never done    Diabetic retinal exam  Never done    COVID-19 Vaccine (1) Never done    HIV screen  Never done    Hepatitis B vaccine (1 of 3 - Risk 3-dose series) Never done    Cervical cancer screen  05/21/2017    Diabetic microalbuminuria test  12/17/2019    Flu vaccine (Season Ended) 01/20/2022 (Originally 9/1/2021)    A1C test (Diabetic or Prediabetic)  08/03/2021    Lipid screen  08/17/2021    DTaP/Tdap/Td vaccine (2 - Td or Tdap) 09/22/2021    Diabetic foot exam  01/20/2022    Potassium monitoring  05/03/2022    Creatinine monitoring  05/03/2022    Pneumococcal 0-64 years Vaccine (2 of 2) 09/08/2037    Hepatitis A vaccine  Aged Out    Hib vaccine  Aged Out    Meningococcal (ACWY) vaccine  Aged Out       Subjective:     Review of Systems   Constitutional: Negative for activity change, appetite change and fever. Respiratory: Negative for cough and shortness of breath. Cardiovascular: Positive for leg swelling. Musculoskeletal: Positive for arthralgias, joint swelling and myalgias. Left foot pain  Chronic lymphadema   Skin: Negative for rash. Neurological: Negative for headaches.       :Objective      Physical Exam  Vitals and nursing note reviewed. Constitutional:       General: She is awake. She is not in acute distress. Appearance: Normal appearance. She is well-developed. She is morbidly obese. She is ill-appearing. Comments: Chronically ill appearing   HENT:      Head: Normocephalic. Right Ear: Hearing normal.      Left Ear: Hearing normal.      Nose: Nose normal.      Mouth/Throat:      Lips: Pink. Eyes:      General: Vision grossly intact. Neck:      Trachea: Phonation normal.   Cardiovascular:      Rate and Rhythm: Normal rate and regular rhythm. Comments: Lymphedema of bilateral lower legs  Pulmonary:      Effort: Pulmonary effort is normal. No respiratory distress. Abdominal:      Palpations: Abdomen is soft. Musculoskeletal:         General: No deformity. Cervical back: Full passive range of motion without pain and neck supple. Right lower leg: Edema present. Left lower leg: Edema present. Left foot: Decreased range of motion. Swelling and tenderness present.       Comments: AROM to left foot causes pain  Pt is limping and gait is very slow   Skin:     General: Skin is warm and dry. Capillary Refill: Capillary refill takes less than 2 seconds. Findings: Wound present. Comments: Left posterior lower leg with open wound ,no obvious drainage  Bilateral lower legs wrapped in Ace bandages   Neurological:      General: No focal deficit present. Mental Status: She is alert, oriented to person, place, and time and easily aroused. Psychiatric:         Attention and Perception: Attention normal.         Mood and Affect: Mood normal.         Speech: Speech normal.         Behavior: Behavior normal. Behavior is cooperative. BP (!) 166/89   Pulse 94   Temp 97.3 °F (36.3 °C)   Resp 18   Ht 5' 1\" (1.549 m)   Wt (!) 319 lb 6.4 oz (144.9 kg)   SpO2 96%   BMI 60.35 kg/m²     :Assessment       Diagnosis Orders   1. Left foot pain  XR FOOT LEFT (MIN 3 VIEWS)   2. Lymphedema of both lower extremities         :Plan      Orders Placed This Encounter   Procedures    XR FOOT LEFT (MIN 3 VIEWS)     Standing Status:   Future     Number of Occurrences:   1     Standing Expiration Date:   6/16/2022     Order Specific Question:   Reason for exam:     Answer:   left foot pain   INDICATION: LEFT foot pain  COMPARISON: None available. FINDINGS:  Lisfranc plane is maintained. No acute fracture or dislocation. Joint  spaces are maintained. Mild calcaneal spurring. No suspicious osseous  lesion or periosteal reaction. No radiopaque foreign body or soft  tissue gas. IMPRESSION:  No acute osseous findings. No follow-ups on file. Orders Placed This Encounter   Medications    ketorolac (TORADOL) injection 60 mg        Patient Instructions     Rest  Follow-up with Home Health and Wound Care as directed  Toradol 60 mg Im today per pt request  Follow up with PCP or return to Urgent Care for worsening or unresolved symptoms. Patient Education        Lymphedema: Care Instructions  Your Care Instructions     Lymphedema is fluid that builds up in the arms or legs.  It is often caused by surgery to remove lymph nodes during cancer treatment, especially breast cancer surgery, which can cause fluid to build up in the arm. It can happen after radiation treatment to an area that involves lymph nodes. It also can be caused by a fractured bone or surgery to fix a fracture. And some medicines also can cause lymphedema. Some people get it for unknown reasons. Normally, lymph nodes trap bacteria and other substances as fluid flows through them. Then, the white cells in the body's defense, or immune, system can destroy the substances. But if there are few or no lymph nodesor if the lymph system in an arm or leg has been damagedfluid can build up in the affected arm or leg. You can take simple steps at home to help treat or prevent fluid buildup. Treatment may include raising the arm or leg to let gravity drain the fluid. You also can wear compression stockings or sleeves. Follow-up care is a key part of your treatment and safety. Be sure to make and go to all appointments, and call your doctor if you are having problems. It's also a good idea to know your test results and keep a list of the medicines you take. How can you care for yourself at home? · Wear a compression stocking or sleeve as your doctor suggests. It can help keep fluid from pooling in an arm or leg. Wear it during air travel. · Prop up the swollen arm or leg on a pillow anytime you sit or lie down. Try to keep it above the level of your heart. This will help reduce swelling. · Avoid crossing your legs if your legs are swollen. · Get some exercise on most days of the week. Increase the intensity of exercise slowly. Water aerobics can help reduce swelling by helping fluid move around. Wear your compression stocking or sleeve during exercise, but not during water exercise. · See a physical therapist. He or she can teach you how to do self-massage to help fluid move around.  You also can learn what activities would be best for do not have an account, please click on the \"Sign Up Now\" link. Current as of: December 17, 2020               Content Version: 12.9  © 2006-2021 [employer]. Care instructions adapted under license by [facility] ([facility]). If you have questions about a medical condition or this instruction, always ask your healthcare professional. [address] any warranty or liability for your use of this information. Patient Education        Foot Pain: Care Instructions  Your Care Instructions     Foot injuries that cause pain and swelling are fairly common. Almost all sports or home repair projects can cause a misstep that ends up as foot pain. Normal wear and tear, especially as you get older, also can cause foot pain. Most minor foot injuries will heal on their own, and home treatment is usually all you need to do. If you have a severe injury, you may need tests and treatment. Follow-up care is a key part of your treatment and safety. Be sure to make and go to all appointments, and call your doctor if you are having problems. It's also a good idea to know your test results and keep a list of the medicines you take. How can you care for yourself at home? · Take pain medicines exactly as directed. ? If the doctor gave you a prescription medicine for pain, take it as prescribed. ? If you are not taking a prescription pain medicine, ask your doctor if you can take an over-the-counter medicine. · Rest and protect your foot. Take a break from any activity that may cause pain. · Put ice or a cold pack on your foot for 10 to 20 minutes at a time. Put a thin cloth between the ice and your skin. · Prop up the sore foot on a pillow when you ice it or anytime you sit or lie down during the next 3 days. Try to keep it above the level of your heart. This will help reduce swelling. · Your doctor may recommend that you wrap your foot with an elastic bandage.  Keep your foot wrapped for as long as your doctor advises. · If your doctor recommends crutches, use them as directed. · Wear roomy footwear. · As soon as pain and swelling end, begin gentle exercises of your foot. Your doctor can tell you which exercises will help. When should you call for help? Call 911 anytime you think you may need emergency care. For example, call if:    · Your foot turns pale, white, blue, or cold. Call your doctor now or seek immediate medical care if:    · You cannot move or stand on your foot.     · Your foot looks twisted or out of its normal position.     · Your foot is not stable when you step down.     · You have signs of infection, such as:  ? Increased pain, swelling, warmth, or redness. ? Red streaks leading from the sore area. ? Pus draining from a place on your foot. ? A fever.     · Your foot is numb or tingly. Watch closely for changes in your health, and be sure to contact your doctor if:    · You do not get better as expected.     · You have bruises from an injury that last longer than 2 weeks. Where can you learn more? Go to https://[employer]. org and sign in to your Slide account. Enter Y946 in the [facility] box to learn more about \"Foot Pain: Care Instructions. \"     If you do not have an account, please click on the \"Sign Up Now\" link. Current as of: November 16, 2020               Content Version: 12.9  © 0405-2408 [employer]. Care instructions adapted under license by [facility] ([facility]). If you have questions about a medical condition or this instruction, always ask your healthcare professional. [place] [address] any warranty or liability for your use of this information. Patient given educational materials- see patient instructions. Discussed use, benefit, and side effects of prescribedmedications. All patient questions answered. Pt voiced understanding.        Electronically signed by NANETTE Escudero CNP on

## 2021-06-18 ENCOUNTER — OFFICE VISIT (OUTPATIENT)
Dept: WOUND CARE | Facility: HOSPITAL | Age: 51
End: 2021-06-18

## 2021-06-18 PROCEDURE — G0463 HOSPITAL OUTPT CLINIC VISIT: HCPCS

## 2021-06-21 ENCOUNTER — OFFICE VISIT (OUTPATIENT)
Dept: WOUND CARE | Facility: HOSPITAL | Age: 51
End: 2021-06-21

## 2021-06-21 ENCOUNTER — TELEPHONE (OUTPATIENT)
Dept: INTERNAL MEDICINE CLINIC | Age: 51
End: 2021-06-21

## 2021-06-21 DIAGNOSIS — Q82.0 HEREDITARY LYMPHEDEMA: ICD-10-CM

## 2021-06-21 DIAGNOSIS — L03.115 CELLULITIS OF RIGHT LOWER LIMB: ICD-10-CM

## 2021-06-21 DIAGNOSIS — M79.662 PAIN IN LEFT LOWER LEG: ICD-10-CM

## 2021-06-21 DIAGNOSIS — L03.116 CELLULITIS OF LEFT LOWER LIMB: ICD-10-CM

## 2021-06-21 PROCEDURE — G0463 HOSPITAL OUTPT CLINIC VISIT: HCPCS

## 2021-06-21 PROCEDURE — 99204 OFFICE O/P NEW MOD 45 MIN: CPT | Performed by: PODIATRIST

## 2021-06-21 RX ORDER — FLUTICASONE PROPIONATE 50 MCG
SPRAY, SUSPENSION (ML) NASAL
Qty: 16 G | Refills: 0 | Status: SHIPPED | OUTPATIENT
Start: 2021-06-21

## 2021-06-21 NOTE — TELEPHONE ENCOUNTER
HH was notified
[facility] nurse reporting pt has seen Dr Blanco Zamorano for wound care 6/18 and he changed the wraps to 2 layer dressings once a week and PRN if they fall down or get soiled  -   They need to change her visit frequency to once a week for dressing changes     Please advise if approved
Ambulatory

## 2021-06-22 RX ORDER — DEXTROSE MONOHYDRATE 50 MG/ML
250 INJECTION, SOLUTION INTRAVENOUS ONCE
Status: CANCELLED
Start: 2021-06-23 | End: 2021-06-23

## 2021-06-23 ENCOUNTER — TELEPHONE (OUTPATIENT)
Dept: BARIATRICS/WEIGHT MGMT | Facility: CLINIC | Age: 51
End: 2021-06-23

## 2021-06-23 ENCOUNTER — INFUSION (OUTPATIENT)
Dept: ONCOLOGY | Facility: HOSPITAL | Age: 51
End: 2021-06-23

## 2021-06-23 VITALS
HEART RATE: 74 BPM | OXYGEN SATURATION: 100 % | WEIGHT: 293 LBS | SYSTOLIC BLOOD PRESSURE: 120 MMHG | HEIGHT: 71 IN | BODY MASS INDEX: 41.02 KG/M2 | TEMPERATURE: 97.4 F | DIASTOLIC BLOOD PRESSURE: 80 MMHG | RESPIRATION RATE: 18 BRPM

## 2021-06-23 PROCEDURE — G0463 HOSPITAL OUTPT CLINIC VISIT: HCPCS

## 2021-06-23 RX ORDER — DOXYCYCLINE HYCLATE 100 MG/1
100 CAPSULE ORAL 2 TIMES DAILY
Qty: 20 CAPSULE | Refills: 0 | Status: SHIPPED | OUTPATIENT
Start: 2021-06-23 | End: 2021-10-23 | Stop reason: HOSPADM

## 2021-06-23 NOTE — PROGRESS NOTES
Patient states she doesn't have good veins.  She states she asked Dr. Tom for a midline and since this is only a one time dose he didn't want to do that.  I encouraged her to have the IV team access her but she said even if they start it the vein will blow.  I advised I could run the Dalvance slower but she didn't want to try.  Called Dr. Tom's office and pharmacy to let them know. RADHA Abdi Rn

## 2021-06-24 ENCOUNTER — APPOINTMENT (OUTPATIENT)
Dept: WOUND CARE | Facility: HOSPITAL | Age: 51
End: 2021-06-24

## 2021-06-24 ENCOUNTER — TELEPHONE (OUTPATIENT)
Dept: BARIATRICS/WEIGHT MGMT | Facility: CLINIC | Age: 51
End: 2021-06-24

## 2021-06-28 ENCOUNTER — APPOINTMENT (OUTPATIENT)
Dept: WOUND CARE | Facility: HOSPITAL | Age: 51
End: 2021-06-28

## 2021-06-28 ENCOUNTER — OFFICE VISIT (OUTPATIENT)
Dept: PRIMARY CARE CLINIC | Age: 51
End: 2021-06-28
Payer: MEDICAID

## 2021-06-28 VITALS
SYSTOLIC BLOOD PRESSURE: 122 MMHG | BODY MASS INDEX: 55.32 KG/M2 | RESPIRATION RATE: 16 BRPM | TEMPERATURE: 97.3 F | OXYGEN SATURATION: 98 % | HEART RATE: 81 BPM | HEIGHT: 61 IN | WEIGHT: 293 LBS | DIASTOLIC BLOOD PRESSURE: 76 MMHG

## 2021-06-28 DIAGNOSIS — E11.9 TYPE 2 DIABETES MELLITUS WITHOUT COMPLICATION, WITH LONG-TERM CURRENT USE OF INSULIN (HCC): Primary | ICD-10-CM

## 2021-06-28 DIAGNOSIS — Z79.4 TYPE 2 DIABETES MELLITUS WITHOUT COMPLICATION, WITH LONG-TERM CURRENT USE OF INSULIN (HCC): Primary | ICD-10-CM

## 2021-06-28 DIAGNOSIS — E11.9 TYPE 2 DIABETES MELLITUS WITHOUT COMPLICATION, WITH LONG-TERM CURRENT USE OF INSULIN (HCC): ICD-10-CM

## 2021-06-28 DIAGNOSIS — I10 ESSENTIAL HYPERTENSION: ICD-10-CM

## 2021-06-28 DIAGNOSIS — Z79.4 TYPE 2 DIABETES MELLITUS WITHOUT COMPLICATION, WITH LONG-TERM CURRENT USE OF INSULIN (HCC): ICD-10-CM

## 2021-06-28 LAB
ALBUMIN SERPL-MCNC: 4.2 G/DL (ref 3.5–5.2)
ALP BLD-CCNC: 67 U/L (ref 35–104)
ALT SERPL-CCNC: 10 U/L (ref 5–33)
ANION GAP SERPL CALCULATED.3IONS-SCNC: 10 MMOL/L (ref 7–19)
AST SERPL-CCNC: 15 U/L (ref 5–32)
BASOPHILS ABSOLUTE: 0 K/UL (ref 0–0.2)
BASOPHILS RELATIVE PERCENT: 0.4 % (ref 0–1)
BILIRUB SERPL-MCNC: 0.3 MG/DL (ref 0.2–1.2)
BUN BLDV-MCNC: 13 MG/DL (ref 6–20)
CALCIUM SERPL-MCNC: 9.9 MG/DL (ref 8.6–10)
CHLORIDE BLD-SCNC: 98 MMOL/L (ref 98–111)
CO2: 28 MMOL/L (ref 22–29)
CREAT SERPL-MCNC: 0.8 MG/DL (ref 0.5–0.9)
EOSINOPHILS ABSOLUTE: 0.2 K/UL (ref 0–0.6)
EOSINOPHILS RELATIVE PERCENT: 2.3 % (ref 0–5)
GFR AFRICAN AMERICAN: >59
GFR NON-AFRICAN AMERICAN: >60
GLUCOSE BLD-MCNC: 150 MG/DL (ref 74–109)
HCT VFR BLD CALC: 37.9 % (ref 37–47)
HEMOGLOBIN: 11.8 G/DL (ref 12–16)
IMMATURE GRANULOCYTES #: 0 K/UL
LYMPHOCYTES ABSOLUTE: 2.5 K/UL (ref 1.1–4.5)
LYMPHOCYTES RELATIVE PERCENT: 35.4 % (ref 20–40)
MCH RBC QN AUTO: 25.7 PG (ref 27–31)
MCHC RBC AUTO-ENTMCNC: 31.1 G/DL (ref 33–37)
MCV RBC AUTO: 82.6 FL (ref 81–99)
MONOCYTES ABSOLUTE: 0.5 K/UL (ref 0–0.9)
MONOCYTES RELATIVE PERCENT: 6.4 % (ref 0–10)
NEUTROPHILS ABSOLUTE: 3.9 K/UL (ref 1.5–7.5)
NEUTROPHILS RELATIVE PERCENT: 55.2 % (ref 50–65)
PDW BLD-RTO: 14.1 % (ref 11.5–14.5)
PLATELET # BLD: 231 K/UL (ref 130–400)
PMV BLD AUTO: 10.6 FL (ref 9.4–12.3)
POTASSIUM SERPL-SCNC: 3.7 MMOL/L (ref 3.5–5)
RBC # BLD: 4.59 M/UL (ref 4.2–5.4)
SODIUM BLD-SCNC: 136 MMOL/L (ref 136–145)
TOTAL PROTEIN: 9.2 G/DL (ref 6.6–8.7)
WBC # BLD: 7.1 K/UL (ref 4.8–10.8)

## 2021-06-28 PROCEDURE — G8417 CALC BMI ABV UP PARAM F/U: HCPCS | Performed by: NURSE PRACTITIONER

## 2021-06-28 PROCEDURE — 2022F DILAT RTA XM EVC RTNOPTHY: CPT | Performed by: NURSE PRACTITIONER

## 2021-06-28 PROCEDURE — G8427 DOCREV CUR MEDS BY ELIG CLIN: HCPCS | Performed by: NURSE PRACTITIONER

## 2021-06-28 PROCEDURE — 3046F HEMOGLOBIN A1C LEVEL >9.0%: CPT | Performed by: NURSE PRACTITIONER

## 2021-06-28 PROCEDURE — 99214 OFFICE O/P EST MOD 30 MIN: CPT | Performed by: NURSE PRACTITIONER

## 2021-06-28 PROCEDURE — 1036F TOBACCO NON-USER: CPT | Performed by: NURSE PRACTITIONER

## 2021-06-28 RX ORDER — DOXYCYCLINE HYCLATE 100 MG/1
100 CAPSULE ORAL 2 TIMES DAILY
COMMUNITY
Start: 2021-06-23 | End: 2021-09-13 | Stop reason: ALTCHOICE

## 2021-06-28 RX ORDER — ONDANSETRON 4 MG/1
TABLET, ORALLY DISINTEGRATING ORAL
Qty: 20 TABLET | Refills: 0 | Status: SHIPPED | OUTPATIENT
Start: 2021-06-28 | End: 2021-08-04

## 2021-06-28 ASSESSMENT — ENCOUNTER SYMPTOMS
SHORTNESS OF BREATH: 0
RHINORRHEA: 0
BACK PAIN: 0
NAUSEA: 0
PHOTOPHOBIA: 0
COLOR CHANGE: 0
COUGH: 0
VOICE CHANGE: 0
VOMITING: 0

## 2021-06-29 ENCOUNTER — TELEPHONE (OUTPATIENT)
Dept: PRIMARY CARE CLINIC | Age: 51
End: 2021-06-29

## 2021-06-29 NOTE — TELEPHONE ENCOUNTER
Pt called wanting to know if she could get an order for compression wraps so home health can do her wound care. I spoke with Lacey Garg about doing this order and she stated that the pt needs to go to wound care to get the order for home health to take care of her wounds.

## 2021-07-01 ENCOUNTER — OFFICE VISIT (OUTPATIENT)
Dept: WOUND CARE | Facility: HOSPITAL | Age: 51
End: 2021-07-01

## 2021-07-01 DIAGNOSIS — L98.499 TYPE 2 DIABETES MELLITUS WITH OTHER SKIN ULCER, UNSPECIFIED WHETHER LONG TERM INSULIN USE (HCC): ICD-10-CM

## 2021-07-01 DIAGNOSIS — L03.115 CELLULITIS OF RIGHT LOWER LIMB: ICD-10-CM

## 2021-07-01 DIAGNOSIS — L03.116 CELLULITIS OF LEFT LOWER LIMB: ICD-10-CM

## 2021-07-01 DIAGNOSIS — E11.622 TYPE 2 DIABETES MELLITUS WITH OTHER SKIN ULCER, UNSPECIFIED WHETHER LONG TERM INSULIN USE (HCC): ICD-10-CM

## 2021-07-01 DIAGNOSIS — L97.821 NON-PRESSURE CHRONIC ULCER OF OTHER PART OF LEFT LOWER LEG LIMITED TO BREAKDOWN OF SKIN (HCC): ICD-10-CM

## 2021-07-01 PROCEDURE — 99213 OFFICE O/P EST LOW 20 MIN: CPT | Performed by: NURSE PRACTITIONER

## 2021-07-09 ENCOUNTER — APPOINTMENT (OUTPATIENT)
Dept: WOUND CARE | Facility: HOSPITAL | Age: 51
End: 2021-07-09

## 2021-07-12 ENCOUNTER — HOSPITAL ENCOUNTER (EMERGENCY)
Age: 51
Discharge: HOME | End: 2021-07-12
Attending: EMERGENCY MEDICINE
Payer: MEDICAID

## 2021-07-12 VITALS
SYSTOLIC BLOOD PRESSURE: 154 MMHG | RESPIRATION RATE: 18 BRPM | DIASTOLIC BLOOD PRESSURE: 84 MMHG | WEIGHT: 293 LBS | BODY MASS INDEX: 59.52 KG/M2 | OXYGEN SATURATION: 95 % | HEART RATE: 86 BPM | TEMPERATURE: 97.3 F

## 2021-07-12 DIAGNOSIS — I89.0 LYMPHEDEMA OF BOTH LOWER EXTREMITIES: ICD-10-CM

## 2021-07-12 DIAGNOSIS — L03.119 RECURRENT CELLULITIS OF LOWER EXTREMITY: Primary | ICD-10-CM

## 2021-07-12 DIAGNOSIS — Z78.9 LACK OF INTRAVENOUS ACCESS: ICD-10-CM

## 2021-07-12 PROCEDURE — 87040 BLOOD CULTURE FOR BACTERIA: CPT

## 2021-07-12 PROCEDURE — 96372 THER/PROPH/DIAG INJ SC/IM: CPT

## 2021-07-12 PROCEDURE — 6360000002 HC RX W HCPCS: Performed by: EMERGENCY MEDICINE

## 2021-07-12 PROCEDURE — 6370000000 HC RX 637 (ALT 250 FOR IP): Performed by: EMERGENCY MEDICINE

## 2021-07-12 PROCEDURE — 36415 COLL VENOUS BLD VENIPUNCTURE: CPT

## 2021-07-12 PROCEDURE — 2500000003 HC RX 250 WO HCPCS: Performed by: EMERGENCY MEDICINE

## 2021-07-12 PROCEDURE — 99283 EMERGENCY DEPT VISIT LOW MDM: CPT

## 2021-07-12 RX ORDER — HYDROMORPHONE HYDROCHLORIDE 1 MG/ML
2 INJECTION, SOLUTION INTRAMUSCULAR; INTRAVENOUS; SUBCUTANEOUS EVERY 30 MIN PRN
Status: DISCONTINUED | OUTPATIENT
Start: 2021-07-12 | End: 2021-07-12 | Stop reason: HOSPADM

## 2021-07-12 RX ORDER — ONDANSETRON 4 MG/1
4 TABLET, ORALLY DISINTEGRATING ORAL ONCE
Status: COMPLETED | OUTPATIENT
Start: 2021-07-12 | End: 2021-07-12

## 2021-07-12 RX ADMIN — HYDROMORPHONE HYDROCHLORIDE 2 MG: 1 INJECTION, SOLUTION INTRAMUSCULAR; INTRAVENOUS; SUBCUTANEOUS at 15:41

## 2021-07-12 RX ADMIN — ONDANSETRON 4 MG: 4 TABLET, ORALLY DISINTEGRATING ORAL at 15:39

## 2021-07-12 RX ADMIN — LIDOCAINE HYDROCHLORIDE 1000 MG: 10 INJECTION, SOLUTION EPIDURAL; INFILTRATION; INTRACAUDAL; PERINEURAL at 15:39

## 2021-07-12 ASSESSMENT — ENCOUNTER SYMPTOMS
VOICE CHANGE: 0
NAUSEA: 0
CONSTIPATION: 0
BLOOD IN STOOL: 0
ABDOMINAL PAIN: 0
SHORTNESS OF BREATH: 0
APNEA: 0
CHOKING: 0
SINUS PRESSURE: 0
DIARRHEA: 0
COUGH: 0
SORE THROAT: 0
EYE DISCHARGE: 0
FACIAL SWELLING: 0

## 2021-07-12 ASSESSMENT — PAIN SCALES - GENERAL
PAINLEVEL_OUTOF10: 7
PAINLEVEL_OUTOF10: 6

## 2021-07-12 ASSESSMENT — PAIN DESCRIPTION - PAIN TYPE: TYPE: ACUTE PAIN

## 2021-07-12 ASSESSMENT — PAIN DESCRIPTION - DESCRIPTORS: DESCRIPTORS: ACHING

## 2021-07-12 NOTE — ED TRIAGE NOTES
Pt here with cellulitis to lower ext pt notes that she has lympadema and has been seen by wound for this and has had 2 rounds of doxy

## 2021-07-12 NOTE — ED PROVIDER NOTES
[facility] EMERGENCY DEPT  eMERGENCY dEPARTMENT eNCOUnter      Pt Name: Lata Coleman  MRN: [id number]  [place] 1970  Date of evaluation: 7/12/2021  Provider: Yulissa Thompson MD    [address] [place] [address]       Chief Complaint   Patient presents with    Cellulitis         HISTORY OF PRESENT ILLNESS   (Location/Symptom, Timing/Onset,Context/Setting, Quality, Duration, Modifying Factors, Severity)  Note limiting factors. Lata Coleman is a 52 y.o. female who presents to the emergency department cellulitis of the lower extremities    53-year-old female presents with cellulitis bilateral lower extremities. She has recurrent cellulitis because of lymphedema and chronic peripheral vascular problems. She is followed by wound care. She has been on doxycycline for 20 days she will have 2 more days left. And her condition seems to be getting worse some nausea some chills. She uses compression stockings but is just on and off. The history is provided by the patient and medical records. NursingNotes were reviewed. REVIEW OF SYSTEMS    (2-9 systems for level 4, 10 or more for level 5)     Review of Systems   Constitutional: Positive for chills and fatigue. Negative for fever. HENT: Negative for congestion, drooling, facial swelling, nosebleeds, sinus pressure, sore throat and voice change. Eyes: Negative for discharge. Respiratory: Negative for apnea, cough, choking and shortness of breath. Cardiovascular: Positive for leg swelling. Negative for chest pain. Gastrointestinal: Negative for abdominal pain, blood in stool, constipation, diarrhea and nausea. Genitourinary: Negative for dysuria and enuresis. Musculoskeletal: Negative for joint swelling. Skin: Positive for rash and wound. Neurological: Negative for seizures and syncope. Psychiatric/Behavioral: Negative for behavioral problems, hallucinations and suicidal ideas. All other systems reviewed and are negative.       A complete review of systems was performed and is negative except as noted above in the HPI. PAST MEDICAL HISTORY     Past Medical History:   Diagnosis Date    Anxiety     Constipation     Depression     Headache(784.0)     Hyperlipidemia     Hypertension     Kidney stones     Kidney stones     Morbid obesity due to excess calories (HCC)     Restless legs syndrome     Type 2 diabetes mellitus ([address]          SURGICAL HISTORY       Past Surgical History:   Procedure Laterality Date    ECTOPIC PREGNANCY SURGERY  2002    EYE SURGERY Bilateral     2005 & 2007: cornea transplant and cataracts removed    KNEE CARTILAGE SURGERY Left 12/20/2016    KNEE ARTHROSCOPIC PARTIAL MEDIAL MENISCECTOMY performed by Sivakumar Montalvo MD at [facility]       Previous Medications    B-D ULTRAFINE III SHORT PEN 31G X 8 MM MISC    USE TO INJECT INSULIN ONCE DAILY    BASAGLAR KWIKPEN 100 UNIT/ML INJECTION PEN    INJECT 30 UNITS INTO THE SKIN EVERY EVENING    BLOOD GLUCOSE MONITOR KIT AND SUPPLIES    Test 1 times a day & as needed for symptoms of irregular blood glucose. BLOOD GLUCOSE MONITORING SUPPL (ONE TOUCH BASIC SYSTEM) W/DEVICE KIT    1 kit by Does not apply route daily as needed (Dx 250.00)    BLOOD GLUCOSE TEST STRIPS (ASCENSIA AUTODISC VI;ONE TOUCH ULTRA TEST VI) STRIP    Check glucose TID and as needed for hypoglycemic events Dx E11.40 E11.66 Z79.4  Use one touch verio flex    BUSPIRONE (BUSPAR) 10 MG TABLET    TAKE 1 TABLET BY MOUTH THREE TIMES DAILY    BUTORPHANOL TARTRATE (STADOL NS NA)    1 spray by Nasal route 4 times daily as needed (migraines)    CELECOXIB (CELEBREX) 200 MG CAPSULE    TAKE 1 CAPSULE BY MOUTH EVERY DAY    CLONAZEPAM (KLONOPIN) 0.5 MG TABLET    Take 1 tablet by mouth 2 times daily as needed for Anxiety for up to 30 days.     CREAM BASE CREA    APPLY ONE PUMP (GRAM) TO AFFECTED AREA(S) THREE TO FOUR TIMES A DAY TO THE APPENDAGES AND TRUNK AS DIRECTED    DIABETIC SHOE MISC    by Does not apply route DISPENSE ONE PAIR OF DIABETIC SHOE AND 3 PAIRS HEAT MOLDED INSERTS    DOXYCYCLINE HYCLATE (VIBRAMYCIN) 100 MG CAPSULE    Take 100 mg by mouth 2 times daily    DULOXETINE (CYMBALTA) 60 MG EXTENDED RELEASE CAPSULE    TAKE 1 CAPSULE BY MOUTH TWICE DAILY    FLUTICASONE (FLONASE) 50 MCG/ACT NASAL SPRAY    SHAKE LIQUID AND USE 1 SPRAY IN EACH NOSTRIL DAILY    FUROSEMIDE (LASIX) 80 MG TABLET    Take 0.5 tablets by mouth 2 times daily    GABAPENTIN (NEURONTIN) 300 MG CAPSULE    Take 300 mg by mouth 3 times daily. INSULIN LISPRO, 1 UNIT DIAL, (ADMELOG SOLOSTAR) 100 UNIT/ML SOPN    ADMINISTER 60 UNITS  EVERY EVENING    LANCETS (ONETOUCH DELICA PLUS [id number]) MISC    USE TO CHECK BLLOD SUGAR AS DIRECTED    LIRAGLUTIDE (VICTOZA) 18 MG/3ML SOPN SC INJECTION    ADMINISTER 1.8 MG INTO THE SKIN EVERY DAY    METFORMIN (GLUCOPHAGE) 500 MG TABLET    Take 1 tablet by mouth 2 times daily (with meals)    MIRTAZAPINE (REMERON) 15 MG TABLET    TAKE 1 TABLET BY MOUTH EVERY NIGHT    MORPHINE SULFATE ER 15 MG T12A    Take 15 mg by mouth 2 times daily. NONFORMULARY    Prednisone eye drops 1 drop in each eye daily    OMEPRAZOLE (PRILOSEC) 20 MG DELAYED RELEASE CAPSULE    TAKE ONE CAPSULE BY MOUTH TWICE DAILY BEFORE MEALS    ONDANSETRON (ZOFRAN-ODT) 4 MG DISINTEGRATING TABLET    DISSOLVE 1 TABLET UNDER THE TONGUE EVERY 8 HOURS AS NEEDED FOR NAUSEA AND VOMITING(TAKE PRIOR TO DOXYCYLINE DOSES)    ONE TOUCH LANCETS MISC    1 each by Does not apply route daily    OXYCODONE-ACETAMINOPHEN (PERCOCET) 7.5-325 MG PER TABLET    Take 1 tablet by mouth 2 times daily.     POTASSIUM CHLORIDE (KLOR-CON M) 20 MEQ EXTENDED RELEASE TABLET    Take 1 tablet by mouth daily    PRAMIPEXOLE (MIRAPEX) 0.75 MG TABLET    TAKE 1 TABLET BY MOUTH TWICE DAILY    RIZATRIPTAN (MAXALT) 10 MG TABLET    Take 1 tablet by mouth once as needed for Migraine May repeat in 2 hours if needed    TIZANIDINE (ZANAFLEX) 4 MG TABLET    Take 4 mg by mouth daily as needed    TRIAMTERENE-HYDROCHLOROTHIAZIDE (MAXZIDE-25) 37.5-25 MG PER TABLET    TAKE 1 TABLET BY MOUTH DAILY       ALLERGIES     Compazine [prochlorperazine], Ciprofloxacin, Calamine, Prochlorperazine edisylate, Tobramycin, Tramadol, Amoxicillin-pot clavulanate, Ancef [cefazolin], Bactrim [sulfamethoxazole-trimethoprim], Cephalexin, Clindamycin/lincomycin, Codeine, Demerol, Doxycycline, Hydroxyzine hcl, Ibuprofen, Keflex [cephalexin], Meperidine, Moxifloxacin, Nortriptyline, Orphenadrine citrate, Penicillins, and Vistaril [hydroxyzine hcl]    FAMILY HISTORY       Family History   Problem Relation Age of Onset    Obesity Mother     Arthritis Mother         has had knees replaced    Cancer Father         \"adrenal gland & lung\"    Parkinsonism Father     Cancer Maternal Grandmother     COPD Maternal Grandmother     Obesity Maternal Grandmother     Heart Failure Maternal Grandmother     Cancer Maternal Grandfather     Other Sister         spina biffida    No Known Problems Son     No Known Problems Son     No Known Problems Daughter     Obesity Maternal Aunt     Other Maternal Aunt          of sepsis related to a spider bite          SOCIAL HISTORY       Social History     Socioeconomic History    Marital status: Single     Spouse name: Not on file    Number of children: 3    Years of education: Not on file    Highest education level: Not on file   Occupational History    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 Duglas Perez, [phone]    AMBULATES: independently    DOMICILED: lives in a private home alone, no stairs, no steps to enter home, has a cat     Social Determinants of Health     Financial Resource Strain:     Difficulty of Paying Living Expenses:    Food Insecurity:     Worried About Running Out of Food in the Last Year:     [address] in the Last Year:    Transportation Needs:     Lack of Transportation (Medical):  Lack of Transportation (Non-Medical):    Physical Activity:     Days of Exercise per Week:     Minutes of Exercise per Session:    Stress:     Feeling of Stress :    Social Connections:     Frequency of Communication with Friends and Family:     Frequency of Social Gatherings with Friends and Family:     Attends [employer] Services:     Active Member of Clubs or Organizations:     Attends Club or Organization Meetings:     Marital Status:    Intimate Partner Violence:     Fear of Current or Ex-Partner:     Emotionally Abused:     Physically Abused:     Sexually Abused:        SCREENINGS             PHYSICAL EXAM    (up to 7 for level 4, 8 or more for level 5)     ED Triage Vitals [07/12/21 1235]   BP Temp Temp Source Pulse Resp SpO2 Height Weight   (!) 154/84 97.3 °F (36.3 °C) Oral 86 18 95 % -- (!) 315 lb (142.9 kg)       Physical Exam  Vitals and nursing note reviewed. Constitutional:       General: She is not in acute distress. Appearance: She is well-developed. HENT:      Head: Normocephalic and atraumatic. Right Ear: External ear normal.      Left Ear: External ear normal.   Eyes:      General: No scleral icterus. Conjunctiva/sclera: Conjunctivae normal.      Pupils: Pupils are equal, round, and reactive to light. Cardiovascular:      Rate and Rhythm: Normal rate and regular rhythm. Pulses: Normal pulses. Heart sounds: Normal heart sounds.    Pulmonary:      Effort: Pulmonary effort is normal. No respiratory distress. Breath sounds: Normal breath sounds. Abdominal:      General: Bowel sounds are normal.      Palpations: Abdomen is soft. Musculoskeletal:         General: Normal range of motion. Cervical back: Normal range of motion and neck supple. Skin:     General: Skin is warm and dry. Coloration: Skin is not jaundiced. Neurological:      General: No focal deficit present. Mental Status: She is alert and oriented to person, place, and time. Psychiatric:         Mood and Affect: Mood normal.         Behavior: Behavior normal.         DIAGNOSTIC RESULTS     EKG: All EKG's are interpreted by the Emergency Department Physician who either signs or Co-signs this chart in the absence of a cardiologist.        RADIOLOGY:   Non-plain film images such as CT, Ultrasound and MRI are read by the radiologist. Plainradiographic images are visualized and preliminarily interpreted by the emergency physician with the below findings:        Interpretation per the Radiologist below, if available at the time of this note:    No orders to display         ED BEDSIDE ULTRASOUND:   Performed by ED Physician - none    LABS:  Labs Reviewed   CULTURE, BLOOD 2   CULTURE, BLOOD 1   COVID-19, RAPID   CBC WITH AUTO DIFFERENTIAL   COMPREHENSIVE METABOLIC PANEL W/ REFLEX TO MG FOR LOW K   URINALYSIS   LACTIC ACID, PLASMA       All other labs were within normal range or not returned as of this dictation.     EMERGENCY DEPARTMENT COURSE and DIFFERENTIALDIAGNOSIS/MDM:   Vitals:    Vitals:    07/12/21 1235   BP: (!) 154/84   Pulse: 86   Resp: 18   Temp: 97.3 °F (36.3 °C)   TempSrc: Oral   SpO2: 95%   Weight: (!) 315 lb (142.9 kg)       MDM  Number of Diagnoses or Management Options  Lack of intravenous access  Lymphedema of both lower extremities  Recurrent cellulitis of lower extremity  Diagnosis management comments: Patient could not or would not tolerate the IVs that we attempted here in the emergency department. She states she is going to leave [address] if she cannot get a midline or PICC line. We did contact the IV team and they are not available at this point. The patient tells me she can take Rocephin. I am going to give her a shot of Rocephin and some shot of pain medication now. She wants to leave. I contacted Eduardo Haque's office and discussed the case with them they are going to follow-up with the patient at [phone] by phone I suggested set up outpatient Rocephin or home health Rocephin or maybe they can work her into the IV team to get an IV. Patient would not let us straight stick her for blood work. They we did use the ultrasound. We did have access but the patient demanded that it be discontinued. Her vitals are good she does not clinically look septic she understands she could get much worse and have much worse outcome. Please have got some antibiotic coverage she was still on the doxycycline for multiple allergies true or not are very limiting on what we can treat her with. Advise she return here tomorrow if she was ready to receive further evaluation or she could get a shot of antibiotics set up for tomorrow I did give her 1 shot more here in the ED till something else could be scheduled or set up. CONSULTS:  None    PROCEDURES:  Unless otherwise notedbelow, none     Procedures    FINAL IMPRESSION     1. Recurrent cellulitis of lower extremity    2. Lymphedema of both lower extremities    3.  Lack of intravenous access          DISPOSITION/PLAN   DISPOSITION Decision To Discharge 07/12/2021 03:25:18 PM      PATIENT REFERRED TO:  @FUP@    DISCHARGE MEDICATIONS:  New Prescriptions    No medications on file          (Please note that portions of this note were completed with a voice recognition program.  Efforts were made to edit the dictations butoccasionally words are mis-transcribed.)    Janes Lundberg MD (electronically signed)  AttendingEmergency Physician          Sobeida Jane MD  07/12/21 [id number]

## 2021-07-12 NOTE — ED NOTES
Attempted US IV access, on getting flash patient became tearful and asked me to pull out the IV and that she wants a midline IV or she wants to go home. Dr Maryanne Suarez notified.   Will re attempt IV access     Matilde Willis RN  07/12/21 [id number]

## 2021-07-12 NOTE — ED NOTES
Attempted IV access left forearm, patient complained of it hurting and asked for US guided placement.        Renu Mcrae RN  07/12/21 6971

## 2021-07-13 ENCOUNTER — VIRTUAL VISIT (OUTPATIENT)
Dept: PRIMARY CARE CLINIC | Age: 51
End: 2021-07-13
Payer: MEDICAID

## 2021-07-13 ENCOUNTER — TELEPHONE (OUTPATIENT)
Dept: PRIMARY CARE CLINIC | Age: 51
End: 2021-07-13

## 2021-07-13 ENCOUNTER — TELEPHONE (OUTPATIENT)
Dept: ADMINISTRATIVE | Age: 51
End: 2021-07-13

## 2021-07-13 DIAGNOSIS — I89.0 LYMPHEDEMA: ICD-10-CM

## 2021-07-13 DIAGNOSIS — L03.115 CELLULITIS OF BOTH LOWER EXTREMITIES: Primary | ICD-10-CM

## 2021-07-13 DIAGNOSIS — L03.116 CELLULITIS OF BOTH LOWER EXTREMITIES: Primary | ICD-10-CM

## 2021-07-13 DIAGNOSIS — F41.9 ANXIETY: ICD-10-CM

## 2021-07-13 PROCEDURE — 1036F TOBACCO NON-USER: CPT | Performed by: NURSE PRACTITIONER

## 2021-07-13 PROCEDURE — G8427 DOCREV CUR MEDS BY ELIG CLIN: HCPCS | Performed by: NURSE PRACTITIONER

## 2021-07-13 PROCEDURE — G8417 CALC BMI ABV UP PARAM F/U: HCPCS | Performed by: NURSE PRACTITIONER

## 2021-07-13 PROCEDURE — 99213 OFFICE O/P EST LOW 20 MIN: CPT | Performed by: NURSE PRACTITIONER

## 2021-07-13 RX ORDER — CLONAZEPAM 0.5 MG/1
0.5 TABLET ORAL 2 TIMES DAILY PRN
Qty: 45 TABLET | Refills: 0 | Status: SHIPPED | OUTPATIENT
Start: 2021-07-13 | End: 2021-09-13 | Stop reason: ALTCHOICE

## 2021-07-13 RX ORDER — CEFTRIAXONE 1 G/1
1000 INJECTION, POWDER, FOR SOLUTION INTRAMUSCULAR; INTRAVENOUS DAILY
Status: SHIPPED | OUTPATIENT
Start: 2021-07-13 | End: 2021-07-17

## 2021-07-13 RX ORDER — KETOROLAC TROMETHAMINE 30 MG/ML
30 INJECTION, SOLUTION INTRAMUSCULAR; INTRAVENOUS ONCE
Status: COMPLETED | OUTPATIENT
Start: 2021-07-13 | End: 2021-07-13

## 2021-07-13 ASSESSMENT — ENCOUNTER SYMPTOMS
BACK PAIN: 0
RHINORRHEA: 0
VOMITING: 0
PHOTOPHOBIA: 0
VOICE CHANGE: 0
NAUSEA: 0
SHORTNESS OF BREATH: 0
COLOR CHANGE: 0
COUGH: 0

## 2021-07-13 NOTE — TELEPHONE ENCOUNTER
Ines Douglas from [facility] called and patient didn't have supplies for her wound change so she is going to have to go out and do it later per patient request. Patient has an appointment with provider at [phone] and if Provider needs home health to do anything Ines Douglas request call back

## 2021-07-13 NOTE — TELEPHONE ENCOUNTER
[facility] CALLED AND SAID THEY NEED ORDERS SENT TO THEM IN ORDER TO PROVIDE SHOTS FOR  WAI.     FAX: [phone]

## 2021-07-13 NOTE — PROGRESS NOTES
[address] [address]             Phone:  [phone]  Fax:  [phone]       2021    TELEHEALTH EVALUATION -- Audio/Visual (During LUMFF-61 public health emergency)    HPI:  Chief Complaint   Patient presents with    Cellulitis         Tianna Trivedi (:  [id number]) has requested an audio/video evaluation for the following concern(s):    Patient presents today for ED follow-up for bilateral lower extremity cellulitis. This has been a chronic condition for her due to lymphedema. She has been seeing wound care-- often non-compliant with dressing changes, however. She has also seen infectious disease. She went to ED on 21 stating she was having nausea and chills and that the wounds were getting worse. She had been on doxycycline for 20 days. There was evidently multiple attempts to obtain IV access but she did not tolerate and would not allow straight stick.  [facility] had called our office to inform of the situation. She ended up leaving A and was give IM Rocephin and was told she could come back to ED if it worsened or for IV antibiotics if she would allow. She is requesting further Rocephin today IM. She states that after yesterday's dose she does feel that symptoms have somewhat improved. She will see Dr Tess Joel later this week/ due to come tomorrow. Review of Systems   Constitutional: Negative for chills and fever. HENT: Negative for ear pain, hearing loss, rhinorrhea and voice change. Eyes: Negative for photophobia and visual disturbance. Respiratory: Negative for cough and shortness of breath. Cardiovascular: Negative for chest pain and palpitations. Gastrointestinal: Negative for nausea and vomiting. Endocrine: Negative. Negative for cold intolerance and heat intolerance. Genitourinary: Negative for difficulty urinating and flank pain. Musculoskeletal: Negative for back pain and neck pain.    Skin: Positive for wound (bilat lower extremity). Negative for color change and rash. Allergic/Immunologic: Negative for environmental allergies and food allergies. Neurological: Negative for dizziness, speech difficulty and headaches. Hematological: Does not bruise/bleed easily. Psychiatric/Behavioral: Negative for sleep disturbance and suicidal ideas. Prior to Visit Medications    Medication Sig Taking? Authorizing Provider   clonazePAM (KLONOPIN) 0.5 MG tablet Take 1 tablet by mouth 2 times daily as needed for Anxiety for up to 30 days.  Yes NANETTE Pineda   doxycycline hyclate (VIBRAMYCIN) 100 MG capsule Take 100 mg by mouth 2 times daily  Historical Provider, MD   ondansetron (ZOFRAN-ODT) 4 MG disintegrating tablet DISSOLVE 1 TABLET UNDER THE TONGUE EVERY 8 HOURS AS NEEDED FOR NAUSEA AND VOMITING(TAKE PRIOR TO DOXYCYLINE DOSES)  NANETTE Pineda   metFORMIN (GLUCOPHAGE) 500 MG tablet Take 1 tablet by mouth 2 times daily (with meals)  NANETTE Pineda   fluticasone (FLONASE) 50 MCG/ACT nasal spray SHAKE LIQUID AND USE 1 SPRAY IN EACH NOSTRIL DAILY  NANETTE Pineda   furosemide (LASIX) 80 MG tablet Take 0.5 tablets by mouth 2 times daily  NANETTE Pineda   triamterene-hydroCHLOROthiazide (MAXZIDE-25) 37.5-25 MG per tablet TAKE 1 TABLET BY MOUTH DAILY  NANETTE Pineda   DULoxetine (CYMBALTA) 60 MG extended release capsule TAKE 1 CAPSULE BY MOUTH TWICE DAILY  NANETTE Pineda   insulin lispro, 1 Unit Dial, (ADMELOG SOLOSTAR) 100 UNIT/ML SOPN ADMINISTER 60 UNITS  EVERY EVENING  Patient taking differently: Sliding scale  NANETTE Pineda   Liraglutide (VICTOZA) 18 MG/3ML SOPN SC injection ADMINISTER 1.8 MG INTO THE SKIN EVERY DAY  NANETTE Pineda   busPIRone (BUSPAR) 10 MG tablet TAKE 1 TABLET BY MOUTH THREE TIMES DAILY  NANETTE Pineda   pramipexole (MIRAPEX) 0.75 MG tablet TAKE 1 TABLET BY MOUTH TWICE DAILY  NANETTE Pineda   Butorphanol Tartrate (STADOL NS NA) 1 spray by Nasal route 4 times daily as needed (migraines)  Patient not taking: Reported on 6/28/2021  Historical Provider, MD   blood glucose test strips (ASCENSIA AUTODISC VI;ONE TOUCH ULTRA TEST VI) strip Check glucose TID and as needed for hypoglycemic events Dx E11.40 E11.66 Z79.4  Use one touch verio flex  NANETTE Ag   omeprazole (PRILOSEC) 20 MG delayed release capsule TAKE ONE CAPSULE BY MOUTH TWICE DAILY BEFORE MEALS  NANETTE Ag   Diabetic Shoe MISC by Does not apply route DISPENSE ONE PAIR OF DIABETIC SHOE AND 3 PAIRS HEAT MOLDED INSERTS  Jennifer Aranda MD   Diabetic Shoe MISC by Does not apply route DISPENSE ONE PAIR OF DIABETIC SHOE AND 3 PAIRS HEAT MOLDED INSERTS  Jennifer Aranda MD   rizatriptan (MAXALT) 10 MG tablet Take 1 tablet by mouth once as needed for Migraine May repeat in 2 hours if needed  NANETTE Ag   potassium chloride (KLOR-CON M) 20 MEQ extended release tablet Take 1 tablet by mouth daily  Patient taking differently: Take 20 mEq by mouth daily as needed   NANETTE Ag   mirtazapine (REMERON) 15 MG tablet TAKE 1 TABLET BY MOUTH EVERY NIGHT  NANETTE Ag   BASAGLAR KWIKPEN 100 UNIT/ML injection pen INJECT [address] APRLINDA   oxyCODONE-acetaminophen (PERCOCET) 7.5-325 MG per tablet Take 1 tablet by mouth 2 times daily. Historical Provider, MD   tiZANidine (ZANAFLEX) 4 MG tablet Take 4 mg by mouth daily as needed  Historical Provider, MD   NONFORMULARY Prednisone eye drops 1 drop in each eye daily  Historical Provider, MD   Morphine Sulfate ER 15 MG T12A Take 15 mg by mouth 2 times daily.   Historical Provider, MD   Cream Base CREA APPLY ONE PUMP (GRAM) TO AFFECTED AREA(S) THREE TO FOUR TIMES A DAY TO THE APPENDAGES AND TRUNK AS DIRECTED  Jennifer Aranda MD   celecoxib (CELEBREX) 200 MG capsule TAKE 1 CAPSULE BY MOUTH EVERY DAY  Jennifer Aranda MD   B-D ULTRAFINE III SHORT PEN 31G X 8 MM MISC USE TO Arnold Mejia MD   Lancets (Shawn Solorzano) [address] USE TO [facility] AS DIRECTED  Sherrill Looney MD   blood glucose monitor kit and supplies Test 1 times a day & as needed for symptoms of irregular blood glucose. Sherrill Looney MD   gabapentin (NEURONTIN) 300 MG capsule Take 300 mg by mouth 3 times daily.    Historical Provider, MD   Blood Glucose Monitoring Suppl ([address]) w/Device KIT 1 kit by Does not apply route daily as needed (Dx 250.00)  Sherrill Looney MD   ONE TOUCH LANCETS MISC 1 each by Does not apply route daily  Sherrill Looney MD       Social History     Tobacco Use    Smoking status: Never Smoker    Smokeless tobacco: Never Used   Vaping Use    Vaping Use: Never used   Substance Use Topics    Alcohol use: Not Currently     Comment: \"socially, not recently though\"    Drug use: Never        Allergies   Allergen Reactions    Compazine [Prochlorperazine] Shortness Of Breath    Ciprofloxacin Hives    Calamine Itching    Prochlorperazine Edisylate      Will discuss with patient at next visit     Tobramycin Other (See Comments)     States had corneal transplants - and was told not to    Tramadol      Will discuss with patient at next visit     Amoxicillin-Pot Clavulanate Hives, Itching and Rash    Ancef [Cefazolin] Nausea And Vomiting    Bactrim [Sulfamethoxazole-Trimethoprim] Nausea And Vomiting and Other (See Comments)     States she also gets yeast infections with it    Cephalexin Rash    Clindamycin/Lincomycin Rash    Codeine Nausea And Vomiting and Rash    Demerol Hives    Doxycycline Nausea And Vomiting    Hydroxyzine Hcl Itching    Ibuprofen Nausea Only    Keflex [Cephalexin] Rash    Meperidine Hives    Moxifloxacin Nausea And Vomiting    Nortriptyline Hives, Itching and Rash    Orphenadrine Citrate Itching    Penicillins Hives and Nausea Only    Vistaril [Hydroxyzine Hcl] Itching   ,   Past Medical History: Diagnosis Date    Anxiety     Constipation     Depression     Headache(784.0)     Hyperlipidemia     Hypertension     Kidney stones     Kidney stones     Morbid obesity due to excess calories (HCC)     Restless legs syndrome     Type 2 diabetes mellitus ([address]    ,   Past Surgical History:   Procedure Laterality Date    ECTOPIC PREGNANCY SURGERY  2002    EYE SURGERY Bilateral     2005 & : cornea transplant and cataracts removed    KNEE CARTILAGE SURGERY Left 2016    KNEE ARTHROSCOPIC PARTIAL MEDIAL MENISCECTOMY performed by Jeremiah Grey MD at [address] [place] [address] Place     ,   Social History     Tobacco Use    Smoking status: Never Smoker    Smokeless tobacco: Never Used   Vaping Use    Vaping Use: Never used   Substance Use Topics    Alcohol use: Not Currently     Comment: \"socially, not recently though\"    Drug use: Never   ,   Family History   Problem Relation Age of Onset    Obesity Mother     Arthritis Mother         has had knees replaced    Cancer Father         \"adrenal gland & lung\"    Parkinsonism Father     Cancer Maternal Grandmother     COPD Maternal Grandmother     Obesity Maternal Grandmother     Heart Failure Maternal Grandmother     Cancer Maternal Grandfather     Other Sister         spina biffida    No Known Problems Son     No Known Problems Son     No Known Problems Daughter     Obesity Maternal Aunt     Other Maternal Aunt          of sepsis related to a spider bite   ,   Immunization History   Administered Date(s) Administered    Influenza, MDCK Quadv, with preserv IM (Flucelvax 4 yrs and older) 2018    Influenza, Quadv, IM, PF (6 mo and older Fluzone, Flulaval, Fluarix, and 3 yrs and older Afluria) 2016    PPD Test 2019    Pneumococcal Polysaccharide ([id number]) 2018    Tdap (Boostrix, Adacel) 2011   ,   Health Maintenance   Topic Date Due    Hepatitis C screen  Never done    Diabetic retinal exam Never done    COVID-19 Vaccine (1) Never done    HIV screen  Never done    Hepatitis B vaccine (1 of 3 - Risk 3-dose series) Never done    Cervical cancer screen  05/21/2017    Colon cancer screen colonoscopy  Never done    Diabetic microalbuminuria test  12/17/2019    A1C test (Diabetic or Prediabetic)  08/03/2021    Lipid screen  08/17/2021    Flu vaccine (1) 09/01/2021    DTaP/Tdap/Td vaccine (2 - Td or Tdap) 09/22/2021    Diabetic foot exam  01/20/2022    Potassium monitoring  06/28/2022    Creatinine monitoring  06/28/2022    Pneumococcal 0-64 years Vaccine (2 of 2 - PPSV23) 09/08/2037    Hepatitis A vaccine  Aged Out    Hib vaccine  Aged Out    Meningococcal (ACWY) vaccine  Aged Out       PHYSICAL EXAMINATION:  [ INSTRUCTIONS:  \"[x]\" Indicates a positive item  \"[]\" Indicates a negative item  -- DELETE ALL ITEMS NOT EXAMINED]  [x] Alert  [x] Oriented to person/place/time    [x] No apparent distress  [] Toxic appearing    [] Face flushed appearing [x] Sclera clear  [] Lips are cyanotic      [x] Breathing appears normal  [] Appears tachypneic      [] Rash on visible skin    [x] Cranial Nerves II-XII grossly intact    [x] Motor grossly intact in visible upper extremities    [x] Motor grossly intact in visible lower extremities    [x] Normal Mood  [] Anxious appearing    [] Depressed appearing  [] Confused appearing      [] Poor short term memory  [] Poor long term memory    [] OTHER:      Due to this being a TeleHealth encounter, evaluation of the following organ systems is limited: Vitals/Constitutional/EENT/Resp/CV/GI//MS/Neuro/Skin/Heme-Lymph-Imm. There were no vitals taken for this visit. Patient-Reported Vitals 4/26/2021   Patient-Reported Weight 350   Patient-Reported Height 5'3\"          ASSESSMENT/PLAN:  1.  Cellulitis of both lower extremities  - She will come to office for 1g Rocephin today (and toradol per her request for pain); we will set her up for 3 additional injections of Rocephin this week for a total of 5 days Rocephin. HH can adminster Wed, PETÄJÄVESI, Fri. She is to call or go to ED for worsening symptoms or concerns. I stressed the importance of wound care compliance and keeping blood sugars controlled to aid in wound healing.     - ketorolac (TORADOL) injection 30 mg  - cefTRIAXone (ROCEPHIN) injection 1,000 mg    2. Lymphedema    - Wound care to follow. 3. Anxiety    - clonazePAM (KLONOPIN) 0.5 MG tablet; Take 1 tablet by mouth 2 times daily as needed for Anxiety for up to 30 days. Dispense: 45 tablet; Refill: 0      Return if symptoms worsen or fail to improve. An  electronic signature was used to authenticate this note. --NANETTE Vinson on 7/14/2021 at 8:54 AM        Pursuant to the emergency declaration under the [facility], [facility] waiver authority and the [employer] Act, this Virtual  Visit was conducted, with patient's consent, to reduce the patient's risk of exposure to COVID-19 and provide continuity of care for an established patient. Services were provided through a video synchronous discussion virtually to substitute for in-person clinic visit.

## 2021-07-14 ENCOUNTER — HOSPITAL ENCOUNTER (EMERGENCY)
Age: 51
Discharge: HOME | End: 2021-07-14
Attending: EMERGENCY MEDICINE
Payer: MEDICAID

## 2021-07-14 ENCOUNTER — TELEPHONE (OUTPATIENT)
Dept: PRIMARY CARE CLINIC | Age: 51
End: 2021-07-14

## 2021-07-14 VITALS
WEIGHT: 293 LBS | DIASTOLIC BLOOD PRESSURE: 80 MMHG | BODY MASS INDEX: 55.32 KG/M2 | HEIGHT: 61 IN | OXYGEN SATURATION: 96 % | RESPIRATION RATE: 19 BRPM | HEART RATE: 90 BPM | SYSTOLIC BLOOD PRESSURE: 174 MMHG | TEMPERATURE: 98 F

## 2021-07-14 DIAGNOSIS — L03.119 RECURRENT CELLULITIS OF LOWER EXTREMITY: Primary | ICD-10-CM

## 2021-07-14 DIAGNOSIS — I89.0 LYMPHEDEMA: ICD-10-CM

## 2021-07-14 PROCEDURE — 6360000002 HC RX W HCPCS: Performed by: EMERGENCY MEDICINE

## 2021-07-14 PROCEDURE — 96372 THER/PROPH/DIAG INJ SC/IM: CPT

## 2021-07-14 PROCEDURE — 99282 EMERGENCY DEPT VISIT SF MDM: CPT

## 2021-07-14 PROCEDURE — 2500000003 HC RX 250 WO HCPCS: Performed by: EMERGENCY MEDICINE

## 2021-07-14 RX ORDER — OXYCODONE AND ACETAMINOPHEN 7.5; 325 MG/1; MG/1
1 TABLET ORAL ONCE
Status: DISCONTINUED | OUTPATIENT
Start: 2021-07-14 | End: 2021-07-14 | Stop reason: HOSPADM

## 2021-07-14 RX ORDER — CEFTRIAXONE 1 G/1
1000 INJECTION, POWDER, FOR SOLUTION INTRAMUSCULAR; INTRAVENOUS EVERY 24 HOURS
Qty: 1000 MG | Refills: 0 | Status: SHIPPED | OUTPATIENT
Start: 2021-07-14 | End: 2021-07-17

## 2021-07-14 RX ADMIN — LIDOCAINE HYDROCHLORIDE 1000 MG: 10 INJECTION, SOLUTION EPIDURAL; INFILTRATION; INTRACAUDAL; PERINEURAL at 11:43

## 2021-07-14 ASSESSMENT — PAIN SCALES - GENERAL: PAINLEVEL_OUTOF10: 8

## 2021-07-14 ASSESSMENT — ENCOUNTER SYMPTOMS
COUGH: 0
DIARRHEA: 0
ABDOMINAL PAIN: 0
VOMITING: 0
NAUSEA: 0
BACK PAIN: 0
SHORTNESS OF BREATH: 0
RHINORRHEA: 0

## 2021-07-14 NOTE — ED PROVIDER NOTES
[facility]  eMERGENCY dEPARTMENT eNCOUnter      Pt Name: Shen James  MRN: [id number]  [place] 1970  Date of evaluation: 7/14/2021  Provider: Nestor Joseph MD    [address] [place] [address]       Chief Complaint   Patient presents with    Cellulitis         HISTORY OF PRESENT ILLNESS   (Location/Symptom, Timing/Onset,Context/Setting, Quality, Duration, Modifying Factors, Severity)  Note limiting factors. Shen James is a 52 y.o. female who presents to the emergency department for recurrent cellulitis. Patient has chronic bilateral lower extremity lymphedema with venous insufficiency and recurrent cellulitis of both lower legs. She is followed by home health as well as Dr. Tess Joel at [facility] wound care and has an appointment with him tomorrow. She was seen here 2 days ago for the same complaint and was given a gram of Rocephin. She recently finished 20 days of doxycycline 2 days ago. She is morbidly obese and has recurrent issues with IV access. At her last visit IV access was obtained but she demanded it be removed. She does not want any blood work drawn today. She had her second dose of Rocephin at her primary care physician office yesterday and they were supposed to be calling in the prescription so the home health nurses could administer to her however there is some complication in getting this done so she came back to the ER today to get a shot of Rocephin. She plans to call her primary care physician once again after this to arrange her prescriptions to be sent there. She denies any fevers or chills. HPI    NursingNotes were reviewed. REVIEW OF SYSTEMS    (2-9 systems for level 4, 10 or more for level 5)     Review of Systems   Constitutional: Negative for chills and fever. HENT: Negative for rhinorrhea. Respiratory: Negative for cough and shortness of breath. Cardiovascular: Positive for leg swelling. Negative for chest pain.    Gastrointestinal: Negative for abdominal pain, diarrhea, nausea and vomiting. Genitourinary: Negative for dysuria and frequency. Musculoskeletal: Negative for back pain and neck pain. Neurological: Negative for dizziness and headaches. All other systems reviewed and are negative. PAST MEDICALHISTORY     Past Medical History:   Diagnosis Date    Anxiety     Constipation     Depression     Headache(784.0)     Hyperlipidemia     Hypertension     Kidney stones     Kidney stones     Morbid obesity due to excess calories (HCC)     Restless legs syndrome     Type 2 diabetes mellitus ([address]          SURGICAL HISTORY       Past Surgical History:   Procedure Laterality Date    ECTOPIC PREGNANCY SURGERY  2002    EYE SURGERY Bilateral     2005 & 2007: cornea transplant and cataracts removed    KNEE CARTILAGE SURGERY Left 12/20/2016    KNEE ARTHROSCOPIC PARTIAL MEDIAL MENISCECTOMY performed by Nedra Colon MD at [facility]     Previous Medications    B-D ULTRAFINE III SHORT PEN 31G X 8 MM MISC    USE TO INJECT INSULIN ONCE DAILY    BASAGLAR KWIKPEN 100 UNIT/ML INJECTION PEN    INJECT 30 UNITS INTO THE SKIN EVERY EVENING    BLOOD GLUCOSE MONITOR KIT AND SUPPLIES    Test 1 times a day & as needed for symptoms of irregular blood glucose.     BLOOD GLUCOSE MONITORING SUPPL (ONE TOUCH BASIC SYSTEM) W/DEVICE KIT    1 kit by Does not apply route daily as needed (Dx 250.00)    BLOOD GLUCOSE TEST STRIPS (ASCENSIA AUTODISC VI;ONE TOUCH ULTRA TEST VI) STRIP    Check glucose TID and as needed for hypoglycemic events Dx E11.40 E11.66 Z79.4  Use one touch verio flex    BUSPIRONE (BUSPAR) 10 MG TABLET    TAKE 1 TABLET BY MOUTH THREE TIMES DAILY    BUTORPHANOL TARTRATE (STADOL NS NA)    1 spray by Nasal route 4 times daily as needed (migraines)    CELECOXIB (CELEBREX) 200 MG CAPSULE    TAKE 1 CAPSULE BY MOUTH EVERY DAY    CLONAZEPAM (KLONOPIN) 0.5 MG TABLET    Take 1 tablet by mouth 2 times daily as needed for Anxiety (MAXALT) 10 MG TABLET    Take 1 tablet by mouth once as needed for Migraine May repeat in 2 hours if needed    TIZANIDINE (ZANAFLEX) 4 MG TABLET    Take 4 mg by mouth daily as needed    TRIAMTERENE-HYDROCHLOROTHIAZIDE (MAXZIDE-25) 37.5-25 MG PER TABLET    TAKE 1 TABLET BY MOUTH DAILY       ALLERGIES     Compazine [prochlorperazine], Ciprofloxacin, Calamine, Prochlorperazine edisylate, Tobramycin, Tramadol, Amoxicillin-pot clavulanate, Ancef [cefazolin], Bactrim [sulfamethoxazole-trimethoprim], Cephalexin, Clindamycin/lincomycin, Codeine, Demerol, Doxycycline, Hydroxyzine hcl, Ibuprofen, Keflex [cephalexin], Meperidine, Moxifloxacin, Nortriptyline, Orphenadrine citrate, Penicillins, and Vistaril [hydroxyzine hcl]    FAMILY HISTORY       Family History   Problem Relation Age of Onset    Obesity Mother     Arthritis Mother         has had knees replaced    Cancer Father         \"adrenal gland & lung\"    Parkinsonism Father     Cancer Maternal Grandmother     COPD Maternal Grandmother     Obesity Maternal Grandmother     Heart Failure Maternal Grandmother     Cancer Maternal Grandfather     Other Sister         spina biffida    No Known Problems Son     No Known Problems Son     No Known Problems Daughter     Obesity Maternal Aunt     Other Maternal Aunt          of sepsis related to a spider bite          SOCIAL HISTORY       Social History     Socioeconomic History    Marital status: Single     Spouse name: None    Number of children: 3    Years of education: None    Highest education level: None   Occupational History    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 Eric Bob, [phone]    AMBULATES: independently    DOMICILED: lives in a private home alone, no stairs, no steps to enter home, has a cat     Social Determinants of Health     Financial Resource Strain:     Difficulty of Paying Living Expenses:    Food Insecurity:     Worried About Running Out of Food in the Last Year:     [address] in the Last Year:    Transportation Needs:     Lack of Transportation (Medical):  Lack of Transportation (Non-Medical):    Physical Activity:     Days of Exercise per Week:     Minutes of Exercise per Session:    Stress:     Feeling of Stress :    Social Connections:     Frequency of Communication with Friends and Family:     Frequency of Social Gatherings with Friends and Family:     Attends [employer] Services:     Active Member of Clubs or Organizations:     Attends Club or Organization Meetings:     Marital Status:    Intimate Partner Violence:     Fear of Current or Ex-Partner:     Emotionally Abused:     Physically Abused:     Sexually Abused:        SCREENINGS             PHYSICAL EXAM    (up to 7 for level 4, 8 or more for level 5)     ED Triage Vitals [07/14/21 0947]   BP Temp Temp src Pulse Resp SpO2 Height Weight   (!) 183/75 98 °F (36.7 °C) -- 95 20 95 % 5' 1\" (1.549 m) (!) 320 lb (145.2 kg)       Physical Exam  Vitals and nursing note reviewed. Constitutional:       General: She is not in acute distress. Appearance: Normal appearance. She is well-developed. She is not ill-appearing or diaphoretic. HENT:      Head: Normocephalic and atraumatic.       Right Ear: External ear normal.      Left Ear: External ear normal.      Nose: Nose normal.      Mouth/Throat:      Mouth: Mucous membranes are moist.   Eyes:      Conjunctiva/sclera: Conjunctivae normal. tomorrow          CONSULTS:  None    PROCEDURES:  Unless otherwise noted below, none     Procedures    FINAL IMPRESSION      1. Recurrent cellulitis of lower extremity    2. Lymphedema          DISPOSITION/PLAN   DISPOSITION Discharge - Pending Orders Complete 07/14/2021 10:43:15 AM      PATIENT REFERRED TO:  No follow-up provider specified.     DISCHARGE MEDICATIONS:  New Prescriptions    No medications on file          (Please note that portions of this note were completed with a voice recognition program.  Efforts were made to edit thedictations but occasionally words are mis-transcribed.)    Matthew Piña MD (electronically signed)  Attending Emergency Physician        Lillian Martinez MD  07/14/21 [id number]

## 2021-07-15 ENCOUNTER — APPOINTMENT (OUTPATIENT)
Dept: WOUND CARE | Facility: HOSPITAL | Age: 51
End: 2021-07-15

## 2021-07-15 PROCEDURE — 87635 SARS-COV-2 COVID-19 AMP PRB: CPT | Performed by: NURSE PRACTITIONER

## 2021-07-17 LAB — BLOOD CULTURE, ROUTINE: NORMAL

## 2021-07-30 ENCOUNTER — TELEPHONE (OUTPATIENT)
Dept: INTERNAL MEDICINE CLINIC | Age: 51
End: 2021-07-30

## 2021-07-30 NOTE — TELEPHONE ENCOUNTER
[facility] reporting that pt has stated she is no longer going to go to [address] [place] [address]   Pt has been having family member change unna boots rather than calling HH if there is an issue, she told home health she just had cg  change it  because she did not want to bother HH,  Pt was ordered 8 unna boots on 7/15 and she states she is out , and her current orders were to change them once weekly. She should have 4 left and she told [place] she had none left. Pt is going thru costly unna boots against orders as those are supposed to be changed by [place] nurse only   Is there an alternative order they can have like possibly a wound care to do on the actual open wound and then wrap with ace wraps which cgs could more safely provide   ?    [facility] wound care is not going to give orders on this anymore due to her not following up with them and her stating she would not go back   (Also the current order for unna boots is the non medicated type that are foam and coban only  )  Please advise

## 2021-08-02 NOTE — TELEPHONE ENCOUNTER
Currently we have no wound care orders at all since [facility] wound care has discharged her and will not order anymore wound care for her   So with no further orders they cannot provide wound care - can you recommend something that they can teach her to do herself safely and they can discharge her once trained   They have recommended placing some type of  dressing over wound and then secure with  ace wraps for compression daily  rather than unna boots

## 2021-08-02 NOTE — TELEPHONE ENCOUNTER
Ace wraps for compression would be my only other recommendation. Unna boots are entirely too expensive for her to be using like this. Also, if she is not going to be compliant with wound care orders, she cannot expect to get us to do this for her either. Should she need further wound care it must come from wound care! She is welcome to try seeing [facility] again if they will accept her.

## 2021-08-03 ENCOUNTER — TELEPHONE (OUTPATIENT)
Dept: PRIMARY CARE CLINIC | Age: 51
End: 2021-08-03

## 2021-08-03 NOTE — TELEPHONE ENCOUNTER
Patient called wanting advice on what to do for the cracked skin on her feet. She states she has tried OTC medication, but she is almost unable to walk on on her right foot.  Please advise

## 2021-08-04 ENCOUNTER — TELEPHONE (OUTPATIENT)
Dept: INTERNAL MEDICINE CLINIC | Age: 51
End: 2021-08-04

## 2021-08-04 RX ORDER — INSULIN LISPRO 100 [IU]/ML
INJECTION, SOLUTION INTRAVENOUS; SUBCUTANEOUS
Qty: 18 ML | Refills: 5 | Status: SHIPPED | OUTPATIENT
Start: 2021-08-04

## 2021-08-04 RX ORDER — INSULIN GLARGINE 100 [IU]/ML
INJECTION, SOLUTION SUBCUTANEOUS
Qty: 15 ML | Refills: 1 | Status: SHIPPED | OUTPATIENT
Start: 2021-08-04 | End: 2021-10-21

## 2021-08-04 RX ORDER — ONDANSETRON 4 MG/1
TABLET, ORALLY DISINTEGRATING ORAL
Qty: 20 TABLET | Refills: 0 | Status: SHIPPED | OUTPATIENT
Start: 2021-08-04 | End: 2021-09-03

## 2021-08-04 NOTE — TELEPHONE ENCOUNTER
Pt was to be seen by [facility] today to be trained on the ace wraps per your last message to me , dc was being planned   Pt refused visit when she was told that [facility] would not be doing unna boots any longer   Pt told [facility] not to come if they were only going to do ace wraps and that she was calling you to get you to order what she wanted ( which is the unna boots )   Pt has not followed up with any wound care and [facility] will not see her again   [facility]

## 2021-08-09 NOTE — TELEPHONE ENCOUNTER
[facility] again tried to schedule a visit for pt discharge and instruction on ace wraps,. Pt stated \"unless you are going to put unna boots on my legs, there is no reason to come.  \" [facility] nurse explained that she needed to do  Discharge visit pt refused for [facility] to make a discharge visit   Pt stated she does not believe ace wraps will help her and also said she will not go back to wound care for any further wound care  orders     HH is doing a paper discharge since pt will not allow another visit

## 2021-08-10 ENCOUNTER — TELEPHONE (OUTPATIENT)
Dept: PRIMARY CARE CLINIC | Age: 51
End: 2021-08-10

## 2021-08-30 ENCOUNTER — VIRTUAL VISIT (OUTPATIENT)
Dept: PRIMARY CARE CLINIC | Age: 51
End: 2021-08-30
Payer: MEDICAID

## 2021-08-30 DIAGNOSIS — F41.9 ANXIETY AND DEPRESSION: ICD-10-CM

## 2021-08-30 DIAGNOSIS — G89.29 CHRONIC PAIN OF LEFT KNEE: Primary | ICD-10-CM

## 2021-08-30 DIAGNOSIS — M23.204 OLD COMPLEX TEAR OF MEDIAL MENISCUS OF LEFT KNEE: ICD-10-CM

## 2021-08-30 DIAGNOSIS — N30.00 ACUTE CYSTITIS WITHOUT HEMATURIA: ICD-10-CM

## 2021-08-30 DIAGNOSIS — E11.65 UNCONTROLLED TYPE 2 DIABETES MELLITUS WITH HYPERGLYCEMIA, WITHOUT LONG-TERM CURRENT USE OF INSULIN (HCC): ICD-10-CM

## 2021-08-30 DIAGNOSIS — F32.A ANXIETY AND DEPRESSION: ICD-10-CM

## 2021-08-30 DIAGNOSIS — M25.562 CHRONIC PAIN OF LEFT KNEE: Primary | ICD-10-CM

## 2021-08-30 PROCEDURE — G8427 DOCREV CUR MEDS BY ELIG CLIN: HCPCS | Performed by: NURSE PRACTITIONER

## 2021-08-30 PROCEDURE — 1036F TOBACCO NON-USER: CPT | Performed by: NURSE PRACTITIONER

## 2021-08-30 PROCEDURE — 99213 OFFICE O/P EST LOW 20 MIN: CPT | Performed by: NURSE PRACTITIONER

## 2021-08-30 PROCEDURE — G8417 CALC BMI ABV UP PARAM F/U: HCPCS | Performed by: NURSE PRACTITIONER

## 2021-08-30 ASSESSMENT — ENCOUNTER SYMPTOMS
PHOTOPHOBIA: 0
COUGH: 0
SHORTNESS OF BREATH: 0
COLOR CHANGE: 0
VOICE CHANGE: 0
NAUSEA: 0
VOMITING: 0
RHINORRHEA: 0
BACK PAIN: 0

## 2021-08-30 NOTE — PROGRESS NOTES
[address] [address]             Phone:  [phone]  Fax:  [phone]       2021    TELEHEALTH EVALUATION -- Audio/Visual (During [place] [employer] SSDBI-87 public health emergency)    HPI:  Chief Complaint   Patient presents with    Knee Pain       Tianna Trivedi (:  [birth year]) has requested an audio/video evaluation for the following concern(s):    Patient presents today for referral to pain management. She states she would like to go back to [facility] pain management for right knee pain. She is also complaining of dysuria x 2 days. She states she is having urinary frequency as well. She also states she typically gets a yeast infection when on antibiotics. Review of Systems   Constitutional: Negative for chills and fever. HENT: Negative for ear pain, hearing loss, rhinorrhea and voice change. Eyes: Negative for photophobia and visual disturbance. Respiratory: Negative for cough and shortness of breath. Cardiovascular: Negative for chest pain and palpitations. Gastrointestinal: Negative for nausea and vomiting. Endocrine: Negative. Negative for cold intolerance and heat intolerance. Genitourinary: Negative for difficulty urinating and flank pain. Musculoskeletal: Positive for arthralgias. Negative for back pain and neck pain. Skin: Negative for color change and rash. Allergic/Immunologic: Negative for environmental allergies and food allergies. Neurological: Negative for dizziness, speech difficulty and headaches. Hematological: Does not bruise/bleed easily. Psychiatric/Behavioral: Negative for sleep disturbance and suicidal ideas. Prior to Visit Medications    Medication Sig Taking?  Authorizing Provider   nitrofurantoin, macrocrystal-monohydrate, (MACROBID) 100 MG capsule Take 1 capsule by mouth 2 times daily for 10 days Yes Virgilio Soda, APRN   fluconazole (DIFLUCAN) 150 MG tablet Take 1 tablet by mouth once for 1 dose Yes Leslee Cortes NANETTE Haque   Liraglutide (VICTOZA) 18 MG/3ML SOPN SC injection ADMINISTER 1.8 MG UNDER THE SKIN EVERY DAY  Virgilio Soda, APRN   mirtazapine (REMERON) 15 MG tablet TAKE 1 TABLET BY MOUTH EVERY NIGHT  Virgilio Soda, APRN   busPIRone (BUSPAR) 10 MG tablet TAKE 1 TABLET BY MOUTH THREE TIMES DAILY  Virgilio Soda, APRN   BASAGLAR KWIKPEN 100 UNIT/ML injection pen INJECT 30 UNITS INTO THE SKIN EVERY [address], APRN   insulin lispro, 1 Unit Dial, (ADMELOG SOLOSTAR) 100 UNIT/ML SOPN ADMINISTER 39 UNITS UNDER THE SKIN EVERY EVENING. INCREASE BY 3 UNITS EVERY NIGHT UNTIL 60 UNITS IS REACHED AND. [address]  Kris Nam MD   ondansetron (ZOFRAN-ODT) 4 MG disintegrating tablet DISSOLVE 1 TABLET UNDER THE TONGUE EVERY 8 HOURS AS NEEDED FOR NAUSEA AND VOMITING PRIOR TO DOXYCYCLINE DOSES  Virgilio Soda, APRN   clonazePAM (KLONOPIN) 0.5 MG tablet Take 1 tablet by mouth 2 times daily as needed for Anxiety for up to 30 days.   Virgilio Soda, APRN   doxycycline hyclate (VIBRAMYCIN) 100 MG capsule Take 100 mg by mouth 2 times daily  Historical Provider, MD   metFORMIN (GLUCOPHAGE) 500 MG tablet Take 1 tablet by mouth 2 times daily (with meals)  Virgilio Soda, APRN   fluticasone (FLONASE) 50 MCG/ACT nasal spray SHAKE LIQUID AND USE 1 SPRAY IN EACH NOSTRIL DAILY  Virgilio Soda, APRN   furosemide (LASIX) 80 MG tablet Take 0.5 tablets by mouth 2 times daily  Virgilio Soda, APRN   triamterene-hydroCHLOROthiazide (MAXZIDE-25) 37.5-25 MG per tablet TAKE 1 TABLET BY MOUTH DAILY  Virgilio Soda, APRN   DULoxetine (CYMBALTA) 60 MG extended release capsule TAKE 1 CAPSULE BY MOUTH TWICE DAILY  Virgilio Soda, APRN   pramipexole (MIRAPEX) 0.75 MG tablet TAKE 1 TABLET BY MOUTH TWICE DAILY  Virgilio Soda, APRN   Butorphanol Tartrate (STADOL NS NA) 1 spray by Nasal route 4 times daily as needed (migraines)  Patient not taking: Reported on 6/28/2021  Historical Provider, MD   blood glucose test strips (ASCENSIA AUTODISC VI;ONE TOUCH ULTRA TEST VI) strip Check glucose TID and as needed for hypoglycemic events Dx E11.40 E11.66 Z79.4  Use one touch verio flex  Lorrin Mortimer, APRN   omeprazole (PRILOSEC) 20 MG delayed release capsule TAKE ONE CAPSULE BY MOUTH TWICE DAILY BEFORE MEALS  Lorrin Mortimer, APRN   Diabetic Shoe MISC by Does not apply route DISPENSE ONE PAIR OF DIABETIC SHOE AND 3 PAIRS HEAT MOLDED INSERTS  Herminio Chen MD   Diabetic Shoe MISC by Does not apply route DISPENSE ONE PAIR OF DIABETIC SHOE AND 3 PAIRS HEAT MOLDED INSERTS  Herminio Chen MD   rizatriptan (MAXALT) 10 MG tablet Take 1 tablet by mouth once as needed for Migraine May repeat in 2 hours if needed  Lorrin Mortimer, APRN   potassium chloride (KLOR-CON M) 20 MEQ extended release tablet Take 1 tablet by mouth daily  Patient taking differently: Take 20 mEq by mouth daily as needed   Lorrin Mortimer, APRN   oxyCODONE-acetaminophen (PERCOCET) 7.5-325 MG per tablet Take 1 tablet by mouth 2 times daily. Historical Provider, MD   tiZANidine (ZANAFLEX) 4 MG tablet Take 4 mg by mouth daily as needed  Historical Provider, MD   NONFORMULARY Prednisone eye drops 1 drop in each eye daily  Historical Provider, MD   Morphine Sulfate ER 15 MG T12A Take 15 mg by mouth 2 times daily. Historical Provider, MD   Cream Base CREA APPLY ONE PUMP (GRAM) TO AFFECTED AREA(S) THREE TO FOUR TIMES A DAY TO THE APPENDAGES AND TRUNK AS DIRECTED  Herminio Chen MD   celecoxib (CELEBREX) 200 MG capsule TAKE 1 CAPSULE BY MOUTH EVERY DAY  Herminio Chen MD   B-D ULTRAFINE III SHORT PEN 31G X 8 MM MISC USE TO INJECT INSULIN ONCE DAILY  Herminio Chen MD   Lancets (Jonita Radha) [address] USE TO [facility] AS DIRECTED  Herminio Chen MD   blood glucose monitor kit and supplies Test 1 times a day & as needed for symptoms of irregular blood glucose.   Herminio Chen MD   gabapentin (NEURONTIN) 300 MG capsule Take 300 mg by mouth 3 times daily.    Historical Provider, MD   Blood Glucose Monitoring Suppl ([address]) w/Device KIT 1 kit by Does not apply route daily as needed (Dx 250.00)  Tonya Leonardo MD   ONE TOUCH LANCETS MISC 1 each by Does not apply route daily  Tonya Leonardo MD       Social History     Tobacco Use    Smoking status: Never Smoker    Smokeless tobacco: Never Used   Vaping Use    Vaping Use: Never used   Substance Use Topics    Alcohol use: Not Currently     Comment: \"socially, not recently though\"    Drug use: Never        Allergies   Allergen Reactions    Compazine [Prochlorperazine] Shortness Of Breath    Ciprofloxacin Hives    Calamine Itching    Prochlorperazine Edisylate      Will discuss with patient at next visit     Tobramycin Other (See Comments)     States had corneal transplants - and was told not to    Tramadol      Will discuss with patient at next visit     Amoxicillin-Pot Clavulanate Hives, Itching and Rash    Ancef [Cefazolin] Nausea And Vomiting    Bactrim [Sulfamethoxazole-Trimethoprim] Nausea And Vomiting and Other (See Comments)     States she also gets yeast infections with it    Cephalexin Rash    Clindamycin/Lincomycin Rash    Codeine Nausea And Vomiting and Rash    Demerol Hives    Doxycycline Nausea And Vomiting    Hydroxyzine Hcl Itching    Ibuprofen Nausea Only    Keflex [Cephalexin] Rash    Meperidine Hives    Moxifloxacin Nausea And Vomiting    Nortriptyline Hives, Itching and Rash    Orphenadrine Citrate Itching    Penicillins Hives and Nausea Only    Vistaril [Hydroxyzine Hcl] Itching   ,   Past Medical History:   Diagnosis Date    Anxiety     Constipation     Depression     Headache(784.0)     Hyperlipidemia     Hypertension     Kidney stones     Kidney stones     Morbid obesity due to excess calories ([address]     Restless legs syndrome     Type 2 diabetes mellitus ([address]    ,   Past Surgical History:   Procedure Laterality Date    ECTOPIC PREGNANCY SURGERY  2002    EYE SURGERY Bilateral      & : cornea transplant and cataracts removed    KNEE CARTILAGE SURGERY Left 2016    KNEE ARTHROSCOPIC PARTIAL MEDIAL MENISCECTOMY performed by Car Black MD at [address]     ,   Social History     Tobacco Use    Smoking status: Never Smoker    Smokeless tobacco: Never Used   Vaping Use    Vaping Use: Never used   Substance Use Topics    Alcohol use: Not Currently     Comment: \"socially, not recently though\"    Drug use: Never   ,   Family History   Problem Relation Age of Onset    Obesity Mother     Arthritis Mother         has had knees replaced    Cancer Father         \"adrenal gland & lung\"    Parkinsonism Father     Cancer Maternal Grandmother     COPD Maternal Grandmother     Obesity Maternal Grandmother     Heart Failure Maternal Grandmother     Cancer Maternal Grandfather     Other Sister         spina biffida    No Known Problems Son     No Known Problems Son     No Known Problems Daughter     Obesity Maternal Aunt     Other Maternal Aunt          of sepsis related to a spider bite   ,   Immunization History   Administered Date(s) Administered    Influenza, MDCK Quadv, with preserv IM (Flucelvax 2 yrs and older) 2018    Influenza, Quadv, IM, PF (6 mo and older Fluzone, Flulaval, Fluarix, and 3 yrs and older Afluria) 2016    PPD Test 2019    Pneumococcal Polysaccharide ([id number]) 2018    Tdap (Boostrix, Adacel) 2011   ,   Health Maintenance   Topic Date Due    Hepatitis C screen  Never done    Diabetic retinal exam  Never done    COVID-19 Vaccine (1) Never done    HIV screen  Never done    Hepatitis B vaccine (1 of 3 - Risk 3-dose series) Never done    Cervical cancer screen  2017    Colon cancer screen colonoscopy  Never done    Diabetic microalbuminuria test  2019    A1C test (Diabetic or Prediabetic)  2021    Lipid screen 08/17/2021    Flu vaccine (1) 09/01/2021    DTaP/Tdap/Td vaccine (2 - Td or Tdap) 09/22/2021    Diabetic foot exam  01/20/2022    Potassium monitoring  06/28/2022    Creatinine monitoring  06/28/2022    Pneumococcal 0-64 years Vaccine (2 of 2 - PPSV23) 09/08/2037    Hepatitis A vaccine  Aged Out    Hib vaccine  Aged Out    Meningococcal (ACWY) vaccine  Aged Out       PHYSICAL EXAMINATION:  [ INSTRUCTIONS:  \"[x]\" Indicates a positive item  \"[]\" Indicates a negative item  -- DELETE ALL ITEMS NOT EXAMINED]  [x] Alert  [x] Oriented to person/place/time    [x] No apparent distress  [] Toxic appearing    [] Face flushed appearing [x] Sclera clear  [] Lips are cyanotic      [x] Breathing appears normal  [] Appears tachypneic      [] Rash on visible skin    [x] Cranial Nerves II-XII grossly intact    [x] Motor grossly intact in visible upper extremities    [x] Motor grossly intact in visible lower extremities    [x] Normal Mood  [] Anxious appearing    [] Depressed appearing  [] Confused appearing      [] Poor short term memory  [] Poor long term memory    [] OTHER:      Due to this being a TeleHealth encounter, evaluation of the following organ systems is limited: Vitals/Constitutional/EENT/Resp/CV/GI//MS/Neuro/Skin/Heme-Lymph-Imm. There were no vitals taken for this visit. Patient-Reported Vitals 4/26/2021   Patient-Reported Weight 350   Patient-Reported Height 5'3\"          ASSESSMENT/PLAN:  1. Old complex tear of medial meniscus of left knee      2. Chronic pain of left knee    - Amb External Referral To Orthopedic Surgery    3. Acute cystitis without hematuria    - nitrofurantoin, macrocrystal-monohydrate, (MACROBID) 100 MG capsule; Take 1 capsule by mouth 2 times daily for 10 days  Dispense: 20 capsule; Refill: 0  - fluconazole (DIFLUCAN) 150 MG tablet; Take 1 tablet by mouth once for 1 dose  Dispense: 1 tablet; Refill: 0      Return if symptoms worsen or fail to improve.       An  electronic signature was used to authenticate this note. --NANETTE White on 8/31/2021 at 10:32 PM        Pursuant to the emergency declaration under the [address] [place] [address], [facility] waiver authority and the [employer] and [employer] Act, this Virtual  Visit was conducted, with patient's consent, to reduce the patient's risk of exposure to COVID-19 and provide continuity of care for an established patient. Services were provided through a video synchronous discussion virtually to substitute for in-person clinic visit.

## 2021-08-31 RX ORDER — BUSPIRONE HYDROCHLORIDE 10 MG/1
TABLET ORAL
Qty: 90 TABLET | Refills: 3 | Status: SHIPPED | OUTPATIENT
Start: 2021-08-31

## 2021-08-31 RX ORDER — MIRTAZAPINE 15 MG/1
TABLET, FILM COATED ORAL
Qty: 30 TABLET | Refills: 3 | Status: SHIPPED | OUTPATIENT
Start: 2021-08-31

## 2021-08-31 RX ORDER — FLUCONAZOLE 150 MG/1
150 TABLET ORAL ONCE
Qty: 1 TABLET | Refills: 0 | Status: SHIPPED | OUTPATIENT
Start: 2021-08-31 | End: 2021-08-31

## 2021-08-31 RX ORDER — LIRAGLUTIDE 6 MG/ML
INJECTION SUBCUTANEOUS
Qty: 9 ML | Refills: 3 | Status: SHIPPED | OUTPATIENT
Start: 2021-08-31

## 2021-08-31 RX ORDER — NITROFURANTOIN 25; 75 MG/1; MG/1
100 CAPSULE ORAL 2 TIMES DAILY
Qty: 20 CAPSULE | Refills: 0 | Status: SHIPPED | OUTPATIENT
Start: 2021-08-31 | End: 2021-09-10

## 2021-09-03 RX ORDER — ONDANSETRON 4 MG/1
TABLET, ORALLY DISINTEGRATING ORAL
Qty: 20 TABLET | Refills: 0 | Status: SHIPPED | OUTPATIENT
Start: 2021-09-03

## 2021-09-13 ENCOUNTER — OFFICE VISIT (OUTPATIENT)
Dept: PRIMARY CARE CLINIC | Age: 51
End: 2021-09-13
Payer: MEDICAID

## 2021-09-13 VITALS
WEIGHT: 293 LBS | BODY MASS INDEX: 55.32 KG/M2 | SYSTOLIC BLOOD PRESSURE: 120 MMHG | TEMPERATURE: 97 F | HEIGHT: 61 IN | OXYGEN SATURATION: 99 % | DIASTOLIC BLOOD PRESSURE: 72 MMHG | HEART RATE: 71 BPM

## 2021-09-13 DIAGNOSIS — I89.0 LYMPHEDEMA OF BOTH LOWER EXTREMITIES: Primary | ICD-10-CM

## 2021-09-13 DIAGNOSIS — R25.2 BILATERAL LEG CRAMPS: ICD-10-CM

## 2021-09-13 DIAGNOSIS — E11.65 UNCONTROLLED TYPE 2 DIABETES MELLITUS WITH HYPERGLYCEMIA, WITHOUT LONG-TERM CURRENT USE OF INSULIN (HCC): Chronic | ICD-10-CM

## 2021-09-13 DIAGNOSIS — E66.01 MORBID OBESITY WITH BMI OF 50.0-59.9, ADULT (HCC): ICD-10-CM

## 2021-09-13 PROCEDURE — 3046F HEMOGLOBIN A1C LEVEL >9.0%: CPT | Performed by: NURSE PRACTITIONER

## 2021-09-13 PROCEDURE — G8417 CALC BMI ABV UP PARAM F/U: HCPCS | Performed by: NURSE PRACTITIONER

## 2021-09-13 PROCEDURE — G8427 DOCREV CUR MEDS BY ELIG CLIN: HCPCS | Performed by: NURSE PRACTITIONER

## 2021-09-13 PROCEDURE — 1036F TOBACCO NON-USER: CPT | Performed by: NURSE PRACTITIONER

## 2021-09-13 PROCEDURE — 99214 OFFICE O/P EST MOD 30 MIN: CPT | Performed by: NURSE PRACTITIONER

## 2021-09-13 PROCEDURE — 2022F DILAT RTA XM EVC RTNOPTHY: CPT | Performed by: NURSE PRACTITIONER

## 2021-09-13 RX ORDER — POTASSIUM CHLORIDE 20 MEQ/1
20 TABLET, EXTENDED RELEASE ORAL DAILY PRN
Qty: 30 TABLET | Refills: 0 | Status: SHIPPED | OUTPATIENT
Start: 2021-09-13

## 2021-09-13 NOTE — PROGRESS NOTES
ChiefComplaint:   Chief Complaint   Patient presents with    Leg Pain     cramping at top of legs       History of Present Illness:  Kizzy Harrell is a 54 y.o. female who presents for evaluation of bilateral leg pain. Hx of lymphedema. Patient reports she took doxycycline in 2021. Reports her legs hurt more at the top/thighs than at the bottom. Dr. Justin Munoz will not do knee surgery on her and she is switching to Dr. Juanita Salazar. Patient reports she is not able to walk much. She reports she is wrapping her legs every day and is staying home, however legs are unwrapped at this visit today. She reports she is elevating her legs as well. Patient is taking 40 mg of lasix today, and reports blood sugars have been in the 200's non fasting, is about 140 fasting. Reports she has leg cramps and drinks pickle juice with some relief. Patient requests phentermine and reports she has lost 4-5 pounds in 1 month.     History:  Past Medical History:   Diagnosis Date    Anxiety     Constipation     Depression     Headache(784.0)     Hyperlipidemia     Hypertension     Kidney stones     Kidney stones     Morbid obesity due to excess calories (HCC)     Restless legs syndrome     Type 2 diabetes mellitus (HCC)        Family History   Problem Relation Age of Onset    Obesity Mother     Arthritis Mother         has had knees replaced    Cancer Father         \"adrenal gland & lung\"    Parkinsonism Father     Cancer Maternal Grandmother     COPD Maternal Grandmother     Obesity Maternal Grandmother     Heart Failure Maternal Grandmother     Cancer Maternal Grandfather     Other Sister         spina biffida    No Known Problems Son     No Known Problems Son     No Known Problems Daughter     Obesity Maternal Aunt     Other Maternal Aunt          of sepsis related to a spider bite     Social History     Socioeconomic History    Marital status: Single     Spouse name: Not on file    Number of children: 3    Years Comments)     States had corneal transplants - and was told not to    Tramadol      Will discuss with patient at next visit     Amoxicillin-Pot Clavulanate Hives, Itching and Rash    Ancef [Cefazolin] Nausea And Vomiting    Bactrim [Sulfamethoxazole-Trimethoprim] Nausea And Vomiting and Other (See Comments)     States she also gets yeast infections with it    Cephalexin Rash    Clindamycin/Lincomycin Rash    Codeine Nausea And Vomiting and Rash    Demerol Hives    Doxycycline Nausea And Vomiting    Hydroxyzine Hcl Itching    Ibuprofen Nausea Only    Keflex [Cephalexin] Rash    Meperidine Hives    Moxifloxacin Nausea And Vomiting    Nortriptyline Hives, Itching and Rash    Orphenadrine Citrate Itching    Penicillins Hives and Nausea Only    Vistaril [Hydroxyzine Hcl] Itching       Medications:  Current Outpatient Medications on File Prior to Visit   Medication Sig Dispense Refill    ondansetron (ZOFRAN-ODT) 4 MG disintegrating tablet DISSOLVE 1 TABLET UNDER THE TONGUE EVERY 8 HOURS AS NEEDED FOR NAUSEA AND VOMITING PRIOR TO DOXYCYCLINE DOSES 20 tablet 0    Liraglutide (VICTOZA) 18 MG/3ML SOPN SC injection ADMINISTER 1.8 MG UNDER THE SKIN EVERY DAY 9 mL 3    mirtazapine (REMERON) 15 MG tablet TAKE 1 TABLET BY MOUTH EVERY NIGHT 30 tablet 3    busPIRone (BUSPAR) 10 MG tablet TAKE 1 TABLET BY MOUTH THREE TIMES DAILY 90 tablet 3    BASAGLAR KWIKPEN 100 UNIT/ML injection pen INJECT 30 UNITS INTO THE SKIN EVERY EVENING 15 mL 1    insulin lispro, 1 Unit Dial, (ADMELOG SOLOSTAR) 100 UNIT/ML SOPN ADMINISTER 45 UNITS UNDER THE SKIN EVERY EVENING. INCREASE BY 3 UNITS EVERY NIGHT UNTIL 60 UNITS IS REACHED AND.  CONTINUE 60 UNITS EVERY EVENING (Patient taking differently: 60 UNITS EVERY EVENING) 18 mL 5    metFORMIN (GLUCOPHAGE) 500 MG tablet Take 1 tablet by mouth 2 times daily (with meals) 180 tablet 1    fluticasone (FLONASE) 50 MCG/ACT nasal spray SHAKE LIQUID AND USE 1 SPRAY IN EACH NOSTRIL DAILY 16 g 0    furosemide (LASIX) 80 MG tablet Take 0.5 tablets by mouth 2 times daily 60 tablet 0    triamterene-hydroCHLOROthiazide (MAXZIDE-25) 37.5-25 MG per tablet TAKE 1 TABLET BY MOUTH DAILY 30 tablet 2    DULoxetine (CYMBALTA) 60 MG extended release capsule TAKE 1 CAPSULE BY MOUTH TWICE DAILY 30 capsule 5    pramipexole (MIRAPEX) 0.75 MG tablet TAKE 1 TABLET BY MOUTH TWICE DAILY 60 tablet 5    blood glucose test strips (ASCENSIA AUTODISC VI;ONE TOUCH ULTRA TEST VI) strip Check glucose TID and as needed for hypoglycemic events Dx E11.40 E11.66 Z79.4  Use one touch verio flex 150 each 5    omeprazole (PRILOSEC) 20 MG delayed release capsule TAKE ONE CAPSULE BY MOUTH TWICE DAILY BEFORE MEALS 180 capsule 1    Diabetic Shoe MISC by Does not apply route DISPENSE ONE PAIR OF DIABETIC SHOE AND 3 PAIRS HEAT MOLDED INSERTS 1 each 0    oxyCODONE-acetaminophen (PERCOCET) 7.5-325 MG per tablet Take 1 tablet by mouth 2 times daily.  tiZANidine (ZANAFLEX) 4 MG tablet Take 4 mg by mouth daily as needed      NONFORMULARY Prednisone eye drops 1 drop in each eye daily      Morphine Sulfate ER 15 MG T12A Take 15 mg by mouth 2 times daily.  Cream Base CREA APPLY ONE PUMP (GRAM) TO AFFECTED AREA(S) THREE TO FOUR TIMES A DAY TO THE APPENDAGES AND TRUNK AS DIRECTED 30 g 5    celecoxib (CELEBREX) 200 MG capsule TAKE 1 CAPSULE BY MOUTH EVERY DAY 60 capsule 5    B-D ULTRAFINE III SHORT PEN 31G X 8 MM MISC USE TO INJECT INSULIN ONCE DAILY 100 each 3    Lancets (ONETOUCH DELICA PLUS [id number]) MISC USE TO CHECK BLLOD SUGAR AS DIRECTED 100 each 1    blood glucose monitor kit and supplies Test 1 times a day & as needed for symptoms of irregular blood glucose. 1 kit 0    gabapentin (NEURONTIN) 300 MG capsule Take 300 mg by mouth 3 times daily.    5    Blood Glucose Monitoring Suppl ([address]) w/Device KIT 1 kit by Does not apply route daily as needed (Dx 250.00) 1 kit 0    ONE TOUCH LANCETS MISC 1 each by Does not apply route daily 100 each 3    [DISCONTINUED] Diabetic Shoe MISC by Does not apply route DISPENSE ONE PAIR OF DIABETIC SHOE AND 3 PAIRS HEAT MOLDED INSERTS 1 each 0    rizatriptan (MAXALT) 10 MG tablet Take 1 tablet by mouth once as needed for Migraine May repeat in 2 hours if needed 30 tablet 3     No current facility-administered medications on file prior to visit. Review of Systems:  Review of Systems   Constitutional: Negative for activity change and fever. HENT: Negative for congestion, ear pain and sore throat. Respiratory: Negative for cough, chest tightness and shortness of breath. Cardiovascular: Positive for leg swelling. Negative for chest pain. Gastrointestinal: Negative for abdominal pain, diarrhea, nausea and vomiting. Genitourinary: Negative for frequency and urgency. Musculoskeletal: Negative for arthralgias and myalgias. Skin: Negative for color change. Neurological: Negative for dizziness, weakness and numbness. Psychiatric/Behavioral: Negative for agitation. The patient is not nervous/anxious. Vital Signs:  /72   Pulse 71   Temp 97 °F (36.1 °C) (Temporal)   Ht 5' 1\" (1.549 m)   Wt 300 lb (136.1 kg)   SpO2 99%   BMI 56.68 kg/m²     Physical Exam:  Physical Exam  Vitals reviewed. Constitutional:       Appearance: Normal appearance. She is obese. HENT:      Head: Normocephalic. Right Ear: Tympanic membrane normal.      Left Ear: Tympanic membrane normal.      Nose: Nose normal.      Mouth/Throat:      Mouth: Mucous membranes are moist.      Pharynx: Oropharynx is clear. Eyes:      Extraocular Movements: Extraocular movements intact. Pupils: Pupils are equal, round, and reactive to light. Cardiovascular:      Rate and Rhythm: Normal rate and regular rhythm. Pulses:           Dorsalis pedis pulses are 1+ on the right side and 1+ on the left side. Posterior tibial pulses are 1+ on the right side and 1+ on the left side. Heart sounds: Normal heart sounds. Pulmonary:      Effort: Pulmonary effort is normal.      Breath sounds: Normal breath sounds. Abdominal:      General: Bowel sounds are normal.      Palpations: Abdomen is soft. Musculoskeletal:         General: Normal range of motion. Cervical back: Normal range of motion. Right lower leg: 3+ Pitting Edema present. Left lower leg: 3+ Pitting Edema present. Skin:     General: Skin is warm and dry. Capillary Refill: Capillary refill takes 2 to 3 seconds. Comments: Bilateral leg lymphedema. Neurological:      Mental Status: She is alert and oriented to person, place, and time. Psychiatric:         Mood and Affect: Mood normal.         Behavior: Behavior normal.          Assessment & Plan    1. Lymphedema of both lower extremities  Wrap legs daily and elevate legs daily. Decrease sodium intake. 2. Bilateral leg cramps    - potassium chloride (KLOR-CON M) 20 MEQ extended release tablet; Take 1 tablet by mouth daily as needed (low potassium)  Dispense: 30 tablet; Refill: 0    3. Uncontrolled type 2 diabetes mellitus with hyperglycemia, without long-term current use of insulin ([facility]      4. Morbid obesity with BMI of 50.0-59.9, adult ([facility]  Encouraged patient to be more active with what she is able to tolerate so she can increase her activity level. Encouraged her to eat a healthy diet. Spent 35 minutes with patient discussing her symptoms, lack of self care and the need to be consistent and serious of taking care of herself. Also discussed recommendations for her legs, ways to control her diabetes and importance of activity/exercise in her life so she can continue to lose weight. Return in about 16 days (around 9/29/2021).

## 2021-09-13 NOTE — PATIENT INSTRUCTIONS
1. Wrap legs daily  2. Elevate legs daily  3. Decrease sodium intake  4. Keep follow up 0/52/3186 with NANETTE Rivers  5.  Make sure to take lasix and maxide

## 2021-09-14 ASSESSMENT — ENCOUNTER SYMPTOMS
SHORTNESS OF BREATH: 0
COUGH: 0
NAUSEA: 0
COLOR CHANGE: 0
SORE THROAT: 0
DIARRHEA: 0
VOMITING: 0
CHEST TIGHTNESS: 0
ABDOMINAL PAIN: 0

## 2021-09-17 ENCOUNTER — VIRTUAL VISIT (OUTPATIENT)
Dept: PRIMARY CARE CLINIC | Age: 51
End: 2021-09-17
Payer: MEDICAID

## 2021-09-17 DIAGNOSIS — B35.4 TINEA CORPORIS: Primary | ICD-10-CM

## 2021-09-17 PROCEDURE — G8428 CUR MEDS NOT DOCUMENT: HCPCS | Performed by: NURSE PRACTITIONER

## 2021-09-17 PROCEDURE — 99214 OFFICE O/P EST MOD 30 MIN: CPT | Performed by: NURSE PRACTITIONER

## 2021-09-17 RX ORDER — FLUCONAZOLE 150 MG/1
150 TABLET ORAL DAILY
Qty: 3 TABLET | Refills: 0 | Status: SHIPPED | OUTPATIENT
Start: 2021-09-17 | End: 2021-09-20

## 2021-09-17 NOTE — PROGRESS NOTES
Tianna Trivedi (:  ) is a 54 y.o. female,Established patient, here for evaluation of the following chief complaint(s): No chief complaint on file. ASSESSMENT/PLAN:  1. Tinea corporis  -     fluconazole (DIFLUCAN) 150 MG tablet; Take 1 tablet by mouth daily for 3 days, Disp-3 tablet, R-0Normal      No follow-ups on file. SUBJECTIVE/OBJECTIVE:  HPI    Wants stadol     Migraine: Pt reports that her migraine started yesterday, occurring on the right side of her head. States that she is sensitive to light and has vomited 4-5 times. Reports that she took her maxalt and zofran and denies any relief. Pt states that she doesn't want to go to the ER to get a shot thus she called the office to see if she could get a refill on her stadol prescription. Pt states that she has tried zomig, imitrex, fioracet, and several other abortive therapies without relief. yest infection behind bilateral knees. Request script of diflucan. Review of Systems   Constitutional: Negative for activity change, appetite change, fatigue, fever and unexpected weight change. HENT: Negative for congestion, ear pain, nosebleeds, rhinorrhea, sore throat, trouble swallowing and voice change. Eyes: Negative for redness and visual disturbance. Respiratory: Negative for cough, chest tightness, shortness of breath and wheezing. Cardiovascular: Negative for chest pain, palpitations and leg swelling. Gastrointestinal: Positive for nausea and vomiting. Negative for abdominal pain, blood in stool, constipation and diarrhea. Endocrine: Negative for polydipsia, polyphagia and polyuria. Genitourinary: Negative for dysuria, frequency and urgency. Musculoskeletal: Negative for myalgias. Skin: Negative for rash and wound. Neurological: Positive for headaches. Negative for dizziness, speech difficulty and light-headedness. Psychiatric/Behavioral: Negative for agitation, confusion, self-injury and suicidal ideas.  The patient is not nervous/anxious. Patient-Reported Vitals 4/26/2021   Patient-Reported Weight 350   Patient-Reported Height 5'3\"        Physical Exam    [INSTRUCTIONS:  \"[x]\" Indicates a positive item  \"[]\" Indicates a negative item  -- DELETE ALL ITEMS NOT EXAMINED]    Constitutional: [x] Appears well-developed and well-nourished [x] No apparent distress      [] Abnormal -     Mental status: [x] Alert and awake  [x] Oriented to person/place/time [x] Able to follow commands    [] Abnormal -     Eyes:   EOM    [x]  Normal    [] Abnormal -   Sclera  [x]  Normal    [] Abnormal -          Discharge [x]  None visible   [] Abnormal -     HENT: [x] Normocephalic, atraumatic  [] Abnormal -   [x] Mouth/Throat: Mucous membranes are moist    External Ears [x] Normal  [] Abnormal -    Neck: [x] No visualized mass [] Abnormal -     Pulmonary/Chest: [x] Respiratory effort normal   [x] No visualized signs of difficulty breathing or respiratory distress        [] Abnormal -      Musculoskeletal:   [x] Normal gait with no signs of ataxia         [x] Normal range of motion of neck        [] Abnormal -     Neurological:        [x] No Facial Asymmetry (Cranial nerve 7 motor function) (limited exam due to video visit)          [x] No gaze palsy        [] Abnormal -          Skin:        [x] No significant exanthematous lesions or discoloration noted on facial skin         [] Abnormal -            Psychiatric:       [x] Normal Affect [] Abnormal -        [x] No Hallucinations    Other pertinent observable physical exam findings:-    Discussion: After reviewing [employer], I discussed with pt her other chronic medications. She states that she was recently fired from pain management due to taking 2 Kratum pills. She states that she hasn't had her morphine, percocet, or gabapentin in over one month. Pt script for these prescriptions were last wrote on 8/18/2021 per rusty report.  Discussed with pt to receive any controlled substances that she will have to come to the office to provide UDS. Discussed with pt that stadol script would need to be discussed with her PCP as well. I am uncomfortable prescribing controlled substances for  this pt due to the increased risk of abuse. Micaela Martínez is 50. On this date 9/17/2021 I have spent 30 minutes reviewing previous notes, test results and face to face (virtual) with the patient discussing the diagnosis and importance of compliance with the treatment plan as well as documenting on the day of the visit. Tianna Trivedi, was evaluated through a synchronous (real-time) audio-video encounter. The patient (or guardian if applicable) is aware that this is a billable service. Verbal consent to proceed has been obtained within the past 12 months. The visit was conducted pursuant to the emergency declaration under the [facility] [address] [facility], [address] [place] [address] authority and the [employer] and "[employer] General Act. Patient identification was verified, and a caregiver was present when appropriate. The patient was located in a state where the provider was credentialed to provide care. An electronic signature was used to authenticate this note.     --NANETTE Mason

## 2021-09-18 ASSESSMENT — ENCOUNTER SYMPTOMS
CHEST TIGHTNESS: 0
EYE REDNESS: 0
SHORTNESS OF BREATH: 0
RHINORRHEA: 0
ABDOMINAL PAIN: 0
SORE THROAT: 0
DIARRHEA: 0
BLOOD IN STOOL: 0
VOMITING: 1
WHEEZING: 0
VOICE CHANGE: 0
CONSTIPATION: 0
COUGH: 0
TROUBLE SWALLOWING: 0
NAUSEA: 1

## 2021-09-29 ENCOUNTER — TELEPHONE (OUTPATIENT)
Dept: PRIMARY CARE CLINIC | Age: 51
End: 2021-09-29

## 2021-09-29 NOTE — TELEPHONE ENCOUNTER
----- Message from NANETTE Wilson sent at 9/29/2021  1:43 PM CDT -----  Regarding: Tona Boston from practice  Patient has had 4 no shows in this practice in the last year. Due to frequent no shows and noncompliance patient is to be dismissed from practice. Thank you!

## 2021-10-04 RX ORDER — BLOOD SUGAR DIAGNOSTIC
STRIP MISCELLANEOUS
Qty: 150 STRIP | Refills: 0 | Status: SHIPPED | OUTPATIENT
Start: 2021-10-04 | End: 2021-10-21 | Stop reason: SDUPTHER

## 2021-10-16 DIAGNOSIS — R25.2 BILATERAL LEG CRAMPS: ICD-10-CM

## 2021-10-16 DIAGNOSIS — E11.65 UNCONTROLLED TYPE 2 DIABETES MELLITUS WITH HYPERGLYCEMIA, WITHOUT LONG-TERM CURRENT USE OF INSULIN (HCC): ICD-10-CM

## 2021-10-16 DIAGNOSIS — M17.0 PRIMARY OSTEOARTHRITIS OF BOTH KNEES: ICD-10-CM

## 2021-10-16 RX ORDER — CELECOXIB 200 MG/1
CAPSULE ORAL
Qty: 60 CAPSULE | Refills: 5 | OUTPATIENT
Start: 2021-10-16

## 2021-10-16 RX ORDER — BLOOD SUGAR DIAGNOSTIC
STRIP MISCELLANEOUS
Qty: 150 STRIP | Refills: 0 | OUTPATIENT
Start: 2021-10-16

## 2021-10-16 RX ORDER — FUROSEMIDE 80 MG
TABLET ORAL
Qty: 60 TABLET | Refills: 0 | OUTPATIENT
Start: 2021-10-16

## 2021-10-16 RX ORDER — LANCETS 33 GAUGE
EACH MISCELLANEOUS
Qty: 100 EACH | Refills: 1 | OUTPATIENT
Start: 2021-10-16

## 2021-10-16 RX ORDER — POTASSIUM CHLORIDE 20 MEQ/1
TABLET, EXTENDED RELEASE ORAL
Qty: 30 TABLET | Refills: 0 | OUTPATIENT
Start: 2021-10-16

## 2021-10-16 RX ORDER — ONDANSETRON 4 MG/1
TABLET, ORALLY DISINTEGRATING ORAL
Qty: 20 TABLET | Refills: 0 | OUTPATIENT
Start: 2021-10-16

## 2021-10-16 RX ORDER — FLUTICASONE PROPIONATE 50 MCG
SPRAY, SUSPENSION (ML) NASAL
Qty: 16 G | Refills: 0 | OUTPATIENT
Start: 2021-10-16

## 2021-10-21 ENCOUNTER — TELEPHONE (OUTPATIENT)
Dept: PRIMARY CARE CLINIC | Age: 51
End: 2021-10-21

## 2021-10-21 DIAGNOSIS — E11.65 UNCONTROLLED TYPE 2 DIABETES MELLITUS WITH HYPERGLYCEMIA, WITHOUT LONG-TERM CURRENT USE OF INSULIN (HCC): ICD-10-CM

## 2021-10-21 DIAGNOSIS — G25.81 RLS (RESTLESS LEGS SYNDROME): ICD-10-CM

## 2021-10-21 RX ORDER — PEN NEEDLE, DIABETIC 31 G X1/4"
1 NEEDLE, DISPOSABLE MISCELLANEOUS DAILY
Qty: 100 EACH | Refills: 3 | Status: SHIPPED | OUTPATIENT
Start: 2021-10-21

## 2021-10-21 RX ORDER — LANCETS 33 GAUGE
EACH MISCELLANEOUS
Qty: 100 EACH | Refills: 5 | Status: SHIPPED | OUTPATIENT
Start: 2021-10-21

## 2021-10-21 RX ORDER — BLOOD SUGAR DIAGNOSTIC
STRIP MISCELLANEOUS
Qty: 150 STRIP | Refills: 5 | Status: SHIPPED | OUTPATIENT
Start: 2021-10-21

## 2021-10-21 RX ORDER — PRAMIPEXOLE DIHYDROCHLORIDE 0.75 MG/1
TABLET ORAL
Qty: 60 TABLET | Refills: 5 | Status: SHIPPED | OUTPATIENT
Start: 2021-10-21

## 2021-10-21 RX ORDER — INSULIN GLARGINE 100 [IU]/ML
30 INJECTION, SOLUTION SUBCUTANEOUS EVERY EVENING
Qty: 5 PEN | Refills: 5 | Status: SHIPPED | OUTPATIENT
Start: 2021-10-21

## 2021-10-21 NOTE — TELEPHONE ENCOUNTER
Patient called in about insulin. Patient recently dismissed from practice as of 09/29/2021, and was unable to have certain meds refilled due to no longer being covered by [facility]. Patient is requesting that [facility] is not covered and needs to be replaced with either Levimir or Lantis.

## 2021-10-21 NOTE — TELEPHONE ENCOUNTER
Jose J Garcia change insulin due to pt insurance.     Requested Prescriptions     Pending Prescriptions Disp Refills    insulin glargine (LANTUS SOLOSTAR) 100 UNIT/ML injection pen 5 pen 5     Sig: Inject 30 Units into the skin every evening    blood glucose test strips (ONETOUCH ULTRA) strip 150 strip 5     Sig: USE TO TEST BLOOD SUGAR THREE TIMES DAILY AND AS NEEDED AS DIRECTED    Lancets (ONETOUCH DELICA PLUS [id number]) MISC 100 each 5     Sig: USE TO CHECK BLOOD SUGAR AS DIRECTED    pramipexole (MIRAPEX) 0.75 MG tablet 60 tablet 5     Sig: TAKE 1 TABLET BY MOUTH TWICE DAILY    Insulin Pen Needle ([employer] PEN NEEDLES) 31G X 6 MM MISC 100 each 3     Si each by Does not apply route daily       Last Appointment Date: 2021  Next Appointment Date: Visit date not found    Allergies   Allergen Reactions    Compazine [Prochlorperazine] Shortness Of Breath    Ciprofloxacin Hives    Calamine Itching    Prochlorperazine Edisylate      Will discuss with patient at next visit     Tobramycin Other (See Comments)     States had corneal transplants - and was told not to    Tramadol      Will discuss with patient at next visit     Amoxicillin-Pot Clavulanate Hives, Itching and Rash    Ancef [Cefazolin] Nausea And Vomiting    Bactrim [Sulfamethoxazole-Trimethoprim] Nausea And Vomiting and Other (See Comments)     States she also gets yeast infections with it    Cephalexin Rash    Clindamycin/Lincomycin Rash    Codeine Nausea And Vomiting and Rash    Demerol Hives    Doxycycline Nausea And Vomiting    Hydroxyzine Hcl Itching    Ibuprofen Nausea Only    Keflex [Cephalexin] Rash    Meperidine Hives    Moxifloxacin Nausea And Vomiting    Nortriptyline Hives, Itching and Rash    Orphenadrine Citrate Itching    Penicillins Hives and Nausea Only    Vistaril [Hydroxyzine Hcl] Itching

## 2021-10-23 ENCOUNTER — APPOINTMENT (OUTPATIENT)
Dept: CT IMAGING | Facility: HOSPITAL | Age: 51
End: 2021-10-23

## 2021-10-23 ENCOUNTER — APPOINTMENT (OUTPATIENT)
Dept: ULTRASOUND IMAGING | Facility: HOSPITAL | Age: 51
End: 2021-10-23

## 2021-10-23 ENCOUNTER — HOSPITAL ENCOUNTER (EMERGENCY)
Facility: HOSPITAL | Age: 51
Discharge: LEFT BEFORE DISCHARGE | End: 2021-10-23
Attending: EMERGENCY MEDICINE | Admitting: EMERGENCY MEDICINE

## 2021-10-23 ENCOUNTER — APPOINTMENT (OUTPATIENT)
Dept: GENERAL RADIOLOGY | Facility: HOSPITAL | Age: 51
End: 2021-10-23

## 2021-10-23 VITALS
DIASTOLIC BLOOD PRESSURE: 69 MMHG | SYSTOLIC BLOOD PRESSURE: 155 MMHG | HEART RATE: 96 BPM | OXYGEN SATURATION: 94 % | WEIGHT: 293 LBS | TEMPERATURE: 99.3 F | RESPIRATION RATE: 18 BRPM | HEIGHT: 61 IN | BODY MASS INDEX: 55.32 KG/M2

## 2021-10-23 DIAGNOSIS — L03.119 CELLULITIS OF LOWER EXTREMITY, UNSPECIFIED LATERALITY: ICD-10-CM

## 2021-10-23 DIAGNOSIS — N28.9 RENAL INSUFFICIENCY: ICD-10-CM

## 2021-10-23 DIAGNOSIS — R73.9 HYPERGLYCEMIA: ICD-10-CM

## 2021-10-23 DIAGNOSIS — M79.605 PAIN IN BOTH LOWER EXTREMITIES: ICD-10-CM

## 2021-10-23 DIAGNOSIS — M79.604 PAIN IN BOTH LOWER EXTREMITIES: ICD-10-CM

## 2021-10-23 DIAGNOSIS — I89.0 LYMPHEDEMA: ICD-10-CM

## 2021-10-23 DIAGNOSIS — N39.0 ACUTE UTI (URINARY TRACT INFECTION): Primary | ICD-10-CM

## 2021-10-23 LAB
ALBUMIN SERPL-MCNC: 3.5 G/DL (ref 3.5–5.2)
ALBUMIN/GLOB SERPL: 0.9 G/DL
ALP SERPL-CCNC: 66 U/L (ref 39–117)
ALT SERPL W P-5'-P-CCNC: 8 U/L (ref 1–33)
ANION GAP SERPL CALCULATED.3IONS-SCNC: 11 MMOL/L (ref 5–15)
AST SERPL-CCNC: 14 U/L (ref 1–32)
BACTERIA UR QL AUTO: ABNORMAL /HPF
BASOPHILS # BLD AUTO: 0.03 10*3/MM3 (ref 0–0.2)
BASOPHILS NFR BLD AUTO: 0.4 % (ref 0–1.5)
BILIRUB SERPL-MCNC: 0.4 MG/DL (ref 0–1.2)
BILIRUB UR QL STRIP: NEGATIVE
BUN SERPL-MCNC: 24 MG/DL (ref 6–20)
BUN/CREAT SERPL: 17.1 (ref 7–25)
CALCIUM SPEC-SCNC: 8.7 MG/DL (ref 8.6–10.5)
CHLORIDE SERPL-SCNC: 99 MMOL/L (ref 98–107)
CLARITY UR: ABNORMAL
CO2 SERPL-SCNC: 22 MMOL/L (ref 22–29)
COLOR UR: YELLOW
CREAT SERPL-MCNC: 1.4 MG/DL (ref 0.57–1)
CRP SERPL-MCNC: 12.69 MG/DL (ref 0–0.5)
D DIMER PPP FEU-MCNC: 1.03 MG/L (FEU) (ref 0–0.5)
D-LACTATE SERPL-SCNC: 1.2 MMOL/L (ref 0.5–2)
DEPRECATED RDW RBC AUTO: 42.5 FL (ref 37–54)
EOSINOPHIL # BLD AUTO: 0.03 10*3/MM3 (ref 0–0.4)
EOSINOPHIL NFR BLD AUTO: 0.4 % (ref 0.3–6.2)
ERYTHROCYTE [DISTWIDTH] IN BLOOD BY AUTOMATED COUNT: 14.5 % (ref 12.3–15.4)
GFR SERPL CREATININE-BSD FRML MDRD: 40 ML/MIN/1.73
GLOBULIN UR ELPH-MCNC: 4.1 GM/DL
GLUCOSE SERPL-MCNC: 331 MG/DL (ref 65–99)
GLUCOSE UR STRIP-MCNC: ABNORMAL MG/DL
HCG SERPL QL: NEGATIVE
HCT VFR BLD AUTO: 28.7 % (ref 34–46.6)
HGB BLD-MCNC: 9.2 G/DL (ref 12–15.9)
HGB UR QL STRIP.AUTO: ABNORMAL
HOLD SPECIMEN: NORMAL
HYALINE CASTS UR QL AUTO: ABNORMAL /LPF
IMM GRANULOCYTES # BLD AUTO: 0.04 10*3/MM3 (ref 0–0.05)
IMM GRANULOCYTES NFR BLD AUTO: 0.6 % (ref 0–0.5)
KETONES UR QL STRIP: NEGATIVE
LEUKOCYTE ESTERASE UR QL STRIP.AUTO: ABNORMAL
LYMPHOCYTES # BLD AUTO: 1.07 10*3/MM3 (ref 0.7–3.1)
LYMPHOCYTES NFR BLD AUTO: 15 % (ref 19.6–45.3)
MCH RBC QN AUTO: 26.2 PG (ref 26.6–33)
MCHC RBC AUTO-ENTMCNC: 32.1 G/DL (ref 31.5–35.7)
MCV RBC AUTO: 81.8 FL (ref 79–97)
MONOCYTES # BLD AUTO: 0.64 10*3/MM3 (ref 0.1–0.9)
MONOCYTES NFR BLD AUTO: 9 % (ref 5–12)
NEUTROPHILS NFR BLD AUTO: 5.3 10*3/MM3 (ref 1.7–7)
NEUTROPHILS NFR BLD AUTO: 74.6 % (ref 42.7–76)
NITRITE UR QL STRIP: POSITIVE
NRBC BLD AUTO-RTO: 0 /100 WBC (ref 0–0.2)
NT-PROBNP SERPL-MCNC: 739.2 PG/ML (ref 0–450)
PH UR STRIP.AUTO: 5.5 [PH] (ref 5–8)
PLATELET # BLD AUTO: 167 10*3/MM3 (ref 140–450)
PMV BLD AUTO: 9.9 FL (ref 6–12)
POTASSIUM SERPL-SCNC: 4.6 MMOL/L (ref 3.5–5.2)
PROCALCITONIN SERPL-MCNC: 0.35 NG/ML (ref 0–0.25)
PROT SERPL-MCNC: 7.6 G/DL (ref 6–8.5)
PROT UR QL STRIP: ABNORMAL
RBC # BLD AUTO: 3.51 10*6/MM3 (ref 3.77–5.28)
RBC # UR: ABNORMAL /HPF
REF LAB TEST METHOD: ABNORMAL
SODIUM SERPL-SCNC: 132 MMOL/L (ref 136–145)
SP GR UR STRIP: 1.01 (ref 1–1.03)
SQUAMOUS #/AREA URNS HPF: ABNORMAL /HPF
UROBILINOGEN UR QL STRIP: ABNORMAL
WBC # BLD AUTO: 7.11 10*3/MM3 (ref 3.4–10.8)
WBC UR QL AUTO: ABNORMAL /HPF

## 2021-10-23 PROCEDURE — 87150 DNA/RNA AMPLIFIED PROBE: CPT | Performed by: EMERGENCY MEDICINE

## 2021-10-23 PROCEDURE — 84703 CHORIONIC GONADOTROPIN ASSAY: CPT | Performed by: EMERGENCY MEDICINE

## 2021-10-23 PROCEDURE — 86140 C-REACTIVE PROTEIN: CPT | Performed by: EMERGENCY MEDICINE

## 2021-10-23 PROCEDURE — 80053 COMPREHEN METABOLIC PANEL: CPT | Performed by: EMERGENCY MEDICINE

## 2021-10-23 PROCEDURE — 93005 ELECTROCARDIOGRAM TRACING: CPT | Performed by: EMERGENCY MEDICINE

## 2021-10-23 PROCEDURE — 87040 BLOOD CULTURE FOR BACTERIA: CPT | Performed by: EMERGENCY MEDICINE

## 2021-10-23 PROCEDURE — 87077 CULTURE AEROBIC IDENTIFY: CPT | Performed by: EMERGENCY MEDICINE

## 2021-10-23 PROCEDURE — 71045 X-RAY EXAM CHEST 1 VIEW: CPT

## 2021-10-23 PROCEDURE — 0 HYDROMORPHONE 1 MG/ML SOLUTION: Performed by: EMERGENCY MEDICINE

## 2021-10-23 PROCEDURE — 87086 URINE CULTURE/COLONY COUNT: CPT | Performed by: EMERGENCY MEDICINE

## 2021-10-23 PROCEDURE — 85025 COMPLETE CBC W/AUTO DIFF WBC: CPT | Performed by: EMERGENCY MEDICINE

## 2021-10-23 PROCEDURE — 99283 EMERGENCY DEPT VISIT LOW MDM: CPT

## 2021-10-23 PROCEDURE — 84145 PROCALCITONIN (PCT): CPT | Performed by: EMERGENCY MEDICINE

## 2021-10-23 PROCEDURE — 85379 FIBRIN DEGRADATION QUANT: CPT | Performed by: EMERGENCY MEDICINE

## 2021-10-23 PROCEDURE — 93010 ELECTROCARDIOGRAM REPORT: CPT | Performed by: INTERNAL MEDICINE

## 2021-10-23 PROCEDURE — 87186 SC STD MICRODIL/AGAR DIL: CPT | Performed by: EMERGENCY MEDICINE

## 2021-10-23 PROCEDURE — 83605 ASSAY OF LACTIC ACID: CPT | Performed by: EMERGENCY MEDICINE

## 2021-10-23 PROCEDURE — 81001 URINALYSIS AUTO W/SCOPE: CPT | Performed by: EMERGENCY MEDICINE

## 2021-10-23 PROCEDURE — 25010000003 HYDROMORPHONE 1 MG/ML SOLUTION: Performed by: EMERGENCY MEDICINE

## 2021-10-23 PROCEDURE — 96372 THER/PROPH/DIAG INJ SC/IM: CPT

## 2021-10-23 PROCEDURE — 63710000001 ONDANSETRON ODT 4 MG TABLET DISPERSIBLE: Performed by: EMERGENCY MEDICINE

## 2021-10-23 PROCEDURE — 83880 ASSAY OF NATRIURETIC PEPTIDE: CPT | Performed by: EMERGENCY MEDICINE

## 2021-10-23 RX ORDER — LINEZOLID 2 MG/ML
600 INJECTION, SOLUTION INTRAVENOUS ONCE
Status: DISCONTINUED | OUTPATIENT
Start: 2021-10-23 | End: 2021-10-23 | Stop reason: HOSPADM

## 2021-10-23 RX ORDER — OXYCODONE AND ACETAMINOPHEN 7.5; 325 MG/1; MG/1
1 TABLET ORAL ONCE
Status: COMPLETED | OUTPATIENT
Start: 2021-10-23 | End: 2021-10-23

## 2021-10-23 RX ORDER — NITROFURANTOIN 25; 75 MG/1; MG/1
100 CAPSULE ORAL 2 TIMES DAILY
Qty: 20 CAPSULE | Refills: 0 | Status: SHIPPED | OUTPATIENT
Start: 2021-10-23 | End: 2021-10-28 | Stop reason: HOSPADM

## 2021-10-23 RX ORDER — ONDANSETRON 4 MG/1
4 TABLET, ORALLY DISINTEGRATING ORAL ONCE
Status: COMPLETED | OUTPATIENT
Start: 2021-10-23 | End: 2021-10-23

## 2021-10-23 RX ORDER — ONDANSETRON 2 MG/ML
4 INJECTION INTRAMUSCULAR; INTRAVENOUS ONCE
Status: DISCONTINUED | OUTPATIENT
Start: 2021-10-23 | End: 2021-10-23

## 2021-10-23 RX ADMIN — OXYCODONE HYDROCHLORIDE AND ACETAMINOPHEN 1 TABLET: 7.5; 325 TABLET ORAL at 13:12

## 2021-10-23 RX ADMIN — ONDANSETRON 4 MG: 4 TABLET, ORALLY DISINTEGRATING ORAL at 13:46

## 2021-10-23 RX ADMIN — HYDROMORPHONE HYDROCHLORIDE 1 MG: 1 INJECTION, SOLUTION INTRAMUSCULAR; INTRAVENOUS; SUBCUTANEOUS at 13:46

## 2021-10-24 LAB
BACTERIA BLD CULT: ABNORMAL
BOTTLE TYPE: ABNORMAL

## 2021-10-25 ENCOUNTER — HOSPITAL ENCOUNTER (EMERGENCY)
Facility: HOSPITAL | Age: 51
Discharge: HOME | End: 2021-10-25
Attending: EMERGENCY MEDICINE | Admitting: EMERGENCY MEDICINE

## 2021-10-25 VITALS
SYSTOLIC BLOOD PRESSURE: 124 MMHG | OXYGEN SATURATION: 98 % | DIASTOLIC BLOOD PRESSURE: 55 MMHG | HEIGHT: 61 IN | HEART RATE: 63 BPM | BODY MASS INDEX: 55.32 KG/M2 | RESPIRATION RATE: 20 BRPM | TEMPERATURE: 98.3 F | WEIGHT: 293 LBS

## 2021-10-25 DIAGNOSIS — R69 ILLNESS: Primary | ICD-10-CM

## 2021-10-25 LAB
BACTERIA SPEC AEROBE CULT: ABNORMAL
QT INTERVAL: 332 MS
QTC INTERVAL: 426 MS

## 2021-10-25 PROCEDURE — 99281 EMR DPT VST MAYX REQ PHY/QHP: CPT

## 2021-10-26 ENCOUNTER — HOSPITAL ENCOUNTER (INPATIENT)
Facility: HOSPITAL | Age: 51
LOS: 2 days | Discharge: HOME | End: 2021-10-28
Attending: EMERGENCY MEDICINE | Admitting: FAMILY MEDICINE

## 2021-10-26 ENCOUNTER — APPOINTMENT (OUTPATIENT)
Dept: GENERAL RADIOLOGY | Facility: HOSPITAL | Age: 51
End: 2021-10-26

## 2021-10-26 DIAGNOSIS — M62.82 NON-TRAUMATIC RHABDOMYOLYSIS: ICD-10-CM

## 2021-10-26 DIAGNOSIS — R78.81 BACTEREMIA: Primary | ICD-10-CM

## 2021-10-26 DIAGNOSIS — A41.9 SEPSIS, DUE TO UNSPECIFIED ORGANISM, UNSPECIFIED WHETHER ACUTE ORGAN DYSFUNCTION PRESENT (HCC): ICD-10-CM

## 2021-10-26 LAB
ALBUMIN SERPL-MCNC: 3 G/DL (ref 3.5–5.2)
ALBUMIN/GLOB SERPL: 0.7 G/DL
ALP SERPL-CCNC: 103 U/L (ref 39–117)
ALT SERPL W P-5'-P-CCNC: 28 U/L (ref 1–33)
ANION GAP SERPL CALCULATED.3IONS-SCNC: 9 MMOL/L (ref 5–15)
APTT PPP: 30 SECONDS (ref 24.1–35)
AST SERPL-CCNC: 51 U/L (ref 1–32)
ATMOSPHERIC PRESS: 750 MMHG
BACTERIA SPEC AEROBE CULT: ABNORMAL
BACTERIA UR QL AUTO: ABNORMAL /HPF
BASE EXCESS BLDV CALC-SCNC: -2.7 MMOL/L (ref 0–2)
BASOPHILS # BLD AUTO: 0.02 10*3/MM3 (ref 0–0.2)
BASOPHILS NFR BLD AUTO: 0.4 % (ref 0–1.5)
BDY SITE: ABNORMAL
BILIRUB SERPL-MCNC: 0.5 MG/DL (ref 0–1.2)
BILIRUB UR QL STRIP: NEGATIVE
BODY TEMPERATURE: 37 C
BUN SERPL-MCNC: 26 MG/DL (ref 6–20)
BUN/CREAT SERPL: 23.6 (ref 7–25)
CALCIUM SPEC-SCNC: 8.7 MG/DL (ref 8.6–10.5)
CHLORIDE SERPL-SCNC: 98 MMOL/L (ref 98–107)
CK SERPL-CCNC: 2416 U/L (ref 20–180)
CLARITY UR: CLEAR
CO2 SERPL-SCNC: 23 MMOL/L (ref 22–29)
COLOR UR: YELLOW
CREAT SERPL-MCNC: 1.1 MG/DL (ref 0.57–1)
CRP SERPL-MCNC: 30.67 MG/DL (ref 0–0.5)
D-LACTATE SERPL-SCNC: 1 MMOL/L (ref 0.5–2)
DEPRECATED RDW RBC AUTO: 45 FL (ref 37–54)
EOSINOPHIL # BLD AUTO: 0.09 10*3/MM3 (ref 0–0.4)
EOSINOPHIL NFR BLD AUTO: 1.8 % (ref 0.3–6.2)
ERYTHROCYTE [DISTWIDTH] IN BLOOD BY AUTOMATED COUNT: 14.9 % (ref 12.3–15.4)
ERYTHROCYTE [SEDIMENTATION RATE] IN BLOOD: 49 MM/HR (ref 0–20)
GFR SERPL CREATININE-BSD FRML MDRD: 53 ML/MIN/1.73
GLOBULIN UR ELPH-MCNC: 4.3 GM/DL
GLUCOSE BLDC GLUCOMTR-MCNC: 318 MG/DL (ref 70–130)
GLUCOSE SERPL-MCNC: 320 MG/DL (ref 65–99)
GLUCOSE UR STRIP-MCNC: ABNORMAL MG/DL
GRAM STN SPEC: ABNORMAL
GRAM STN SPEC: ABNORMAL
HCO3 BLDV-SCNC: 23.2 MMOL/L (ref 22–28)
HCT VFR BLD AUTO: 27.3 % (ref 34–46.6)
HGB BLD-MCNC: 8.4 G/DL (ref 12–15.9)
HGB UR QL STRIP.AUTO: ABNORMAL
IMM GRANULOCYTES # BLD AUTO: 0.03 10*3/MM3 (ref 0–0.05)
IMM GRANULOCYTES NFR BLD AUTO: 0.6 % (ref 0–0.5)
INHALED O2 CONCENTRATION: 21 %
INR PPP: 1.24 (ref 0.91–1.09)
ISOLATED FROM: ABNORMAL
KETONES UR QL STRIP: NEGATIVE
LEUKOCYTE ESTERASE UR QL STRIP.AUTO: ABNORMAL
LYMPHOCYTES # BLD AUTO: 1.3 10*3/MM3 (ref 0.7–3.1)
LYMPHOCYTES NFR BLD AUTO: 25.3 % (ref 19.6–45.3)
Lab: ABNORMAL
MCH RBC QN AUTO: 25.2 PG (ref 26.6–33)
MCHC RBC AUTO-ENTMCNC: 30.8 G/DL (ref 31.5–35.7)
MCV RBC AUTO: 82 FL (ref 79–97)
MODALITY: ABNORMAL
MONOCYTES # BLD AUTO: 0.74 10*3/MM3 (ref 0.1–0.9)
MONOCYTES NFR BLD AUTO: 14.4 % (ref 5–12)
NEUTROPHILS NFR BLD AUTO: 2.95 10*3/MM3 (ref 1.7–7)
NEUTROPHILS NFR BLD AUTO: 57.5 % (ref 42.7–76)
NITRITE UR QL STRIP: POSITIVE
NRBC BLD AUTO-RTO: 0 /100 WBC (ref 0–0.2)
PCO2 BLDV: 44.3 MM HG (ref 41–51)
PH BLDV: 7.33 PH UNITS (ref 7.32–7.42)
PH UR STRIP.AUTO: <=5 [PH] (ref 5–8)
PLATELET # BLD AUTO: 186 10*3/MM3 (ref 140–450)
PMV BLD AUTO: 10.2 FL (ref 6–12)
PO2 BLDV: 36.7 MM HG (ref 27–53)
POTASSIUM SERPL-SCNC: 4.3 MMOL/L (ref 3.5–5.2)
PROCALCITONIN SERPL-MCNC: 3.82 NG/ML (ref 0–0.25)
PROT SERPL-MCNC: 7.3 G/DL (ref 6–8.5)
PROT UR QL STRIP: ABNORMAL
PROTHROMBIN TIME: 15.1 SECONDS (ref 11.9–14.6)
RBC # BLD AUTO: 3.33 10*6/MM3 (ref 3.77–5.28)
RBC # UR: ABNORMAL /HPF
REF LAB TEST METHOD: ABNORMAL
SAO2 % BLDCOV: 66.6 % (ref 45–75)
SARS-COV-2 RNA PNL SPEC NAA+PROBE: NOT DETECTED
SODIUM SERPL-SCNC: 130 MMOL/L (ref 136–145)
SP GR UR STRIP: 1.02 (ref 1–1.03)
SQUAMOUS #/AREA URNS HPF: ABNORMAL /HPF
UROBILINOGEN UR QL STRIP: ABNORMAL
VENTILATOR MODE: ABNORMAL
WBC # BLD AUTO: 5.13 10*3/MM3 (ref 3.4–10.8)
WBC UR QL AUTO: ABNORMAL /HPF

## 2021-10-26 PROCEDURE — 85025 COMPLETE CBC W/AUTO DIFF WBC: CPT | Performed by: EMERGENCY MEDICINE

## 2021-10-26 PROCEDURE — 83605 ASSAY OF LACTIC ACID: CPT | Performed by: EMERGENCY MEDICINE

## 2021-10-26 PROCEDURE — 87635 SARS-COV-2 COVID-19 AMP PRB: CPT | Performed by: EMERGENCY MEDICINE

## 2021-10-26 PROCEDURE — 99285 EMERGENCY DEPT VISIT HI MDM: CPT

## 2021-10-26 PROCEDURE — 0 HYDROMORPHONE 1 MG/ML SOLUTION: Performed by: EMERGENCY MEDICINE

## 2021-10-26 PROCEDURE — 25010000002 METHYLPREDNISOLONE PER 125 MG: Performed by: EMERGENCY MEDICINE

## 2021-10-26 PROCEDURE — 86140 C-REACTIVE PROTEIN: CPT | Performed by: EMERGENCY MEDICINE

## 2021-10-26 PROCEDURE — C1751 CATH, INF, PER/CENT/MIDLINE: HCPCS

## 2021-10-26 PROCEDURE — 85610 PROTHROMBIN TIME: CPT | Performed by: EMERGENCY MEDICINE

## 2021-10-26 PROCEDURE — 02HV33Z INSERTION OF INFUSION DEVICE INTO SUPERIOR VENA CAVA, PERCUTANEOUS APPROACH: ICD-10-PCS | Performed by: EMERGENCY MEDICINE

## 2021-10-26 PROCEDURE — 82803 BLOOD GASES ANY COMBINATION: CPT

## 2021-10-26 PROCEDURE — 84145 PROCALCITONIN (PCT): CPT | Performed by: EMERGENCY MEDICINE

## 2021-10-26 PROCEDURE — 85652 RBC SED RATE AUTOMATED: CPT | Performed by: EMERGENCY MEDICINE

## 2021-10-26 PROCEDURE — 71045 X-RAY EXAM CHEST 1 VIEW: CPT

## 2021-10-26 PROCEDURE — 82962 GLUCOSE BLOOD TEST: CPT

## 2021-10-26 PROCEDURE — 80053 COMPREHEN METABOLIC PANEL: CPT | Performed by: EMERGENCY MEDICINE

## 2021-10-26 PROCEDURE — 87040 BLOOD CULTURE FOR BACTERIA: CPT | Performed by: EMERGENCY MEDICINE

## 2021-10-26 PROCEDURE — 81001 URINALYSIS AUTO W/SCOPE: CPT | Performed by: EMERGENCY MEDICINE

## 2021-10-26 PROCEDURE — 82550 ASSAY OF CK (CPK): CPT | Performed by: EMERGENCY MEDICINE

## 2021-10-26 PROCEDURE — 25010000002 PIPERACILLIN SOD-TAZOBACTAM PER 1 G: Performed by: EMERGENCY MEDICINE

## 2021-10-26 PROCEDURE — 85730 THROMBOPLASTIN TIME PARTIAL: CPT | Performed by: EMERGENCY MEDICINE

## 2021-10-26 PROCEDURE — 25010000002 DROPERIDOL PER 5 MG: Performed by: EMERGENCY MEDICINE

## 2021-10-26 RX ORDER — SODIUM CHLORIDE 0.9 % (FLUSH) 0.9 %
10 SYRINGE (ML) INJECTION EVERY 12 HOURS SCHEDULED
Status: DISCONTINUED | OUTPATIENT
Start: 2021-10-27 | End: 2021-10-28 | Stop reason: HOSPADM

## 2021-10-26 RX ORDER — PANTOPRAZOLE SODIUM 40 MG/1
40 TABLET, DELAYED RELEASE ORAL
Status: DISCONTINUED | OUTPATIENT
Start: 2021-10-27 | End: 2021-10-28 | Stop reason: HOSPADM

## 2021-10-26 RX ORDER — GABAPENTIN 100 MG/1
100 CAPSULE ORAL EVERY 12 HOURS SCHEDULED
Status: DISCONTINUED | OUTPATIENT
Start: 2021-10-27 | End: 2021-10-28 | Stop reason: HOSPADM

## 2021-10-26 RX ORDER — DEXTROSE MONOHYDRATE 25 G/50ML
25 INJECTION, SOLUTION INTRAVENOUS
Status: DISCONTINUED | OUTPATIENT
Start: 2021-10-26 | End: 2021-10-28 | Stop reason: HOSPADM

## 2021-10-26 RX ORDER — GABAPENTIN 300 MG/1
300 CAPSULE ORAL NIGHTLY
Status: DISCONTINUED | OUTPATIENT
Start: 2021-10-26 | End: 2021-10-28 | Stop reason: HOSPADM

## 2021-10-26 RX ORDER — MORPHINE SULFATE 15 MG/1
15 TABLET, FILM COATED, EXTENDED RELEASE ORAL EVERY 12 HOURS SCHEDULED
Status: DISCONTINUED | OUTPATIENT
Start: 2021-10-26 | End: 2021-10-28 | Stop reason: HOSPADM

## 2021-10-26 RX ORDER — SODIUM CHLORIDE 9 MG/ML
50 INJECTION, SOLUTION INTRAVENOUS CONTINUOUS
Status: DISCONTINUED | OUTPATIENT
Start: 2021-10-27 | End: 2021-10-27

## 2021-10-26 RX ORDER — DOCUSATE SODIUM 100 MG/1
100 CAPSULE, LIQUID FILLED ORAL 2 TIMES DAILY
Status: DISCONTINUED | OUTPATIENT
Start: 2021-10-26 | End: 2021-10-28 | Stop reason: HOSPADM

## 2021-10-26 RX ORDER — DIPHENHYDRAMINE HYDROCHLORIDE 50 MG/ML
25 INJECTION INTRAMUSCULAR; INTRAVENOUS ONCE
Status: DISCONTINUED | OUTPATIENT
Start: 2021-10-26 | End: 2021-10-28 | Stop reason: HOSPADM

## 2021-10-26 RX ORDER — FUROSEMIDE 40 MG/1
80 TABLET ORAL 2 TIMES DAILY PRN
Status: DISCONTINUED | OUTPATIENT
Start: 2021-10-26 | End: 2021-10-28 | Stop reason: HOSPADM

## 2021-10-26 RX ORDER — CELECOXIB 100 MG/1
100 CAPSULE ORAL DAILY
Status: DISCONTINUED | OUTPATIENT
Start: 2021-10-27 | End: 2021-10-27

## 2021-10-26 RX ORDER — PRAMIPEXOLE DIHYDROCHLORIDE 0.25 MG/1
0.75 TABLET ORAL EVERY 8 HOURS SCHEDULED
Status: DISCONTINUED | OUTPATIENT
Start: 2021-10-26 | End: 2021-10-28 | Stop reason: HOSPADM

## 2021-10-26 RX ORDER — METHYLPREDNISOLONE SODIUM SUCCINATE 125 MG/2ML
125 INJECTION, POWDER, LYOPHILIZED, FOR SOLUTION INTRAMUSCULAR; INTRAVENOUS ONCE
Status: COMPLETED | OUTPATIENT
Start: 2021-10-26 | End: 2021-10-26

## 2021-10-26 RX ORDER — DULOXETIN HYDROCHLORIDE 30 MG/1
60 CAPSULE, DELAYED RELEASE ORAL DAILY
Status: DISCONTINUED | OUTPATIENT
Start: 2021-10-27 | End: 2021-10-28 | Stop reason: HOSPADM

## 2021-10-26 RX ORDER — SODIUM CHLORIDE 0.9 % (FLUSH) 0.9 %
10 SYRINGE (ML) INJECTION AS NEEDED
Status: DISCONTINUED | OUTPATIENT
Start: 2021-10-26 | End: 2021-10-28 | Stop reason: HOSPADM

## 2021-10-26 RX ORDER — POTASSIUM CHLORIDE 750 MG/1
20 CAPSULE, EXTENDED RELEASE ORAL DAILY PRN
Status: DISCONTINUED | OUTPATIENT
Start: 2021-10-26 | End: 2021-10-28 | Stop reason: HOSPADM

## 2021-10-26 RX ORDER — TRIAMTERENE AND HYDROCHLOROTHIAZIDE 37.5; 25 MG/1; MG/1
1 TABLET ORAL DAILY
Status: DISCONTINUED | OUTPATIENT
Start: 2021-10-27 | End: 2021-10-28 | Stop reason: HOSPADM

## 2021-10-26 RX ORDER — SUMATRIPTAN 50 MG/1
50 TABLET, FILM COATED ORAL ONCE AS NEEDED
Status: DISCONTINUED | OUTPATIENT
Start: 2021-10-26 | End: 2021-10-28 | Stop reason: HOSPADM

## 2021-10-26 RX ORDER — BUSPIRONE HYDROCHLORIDE 10 MG/1
10 TABLET ORAL 3 TIMES DAILY
Status: DISCONTINUED | OUTPATIENT
Start: 2021-10-26 | End: 2021-10-28 | Stop reason: HOSPADM

## 2021-10-26 RX ORDER — CLONAZEPAM 0.5 MG/1
0.5 TABLET ORAL 2 TIMES DAILY PRN
Status: DISCONTINUED | OUTPATIENT
Start: 2021-10-26 | End: 2021-10-28 | Stop reason: HOSPADM

## 2021-10-26 RX ORDER — OXYCODONE AND ACETAMINOPHEN 7.5; 325 MG/1; MG/1
1 TABLET ORAL EVERY 12 HOURS
Status: DISCONTINUED | OUTPATIENT
Start: 2021-10-26 | End: 2021-10-28 | Stop reason: HOSPADM

## 2021-10-26 RX ORDER — DROPERIDOL 2.5 MG/ML
2.5 INJECTION, SOLUTION INTRAMUSCULAR; INTRAVENOUS ONCE
Status: COMPLETED | OUTPATIENT
Start: 2021-10-26 | End: 2021-10-26

## 2021-10-26 RX ORDER — NICOTINE POLACRILEX 4 MG
15 LOZENGE BUCCAL
Status: DISCONTINUED | OUTPATIENT
Start: 2021-10-26 | End: 2021-10-28 | Stop reason: HOSPADM

## 2021-10-26 RX ORDER — ONDANSETRON 4 MG/1
4 TABLET, ORALLY DISINTEGRATING ORAL EVERY 8 HOURS PRN
Status: DISCONTINUED | OUTPATIENT
Start: 2021-10-26 | End: 2021-10-28 | Stop reason: HOSPADM

## 2021-10-26 RX ORDER — MIRTAZAPINE 15 MG/1
15 TABLET, FILM COATED ORAL NIGHTLY
Status: DISCONTINUED | OUTPATIENT
Start: 2021-10-26 | End: 2021-10-28 | Stop reason: HOSPADM

## 2021-10-26 RX ORDER — TIZANIDINE 4 MG/1
4 TABLET ORAL EVERY 8 HOURS PRN
Status: DISCONTINUED | OUTPATIENT
Start: 2021-10-26 | End: 2021-10-28 | Stop reason: HOSPADM

## 2021-10-26 RX ORDER — ACETAMINOPHEN 325 MG/1
650 TABLET ORAL EVERY 4 HOURS PRN
Status: DISCONTINUED | OUTPATIENT
Start: 2021-10-26 | End: 2021-10-28 | Stop reason: HOSPADM

## 2021-10-26 RX ADMIN — OXYCODONE HYDROCHLORIDE AND ACETAMINOPHEN 1 TABLET: 7.5; 325 TABLET ORAL at 22:59

## 2021-10-26 RX ADMIN — PRAMIPEXOLE DIHYDROCHLORIDE 0.75 MG: 0.25 TABLET ORAL at 22:59

## 2021-10-26 RX ADMIN — SODIUM CHLORIDE 4650 ML: 9 INJECTION, SOLUTION INTRAVENOUS at 22:01

## 2021-10-26 RX ADMIN — HYDROMORPHONE HYDROCHLORIDE 1 MG: 1 INJECTION, SOLUTION INTRAMUSCULAR; INTRAVENOUS; SUBCUTANEOUS at 21:19

## 2021-10-26 RX ADMIN — DROPERIDOL 2.5 MG: 2.5 INJECTION, SOLUTION INTRAMUSCULAR; INTRAVENOUS at 21:19

## 2021-10-26 RX ADMIN — PIPERACILLIN SODIUM AND TAZOBACTAM SODIUM 3.38 G: 3; .375 INJECTION, POWDER, LYOPHILIZED, FOR SOLUTION INTRAVENOUS at 22:02

## 2021-10-26 RX ADMIN — GABAPENTIN 300 MG: 300 CAPSULE ORAL at 23:00

## 2021-10-26 RX ADMIN — METHYLPREDNISOLONE SODIUM SUCCINATE 125 MG: 125 INJECTION, POWDER, FOR SOLUTION INTRAMUSCULAR; INTRAVENOUS at 22:02

## 2021-10-27 PROBLEM — B96.1 BACTEREMIA DUE TO KLEBSIELLA PNEUMONIAE: Status: ACTIVE | Noted: 2021-10-26

## 2021-10-27 PROBLEM — G89.4 CHRONIC PAIN SYNDROME: Status: ACTIVE | Noted: 2021-10-27

## 2021-10-27 PROBLEM — N39.0 URINARY TRACT INFECTION: Status: ACTIVE | Noted: 2021-10-27

## 2021-10-27 PROBLEM — I89.0 LYMPHEDEMA OF BOTH LOWER EXTREMITIES: Status: ACTIVE | Noted: 2021-10-27

## 2021-10-27 LAB
ALBUMIN SERPL-MCNC: 3.2 G/DL (ref 3.5–5.2)
ALBUMIN/GLOB SERPL: 0.8 G/DL
ALP SERPL-CCNC: 108 U/L (ref 39–117)
ALT SERPL W P-5'-P-CCNC: 28 U/L (ref 1–33)
ANION GAP SERPL CALCULATED.3IONS-SCNC: 12 MMOL/L (ref 5–15)
AST SERPL-CCNC: 46 U/L (ref 1–32)
BASOPHILS # BLD AUTO: 0.02 10*3/MM3 (ref 0–0.2)
BASOPHILS NFR BLD AUTO: 0.4 % (ref 0–1.5)
BILIRUB SERPL-MCNC: 0.6 MG/DL (ref 0–1.2)
BUN SERPL-MCNC: 23 MG/DL (ref 6–20)
BUN/CREAT SERPL: 23 (ref 7–25)
CALCIUM SPEC-SCNC: 8.2 MG/DL (ref 8.6–10.5)
CHLORIDE SERPL-SCNC: 101 MMOL/L (ref 98–107)
CK SERPL-CCNC: 1672 U/L (ref 20–180)
CO2 SERPL-SCNC: 22 MMOL/L (ref 22–29)
CREAT SERPL-MCNC: 1 MG/DL (ref 0.57–1)
DEPRECATED RDW RBC AUTO: 44.6 FL (ref 37–54)
EOSINOPHIL # BLD AUTO: 0.01 10*3/MM3 (ref 0–0.4)
EOSINOPHIL NFR BLD AUTO: 0.2 % (ref 0.3–6.2)
ERYTHROCYTE [DISTWIDTH] IN BLOOD BY AUTOMATED COUNT: 14.7 % (ref 12.3–15.4)
GFR SERPL CREATININE-BSD FRML MDRD: 59 ML/MIN/1.73
GLOBULIN UR ELPH-MCNC: 3.9 GM/DL
GLUCOSE BLDC GLUCOMTR-MCNC: 208 MG/DL (ref 70–130)
GLUCOSE BLDC GLUCOMTR-MCNC: 314 MG/DL (ref 70–130)
GLUCOSE BLDC GLUCOMTR-MCNC: 325 MG/DL (ref 70–130)
GLUCOSE BLDC GLUCOMTR-MCNC: 340 MG/DL (ref 70–130)
GLUCOSE SERPL-MCNC: 359 MG/DL (ref 65–99)
HBA1C MFR BLD: 11.4 % (ref 4.8–5.6)
HCT VFR BLD AUTO: 28.5 % (ref 34–46.6)
HGB BLD-MCNC: 8.8 G/DL (ref 12–15.9)
IMM GRANULOCYTES # BLD AUTO: 0.08 10*3/MM3 (ref 0–0.05)
IMM GRANULOCYTES NFR BLD AUTO: 1.7 % (ref 0–0.5)
LYMPHOCYTES # BLD AUTO: 0.64 10*3/MM3 (ref 0.7–3.1)
LYMPHOCYTES NFR BLD AUTO: 14 % (ref 19.6–45.3)
MCH RBC QN AUTO: 25.5 PG (ref 26.6–33)
MCHC RBC AUTO-ENTMCNC: 30.9 G/DL (ref 31.5–35.7)
MCV RBC AUTO: 82.6 FL (ref 79–97)
MONOCYTES # BLD AUTO: 0.2 10*3/MM3 (ref 0.1–0.9)
MONOCYTES NFR BLD AUTO: 4.4 % (ref 5–12)
NEUTROPHILS NFR BLD AUTO: 3.63 10*3/MM3 (ref 1.7–7)
NEUTROPHILS NFR BLD AUTO: 79.3 % (ref 42.7–76)
NRBC BLD AUTO-RTO: 0 /100 WBC (ref 0–0.2)
PLATELET # BLD AUTO: 174 10*3/MM3 (ref 140–450)
PMV BLD AUTO: 9.6 FL (ref 6–12)
POTASSIUM SERPL-SCNC: 4.9 MMOL/L (ref 3.5–5.2)
PROT SERPL-MCNC: 7.1 G/DL (ref 6–8.5)
RBC # BLD AUTO: 3.45 10*6/MM3 (ref 3.77–5.28)
SODIUM SERPL-SCNC: 135 MMOL/L (ref 136–145)
WBC # BLD AUTO: 4.58 10*3/MM3 (ref 3.4–10.8)

## 2021-10-27 PROCEDURE — 82550 ASSAY OF CK (CPK): CPT | Performed by: INTERNAL MEDICINE

## 2021-10-27 PROCEDURE — 63710000001 INSULIN DETEMIR PER 5 UNITS: Performed by: INTERNAL MEDICINE

## 2021-10-27 PROCEDURE — 83036 HEMOGLOBIN GLYCOSYLATED A1C: CPT | Performed by: FAMILY MEDICINE

## 2021-10-27 PROCEDURE — 25010000002 CEFTRIAXONE PER 250 MG: Performed by: FAMILY MEDICINE

## 2021-10-27 PROCEDURE — 82962 GLUCOSE BLOOD TEST: CPT

## 2021-10-27 PROCEDURE — 25010000002 PIPERACILLIN SOD-TAZOBACTAM PER 1 G: Performed by: INTERNAL MEDICINE

## 2021-10-27 PROCEDURE — 85025 COMPLETE CBC W/AUTO DIFF WBC: CPT | Performed by: INTERNAL MEDICINE

## 2021-10-27 PROCEDURE — 63710000001 INSULIN LISPRO (HUMAN) PER 5 UNITS: Performed by: INTERNAL MEDICINE

## 2021-10-27 PROCEDURE — 80053 COMPREHEN METABOLIC PANEL: CPT | Performed by: INTERNAL MEDICINE

## 2021-10-27 PROCEDURE — 87040 BLOOD CULTURE FOR BACTERIA: CPT | Performed by: FAMILY MEDICINE

## 2021-10-27 RX ORDER — CELECOXIB 200 MG/1
200 CAPSULE ORAL DAILY
Status: DISCONTINUED | OUTPATIENT
Start: 2021-10-28 | End: 2021-10-28 | Stop reason: HOSPADM

## 2021-10-27 RX ORDER — CELECOXIB 200 MG/1
200 CAPSULE ORAL DAILY
Status: DISCONTINUED | OUTPATIENT
Start: 2021-10-27 | End: 2021-10-27

## 2021-10-27 RX ADMIN — INSULIN DETEMIR 40 UNITS: 100 INJECTION, SOLUTION SUBCUTANEOUS at 00:56

## 2021-10-27 RX ADMIN — PIPERACILLIN SODIUM AND TAZOBACTAM SODIUM 3.38 G: 3; .375 INJECTION, POWDER, LYOPHILIZED, FOR SOLUTION INTRAVENOUS at 03:23

## 2021-10-27 RX ADMIN — GABAPENTIN 300 MG: 300 CAPSULE ORAL at 20:13

## 2021-10-27 RX ADMIN — OXYCODONE HYDROCHLORIDE AND ACETAMINOPHEN 1 TABLET: 7.5; 325 TABLET ORAL at 21:54

## 2021-10-27 RX ADMIN — GABAPENTIN 100 MG: 100 CAPSULE ORAL at 13:45

## 2021-10-27 RX ADMIN — DOCUSATE SODIUM 100 MG: 100 CAPSULE ORAL at 20:13

## 2021-10-27 RX ADMIN — MORPHINE SULFATE 15 MG: 15 TABLET, FILM COATED, EXTENDED RELEASE ORAL at 20:13

## 2021-10-27 RX ADMIN — SODIUM CHLORIDE 50 ML/HR: 9 INJECTION, SOLUTION INTRAVENOUS at 00:15

## 2021-10-27 RX ADMIN — INSULIN LISPRO 7 UNITS: 100 INJECTION, SOLUTION INTRAVENOUS; SUBCUTANEOUS at 18:09

## 2021-10-27 RX ADMIN — MORPHINE SULFATE 15 MG: 15 TABLET, FILM COATED, EXTENDED RELEASE ORAL at 00:15

## 2021-10-27 RX ADMIN — PANTOPRAZOLE SODIUM 40 MG: 40 TABLET, DELAYED RELEASE ORAL at 05:36

## 2021-10-27 RX ADMIN — DOCUSATE SODIUM 100 MG: 100 CAPSULE ORAL at 08:15

## 2021-10-27 RX ADMIN — PRAMIPEXOLE DIHYDROCHLORIDE 0.75 MG: 0.25 TABLET ORAL at 13:45

## 2021-10-27 RX ADMIN — BUSPIRONE HYDROCHLORIDE 10 MG: 10 TABLET ORAL at 00:15

## 2021-10-27 RX ADMIN — DOCUSATE SODIUM 100 MG: 100 CAPSULE ORAL at 00:15

## 2021-10-27 RX ADMIN — BUSPIRONE HYDROCHLORIDE 10 MG: 10 TABLET ORAL at 20:13

## 2021-10-27 RX ADMIN — OXYCODONE HYDROCHLORIDE AND ACETAMINOPHEN 1 TABLET: 7.5; 325 TABLET ORAL at 10:43

## 2021-10-27 RX ADMIN — PRAMIPEXOLE DIHYDROCHLORIDE 0.75 MG: 0.25 TABLET ORAL at 21:54

## 2021-10-27 RX ADMIN — INSULIN LISPRO 7 UNITS: 100 INJECTION, SOLUTION INTRAVENOUS; SUBCUTANEOUS at 08:14

## 2021-10-27 RX ADMIN — MORPHINE SULFATE 15 MG: 15 TABLET, FILM COATED, EXTENDED RELEASE ORAL at 08:14

## 2021-10-27 RX ADMIN — INSULIN LISPRO 7 UNITS: 100 INJECTION, SOLUTION INTRAVENOUS; SUBCUTANEOUS at 12:18

## 2021-10-27 RX ADMIN — SODIUM CHLORIDE 1 G: 9 INJECTION, SOLUTION INTRAVENOUS at 10:43

## 2021-10-27 RX ADMIN — Medication 10 ML: at 00:15

## 2021-10-27 RX ADMIN — BUSPIRONE HYDROCHLORIDE 10 MG: 10 TABLET ORAL at 08:15

## 2021-10-27 RX ADMIN — TRIAMTERENE AND HYDROCHLOROTHIAZIDE 1 TABLET: 37.5; 25 TABLET ORAL at 08:15

## 2021-10-27 RX ADMIN — Medication 10 ML: at 20:13

## 2021-10-27 RX ADMIN — MIRTAZAPINE 15 MG: 15 TABLET, FILM COATED ORAL at 00:15

## 2021-10-27 RX ADMIN — MIRTAZAPINE 15 MG: 15 TABLET, FILM COATED ORAL at 20:13

## 2021-10-27 RX ADMIN — GABAPENTIN 100 MG: 100 CAPSULE ORAL at 08:14

## 2021-10-27 RX ADMIN — DULOXETINE HYDROCHLORIDE 60 MG: 30 CAPSULE, DELAYED RELEASE ORAL at 08:15

## 2021-10-27 RX ADMIN — CELECOXIB 100 MG: 100 CAPSULE ORAL at 08:15

## 2021-10-27 RX ADMIN — BUSPIRONE HYDROCHLORIDE 10 MG: 10 TABLET ORAL at 15:57

## 2021-10-27 RX ADMIN — INSULIN DETEMIR 40 UNITS: 100 INJECTION, SOLUTION SUBCUTANEOUS at 21:56

## 2021-10-27 RX ADMIN — PRAMIPEXOLE DIHYDROCHLORIDE 0.75 MG: 0.25 TABLET ORAL at 05:36

## 2021-10-28 ENCOUNTER — TELEPHONE (OUTPATIENT)
Dept: FAMILY MEDICINE CLINIC | Facility: CLINIC | Age: 51
End: 2021-10-28

## 2021-10-28 ENCOUNTER — READMISSION MANAGEMENT (OUTPATIENT)
Dept: CALL CENTER | Facility: HOSPITAL | Age: 51
End: 2021-10-28

## 2021-10-28 VITALS
DIASTOLIC BLOOD PRESSURE: 65 MMHG | WEIGHT: 293 LBS | SYSTOLIC BLOOD PRESSURE: 145 MMHG | HEART RATE: 63 BPM | OXYGEN SATURATION: 100 % | BODY MASS INDEX: 55.32 KG/M2 | TEMPERATURE: 97.5 F | RESPIRATION RATE: 16 BRPM | HEIGHT: 61 IN

## 2021-10-28 LAB
BACTERIA SPEC AEROBE CULT: NORMAL
GLUCOSE BLDC GLUCOMTR-MCNC: 104 MG/DL (ref 70–130)
GLUCOSE BLDC GLUCOMTR-MCNC: 83 MG/DL (ref 70–130)

## 2021-10-28 PROCEDURE — 25010000002 CEFTRIAXONE PER 250 MG: Performed by: FAMILY MEDICINE

## 2021-10-28 PROCEDURE — 82962 GLUCOSE BLOOD TEST: CPT

## 2021-10-28 RX ORDER — CEFDINIR 300 MG/1
300 CAPSULE ORAL 2 TIMES DAILY
Qty: 24 CAPSULE | Refills: 0 | Status: SHIPPED | OUTPATIENT
Start: 2021-10-28 | End: 2021-11-15

## 2021-10-28 RX ADMIN — MORPHINE SULFATE 15 MG: 15 TABLET, FILM COATED, EXTENDED RELEASE ORAL at 08:40

## 2021-10-28 RX ADMIN — PANTOPRAZOLE SODIUM 40 MG: 40 TABLET, DELAYED RELEASE ORAL at 05:10

## 2021-10-28 RX ADMIN — BUSPIRONE HYDROCHLORIDE 10 MG: 10 TABLET ORAL at 08:40

## 2021-10-28 RX ADMIN — TRIAMTERENE AND HYDROCHLOROTHIAZIDE 1 TABLET: 37.5; 25 TABLET ORAL at 08:40

## 2021-10-28 RX ADMIN — ACETAMINOPHEN 650 MG: 325 TABLET, FILM COATED ORAL at 07:03

## 2021-10-28 RX ADMIN — Medication 10 ML: at 08:42

## 2021-10-28 RX ADMIN — GABAPENTIN 100 MG: 100 CAPSULE ORAL at 08:40

## 2021-10-28 RX ADMIN — PRAMIPEXOLE DIHYDROCHLORIDE 0.75 MG: 0.25 TABLET ORAL at 13:33

## 2021-10-28 RX ADMIN — PRAMIPEXOLE DIHYDROCHLORIDE 0.75 MG: 0.25 TABLET ORAL at 05:10

## 2021-10-28 RX ADMIN — GABAPENTIN 100 MG: 100 CAPSULE ORAL at 13:33

## 2021-10-28 RX ADMIN — CELECOXIB 200 MG: 200 CAPSULE ORAL at 08:40

## 2021-10-28 RX ADMIN — OXYCODONE HYDROCHLORIDE AND ACETAMINOPHEN 1 TABLET: 7.5; 325 TABLET ORAL at 11:04

## 2021-10-28 RX ADMIN — SODIUM CHLORIDE 1 G: 9 INJECTION, SOLUTION INTRAVENOUS at 11:04

## 2021-10-28 RX ADMIN — DULOXETINE HYDROCHLORIDE 60 MG: 30 CAPSULE, DELAYED RELEASE ORAL at 08:40

## 2021-10-28 NOTE — TELEPHONE ENCOUNTER
Caller: Valeria Wilson    Relationship to patient: Self    Best call back number: [phone]  New or established patient?  [x] New  [] Established    Date of discharge: UNKNOWN   Facility discharged from: [facility]  Diagnosis/Symptoms: INFECTION IN BLOOD STREAM     Length of stay (If applicable): UNKNOWN     NOTE IS COMING IN A NEW PATIENT BEING DISMISSED FROM PREVIOUS DOCTOR US IS CURRENTLY IN HOSPITAL NOW

## 2021-10-29 ENCOUNTER — TRANSITIONAL CARE MANAGEMENT TELEPHONE ENCOUNTER (OUTPATIENT)
Dept: CALL CENTER | Facility: HOSPITAL | Age: 51
End: 2021-10-29

## 2021-10-29 NOTE — OUTREACH NOTE
Call Center TCM Note      Responses   [facility] patient discharged from? [place]   Does the patient have one of the following disease processes/diagnoses(primary or secondary)? Sepsis   TCM attempt successful? No   Unsuccessful attempts Attempt 2          Eli Olguin RN    10/29/2021, 14:41 EDT

## 2021-10-29 NOTE — OUTREACH NOTE
Prep Survey      Responses   [facility] patient discharged from? [place]   Is LACE score < 7 ? No   Emergency Room discharge w/ pulse ox? No   Eligibility [facility]   Date of Admission 10/26/21   Date of Discharge 10/28/21   Discharge diagnosis Bacteremia due to Klebsiella pneumoniae   Does the patient have one of the following disease processes/diagnoses(primary or secondary)? Sepsis   Does the patient have Home health ordered? No   Is there a DME ordered? No   Prep survey completed? Yes          Yamileth Tamayo RN

## 2021-10-29 NOTE — OUTREACH NOTE
Call Center TCM Note      Responses   [facility] patient discharged from? [place]   Does the patient have one of the following disease processes/diagnoses(primary or secondary)? Sepsis   TCM attempt successful? No  [out of date verbal release ]   Unsuccessful attempts Attempt 1          Eli Olguin RN    10/29/2021, 14:20 EDT

## 2021-10-30 ENCOUNTER — TRANSITIONAL CARE MANAGEMENT TELEPHONE ENCOUNTER (OUTPATIENT)
Dept: CALL CENTER | Facility: HOSPITAL | Age: 51
End: 2021-10-30

## 2021-10-30 NOTE — OUTREACH NOTE
Call Center TCM Note      Responses   [facility] patient discharged from? [place]   Does the patient have one of the following disease processes/diagnoses(primary or secondary)? Sepsis   TCM attempt successful? Yes   Call start time 1112   Call end time 1117   Discharge diagnosis Bacteremia due to Klebsiella pneumoniae   Meds reviewed with patient/caregiver? Yes   Is the patient having any side effects they believe may be caused by any medication additions or changes? No   Does the patient have all medications related to this admission filled (includes all antibiotics, inhalers, nebulizers,steroids,etc.) Yes   Is the patient taking all medications as directed (includes completed medication regime)? Yes   Medication comments Encouraged to complete antibiotics as ordered--antibiotics upsetting to stomach right now, encouraged to discuss with MD if continues and also enc'd to add yogurt while taking antibiotics   Does the patient have a primary care provider?  Yes   Comments regarding PCP Hospital PCP FOLLOW UP APPOINTMENT IS 11/1/21@0930am   Does the patient have an appointment with their PCP within 7 days of discharge? Yes   Has the patient kept scheduled appointments due by today? N/A   Has home health visited the patient within 72 hours of discharge? N/A   Psychosocial issues? No   Did the patient receive a copy of their discharge instructions? Yes   Nursing interventions Reviewed instructions with patient   What is the patient's perception of their health status since discharge? Improving   Nursing interventions Nurse provided patient education   Is the patient/caregiver able to teach back Sepsis? S - Shivering,fever or very cold,  S - Sleepy, difficult to arouse,confused,  S - Short of breath   Nursing interventions Nurse provided reassurance to patient   Is patient/caregiver able to teach back steps to recovery at home? Set small, achievable goals for return to baseline health,  Rest and regain strength,   Eat a balanced diet   Is the patient/caregiver able to teach back signs and symptoms of worsening condition: Rapid heart rate (>90),  Fever,  Altered mental status(confusion/coma),  Shortness of breath/rapid respiratory rate   Is the patient/caregiver able to teach back the hierarchy of who to call/visit for symptoms/problems? PCP, Specialist, Home health nurse, Urgent Care, ED, 911 Yes   TCM call completed? Yes          Mariah Alves RN    10/30/2021, 11:17 EDT

## 2021-10-31 LAB
BACTERIA SPEC AEROBE CULT: NORMAL
BACTERIA SPEC AEROBE CULT: NORMAL

## 2021-11-01 ENCOUNTER — HOME HEALTH ADMISSION (OUTPATIENT)
Dept: HOME HEALTH SERVICES | Facility: HOME HEALTHCARE | Age: 51
End: 2021-11-01

## 2021-11-01 ENCOUNTER — OFFICE VISIT (OUTPATIENT)
Dept: INTERNAL MEDICINE | Facility: CLINIC | Age: 51
End: 2021-11-01

## 2021-11-01 VITALS
WEIGHT: 293 LBS | BODY MASS INDEX: 55.32 KG/M2 | SYSTOLIC BLOOD PRESSURE: 172 MMHG | DIASTOLIC BLOOD PRESSURE: 98 MMHG | OXYGEN SATURATION: 95 % | HEART RATE: 92 BPM | HEIGHT: 61 IN

## 2021-11-01 DIAGNOSIS — R23.8 SKIN BREAKDOWN: Primary | ICD-10-CM

## 2021-11-01 DIAGNOSIS — R78.81 BACTEREMIA: ICD-10-CM

## 2021-11-01 DIAGNOSIS — I89.0 LYMPHEDEMA: ICD-10-CM

## 2021-11-01 DIAGNOSIS — D64.9 ANEMIA, UNSPECIFIED TYPE: ICD-10-CM

## 2021-11-01 DIAGNOSIS — R19.7 DIARRHEA, UNSPECIFIED TYPE: ICD-10-CM

## 2021-11-01 DIAGNOSIS — E11.65 TYPE 2 DIABETES MELLITUS WITH HYPERGLYCEMIA, WITHOUT LONG-TERM CURRENT USE OF INSULIN (HCC): ICD-10-CM

## 2021-11-01 DIAGNOSIS — F41.9 ANXIETY: ICD-10-CM

## 2021-11-01 PROBLEM — E16.2 HYPOGLYCEMIA: Status: ACTIVE | Noted: 2019-08-20

## 2021-11-01 PROBLEM — L03.116 BILATERAL LOWER LEG CELLULITIS: Status: ACTIVE | Noted: 2020-06-16

## 2021-11-01 PROBLEM — E66.01 MORBID OBESITY WITH BMI OF 50.0-59.9, ADULT: Status: ACTIVE | Noted: 2018-06-25

## 2021-11-01 PROBLEM — Z88.1: Status: ACTIVE | Noted: 2021-05-06

## 2021-11-01 PROBLEM — E11.622 DIABETIC ULCER OF LEFT LOWER LEG ASSOCIATED WITH TYPE 2 DIABETES MELLITUS, WITH FAT LAYER EXPOSED: Status: ACTIVE | Noted: 2020-10-27

## 2021-11-01 PROBLEM — L03.115 BILATERAL LOWER LEG CELLULITIS: Status: ACTIVE | Noted: 2020-06-16

## 2021-11-01 PROBLEM — R09.1 PLEURISY: Status: ACTIVE | Noted: 2019-07-19

## 2021-11-01 PROBLEM — E87.6 HYPOKALEMIA: Status: ACTIVE | Noted: 2019-08-20

## 2021-11-01 PROBLEM — J06.9 UPPER RESPIRATORY TRACT INFECTION: Status: ACTIVE | Noted: 2019-07-19

## 2021-11-01 PROBLEM — K21.9 GASTROESOPHAGEAL REFLUX DISEASE: Status: ACTIVE | Noted: 2019-07-19

## 2021-11-01 PROBLEM — L97.922 DIABETIC ULCER OF LEFT LOWER LEG ASSOCIATED WITH TYPE 2 DIABETES MELLITUS, WITH FAT LAYER EXPOSED: Status: ACTIVE | Noted: 2020-10-27

## 2021-11-01 PROBLEM — R06.00 DYSPNEA: Status: ACTIVE | Noted: 2019-07-19

## 2021-11-01 PROBLEM — G43.009 MIGRAINE WITHOUT AURA AND WITHOUT STATUS MIGRAINOSUS, NOT INTRACTABLE: Status: ACTIVE | Noted: 2018-04-03

## 2021-11-01 PROBLEM — Z79.899 POLYPHARMACY: Status: ACTIVE | Noted: 2021-04-15

## 2021-11-01 LAB — BACTERIA SPEC AEROBE CULT: NORMAL

## 2021-11-01 PROCEDURE — 99214 OFFICE O/P EST MOD 30 MIN: CPT | Performed by: NURSE PRACTITIONER

## 2021-11-01 RX ORDER — PROCHLORPERAZINE 25 MG/1
SUPPOSITORY RECTAL
Qty: 1 EACH | Refills: 11 | Status: SHIPPED | OUTPATIENT
Start: 2021-11-01 | End: 2022-04-26

## 2021-11-01 NOTE — PROGRESS NOTES
"Chief Complaint   Patient presents with   • Establish Care         History:  Valeria Wilson is a 51 y.o. female who presents today for evaluation of the above problems.          Here to establish with PCP.  Previously patient of SHOLA Erickson, at [facility].   Problems include the following:  Lymphedema, has a machine at home.  Helps when home health can wrap it.  Would like referral.  Difficulty getting around.    Hypertension, Type 2 diabetes.  Would like Dexcom.  Says her blood pressure usually runs good at home, feels up today due to trouble getting in to visit.  Not really checking blood sugar regularly.    Difficulty losing weight, can't exercise well.  Has seen bariatrics in the past but would not consider surgery.  Would consider seeing Dr. Felipe.  Has been on phentermine in the past.  Understands low-carb, low-sugar diet is best.    Has a lot of anxiety.  Has been on Buspar 10 mg TID for this, also Klonopin.  Doesn't really seem to help, and Klonopin is expensive for her.  Has tried Zoloft before and didn't help/gave her headache. Would consider referral to Behavioral Health.      Recent History\"  10/23/21 went to ER with vague symptoms of arms and legs hurting. Had UTI, and bacteremia, positive for Klebsiella.  Had left ER AMA on 10/23/21.  They were able to reach her by phone, and she returned 10/26/21 for IV antibiotics.  At that time, her blood cultures drawn ended up being negative x 2.  She was admitted, had a central line, and received IV Zosyn before being changed to Rocephin.  She was then on Omnicef PO at discharge and has been on this since 10/28 discharge.  She has had diarrhea since she was started on Omnicef, several days.  Today she didn't take the Omnicef because of the diarrhea.  Not passing any blood or having abdominal pain.     She does have a sore around her anus and up onto buttocks due to moisture.  She wears a pad for urinary incontinence and states the area stays moist.  She " has used Jeanine's amazing butt paste for this before and requests a refill.  Defers seeing wound care for this.  Has been discharged from them in the past.    She states she fell outside on her way into this visit.  Says it was on the sidewalk, and she couldn't get up. Says many people helped her, and a nurse brought her up in a wheelchair.  Says she landed on hands and may have scratched up left knee but does not want to have any xrays today.  Says she is fine and not hurt.    When I reviewed chart, I noted her Hgb was 8.8 in the hospital, which upon further review is stable for over a year.  She did not know she was anemic and does not know the cause.  States she is not having any bleeding.  Defers having any more blood drawn today to work this up.  Says they can never get her blood, and she just can't do this today.          ROS:  Review of Systems  As above.    Allergies   Allergen Reactions   • Compazine [Prochlorperazine Edisylate] Shortness Of Breath     Breathing    • Meperidine Hives     (Demerol)    • Norflex [Orphenadrine] Hives   • Nortriptyline Hives   • Prochlorperazine Shortness Of Breath   • Prochlorperazine Maleate Shortness Of Breath   • Codeine Nausea And Vomiting     Rash    • Penicillins Rash     Nausea & vomiting    • Calamine Itching   • Amoxicillin-Pot Clavulanate Rash, Hives and Itching   • Avelox [Moxifloxacin Hcl] Rash   • Cefazolin Nausea And Vomiting   • Cephalexin Rash     (Keflex)    • Ciprofloxacin Rash   • Clindamycin/Lincomycin Rash   • Doxycycline Nausea And Vomiting   • Hydroxyzine Hcl Itching   • Moxifloxacin Nausea And Vomiting   • Orphenadrine Citrate Itching   • Sulfamethoxazole-Trimethoprim Nausea And Vomiting and Rash     States she also gets yeast infections with it   • Tobramycin Other (See Comments)     Eyes burn-feel like eyes are on fire     • Toradol [Ketorolac Tromethamine] Rash   • Vistaril [Hydroxyzine] Rash     Past Medical History:   Diagnosis Date   • Anxiety    •  Arthritis     Osteoarthritis primarily left knee    • Back pain    • Chronic pain syndrome 10/27/2021   • Depression    • Diabetes mellitus (HCC)    • Fibromyalgia, primary    • Hx of tear of meniscus of knee joint 11/21/2016   • Hypertension    • Joint pain    • Kidney stone    • Knee pain    • Lymphedema of both lower extremities 10/27/2021   • Migraine    • Migraine headache    • Pain     knee, left ankle, left ankle     • Pes anserinus bursitis 04/28/2015   • Restless leg      Past Surgical History:   Procedure Laterality Date   • CATARACT EXTRACTION     • CATARACT EXTRACTION     • CATARACT EXTRACTION     • CORNEAL TRANSPLANT      bilateral    • ECTOPIC PREGNANCY     • JOINT REPLACEMENT     • KNEE ARTHROSCOPY W/ MENISCAL REPAIR     • KNEE MENISCAL REPAIR     • MENISCECTOMY Left 12/20/2016   • TUBAL ABDOMINAL LIGATION       Family History   Problem Relation Age of Onset   • No Known Problems Mother    • Cancer Father    • Dementia Father    • Hypertension Maternal Grandmother    • Cancer Maternal Grandfather      Valeria  reports that she has never smoked. She has never used smokeless tobacco. She reports current alcohol use. She reports that she does not use drugs.    I have reviewed and updated the above documentation (if necessary) including but not limited to chief complaint, ROS, PFSH, allergies and medications        Current Outpatient Medications:   •  busPIRone (BUSPAR) 10 MG tablet, Take 10 mg by mouth 3 (Three) Times a Day., Disp: , Rfl:   •  cefdinir (OMNICEF) 300 MG capsule, Take 1 capsule by mouth 2 (Two) Times a Day., Disp: 24 capsule, Rfl: 0  •  celecoxib (CeleBREX) 200 MG capsule, Take 200 mg by mouth Daily., Disp: , Rfl:   •  cyclobenzaprine (FLEXERIL) 10 MG tablet, Take 10 mg by mouth 3 (Three) Times a Day As Needed for Muscle Spasms., Disp: , Rfl:   •  diclofenac (VOLTAREN) 1 % gel gel, Apply 4 g topically to the appropriate area as directed 4 (Four) Times a Day As Needed., Disp: , Rfl:   •   docusate sodium (COLACE) 100 MG capsule, Take 100 mg by mouth 2 (Two) Times a Day., Disp: , Rfl:   •  DULoxetine (CYMBALTA) 60 MG capsule, Take 60 mg by mouth 2 (Two) Times a Day., Disp: , Rfl:   •  EPINEPHrine (EpiPen 2-Jean) 0.3 MG/0.3ML solution auto-injector injection, As Needed (anaphylaxis)., Disp: , Rfl:   •  fluticasone (FLONASE) 50 MCG/ACT nasal spray, 1 spray by Each Nare route Daily. Shake before using., Disp: , Rfl:   •  furosemide (LASIX) 80 MG tablet, Take 40 mg by mouth 2 (Two) Times a Day As Needed (swelling)., Disp: , Rfl:   •  gabapentin (NEURONTIN) 300 MG capsule, 300 mg 3 (Three) Times a Day., Disp: , Rfl:   •  glucose blood test strip, Check glucose TID and as needed for hypoglycemic events Dx E11.40 E11.66 Z79.4  Use one touch verio flex, Disp: , Rfl:   •  Insulin Lispro (ADMELOG SOLOSTAR) 100 UNIT/ML injection pen, Inject  under the skin into the appropriate area as directed. Sliding scale, Disp: , Rfl:   •  Liraglutide (Victoza) 18 MG/3ML solution pen-injector injection, Inject 1.8 mg under the skin into the appropriate area as directed Daily., Disp: , Rfl:   •  mirtazapine (REMERON) 15 MG tablet, Take 15 mg by mouth Every Night., Disp: , Rfl:   •  Morphine Sulfate ER 15 MG tablet extended-release 12 hour, Take 15 mg by mouth 2 (two) times a day., Disp: , Rfl:   •  NON FORMULARY, [place] pain cream, Disp: , Rfl:   •  omeprazole (priLOSEC) 40 MG capsule, Take 40 mg by mouth 2 (Two) Times a Day., Disp: , Rfl:   •  ondansetron ODT (ZOFRAN-ODT) 4 MG disintegrating tablet, Place 4 mg on the tongue Every 8 (Eight) Hours As Needed for Nausea or Vomiting., Disp: , Rfl:   •  OneTouch Ultra test strip, USE TO TEST BLOOD SUGAR THREE TIMES DAILY AND AS NEEDED AS DIRECTED, Disp: , Rfl:   •  oxyCODONE-acetaminophen (PERCOCET) 7.5-325 MG per tablet, Take 1 tablet by mouth Every 12 (Twelve) Hours., Disp: , Rfl:   •  potassium chloride (K-DUR,KLOR-CON) 20 MEQ CR tablet, Take 20 mEq by mouth As Needed.,  "Disp: , Rfl:   •  pramipexole (Mirapex) 0.75 MG tablet, Take 0.75 mg by mouth 2 (two) times a day., Disp: , Rfl:   •  promethazine (PHENERGAN) 12.5 MG suppository, Insert 25 mg into the rectum Every 6 (Six) Hours As Needed for Nausea or Vomiting., Disp: , Rfl:   •  promethazine (PHENERGAN) 25 MG tablet, Take 25 mg by mouth Every 8 (Eight) Hours As Needed for Nausea or Vomiting., Disp: , Rfl:   •  rizatriptan (MAXALT) 10 MG tablet, Take 10 mg by mouth As Needed., Disp: , Rfl:   •  tiZANidine (ZANAFLEX) 4 MG tablet, Take 4 mg by mouth every night at bedtime., Disp: , Rfl:   •  triamterene-hydrochlorothiazide (DYAZIDE) 37.5-25 MG per capsule, Take 1 capsule by mouth Daily., Disp: , Rfl:   •  ZOLMitriptan (ZOMIG) 5 MG nasal solution, 1 spray into the nostril(s) as directed by provider As Needed for Migraine., Disp: , Rfl:   •  clonazePAM (KlonoPIN) 0.5 MG tablet, Take 0.5 mg by mouth 2 (Two) Times a Day As Needed for Anxiety., Disp: , Rfl:   •  Continuous Blood Gluc Sensor (Dexcom G6 Sensor), Every 10 (Ten) Days., Disp: 1 each, Rfl: 11  •  Hydrocortisone (emory's amazing butt) cream, Apply 1 application topically to the appropriate area as directed Daily., Disp: 120 g, Rfl: 2        PHQ-9 Total Score:      OBJECTIVE:  Visit Vitals  /98 (BP Location: Right arm, Patient Position: Sitting, Cuff Size: Adult)   Pulse 92   Ht 154.9 cm (61\")   Wt (!) 147 kg (324 lb 11.2 oz)   LMP  (LMP Unknown)   SpO2 95%   BMI 61.35 kg/m²      Physical Exam  Vitals and nursing note reviewed.   Constitutional:       General: She is not in acute distress.     Appearance: Normal appearance. She is obese. She is not ill-appearing, toxic-appearing or diaphoretic.   HENT:      Head: Normocephalic and atraumatic.   Eyes:      Conjunctiva/sclera: Conjunctivae normal.   Cardiovascular:      Rate and Rhythm: Normal rate and regular rhythm.      Heart sounds: Normal heart sounds.   Pulmonary:      Effort: Pulmonary effort is normal. No respiratory " distress.      Breath sounds: Normal breath sounds. No stridor. No wheezing or rales.   Abdominal:      General: There is no distension.      Palpations: Abdomen is soft.      Tenderness: There is no abdominal tenderness.   Genitourinary:      Musculoskeletal:         General: Tenderness (tender left patella, states always sore) present.      Right lower leg: Edema present.      Left lower leg: Edema present.      Comments: Moderate lymphedema BLE, with chronic skin changes.   Skin:     General: Skin is warm and dry.      Comments: Examined rectal area/buttocks.  She has moist macerated skin on the buttocks and a dime-sized area on skin peeling on the right buttock.   Neurological:      General: No focal deficit present.      Mental Status: She is alert and oriented to person, place, and time.   Psychiatric:         Mood and Affect: Mood normal.         Behavior: Behavior normal.         Thought Content: Thought content normal.         Judgment: Judgment normal.     Reviewed hospital records, including discharge summary and labs.   Hgb A1C in hospital was 11.40%.    MDM    Assessment/Plan    Diagnoses and all orders for this visit:    1. Skin breakdown (Primary)  -     Hydrocortisone (emory's amazing butt) cream; Apply 1 application topically to the appropriate area as directed Daily.  Dispense: 120 g; Refill: 2  -     Ambulatory Referral to Home Health    2. Lymphedema  -     Ambulatory Referral to Home Health    3. Diarrhea, unspecified type  -     Clostridium Difficile Toxin, PCR - Stool, Per Rectum; Future    4. Bacteremia    5. Anxiety  -     Ambulatory Referral to Psychiatry    6. Type 2 diabetes mellitus with hyperglycemia, without long-term current use of insulin (HCC)  -     Continuous Blood Gluc Sensor (Dexcom G6 Sensor); Every 10 (Ten) Days.  Dispense: 1 each; Refill: 11    7. Anemia, unspecified type      She has multiple issues today as well as presenting as a new patient that required review of  hospital records and old records as well.  The primary concern today is the bacteremia with diarrhea resulting from the use of an appropriate antibiotic, Omnicef.  The Klebsiella is also sensitive to Bactrim and Levaquin; however, she states she cannot take these medications due to rashes that developed.  I discussed this case with Dr. Manuel.  Discussed with patient and offered the choice of continuing Omnicef and trying something for the diarrhea such as Immodium or trying to change to one of the other antibiotics to see if she can tolerate it.  We do feel she needs to continue on an antibiotic for bacteremia for the full 14 days as recommended from the hospitalist. She is agreeable to continuing the Omnicef.  I will check stool for C. Diff to be complete.    OTher orders as above. I would like to order an anemia lab workup.  She does report fatigue, which could be from low hemoglobin.  She does not want to have blood drawn today.    Patient's Body mass index is 61.35 kg/m². indicating that she is morbidly obese (BMI > 40 or > 35 with obesity - related health condition). Obesity-related health conditions include the following: hypertension and diabetes mellitus. Obesity is newly identified. BMI is is above average; BMI management plan is completed. We discussed low calorie, low carb based diet program, portion control and increasing exercise..      Education materials and an After Visit Summary were printed and given to the patient at discharge.  Return for 2-4 week annual with Dr. Manuel. Needs follow up on blood pressure elevation, as well as discussion about diabetes management.          SHOLA Jordan   11:10 CDT  11/1/2021

## 2021-11-03 ENCOUNTER — HOME CARE VISIT (OUTPATIENT)
Dept: HOME HEALTH SERVICES | Facility: CLINIC | Age: 51
End: 2021-11-03

## 2021-11-03 ENCOUNTER — TELEPHONE (OUTPATIENT)
Dept: INTERNAL MEDICINE | Facility: CLINIC | Age: 51
End: 2021-11-03

## 2021-11-03 VITALS
SYSTOLIC BLOOD PRESSURE: 202 MMHG | DIASTOLIC BLOOD PRESSURE: 96 MMHG | OXYGEN SATURATION: 98 % | HEART RATE: 80 BPM | RESPIRATION RATE: 16 BRPM | TEMPERATURE: 98.8 F

## 2021-11-03 PROCEDURE — G0299 HHS/HOSPICE OF RN EA 15 MIN: HCPCS

## 2021-11-03 NOTE — TELEPHONE ENCOUNTER
I received a call from the home health nurse, Lisa regarding the patients blood pressure. She stated that when Lisa arrived her blood pressure was 182/84 and the patient had a mild headache. The patient has taken all her medication but is not on blood pressure medication. An hour they took her blood pressure again and she had been sitting her blood pressure was 202/96. I called Aleksandra Doty and she advised that she go to the ER. The patient stated she did not want to go the ER. She stated that she would call her daughter because she had clonidine. Advised that it was not recommended to take other peoples medications that were not prescribed to her. She stated that she would keep an eye on her blood pressure and if it continued to rise she would go to the ER. I have scheduled her an appointment for tomorrow with Aleksandra to have her blood pressure seen about.

## 2021-11-04 ENCOUNTER — TELEPHONE (OUTPATIENT)
Dept: INTERNAL MEDICINE | Facility: CLINIC | Age: 51
End: 2021-11-04

## 2021-11-04 NOTE — HOME HEALTH
Pt/cg agreeable to homecare admission under the care of SHOLA Jordan. Admission agreement and safety plan reviewed and signed by the pt. Medication reconciliation completed and no issues found. No upcoming insurance changes. The pt fell before her MD appt on 11/1. Pt had no further questions regarding medication and/or plan of care. A&O X 4. Pt c/o pain in her left knee and BLE.The pt's BP was elevated at 182/84 at the beginning of the visit and an hour later the pt's BP was 202/96. The pt stated that she wasn't having a headache. I called the office of SHOLA Jordan and they recommended the pt go to the ER. The pt refused to go to the ER to be evaluated. They set up an appt. for the pt to see the APRN on 11/4 to evaluate her BP. I educated the pt that she needed to be checking her BP every hour and if it gets worse she needs to go to the ER. The pt v/u. All other VSS. I also asked for orders for what to wrap the pt's legs in to help with her edema and how we needed to treat the pt's wounds on the back of her legs. I am still waiting on a call back from the APRN office with an order. The pt stated she gets fatigued very easily and it is a task to leave the home. The pt has lymphedema in her BLE. I educated the pt on the importance of keeping her extremities moisturized. I educated on the pt's medications, diabetic foot care, fall prevention, pressure injury prevention, anxiety, and pain management. The pt lives alone, but has available cg if needed. The pt manages her medications. The pt is going to be using a dexcom BS monitor. The pt stated that her body doesn't realize when her BS is too high or too low so she has to monitor it closely. The pt refused for me to look at her bottom, and stated that her boyfriend will perform wound care. The wounds on the back of the pt's legs are scabbed and I had no wound care orders at that time to tend to them or what to wrap the pt's legs with.

## 2021-11-04 NOTE — TELEPHONE ENCOUNTER
Will you please let her know I would like to start her on lisinopril based on her reading in the office and her readings yesterday.  If she is okay with this, I will send in to pharmacy.  Then we can re-check at her follow up visit. Please let me know. Thank you!

## 2021-11-04 NOTE — TELEPHONE ENCOUNTER
Caller: Valeria Wilson    Relationship to patient: Self    Best call back number: [phone]     PATIENT WANTED TO LET ELO KNOW THAT HER BLOOD PRESSURE IS A LOT BETTER AND SHE HAS ALREADY SPOKE WITH HOME HEALTH THIS MORNING. PATIENT STATED THAT SHE WILL CONTINUE TO CHECK AND IF ANYTHING CHANGES SHE WILL CALL AND GET AN APPT TO COME ON IN. PLEASE ADVISE.

## 2021-11-05 ENCOUNTER — TELEPHONE (OUTPATIENT)
Dept: INTERNAL MEDICINE | Facility: CLINIC | Age: 51
End: 2021-11-05

## 2021-11-05 ENCOUNTER — READMISSION MANAGEMENT (OUTPATIENT)
Dept: CALL CENTER | Facility: HOSPITAL | Age: 51
End: 2021-11-05

## 2021-11-05 DIAGNOSIS — I10 HYPERTENSION, UNSPECIFIED TYPE: Primary | ICD-10-CM

## 2021-11-05 RX ORDER — FLUCONAZOLE 150 MG/1
150 TABLET ORAL ONCE
Qty: 1 TABLET | Refills: 0 | Status: SHIPPED | OUTPATIENT
Start: 2021-11-05 | End: 2021-11-05

## 2021-11-05 RX ORDER — LOSARTAN POTASSIUM 25 MG/1
25 TABLET ORAL DAILY
Qty: 30 TABLET | Refills: 2 | Status: SHIPPED | OUTPATIENT
Start: 2021-11-05 | End: 2022-05-31

## 2021-11-05 NOTE — OUTREACH NOTE
Sepsis Week 2 Survey      Responses   [facility] patient discharged from? [facility]   Does the patient have one of the following disease processes/diagnoses(primary or secondary)? Sepsis   Week 2 attempt successful? No   Unsuccessful attempts Attempt 1          Cheri Bush RN

## 2021-11-05 NOTE — TELEPHONE ENCOUNTER
PATIENT HAS RETURNED CALL.  SHE STATED THAT SHE WOULD RATHER NOT TAKE LISINOPRIL BECAUSE IT CAUSES HER TO COUGH.  PATIENT STATED THAT HER BLOOD PRESSURE /88 ON 11/04/21 AND THEN THIS MORNING 172/89 AND THEN AT 1030am 151/75.  PATIENT IS ASKING IF SOME DIFLUCAN COULD BE CALLED IN.  SHE STATED THAT SHE IS ON OMNICEF AND FEELS LIKE SHE IS GETTING A YEAST INFECTION.

## 2021-11-05 NOTE — TELEPHONE ENCOUNTER
Received VM from Lisa with [facility]. She is wondering with pt having lymphedema did you want her to wrap the legs and if so with what and how often.  Also she was asking if you wanted to her to do wound care on the wound on the back of her left leg. Please advise.      Lisa call back # [phone]

## 2021-11-05 NOTE — TELEPHONE ENCOUNTER
Please let her know I have sent in losartan.  She will need a BMP (lab) in 1 week to evaluate potassium with other meds she is on.  I have put the order in for this so she can just have lab done. Thank you!

## 2021-11-05 NOTE — TELEPHONE ENCOUNTER
We can put the order in for leg wraps, as patient states this helps greatly.  Have not seen the wound on back of leg. Would need to document this before ordering wound care.  Can schedule office visit . Thank you!

## 2021-11-05 NOTE — TELEPHONE ENCOUNTER
"PATIENT HAS BEEN CALLED, SHE DOES NOT WANT TO BACK TO WOULD CARE.   SHE STATED THAT SHE JUST NEEDS THE  COMPRESSION WRAPS AND THE MEDICATION THAT STARTS WITH AN  \"S\"    SHE STATED THAT THE WOUND IS BETTER.    "

## 2021-11-05 NOTE — TELEPHONE ENCOUNTER
ATTEMPTED TO CALL PATIENT, NO VM TO LEAVE MESSAGE.   ATTEMPTED TO CALL PATIENT ON MOMS PHONE, VM LEFT FOR RETURN CALL

## 2021-11-09 ENCOUNTER — HOME CARE VISIT (OUTPATIENT)
Dept: HOME HEALTH SERVICES | Facility: CLINIC | Age: 51
End: 2021-11-09

## 2021-11-09 ENCOUNTER — READMISSION MANAGEMENT (OUTPATIENT)
Dept: CALL CENTER | Facility: HOSPITAL | Age: 51
End: 2021-11-09

## 2021-11-09 ENCOUNTER — TELEPHONE (OUTPATIENT)
Dept: INTERNAL MEDICINE | Facility: CLINIC | Age: 51
End: 2021-11-09

## 2021-11-09 VITALS
RESPIRATION RATE: 16 BRPM | DIASTOLIC BLOOD PRESSURE: 70 MMHG | OXYGEN SATURATION: 99 % | SYSTOLIC BLOOD PRESSURE: 140 MMHG | TEMPERATURE: 98.9 F | HEART RATE: 84 BPM

## 2021-11-09 DIAGNOSIS — R26.89 DECREASED MOBILITY: ICD-10-CM

## 2021-11-09 DIAGNOSIS — I89.0 LYMPHEDEMA: Primary | ICD-10-CM

## 2021-11-09 PROCEDURE — G0299 HHS/HOSPICE OF RN EA 15 MIN: HCPCS

## 2021-11-09 RX ORDER — FLUCONAZOLE 150 MG/1
150 TABLET ORAL DAILY
Qty: 2 TABLET | Refills: 0 | Status: SHIPPED | OUTPATIENT
Start: 2021-11-09 | End: 2021-11-22

## 2021-11-09 NOTE — TELEPHONE ENCOUNTER
DIANNE WITH [facility] HAS CALLED,   1) PATIENT RECEIVED A 1 TIME DOSE OF DIFLUCAN AND ALWAYS NEEDS 3 DOSES TO CLEAR UP.  PATIENT IS STILL ITCHY, IRRITATED, TYPICAL YEAST INFECTION.    2) PATIENT IS HAVING ISSUES GETTING HER DENNIS'S BUTT CREAM.  SHE NEEDS IT SENT TO ANOTHER COMPOUND PHARMACY [facility]  3) PATIENT IS HAVING MORE PAIN IN HER FEET.  SHE STATED THAT IT EASES UP BUT THEN GETS SHARP STABBING PAINS IN HER FEET.    4) PATIENT IS STILL ON ANTIBIOTIC BUT EVEN THOUGH SHE IS STAYING HYDRATED, HER URINE IS DARKER.  NOT CLOUDY, NO ODOR.  DOES PATIENT NEED A UA NOW OR WAIT TILL ANTIBIOTIC IS COMPLETED.  5)  PATIENTS FASTING BLOOD SUGAR HAS BEEN IN 'S 'S FASTING.    SHE STATED THAT IT HAD GOTTEN UP TO OVER 300 TODAY.  6) PATIENT IS NEEDING A SMALLER SHOWER CHAIR THAT WILL FIT IN A REGULAR TUB.   7) PATIENTS LEGS HAVE BEEN WRAPPED AND WAS GOING TO LEAVE IT FOR 1 WEEK.  THE PATIENT CANNOT STAND HER LEGS TO BE WRAPPED MORE THAN A FEW DAYS.    THEY WILL BE CHANGING THE WRAP ON TUESDAYS AND FRIDAYS.       [phone]

## 2021-11-09 NOTE — TELEPHONE ENCOUNTER
PLease let patient know I have sent in the Diflucan.  We can send the Butt Cream wherever she would like us to, just need to know where she wants it sent.  I also added order for shower chair.  I would not necessarily check urine for infection if it is dark, but if she is having other symptoms of infection we can put the order in.  For the issues of pain and blood sugar, we need to discuss these with patient at her office visit.  Thank you!

## 2021-11-10 ENCOUNTER — TELEPHONE (OUTPATIENT)
Dept: INTERNAL MEDICINE | Facility: CLINIC | Age: 51
End: 2021-11-10

## 2021-11-10 ENCOUNTER — NURSE TRIAGE (OUTPATIENT)
Dept: CALL CENTER | Facility: HOSPITAL | Age: 51
End: 2021-11-10

## 2021-11-10 DIAGNOSIS — R30.0 DYSURIA: Primary | ICD-10-CM

## 2021-11-10 NOTE — TELEPHONE ENCOUNTER
States she has just recently started home health.     She has bacteremia and was in the hospital for a couple days and had a central line placed but it was removed at d/c. She is taking omnicef at home but it makes her sick, she is still taking however.     She states she has had a midline in the past and done IV abx outpatient and is asking for this again. Wants PCP to call in order for midline to be placed and outpt abx.     Will call HH nurse back at 8 when they open and will sent note to PCP. She is agreeable.

## 2021-11-10 NOTE — TELEPHONE ENCOUNTER
Please let her know this is not something we would do.  She should be finishing up with the antibiotics by today. Thanks!

## 2021-11-10 NOTE — TELEPHONE ENCOUNTER
PATIENT WOULD LIKE TO HAVE A MIDLINE PUT IN TO GET AN INFUSION OF ANTIBIOTICS.  SHE STATED THAT GOING TO THE BATHROOM IS TEARING HER BOTTOM UP.  SHE FEELS LIKE SHE IS GETTING BACK TO [facility].   SHE STATED THAT IF SHE COULD GET A ROCEPHIN SHOT IN THE OFFICE SHE WOULD BE OK WITH THAT.  SHE WOULD ALSO BE OPEN TO GETTING THE INFUSION.  SHE STATED THAT IT WORKED FOR HER BEFORE.  SHE STATED THAT THEY DO NOT DO A MIDLINE IN THE ER, ONLY A CENTRAL LINE.  SHE STATED SHE DOES NOT WANT A CENTRAL LINE AGAIN.

## 2021-11-10 NOTE — TELEPHONE ENCOUNTER
Caller: Valeria Wilson    Relationship: Self    Best call back number: [phone]    What orders are you requesting (i.e. lab or imaging): MIDLINE, INFUSION    In what timeframe would the patient need to come in: ASAP    Where will you receive your lab/imaging services: [facility]

## 2021-11-10 NOTE — TELEPHONE ENCOUNTER
Received call from Lisa from [facility] ( number [phone]). She states the pt accidentally urinated on the floor last night and ended up slipping and falling. The pt is stating that she is having sharp shooting pain like she had before with the UTI that landed her in the hospital. Lisa states that the pt is in pain and crying with her feet and legs. Lisa is asking if you would put in orders for labs, blood culture and urinalysis. Please advise

## 2021-11-11 ENCOUNTER — HOME CARE VISIT (OUTPATIENT)
Dept: HOME HEALTH SERVICES | Facility: CLINIC | Age: 51
End: 2021-11-11

## 2021-11-11 DIAGNOSIS — R23.8 SKIN BREAKDOWN: ICD-10-CM

## 2021-11-11 PROCEDURE — 87086 URINE CULTURE/COLONY COUNT: CPT | Performed by: INTERNAL MEDICINE

## 2021-11-11 PROCEDURE — 87077 CULTURE AEROBIC IDENTIFY: CPT | Performed by: INTERNAL MEDICINE

## 2021-11-11 PROCEDURE — 87186 SC STD MICRODIL/AGAR DIL: CPT | Performed by: INTERNAL MEDICINE

## 2021-11-11 PROCEDURE — G0300 HHS/HOSPICE OF LPN EA 15 MIN: HCPCS

## 2021-11-12 ENCOUNTER — LAB REQUISITION (OUTPATIENT)
Dept: LAB | Facility: HOSPITAL | Age: 51
End: 2021-11-12

## 2021-11-12 DIAGNOSIS — Z00.00 ENCOUNTER FOR GENERAL ADULT MEDICAL EXAMINATION WITHOUT ABNORMAL FINDINGS: ICD-10-CM

## 2021-11-13 DIAGNOSIS — G25.81 RLS (RESTLESS LEGS SYNDROME): ICD-10-CM

## 2021-11-13 RX ORDER — PRAMIPEXOLE DIHYDROCHLORIDE 0.75 MG/1
TABLET ORAL
Qty: 60 TABLET | Refills: 5 | OUTPATIENT
Start: 2021-11-13

## 2021-11-14 LAB — BACTERIA SPEC AEROBE CULT: ABNORMAL

## 2021-11-15 ENCOUNTER — TELEPHONE (OUTPATIENT)
Dept: INTERNAL MEDICINE | Facility: CLINIC | Age: 51
End: 2021-11-15

## 2021-11-15 RX ORDER — CEFUROXIME AXETIL 250 MG/1
250 TABLET ORAL 2 TIMES DAILY
Qty: 20 TABLET | Refills: 0 | Status: SHIPPED | OUTPATIENT
Start: 2021-11-15 | End: 2021-11-27

## 2021-11-15 NOTE — TELEPHONE ENCOUNTER
Caller: Valeria Wilson    Relationship to patient: Self    Best call back number: [phone]    Patient is needing: She gave a urine sample on 11/12 to her  nurse to take to the lab at [facility] for a possible UTI. She states she has been having shooting pain in feet and hand numbness. The last time she had a UTI she states she had these symptoms. She is wondering if the results for that urine sample are back ? She states if this is positive, she is needing a midline or picc line.  Please advise ?

## 2021-11-16 ENCOUNTER — HOME CARE VISIT (OUTPATIENT)
Dept: HOME HEALTH SERVICES | Facility: CLINIC | Age: 51
End: 2021-11-16

## 2021-11-16 VITALS
SYSTOLIC BLOOD PRESSURE: 142 MMHG | RESPIRATION RATE: 18 BRPM | DIASTOLIC BLOOD PRESSURE: 90 MMHG | TEMPERATURE: 97 F | HEART RATE: 77 BPM | OXYGEN SATURATION: 98 %

## 2021-11-16 PROCEDURE — G0300 HHS/HOSPICE OF LPN EA 15 MIN: HCPCS

## 2021-11-17 ENCOUNTER — READMISSION MANAGEMENT (OUTPATIENT)
Dept: CALL CENTER | Facility: HOSPITAL | Age: 51
End: 2021-11-17

## 2021-11-17 NOTE — OUTREACH NOTE
Sepsis Week 3 Survey      Responses   [facility], [place], operated by [facility] patient discharged from? [place]   Does the patient have one of the following disease processes/diagnoses(primary or secondary)? Sepsis   Week 3 attempt successful? Yes   Call start time 1156   Call end time 1202   Discharge diagnosis Bacteremia due to Klebsiella pneumoniae   Person spoke with today (if not patient) and relationship [facility]   Meds reviewed with patient/caregiver? Yes   Is the patient taking all medications as directed (includes completed medication regime)? Yes   Has the patient kept scheduled appointments due by today? Yes   What is the patient's perception of their health status since discharge? Same   Is the patient/caregiver able to teach back Sepsis? S - Shivering,fever or very cold   Nursing interventions Nurse provided patient education   Is patient/caregiver able to teach back steps to recovery at home? Rest and regain strength,  Eat a balanced diet  [Pt is not resting well r/t legs and feet]   Is the patient/caregiver able to teach back signs and symptoms of worsening condition: Fever,  Hyperthermia   Week 3 call completed? Yes          Mary Jo Garza RN

## 2021-11-19 ENCOUNTER — HOME CARE VISIT (OUTPATIENT)
Dept: HOME HEALTH SERVICES | Facility: CLINIC | Age: 51
End: 2021-11-19

## 2021-11-19 PROCEDURE — G0300 HHS/HOSPICE OF LPN EA 15 MIN: HCPCS

## 2021-11-20 VITALS
HEART RATE: 87 BPM | RESPIRATION RATE: 18 BRPM | TEMPERATURE: 98.1 F | SYSTOLIC BLOOD PRESSURE: 154 MMHG | OXYGEN SATURATION: 98 % | DIASTOLIC BLOOD PRESSURE: 100 MMHG

## 2021-11-22 ENCOUNTER — OFFICE VISIT (OUTPATIENT)
Dept: INTERNAL MEDICINE | Facility: CLINIC | Age: 51
End: 2021-11-22

## 2021-11-22 VITALS
SYSTOLIC BLOOD PRESSURE: 130 MMHG | DIASTOLIC BLOOD PRESSURE: 85 MMHG | TEMPERATURE: 97.8 F | HEIGHT: 61 IN | BODY MASS INDEX: 55.32 KG/M2 | RESPIRATION RATE: 15 BRPM | WEIGHT: 293 LBS | OXYGEN SATURATION: 98 % | HEART RATE: 95 BPM

## 2021-11-22 DIAGNOSIS — E66.01 MORBID OBESITY WITH BMI OF 50.0-59.9, ADULT (HCC): ICD-10-CM

## 2021-11-22 DIAGNOSIS — Z00.00 ENCOUNTER FOR PREVENTIVE CARE: Primary | ICD-10-CM

## 2021-11-22 DIAGNOSIS — Z12.11 COLON CANCER SCREENING: ICD-10-CM

## 2021-11-22 DIAGNOSIS — G89.4 CHRONIC PAIN SYNDROME: ICD-10-CM

## 2021-11-22 DIAGNOSIS — E11.65 TYPE 2 DIABETES MELLITUS WITH HYPERGLYCEMIA, WITHOUT LONG-TERM CURRENT USE OF INSULIN (HCC): ICD-10-CM

## 2021-11-22 DIAGNOSIS — Z12.31 ENCOUNTER FOR SCREENING MAMMOGRAM FOR MALIGNANT NEOPLASM OF BREAST: ICD-10-CM

## 2021-11-22 DIAGNOSIS — Z23 NEED FOR COVID-19 VACCINE: ICD-10-CM

## 2021-11-22 DIAGNOSIS — Z11.59 NEED FOR HEPATITIS C SCREENING TEST: ICD-10-CM

## 2021-11-22 DIAGNOSIS — F32.0 MILD SINGLE CURRENT EPISODE OF MAJOR DEPRESSIVE DISORDER (HCC): ICD-10-CM

## 2021-11-22 DIAGNOSIS — I89.0 LYMPHEDEMA OF BOTH LOWER EXTREMITIES: ICD-10-CM

## 2021-11-22 DIAGNOSIS — G43.009 MIGRAINE WITHOUT AURA AND WITHOUT STATUS MIGRAINOSUS, NOT INTRACTABLE: ICD-10-CM

## 2021-11-22 PROCEDURE — 91300 COVID-19 (PFIZER): CPT | Performed by: INTERNAL MEDICINE

## 2021-11-22 PROCEDURE — 0001A COVID-19 (PFIZER): CPT | Performed by: INTERNAL MEDICINE

## 2021-11-22 PROCEDURE — 99396 PREV VISIT EST AGE 40-64: CPT | Performed by: INTERNAL MEDICINE

## 2021-11-22 RX ORDER — ONDANSETRON 4 MG/1
4 TABLET, ORALLY DISINTEGRATING ORAL EVERY 8 HOURS PRN
Qty: 90 TABLET | Refills: 1 | Status: SHIPPED | OUTPATIENT
Start: 2021-11-22 | End: 2021-12-10 | Stop reason: SDUPTHER

## 2021-11-22 RX ORDER — ZOLMITRIPTAN 5 MG/1
5 TABLET, FILM COATED ORAL ONCE AS NEEDED
Qty: 6 TABLET | Refills: 5 | Status: SHIPPED | OUTPATIENT
Start: 2021-11-22

## 2021-11-22 RX ORDER — INSULIN GLARGINE 100 [IU]/ML
45 INJECTION, SOLUTION SUBCUTANEOUS NIGHTLY
Qty: 15 ML | Refills: 5 | Status: SHIPPED | OUTPATIENT
Start: 2021-11-22 | End: 2021-12-02 | Stop reason: SDUPTHER

## 2021-11-22 RX ORDER — INSULIN LISPRO 100 U/ML
10 INJECTION, SOLUTION SUBCUTANEOUS
Qty: 9 ML | Refills: 5 | Status: SHIPPED | OUTPATIENT
Start: 2021-11-22 | End: 2021-12-10 | Stop reason: SDUPTHER
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
## 2021-11-23 ENCOUNTER — HOME CARE VISIT (OUTPATIENT)
Dept: HOME HEALTH SERVICES | Facility: HOME HEALTHCARE | Age: 51
End: 2021-11-23

## 2021-11-24 ENCOUNTER — READMISSION MANAGEMENT (OUTPATIENT)
Dept: CALL CENTER | Facility: HOSPITAL | Age: 51
End: 2021-11-24

## 2021-11-24 NOTE — OUTREACH NOTE
Sepsis Week 4 Survey      Responses   [facility] patient discharged from? [place]   Does the patient have one of the following disease processes/diagnoses(primary or secondary)? Sepsis   Week 4 attempt successful? Yes   Call start time 1558   Call end time 1602   Discharge diagnosis Bacteremia due to Klebsiella pneumoniae   Person spoke with today (if not patient) and relationship [facility]   Meds reviewed with patient/caregiver? Yes   Is the patient taking all medications as directed (includes completed medication regime)? Yes   Has the patient kept scheduled appointments due by today? Yes   Is the patient still receiving Home Health Services? Yes   Psychosocial issues? No   What is the patient's perception of their health status since discharge? Same  [S. O. reports Pt is same. Denies fever, confusion or any other s/s. He states her legs still are swollen and hurt. States Dr is aware. ]   Nursing interventions Nurse provided patient education   Is the patient/caregiver able to teach back Sepsis? S - Short of breath,  S - Shivering,fever or very cold,  E - Extreme pain or generalized discomfort (worst ever,especially abdomen),  S - Sleepy, difficult to arouse,confused,  P - Pale or discolored skin   Nursing interventions Nurse provided patient education   Is patient/caregiver able to teach back steps to recovery at home? Rest and regain strength,  Eat a balanced diet   Is the patient/caregiver able to teach back signs and symptoms of worsening condition: Fever,  Rapid heart rate (>90),  Shortness of breath/rapid respiratory rate,  Altered mental status(confusion/coma)   If the patient is a current smoker, are they able to teach back resources for cessation? Not a smoker   Is the patient/caregiver able to teach back the hierarchy of who to call/visit for symptoms/problems? PCP, Specialist, Home health nurse, Urgent Care, ED, 911 Yes   Week 4 call completed? Yes   Would the patient like one additional call? No    Graduated Yes   Is the patient interested in additional calls from an ambulatory ?  NOTE:  applies to high risk patients requiring additional follow-up. No   Did the patient feel the follow up calls were helpful during their recovery period? Yes          Iza Henderson RN

## 2021-11-26 ENCOUNTER — HOME CARE VISIT (OUTPATIENT)
Dept: HOME HEALTH SERVICES | Facility: HOME HEALTHCARE | Age: 51
End: 2021-11-26

## 2021-11-26 ENCOUNTER — NURSE TRIAGE (OUTPATIENT)
Dept: CALL CENTER | Facility: HOSPITAL | Age: 51
End: 2021-11-26

## 2021-11-26 PROCEDURE — G0300 HHS/HOSPICE OF LPN EA 15 MIN: HCPCS

## 2021-11-26 NOTE — TELEPHONE ENCOUNTER
Has sinus congestion and headache asking for antibioitcs told will have to be seen for this call office on Monday or be seen if needed.   Reason for Disposition  • [1] Sinus congestion (pressure, fullness) AND [2] present > 10 days    Additional Information  • Negative: SEVERE difficulty breathing (e.g., struggling for each breath, speaks in single words)  • Negative: Sounds like a life-threatening emergency to the triager  • Negative: [1] Sinus infection AND [2] taking an antibiotic AND [3] symptoms continue  • Negative: [1] Difficulty breathing AND [2] not from stuffy nose (e.g., not relieved by cleaning out the nose)  • Negative: [1] SEVERE headache AND [2] fever  • Negative: [1] Redness or swelling on the cheek, forehead or around the eye AND [2] fever  • Negative: Fever > 104 F (40 C)  • Negative: Patient sounds very sick or weak to the triager  • Negative: [1] SEVERE pain AND [2] not improved 2 hours after pain medicine  • Negative: [1] Redness or swelling on the cheek, forehead or around the eye AND [2] no fever  • Negative: [1] Fever > 101 F (38.3 C) AND [2] age > 60 years  • Negative: [1] Fever > 100.0 F (37.8 C) AND [2] bedridden (e.g., nursing home patient, CVA, chronic illness, recovering from surgery)  • Negative: [1] Fever > 100.0 F (37.8 C) AND [2] diabetes mellitus or weak immune system (e.g., HIV positive, cancer chemo, splenectomy, organ transplant, chronic steroids)  • Negative: Fever present > 3 days (72 hours)  • Negative: [1] Fever returns after gone for over 24 hours AND [2] symptoms worse or not improved  • Negative: [1] Sinus pain (not just congestion) AND [2] fever  • Negative: Earache  • Negative: [1] Nasal discharge AND [2] present > 10 days  • Negative: [1] Using nasal washes and pain medicine > 24 hours AND [2] sinus pain (around cheekbone or eye) persists  • Negative: Lots of coughing  • Negative: [1] Sinus congestion as part of a cold AND [2] present < 10 days    Answer Assessment -  "Initial Assessment Questions  1. LOCATION: \"Where does it hurt?\"       Sinus area  2. ONSET: \"When did the sinus pain start?\"  (e.g., hours, days)       Wednesday  3. SEVERITY: \"How bad is the pain?\"   (Scale 1-10; mild, moderate or severe)    - MILD (1-3): doesn't interfere with normal activities     - MODERATE (4-7): interferes with normal activities (e.g., work or school) or awakens from sleep    - SEVERE (8-10): excruciating pain and patient unable to do any normal activities         mild  4. RECURRENT SYMPTOM: \"Have you ever had sinus problems before?\" If Yes, ask: \"When was the last time?\" and \"What happened that time?\"       yes  5. NASAL CONGESTION: \"Is the nose blocked?\" If Yes, ask: \"Can you open it or must you breathe through the mouth?\"      Nose stopped up  6. NASAL DISCHARGE: \"Do you have discharge from your nose?\" If so ask, \"What color?\"      Sinus drainage yellow  7. FEVER: \"Do you have a fever?\" If Yes, ask: \"What is it, how was it measured, and when did it start?\"       no  8. OTHER SYMPTOMS: \"Do you have any other symptoms?\" (e.g., sore throat, cough, earache, difficulty breathing)      cough  9. PREGNANCY: \"Is there any chance you are pregnant?\" \"When was your last menstrual period?\"      no    Protocols used: SINUS PAIN OR CONGESTION-ADULT-AH      "

## 2021-11-28 VITALS
TEMPERATURE: 97.7 F | OXYGEN SATURATION: 99 % | SYSTOLIC BLOOD PRESSURE: 162 MMHG | HEART RATE: 84 BPM | RESPIRATION RATE: 18 BRPM | DIASTOLIC BLOOD PRESSURE: 90 MMHG

## 2021-11-30 ENCOUNTER — HOME CARE VISIT (OUTPATIENT)
Dept: HOME HEALTH SERVICES | Facility: HOME HEALTHCARE | Age: 51
End: 2021-11-30

## 2021-11-30 PROCEDURE — G0300 HHS/HOSPICE OF LPN EA 15 MIN: HCPCS

## 2021-11-30 RX ORDER — FLUTICASONE PROPIONATE 50 MCG
SPRAY, SUSPENSION (ML) NASAL
Qty: 16 G | Refills: 0 | OUTPATIENT
Start: 2021-11-30

## 2021-12-01 ENCOUNTER — TELEPHONE (OUTPATIENT)
Dept: INTERNAL MEDICINE | Facility: CLINIC | Age: 51
End: 2021-12-01

## 2021-12-01 NOTE — TELEPHONE ENCOUNTER
Caller: Valeria Wilson    Relationship: Self    Best call back number: [phone]    Who are you requesting to speak with    CLINICAL STAFF    Do you know the name of the person who called:    PATIENT    What was the call regarding:    REQUESTING LANTIS SLLOSTAR IN A PEN. DOES NOT USED THE OTHER THAT HAS BEEN ORDERED.         Do you require a callback: YES, PLEASE ADVISE        [id number] [place] - [address] AT [facility] [address]  [phone] [facility] [phone] FX

## 2021-12-02 DIAGNOSIS — E11.65 TYPE 2 DIABETES MELLITUS WITH HYPERGLYCEMIA, WITHOUT LONG-TERM CURRENT USE OF INSULIN (HCC): Primary | ICD-10-CM

## 2021-12-03 ENCOUNTER — HOME CARE VISIT (OUTPATIENT)
Dept: HOME HEALTH SERVICES | Facility: HOME HEALTHCARE | Age: 51
End: 2021-12-03

## 2021-12-03 PROCEDURE — G0300 HHS/HOSPICE OF LPN EA 15 MIN: HCPCS

## 2021-12-04 ENCOUNTER — HOME CARE VISIT (OUTPATIENT)
Dept: HOME HEALTH SERVICES | Facility: HOME HEALTHCARE | Age: 51
End: 2021-12-04

## 2021-12-04 VITALS
DIASTOLIC BLOOD PRESSURE: 94 MMHG | OXYGEN SATURATION: 98 % | SYSTOLIC BLOOD PRESSURE: 164 MMHG | TEMPERATURE: 99.1 F | HEART RATE: 79 BPM | RESPIRATION RATE: 18 BRPM

## 2021-12-07 ENCOUNTER — OFFICE VISIT (OUTPATIENT)
Dept: URGENT CARE | Age: 51
End: 2021-12-07
Payer: MEDICAID

## 2021-12-07 ENCOUNTER — HOSPITAL ENCOUNTER (OUTPATIENT)
Dept: GENERAL RADIOLOGY | Age: 51
Discharge: HOME | End: 2021-12-07
Payer: MEDICAID

## 2021-12-07 VITALS
RESPIRATION RATE: 18 BRPM | TEMPERATURE: 97.2 F | WEIGHT: 293 LBS | BODY MASS INDEX: 60.27 KG/M2 | DIASTOLIC BLOOD PRESSURE: 62 MMHG | OXYGEN SATURATION: 98 % | SYSTOLIC BLOOD PRESSURE: 134 MMHG | HEART RATE: 61 BPM

## 2021-12-07 DIAGNOSIS — S89.92XA INJURY OF LEFT KNEE, INITIAL ENCOUNTER: ICD-10-CM
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
## 2021-12-07 DIAGNOSIS — S99.922A INJURY OF LEFT FOOT, INITIAL ENCOUNTER: ICD-10-CM

## 2021-12-07 DIAGNOSIS — S99.912A INJURY OF LEFT ANKLE, INITIAL ENCOUNTER: ICD-10-CM

## 2021-12-07 DIAGNOSIS — R82.998 DARK URINE: Primary | ICD-10-CM

## 2021-12-07 DIAGNOSIS — N39.0 ACUTE UTI: ICD-10-CM

## 2021-12-07 LAB
APPEARANCE FLUID: ABNORMAL
BILIRUBIN, POC: NEGATIVE
BLOOD URINE, POC: NEGATIVE
CLARITY, POC: ABNORMAL
COLOR, POC: YELLOW
GLUCOSE URINE, POC: 100
KETONES, POC: NEGATIVE
LEUKOCYTE EST, POC: ABNORMAL
NITRITE, POC: POSITIVE
PH, POC: 5.5
PROTEIN, POC: NEGATIVE
SPECIFIC GRAVITY, POC: 1.02
UROBILINOGEN, POC: 0.2

## 2021-12-07 PROCEDURE — 73560 X-RAY EXAM OF KNEE 1 OR 2: CPT

## 2021-12-07 PROCEDURE — 73610 X-RAY EXAM OF ANKLE: CPT

## 2021-12-07 PROCEDURE — 73630 X-RAY EXAM OF FOOT: CPT

## 2021-12-07 PROCEDURE — G8484 FLU IMMUNIZE NO ADMIN: HCPCS | Performed by: NURSE PRACTITIONER

## 2021-12-07 PROCEDURE — G8427 DOCREV CUR MEDS BY ELIG CLIN: HCPCS | Performed by: NURSE PRACTITIONER

## 2021-12-07 PROCEDURE — 99214 OFFICE O/P EST MOD 30 MIN: CPT | Performed by: NURSE PRACTITIONER

## 2021-12-07 PROCEDURE — 1036F TOBACCO NON-USER: CPT | Performed by: NURSE PRACTITIONER

## 2021-12-07 PROCEDURE — G8417 CALC BMI ABV UP PARAM F/U: HCPCS | Performed by: NURSE PRACTITIONER

## 2021-12-07 PROCEDURE — 81002 URINALYSIS NONAUTO W/O SCOPE: CPT | Performed by: NURSE PRACTITIONER

## 2021-12-07 RX ORDER — KETOROLAC TROMETHAMINE 30 MG/ML
60 INJECTION, SOLUTION INTRAMUSCULAR; INTRAVENOUS ONCE
Status: COMPLETED | OUTPATIENT
Start: 2021-12-07 | End: 2021-12-07

## 2021-12-07 RX ORDER — NITROFURANTOIN 25; 75 MG/1; MG/1
100 CAPSULE ORAL 2 TIMES DAILY
Qty: 14 CAPSULE | Refills: 0 | Status: SHIPPED | OUTPATIENT
Start: 2021-12-07 | End: 2021-12-14

## 2021-12-07 RX ORDER — LOSARTAN POTASSIUM 25 MG/1
25 TABLET ORAL DAILY
Status: ON HOLD | COMMUNITY
Start: 2021-11-05 | End: 2022-03-09

## 2021-12-07 RX ORDER — KETOROLAC TROMETHAMINE 10 MG/1
10 TABLET, FILM COATED ORAL EVERY 6 HOURS PRN
Qty: 20 TABLET | Refills: 0 | Status: SHIPPED | OUTPATIENT
Start: 2021-12-07 | End: 2022-03-07

## 2021-12-07 RX ADMIN — KETOROLAC TROMETHAMINE 60 MG: 30 INJECTION, SOLUTION INTRAMUSCULAR; INTRAVENOUS at 10:05

## 2021-12-07 ASSESSMENT — PATIENT HEALTH QUESTIONNAIRE - PHQ9
SUM OF ALL RESPONSES TO PHQ QUESTIONS 1-9: 0
SUM OF ALL RESPONSES TO PHQ QUESTIONS 1-9: 0
2. FEELING DOWN, DEPRESSED OR HOPELESS: 0
SUM OF ALL RESPONSES TO PHQ QUESTIONS 1-9: 0
SUM OF ALL RESPONSES TO PHQ9 QUESTIONS 1 & 2: 0
DEPRESSION UNABLE TO ASSESS: FUNCTIONAL CAPACITY MOTIVATION LIMITS ACCURACY
1. LITTLE INTEREST OR PLEASURE IN DOING THINGS: 0

## 2021-12-07 NOTE — PATIENT INSTRUCTIONS
Patient Education        Urinary Tract Infection (UTI) in Women: Care Instructions  Overview     A urinary tract infection, or UTI, is a general term for an infection anywhere between the kidneys and the urethra (where urine comes out). Most UTIs are bladder infections. They often cause pain or burning when you urinate. UTIs are caused by bacteria and can be cured with antibiotics. Be sure to complete your treatment so that the infection does not get worse. Follow-up care is a key part of your treatment and safety. Be sure to make and go to all appointments, and call your doctor if you are having problems. It's also a good idea to know your test results and keep a list of the medicines you take. How can you care for yourself at home? · Take your antibiotics as directed. Do not stop taking them just because you feel better. You need to take the full course of antibiotics. · Drink extra water and other fluids for the next day or two. This will help make the urine less concentrated and help wash out the bacteria that are causing the infection. (If you have kidney, heart, or liver disease and have to limit fluids, talk with your doctor before you increase the amount of fluids you drink.)  · Avoid drinks that are carbonated or have caffeine. They can irritate the bladder. · Urinate often. Try to empty your bladder each time. · To relieve pain, take a hot bath or lay a heating pad set on low over your lower belly or genital area. Never go to sleep with a heating pad in place. To prevent UTIs  · Drink plenty of water each day. This helps you urinate often, which clears bacteria from your system. (If you have kidney, heart, or liver disease and have to limit fluids, talk with your doctor before you increase the amount of fluids you drink.)  · Urinate when you need to. · If you are sexually active, urinate right after you have sex. · Change sanitary pads often.   · Avoid douches, bubble baths, feminine hygiene sprays, and other feminine hygiene products that have deodorants. · After going to the bathroom, wipe from front to back. When should you call for help? Call your doctor now or seek immediate medical care if:    · Symptoms such as fever, chills, nausea, or vomiting get worse or appear for the first time.     · You have new pain in your back just below your rib cage. This is called flank pain.     · There is new blood or pus in your urine.     · You have any problems with your antibiotic medicine. Watch closely for changes in your health, and be sure to contact your doctor if:    · You are not getting better after taking an antibiotic for 2 days.     · Your symptoms go away but then come back. Where can you learn more? Go to https://Canopy LabspeSaaspoint.[email or web address] org and sign in to your N42 account. Enter G335 in the NewsCred box to learn more about \"Urinary Tract Infection (UTI) in Women: Care Instructions. \"     If you do not have an account, please click on the \"Sign Up Now\" link. Current as of: February 10, 2021               Content Version: 13.0  © 0460-7736 [employer]. Care instructions adapted under license by [facility] ([facility]). If you have questions about a medical condition or this instruction, always ask your healthcare professional. [place] [address] any warranty or liability for your use of this information. Patient Education        Foot Pain: Care Instructions  Your Care Instructions     Foot injuries that cause pain and swelling are fairly common. Almost all sports or home repair projects can cause a misstep that ends up as foot pain. Normal wear and tear, especially as you get older, also can cause foot pain. Most minor foot injuries will heal on their own, and home treatment is usually all you need to do. If you have a severe injury, you may need tests and treatment. Follow-up care is a key part of your treatment and safety.  Be sure to make and go to all appointments, and call your doctor if you are having problems. It's also a good idea to know your test results and keep a list of the medicines you take. How can you care for yourself at home? · Take pain medicines exactly as directed. ? If the doctor gave you a prescription medicine for pain, take it as prescribed. ? If you are not taking a prescription pain medicine, ask your doctor if you can take an over-the-counter medicine. · Rest and protect your foot. Take a break from any activity that may cause pain. · Put ice or a cold pack on your foot for 10 to 20 minutes at a time. Put a thin cloth between the ice and your skin. · Prop up the sore foot on a pillow when you ice it or anytime you sit or lie down during the next 3 days. Try to keep it above the level of your heart. This will help reduce swelling. · Your doctor may recommend that you wrap your foot with an elastic bandage. Keep your foot wrapped for as long as your doctor advises. · If your doctor recommends crutches, use them as directed. · Wear roomy footwear. · As soon as pain and swelling end, begin gentle exercises of your foot. Your doctor can tell you which exercises will help. When should you call for help? Call 911 anytime you think you may need emergency care. For example, call if:    · Your foot turns pale, white, blue, or cold. Call your doctor now or seek immediate medical care if:    · You cannot move or stand on your foot.     · Your foot looks twisted or out of its normal position.     · Your foot is not stable when you step down.     · You have signs of infection, such as:  ? Increased pain, swelling, warmth, or redness. ? Red streaks leading from the sore area. ? Pus draining from a place on your foot. ? A fever.     · Your foot is numb or tingly.    Watch closely for changes in your health, and be sure to contact your doctor if:    · You do not get better as expected.     · You have bruises from an injury that last longer than 2 weeks. Where can you learn more? Go to https://chpepiceweb.[email or web address] org and sign in to your FTL [employer]AR account. Enter M034 in the [facility] box to learn more about \"Foot Pain: Care Instructions. \"     If you do not have an account, please click on the \"Sign Up Now\" link. Current as of: July 1, 2021               Content Version: 13.0  © 2006-2021 [employer]. Care instructions adapted under license by [facility] ([facility]). If you have questions about a medical condition or this instruction, always ask your healthcare professional. [place] [address] any warranty or liability for your use of this information. Patient Education        Knee Pain or Injury: Care Instructions  Your Care Instructions     Injuries are a common cause of knee problems. Sudden (acute) injuries may be caused by a direct blow to the knee. They can also be caused by abnormal twisting, bending, or falling on the knee. Pain, bruising, or swelling may be severe, and may start within minutes of the injury. Overuse is another cause of knee pain. Other causes are climbing stairs, kneeling, and other activities that use the knee. Everyday wear and tear, especially as you get older, also can cause knee pain. Rest, along with home treatment, often relieves pain and allows your knee to heal. If you have a serious knee injury, you may need tests and treatment. Follow-up care is a key part of your treatment and safety. Be sure to make and go to all appointments, and call your doctor if you are having problems. It's also a good idea to know your test results and keep a list of the medicines you take. How can you care for yourself at home? · Be safe with medicines. Read and follow all instructions on the label. ? If the doctor gave you a prescription medicine for pain, take it as prescribed.   ? If you are not taking a prescription pain medicine, ask your doctor if you can take an over-the-counter medicine. · Rest and protect your knee. Take a break from any activity that may cause pain. · Put ice or a cold pack on your knee for 10 to 20 minutes at a time. Put a thin cloth between the ice and your skin. · Prop up a sore knee on a pillow when you ice it or anytime you sit or lie down for the next 3 days. Try to keep it above the level of your heart. This will help reduce swelling. · If your knee is not swollen, you can put moist heat, a heating pad, or a warm cloth on your knee. · If your doctor recommends an elastic bandage, sleeve, or other type of support for your knee, wear it as directed. · Follow your doctor's instructions about how much weight you can put on your leg. Use a cane, crutches, or a walker as instructed. · Follow your doctor's instructions about activity during your healing process. If you can do mild exercise, slowly increase your activity. · Reach and stay at a healthy weight. Extra weight can strain the joints, especially the knees and hips, and make the pain worse. Losing even a few pounds may help. When should you call for help? Call 911 anytime you think you may need emergency care. For example, call if:    · You have symptoms of a blood clot in your lung (called a pulmonary embolism). These may include:  ? Sudden chest pain. ? Trouble breathing. ? Coughing up blood. Call your doctor now or seek immediate medical care if:    · You have severe or increasing pain.     · Your leg or foot turns cold or changes color.     · You cannot stand or put weight on your knee.     · Your knee looks twisted or bent out of shape.     · You cannot move your knee.     · You have signs of infection, such as:  ? Increased pain, swelling, warmth, or redness. ? Red streaks leading from the knee. ? Pus draining from a place on your knee.   ? A fever.     · You have signs of a blood clot in your leg (called a deep vein thrombosis), such as:  ? Pain in your calf, back of the knee, thigh, or groin. ? Redness and swelling in your leg or groin. Watch closely for changes in your health, and be sure to contact your doctor if:    · You have tingling, weakness, or numbness in your knee.     · You have any new symptoms, such as swelling.     · You have bruises from a knee injury that last longer than 2 weeks.     · You do not get better as expected. Where can you learn more? Go to https://Xiu.com.[email or web address] org and sign in to your Ubitricity account. Enter K195 in the [employer] box to learn more about \"Knee Pain or Injury: Care Instructions. \"     If you do not have an account, please click on the \"Sign Up Now\" link. Current as of: July 1, 2021               Content Version: 13.0  © 7484-6509 [employer]. Care instructions adapted under license by [facility] ([facility]). If you have questions about a medical condition or this instruction, always ask your healthcare professional. [place] [address] any warranty or liability for your use of this information.

## 2021-12-07 NOTE — PROGRESS NOTES
[address] [facility] URGENT CARE  [address] [address] Cristian Alatorre [address]  Dept: [phone]  Dept Fax: [phone]: [phone]    Tianna Trivedi is a 54 y.o. female who presents today for her medical conditions/complaintsas noted below. Tianna  is c/o of Foot Injury (left)        HPI:     HPI  Tianna presents today post fall that happened last week. She reports she is having pain in her left foot, ankle, and knee. She can walk on it. Pain is not getting better with time and rest. Pain is made worse with movement. She is suffering from lymphedema and her BLE are swollen. She has them wrapped in ace wrap. Tianna also reports that her urine is dark and does have some discomfort with urination. She does not describe it as dysuria. Does report recent sepsis with hospitalization from UTI. Just finished antibiotic (zpack and steroid IM) for sinus infection.    Past Medical History:   Diagnosis Date    Anxiety     Constipation     Depression     Headache(784.0)     Hyperlipidemia     Hypertension     Kidney stones     Kidney stones     Morbid obesity due to excess calories (HCC)     Restless legs syndrome     Type 2 diabetes mellitus ([address]      Past Surgical History:   Procedure Laterality Date    ECTOPIC PREGNANCY SURGERY  2002    EYE SURGERY Bilateral     2005 & 2007: cornea transplant and cataracts removed    KNEE CARTILAGE SURGERY Left 12/20/2016    KNEE ARTHROSCOPIC PARTIAL MEDIAL MENISCECTOMY performed by Leonel Michaels MD at [address] [place] [address]  2001       Family History   Problem Relation Age of Onset    Obesity Mother    Jay Arthritis Mother         has had knees replaced    Cancer Father         \"adrenal gland & lung\"    Parkinsonism Father     Cancer Maternal Grandmother     COPD Maternal Grandmother     Obesity Maternal Grandmother     Heart Failure Maternal Grandmother     Cancer Maternal Grandfather     Other Sister         spina biffida    No Known Problems Son     No Known Problems Son     No Known Problems Daughter     Obesity Maternal Aunt     Other Maternal Aunt          of sepsis related to a spider bite       Social History     Tobacco Use    Smoking status: Never Smoker    Smokeless tobacco: Never Used   Substance Use Topics    Alcohol use: Not Currently     Comment: \"socially, not recently though\"      Current Outpatient Medications   Medication Sig Dispense Refill    losartan (COZAAR) 25 MG tablet Take 25 mg by mouth daily      ketorolac (TORADOL) 10 MG tablet Take 1 tablet by mouth every 6 hours as needed for Pain 20 tablet 0    nitrofurantoin, macrocrystal-monohydrate, (MACROBID) 100 MG capsule Take 1 capsule by mouth 2 times daily for 7 days 14 capsule 0    insulin glargine (LANTUS SOLOSTAR) 100 UNIT/ML injection pen Inject 30 Units into the skin every evening 5 pen 5    blood glucose test strips (ONETOUCH ULTRA) strip USE TO TEST BLOOD SUGAR THREE TIMES DAILY AND AS NEEDED AS DIRECTED 150 strip 5    Lancets (ONETOUCH DELICA PLUS RIOSMF20O) MISC USE TO CHECK BLOOD SUGAR AS DIRECTED 100 each 5    pramipexole (MIRAPEX) 0.75 MG tablet TAKE 1 TABLET BY MOUTH TWICE DAILY 60 tablet 5    Insulin Pen Needle ([employer] PEN NEEDLES) 31G X 6 MM MISC 1 each by Does not apply route daily 100 each 3    potassium chloride (KLOR-CON M) 20 MEQ extended release tablet Take 1 tablet by mouth daily as needed (low potassium) 30 tablet 0    ondansetron (ZOFRAN-ODT) 4 MG disintegrating tablet DISSOLVE 1 TABLET UNDER THE TONGUE EVERY 8 HOURS AS NEEDED FOR NAUSEA AND VOMITING PRIOR TO DOXYCYCLINE DOSES 20 tablet 0    Liraglutide (VICTOZA) 18 MG/3ML SOPN SC injection ADMINISTER 1.8 MG UNDER THE SKIN EVERY DAY 9 mL 3    mirtazapine (REMERON) 15 MG tablet TAKE 1 TABLET BY MOUTH EVERY NIGHT 30 tablet 3    busPIRone (BUSPAR) 10 MG tablet TAKE 1 TABLET BY MOUTH THREE TIMES DAILY 90 tablet 3    insulin lispro, 1 Unit Dial, (ADMELOG SOLOSTAR) 100 UNIT/ML SOPN ADMINISTER 45 UNITS UNDER THE SKIN EVERY EVENING. INCREASE BY 3 UNITS EVERY NIGHT UNTIL 60 UNITS IS REACHED AND. CONTINUE 60 UNITS EVERY EVENING (Patient taking differently: 60 UNITS EVERY EVENING) 18 mL 5    metFORMIN (GLUCOPHAGE) 500 MG tablet Take 1 tablet by mouth 2 times daily (with meals) 180 tablet 1    fluticasone (FLONASE) 50 MCG/ACT nasal spray SHAKE LIQUID AND USE 1 SPRAY IN EACH NOSTRIL DAILY 16 g 0    furosemide (LASIX) 80 MG tablet Take 0.5 tablets by mouth 2 times daily 60 tablet 0    DULoxetine (CYMBALTA) 60 MG extended release capsule TAKE 1 CAPSULE BY MOUTH TWICE DAILY 30 capsule 5    omeprazole (PRILOSEC) 20 MG delayed release capsule TAKE ONE CAPSULE BY MOUTH TWICE DAILY BEFORE MEALS 180 capsule 1    Diabetic Shoe MISC by Does not apply route DISPENSE ONE PAIR OF DIABETIC SHOE AND 3 PAIRS HEAT MOLDED INSERTS 1 each 0    oxyCODONE-acetaminophen (PERCOCET) 7.5-325 MG per tablet Take 1 tablet by mouth 2 times daily.  tiZANidine (ZANAFLEX) 4 MG tablet Take 4 mg by mouth daily as needed      NONFORMULARY Prednisone eye drops 1 drop in each eye daily      Morphine Sulfate ER 15 MG T12A Take 15 mg by mouth 2 times daily.  Cream Base CREA APPLY ONE PUMP (GRAM) TO AFFECTED AREA(S) THREE TO FOUR TIMES A DAY TO THE APPENDAGES AND TRUNK AS DIRECTED 30 g 5    celecoxib (CELEBREX) 200 MG capsule TAKE 1 CAPSULE BY MOUTH EVERY DAY 60 capsule 5    blood glucose monitor kit and supplies Test 1 times a day & as needed for symptoms of irregular blood glucose. 1 kit 0    gabapentin (NEURONTIN) 300 MG capsule Take 300 mg by mouth 3 times daily.    5    Blood Glucose Monitoring Suppl ([address]) w/Device KIT 1 kit by Does not apply route daily as needed (Dx 250.00) 1 kit 0    ONE TOUCH LANCETS MISC 1 each by Does not apply route daily 100 each 3    triamterene-hydroCHLOROthiazide (MAXZIDE-25) 37.5-25 MG per tablet TAKE 1 TABLET BY MOUTH DAILY (Patient not taking: Reported on 12/7/2021) 30 tablet 2    rizatriptan (MAXALT) 10 MG tablet Take 1 tablet by mouth once as needed for Migraine May repeat in 2 hours if needed 30 tablet 3     No current facility-administered medications for this visit.      Allergies   Allergen Reactions    Compazine [Prochlorperazine] Shortness Of Breath    Ciprofloxacin Hives    Calamine Itching    Prochlorperazine Edisylate      Will discuss with patient at next visit     Tobramycin Other (See Comments)     States had corneal transplants - and was told not to    Tramadol      Will discuss with patient at next visit     Amoxicillin-Pot Clavulanate Hives, Itching and Rash    Ancef [Cefazolin] Nausea And Vomiting    Bactrim [Sulfamethoxazole-Trimethoprim] Nausea And Vomiting and Other (See Comments)     States she also gets yeast infections with it    Cephalexin Rash    Clindamycin/Lincomycin Rash    Codeine Nausea And Vomiting and Rash    Demerol Hives    Doxycycline Nausea And Vomiting    Hydroxyzine Hcl Itching    Ibuprofen Nausea Only    Keflex [Cephalexin] Rash    Meperidine Hives    Moxifloxacin Nausea And Vomiting    Nortriptyline Hives, Itching and Rash    Orphenadrine Citrate Itching    Penicillins Hives and Nausea Only    Vistaril [Hydroxyzine Hcl] Itching       Health Maintenance   Topic Date Due    Hepatitis C screen  Never done    Diabetic retinal exam  Never done    HIV screen  Never done    Hepatitis B vaccine (1 of 3 - Risk 3-dose series) Never done    Cervical cancer screen  Never done    Colon cancer screen colonoscopy  Never done    A1C test (Diabetic or Prediabetic)  08/03/2021    Lipid screen  08/17/2021    Flu vaccine (1) 09/01/2021    DTaP/Tdap/Td vaccine (2 - Td or Tdap) 09/22/2021    COVID-19 Vaccine (2 - Pfizer 3-dose booster series) 12/13/2021    Diabetic foot exam  01/20/2022    Potassium monitoring  06/28/2022    Creatinine monitoring  06/28/2022    Pneumococcal 0-64 years Vaccine (2 of 2 - PPSV23) 09/08/2037    Hepatitis A vaccine  Aged Out    Hib vaccine  Aged Out    Meningococcal (ACWY) vaccine  Aged Out       Subjective:     Review of Systems   Constitutional: Negative for chills and fever. Genitourinary:        Dark urine      Musculoskeletal:        L foot, ankle, and knee pain post fall   All other systems reviewed and are negative.      :Objective      Physical Exam  Vitals and nursing note reviewed. Constitutional:       Appearance: Normal appearance. HENT:      Head: Normocephalic and atraumatic. Eyes:      Conjunctiva/sclera: Conjunctivae normal.      Pupils: Pupils are equal, round, and reactive to light. Musculoskeletal:      Left knee: Swelling present. Decreased range of motion. Tenderness present. Legs:         Feet:    Skin:     General: Skin is warm and dry. Neurological:      General: No focal deficit present. Mental Status: She is alert and oriented to person, place, and time. Psychiatric:         Mood and Affect: Mood normal.         Behavior: Behavior normal.       /62   Pulse 61   Temp 97.2 °F (36.2 °C) (Temporal)   Resp 18   Wt (!) 319 lb (144.7 kg)   SpO2 98%   BMI 60.27 kg/m²     :Assessment       Diagnosis Orders   1. Dark urine  POCT Urinalysis no Micro    Culture, Urine   2. Fall, initial encounter  XR FOOT LEFT (MIN 3 VIEWS)    XR KNEE LEFT (3 VIEWS)   3. Injury of left foot, initial encounter  XR FOOT LEFT (MIN 3 VIEWS)    ketorolac (TORADOL) 10 MG tablet   4. Injury of left ankle, initial encounter  XR ANKLE LEFT (MIN 3 VIEWS)    ketorolac (TORADOL) 10 MG tablet   5. Injury of left knee, initial encounter  XR KNEE LEFT (3 VIEWS)    ketorolac (TORADOL) 10 MG tablet   6. Acute UTI  nitrofurantoin, macrocrystal-monohydrate, (MACROBID) 100 MG capsule       :Plan   UA- + Leukocytes and + Nitrites - Sending for culture. Pt has multiple allergies. Will treat will Macrobid. Xrays- We will call with results.  Further treatment based on results. Toradol today in office. Pt requested PO Toradol. Also requested IM steroid. Just had one two weeks ago for sinusitis. Can't tolerate oral steroids due to DM. Discussed with pt that it was not recommended to do another IM steroid so close to her other dose. Orders Placed This Encounter   Procedures    Culture, Urine     Order Specific Question:   Specify (ex-cath, midstream, cysto, etc)? Answer:   Midstream    XR FOOT LEFT (MIN 3 VIEWS)     Standing Status:   Future     Standing Expiration Date:   12/7/2022     Order Specific Question:   Reason for exam:     Answer:   fell last week. painful.  XR ANKLE LEFT (MIN 3 VIEWS)     Standing Status:   Future     Standing Expiration Date:   12/7/2022     Order Specific Question:   Reason for exam:     Answer:   fell last week. painful.  XR KNEE LEFT (3 VIEWS)     Standing Status:   Future     Standing Expiration Date:   12/7/2022     Order Specific Question:   Reason for exam:     Answer:   fell last week    POCT Urinalysis no Micro       Return if symptoms worsen or fail to improve. Orders Placed This Encounter   Medications    ketorolac (TORADOL) injection 60 mg    ketorolac (TORADOL) 10 MG tablet     Sig: Take 1 tablet by mouth every 6 hours as needed for Pain     Dispense:  20 tablet     Refill:  0    nitrofurantoin, macrocrystal-monohydrate, (MACROBID) 100 MG capsule     Sig: Take 1 capsule by mouth 2 times daily for 7 days     Dispense:  14 capsule     Refill:  0       Patient given educational materials- see patient instructions. Discussed use, benefit, and side effects of prescribedmedications. All patient questions answered. Pt voiced understanding.      Patient Instructions       Patient Education        Urinary Tract Infection (UTI) in Women: Care Instructions  Overview     A urinary tract infection, or UTI, is a general term for an infection anywhere between the kidneys and the urethra (where urine comes out). Most UTIs are bladder infections. They often cause pain or burning when you urinate. UTIs are caused by bacteria and can be cured with antibiotics. Be sure to complete your treatment so that the infection does not get worse. Follow-up care is a key part of your treatment and safety. Be sure to make and go to all appointments, and call your doctor if you are having problems. It's also a good idea to know your test results and keep a list of the medicines you take. How can you care for yourself at home? · Take your antibiotics as directed. Do not stop taking them just because you feel better. You need to take the full course of antibiotics. · Drink extra water and other fluids for the next day or two. This will help make the urine less concentrated and help wash out the bacteria that are causing the infection. (If you have kidney, heart, or liver disease and have to limit fluids, talk with your doctor before you increase the amount of fluids you drink.)  · Avoid drinks that are carbonated or have caffeine. They can irritate the bladder. · Urinate often. Try to empty your bladder each time. · To relieve pain, take a hot bath or lay a heating pad set on low over your lower belly or genital area. Never go to sleep with a heating pad in place. To prevent UTIs  · Drink plenty of water each day. This helps you urinate often, which clears bacteria from your system. (If you have kidney, heart, or liver disease and have to limit fluids, talk with your doctor before you increase the amount of fluids you drink.)  · Urinate when you need to. · If you are sexually active, urinate right after you have sex. · Change sanitary pads often. · Avoid douches, bubble baths, feminine hygiene sprays, and other feminine hygiene products that have deodorants. · After going to the bathroom, wipe from front to back. When should you call for help?    Call your doctor now or seek immediate medical care if:    · Symptoms such as fever, chills, nausea, or vomiting get worse or appear for the first time.     · You have new pain in your back just below your rib cage. This is called flank pain.     · There is new blood or pus in your urine.     · You have any problems with your antibiotic medicine. Watch closely for changes in your health, and be sure to contact your doctor if:    · You are not getting better after taking an antibiotic for 2 days.     · Your symptoms go away but then come back. Where can you learn more? Go to https://[employer] PowerpeTacodaeb.[employer]. org and sign in to your [employer] account. Enter H736 in the [employer] box to learn more about \"Urinary Tract Infection (UTI) in Women: Care Instructions. \"     If you do not have an account, please click on the \"Sign Up Now\" link. Current as of: February 10, 2021               Content Version: 13.0  © 2006-2021 [employer]. Care instructions adapted under license by [facility] ([facility]). If you have questions about a medical condition or this instruction, always ask your healthcare professional. [address] [address] any warranty or liability for your use of this information. Patient Education        Foot Pain: Care Instructions  Your Care Instructions     Foot injuries that cause pain and swelling are fairly common. Almost all sports or home repair projects can cause a misstep that ends up as foot pain. Normal wear and tear, especially as you get older, also can cause foot pain. Most minor foot injuries will heal on their own, and home treatment is usually all you need to do. If you have a severe injury, you may need tests and treatment. Follow-up care is a key part of your treatment and safety. Be sure to make and go to all appointments, and call your doctor if you are having problems. It's also a good idea to know your test results and keep a list of the medicines you take. How can you care for yourself at home?   · Take pain medicines Instructions. \"     If you do not have an account, please click on the \"Sign Up Now\" link. Current as of: July 1, 2021               Content Version: 13.0  © 2006-2021 Healthwise, [employer]. Care instructions adapted under license by [facility] ([facility]). If you have questions about a medical condition or this instruction, always ask your healthcare professional. [address] any warranty or liability for your use of this information. Patient Education        Knee Pain or Injury: Care Instructions  Your Care Instructions     Injuries are a common cause of knee problems. Sudden (acute) injuries may be caused by a direct blow to the knee. They can also be caused by abnormal twisting, bending, or falling on the knee. Pain, bruising, or swelling may be severe, and may start within minutes of the injury. Overuse is another cause of knee pain. Other causes are climbing stairs, kneeling, and other activities that use the knee. Everyday wear and tear, especially as you get older, also can cause knee pain. Rest, along with home treatment, often relieves pain and allows your knee to heal. If you have a serious knee injury, you may need tests and treatment. Follow-up care is a key part of your treatment and safety. Be sure to make and go to all appointments, and call your doctor if you are having problems. It's also a good idea to know your test results and keep a list of the medicines you take. How can you care for yourself at home? · Be safe with medicines. Read and follow all instructions on the label. ? If the doctor gave you a prescription medicine for pain, take it as prescribed. ? If you are not taking a prescription pain medicine, ask your doctor if you can take an over-the-counter medicine. · Rest and protect your knee. Take a break from any activity that may cause pain. · Put ice or a cold pack on your knee for 10 to 20 minutes at a time.  Put a thin cloth between the ice and your skin.  · Prop up a sore knee on a pillow when you ice it or anytime you sit or lie down for the next 3 days. Try to keep it above the level of your heart. This will help reduce swelling. · If your knee is not swollen, you can put moist heat, a heating pad, or a warm cloth on your knee. · If your doctor recommends an elastic bandage, sleeve, or other type of support for your knee, wear it as directed. · Follow your doctor's instructions about how much weight you can put on your leg. Use a cane, crutches, or a walker as instructed. · Follow your doctor's instructions about activity during your healing process. If you can do mild exercise, slowly increase your activity. · Reach and stay at a healthy weight. Extra weight can strain the joints, especially the knees and hips, and make the pain worse. Losing even a few pounds may help. When should you call for help? Call 911 anytime you think you may need emergency care. For example, call if:    · You have symptoms of a blood clot in your lung (called a pulmonary embolism). These may include:  ? Sudden chest pain. ? Trouble breathing. ? Coughing up blood. Call your doctor now or seek immediate medical care if:    · You have severe or increasing pain.     · Your leg or foot turns cold or changes color.     · You cannot stand or put weight on your knee.     · Your knee looks twisted or bent out of shape.     · You cannot move your knee.     · You have signs of infection, such as:  ? Increased pain, swelling, warmth, or redness. ? Red streaks leading from the knee. ? Pus draining from a place on your knee. ? A fever.     · You have signs of a blood clot in your leg (called a deep vein thrombosis), such as:  ? Pain in your calf, back of the knee, thigh, or groin. ? Redness and swelling in your leg or groin.    Watch closely for changes in your health, and be sure to contact your doctor if:    · You have tingling, weakness, or numbness in your knee.     · You have any new symptoms, such as swelling.     · You have bruises from a knee injury that last longer than 2 weeks.     · You do not get better as expected. Where can you learn more? Go to https://[employer]Fast [employer]. org and sign in to your [employer] account. Enter K195 in the [employer] box to learn more about \"Knee Pain or Injury: Care Instructions. \"     If you do not have an account, please click on the \"Sign Up Now\" link. Current as of: July 1, 2021               Content Version: 13.0  © 1284-6570 Healthwise, Incorporated. Care instructions adapted under license by [facility] ([facility]). If you have questions about a medical condition or this instruction, always ask your healthcare professional. [address] any warranty or liability for your use of this information.                Electronically signed by NANETTE Miranda on 12/7/2021 at 10:37 AM

## 2021-12-08 ENCOUNTER — HOME CARE VISIT (OUTPATIENT)
Dept: HOME HEALTH SERVICES | Facility: CLINIC | Age: 51
End: 2021-12-08

## 2021-12-08 ENCOUNTER — TELEPHONE (OUTPATIENT)
Dept: INTERNAL MEDICINE | Facility: CLINIC | Age: 51
End: 2021-12-08

## 2021-12-08 VITALS
RESPIRATION RATE: 18 BRPM | HEART RATE: 77 BPM | SYSTOLIC BLOOD PRESSURE: 144 MMHG | OXYGEN SATURATION: 98 % | DIASTOLIC BLOOD PRESSURE: 88 MMHG | TEMPERATURE: 98.7 F

## 2021-12-08 PROCEDURE — G0300 HHS/HOSPICE OF LPN EA 15 MIN: HCPCS

## 2021-12-08 NOTE — CASE COMMUNICATION
· Ms.  called and stated that her legs and feet were hurting so bad that she hasn't been able to rest. The pt has had this before. The pt asked to see if she could get a toradol shot. I advised the pt that I would need an MD order, and most physicians will not prescribe pain medications on the weekend. I called and spoke to the pt's PCP, SHOLA Oliva, who was on call, and asked if the pt could have some toradol. Aleksandra stated  that she could not prescribe that on the weekend and the pt would need to go to the ER or urgent care to be evaluated. I called the pt back and notified her of what Aleksandra said. Pt v/u. The pt said she doesn't want to go anywhere.

## 2021-12-08 NOTE — TELEPHONE ENCOUNTER
PATIENT HAS BEEN CALLED, SHE STATED THAT SHE IS SCHEDULED ON 12/13/2021 AND IS ASKING IF THAT APPOINTMENT IS OK OR DOES SHE NEED TO BE SEEN SOONER.

## 2021-12-08 NOTE — HOME HEALTH
Made call to PCP for wound care orders, patient's wounds to jennifer posterior calves have opened up. Left msg with Gina, she states she will return my call.

## 2021-12-09 ENCOUNTER — TELEPHONE (OUTPATIENT)
Dept: INTERNAL MEDICINE | Facility: CLINIC | Age: 51
End: 2021-12-09

## 2021-12-09 LAB
ORGANISM: ABNORMAL
URINE CULTURE, ROUTINE: ABNORMAL
URINE CULTURE, ROUTINE: ABNORMAL

## 2021-12-09 NOTE — TELEPHONE ENCOUNTER
PATIENT STATED THAT SHE WENT TO [facility].  SHE STATED AND STATED THAT [facility] CALLED HER AND TOLD HER THAT MACROBID WAS AN INTERMEDIATE FOR A UTI.      SHE STATED THAT SHE IS WILLING TO TAKE ZOFRAN AND THE TAKE A STRONGER ANTIBIOTIC.    PLEASE ADVISE.  SHE STATED THAT SHE WAS ON CEFDINIR BEFORE AND TOOK THE ZOFRAN AND IT HELPED.    PLEASE ADVISE.

## 2021-12-09 NOTE — TELEPHONE ENCOUNTER
I don't see that she has an office visit on 12/13.  If she has new sores, I think it's good that she be seen as soon as she can. Thanks!

## 2021-12-10 ENCOUNTER — HOME CARE VISIT (OUTPATIENT)
Dept: HOME HEALTH SERVICES | Facility: CLINIC | Age: 51
End: 2021-12-10

## 2021-12-10 VITALS
RESPIRATION RATE: 16 BRPM | DIASTOLIC BLOOD PRESSURE: 68 MMHG | SYSTOLIC BLOOD PRESSURE: 132 MMHG | HEART RATE: 75 BPM | OXYGEN SATURATION: 98 % | TEMPERATURE: 99.6 F

## 2021-12-10 PROCEDURE — G0299 HHS/HOSPICE OF RN EA 15 MIN: HCPCS

## 2021-12-10 RX ORDER — INSULIN LISPRO 100 U/ML
10 INJECTION, SOLUTION SUBCUTANEOUS
Qty: 9 ML | Refills: 5 | Status: SHIPPED | OUTPATIENT
Start: 2021-12-10 | End: 2022-03-08

## 2021-12-10 RX ORDER — CEFDINIR 300 MG/1
300 CAPSULE ORAL 2 TIMES DAILY
Qty: 14 CAPSULE | Refills: 0 | Status: SHIPPED | OUTPATIENT
Start: 2021-12-10 | End: 2022-02-16

## 2021-12-10 RX ORDER — ONDANSETRON 4 MG/1
4 TABLET, ORALLY DISINTEGRATING ORAL EVERY 8 HOURS PRN
Qty: 40 TABLET | Refills: 0 | Status: ON HOLD | OUTPATIENT
Start: 2021-12-10 | End: 2022-06-06

## 2021-12-10 NOTE — TELEPHONE ENCOUNTER
Will you please let her know I reviewed the urine and sent in Omnicef and Zofran.  Please ask her to be sure to follow up if she is not improved with the antibiotics. Thank you.

## 2021-12-10 NOTE — HOME HEALTH
No recent falls and no insurance changes. The pt is being put on omnicef for her UTI. The pt was on macrobid. Pt/cg had no further questions or concerns regarding the pt's medications or plan of care. A&O X 4. Pt c/o severe pain in her left leg and foot. VSS. I educated the pt on her medications, fall and pressure injury prevention, edema, pain management, diabetes, diabetic foot care, and S&S of infection. I applied 2 layer compression wraps bilaterally on the pt's legs from toes to knees. The pt has a wound on the back of her left and right calf that are really tender to touch. The pt is going to see her PCP on 12/13, and her wounds will be evaluated and wound care will be determined.

## 2021-12-11 ENCOUNTER — HOME CARE VISIT (OUTPATIENT)
Dept: HOME HEALTH SERVICES | Facility: HOME HEALTHCARE | Age: 51
End: 2021-12-11

## 2021-12-13 ENCOUNTER — OFFICE VISIT (OUTPATIENT)
Dept: INTERNAL MEDICINE | Facility: CLINIC | Age: 51
End: 2021-12-13

## 2021-12-13 VITALS — DIASTOLIC BLOOD PRESSURE: 82 MMHG | OXYGEN SATURATION: 98 % | SYSTOLIC BLOOD PRESSURE: 162 MMHG | HEART RATE: 74 BPM

## 2021-12-13 DIAGNOSIS — I89.0 LYMPHEDEMA OF BOTH LOWER EXTREMITIES: Primary | ICD-10-CM

## 2021-12-13 DIAGNOSIS — T14.8XXA OPEN WOUND: ICD-10-CM

## 2021-12-13 PROCEDURE — 99213 OFFICE O/P EST LOW 20 MIN: CPT | Performed by: NURSE PRACTITIONER

## 2021-12-13 RX ORDER — KETOROLAC TROMETHAMINE 10 MG/1
1 TABLET, FILM COATED ORAL EVERY 6 HOURS PRN
COMMUNITY
Start: 2021-12-07 | End: 2022-02-16 | Stop reason: SDUPTHER

## 2021-12-13 RX ORDER — FLUCONAZOLE 150 MG/1
150 TABLET ORAL DAILY
Qty: 2 TABLET | Refills: 0 | Status: SHIPPED | OUTPATIENT
Start: 2021-12-13 | End: 2022-02-16 | Stop reason: SDUPTHER
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
## 2021-12-14 ENCOUNTER — TELEPHONE (OUTPATIENT)
Dept: INTERNAL MEDICINE | Facility: CLINIC | Age: 51
End: 2021-12-14

## 2021-12-14 NOTE — TELEPHONE ENCOUNTER
NURSE FROM [facility] HAS CALLED,  SHE STATED THAT SHE IS EXTREMELY COMFORTABLE DOING WOUND CARE ON PATIENT.    SHE SPOKE TO REGARDING THIS AND PATIENT IS COMFORTABLE WITH IT AS WELL.  SHE STATED THAT PATIENT IS IN PAIN AND WONDERED IF THERE IS SOMETHING THAT COULD BE ORDERED.  PLEASE CALL [phone]

## 2021-12-14 NOTE — TELEPHONE ENCOUNTER
Spoke with Arlene with [facility] [phone].  She is able to do wound care and monitor wounds, which they do routinely.  Gave verbal ok for silver she recommends for wounds on calves.  She says she will measure and follow wounds twice a week and give updates.  Discussed patient is already seeing pain management and we will not give any other pain medications. ANDRE

## 2021-12-15 ENCOUNTER — HOME CARE VISIT (OUTPATIENT)
Dept: HOME HEALTH SERVICES | Facility: HOME HEALTHCARE | Age: 51
End: 2021-12-15

## 2021-12-15 VITALS
DIASTOLIC BLOOD PRESSURE: 66 MMHG | SYSTOLIC BLOOD PRESSURE: 128 MMHG | TEMPERATURE: 98.6 F | RESPIRATION RATE: 18 BRPM | OXYGEN SATURATION: 97 % | HEART RATE: 75 BPM

## 2021-12-15 PROCEDURE — G0300 HHS/HOSPICE OF LPN EA 15 MIN: HCPCS

## 2021-12-17 ENCOUNTER — HOME CARE VISIT (OUTPATIENT)
Dept: HOME HEALTH SERVICES | Facility: HOME HEALTHCARE | Age: 51
End: 2021-12-17

## 2021-12-17 PROCEDURE — G0300 HHS/HOSPICE OF LPN EA 15 MIN: HCPCS

## 2021-12-18 ENCOUNTER — HOME CARE VISIT (OUTPATIENT)
Dept: HOME HEALTH SERVICES | Facility: CLINIC | Age: 51
End: 2021-12-18

## 2021-12-18 PROCEDURE — G0299 HHS/HOSPICE OF RN EA 15 MIN: HCPCS

## 2021-12-20 VITALS
HEART RATE: 80 BPM | SYSTOLIC BLOOD PRESSURE: 130 MMHG | OXYGEN SATURATION: 99 % | TEMPERATURE: 97.4 F | RESPIRATION RATE: 18 BRPM | DIASTOLIC BLOOD PRESSURE: 68 MMHG

## 2021-12-20 NOTE — HOME HEALTH
PRN visit made to pt's home to change unnaboot that had become soiled. Pt removed unnaboot prior to SN arrival. Wound cleansed with normal saline and covered with silvercel and optilock pad. SN wrapped pt's leg from toes to knee with unnaboot compression wrap. Pt tolerated well.

## 2021-12-21 ENCOUNTER — HOME CARE VISIT (OUTPATIENT)
Dept: HOME HEALTH SERVICES | Facility: CLINIC | Age: 51
End: 2021-12-21

## 2021-12-21 PROCEDURE — G0300 HHS/HOSPICE OF LPN EA 15 MIN: HCPCS

## 2021-12-22 ENCOUNTER — TELEPHONE (OUTPATIENT)
Dept: INTERNAL MEDICINE | Facility: CLINIC | Age: 51
End: 2021-12-22

## 2021-12-22 NOTE — TELEPHONE ENCOUNTER
Call from  from [facility]. She states the patients wounds look better but hurt worse. She thinks there might be an infection. She is wanting to know if you want to order cultures or just send in an antibiotic. Please advise.    Call back # [phone]

## 2021-12-22 NOTE — TELEPHONE ENCOUNTER
Called and spoke with pt. She states she has a  today and she is already scheduled for an appt on the  with Aleksandra she will just keep that appt.

## 2021-12-23 VITALS
DIASTOLIC BLOOD PRESSURE: 78 MMHG | HEART RATE: 88 BPM | OXYGEN SATURATION: 96 % | TEMPERATURE: 98.4 F | SYSTOLIC BLOOD PRESSURE: 132 MMHG | RESPIRATION RATE: 18 BRPM

## 2021-12-24 ENCOUNTER — HOME CARE VISIT (OUTPATIENT)
Dept: HOME HEALTH SERVICES | Facility: CLINIC | Age: 51
End: 2021-12-24

## 2021-12-24 VITALS
SYSTOLIC BLOOD PRESSURE: 144 MMHG | TEMPERATURE: 98.7 F | OXYGEN SATURATION: 98 % | DIASTOLIC BLOOD PRESSURE: 76 MMHG | HEART RATE: 92 BPM | RESPIRATION RATE: 16 BRPM

## 2021-12-24 PROCEDURE — G0299 HHS/HOSPICE OF RN EA 15 MIN: HCPCS

## 2021-12-24 NOTE — HOME HEALTH
No recent falls and no insurance changes. No recent medication changes. No need to contact the MD. Pt/cg had no further questions or concerns regarding the pt's medications or plan of care. A&O X 4. Pt c/o severe pain in her left leg and foot. VSS. I educated the pt on her medications, fall and pressure injury prevention, edema, pain management, diabetes, diabetic foot care, and S&S of infection via teachback. Pt v/u. I cleansed the pt's wound on the back of her rt and lt calf with wound cleanser. I applied silver alginate to the wound bed, applied optilock for drainage and then applied 2 layer compression wraps bilaterally on the pt's legs from toes to knees. No S&S of infection, but the pt is going to have her wound cultured at her next MD appt.

## 2021-12-28 ENCOUNTER — HOME CARE VISIT (OUTPATIENT)
Dept: HOME HEALTH SERVICES | Facility: CLINIC | Age: 51
End: 2021-12-28

## 2021-12-28 PROCEDURE — G0299 HHS/HOSPICE OF RN EA 15 MIN: HCPCS

## 2021-12-29 ENCOUNTER — HOME CARE VISIT (OUTPATIENT)
Dept: HOME HEALTH SERVICES | Facility: CLINIC | Age: 51
End: 2021-12-29

## 2021-12-29 VITALS
OXYGEN SATURATION: 98 % | SYSTOLIC BLOOD PRESSURE: 136 MMHG | HEART RATE: 74 BPM | RESPIRATION RATE: 18 BRPM | TEMPERATURE: 97.7 F | DIASTOLIC BLOOD PRESSURE: 80 MMHG

## 2021-12-29 PROCEDURE — G0300 HHS/HOSPICE OF LPN EA 15 MIN: HCPCS

## 2021-12-31 ENCOUNTER — HOME CARE VISIT (OUTPATIENT)
Dept: HOME HEALTH SERVICES | Facility: CLINIC | Age: 51
End: 2021-12-31

## 2022-01-04 ENCOUNTER — HOME CARE VISIT (OUTPATIENT)
Dept: HOME HEALTH SERVICES | Facility: CLINIC | Age: 52
End: 2022-01-04

## 2022-01-04 PROCEDURE — OUTSIDEPOS PR OUTSIDE POS PLACEHOLDER: Performed by: NURSE PRACTITIONER

## 2022-01-04 PROCEDURE — G0300 HHS/HOSPICE OF LPN EA 15 MIN: HCPCS

## 2022-01-05 ENCOUNTER — TELEPHONE (OUTPATIENT)
Dept: INTERNAL MEDICINE | Facility: CLINIC | Age: 52
End: 2022-01-05

## 2022-01-05 NOTE — TELEPHONE ENCOUNTER
PT CALLED REGARDING THE CULTURES ELO CESAR HAD REQUESTED OF HER LYMPHEDEMA, PT WANTS TO KNOW IF THIS IS SOMETHING THAT CAN BE ORDERED THROUGH HOME HEALTH CARE SO SHE DOES NOT HAVE TO COME IN TO THE OFFICE TO HAVE IT DONE    CALLBACK [phone]

## 2022-01-05 NOTE — TELEPHONE ENCOUNTER
Our recommendation is to go to wound care for treatment.  We understand that the decision is her choice, but I do not think we can appropriately treat her wounds and lymphedema without going to wound care.  Cultures from the skin are likely to be contaminated with normal skin bacteria and not likely to show us a causative organism.  She has missed numerous appointments with us including December 21, December 22, and December 27.  Please offer another office visit.

## 2022-01-05 NOTE — TELEPHONE ENCOUNTER
Please call and investigate. I did not order cultures of lymphedema. If her wounds are draining or not improving, we need to refer to wound care. Thanks.

## 2022-01-05 NOTE — TELEPHONE ENCOUNTER
SHE STATED THAT WHEN SHE WAS IN THE OFFICE LAST SHE STATED THAT SHE DIDN'T WANT TO GO TO WOUND CARE.    SHE STATED IT WAS PERSONAL.  SHE STATED THAT HER HOME HEALTH NURSE STATED THAT SHE THOUGHT THAT THE WOUNDS NEEDED TO BE CULTURED.  SHE IS NOT SURE WHO WAS TALKED TO.    SHE STATED THAT THE HOME HEALTH NURSE STATED TO HER THAT IF CULTURES NEEDED TO BE DONE THEN THEY COULD DO IT IF ORDERS WAS PUT IN.   HOME HEALTH NURSE WILL BE BACK ON Friday 1-7-22

## 2022-01-05 NOTE — TELEPHONE ENCOUNTER
PATIENT HAS BEEN CALLED, GIVEN MESSAGE AND STATED THAT SHE IS STILL SET AGAINST GOING TO WOUND CARE.  SHE STATED THAT SHE HAD MISSED A FEW APPOINTMENTS 1 OF WHICH SHE DID NOT KNOW SHE HAD.    PATIENT IS SCHEDULED ON 01/06/2022 WITH ELO JOLLEY.

## 2022-01-07 RX ORDER — FLUTICASONE PROPIONATE 50 MCG
1 SPRAY, SUSPENSION (ML) NASAL DAILY
Qty: 16 G | Refills: 0 | Status: SHIPPED | OUTPATIENT
Start: 2022-01-07 | End: 2022-02-21

## 2022-01-07 RX ORDER — POTASSIUM CHLORIDE 20 MEQ/1
20 TABLET, EXTENDED RELEASE ORAL AS NEEDED
Qty: 30 TABLET | Refills: 1 | Status: SHIPPED | OUTPATIENT
Start: 2022-01-07 | End: 2022-06-23

## 2022-01-08 ENCOUNTER — HOME CARE VISIT (OUTPATIENT)
Dept: HOME HEALTH SERVICES | Facility: HOME HEALTHCARE | Age: 52
End: 2022-01-08

## 2022-01-11 ENCOUNTER — HOME CARE VISIT (OUTPATIENT)
Dept: HOME HEALTH SERVICES | Facility: CLINIC | Age: 52
End: 2022-01-11

## 2022-01-11 ENCOUNTER — TELEPHONE (OUTPATIENT)
Dept: INTERNAL MEDICINE | Facility: CLINIC | Age: 52
End: 2022-01-11

## 2022-01-11 VITALS — HEART RATE: 68 BPM | OXYGEN SATURATION: 97 % | TEMPERATURE: 97.5 F

## 2022-01-11 PROCEDURE — G0300 HHS/HOSPICE OF LPN EA 15 MIN: HCPCS

## 2022-01-11 NOTE — TELEPHONE ENCOUNTER
PATIENT CALLED IN REQUESTING WE SEND AN ORDER TO HER HOME HEALTH PROVIDER FOR  THEM TO DO THE CULTURES ON THE WOUNDS ON HER LEGS     GOOD CALL BACK   [phone]

## 2022-01-11 NOTE — TELEPHONE ENCOUNTER
Will you please get her an appointment for a follow-up visit with Dr. Manuel? She missed her follow up, and if we are going to be treating and ordering, etc. We need to be able to evaluate these wounds. Thank you!

## 2022-01-13 ENCOUNTER — HOME CARE VISIT (OUTPATIENT)
Dept: HOME HEALTH SERVICES | Facility: CLINIC | Age: 52
End: 2022-01-13

## 2022-01-13 PROCEDURE — G0300 HHS/HOSPICE OF LPN EA 15 MIN: HCPCS

## 2022-01-17 ENCOUNTER — TELEPHONE (OUTPATIENT)
Dept: INTERNAL MEDICINE | Facility: CLINIC | Age: 52
End: 2022-01-17

## 2022-01-17 ENCOUNTER — LAB (OUTPATIENT)
Dept: LAB | Facility: HOSPITAL | Age: 52
End: 2022-01-17

## 2022-01-17 DIAGNOSIS — Z20.822 CLOSE EXPOSURE TO COVID-19 VIRUS: Primary | ICD-10-CM

## 2022-01-17 DIAGNOSIS — Z20.822 CLOSE EXPOSURE TO COVID-19 VIRUS: ICD-10-CM

## 2022-01-17 LAB — SARS-COV-2 ORF1AB RESP QL NAA+PROBE: NOT DETECTED

## 2022-01-17 PROCEDURE — C9803 HOPD COVID-19 SPEC COLLECT: HCPCS

## 2022-01-17 PROCEDURE — U0004 COV-19 TEST NON-CDC HGH THRU: HCPCS

## 2022-01-17 NOTE — TELEPHONE ENCOUNTER
I ordered the COVID-19 test.  Please call and get her scheduled.    I am showing that she is past due for a second COVID-19 vaccine.  Therefore, we need to cancel tomorrow's appointment.  She will need to quarantine for 10 days since her last exposure regardless of the COVID-19 test result as she is not considered to be fully vaccinated.

## 2022-01-17 NOTE — TELEPHONE ENCOUNTER
PATIENT HAS CALLED, SHE STATED THAT HER SON LIVES WITH HER AND HE HAS BEEN DIAGNOSED WITH COVID ON 01/14/2022 AND HIS GIRLFRIEND TESTED POSITIVE ON 01/15/2022 OF WHICH LIVES THERE AS WELL.  SHE DOES HAVE A HEADACHE.    PATIENT WOULD LIKE TO BE TESTED TODAY IF POSSIBLE.    PATIENT HAS APPOINTMENT ON 01/118/2022

## 2022-01-17 NOTE — TELEPHONE ENCOUNTER
PATIENT HAS BEEN CALLED, GIVEN MESSAGE AND UNDERSTANDING VOICED.   PATIENT WILL CALL BACK TO RESCHEDULE APPOINTMENT.

## 2022-01-18 ENCOUNTER — HOME CARE VISIT (OUTPATIENT)
Dept: HOME HEALTH SERVICES | Facility: CLINIC | Age: 52
End: 2022-01-18

## 2022-01-21 ENCOUNTER — HOME CARE VISIT (OUTPATIENT)
Dept: HOME HEALTH SERVICES | Facility: CLINIC | Age: 52
End: 2022-01-21

## 2022-01-25 ENCOUNTER — APPOINTMENT (OUTPATIENT)
Dept: GENERAL RADIOLOGY | Facility: HOSPITAL | Age: 52
End: 2022-01-25

## 2022-01-25 ENCOUNTER — NURSE TRIAGE (OUTPATIENT)
Dept: CALL CENTER | Facility: HOSPITAL | Age: 52
End: 2022-01-25

## 2022-01-25 ENCOUNTER — HOME CARE VISIT (OUTPATIENT)
Dept: HOME HEALTH SERVICES | Facility: CLINIC | Age: 52
End: 2022-01-25

## 2022-01-25 ENCOUNTER — TELEPHONE (OUTPATIENT)
Dept: INTERNAL MEDICINE | Facility: CLINIC | Age: 52
End: 2022-01-25

## 2022-01-25 ENCOUNTER — APPOINTMENT (OUTPATIENT)
Dept: ULTRASOUND IMAGING | Facility: HOSPITAL | Age: 52
End: 2022-01-25

## 2022-01-25 ENCOUNTER — HOSPITAL ENCOUNTER (EMERGENCY)
Facility: HOSPITAL | Age: 52
Discharge: LEFT BEFORE DISCHARGE | End: 2022-01-25
Admitting: EMERGENCY MEDICINE

## 2022-01-25 VITALS
HEART RATE: 84 BPM | WEIGHT: 293 LBS | BODY MASS INDEX: 55.32 KG/M2 | HEIGHT: 61 IN | OXYGEN SATURATION: 100 % | SYSTOLIC BLOOD PRESSURE: 153 MMHG | TEMPERATURE: 97.8 F | DIASTOLIC BLOOD PRESSURE: 89 MMHG | RESPIRATION RATE: 16 BRPM

## 2022-01-25 DIAGNOSIS — M79.605 PAIN IN BOTH LOWER EXTREMITIES: ICD-10-CM

## 2022-01-25 DIAGNOSIS — M79.672 BILATERAL FOOT PAIN: ICD-10-CM

## 2022-01-25 DIAGNOSIS — E11.65 UNCONTROLLED TYPE 2 DIABETES MELLITUS WITH HYPERGLYCEMIA: ICD-10-CM

## 2022-01-25 DIAGNOSIS — N39.0 URINARY TRACT INFECTION WITHOUT HEMATURIA, SITE UNSPECIFIED: ICD-10-CM

## 2022-01-25 DIAGNOSIS — Z53.29 LEFT AGAINST MEDICAL ADVICE: Primary | ICD-10-CM

## 2022-01-25 DIAGNOSIS — M79.671 BILATERAL FOOT PAIN: ICD-10-CM

## 2022-01-25 DIAGNOSIS — S81.802A WOUND OF LEFT LOWER EXTREMITY, INITIAL ENCOUNTER: ICD-10-CM

## 2022-01-25 DIAGNOSIS — M79.604 PAIN IN BOTH LOWER EXTREMITIES: ICD-10-CM

## 2022-01-25 DIAGNOSIS — M17.12 TRICOMPARTMENT OSTEOARTHRITIS OF LEFT KNEE: ICD-10-CM

## 2022-01-25 LAB
ACETONE BLD QL: NEGATIVE
ALBUMIN SERPL-MCNC: 4.7 G/DL (ref 3.5–5.2)
ALBUMIN/GLOB SERPL: 1 G/DL
ALP SERPL-CCNC: 91 U/L (ref 39–117)
ALT SERPL W P-5'-P-CCNC: 10 U/L (ref 1–33)
ANION GAP SERPL CALCULATED.3IONS-SCNC: 12 MMOL/L (ref 5–15)
AST SERPL-CCNC: 16 U/L (ref 1–32)
BACTERIA UR QL AUTO: ABNORMAL /HPF
BASOPHILS # BLD AUTO: 0.04 10*3/MM3 (ref 0–0.2)
BASOPHILS NFR BLD AUTO: 0.6 % (ref 0–1.5)
BILIRUB SERPL-MCNC: 0.4 MG/DL (ref 0–1.2)
BILIRUB UR QL STRIP: NEGATIVE
BUN SERPL-MCNC: 22 MG/DL (ref 6–20)
BUN/CREAT SERPL: 21.2 (ref 7–25)
CALCIUM SPEC-SCNC: 9.6 MG/DL (ref 8.6–10.5)
CHLORIDE SERPL-SCNC: 93 MMOL/L (ref 98–107)
CLARITY UR: CLEAR
CO2 SERPL-SCNC: 30 MMOL/L (ref 22–29)
COLOR UR: YELLOW
CREAT SERPL-MCNC: 1.04 MG/DL (ref 0.57–1)
D-LACTATE SERPL-SCNC: 2.4 MMOL/L (ref 0.5–2)
DEPRECATED RDW RBC AUTO: 41.7 FL (ref 37–54)
EOSINOPHIL # BLD AUTO: 0.16 10*3/MM3 (ref 0–0.4)
EOSINOPHIL NFR BLD AUTO: 2.3 % (ref 0.3–6.2)
ERYTHROCYTE [DISTWIDTH] IN BLOOD BY AUTOMATED COUNT: 14.2 % (ref 12.3–15.4)
GFR SERPL CREATININE-BSD FRML MDRD: 56 ML/MIN/1.73
GLOBULIN UR ELPH-MCNC: 4.5 GM/DL
GLUCOSE BLDC GLUCOMTR-MCNC: 372 MG/DL (ref 70–130)
GLUCOSE SERPL-MCNC: 433 MG/DL (ref 65–99)
GLUCOSE UR STRIP-MCNC: ABNORMAL MG/DL
HBA1C MFR BLD: 11.6 % (ref 4.8–5.6)
HCT VFR BLD AUTO: 41.6 % (ref 34–46.6)
HGB BLD-MCNC: 13 G/DL (ref 12–15.9)
HGB UR QL STRIP.AUTO: NEGATIVE
HYALINE CASTS UR QL AUTO: ABNORMAL /LPF
IMM GRANULOCYTES # BLD AUTO: 0.03 10*3/MM3 (ref 0–0.05)
IMM GRANULOCYTES NFR BLD AUTO: 0.4 % (ref 0–0.5)
KETONES UR QL STRIP: NEGATIVE
LEUKOCYTE ESTERASE UR QL STRIP.AUTO: NEGATIVE
LYMPHOCYTES # BLD AUTO: 1.78 10*3/MM3 (ref 0.7–3.1)
LYMPHOCYTES NFR BLD AUTO: 25 % (ref 19.6–45.3)
MAGNESIUM SERPL-MCNC: 2 MG/DL (ref 1.6–2.6)
MCH RBC QN AUTO: 25.5 PG (ref 26.6–33)
MCHC RBC AUTO-ENTMCNC: 31.3 G/DL (ref 31.5–35.7)
MCV RBC AUTO: 81.7 FL (ref 79–97)
MONOCYTES # BLD AUTO: 0.41 10*3/MM3 (ref 0.1–0.9)
MONOCYTES NFR BLD AUTO: 5.8 % (ref 5–12)
NEUTROPHILS NFR BLD AUTO: 4.69 10*3/MM3 (ref 1.7–7)
NEUTROPHILS NFR BLD AUTO: 65.9 % (ref 42.7–76)
NITRITE UR QL STRIP: POSITIVE
NRBC BLD AUTO-RTO: 0 /100 WBC (ref 0–0.2)
PH UR STRIP.AUTO: <=5 [PH] (ref 5–8)
PLATELET # BLD AUTO: 223 10*3/MM3 (ref 140–450)
PMV BLD AUTO: 10.9 FL (ref 6–12)
POTASSIUM SERPL-SCNC: 4.3 MMOL/L (ref 3.5–5.2)
PROCALCITONIN SERPL-MCNC: 0.1 NG/ML (ref 0–0.25)
PROT SERPL-MCNC: 9.2 G/DL (ref 6–8.5)
PROT UR QL STRIP: NEGATIVE
RBC # BLD AUTO: 5.09 10*6/MM3 (ref 3.77–5.28)
RBC # UR STRIP: ABNORMAL /HPF
REF LAB TEST METHOD: ABNORMAL
SODIUM SERPL-SCNC: 135 MMOL/L (ref 136–145)
SP GR UR STRIP: >=1.03 (ref 1–1.03)
SQUAMOUS #/AREA URNS HPF: ABNORMAL /HPF
UROBILINOGEN UR QL STRIP: ABNORMAL
WBC # UR STRIP: ABNORMAL /HPF
WBC CLUMPS # UR AUTO: ABNORMAL /HPF
WBC NRBC COR # BLD: 7.11 10*3/MM3 (ref 3.4–10.8)
YEAST URNS QL MICRO: ABNORMAL /HPF

## 2022-01-25 PROCEDURE — 63710000001 INSULIN REGULAR HUMAN PER 5 UNITS: Performed by: PHYSICIAN ASSISTANT

## 2022-01-25 PROCEDURE — 36415 COLL VENOUS BLD VENIPUNCTURE: CPT

## 2022-01-25 PROCEDURE — 80053 COMPREHEN METABOLIC PANEL: CPT | Performed by: PHYSICIAN ASSISTANT

## 2022-01-25 PROCEDURE — 25010000002 CEFTRIAXONE PER 250 MG: Performed by: PHYSICIAN ASSISTANT

## 2022-01-25 PROCEDURE — 87077 CULTURE AEROBIC IDENTIFY: CPT | Performed by: PHYSICIAN ASSISTANT

## 2022-01-25 PROCEDURE — 83735 ASSAY OF MAGNESIUM: CPT | Performed by: PHYSICIAN ASSISTANT

## 2022-01-25 PROCEDURE — 84145 PROCALCITONIN (PCT): CPT | Performed by: PHYSICIAN ASSISTANT

## 2022-01-25 PROCEDURE — 87070 CULTURE OTHR SPECIMN AEROBIC: CPT | Performed by: EMERGENCY MEDICINE

## 2022-01-25 PROCEDURE — 87205 SMEAR GRAM STAIN: CPT | Performed by: EMERGENCY MEDICINE

## 2022-01-25 PROCEDURE — 81001 URINALYSIS AUTO W/SCOPE: CPT | Performed by: PHYSICIAN ASSISTANT

## 2022-01-25 PROCEDURE — 87147 CULTURE TYPE IMMUNOLOGIC: CPT | Performed by: EMERGENCY MEDICINE

## 2022-01-25 PROCEDURE — 96372 THER/PROPH/DIAG INJ SC/IM: CPT

## 2022-01-25 PROCEDURE — 96365 THER/PROPH/DIAG IV INF INIT: CPT

## 2022-01-25 PROCEDURE — 82962 GLUCOSE BLOOD TEST: CPT

## 2022-01-25 PROCEDURE — 87186 SC STD MICRODIL/AGAR DIL: CPT | Performed by: PHYSICIAN ASSISTANT

## 2022-01-25 PROCEDURE — 83036 HEMOGLOBIN GLYCOSYLATED A1C: CPT | Performed by: PHYSICIAN ASSISTANT

## 2022-01-25 PROCEDURE — 87186 SC STD MICRODIL/AGAR DIL: CPT | Performed by: EMERGENCY MEDICINE

## 2022-01-25 PROCEDURE — 87086 URINE CULTURE/COLONY COUNT: CPT | Performed by: PHYSICIAN ASSISTANT

## 2022-01-25 PROCEDURE — 87040 BLOOD CULTURE FOR BACTERIA: CPT | Performed by: PHYSICIAN ASSISTANT

## 2022-01-25 PROCEDURE — 83605 ASSAY OF LACTIC ACID: CPT | Performed by: PHYSICIAN ASSISTANT

## 2022-01-25 PROCEDURE — 99283 EMERGENCY DEPT VISIT LOW MDM: CPT

## 2022-01-25 PROCEDURE — 85025 COMPLETE CBC W/AUTO DIFF WBC: CPT | Performed by: PHYSICIAN ASSISTANT

## 2022-01-25 PROCEDURE — 73620 X-RAY EXAM OF FOOT: CPT

## 2022-01-25 PROCEDURE — 73562 X-RAY EXAM OF KNEE 3: CPT

## 2022-01-25 PROCEDURE — 82009 KETONE BODYS QUAL: CPT | Performed by: PHYSICIAN ASSISTANT

## 2022-01-25 RX ORDER — CEFDINIR 300 MG/1
300 CAPSULE ORAL 2 TIMES DAILY
Qty: 14 CAPSULE | Refills: 0 | Status: SHIPPED | OUTPATIENT
Start: 2022-01-25 | End: 2022-02-01

## 2022-01-25 RX ORDER — GABAPENTIN 300 MG/1
300 CAPSULE ORAL ONCE
Status: DISCONTINUED | OUTPATIENT
Start: 2022-01-25 | End: 2022-01-25 | Stop reason: HOSPADM

## 2022-01-25 RX ORDER — SODIUM CHLORIDE 0.9 % (FLUSH) 0.9 %
10 SYRINGE (ML) INJECTION AS NEEDED
Status: DISCONTINUED | OUTPATIENT
Start: 2022-01-25 | End: 2022-01-25 | Stop reason: HOSPADM

## 2022-01-25 RX ADMIN — INSULIN HUMAN 10 UNITS: 100 INJECTION, SOLUTION PARENTERAL at 17:57

## 2022-01-25 NOTE — ED PROVIDER NOTES
"Subjective   History of Present Illness    Patient is a 51-year-old female presenting to ED with foot pain.  PMH significant for insulin-dependent diabetes, hypertension, chronic migraines, chronic pain syndrome, arthritis, history of kidney stones.  Patient states that she was recently seen and evaluated for urinary tract infection and had to return for positive blood cultures.  Patient reports that since that time she is \"just still felt like my urine never went away\" with description of urinary frequency, urinary urgency, mild nausea, and subjective low-grade fevers.  Patient reports that she is also been being treated at home for bilateral lower extremity wounds to her lymphedematous areas.  Patient reports that over the past week she has had intermittent pains in her bilateral feet which started as a numbness, \"more intense then my normal neuropathy\" type pain and shoot up her legs.  Patient describes the pain as though she is having nails driving into her feet and then \"muscles aneudy around this.\"  Patient describes that the pain is increasing more to the point that when she is walking at baseline with her cane she feels as though she will fall over.  Patient reports she began having similar intermittent numbness in her hands for which she has been \"discussing this with my home health nurse and pretty sure I have an electrolyte deficiency.\"  Patient reports she currently takes 300 mg of gabapentin three times a day as well as a boost of 100 mg in the afternoon for her normal neuropathy and she is still having pain despite this.  Patient denies any falls with injuries however she feels that she is going to start becoming dizzy at which time she presents for further evaluation.  Patient denies any known injuries to the lower extremities however she reports that her known \"bone to bone on my left knee which I cannot have surgery for\" has significantly worsened and she feels that the left knee is becoming " "more unstable when she attempts to ambulate.  Patient is also concerned because when she went to change her right lower extremity wound dressing at home last night she noticed a \"funky smelling discharge.\"  Patient reports the subjective low-grade fevers but denies any diaphoresis, chills.  Patient denies any medication use for her symptoms.    Records reviewed show patient was seen in the ED on 10/25/2021 for illness and return the following day on 10/26/2021 at which time she was admitted until 10/28/2021 for bacteremia, sepsis, nontraumatic rhabdomyolysis.  Due to patient's extensive allergies Zosyn was identified as the antibiotic of choice for patient's treatment which she was able to tolerate with concurrent use of Solu-Medrol and Benadryl.    Patient has since been following up with home health and wound care.    Review of Systems   Constitutional: Positive for fever (subjective, low grade). Negative for chills and diaphoresis.   Respiratory: Negative.  Negative for shortness of breath.    Cardiovascular: Negative.  Negative for chest pain.   Gastrointestinal: Positive for nausea. Negative for abdominal pain and vomiting.   Genitourinary: Positive for frequency and urgency. Negative for dysuria, flank pain and hematuria.   Musculoskeletal: Positive for arthralgias (bilateral feet, left knee) and gait problem (due to numbness in feet and left knee pain).   Skin: Positive for wound (bilateral LE).   Neurological: Positive for numbness (Bilateral feet, bilateral hands).   Psychiatric/Behavioral: Negative.    All other systems reviewed and are negative.      Past Medical History:   Diagnosis Date   • Anxiety    • Arthritis     Osteoarthritis primarily left knee    • Back pain    • Chronic pain syndrome 10/27/2021   • Depression    • Diabetes mellitus (HCC)    • Fibromyalgia, primary    • Hx of tear of meniscus of knee joint 11/21/2016   • Hypertension    • Joint pain    • Kidney stone    • Knee pain    • " Lymphedema of both lower extremities 10/27/2021   • Migraine    • Migraine headache    • Pain     knee, left ankle, left ankle     • Pes anserinus bursitis 04/28/2015   • Restless leg        Allergies   Allergen Reactions   • Compazine [Prochlorperazine Edisylate] Shortness Of Breath     Breathing    • Meperidine Hives     (Demerol)    • Norflex [Orphenadrine] Hives   • Nortriptyline Hives   • Prochlorperazine Shortness Of Breath   • Prochlorperazine Maleate Shortness Of Breath   • Codeine Nausea And Vomiting     Rash    • Penicillins Rash     Nausea & vomiting    • Calamine Itching   • Amoxicillin-Pot Clavulanate Rash, Hives and Itching   • Avelox [Moxifloxacin Hcl] Rash   • Cefazolin Nausea And Vomiting   • Cephalexin Rash     (Keflex)    • Ciprofloxacin Rash   • Clindamycin/Lincomycin Rash   • Doxycycline Nausea And Vomiting   • Hydroxyzine Hcl Itching   • Moxifloxacin Nausea And Vomiting   • Orphenadrine Citrate Itching   • Sulfamethoxazole-Trimethoprim Nausea And Vomiting and Rash     States she also gets yeast infections with it   • Tobramycin Other (See Comments)     Eyes burn-feel like eyes are on fire     • Vistaril [Hydroxyzine] Rash       Past Surgical History:   Procedure Laterality Date   • CATARACT EXTRACTION     • CATARACT EXTRACTION     • CATARACT EXTRACTION     • CORNEAL TRANSPLANT      bilateral    • ECTOPIC PREGNANCY     • JOINT REPLACEMENT     • KNEE ARTHROSCOPY W/ MENISCAL REPAIR     • KNEE MENISCAL REPAIR     • MENISCECTOMY Left 12/20/2016   • TUBAL ABDOMINAL LIGATION         Family History   Problem Relation Age of Onset   • No Known Problems Mother    • Cancer Father    • Dementia Father    • Hypertension Maternal Grandmother    • Cancer Maternal Grandfather        Social History     Socioeconomic History   • Marital status: Single   Tobacco Use   • Smoking status: Never Smoker   • Smokeless tobacco: Never Used   Vaping Use   • Vaping Use: Never used   Substance and Sexual Activity   •  Alcohol use: Yes   • Drug use: No   • Sexual activity: Not Currently     Partners: Male           Objective   Physical Exam  Vitals and nursing note reviewed.   Constitutional:       General: She is not in acute distress.     Appearance: Normal appearance. She is well-developed. She is morbidly obese. She is not ill-appearing, toxic-appearing or diaphoretic.   HENT:      Head: Normocephalic.      Mouth/Throat:      Mouth: Mucous membranes are moist.      Pharynx: Oropharynx is clear.   Eyes:      Conjunctiva/sclera: Conjunctivae normal.      Pupils: Pupils are equal, round, and reactive to light.   Cardiovascular:      Rate and Rhythm: Normal rate and regular rhythm.   Pulmonary:      Effort: Pulmonary effort is normal.      Breath sounds: Normal breath sounds.   Abdominal:      General: Bowel sounds are normal.      Palpations: Abdomen is soft.   Musculoskeletal:      Cervical back: Normal range of motion and neck supple.      Right lower leg: Edema present.      Left lower leg: Edema present.      Comments: Lymphedema noted to bilateral lower extremities with increased tenderness to palpitation over the left anterior knee.  Limited range of motion of all joints of the lower extremity secondary to lymphedema as well as chronic swelling.  Diffuse tenderness to palpitation of bilateral feet and ankles with no overlying wounds or signs of injury.    Normal inspection of bilateral upper extremities   Skin:            Comments: Wounds noted to bilateral lower extremities which are on the same lateral posterior location of both calves approximately half an inch in diameter.  Wound bed is able to be visualized with yellow discharge noted.  Wounds with gauze overlying which are saturated with discharge with malodorous.  Diffuse erythema noted to bilateral lower extremities which is distal to the knees.  Weeping and blistering noted on the lower lateral aspects.  Diffuse tenderness to palpitation of the lower legs.  "  Neurological:      Mental Status: She is alert and oriented to person, place, and time.      Gait: Gait (uses cane which is baseline for patient) normal.   Psychiatric:         Attention and Perception: Attention normal.         Mood and Affect: Mood and affect normal.         Speech: Speech normal.         Behavior: Behavior normal. Behavior is cooperative.         Procedures           ED Course                                                 MDM  Number of Diagnoses or Management Options     Amount and/or Complexity of Data Reviewed  Clinical lab tests: reviewed and ordered  Tests in the radiology section of CPT®: reviewed and ordered  Tests in the medicine section of CPT®: ordered and reviewed  Decide to obtain previous medical records or to obtain history from someone other than the patient: yes  Review and summarize past medical records: yes  Discuss the patient with other providers: yes (Dr. Jaleel Tinoco (attending))    Patient Progress  Patient progress: stable      Patient is a 51-year-old female presenting to ED with foot pain.  PMH significant for insulin-dependent diabetes, hypertension, chronic migraines, chronic pain syndrome, arthritis, history of kidney stones.  Patient initially declined undressing her wound wrappings reporting \"it is hard to get the materials.\"  Discussed with patient at length importance of evaluating the wound to ascertain if it is related to her symptoms or the source of her pain.  Patient was eventually willing and amenable and wounds were able to be assessed.  Wound cultures were obtained from the bilateral legs.    CBC with no acute findings including normal blood cell count 7.11.  CMP with hyperglycemia 433, CO2 30, no further changes from patient's baseline.  Magnesium WNL.  Procalcitonin WNL at 0.1.  Lactic acid elevated at 2.4. Hemoglobin A1c 11.6 which is slightly increased from 3 months ago at 11.4.  Acetone level negative.  Urinalysis with positive nitrates, 3+ " "bacteria, 21-30 white blood cells, 0-2 squamous epithelium, no RBC, negative leukocytes.  Greater than 1000 glucose.    Left knee x-ray showed: Tricompartmental osteoarthritis most severe at the medial and patellofemoral compartments, bone-on-bone appearance of the medial compartment.  Bilateral foot x-ray showed: No acute fracture, healed fracture deformities of the distal right tibia and fibula noted and appear to be narrowing of the right subtalar joint which is exaggerated by positioning.  Inferior calcaneal enthesophytes, mild flattening of plantar arches greater on the left, extensive soft tissue prominence of swelling versus commendation of swelling and obesity.    Patient remained in no acute distress in room until this provider would enter and would intermittently began stating that her pain was suddenly increasing in waves.  Patient was offered her nighttime dose of gabapentin to assist with neuropathic pain however she declined stating \"I do not need medicines that I can take at home.\"  Patient was given a dose of IV Rocephin and advised that she will need to go home on continued antibiotics for treatment of her urinary tract infection.  Vital signs initially hypertensive at 186/86 which improved to 153/89 once patient was resting.  Patient remained afebrile with no tachycardia, oxygenating well on room air.  Patient was given a dose of insulin after which her blood sugar her blood sugar slightly improved to 372.    Patient continued to complain of cramping, pain, swelling in her calves for which venous Doppler ultrasound studies were ordered.  Upon arrival to transport patient to ultrasound significant other had rewrapped her compression dressings over her legs.  Nurse did not have the chance to change the gauze overlying her wounds.  Discussed with patient that we need to remove the dressing once again to put on clean gauze as it was saturated with concerning discharge.  Patient adamantly denied taking " "them off again.  Discussed with patient that it is also important that we complete her evaluation with ultrasound to rule out any life-threatening causes or conditions such as blood clots in her lower extremities.  Patient continues to decline stating \"they have done that before and all it does is hurt me.  Discussed with patient ability to give her a dose of pain medication however patient states \"they are going to have to push so hard is not even go to work anyways.  Discussed risk, benefits, and alternatives.  Patient is alert and oriented x3 and of sound decision-making capabilities.  Patient continues to decline this evaluation.    Discussed with patient at length that her symptoms may be a combination of worsening of her chronic illness including worsening of diabetic neuropathy, the weight pressing onto her joints, as well as poorly controlled diabetes.  Discussed that it is important she continue to follow-up closely outpatient with her primary care provider for discussion of podiatry referral, continue to follow-up with the orthopedist for her knee, and discuss adequate diabetic control.  Once again offered patient venous Doppler ultrasound and she continues to deny stating that at this time she would like to go home.  Patient states \"I am not risk to take any medications here which I have at home because that is a waste of the insurance money paying for it.\"  Offered patient oral pain medication and she declines at this time.  Patient was advised that she can return anytime to continue her evaluation and importance of following up closely with her primary care provider not only to complete the ultrasound but for close outpatient follow-up.  Discussed return precautions need for immediate return to ED should she develop any new or worsening symptoms.  Patient with no further questions, concerns, needs at this time, is ambulating at baseline with her cane, and is stable for discharge.  Case was discussed " with Dr. Jaleel Tinoco who was made aware of patient decision to leave [place] with no further recommendations at this time.    Final diagnoses:   Bilateral foot pain   Pain in both lower extremities   Uncontrolled type 2 diabetes mellitus with hyperglycemia (HCC)   Urinary tract infection without hematuria, site unspecified   Wound of left lower extremity, initial encounter   Left against medical advice   Tricompartment osteoarthritis of left knee       ED Disposition  ED Disposition     ED Disposition Condition Comment    Viri Ramírez, APRN  [address]  [place]  [facility] [id number] [phone]    Schedule an appointment as soon as possible for a visit in 2 days      [facility] Emergency Department  [address] [facility] [address] [phone]    As needed         Medication List      Changed    * cefdinir 300 MG capsule  Commonly known as: OMNICEF  Take 1 capsule by mouth 2 (Two) Times a Day.  What changed: Another medication with the same name was added. Make sure you understand how and when to take each.     * cefdinir 300 MG capsule  Commonly known as: OMNICEF  Take 1 capsule by mouth 2 (Two) Times a Day for 7 days.  What changed: You were already taking a medication with the same name, and this prescription was added. Make sure you understand how and when to take each.         * This list has 2 medication(s) that are the same as other medications prescribed for you. Read the directions carefully, and ask your doctor or other care provider to review them with you.               Where to Get Your Medications      These medications were sent to [facility] #[id number] - [place], [place] - [address] AT [facility] [address] - [phone]  - [phone] FX  [address], [facility] [address]    Phone: [phone]   · cefdinir 300 MG capsule          Augusto Calvin PA-C  01/25/22 2034

## 2022-01-25 NOTE — TELEPHONE ENCOUNTER
"Caller concerned about pain in her feet. Has Chronic lymphedema and diabetic neuropathy. This week she has been having contraction like pain that feels like nails driving in her foot.       She also states this morning her left leg hurts and she \"couldn't even stand up\" she states she had to have her boyfriend help her. She states she has fallen \" a couple times\" in the last couple of weeks. She is wanting to know if she should come in for an appt and have a xray to see if she may have a fracture or something please advise.   "

## 2022-01-25 NOTE — TELEPHONE ENCOUNTER
"Reviewed guideline with caller, advises she be evaluated within 4 hours. Warm transfer to Mariah at [facility] [place].     Reason for Disposition  • [1] SEVERE pain (e.g., excruciating, unable to do any normal activities) AND [2] not improved after 2 hours of pain medicine    Additional Information  • Negative: Followed a foot injury  • Negative: Diabetes mellitus  • Negative: Ankle pain is main symptom  • Negative: Thigh or calf pain is main symptom  • Negative: Entire foot is cool or blue in comparison to other foot  • Negative: Purple or black skin on foot or toe  • Negative: [1] Red area or streak AND [2] fever  • Negative: [1] Swollen foot AND [2] fever  • Negative: Patient sounds very sick or weak to the triager    Answer Assessment - Initial Assessment Questions  1. ONSET: \"When did the pain start?\"       A week ago   2. LOCATION: \"Where is the pain located?\"       Both feet, worse in left foot  3. PAIN: \"How bad is the pain?\"    (Scale 1-10; or mild, moderate, severe)   - MILD (1-3): doesn't interfere with normal activities.    - MODERATE (4-7): interferes with normal activities (e.g., work or school) or awakens from sleep, limping.    - SEVERE (8-10): excruciating pain, unable to do any normal activities, unable to walk.       10/10 at times  4. WORK OR EXERCISE: \"Has there been any recent work or exercise that involved this part of the body?\"       no  5. CAUSE: \"What do you think is causing the foot pain?\"      unknown  6. OTHER SYMPTOMS: \"Do you have any other symptoms?\" (e.g., leg pain, rash, fever, numbness)      Leg pain,   7. PREGNANCY: \"Is there any chance you are pregnant?\" \"When was your last menstrual period?\"      no    Protocols used: FOOT PAIN-ADULT-AH      "

## 2022-01-25 NOTE — TELEPHONE ENCOUNTER
Called pt to schedule and she states she has decided the pain is terrible and she is planning to go on to the ER.

## 2022-01-26 NOTE — DISCHARGE INSTRUCTIONS
As we discussed today your hemoglobin A1c is 11.6 which is concerning because it is continuing to increase.  Your imaging shows your chronic findings of tricompartmental osteoarthritis of the left knee, as well as healing of your previous injuries in your feet.  You have evidence of a urinary tract infection for which she will be given the cefdinir (Omnicef) antibiotic. Please increase your hydration and complete this antibiotic in its entirety.  As we discussed it is important that you follow-up with your primary care provider for further monitoring of your urinary tract infection.  Please also follow-up with them for discussion of controlling your diabetes as well as concerns for your worsening diabetic neuropathy.  It is important you continue to follow-up with wound care for management of your calf wounds. The cultures obtained today will be sent and you will be contacted with the need for further treatment.  Please continue to follow-up with the orthopedic [facility] for management of your knee pain as well as continued planning for surgical intervention.  Please discuss with your family care provider finding a podiatrist for further evaluation of the arches of your feet.    Today we were unable to complete your evaluation without the ultrasound. Please feel free to return at any time to continue your evaluation. Otherwise please follow-up with your primary care provider to assure completion of the evaluation to rule out blood clots.    Should you develop any new or worsening symptoms please return to the ER for further evaluation.

## 2022-01-27 LAB — BACTERIA SPEC AEROBE CULT: ABNORMAL

## 2022-01-28 ENCOUNTER — HOME CARE VISIT (OUTPATIENT)
Dept: HOME HEALTH SERVICES | Facility: CLINIC | Age: 52
End: 2022-01-28

## 2022-01-28 VITALS
SYSTOLIC BLOOD PRESSURE: 164 MMHG | HEART RATE: 87 BPM | RESPIRATION RATE: 16 BRPM | DIASTOLIC BLOOD PRESSURE: 88 MMHG | OXYGEN SATURATION: 98 % | TEMPERATURE: 98.9 F

## 2022-01-28 PROCEDURE — G0299 HHS/HOSPICE OF RN EA 15 MIN: HCPCS

## 2022-01-29 NOTE — HOME HEALTH
No insurance changes. No recent falls. The pt is on an abx for a UTI. No need to contact the MD. Pt/cg had no further questions or concerns regarding the pt's medications or plan of care. A&O X 4. Pt c/o severe pain in her BLE. VSS.  The pt cleansed the her wound on the back of her rt and lt calf with soap and water, rinsed, and pat dry. I applied silver alginate to the wound bed, applied optilock for drainage and then applied 2 layer compression wraps bilaterally on the pt's legs from toes to knees. No S&S of infection.

## 2022-01-30 LAB
BACTERIA SPEC AEROBE CULT: NORMAL
BACTERIA SPEC AEROBE CULT: NORMAL

## 2022-01-31 ENCOUNTER — TELEPHONE (OUTPATIENT)
Dept: EMERGENCY DEPT | Facility: HOSPITAL | Age: 52
End: 2022-01-31

## 2022-01-31 ENCOUNTER — APPOINTMENT (OUTPATIENT)
Dept: MAMMOGRAPHY | Facility: HOSPITAL | Age: 52
End: 2022-01-31

## 2022-01-31 LAB
BACTERIA SPEC AEROBE CULT: ABNORMAL
GRAM STN SPEC: ABNORMAL

## 2022-01-31 NOTE — TELEPHONE ENCOUNTER
Called in new rx for:    BACTRIM DS PO BID for 10 days. Dispense #20 (twenty). No Refills.     FLUCONAZOLE 200 mg PO q72h for three doses, dispense #3 (three). No refills.

## 2022-01-31 NOTE — TELEPHONE ENCOUNTER
Called to review wound culture with patient.  Discussed that at this time the most propria antibiotic would be Bactrim with the use of an antiyeast medication.  Initially patient very defensive and does not wish to proceed requesting medication such as IM Rocephin to be ordered through her home health care provider.  Discussed that based off the cultures this antibiotic would not be sufficient.  After further discussion of the side effect profiles of other antibiotic such as the rifampin and vancomycin patient is amenable to trialing Bactrim.  Discussed need for continued wound care follow-up for recommendations on compression dressings for her lymphedema versus dressings for her wound.  Patient reports that she does not wish to follow through with them as she has had previous poor experiences however continue to advise importance of wound care follow-up.  Discussed return precautions.  Patient with no further questions, concerns, or needs.

## 2022-02-01 ENCOUNTER — HOME CARE VISIT (OUTPATIENT)
Dept: HOME HEALTH SERVICES | Facility: HOME HEALTHCARE | Age: 52
End: 2022-02-01

## 2022-02-02 ENCOUNTER — HOME CARE VISIT (OUTPATIENT)
Dept: HOME HEALTH SERVICES | Facility: HOME HEALTHCARE | Age: 52
End: 2022-02-02

## 2022-02-03 NOTE — CASE COMMUNICATION
In preparation for the upcoming severe weather, I spoke to the pt and she has adequate food, water, shelter, wound care supplies, and medications to last at least a week. There are no other circumstances that may be an issue.

## 2022-02-04 ENCOUNTER — HOME CARE VISIT (OUTPATIENT)
Dept: HOME HEALTH SERVICES | Facility: HOME HEALTHCARE | Age: 52
End: 2022-02-04

## 2022-02-08 ENCOUNTER — HOME CARE VISIT (OUTPATIENT)
Dept: HOME HEALTH SERVICES | Facility: CLINIC | Age: 52
End: 2022-02-08

## 2022-02-08 PROCEDURE — G0300 HHS/HOSPICE OF LPN EA 15 MIN: HCPCS

## 2022-02-08 NOTE — HOME HEALTH
left high 88.5 cm    left calf 74 cm  right thigh 77cm    right calf 71cm    right calf wound 2.5 x 2  left calf wound 2.5 x 2.5

## 2022-02-11 ENCOUNTER — HOME CARE VISIT (OUTPATIENT)
Dept: HOME HEALTH SERVICES | Facility: HOME HEALTHCARE | Age: 52
End: 2022-02-11

## 2022-02-11 NOTE — CASE COMMUNICATION
Patient notified this nurse at apprx. 12:42 on 2/11/2022 that she is not feeling well, she thinks she may have a stomach bug and wants to cancel her appt for today. CG is knowledgeable on how to completed wound care and wrap legs with ace bandages. This visit will be missed.

## 2022-02-15 ENCOUNTER — HOME CARE VISIT (OUTPATIENT)
Dept: HOME HEALTH SERVICES | Facility: CLINIC | Age: 52
End: 2022-02-15

## 2022-02-15 ENCOUNTER — TELEPHONE (OUTPATIENT)
Dept: INTERNAL MEDICINE | Facility: CLINIC | Age: 52
End: 2022-02-15

## 2022-02-15 PROCEDURE — G0300 HHS/HOSPICE OF LPN EA 15 MIN: HCPCS

## 2022-02-15 RX ORDER — CLOTRIMAZOLE 1 %
1 CREAM (GRAM) TOPICAL 2 TIMES DAILY
Qty: 45 G | Refills: 0 | Status: SHIPPED | OUTPATIENT
Start: 2022-02-15 | End: 2022-02-16 | Stop reason: SDUPTHER

## 2022-02-15 NOTE — TELEPHONE ENCOUNTER
Please let her know I sent in some Lotrimin cream.  If not improved, will need office visit. Thanks!

## 2022-02-15 NOTE — CASE COMMUNICATION
Update: It has been a long time since I have been able to see Ms. Wilson for 2 visits in a row. Last week her wound measurements were right calf: 2.5cm x 2 cm x 0.5cm, left calf: 2.5 cm x 2.5 cm x 0.5cm. Both wounds have serosanguenes moderate drainage, wound beds are 50% red and 50% yellow. Todays measurements are right calf:2 cm x 1.6 cm x 0.5 left calf: 2.4 cm x 2.5 cm x 0.5 cm. Patient has several stressors going on in her life, her  c/o of pain is disheartening most visits, she has had bone spurs on the bottom of both feet causing it to be extremely painful to walk, she has had a stomach bug too in addition to the ongoing pain of lymphedema. Today her pain is a 5/10 in her legs, her last pain medication was taken at apprx 9 am. We continue to change the 2 layer compression wraps twice a week, using silver impregnated alginate. She might benefit from 3 times a week , although her BF does wrap her legs with ace banages after she takes her showers. Patient is trying to afford a walking shower since it is taxing to get a shower with the present setup. If you have any new orders, feel free to fax them to [phone]. If you need to speak with me my work phone number is [phone].

## 2022-02-15 NOTE — HOME HEALTH
FOCUS OF CARE/SKILLED NEED: wound care, edema/lymphedema, stress r/t diabetes    TEACHING/INTERVENTIONS: Instructed pateint on keeping wound clean and dry, preferable wrapped at all times, well balancved diet with adeqaute water intake, lymphedema care, elevating affected extramities above the heart at least 30 min or more several times a day, managing stress and diabetes.    PROGRESS TOWARD GOALS: Patient's wounds are getting smaller, update sent to PCP, discussed edema/lymphedema manageent, well balalcned diet with adequate water intake.    PHYSICIAN CONTACT: yes    FALLS SINCE LAST VISIT?  no    MEDICATION CHANGES SINCE LAST VISIT?  no    PLAN FOR NEXT VISIT: wound care, monitor edema, managing stress and diabetes.

## 2022-02-15 NOTE — TELEPHONE ENCOUNTER
Caller: Valeria Wilson    Relationship: Self    Best call back number: [phone]    What medication are you requesting: CREAM    What are your current symptoms: YEAST BEHIND FOLD OF LEGS    How long have you been experiencing symptoms: TWO WEEKS    Have you had these symptoms before:    [x] Yes  [] No    Have you been treated for these symptoms before:   [x] Yes  [] No    If a prescription is needed, what is your preferred pharmacy and phone number:   [id number] - [address] AT [facility] [address] - [phone]  - [phone] FX  [phone]    Additional notes:  PATIENT STATES SHE HAS BEEN USING DESITIN AND IT IS NOT HELPING. PLEASE ADVISE

## 2022-02-16 ENCOUNTER — OFFICE VISIT (OUTPATIENT)
Dept: FAMILY MEDICINE CLINIC | Facility: CLINIC | Age: 52
End: 2022-02-16

## 2022-02-16 VITALS
TEMPERATURE: 96.8 F | HEART RATE: 93 BPM | BODY MASS INDEX: 55.32 KG/M2 | DIASTOLIC BLOOD PRESSURE: 90 MMHG | RESPIRATION RATE: 20 BRPM | HEIGHT: 61 IN | WEIGHT: 293 LBS | SYSTOLIC BLOOD PRESSURE: 163 MMHG

## 2022-02-16 DIAGNOSIS — G89.29 OTHER CHRONIC PAIN: ICD-10-CM

## 2022-02-16 DIAGNOSIS — L30.4 INTERTRIGO: Primary | ICD-10-CM

## 2022-02-16 PROCEDURE — 99213 OFFICE O/P EST LOW 20 MIN: CPT | Performed by: NURSE PRACTITIONER

## 2022-02-16 RX ORDER — FLUCONAZOLE 150 MG/1
150 TABLET ORAL DAILY
Qty: 2 TABLET | Refills: 1 | Status: SHIPPED | OUTPATIENT
Start: 2022-02-16 | End: 2022-05-31

## 2022-02-16 RX ORDER — KETOROLAC TROMETHAMINE 10 MG/1
10 TABLET, FILM COATED ORAL EVERY 6 HOURS PRN
Qty: 20 TABLET | Refills: 0 | Status: SHIPPED | OUTPATIENT
Start: 2022-02-16 | End: 2022-02-21

## 2022-02-16 RX ORDER — PROCHLORPERAZINE 25 MG/1
1 SUPPOSITORY RECTAL SEE ADMIN INSTRUCTIONS
COMMUNITY
Start: 2021-12-20 | End: 2022-04-20

## 2022-02-16 RX ORDER — CLOTRIMAZOLE 1 %
1 CREAM (GRAM) TOPICAL 2 TIMES DAILY
Qty: 45 G | Refills: 0 | Status: SHIPPED | OUTPATIENT
Start: 2022-02-16 | End: 2022-05-31

## 2022-02-16 NOTE — PROGRESS NOTES
"Chief Complaint  Rash    Subjective    History of Present Illness      Patient presents to [facility] for   Pt c/o a rash to both legs x 2 weeks.      Rash  This is a new problem. The current episode started 1 to 4 weeks ago. The problem has been gradually worsening since onset. The affected locations include the left lower leg and right lower leg. The rash is characterized by redness.        Review of Systems   Skin: Positive for rash.       I have reviewed and agree with the HPI and ROS information as above.  Denise Araujo, SHOLA     Objective   Vital Signs:   /90   Pulse 93   Temp 96.8 °F (36 °C)   Resp 20   Ht 154.9 cm (61\")   Wt (!) 147 kg (324 lb)   BMI 61.22 kg/m²       Physical Exam  Constitutional:       Appearance: Normal appearance. She is well-developed. She is obese.   HENT:      Head: Normocephalic and atraumatic.      Right Ear: External ear normal.      Left Ear: External ear normal.      Nose: Nose normal. No nasal tenderness or congestion.      Mouth/Throat:      Lips: Pink. No lesions.      Mouth: Mucous membranes are moist. No oral lesions.      Dentition: Normal dentition.      Pharynx: Oropharynx is clear. No pharyngeal swelling, oropharyngeal exudate or posterior oropharyngeal erythema.   Eyes:      General: Lids are normal. Vision grossly intact. No scleral icterus.        Right eye: No discharge.         Left eye: No discharge.      Extraocular Movements: Extraocular movements intact.      Conjunctiva/sclera: Conjunctivae normal.      Right eye: Right conjunctiva is not injected.      Left eye: Left conjunctiva is not injected.      Pupils: Pupils are equal, round, and reactive to light.   Cardiovascular:      Rate and Rhythm: Normal rate and regular rhythm.      Heart sounds: Normal heart sounds. No murmur heard.  No gallop.    Pulmonary:      Effort: Pulmonary effort is normal.      Breath sounds: Normal breath sounds and air entry. No " wheezing, rhonchi or rales.   Musculoskeletal:         General: No tenderness or deformity. Normal range of motion.      Cervical back: Full passive range of motion without pain, normal range of motion and neck supple.      Right lower leg: No edema.      Left lower leg: No edema.   Skin:     General: Skin is warm and dry.      Coloration: Skin is not jaundiced.      Findings: Erythema and rash present.             Comments: Skin to skin on bilateral lower extremities. Skin in between is red and moist.    Neurological:      Mental Status: She is alert and oriented to person, place, and time.      Cranial Nerves: Cranial nerves are intact.      Sensory: Sensation is intact.      Motor: Motor function is intact.      Coordination: Coordination is intact.      Gait: Gait is intact.   Psychiatric:         Attention and Perception: Attention normal.         Mood and Affect: Mood and affect normal.         Behavior: Behavior is not hyperactive. Behavior is cooperative.         Thought Content: Thought content normal.         Judgment: Judgment normal.          Result Review  Data Reviewed:                   Assessment and Plan      Problem List Items Addressed This Visit     None      Visit Diagnoses     Intertrigo    -  Primary    Relevant Medications    clotrimazole (Lotrimin AF) 1 % cream    fluconazole (Diflucan) 150 MG tablet    Other chronic pain        Relevant Medications    ketorolac (TORADOL) 10 MG tablet      PCP Viri Doty. She manages her BP and diabetes. Discussed BP being elevated in the office today and pt states that is good for her. I told her to continue to monitor and may need to follow-up with PCP if constantly running high.   Home health comes 2 x week to do dressing changes to wounds on bilateral lower extremities.   They are monitoring these lesions and will refer to wound care as needed. I did not assess under the bandages.  Pt needs refill on Toradol and Diflucan.   Topical antifungal was  prescribed for intertrigo.         Follow Up   Return if symptoms worsen or fail to improve.  Patient was given instructions and counseling regarding her condition or for health maintenance advice. Please see specific information pulled into the AVS if appropriate.

## 2022-02-18 ENCOUNTER — HOME CARE VISIT (OUTPATIENT)
Dept: HOME HEALTH SERVICES | Facility: CLINIC | Age: 52
End: 2022-02-18

## 2022-02-18 PROCEDURE — G0300 HHS/HOSPICE OF LPN EA 15 MIN: HCPCS

## 2022-02-19 RX ORDER — INSULIN LISPRO 100 [IU]/ML
INJECTION, SOLUTION INTRAVENOUS; SUBCUTANEOUS
Qty: 18 ML | Refills: 5 | OUTPATIENT
Start: 2022-02-19

## 2022-02-20 VITALS
RESPIRATION RATE: 18 BRPM | DIASTOLIC BLOOD PRESSURE: 68 MMHG | TEMPERATURE: 98.4 F | OXYGEN SATURATION: 97 % | SYSTOLIC BLOOD PRESSURE: 124 MMHG | HEART RATE: 84 BPM

## 2022-02-21 RX ORDER — FLUTICASONE PROPIONATE 50 MCG
1 SPRAY, SUSPENSION (ML) NASAL DAILY
Qty: 16 G | Refills: 0 | Status: SHIPPED | OUTPATIENT
Start: 2022-02-21 | End: 2022-03-29

## 2022-02-22 ENCOUNTER — HOME CARE VISIT (OUTPATIENT)
Dept: HOME HEALTH SERVICES | Facility: CLINIC | Age: 52
End: 2022-02-22

## 2022-02-22 PROCEDURE — G0300 HHS/HOSPICE OF LPN EA 15 MIN: HCPCS

## 2022-02-22 NOTE — HOME HEALTH
FOCUS OF CARE/SKILLED NEED: wound care, s/s of infection, edema, skin assessment    TEACHING/INTERVENTIONS: wound care, s/s of infection, edema    PROGRESS TOWARD GOALS: none    PHYSICIAN CONTACT: none    FALLS SINCE LAST VISIT? none    MEDICATION CHANGES SINCE LAST VISIT? none    PLAN FOR NEXT VISIT: edema, wound care, s/s of infection

## 2022-02-25 ENCOUNTER — HOME CARE VISIT (OUTPATIENT)
Dept: HOME HEALTH SERVICES | Facility: CLINIC | Age: 52
End: 2022-02-25

## 2022-02-25 PROCEDURE — G0300 HHS/HOSPICE OF LPN EA 15 MIN: HCPCS

## 2022-02-25 NOTE — CASE COMMUNICATION
Patient missed a nursing visit from [facility] on 2/4/22    Reason: Inclimate weather      For your records only.   As per home health protocol, MD must be notified of missed/cancelled visits; therefore the prescribed frequency was not met.

## 2022-02-25 NOTE — CASE COMMUNICATION
Patient missed a nursing visit from [facility] on 2/1/22    Reason: patient request      For your records only.   As per home health protocol, MD must be notified of missed/cancelled visits; therefore the prescribed frequency was not met.

## 2022-02-28 ENCOUNTER — OFFICE VISIT (OUTPATIENT)
Age: 52
End: 2022-02-28
Payer: MEDICAID

## 2022-02-28 ENCOUNTER — HOSPITAL ENCOUNTER (OUTPATIENT)
Dept: GENERAL RADIOLOGY | Age: 52
Discharge: HOME | End: 2022-02-28
Payer: MEDICAID

## 2022-02-28 VITALS
SYSTOLIC BLOOD PRESSURE: 120 MMHG | TEMPERATURE: 97.4 F | WEIGHT: 293 LBS | HEART RATE: 90 BPM | OXYGEN SATURATION: 98 % | BODY MASS INDEX: 60.46 KG/M2 | DIASTOLIC BLOOD PRESSURE: 72 MMHG | RESPIRATION RATE: 18 BRPM
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
## 2022-02-28 DIAGNOSIS — M54.32 SCIATICA OF LEFT SIDE: Primary | ICD-10-CM

## 2022-02-28 DIAGNOSIS — M54.32 SCIATICA OF LEFT SIDE: ICD-10-CM

## 2022-02-28 PROCEDURE — 1036F TOBACCO NON-USER: CPT

## 2022-02-28 PROCEDURE — G8484 FLU IMMUNIZE NO ADMIN: HCPCS

## 2022-02-28 PROCEDURE — 99213 OFFICE O/P EST LOW 20 MIN: CPT

## 2022-02-28 PROCEDURE — G8417 CALC BMI ABV UP PARAM F/U: HCPCS

## 2022-02-28 PROCEDURE — G8427 DOCREV CUR MEDS BY ELIG CLIN: HCPCS

## 2022-02-28 PROCEDURE — 72100 X-RAY EXAM L-S SPINE 2/3 VWS: CPT

## 2022-02-28 RX ORDER — KETOROLAC TROMETHAMINE 30 MG/ML
60 INJECTION, SOLUTION INTRAMUSCULAR; INTRAVENOUS ONCE
Status: COMPLETED | OUTPATIENT
Start: 2022-02-28 | End: 2022-02-28

## 2022-02-28 RX ORDER — DEXAMETHASONE SODIUM PHOSPHATE 10 MG/ML
10 INJECTION INTRAMUSCULAR; INTRAVENOUS ONCE
Status: COMPLETED | OUTPATIENT
Start: 2022-02-28 | End: 2022-02-28

## 2022-02-28 RX ADMIN — DEXAMETHASONE SODIUM PHOSPHATE 10 MG: 10 INJECTION INTRAMUSCULAR; INTRAVENOUS at 12:17

## 2022-02-28 RX ADMIN — KETOROLAC TROMETHAMINE 60 MG: 30 INJECTION, SOLUTION INTRAMUSCULAR; INTRAVENOUS at 12:17

## 2022-02-28 ASSESSMENT — ENCOUNTER SYMPTOMS: BACK PAIN: 0

## 2022-02-28 NOTE — PROGRESS NOTES
DEXAMETHASONE 10 MG GIVEN INTRAMUSCULARLY IN RIGHT GLUTEAL. PT TOLERATED WELL. Toradol 60mg given intramuscularly to left gluteal. Pt tolerated well.

## 2022-02-28 NOTE — PROGRESS NOTES
[facility]  [address] [address] Cristian Alatorre [address]  Dept: [phone]  Dept Fax: [phone]: [phone]    Tianna  is a 54 y.o. female who presents today for her medical conditions/complaints as noted below. Tianna  is c/o of Foot Problem (LEFT FOOT NUMBNESS)        HPI:     HPI  Tianna presents today with left leg feeling heavy and shooting pain down leg into foot like \"contractions\" for 4 days. She reports she has fallen at night when walking to the bathroom a few times. Denies LOC or hitting head. She reports she thinks the zanaflex was making her too sleepy and that was causing her to fall. She reports she switched to an old prescription of flexeril and she has not fallen since then. She denies back pain but reports the pain \"might be sciatic\". She states her home morphine and percocet are not helping.      Past Medical History:   Diagnosis Date    Anxiety     Constipation     Depression     Headache(784.0)     Hyperlipidemia     Hypertension     Kidney stones     Kidney stones     Morbid obesity due to excess calories (HCC)     Restless legs syndrome     Type 2 diabetes mellitus ([address]      Past Surgical History:   Procedure Laterality Date    ECTOPIC PREGNANCY SURGERY  2002    EYE SURGERY Bilateral     2005 & 2007: cornea transplant and cataracts removed    KNEE CARTILAGE SURGERY Left 12/20/2016    KNEE ARTHROSCOPIC PARTIAL MEDIAL MENISCECTOMY performed by Ami Hernandez MD at [address] [place] [address]  2001       Family History   Problem Relation Age of Onset    Obesity Mother    [facility] Arthritis Mother         has had knees replaced    Cancer Father         \"adrenal gland & lung\"    Parkinsonism Father     Cancer Maternal Grandmother     COPD Maternal Grandmother     Obesity Maternal Grandmother     Heart Failure Maternal Grandmother     Cancer Maternal Grandfather     Other Sister         spina biffida  No Known Problems Son     No Known Problems Son     No Known Problems Daughter     Obesity Maternal Aunt     Other Maternal Aunt          of sepsis related to a spider bite       Social History     Tobacco Use    Smoking status: Never Smoker    Smokeless tobacco: Never Used   Substance Use Topics    Alcohol use: Not Currently     Comment: \"socially, not recently though\"      Current Outpatient Medications   Medication Sig Dispense Refill    losartan (COZAAR) 25 MG tablet Take 25 mg by mouth daily      insulin glargine (LANTUS SOLOSTAR) 100 UNIT/ML injection pen Inject 30 Units into the skin every evening 5 pen 5    blood glucose test strips (ONETOUCH ULTRA) strip USE TO TEST BLOOD SUGAR THREE TIMES DAILY AND AS NEEDED AS DIRECTED 150 strip 5    Lancets (ONETOUCH DELICA PLUS [id number]) MISC USE TO CHECK BLOOD SUGAR AS DIRECTED 100 each 5    pramipexole (MIRAPEX) 0.75 MG tablet TAKE 1 TABLET BY MOUTH TWICE DAILY 60 tablet 5    Insulin Pen Needle ([employer] PEN NEEDLES) 31G X 6 MM MISC 1 each by Does not apply route daily 100 each 3    potassium chloride (KLOR-CON M) 20 MEQ extended release tablet Take 1 tablet by mouth daily as needed (low potassium) 30 tablet 0    ondansetron (ZOFRAN-ODT) 4 MG disintegrating tablet DISSOLVE 1 TABLET UNDER THE TONGUE EVERY 8 HOURS AS NEEDED FOR NAUSEA AND VOMITING PRIOR TO DOXYCYCLINE DOSES 20 tablet 0    Liraglutide (VICTOZA) 18 MG/3ML SOPN SC injection ADMINISTER 1.8 MG UNDER THE SKIN EVERY DAY 9 mL 3    mirtazapine (REMERON) 15 MG tablet TAKE 1 TABLET BY MOUTH EVERY NIGHT 30 tablet 3    busPIRone (BUSPAR) 10 MG tablet TAKE 1 TABLET BY MOUTH THREE TIMES DAILY 90 tablet 3    insulin lispro, 1 Unit Dial, (ADMELOG SOLOSTAR) 100 UNIT/ML SOPN ADMINISTER 45 UNITS UNDER THE SKIN EVERY EVENING. INCREASE BY 3 UNITS EVERY NIGHT UNTIL 60 UNITS IS REACHED AND.  CONTINUE 60 UNITS EVERY EVENING (Patient taking differently: 60 UNITS EVERY EVENING) 18 mL 5    metFORMIN (GLUCOPHAGE) 500 MG tablet Take 1 tablet by mouth 2 times daily (with meals) 180 tablet 1    fluticasone (FLONASE) 50 MCG/ACT nasal spray SHAKE LIQUID AND USE 1 SPRAY IN EACH NOSTRIL DAILY 16 g 0    furosemide (LASIX) 80 MG tablet Take 0.5 tablets by mouth 2 times daily 60 tablet 0    triamterene-hydroCHLOROthiazide (MAXZIDE-25) 37.5-25 MG per tablet TAKE 1 TABLET BY MOUTH DAILY 30 tablet 2    DULoxetine (CYMBALTA) 60 MG extended release capsule TAKE 1 CAPSULE BY MOUTH TWICE DAILY 30 capsule 5    omeprazole (PRILOSEC) 20 MG delayed release capsule TAKE ONE CAPSULE BY MOUTH TWICE DAILY BEFORE MEALS 180 capsule 1    Diabetic Shoe MISC by Does not apply route DISPENSE ONE PAIR OF DIABETIC SHOE AND 3 PAIRS HEAT MOLDED INSERTS 1 each 0    oxyCODONE-acetaminophen (PERCOCET) 7.5-325 MG per tablet Take 1 tablet by mouth 2 times daily.  tiZANidine (ZANAFLEX) 4 MG tablet Take 4 mg by mouth daily as needed      NONFORMULARY Prednisone eye drops 1 drop in each eye daily      Morphine Sulfate ER 15 MG T12A Take 15 mg by mouth 2 times daily.  Cream Base CREA APPLY ONE PUMP (GRAM) TO AFFECTED AREA(S) THREE TO FOUR TIMES A DAY TO THE APPENDAGES AND TRUNK AS DIRECTED 30 g 5    celecoxib (CELEBREX) 200 MG capsule TAKE 1 CAPSULE BY MOUTH EVERY DAY 60 capsule 5    blood glucose monitor kit and supplies Test 1 times a day & as needed for symptoms of irregular blood glucose. 1 kit 0    gabapentin (NEURONTIN) 300 MG capsule Take 300 mg by mouth 3 times daily.    5    Blood Glucose Monitoring Suppl ([address]) w/Device KIT 1 kit by Does not apply route daily as needed (Dx 250.00) 1 kit 0    ONE TOUCH LANCETS MISC 1 each by Does not apply route daily 100 each 3    ketorolac (TORADOL) 10 MG tablet Take 1 tablet by mouth every 6 hours as needed for Pain 20 tablet 0    rizatriptan (MAXALT) 10 MG tablet Take 1 tablet by mouth once as needed for Migraine May repeat in 2 hours if needed 30 tablet 3 Current Facility-Administered Medications   Medication Dose Route Frequency Provider Last Rate Last Admin    dexamethasone (DECADRON) injection 10 mg  10 mg IntraMUSCular Once Tenny Corvallis, APRN - CNP        ketorolac (TORADOL) injection 60 mg  60 mg IntraMUSCular Once Tenny Corvallis, APRN - CNP         Allergies   Allergen Reactions    Compazine [Prochlorperazine] Shortness Of Breath    Ciprofloxacin Hives    Calamine Itching    Prochlorperazine Edisylate      Will discuss with patient at next visit     Tobramycin Other (See Comments)     States had corneal transplants - and was told not to    Tramadol      Will discuss with patient at next visit     Amoxicillin-Pot Clavulanate Hives, Itching and Rash    Ancef [Cefazolin] Nausea And Vomiting    Bactrim [Sulfamethoxazole-Trimethoprim] Nausea And Vomiting and Other (See Comments)     States she also gets yeast infections with it    Cephalexin Rash    Clindamycin/Lincomycin Rash    Codeine Nausea And Vomiting and Rash    Demerol Hives    Doxycycline Nausea And Vomiting    Hydroxyzine Hcl Itching    Ibuprofen Nausea Only    Keflex [Cephalexin] Rash    Meperidine Hives    Moxifloxacin Nausea And Vomiting    Nortriptyline Hives, Itching and Rash    Orphenadrine Citrate Itching    Penicillins Hives and Nausea Only    Vistaril [Hydroxyzine Hcl] Itching       Health Maintenance   Topic Date Due    Hepatitis C screen  Never done    HIV screen  Never done    Diabetic retinal exam  Never done    Hepatitis B vaccine (1 of 3 - Risk 3-dose series) Never done    Cervical cancer screen  Never done    Colorectal Cancer Screen  Never done    Lipid screen  08/17/2021    Flu vaccine (1) 09/01/2021    DTaP/Tdap/Td vaccine (2 - Td or Tdap) 09/22/2021    COVID-19 Vaccine (2 - Pfizer 3-dose series) 12/13/2021    Diabetic foot exam  01/20/2022    A1C test (Diabetic or Prediabetic)  04/25/2022    Potassium monitoring  06/28/2022    Creatinine monitoring  06/28/2022    Depression Monitoring  12/07/2022    Pneumococcal 0-64 years Vaccine (2 of 2 - PPSV23) 09/08/2037    Hepatitis A vaccine  Aged Out    Hib vaccine  Aged Out    Meningococcal (ACWY) vaccine  Aged Out       Subjective:     Review of Systems   Constitutional: Negative for fever. Musculoskeletal: Positive for myalgias (shooting pain down left leg into foot). Negative for back pain. Neurological: Negative for dizziness, syncope and headaches.       :Objective      Physical Exam  /72   Pulse 90   Temp 97.4 °F (36.3 °C) (Temporal)   Resp 18   Wt (!) 320 lb (145.2 kg)   SpO2 98%   BMI 60.46 kg/m²     :Assessment       Diagnosis Orders   1. Sciatica of left side  dexamethasone (DECADRON) injection 10 mg    ketorolac (TORADOL) injection 60 mg    XR LUMBAR SPINE (2-3 VIEWS)   2. Fall, initial encounter  XR LUMBAR SPINE (2-3 VIEWS)       :Plan    Discussed with patient that since her home pain medications that were very strong were not helping with the pain, I was likely not going to be able to offer her much to help with the pain. D/W patient that she was diabetic and I was hesitant to order her any steroids and only had toradol injections for pain in the office. I offered her an xray of her lumbar spine to evaluate for fracture or issues from her fall. Pt requests toradol shot stating \"It help the inflammation\". She also requests steroid injection and states she will take extra insulin. Will give injection in office as it may affect the glucose less than a burst pack of steroids. Pt nontoxic on exam, she is tearful and requests something for the pain. Likely a combination of neuropathy and sciatic nerve pain. Instructed her to f/u with PCP or go to ER if worsens.      Orders Placed This Encounter   Procedures    XR LUMBAR SPINE (2-3 VIEWS)     Standing Status:   Future     Standing Expiration Date:   2/28/2023     Order Specific Question:   Reason for exam:     Answer:   ground level fall - now with sciatica       Return if symptoms worsen or fail to improve. Orders Placed This Encounter   Medications    dexamethasone (DECADRON) injection 10 mg    ketorolac (TORADOL) injection 60 mg       Patient given educational materials- see patient instructions. Discussed use, benefit, and side effects of prescribed medications. All patient questions answered. Pt voiced understanding. Patient Instructions     1. Monitor glucose closely as you were given steroid injection  2. Avoid NSAIDs for the rest of the day as you were given an injection of toradol  3. Xray results will be called to you  4. If you develop new or worsening symptoms, please seek evaluation      Patient Education        Back Pain: Care Instructions  Your Care Instructions     Back pain has many possible causes. It is often related to problems with muscles and ligaments of the back. It may also be related to problems with the nerves, discs, or bones of the back. Moving, lifting, standing, sitting, or sleeping in an awkward way can strain the back. Sometimes you don't notice the injury until later. Arthritis is another common cause of back pain. Although it may hurt a lot, back pain usually improves on its own within several weeks. Most people recover in 12 weeks or less. Using good home treatment and being careful not to stress your back can help you feel better sooner. Follow-up care is a key part of your treatment and safety. Be sure to make and go to all appointments, and call your doctor if you are having problems. It's also a good idea to know your test results and keep a list of the medicines you take. How can you care for yourself at home? · Sit or lie in positions that are most comfortable and reduce your pain. Try one of these positions when you lie down:  ? Lie on your back with your knees bent and supported by large pillows. ? Lie on the floor with your legs on the seat of a sofa or chair.   ? Lie on your side with your knees and hips bent and a pillow between your legs. ? Lie on your stomach if it does not make pain worse. · Do not sit up in bed, and avoid soft couches and twisted positions. Bed rest can help relieve pain at first, but it delays healing. Avoid bed rest after the first day of back pain. · Change positions every 30 minutes. If you must sit for long periods of time, take breaks from sitting. Get up and walk around, or lie in a comfortable position. · Try using a heating pad on a low or medium setting for 15 to 20 minutes every 2 or 3 hours. Try a warm shower in place of one session with the heating pad. · You can also try an ice pack for 10 to 15 minutes every 2 to 3 hours. Put a thin cloth between the ice pack and your skin. · Take pain medicines exactly as directed. ? If the doctor gave you a prescription medicine for pain, take it as prescribed. ? If you are not taking a prescription pain medicine, ask your doctor if you can take an over-the-counter medicine. · Take short walks several times a day. You can start with 5 to 10 minutes, 3 or 4 times a day, and work up to longer walks. Walk on level surfaces and avoid hills and stairs until your back is better. · Return to work and other activities as soon as you can. Continued rest without activity is usually not good for your back. · To prevent future back pain, do exercises to stretch and strengthen your back and stomach. Learn how to use good posture, safe lifting techniques, and proper body mechanics. When should you call for help? Call your doctor now or seek immediate medical care if:    · You have new or worsening numbness in your legs.     · You have new or worsening weakness in your legs. (This could make it hard to stand up.)     · You lose control of your bladder or bowels.    Watch closely for changes in your health, and be sure to contact your doctor if:    · You have a fever, lose weight, or don't feel well.     · You do not get better as expected. Where can you learn more? Go to https://chpepiceweb.[email or web address]. org and sign in to your [employer] account. Enter R489 in the [facility] box to learn more about \"Back Pain: Care Instructions. \"     If you do not have an account, please click on the \"Sign Up Now\" link. Current as of: July 1, 2021               Content Version: 13.1  © 2006-2021 Healthwise, Incorporated. Care instructions adapted under license by [facility] ([facility]). If you have questions about a medical condition or this instruction, always ask your healthcare professional. [address] [address] any warranty or liability for your use of this information. Patient Education        Low Back Pain: Exercises  Introduction  Here are some examples of exercises for you to try. The exercises may be suggested for a condition or for rehabilitation. Start each exercise slowly. Ease off the exercises if you start to have pain. You will be told when to start these exercises and which ones will work best for you. How to do the exercises  Press-up    1. Lie on your stomach, supporting your body with your forearms. 2. Press your elbows down into the floor to raise your upper back. As you do this, relax your stomach muscles and allow your back to arch without using your back muscles. As your press up, do not let your hips or pelvis come off the floor. 3. Hold for 15 to 30 seconds, then relax. 4. Repeat 2 to 4 times. Alternate arm and leg (bird dog) exercise    Do this exercise slowly. Try to keep your body straight at all times, and do not let one hip drop lower than the other. 1. Start on the floor, on your hands and knees. 2. Tighten your belly muscles. 3. Raise one leg off the floor, and hold it straight out behind you. Be careful not to let your hip drop down, because that will twist your trunk. 4. Hold for about 6 seconds, then lower your leg and switch to the other leg.   5. Repeat 8 to 12 times on each leg. 6. Over time, work up to holding for 10 to 30 seconds each time. 7. If you feel stable and secure with your leg raised, try raising the opposite arm straight out in front of you at the same time. Knee-to-chest exercise    1. Lie on your back with your knees bent and your feet flat on the floor. 2. Bring one knee to your chest, keeping the other foot flat on the floor (or keeping the other leg straight, whichever feels better on your lower back). 3. Keep your lower back pressed to the floor. Hold for at least 15 to 30 seconds. 4. Relax, and lower the knee to the starting position. 5. Repeat with the other leg. Repeat 2 to 4 times with each leg. 6. To get more stretch, put your other leg flat on the floor while pulling your knee to your chest.  Curl-ups    1. Lie on the floor on your back with your knees bent at a 90-degree angle. Your feet should be flat on the floor, about 12 inches from your buttocks. 2. Cross your arms over your chest. If this bothers your neck, try putting your hands behind your neck (not your head), with your elbows spread apart. 3. Slowly tighten your belly muscles and raise your shoulder blades off the floor. 4. Keep your head in line with your body, and do not press your chin to your chest.  5. Hold this position for 1 or 2 seconds, then slowly lower yourself back down to the floor. 6. Repeat 8 to 12 times. Pelvic tilt exercise    1. Lie on your back with your knees bent. 2. \"Brace\" your stomach. This means to tighten your muscles by pulling in and imagining your belly button moving toward your spine. You should feel like your back is pressing to the floor and your hips and pelvis are rocking back. 3. Hold for about 6 seconds while you breathe smoothly. 4. Repeat 8 to 12 times. Heel dig bridging    1. Lie on your back with both knees bent and your ankles bent so that only your heels are digging into the floor. Your knees should be bent about 90 degrees.   2. Then push your heels into the floor, squeeze your buttocks, and lift your hips off the floor until your shoulders, hips, and knees are all in a straight line. 3. Hold for about 6 seconds as you continue to breathe normally, and then slowly lower your hips back down to the floor and rest for up to 10 seconds. 4. Do 8 to 12 repetitions. Hamstring stretch in doorway    1. Lie on your back in a doorway, with one leg through the open door. 2. Slide your leg up the wall to straighten your knee. You should feel a gentle stretch down the back of your leg. 3. Hold the stretch for at least 15 to 30 seconds. Do not arch your back, point your toes, or bend either knee. Keep one heel touching the floor and the other heel touching the wall. 4. Repeat with your other leg. 5. Do 2 to 4 times for each leg. Hip flexor stretch    1. Kneel on the floor with one knee bent and one leg behind you. Place your forward knee over your foot. Keep your other knee touching the floor. 2. Slowly push your hips forward until you feel a stretch in the upper thigh of your rear leg. 3. Hold the stretch for at least 15 to 30 seconds. Repeat with your other leg. 4. Do 2 to 4 times on each side. Wall sit    1. Stand with your back 10 to 12 inches away from a wall. 2. Lean into the wall until your back is flat against it. 3. Slowly slide down until your knees are slightly bent, pressing your lower back into the wall. 4. Hold for about 6 seconds, then slide back up the wall. 5. Repeat 8 to 12 times. Follow-up care is a key part of your treatment and safety. Be sure to make and go to all appointments, and call your doctor if you are having problems. It's also a good idea to know your test results and keep a list of the medicines you take. Where can you learn more? Go to https://reanna.[employer]. org and sign in to your [employer] account. Enter H136 in the [place] box to learn more about \"Low Back Pain: Exercises. \" unless it feels better than standing. · Alternate lying down with short walks. Increase your walking distance as you are able to without making your symptoms worse. · Do not do anything that makes your symptoms worse. When should you call for help? Call 911 anytime you think you may need emergency care. For example, call if:    · You are unable to move a leg at all. Call your doctor now or seek immediate medical care if:    · You have new or worse symptoms in your legs or buttocks. Symptoms may include:  ? Numbness or tingling. ? Weakness. ? Pain.     · You lose bladder or bowel control. Watch closely for changes in your health, and be sure to contact your doctor if:    · You are not getting better as expected. Where can you learn more? Go to https://[email or web address]. org and sign in to your [employer] account. Enter [phone] in the [facility] box to learn more about \"Sciatica: Care Instructions. \"     If you do not have an account, please click on the \"Sign Up Now\" link. Current as of: July 1, 2021               Content Version: 13.1  © 2006-2021 Healthwise, Incorporated. Care instructions adapted under license by [facility] ([facility]). If you have questions about a medical condition or this instruction, always ask your healthcare professional. [place] [address] any warranty or liability for your use of this information.                Electronically signed by NANETTE Alfonso CNP on 2/28/2022 at 12:15 PM

## 2022-02-28 NOTE — PATIENT INSTRUCTIONS
in a comfortable position. · Try using a heating pad on a low or medium setting for 15 to 20 minutes every 2 or 3 hours. Try a warm shower in place of one session with the heating pad. · You can also try an ice pack for 10 to 15 minutes every 2 to 3 hours. Put a thin cloth between the ice pack and your skin. · Take pain medicines exactly as directed. ? If the doctor gave you a prescription medicine for pain, take it as prescribed. ? If you are not taking a prescription pain medicine, ask your doctor if you can take an over-the-counter medicine. · Take short walks several times a day. You can start with 5 to 10 minutes, 3 or 4 times a day, and work up to longer walks. Walk on level surfaces and avoid hills and stairs until your back is better. · Return to work and other activities as soon as you can. Continued rest without activity is usually not good for your back. · To prevent future back pain, do exercises to stretch and strengthen your back and stomach. Learn how to use good posture, safe lifting techniques, and proper body mechanics. When should you call for help? Call your doctor now or seek immediate medical care if:    · You have new or worsening numbness in your legs.     · You have new or worsening weakness in your legs. (This could make it hard to stand up.)     · You lose control of your bladder or bowels. Watch closely for changes in your health, and be sure to contact your doctor if:    · You have a fever, lose weight, or don't feel well.     · You do not get better as expected. Where can you learn more? Go to https://Appointuitmoody.Conjur. org and sign in to your [employer] account. Enter O038 in the zhouwu box to learn more about \"Back Pain: Care Instructions. \"     If you do not have an account, please click on the \"Sign Up Now\" link. Current as of: July 1, 2021               Content Version: 13.1  © 6774-9049 Healthwise, Incorporated.    Care instructions adapted under license by [facility]. If you have questions about a medical condition or this instruction, always ask your healthcare professional. [place] [address] any warranty or liability for your use of this information. Patient Education        Low Back Pain: Exercises  Introduction  Here are some examples of exercises for you to try. The exercises may be suggested for a condition or for rehabilitation. Start each exercise slowly. Ease off the exercises if you start to have pain. You will be told when to start these exercises and which ones will work best for you. How to do the exercises  Press-up    1. Lie on your stomach, supporting your body with your forearms. 2. Press your elbows down into the floor to raise your upper back. As you do this, relax your stomach muscles and allow your back to arch without using your back muscles. As your press up, do not let your hips or pelvis come off the floor. 3. Hold for 15 to 30 seconds, then relax. 4. Repeat 2 to 4 times. Alternate arm and leg (bird dog) exercise    Do this exercise slowly. Try to keep your body straight at all times, and do not let one hip drop lower than the other. 1. Start on the floor, on your hands and knees. 2. Tighten your belly muscles. 3. Raise one leg off the floor, and hold it straight out behind you. Be careful not to let your hip drop down, because that will twist your trunk. 4. Hold for about 6 seconds, then lower your leg and switch to the other leg. 5. Repeat 8 to 12 times on each leg. 6. Over time, work up to holding for 10 to 30 seconds each time. 7. If you feel stable and secure with your leg raised, try raising the opposite arm straight out in front of you at the same time. Knee-to-chest exercise    1. Lie on your back with your knees bent and your feet flat on the floor.   2. Bring one knee to your chest, keeping the other foot flat on the floor (or keeping the other leg straight, whichever feels better on your lower back). 3. Keep your lower back pressed to the floor. Hold for at least 15 to 30 seconds. 4. Relax, and lower the knee to the starting position. 5. Repeat with the other leg. Repeat 2 to 4 times with each leg. 6. To get more stretch, put your other leg flat on the floor while pulling your knee to your chest.  Curl-ups    1. Lie on the floor on your back with your knees bent at a 90-degree angle. Your feet should be flat on the floor, about 12 inches from your buttocks. 2. Cross your arms over your chest. If this bothers your neck, try putting your hands behind your neck (not your head), with your elbows spread apart. 3. Slowly tighten your belly muscles and raise your shoulder blades off the floor. 4. Keep your head in line with your body, and do not press your chin to your chest.  5. Hold this position for 1 or 2 seconds, then slowly lower yourself back down to the floor. 6. Repeat 8 to 12 times. Pelvic tilt exercise    1. Lie on your back with your knees bent. 2. \"Brace\" your stomach. This means to tighten your muscles by pulling in and imagining your belly button moving toward your spine. You should feel like your back is pressing to the floor and your hips and pelvis are rocking back. 3. Hold for about 6 seconds while you breathe smoothly. 4. Repeat 8 to 12 times. Heel dig bridging    1. Lie on your back with both knees bent and your ankles bent so that only your heels are digging into the floor. Your knees should be bent about 90 degrees. 2. Then push your heels into the floor, squeeze your buttocks, and lift your hips off the floor until your shoulders, hips, and knees are all in a straight line. 3. Hold for about 6 seconds as you continue to breathe normally, and then slowly lower your hips back down to the floor and rest for up to 10 seconds. 4. Do 8 to 12 repetitions. Hamstring stretch in doorway    1.  Lie on your back in a doorway, with one leg through the open door.  2. Slide your leg up the wall to straighten your knee. You should feel a gentle stretch down the back of your leg. 3. Hold the stretch for at least 15 to 30 seconds. Do not arch your back, point your toes, or bend either knee. Keep one heel touching the floor and the other heel touching the wall. 4. Repeat with your other leg. 5. Do 2 to 4 times for each leg. Hip flexor stretch    1. Kneel on the floor with one knee bent and one leg behind you. Place your forward knee over your foot. Keep your other knee touching the floor. 2. Slowly push your hips forward until you feel a stretch in the upper thigh of your rear leg. 3. Hold the stretch for at least 15 to 30 seconds. Repeat with your other leg. 4. Do 2 to 4 times on each side. Wall sit    1. Stand with your back 10 to 12 inches away from a wall. 2. Lean into the wall until your back is flat against it. 3. Slowly slide down until your knees are slightly bent, pressing your lower back into the wall. 4. Hold for about 6 seconds, then slide back up the wall. 5. Repeat 8 to 12 times. Follow-up care is a key part of your treatment and safety. Be sure to make and go to all appointments, and call your doctor if you are having problems. It's also a good idea to know your test results and keep a list of the medicines you take. Where can you learn more? Go to https://Nabriva Therapeutics.Blend Labs. org and sign in to your [employer] account. Enter Y285 in the [facility] box to learn more about \"Low Back Pain: Exercises. \"     If you do not have an account, please click on the \"Sign Up Now\" link. Current as of: July 1, 2021               Content Version: 13.1  © 2006-2021 Healthwise, Incorporated. Care instructions adapted under license by [facility] ([facility]).  If you have questions about a medical condition or this instruction, always ask your healthcare professional. [address] any warranty or liability for your use of this information. Patient Education        Sciatica: Care Instructions  Your Care Instructions     Sciatica (say \"vnn-YQ-zu-kuh\") is an irritation of one of the sciatic nerves, which come from the spinal cord in the lower back. The sciatic nerves and their branches extend down through the buttock to the foot. Sciatica can develop when an injured disc in the back irritates or presses against a spinal nerve root. Its main symptom is pain, numbness, or weakness that is often worse in the leg or foot than in the back. Sciatica often will improve and go away with time. Early treatment usually includes medicines and exercises to relieve pain. Follow-up care is a key part of your treatment and safety. Be sure to make and go to all appointments, and call your doctor if you are having problems. It's also a good idea to know your test results and keep a list of the medicines you take. How can you care for yourself at home? · Take pain medicines exactly as directed. ? If the doctor gave you a prescription medicine for pain, take it as prescribed. ? If you are not taking a prescription pain medicine, ask your doctor if you can take an over-the-counter medicine. · Use heat or ice to relieve pain. ? To apply heat, put a warm water bottle, heating pad set on low, or warm cloth on your back. Do not go to sleep with a heating pad on your skin. ? To use ice, put ice or a cold pack on the area for 10 to 20 minutes at a time. Put a thin cloth between the ice and your skin. · Avoid sitting if possible, unless it feels better than standing. · Alternate lying down with short walks. Increase your walking distance as you are able to without making your symptoms worse. · Do not do anything that makes your symptoms worse. When should you call for help? Call 911 anytime you think you may need emergency care. For example, call if:    · You are unable to move a leg at all.    Call your doctor now or seek immediate medical care if:    · You have new or worse symptoms in your legs or buttocks. Symptoms may include:  ? Numbness or tingling. ? Weakness. ? Pain.     · You lose bladder or bowel control. Watch closely for changes in your health, and be sure to contact your doctor if:    · You are not getting better as expected. Where can you learn more? Go to https://AcuFocuspepiceweb.Beepl. org and sign in to your [employer] account. Enter [phone] in the [facility] box to learn more about \"Sciatica: Care Instructions. \"     If you do not have an account, please click on the \"Sign Up Now\" link. Current as of: July 1, 2021               Content Version: 13.1  © 5370-1599 Healthwise, Incorporated. Care instructions adapted under license by [facility] ([facility]). If you have questions about a medical condition or this instruction, always ask your healthcare professional. [place] [address] any warranty or liability for your use of this information.

## 2022-03-01 ENCOUNTER — HOME CARE VISIT (OUTPATIENT)
Dept: HOME HEALTH SERVICES | Facility: CLINIC | Age: 52
End: 2022-03-01

## 2022-03-01 VITALS
TEMPERATURE: 99.2 F | OXYGEN SATURATION: 98 % | SYSTOLIC BLOOD PRESSURE: 164 MMHG | HEART RATE: 89 BPM | DIASTOLIC BLOOD PRESSURE: 90 MMHG | RESPIRATION RATE: 16 BRPM

## 2022-03-01 PROCEDURE — G0299 HHS/HOSPICE OF RN EA 15 MIN: HCPCS

## 2022-03-01 NOTE — HOME HEALTH
60 Day Summary: Wound care and edema management continue. The pt still hasn't decided on if she will go to wound care or not due to past experiences. The pt was treated for an infection in her wound in the past 60 days, and finished the tx. Pt continues to have HTN. The pt has fallen 1-2 times in the past 60 days. No insurance changes.    FOCUS OF CARE/SKILLED NEED: Wound care. Edema management.    TEACHING/INTERVENTIONS: Pt/cg agreeable to homecare recertification under the care of SHOLA Jordan. Pt had no further questions regarding medication and/or plan of care. A&O X 4. Pt c/o chronic pain. BP elevated, but all other VSS. The pt wasn't having a headache. The pt stated she was going to stop her losartan and start her old BP medication because the losartan hasn't been working. I educated the pt that she should not take herself off of any medication without the consent of her MD or APRN. Pt v/u. The pt was also taking her insulins wrong. The pt was educated on this and she v/u. I have been encouraging the pt time and time again to please reconsider going to wound care. I have educated the pt that we are unable to take the best care of her when a wound care specialist isn't involved. Pt v/u and stated she will think about it.    PROGRESS TOWARD GOALS: Ongoing, progressing    PHYSICIAN CONTACT: I am sending a message to SHOLA Jordan and Dr. Manuel to notify them of these things mentioned in this note.    INSURANCE CHANGES? No    FALLS SINCE LAST VISIT? No    MEDICATION CHANGES SINCE LAST VISIT? No changes. I was going over the pt's medications and the pt revealed to me that she was taking her long acting insulin (lantus) 10 units 3X daily before meals and her short acting insulin (lispro) 45 units at night. I educated the pt that this was the incorrect way of taking her insulin and that she would need to switch them. I educated the pt that her short acting insulin is to be taken 3X daily before  meals and her long acting insulin is to be taken once at night. The pt v/u via teachback.    PLAN FOR NEXT VISIT: Make sure the pt is taking her insulins correctly. (see above) Has the pt decided if she will be going to wound care or not? Please continue encouraging the pt to go to wound care. How is the pt's BP? Did the pt stop taking her losartan and start taking her old BP medicine like she said she was going to do without the MD permission?

## 2022-03-02 NOTE — CASE COMMUNICATION
I did a recertification on Ms. Wilson yesterday and her BP was elevated at 164/90, but all other VSS. The pt wasn't having a headache. The pt stated she was going to stop her losartan and start her old BP medication because the losartan hasn't been working. I educated the pt that she should not take herself off of any medication without the consent of her MD or APRN. Pt v/u.   The pt has been on this medication since November and the pt  contiues to have HTN. Can we try something else with her? Thank you!  FYI:  The pt has a new wound to her rt outer ankle. We are treating it like the other 2 wounds, if that is okay with you guys.  I have been encouraging the pt time and time again to please reconsider going to wound care. I have educated the pt that we are unable to take the best care of her when a wound care specialist isn't involved. Pt v/u and stated she will think  about it. We are going to continue wound care orders as we have been, and hopefully she decides to go to wound care.   The pt has also been taking her insulins wrong. I was going over the pt's medications and the pt revealed to me that she was taking her long acting insulin (lantus) 10 units 3X daily before meals and her short acting insulin (lispro) 45 units at night. I educated the pt that this was the incorrect way of taking her ins ulin and that she would need to switch them. I educated the pt that her short acting insulin is to be taken 3X daily before meals and her long acting insulin is to be taken once at night. The pt v/u via teachback.

## 2022-03-03 ENCOUNTER — TELEPHONE (OUTPATIENT)
Dept: INTERNAL MEDICINE | Facility: CLINIC | Age: 52
End: 2022-03-03

## 2022-03-03 VITALS
OXYGEN SATURATION: 97 % | HEART RATE: 87 BPM | DIASTOLIC BLOOD PRESSURE: 84 MMHG | TEMPERATURE: 98.2 F | RESPIRATION RATE: 18 BRPM | SYSTOLIC BLOOD PRESSURE: 143 MMHG

## 2022-03-03 DIAGNOSIS — T14.8XXA OPEN WOUND: Primary | ICD-10-CM

## 2022-03-03 DIAGNOSIS — S81.801S WOUND OF RIGHT LOWER EXTREMITY, SEQUELA: ICD-10-CM

## 2022-03-03 DIAGNOSIS — S81.802S WOUND OF LEFT LOWER EXTREMITY, SEQUELA: ICD-10-CM

## 2022-03-03 RX ORDER — BUSPIRONE HYDROCHLORIDE 10 MG/1
TABLET ORAL
Qty: 90 TABLET | Refills: 3 | OUTPATIENT
Start: 2022-03-03

## 2022-03-03 NOTE — TELEPHONE ENCOUNTER
I have attempted to call this pt to schedule an appt. Phone does not ring and the message states that the call cannot be completed at this time. Will try again

## 2022-03-03 NOTE — HOME HEALTH
FOCUS OF CARE/SKILLED NEED: DM home management, pain management, lympedema management    TEACHING/INTERVENTIONS:     PROGRESS TOWARD GOALS:    PHYSICIAN CONTACT: none    FALLS SINCE LAST VISIT?  none    MEDICATION CHANGES SINCE LAST VISIT?  none    PLAN FOR NEXT VISIT:

## 2022-03-03 NOTE — TELEPHONE ENCOUNTER
PATIENT WAS CALLED, GIVEN MESSAGE AND UNDERSTANDING VOICED.     PATIENT IS NEEDING AN ORDER FOR CUSTOM SHOES AND INSOLES SENT TO [facility].    PATIENT STATED THAT THIS REQUEST WAS SENT A FEW WEEKS AGO AND THEY HAVE NOT HEARD ANYTHING.  PLEASE ADVISE.

## 2022-03-03 NOTE — TELEPHONE ENCOUNTER
Caller: Lisa    Relationship to patient: Home Health    Best call back number: [phone]    Patient is needing:  Pt agreed to go to wound care -she will only go to [facility] and wants a specific provider. Lisa asked that someone call her when this is ordered and she will reach out to [facility] wound care and arrange for the patient.

## 2022-03-03 NOTE — TELEPHONE ENCOUNTER
----- Message from SHOLA Jordan sent at 3/3/2022  8:42 AM CST -----  Either will be fine. Thank you!  ----- Message -----  From: Eli Mir MA  Sent: 3/3/2022   8:37 AM CST  To: SHOLA Jordan    You want her with you or [facility]? :)  ----- Message -----  From: Viri Doty APRN  Sent: 3/3/2022   8:35 AM CST  [facility]    Will you please call patient and see if we can get her on the schedule for a follow-up visit.  Home health has messaged about worsening wound issues and hypertension and diabetes.  We have to see her regularly to be able to manage these issues well. Thanks!

## 2022-03-03 NOTE — TELEPHONE ENCOUNTER
PATIENT RETURNED CALL, GIVEN MESSAGE AND STATED THAT SHE WOULD CALL BACK TO MAKE AN APPOINTMENT WHEN SHE HAD HER CALENDER IN FRONT OF HER.

## 2022-03-03 NOTE — TELEPHONE ENCOUNTER
This will require an appointment with Dr. Hurst.  A particular diabetic foot exam must be done by an MD to order these. Thanks

## 2022-03-04 ENCOUNTER — HOME CARE VISIT (OUTPATIENT)
Dept: HOME HEALTH SERVICES | Facility: CLINIC | Age: 52
End: 2022-03-04

## 2022-03-04 PROCEDURE — G0300 HHS/HOSPICE OF LPN EA 15 MIN: HCPCS

## 2022-03-06 VITALS
OXYGEN SATURATION: 97 % | HEART RATE: 84 BPM | TEMPERATURE: 98.4 F | RESPIRATION RATE: 18 BRPM | SYSTOLIC BLOOD PRESSURE: 138 MMHG | DIASTOLIC BLOOD PRESSURE: 76 MMHG

## 2022-03-06 NOTE — HOME HEALTH
FOCUS OF CARE/SKILLED NEED:  Edema management, wound care, s/s of infection    TEACHING/INTERVENTIONS: Edema management, wound care, s/s of infection    PROGRESS TOWARD GOALS: wounds are healing without s/s of infection    PHYSICIAN CONTACT: none    FALLS SINCE LAST VISIT? none    MEDICATION CHANGES SINCE LAST VISIT?  none    PLAN FOR NEXT VISIT wound care, s/s of infection

## 2022-03-07 ENCOUNTER — HOSPITAL ENCOUNTER (OUTPATIENT)
Dept: WOUND CARE | Age: 52
Discharge: HOME | End: 2022-03-07
Payer: MEDICAID

## 2022-03-07 VITALS
RESPIRATION RATE: 20 BRPM | HEIGHT: 61 IN | SYSTOLIC BLOOD PRESSURE: 158 MMHG | TEMPERATURE: 96.2 F | HEART RATE: 90 BPM | DIASTOLIC BLOOD PRESSURE: 80 MMHG | WEIGHT: 293 LBS | BODY MASS INDEX: 55.32 KG/M2

## 2022-03-07 DIAGNOSIS — I89.0 LYMPHEDEMA OF BOTH LOWER EXTREMITIES: ICD-10-CM

## 2022-03-07 DIAGNOSIS — L03.116 BILATERAL LOWER LEG CELLULITIS: ICD-10-CM

## 2022-03-07 DIAGNOSIS — L97.912 SKIN ULCER OF RIGHT LOWER LEG, WITH FAT LAYER EXPOSED (HCC): ICD-10-CM

## 2022-03-07 DIAGNOSIS — E11.622 DIABETIC ULCER OF LEFT LOWER LEG ASSOCIATED WITH TYPE 2 DIABETES MELLITUS, WITH FAT LAYER EXPOSED (HCC): Chronic | ICD-10-CM

## 2022-03-07 DIAGNOSIS — E11.65 UNCONTROLLED TYPE 2 DIABETES MELLITUS WITH HYPERGLYCEMIA, WITHOUT LONG-TERM CURRENT USE OF INSULIN (HCC): Chronic | ICD-10-CM

## 2022-03-07 DIAGNOSIS — I87.2 PERIPHERAL VENOUS INSUFFICIENCY: Primary | ICD-10-CM

## 2022-03-07 DIAGNOSIS — L03.115 BILATERAL LOWER LEG CELLULITIS: ICD-10-CM

## 2022-03-07 DIAGNOSIS — L97.922 DIABETIC ULCER OF LEFT LOWER LEG ASSOCIATED WITH TYPE 2 DIABETES MELLITUS, WITH FAT LAYER EXPOSED (HCC): Chronic | ICD-10-CM

## 2022-03-07 DIAGNOSIS — E66.01 MORBID OBESITY DUE TO EXCESS CALORIES (HCC): Chronic | ICD-10-CM

## 2022-03-07 DIAGNOSIS — I87.2 VENOUS INSUFFICIENCY OF BOTH LOWER EXTREMITIES: ICD-10-CM

## 2022-03-07 PROCEDURE — 99214 OFFICE O/P EST MOD 30 MIN: CPT

## 2022-03-07 PROCEDURE — 99213 OFFICE O/P EST LOW 20 MIN: CPT | Performed by: NURSE PRACTITIONER

## 2022-03-07 RX ORDER — ONDANSETRON 4 MG/1
4 TABLET, FILM COATED ORAL 3 TIMES DAILY PRN
Qty: 30 TABLET | Refills: 0 | Status: SHIPPED | OUTPATIENT
Start: 2022-03-07

## 2022-03-07 RX ORDER — TOPIRAMATE 25 MG/1
1 TABLET ORAL NIGHTLY
COMMUNITY
Start: 2022-02-21

## 2022-03-07 RX ORDER — FLUCONAZOLE 150 MG/1
150 TABLET ORAL DAILY
Qty: 3 TABLET | Refills: 0 | Status: SHIPPED | OUTPATIENT
Start: 2022-03-07 | End: 2022-03-10

## 2022-03-07 RX ORDER — DOXYCYCLINE 100 MG/1
100 CAPSULE ORAL 2 TIMES DAILY
Qty: 60 CAPSULE | Refills: 0 | Status: SHIPPED | OUTPATIENT
Start: 2022-03-07 | End: 2022-04-06

## 2022-03-07 ASSESSMENT — PAIN DESCRIPTION - LOCATION: LOCATION: LEG

## 2022-03-07 ASSESSMENT — PAIN DESCRIPTION - PAIN TYPE: TYPE: ACUTE PAIN

## 2022-03-07 ASSESSMENT — VISUAL ACUITY: OU: 1

## 2022-03-07 ASSESSMENT — PAIN DESCRIPTION - ORIENTATION: ORIENTATION: RIGHT;LEFT

## 2022-03-07 ASSESSMENT — PAIN SCALES - GENERAL: PAINLEVEL_OUTOF10: 6

## 2022-03-07 NOTE — PROGRESS NOTES
Patient Care Team:  NANETTE Perez - CNP as PCP - General (Family Nurse Practitioner)  NANETTE Calzada as Advanced Practice Nurse (Neurology)    TODAY'S DATE:  3/7/2022     HISTORY of PRESENTILLNESS HPI   Clare Trivedi is a 54 y.o. female who presents today for wound evaluation. She reports she developed a wound on right and left legs. This started 3 week(s) ago. She believes this is not healing. She has been applying silvercel and unna boot per [facility]. She has not had  fever or chills. She has a history of diabetes mellitus and lymphedema. Wound Type:venous  Wound Location:left and right legs  Modifying factors:edema, lymphedema, diabetes, poor glucose control, obesity, malnutrition and poor hygiene    Patient Active Problem List   Diagnosis Code    RLS (restless legs syndrome) G25.81    Hypertension I10    Headache R51.9    Venous insufficiency of both lower extremities I87.2    Mild single current episode of major depressive disorder ([facility]) F32.0    [place] (generalized anxiety disorder) F41.1    Morbid obesity due to excess calories ([facility]) E66.01    Old complex tear of medial meniscus of left knee M23.204    Uncontrolled type 2 diabetes mellitus with hyperglycemia, without long-term current use of insulin ([facility]) E11.65    Migraine without aura and without status migrainosus, not intractable G43.009    Morbid obesity with BMI of 50.0-59.9, adult ([facility]) E66.01, Z68.43    Uncontrolled type 2 diabetes mellitus with diabetic neuropathy, with long-term current use of insulin ([facility]) E11.40, Z79.4, E11.65    Dyspnea R06.00    Upper respiratory tract infection J06.9    Pleurisy R09.1    Gastroesophageal reflux disease K21.9    Hypoglycemia E16.2    Hypokalemia E87.6    Recurrent cellulitis of lower extremity L03.119    Bilateral lower leg cellulitis L03.116, L03.115    Cellulitis of both lower extremities L03.115, L03. 116    Lymphedema of both lower extremities I89.0    Diabetic ulcer of left lower leg associated with type 2 diabetes mellitus, with fat layer exposed ([facility] [address] E11.622, T00.398    Stage 3 chronic kidney disease (HCC) N18.30    Stasis dermatitis I87.2    Chronic pain G89.29    Polypharmacy Z79.899    Peripheral venous insufficiency I87.2    Personal history of allergy to antibiotic agent Z88.1    Skin ulcer of right lower leg, with fat layer exposed [facility]) L97.912       Tianna Trivedi is a 54 y.o. female with the following history reviewed and recorded in [facility]:    Current Outpatient Medications   Medication Sig Dispense Refill    doxycycline monohydrate (MONODOX) 100 MG capsule Take 1 capsule by mouth 2 times daily 60 capsule 0    ondansetron (ZOFRAN) 4 MG tablet Take 1 tablet by mouth 3 times daily as needed for Nausea or Vomiting 30 tablet 0    fluconazole (DIFLUCAN) 150 MG tablet Take 1 tablet by mouth daily for 3 days 3 tablet 0    losartan (COZAAR) 25 MG tablet Take 25 mg by mouth daily      insulin glargine (LANTUS SOLOSTAR) 100 UNIT/ML injection pen Inject 30 Units into the skin every evening (Patient taking differently: Inject 30 Units into the skin every evening Has been taking 10 units tid with meals) 5 pen 5    pramipexole (MIRAPEX) 0.75 MG tablet TAKE 1 TABLET BY MOUTH TWICE DAILY 60 tablet 5    potassium chloride (KLOR-CON M) 20 MEQ extended release tablet Take 1 tablet by mouth daily as needed (low potassium) 30 tablet 0    ondansetron (ZOFRAN-ODT) 4 MG disintegrating tablet DISSOLVE 1 TABLET UNDER THE TONGUE EVERY 8 HOURS AS NEEDED FOR NAUSEA AND VOMITING PRIOR TO DOXYCYCLINE DOSES 20 tablet 0    Liraglutide (VICTOZA) 18 MG/3ML SOPN SC injection ADMINISTER 1.8 MG UNDER THE SKIN EVERY DAY 9 mL 3    mirtazapine (REMERON) 15 MG tablet TAKE 1 TABLET BY MOUTH EVERY NIGHT 30 tablet 3    busPIRone (BUSPAR) 10 MG tablet TAKE 1 TABLET BY MOUTH THREE TIMES DAILY 90 tablet 3    insulin lispro, 1 Unit Dial, (ADMELOG SOLOSTAR) 100 UNIT/ML SOPN ADMINISTER 45 UNITS UNDER THE SKIN EVERY EVENING. INCREASE BY 3 UNITS EVERY NIGHT UNTIL 60 UNITS IS REACHED AND. CONTINUE 60 UNITS EVERY EVENING (Patient taking differently: 45 UNITS EVERY EVENING) 18 mL 5    fluticasone (FLONASE) 50 MCG/ACT nasal spray SHAKE LIQUID AND USE 1 SPRAY IN EACH NOSTRIL DAILY 16 g 0    furosemide (LASIX) 80 MG tablet Take 0.5 tablets by mouth 2 times daily 60 tablet 0    DULoxetine (CYMBALTA) 60 MG extended release capsule TAKE 1 CAPSULE BY MOUTH TWICE DAILY 30 capsule 5    omeprazole (PRILOSEC) 20 MG delayed release capsule TAKE ONE CAPSULE BY MOUTH TWICE DAILY BEFORE MEALS 180 capsule 1    oxyCODONE-acetaminophen (PERCOCET) 7.5-325 MG per tablet Take 1 tablet by mouth 2 times daily.  NONFORMULARY Prednisone eye drops 1 drop in each eye daily      Morphine Sulfate ER 15 MG T12A Take 15 mg by mouth 2 times daily.  Cream Base CREA APPLY ONE PUMP (GRAM) TO AFFECTED AREA(S) THREE TO FOUR TIMES A DAY TO THE APPENDAGES AND TRUNK AS DIRECTED 30 g 5    celecoxib (CELEBREX) 200 MG capsule TAKE 1 CAPSULE BY MOUTH EVERY DAY 60 capsule 5    gabapentin (NEURONTIN) 300 MG capsule Take 300 mg by mouth 3 times daily. 5    topiramate (TOPAMAX) 25 MG tablet Take 1 tablet by mouth daily      blood glucose test strips (ONETOUCH ULTRA) strip USE TO TEST BLOOD SUGAR THREE TIMES DAILY AND AS NEEDED AS DIRECTED 150 strip 5    Lancets (ONETOUCH DELICA PLUS [id number]) MISC USE TO CHECK BLOOD SUGAR AS DIRECTED 100 each 5    Insulin Pen Needle ([employer] PEN NEEDLES) 31G X 6 MM MISC 1 each by Does not apply route daily 100 each 3    Diabetic Shoe MISC by Does not apply route DISPENSE ONE PAIR OF DIABETIC SHOE AND 3 PAIRS HEAT MOLDED INSERTS 1 each 0    blood glucose monitor kit and supplies Test 1 times a day & as needed for symptoms of irregular blood glucose.  1 kit 0    Blood Glucose Monitoring Suppl ([address]) w/Device KIT 1 kit by Does not apply route daily as needed (Dx 250.00) 1 kit 0    ONE TOUCH LANCETS MISC 1 each by Does not apply route daily 100 each 3     No current facility-administered medications for this encounter.      Allergies: Compazine [prochlorperazine], Ciprofloxacin, Calamine, Prochlorperazine edisylate, Tobramycin, Tramadol, Amoxicillin-pot clavulanate, Ancef [cefazolin], Bactrim [sulfamethoxazole-trimethoprim], Cephalexin, Clindamycin/lincomycin, Codeine, Demerol, Doxycycline, Hydroxyzine hcl, Ibuprofen, Keflex [cephalexin], Meperidine, Moxifloxacin, Nortriptyline, Orphenadrine citrate, Penicillins, and Vistaril [hydroxyzine hcl]  Past Medical History:   Diagnosis Date    Anxiety     Constipation     Depression     Headache(784.0)     Hyperlipidemia     Hypertension     Kidney stones     Kidney stones     Morbid obesity due to excess calories ([address]     Restless legs syndrome     Type 2 diabetes mellitus ([facility] [address]        Past Surgical History:   Procedure Laterality Date    ECTOPIC PREGNANCY SURGERY  2002    EYE SURGERY Bilateral      & : cornea transplant and cataracts removed    KNEE CARTILAGE SURGERY Left 2016    KNEE ARTHROSCOPIC PARTIAL MEDIAL MENISCECTOMY performed by Jose De Oliveira MD at [address] [place] [address]       Family History   Problem Relation Age of Onset    Obesity Mother     Arthritis Mother         has had knees replaced    Cancer Father         \"adrenal gland & lung\"    Parkinsonism Father     Cancer Maternal Grandmother     COPD Maternal Grandmother     Obesity Maternal Grandmother     Heart Failure Maternal Grandmother     Cancer Maternal Grandfather     Other Sister         spina biffida    No Known Problems Son     No Known Problems Son     No Known Problems Daughter     Obesity Maternal Aunt     Other Maternal Aunt          of sepsis related to a spider bite     Social History     Tobacco Use    Smoking status: Never Smoker    Smokeless tobacco: Never Used   Substance Use Topics    Alcohol use: Not Currently     Comment: \"socially, not recently though\"         Review of Systems    Review of Systems   Cardiovascular: Positive for leg swelling. Skin: Positive for wound. All other systems reviewed and are negative. All other review of systems are negative. Physical Exam    BP (!) 158/80   Pulse 90   Temp 96.2 °F (35.7 °C) (Temporal)   Resp 20   Ht 5' 1\" (1.549 m)   Wt (!) 320 lb (145.2 kg)   BMI 60.46 kg/m²     Physical Exam  Vitals reviewed. Constitutional:       Appearance: Normal appearance. She is obese. HENT:      Head: Normocephalic and atraumatic. Right Ear: External ear normal.      Left Ear: External ear normal.   Eyes:      General: Lids are normal. Lids are everted, no foreign bodies appreciated. Vision grossly intact. Gaze aligned appropriately. Cardiovascular:      Rate and Rhythm: Normal rate and regular rhythm. Pulses: Normal pulses. Heart sounds: Normal heart sounds. Pulmonary:      Effort: Pulmonary effort is normal.      Breath sounds: Normal breath sounds. Abdominal:      General: Bowel sounds are normal.   Musculoskeletal:         General: Swelling present. Normal range of motion. Right lower leg: Edema present. Left lower leg: Edema present. Skin:     General: Skin is warm and dry. Capillary Refill: Capillary refill takes 2 to 3 seconds. Findings: Wound present. Neurological:      Mental Status: She is alert and oriented to person, place, and time. Psychiatric:         Mood and Affect: Mood normal.         Behavior: Behavior normal.         Thought Content: Thought content normal.         Judgment: Judgment normal.             Post Debridement Measurements and Assessment:    The patientspain isPain Level: 6 Pain Type: Acute pain. Please refer to nursing measurements and assessment regarding wound pre and postdebridement.     Wound 03/07/22 Pretibial Right;Lateral wound 3- right lateral venous (Active)   Wound Image    03/07/22 1027   Wound Etiology Venous 03/07/22 1027   Dressing Status Old drainage noted 03/07/22 1027   Wound Cleansed Soap and water 03/07/22 1027   Dressing/Treatment Other (comment) 03/07/22 1138   Wound Length (cm) 1 cm 03/07/22 1027   Wound Width (cm) 1.5 cm 03/07/22 1027   Wound Depth (cm) 0.1 cm 03/07/22 1027   Wound Surface Area (cm^2) 1.5 cm^2 03/07/22 1027   Wound Volume (cm^3) 0.15 cm^3 03/07/22 1027   Wound Assessment Slough 03/07/22 1027   Drainage Amount Moderate 03/07/22 1027   Drainage Description Serosanguinous 03/07/22 1027   Odor None 03/07/22 1027   Octavia-wound Assessment Blanchable erythema 03/07/22 1027   Margins Defined edges 03/07/22 1027   Wound Thickness Description not for Pressure Injury Full thickness 03/07/22 1027   Number of days: 0       Wound 03/07/22 Tibial Posterior; Left wound 1- left posterior venous (Active)   Wound Image    03/07/22 1027   Wound Etiology Venous 03/07/22 1027   Dressing Status Old drainage noted 03/07/22 1027   Wound Cleansed Soap and water 03/07/22 1027   Wound Length (cm) 2.2 cm 03/07/22 1027   Wound Width (cm) 2.5 cm 03/07/22 1027   Wound Depth (cm) 1.8 cm 03/07/22 1027   Wound Surface Area (cm^2) 5.5 cm^2 03/07/22 1027   Wound Volume (cm^3) 9.9 cm^3 03/07/22 1027   Wound Assessment Slough 03/07/22 1027   Drainage Amount Moderate 03/07/22 1027   Drainage Description Serosanguinous 03/07/22 1027   Odor None 03/07/22 1027   Octavia-wound Assessment Blanchable erythema 03/07/22 1027   Margins Defined edges 03/07/22 1027   Wound Thickness Description not for Pressure Injury Full thickness 03/07/22 1027   Number of days: 0       Wound 03/07/22 Tibial Posterior;Right wound 2- right posterior venous (Active)   Wound Image   03/07/22 1027   Wound Etiology Venous 03/07/22 1027   Dressing Status Old drainage noted 03/07/22 1027   Wound Cleansed Soap and water 03/07/22 1027   Wound Length (cm) 2 cm 03/07/22 1027   Wound Width (cm) 1.8 cm 03/07/22 1027   Wound Depth (cm) 0.5 cm 03/07/22 1027   Wound Surface Area (cm^2) 3.6 cm^2 03/07/22 1027   Wound Volume (cm^3) 1.8 cm^3 03/07/22 1027   Wound Assessment Slough 03/07/22 1027   Drainage Amount Moderate 03/07/22 1027   Drainage Description Serosanguinous 03/07/22 1027   Odor None 03/07/22 1027   Octavia-wound Assessment Blanchable erythema 03/07/22 1027   Margins Defined edges 03/07/22 1027   Wound Thickness Description not for Pressure Injury Full thickness 03/07/22 1027   Number of days: 0          Assessment    1. Peripheral venous insufficiency    2. Lymphedema of both lower extremities    3. Bilateral lower leg cellulitis    4. Skin ulcer of right lower leg, with fat layer exposed ([address]    5. Morbid obesity due to excess calories ([address]    6. Uncontrolled type 2 diabetes mellitus with hyperglycemia, without long-term current use of insulin ([address]    7. Diabetic ulcer of left lower leg associated with type 2 diabetes mellitus, with fat layer exposed ([address]    8. Venous insufficiency of both lower extremities          Plan    1. Antibiotics for cellulitis    Plan for wound - Dress per physician order  Treatment:     Compression : Yes   Offloading : No   Dressing : Bilateral leg wounds: Soap and water wash, apply silvercel AG to the wound bed, secure with optilock, 4x4 and ABD pad over the wound bed, rolled gauze and ace wrap daily    Discussed importance of nutrition, glucose control, leg elevation, weight management, leg compression, antibiotic/probiotic need, wound care, and plan of care. Patient understanding and questions answered. I spent a total of 30 minutes face to face with the patient. Over 90% of that time was spent on counseling and care coordination. Patient was told that if symptoms worsen or new symptoms develop they are to go to the emergency department immediately. Patient was educated on diagnosis and treatment plan.   All of patient's questions were answered, and the patient understands

## 2022-03-08 ENCOUNTER — HOME CARE VISIT (OUTPATIENT)
Dept: HOME HEALTH SERVICES | Facility: CLINIC | Age: 52
End: 2022-03-08

## 2022-03-08 ENCOUNTER — TELEPHONE (OUTPATIENT)
Dept: INTERNAL MEDICINE | Facility: CLINIC | Age: 52
End: 2022-03-08

## 2022-03-08 NOTE — TELEPHONE ENCOUNTER
Received call from  nurse Arlene, she states p[t is asking for rocephin shot and is wanting a referral for weight loss. Also in previous notes pt needs a MD appt for diabetic shoes. I attemped to call the pt and schedule her for 3 pm today with Dr. Manuel. No answer. NO VM.

## 2022-03-08 NOTE — TELEPHONE ENCOUNTER
"Pt returned call. She states that her feet are much to swollen to be fitted for shoes so she wants to wait on the appointment for that. I did inform her that we do not do weight loss management here but we could refer her to someone and she said she \"didn't want to go doctor to doctor\". Also, pt declined appt for rocephin shot because I explained to her that I can't say for sure that he would give her a shot until after she is seen here in the office. She said if she didn't know fore sure she was going to get a shot she would just wait on an appointment.   "

## 2022-03-09 ENCOUNTER — APPOINTMENT (OUTPATIENT)
Dept: GENERAL RADIOLOGY | Age: 52
DRG: 638 | End: 2022-03-09
Payer: MEDICAID

## 2022-03-09 ENCOUNTER — OFFICE VISIT (OUTPATIENT)
Age: 52
End: 2022-03-09

## 2022-03-09 ENCOUNTER — HOSPITAL ENCOUNTER (INPATIENT)
Age: 52
LOS: 1 days | Discharge: LEFT BEFORE DISCHARGE | DRG: 638 | End: 2022-03-10
Attending: INTERNAL MEDICINE | Admitting: INTERNAL MEDICINE
Payer: MEDICAID

## 2022-03-09 VITALS
RESPIRATION RATE: 22 BRPM | WEIGHT: 293 LBS | TEMPERATURE: 98.3 F | SYSTOLIC BLOOD PRESSURE: 140 MMHG | OXYGEN SATURATION: 98 % | HEIGHT: 61 IN | HEART RATE: 94 BPM | DIASTOLIC BLOOD PRESSURE: 80 MMHG | BODY MASS INDEX: 55.32 KG/M2

## 2022-03-09 VITALS
HEART RATE: 89 BPM | DIASTOLIC BLOOD PRESSURE: 76 MMHG | OXYGEN SATURATION: 95 % | RESPIRATION RATE: 22 BRPM | SYSTOLIC BLOOD PRESSURE: 137 MMHG | HEIGHT: 61 IN | BODY MASS INDEX: 55.32 KG/M2 | WEIGHT: 293 LBS | TEMPERATURE: 96.9 F

## 2022-03-09 DIAGNOSIS — L03.119 CELLULITIS OF LOWER EXTREMITY, UNSPECIFIED LATERALITY: Primary | ICD-10-CM

## 2022-03-09 PROBLEM — L03.90 CELLULITIS: Status: ACTIVE | Noted: 2022-03-09

## 2022-03-09 LAB
ALBUMIN SERPL-MCNC: 3.9 G/DL (ref 3.5–5.2)
ALP BLD-CCNC: 82 U/L (ref 35–104)
ALT SERPL-CCNC: 14 U/L (ref 5–33)
ANION GAP SERPL CALCULATED.3IONS-SCNC: 13 MMOL/L (ref 7–19)
AST SERPL-CCNC: 19 U/L (ref 5–32)
BASOPHILS ABSOLUTE: 0 K/UL (ref 0–0.2)
BASOPHILS RELATIVE PERCENT: 0.5 % (ref 0–1)
BILIRUB SERPL-MCNC: <0.2 MG/DL (ref 0.2–1.2)
BILIRUBIN URINE: NEGATIVE
BLOOD, URINE: NEGATIVE
BUN BLDV-MCNC: 19 MG/DL (ref 6–20)
C-REACTIVE PROTEIN: 11.24 MG/DL (ref 0–0.5)
CALCIUM SERPL-MCNC: 9.5 MG/DL (ref 8.6–10)
CHLORIDE BLD-SCNC: 99 MMOL/L (ref 98–111)
CLARITY: CLEAR
CO2: 25 MMOL/L (ref 22–29)
COLOR: YELLOW
CREAT SERPL-MCNC: 0.9 MG/DL (ref 0.5–0.9)
EOSINOPHILS ABSOLUTE: 0.2 K/UL (ref 0–0.6)
EOSINOPHILS RELATIVE PERCENT: 2.9 % (ref 0–5)
GFR AFRICAN AMERICAN: >59
GFR NON-AFRICAN AMERICAN: >60
GLUCOSE BLD-MCNC: 116 MG/DL (ref 70–99)
GLUCOSE BLD-MCNC: 127 MG/DL (ref 74–109)
GLUCOSE URINE: NEGATIVE MG/DL
HBA1C MFR BLD: 11.4 % (ref 4–6)
HCT VFR BLD CALC: 38.3 % (ref 37–47)
HEMOGLOBIN: 11.3 G/DL (ref 12–16)
IMMATURE GRANULOCYTES #: 0.1 K/UL
KETONES, URINE: NEGATIVE MG/DL
LACTIC ACID: 1.1 MMOL/L (ref 0.5–1.9)
LEUKOCYTE ESTERASE, URINE: NEGATIVE
LYMPHOCYTES ABSOLUTE: 2.9 K/UL (ref 1.1–4.5)
LYMPHOCYTES RELATIVE PERCENT: 36.1 % (ref 20–40)
MCH RBC QN AUTO: 24.7 PG (ref 27–31)
MCHC RBC AUTO-ENTMCNC: 29.5 G/DL (ref 33–37)
MCV RBC AUTO: 83.8 FL (ref 81–99)
MONOCYTES ABSOLUTE: 0.6 K/UL (ref 0–0.9)
MONOCYTES RELATIVE PERCENT: 6.8 % (ref 0–10)
NEUTROPHILS ABSOLUTE: 4.3 K/UL (ref 1.5–7.5)
NEUTROPHILS RELATIVE PERCENT: 52.7 % (ref 50–65)
NITRITE, URINE: NEGATIVE
PDW BLD-RTO: 15 % (ref 11.5–14.5)
PERFORMED ON: ABNORMAL
PH UA: 5 (ref 5–8)
PLATELET # BLD: 259 K/UL (ref 130–400)
PMV BLD AUTO: 9.1 FL (ref 9.4–12.3)
POTASSIUM REFLEX MAGNESIUM: 3.8 MMOL/L (ref 3.5–5)
PRO-BNP: 521 PG/ML (ref 0–450)
PROTEIN UA: NEGATIVE MG/DL
RBC # BLD: 4.57 M/UL (ref 4.2–5.4)
SARS-COV-2, NAAT: NOT DETECTED
SEDIMENTATION RATE, ERYTHROCYTE: 85 MM/HR (ref 0–20)
SODIUM BLD-SCNC: 137 MMOL/L (ref 136–145)
SPECIFIC GRAVITY UA: 1.02 (ref 1–1.03)
TOTAL PROTEIN: 7.9 G/DL (ref 6.6–8.7)
UROBILINOGEN, URINE: 0.2 E.U./DL
WBC # BLD: 8.1 K/UL (ref 4.8–10.8)

## 2022-03-09 PROCEDURE — 71045 X-RAY EXAM CHEST 1 VIEW: CPT

## 2022-03-09 PROCEDURE — 6360000002 HC RX W HCPCS: Performed by: NURSE PRACTITIONER

## 2022-03-09 PROCEDURE — 1210000000 HC MED SURG R&B

## 2022-03-09 PROCEDURE — 96375 TX/PRO/DX INJ NEW DRUG ADDON: CPT

## 2022-03-09 PROCEDURE — 99284 EMERGENCY DEPT VISIT MOD MDM: CPT

## 2022-03-09 PROCEDURE — OUTSIDEPOS PR OUTSIDE POS PLACEHOLDER: Performed by: NURSE PRACTITIONER

## 2022-03-09 PROCEDURE — 87205 SMEAR GRAM STAIN: CPT

## 2022-03-09 PROCEDURE — 82947 ASSAY GLUCOSE BLOOD QUANT: CPT

## 2022-03-09 PROCEDURE — 36415 COLL VENOUS BLD VENIPUNCTURE: CPT

## 2022-03-09 PROCEDURE — 86140 C-REACTIVE PROTEIN: CPT

## 2022-03-09 PROCEDURE — 83605 ASSAY OF LACTIC ACID: CPT

## 2022-03-09 PROCEDURE — 96374 THER/PROPH/DIAG INJ IV PUSH: CPT

## 2022-03-09 PROCEDURE — 87040 BLOOD CULTURE FOR BACTERIA: CPT

## 2022-03-09 PROCEDURE — 85652 RBC SED RATE AUTOMATED: CPT

## 2022-03-09 PROCEDURE — 83880 ASSAY OF NATRIURETIC PEPTIDE: CPT

## 2022-03-09 PROCEDURE — 6360000002 HC RX W HCPCS

## 2022-03-09 PROCEDURE — 87186 SC STD MICRODIL/AGAR DIL: CPT

## 2022-03-09 PROCEDURE — 99999 PR OFFICE/OUTPT VISIT,PROCEDURE ONLY: CPT

## 2022-03-09 PROCEDURE — 81003 URINALYSIS AUTO W/O SCOPE: CPT

## 2022-03-09 PROCEDURE — 87070 CULTURE OTHR SPECIMN AEROBIC: CPT

## 2022-03-09 PROCEDURE — 85025 COMPLETE CBC W/AUTO DIFF WBC: CPT

## 2022-03-09 PROCEDURE — 80053 COMPREHEN METABOLIC PANEL: CPT

## 2022-03-09 PROCEDURE — 83036 HEMOGLOBIN GLYCOSYLATED A1C: CPT

## 2022-03-09 PROCEDURE — 2580000003 HC RX 258

## 2022-03-09 PROCEDURE — 96376 TX/PRO/DX INJ SAME DRUG ADON: CPT

## 2022-03-09 PROCEDURE — 87635 SARS-COV-2 COVID-19 AMP PRB: CPT

## 2022-03-09 RX ORDER — SODIUM CHLORIDE 9 MG/ML
25 INJECTION, SOLUTION INTRAVENOUS PRN
Status: DISCONTINUED | OUTPATIENT
Start: 2022-03-09 | End: 2022-03-10 | Stop reason: HOSPADM

## 2022-03-09 RX ORDER — DEXTROSE MONOHYDRATE 25 G/50ML
12.5 INJECTION, SOLUTION INTRAVENOUS PRN
Status: DISCONTINUED | OUTPATIENT
Start: 2022-03-09 | End: 2022-03-09 | Stop reason: CLARIF

## 2022-03-09 RX ORDER — SODIUM CHLORIDE 0.9 % (FLUSH) 0.9 %
5-40 SYRINGE (ML) INJECTION PRN
Status: DISCONTINUED | OUTPATIENT
Start: 2022-03-09 | End: 2022-03-10 | Stop reason: HOSPADM

## 2022-03-09 RX ORDER — OXYCODONE HYDROCHLORIDE AND ACETAMINOPHEN 5; 325 MG/1; MG/1
1 TABLET ORAL EVERY 4 HOURS PRN
Status: DISCONTINUED | OUTPATIENT
Start: 2022-03-09 | End: 2022-03-10 | Stop reason: HOSPADM

## 2022-03-09 RX ORDER — ONDANSETRON 4 MG/1
4 TABLET, ORALLY DISINTEGRATING ORAL EVERY 8 HOURS PRN
Status: DISCONTINUED | OUTPATIENT
Start: 2022-03-09 | End: 2022-03-10 | Stop reason: HOSPADM

## 2022-03-09 RX ORDER — NICOTINE POLACRILEX 4 MG
15 LOZENGE BUCCAL PRN
Status: DISCONTINUED | OUTPATIENT
Start: 2022-03-09 | End: 2022-03-09 | Stop reason: CLARIF

## 2022-03-09 RX ORDER — ACETAMINOPHEN 650 MG/1
650 SUPPOSITORY RECTAL EVERY 6 HOURS PRN
Status: DISCONTINUED | OUTPATIENT
Start: 2022-03-09 | End: 2022-03-10 | Stop reason: HOSPADM

## 2022-03-09 RX ORDER — TRIAMTERENE AND HYDROCHLOROTHIAZIDE 37.5; 25 MG/1; MG/1
1 CAPSULE ORAL DAILY
Status: ON HOLD | COMMUNITY
End: 2022-04-12

## 2022-03-09 RX ORDER — SODIUM CHLORIDE 0.9 % (FLUSH) 0.9 %
5-40 SYRINGE (ML) INJECTION EVERY 12 HOURS SCHEDULED
Status: DISCONTINUED | OUTPATIENT
Start: 2022-03-09 | End: 2022-03-10 | Stop reason: HOSPADM

## 2022-03-09 RX ORDER — POLYETHYLENE GLYCOL 3350 17 G/17G
17 POWDER, FOR SOLUTION ORAL DAILY PRN
Status: DISCONTINUED | OUTPATIENT
Start: 2022-03-09 | End: 2022-03-10 | Stop reason: HOSPADM

## 2022-03-09 RX ORDER — DEXTROSE MONOHYDRATE 50 MG/ML
100 INJECTION, SOLUTION INTRAVENOUS PRN
Status: DISCONTINUED | OUTPATIENT
Start: 2022-03-09 | End: 2022-03-10 | Stop reason: HOSPADM

## 2022-03-09 RX ORDER — ONDANSETRON 2 MG/ML
4 INJECTION INTRAMUSCULAR; INTRAVENOUS EVERY 6 HOURS PRN
Status: DISCONTINUED | OUTPATIENT
Start: 2022-03-09 | End: 2022-03-10 | Stop reason: HOSPADM

## 2022-03-09 RX ORDER — ONDANSETRON 2 MG/ML
4 INJECTION INTRAMUSCULAR; INTRAVENOUS ONCE
Status: COMPLETED | OUTPATIENT
Start: 2022-03-09 | End: 2022-03-09

## 2022-03-09 RX ORDER — ACETAMINOPHEN 325 MG/1
650 TABLET ORAL EVERY 6 HOURS PRN
Status: DISCONTINUED | OUTPATIENT
Start: 2022-03-09 | End: 2022-03-10 | Stop reason: HOSPADM

## 2022-03-09 RX ORDER — HYDROMORPHONE HYDROCHLORIDE 1 MG/ML
0.5 INJECTION, SOLUTION INTRAMUSCULAR; INTRAVENOUS; SUBCUTANEOUS ONCE
Status: COMPLETED | OUTPATIENT
Start: 2022-03-09 | End: 2022-03-09

## 2022-03-09 RX ORDER — HYDROMORPHONE HYDROCHLORIDE 1 MG/ML
0.5 INJECTION, SOLUTION INTRAMUSCULAR; INTRAVENOUS; SUBCUTANEOUS EVERY 4 HOURS PRN
Status: DISCONTINUED | OUTPATIENT
Start: 2022-03-09 | End: 2022-03-10 | Stop reason: HOSPADM

## 2022-03-09 RX ADMIN — HYDROMORPHONE HYDROCHLORIDE 0.5 MG: 1 INJECTION, SOLUTION INTRAMUSCULAR; INTRAVENOUS; SUBCUTANEOUS at 19:59

## 2022-03-09 RX ADMIN — HYDROMORPHONE HYDROCHLORIDE 0.5 MG: 1 INJECTION, SOLUTION INTRAMUSCULAR; INTRAVENOUS; SUBCUTANEOUS at 22:39

## 2022-03-09 RX ADMIN — VANCOMYCIN HYDROCHLORIDE 1750 MG: 1 INJECTION, POWDER, LYOPHILIZED, FOR SOLUTION INTRAVENOUS at 22:42

## 2022-03-09 RX ADMIN — ENOXAPARIN SODIUM 40 MG: 100 INJECTION SUBCUTANEOUS at 22:39

## 2022-03-09 RX ADMIN — ONDANSETRON 4 MG: 2 INJECTION INTRAMUSCULAR; INTRAVENOUS at 19:06

## 2022-03-09 RX ADMIN — SODIUM CHLORIDE, PRESERVATIVE FREE 10 ML: 5 INJECTION INTRAVENOUS at 22:40

## 2022-03-09 RX ADMIN — HYDROMORPHONE HYDROCHLORIDE 0.5 MG: 1 INJECTION, SOLUTION INTRAMUSCULAR; INTRAVENOUS; SUBCUTANEOUS at 19:06

## 2022-03-09 ASSESSMENT — PAIN SCALES - GENERAL
PAINLEVEL_OUTOF10: 7
PAINLEVEL_OUTOF10: 6
PAINLEVEL_OUTOF10: 5
PAINLEVEL_OUTOF10: 7

## 2022-03-09 ASSESSMENT — ENCOUNTER SYMPTOMS
COLOR CHANGE: 1
GASTROINTESTINAL NEGATIVE: 1
RESPIRATORY NEGATIVE: 1

## 2022-03-09 NOTE — PROGRESS NOTES
Tianna presents today for complaints of swelling and redness to bilateral lower extremities. She was evaluated at [facility] wound care 2 days ago as a new patient and diagnosed with cellulitis. She was given doxycycline prescription and encouraged to go to ER if anything worsened. She was evaluated at [facility] yesterday for complaints of new fever and worsening weeping to her legs. They also encouraged her to go to ER. She presents today tearful and stating she cannot live like this anymore. She has had 30 lb weight gain over the last 2 days and BLE are erythematous and weeping. She is afebrile here. She is requesting rocephin shot and to be sent home. Lengthy discussion with patient that she needs IV antibiotics and potentially diuresis. Agreeable to go to [facility] ER with her . Report called to [facility] in ED. Pt left ambulatory with cane in stable condition.

## 2022-03-10 NOTE — ED PROVIDER NOTES
[facility] system [facility] ONCOLOGY UNIT  eMERGENCY dEPARTMENT eNCOUnter      Pt Name: Alejandra Cruz  MRN: [id number]  [place] 1970  Date of evaluation: 3/9/2022  Provider: NANETTE Barrera    CHIEF COMPLAINT       Chief Complaint   Patient presents with    Cellulitis     BLE         HISTORY OF PRESENT ILLNESS   (Location/Symptom, Timing/Onset,Context/Setting, Quality, Duration, Modifying Factors, Severity)  Note limiting factors. Alejandra Cruz is a 54 y.o. female who presents to the emergency department for worsening cellulitis and a weight gain of 30 pounds over the last 3 days. Pt states before this her weight has stayed consistent. Pt was seen in urgent care this morning and came to the ER due to their recommendation. Patient states wound care placed her on doxycycline on Monday after evaluating the worsening redness of both extremities. Pt states the redness has not improved. She also states she has had severe cramping pain to both lower extremities since Monday which is not typical for her usual neuropathy pain. HPI    NursingNotes were reviewed. REVIEW OF SYSTEMS    (2-9 systems for level 4, 10 or more for level 5)     Review of Systems    Except as noted above the remainder of the review of systems was reviewed and negative.        PAST MEDICAL HISTORY     Past Medical History:   Diagnosis Date    Anxiety     Constipation     Depression     GERD (gastroesophageal reflux disease)     Headache(784.0)     Hyperlipidemia     Hypertension     Kidney stones     Kidney stones     Morbid obesity due to excess calories (HCC)     Neuromuscular disorder (HCC)     Restless legs syndrome     Type 2 diabetes mellitus ([facility]          SURGICALHISTORY       Past Surgical History:   Procedure Laterality Date    ECTOPIC PREGNANCY SURGERY  2002    EYE SURGERY Bilateral     2005 & 2007: cornea transplant and cataracts removed    KNEE CARTILAGE SURGERY Left 12/20/2016    KNEE ARTHROSCOPIC PARTIAL MEDIAL MENISCECTOMY performed by Melisa Cody MD at [address]       Current Discharge Medication List      CONTINUE these medications which have NOT CHANGED    Details   triamterene-hydroCHLOROthiazide (DYAZIDE) 37.5-25 MG per capsule Take 1 capsule by mouth daily      topiramate (TOPAMAX) 25 MG tablet Take 1 tablet by mouth at bedtime       doxycycline monohydrate (MONODOX) 100 MG capsule Take 1 capsule by mouth 2 times daily  Qty: 60 capsule, Refills: 0      ondansetron (ZOFRAN) 4 MG tablet Take 1 tablet by mouth 3 times daily as needed for Nausea or Vomiting  Qty: 30 tablet, Refills: 0      insulin glargine (LANTUS SOLOSTAR) 100 UNIT/ML injection pen Inject 30 Units into the skin every evening  Qty: 5 pen, Refills: 5    Associated Diagnoses: Uncontrolled type 2 diabetes mellitus with diabetic neuropathy, with long-term current use of insulin ([address] Uncontrolled type 2 diabetes mellitus with hyperglycemia, without long-term current use of insulin ([facility])      blood glucose test strips (ONETOUCH ULTRA) strip USE TO TEST BLOOD SUGAR THREE TIMES DAILY AND AS NEEDED AS DIRECTED  Qty: 150 strip, Refills: 5    Associated Diagnoses: Uncontrolled type 2 diabetes mellitus with diabetic neuropathy, with long-term current use of insulin ([address] Uncontrolled type 2 diabetes mellitus with hyperglycemia, without long-term current use of insulin ([address]      ! ! Lancets ([address]) MISC USE TO CHECK BLOOD SUGAR AS DIRECTED  Qty: 100 each, Refills: 5    Associated Diagnoses: Uncontrolled type 2 diabetes mellitus with diabetic neuropathy, with long-term current use of insulin ([address]  Uncontrolled type 2 diabetes mellitus with hyperglycemia, without long-term current use of insulin ([facility])      pramipexole (MIRAPEX) 0.75 MG tablet TAKE 1 TABLET BY MOUTH TWICE DAILY  Qty: 60 tablet, Refills: 5    Associated Diagnoses: RLS (restless legs syndrome)      Insulin Pen Needle (Darryle Helling PEN NEEDLES) 31G X 6 MM MISC 1 each by Does not apply route daily  Qty: 100 each, Refills: 3    Associated Diagnoses: Uncontrolled type 2 diabetes mellitus with diabetic neuropathy, with long-term current use of insulin ([facility] Uncontrolled type 2 diabetes mellitus with hyperglycemia, without long-term current use of insulin ([facility])      potassium chloride (KLOR-CON M) 20 MEQ extended release tablet Take 1 tablet by mouth daily as needed (low potassium)  Qty: 30 tablet, Refills: 0    Associated Diagnoses: Bilateral leg cramps      ondansetron (ZOFRAN-ODT) 4 MG disintegrating tablet DISSOLVE 1 TABLET UNDER THE TONGUE EVERY 8 HOURS AS NEEDED FOR NAUSEA AND VOMITING PRIOR TO DOXYCYCLINE DOSES  Qty: 20 tablet, Refills: 0      Liraglutide (VICTOZA) 18 MG/3ML SOPN SC injection ADMINISTER 1.8 MG UNDER THE SKIN EVERY DAY  Qty: 9 mL, Refills: 3    Associated Diagnoses: Uncontrolled type 2 diabetes mellitus with hyperglycemia, without long-term current use of insulin ([facility])      mirtazapine (REMERON) 15 MG tablet TAKE 1 TABLET BY MOUTH EVERY NIGHT  Qty: 30 tablet, Refills: 3    Associated Diagnoses: Anxiety and depression      busPIRone (BUSPAR) 10 MG tablet TAKE 1 TABLET BY MOUTH THREE TIMES DAILY  Qty: 90 tablet, Refills: 3      insulin lispro, 1 Unit Dial, (ADMELOG SOLOSTAR) 100 UNIT/ML SOPN ADMINISTER 45 UNITS UNDER THE SKIN EVERY EVENING. INCREASE BY 3 UNITS EVERY NIGHT UNTIL 60 UNITS IS REACHED AND.  CONTINUE 60 UNITS EVERY EVENING  Qty: 18 mL, Refills: 5    Associated Diagnoses: Uncontrolled type 2 diabetes mellitus with diabetic neuropathy, with long-term current use of insulin ([facility])      fluticasone (FLONASE) 50 MCG/ACT nasal spray SHAKE LIQUID AND USE 1 SPRAY IN EACH NOSTRIL DAILY  Qty: 16 g, Refills: 0      furosemide (LASIX) 80 MG tablet Take 0.5 tablets by mouth 2 times daily  Qty: 60 tablet, Refills: 0      DULoxetine (CYMBALTA) 60 MG extended release capsule TAKE 1 CAPSULE BY MOUTH TWICE DAILY  Qty: 30 capsule, Refills: 5    Associated Diagnoses: Anxiety      omeprazole (PRILOSEC) 20 MG delayed release capsule TAKE ONE CAPSULE BY MOUTH TWICE DAILY BEFORE MEALS  Qty: 180 capsule, Refills: 1    Associated Diagnoses: Gastroesophageal reflux disease      Diabetic Shoe MISC by Does not apply route DISPENSE ONE PAIR OF DIABETIC SHOE AND 3 PAIRS HEAT MOLDED INSERTS  Qty: 1 each, Refills: 0    Associated Diagnoses: Uncontrolled type 2 diabetes mellitus with diabetic neuropathy, with long-term current use of insulin ([facility])      oxyCODONE-acetaminophen (PERCOCET) 7.5-325 MG per tablet Take 1 tablet by mouth 2 times daily. Morphine Sulfate ER 15 MG T12A Take 15 mg by mouth 2 times daily. Cream Base CREA APPLY ONE PUMP (GRAM) TO AFFECTED AREA(S) THREE TO FOUR TIMES A DAY TO THE APPENDAGES AND TRUNK AS DIRECTED  Qty: 30 g, Refills: 5      celecoxib (CELEBREX) 200 MG capsule TAKE 1 CAPSULE BY MOUTH EVERY DAY  Qty: 60 capsule, Refills: 5    Associated Diagnoses: Primary osteoarthritis of both knees      blood glucose monitor kit and supplies Test 1 times a day & as needed for symptoms of irregular blood glucose. Qty: 1 kit, Refills: 0    Comments: Brand per patient preference. May round up to next available package size.      gabapentin (NEURONTIN) 300 MG capsule Take 300 mg by mouth 3 times daily. Refills: 5      Blood Glucose Monitoring Suppl ([address]) w/Device KIT 1 kit by Does not apply route daily as needed (Dx 250.00)  Qty: 1 kit, Refills: 0    Associated Diagnoses: Diabetes 1.5, managed as type 2 ([facility] [address]      !! ONE TOUCH LANCETS MISC 1 each by Does not apply route daily  Qty: 100 each, Refills: 3    Associated Diagnoses: Diabetes 1.5, managed as type 2 ([facility])      fluconazole (DIFLUCAN) 150 MG tablet Take 1 tablet by mouth daily for 3 days  Qty: 3 tablet, Refills: 0       !! - Potential duplicate medications found. Please discuss with provider.           ALLERGIES     Compazine [prochlorperazine], Ciprofloxacin, Calamine, Prochlorperazine edisylate, Tobramycin, Tramadol, Amoxicillin-pot clavulanate, Ancef [cefazolin], Bactrim [sulfamethoxazole-trimethoprim], Cephalexin, Clindamycin/lincomycin, Codeine, Demerol, Doxycycline, Hydroxyzine hcl, Ibuprofen, Keflex [cephalexin], Meperidine, Moxifloxacin, Nortriptyline, Orphenadrine citrate, Penicillins, and Vistaril [hydroxyzine hcl]    FAMILY HISTORY       Family History   Problem Relation Age of Onset    Obesity Mother    Jay Arthritis Mother         has had knees replaced    Cancer Father         \"adrenal gland & lung\"    Parkinsonism Father     Cancer Maternal Grandmother     COPD Maternal Grandmother     Obesity Maternal Grandmother     Heart Failure Maternal Grandmother     Cancer Maternal Grandfather     Other Sister         spina biffida    No Known Problems Son     No Known Problems Son     No Known Problems Daughter     Obesity Maternal Aunt     Other Maternal Aunt          of sepsis related to a spider bite          SOCIAL HISTORY       Social History     Socioeconomic History    Marital status: Single     Spouse name: None    Number of children: 3    Years of education: None    Highest education level: None   Occupational History    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 Go Quintero, [phone]    AMBULATES: independently    DOMICILED: lives in a private home alone, no stairs, no steps to enter home, has a cat     Social Determinants of Health     Financial Resource Strain:     Difficulty of Paying Living Expenses: Not on file   Food Insecurity:     Worried About Running Out of Food in the Last Year: Not on file    Venu of Food in the Last Year: Not on file   Transportation Needs:     Lack of Transportation (Medical): Not on file    Lack of Transportation (Non-Medical): Not on file   Physical Activity:     Days of Exercise per Week: Not on file    Minutes of Exercise per Session: Not on file   Stress:     Feeling of Stress : Not on file   Social Connections:     Frequency of Communication with Friends and Family: Not on file    Frequency of Social Gatherings with Friends and Family: Not on file    Attends Yarsani Services: Not on file    Active Member of [address] [place] [address] [employer] or Organizations: Not on file    Attends Club or Organization Meetings: Not on file    Marital Status: Not on file   Intimate Partner Violence:     Fear of Current or Ex-Partner: Not on file    Emotionally Abused: Not on file    Physically Abused: Not on file    Sexually Abused: Not on file   Housing Stability:     Unable to Pay for Housing in the Last Year: Not on file    Number of [place] in the Last Year: Not on file    Unstable Housing in the Last Year: Not on file       SCREENINGS    Manfred Coma Scale  Eye Opening: Spontaneous (Simultaneous filing. User may not have seen previous data.)  Best Verbal Response: Oriented (Simultaneous filing. User may not have seen previous data.)  Best Motor Response: Obeys commands (Simultaneous filing. User may not have seen previous data.)  Goodman Coma Scale Score: 15 (Simultaneous filing.  User may not have seen previous data.) @FLOW([id number])@      PHYSICAL EXAM    (up to 7 for level 4, 8 or more for level 5)     ED Triage Vitals [03/09/22 1744]   BP Temp Temp Source Pulse Resp SpO2 Height Weight   (!) 169/82 98.2 °F (36.8 °C) Oral 92 18 96 % 5' 1\" (1.549 m) (!) 350 lb (158.8 kg)       Physical Exam  Vitals and nursing note reviewed. Constitutional:       Appearance: She is well-developed. She is obese. HENT:      Head: Normocephalic and atraumatic. Eyes:      General: No scleral icterus. Right eye: No discharge. Left eye: No discharge. Cardiovascular:      Rate and Rhythm: Regular rhythm. Tachycardia present. Heart sounds: Normal heart sounds. Pulmonary:      Effort: No respiratory distress. Musculoskeletal:      Cervical back: Normal range of motion and neck supple. Skin:     Comments: Erythema to both lower legs with redness extending up to her r thigh. Somewhat less extensive on left. 3 small wounds present. 2 to right lower leg and 1 to left    Neurological:      Mental Status: She is alert. Psychiatric:         Behavior: Behavior normal.         DIAGNOSTIC RESULTS     EKG: All EKG's are interpreted by the Emergency Department Physician who either signs or Co-signsthis chart in the absence of a cardiologist.        RADIOLOGY:   [place] such as CT, Ultrasound and MRI are read by the radiologist. Plain radiographic images are visualized and preliminarily interpreted by the emergency physician with the below findings:      Interpretation per the Radiologist below, if available at the time of this note:    XR CHEST PORTABLE   Final Result   1. Hypoventilated lungs. Increasing prominence of pulmonary vascular   structures concerning for venous congestion/mild volume overload. .   Signed by Dr Alfredo Jaffe            ED BEDSIDEULTRASOUND:   Performed by ED Physician -none    LABS:  Labs Reviewed   CBC WITH AUTO DIFFERENTIAL - Abnormal; Notable for the following components:       Result Value    Hemoglobin 11.3 (*)     MCH 24.7 (*)     MCHC 29.5 (*)     RDW 15.0 (*)     MPV 9.1 (*)     All other components within normal limits   COMPREHENSIVE METABOLIC PANEL W/ REFLEX TO MG FOR LOW K - Abnormal; Notable for the following components:    Glucose 127 (*) All other components within normal limits   BRAIN NATRIURETIC PEPTIDE - Abnormal; Notable for the following components:    Pro- (*)     All other components within normal limits   C-REACTIVE PROTEIN - Abnormal; Notable for the following components:    CRP 11.24 (*)     All other components within normal limits   SEDIMENTATION RATE - Abnormal; Notable for the following components:    Sed Rate 85 (*)     All other components within normal limits   HEMOGLOBIN A1C - Abnormal; Notable for the following components:    Hemoglobin A1C 11.4 (*)     All other components within normal limits   POCT GLUCOSE - Abnormal; Notable for the following components:    POC Glucose 116 (*)     All other components within normal limits   COVID-19, RAPID   CULTURE, BLOOD 1   CULTURE, BLOOD 2   CULTURE, WOUND    Narrative:     ORDER#: [id number]                          ORDERED BY: JOHN ROD  SOURCE: Leg R ankle                        COLLECTED:  03/09/22 21:02  ANTIBIOTICS AT [facility].:                      RECEIVED :  03/09/22 21:24   LACTIC ACID   URINALYSIS WITH REFLEX TO CULTURE   BASIC METABOLIC PANEL W/ REFLEX TO MG FOR LOW K   CBC   POCT GLUCOSE   POCT GLUCOSE       All other labs were within normal range or not returned as of this dictation. EMERGENCY DEPARTMENT COURSE and DIFFERENTIALDIAGNOSIS/MDM:   Vitals:    Vitals:    03/09/22 1744 03/09/22 2133 03/09/22 2335   BP: (!) 169/82 (!) 166/76 137/76   Pulse: 92 90 89   Resp: 18 20 22   Temp: 98.2 °F (36.8 °C) 96.2 °F (35.7 °C) 96.9 °F (36.1 °C)   TempSrc: Oral Temporal Temporal   SpO2: 96% 97% 95%   Weight: (!) 350 lb (158.8 kg) (!) 347 lb 11.2 oz (157.7 kg)    Height: 5' 1\" (1.549 m) 5' 1\" (1.549 m)            MDM  Pictures of wounds taken for the chart. Pt has extensive allergies. Will give her vanc.   Spoke to Dr Giacomo Zamarripa for admission      CONSULTS:  [place] [facility]:  Unless otherwise noted below, none     Procedures    FINAL IMPRESSION      1. Cellulitis of lower extremity, unspecified laterality        DISPOSITION/PLAN   DISPOSITION Admitted 03/09/2022 09:01:00 PM      PATIENT REFERRED TO:  No follow-up provider specified.     DISCHARGE MEDICATIONS:  Current Discharge Medication List             (Please note that portions of this note were completed with a voice recognitionprogram.  Efforts were made to edit the dictations but occasionally words are mis-transcribed.)    NANETTE Valentin (electronically signed)          NANETTE Valentin  03/09/22 5182

## 2022-03-10 NOTE — PROGRESS NOTES
Patient verbalized wanting to leave AMA, patient stated \"I just can't do this. \" Explained the importance of needing IV antibiotics, patient verbalizes understanding but has still decided to leave AMA. Dr. Zachariah Briscoe notified. Papers signed, IV removed and patient waiting on  to .

## 2022-03-10 NOTE — ED NOTES
Consent to photograph or videotape form signed by patient and verbal consent given by patient to photograph patient's leg wounds. See media for photos.       Michelle Gerard RN  03/09/22 1949

## 2022-03-10 NOTE — ED NOTES
Open wound noted to right lateral ankle and right posterior lower leg. Pts  placed dressing on pts bilateral lower legs.       Panda Piña RN  03/09/22 2541

## 2022-03-10 NOTE — H&P
Kala Rubio - History & Physical      PCP: NANETTE Pitt CNP    Date of Admission: 3/9/2022    Date of Service: 3/9/2022    Chief Complaint: Cellulitis    History Of Present Illness: The patient is a 54 y.o. female who presented to [facility] ER with PMH HTN, morbid obesity, diabetes, complaining of cellulitis. Patient states that she noted on Saturday evening fever of 100. She stated on Sunday evening she noticed bilateral lower extremities increasing redness and edema. Patient states that she has had bilateral wounds to lower extremities that started 3 weeks ago and has been seeing [facility]. She has been utilizing [employer] and applying [employer]. Patient was seen at wound care on Monday and was prescribed doxycycline. She states since starting antibiotics wound has not improved. She was evaluated at urgent care today and instructed to her to come to [address] [place] [address] ER. Endorses fever, lower extremity pain, and generalized weakness. 30 pound weight gain over the past few days. Denies nausea, vomiting, shortness of breath, abdominal pain, chest pain. Work-up in ER CXR concerning for venous congestion/mild volume overload, urinalysis negative, WBC 8.1, sed rate 85, CRP 11.24, lactic acid 1.1, CMP WNL, , blood cultures pending. Received Dilaudid1 mg and Zofran 4 mg. Patient is to be admitted to hospital service due to cellulitis.   Past Medical History:        Diagnosis Date    Anxiety     Constipation     Depression     Headache(784.0)     Hyperlipidemia     Hypertension     Kidney stones     Kidney stones     Morbid obesity due to excess calories (HCC)     Restless legs syndrome     Type 2 diabetes mellitus ([facility] [address]        Past Surgical History:        Procedure Laterality Date    ECTOPIC PREGNANCY SURGERY  2002    EYE SURGERY Bilateral     2005 & 2007: cornea transplant and cataracts removed    KNEE CARTILAGE SURGERY Left 12/20/2016    KNEE ARTHROSCOPIC PARTIAL MEDIAL MENISCECTOMY performed by Amanda Pittman MD at [facility] Medications:  Prior to Admission medications    Medication Sig Start Date End Date Taking?  Authorizing Provider   topiramate (TOPAMAX) 25 MG tablet Take 1 tablet by mouth daily 2/21/22   Historical Provider, MD   doxycycline monohydrate (MONODOX) 100 MG capsule Take 1 capsule by mouth 2 times daily 3/7/22 4/6/22  Ardyth Gram, NANETTE - CNP   ondansetron (ZOFRAN) 4 MG tablet Take 1 tablet by mouth 3 times daily as needed for Nausea or Vomiting 3/7/22   Ardyth Gram, NANETTE - CNP   fluconazole (DIFLUCAN) 150 MG tablet Take 1 tablet by mouth daily for 3 days 3/7/22 3/10/22  Ardyth Gram, NANETTE Gauthier CNP   losartan (COZAAR) 25 MG tablet Take 25 mg by mouth daily 11/5/21   Historical Provider, MD   insulin glargine (LANTUS SOLOSTAR) 100 UNIT/ML injection pen Inject 30 Units into the skin every evening  Patient taking differently: Inject 30 Units into the skin every evening Has been taking 10 units tid with meals 94/90/17   NANETTE Rogers   blood glucose test strips (ONETOUCH ULTRA) strip USE TO TEST BLOOD SUGAR THREE TIMES DAILY AND AS NEEDED AS DIRECTED 70/76/88   NANETTE Rogers   Lancets (ONETOUCH DELICA PLUS [id number]) MISC USE TO CHECK BLOOD SUGAR AS DIRECTED 01/97/15   NANETTE Rogers   pramipexole (MIRAPEX) 0.75 MG tablet TAKE 1 TABLET BY MOUTH TWICE DAILY 78/84/51   NANETTE Rogers   Insulin Pen Needle ([employer] PEN NEEDLES) 31G X 6 MM MISC 1 each by Does not apply route daily 24/78/47   NANETTE Rogers   potassium chloride (KLOR-CON M) 20 MEQ extended release tablet Take 1 tablet by mouth daily as needed (low potassium) 9/13/21   NANETTE Villalobos CNP   ondansetron (ZOFRAN-ODT) 4 MG disintegrating tablet DISSOLVE 1 TABLET UNDER THE TONGUE EVERY 8 HOURS AS NEEDED FOR NAUSEA AND VOMITING PRIOR TO DOXYCYCLINE DOSES 1/6/00   NANETTE Rogers   Liraglutide ([address]) 18 MG/3ML SOPN SC injection ADMINISTER 1.8 MG UNDER THE SKIN EVERY DAY 4/26/54   NANETTE Ramsey   mirtazapine (REMERON) 15 MG tablet TAKE 1 TABLET BY MOUTH EVERY NIGHT 0/16/41   NANETTE Ramsey   busPIRone (BUSPAR) 10 MG tablet TAKE 1 TABLET BY MOUTH THREE TIMES DAILY 3/90/62   NANETTE Ramsey   insulin lispro, 1 Unit Dial, (ADMELOG SOLOSTAR) 100 UNIT/ML SOPN ADMINISTER 45 UNITS UNDER THE SKIN EVERY EVENING. INCREASE BY 3 UNITS EVERY NIGHT UNTIL 60 UNITS IS REACHED AND. [address] EVENING  Patient taking differently: [address] 8/4/21   Mathtieu Nettles MD   fluticasone [facility]) 50 MCG/ACT nasal spray SHAKE LIQUID AND USE 1 SPRAY IN [facility] NOSTRIL DAILY 2/72/45   NANETTE Ramsey   furosemide (LASIX) 80 MG tablet Take 0.5 tablets by mouth 2 times daily 8/17/96   NANETTE Ramsey   DULoxetine (CYMBALTA) 60 MG extended release capsule TAKE 1 CAPSULE BY MOUTH TWICE DAILY 5/95/59   NANETTE Ramsey   omeprazole (PRILOSEC) 20 MG delayed release capsule TAKE ONE CAPSULE BY MOUTH TWICE DAILY BEFORE MEALS 0/65/95   NANETTE Ramsey   Diabetic Shoe MISC by Does not apply route DISPENSE ONE PAIR OF DIABETIC SHOE AND 3 PAIRS HEAT MOLDED INSERTS 3/4/21   Matthieu Nettles MD   Diabetic Shoe MISC by Does not apply route DISPENSE ONE PAIR OF DIABETIC SHOE AND 3 PAIRS HEAT MOLDED INSERTS 2/9/21 3/4/21  Matthieu Nettles MD   oxyCODONE-acetaminophen (PERCOCET) 7.5-325 MG per tablet Take 1 tablet by mouth 2 times daily. Historical Provider, MD   NONFORMULARY Prednisone eye drops 1 drop in each eye daily    Historical Provider, MD   Morphine Sulfate ER 15 MG T12A Take 15 mg by mouth 2 times daily.     Historical Provider, MD   C[employer] CREA APPLY ONE PUMP (GRAM) TO AFFECTED AREA(S) THREE TO FOUR TIMES A DAY TO THE APPENDAGES AND TRUNK AS DIRECTED 9/11/20   Matthieu Nettles MD   celecoxib (CELEBREX) 200 MG capsule TAKE 1 CAPSULE BY MOUTH EVERY DAY 9/4/20   Matthieu Nettles, MD   blood glucose monitor kit and supplies Test 1 times a day & as needed for symptoms of irregular blood glucose. 9/3/19   Krystal Rider MD   gabapentin (NEURONTIN) 300 MG capsule Take 300 mg by mouth 3 times daily. 19   Historical Provider, MD   Blood Glucose Monitoring Suppl ([address]) w/Device KIT 1 kit by Does not apply route daily as needed (Dx 250.00) 17   Krystal Rider MD   ONE TOUCH LANCETS MISC 1 each by Does not apply route daily 17   Krystal Rider MD       Allergies:    Compazine [prochlorperazine], Ciprofloxacin, Calamine, Prochlorperazine edisylate, Tobramycin, Tramadol, Amoxicillin-pot clavulanate, Ancef [cefazolin], Bactrim [sulfamethoxazole-trimethoprim], Cephalexin, Clindamycin/lincomycin, Codeine, Demerol, Doxycycline, Hydroxyzine hcl, Ibuprofen, Keflex [cephalexin], Meperidine, Moxifloxacin, Nortriptyline, Orphenadrine citrate, Penicillins, and Vistaril [hydroxyzine hcl]    Social History:    The patient currently lives home  Tobacco:   reports that she has never smoked. She has never used smokeless tobacco.  Alcohol:   reports previous alcohol use. Illicit Drugs: denies    Family History:      Problem Relation Age of Onset    Obesity Mother     Arthritis Mother         has had knees replaced    Cancer Father         \"adrenal gland & lung\"    Parkinsonism Father     Cancer Maternal Grandmother     COPD Maternal Grandmother     Obesity Maternal Grandmother     Heart Failure Maternal Grandmother     Cancer Maternal Grandfather     Other Sister         spina biffida    No Known Problems Son     No Known Problems Son     No Known Problems Daughter     Obesity Maternal Aunt     Other Maternal Aunt          of sepsis related to a spider bite       Review of Systems:   Review of Systems   Constitutional: Positive for fatigue and fever. HENT: Negative. Respiratory: Negative. Cardiovascular: Positive for leg swelling.    Gastrointestinal: noted  left posterior leg quarter size wound with purulent drainage noted   Neurological:      General: No focal deficit present. Mental Status: She is alert and oriented to person, place, and time. Cranial Nerves: No cranial nerve deficit. Motor: Weakness present. Psychiatric:         Mood and Affect: Mood normal.         Behavior: Behavior normal.        Diagnostic Data:  CBC:  Recent Labs     03/09/22 1901   WBC 8.1   HGB 11.3*   HCT 38.3        BMP:  Recent Labs     03/09/22 1908      K 3.8   CL 99   CO2 25   BUN 19   CREATININE 0.9   CALCIUM 9.5     Recent Labs     03/09/22 1908   AST 19   ALT 14   BILITOT <0.2   ALKPHOS 82     Urinalysis:  Lab Results   Component Value Date    NITRU Negative 03/09/2022    WBCUA 16 06/30/2020    BACTERIA 1+ 06/30/2020    RBCUA 0 06/30/2020    BLOODU Negative 03/09/2022    SPECGRAV 1.021 03/09/2022    GLUCOSEU Negative 03/09/2022       XR CHEST PORTABLE    Result Date: 3/9/2022  XR CHEST PORTABLE 3/9/2022 7:21 PM HISTORY: Weight gain COMPARISON: 8/19/2019 CXR: A single frontal view of the chest is performed. Findings: Diminished level of inspiration. Increasing prominence of pulmonary vascular structures. No consolidation. Questionable cardiomegaly. No pleural effusion or pneumothorax. No acute regional bony pathology. 1. Hypoventilated lungs. Increasing prominence of pulmonary vascular structures concerning for venous congestion/mild volume overload. . Signed by Dr Toño Palacios      Assessment/Plan:    Cellulitis   -IV antibiotic, vancomycin   -Blood cultures pending   -Wound culture pending   -Lactic acid   -Sed rate, CRP   -Elevate bilateral lower extremities   -Wound care evaluation and treatment   -As needed pain medication    Diabetic ulcer of left lower leg associated with type 2 diabetes mellitus, with fat layer exposed ([facility] [address]  -Accucheck  -A1c  -Sliding scale insulin   -Hypoglycemia protocol as warranted     Stage 3 chronic kidney disease ([facility]      -Current creatinine 0.9              - Monitor I's and O's closely              - Monitor labs closely              - Avoid hypotension              - Avoid nephrotoxic agents     Morbid obesity with BMI of 50.0-59.9, adult ([facility]   -noted    DVT prophylaxis Lovenox       Signed:  NANETTE Keen CNP, 3/9/2022 9:01 PM       Physician  Addendum:    After going over plan of care with her and   Going over meds etc     She now wants to leave ama    She was crying out loudly in room due to migraine headaches and resumed her meds etc as she requested     She still wishes to leave ama

## 2022-03-10 NOTE — PROGRESS NOTES
Tianna  arrived to room # [id number]. Presented with: cellulitis   Mental Status: Patient is oriented and alert. Vitals:    03/09/22 2133   BP: (!) 166/76   Pulse: 90   Resp: 20   Temp: 96.2 °F (35.7 °C)   SpO2: 97%     Patient safety contract and falls prevention contract reviewed with patient Yes. Oriented Patient to room. Call light within reach. Yes.   Needs, issues or concerns expressed at this time: no.      Electronically signed by Perry Justin RN on 3/9/2022 at 9:35 PM

## 2022-03-10 NOTE — PROGRESS NOTES
[address] Pharmacokinetic Monitoring Service - Vancomycin     Clare Trivedi is a 54 y.o. female starting on vancomycin therapy for Skin/Soft Tissue Infection. Pharmacy consulted by Yaritza Henriquez for monitoring and adjustment. Target Concentration: Goal AUC/-600 mg*hr/L    Additional Antimicrobials: N/A    Pertinent Laboratory Values: Wt Readings from Last 1 Encounters:   03/09/22 (!) 347 lb 11.2 oz (157.7 kg)     Temp Readings from Last 1 Encounters:   03/09/22 96.9 °F (36.1 °C) (Temporal)     Estimated Creatinine Clearance: 110 mL/min (based on SCr of 0.9 mg/dL). Recent Labs     03/09/22  1901 03/09/22  1908   CREATININE  --  0.9   WBC 8.1  --      Procalcitonin: N/A    Pertinent Cultures:  Culture Date Source Results   03/09/22 Wound Gram+ cocci   MRSA Nasal Swab: N/A. Non-respiratory infection.     Plan:  Dosing recommendations based on Bayesian software  Start vancomycin 1750mg q18hr  Anticipated AUC of 564 and trough concentration of 03/12 at steady state  Renal labs as indicated   Vancomycin concentration ordered for 03/12 @ [facility] [place] will continue to monitor patient and adjust therapy as indicated    Thank you for the consult,  LUISA Morillo [facility]  3/10/2022 12:09 AM

## 2022-03-10 NOTE — PROGRESS NOTES
4 Eyes Skin Assessment    Tianna Trivedi is being assessed upon: Admission    I agree that I, Suzette Osman, RN, along with Meagan Chan (either 2 RN's or 1 LPN and 1 RN) have performed a thorough Head to Toe Skin Assessment on the patient. ALL assessment sites listed below have been assessed. Areas assessed by both nurses:     [x]   Head, Face, and Ears   [x]   Shoulders, Back, and Chest  [x]   Arms, Elbows, and Hands   [x]   Coccyx, Sacrum, and Ischium  [x]   Legs, Feet, and Heels    Does the Patient have Skin Breakdown? Yes, wound(s) noted upon assessment. It is the responsibility of the Primary Nurse to assure that the following documentation, preventions, orders, and consults are complete on the above noted wound(s): Wound LDA initiated. LDA Flowsheet Documentation includes the Octavia-wound, Wound Assessment, Measurements, Dressing Treatment, Drainage, and Color.     Prieto Prevention initiated: Yes  Wound Care Orders initiated: Yes    [address]  nurse consulted for Pressure Injury (Stage 3,4, Unstageable, DTI, NWPT, and Complex wounds) and New or Established Ostomies: Yes        Primary Nurse eSignature: Suzette Osman RN on 3/9/2022 at 10:21 PM      Co-Signer eSignature: {Esignature:[id number]}

## 2022-03-10 NOTE — DISCHARGE SUMMARY
Date of admission 3/9/22    Date of discharge: This is not really a discharge   She insisted on leaving the hospital against medical advice after all of her medical needs were being addressed and met and she came to the er   And she wished to be admitted and she truly needed to be admitted   3/10/22    She insisted on going home because she had a migraine headache   And it was taking just a few minutes to get her   The meds to relieve her headache   As next door to her was an emergent life threatening sick patient   Whom we ( I and nursing staff were dealing with )   She was hysterically and loudly crying the whole time as her head was aching )   While this patient was dying ).      We arranged for her to get her migraine meds for her     In the meantime she had called her  and told him to come and get her   As she did not feel she could be taken care of at this hospital     She had explained in detail to me about her bp control   Had instituted her meds   Losartan and that she may need higher doses to help control bp     Had spoken to her about add meds concerta as she felt she needed energy during the day and appetite suppression to lose weight and to feel like she needed energy to participate in some activities this was a good idea I felt that we could certainly try that under careful observed circumstances     She wanted to see what wound care could do to help her   We ordered that for her     We would try pt and ot   And see how that could help and help try and optimal slow but maybe a beneficial exercise program for her   Chose what I thought were optimal abx for her    Type ii dm optimize control of blood sugars  Her glycohgb is 11.9  And go over diet and blood sugar control and go over this with a dieitician for her     She wanted to optimize pain control as she felt being left in pain was also part of the reason her blood sugars were elevated which I agreed with and we were in the process of managing this for her as well. Mikel Narvaez Optimizing control of migraine headaches and instituting newer meds for control of migraines and consideration of having her see neuroolgy for this and consideration for botox injection as well could be an option. However, she wants instantaneous results and this was and is not possible anywhere and tried to explain this to her   She was hysterically crying and simply could not be comforted and insisted on signing out of the hospital ama     Told her to please come back if she wished to seek care here at [facility] we are here for her and would be glad to care for her.      She signed out of hospital ama

## 2022-03-11 ENCOUNTER — HOME CARE VISIT (OUTPATIENT)
Dept: HOME HEALTH SERVICES | Facility: CLINIC | Age: 52
End: 2022-03-11

## 2022-03-12 LAB
GRAM STAIN RESULT: ABNORMAL
ORGANISM: ABNORMAL
WOUND/ABSCESS: ABNORMAL

## 2022-03-12 NOTE — PROGRESS NOTES
Physician Progress Note      PATIENT:               Evon Mir  CSN #:                  [id number]  :                       1970  ADMIT DATE:       3/9/2022 5:39 PM  [address] [place] DATE:        3/10/2022 2:02 AM  RESPONDING  PROVIDER #:        Alfredo RIDLEY          QUERY TEXT:    Pt admitted with cellulitis. Pt noted to have DMII with HGB A1 C 11.4. If   possible, please document in progress notes and discharge summary the   relationship, if any, between cellulitis and DM. The medical record reflects the following:  Risk Factors: DMII, Wounds,  Clinical Indicators: Diabetic ulcer left lower leg, cellulitis. Erythema,   edematous to bilateral lower extremities with multiple wounds. right posterior medial lower leg quarter size wound with purulent drainage   noted. . Hgb A1c 11. 4.  left posterior leg quarter size wound with purulent drainage noted  Treatment:   ml/hr Dextrose IVF, labs,    Thank you  Robson Ghotra RN, BSN, [facility]  [phone]  Options provided:  -- Lower extremities cellulitis associated with Diabetes  -- Lower extremities  cellulitis unrelated to Diabetes  -- Other - I will add my own diagnosis  -- Disagree - Not applicable / Not valid  -- Disagree - Clinically unable to determine / Unknown  -- Refer to Clinical Documentation Reviewer    PROVIDER RESPONSE TEXT:    Lower extremities cellulitis associated with Diabetes.   Dm is the cause of cellulitis and worsening  Sx and leading to her not responding to oral abx therapy and leading to   hospitalization    Query created by: Moo Mariscal on 3/11/2022 1:14 PM      Electronically signed by:  Narayan Vaughn 3/11/2022 8:25 PM

## 2022-03-14 LAB
BLOOD CULTURE, ROUTINE: NORMAL
CULTURE, BLOOD 2: NORMAL

## 2022-03-15 ENCOUNTER — HOME CARE VISIT (OUTPATIENT)
Dept: HOME HEALTH SERVICES | Facility: CLINIC | Age: 52
End: 2022-03-15

## 2022-03-18 ENCOUNTER — HOME CARE VISIT (OUTPATIENT)
Dept: HOME HEALTH SERVICES | Facility: HOME HEALTHCARE | Age: 52
End: 2022-03-18

## 2022-03-18 PROCEDURE — G0300 HHS/HOSPICE OF LPN EA 15 MIN: HCPCS

## 2022-03-20 VITALS
SYSTOLIC BLOOD PRESSURE: 138 MMHG | OXYGEN SATURATION: 97 % | TEMPERATURE: 98 F | RESPIRATION RATE: 18 BRPM | DIASTOLIC BLOOD PRESSURE: 74 MMHG | HEART RATE: 86 BPM

## 2022-03-20 NOTE — HOME HEALTH
FOCUS OF CARE/SKILLED NEED:  Edema management, wound care, s/s of infection,     TEACHING/INTERVENTIONS: Home management of edema, wound care, s/s of infection    PROGRESS TOWARD GOALS: wounds are healing without s/s fo infection    PHYSICIAN CONTACT: none    FALLS SINCE LAST VISIT?  none    MEDICATION CHANGES SINCE LAST VISIT?  none    PLAN FOR NEXT VISIT:  edema home management, wound care, s/s of infection

## 2022-03-22 ENCOUNTER — HOME CARE VISIT (OUTPATIENT)
Dept: HOME HEALTH SERVICES | Facility: CLINIC | Age: 52
End: 2022-03-22

## 2022-03-24 ENCOUNTER — HOSPITAL ENCOUNTER (OUTPATIENT)
Dept: WOUND CARE | Age: 52
Discharge: HOME | End: 2022-03-24

## 2022-03-24 NOTE — ADT AUTH CERT
Cellulitis - Care Day 1 (3/9/2022) by Britta Moss RN       Review Status Review Entered   Completed 3/10/2022 07:52      Criteria Review      Care Day: 1 Care Date: 3/9/2022 Level of Care:    Guideline Day 1    Level Of Care    (X) Floor    3/10/2022 8:52 AM EST by [employer]      med/surg    Clinical Status    (X) * Clinical Indications met    3/10/2022 8:52 AM EST by [employer]      cellulitis    Activity    (X) Activity as tolerated    3/10/2022 8:52 AM EST by [employer]      up with assistance    Routes    (X) Oral or IV hydration    3/10/2022 8:52 AM EST by [employer]      oral hydration    (X) Diet as tolerated    3/10/2022 8:52 AM EST by [employer]      carb control diet    Interventions    (X) CBC, electrolytes    3/10/2022 8:52 AM EST by [employer]      ua: negative  wbc 8.1, hgb 11.3, hct 38.3, pro bnp 521, lactic acid 1.1, glucose 127, crp 11.24, sed rate 85, hgba1c 11.4    (X) Possible lesion and blood cultures    3/10/2022 8:52 AM EST by [employer]      blood cultures x2 drawn  wound culture sent    (X) Elevation of affected area, if possible    3/10/2022 8:52 AM EST by [employer]      elevate bilateral lower extremities    Medications    (X) IV antibiotics    3/10/2022 8:52 AM EST by [employer]      vancomycin 1750mg iv q18hrs    * Milestone   Additional Notes   Cxr: Hypoventilated lungs. Increasing prominence of pulmonary vascular    structures concerning for venous congestion/mild volume overload.          Bp: 169/82, pulse 92, resp 20, temp 98.2, o2 sat 96% on ra         Lovenox 40mg sub q daily   Dilaudid 0.5mg iv x2 in er   Humalog sliding scale 0-3 units sub q qhs   Humalog sliding scale 0-6 units sub q tid   Zofran 4mg iv x1 in er   Vancomycin 1750mg iv q18hrs      Dilaudid 0.5mg iv q4hrs prn- had 1 additional dose            [facility] ONCOLOGY UNIT   EMERGENCY DEPARTMENT ENCOUNTER        Tianna Trivedi is a 54 y.o. female who presents to the emergency department for worsening cellulitis and a weight gain of 30 pounds over the last 3 days. Pt states before this her weight has stayed consistent. Pt was seen in urgent care this morning and came to the ER due to their recommendation. Patient states wound care placed her on doxycycline on Monday after evaluating the worsening redness of both extremities. Pt states the redness has not improved. She also states she has had severe cramping pain to both lower extremities since Monday which is not typical for her usual neuropathy pain.          Physical Exam   Vitals and nursing note reviewed. Constitutional:        Appearance: She is well-developed. She is obese. HENT:       Head: Normocephalic and atraumatic. Eyes:       General: No scleral icterus.         Right eye: No discharge.          Left eye: No discharge. Cardiovascular:       Rate and Rhythm: Regular rhythm. Tachycardia present.       Heart sounds: Normal heart sounds. Pulmonary:       Effort: No respiratory distress. Musculoskeletal:       Cervical back: Normal range of motion and neck supple. Skin:      Comments: Erythema to both lower legs with redness extending up to her r thigh. Somewhat less extensive on left.  3 small wounds present.  2 to right lower leg and 1 to left     Neurological:       Mental Status: She is alert. Psychiatric:          Behavior: Behavior normal.       [facility]  Pictures of wounds taken for the chart.  Pt has extensive allergies. Pablito Catrachita give her vanc.  Spoke to Dr Singh Guthrie for admission      FINAL IMPRESSION        1. Cellulitis of lower extremity, unspecified laterality                      Hospitalist - History & Physical        Chief Complaint: Cellulitis       History Of Present Illness:     The patient is a 54 y.o. female who presented to [facility] ER with PMH HTN, morbid obesity, diabetes, complaining of cellulitis. Kenton Dimas states that she noted on Saturday evening fever of 100.  She stated on Sunday evening she noticed bilateral lower extremities increasing redness and edema.  Patient states that she has had bilateral wounds to lower extremities that started 3 weeks ago and has been seeing [facility] home health. Estefanía Dean has been utilizing [employer] and applying Brendon Levee was seen at wound care on Monday and was prescribed doxycycline. She states since starting antibiotics wound has not improved.  She was evaluated at urgent care today and instructed to her to come to [address] [place] [address] ER.  Endorses fever, lower extremity pain, and generalized weakness.  30 pound weight gain over the past few days.  Denies nausea, vomiting, shortness of breath, abdominal pain, chest pain.  Work-up in ER CXR concerning for venous congestion/mild volume overload, urinalysis negative, WBC 8.1, sed rate 85, CRP 11.24, lactic acid 1.1, CMP WNL, , blood cultures pending.  Received Dilaudid1 mg and Zofran 4 mg.  Patient is to be admitted to hospital service due to cellulitis. Review of Systems    Constitutional: Positive for fatigue and fever. HENT: Negative.     Respiratory: Negative.     Cardiovascular: Positive for leg swelling. Gastrointestinal: Negative.     Genitourinary: Negative.     Musculoskeletal: Positive for gait problem. Skin: Positive for color change and wound. Neurological: Positive for weakness. Physical Exam   Vitals and nursing note reviewed. Constitutional:        General: She is not in acute distress.      Appearance: Normal appearance. She is obese. She is not ill-appearing. HENT:       Mouth/Throat:       Mouth: Mucous membranes are moist.       Pharynx: Oropharynx is clear. Eyes:       Extraocular Movements: Extraocular movements intact.       Conjunctiva/sclera: Conjunctivae normal.       Pupils: Pupils are equal, round, and reactive to light. Cardiovascular:       Rate and Rhythm: Normal rate and regular rhythm.       Pulses: Normal pulses.       Heart sounds: Normal heart sounds.  No murmur heard.          Pulmonary:       Effort: Pulmonary effort is normal. No respiratory distress.       Breath sounds: Normal breath sounds. Chest:       Chest wall: No tenderness. Abdominal:       General: Bowel sounds are normal. There is no distension.       Palpations: Abdomen is soft.       Tenderness: There is no abdominal tenderness. There is no guarding or rebound. Musculoskeletal:          General: No swelling. Normal range of motion.       Cervical back: Normal range of motion and neck supple. No rigidity or tenderness.       Right lower le+ Pitting Edema present.       Left lower le+ Pitting Edema present. Skin:      General: Skin is warm and dry.       Capillary Refill: Capillary refill takes less than 2 seconds.       Comments: Erythema, edematous to bilateral lower extremities with multiple wounds. right posterior medial lower leg quarter size wound with purulent drainage noted   left posterior leg quarter size wound with purulent drainage noted    Neurological:       General: No focal deficit present.       Mental Status: She is alert and oriented to person, place, and time.       Cranial Nerves: No cranial nerve deficit.       Motor: Weakness present.     Psychiatric:          Mood and Affect: Mood normal.          Behavior: Behavior normal.            Assessment/Plan:     Cellulitis               -IV antibiotic, vancomycin               -Blood cultures pending               -Wound culture pending               -Lactic acid               -Sed rate, CRP               -Elevate bilateral lower extremities               -Wound care evaluation and treatment               -As needed pain medication     Diabetic ulcer of left lower leg associated with type 2 diabetes mellitus, with fat layer exposed (HCC)   -Accucheck   -A1c   -Sliding scale insulin    -Hypoglycemia protocol as warranted      Stage 3 chronic kidney disease (HCC)                  -Current creatinine 0.9               - Monitor I's and O's closely               - Monitor labs closely               - Avoid hypotension               - Avoid nephrotoxic agents      Morbid obesity with BMI of 50.0-59.9, adult (HCC)               -noted       DVT prophylaxis Lovenox           Signed:   NANETTE Alexander - CNP, 3/9/2022 9:01 PM            Physician  Addendum:       After going over plan of care with her and    Going over meds etc        She now wants to leave [facility]       She was crying out loudly in room due to migraine headaches and resumed her meds etc as she requested        She still wishes to leave [facility]                 Cellulitis - Clinical Indications for Admission to Inpatient Care by Yamile Whitley RN       Review Status Review Entered   Completed 3/10/2022 07:49      Criteria Review      Clinical Indications for Admission to Inpatient Care    Most Recent : Jasbir Sheffield Most Recent Date: 3/10/2022 8:49 AM EST    (X) Admission is indicated for  1 or more  of the following  (1) (2) (3) (4) (5) (6) (7):       (X) Failure of outpatient therapy, as indicated by  ALL  of the following  (8) (9):          (X) Progression or no improvement after adequate trial (minimum of 48 hours, with longer period          for stable lower extremity infection)          3/10/2022 8:49 AM EST by Jasbir Sheffield            patient reports starting doxycylcine on 3/7 with no improvement in bilateral lower extremity cellulitis          (X) Adequate antibiotic regimen, as indicated by use of  1 or more  of the following :             (X) Penicillin-allergic patient regimen (clindamycin, extended-spectrum fluoroquinolone, or doxycycline)             3/10/2022 8:49 AM EST by Jasbir Sheffield               was started on doxycycline 3/7 by wound care          (X) Outpatient intravenous therapy is not appropriate due to  1 or more  of the following  (11):             (X) Clinical presentation (eg, acuity of infection, rapidity of progression, confirmed or suspected             bacteremia) is judged to require  ALL  of the following :                (X) Immediate initiation of intravenous therapy (eg, cannot wait for next day)                3/10/2022 8:49 AM EST by Roby Reza                  patient needing iv antibiotics for bilateral lower extremity cellulitis with wounds with purulent drainage                (X) Intensity of patient monitoring (eg, vital sign measurement, checks for infection progression)                that cannot be provided at other than inpatient level of care

## 2022-03-25 ENCOUNTER — HOME CARE VISIT (OUTPATIENT)
Dept: HOME HEALTH SERVICES | Facility: HOME HEALTHCARE | Age: 52
End: 2022-03-25

## 2022-03-29 ENCOUNTER — HOME CARE VISIT (OUTPATIENT)
Dept: HOME HEALTH SERVICES | Facility: CLINIC | Age: 52
End: 2022-03-29

## 2022-03-29 PROCEDURE — G0300 HHS/HOSPICE OF LPN EA 15 MIN: HCPCS

## 2022-03-29 RX ORDER — FLUTICASONE PROPIONATE 50 MCG
1 SPRAY, SUSPENSION (ML) NASAL DAILY
Qty: 16 G | Refills: 0 | Status: SHIPPED | OUTPATIENT
Start: 2022-03-29

## 2022-04-01 ENCOUNTER — HOME CARE VISIT (OUTPATIENT)
Dept: HOME HEALTH SERVICES | Facility: CLINIC | Age: 52
End: 2022-04-01

## 2022-04-01 PROCEDURE — G0300 HHS/HOSPICE OF LPN EA 15 MIN: HCPCS

## 2022-04-04 VITALS
OXYGEN SATURATION: 95 % | HEART RATE: 90 BPM | SYSTOLIC BLOOD PRESSURE: 136 MMHG | DIASTOLIC BLOOD PRESSURE: 82 MMHG | RESPIRATION RATE: 20 BRPM | TEMPERATURE: 98.4 F

## 2022-04-08 ENCOUNTER — TELEPHONE (OUTPATIENT)
Dept: INTERNAL MEDICINE | Facility: CLINIC | Age: 52
End: 2022-04-08

## 2022-04-08 ENCOUNTER — HOME CARE VISIT (OUTPATIENT)
Dept: HOME HEALTH SERVICES | Facility: HOME HEALTHCARE | Age: 52
End: 2022-04-08

## 2022-04-08 PROCEDURE — G0300 HHS/HOSPICE OF LPN EA 15 MIN: HCPCS

## 2022-04-08 NOTE — TELEPHONE ENCOUNTER
Arlene from [facility] called regarding a bruise on the patients upper right inner thigh. It is red/pink, swollen, and does not look like a traditional bruise. Arlene also stated that the lower leg looked swollen as well. She did not want to go to the hospital and stay, however I did recommend she go to the ER since it was Friday and we were going into the weekend and be evaluated by the ER to rule out a possible DVT. She noted understanding.

## 2022-04-08 NOTE — TELEPHONE ENCOUNTER
DETAILED MESSAGE HAS BEEN LEFT FOR WILFRED WITH [facility].  SHE HAD ASK FOR A DETAILED MESSAGE TO BE LEFT.

## 2022-04-08 NOTE — TELEPHONE ENCOUNTER
WILFRED WITH [facility] HAS CALLED SHE STATED THAT THE PATIENT STATED THAT ALGINATED SILVER IS BURNING.    WILFRED IS ASKING IF SHE COULD TRY MEDAHONEY OR SOME COLOGEN AND SILVER.   [phone]

## 2022-04-09 ENCOUNTER — APPOINTMENT (OUTPATIENT)
Dept: CT IMAGING | Age: 52
End: 2022-04-09
Payer: MEDICAID

## 2022-04-09 ENCOUNTER — HOSPITAL ENCOUNTER (EMERGENCY)
Age: 52
Discharge: LEFT BEFORE DISCHARGE | End: 2022-04-09
Payer: MEDICAID

## 2022-04-09 VITALS
TEMPERATURE: 98.1 F | RESPIRATION RATE: 16 BRPM | HEART RATE: 94 BPM | OXYGEN SATURATION: 96 % | WEIGHT: 293 LBS | HEIGHT: 61 IN | SYSTOLIC BLOOD PRESSURE: 129 MMHG | BODY MASS INDEX: 55.32 KG/M2 | DIASTOLIC BLOOD PRESSURE: 70 MMHG

## 2022-04-09 DIAGNOSIS — M79.89 LEG SWELLING: Primary | ICD-10-CM

## 2022-04-09 DIAGNOSIS — L03.115 CELLULITIS OF RIGHT LOWER EXTREMITY: ICD-10-CM

## 2022-04-09 LAB
ALBUMIN SERPL-MCNC: 3.8 G/DL (ref 3.5–5.2)
ALP BLD-CCNC: 101 U/L (ref 35–104)
ALT SERPL-CCNC: 15 U/L (ref 5–33)
ANION GAP SERPL CALCULATED.3IONS-SCNC: 13 MMOL/L (ref 7–19)
AST SERPL-CCNC: 24 U/L (ref 5–32)
BACTERIA: NEGATIVE /HPF
BASOPHILS ABSOLUTE: 0 K/UL (ref 0–0.2)
BASOPHILS RELATIVE PERCENT: 0.2 % (ref 0–1)
BILIRUB SERPL-MCNC: 0.5 MG/DL (ref 0.2–1.2)
BILIRUBIN URINE: NEGATIVE
BLOOD, URINE: NEGATIVE
BUN BLDV-MCNC: 13 MG/DL (ref 6–20)
CALCIUM SERPL-MCNC: 9.2 MG/DL (ref 8.6–10)
CHLORIDE BLD-SCNC: 95 MMOL/L (ref 98–111)
CLARITY: CLEAR
CO2: 27 MMOL/L (ref 22–29)
COLOR: YELLOW
CREAT SERPL-MCNC: 1 MG/DL (ref 0.5–0.9)
CRYSTALS, UA: ABNORMAL /HPF
EOSINOPHILS ABSOLUTE: 0.1 K/UL (ref 0–0.6)
EOSINOPHILS RELATIVE PERCENT: 0.7 % (ref 0–5)
EPITHELIAL CELLS, UA: 2 /HPF (ref 0–5)
GFR AFRICAN AMERICAN: >59
GFR NON-AFRICAN AMERICAN: 59
GLUCOSE BLD-MCNC: 113 MG/DL (ref 74–109)
GLUCOSE URINE: 250 MG/DL
HCT VFR BLD CALC: 37.8 % (ref 37–47)
HEMOGLOBIN: 11.1 G/DL (ref 12–16)
HYALINE CASTS: 2 /HPF (ref 0–8)
IMMATURE GRANULOCYTES #: 0.1 K/UL
KETONES, URINE: NEGATIVE MG/DL
LACTIC ACID: 2.1 MMOL/L (ref 0.5–1.9)
LEUKOCYTE ESTERASE, URINE: ABNORMAL
LYMPHOCYTES ABSOLUTE: 1.8 K/UL (ref 1.1–4.5)
LYMPHOCYTES RELATIVE PERCENT: 12.4 % (ref 20–40)
MCH RBC QN AUTO: 24.7 PG (ref 27–31)
MCHC RBC AUTO-ENTMCNC: 29.4 G/DL (ref 33–37)
MCV RBC AUTO: 84 FL (ref 81–99)
MONOCYTES ABSOLUTE: 0.7 K/UL (ref 0–0.9)
MONOCYTES RELATIVE PERCENT: 4.6 % (ref 0–10)
NEUTROPHILS ABSOLUTE: 11.8 K/UL (ref 1.5–7.5)
NEUTROPHILS RELATIVE PERCENT: 81.7 % (ref 50–65)
NITRITE, URINE: NEGATIVE
PDW BLD-RTO: 15.3 % (ref 11.5–14.5)
PH UA: 5 (ref 5–8)
PLATELET # BLD: 217 K/UL (ref 130–400)
PMV BLD AUTO: 9.5 FL (ref 9.4–12.3)
POTASSIUM REFLEX MAGNESIUM: 3.8 MMOL/L (ref 3.5–5)
PROTEIN UA: ABNORMAL MG/DL
RBC # BLD: 4.5 M/UL (ref 4.2–5.4)
RBC UA: 1 /HPF (ref 0–4)
SODIUM BLD-SCNC: 135 MMOL/L (ref 136–145)
SPECIFIC GRAVITY UA: 1.01 (ref 1–1.03)
TOTAL PROTEIN: 8.1 G/DL (ref 6.6–8.7)
UROBILINOGEN, URINE: 1 E.U./DL
WBC # BLD: 14.5 K/UL (ref 4.8–10.8)
WBC UA: 7 /HPF (ref 0–5)

## 2022-04-09 PROCEDURE — 96375 TX/PRO/DX INJ NEW DRUG ADDON: CPT

## 2022-04-09 PROCEDURE — 2580000003 HC RX 258: Performed by: PHYSICIAN ASSISTANT

## 2022-04-09 PROCEDURE — 6360000002 HC RX W HCPCS: Performed by: PHYSICIAN ASSISTANT

## 2022-04-09 PROCEDURE — 83605 ASSAY OF LACTIC ACID: CPT

## 2022-04-09 PROCEDURE — 99283 EMERGENCY DEPT VISIT LOW MDM: CPT

## 2022-04-09 PROCEDURE — 96366 THER/PROPH/DIAG IV INF ADDON: CPT

## 2022-04-09 PROCEDURE — 85025 COMPLETE CBC W/AUTO DIFF WBC: CPT

## 2022-04-09 PROCEDURE — 80053 COMPREHEN METABOLIC PANEL: CPT

## 2022-04-09 PROCEDURE — 87040 BLOOD CULTURE FOR BACTERIA: CPT

## 2022-04-09 PROCEDURE — 96365 THER/PROPH/DIAG IV INF INIT: CPT

## 2022-04-09 PROCEDURE — 6360000004 HC RX CONTRAST MEDICATION: Performed by: PHYSICIAN ASSISTANT

## 2022-04-09 PROCEDURE — 87086 URINE CULTURE/COLONY COUNT: CPT

## 2022-04-09 PROCEDURE — 36415 COLL VENOUS BLD VENIPUNCTURE: CPT

## 2022-04-09 PROCEDURE — 81001 URINALYSIS AUTO W/SCOPE: CPT

## 2022-04-09 RX ORDER — 0.9 % SODIUM CHLORIDE 0.9 %
1000 INTRAVENOUS SOLUTION INTRAVENOUS ONCE
Status: COMPLETED | OUTPATIENT
Start: 2022-04-09 | End: 2022-04-09

## 2022-04-09 RX ORDER — DOXYCYCLINE HYCLATE 100 MG
100 TABLET ORAL 2 TIMES DAILY
Qty: 14 TABLET | Refills: 0 | Status: ON HOLD | OUTPATIENT
Start: 2022-04-09 | End: 2022-04-15 | Stop reason: HOSPADM

## 2022-04-09 RX ORDER — ONDANSETRON 2 MG/ML
4 INJECTION INTRAMUSCULAR; INTRAVENOUS ONCE
Status: COMPLETED | OUTPATIENT
Start: 2022-04-09 | End: 2022-04-09

## 2022-04-09 RX ORDER — HYDROMORPHONE HYDROCHLORIDE 1 MG/ML
1 INJECTION, SOLUTION INTRAMUSCULAR; INTRAVENOUS; SUBCUTANEOUS ONCE
Status: COMPLETED | OUTPATIENT
Start: 2022-04-09 | End: 2022-04-09

## 2022-04-09 RX ADMIN — ONDANSETRON 4 MG: 2 INJECTION INTRAMUSCULAR; INTRAVENOUS at 15:48

## 2022-04-09 RX ADMIN — SODIUM CHLORIDE 1000 ML: 9 INJECTION, SOLUTION INTRAVENOUS at 15:55

## 2022-04-09 RX ADMIN — HYDROMORPHONE HYDROCHLORIDE 1 MG: 1 INJECTION, SOLUTION INTRAMUSCULAR; INTRAVENOUS; SUBCUTANEOUS at 15:17

## 2022-04-09 RX ADMIN — VANCOMYCIN HYDROCHLORIDE 2500 MG: 5 INJECTION, POWDER, LYOPHILIZED, FOR SOLUTION INTRAVENOUS at 14:48

## 2022-04-09 RX ADMIN — SODIUM CHLORIDE 1000 ML: 9 INJECTION, SOLUTION INTRAVENOUS at 15:17

## 2022-04-09 RX ADMIN — IOPAMIDOL 90 ML: 755 INJECTION, SOLUTION INTRAVENOUS at 16:15

## 2022-04-09 ASSESSMENT — ENCOUNTER SYMPTOMS
COLOR CHANGE: 1
SHORTNESS OF BREATH: 0
ABDOMINAL PAIN: 0

## 2022-04-09 ASSESSMENT — PAIN DESCRIPTION - ORIENTATION: ORIENTATION: LEFT

## 2022-04-09 ASSESSMENT — PAIN DESCRIPTION - LOCATION: LOCATION: LEG

## 2022-04-09 ASSESSMENT — PAIN SCALES - GENERAL
PAINLEVEL_OUTOF10: 8
PAINLEVEL_OUTOF10: 8

## 2022-04-09 ASSESSMENT — PAIN DESCRIPTION - PAIN TYPE: TYPE: CHRONIC PAIN

## 2022-04-09 NOTE — ED NOTES
Pt states she has multiple wounds but c/o new spot to right upper medial thigh that has gotten progressively worse x3 days. Area with purple discoloration to base of wound and redness surrounding wound. Skin hot to touch and pain states area very painful to touch.      Margarita Booker RN  04/09/22 [id number]

## 2022-04-09 NOTE — ED NOTES
This RN back into check on patient and update her that PA will be in to speak with her as soon as available. This RN offered to restart abx and fluids while pt is waiting for PA to speak with her and pt refuses at this time stating \"I really just want to go home, please. \"      Nico Ferrell RN  04/09/22 0122

## 2022-04-09 NOTE — PROGRESS NOTES
[address] [facility] Pharmacokinetic Monitoring Service - Vancomycin     Evelin Trivedi is a 54 y.o. female starting on vancomycin therapy for Skin and Soft Tissue Infection. Pharmacy consulted by GOLDIE Jane for monitoring and adjustment. Target Concentration: Goal trough of 10-15 mg/L and AUC/KEY <500 mg*hr/L    Additional Antimicrobials: None ordered at this time    Pertinent Laboratory Values: Wt Readings from Last 1 Encounters:   04/09/22 (!) 310 lb (140.6 kg)     Temp Readings from Last 1 Encounters:   04/09/22 98.1 °F (36.7 °C) (Oral)     CrCl cannot be calculated (Patient's most recent lab result is older than the maximum 10 days allowed. ). No results for input(s): CREATININE, WBC in the last 72 hours. Invalid input(s):  BUN  Procalcitonin: None ordered at this time    Pertinent Cultures:  Culture Date Source Results    Blood x 2 Ordered   MRSA Nasal Swab: N/A. Non-respiratory infection.     Plan:  Dosing recommendations based on Bayesian software  Start Vancomycin 2500 mg IV x 1 followed by Vancomycin 1500 mg IV every 18 hours  Anticipated AUC of 526 and trough concentration of 13.0 at steady state  Renal labs as indicated   Vancomycin concentration ordered for 04/11/22 @ 2030   Pharmacy will continue to monitor patient and adjust therapy as indicated    Thank you for the consult,  Feli Coles [facility]  4/9/2022 1:54 PM

## 2022-04-09 NOTE — ED PROVIDER NOTES
[facility]  eMERGENCY dEPARTMENT eNCOUnter      Pt Name: Moncho Hung  MRN: [id number]  [place] 1970  Date of evaluation: 4/9/2022  Provider: Abraham Rosario, [address] [place] [address]       Chief Complaint   Patient presents with    Leg Swelling     Right leg pain and swelling         HISTORY OF PRESENT ILLNESS   (Location/Symptom, Timing/Onset,Context/Setting, Quality, Duration, Modifying Factors, Severity)  Note limiting factors. Moncho Hung is a 54 y.o. female with history of morbid obesity, uncontrolled type 2 diabetes, diabetic ulceration of bilateral lower legs, venous insufficiency of bilateral lower legs, hypertension, restless leg, anxiety, depression, migraines, GERD, stage III kidney disease who presents to the emergency department with right leg redness and swelling over the last 4 days. She notes a fever at home as high as 104. She states it was 100 today but she took tylenol prior to arrival. She states on the medial right leg there has been worsening redness, pain, and swelling. She states it started as a pimple but it never came to a head. She denies drainage. She does note some dark skin discoloration. HPI    NursingNotes were reviewed. REVIEW OF SYSTEMS    (2-9 systems for level 4, 10 or more for level 5)     Review of Systems   Constitutional: Positive for chills and fever. Respiratory: Negative for shortness of breath. Cardiovascular: Negative for chest pain. Gastrointestinal: Negative for abdominal pain. Musculoskeletal: Positive for arthralgias. Skin: Positive for color change and wound. Neurological: Negative for numbness. All other systems reviewed and are negative.            PAST MEDICALHISTORY     Past Medical History:   Diagnosis Date    Anxiety     Constipation     Depression     GERD (gastroesophageal reflux disease)     Headache(784.0)     Hyperlipidemia     Hypertension     Kidney stones     Kidney stones     Morbid obesity due to excess calories [facility]     Neuromuscular disorder ([facility]     Restless legs syndrome     Type 2 diabetes mellitus ([facility]          SURGICAL HISTORY       Past Surgical History:   Procedure Laterality Date    ECTOPIC PREGNANCY SURGERY  2002    EYE SURGERY Bilateral     2005 & 2007: cornea transplant and cataracts removed    KNEE CARTILAGE SURGERY Left 12/20/2016    KNEE ARTHROSCOPIC PARTIAL MEDIAL MENISCECTOMY performed by Judy Grewal MD at [facility]     Previous Medications    BLOOD GLUCOSE MONITOR KIT AND SUPPLIES    Test 1 times a day & as needed for symptoms of irregular blood glucose. BLOOD GLUCOSE MONITORING SUPPL (ONE TOUCH [employer] SYSTEM) W/DEVICE KIT    1 kit by Does not apply route daily as needed (Dx 250.00)    BLOOD GLUCOSE TEST STRIPS (ONETOUCH ULTRA) STRIP    USE TO TEST BLOOD SUGAR THREE TIMES DAILY AND AS NEEDED AS DIRECTED    BUSPIRONE (BUSPAR) 10 MG TABLET    TAKE 1 TABLET BY MOUTH THREE TIMES DAILY    CELECOXIB (CELEBREX) 200 MG CAPSULE    TAKE 1 CAPSULE BY MOUTH EVERY DAY    CREAM BASE CREA    APPLY ONE PUMP (GRAM) TO AFFECTED AREA(S) THREE TO FOUR TIMES A DAY TO THE APPENDAGES AND TRUNK AS DIRECTED    DIABETIC SHOE MISC    by Does not apply route DISPENSE ONE PAIR OF DIABETIC SHOE AND 3 PAIRS HEAT MOLDED INSERTS    DULOXETINE (CYMBALTA) 60 MG EXTENDED RELEASE CAPSULE    TAKE 1 CAPSULE BY MOUTH TWICE DAILY    FLUTICASONE (FLONASE) 50 MCG/ACT NASAL SPRAY    SHAKE LIQUID AND USE 1 SPRAY IN EACH NOSTRIL DAILY    FUROSEMIDE (LASIX) 80 MG TABLET    Take 0.5 tablets by mouth 2 times daily    GABAPENTIN (NEURONTIN) 300 MG CAPSULE    Take 300 mg by mouth 3 times daily. INSULIN GLARGINE (LANTUS SOLOSTAR) 100 UNIT/ML INJECTION PEN    Inject 30 Units into the skin every evening    INSULIN LISPRO, 1 UNIT DIAL, (ADMELOG SOLOSTAR) 100 UNIT/ML SOPN    ADMINISTER 45 UNITS UNDER THE SKIN EVERY EVENING.  INCREASE BY 3 UNITS EVERY NIGHT UNTIL 60 UNITS IS REACHED AND. CONTINUE 60 UNITS EVERY EVENING    INSULIN PEN NEEDLE ([employer] PEN NEEDLES) 31G X 6 MM MISC    1 each by Does not apply route daily    LANCETS (ONETOUCH DELICA PLUS [id number]) MISC    USE TO CHECK BLOOD SUGAR AS DIRECTED    LIRAGLUTIDE (VICTOZA) 18 MG/3ML SOPN SC INJECTION    ADMINISTER 1.8 MG UNDER THE SKIN EVERY DAY    MIRTAZAPINE (REMERON) 15 MG TABLET    TAKE 1 TABLET BY MOUTH EVERY NIGHT    MORPHINE SULFATE ER 15 MG T12A    Take 15 mg by mouth 2 times daily. OMEPRAZOLE (PRILOSEC) 20 MG DELAYED RELEASE CAPSULE    TAKE ONE CAPSULE BY MOUTH TWICE DAILY BEFORE MEALS    ONDANSETRON (ZOFRAN) 4 MG TABLET    Take 1 tablet by mouth 3 times daily as needed for Nausea or Vomiting    ONDANSETRON (ZOFRAN-ODT) 4 MG DISINTEGRATING TABLET    DISSOLVE 1 TABLET UNDER THE TONGUE EVERY 8 HOURS AS NEEDED FOR NAUSEA AND VOMITING PRIOR TO DOXYCYCLINE DOSES    ONE TOUCH LANCETS MISC    1 each by Does not apply route daily    OXYCODONE-ACETAMINOPHEN (PERCOCET) 7.5-325 MG PER TABLET    Take 1 tablet by mouth 2 times daily.     POTASSIUM CHLORIDE (KLOR-CON M) 20 MEQ EXTENDED RELEASE TABLET    Take 1 tablet by mouth daily as needed (low potassium)    PRAMIPEXOLE (MIRAPEX) 0.75 MG TABLET    TAKE 1 TABLET BY MOUTH TWICE DAILY    TOPIRAMATE (TOPAMAX) 25 MG TABLET    Take 1 tablet by mouth at bedtime     TRIAMTERENE-HYDROCHLOROTHIAZIDE (DYAZIDE) 37.5-25 MG PER CAPSULE    Take 1 capsule by mouth daily       ALLERGIES     Compazine [prochlorperazine], Ciprofloxacin, Calamine, Prochlorperazine edisylate, Tobramycin, Tramadol, Amoxicillin-pot clavulanate, Ancef [cefazolin], Bactrim [sulfamethoxazole-trimethoprim], Cephalexin, Clindamycin/lincomycin, Codeine, Demerol, Doxycycline, Hydroxyzine hcl, Ibuprofen, Keflex [cephalexin], Meperidine, Moxifloxacin, Nortriptyline, Orphenadrine citrate, Penicillins, and Vistaril [hydroxyzine hcl]    FAMILY HISTORY       Family History   Problem Relation Age of Onset    Obesity Mother     Arthritis Mother         has had knees replaced    Cancer Father         \"adrenal gland & lung\"    Parkinsonism Father     Cancer Maternal Grandmother     COPD Maternal Grandmother     Obesity Maternal Grandmother     Heart Failure Maternal Grandmother     Cancer Maternal Grandfather     Other Sister         meliton zapatada    No Known Problems Son     No Known Problems Son     No Known Problems Daughter     Obesity Maternal Aunt     Other Maternal Aunt          of sepsis related to a spider bite          SOCIAL HISTORY       Social History     Socioeconomic History    Marital status: Single     Spouse name: None    Number of children: 3    Years of education: None    Highest education level: None   Occupational History    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 Carlos Jones, [phone]    AMBULATES: independently    DOMICILED: lives in a private home alone, no stairs, no steps to enter home, has a cat     Social Determinants of Health     Financial Resource Strain:     Difficulty of Paying Living Expenses: Not on file   Food Insecurity:     Worried About [address] in the Last Year: Not on file    [address] in the Last Year: Not on file   Transportation Needs:     Lack of Transportation (Medical): Not on file    Lack of Transportation (Non-Medical):  Not on file   Physical Activity:     Days of Exercise per Week: Not on file    Minutes of Exercise per Session: Not on file   Stress:     Feeling of Stress : Not on file   Social Connections:     Frequency of Communication with Friends and Family: Not on file    Frequency of Social Gatherings with Friends and Family: Not on file    Attends Shinto Services: Not on file    Active Member of [address] [place] [address] [employer] or Organizations: Not on file    Attends Club or Organization Meetings: Not on file    Marital Status: Not on file   Intimate Partner Violence:     Fear of Current or Ex-Partner: Not on file    Emotionally Abused: Not on file    Physically Abused: Not on file    Sexually Abused: Not on file   Housing Stability:     Unable to Pay for Housing in the Last Year: Not on file    Number of [place] in the Last Year: Not on file    Unstable Housing in the Last Year: Not on file       SCREENINGS             PHYSICAL EXAM    (up to 7 for level 4, 8 or more for level 5)     ED Triage Vitals [04/09/22 1239]   BP Temp Temp Source Pulse Resp SpO2 Height Weight   (!) 130/53 98.1 °F (36.7 °C) Oral 94 18 97 % 5' 1\" (1.549 m) (!) 310 lb (140.6 kg)       Physical Exam  Vitals and nursing note reviewed. Constitutional:       General: She is not in acute distress. Appearance: She is obese. She is not ill-appearing or toxic-appearing. Eyes:      Extraocular Movements: Extraocular movements intact. Conjunctiva/sclera: Conjunctivae normal.      Pupils: Pupils are equal, round, and reactive to light. Cardiovascular:      Rate and Rhythm: Normal rate and regular rhythm. Pulses: Normal pulses. Pulmonary:      Effort: Pulmonary effort is normal. No respiratory distress. Abdominal:      Palpations: Abdomen is soft. Tenderness: There is no abdominal tenderness. Musculoskeletal:      Cervical back: Normal range of motion and neck supple. Skin:     Comments: Skin on the medial thigh is tender, swollen, and red. NO obvious fluctuance or drainage. Large area of erythema surrounding the localized pain.    Neurological: General: No focal deficit present. Mental Status: She is alert and oriented to person, place, and time. DIAGNOSTIC RESULTS     RADIOLOGY:  Non-plain film images such as CT, Ultrasound and MRI are read by the radiologist. Plain radiographic images are visualized and preliminarily interpreted bythe emergency physician with the below findings:    Refused imaging      CT FEMUR RIGHT W CONTRAST                 LABS:  Labs Reviewed   CBC WITH AUTO DIFFERENTIAL - Abnormal; Notable for the following components:       Result Value    WBC 14.5 (*)     Hemoglobin 11.1 (*)     MCH 24.7 (*)     MCHC 29.4 (*)     RDW 15.3 (*)     Neutrophils % 81.7 (*)     Lymphocytes % 12.4 (*)     Neutrophils Absolute 11.8 (*)     All other components within normal limits   COMPREHENSIVE METABOLIC PANEL W/ REFLEX TO MG FOR LOW K - Abnormal; Notable for the following components:    Sodium 135 (*)     Chloride 95 (*)     Glucose 113 (*)     CREATININE 1.0 (*)     GFR Non- 59 (*)     All other components within normal limits   LACTIC ACID - Abnormal; Notable for the following components:    Lactic Acid 2.1 (*)     All other components within normal limits    Narrative:     CALL  [facility] ,  Chemistry results called to and read back by bryce varma er, 04/09/2022 14:48,  by SAMUEL   URINALYSIS WITH MICROSCOPIC - Abnormal; Notable for the following components:    Glucose, Ur 250 (*)     Protein, UA TRACE (*)     Leukocyte Esterase, Urine TRACE (*)     Bacteria, UA NEGATIVE (*)     Crystals, UA NEG (*)     WBC, UA 7 (*)     All other components within normal limits   CULTURE, BLOOD 1   CULTURE, BLOOD 2   CULTURE, URINE       All other labs were within normal range or not returned as of this dictation.     EMERGENCY DEPARTMENT COURSE and DIFFERENTIAL DIAGNOSIS/MDM:   Vitals:    Vitals:    04/09/22 1518 04/09/22 1538 04/09/22 1540 04/09/22 1702   BP: 138/64   129/70   Pulse: 91   94   Resp:    16   Temp:       TempSrc: SpO2: 92% 94% 96% 96%   Weight:       Height:           MDM  Patient is a 57-year-old female presented to the ER with concern for an infection of the right medial thigh. On physical exam, there was a large area of redness, swelling, and tenderness. Due to the patient's obesity, it was difficult to feel for a localized abscess. CT of the leg was obtained but the patient refused any imaging. She did have elevation of her white count as well as an increase in her lactic acid which was concerning for significant infection. I was planning to admit the patient to the hospital.  However, the patient has refused any further work-up at this time. She would like to leave [address] She states that she has had wonderful care and is very thankful for us in the ER but states that she is just frustrated and tired of feeling bad so she just wants to go home. I have discussed with her in detail the fact that she did not get her full vancomycin dose, fluids, or full work-up. I discussed with her that I like to admit her to the hospital.  We went over in detail the differentials I wanted to rule out in the risk associated with not obtaining further care at this time. She still would like to leave [place]. I will go ahead and give her an antibiotic outpatient. She is allergic to clindamycin, Bactrim, and Keflex. I will send her doxycycline. I stressed that she needs to see a primary care doctor soon as possible and I also encouraged that if she change her mind she return to the ER soon as possible. CONSULTS:  PHARMACY TO DOSE VANCOMYCIN        FINAL IMPRESSION      1. Leg swelling    2. Cellulitis of right lower extremity          DISPOSITION/PLAN   DISPOSITION [place] 04/09/2022 05:49:51 PM      PATIENT REFERRED TO:  No follow-up provider specified.     DISCHARGE MEDICATIONS:  New Prescriptions    DOXYCYCLINE HYCLATE (VIBRA-TABS) 100 MG TABLET    Take 1 tablet by mouth 2 times daily for 7 days

## 2022-04-09 NOTE — ED NOTES
Poonam AMEZCUA into speak with pt and pt again states she wants to go home. PA educated pt on possible complications from leaving and pt still states she would rather go home at this time. Pt agrees to sign AMA paperwork.      Amandeep Rashid RN  04/09/22 2129

## 2022-04-09 NOTE — ED NOTES
Patient signed AMA paperwork and this RN as witness. Pt A&Ox4, VSS.       Juaquin Robles RN  04/09/22 8488

## 2022-04-09 NOTE — LETTER
[facility] EMERGENCY DEPT  [address]  Phone: [phone]    Patient: Jose Sill: [birth year]  Date: 4/9/2022 Time: 5:18 PM    Leaving the [address] [place] [address] Advice    Chart #:[id number]    This will certify that I, the undersigned,    ______________________________________________________________________    A patient in the above named medical center, having requested discharge and removal from the medical center against the advice of my attending physician(s), hereby release the Emergency Department, its physicians, officers and employees, severally and individually, from any and all liability of any nature whatsoever for any injury or harm or complication of any kind that may result directly or indirectly, by reason of my terminating my stay as a patient from [facility], and hereby waive any and all rights of action I may now have or later acquire as a result of my voluntary departure from [facility] and the termination of my stay as a patient therein. This release is made with the full knowledge of the danger that may result from the action which I am taking.       Date:_______________________                         ___________________________                                                                                    Patient/Legal Representative    Witness:        ____________________________                          ___________________________  Nurse                                                                        Physician

## 2022-04-11 ENCOUNTER — TELEPHONE (OUTPATIENT)
Dept: INTERNAL MEDICINE | Facility: CLINIC | Age: 52
End: 2022-04-11

## 2022-04-11 LAB — URINE CULTURE, ROUTINE: NORMAL

## 2022-04-11 NOTE — TELEPHONE ENCOUNTER
ODETTE AYALA NURSE WITH [facility] HAS CALLED AND STATED THAT PATIENT HAS WENT TO THE ER OVER THE WEEKEND.  SHE STATED THEY WANTED HER TO BE ADMITTED.  PATIENT DENIED.  PATIENTS RIGHT LEG HAD DEVELOPED A LARGE BLISTER WITH CLEAR FLUID THAT BUSTED.   SHE STATED THAT PATIENT IS ON LASIX AND IS URINATING A LOT.  HER LEG APPEARS TO BE INFECTED AND PAIN MEDICATION IS NOT TOUCHING IT.   ODETTE STATED THAT IT IS CLOSE TO FEMORAL ARTERY.   PLEASE ADVISE.    [phone]      PATIENT HAS BEEN CALLED, SHE STATED THAT SHE CANT GET IN THE VAN WITH HER LEG.  SHE WAS ASKING IF ANTIBIOTICS COULD BE DONE AT HOME.  SHE STATED THAT THE AREA IS NOT AS WARM AS IT WAS, LOOKS LIKE A BRUISE BUT ITS NOT.   PLEASE ADVISE.

## 2022-04-11 NOTE — TELEPHONE ENCOUNTER
"Arlene from  came by the office and showed me picture of leg. She asked me if I could make a copy of it to scan into chart, yes, but it will make it black and white. I did call the pt and asked her if she could send a picture through her my chart and she started crying and said she couldn't sign up for mychart. I have the phone number to call them directly to help her walk through all the steps and she did not want the number. She states she is at home alone today that her boyfriend is at work and she could not come into the office and \"just needs some IV antibiotics\". She states \" I dont understand what home health is for if they cant give me what I need\"  "

## 2022-04-11 NOTE — TELEPHONE ENCOUNTER
She needs to see Aleksandra in the office as soon as possible.  I do not have any open appointments today.

## 2022-04-11 NOTE — TELEPHONE ENCOUNTER
Please explain to patient that we will need to see her regularly to treat chronic issues and see for problem visits as well.  Home health is to help with treatments and identifying issues that need to be seen, but we cannot treat based on home health evaluations. Thanks.

## 2022-04-11 NOTE — TELEPHONE ENCOUNTER
"I called and spoke with pt. She states she just cannot get into the vehicle to come in because he legs wont bend. I did tell her that the ambulance does bring patients into the office and that is an option. I also explained that she really should have stayed in the hospital and received the antibiotics instead of trying to \" go here, go there\" she said at this time she would just wait it out.   "

## 2022-04-12 ENCOUNTER — HOSPITAL ENCOUNTER (INPATIENT)
Age: 52
LOS: 3 days | Discharge: HOME HEALTH | DRG: 603 | End: 2022-04-15
Attending: HOSPITALIST | Admitting: HOSPITALIST
Payer: MEDICAID

## 2022-04-12 ENCOUNTER — HOME CARE VISIT (OUTPATIENT)
Dept: HOME HEALTH SERVICES | Facility: HOME HEALTHCARE | Age: 52
End: 2022-04-12

## 2022-04-12 DIAGNOSIS — N18.30 STAGE 3 CHRONIC KIDNEY DISEASE, UNSPECIFIED WHETHER STAGE 3A OR 3B CKD (HCC): Primary | ICD-10-CM

## 2022-04-12 DIAGNOSIS — Z78.9 FAILURE OF OUTPATIENT TREATMENT: ICD-10-CM

## 2022-04-12 DIAGNOSIS — R60.0 BILATERAL LOWER EXTREMITY EDEMA: ICD-10-CM

## 2022-04-12 DIAGNOSIS — L03.115 CELLULITIS OF RIGHT LOWER EXTREMITY: ICD-10-CM

## 2022-04-12 PROBLEM — L02.415 CELLULITIS AND ABSCESS OF RIGHT LEG: Status: ACTIVE | Noted: 2022-04-12

## 2022-04-12 LAB
ALBUMIN SERPL-MCNC: 3.7 G/DL (ref 3.5–5.2)
ALP BLD-CCNC: 116 U/L (ref 35–104)
ALT SERPL-CCNC: 20 U/L (ref 5–33)
ANION GAP SERPL CALCULATED.3IONS-SCNC: 11 MMOL/L (ref 7–19)
AST SERPL-CCNC: 31 U/L (ref 5–32)
BASOPHILS ABSOLUTE: 0 K/UL (ref 0–0.2)
BASOPHILS RELATIVE PERCENT: 0.5 % (ref 0–1)
BILIRUB SERPL-MCNC: 0.4 MG/DL (ref 0.2–1.2)
BUN BLDV-MCNC: 11 MG/DL (ref 6–20)
CALCIUM SERPL-MCNC: 9 MG/DL (ref 8.6–10)
CHLORIDE BLD-SCNC: 95 MMOL/L (ref 98–111)
CO2: 27 MMOL/L (ref 22–29)
CREAT SERPL-MCNC: 1.1 MG/DL (ref 0.5–0.9)
EOSINOPHILS ABSOLUTE: 0.1 K/UL (ref 0–0.6)
EOSINOPHILS RELATIVE PERCENT: 1.6 % (ref 0–5)
GFR AFRICAN AMERICAN: >59
GFR NON-AFRICAN AMERICAN: 53
GLUCOSE BLD-MCNC: 182 MG/DL (ref 70–99)
GLUCOSE BLD-MCNC: 304 MG/DL (ref 70–99)
GLUCOSE BLD-MCNC: 344 MG/DL (ref 74–109)
HCT VFR BLD CALC: 34.7 % (ref 37–47)
HEMOGLOBIN: 10.1 G/DL (ref 12–16)
IMMATURE GRANULOCYTES #: 0 K/UL
LACTIC ACID, SEPSIS: 2.1 MG/DL (ref 0.5–1.9)
LACTIC ACID, SEPSIS: 3.5 MG/DL (ref 0.5–1.9)
LACTIC ACID: 1.9 MMOL/L (ref 0.5–1.9)
LACTIC ACID: 2.9 MMOL/L (ref 0.5–1.9)
LYMPHOCYTES ABSOLUTE: 1.1 K/UL (ref 1.1–4.5)
LYMPHOCYTES RELATIVE PERCENT: 14 % (ref 20–40)
MCH RBC QN AUTO: 24.3 PG (ref 27–31)
MCHC RBC AUTO-ENTMCNC: 29.1 G/DL (ref 33–37)
MCV RBC AUTO: 83.6 FL (ref 81–99)
MONOCYTES ABSOLUTE: 0.6 K/UL (ref 0–0.9)
MONOCYTES RELATIVE PERCENT: 7.5 % (ref 0–10)
NEUTROPHILS ABSOLUTE: 5.7 K/UL (ref 1.5–7.5)
NEUTROPHILS RELATIVE PERCENT: 76 % (ref 50–65)
PDW BLD-RTO: 15.1 % (ref 11.5–14.5)
PERFORMED ON: ABNORMAL
PERFORMED ON: ABNORMAL
PLATELET # BLD: 243 K/UL (ref 130–400)
PMV BLD AUTO: 9.8 FL (ref 9.4–12.3)
POTASSIUM REFLEX MAGNESIUM: 4.4 MMOL/L (ref 3.5–5)
RBC # BLD: 4.15 M/UL (ref 4.2–5.4)
SODIUM BLD-SCNC: 133 MMOL/L (ref 136–145)
TOTAL PROTEIN: 8.2 G/DL (ref 6.6–8.7)
WBC # BLD: 7.6 K/UL (ref 4.8–10.8)

## 2022-04-12 PROCEDURE — 2580000003 HC RX 258: Performed by: PHYSICIAN ASSISTANT

## 2022-04-12 PROCEDURE — 6370000000 HC RX 637 (ALT 250 FOR IP): Performed by: NURSE PRACTITIONER

## 2022-04-12 PROCEDURE — 87040 BLOOD CULTURE FOR BACTERIA: CPT

## 2022-04-12 PROCEDURE — 85025 COMPLETE CBC W/AUTO DIFF WBC: CPT

## 2022-04-12 PROCEDURE — 1210000000 HC MED SURG R&B

## 2022-04-12 PROCEDURE — 96375 TX/PRO/DX INJ NEW DRUG ADDON: CPT

## 2022-04-12 PROCEDURE — 93970 EXTREMITY STUDY: CPT

## 2022-04-12 PROCEDURE — 99283 EMERGENCY DEPT VISIT LOW MDM: CPT

## 2022-04-12 PROCEDURE — 6360000002 HC RX W HCPCS: Performed by: PHYSICIAN ASSISTANT

## 2022-04-12 PROCEDURE — 96365 THER/PROPH/DIAG IV INF INIT: CPT

## 2022-04-12 PROCEDURE — 2580000003 HC RX 258: Performed by: HOSPITALIST

## 2022-04-12 PROCEDURE — 80053 COMPREHEN METABOLIC PANEL: CPT

## 2022-04-12 PROCEDURE — 36415 COLL VENOUS BLD VENIPUNCTURE: CPT

## 2022-04-12 PROCEDURE — 83605 ASSAY OF LACTIC ACID: CPT

## 2022-04-12 PROCEDURE — 96366 THER/PROPH/DIAG IV INF ADDON: CPT

## 2022-04-12 PROCEDURE — 82947 ASSAY GLUCOSE BLOOD QUANT: CPT

## 2022-04-12 PROCEDURE — 2580000003 HC RX 258: Performed by: NURSE PRACTITIONER

## 2022-04-12 RX ORDER — IBUPROFEN 400 MG/1
600 TABLET ORAL ONCE
Status: COMPLETED | OUTPATIENT
Start: 2022-04-12 | End: 2022-04-12

## 2022-04-12 RX ORDER — SODIUM CHLORIDE 9 MG/ML
25 INJECTION, SOLUTION INTRAVENOUS PRN
Status: DISCONTINUED | OUTPATIENT
Start: 2022-04-12 | End: 2022-04-15 | Stop reason: HOSPADM

## 2022-04-12 RX ORDER — DEXTROSE MONOHYDRATE 25 G/50ML
12.5 INJECTION, SOLUTION INTRAVENOUS PRN
Status: DISCONTINUED | OUTPATIENT
Start: 2022-04-12 | End: 2022-04-12 | Stop reason: CLARIF

## 2022-04-12 RX ORDER — SODIUM CHLORIDE 0.9 % (FLUSH) 0.9 %
5-40 SYRINGE (ML) INJECTION PRN
Status: DISCONTINUED | OUTPATIENT
Start: 2022-04-12 | End: 2022-04-15 | Stop reason: HOSPADM

## 2022-04-12 RX ORDER — NICOTINE POLACRILEX 4 MG
15 LOZENGE BUCCAL PRN
Status: DISCONTINUED | OUTPATIENT
Start: 2022-04-12 | End: 2022-04-12 | Stop reason: CLARIF

## 2022-04-12 RX ORDER — POTASSIUM CHLORIDE 7.45 MG/ML
10 INJECTION INTRAVENOUS PRN
Status: DISCONTINUED | OUTPATIENT
Start: 2022-04-12 | End: 2022-04-15 | Stop reason: HOSPADM

## 2022-04-12 RX ORDER — GABAPENTIN 100 MG/1
100 CAPSULE ORAL DAILY
COMMUNITY

## 2022-04-12 RX ORDER — ACETAMINOPHEN 325 MG/1
650 TABLET ORAL EVERY 6 HOURS PRN
Status: DISCONTINUED | OUTPATIENT
Start: 2022-04-12 | End: 2022-04-15 | Stop reason: HOSPADM

## 2022-04-12 RX ORDER — 0.9 % SODIUM CHLORIDE 0.9 %
500 INTRAVENOUS SOLUTION INTRAVENOUS ONCE
Status: COMPLETED | OUTPATIENT
Start: 2022-04-12 | End: 2022-04-13

## 2022-04-12 RX ORDER — SODIUM CHLORIDE 9 MG/ML
INJECTION, SOLUTION INTRAVENOUS CONTINUOUS
Status: DISCONTINUED | OUTPATIENT
Start: 2022-04-12 | End: 2022-04-15

## 2022-04-12 RX ORDER — ONDANSETRON 2 MG/ML
4 INJECTION INTRAMUSCULAR; INTRAVENOUS ONCE
Status: COMPLETED | OUTPATIENT
Start: 2022-04-12 | End: 2022-04-12

## 2022-04-12 RX ORDER — CYCLOBENZAPRINE HCL 10 MG
10 TABLET ORAL 3 TIMES DAILY PRN
COMMUNITY

## 2022-04-12 RX ORDER — FAMOTIDINE 20 MG/1
20 TABLET, FILM COATED ORAL 2 TIMES DAILY
Status: DISCONTINUED | OUTPATIENT
Start: 2022-04-12 | End: 2022-04-12

## 2022-04-12 RX ORDER — BUSPIRONE HYDROCHLORIDE 10 MG/1
10 TABLET ORAL 3 TIMES DAILY PRN
Status: DISCONTINUED | OUTPATIENT
Start: 2022-04-12 | End: 2022-04-15 | Stop reason: HOSPADM

## 2022-04-12 RX ORDER — DEXTROSE MONOHYDRATE 50 MG/ML
100 INJECTION, SOLUTION INTRAVENOUS PRN
Status: DISCONTINUED | OUTPATIENT
Start: 2022-04-12 | End: 2022-04-15 | Stop reason: HOSPADM

## 2022-04-12 RX ORDER — ACETAMINOPHEN 650 MG/1
650 SUPPOSITORY RECTAL EVERY 6 HOURS PRN
Status: DISCONTINUED | OUTPATIENT
Start: 2022-04-12 | End: 2022-04-15 | Stop reason: HOSPADM

## 2022-04-12 RX ORDER — MAGNESIUM SULFATE IN WATER 40 MG/ML
2000 INJECTION, SOLUTION INTRAVENOUS PRN
Status: DISCONTINUED | OUTPATIENT
Start: 2022-04-12 | End: 2022-04-15 | Stop reason: HOSPADM

## 2022-04-12 RX ORDER — POLYETHYLENE GLYCOL 3350 17 G/17G
17 POWDER, FOR SOLUTION ORAL DAILY PRN
Status: DISCONTINUED | OUTPATIENT
Start: 2022-04-12 | End: 2022-04-15 | Stop reason: HOSPADM

## 2022-04-12 RX ORDER — MORPHINE SULFATE 2 MG/ML
2 INJECTION, SOLUTION INTRAMUSCULAR; INTRAVENOUS ONCE
Status: COMPLETED | OUTPATIENT
Start: 2022-04-12 | End: 2022-04-12

## 2022-04-12 RX ORDER — SODIUM CHLORIDE 0.9 % (FLUSH) 0.9 %
5-40 SYRINGE (ML) INJECTION EVERY 12 HOURS SCHEDULED
Status: DISCONTINUED | OUTPATIENT
Start: 2022-04-12 | End: 2022-04-15 | Stop reason: HOSPADM

## 2022-04-12 RX ORDER — 0.9 % SODIUM CHLORIDE 0.9 %
500 INTRAVENOUS SOLUTION INTRAVENOUS ONCE
Status: COMPLETED | OUTPATIENT
Start: 2022-04-12 | End: 2022-04-12

## 2022-04-12 RX ADMIN — WATER 2000 MG: 1 INJECTION INTRAMUSCULAR; INTRAVENOUS; SUBCUTANEOUS at 11:04

## 2022-04-12 RX ADMIN — SODIUM CHLORIDE 500 ML: 9 INJECTION, SOLUTION INTRAVENOUS at 22:03

## 2022-04-12 RX ADMIN — SODIUM CHLORIDE: 9 INJECTION, SOLUTION INTRAVENOUS at 22:03

## 2022-04-12 RX ADMIN — ACETAMINOPHEN 650 MG: 325 TABLET ORAL at 23:58

## 2022-04-12 RX ADMIN — VANCOMYCIN HYDROCHLORIDE 2500 MG: 5 INJECTION, POWDER, LYOPHILIZED, FOR SOLUTION INTRAVENOUS at 11:22

## 2022-04-12 RX ADMIN — SODIUM CHLORIDE 500 ML: 9 INJECTION, SOLUTION INTRAVENOUS at 22:43

## 2022-04-12 RX ADMIN — INSULIN LISPRO 1 UNITS: 100 INJECTION, SOLUTION INTRAVENOUS; SUBCUTANEOUS at 22:02

## 2022-04-12 RX ADMIN — MORPHINE SULFATE 2 MG: 2 INJECTION, SOLUTION INTRAMUSCULAR; INTRAVENOUS at 12:12

## 2022-04-12 RX ADMIN — INSULIN LISPRO 8 UNITS: 100 INJECTION, SOLUTION INTRAVENOUS; SUBCUTANEOUS at 18:07

## 2022-04-12 RX ADMIN — IBUPROFEN 600 MG: 400 TABLET ORAL at 21:03

## 2022-04-12 RX ADMIN — WATER 2000 MG: 1 INJECTION INTRAMUSCULAR; INTRAVENOUS; SUBCUTANEOUS at 21:56

## 2022-04-12 RX ADMIN — ACETAMINOPHEN 650 MG: 325 TABLET ORAL at 18:07

## 2022-04-12 RX ADMIN — BUSPIRONE HYDROCHLORIDE 10 MG: 10 TABLET ORAL at 23:58

## 2022-04-12 RX ADMIN — ONDANSETRON 4 MG: 2 INJECTION INTRAMUSCULAR; INTRAVENOUS at 12:12

## 2022-04-12 ASSESSMENT — PAIN DESCRIPTION - PAIN TYPE
TYPE: ACUTE PAIN
TYPE: ACUTE PAIN

## 2022-04-12 ASSESSMENT — ENCOUNTER SYMPTOMS
ABDOMINAL DISTENTION: 0
PHOTOPHOBIA: 0
COUGH: 0
EYE PAIN: 0
BACK PAIN: 0
SHORTNESS OF BREATH: 0
RHINORRHEA: 0
ABDOMINAL PAIN: 0
APNEA: 0
SORE THROAT: 0
COLOR CHANGE: 0
NAUSEA: 0
EYE DISCHARGE: 0

## 2022-04-12 ASSESSMENT — PAIN DESCRIPTION - FREQUENCY: FREQUENCY: CONTINUOUS

## 2022-04-12 ASSESSMENT — PAIN DESCRIPTION - LOCATION
LOCATION: LEG
LOCATION: LEG

## 2022-04-12 ASSESSMENT — PAIN DESCRIPTION - ORIENTATION
ORIENTATION: RIGHT;LEFT
ORIENTATION: RIGHT;LEFT

## 2022-04-12 ASSESSMENT — PAIN DESCRIPTION - DESCRIPTORS: DESCRIPTORS: CONSTANT;BURNING;ACHING

## 2022-04-12 ASSESSMENT — PAIN DESCRIPTION - PROGRESSION: CLINICAL_PROGRESSION: NOT CHANGED

## 2022-04-12 ASSESSMENT — PAIN SCALES - GENERAL
PAINLEVEL_OUTOF10: 7
PAINLEVEL_OUTOF10: 10
PAINLEVEL_OUTOF10: 5
PAINLEVEL_OUTOF10: 9

## 2022-04-12 ASSESSMENT — PAIN - FUNCTIONAL ASSESSMENT: PAIN_FUNCTIONAL_ASSESSMENT: PREVENTS OR INTERFERES SOME ACTIVE ACTIVITIES AND ADLS

## 2022-04-12 ASSESSMENT — PAIN DESCRIPTION - ONSET: ONSET: AWAKENED FROM SLEEP

## 2022-04-12 NOTE — H&P
[place], [place], [address]    DEPARTMENT OF HOSPITALIST MEDICINE      HISTORY & PHYSICAL:          REASON FOR ADMISSION:  Chief Complaint   Patient presents with    Leg Swelling     right leg swelling and wound        HISTORY OF PRESENT ILLNESS:  Lizbeth Healy is an 54 y.o. female with past medical history of depression, GERD, hyperlipidemia, history of renal stones, hypertension, morbid obesity, type 2 diabetes, lymphedema, osteoarthritis of the knees, and anxiety. Patient gives a history of 2 years ago suddenly developing swelling of her lower extremities. Over the last 2 years she states she has gained 100 pounds because of her ever-increasing girth of her legs. She comes in today complaining of severe pain and redness. She has an open weeping ulceration to the right thigh. Her leg is hot and firm all the way up to the groin. She reports she has become reclusive except to go to [facility] once a week because of her ever expanding leg. She has self-referred to [facility] but there is a waiting list in the lymphedema clinic there. She tried to go to weight loss clinic locally and they refused to give her anything because she is managed with narcotics for her chronic pain by pain management. She is limited on specialty care because she is on [facility].   However I did do some research and there is a lymphedema clinic at [facility] which is in [place] and should be covered once she is discharged we should consider making that referral.      PAST MEDICAL HISTORY:  Past Medical History:   Diagnosis Date    Anxiety     Constipation     Depression     GERD (gastroesophageal reflux disease)     Headache(784.0)     Hyperlipidemia     Hypertension     Kidney stones     Kidney stones     Morbid obesity due to excess calories ([address]     Neuromuscular disorder ([address]     Restless legs syndrome     Type 2 diabetes mellitus ([address]          PAST SURGICAL HISTORY:  Past Surgical History:   Procedure Laterality Date    ECTOPIC PREGNANCY SURGERY  2002    EYE SURGERY Bilateral     2005 & 2007: cornea transplant and cataracts removed    KNEE CARTILAGE SURGERY Left 12/20/2016    KNEE ARTHROSCOPIC PARTIAL MEDIAL MENISCECTOMY performed by Sivakumar Montalvo MD at [address] [place] [address] TUBAL LIGATION  2001        SOCIAL HISTORY:  Social History     Socioeconomic History    Marital status: Single     Spouse name: None    Number of children: 3    Years of education: None    Highest education level: None   Occupational History    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 Miguel Angel Palomino, [phone]    AMBULATES: independently    DOMICILED: lives in a private home alone, no stairs, no steps to enter home, has a cat     Social Determinants of Health     Financial Resource Strain:     Difficulty of Paying Living Expenses: Not on file   Food Insecurity:     Worried About [address] in the Last Year: Not on file    [address] in the Last Year: Not on file   Transportation Needs:     Lack of Transportation (Medical): Not on file    Lack of Transportation (Non-Medical):  Not on file   Physical Activity:     Days of Exercise per Week: Not on file    Minutes of Exercise per Session: Not on file   Stress:     Feeling of Stress : Not on file   Social Connections:     Frequency of Communication with Friends and Family: Not on file    Frequency of Social Gatherings with Friends and Family: Not on file    Attends Buddhist Services: Not on file    Active Member of Clubs or Organizations: Not on file    Attends Club or Organization Meetings: Not on file    Marital Status: Not on file   Intimate Partner Violence:     Fear of Current or Ex-Partner: Not on file    Emotionally Abused: Not on file    Physically Abused: Not on file    Sexually Abused: Not on file   Housing Stability:     Unable to Pay for Housing in the Last Year: Not on file    Number of [place] in the Last Year: Not on file    Unstable Housing in the Last Year: Not on file        FAMILY HISTORY:  Family History   Problem Relation Age of Onset    Obesity Mother     Arthritis Mother         has had knees replaced    Cancer Father         \"adrenal gland & lung\"    Parkinsonism Father     Cancer Maternal Grandmother     COPD Maternal Grandmother     Obesity Maternal Grandmother     Heart Failure Maternal Grandmother     Cancer Maternal Grandfather     Other Sister         spina biffida    No Known Problems Son     No Known Problems Son     No Known Problems Daughter     Obesity Maternal Aunt     Other Maternal Aunt          of sepsis related to a spider bite         ALLERGIES:  Allergies   Allergen Reactions    Compazine [Prochlorperazine] Shortness Of Breath    Ciprofloxacin Hives    Calamine Itching    Prochlorperazine Edisylate      Will discuss with patient at next visit     Tobramycin Other (See Comments)     States had corneal transplants - and was told not to    Tramadol      Will discuss with patient at next visit     Amoxicillin-Pot Clavulanate Hives, Itching and Rash    Ancef [Cefazolin] Nausea And Vomiting    Bactrim [Sulfamethoxazole-Trimethoprim] Nausea And Vomiting and Other (See Comments)     States she also gets yeast infections with it    Cephalexin Rash    Clindamycin/Lincomycin Rash    Codeine Nausea And Vomiting and Rash    Demerol Hives    Doxycycline Nausea And Vomiting    Hydroxyzine Hcl Itching  Ibuprofen Nausea Only    Keflex [Cephalexin] Rash    Meperidine Hives    Moxifloxacin Nausea And Vomiting    Nortriptyline Hives, Itching and Rash    Orphenadrine Citrate Itching    Penicillins Hives and Nausea Only    Vistaril [Hydroxyzine Hcl] Itching        PRIOR TO ADMISSION MEDS:  Medications Prior to Admission: gabapentin (NEURONTIN) 100 MG capsule, Take 100 mg by mouth daily.  Takes at 4 pm  doxycycline hyclate (VIBRA-TABS) 100 MG tablet, Take 1 tablet by mouth 2 times daily for 7 days (Patient not taking: Reported on 4/12/2022)  [DISCONTINUED] triamterene-hydroCHLOROthiazide (DYAZIDE) 37.5-25 MG per capsule, Take 1 capsule by mouth daily  topiramate (TOPAMAX) 25 MG tablet, Take 1 tablet by mouth at bedtime  (Patient not taking: Reported on 4/12/2022)  ondansetron (ZOFRAN) 4 MG tablet, Take 1 tablet by mouth 3 times daily as needed for Nausea or Vomiting  insulin glargine (LANTUS SOLOSTAR) 100 UNIT/ML injection pen, Inject 30 Units into the skin every evening (Patient taking differently: Inject 10 Units into the skin 3 times daily (with meals) Has been taking 10 units tid with meals)  blood glucose test strips (ONETOUCH ULTRA) strip, USE TO TEST BLOOD SUGAR THREE TIMES DAILY AND AS NEEDED AS DIRECTED  Lancets (ONETOUCH DELICA PLUS [id number]) MISC, USE TO CHECK BLOOD SUGAR AS DIRECTED  pramipexole (MIRAPEX) 0.75 MG tablet, TAKE 1 TABLET BY MOUTH TWICE DAILY  Insulin Pen Needle ([employer] PEN NEEDLES) 31G X 6 MM MISC, 1 each by Does not apply route daily  potassium chloride (KLOR-CON M) 20 MEQ extended release tablet, Take 1 tablet by mouth daily as needed (low potassium)  ondansetron (ZOFRAN-ODT) 4 MG disintegrating tablet, DISSOLVE 1 TABLET UNDER THE TONGUE EVERY 8 HOURS AS NEEDED FOR NAUSEA AND VOMITING PRIOR TO DOXYCYCLINE DOSES  Liraglutide (VICTOZA) 18 MG/3ML SOPN SC injection, ADMINISTER 1.8 MG UNDER THE SKIN EVERY DAY  mirtazapine (REMERON) 15 MG tablet, TAKE 1 TABLET BY MOUTH EVERY NIGHT  busPIRone (BUSPAR) 10 MG tablet, TAKE 1 TABLET BY MOUTH THREE TIMES DAILY (Patient taking differently: 2 times daily )  insulin lispro, 1 Unit Dial, (ADMELOG SOLOSTAR) 100 UNIT/ML SOPN, ADMINISTER 45 UNITS UNDER THE SKIN EVERY EVENING. INCREASE BY 3 UNITS EVERY NIGHT UNTIL 60 UNITS IS REACHED AND. CONTINUE 60 UNITS EVERY EVENING (Patient taking differently: 45 UNITS EVERY EVENING)  fluticasone (FLONASE) 50 MCG/ACT nasal spray, SHAKE LIQUID AND USE 1 SPRAY IN EACH NOSTRIL DAILY  furosemide (LASIX) 80 MG tablet, Take 0.5 tablets by mouth 2 times daily  DULoxetine (CYMBALTA) 60 MG extended release capsule, TAKE 1 CAPSULE BY MOUTH TWICE DAILY  omeprazole (PRILOSEC) 20 MG delayed release capsule, TAKE ONE CAPSULE BY MOUTH TWICE DAILY BEFORE MEALS  Diabetic Shoe MISC, by Does not apply route DISPENSE ONE PAIR OF DIABETIC SHOE AND 3 PAIRS HEAT MOLDED INSERTS  oxyCODONE-acetaminophen (PERCOCET) 7.5-325 MG per tablet, Take 1 tablet by mouth three times daily. Morphine Sulfate ER 15 MG T12A, Take 15 mg by mouth 2 times daily. Cream Base CREA, APPLY ONE PUMP (GRAM) TO AFFECTED AREA(S) THREE TO FOUR TIMES A DAY TO THE APPENDAGES AND TRUNK AS DIRECTED  celecoxib (CELEBREX) 200 MG capsule, TAKE 1 CAPSULE BY MOUTH EVERY DAY  blood glucose monitor kit and supplies, Test 1 times a day & as needed for symptoms of irregular blood glucose. gabapentin (NEURONTIN) 300 MG capsule, Take 400 mg by mouth 3 times daily.    Blood Glucose Monitoring Suppl ([address]) w/Device KIT, 1 kit by Does not apply route daily as needed (Dx 250.00)  ONE TOUCH LANCETS MISC, 1 each by Does not apply route daily     REVIEW OF SYSTEMS:  Constitutional:  No fevers, chills, nausea, vomiting, + tiredness & fatigue   Head:  No head injury, facial trauma   Eyes:  No acute visual changes, exudate, trauma   Ears:  No acute hearing loss, earaches   Nose: No nasal discharge, epistaxis   Neck: No new hoarseness, voice change, or new masses Lungs:   No hemoptysis, pleurisy   Heart:  No chest pressure with exertion, palpitations,    Abdomen:   No new masses, no bright red blood per rectum   Extremities: c/o acute pain while ambulating, cellulits to right leg   Skin: No new changes in skin color, no rashes or lesions   Neurologic: No new motor or sensory changes     14 point review of systems addressed with patient which is essentially negative except as specifically addressed above:    PHYSICAL EXAM:  BP (!) 165/72   Pulse 90   Temp 98.1 °F (36.7 °C) (Oral)   Resp 18   Ht 5' 1\" (1.549 m)   Wt (!) 310 lb (140.6 kg)   SpO2 92%   BMI 58.57 kg/m²   No intake/output data recorded. PHYSICAL EXAMINATION:    Vital Signs: Please see the chart   OK:  Awake, alert, oriented x 3, patient appears tired and fatigued   Head/Eyes:  Normocephalic, atraumatic, EOMI and PERRLA bilaterally   ENT: Moist mucous membranes, nasal passages clear   Neck: Supple, full range of motion, no carotid bruit, trachea midline   Respiratory:   Bilateral fair air entry in both lung fields, mild B/L crackles, symmetric expansion of chest   Cardiovascular:  Regular rate and rhythm, S1+S2+0, no murmurs/rubs   Urology: No bilateral CVA tenderness, no suprapubic tenderness   Abdomen:   Soft, non-tender, bowel sounds +ve, no organomegaly   Muscle/Joints: Moves all, full range of motion, no muscle spasms   Extremities: Massive lymphedema.   Redness and 4 cm blistered area to right thigh   Pulses: Unable to palate   Skin: Warm, dry, no pallor/cyanosis/jaundice, no rashes/lesions   Neurologic: Awake, alert, oriented x 3, cranial nerves II-XII intact, no focal neurological deficits, sensory system intact   Psychiatric: Normal mood, non-suicidal         LABORATORY DATA:    CBC:  Recent Labs     04/12/22  1005   WBC 7.6   HGB 10.1*   HCT 34.7*        BMP:  Recent Labs     04/12/22  1005   *   K 4.4   CL 95*   CO2 27   BUN 11   CREATININE 1.1*   CALCIUM 9.0     Recent Labs 04/12/22  1005   AST 31   ALT 20   BILITOT 0.4   ALKPHOS 116*     Coag Panel: No results for input(s): INR, PROTIME, APTT in the last 72 hours. Cardiac Enzymes: No results for input(s): Klaudia Tyler in the last 72 hours. ABGs:No results found for: PHART, PO2ART, REL0IHX  Urinalysis:  Lab Results   Component Value Date    NITRU Negative 04/09/2022    WBCUA 7 04/09/2022    BACTERIA NEGATIVE 04/09/2022    RBCUA 1 04/09/2022    BLOODU Negative 04/09/2022    SPECGRAV 1.014 04/09/2022    GLUCOSEU 250 04/09/2022     A1C: No results for input(s): LABA1C in the last 72 hours. ABG:No results for input(s): PHART, DTO8MSH, PO2ART, JXZ0IZM, BEART, HGBAE, L7SZZXCO, CARBOXHGBART in the last 72 hours. EKG:   Please see chart      IMAGING:  No results found. Assessment and Plan:    Principal Problem:    Cellulitis and abscess of right leg   VANComycin 1 g every 24   Maxipime Q 12 HOURS   Wound care consult ID consult     Active Problems:    Hypertension   Continue home medication    FEDERICA (generalized anxiety disorder)   Add BuSpar 10 3 times daily as needed    Morbid obesity due to excess calories (HCC)   30 g carb diet    Gastroesophageal reflux disease   Continue PPI    Hypokalemia   Potassium magnesium replacement protocol    Lymphedema of both lower extremities   His Doppler study   Consider discharge referral to [facility] lymphed[facility]    Diabetic ulcer of left lower leg associated with type 2 diabetes mellitus, with fat layer exposed left  anklE (HCC)   Wound care consult  Type 2 diabetes   3 g grams carb diet   Medium dose insulin protocol   Hypoglycemic protocol   Accu-Cheks before meals and at bedtime  Resolved Problems:    * No resolved hospital problems. *           Repeat labs in a.m. Electrolyte replacement as per protocol. Patient will be monitored very closely on the floor. Further recommendations as per the hospital course.   Patient's management will be taken over by our [facility] Hospitalist Team in am.    Patient  is on DVT prophylaxis  Current medications reviewed  Lab work reviewed  Radiology/Chest x-ray films reviewed  Discussed with the nurse and addressed all questions/concerns  Discussed with Patient and/or Family at the bedside in detail . .. they verbalize understanding and agree with the management plan. Attestation:  Inpatient status is used for patients with an expected LOS extending past two midnights due to medical therapy and/or critical care needs  . .. all other patients are placed under OBServation status. NANETTE Romero CNP  4:06 PM 4/12/2022      DISCLAIMER: This note was created with electronic voice recognition which does have occasional errors. If you have any questions regarding the content within the note please do not hesitate to contact me. .. Thanks.

## 2022-04-12 NOTE — ED PROVIDER NOTES
[facility] SURG SERVICES  eMERGENCYdEPARTMENT eNCOUnter      Pt Name: Woodrow Boston  MRN: [id number]  [place] 1970  Date of evaluation: 4/12/2022  Provider:GOLDIE Viramontes    CHIEF COMPLAINT       Chief Complaint   Patient presents with    Leg Swelling     right leg swelling and wound         HISTORY OF PRESENT ILLNESS  (Location/Symptom, Timing/Onset, Context/Setting, Quality, Duration, Modifying Factors, Severity.)   Lj Reyez  is a 54 y.o. female who presents to the emergency department with complaints of concern for worsening leg infection. Was seen here on 9th of April 2022 given iv vanc and oral doxy left AMA. Now here and worsening. Lactic and blood cultures are ordered. She left AMA as she has been admitted often and wanted to try oral doxy has blister medial thigh right leg concern where the infection began. She has severe lymphedema she can ambulate but  Needs help getting up and assistance. She knows Dr Elian Mcconnell with infectious diseases. States she doesn't think she has grown out MRSA in past. Multiple abx allergies. Rash being primary reaction. Involved with wound care and home health nursing. Hospitals in [place]    Nursing Notes were reviewed and I agree. REVIEW OF SYSTEMS    (2-9 systems for level 4, 10 or more for level 5)     Review of Systems   Constitutional: Negative for activity change, appetite change, chills and fever. HENT: Negative for congestion, postnasal drip, rhinorrhea and sore throat. Eyes: Negative for photophobia, pain, discharge and visual disturbance. Respiratory: Negative for apnea, cough and shortness of breath. Cardiovascular: Negative for chest pain and leg swelling. Gastrointestinal: Negative for abdominal distention, abdominal pain and nausea. Genitourinary: Negative for vaginal bleeding. Musculoskeletal: Negative for arthralgias, back pain, joint swelling, neck pain and neck stiffness. Skin: Positive for wound. Negative for color change and rash.    Neurological: Negative for dizziness, syncope, facial asymmetry and headaches. Hematological: Negative for adenopathy. Does not bruise/bleed easily. Psychiatric/Behavioral: Negative for agitation, behavioral problems and confusion. Except as noted above the remainder of the review of systems was reviewed and negative. PAST MEDICAL HISTORY     Past Medical History:   Diagnosis Date    Anxiety     Constipation     Depression     GERD (gastroesophageal reflux disease)     Headache(784.0)     Hyperlipidemia     Hypertension     Kidney stones     Kidney stones     Morbid obesity due to excess calories ([facility])     Neuromuscular disorder ([facility])     Restless legs syndrome     Type 2 diabetes mellitus ([facility]          SURGICAL HISTORY       Past Surgical History:   Procedure Laterality Date    ECTOPIC PREGNANCY SURGERY  2002    EYE SURGERY Bilateral     2005 & 2007: cornea transplant and cataracts removed    KNEE CARTILAGE SURGERY Left 12/20/2016    KNEE ARTHROSCOPIC PARTIAL MEDIAL MENISCECTOMY performed by Danie Flores [facility]       Current Discharge Medication List      CONTINUE these medications which have NOT CHANGED    Details   !! gabapentin (NEURONTIN) 100 MG capsule Take 100 mg by mouth daily.  Takes at 4 pm      cyclobenzaprine (FLEXERIL) 10 MG tablet Take 10 mg by mouth 3 times daily as needed for Muscle spasms      doxycycline hyclate (VIBRA-TABS) 100 MG tablet Take 1 tablet by mouth 2 times daily for 7 days  Qty: 14 tablet, Refills: 0      topiramate (TOPAMAX) 25 MG tablet Take 1 tablet by mouth at bedtime       ondansetron (ZOFRAN) 4 MG tablet Take 1 tablet by mouth 3 times daily as needed for Nausea or Vomiting  Qty: 30 tablet, Refills: 0      insulin glargine (LANTUS SOLOSTAR) 100 UNIT/ML injection pen Inject 30 Units into the skin every evening  Qty: 5 pen, Refills: 5    Associated Diagnoses: Uncontrolled type 2 diabetes mellitus with diabetic neuropathy, with long-term current use of insulin ([address] Uncontrolled type 2 diabetes mellitus with hyperglycemia, without long-term current use of insulin ([facility])      blood glucose test strips (ONETOUCH ULTRA) strip USE TO TEST BLOOD SUGAR THREE TIMES DAILY AND AS NEEDED AS DIRECTED  Qty: 150 strip, Refills: 5    Associated Diagnoses: Uncontrolled type 2 diabetes mellitus with diabetic neuropathy, with long-term current use of insulin ([address] Uncontrolled type 2 diabetes mellitus with hyperglycemia, without long-term current use of insulin ([address]      ! ! Lancets ([address]) MISC USE TO CHECK BLOOD SUGAR AS DIRECTED  Qty: 100 each, Refills: 5    Associated Diagnoses: Uncontrolled type 2 diabetes mellitus with diabetic neuropathy, with long-term current use of insulin ([address] Uncontrolled type 2 diabetes mellitus with hyperglycemia, without long-term current use of insulin ([facility])      pramipexole (MIRAPEX) 0.75 MG tablet TAKE 1 TABLET BY MOUTH TWICE DAILY  Qty: 60 tablet, Refills: 5    Associated Diagnoses: RLS (restless legs syndrome)      Insulin Pen Needle ([employer] PEN NEEDLES) 31G X 6 MM MISC 1 each by Does not apply route daily  Qty: 100 each, Refills: 3    Associated Diagnoses: Uncontrolled type 2 diabetes mellitus with diabetic neuropathy, with long-term current use of insulin ([address]  Uncontrolled type 2 diabetes mellitus with hyperglycemia, without long-term current use of insulin ([facility])      potassium chloride (KLOR-CON M) 20 MEQ extended release tablet Take 1 tablet by mouth daily as needed (low potassium)  Qty: 30 tablet, Refills: 0    Associated Diagnoses: Bilateral leg cramps      ondansetron (ZOFRAN-ODT) 4 MG disintegrating tablet DISSOLVE 1 TABLET UNDER THE TONGUE EVERY 8 HOURS AS NEEDED FOR NAUSEA AND VOMITING PRIOR TO DOXYCYCLINE DOSES  Qty: 20 tablet, Refills: 0      Liraglutide (VICTOZA) 18 MG/3ML SOPN SC injection ADMINISTER 1.8 MG UNDER THE SKIN EVERY DAY  Qty: 9 mL, Refills: 3    Associated Diagnoses: Uncontrolled type 2 diabetes mellitus with hyperglycemia, without long-term current use of insulin ([facility])      mirtazapine (REMERON) 15 MG tablet TAKE 1 TABLET BY MOUTH EVERY NIGHT  Qty: 30 tablet, Refills: 3    Associated Diagnoses: Anxiety and depression      busPIRone (BUSPAR) 10 MG tablet TAKE 1 TABLET BY MOUTH THREE TIMES DAILY  Qty: 90 tablet, Refills: 3      insulin lispro, 1 Unit Dial, (ADMELOG SOLOSTAR) 100 UNIT/ML SOPN ADMINISTER 45 UNITS UNDER THE SKIN EVERY EVENING. INCREASE BY 3 UNITS EVERY NIGHT UNTIL 60 UNITS IS REACHED AND. CONTINUE 60 UNITS EVERY EVENING  Qty: 18 mL, Refills: 5    Associated Diagnoses: Uncontrolled type 2 diabetes mellitus with diabetic neuropathy, with long-term current use of insulin ([facility])      fluticasone (FLONASE) 50 MCG/ACT nasal spray SHAKE LIQUID AND USE 1 SPRAY IN EACH NOSTRIL DAILY  Qty: 16 g, Refills: 0      furosemide (LASIX) 80 MG tablet Take 0.5 tablets by mouth 2 times daily  Qty: 60 tablet, Refills: 0      DULoxetine (CYMBALTA) 60 MG extended release capsule TAKE 1 CAPSULE BY MOUTH TWICE DAILY  Qty: 30 capsule, Refills: 5    Associated Diagnoses: Anxiety      omeprazole (PRILOSEC) 20 MG delayed release capsule TAKE ONE CAPSULE BY MOUTH TWICE DAILY BEFORE MEALS  Qty: 180 capsule, Refills: 1    Associated Diagnoses: Gastroesophageal reflux disease      Diabetic Shoe MISC by Does not apply route DISPENSE ONE PAIR OF DIABETIC SHOE AND 3 PAIRS HEAT MOLDED INSERTS  Qty: 1 each, Refills: 0    Associated Diagnoses: Uncontrolled type 2 diabetes mellitus with diabetic neuropathy, with long-term current use of insulin ([facility])      oxyCODONE-acetaminophen (PERCOCET) 7.5-325 MG per tablet Take 1 tablet by mouth three times daily. Morphine Sulfate ER 15 MG T12A Take 15 mg by mouth 2 times daily.       [employer] CREA APPLY ONE PUMP (GRAM) TO AFFECTED AREA(S) THREE TO FOUR TIMES A DAY TO THE APPENDAGES AND TRUNK AS DIRECTED  Qty: 30 g, Refills: 5      celecoxib (CELEBREX) 200 MG capsule TAKE 1 CAPSULE BY MOUTH EVERY DAY  Qty: 60 capsule, Refills: 5    Associated Diagnoses: Primary osteoarthritis of both knees      blood glucose monitor kit and supplies Test 1 times a day & as needed for symptoms of irregular blood glucose. Qty: 1 kit, Refills: 0    Comments: Brand per patient preference. May round up to next available package size. !! gabapentin (NEURONTIN) 300 MG capsule Take 400 mg by mouth 3 times daily. Refills: 5      Blood Glucose Monitoring Suppl ([address]) w/Device KIT 1 kit by Does not apply route daily as needed (Dx 250.00)  Qty: 1 kit, Refills: 0    Associated Diagnoses: Diabetes 1.5, managed as type 2 ([address]      !! ONE TOUCH LANCETS MISC 1 each by Does not apply route daily  Qty: 100 each, Refills: 3    Associated Diagnoses: Diabetes 1.5, managed as type 2 ([address]       ! ! - Potential duplicate medications found. Please discuss with provider.           ALLERGIES     Compazine [prochlorperazine], Ciprofloxacin, Calamine, Prochlorperazine edisylate, Tobramycin, Tramadol, Amoxicillin-pot clavulanate, Ancef [cefazolin], Bactrim [sulfamethoxazole-trimethoprim], Cephalexin, Clindamycin/lincomycin, Codeine, Demerol, Doxycycline, Hydroxyzine hcl, Ibuprofen, Keflex [cephalexin], Meperidine, Moxifloxacin, Nortriptyline, Orphenadrine citrate, Penicillins, and Vistaril [hydroxyzine hcl]    FAMILY HISTORY       Family History   Problem Relation Age of Onset    Obesity Mother     Arthritis Mother         has had knees replaced    Cancer Father         \"adrenal gland & lung\"    Parkinsonism Father     Cancer Maternal Grandmother     COPD Maternal Grandmother     Obesity Maternal Grandmother     Heart Failure Maternal Grandmother     Cancer Maternal Grandfather     Other Sister         spina biffida    No Known Problems Son     No Known Problems Son     No Known Problems Daughter     Obesity Maternal Aunt     Other Maternal Aunt          of sepsis related to a spider bite          SOCIAL HISTORY       Social History     Socioeconomic History    Marital status: Single     Spouse name: None    Number of children: 3    Years of education: None    Highest education level: None   Occupational History    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 Eric Bob, [phone]    AMBULATES: independently    DOMICILED: lives in a private home alone, no stairs, no steps to enter home, has a cat     Social Determinants of Health     Financial Resource Strain:     Difficulty of Paying Living Expenses: Not on file   Food Insecurity:     Worried About [address] in the Last Year: Not on file    [address] in the Last Year: Not on file   Transportation Needs:     Lack of Transportation (Medical): Not on file    Lack of Transportation (Non-Medical):  Not on file   Physical Activity:     Days of Exercise per Week: Not on file    Minutes of Exercise per Session: Not on file   Stress:     Feeling of Stress : Not on file   Social Connections:     Frequency of Communication with Friends and Family: Not on file    Frequency of Social Gatherings with Friends and Family: Not on file    Attends Jew Services: Not on file    Active Member of Clubs or Organizations: Not on file    Attends Club or Organization Meetings: Not on file    Marital Status: Not on file   Intimate Partner Violence:     Fear of Current or Ex-Partner: Not on file    Emotionally Abused: Not on file    Physically Abused: Not on file    Sexually Abused: Not on file   Housing Stability:     Unable to Pay for Housing in the Last Year: Not on file    Number of [place] in the Last Year: Not on file    Unstable Housing in the Last Year: Not on file       SCREENINGS    [place] Coma Scale  Eye Opening: Spontaneous  Best Verbal Response: Oriented  Best Motor Response: Obeys commands  [place] Coma Scale Score: 15      PHYSICAL EXAM    (up to 7 forlevel 4, 8 or more for level 5)     ED Triage Vitals   BP Temp Temp src Pulse Resp SpO2 Height Weight   -- -- -- -- -- -- -- --       Physical Exam  Vitals and nursing note reviewed. Constitutional:       General: She is not in acute distress. Appearance: She is well-developed. She is obese. She is not diaphoretic. HENT:      Head: Normocephalic and atraumatic. Right Ear: External ear normal.      Left Ear: External ear normal.      Mouth/Throat:      Pharynx: No oropharyngeal exudate. Eyes:      General:         Right eye: No discharge. Left eye: No discharge. Pupils: Pupils are equal, round, and reactive to light. Neck:      Thyroid: No thyromegaly. Cardiovascular:      Rate and Rhythm: Normal rate and regular rhythm. Pulses: Normal pulses. Heart sounds: Normal heart sounds. No murmur heard. No friction rub. Pulmonary:      Effort: Pulmonary effort is normal. No respiratory distress. Breath sounds: Normal breath sounds. No stridor. No wheezing. Abdominal:      General: Abdomen is flat. Bowel sounds are normal. There is no distension. Palpations: Abdomen is soft. Tenderness: There is no abdominal tenderness. Musculoskeletal:         General: Normal range of motion. Cervical back: Normal range of motion and neck supple. Right lower leg: Edema present. Left lower leg: Edema present. Skin:     General: Skin is warm and dry. Capillary Refill: Capillary refill takes less than 2 seconds. Findings: Erythema present. No rash. Comments: What looks like actively weeping blister secondary infection with cellulitis about the medial aspect of right leg with entire distal involvement. Neurological:      General: No focal deficit present. Mental Status: She is alert and oriented to person, place, and time. Mental status is at baseline. Cranial Nerves: No cranial nerve deficit. Sensory: No sensory deficit. Coordination: Coordination normal.   Psychiatric:         Mood and Affect: Mood normal.         Behavior: Behavior normal.         Thought Content:  Thought content normal.         Judgment: Judgment normal.           DIAGNOSTIC RESULTS     RADIOLOGY:   Non-plain film images such as CT, Ultrasound and MRI are read by the radiologist. Plain radiographic images are visualized and preliminarilyinterpreted by Jose Ball MD with the below findings:    Interpretation per the Radiologist below, if available at the time of this note:    VL Extremity Venous Bilateral    (Results Pending)       LABS:  Labs Reviewed   CBC WITH AUTO DIFFERENTIAL - Abnormal; Notable for the following components:       Result Value    RBC 4.15 (*)     Hemoglobin 10.1 (*)     Hematocrit 34.7 (*)     MCH 24.3 (*)     MCHC 29.1 (*)     RDW 15.1 (*)     Neutrophils % 76.0 (*)     Lymphocytes % 14.0 (*)     All other components within normal limits   COMPREHENSIVE METABOLIC PANEL W/ REFLEX TO MG FOR LOW K - Abnormal; Notable for the following components:    Sodium 133 (*)     Chloride 95 (*)     Glucose 344 (*)     CREATININE 1.1 (*)     GFR Non-African American 53 (*)     Alkaline Phosphatase 116 (*)     All other components within normal limits   LACTATE, SEPSIS - Abnormal; Notable for the following components:    Lactic Acid, Sepsis 2.1 (*)     All other components within normal limits    Narrative:     [address] ,  Chemistry results called to and read back by fox gutierrez rn, 04/12/2022  10:42, by STXIG   CULTURE, BLOOD 1   CULTURE, BLOOD 2   LACTATE, SEPSIS   POCT GLUCOSE       All other labs were within normal range or notreturned as of this dictation. RE-ASSESSMENT        EMERGENCY DEPARTMENT COURSE and DIFFERENTIAL DIAGNOSIS/MDM:   Vitals:    Vitals:    04/12/22 1007 04/12/22 1622   BP: (!) 165/72 136/61   Pulse: 90 95   Resp: 18 20   Temp: 98.1 °F (36.7 °C) 96.6 °F (35.9 °C)   TempSrc: Oral Temporal   SpO2: 92% 96%   Weight: (!) 310 lb (140.6 kg)    Height: 5' 1\" (1.549 m)        MDM  Plan will be for admission I started cefepime per hospitalist instruction we have unknown reaction for rash we can do Benadryl as needed. I have also ordered pharmacy to dose vancomycin failure of outpatient treatment his leg continues to become more cellulitic plan for infectious disease consult. PROCEDURES:    Procedures      FINAL IMPRESSION      1. Bilateral lower extremity edema    2. Cellulitis of right lower extremity    3.  Failure of outpatient treatment          DISPOSITION/PLAN   DISPOSITION Admitted 04/12/2022 12:53:18 PM      PATIENT REFERRED TO:  [facility] EMERGENCY DEPT  Jack Hudson  [phone]          DISCHARGE MEDICATIONS:  Current Discharge Medication List          (Please note that portions of this note were completed with a voice recognition program.  Efforts were made to edit the dictations but occasionallywords are mis-transcribed.)    Yue Menchaca [facility], [place]  04/12/22 1478

## 2022-04-12 NOTE — ED NOTES
Pt signed photo consent papers. Wound pictures taken and uploaded to chart.      Maximo Joyner RN  04/12/22 9197

## 2022-04-13 ENCOUNTER — HOME CARE VISIT (OUTPATIENT)
Dept: HOME HEALTH SERVICES | Facility: HOME HEALTHCARE | Age: 52
End: 2022-04-13

## 2022-04-13 LAB
GLUCOSE BLD-MCNC: 212 MG/DL (ref 70–99)
GLUCOSE BLD-MCNC: 235 MG/DL (ref 70–99)
GLUCOSE BLD-MCNC: 301 MG/DL (ref 70–99)
PERFORMED ON: ABNORMAL

## 2022-04-13 PROCEDURE — 82947 ASSAY GLUCOSE BLOOD QUANT: CPT

## 2022-04-13 PROCEDURE — 2580000003 HC RX 258: Performed by: NURSE PRACTITIONER

## 2022-04-13 PROCEDURE — 1210000000 HC MED SURG R&B

## 2022-04-13 PROCEDURE — 36415 COLL VENOUS BLD VENIPUNCTURE: CPT

## 2022-04-13 PROCEDURE — 83605 ASSAY OF LACTIC ACID: CPT

## 2022-04-13 PROCEDURE — 6360000002 HC RX W HCPCS: Performed by: NURSE PRACTITIONER

## 2022-04-13 PROCEDURE — 2580000003 HC RX 258: Performed by: PHYSICIAN ASSISTANT

## 2022-04-13 PROCEDURE — 6370000000 HC RX 637 (ALT 250 FOR IP): Performed by: NURSE PRACTITIONER

## 2022-04-13 PROCEDURE — 87040 BLOOD CULTURE FOR BACTERIA: CPT

## 2022-04-13 PROCEDURE — 6360000002 HC RX W HCPCS: Performed by: INTERNAL MEDICINE

## 2022-04-13 PROCEDURE — 6360000002 HC RX W HCPCS: Performed by: PHYSICIAN ASSISTANT

## 2022-04-13 RX ORDER — TOPIRAMATE 25 MG/1
25 TABLET ORAL NIGHTLY
Status: DISCONTINUED | OUTPATIENT
Start: 2022-04-13 | End: 2022-04-15 | Stop reason: HOSPADM

## 2022-04-13 RX ORDER — PANTOPRAZOLE SODIUM 40 MG/1
40 TABLET, DELAYED RELEASE ORAL
Status: DISCONTINUED | OUTPATIENT
Start: 2022-04-14 | End: 2022-04-15 | Stop reason: HOSPADM

## 2022-04-13 RX ORDER — PRAMIPEXOLE DIHYDROCHLORIDE 0.25 MG/1
0.75 TABLET ORAL 2 TIMES DAILY
Status: DISCONTINUED | OUTPATIENT
Start: 2022-04-13 | End: 2022-04-15 | Stop reason: HOSPADM

## 2022-04-13 RX ORDER — MORPHINE SULFATE 4 MG/ML
3 INJECTION, SOLUTION INTRAMUSCULAR; INTRAVENOUS
Status: DISCONTINUED | OUTPATIENT
Start: 2022-04-13 | End: 2022-04-15 | Stop reason: HOSPADM

## 2022-04-13 RX ORDER — FLUTICASONE PROPIONATE 50 MCG
1 SPRAY, SUSPENSION (ML) NASAL DAILY
Status: DISCONTINUED | OUTPATIENT
Start: 2022-04-13 | End: 2022-04-15 | Stop reason: HOSPADM

## 2022-04-13 RX ORDER — GABAPENTIN 400 MG/1
400 CAPSULE ORAL 3 TIMES DAILY
Status: DISCONTINUED | OUTPATIENT
Start: 2022-04-13 | End: 2022-04-15 | Stop reason: HOSPADM

## 2022-04-13 RX ORDER — MORPHINE SULFATE 15 MG/1
15 TABLET, FILM COATED, EXTENDED RELEASE ORAL 2 TIMES DAILY
Status: DISCONTINUED | OUTPATIENT
Start: 2022-04-13 | End: 2022-04-15 | Stop reason: HOSPADM

## 2022-04-13 RX ORDER — FUROSEMIDE 40 MG/1
40 TABLET ORAL 2 TIMES DAILY
Status: DISCONTINUED | OUTPATIENT
Start: 2022-04-13 | End: 2022-04-15 | Stop reason: HOSPADM

## 2022-04-13 RX ORDER — MORPHINE SULFATE 4 MG/ML
3 INJECTION, SOLUTION INTRAMUSCULAR; INTRAVENOUS ONCE
Status: COMPLETED | OUTPATIENT
Start: 2022-04-13 | End: 2022-04-13

## 2022-04-13 RX ORDER — DULOXETIN HYDROCHLORIDE 60 MG/1
60 CAPSULE, DELAYED RELEASE ORAL 2 TIMES DAILY
Status: DISCONTINUED | OUTPATIENT
Start: 2022-04-13 | End: 2022-04-15 | Stop reason: HOSPADM

## 2022-04-13 RX ORDER — GABAPENTIN 100 MG/1
100 CAPSULE ORAL DAILY
Status: DISCONTINUED | OUTPATIENT
Start: 2022-04-13 | End: 2022-04-15 | Stop reason: HOSPADM

## 2022-04-13 RX ORDER — CELECOXIB 100 MG/1
200 CAPSULE ORAL DAILY
Status: DISCONTINUED | OUTPATIENT
Start: 2022-04-13 | End: 2022-04-15 | Stop reason: HOSPADM

## 2022-04-13 RX ORDER — OXYCODONE AND ACETAMINOPHEN 7.5; 325 MG/1; MG/1
1 TABLET ORAL 3 TIMES DAILY
Status: DISCONTINUED | OUTPATIENT
Start: 2022-04-13 | End: 2022-04-15 | Stop reason: HOSPADM

## 2022-04-13 RX ORDER — MIRTAZAPINE 15 MG/1
15 TABLET, FILM COATED ORAL NIGHTLY
Status: DISCONTINUED | OUTPATIENT
Start: 2022-04-13 | End: 2022-04-15 | Stop reason: HOSPADM

## 2022-04-13 RX ORDER — ONDANSETRON 2 MG/ML
4 INJECTION INTRAMUSCULAR; INTRAVENOUS EVERY 6 HOURS PRN
Status: DISCONTINUED | OUTPATIENT
Start: 2022-04-13 | End: 2022-04-15 | Stop reason: HOSPADM

## 2022-04-13 RX ADMIN — ENOXAPARIN SODIUM 40 MG: 40 INJECTION SUBCUTANEOUS at 11:38

## 2022-04-13 RX ADMIN — GABAPENTIN 400 MG: 400 CAPSULE ORAL at 22:23

## 2022-04-13 RX ADMIN — FLUTICASONE PROPIONATE 1 SPRAY: 50 SPRAY, METERED NASAL at 15:03

## 2022-04-13 RX ADMIN — MORPHINE SULFATE 3 MG: 4 INJECTION, SOLUTION INTRAMUSCULAR; INTRAVENOUS at 23:39

## 2022-04-13 RX ADMIN — MIRTAZAPINE 15 MG: 15 TABLET, FILM COATED ORAL at 22:22

## 2022-04-13 RX ADMIN — BUSPIRONE HYDROCHLORIDE 10 MG: 10 TABLET ORAL at 10:18

## 2022-04-13 RX ADMIN — PRAMIPEXOLE DIHYDROCHLORIDE 0.75 MG: 0.25 TABLET ORAL at 12:40

## 2022-04-13 RX ADMIN — MORPHINE SULFATE 15 MG: 15 TABLET, FILM COATED, EXTENDED RELEASE ORAL at 12:40

## 2022-04-13 RX ADMIN — DULOXETINE 60 MG: 60 CAPSULE, DELAYED RELEASE ORAL at 22:22

## 2022-04-13 RX ADMIN — GABAPENTIN 400 MG: 400 CAPSULE ORAL at 12:41

## 2022-04-13 RX ADMIN — OXYCODONE HYDROCHLORIDE AND ACETAMINOPHEN 1 TABLET: 7.5; 325 TABLET ORAL at 15:02

## 2022-04-13 RX ADMIN — PRAMIPEXOLE DIHYDROCHLORIDE 0.75 MG: 0.25 TABLET ORAL at 22:22

## 2022-04-13 RX ADMIN — MORPHINE SULFATE 3 MG: 4 INJECTION, SOLUTION INTRAMUSCULAR; INTRAVENOUS at 06:21

## 2022-04-13 RX ADMIN — TOPIRAMATE 25 MG: 25 TABLET, FILM COATED ORAL at 22:22

## 2022-04-13 RX ADMIN — MORPHINE SULFATE 3 MG: 4 INJECTION, SOLUTION INTRAMUSCULAR; INTRAVENOUS at 00:12

## 2022-04-13 RX ADMIN — DULOXETINE 60 MG: 60 CAPSULE, DELAYED RELEASE ORAL at 12:40

## 2022-04-13 RX ADMIN — INSULIN LISPRO 4 UNITS: 100 INJECTION, SOLUTION INTRAVENOUS; SUBCUTANEOUS at 22:23

## 2022-04-13 RX ADMIN — MORPHINE SULFATE 15 MG: 15 TABLET, FILM COATED, EXTENDED RELEASE ORAL at 22:23

## 2022-04-13 RX ADMIN — INSULIN LISPRO 6 UNITS: 100 INJECTION, SOLUTION INTRAVENOUS; SUBCUTANEOUS at 11:47

## 2022-04-13 RX ADMIN — WATER 2000 MG: 1 INJECTION INTRAMUSCULAR; INTRAVENOUS; SUBCUTANEOUS at 22:28

## 2022-04-13 RX ADMIN — ONDANSETRON 4 MG: 2 INJECTION INTRAMUSCULAR; INTRAVENOUS at 00:31

## 2022-04-13 RX ADMIN — MORPHINE SULFATE 3 MG: 4 INJECTION, SOLUTION INTRAMUSCULAR; INTRAVENOUS at 11:37

## 2022-04-13 RX ADMIN — OXYCODONE HYDROCHLORIDE AND ACETAMINOPHEN 1 TABLET: 7.5; 325 TABLET ORAL at 19:57

## 2022-04-13 RX ADMIN — SODIUM CHLORIDE, PRESERVATIVE FREE 10 ML: 5 INJECTION INTRAVENOUS at 22:24

## 2022-04-13 RX ADMIN — WATER 2000 MG: 1 INJECTION INTRAMUSCULAR; INTRAVENOUS; SUBCUTANEOUS at 10:18

## 2022-04-13 ASSESSMENT — ENCOUNTER SYMPTOMS
EYES NEGATIVE: 1
RESPIRATORY NEGATIVE: 1
GASTROINTESTINAL NEGATIVE: 1
ALLERGIC/IMMUNOLOGIC NEGATIVE: 1

## 2022-04-13 ASSESSMENT — PAIN DESCRIPTION - DESCRIPTORS: DESCRIPTORS: CONSTANT;BURNING;ACHING

## 2022-04-13 ASSESSMENT — PAIN DESCRIPTION - LOCATION
LOCATION: LEG

## 2022-04-13 ASSESSMENT — PAIN SCALES - GENERAL
PAINLEVEL_OUTOF10: 0
PAINLEVEL_OUTOF10: 8
PAINLEVEL_OUTOF10: 7
PAINLEVEL_OUTOF10: 8
PAINLEVEL_OUTOF10: 10
PAINLEVEL_OUTOF10: 8
PAINLEVEL_OUTOF10: 6
PAINLEVEL_OUTOF10: 0
PAINLEVEL_OUTOF10: 8
PAINLEVEL_OUTOF10: 0
PAINLEVEL_OUTOF10: 6
PAINLEVEL_OUTOF10: 10

## 2022-04-13 ASSESSMENT — PAIN DESCRIPTION - ONSET
ONSET: AWAKENED FROM SLEEP
ONSET: ON-GOING

## 2022-04-13 ASSESSMENT — PAIN DESCRIPTION - PROGRESSION
CLINICAL_PROGRESSION: NOT CHANGED

## 2022-04-13 ASSESSMENT — PAIN DESCRIPTION - FREQUENCY
FREQUENCY: CONTINUOUS

## 2022-04-13 ASSESSMENT — PAIN DESCRIPTION - PAIN TYPE
TYPE: ACUTE PAIN;CHRONIC PAIN
TYPE: ACUTE PAIN
TYPE: ACUTE PAIN;CHRONIC PAIN
TYPE: ACUTE PAIN;CHRONIC PAIN

## 2022-04-13 ASSESSMENT — PAIN DESCRIPTION - ORIENTATION
ORIENTATION: RIGHT;LEFT

## 2022-04-13 ASSESSMENT — PAIN - FUNCTIONAL ASSESSMENT
PAIN_FUNCTIONAL_ASSESSMENT: PREVENTS OR INTERFERES SOME ACTIVE ACTIVITIES AND ADLS

## 2022-04-13 NOTE — PROGRESS NOTES
[facility]      Progress Note    Patient:  Tianna Trivedi  YOB: 1970  Date of Service: 4/13/2022  MRN: [id number]   Acct: [de-identified]   Primary Care Physician: NANETTE Wright CNP  Advance Directive: Full Code  Admit Date: 4/12/2022       Hospital Day: 1    Portions of this note have been copied forward, however, updated to reflect the most current clinical status of this patient. CHIEF COMPLAINT Leg pain    SUBJECTIVE:  Pain better controlled. Going to get [facility]  Happy about that. CUMULATIVE HOSPITAL COURSE:     Shen James is an 54 y.o. female with past medical history of depression, GERD, hyperlipidemia, history of renal stones, hypertension, morbid obesity, type 2 diabetes, lymphedema, osteoarthritis of the knees, and anxiety. Patient gives a history of 2 years ago suddenly developing swelling of her lower extremities. Over the last 2 years she states she has gained 100 pounds because of her ever-increasing girth of her legs. She comes in today complaining of severe pain and redness. She has an open weeping ulceration to the right thigh. Her leg is hot and firm all the way up to the groin. She reports she has become reclusive except to go to [facility] a week because of her ever expanding leg. She has self-referred to [place] but there is a waiting list in the lymphedema clinic there. She tried to go to weight loss clinic locally and they refused to give her anything because she is managed with narcotics for her chronic pain by pain management. She is limited on specialty care because she is on [facility]. However I did do some research and there is a lymphedema clinic at [facility] which is in [place] and should be covered once she is discharged we should consider making that referral.    Review of Systems   Constitutional: Negative. HENT: Negative. Eyes: Negative. Respiratory: Negative. Cardiovascular: Negative. Gastrointestinal: Negative. Endocrine: Negative. Musculoskeletal: Negative. Allergic/Immunologic: Negative. Neurological: Negative. Hematological: Negative. Psychiatric/Behavioral: Negative. Objective:   VITALS:  BP (!) 110/58   Pulse 76   Temp 99 °F (37.2 °C) (Temporal)   Resp 18   Ht 5' 1\" (1.549 m)   Wt (!) 310 lb (140.6 kg)   SpO2 97%   BMI 58.57 kg/m²   24HR INTAKE/OUTPUT:    Intake/Output Summary (Last 24 hours) at 4/13/2022 1108  Last data filed at 4/13/2022 0946  Gross per 24 hour   Intake 600 ml   Output --   Net 600 ml         Physical Exam  Constitutional:       Appearance: She is obese. HENT:      Nose: Nose normal.      Mouth/Throat:      Mouth: Mucous membranes are moist.   Eyes:      Extraocular Movements: Extraocular movements intact. Cardiovascular:      Rate and Rhythm: Normal rate and regular rhythm. Pulses: Normal pulses. Heart sounds: Normal heart sounds. Pulmonary:      Effort: Pulmonary effort is normal.      Breath sounds: Normal breath sounds. Abdominal:      General: Bowel sounds are normal.      Palpations: Abdomen is soft. Musculoskeletal:      Cervical back: Neck supple. Right lower leg: Edema present. Left lower leg: Edema present. Skin:     General: Skin is warm and dry. Neurological:      General: No focal deficit present. Mental Status: She is alert.    Psychiatric:         Mood and Affect: Mood normal.            Medications:      sodium chloride      dextrose      sodium chloride 125 mL/hr at 04/12/22 2203      vancomycin (VANCOCIN) intermittent dosing (placeholder)   Other RX Placeholder    vancomycin  1,500 mg IntraVENous Q18H    cefepime  2,000 mg IntraVENous Q12H    sodium chloride flush  5-40 mL IntraVENous 2 times per day    enoxaparin  40 mg SubCUTAneous Q24H    insulin lispro  0-12 Units SubCUTAneous TID WC    insulin lispro  0-6 Units SubCUTAneous Nightly     ondansetron, morphine, sodium chloride flush, sodium chloride, potassium chloride, magnesium sulfate, polyethylene glycol, acetaminophen **OR** acetaminophen, glucagon (rDNA), dextrose, glucose, dextrose bolus (hypoglycemia) **OR** dextrose bolus (hypoglycemia), busPIRone  ADULT DIET; Regular; 3 carb choices (45 gm/meal)     Lab and other Data:     Recent Labs     04/12/22  1005   WBC 7.6   HGB 10.1*        Recent Labs     04/12/22  1005   *   K 4.4   CL 95*   CO2 27   BUN 11   CREATININE 1.1*   GLUCOSE 344*     Recent Labs     04/12/22  1005   AST 31   ALT 20   BILITOT 0.4   ALKPHOS 116*     Troponin T: No results for input(s): TROPONINI in the last 72 hours. Pro-BNP: No results for input(s): BNP in the last 72 hours. INR: No results for input(s): INR in the last 72 hours. UA:No results for input(s): NITRITE, COLORU, PHUR, LABCAST, WBCUA, RBCUA, MUCUS, TRICHOMONAS, YEAST, BACTERIA, CLARITYU, SPECGRAV, LEUKOCYTESUR, UROBILINOGEN, BILIRUBINUR, BLOODU, GLUCOSEU, AMORPHOUS in the last 72 hours. Invalid input(s): Sruthi Mendez  A1C: No results for input(s): LABA1C in the last 72 hours. ABG:No results for input(s): PHART, TOC3UBH, PO2ART, COT1AAM, BEART, HGBAE, K9NJGNXF, CARBOXHGBART in the last 72 hours. RAD:   No results found. Assessment/Plan   Principal Problem:    Cellulitis and abscess of right leg   cotinue same rx   ID consult. Appreciate their help  Active Problems:    Hypertension   stable    [place] (generalized anxiety disorder)   Noted   Buspar added    Morbid obesity due to excess calories (HCC)   Noted    Gastroesophageal reflux disease   Continue home PPI    Hypokalemia   4.4 on admissio    Lymphedema of both lower extremities   woud care to wrap    Diabetic ulcer of left lower leg associated with type 2 diabetes mellitus, with fat layer exposed ([address]  Resolved Problems:    * No resolved hospital problems.  *          Antibiotic:vancomycin   maxipime    DVT Prophylaxis: lovenox    Discharge planning: TBD      Further Orders per Clinical

## 2022-04-13 NOTE — PROGRESS NOTES
Physician Progress Note      Shawnee King  [facility] #:                  [id number]  :                       1970  ADMIT DATE:       2022 9:52 AM  DISCH DATE:  RESPONDING  PROVIDER #:        DAYLIN HENSLEY          QUERY TEXT:    Pt admitted with right leg  cellulitis. Pt noted to have DM II uncontrolled. If possible, please document in progress notes and discharge summary the   relationship, if any, between cellulitis and DM. The medical record reflects the following:  Risk Factors: DMII,  Clinical Indicators: . 304   Right leg blister with worsening infection   , redness and swelling. Treatment: Wound care consult, ID consult , Vancomycin, Cefepime,    ml/hr    Thank you    Latonia Martinez RN,BSN,[facility]  [phone]  Options provided:  -- Right leg  cellulitis associated with Diabetes  -- Right leg  cellulitis unrelated to Diabetes  -- Other - I will add my own diagnosis  -- Disagree - Not applicable / Not valid  -- Disagree - Clinically unable to determine / Unknown  -- Refer to Clinical Documentation Reviewer    PROVIDER RESPONSE TEXT:    Provider is clinically unable to determine a response to this query.     Query created by: Noemi Flores on 2022 7:42 AM      Electronically signed by:  Luciana Pollard 2022 8:29 AM

## 2022-04-13 NOTE — PLAN OF CARE
Nutrition Problem #1: Increased nutrient needs  Intervention: Food and/or Nutrient Delivery: Continue Current Diet (start double protein portions)  Nutritional

## 2022-04-13 NOTE — PROGRESS NOTES
Comprehensive Nutrition Assessment    Type and Reason for Visit:  Initial,Positive Nutrition Screen    Nutrition Recommendations/Plan: Double protein portions with meals    Nutrition Assessment:  Pt is nutritionally compromised AEB morbid obesity and altered skin integrity. PO intake is good at this time. Will start double protein portions to promote healing. Malnutrition Assessment:  Malnutrition Status: At risk for malnutrition (Comment)    Context:  Acute Illness     Findings of the 6 clinical characteristics of malnutrition:  Energy Intake:  No significant decrease in energy intake  Weight Loss:  Unable to assess     Body Fat Loss:  No significant body fat loss     Muscle Mass Loss:  No significant muscle mass loss    Fluid Accumulation:  7 - Moderate to Severe Extremities   Strength:  Not Performed    Estimated Daily Nutrient Needs:  Energy (kcal):  2628-6131 kcals/day; Weight Used for Energy Requirements:  Current (8-11)     Protein (g):  120 g/Protein/day; Weight Used for Protein Requirements:  Ideal (2.5)        Fluid (ml/day):  3769-0887 mL/day; Method Used for Fluid Requirements:  1 ml/kcal      Wounds:  Venous Stasis       Current Nutrition Therapies:    ADULT DIET;  Regular; 3 carb choices (45 gm/meal)    Anthropometric Measures:  · Height: 5' 1\" (154.9 cm)  · Current Body Weight: 310 lb (140.6 kg)     · Ideal Body Weight: 105 lbs  · BMI: 58.6   · BMI Categories: Obese Class 3 (BMI 40.0 or greater)       Nutrition Diagnosis:   · Increased nutrient needs related to acute injury/trauma as evidenced by wounds    Nutrition Interventions:   Food and/or Nutrient Delivery:  Continue Current Diet (start double protein portions)   Coordination of Nutrition Care:  Continue to monitor while inpatient    Nutrition Monitoring and Evaluation:   Food/Nutrient Intake Outcomes:  Food and Nutrient Intake  Physical Signs/Symptoms Outcomes:  Biochemical Data,Fluid Status or Edema,Nutrition Focused Physical Findings,Skin,Weight     Electronically signed by Jose Sommer, MS, RD, LD on 4/13/22 at 12:09 PM CDT    Contact: [phone]

## 2022-04-13 NOTE — CARE COORDINATION
Patient is current with [facility]  Electronically signed by Malcolm Blanco on 4/13/22 at 12:51 PM CDT

## 2022-04-14 LAB
BLOOD CULTURE, ROUTINE: NORMAL
CULTURE, BLOOD 2: NORMAL
GLUCOSE BLD-MCNC: 174 MG/DL (ref 70–99)
GLUCOSE BLD-MCNC: 200 MG/DL (ref 70–99)
GLUCOSE BLD-MCNC: 230 MG/DL (ref 70–99)
GLUCOSE BLD-MCNC: 240 MG/DL (ref 70–99)
PERFORMED ON: ABNORMAL

## 2022-04-14 PROCEDURE — 6360000002 HC RX W HCPCS: Performed by: NURSE PRACTITIONER

## 2022-04-14 PROCEDURE — 05HA33Z INSERTION OF INFUSION DEVICE INTO LEFT BRACHIAL VEIN, PERCUTANEOUS APPROACH: ICD-10-PCS | Performed by: INTERNAL MEDICINE

## 2022-04-14 PROCEDURE — 6360000002 HC RX W HCPCS: Performed by: INTERNAL MEDICINE

## 2022-04-14 PROCEDURE — 82947 ASSAY GLUCOSE BLOOD QUANT: CPT

## 2022-04-14 PROCEDURE — 6360000002 HC RX W HCPCS: Performed by: PHYSICIAN ASSISTANT

## 2022-04-14 PROCEDURE — 1210000000 HC MED SURG R&B

## 2022-04-14 PROCEDURE — 36410 VNPNXR 3YR/> PHY/QHP DX/THER: CPT

## 2022-04-14 PROCEDURE — 76937 US GUIDE VASCULAR ACCESS: CPT

## 2022-04-14 PROCEDURE — 6370000000 HC RX 637 (ALT 250 FOR IP): Performed by: NURSE PRACTITIONER

## 2022-04-14 PROCEDURE — 2580000003 HC RX 258: Performed by: NURSE PRACTITIONER

## 2022-04-14 PROCEDURE — C1751 CATH, INF, PER/CENT/MIDLINE: HCPCS

## 2022-04-14 PROCEDURE — 2580000003 HC RX 258: Performed by: PHYSICIAN ASSISTANT

## 2022-04-14 RX ADMIN — DULOXETINE 60 MG: 60 CAPSULE, DELAYED RELEASE ORAL at 22:02

## 2022-04-14 RX ADMIN — PANTOPRAZOLE SODIUM 40 MG: 40 TABLET, DELAYED RELEASE ORAL at 08:56

## 2022-04-14 RX ADMIN — BUSPIRONE HYDROCHLORIDE 10 MG: 10 TABLET ORAL at 13:17

## 2022-04-14 RX ADMIN — MORPHINE SULFATE 15 MG: 15 TABLET, FILM COATED, EXTENDED RELEASE ORAL at 11:22

## 2022-04-14 RX ADMIN — MORPHINE SULFATE 3 MG: 4 INJECTION, SOLUTION INTRAMUSCULAR; INTRAVENOUS at 08:53

## 2022-04-14 RX ADMIN — INSULIN LISPRO 2 UNITS: 100 INJECTION, SOLUTION INTRAVENOUS; SUBCUTANEOUS at 22:02

## 2022-04-14 RX ADMIN — VANCOMYCIN HYDROCHLORIDE 1500 MG: 10 INJECTION, POWDER, LYOPHILIZED, FOR SOLUTION INTRAVENOUS at 13:21

## 2022-04-14 RX ADMIN — MORPHINE SULFATE 3 MG: 4 INJECTION, SOLUTION INTRAMUSCULAR; INTRAVENOUS at 19:48

## 2022-04-14 RX ADMIN — ENOXAPARIN SODIUM 40 MG: 40 INJECTION SUBCUTANEOUS at 13:18

## 2022-04-14 RX ADMIN — SODIUM CHLORIDE, PRESERVATIVE FREE 10 ML: 5 INJECTION INTRAVENOUS at 22:08

## 2022-04-14 RX ADMIN — FUROSEMIDE 40 MG: 40 TABLET ORAL at 08:58

## 2022-04-14 RX ADMIN — MIRTAZAPINE 15 MG: 15 TABLET, FILM COATED ORAL at 22:02

## 2022-04-14 RX ADMIN — TOPIRAMATE 25 MG: 25 TABLET, FILM COATED ORAL at 22:02

## 2022-04-14 RX ADMIN — OXYCODONE HYDROCHLORIDE AND ACETAMINOPHEN 1 TABLET: 7.5; 325 TABLET ORAL at 13:01

## 2022-04-14 RX ADMIN — DULOXETINE 60 MG: 60 CAPSULE, DELAYED RELEASE ORAL at 08:58

## 2022-04-14 RX ADMIN — INSULIN LISPRO 6 UNITS: 100 INJECTION, SOLUTION INTRAVENOUS; SUBCUTANEOUS at 19:58

## 2022-04-14 RX ADMIN — WATER 2000 MG: 1 INJECTION INTRAMUSCULAR; INTRAVENOUS; SUBCUTANEOUS at 23:41

## 2022-04-14 RX ADMIN — GABAPENTIN 400 MG: 400 CAPSULE ORAL at 13:16

## 2022-04-14 RX ADMIN — OXYCODONE HYDROCHLORIDE AND ACETAMINOPHEN 1 TABLET: 7.5; 325 TABLET ORAL at 22:00

## 2022-04-14 RX ADMIN — BUSPIRONE HYDROCHLORIDE 10 MG: 10 TABLET ORAL at 22:00

## 2022-04-14 RX ADMIN — SODIUM CHLORIDE, PRESERVATIVE FREE 10 ML: 5 INJECTION INTRAVENOUS at 09:03

## 2022-04-14 RX ADMIN — WATER 2000 MG: 1 INJECTION INTRAMUSCULAR; INTRAVENOUS; SUBCUTANEOUS at 13:17

## 2022-04-14 RX ADMIN — PRAMIPEXOLE DIHYDROCHLORIDE 0.75 MG: 0.25 TABLET ORAL at 08:57

## 2022-04-14 RX ADMIN — GABAPENTIN 400 MG: 400 CAPSULE ORAL at 08:57

## 2022-04-14 RX ADMIN — PRAMIPEXOLE DIHYDROCHLORIDE 0.75 MG: 0.25 TABLET ORAL at 22:01

## 2022-04-14 ASSESSMENT — PAIN SCALES - GENERAL
PAINLEVEL_OUTOF10: 7
PAINLEVEL_OUTOF10: 7
PAINLEVEL_OUTOF10: 0
PAINLEVEL_OUTOF10: 3
PAINLEVEL_OUTOF10: 8
PAINLEVEL_OUTOF10: 7

## 2022-04-14 ASSESSMENT — ENCOUNTER SYMPTOMS
EYES NEGATIVE: 1
RESPIRATORY NEGATIVE: 1
ALLERGIC/IMMUNOLOGIC NEGATIVE: 1
GASTROINTESTINAL NEGATIVE: 1

## 2022-04-14 NOTE — PROCEDURES
label, date and use alcohol caps on new tubing and alcohol caps on unused ports.            Electronically Signed By: Josemanuel Rios RN on 4/14/2022 at 11:29 AM

## 2022-04-14 NOTE — PROGRESS NOTES
[facility]      [facility]    Patient:  Tianna Trivedi  YOB: 1970  Date of Service: 4/14/2022  MRN: [id number]   Acct: [de-identified]   Primary Care Physician: NANETTE Hearn CNP  Advance Directive: Full Code  Admit Date: 4/12/2022       Hospital Day: 2    Portions of this note have been copied forward, however, updated to reflect the most current clinical status of this patient. CHIEF COMPLAINT Leg pain    SUBJECTIVE:  Pain better controlled. Midline place. Discussed with patient again the need for venous studies if she plans on pursuing lymphedema clinic in the future explained that would be one of their first requirements. Tearful today wants to be home for 600 [address]ir:     Damari Castaneda is an 54 y.o. female with past medical history of depression, GERD, hyperlipidemia, history of renal stones, hypertension, morbid obesity, type 2 diabetes, lymphedema, osteoarthritis of the knees, and anxiety. Patient gives a history of 2 years ago suddenly developing swelling of her lower extremities. Over the last 2 years she states she has gained 100 pounds because of her ever-increasing girth of her legs. She comes in today complaining of severe pain and redness. She has an open weeping ulceration to the right thigh. Her leg is hot and firm all the way up to the groin. She reports she has become reclusive except to go to [facility] once a week because of her ever expanding leg. She has self-referred to [place] but there is a waiting list in the lymphedema clinic there. She tried to go to weight loss clinic locally and they refused to give her anything because she is managed with narcotics for her chronic pain by pain management. She is limited on specialty care because she is on [facility].   However I did do some research and there is a lymphedema clinic at [facility] which is in [place] and should be covered once she is discharged we should consider making that referral.  Have not been able to get labs today because she did not have access. We will try to get in a.m. Review of Systems   Constitutional: Negative. HENT: Negative. Eyes: Negative. Respiratory: Negative. Cardiovascular: Negative. Gastrointestinal: Negative. Endocrine: Negative. Musculoskeletal: Negative. Allergic/Immunologic: Negative. Neurological: Negative. Hematological: Negative. Psychiatric/Behavioral: Negative. Objective:   VITALS:  /65   Pulse 85   Temp 98.8 °F (37.1 °C) (Temporal)   Resp 16   Ht 5' 1\" (1.549 m)   Wt (!) 310 lb (140.6 kg)   SpO2 (!) 89%   BMI 58.57 kg/m²   24HR INTAKE/OUTPUT:      Intake/Output Summary (Last 24 hours) at 4/14/2022 1515  Last data filed at 4/14/2022 1025  Gross per 24 hour   Intake 0 ml   Output --   Net 0 ml         Physical Exam  Constitutional:       Appearance: She is obese. HENT:      Nose: Nose normal.      Mouth/Throat:      Mouth: Mucous membranes are moist.   Eyes:      Extraocular Movements: Extraocular movements intact. Cardiovascular:      Rate and Rhythm: Normal rate and regular rhythm. Pulses: Normal pulses. Heart sounds: Normal heart sounds. Pulmonary:      Effort: Pulmonary effort is normal.      Breath sounds: Normal breath sounds. Abdominal:      General: Bowel sounds are normal.      Palpations: Abdomen is soft. Musculoskeletal:      Cervical back: Neck supple. Right lower leg: Edema present. Left lower leg: Edema present. Skin:     General: Skin is warm and dry. Neurological:      General: No focal deficit present. Mental Status: She is alert.    Psychiatric:         Mood and Affect: Mood normal.            Medications:      sodium chloride      dextrose      sodium chloride 100 mL/hr at 04/13/22 1138      celecoxib  200 mg Oral Daily    DULoxetine  60 mg Oral BID    fluticasone  1 spray Each Nostril Daily    furosemide  40 mg Oral BID    gabapentin 100 mg Oral Daily    gabapentin  400 mg Oral TID    mirtazapine  15 mg Oral Nightly    morphine  15 mg Oral BID    pantoprazole  40 mg Oral QAM AC    oxyCODONE-acetaminophen  1 tablet Oral TID    pramipexole  0.75 mg Oral BID    topiramate  25 mg Oral Nightly    vancomycin (VANCOCIN) intermittent dosing (placeholder)   Other RX Placeholder    vancomycin  1,500 mg IntraVENous Q18H    cefepime  2,000 mg IntraVENous Q12H    sodium chloride flush  5-40 mL IntraVENous 2 times per day    enoxaparin  40 mg SubCUTAneous Q24H    insulin lispro  0-12 Units SubCUTAneous TID WC    insulin lispro  0-6 Units SubCUTAneous Nightly     ondansetron, morphine, sodium chloride flush, sodium chloride, potassium chloride, magnesium sulfate, polyethylene glycol, acetaminophen **OR** acetaminophen, glucagon (rDNA), dextrose, glucose, dextrose bolus (hypoglycemia) **OR** dextrose bolus (hypoglycemia), busPIRone  ADULT DIET; Regular; 3 carb choices (45 gm/meal)     Lab and other Data:     Recent Labs     04/12/22  1005   WBC 7.6   HGB 10.1*        Recent Labs     04/12/22  1005   *   K 4.4   CL 95*   CO2 27   BUN 11   CREATININE 1.1*   GLUCOSE 344*     Recent Labs     04/12/22  1005   AST 31   ALT 20   BILITOT 0.4   ALKPHOS 116*     Troponin T: No results for input(s): TROPONINI in the last 72 hours. Pro-BNP: No results for input(s): BNP in the last 72 hours. INR: No results for input(s): INR in the last 72 hours. UA:No results for input(s): NITRITE, COLORU, PHUR, LABCAST, WBCUA, RBCUA, MUCUS, TRICHOMONAS, YEAST, BACTERIA, CLARITYU, SPECGRAV, LEUKOCYTESUR, UROBILINOGEN, BILIRUBINUR, BLOODU, GLUCOSEU, AMORPHOUS in the last 72 hours. Invalid input(s): Elfrieda Gauze  A1C: No results for input(s): LABA1C in the last 72 hours. ABG:No results for input(s): PHART, UIE0RZD, PO2ART, RRT6HVO, BEART, HGBAE, U2LTNGXI, CARBOXHGBART in the last 72 hours. RAD:   No results found.           Assessment/Plan   Principal

## 2022-04-14 NOTE — CONSULTS
INFECTIOUS DISEASES CONSULT NOTE    Patient:  Tianna Trivedi 54 y.o. female  ROOM # [unfilled]  YOB: 1970  MRN: [id number]  CSN:  [id number]  Admit date: 4/12/2022   Admitting Physician: Lauryn Cartwright MD  Primary Care Physician: NANETTE Hawkins CNP  REFERRING PROVIDER: No ref. provider found    Reason for Consultation: History recurrent infection lower extremities. Multiple antibiotic allergies. Admitted for cellulitis. History of Present Illness/Chief Complaint: 82-year-old woman. Severe bilateral lower extremity lymphedema. She is followed with wound care. She uses some pneumatic compression garments at home. She indicates she has not been using them as much recently. There has been some increase in her lower extremity edema. She developed erythema right thigh. She had had small open area with some drainage right medial thigh as well. She had low-grade temperature elevation. She was admitted for IV antibiotic treatment. Infectious disease asked to evaluate and offer antibiotic recommendations. She has multiple antimicrobial allergies.     Current Scheduled Medications:    celecoxib  200 mg Oral Daily    DULoxetine  60 mg Oral BID    fluticasone  1 spray Each Nostril Daily    furosemide  40 mg Oral BID    gabapentin  100 mg Oral Daily    gabapentin  400 mg Oral TID    mirtazapine  15 mg Oral Nightly    morphine  15 mg Oral BID    [START ON 4/14/2022] pantoprazole  40 mg Oral QAM AC    oxyCODONE-acetaminophen  1 tablet Oral TID    pramipexole  0.75 mg Oral BID    topiramate  25 mg Oral Nightly    vancomycin (VANCOCIN) intermittent dosing (placeholder)   Other RX Placeholder    vancomycin  1,500 mg IntraVENous Q18H    cefepime  2,000 mg IntraVENous Q12H    sodium chloride flush  5-40 mL IntraVENous 2 times per day    enoxaparin  40 mg SubCUTAneous Q24H    insulin lispro  0-12 Units SubCUTAneous TID WC    insulin lispro  0-6 Units SubCUTAneous Nightly     Current PRN Medications:  ondansetron, morphine, sodium chloride flush, sodium chloride, potassium chloride, magnesium sulfate, polyethylene glycol, acetaminophen **OR** acetaminophen, glucagon (rDNA), dextrose, glucose, dextrose bolus (hypoglycemia) **OR** dextrose bolus (hypoglycemia), busPIRone    Allergies: Allergies   Allergen Reactions    Compazine [Prochlorperazine] Shortness Of Breath    Ciprofloxacin Hives    Calamine Itching    Prochlorperazine Edisylate      Will discuss with patient at next visit     Tobramycin Other (See Comments)     States had corneal transplants - and was told not to    Tramadol      Will discuss with patient at next visit     Amoxicillin-Pot Clavulanate Hives, Itching and Rash    Ancef [Cefazolin] Nausea And Vomiting    Bactrim [Sulfamethoxazole-Trimethoprim] Nausea And Vomiting and Other (See Comments)     States she also gets yeast infections with it    Cephalexin Rash    Clindamycin/Lincomycin Rash    Codeine Nausea And Vomiting and Rash    Demerol Hives    Doxycycline Nausea And Vomiting    Hydroxyzine Hcl Itching    Ibuprofen Nausea Only    Keflex [Cephalexin] Rash    Meperidine Hives    Moxifloxacin Nausea And Vomiting    Nortriptyline Hives, Itching and Rash    Orphenadrine Citrate Itching    Penicillins Hives and Nausea Only    Vistaril [Hydroxyzine Hcl] Itching       Past Medical History: Severe chronic bilateral lower extremity lymphedema. Depression/anxiety. Diabetes mellitus. Hyperlipidemia. Hypertension. Nephrolithiasis. Restless leg syndrome. History lower extremity cellulitis. Past Surgical History: Previous surgery for ectopic pregnancy. Corneal transplant. Cataract removal.  Left knee surgery. Tubal ligation. Social History: She indicates she has a boyfriend that lives with her to some degree assistance with her care. No alcohol use. No tobacco use. Family History: Chronic obstructive pulmonary disease. Cancer.     Exposure History: No close contacts of been ill    Review of Systems: No chest pain or chest pressure. No cough or dyspnea. No nausea or vomiting. No diarrhea. Vital Signs:  /66   Pulse 88   Temp 98.1 °F (36.7 °C) (Temporal)   Resp 17   Ht 5' 1\" (1.549 m)   Wt (!) 310 lb (140.6 kg)   SpO2 98%   BMI 58.57 kg/m²  Temp (24hrs), Av.8 °F (37.1 °C), Min:97 °F (36.1 °C), Max:101.5 °F (38.6 °C)    Physical Exam:   Vital signs reviewed. Lungs clear without crackles  Heart without murmur  Abdomen obese. Soft. Nontender. Severe bilateral lower extremity lymphedema  Erythema medial right thigh and some on right lower extremity  Soft area of medial right thigh with some drainage. Drainage primarily thin and slightly cloudy. Lab Results:  CBC:   Recent Labs     22  1005   WBC 7.6   HGB 10.1*   HCT 34.7*      LYMPHOPCT 14.0*   MONOPCT 7.5     CMP:   Recent Labs     22  1005   *   K 4.4   CL 95*   CO2 27   BUN 11   CREATININE 1.1*   CALCIUM 9.0   BILITOT 0.4   ALKPHOS 116*   ALT 20   AST 31   GLUCOSE 344*     Impression:   1. Right lower extremity cellulitis  2. Severe bilateral lower extremity lymphedema  3.   Multiple antibiotic allergies-appears to be tolerating vancomycin and cefepime without difficulty at present    Recommendations:    Continue vancomycin  Continue cefepime  Would suggest trying to elevate legs above the level of the heart  Try to diurese some degree of possible  Continue to follow    Steve Hays MD  22  8:41 PM

## 2022-04-14 NOTE — CONSULTS
[facility] Vascular Access Team:  Consult Note    Tianna   Admitted - 6/51/8793  9:52 AM  Admission Diagnosis -   Cellulitis of right lower extremity [L03.115]  Bilateral lower extremity edema [R60.0]  Failure of outpatient treatment [Z78.9]  Cellulitis and abscess of right leg [L03.115, L02.415]    Attending Physician - Afua Denney MD  Ordering Physician - DR Mike Jacobson    Active LDAs -   Midline Single Lumen 04/14/22 Left Brachial (Active)   Number of days: 0       Peripheral IV 04/12/22 Right Shoulder (Active)   Number of days: 2       REASON FOR CONSULT:   [facility] vascular access team consulted for placement of PICC/Midline . INDICATIONS:  PT WITH HISTORY OF DIFFICULT VENOUS ACCESS. MIDLINE INSERTED PER ORDERS    FINDINGS:  4.5 [place] Single Lumen Midline  INSERTED, SEE PROCEDURAL NOTE     Past Medical History:   Diagnosis Date    Anxiety     Constipation     Depression     GERD (gastroesophageal reflux disease)     Headache(784.0)     Hyperlipidemia     Hypertension     Kidney stones     Kidney stones     Morbid obesity due to excess calories ([facility])     Neuromuscular disorder ([facility])     Restless legs syndrome     Type 2 diabetes mellitus ([facility] [address]        Recent Labs     Units 04/12/22  2311 04/12/22  2049 04/12/22  1015 04/12/22  1005   BC   --  No Growth to date. Any change in status will be called. < > No Growth to date. Any change in status will be called. BLOODCULT2  No Growth to date. Any change in status will be called. --    < >  --    WBC K/uL  --   --   --  7.6   PLT K/uL  --   --   --  243   CREATININE mg/dL  --   --   --  1.1*    < > = values in this interval not displayed. IMPRESSION/PLAN:  1. Midline is ready to be used      Thank you for your time and consult.      Electronically Signed By: Sandee Desir RN on 4/14/2022 at 11:35 AM

## 2022-04-14 NOTE — PROGRESS NOTES
Patient refused wound care consultation. States she has a headache and doesn't want wound care to see her today.

## 2022-04-15 ENCOUNTER — TRANSCRIBE ORDERS (OUTPATIENT)
Dept: HOME HEALTH SERVICES | Facility: HOME HEALTHCARE | Age: 52
End: 2022-04-15

## 2022-04-15 ENCOUNTER — HOME CARE VISIT (OUTPATIENT)
Dept: HOME HEALTH SERVICES | Facility: HOME HEALTHCARE | Age: 52
End: 2022-04-15

## 2022-04-15 ENCOUNTER — TELEPHONE (OUTPATIENT)
Dept: INTERNAL MEDICINE | Facility: CLINIC | Age: 52
End: 2022-04-15

## 2022-04-15 VITALS
BODY MASS INDEX: 55.32 KG/M2 | SYSTOLIC BLOOD PRESSURE: 133 MMHG | WEIGHT: 293 LBS | OXYGEN SATURATION: 92 % | DIASTOLIC BLOOD PRESSURE: 60 MMHG | HEART RATE: 84 BPM | RESPIRATION RATE: 14 BRPM | HEIGHT: 61 IN | TEMPERATURE: 98.2 F

## 2022-04-15 DIAGNOSIS — I89.0 LYMPH EDEMA: Primary | ICD-10-CM

## 2022-04-15 LAB
ANION GAP SERPL CALCULATED.3IONS-SCNC: 13 MMOL/L (ref 7–19)
BASOPHILS ABSOLUTE: 0 K/UL (ref 0–0.2)
BASOPHILS RELATIVE PERCENT: 0.6 % (ref 0–1)
BUN BLDV-MCNC: 10 MG/DL (ref 6–20)
CALCIUM SERPL-MCNC: 9.7 MG/DL (ref 8.6–10)
CHLORIDE BLD-SCNC: 95 MMOL/L (ref 98–111)
CO2: 30 MMOL/L (ref 22–29)
CREAT SERPL-MCNC: 1.1 MG/DL (ref 0.5–0.9)
EOSINOPHILS ABSOLUTE: 0.2 K/UL (ref 0–0.6)
EOSINOPHILS RELATIVE PERCENT: 3.7 % (ref 0–5)
GFR AFRICAN AMERICAN: >59
GFR NON-AFRICAN AMERICAN: 53
GLUCOSE BLD-MCNC: 177 MG/DL (ref 70–99)
GLUCOSE BLD-MCNC: 178 MG/DL (ref 70–99)
GLUCOSE BLD-MCNC: 198 MG/DL (ref 74–109)
HCT VFR BLD CALC: 33.7 % (ref 37–47)
HEMOGLOBIN: 10 G/DL (ref 12–16)
IMMATURE GRANULOCYTES #: 0.1 K/UL
LYMPHOCYTES ABSOLUTE: 1.3 K/UL (ref 1.1–4.5)
LYMPHOCYTES RELATIVE PERCENT: 19.7 % (ref 20–40)
MAGNESIUM: 1.5 MG/DL (ref 1.6–2.6)
MCH RBC QN AUTO: 24.6 PG (ref 27–31)
MCHC RBC AUTO-ENTMCNC: 29.7 G/DL (ref 33–37)
MCV RBC AUTO: 82.8 FL (ref 81–99)
MONOCYTES ABSOLUTE: 0.4 K/UL (ref 0–0.9)
MONOCYTES RELATIVE PERCENT: 5.8 % (ref 0–10)
NEUTROPHILS ABSOLUTE: 4.5 K/UL (ref 1.5–7.5)
NEUTROPHILS RELATIVE PERCENT: 69.3 % (ref 50–65)
PDW BLD-RTO: 15.3 % (ref 11.5–14.5)
PERFORMED ON: ABNORMAL
PERFORMED ON: ABNORMAL
PHOSPHORUS: 3.3 MG/DL (ref 2.5–4.5)
PLATELET # BLD: 246 K/UL (ref 130–400)
PMV BLD AUTO: 9.7 FL (ref 9.4–12.3)
POTASSIUM REFLEX MAGNESIUM: 4.2 MMOL/L (ref 3.5–5)
RBC # BLD: 4.07 M/UL (ref 4.2–5.4)
SODIUM BLD-SCNC: 138 MMOL/L (ref 136–145)
VANCOMYCIN TROUGH: 75.1 UG/ML (ref 10–20)
VITAMIN D 25-HYDROXY: 10.4 NG/ML
WBC # BLD: 6.5 K/UL (ref 4.8–10.8)

## 2022-04-15 PROCEDURE — 6360000002 HC RX W HCPCS: Performed by: HOSPITALIST

## 2022-04-15 PROCEDURE — 80048 BASIC METABOLIC PNL TOTAL CA: CPT

## 2022-04-15 PROCEDURE — 84100 ASSAY OF PHOSPHORUS: CPT

## 2022-04-15 PROCEDURE — 6370000000 HC RX 637 (ALT 250 FOR IP): Performed by: HOSPITALIST

## 2022-04-15 PROCEDURE — 80202 ASSAY OF VANCOMYCIN: CPT

## 2022-04-15 PROCEDURE — 85025 COMPLETE CBC W/AUTO DIFF WBC: CPT

## 2022-04-15 PROCEDURE — 6360000002 HC RX W HCPCS: Performed by: PHYSICIAN ASSISTANT

## 2022-04-15 PROCEDURE — 36415 COLL VENOUS BLD VENIPUNCTURE: CPT

## 2022-04-15 PROCEDURE — 2580000003 HC RX 258: Performed by: PHYSICIAN ASSISTANT

## 2022-04-15 PROCEDURE — 82306 VITAMIN D 25 HYDROXY: CPT

## 2022-04-15 PROCEDURE — 93970 EXTREMITY STUDY: CPT

## 2022-04-15 PROCEDURE — 82947 ASSAY GLUCOSE BLOOD QUANT: CPT

## 2022-04-15 PROCEDURE — 6360000002 HC RX W HCPCS: Performed by: INTERNAL MEDICINE

## 2022-04-15 PROCEDURE — 6370000000 HC RX 637 (ALT 250 FOR IP): Performed by: NURSE PRACTITIONER

## 2022-04-15 PROCEDURE — 83735 ASSAY OF MAGNESIUM: CPT

## 2022-04-15 RX ORDER — ERGOCALCIFEROL 1.25 MG/1
50000 CAPSULE ORAL WEEKLY
Qty: 4 CAPSULE | Refills: 0 | Status: SHIPPED | OUTPATIENT
Start: 2022-04-15 | End: 2022-05-15

## 2022-04-15 RX ORDER — ALBUTEROL SULFATE 90 UG/1
4 AEROSOL, METERED RESPIRATORY (INHALATION) PRN
Status: CANCELLED | OUTPATIENT
Start: 2022-04-15

## 2022-04-15 RX ORDER — HEPARIN SODIUM (PORCINE) LOCK FLUSH IV SOLN 100 UNIT/ML 100 UNIT/ML
500 SOLUTION INTRAVENOUS PRN
Status: CANCELLED | OUTPATIENT
Start: 2022-04-15

## 2022-04-15 RX ORDER — DIPHENHYDRAMINE HYDROCHLORIDE 50 MG/ML
50 INJECTION INTRAMUSCULAR; INTRAVENOUS
Status: CANCELLED | OUTPATIENT
Start: 2022-04-15

## 2022-04-15 RX ORDER — SODIUM CHLORIDE 9 MG/ML
25 INJECTION, SOLUTION INTRAVENOUS PRN
Status: CANCELLED | OUTPATIENT
Start: 2022-04-15

## 2022-04-15 RX ORDER — SODIUM CHLORIDE 0.9 % (FLUSH) 0.9 %
10 SYRINGE (ML) INJECTION PRN
Status: CANCELLED | OUTPATIENT
Start: 2022-04-15

## 2022-04-15 RX ORDER — ACETAMINOPHEN 325 MG/1
650 TABLET ORAL
Status: CANCELLED | OUTPATIENT
Start: 2022-04-15

## 2022-04-15 RX ORDER — ONDANSETRON 2 MG/ML
8 INJECTION INTRAMUSCULAR; INTRAVENOUS
Status: CANCELLED | OUTPATIENT
Start: 2022-04-15

## 2022-04-15 RX ORDER — EPINEPHRINE 1 MG/ML
0.3 INJECTION, SOLUTION, CONCENTRATE INTRAVENOUS PRN
Status: CANCELLED | OUTPATIENT
Start: 2022-04-15

## 2022-04-15 RX ORDER — ERGOCALCIFEROL 1.25 MG/1
50000 CAPSULE ORAL WEEKLY
Status: DISCONTINUED | OUTPATIENT
Start: 2022-04-15 | End: 2022-04-15 | Stop reason: HOSPADM

## 2022-04-15 RX ORDER — MAGNESIUM SULFATE IN WATER 40 MG/ML
4000 INJECTION, SOLUTION INTRAVENOUS ONCE
Status: DISCONTINUED | OUTPATIENT
Start: 2022-04-15 | End: 2022-04-15 | Stop reason: HOSPADM

## 2022-04-15 RX ORDER — MAGNESIUM OXIDE 400 MG/1
200 TABLET ORAL DAILY
Qty: 30 TABLET | Refills: 0 | Status: SHIPPED | OUTPATIENT
Start: 2022-04-15 | End: 2022-05-15

## 2022-04-15 RX ORDER — SODIUM CHLORIDE 9 MG/ML
INJECTION, SOLUTION INTRAVENOUS CONTINUOUS
Status: CANCELLED | OUTPATIENT
Start: 2022-04-15

## 2022-04-15 RX ADMIN — PRAMIPEXOLE DIHYDROCHLORIDE 0.75 MG: 0.25 TABLET ORAL at 08:06

## 2022-04-15 RX ADMIN — WATER 2000 MG: 1 INJECTION INTRAMUSCULAR; INTRAVENOUS; SUBCUTANEOUS at 10:15

## 2022-04-15 RX ADMIN — DULOXETINE 60 MG: 60 CAPSULE, DELAYED RELEASE ORAL at 08:04

## 2022-04-15 RX ADMIN — MORPHINE SULFATE 15 MG: 15 TABLET, FILM COATED, EXTENDED RELEASE ORAL at 08:06

## 2022-04-15 RX ADMIN — GABAPENTIN 400 MG: 400 CAPSULE ORAL at 14:11

## 2022-04-15 RX ADMIN — ERGOCALCIFEROL 50000 UNITS: 1.25 CAPSULE ORAL at 14:12

## 2022-04-15 RX ADMIN — OXYCODONE HYDROCHLORIDE AND ACETAMINOPHEN 1 TABLET: 7.5; 325 TABLET ORAL at 08:06

## 2022-04-15 RX ADMIN — OXYCODONE HYDROCHLORIDE AND ACETAMINOPHEN 1 TABLET: 7.5; 325 TABLET ORAL at 14:12

## 2022-04-15 RX ADMIN — PANTOPRAZOLE SODIUM 40 MG: 40 TABLET, DELAYED RELEASE ORAL at 08:06

## 2022-04-15 RX ADMIN — BUSPIRONE HYDROCHLORIDE 10 MG: 10 TABLET ORAL at 08:06

## 2022-04-15 RX ADMIN — GABAPENTIN 400 MG: 400 CAPSULE ORAL at 08:06

## 2022-04-15 RX ADMIN — MORPHINE SULFATE 3 MG: 4 INJECTION, SOLUTION INTRAMUSCULAR; INTRAVENOUS at 04:57

## 2022-04-15 RX ADMIN — GABAPENTIN 100 MG: 100 CAPSULE ORAL at 08:05

## 2022-04-15 RX ADMIN — FLUTICASONE PROPIONATE 1 SPRAY: 50 SPRAY, METERED NASAL at 08:09

## 2022-04-15 RX ADMIN — INSULIN LISPRO 2 UNITS: 100 INJECTION, SOLUTION INTRAVENOUS; SUBCUTANEOUS at 12:48

## 2022-04-15 RX ADMIN — INSULIN LISPRO 2 UNITS: 100 INJECTION, SOLUTION INTRAVENOUS; SUBCUTANEOUS at 08:31

## 2022-04-15 ASSESSMENT — PAIN SCALES - GENERAL
PAINLEVEL_OUTOF10: 7
PAINLEVEL_OUTOF10: 6
PAINLEVEL_OUTOF10: 5

## 2022-04-15 NOTE — PROGRESS NOTES
[facility] Pharmacokinetic Monitoring Service - Vancomycin    Consulting Provider:  Diomedes Conley   Indication:  Skin ans soft tissue infection  Target Concentration: Goal trough of 10-15 mg/L and AUC/KEY <500 mg*hr/L  Day of Therapy: 4  Additional Antimicrobials: Cefepime    Pertinent Laboratory Values: Wt Readings from Last 1 Encounters:   04/12/22 (!) 310 lb (140.6 kg)     Temp Readings from Last 1 Encounters:   04/15/22 98.2 °F (36.8 °C) (Temporal)     Estimated Creatinine Clearance: 83 mL/min (A) (based on SCr of 1.1 mg/dL (H)). Recent Labs     04/12/22  1005 04/15/22  0646   CREATININE 1.1* 1.1*   WBC 7.6 6.5     Procalcitonin: n/a    Pertinent Cultures:  Culture Date Source Results   04/12/22 Blood x 4 No growth   MRSA Nasal Swab: N/A. Non-respiratory infection           Recent vancomycin administrations                     vancomycin (VANCOCIN) 1,500 mg in dextrose 5 % 500 mL IVPB (mg) 1,500 mg New Bag 04/14/22 1321    vancomycin (VANCOCIN) 2,500 mg in dextrose 5 % 500 mL IVPB (mg) 2,500 mg New Bag 04/12/22 1122                    Assessment:  Date/Time Current Dose Concentration Timing of Concentration (h) AUC   Rangel@[employer] 1500mg IV q18hr 75.1 17 h 25 m 3627   Note: Serum concentrations collected for AUC dosing may appear elevated if collected in close proximity to the dose administered, this is not necessarily an indication of toxicity    Plan:  Current dosing regimen is supra-therapeutic  Hold Vancomycin. Patient responded to Vancomycin 04/11 in 2021 with similar kinetic values. Repeat vancomycin concentration ordered for 4/16 @ 0400 with am labs.   Pharmacy will continue to monitor patient and adjust therapy as indicated    Thank you for the consult,  Ryena Richard, [facility]  4/15/2022 8:08 AM

## 2022-04-15 NOTE — DISCHARGE INSTR - OTHER ORDERS
Recommendations:  Okay with me for discharge home  Her current vancomycin level should be adequate to keep her protected over the next 1 to 2 days  Would make arrangements for her to receive a BMP on Monday morning April 18, 2022  If creatinine at that time less than or equal to 1.2 would give dose of oritavancin  She could go home with midline catheter in place  On Monday if creatinine less than or equal to 1.2 she could receive a dose of oritavancin 1200 mg IV x 1  Midline could be removed after her antibiotic dose  She should have follow-up with her primary care provider next week                Revision History

## 2022-04-15 NOTE — PROGRESS NOTES
Patient complaining of migraine. She takes zomig at home. It is non formulary. We have imitrex here. She is allergic to it. Medication not ordered.

## 2022-04-15 NOTE — PROGRESS NOTES
Patient will be discharged today with the plan to go to outpatient infusion on Monday for lab work and one dose of antibiotic. She is discharging with midline IV. Patient has been made aware of the need to go to outpatient infusion on Monday. Meds to beds delivered her prescriptions.

## 2022-04-15 NOTE — PROGRESS NOTES
CLINICAL PHARMACY NOTE: MEDS TO BEDS    Total # of Prescriptions Filled: 2   The following medications were delivered to the patient:  · Magnesium Oxide 400   · Vitamin D (Ergo) 1.25 mg    Additional Documentation:    Handed scripts to patient at bedside, family member was present as well

## 2022-04-15 NOTE — PROGRESS NOTES
Infectious Diseases Progress Note    Patient:  Tianna Trivedi  YOB: 1970  MRN: [id number]   Admit date: 4/12/2022   Admitting Physician: Samantha Dasilva MD  Primary Care Physician: NANETTE Preciado - CNP    Chief Complaint/Interval History: She would like to be home for [facility]. She feels legs are little bit better. Boyfriend at bedside. She is getting diuretic. Patient was seen and examined the morning of April 14, 2022. Note completed as a late entry the morning of April 15, 2022. In/Out    Intake/Output Summary (Last 24 hours) at 4/15/2022 5781  Last data filed at 4/14/2022 1025  Gross per 24 hour   Intake 0 ml   Output --   Net 0 ml     Allergies:    Allergies   Allergen Reactions    Compazine [Prochlorperazine] Shortness Of Breath    Ciprofloxacin Hives    Calamine Itching    Prochlorperazine Edisylate      Will discuss with patient at next visit     Tobramycin Other (See Comments)     States had corneal transplants - and was told not to    Tramadol      Will discuss with patient at next visit     Amoxicillin-Pot Clavulanate Hives, Itching and Rash    Ancef [Cefazolin] Nausea And Vomiting    Bactrim [Sulfamethoxazole-Trimethoprim] Nausea And Vomiting and Other (See Comments)     States she also gets yeast infections with it    Cephalexin Rash    Clindamycin/Lincomycin Rash    Codeine Nausea And Vomiting and Rash    Demerol Hives    Doxycycline Nausea And Vomiting    Hydroxyzine Hcl Itching    Ibuprofen Nausea Only    Keflex [Cephalexin] Rash    Meperidine Hives    Moxifloxacin Nausea And Vomiting    Nortriptyline Hives, Itching and Rash    Orphenadrine Citrate Itching    Penicillins Hives and Nausea Only    Vistaril [Hydroxyzine Hcl] Itching     Current Meds: celecoxib (CELEBREX) capsule 200 mg, Daily  DULoxetine (CYMBALTA) extended release capsule 60 mg, BID  fluticasone (FLONASE) 50 MCG/ACT nasal spray 1 spray, Daily  furosemide (LASIX) tablet 40 mg, BID  gabapentin (NEURONTIN) capsule 100 mg, Daily  gabapentin (NEURONTIN) capsule 400 mg, TID  mirtazapine (REMERON) tablet 15 mg, Nightly  morphine (MS CONTIN) extended release tablet 15 mg, BID  pantoprazole (PROTONIX) tablet 40 mg, QAM AC  oxyCODONE-acetaminophen (PERCOCET) 7.5-325 MG per tablet 1 tablet, TID  pramipexole (MIRAPEX) tablet 0.75 mg, BID  topiramate (TOPAMAX) tablet 25 mg, Nightly  ondansetron (ZOFRAN) injection 4 mg, Q6H PRN  morphine injection 3 mg, Q3H PRN  vancomycin (VANCOCIN) intermittent dosing (placeholder), RX Placeholder  ceFEPIme (MAXIPIME) 2,000 mg in sterile water 20 mL IV syringe, Q12H  sodium chloride flush 0.9 % injection 5-40 mL, 2 times per day  sodium chloride flush 0.9 % injection 5-40 mL, PRN  0.9 % sodium chloride infusion, PRN  potassium chloride 10 mEq/100 mL IVPB (Peripheral Line), PRN  magnesium sulfate 2000 mg in 50 mL IVPB premix, PRN  enoxaparin (LOVENOX) injection 40 mg, Q24H  polyethylene glycol (GLYCOLAX) packet 17 g, Daily PRN  acetaminophen (TYLENOL) tablet 650 mg, Q6H PRN   Or  acetaminophen (TYLENOL) suppository 650 mg, Q6H PRN  insulin lispro (HUMALOG) injection vial 0-12 Units, TID WC  insulin lispro (HUMALOG) injection vial 0-6 Units, Nightly  glucagon (rDNA) injection 1 mg, PRN  dextrose 5 % solution, PRN  glucose chewable tablet 4 each, PRN  dextrose bolus (hypoglycemia) 10% 125 mL, PRN   Or  dextrose bolus (hypoglycemia) 10% 250 mL, PRN  busPIRone (BUSPAR) tablet 10 mg, TID PRN  0.9 % sodium chloride infusion, Continuous      Review of Systems no diarrhea or rash    VitalSigns:  /60   Pulse 84   Temp 98.2 °F (36.8 °C) (Temporal)   Resp 14   Ht 5' 1\" (1.549 m)   Wt (!) 310 lb (140.6 kg)   SpO2 92%   BMI 58.57 kg/m²      Physical Exam  Line/IV site: No erythema, warmth, induration, or tenderness.   Both lower extremities with chronic lymphedema  Edema slightly less  Slightly improved erythema right lower extremity    Lab Results:  CBC:   Recent Labs 04/12/22  1005 04/15/22  0646   WBC 7.6 6.5   HGB 10.1* 10.0*    246     BMP:  Recent Labs     04/12/22  1005 04/15/22  0646   * 138   K 4.4 4.2   CL 95* 95*   CO2 27 30*   BUN 11 10   CREATININE 1.1* 1.1*   GLUCOSE 344* 198*     Radiology: None    Additional Studies Reviewed:  None    Impression:  1. Right lower extremity cellulitis  2. Severe bilateral lower extremity lymphedema  3. History of multiple antibiotic allergies-currently receiving vancomycin and cefepime. She appears to be tolerating her current antibiotic treatment without any evidence of allergy.     Recommendations:  Continue current treatment  Hope to transition to oral antibiotic treatment on April 15 or possibly transition to oritavancin  We continue attempts at diuresis  Encouraged her to continue to elevate her legs above the level of the heart when she is in bed    Melania Garcia MD

## 2022-04-15 NOTE — CARE COORDINATION
Pt will be sent home on oral antibiotics. Pt lives with boyfriend and has a good support system as well as home health. Pt states she has no concerns but will contact us if anything arises.  Electronically signed by Kayy Brannon on 4/15/2022 at 12:07 PM

## 2022-04-15 NOTE — DISCHARGE SUMMARY
[facility], [address] [place] [address] MEDICINE      DISCHARGE SUMMARY:      PATIENT NAME:  Neena Posey  :  [birth year]  MRN:  [id number]    Admission Date:   2022  9:52 AM Attending: Trini Romo MD   Discharge Date:   4/15/2022              PCP: NANETTE Alexis - CNP  Length of Stay: 3 days     Chief Complaint on Admission:   Chief Complaint   Patient presents with    Leg Swelling     right leg swelling and wound       Consultants:     PHARMACY TO DOSE VANCOMYCIN  IP CONSULT TO INFECTIOUS DISEASES  IP CONSULT TO IV TEAM       Discharge Problem List:   Principal Problem:    Cellulitis and abscess of right leg  Active Problems:    Hypertension    [place] (generalized anxiety disorder)    Morbid obesity due to excess calories (HCC)    Gastroesophageal reflux disease    Hypokalemia    Lymphedema of both lower extremities    Diabetic ulcer of left lower leg associated with type 2 diabetes mellitus, with fat layer exposed ([facility] [address]  Resolved Problems:    * No resolved hospital problems. *         Last dated Assessment and Plan . .. 04/15/2022      CUMULATIVE  HOSPITAL  COURSE  AND  TREATMENT:  Tianna Trivedi is an 54 y.o. female with past medical history of depression, GERD, hyperlipidemia, history of renal stones, hypertension, morbid obesity, type 2 diabetes, lymphedema, osteoarthritis of the knees, and anxiety.  Patient gives a history of 2 years ago suddenly developing swelling of her lower extremities.  Over the last 2 years she states she has gained 100 pounds because of her ever-increasing girth of her legs.  She comes in today complaining of severe pain and redness.  She has an open weeping ulceration to the right thigh.  Her leg is hot and firm all the way up to the groin.  She reports she has become reclusive except to go to [facility] once a week because of her ever expanding leg.  She has self-referred to [place] but there is a waiting list in the lymphedema clinic there.  She tried to go to weight loss clinic locally and they refused to give her anything because she is managed with narcotics for her chronic pain by pain management.  She is limited on specialty care because she is on [employer] I did do some research and there is a lymphedema clinic at [facility] which is in [place] and should be covered once she is discharged we should consider making that referral.    Except for medications patient has refused almost all therapy. . She is not let wound care dress her legs. She has not let us do venous studies. She insist on going home for [facility]. Dr. Batres [place] approved that. She will get lab on Monday and based on her creatinine she will get 1 dose of T12 100 mg midline removed . To the infusion center Monday to fill simple title    OBJECTIVE:  /60   Pulse 84   Temp 98.2 °F (36.8 °C) (Temporal)   Resp 14   Ht 5' 1\" (1.549 m)   Wt (!) 310 lb (140.6 kg)   SpO2 92%   BMI 58.57 kg/m²       Heart: RRR   Lungs: Bilateral fair air entry   Abdomen: Soft, non-tender   Extremities: No edema   Neurologic: Alert and oriented   Skin: Warm and dry          Laboratory Data:  Recent Labs     04/15/22  0646   WBC 6.5   HGB 10.0*        Recent Labs     04/15/22  0646      K 4.2   CL 95*   CO2 30*   BUN 10   CREATININE 1.1*   GLUCOSE 198*     No results for input(s): AST, ALT, ALB, BILITOT, ALKPHOS in the last 72 hours. Troponin T: No results for input(s): TROPONINI in the last 72 hours. Pro-BNP: No results for input(s): BNP in the last 72 hours. INR: No results for input(s): INR in the last 72 hours. UA:No results for input(s): NITRITE, COLORU, PHUR, LABCAST, WBCUA, RBCUA, MUCUS, TRICHOMONAS, YEAST, BACTERIA, CLARITYU, SPECGRAV, LEUKOCYTESUR, UROBILINOGEN, BILIRUBINUR, BLOODU, GLUCOSEU, AMORPHOUS in the last 72 hours. Invalid input(s): Lenny Machado  A1C: No results for input(s): LABA1C in the last 72 hours.   ABG:No results for input(s): PHART, GJR4VRE, PO2ART, PUZ8YEE, BEART, HGBAE, Q0WHLMDC, CARBOXHGBART in the last 72 hours. Impressions of imaging performed in 48 hours before discharge:    No results found. Medication List      START taking these medications    magnesium oxide 400 MG tablet  Commonly known as: MAG-OX  Take 0.5 tablets by mouth daily     vitamin D 1.25 MG (07952 UT) Caps capsule  Commonly known as: ERGOCALCIFEROL  Take 1 capsule by mouth once a week        CHANGE how you take these medications    busPIRone 10 MG tablet  Commonly known as: BUSPAR  TAKE 1 TABLET BY MOUTH THREE TIMES DAILY  What changed: See the new instructions. insulin lispro (1 Unit Dial) 100 UNIT/ML Sopn  Commonly known as: Admelog SoloStar  ADMINISTER 45 UNITS UNDER THE SKIN EVERY EVENING. INCREASE BY 3 UNITS EVERY NIGHT UNTIL 60 UNITS IS REACHED AND.  CONTINUE 60 UNITS EVERY EVENING  What changed: additional instructions     Lantus SoloStar 100 UNIT/ML injection pen  Generic drug: insulin glargine  Inject 30 Units into the skin every evening  What changed:   · how much to take  · when to take this  · additional instructions        CONTINUE taking these medications    celecoxib 200 MG capsule  Commonly known as: CELEBREX  TAKE 1 CAPSULE BY MOUTH EVERY DAY     Cream [employer] Crea  APPLY ONE PUMP (GRAM) TO AFFECTED AREA(S) THREE TO FOUR TIMES A DAY TO THE APPENDAGES AND TRUNK AS DIRECTED     cyclobenzaprine 10 MG tablet  Commonly known as: FLEXERIL     Diabetic Shoe Misc  by Does not apply route DISPENSE ONE PAIR OF DIABETIC SHOE AND 3 PAIRS HEAT MOLDED INSERTS     DULoxetine 60 MG extended release capsule  Commonly known as: CYMBALTA  TAKE 1 CAPSULE BY MOUTH TWICE DAILY     fluticasone 50 MCG/ACT nasal spray  Commonly known as: FLONASE  SHAKE LIQUID AND USE 1 SPRAY IN EACH NOSTRIL DAILY     furosemide 80 MG tablet  Commonly known as: LASIX  Take 0.5 tablets by mouth 2 times daily     * gabapentin 100 MG capsule  Commonly known as: NEURONTIN     * gabapentin 300 MG capsule  Commonly known as: NEURONTIN     [employer] Pen Needles 31G X 6 MM Misc  Generic drug: Insulin Pen Needle  1 each by Does not apply route daily     mirtazapine 15 MG tablet  Commonly known as: REMERON  TAKE 1 TABLET BY MOUTH EVERY NIGHT     Morphine Sulfate ER 15 MG T12a     omeprazole 20 MG delayed release capsule  Commonly known as: PRILOSEC  TAKE ONE CAPSULE BY MOUTH TWICE DAILY BEFORE MEALS     ondansetron 4 MG disintegrating tablet  Commonly known as: ZOFRAN-ODT  DISSOLVE 1 TABLET UNDER THE TONGUE EVERY 8 HOURS AS NEEDED FOR NAUSEA AND VOMITING PRIOR TO DOXYCYCLINE DOSES     ondansetron 4 MG tablet  Commonly known as: ZOFRAN  Take 1 tablet by mouth 3 times daily as needed for Nausea or Vomiting     * ONE TOUCH BASIC SYSTEM w/Device Kit  1 kit by Does not apply route daily as needed (Dx 250.00)     * blood glucose monitor kit and supplies  Test 1 times a day & as needed for symptoms of irregular blood glucose. * ONE TOUCH LANCETS Misc  1 each by Does not apply route daily     * OneTouch Delica Plus [id number] Misc  USE TO CHECK BLOOD SUGAR AS DIRECTED     OneTouch Ultra strip  Generic drug: blood glucose test strips  USE TO TEST BLOOD SUGAR THREE TIMES DAILY AND AS NEEDED AS DIRECTED     oxyCODONE-acetaminophen 7.5-325 MG per tablet  Commonly known as: PERCOCET     potassium chloride 20 MEQ extended release tablet  Commonly known as: KLOR-CON M  Take 1 tablet by mouth daily as needed (low potassium)     pramipexole 0.75 MG tablet  Commonly known as: MIRAPEX  TAKE 1 TABLET BY MOUTH TWICE DAILY     topiramate 25 MG tablet  Commonly known as: TOPAMAX     Victoza 18 MG/3ML Sopn SC injection  Generic drug: Liraglutide  ADMINISTER 1.8 MG UNDER THE SKIN EVERY DAY         * This list has 6 medication(s) that are the same as other medications prescribed for you. Read the directions carefully, and ask your doctor or other care provider to review them with you.             STOP taking these medications    doxycycline hyclate 100 MG tablet  Commonly known as: VIBRA-TABS           Where to Get Your Medications      These medications were sent to [facility], [address]  [address], [address] [address]    Phone: [phone]   · magnesium oxide 400 MG tablet  · vitamin D 1.25 MG (81835 UT) Caps capsule           ISSUES TO BE ADDRESSED AT HOSPITAL FOLLOW-UP VISIT:    Advised patient to follow-up closely with PCP upon discharge for management of chronic medical issues  Please see the detailed discharge directions delivered from earlier today! Condition on Discharge: stable  Discharge Disposition: Home with [facility]    Recommended Follow Up:  [facility] EMERGENCY DEPT  [address] [id number] [facility], NANETTE - CNP  [address] [facility] [address] [facility] [phone]    In 3 days      Followup Appointments Scheduled at Time of Discharge:  No future appointments. Discharge Instructions:   Please see the discharge paperwork. Patient was seen at bedside today, and the examination shows improvement since yesterday. Detailed discharge directions delivered to the patient by myself and our nursing staff, who verbalizes understanding and is very happy and satisfied with the plan. Patient has been advised to continue all medications as prescribed and advised, and f/u with PCP within 1 week. Patient is stable from medical standpoint to be discharged. Total time spent during patient evaluation and assessment, discussion with the nurse/family, addressing discharge medications/scripts and coordination of care for safe discharge was in excess of 35 minutes.       Signed Electronically:    NANETTE Romero CNP  12:44 PM 4/15/2022

## 2022-04-15 NOTE — DISCHARGE INSTR - DIET
Good nutrition is important when healing from an illness, injury, or surgery. Follow any nutrition recommendations given to you during your hospital stay. If you were given an oral nutrition supplement while in the hospital, continue to take this supplement at home. You can take it with meals, in-between meals, and/or before bedtime. These supplements can be purchased at most local grocery stores, pharmacies, and chain [employer]-stores. If you have any questions about your diet or nutrition, call the hospital and ask for the dietitian.     Regular 3 carb choices/meal

## 2022-04-15 NOTE — PROGRESS NOTES
Infectious Diseases Progress Note    Patient:  Tianna Trivedi  YOB: 1970  MRN: [id number]   Admit date: 4/12/2022   Admitting Physician: Boy Landaverde MD  Primary Care Physician: NANETTE Lauren CNP    Chief Complaint/Interval History:  She is without any symptoms. She is without fever. She would like to go home for [facility]. She feels right lower extremity is getting better. Although her chart lists a cephalosporin allergy, she has been tolerating cefepime without any rash, wheezing, or other signs of reaction. She does not appear to have a cephalosporin allergy. Her last dose of vancomycin was on 4/14 at 1:21 in the afternoon. Discussed with pharmacist regarding creatinine monitoring given her high Vanco level. Also discussed with pharmacist dosing of oritavancin. In/Out  No intake or output data in the 24 hours ending 04/15/22 1151  Allergies:    Allergies   Allergen Reactions    Compazine [Prochlorperazine] Shortness Of Breath    Ciprofloxacin Hives    Calamine Itching    Prochlorperazine Edisylate      Will discuss with patient at next visit     Tobramycin Other (See Comments)     States had corneal transplants - and was told not to    Tramadol      Will discuss with patient at next visit     Amoxicillin-Pot Clavulanate Hives, Itching and Rash    Ancef [Cefazolin] Nausea And Vomiting    Bactrim [Sulfamethoxazole-Trimethoprim] Nausea And Vomiting and Other (See Comments)     States she also gets yeast infections with it    Cephalexin Rash    Clindamycin/Lincomycin Rash    Codeine Nausea And Vomiting and Rash    Demerol Hives    Doxycycline Nausea And Vomiting    Hydroxyzine Hcl Itching    Ibuprofen Nausea Only    Keflex [Cephalexin] Rash    Meperidine Hives    Moxifloxacin Nausea And Vomiting    Nortriptyline Hives, Itching and Rash    Orphenadrine Citrate Itching    Penicillins Hives and Nausea Only    Vistaril [Hydroxyzine Hcl] Itching     Current Meds: magnesium sulfate 4000 mg in 100 mL IVPB premix, Once  vitamin D (ERGOCALCIFEROL) capsule 50,000 Units, Weekly  celecoxib (CELEBREX) capsule 200 mg, Daily  DULoxetine (CYMBALTA) extended release capsule 60 mg, BID  fluticasone (FLONASE) 50 MCG/ACT nasal spray 1 spray, Daily  furosemide (LASIX) tablet 40 mg, BID  gabapentin (NEURONTIN) capsule 100 mg, Daily  gabapentin (NEURONTIN) capsule 400 mg, TID  mirtazapine (REMERON) tablet 15 mg, Nightly  morphine (MS CONTIN) extended release tablet 15 mg, BID  pantoprazole (PROTONIX) tablet 40 mg, QAM AC  oxyCODONE-acetaminophen (PERCOCET) 7.5-325 MG per tablet 1 tablet, TID  pramipexole (MIRAPEX) tablet 0.75 mg, BID  topiramate (TOPAMAX) tablet 25 mg, Nightly  ondansetron (ZOFRAN) injection 4 mg, Q6H PRN  morphine injection 3 mg, Q3H PRN  vancomycin (VANCOCIN) intermittent dosing (placeholder), RX Placeholder  ceFEPIme (MAXIPIME) 2,000 mg in sterile water 20 mL IV syringe, Q12H  sodium chloride flush 0.9 % injection 5-40 mL, 2 times per day  sodium chloride flush 0.9 % injection 5-40 mL, PRN  0.9 % sodium chloride infusion, PRN  potassium chloride 10 mEq/100 mL IVPB (Peripheral Line), PRN  magnesium sulfate 2000 mg in 50 mL IVPB premix, PRN  enoxaparin (LOVENOX) injection 40 mg, Q24H  polyethylene glycol (GLYCOLAX) packet 17 g, Daily PRN  acetaminophen (TYLENOL) tablet 650 mg, Q6H PRN   Or  acetaminophen (TYLENOL) suppository 650 mg, Q6H PRN  insulin lispro (HUMALOG) injection vial 0-12 Units, TID WC  insulin lispro (HUMALOG) injection vial 0-6 Units, Nightly  glucagon (rDNA) injection 1 mg, PRN  dextrose 5 % solution, PRN  glucose chewable tablet 4 each, PRN  dextrose bolus (hypoglycemia) 10% 125 mL, PRN   Or  dextrose bolus (hypoglycemia) 10% 250 mL, PRN  busPIRone (BUSPAR) tablet 10 mg, TID PRN      Review of Systems See HPI    VitalSigns:  /60   Pulse 84   Temp 98.2 °F (36.8 °C) (Temporal)   Resp 14   Ht 5' 1\" (1.549 m)   Wt (!) 310 lb (140.6 kg)   SpO2 92%   BMI 58.57 kg/m²      Physical Exam  Line/IV site: No erythema, warmth, induration, or tenderness. Bilateral lower extremity lymphedema. Slight improvement. Right lower extremity erythema showing improvement  Lungs clear to auscultation without crackles  Abdomen soft and nontender    Lab Results:  CBC:   Recent Labs     04/15/22  0646   WBC 6.5   HGB 10.0*        BMP:  Recent Labs     04/15/22  0646      K 4.2   CL 95*   CO2 30*   BUN 10   CREATININE 1.1*   GLUCOSE 198*     CultureResults:  Blood cultures April 9, 2022-no growth  Blood cultures April 12, 2022-no growth    Radiology: None    Additional Studies Reviewed:  None    Impression:  1. Right lower extremity cellulitis  2. Severe bilateral lower extremity lymphedema  3. History multiple antibiotic allergies-currently receiving vancomycin and cefepime. Appears to be tolerating cephalosporins without evidence of reaction. Does not appear to have cephalosporin allergy.     Recommendations:  Okay with me for discharge home  Her current vancomycin level should be adequate to keep her protected over the next 1 to 2 days  Would make arrangements for her to receive a BMP on Monday morning April 18, 2022  If creatinine at that time less than or equal to 1.2 would give dose of oritavancin  She could go home with midline catheter in place  On Monday if creatinine less than or equal to 1.2 she could receive a dose of oritavancin 1200 mg IV x 1  Midline could be removed after her antibiotic dose  She should have follow-up with her primary care provider next week    Elizabeth Sterling MD

## 2022-04-15 NOTE — CARE COORDINATION
Pt is set up for Monday with out patient infusion. It is walk in and there is no chair time.  Pt is good for DC Electronically signed by Dominic Blanc on 4/15/2022 at 1:47 PM

## 2022-04-17 LAB
BLOOD CULTURE, ROUTINE: NORMAL
BLOOD CULTURE, ROUTINE: NORMAL
CULTURE, BLOOD 2: NORMAL

## 2022-04-18 DIAGNOSIS — L03.115 CELLULITIS OF RIGHT LOWER EXTREMITY: Primary | ICD-10-CM

## 2022-04-18 DIAGNOSIS — N18.30 STAGE 3 CHRONIC KIDNEY DISEASE, UNSPECIFIED WHETHER STAGE 3A OR 3B CKD (HCC): ICD-10-CM

## 2022-04-18 LAB
ANION GAP SERPL CALCULATED.3IONS-SCNC: 14 MMOL/L (ref 7–19)
BUN BLDV-MCNC: 10 MG/DL (ref 6–20)
CALCIUM SERPL-MCNC: 9.3 MG/DL (ref 8.6–10)
CHLORIDE BLD-SCNC: 91 MMOL/L (ref 98–111)
CO2: 28 MMOL/L (ref 22–29)
CREAT SERPL-MCNC: 1 MG/DL (ref 0.5–0.9)
CULTURE, BLOOD 2: NORMAL
GFR AFRICAN AMERICAN: >59
GFR NON-AFRICAN AMERICAN: 59
GLUCOSE BLD-MCNC: 250 MG/DL (ref 74–109)
POTASSIUM SERPL-SCNC: 4.6 MMOL/L (ref 3.5–5)
SODIUM BLD-SCNC: 133 MMOL/L (ref 136–145)

## 2022-04-19 ENCOUNTER — HOSPITAL ENCOUNTER (OUTPATIENT)
Dept: INFUSION THERAPY | Age: 52
Setting detail: INFUSION SERIES
Discharge: HOME | End: 2022-04-19
Payer: MEDICAID

## 2022-04-19 VITALS
TEMPERATURE: 97.5 F | HEART RATE: 78 BPM | SYSTOLIC BLOOD PRESSURE: 138 MMHG | RESPIRATION RATE: 20 BRPM | DIASTOLIC BLOOD PRESSURE: 73 MMHG

## 2022-04-19 DIAGNOSIS — Z88.1 PERSONAL HISTORY OF ALLERGY TO ANTIBIOTIC AGENT: ICD-10-CM

## 2022-04-19 DIAGNOSIS — L03.115 CELLULITIS OF RIGHT LOWER EXTREMITY: Primary | ICD-10-CM

## 2022-04-19 DIAGNOSIS — L03.116 BILATERAL LOWER LEG CELLULITIS: ICD-10-CM

## 2022-04-19 DIAGNOSIS — L03.115 BILATERAL LOWER LEG CELLULITIS: ICD-10-CM

## 2022-04-19 DIAGNOSIS — I89.0 LYMPHEDEMA OF BOTH LOWER EXTREMITIES: ICD-10-CM

## 2022-04-19 DIAGNOSIS — I87.2 PERIPHERAL VENOUS INSUFFICIENCY: ICD-10-CM

## 2022-04-19 PROCEDURE — 96366 THER/PROPH/DIAG IV INF ADDON: CPT

## 2022-04-19 PROCEDURE — 6360000002 HC RX W HCPCS: Performed by: INTERNAL MEDICINE

## 2022-04-19 PROCEDURE — 2580000003 HC RX 258: Performed by: INTERNAL MEDICINE

## 2022-04-19 PROCEDURE — 96365 THER/PROPH/DIAG IV INF INIT: CPT

## 2022-04-19 RX ORDER — EPINEPHRINE 1 MG/ML
0.3 INJECTION, SOLUTION, CONCENTRATE INTRAVENOUS PRN
Status: CANCELLED | OUTPATIENT
Start: 2022-04-23

## 2022-04-19 RX ORDER — SODIUM CHLORIDE 0.9 % (FLUSH) 0.9 %
10 SYRINGE (ML) INJECTION PRN
Status: CANCELLED | OUTPATIENT
Start: 2022-04-23

## 2022-04-19 RX ORDER — SODIUM CHLORIDE 9 MG/ML
INJECTION, SOLUTION INTRAVENOUS CONTINUOUS
Status: CANCELLED | OUTPATIENT
Start: 2022-04-23

## 2022-04-19 RX ORDER — ACETAMINOPHEN 325 MG/1
650 TABLET ORAL
Status: CANCELLED | OUTPATIENT
Start: 2022-04-23

## 2022-04-19 RX ORDER — POLYETHYLENE GLYCOL 3350 17 G/17G
17 POWDER, FOR SOLUTION ORAL DAILY
Qty: 1530 G | Refills: 3 | Status: SHIPPED | OUTPATIENT
Start: 2022-04-19 | End: 2022-04-22 | Stop reason: SDUPTHER

## 2022-04-19 RX ORDER — ONDANSETRON 2 MG/ML
8 INJECTION INTRAMUSCULAR; INTRAVENOUS
Status: CANCELLED | OUTPATIENT
Start: 2022-04-23

## 2022-04-19 RX ORDER — HEPARIN SODIUM (PORCINE) LOCK FLUSH IV SOLN 100 UNIT/ML 100 UNIT/ML
500 SOLUTION INTRAVENOUS PRN
Status: CANCELLED | OUTPATIENT
Start: 2022-04-23

## 2022-04-19 RX ORDER — DIPHENHYDRAMINE HYDROCHLORIDE 50 MG/ML
50 INJECTION INTRAMUSCULAR; INTRAVENOUS
Status: CANCELLED | OUTPATIENT
Start: 2022-04-23

## 2022-04-19 RX ORDER — ALBUTEROL SULFATE 90 UG/1
4 AEROSOL, METERED RESPIRATORY (INHALATION) PRN
Status: CANCELLED | OUTPATIENT
Start: 2022-04-23

## 2022-04-19 RX ORDER — SODIUM CHLORIDE 0.9 % (FLUSH) 0.9 %
10 SYRINGE (ML) INJECTION PRN
Status: DISCONTINUED | OUTPATIENT
Start: 2022-04-19 | End: 2022-04-20 | Stop reason: HOSPADM

## 2022-04-19 RX ORDER — SODIUM CHLORIDE 9 MG/ML
25 INJECTION, SOLUTION INTRAVENOUS PRN
Status: CANCELLED | OUTPATIENT
Start: 2022-04-23

## 2022-04-19 RX ADMIN — ORITAVANCIN 1200 MG: 400 INJECTION, POWDER, LYOPHILIZED, FOR SOLUTION INTRAVENOUS at 08:45

## 2022-04-19 NOTE — PROGRESS NOTES
Patient here for Orbactiv infusion. At 1129 with only approximately 30 minutes of infusion remaining, patient stated she could not stay any longer. Her legs were hurting and she had to leave. Educated patient on the importance of received the full infusion of antibiotic. She refused to stay. Stopped infusion flushed midline and placed acohol cap on. Called Dr Lunsford's office to notify of patient refusing to complete infusion.

## 2022-04-20 ENCOUNTER — HOME CARE VISIT (OUTPATIENT)
Dept: HOME HEALTH SERVICES | Facility: HOME HEALTHCARE | Age: 52
End: 2022-04-20

## 2022-04-20 ENCOUNTER — HOME CARE VISIT (OUTPATIENT)
Dept: HOME HEALTH SERVICES | Facility: CLINIC | Age: 52
End: 2022-04-20

## 2022-04-20 VITALS
HEART RATE: 96 BPM | OXYGEN SATURATION: 96 % | DIASTOLIC BLOOD PRESSURE: 80 MMHG | TEMPERATURE: 98.8 F | RESPIRATION RATE: 16 BRPM | SYSTOLIC BLOOD PRESSURE: 156 MMHG

## 2022-04-20 VITALS
DIASTOLIC BLOOD PRESSURE: 78 MMHG | HEART RATE: 78 BPM | RESPIRATION RATE: 18 BRPM | TEMPERATURE: 97.9 F | OXYGEN SATURATION: 96 % | SYSTOLIC BLOOD PRESSURE: 138 MMHG

## 2022-04-20 PROCEDURE — G0299 HHS/HOSPICE OF RN EA 15 MIN: HCPCS

## 2022-04-21 ENCOUNTER — TELEPHONE (OUTPATIENT)
Dept: INTERNAL MEDICINE | Facility: CLINIC | Age: 52
End: 2022-04-21

## 2022-04-21 ENCOUNTER — HOME CARE VISIT (OUTPATIENT)
Dept: HOME HEALTH SERVICES | Facility: CLINIC | Age: 52
End: 2022-04-21

## 2022-04-21 NOTE — TELEPHONE ENCOUNTER
NANI WITH [facility] HAS CALLED,  SHE STATED THAT PATIENT HAS A MIDLINE AND IS NO LONGER GETTING IV ANTIBIOTICS.  NANI IS ASKING IT SHE COULD HAVE A VERBAL TO REMOVE THE MIDLINE.  SHE STATED THAT IT HAS NOT USE NOW AND WAS LEFT IN AT OUT PATIENT INFUSION AND THEY DID NOT REMOVE IT.    [phone]

## 2022-04-21 NOTE — HOME HEALTH
PRE-SCREENED FOR COVID? Yes, no S/S of COVID. No recent exposure.    FOCUS OF CARE/SKILLED NEED: TABATHA, wound assessment, teaching/education    TEACHING/INTERVENTIONS: Pt/cg agreeable to a resumption of care under the care of SHOLA Jordan. Medication reconciliation completed and no issues found. Pt had no further questions regarding medication and/or plan of care. A&O X 4. Pt c/o pain in her legs. VSS. No S/S of infection in the pt's wounds.    PROGRESS TOWARD GOALS: Ongoing, progressing    PHYSICIAN CONTACT: I am contacting Viri Doty's office to get an order to remove the pt's midline since the pt finished her IV abx this past week. Spoke with JOSE DAVID Jain. Waiting on a response.    INSURANCE CHANGES? No    FALLS SINCE LAST VISIT? No    MEDICATION CHANGES? No    PLAN FOR NEXT VISIT: Possibly remove midline or change midline dressing on 4/21.

## 2022-04-21 NOTE — TELEPHONE ENCOUNTER
ATTEMPTED TO CALL DIANNE ON HER NUMBER.  DETAILED MESSAGE HAS BEEN LEFT REGARDING MIDLINE REMOVAL.

## 2022-04-21 NOTE — TELEPHONE ENCOUNTER
NIDA WITH [facility] HAS BEEN CALLED, SHE STATED THAT SHE SPOKE WITH PATIENT THIS MORNING AND THE MIDLINE FELL OUT WHILE PATIENT WAS TAKING A SHOWER.

## 2022-04-21 NOTE — CASE COMMUNICATION
"Patient missed a SN visit from [facility] on 4/21/22  Reason: I received an order to remove the pt's midline, and when I called the pt to schedule her visit, the pt stated that her \"midline fell out in the shower.\" The pt stated she didn't need to be seen this day.  For your records only.   As per home health protocol, MD must be notified of missed/cancelled visits; therefore the prescribed frequency was not met. "

## 2022-04-22 ENCOUNTER — TELEPHONE (OUTPATIENT)
Dept: INTERNAL MEDICINE | Facility: CLINIC | Age: 52
End: 2022-04-22

## 2022-04-22 RX ORDER — POLYETHYLENE GLYCOL 3350 17 G/17G
17 POWDER, FOR SOLUTION ORAL DAILY PRN
Qty: 1530 G | Refills: 3 | Status: ON HOLD | OUTPATIENT
Start: 2022-04-22 | End: 2022-11-23

## 2022-04-22 NOTE — TELEPHONE ENCOUNTER
Called patient due to many  No show cancel appointment. Provider needs a face to face appointment. No answer no voice mail will send letter. May need to dismiss patient due to these issues

## 2022-04-26 DIAGNOSIS — K21.9 GASTROESOPHAGEAL REFLUX DISEASE: ICD-10-CM

## 2022-04-26 RX ORDER — OMEPRAZOLE 20 MG/1
CAPSULE, DELAYED RELEASE ORAL
Qty: 180 CAPSULE | Refills: 1 | OUTPATIENT
Start: 2022-04-26

## 2022-04-27 DIAGNOSIS — E11.65 UNCONTROLLED TYPE 2 DIABETES MELLITUS WITH HYPERGLYCEMIA, WITHOUT LONG-TERM CURRENT USE OF INSULIN (HCC): ICD-10-CM

## 2022-04-27 DIAGNOSIS — F41.9 ANXIETY AND DEPRESSION: ICD-10-CM

## 2022-04-27 DIAGNOSIS — F32.A ANXIETY AND DEPRESSION: ICD-10-CM

## 2022-04-27 RX ORDER — DOXYCYCLINE 100 MG/1
100 CAPSULE ORAL 2 TIMES DAILY
Qty: 60 CAPSULE | Refills: 0 | Status: SHIPPED | OUTPATIENT
Start: 2022-04-27 | End: 2022-05-27

## 2022-04-27 RX ORDER — LIRAGLUTIDE 6 MG/ML
INJECTION SUBCUTANEOUS
Qty: 9 ML | Refills: 3 | OUTPATIENT
Start: 2022-04-27

## 2022-04-27 RX ORDER — RIZATRIPTAN BENZOATE 10 MG/1
TABLET ORAL
Qty: 30 TABLET | Refills: 3 | OUTPATIENT
Start: 2022-04-27

## 2022-04-27 RX ORDER — MIRTAZAPINE 15 MG/1
TABLET, FILM COATED ORAL
Qty: 30 TABLET | Refills: 3 | OUTPATIENT
Start: 2022-04-27

## 2022-04-28 ENCOUNTER — TELEPHONE (OUTPATIENT)
Dept: INTERNAL MEDICINE | Facility: CLINIC | Age: 52
End: 2022-04-28

## 2022-04-28 NOTE — TELEPHONE ENCOUNTER
DIANNE WITH [facility] HAS CALLED,  SHE STATED THAT SHE IS NEEDING RE-CERTIFICATION PAPERS SIGNED.  SHE STATED THAT SHE WILL SEE PATIENT ON Friday 04/29/2022.  SHE STATED THAT PATIENT HAS AN APPOINTMENT TODAY ON 04/28/2022.    PLEASE ADVISE.

## 2022-04-29 ENCOUNTER — HOME CARE VISIT (OUTPATIENT)
Dept: HOME HEALTH SERVICES | Facility: CLINIC | Age: 52
End: 2022-04-29

## 2022-04-29 NOTE — HOME HEALTH
D/C W/O VISIT DUE TO LACK OF PROVIDER ORDER, BECAUSE PT DIDN'T MAKE HER APPT WITH PROVIDER ON 4/28. PLAN TO READMIT ONCE NEW REFERRAL RECEIVED.

## 2022-04-29 NOTE — TELEPHONE ENCOUNTER
SPOKE WITH DIANNE WITH [facility], SHE STATED THAT SINCE SHE WAS TO BE RE-CERTIFIED TODAY AND PATIENT DID NOT SHOW FOR APPOINTMENT THEN THEY WILL HAVE TO DISCHARGE THE PATIENT AND A NEW REFERRAL WILL HAVE TO BE PLACED SO HOME HEALTH CAN CONTINUE TO SEE PATIENT.   [phone]

## 2022-05-09 ENCOUNTER — TELEPHONE (OUTPATIENT)
Dept: INTERNAL MEDICINE | Facility: CLINIC | Age: 52
End: 2022-05-09

## 2022-05-09 NOTE — TELEPHONE ENCOUNTER
Pt mother called and states pt cannot walk to get into a vehicle. She has fallen 6 times. They did receive the NO SHOW/ canc letter. I asked if they had a w/c she states no. She states even if they did have a w/c she cannot bend legs to get into a vehicle. I even offered for them to call EMS for transportation. I did provide them the number for [facility] to call and see if they could transport her to office visit. She v/u and thanked me for the call

## 2022-05-24 ENCOUNTER — APPOINTMENT (OUTPATIENT)
Dept: GENERAL RADIOLOGY | Age: 52
End: 2022-05-24
Payer: MEDICAID

## 2022-05-24 ENCOUNTER — HOSPITAL ENCOUNTER (EMERGENCY)
Age: 52
Discharge: LEFT BEFORE DISCHARGE | End: 2022-05-25
Attending: EMERGENCY MEDICINE
Payer: MEDICAID

## 2022-05-24 VITALS
DIASTOLIC BLOOD PRESSURE: 72 MMHG | HEIGHT: 61 IN | BODY MASS INDEX: 55.32 KG/M2 | SYSTOLIC BLOOD PRESSURE: 145 MMHG | HEART RATE: 96 BPM | TEMPERATURE: 98.6 F | WEIGHT: 293 LBS | OXYGEN SATURATION: 92 % | RESPIRATION RATE: 18 BRPM

## 2022-05-24 DIAGNOSIS — L03.115 CELLULITIS OF RIGHT LOWER EXTREMITY: Primary | ICD-10-CM

## 2022-05-24 DIAGNOSIS — M79.89 LEG SWELLING: ICD-10-CM

## 2022-05-24 PROCEDURE — 99284 EMERGENCY DEPT VISIT MOD MDM: CPT

## 2022-05-24 PROCEDURE — 73630 X-RAY EXAM OF FOOT: CPT

## 2022-05-24 ASSESSMENT — PAIN DESCRIPTION - LOCATION: LOCATION: ANKLE;LEG

## 2022-05-24 ASSESSMENT — PAIN DESCRIPTION - FREQUENCY: FREQUENCY: CONTINUOUS

## 2022-05-24 ASSESSMENT — PAIN SCALES - GENERAL: PAINLEVEL_OUTOF10: 7

## 2022-05-24 ASSESSMENT — PAIN DESCRIPTION - PAIN TYPE: TYPE: ACUTE PAIN

## 2022-05-24 ASSESSMENT — PAIN DESCRIPTION - ORIENTATION: ORIENTATION: LEFT

## 2022-05-24 ASSESSMENT — PAIN - FUNCTIONAL ASSESSMENT: PAIN_FUNCTIONAL_ASSESSMENT: 0-10

## 2022-05-24 ASSESSMENT — PAIN DESCRIPTION - DESCRIPTORS: DESCRIPTORS: ACHING;BURNING

## 2022-05-24 NOTE — LETTER
[facility] EMERGENCY DEPT  [address]  Phone: [phone]    Patient: Christos Lazar: [birth year]  Date: 5/25/2022 Time: 1:43 AM    Leaving the [address] [place] [address] Advice    Chart #:[id number]    This will certify that I, the undersigned,    ______________________________________________________________________    A patient in the above named medical center, having requested discharge and removal from the medical center against the advice of my attending physician(s), hereby release the Emergency Department, its physicians, officers and employees, severally and individually, from any and all liability of any nature whatsoever for any injury or harm or complication of any kind that may result directly or indirectly, by reason of my terminating my stay as a patient from [facility], and hereby waive any and all rights of action I may now have or later acquire as a result of my voluntary departure from [facility] and the termination of my stay as a patient therein. This release is made with the full knowledge of the danger that may result from the action which I am taking.       Date:_______________________                         ___________________________                                                                                    Patient/Legal Representative    Witness:        ____________________________                          ___________________________  Nurse                                                                        Physician

## 2022-05-25 LAB
ALBUMIN SERPL-MCNC: 2.7 G/DL (ref 3.5–5.2)
ALP BLD-CCNC: 108 U/L (ref 35–104)
ALT SERPL-CCNC: 24 U/L (ref 5–33)
ANION GAP SERPL CALCULATED.3IONS-SCNC: 12 MMOL/L (ref 7–19)
AST SERPL-CCNC: 35 U/L (ref 5–32)
BASOPHILS ABSOLUTE: 0 K/UL (ref 0–0.2)
BASOPHILS RELATIVE PERCENT: 0.3 % (ref 0–1)
BILIRUB SERPL-MCNC: 0.6 MG/DL (ref 0.2–1.2)
BUN BLDV-MCNC: 25 MG/DL (ref 6–20)
CALCIUM SERPL-MCNC: 8.4 MG/DL (ref 8.6–10)
CHLORIDE BLD-SCNC: 89 MMOL/L (ref 98–111)
CO2: 27 MMOL/L (ref 22–29)
CREAT SERPL-MCNC: 1.3 MG/DL (ref 0.5–0.9)
EOSINOPHILS ABSOLUTE: 0 K/UL (ref 0–0.6)
EOSINOPHILS RELATIVE PERCENT: 0.2 % (ref 0–5)
GFR AFRICAN AMERICAN: 53
GFR NON-AFRICAN AMERICAN: 43
GLUCOSE BLD-MCNC: 399 MG/DL (ref 74–109)
HCT VFR BLD CALC: 31 % (ref 37–47)
HEMOGLOBIN: 9.3 G/DL (ref 12–16)
IMMATURE GRANULOCYTES #: 0.1 K/UL
LACTIC ACID: 1.9 MMOL/L (ref 0.5–1.9)
LYMPHOCYTES ABSOLUTE: 1.1 K/UL (ref 1.1–4.5)
LYMPHOCYTES RELATIVE PERCENT: 12.4 % (ref 20–40)
MCH RBC QN AUTO: 24.9 PG (ref 27–31)
MCHC RBC AUTO-ENTMCNC: 30 G/DL (ref 33–37)
MCV RBC AUTO: 82.9 FL (ref 81–99)
MONOCYTES ABSOLUTE: 0.9 K/UL (ref 0–0.9)
MONOCYTES RELATIVE PERCENT: 10.1 % (ref 0–10)
NEUTROPHILS ABSOLUTE: 6.6 K/UL (ref 1.5–7.5)
NEUTROPHILS RELATIVE PERCENT: 76.2 % (ref 50–65)
PDW BLD-RTO: 15.5 % (ref 11.5–14.5)
PLATELET # BLD: 205 K/UL (ref 130–400)
PMV BLD AUTO: 10.8 FL (ref 9.4–12.3)
POTASSIUM REFLEX MAGNESIUM: 4.1 MMOL/L (ref 3.5–5)
RBC # BLD: 3.74 M/UL (ref 4.2–5.4)
SARS-COV-2, NAAT: NOT DETECTED
SODIUM BLD-SCNC: 128 MMOL/L (ref 136–145)
TOTAL PROTEIN: 7.5 G/DL (ref 6.6–8.7)
WBC # BLD: 8.7 K/UL (ref 4.8–10.8)

## 2022-05-25 PROCEDURE — 6360000002 HC RX W HCPCS: Performed by: EMERGENCY MEDICINE

## 2022-05-25 PROCEDURE — 6360000002 HC RX W HCPCS: Performed by: PHYSICIAN ASSISTANT

## 2022-05-25 PROCEDURE — 87635 SARS-COV-2 COVID-19 AMP PRB: CPT

## 2022-05-25 PROCEDURE — 80053 COMPREHEN METABOLIC PANEL: CPT

## 2022-05-25 PROCEDURE — 85025 COMPLETE CBC W/AUTO DIFF WBC: CPT

## 2022-05-25 PROCEDURE — 87040 BLOOD CULTURE FOR BACTERIA: CPT

## 2022-05-25 PROCEDURE — 96375 TX/PRO/DX INJ NEW DRUG ADDON: CPT

## 2022-05-25 PROCEDURE — 6370000000 HC RX 637 (ALT 250 FOR IP): Performed by: EMERGENCY MEDICINE

## 2022-05-25 PROCEDURE — 96365 THER/PROPH/DIAG IV INF INIT: CPT

## 2022-05-25 PROCEDURE — 83605 ASSAY OF LACTIC ACID: CPT

## 2022-05-25 PROCEDURE — 2580000003 HC RX 258: Performed by: PHYSICIAN ASSISTANT

## 2022-05-25 PROCEDURE — 36415 COLL VENOUS BLD VENIPUNCTURE: CPT

## 2022-05-25 RX ORDER — ACETAMINOPHEN 325 MG/1
650 TABLET ORAL ONCE
Status: COMPLETED | OUTPATIENT
Start: 2022-05-25 | End: 2022-05-25

## 2022-05-25 RX ORDER — ONDANSETRON 2 MG/ML
4 INJECTION INTRAMUSCULAR; INTRAVENOUS ONCE
Status: COMPLETED | OUTPATIENT
Start: 2022-05-25 | End: 2022-05-25

## 2022-05-25 RX ADMIN — VANCOMYCIN HYDROCHLORIDE 2500 MG: 5 INJECTION, POWDER, LYOPHILIZED, FOR SOLUTION INTRAVENOUS at 00:31

## 2022-05-25 RX ADMIN — ACETAMINOPHEN 650 MG: 325 TABLET ORAL at 01:33

## 2022-05-25 RX ADMIN — ONDANSETRON 4 MG: 2 INJECTION INTRAMUSCULAR; INTRAVENOUS at 01:33

## 2022-05-25 ASSESSMENT — ENCOUNTER SYMPTOMS
SHORTNESS OF BREATH: 0
COLOR CHANGE: 1
ABDOMINAL PAIN: 0

## 2022-05-25 NOTE — ED PROVIDER NOTES
Attending Supervisory Note/Shared Visit   I have personally performed a face to face diagnostic evaluation on this patient. I have reviewed the mid-levels findings and agree. Chronic b/l leg swelling. Right slightly more erythema than left. This is a chronic problem for the patient unfortunately. Patient had been admitted to the hospitalist however patient got upset that she was cold here and wants to go home. I went and evaluated the patient and discussed with her and she is adamant she is leaving. She has numerous allergies and I discussed different PO antibx options with her but tells me she can only take a z pack possibly. She does not wish to be sent with any PO antibx. She has been given a dose of vancomycin here. [facility] and understands risk of this. FINAL IMPRESSION      1. Cellulitis of right lower extremity    2.  Leg swelling          Yefri Mcgovern MD  Attending Emergency Physician        Carolyn Martin MD  05/25/22 3030

## 2022-05-25 NOTE — ED PROVIDER NOTES
[facility] EMERGENCY DEPT  eMERGENCY dEPARTMENT eNCOUnter      Pt Name: Basim Roberson  MRN: [id number]  [place] 1970  Date of evaluation: 5/24/2022  Provider: Lorenso Favre, [address] [place] [address]       Chief Complaint   Patient presents with    Leg Pain     left leg edema/redness, hx of lymphodema. pt also fell this morning, pain to left ankle         HISTORY OF PRESENT ILLNESS   (Location/Symptom, Timing/Onset,Context/Setting, Quality, Duration, Modifying Factors, Severity)  Note limiting factors. Basim Roberson is a 54 y.o. female with history including morbid obesity, uncontrolled type 2 diabetes, bilateral diabetic ulcerations, restless leg syndrome, hypertension, anxiety, chronic migraines, GERD, recurrent cellulitis of bilateral lower extremities, and lymphedema of lower extremities who presents to the emergency department with complaints. The patient first complains of a fall that occurred this morning. The patient explains an inversion injury of her right foot. She denies any associated head injury or other complaint from the fall. She states that it first she was not having any pain but throughout the day as she is attempted to walk the pain has worsened. She denies any significant swelling or bruising of the foot. Patient also complains of left leg edema and redness. The patient notes history of lymphedema. She states that she actually was planning to come in yesterday due to the pain and swelling. She is had a fever as high as 103.4. She notes worsening swelling compared to the right leg. She states that there is localized warmth of the leg as well. Her last dose of IV antibiotic was 3 weeks ago for the same complaint. The leg had improved in the last few weeks prior to the last few days when the redness returned. HPI    NursingNotes were reviewed. REVIEW OF SYSTEMS    (2-9 systems for level 4, 10 or more for level 5)     Review of Systems   Constitutional: Positive for chills and fever. Respiratory: Negative for shortness of breath. Cardiovascular: Negative for chest pain. Gastrointestinal: Negative for abdominal pain. Musculoskeletal: Positive for arthralgias and gait problem. Negative for joint swelling. Skin: Positive for color change. Negative for wound. Neurological: Negative for dizziness and headaches. All other systems reviewed and are negative. PAST MEDICALHISTORY     Past Medical History:   Diagnosis Date    Anxiety     Constipation     Depression     GERD (gastroesophageal reflux disease)     Headache(784.0)     Hyperlipidemia     Hypertension     Kidney stones     Kidney stones     Morbid obesity due to excess calories ([facility])     Neuromuscular disorder ([facility])     Restless legs syndrome     Type 2 diabetes mellitus ([facility] [address]          SURGICAL HISTORY       Past Surgical History:   Procedure Laterality Date    ECTOPIC PREGNANCY SURGERY  2002    EYE SURGERY Bilateral     2005 & 2007: cornea transplant and cataracts removed    KNEE CARTILAGE SURGERY Left 12/20/2016    KNEE ARTHROSCOPIC PARTIAL MEDIAL MENISCECTOMY performed by Bethany Sauceda MD at [facility]     Previous Medications    BLOOD GLUCOSE MONITOR KIT AND SUPPLIES    Test 1 times a day & as needed for symptoms of irregular blood glucose.     BLOOD GLUCOSE MONITORING SUPPL (ONE TOUCH BASIC SYSTEM) W/DEVICE KIT    1 kit by Does not apply route daily as needed (Dx 250.00)    BLOOD GLUCOSE TEST STRIPS (ONETOUCH ULTRA) STRIP    USE TO TEST BLOOD SUGAR THREE TIMES DAILY AND AS NEEDED AS DIRECTED    BUSPIRONE (BUSPAR) 10 MG TABLET    TAKE 1 TABLET BY MOUTH THREE TIMES DAILY    CELECOXIB (CELEBREX) 200 MG CAPSULE    TAKE 1 CAPSULE BY MOUTH EVERY DAY    CREAM BASE CREA    APPLY ONE PUMP (GRAM) TO AFFECTED AREA(S) THREE TO FOUR TIMES A DAY TO THE APPENDAGES AND TRUNK AS DIRECTED    CYCLOBENZAPRINE (FLEXERIL) 10 MG TABLET    Take 10 mg by mouth 3 times daily as needed for Muscle spasms    DIABETIC SHOE MISC    by Does not apply route DISPENSE ONE PAIR OF DIABETIC SHOE AND 3 PAIRS HEAT MOLDED INSERTS    DOXYCYCLINE MONOHYDRATE (MONODOX) 100 MG CAPSULE    Take 1 capsule by mouth 2 times daily Contact PCP concerning refills    DULOXETINE (CYMBALTA) 60 MG EXTENDED RELEASE CAPSULE    TAKE 1 CAPSULE BY MOUTH TWICE DAILY    FLUTICASONE (FLONASE) 50 MCG/ACT NASAL SPRAY    SHAKE LIQUID AND USE 1 SPRAY IN EACH NOSTRIL DAILY    FUROSEMIDE (LASIX) 80 MG TABLET    Take 0.5 tablets by mouth 2 times daily    GABAPENTIN (NEURONTIN) 100 MG CAPSULE    Take 100 mg by mouth daily. Takes at 4 pm    GABAPENTIN (NEURONTIN) 300 MG CAPSULE    Take 400 mg by mouth 3 times daily. INSULIN GLARGINE (LANTUS SOLOSTAR) 100 UNIT/ML INJECTION PEN    Inject 30 Units into the skin every evening    INSULIN LISPRO, 1 UNIT DIAL, (ADMELOG SOLOSTAR) 100 UNIT/ML SOPN    ADMINISTER 45 UNITS UNDER THE SKIN EVERY EVENING. INCREASE BY 3 UNITS EVERY NIGHT UNTIL 60 UNITS IS REACHED AND. CONTINUE 60 UNITS EVERY EVENING    INSULIN PEN NEEDLE ([employer] PEN NEEDLES) 31G X 6 MM MISC    1 each by Does not apply route daily    LANCETS (ONETOUCH DELICA PLUS [id number]) MISC    USE TO CHECK BLOOD SUGAR AS DIRECTED    LIRAGLUTIDE (VICTOZA) 18 MG/3ML SOPN SC INJECTION    ADMINISTER 1.8 MG UNDER THE SKIN EVERY DAY    MIRTAZAPINE (REMERON) 15 MG TABLET    TAKE 1 TABLET BY MOUTH EVERY NIGHT    MORPHINE SULFATE ER 15 MG T12A    Take 15 mg by mouth 2 times daily.     OMEPRAZOLE (PRILOSEC) 20 MG DELAYED RELEASE CAPSULE    TAKE ONE CAPSULE BY MOUTH TWICE DAILY BEFORE MEALS    ONDANSETRON (ZOFRAN) 4 MG TABLET    Take 1 tablet by mouth 3 times daily as needed for Nausea or Vomiting    ONDANSETRON (ZOFRAN-ODT) 4 MG DISINTEGRATING TABLET    DISSOLVE 1 TABLET UNDER THE TONGUE EVERY 8 HOURS AS NEEDED FOR NAUSEA AND VOMITING PRIOR TO DOXYCYCLINE DOSES    ONE TOUCH LANCETS MISC    1 each by Does not apply route daily OXYCODONE-ACETAMINOPHEN (PERCOCET) 7.5-325 MG PER TABLET    Take 1 tablet by mouth three times daily.      POTASSIUM CHLORIDE (KLOR-CON M) 20 MEQ EXTENDED RELEASE TABLET    Take 1 tablet by mouth daily as needed (low potassium)    PRAMIPEXOLE (MIRAPEX) 0.75 MG TABLET    TAKE 1 TABLET BY MOUTH TWICE DAILY    TOPIRAMATE (TOPAMAX) 25 MG TABLET    Take 1 tablet by mouth at bedtime     VITAMIN D (ERGOCALCIFEROL) 1.25 MG (38031 UT) CAPS CAPSULE    Take 1 capsule by mouth once a week       ALLERGIES     Compazine [prochlorperazine], Ciprofloxacin, Calamine, Prochlorperazine edisylate, Tobramycin, Tramadol, Amoxicillin-pot clavulanate, Ancef [cefazolin], Bactrim [sulfamethoxazole-trimethoprim], Cephalexin, Clindamycin/lincomycin, Codeine, Demerol, Doxycycline, Hydroxyzine hcl, Ibuprofen, Meperidine, Moxifloxacin, Nortriptyline, Orphenadrine citrate, Penicillins, and Vistaril [hydroxyzine hcl]    FAMILY HISTORY       Family History   Problem Relation Age of Onset    Obesity Mother     Arthritis Mother         has had knees replaced    Cancer Father         \"adrenal gland & lung\"    Parkinsonism Father     Cancer Maternal Grandmother     COPD Maternal Grandmother     Obesity Maternal Grandmother     Heart Failure Maternal Grandmother     Cancer Maternal Grandfather     Other Sister         spina biffida    No Known Problems Son     No Known Problems Son     No Known Problems Daughter     Obesity Maternal Aunt     Other Maternal Aunt          of sepsis related to a spider bite          SOCIAL HISTORY       Social History     Socioeconomic History    Marital status: Single     Spouse name: None    Number of children: 3    Years of education: None    Highest education level: None   Occupational History    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 Constitutional:       General: She is not in acute distress. Appearance: Normal appearance. She is obese. She is not ill-appearing or toxic-appearing. HENT:      Head: Normocephalic and atraumatic. Cardiovascular:      Rate and Rhythm: Normal rate and regular rhythm. Pulses: Normal pulses. Pulmonary:      Effort: Pulmonary effort is normal. No respiratory distress. Musculoskeletal:      Cervical back: Normal range of motion and neck supple. Right knee: Swelling (chronic) present. No deformity, erythema (chronic) or bony tenderness. Normal range of motion. No tenderness. Normal alignment. Normal pulse. Right lower leg: Swelling present. No deformity, tenderness or bony tenderness. Edema present. Left lower leg: Swelling present. No deformity, tenderness or bony tenderness. Edema present. Right ankle: Swelling present. No deformity. No tenderness. Normal range of motion. Normal pulse. Right foot: Normal range of motion. Tenderness and bony tenderness present. No swelling or deformity. Normal pulse. Comments: Lower extremity does have diffuse swelling, redness, and localized warmth concerning for cellulitis, no active blistering or drainage, she does have Ace wrap's bilateral lower extremities covering wounds and helping with swelling. Skin:     General: Skin is warm and dry. Neurological:      General: No focal deficit present. Mental Status: She is alert and oriented to person, place, and time. Mental status is at baseline.          DIAGNOSTIC RESULTS RADIOLOGY:  Non-plain film images such as CT, Ultrasound and MRI are read by the radiologist. Plain radiographic images are visualized and preliminarily interpreted bythe emergency physician with the below findings:    Read by attending, no acute bony fracture or malalignment      XR FOOT RIGHT (MIN 3 VIEWS)    (Results Pending)       LABS:  Labs Reviewed   CBC WITH AUTO DIFFERENTIAL - Abnormal; Notable for the following components:       Result Value    RBC 3.74 (*)     Hemoglobin 9.3 (*)     Hematocrit 31.0 (*)     MCH 24.9 (*)     MCHC 30.0 (*)     RDW 15.5 (*)     Neutrophils % 76.2 (*)     Lymphocytes % 12.4 (*)     Monocytes % 10.1 (*)     All other components within normal limits   COMPREHENSIVE METABOLIC PANEL W/ REFLEX TO MG FOR LOW K - Abnormal; Notable for the following components:    Sodium 128 (*)     Chloride 89 (*)     Glucose 399 (*)     BUN 25 (*)     CREATININE 1.3 (*)     GFR Non- 43 (*)     GFR  53 (*)     Calcium 8.4 (*)     Albumin 2.7 (*)     Alkaline Phosphatase 108 (*)     AST 35 (*)     All other components within normal limits   CULTURE, BLOOD 1   CULTURE, BLOOD 2   LACTIC ACID       All other labs were within normal range or not returned as of this dictation. EMERGENCY DEPARTMENT COURSE and DIFFERENTIAL DIAGNOSIS/MDM:   Vitals:    Vitals:    05/24/22 2329 05/24/22 2330   BP:  (!) 145/72   Pulse: 96    Resp: 18    Temp: 98.6 °F (37 °C)    TempSrc: Oral    SpO2: 92%    Weight: (!) 325 lb (147.4 kg)    Height: 5' 1\" (1.549 m)        MDM  Is a 28-year-old female who presents the ER with an injury to the right foot as well as left leg cellulitis. Plain films of the right foot are negative for any associated bony abnormality including fracture or malalignment. I plan to treat this as a sprain. I encouraged rest, ice, gentle range of motion, and elevation as possible. The patient does have chronic cellulitis of the bilateral lower extremities.   Due to this history, blood cultures were obtained as well as a lactic. Lactic is within normal limits. She was then given a dose of vancomycin in the ER. CBC does not show leukocytosis. She does have chronic anemia noted. Her CMP does show chronic hyponatremia. She also does have chronic elevation of her kidney function. Patient will be admitted to the hospitalist for further IV antibiotics. Patient is agreeable to plan. CONSULTS:  PHARMACY TO DOSE VANCOMYCIN        FINAL IMPRESSION      1. Cellulitis of right lower extremity          DISPOSITION/PLAN   DISPOSITION Decision To Admit 05/25/2022 01:01:45 AM      PATIENT REFERRED TO:  No follow-up provider specified.     DISCHARGE MEDICATIONS:  New Prescriptions    No medications on file          (Please note that portions of this note were completed with a voice recognition program.  Efforts were made to edit thedictations but occasionally words are mis-transcribed.)    GOLDIE Abarca (electronically signed)     Ky Abarcama  05/25/22 8552

## 2022-05-25 NOTE — ED NOTES
Pt states that she wants to call her boyfriend and wants to leave. Explained to pt that she would need to sign AMA paperwork if she was leaving. Pt states that she is cold, hurting, and sick so she just wants to go home. This RN, witnessed by Melissa PINO, explained to pt that if she leaves AMA at this time she will likely get more sick at home. Pt states that she understands but that she wants to leave anyway. Pt states that she is going to call her boyfriend to come pick her up at this time. Dr. Genie Lara aware.      Nick Richard, RN  05/25/22 0209

## 2022-05-25 NOTE — PROGRESS NOTES
[facility] Pharmacokinetic Monitoring Service - Vancomycin     Michelle Trivedi is a 54 y.o. female starting on vancomycin therapy for skin and soft tissue infection. Pharmacy consulted by GOLDIE Millan, for monitoring and adjustment. Target Concentration: Goal trough of 10-15 mg/L and AUC/KEY <500 mg*hr/L    Additional Antimicrobials: none    Pertinent Laboratory Values: Wt Readings from Last 1 Encounters:   05/24/22 (!) 325 lb (147.4 kg)     Temp Readings from Last 1 Encounters:   05/24/22 98.6 °F (37 °C) (Oral)     Estimated Creatinine Clearance: 72 mL/min (A) (based on SCr of 1.3 mg/dL (H)). Recent Labs     05/25/22  0000   CREATININE 1.3*   WBC 8.7     Procalcitonin: not ordered at this time    Pertinent Cultures:  Culture Date Source Results   05/24/22 Blood x 2  Sent    MRSA Nasal Swab: N/A. Non-respiratory infection. Plan:  Vancomycin 2500 mg IV x 1 dose (17 mg/kg) was sent to the ED while waiting on labs to confirm renal function. Patient labs had resulted and then noticed patient wanted to leave A and has now discharged.        Thank you for the consult,  Domitila Nicholson, [facility]  5/25/2022 2:13 AM

## 2022-05-27 ENCOUNTER — TELEPHONE (OUTPATIENT)
Dept: INTERNAL MEDICINE | Facility: CLINIC | Age: 52
End: 2022-05-27

## 2022-05-27 DIAGNOSIS — G25.81 RLS (RESTLESS LEGS SYNDROME): ICD-10-CM

## 2022-05-27 RX ORDER — PRAMIPEXOLE DIHYDROCHLORIDE 0.75 MG/1
TABLET ORAL
Qty: 60 TABLET | Refills: 5 | OUTPATIENT
Start: 2022-05-27

## 2022-05-30 LAB
BLOOD CULTURE, ROUTINE: NORMAL
CULTURE, BLOOD 2: NORMAL

## 2022-05-31 ENCOUNTER — TELEPHONE (OUTPATIENT)
Dept: INTERNAL MEDICINE | Facility: CLINIC | Age: 52
End: 2022-05-31

## 2022-05-31 ENCOUNTER — OFFICE VISIT (OUTPATIENT)
Dept: INTERNAL MEDICINE | Facility: CLINIC | Age: 52
End: 2022-05-31

## 2022-05-31 ENCOUNTER — HOSPITAL ENCOUNTER (EMERGENCY)
Facility: HOSPITAL | Age: 52
Discharge: LEFT BEFORE DISCHARGE | End: 2022-05-31

## 2022-05-31 VITALS
BODY MASS INDEX: 55.32 KG/M2 | HEART RATE: 83 BPM | SYSTOLIC BLOOD PRESSURE: 159 MMHG | WEIGHT: 293 LBS | TEMPERATURE: 97.8 F | RESPIRATION RATE: 20 BRPM | DIASTOLIC BLOOD PRESSURE: 85 MMHG | OXYGEN SATURATION: 95 % | HEIGHT: 61 IN

## 2022-05-31 VITALS
BODY MASS INDEX: 55.32 KG/M2 | HEIGHT: 61 IN | OXYGEN SATURATION: 95 % | WEIGHT: 293 LBS | DIASTOLIC BLOOD PRESSURE: 82 MMHG | SYSTOLIC BLOOD PRESSURE: 144 MMHG | HEART RATE: 86 BPM

## 2022-05-31 DIAGNOSIS — E11.65 TYPE 2 DIABETES MELLITUS WITH HYPERGLYCEMIA, WITH LONG-TERM CURRENT USE OF INSULIN: ICD-10-CM

## 2022-05-31 DIAGNOSIS — D64.9 ANEMIA, UNSPECIFIED TYPE: ICD-10-CM

## 2022-05-31 DIAGNOSIS — Z79.4 TYPE 2 DIABETES MELLITUS WITH HYPERGLYCEMIA, WITH LONG-TERM CURRENT USE OF INSULIN: ICD-10-CM

## 2022-05-31 DIAGNOSIS — I10 HYPERTENSION, UNSPECIFIED TYPE: ICD-10-CM

## 2022-05-31 DIAGNOSIS — E55.9 VITAMIN D DEFICIENCY: ICD-10-CM

## 2022-05-31 DIAGNOSIS — T14.8XXA OPEN WOUND: Primary | ICD-10-CM

## 2022-05-31 PROCEDURE — 99214 OFFICE O/P EST MOD 30 MIN: CPT | Performed by: NURSE PRACTITIONER

## 2022-05-31 PROCEDURE — 99211 OFF/OP EST MAY X REQ PHY/QHP: CPT

## 2022-05-31 RX ORDER — DOXYCYCLINE 100 MG/1
100 CAPSULE ORAL 2 TIMES DAILY
Status: ON HOLD | COMMUNITY
Start: 2022-04-27 | End: 2022-06-06

## 2022-05-31 NOTE — PROGRESS NOTES
Chief Complaint   Patient presents with   • wound     Pt states she has a new wound on left leg         History:  Valeria Wilson is a 51 y.o. female who presents today for evaluation of the above problems.          4-5 days ago noted wound left leg, when she bent over it ruptured and drained bloody, purulent material. The leg is painful in the area of the sore.    She has uncontrolled diabetes and has not been able to get into the office for her visits for several months.      ROS:  Review of Systems  As above    Allergies   Allergen Reactions   • Compazine [Prochlorperazine Edisylate] Shortness Of Breath     Breathing    • Meperidine Hives     (Demerol)    • Norflex [Orphenadrine] Hives   • Nortriptyline Hives   • Prochlorperazine Shortness Of Breath   • Prochlorperazine Maleate Shortness Of Breath   • Codeine Nausea And Vomiting     Rash    • Penicillins Rash     Nausea & vomiting    • Calamine Itching   • Amoxicillin-Pot Clavulanate Rash, Hives and Itching   • Avelox [Moxifloxacin Hcl] Rash   • Cefazolin Nausea And Vomiting   • Cephalexin Rash     (Keflex)    • Ciprofloxacin Rash   • Clindamycin/Lincomycin Rash   • Doxycycline Nausea And Vomiting   • Hydroxyzine Hcl Itching   • Moxifloxacin Nausea And Vomiting   • Orphenadrine Citrate Itching   • Sulfamethoxazole-Trimethoprim Nausea And Vomiting and Rash     States she also gets yeast infections with it   • Tobramycin Other (See Comments)     Eyes burn-feel like eyes are on fire     • Vistaril [Hydroxyzine] Rash     Past Medical History:   Diagnosis Date   • Anxiety    • Arthritis     Osteoarthritis primarily left knee    • Back pain    • Chronic pain syndrome 10/27/2021   • Depression    • Diabetes mellitus (HCC)    • Fibromyalgia, primary    • Hx of tear of meniscus of knee joint 11/21/2016   • Hypertension    • Joint pain    • Kidney stone    • Knee pain    • Lymphedema of both lower extremities 10/27/2021   • Migraine    • Migraine headache    • Pain      knee, left ankle, left ankle     • Pes anserinus bursitis 04/28/2015   • Restless leg      Past Surgical History:   Procedure Laterality Date   • CATARACT EXTRACTION     • CATARACT EXTRACTION     • CATARACT EXTRACTION     • CORNEAL TRANSPLANT      bilateral    • ECTOPIC PREGNANCY     • JOINT REPLACEMENT     • KNEE ARTHROSCOPY W/ MENISCAL REPAIR     • KNEE MENISCAL REPAIR     • MENISCECTOMY Left 12/20/2016   • TUBAL ABDOMINAL LIGATION       Family History   Problem Relation Age of Onset   • No Known Problems Mother    • Cancer Father    • Dementia Father    • Hypertension Maternal Grandmother    • Cancer Maternal Grandfather      Valeria  reports that she has never smoked. She has never used smokeless tobacco. She reports current alcohol use. She reports that she does not use drugs.    I have reviewed and updated the above documentation (if necessary) including but not limited to chief complaint, ROS, PFSH, allergies and medications        Current Outpatient Medications:   •  busPIRone (BUSPAR) 10 MG tablet, Take 10 mg by mouth 3 (Three) Times a Day., Disp: , Rfl:   •  docusate sodium (COLACE) 100 MG capsule, Take 100 mg by mouth 2 (Two) Times a Day., Disp: , Rfl:   •  doxycycline (MONODOX) 100 MG capsule, Take 100 mg by mouth 2 (Two) Times a Day., Disp: , Rfl:   •  DULoxetine (CYMBALTA) 60 MG capsule, Take 60 mg by mouth 2 (Two) Times a Day., Disp: , Rfl:   •  EPINEPHrine (EPIPEN) 0.3 MG/0.3ML solution auto-injector injection, As Needed (anaphylaxis)., Disp: , Rfl:   •  fluticasone (FLONASE) 50 MCG/ACT nasal spray, 1 spray into the nostril(s) as directed by provider Daily., Disp: 16 g, Rfl: 0  •  furosemide (LASIX) 80 MG tablet, Take 40 mg by mouth 2 (Two) Times a Day As Needed (swelling)., Disp: , Rfl:   •  gabapentin (NEURONTIN) 100 MG capsule, Take 100 mg by mouth Daily. PT TAKES AT 4PM, Disp: , Rfl:   •  gabapentin (NEURONTIN) 300 MG capsule, Take 600 mg by mouth 3 (Three) Times a Day., Disp: , Rfl:   •  Insulin  Glargine (LANTUS SOLOSTAR) 100 UNIT/ML injection pen, Inject 45 Units under the skin into the appropriate area as directed Every Night., Disp: 3 pen, Rfl: 3  •  Liraglutide (Victoza) 18 MG/3ML solution pen-injector injection, Inject 1.8 mg under the skin into the appropriate area as directed Daily., Disp: , Rfl:   •  Magnesium Oxide 400 (240 Mg) MG tablet, Take 200 mg by mouth Daily., Disp: , Rfl:   •  mirtazapine (REMERON) 15 MG tablet, Take 15 mg by mouth Every Night., Disp: , Rfl:   •  Morphine Sulfate ER 15 MG tablet extended-release 12 hour, Take 15 mg by mouth 2 (two) times a day., Disp: , Rfl:   •  omeprazole (priLOSEC) 40 MG capsule, Take 40 mg by mouth 2 (Two) Times a Day., Disp: , Rfl:   •  ondansetron ODT (ZOFRAN-ODT) 4 MG disintegrating tablet, Place 1 tablet on the tongue Every 8 (Eight) Hours As Needed for Nausea or Vomiting., Disp: 40 tablet, Rfl: 0  •  oxyCODONE-acetaminophen (PERCOCET) 7.5-325 MG per tablet, Take 1 tablet by mouth Every 8 (Eight) Hours As Needed for Moderate Pain  or Severe Pain ., Disp: , Rfl:   •  polyethylene glycol (MIRALAX) 17 GM/SCOOP powder, Take 17 g by mouth Daily As Needed (CONSTIPATION)., Disp: 1530 g, Rfl: 3  •  potassium chloride (K-DUR,KLOR-CON) 20 MEQ CR tablet, Take 1 tablet by mouth As Needed (potassium)., Disp: 30 tablet, Rfl: 1  •  pramipexole (MIRAPEX) 0.75 MG tablet, Take 0.75 mg by mouth 2 (two) times a day., Disp: , Rfl:   •  promethazine (PHENERGAN) 25 MG tablet, Take 25 mg by mouth Every 8 (Eight) Hours As Needed for Nausea or Vomiting., Disp: , Rfl:   •  vitamin D (ERGOCALCIFEROL) 1.25 MG (69969 UT) capsule capsule, Take 50,000 Units by mouth 1 (One) Time Per Week., Disp: , Rfl:   •  ZOLMitriptan (Zomig) 5 MG tablet, Take 1 tablet by mouth 1 (One) Time As Needed for Migraine., Disp: 6 tablet, Rfl: 5  •  Hydrocortisone (emory's amazing butt) cream, Apply 1 application topically to the appropriate area as directed Daily. (Patient not taking: No sig reported),  "Disp: 120 g, Rfl: 2  •  NON FORMULARY, [place] pain cream, Disp: , Rfl:     OBJECTIVE:  Visit Vitals  /82 (BP Location: Right arm, Patient Position: Sitting, Cuff Size: Adult)   Pulse 86   Ht 154.9 cm (61\")   Wt (!) 141 kg (311 lb)   SpO2 95%   BMI 58.76 kg/m²      Physical Exam  Vitals and nursing note reviewed.   Constitutional:       Comments: Crying at times during the visit.  Boyfriend is here with her today.   HENT:      Head: Normocephalic and atraumatic.   Skin:            Comments: 4-5 inch diameter open wound that is fluctuant under the exposed tissue.  The skin surrounding the wound is indurated and deeply erythematous.  The left leg is mildly erythematous and tight compared to the right.   Neurological:      Mental Status: She is alert and oriented to person, place, and time.         [facility]    Assessment/Plan    Diagnoses and all orders for this visit:    1. Open wound (Primary)    2. Type 2 diabetes mellitus with hyperglycemia, with long-term current use of insulin (HCC)  -     Cancel: Comprehensive metabolic panel; Future  -     Cancel: Hemoglobin A1c; Future    3. Hypertension, unspecified type  -     Cancel: Comprehensive metabolic panel; Future    4. Anemia, unspecified type  -     Cancel: CBC w AUTO Differential; Future    5. Vitamin D deficiency  -     Cancel: Vitamin D 25 hydroxy; Future      I feel she needs immediate evaluation in ER, with likely admission and wound debridement.  Patient was resistant to going to ER and crying when talking about being admitted.  I asked Dr. Manuel to examine the wound as well, and he is in agreement that she needs to go to ER and will need surgical attention.  We have both emphasized to the patient that this wound is potentially life and limb threatening and that it is extremely important to go to ER immediately.  She and her boyfriend voice understanding.    I called and spoke with SHOLA Merlos, in ER and discussed this case and reason for " sending to ER.        Education materials and an After Visit Summary were printed and given to the patient at discharge.  Return in about 3 months (around 8/31/2022) for follow up with Dr. Manuel.         Viri Doty, APRN   11:40 CDT  5/31/2022

## 2022-06-01 ENCOUNTER — HOSPITAL ENCOUNTER (INPATIENT)
Facility: HOSPITAL | Age: 52
LOS: 8 days | Discharge: HOME HEALTH | DRG: 570 | End: 2022-06-09
Attending: FAMILY MEDICINE | Admitting: SPECIALIST
Payer: COMMERCIAL

## 2022-06-01 ENCOUNTER — ANESTHESIA (OUTPATIENT)
Dept: PERIOP | Facility: HOSPITAL | Age: 52
DRG: 570 | End: 2022-06-01
Payer: COMMERCIAL

## 2022-06-01 ENCOUNTER — ANESTHESIA EVENT (OUTPATIENT)
Dept: PERIOP | Facility: HOSPITAL | Age: 52
DRG: 570 | End: 2022-06-01
Payer: COMMERCIAL

## 2022-06-01 ENCOUNTER — APPOINTMENT (OUTPATIENT)
Dept: CT IMAGING | Facility: HOSPITAL | Age: 52
DRG: 570 | End: 2022-06-01
Payer: COMMERCIAL

## 2022-06-01 DIAGNOSIS — Z79.4 TYPE 2 DIABETES MELLITUS WITH HYPERGLYCEMIA, WITH LONG-TERM CURRENT USE OF INSULIN: ICD-10-CM

## 2022-06-01 DIAGNOSIS — L97.412 DIABETIC ULCER OF RIGHT HEEL ASSOCIATED WITH TYPE 2 DIABETES MELLITUS, WITH FAT LAYER EXPOSED: ICD-10-CM

## 2022-06-01 DIAGNOSIS — E11.65 TYPE 2 DIABETES MELLITUS WITH HYPERGLYCEMIA, WITH LONG-TERM CURRENT USE OF INSULIN: ICD-10-CM

## 2022-06-01 DIAGNOSIS — L89.893: ICD-10-CM

## 2022-06-01 DIAGNOSIS — D64.9 ANEMIA, UNSPECIFIED TYPE: ICD-10-CM

## 2022-06-01 DIAGNOSIS — L89.893 PRESSURE INJURY OF LEFT CALF, STAGE 3: ICD-10-CM

## 2022-06-01 DIAGNOSIS — T14.8XXA OPEN WOUND: ICD-10-CM

## 2022-06-01 DIAGNOSIS — E11.65 TYPE 2 DIABETES MELLITUS WITH HYPERGLYCEMIA, WITHOUT LONG-TERM CURRENT USE OF INSULIN: ICD-10-CM

## 2022-06-01 DIAGNOSIS — L89.313 PRESSURE INJURY OF RIGHT BUTTOCK, STAGE 3: ICD-10-CM

## 2022-06-01 DIAGNOSIS — L03.116 CELLULITIS OF LEFT LEG: ICD-10-CM

## 2022-06-01 DIAGNOSIS — L02.416 ABSCESS OF LEFT THIGH: Primary | ICD-10-CM

## 2022-06-01 DIAGNOSIS — L89.153 PRESSURE INJURY OF COCCYGEAL REGION, STAGE 3: ICD-10-CM

## 2022-06-01 DIAGNOSIS — L89.323 PRESSURE INJURY OF LEFT BUTTOCK, STAGE 3: ICD-10-CM

## 2022-06-01 DIAGNOSIS — E11.621 DIABETIC ULCER OF RIGHT HEEL ASSOCIATED WITH TYPE 2 DIABETES MELLITUS, WITH FAT LAYER EXPOSED: ICD-10-CM

## 2022-06-01 DIAGNOSIS — L89.153 PRESSURE INJURY OF SACRAL REGION, STAGE 3: ICD-10-CM

## 2022-06-01 LAB
ALBUMIN SERPL-MCNC: 2.8 G/DL (ref 3.5–5.2)
ALBUMIN/GLOB SERPL: 0.6 G/DL
ALP SERPL-CCNC: 97 U/L (ref 39–117)
ALT SERPL W P-5'-P-CCNC: 19 U/L (ref 1–33)
ANION GAP SERPL CALCULATED.3IONS-SCNC: 12 MMOL/L (ref 5–15)
APTT PPP: 31.3 SECONDS (ref 24.1–35)
AST SERPL-CCNC: 17 U/L (ref 1–32)
BASOPHILS # BLD AUTO: 0.03 10*3/MM3 (ref 0–0.2)
BASOPHILS NFR BLD AUTO: 0.4 % (ref 0–1.5)
BILIRUB SERPL-MCNC: 0.3 MG/DL (ref 0–1.2)
BUN SERPL-MCNC: 19 MG/DL (ref 6–20)
BUN/CREAT SERPL: 13.2 (ref 7–25)
CALCIUM SPEC-SCNC: 7.9 MG/DL (ref 8.6–10.5)
CHLORIDE SERPL-SCNC: 91 MMOL/L (ref 98–107)
CO2 SERPL-SCNC: 28 MMOL/L (ref 22–29)
CREAT SERPL-MCNC: 1.44 MG/DL (ref 0.57–1)
CRP SERPL-MCNC: 15 MG/DL (ref 0–0.5)
D-LACTATE SERPL-SCNC: 2.3 MMOL/L (ref 0.5–2)
D-LACTATE SERPL-SCNC: 2.5 MMOL/L (ref 0.5–2)
D-LACTATE SERPL-SCNC: 2.8 MMOL/L (ref 0.5–2)
DEPRECATED RDW RBC AUTO: 49.5 FL (ref 37–54)
EGFRCR SERPLBLD CKD-EPI 2021: 44.7 ML/MIN/1.73
EOSINOPHIL # BLD AUTO: 0.14 10*3/MM3 (ref 0–0.4)
EOSINOPHIL NFR BLD AUTO: 1.6 % (ref 0.3–6.2)
ERYTHROCYTE [DISTWIDTH] IN BLOOD BY AUTOMATED COUNT: 16.6 % (ref 12.3–15.4)
GLOBULIN UR ELPH-MCNC: 4.4 GM/DL
GLUCOSE BLDC GLUCOMTR-MCNC: 125 MG/DL (ref 70–130)
GLUCOSE BLDC GLUCOMTR-MCNC: 221 MG/DL (ref 70–130)
GLUCOSE SERPL-MCNC: 400 MG/DL (ref 65–99)
HBA1C MFR BLD: 11 % (ref 4.8–5.6)
HCT VFR BLD AUTO: 34.4 % (ref 34–46.6)
HGB BLD-MCNC: 10.6 G/DL (ref 12–15.9)
HOLD SPECIMEN: NORMAL
IMM GRANULOCYTES # BLD AUTO: 0.1 10*3/MM3 (ref 0–0.05)
IMM GRANULOCYTES NFR BLD AUTO: 1.2 % (ref 0–0.5)
INR PPP: 1.18 (ref 0.91–1.09)
LYMPHOCYTES # BLD AUTO: 1.99 10*3/MM3 (ref 0.7–3.1)
LYMPHOCYTES NFR BLD AUTO: 23.3 % (ref 19.6–45.3)
MCH RBC QN AUTO: 25.2 PG (ref 26.6–33)
MCHC RBC AUTO-ENTMCNC: 30.8 G/DL (ref 31.5–35.7)
MCV RBC AUTO: 81.9 FL (ref 79–97)
MONOCYTES # BLD AUTO: 0.64 10*3/MM3 (ref 0.1–0.9)
MONOCYTES NFR BLD AUTO: 7.5 % (ref 5–12)
NEUTROPHILS NFR BLD AUTO: 5.63 10*3/MM3 (ref 1.7–7)
NEUTROPHILS NFR BLD AUTO: 66 % (ref 42.7–76)
NRBC BLD AUTO-RTO: 0 /100 WBC (ref 0–0.2)
PLATELET # BLD AUTO: 232 10*3/MM3 (ref 140–450)
PMV BLD AUTO: 9.3 FL (ref 6–12)
POTASSIUM SERPL-SCNC: 3.3 MMOL/L (ref 3.5–5.2)
PROT SERPL-MCNC: 7.2 G/DL (ref 6–8.5)
PROTHROMBIN TIME: 14.5 SECONDS (ref 11.9–14.6)
RBC # BLD AUTO: 4.2 10*6/MM3 (ref 3.77–5.28)
SARS-COV-2 RNA PNL SPEC NAA+PROBE: NOT DETECTED
SODIUM SERPL-SCNC: 131 MMOL/L (ref 136–145)
WBC NRBC COR # BLD: 8.53 10*3/MM3 (ref 3.4–10.8)
WHOLE BLOOD HOLD COAG: NORMAL
WHOLE BLOOD HOLD SPECIMEN: NORMAL

## 2022-06-01 PROCEDURE — 25010000002 MIDAZOLAM PER 1 MG: Performed by: ANESTHESIOLOGY

## 2022-06-01 PROCEDURE — 0J9M0ZX DRAINAGE OF LEFT UPPER LEG SUBCUTANEOUS TISSUE AND FASCIA, OPEN APPROACH, DIAGNOSTIC: ICD-10-PCS | Performed by: SPECIALIST

## 2022-06-01 PROCEDURE — 87070 CULTURE OTHR SPECIMN AEROBIC: CPT | Performed by: FAMILY MEDICINE

## 2022-06-01 PROCEDURE — 87077 CULTURE AEROBIC IDENTIFY: CPT | Performed by: FAMILY MEDICINE

## 2022-06-01 PROCEDURE — 87070 CULTURE OTHR SPECIMN AEROBIC: CPT | Performed by: SPECIALIST

## 2022-06-01 PROCEDURE — 93010 ELECTROCARDIOGRAM REPORT: CPT | Performed by: INTERNAL MEDICINE

## 2022-06-01 PROCEDURE — 87077 CULTURE AEROBIC IDENTIFY: CPT | Performed by: SPECIALIST

## 2022-06-01 PROCEDURE — 85730 THROMBOPLASTIN TIME PARTIAL: CPT | Performed by: FAMILY MEDICINE

## 2022-06-01 PROCEDURE — 83036 HEMOGLOBIN GLYCOSYLATED A1C: CPT | Performed by: SPECIALIST

## 2022-06-01 PROCEDURE — 0JB70ZZ EXCISION OF BACK SUBCUTANEOUS TISSUE AND FASCIA, OPEN APPROACH: ICD-10-PCS | Performed by: SPECIALIST

## 2022-06-01 PROCEDURE — 25010000002 FENTANYL CITRATE (PF) 100 MCG/2ML SOLUTION: Performed by: NURSE ANESTHETIST, CERTIFIED REGISTERED

## 2022-06-01 PROCEDURE — 0JBL0ZZ EXCISION OF RIGHT UPPER LEG SUBCUTANEOUS TISSUE AND FASCIA, OPEN APPROACH: ICD-10-PCS | Performed by: SPECIALIST

## 2022-06-01 PROCEDURE — 87040 BLOOD CULTURE FOR BACTERIA: CPT | Performed by: FAMILY MEDICINE

## 2022-06-01 PROCEDURE — 0JBR0ZZ EXCISION OF LEFT FOOT SUBCUTANEOUS TISSUE AND FASCIA, OPEN APPROACH: ICD-10-PCS | Performed by: SPECIALIST

## 2022-06-01 PROCEDURE — 25010000002 MORPHINE PER 10 MG: Performed by: SPECIALIST

## 2022-06-01 PROCEDURE — 25010000002 MORPHINE SULFATE (PF) 2 MG/ML SOLUTION: Performed by: SPECIALIST

## 2022-06-01 PROCEDURE — S0260 H&P FOR SURGERY: HCPCS | Performed by: SPECIALIST

## 2022-06-01 PROCEDURE — 25010000002 KETOROLAC TROMETHAMINE PER 15 MG: Performed by: FAMILY MEDICINE

## 2022-06-01 PROCEDURE — 25010000002 SODIUM CHLORIDE 0.9 % WITH KCL 20 MEQ 20-0.9 MEQ/L-% SOLUTION: Performed by: SPECIALIST

## 2022-06-01 PROCEDURE — 82962 GLUCOSE BLOOD TEST: CPT

## 2022-06-01 PROCEDURE — 87186 SC STD MICRODIL/AGAR DIL: CPT | Performed by: SPECIALIST

## 2022-06-01 PROCEDURE — 25010000002 DROPERIDOL PER 5 MG: Performed by: ANESTHESIOLOGY

## 2022-06-01 PROCEDURE — 63710000001 INSULIN LISPRO (HUMAN) PER 5 UNITS: Performed by: SPECIALIST

## 2022-06-01 PROCEDURE — 88304 TISSUE EXAM BY PATHOLOGIST: CPT | Performed by: SPECIALIST

## 2022-06-01 PROCEDURE — 11045 DBRDMT SUBQ TISS EACH ADDL: CPT | Performed by: SPECIALIST

## 2022-06-01 PROCEDURE — 25010000002 HYDROMORPHONE PER 4 MG: Performed by: ANESTHESIOLOGY

## 2022-06-01 PROCEDURE — 0JBM0ZZ EXCISION OF LEFT UPPER LEG SUBCUTANEOUS TISSUE AND FASCIA, OPEN APPROACH: ICD-10-PCS | Performed by: SPECIALIST

## 2022-06-01 PROCEDURE — 73701 CT LOWER EXTREMITY W/DYE: CPT

## 2022-06-01 PROCEDURE — 87205 SMEAR GRAM STAIN: CPT | Performed by: FAMILY MEDICINE

## 2022-06-01 PROCEDURE — 11042 DBRDMT SUBQ TIS 1ST 20SQCM/<: CPT | Performed by: SPECIALIST

## 2022-06-01 PROCEDURE — 87205 SMEAR GRAM STAIN: CPT | Performed by: SPECIALIST

## 2022-06-01 PROCEDURE — 88311 DECALCIFY TISSUE: CPT | Performed by: SPECIALIST

## 2022-06-01 PROCEDURE — 83605 ASSAY OF LACTIC ACID: CPT | Performed by: FAMILY MEDICINE

## 2022-06-01 PROCEDURE — 86140 C-REACTIVE PROTEIN: CPT | Performed by: FAMILY MEDICINE

## 2022-06-01 PROCEDURE — 25010000002 PROPOFOL 10 MG/ML EMULSION: Performed by: NURSE ANESTHETIST, CERTIFIED REGISTERED

## 2022-06-01 PROCEDURE — 85025 COMPLETE CBC W/AUTO DIFF WBC: CPT | Performed by: FAMILY MEDICINE

## 2022-06-01 PROCEDURE — 85610 PROTHROMBIN TIME: CPT | Performed by: FAMILY MEDICINE

## 2022-06-01 PROCEDURE — 0JB90ZZ EXCISION OF BUTTOCK SUBCUTANEOUS TISSUE AND FASCIA, OPEN APPROACH: ICD-10-PCS | Performed by: SPECIALIST

## 2022-06-01 PROCEDURE — 25010000002 IOPAMIDOL 61 % SOLUTION: Performed by: FAMILY MEDICINE

## 2022-06-01 PROCEDURE — 87635 SARS-COV-2 COVID-19 AMP PRB: CPT | Performed by: FAMILY MEDICINE

## 2022-06-01 PROCEDURE — 25010000002 VANCOMYCIN 10 G RECONSTITUTED SOLUTION: Performed by: FAMILY MEDICINE

## 2022-06-01 PROCEDURE — 87176 TISSUE HOMOGENIZATION CULTR: CPT | Performed by: SPECIALIST

## 2022-06-01 PROCEDURE — 25010000002 ONDANSETRON PER 1 MG: Performed by: NURSE ANESTHETIST, CERTIFIED REGISTERED

## 2022-06-01 PROCEDURE — 93005 ELECTROCARDIOGRAM TRACING: CPT | Performed by: SPECIALIST

## 2022-06-01 PROCEDURE — 25010000002 FENTANYL CITRATE (PF) 50 MCG/ML SOLUTION: Performed by: ANESTHESIOLOGY

## 2022-06-01 PROCEDURE — 80053 COMPREHEN METABOLIC PANEL: CPT | Performed by: FAMILY MEDICINE

## 2022-06-01 PROCEDURE — 99284 EMERGENCY DEPT VISIT MOD MDM: CPT

## 2022-06-01 DEVICE — HEMOST ABS SURGICEL SNOW 4X4IN: Type: IMPLANTABLE DEVICE | Site: LEG | Status: FUNCTIONAL

## 2022-06-01 RX ORDER — MAGNESIUM HYDROXIDE 1200 MG/15ML
LIQUID ORAL AS NEEDED
Status: DISCONTINUED | OUTPATIENT
Start: 2022-06-01 | End: 2022-06-01 | Stop reason: HOSPADM

## 2022-06-01 RX ORDER — ONDANSETRON 8 MG/1
8 TABLET, ORALLY DISINTEGRATING ORAL EVERY 6 HOURS PRN
Status: DISCONTINUED | OUTPATIENT
Start: 2022-06-01 | End: 2022-06-05

## 2022-06-01 RX ORDER — SODIUM CHLORIDE 9 MG/ML
50 INJECTION, SOLUTION INTRAVENOUS CONTINUOUS
Status: DISCONTINUED | OUTPATIENT
Start: 2022-06-01 | End: 2022-06-07

## 2022-06-01 RX ORDER — SODIUM CHLORIDE 0.9 % (FLUSH) 0.9 %
1-10 SYRINGE (ML) INJECTION AS NEEDED
Status: DISCONTINUED | OUTPATIENT
Start: 2022-06-01 | End: 2022-06-01 | Stop reason: SDUPTHER

## 2022-06-01 RX ORDER — HYDROCODONE BITARTRATE AND ACETAMINOPHEN 7.5; 325 MG/1; MG/1
1 TABLET ORAL EVERY 4 HOURS PRN
Status: DISCONTINUED | OUTPATIENT
Start: 2022-06-01 | End: 2022-06-06

## 2022-06-01 RX ORDER — DROPERIDOL 2.5 MG/ML
0.62 INJECTION, SOLUTION INTRAMUSCULAR; INTRAVENOUS ONCE AS NEEDED
Status: COMPLETED | OUTPATIENT
Start: 2022-06-01 | End: 2022-06-01

## 2022-06-01 RX ORDER — INSULIN LISPRO 100 [IU]/ML
0-9 INJECTION, SOLUTION INTRAVENOUS; SUBCUTANEOUS
Status: DISCONTINUED | OUTPATIENT
Start: 2022-06-01 | End: 2022-06-01 | Stop reason: SDUPTHER

## 2022-06-01 RX ORDER — POTASSIUM CHLORIDE 14.9 MG/ML
20 INJECTION INTRAVENOUS ONCE
Status: DISCONTINUED | OUTPATIENT
Start: 2022-06-01 | End: 2022-06-01

## 2022-06-01 RX ORDER — LIDOCAINE HYDROCHLORIDE 10 MG/ML
0.5 INJECTION, SOLUTION EPIDURAL; INFILTRATION; INTRACAUDAL; PERINEURAL ONCE AS NEEDED
Status: DISCONTINUED | OUTPATIENT
Start: 2022-06-01 | End: 2022-06-01 | Stop reason: HOSPADM

## 2022-06-01 RX ORDER — SIMETHICONE 80 MG
80 TABLET,CHEWABLE ORAL 4 TIMES DAILY PRN
Status: DISCONTINUED | OUTPATIENT
Start: 2022-06-01 | End: 2022-06-05

## 2022-06-01 RX ORDER — SODIUM CHLORIDE 0.9 % (FLUSH) 0.9 %
10 SYRINGE (ML) INJECTION EVERY 12 HOURS SCHEDULED
Status: DISCONTINUED | OUTPATIENT
Start: 2022-06-01 | End: 2022-06-01 | Stop reason: HOSPADM

## 2022-06-01 RX ORDER — ONDANSETRON HCL IN 0.9 % NACL 8 MG/50 ML
8 INTRAVENOUS SOLUTION, PIGGYBACK (ML) INTRAVENOUS EVERY 6 HOURS PRN
Status: DISCONTINUED | OUTPATIENT
Start: 2022-06-01 | End: 2022-06-01 | Stop reason: SDUPTHER

## 2022-06-01 RX ORDER — SODIUM CHLORIDE 0.9 % (FLUSH) 0.9 %
10 SYRINGE (ML) INJECTION EVERY 12 HOURS SCHEDULED
Status: DISCONTINUED | OUTPATIENT
Start: 2022-06-01 | End: 2022-06-10 | Stop reason: HOSPADM

## 2022-06-01 RX ORDER — PROPOFOL 10 MG/ML
VIAL (ML) INTRAVENOUS AS NEEDED
Status: DISCONTINUED | OUTPATIENT
Start: 2022-06-01 | End: 2022-06-01 | Stop reason: SURG

## 2022-06-01 RX ORDER — FENTANYL CITRATE 50 UG/ML
25 INJECTION, SOLUTION INTRAMUSCULAR; INTRAVENOUS
Status: DISCONTINUED | OUTPATIENT
Start: 2022-06-01 | End: 2022-06-01 | Stop reason: HOSPADM

## 2022-06-01 RX ORDER — ENOXAPARIN SODIUM 100 MG/ML
30 INJECTION SUBCUTANEOUS EVERY 12 HOURS
Status: DISCONTINUED | OUTPATIENT
Start: 2022-06-02 | End: 2022-06-10 | Stop reason: HOSPADM

## 2022-06-01 RX ORDER — LORAZEPAM 2 MG/ML
1 INJECTION INTRAMUSCULAR EVERY 4 HOURS PRN
Status: DISCONTINUED | OUTPATIENT
Start: 2022-06-01 | End: 2022-06-06

## 2022-06-01 RX ORDER — SODIUM CHLORIDE 0.9 % (FLUSH) 0.9 %
10 SYRINGE (ML) INJECTION AS NEEDED
Status: DISCONTINUED | OUTPATIENT
Start: 2022-06-01 | End: 2022-06-01 | Stop reason: HOSPADM

## 2022-06-01 RX ORDER — FAMOTIDINE 20 MG/1
20 TABLET, FILM COATED ORAL 2 TIMES DAILY
Status: DISCONTINUED | OUTPATIENT
Start: 2022-06-01 | End: 2022-06-01 | Stop reason: SDUPTHER

## 2022-06-01 RX ORDER — ONDANSETRON HCL IN 0.9 % NACL 8 MG/50 ML
8 INTRAVENOUS SOLUTION, PIGGYBACK (ML) INTRAVENOUS EVERY 6 HOURS PRN
Status: DISCONTINUED | OUTPATIENT
Start: 2022-06-01 | End: 2022-06-10 | Stop reason: HOSPADM

## 2022-06-01 RX ORDER — HYDROMORPHONE HYDROCHLORIDE 1 MG/ML
0.5 INJECTION, SOLUTION INTRAMUSCULAR; INTRAVENOUS; SUBCUTANEOUS
Status: DISCONTINUED | OUTPATIENT
Start: 2022-06-01 | End: 2022-06-01 | Stop reason: HOSPADM

## 2022-06-01 RX ORDER — ONDANSETRON 8 MG/1
8 TABLET, ORALLY DISINTEGRATING ORAL EVERY 6 HOURS PRN
Status: DISCONTINUED | OUTPATIENT
Start: 2022-06-01 | End: 2022-06-01 | Stop reason: SDUPTHER

## 2022-06-01 RX ORDER — SODIUM CHLORIDE AND POTASSIUM CHLORIDE 150; 900 MG/100ML; MG/100ML
100 INJECTION, SOLUTION INTRAVENOUS CONTINUOUS
Status: DISCONTINUED | OUTPATIENT
Start: 2022-06-01 | End: 2022-06-01 | Stop reason: SDUPTHER

## 2022-06-01 RX ORDER — SIMETHICONE 80 MG
80 TABLET,CHEWABLE ORAL 4 TIMES DAILY PRN
Status: DISCONTINUED | OUTPATIENT
Start: 2022-06-01 | End: 2022-06-01 | Stop reason: SDUPTHER

## 2022-06-01 RX ORDER — ONDANSETRON 2 MG/ML
INJECTION INTRAMUSCULAR; INTRAVENOUS AS NEEDED
Status: DISCONTINUED | OUTPATIENT
Start: 2022-06-01 | End: 2022-06-01 | Stop reason: SURG

## 2022-06-01 RX ORDER — PHENYLEPHRINE HCL IN 0.9% NACL 1 MG/10 ML
SYRINGE (ML) INTRAVENOUS AS NEEDED
Status: DISCONTINUED | OUTPATIENT
Start: 2022-06-01 | End: 2022-06-01 | Stop reason: SURG

## 2022-06-01 RX ORDER — OXYCODONE AND ACETAMINOPHEN 10; 325 MG/1; MG/1
1 TABLET ORAL ONCE AS NEEDED
Status: DISCONTINUED | OUTPATIENT
Start: 2022-06-01 | End: 2022-06-01 | Stop reason: HOSPADM

## 2022-06-01 RX ORDER — MORPHINE SULFATE 2 MG/ML
4 INJECTION, SOLUTION INTRAMUSCULAR; INTRAVENOUS
Status: DISCONTINUED | OUTPATIENT
Start: 2022-06-01 | End: 2022-06-02

## 2022-06-01 RX ORDER — DEXTROSE MONOHYDRATE 25 G/50ML
25 INJECTION, SOLUTION INTRAVENOUS
Status: DISCONTINUED | OUTPATIENT
Start: 2022-06-01 | End: 2022-06-10 | Stop reason: HOSPADM

## 2022-06-01 RX ORDER — SODIUM CHLORIDE, SODIUM LACTATE, POTASSIUM CHLORIDE, CALCIUM CHLORIDE 600; 310; 30; 20 MG/100ML; MG/100ML; MG/100ML; MG/100ML
9 INJECTION, SOLUTION INTRAVENOUS CONTINUOUS
Status: DISCONTINUED | OUTPATIENT
Start: 2022-06-01 | End: 2022-06-01 | Stop reason: SDUPTHER

## 2022-06-01 RX ORDER — INSULIN LISPRO 100 [IU]/ML
0-9 INJECTION, SOLUTION INTRAVENOUS; SUBCUTANEOUS
Status: DISCONTINUED | OUTPATIENT
Start: 2022-06-01 | End: 2022-06-02

## 2022-06-01 RX ORDER — ROCURONIUM BROMIDE 10 MG/ML
INJECTION, SOLUTION INTRAVENOUS AS NEEDED
Status: DISCONTINUED | OUTPATIENT
Start: 2022-06-01 | End: 2022-06-01 | Stop reason: SURG

## 2022-06-01 RX ORDER — SUCRALFATE 1 G/1
1 TABLET ORAL
Status: DISCONTINUED | OUTPATIENT
Start: 2022-06-01 | End: 2022-06-01 | Stop reason: SDUPTHER

## 2022-06-01 RX ORDER — NALOXONE HCL 0.4 MG/ML
0.04 VIAL (ML) INJECTION AS NEEDED
Status: DISCONTINUED | OUTPATIENT
Start: 2022-06-01 | End: 2022-06-01 | Stop reason: HOSPADM

## 2022-06-01 RX ORDER — LABETALOL HYDROCHLORIDE 5 MG/ML
5 INJECTION, SOLUTION INTRAVENOUS
Status: DISCONTINUED | OUTPATIENT
Start: 2022-06-01 | End: 2022-06-01 | Stop reason: HOSPADM

## 2022-06-01 RX ORDER — POTASSIUM CHLORIDE 750 MG/1
40 CAPSULE, EXTENDED RELEASE ORAL DAILY
Status: DISCONTINUED | OUTPATIENT
Start: 2022-06-01 | End: 2022-06-10 | Stop reason: HOSPADM

## 2022-06-01 RX ORDER — ENOXAPARIN SODIUM 100 MG/ML
30 INJECTION SUBCUTANEOUS EVERY 12 HOURS SCHEDULED
Status: DISCONTINUED | OUTPATIENT
Start: 2022-06-02 | End: 2022-06-01 | Stop reason: SDUPTHER

## 2022-06-01 RX ORDER — SODIUM CHLORIDE 0.9 % (FLUSH) 0.9 %
10 SYRINGE (ML) INJECTION EVERY 12 HOURS SCHEDULED
Status: DISCONTINUED | OUTPATIENT
Start: 2022-06-01 | End: 2022-06-01 | Stop reason: SDUPTHER

## 2022-06-01 RX ORDER — DEXTROSE MONOHYDRATE 25 G/50ML
25 INJECTION, SOLUTION INTRAVENOUS
Status: DISCONTINUED | OUTPATIENT
Start: 2022-06-01 | End: 2022-06-01 | Stop reason: SDUPTHER

## 2022-06-01 RX ORDER — SODIUM CHLORIDE 0.9 % (FLUSH) 0.9 %
10 SYRINGE (ML) INJECTION AS NEEDED
Status: DISCONTINUED | OUTPATIENT
Start: 2022-06-01 | End: 2022-06-01 | Stop reason: SDUPTHER

## 2022-06-01 RX ORDER — LORAZEPAM 2 MG/ML
1 INJECTION INTRAMUSCULAR EVERY 4 HOURS PRN
Status: DISCONTINUED | OUTPATIENT
Start: 2022-06-01 | End: 2022-06-01 | Stop reason: SDUPTHER

## 2022-06-01 RX ORDER — SUCRALFATE 1 G/1
1 TABLET ORAL
Status: DISCONTINUED | OUTPATIENT
Start: 2022-06-01 | End: 2022-06-10 | Stop reason: HOSPADM

## 2022-06-01 RX ORDER — LIDOCAINE HYDROCHLORIDE 20 MG/ML
INJECTION, SOLUTION EPIDURAL; INFILTRATION; INTRACAUDAL; PERINEURAL AS NEEDED
Status: DISCONTINUED | OUTPATIENT
Start: 2022-06-01 | End: 2022-06-01 | Stop reason: SURG

## 2022-06-01 RX ORDER — NICOTINE POLACRILEX 4 MG
15 LOZENGE BUCCAL
Status: DISCONTINUED | OUTPATIENT
Start: 2022-06-01 | End: 2022-06-10 | Stop reason: HOSPADM

## 2022-06-01 RX ORDER — IBUPROFEN 600 MG/1
600 TABLET ORAL ONCE AS NEEDED
Status: DISCONTINUED | OUTPATIENT
Start: 2022-06-01 | End: 2022-06-01 | Stop reason: HOSPADM

## 2022-06-01 RX ORDER — FAMOTIDINE 20 MG/1
20 TABLET, FILM COATED ORAL 2 TIMES DAILY
Status: DISCONTINUED | OUTPATIENT
Start: 2022-06-01 | End: 2022-06-02 | Stop reason: ALTCHOICE

## 2022-06-01 RX ORDER — NICOTINE POLACRILEX 4 MG
15 LOZENGE BUCCAL
Status: DISCONTINUED | OUTPATIENT
Start: 2022-06-01 | End: 2022-06-01 | Stop reason: SDUPTHER

## 2022-06-01 RX ORDER — SODIUM CHLORIDE 0.9 % (FLUSH) 0.9 %
1-10 SYRINGE (ML) INJECTION AS NEEDED
Status: DISCONTINUED | OUTPATIENT
Start: 2022-06-01 | End: 2022-06-10 | Stop reason: HOSPADM

## 2022-06-01 RX ORDER — NEOSTIGMINE METHYLSULFATE 5 MG/5 ML
SYRINGE (ML) INTRAVENOUS AS NEEDED
Status: DISCONTINUED | OUTPATIENT
Start: 2022-06-01 | End: 2022-06-01 | Stop reason: SURG

## 2022-06-01 RX ORDER — DEXTROSE MONOHYDRATE 25 G/50ML
12.5 INJECTION, SOLUTION INTRAVENOUS AS NEEDED
Status: DISCONTINUED | OUTPATIENT
Start: 2022-06-01 | End: 2022-06-01 | Stop reason: HOSPADM

## 2022-06-01 RX ORDER — ONDANSETRON 2 MG/ML
4 INJECTION INTRAMUSCULAR; INTRAVENOUS
Status: DISCONTINUED | OUTPATIENT
Start: 2022-06-01 | End: 2022-06-01 | Stop reason: HOSPADM

## 2022-06-01 RX ORDER — MIDAZOLAM HYDROCHLORIDE 1 MG/ML
1 INJECTION INTRAMUSCULAR; INTRAVENOUS
Status: DISCONTINUED | OUTPATIENT
Start: 2022-06-01 | End: 2022-06-01 | Stop reason: HOSPADM

## 2022-06-01 RX ORDER — FLUMAZENIL 0.1 MG/ML
0.2 INJECTION INTRAVENOUS AS NEEDED
Status: DISCONTINUED | OUTPATIENT
Start: 2022-06-01 | End: 2022-06-01 | Stop reason: HOSPADM

## 2022-06-01 RX ORDER — KETOROLAC TROMETHAMINE 15 MG/ML
15 INJECTION, SOLUTION INTRAMUSCULAR; INTRAVENOUS ONCE
Status: COMPLETED | OUTPATIENT
Start: 2022-06-01 | End: 2022-06-01

## 2022-06-01 RX ORDER — FENTANYL CITRATE 50 UG/ML
INJECTION, SOLUTION INTRAMUSCULAR; INTRAVENOUS AS NEEDED
Status: DISCONTINUED | OUTPATIENT
Start: 2022-06-01 | End: 2022-06-01 | Stop reason: SURG

## 2022-06-01 RX ADMIN — FENTANYL CITRATE 50 MCG: 50 INJECTION, SOLUTION INTRAMUSCULAR; INTRAVENOUS at 18:33

## 2022-06-01 RX ADMIN — Medication 100 MCG: at 17:11

## 2022-06-01 RX ADMIN — ONDANSETRON 4 MG: 2 INJECTION INTRAMUSCULAR; INTRAVENOUS at 18:29

## 2022-06-01 RX ADMIN — Medication 200 MCG: at 17:47

## 2022-06-01 RX ADMIN — FAMOTIDINE 20 MG: 20 TABLET, FILM COATED ORAL at 20:30

## 2022-06-01 RX ADMIN — MORPHINE SULFATE 4 MG: 4 INJECTION, SOLUTION INTRAMUSCULAR; INTRAVENOUS at 13:53

## 2022-06-01 RX ADMIN — INSULIN LISPRO 4 UNITS: 100 INJECTION, SOLUTION INTRAVENOUS; SUBCUTANEOUS at 15:04

## 2022-06-01 RX ADMIN — Medication 10 ML: at 20:31

## 2022-06-01 RX ADMIN — FENTANYL CITRATE 25 MCG: 0.05 INJECTION, SOLUTION INTRAMUSCULAR; INTRAVENOUS at 16:45

## 2022-06-01 RX ADMIN — SUCRALFATE 1 G: 1 TABLET ORAL at 20:31

## 2022-06-01 RX ADMIN — Medication 300 MCG: at 18:15

## 2022-06-01 RX ADMIN — DROPERIDOL 0.62 MG: 2.5 INJECTION, SOLUTION INTRAMUSCULAR; INTRAVENOUS at 19:13

## 2022-06-01 RX ADMIN — Medication 200 MCG: at 17:16

## 2022-06-01 RX ADMIN — Medication 150 MCG: at 17:36

## 2022-06-01 RX ADMIN — ROCURONIUM BROMIDE 50 MG: 10 SOLUTION INTRAVENOUS at 17:04

## 2022-06-01 RX ADMIN — POTASSIUM CHLORIDE AND SODIUM CHLORIDE 100 ML/HR: 900; 150 INJECTION, SOLUTION INTRAVENOUS at 15:04

## 2022-06-01 RX ADMIN — FENTANYL CITRATE 25 MCG: 0.05 INJECTION, SOLUTION INTRAMUSCULAR; INTRAVENOUS at 16:16

## 2022-06-01 RX ADMIN — Medication 200 MCG: at 17:18

## 2022-06-01 RX ADMIN — Medication 3 MG: at 18:39

## 2022-06-01 RX ADMIN — IOPAMIDOL 100 ML: 612 INJECTION, SOLUTION INTRAVENOUS at 10:48

## 2022-06-01 RX ADMIN — GLYCOPYRROLATE 0.4 MG: 0.2 INJECTION INTRAMUSCULAR; INTRAVENOUS at 18:39

## 2022-06-01 RX ADMIN — Medication 150 MCG: at 17:34

## 2022-06-01 RX ADMIN — ROCURONIUM BROMIDE 25 MG: 10 SOLUTION INTRAVENOUS at 17:40

## 2022-06-01 RX ADMIN — PROPOFOL 200 MG: 10 INJECTION, EMULSION INTRAVENOUS at 17:04

## 2022-06-01 RX ADMIN — HYDROMORPHONE HYDROCHLORIDE 0.5 MG: 1 INJECTION, SOLUTION INTRAMUSCULAR; INTRAVENOUS; SUBCUTANEOUS at 19:17

## 2022-06-01 RX ADMIN — LIDOCAINE HYDROCHLORIDE 100 MG: 20 INJECTION, SOLUTION EPIDURAL; INFILTRATION; INTRACAUDAL; PERINEURAL at 17:04

## 2022-06-01 RX ADMIN — FENTANYL CITRATE 25 MCG: 0.05 INJECTION, SOLUTION INTRAMUSCULAR; INTRAVENOUS at 16:28

## 2022-06-01 RX ADMIN — MORPHINE SULFATE 4 MG: 2 INJECTION, SOLUTION INTRAMUSCULAR; INTRAVENOUS at 20:31

## 2022-06-01 RX ADMIN — SODIUM CHLORIDE 100 ML/HR: 9 INJECTION, SOLUTION INTRAVENOUS at 20:31

## 2022-06-01 RX ADMIN — VANCOMYCIN HYDROCHLORIDE 2000 MG: 10 INJECTION, POWDER, LYOPHILIZED, FOR SOLUTION INTRAVENOUS at 10:45

## 2022-06-01 RX ADMIN — KETOROLAC TROMETHAMINE 15 MG: 15 INJECTION, SOLUTION INTRAMUSCULAR; INTRAVENOUS at 09:30

## 2022-06-01 RX ADMIN — SODIUM CHLORIDE, POTASSIUM CHLORIDE, SODIUM LACTATE AND CALCIUM CHLORIDE 9 ML/HR: 600; 310; 30; 20 INJECTION, SOLUTION INTRAVENOUS at 16:14

## 2022-06-01 RX ADMIN — FENTANYL CITRATE 25 MCG: 50 INJECTION INTRAMUSCULAR; INTRAVENOUS at 19:21

## 2022-06-01 RX ADMIN — FENTANYL CITRATE 50 MCG: 50 INJECTION, SOLUTION INTRAMUSCULAR; INTRAVENOUS at 17:02

## 2022-06-01 RX ADMIN — FENTANYL CITRATE 25 MCG: 50 INJECTION INTRAMUSCULAR; INTRAVENOUS at 19:16

## 2022-06-01 RX ADMIN — Medication 200 MCG: at 17:25

## 2022-06-01 RX ADMIN — Medication 300 MCG: at 18:05

## 2022-06-01 RX ADMIN — MIDAZOLAM HYDROCHLORIDE 1 MG: 1 INJECTION, SOLUTION INTRAMUSCULAR; INTRAVENOUS at 16:14

## 2022-06-01 NOTE — H&P
Patient Care Team:  Viri Doty APRN as PCP - General (Nurse Practitioner)    Chief complaint tenderness and erythema in her left medial thigh and also central sacral area.    Subjective     Subjective     Valeria Wilson  is a 51 y.o. female presents with history of significant lymphedema in both lower extremities, she has had a chronic draining area in the posterior aspect of the right and left calf from pressure, also right medial wound that is slowly healed, now she presents with an area 8 x 4 cm in the left medial thigh with surrounding cellulitis extending out at least 8 cm from the periphery.  It is has some necrotic material noted also on the CT scan there is some air in the subcutaneous tissue also in the sacral area there is an area 6 x 1-1/2 cm that has necrotic skin present.  Both of these areas need to be debrided as well as we would freshen up the areas in the right and left posterior calf, debride these and packed with iodoform gauze.  Later on a wound vacuum system can be applied.  We will also try to get better control of her diabetes current glucose is over 400.  She has multiple medical problems to include chronic lower back pain, chronic pain syndrome, multiple allergies to include Bactrim and penicillin and cephalosporins.  Chronic knee pain chronic lymphedema of the both lower extremities.  Plan to admit, hydrate, get better control of her diabetes, look toward going the operating room today with debridement of the left medial thigh and this 8 x 4 cm necrotic draining area as well as the 6 x 1 and half centimeter necrotic area in the lower sacral area.  We will also pulse lavage irrigation these and packed these.  Risk and benefits discussed with full knowledge of this and apparent understanding she gives her informed consent for surgery.    Past surgical history significant for cataract extraction right and left, corneal transplant right and left.  Ectopic pregnancy, meniscus  repair, tubal ligation.    Medical problems are those listed.  Significant diabetes for the past 4 years, poorly controlled, glucose over 400.  Depression diabetes chronic pain syndrome.    She does not work outside of the home, previously she was a pharmacy tech.  At [employer] and [employer].    Review of Systems   Pertinent items are noted in HPI, all other systems reviewed and negative    History  Past Medical History:   Diagnosis Date   • Anxiety    • Arthritis     Osteoarthritis primarily left knee    • Back pain    • Chronic pain syndrome 10/27/2021   • Depression    • Diabetes mellitus (HCC)    • Fibromyalgia, primary    • Hx of tear of meniscus of knee joint 11/21/2016   • Hypertension    • Joint pain    • Kidney stone    • Knee pain    • Lymphedema of both lower extremities 10/27/2021   • Migraine    • Migraine headache    • Pain     knee, left ankle, left ankle     • Pes anserinus bursitis 04/28/2015   • Restless leg      Past Surgical History:   Procedure Laterality Date   • CATARACT EXTRACTION     • CATARACT EXTRACTION     • CATARACT EXTRACTION     • CORNEAL TRANSPLANT      bilateral    • ECTOPIC PREGNANCY     • JOINT REPLACEMENT     • KNEE ARTHROSCOPY W/ MENISCAL REPAIR     • KNEE MENISCAL REPAIR     • MENISCECTOMY Left 12/20/2016   • TUBAL ABDOMINAL LIGATION       Family History   Problem Relation Age of Onset   • No Known Problems Mother    • Cancer Father    • Dementia Father    • Hypertension Maternal Grandmother    • Cancer Maternal Grandfather      Social History     Tobacco Use   • Smoking status: Never Smoker   • Smokeless tobacco: Never Used   Vaping Use   • Vaping Use: Never used   Substance Use Topics   • Alcohol use: Yes   • Drug use: No     (Not in a hospital admission)    Allergies:  Compazine [prochlorperazine edisylate], Meperidine, Norflex [orphenadrine], Nortriptyline, Prochlorperazine, Prochlorperazine maleate, Codeine, Penicillins, Calamine, Amoxicillin-pot clavulanate, Avelox  [moxifloxacin hcl], Cefazolin, Cephalexin, Ciprofloxacin, Clindamycin/lincomycin, Doxycycline, Hydroxyzine hcl, Moxifloxacin, Orphenadrine citrate, Sulfamethoxazole-trimethoprim, Tobramycin, and Vistaril [hydroxyzine]    Problem list  Patient Active Problem List   Diagnosis   • Hypertension   • Type 2 diabetes mellitus with hyperglycemia, without long-term current use of insulin ([facility])   • Obesity, morbid, BMI 50 or higher ([facility])   • Stage 3 chronic kidney disease   • Bacteremia due to Klebsiella pneumoniae   • Urinary tract infection   • Chronic pain syndrome   • Lymphedema of both lower extremities   • Bilateral lower leg cellulitis   • RLS (restless legs syndrome)   • Diabetic ulcer of left lower leg associated with type 2 diabetes mellitus, with fat layer exposed ([facility])   • Dyspnea   • MARCELINA (generalized anxiety disorder)   • Gastroesophageal reflux disease   • Headache   • Hypoglycemia   • Hypokalemia   • Migraine without aura and without status migrainosus, not intractable   • Mild single current episode of major depressive disorder ([facility])   • Morbid obesity with BMI of 50.0-59.9, adult ([facility])   • Old complex tear of medial meniscus of left knee   • Pleurisy   • Polypharmacy   • Type 2 diabetes mellitus with hyperglycemia, with long-term current use of insulin ([facility])   • Venous insufficiency of both lower extremities   • Anemia   • Open wound       Objective      Objective     Vital Signs  Temp:  [97.8 °F (36.6 °C)-98.4 °F (36.9 °C)] 98.4 °F (36.9 °C)  Heart Rate:  [83-92] 92  Resp:  [17-20] 17  BP: (135-159)/(67-85) 135/67    Physical Exam:      General Appearance:    Alert, cooperative, in no acute distress morbidly obese overweight, BMI 59.  Bilateral lymphedema.   Head:    Normocephalic, without obvious abnormality, atraumatic   Eyes:            Lids and lashes normal, conjunctivae and sclerae normal, no   icterus, no pallor, corneas clear, PERRLA   Ears:    Ears appear intact with no abnormalities noted   Throat:    No oral lesions, no thrush, oral mucosa moist   Neck:   No adenopathy, supple, trachea midline, no thyromegaly, no   carotid bruit, no JVD   Back:     No kyphosis present, no scoliosis present, no skin lesions,      erythema or scars, no tenderness to percussion or                   palpation,   range of motion normal   Lungs:     Clear to auscultation,respirations regular, even and                  unlabored    Heart:    Regular rhythm and normal rate, normal S1 and S2, no            murmur, no gallop, no rub, no click   Chest Wall:    No abnormalities observed    The lower back with a significant skin breakdown 6 x 1-1/2 cm in the lower back with overall all necrosis no skin breakdown present but definitely necrotic skin.   Abdomen:    Obese, soft, nontender, nondistended.   Rectal:     Deferred   Extremities:  Bilateral significant lymphedema with a 3 x 3 cm open area on the right and left posterior calf from pressure sores, there is an open area in the left medial thigh 8 x 4 cm, with necrosis, and some subcutaneous air by CT scan, there is a healed scar in the right medial thigh.     Pulses:   Pulses palpable and equal bilaterallyIMPRESSION:   1. The process along the medial aspect of the mid thigh is present and  abscess.  2. No evidence of bony involvement. No muscular involvement.  3. Other findings as above.  This report was finalized on 06/01/2022 11:38 by Dr. Arash Farrell MD.    Skin:   No bleeding, bruising or rash   Lymph nodes:   No palpable adenopathy   Neurologic:   Cranial nerves 2 - 12 grossly intact, sensation intact, DTR       present and equal bilaterallyPhysical Exam  Skin:                   Results Review:    I reviewed the patient's new imaging results and agree with the interpretation.    Results from last 7 days   Lab Units 06/01/22  0837   WBC 10*3/mm3 8.53   HEMOGLOBIN g/dL 10.6*   HEMATOCRIT % 34.4   PLATELETS 10*3/mm3 232        Results from last 7 days   Lab Units 06/01/22  0837    SODIUM mmol/L 131*   POTASSIUM mmol/L 3.3*   CHLORIDE mmol/L 91*   CO2 mmol/L 28.0   BUN mg/dL 19   CREATININE mg/dL 1.44*   CALCIUM mg/dL 7.9*   BILIRUBIN mg/dL 0.3   ALK PHOS U/L 97   ALT (SGPT) U/L 19   AST (SGOT) U/L 17   GLUCOSE mg/dL 400*       Assessment/Plan     Assessment & Plan     51-year-old female poorly controlled diabetic with an open wound 8 x 4 cm infected with air in the subcutaneous tissue, also necrotic area in the mid sacral area 6 x 1-1/2 cm, 2 open areas noninfected in the right and left posterior calf, 3 cm each, also with poorly controlled blood pressure/hypertension, anemia hematocrit of 34, and vitamin D deficiency.  Currently plan to keep n.p.o., begin vancomycin, control diabetes, have been internal medicine see and evaluate and treat her multiple medical problems as well as myself, proceed with surgical intervention this afternoon for debridement of the left medial thigh wound, and packing, also right and left lower leg wound, as well as her lower sacral wound.  We will packed these, and look toward physical therapy to place a wound VAC in the very near future.    I discussed the patients findings and my recommendations with patient, family, nursing staff and primary care team.     Justin Perez MD  06/01/22  12:19 CDT    Time:Time spent with the patient 30 minutes     EMR Dragon/Transcription disclaimer: Much of this encounter note is an electronic transcription/translation of spoken language to printed text. The electronic translation of spoken language may permit erroneous, or at times, nonsensical words or phrases to be inadvertently transcribed; although I have reviewed the note for such errors, some may still exist.

## 2022-06-01 NOTE — PLAN OF CARE
Goal Outcome Evaluation:  Plan of Care Reviewed With: patient        Progress: no change  Outcome Evaluation: Pt admitted from ED to 3C for cellulitis of LLE and open wounds. Pt has mulitple wounds noted on BLE and lower back (pictures in the chart). Pt is going for debridement this afternoon. IVF, IV abx, voiding, up with assist x1 and cane. NPO. Lovenox for VTE. VSS, safety maintained

## 2022-06-01 NOTE — ANESTHESIA PROCEDURE NOTES
Airway  Urgency: elective    Date/Time: 6/1/2022 5:06 PM  Airway not difficult    General Information and Staff    Patient location during procedure: OR  CRNA/CAA: Geneva Lawrence CRNA    Indications and Patient Condition  Indications for airway management: airway protection    Preoxygenated: yes  Mask difficulty assessment: 2 - vent by mask + OA or adjuvant +/- NMBA    Final Airway Details  Final airway type: endotracheal airway      Successful airway: ETT  Cuffed: yes   Successful intubation technique: video laryngoscopy  Facilitating devices/methods: intubating stylet  Endotracheal tube insertion site: oral  Blade: Miranda  Blade size: 3  ETT size (mm): 7.0  Cormack-Lehane Classification: grade I - full view of glottis  Placement verified by: chest auscultation and capnometry   Cuff volume (mL): 6  Measured from: lips  ETT/EBT  to lips (cm): 22  Number of attempts at approach: 1  Assessment: lips, teeth, and gum same as pre-op and atraumatic intubation    Additional Comments  Atraumatic

## 2022-06-01 NOTE — ANESTHESIA PREPROCEDURE EVALUATION
Anesthesia Evaluation     Patient summary reviewed   no history of anesthetic complications:  NPO Solid Status: > 8 hours             Airway   Mallampati: II  Dental      Pulmonary    (-) COPD, asthma, sleep apnea, not a smoker  Cardiovascular     (-) pacemaker, past MI, angina, cardiac stents      Neuro/Psych  (-) seizures, TIA, CVA  GI/Hepatic/Renal/Endo    (+) morbid obesity, GERD,  renal disease CRI, diabetes mellitus using insulin,   (-) liver disease    Musculoskeletal     (+) chronic pain,   Abdominal    Substance History      OB/GYN          Other                        Anesthesia Plan    ASA 3 - emergent     general     intravenous induction     Anesthetic plan, all risks, benefits, and alternatives have been provided, discussed and informed consent has been obtained with: patient.        CODE STATUS:    Level Of Support Discussed With: Patient  Code Status (Patient has no pulse and is not breathing): CPR (Attempt to Resuscitate)  Medical Interventions (Patient has pulse or is breathing): Full Support

## 2022-06-01 NOTE — ED PROVIDER NOTES
Subjective   Ms. Wilson is a 51-year-old female with a complex medical history including diabetes.  She states that about 5 days ago she felt a hard bump on the anterior left thigh which progressively grew and became painful.  Yesterday she was leaning down and felt a popping and liquid dripping down her leg.  She presents today complaining of an increasing ulceration to this anterior thigh.  She is not sure if she has had any fevers.  She has had no nausea or vomiting.  She denies any trauma to that area.          Review of Systems   Skin: Positive for wound.   All other systems reviewed and are negative.      Past Medical History:   Diagnosis Date   • Anxiety    • Arthritis     Osteoarthritis primarily left knee    • Back pain    • Chronic pain syndrome 10/27/2021   • Depression    • Diabetes mellitus (HCC)    • Fibromyalgia, primary    • Hx of tear of meniscus of knee joint 11/21/2016   • Hypertension    • Joint pain    • Kidney stone    • Knee pain    • Lymphedema of both lower extremities 10/27/2021   • Migraine    • Migraine headache    • Pain     knee, left ankle, left ankle     • Pes anserinus bursitis 04/28/2015   • Restless leg        Allergies   Allergen Reactions   • Compazine [Prochlorperazine Edisylate] Shortness Of Breath     Breathing    • Meperidine Hives     (Demerol)    • Norflex [Orphenadrine] Hives   • Nortriptyline Hives   • Prochlorperazine Shortness Of Breath   • Prochlorperazine Maleate Shortness Of Breath   • Vancomycin Provider Review Needed and Unknown - High Severity     CRITICAL LEVEL RESULTS - PATIENT LIKELY HAS METABOLIC / CLEARANCE ISSUE WITH VANCOMYCIN. ON STANDARD REGIMENS BASED ON BAYESIAN/POPULATION PK PARAMETERS, VANCOMYCIN TROUGH LEVELS IN THE 60s, 70s, 90s, 100s WERE OBTAINED. RECOMMEND AGAINST EVER USING VANCOMYCIN IN THIS PATIENT. IF VANCOMYCIN IS ABSOLUTELY INDICATED WITHOUT ANY OTHER OPTIONS, PULSE INTERMITTENT DOSING WOULD LIKELY BE THE ONLY APPROPRIATE METHOD.    •  Codeine Nausea And Vomiting     Rash    • Penicillins Rash     Nausea & vomiting    • Calamine Itching   • Amoxicillin-Pot Clavulanate Rash, Hives and Itching   • Avelox [Moxifloxacin Hcl] Rash   • Cefazolin Nausea And Vomiting   • Cephalexin Rash     (Keflex)    • Ciprofloxacin Rash   • Clindamycin/Lincomycin Rash   • Doxycycline Nausea And Vomiting   • Hydroxyzine Hcl Itching   • Moxifloxacin Nausea And Vomiting   • Orphenadrine Citrate Itching   • Sulfamethoxazole-Trimethoprim Nausea And Vomiting and Rash     States she also gets yeast infections with it   • Tobramycin Other (See Comments)     Eyes burn-feel like eyes are on fire     • Vistaril [Hydroxyzine] Rash       Past Surgical History:   Procedure Laterality Date   • CATARACT EXTRACTION     • CATARACT EXTRACTION     • CATARACT EXTRACTION     • CORNEAL TRANSPLANT      bilateral    • ECTOPIC PREGNANCY     • INCISION AND DRAINAGE ABSCESS Left 6/1/2022    Procedure: DEBRIDEMENTAND PULSE LAVAGE LT MEDIAL THIGH PACKING ODOFORM GAUZE  WOUND SACRAL AND BILATERAL CALVES;  Surgeon: Justin Perez MD;  Location: [facility];  Service: General;  Laterality: Left;   • JOINT REPLACEMENT     • KNEE ARTHROSCOPY W/ MENISCAL REPAIR     • KNEE MENISCAL REPAIR     • MENISCECTOMY Left 12/20/2016   • TUBAL ABDOMINAL LIGATION         Family History   Problem Relation Age of Onset   • No Known Problems Mother    • Cancer Father    • Dementia Father    • Hypertension Maternal Grandmother    • Cancer Maternal Grandfather        Social History     Socioeconomic History   • Marital status: Single   Tobacco Use   • Smoking status: Never Smoker   • Smokeless tobacco: Never Used   Vaping Use   • Vaping Use: Never used   Substance and Sexual Activity   • Alcohol use: Yes   • Drug use: No   • Sexual activity: Not Currently     Partners: Male           Objective   Physical Exam  Vitals and nursing note reviewed.   Constitutional:       Appearance: She is well-developed.   HENT:      Head:  Normocephalic and atraumatic.      Right Ear: External ear normal.      Left Ear: External ear normal.      Nose: Nose normal.      Mouth/Throat:      Mouth: Mucous membranes are moist.      Pharynx: Oropharynx is clear.   Eyes:      Extraocular Movements: Extraocular movements intact.      Conjunctiva/sclera: Conjunctivae normal.   Cardiovascular:      Rate and Rhythm: Normal rate and regular rhythm.      Pulses: Normal pulses.      Heart sounds: Normal heart sounds.   Pulmonary:      Effort: Pulmonary effort is normal.      Breath sounds: Normal breath sounds.   Abdominal:      General: Bowel sounds are normal.      Palpations: Abdomen is soft.   Musculoskeletal:         General: Normal range of motion.      Cervical back: Normal range of motion and neck supple.   Skin:     General: Skin is warm and dry.      Capillary Refill: Capillary refill takes less than 2 seconds.             Comments: The patient has a 10 x 6 cm ulcerated lesion to the left anterior thigh with purulence and necrosis evident.   Neurological:      General: No focal deficit present.      Mental Status: She is alert and oriented to person, place, and time.   Psychiatric:         Behavior: Behavior normal.         Thought Content: Thought content normal.         Judgment: Judgment normal.         Procedures           ED Course                                                 MDM  Number of Diagnoses or Management Options     Amount and/or Complexity of Data Reviewed  Clinical lab tests: reviewed and ordered  Tests in the radiology section of CPT®: ordered and reviewed  Decide to obtain previous medical records or to obtain history from someone other than the patient: yes    Patient Progress  Patient progress: stable      Final diagnoses:   Open wound   Anemia, unspecified type   Pressure injury of sacral region, stage 3 (HCC)   Pressure injury of right buttock, stage 3 (HCC)   Pressure injury of left buttock, stage 3 (HCC)   Pressure injury of  coccygeal region, stage 3 (HCC)   Pressure injury of right calf, stage 3 (HCC)   Pressure injury of left calf, stage 3 (HCC)   Diabetic ulcer of right heel associated with type 2 diabetes mellitus, with fat layer exposed (HCC)   Diabetic ulcer of left heel associated with type 2 diabetes mellitus, with fat layer exposed (HCC)   Cellulitis of left leg   Abscess of left thigh       ED Disposition  ED Disposition     ED Disposition   Decision to Admit    Condition   --    Comment   Level of Care: Med/Surg [1]   Diagnosis: Cellulitis of left leg [[id number]]   Admitting Physician: TONNY PEREZ [[id number]]   Certification: I certify that inpatient hospital services are medically necessary for greater than 2 midnights.               [facility]  [address]  [facility] [address] [phone]             Medication List      No changes were made to your prescriptions during this visit.       I discussed the patient with Dr. Perez who assessed the patient in the ED and went ahead and admitted her.  The patient is currently stable     Loki Mchugh MD  06/01/22 1254       Loki Mchugh MD  06/05/22 1010

## 2022-06-02 LAB
ALBUMIN SERPL-MCNC: 2 G/DL (ref 3.5–5.2)
ALBUMIN/GLOB SERPL: 0.6 G/DL
ALP SERPL-CCNC: 76 U/L (ref 39–117)
ALT SERPL W P-5'-P-CCNC: 13 U/L (ref 1–33)
AMYLASE SERPL-CCNC: 8 U/L (ref 28–100)
ANION GAP SERPL CALCULATED.3IONS-SCNC: 10 MMOL/L (ref 5–15)
AST SERPL-CCNC: 14 U/L (ref 1–32)
BASOPHILS # BLD AUTO: 0.02 10*3/MM3 (ref 0–0.2)
BASOPHILS NFR BLD AUTO: 0.3 % (ref 0–1.5)
BILIRUB SERPL-MCNC: 0.3 MG/DL (ref 0–1.2)
BUN SERPL-MCNC: 15 MG/DL (ref 6–20)
BUN/CREAT SERPL: 14.3 (ref 7–25)
CALCIUM SPEC-SCNC: 7.2 MG/DL (ref 8.6–10.5)
CHLORIDE SERPL-SCNC: 97 MMOL/L (ref 98–107)
CO2 SERPL-SCNC: 29 MMOL/L (ref 22–29)
CREAT SERPL-MCNC: 1.05 MG/DL (ref 0.57–1)
D-LACTATE SERPL-SCNC: 1.9 MMOL/L (ref 0.5–2)
DEPRECATED RDW RBC AUTO: 47.6 FL (ref 37–54)
EGFRCR SERPLBLD CKD-EPI 2021: 65.3 ML/MIN/1.73
EOSINOPHIL # BLD AUTO: 0.24 10*3/MM3 (ref 0–0.4)
EOSINOPHIL NFR BLD AUTO: 3.1 % (ref 0.3–6.2)
ERYTHROCYTE [DISTWIDTH] IN BLOOD BY AUTOMATED COUNT: 16.6 % (ref 12.3–15.4)
GLOBULIN UR ELPH-MCNC: 3.3 GM/DL
GLUCOSE BLDC GLUCOMTR-MCNC: 170 MG/DL (ref 70–130)
GLUCOSE BLDC GLUCOMTR-MCNC: 171 MG/DL (ref 70–130)
GLUCOSE BLDC GLUCOMTR-MCNC: 222 MG/DL (ref 70–130)
GLUCOSE BLDC GLUCOMTR-MCNC: 311 MG/DL (ref 70–130)
GLUCOSE SERPL-MCNC: 205 MG/DL (ref 65–99)
HCT VFR BLD AUTO: 28.6 % (ref 34–46.6)
HGB BLD-MCNC: 8.9 G/DL (ref 12–15.9)
IMM GRANULOCYTES # BLD AUTO: 0.08 10*3/MM3 (ref 0–0.05)
IMM GRANULOCYTES NFR BLD AUTO: 1 % (ref 0–0.5)
INR PPP: 1.11 (ref 0.91–1.09)
LIPASE SERPL-CCNC: 8 U/L (ref 13–60)
LYMPHOCYTES # BLD AUTO: 1.17 10*3/MM3 (ref 0.7–3.1)
LYMPHOCYTES NFR BLD AUTO: 15.3 % (ref 19.6–45.3)
MCH RBC QN AUTO: 24.9 PG (ref 26.6–33)
MCHC RBC AUTO-ENTMCNC: 31.1 G/DL (ref 31.5–35.7)
MCV RBC AUTO: 79.9 FL (ref 79–97)
MONOCYTES # BLD AUTO: 0.19 10*3/MM3 (ref 0.1–0.9)
MONOCYTES NFR BLD AUTO: 2.5 % (ref 5–12)
NEUTROPHILS NFR BLD AUTO: 5.96 10*3/MM3 (ref 1.7–7)
NEUTROPHILS NFR BLD AUTO: 77.8 % (ref 42.7–76)
NRBC BLD AUTO-RTO: 0 /100 WBC (ref 0–0.2)
PLATELET # BLD AUTO: 182 10*3/MM3 (ref 140–450)
PMV BLD AUTO: 9.3 FL (ref 6–12)
POTASSIUM SERPL-SCNC: 3 MMOL/L (ref 3.5–5.2)
PROT SERPL-MCNC: 5.3 G/DL (ref 6–8.5)
PROTHROMBIN TIME: 13.9 SECONDS (ref 11.9–14.6)
RBC # BLD AUTO: 3.58 10*6/MM3 (ref 3.77–5.28)
SODIUM SERPL-SCNC: 136 MMOL/L (ref 136–145)
WBC NRBC COR # BLD: 7.66 10*3/MM3 (ref 3.4–10.8)

## 2022-06-02 PROCEDURE — 85610 PROTHROMBIN TIME: CPT | Performed by: SPECIALIST

## 2022-06-02 PROCEDURE — 99222 1ST HOSP IP/OBS MODERATE 55: CPT | Performed by: NURSE PRACTITIONER

## 2022-06-02 PROCEDURE — 83690 ASSAY OF LIPASE: CPT | Performed by: SPECIALIST

## 2022-06-02 PROCEDURE — 05HF33Z INSERTION OF INFUSION DEVICE INTO LEFT CEPHALIC VEIN, PERCUTANEOUS APPROACH: ICD-10-PCS | Performed by: SPECIALIST

## 2022-06-02 PROCEDURE — C1751 CATH, INF, PER/CENT/MIDLINE: HCPCS

## 2022-06-02 PROCEDURE — 36410 VNPNXR 3YR/> PHY/QHP DX/THER: CPT

## 2022-06-02 PROCEDURE — 25010000002 VANCOMYCIN 1 G RECONSTITUTED SOLUTION 1 EACH VIAL: Performed by: SPECIALIST

## 2022-06-02 PROCEDURE — 63710000001 INSULIN LISPRO (HUMAN) PER 5 UNITS: Performed by: SPECIALIST

## 2022-06-02 PROCEDURE — 97605 NEG PRS WND THER DME<=50SQCM: CPT

## 2022-06-02 PROCEDURE — 25010000002 LORAZEPAM PER 2 MG: Performed by: SPECIALIST

## 2022-06-02 PROCEDURE — 25010000002 MORPHINE SULFATE (PF) 2 MG/ML SOLUTION: Performed by: SPECIALIST

## 2022-06-02 PROCEDURE — 25010000002 ENOXAPARIN PER 10 MG: Performed by: SPECIALIST

## 2022-06-02 PROCEDURE — 97162 PT EVAL MOD COMPLEX 30 MIN: CPT

## 2022-06-02 PROCEDURE — 82962 GLUCOSE BLOOD TEST: CPT

## 2022-06-02 PROCEDURE — 82150 ASSAY OF AMYLASE: CPT | Performed by: SPECIALIST

## 2022-06-02 PROCEDURE — 63710000001 INSULIN DETEMIR PER 5 UNITS: Performed by: INTERNAL MEDICINE

## 2022-06-02 PROCEDURE — 99232 SBSQ HOSP IP/OBS MODERATE 35: CPT | Performed by: SPECIALIST

## 2022-06-02 PROCEDURE — 0 HYDROMORPHONE 1 MG/ML SOLUTION: Performed by: SPECIALIST

## 2022-06-02 PROCEDURE — 85025 COMPLETE CBC W/AUTO DIFF WBC: CPT | Performed by: SPECIALIST

## 2022-06-02 PROCEDURE — 80053 COMPREHEN METABOLIC PANEL: CPT | Performed by: SPECIALIST

## 2022-06-02 PROCEDURE — 83605 ASSAY OF LACTIC ACID: CPT | Performed by: FAMILY MEDICINE

## 2022-06-02 RX ORDER — GABAPENTIN 300 MG/1
600 CAPSULE ORAL 3 TIMES DAILY
Status: DISCONTINUED | OUTPATIENT
Start: 2022-06-02 | End: 2022-06-10 | Stop reason: HOSPADM

## 2022-06-02 RX ORDER — FLUTICASONE PROPIONATE 50 MCG
1 SPRAY, SUSPENSION (ML) NASAL DAILY
Status: DISCONTINUED | OUTPATIENT
Start: 2022-06-02 | End: 2022-06-10 | Stop reason: HOSPADM

## 2022-06-02 RX ORDER — DEXTROSE MONOHYDRATE 25 G/50ML
25 INJECTION, SOLUTION INTRAVENOUS
Status: DISCONTINUED | OUTPATIENT
Start: 2022-06-02 | End: 2022-06-02 | Stop reason: SDUPTHER

## 2022-06-02 RX ORDER — INSULIN LISPRO 100 [IU]/ML
40 INJECTION, SOLUTION INTRAVENOUS; SUBCUTANEOUS NIGHTLY
Status: ON HOLD | COMMUNITY
End: 2022-06-06

## 2022-06-02 RX ORDER — NICOTINE POLACRILEX 4 MG
15 LOZENGE BUCCAL
Status: DISCONTINUED | OUTPATIENT
Start: 2022-06-02 | End: 2022-06-02 | Stop reason: SDUPTHER

## 2022-06-02 RX ORDER — INSULIN LISPRO 100 [IU]/ML
0-14 INJECTION, SOLUTION INTRAVENOUS; SUBCUTANEOUS
Status: DISCONTINUED | OUTPATIENT
Start: 2022-06-03 | End: 2022-06-10 | Stop reason: HOSPADM

## 2022-06-02 RX ORDER — DULOXETIN HYDROCHLORIDE 30 MG/1
60 CAPSULE, DELAYED RELEASE ORAL 2 TIMES DAILY
Status: DISCONTINUED | OUTPATIENT
Start: 2022-06-02 | End: 2022-06-10 | Stop reason: HOSPADM

## 2022-06-02 RX ORDER — BUSPIRONE HYDROCHLORIDE 10 MG/1
10 TABLET ORAL 3 TIMES DAILY
Status: DISCONTINUED | OUTPATIENT
Start: 2022-06-02 | End: 2022-06-10 | Stop reason: HOSPADM

## 2022-06-02 RX ORDER — PRAMIPEXOLE DIHYDROCHLORIDE 0.25 MG/1
0.75 TABLET ORAL 2 TIMES DAILY
Status: DISCONTINUED | OUTPATIENT
Start: 2022-06-02 | End: 2022-06-10 | Stop reason: HOSPADM

## 2022-06-02 RX ORDER — PANTOPRAZOLE SODIUM 40 MG/1
40 TABLET, DELAYED RELEASE ORAL EVERY MORNING
Status: DISCONTINUED | OUTPATIENT
Start: 2022-06-02 | End: 2022-06-10 | Stop reason: HOSPADM

## 2022-06-02 RX ORDER — LIDOCAINE HYDROCHLORIDE 10 MG/ML
1 INJECTION, SOLUTION EPIDURAL; INFILTRATION; INTRACAUDAL; PERINEURAL ONCE
Status: COMPLETED | OUTPATIENT
Start: 2022-06-02 | End: 2022-06-02

## 2022-06-02 RX ORDER — MIRTAZAPINE 15 MG/1
15 TABLET, FILM COATED ORAL NIGHTLY
Status: DISCONTINUED | OUTPATIENT
Start: 2022-06-02 | End: 2022-06-10 | Stop reason: HOSPADM

## 2022-06-02 RX ADMIN — DULOXETINE HYDROCHLORIDE 60 MG: 30 CAPSULE, DELAYED RELEASE ORAL at 21:56

## 2022-06-02 RX ADMIN — BUSPIRONE HYDROCHLORIDE 10 MG: 10 TABLET ORAL at 21:56

## 2022-06-02 RX ADMIN — VANCOMYCIN HYDROCHLORIDE 1000 MG: 1 INJECTION, POWDER, LYOPHILIZED, FOR SOLUTION INTRAVENOUS at 00:10

## 2022-06-02 RX ADMIN — HYDROCODONE BITARTRATE AND ACETAMINOPHEN 1 TABLET: 7.5; 325 TABLET ORAL at 10:12

## 2022-06-02 RX ADMIN — PRAMIPEXOLE DIHYDROCHLORIDE 0.75 MG: 0.25 TABLET ORAL at 21:56

## 2022-06-02 RX ADMIN — MIRTAZAPINE 15 MG: 15 TABLET, FILM COATED ORAL at 21:56

## 2022-06-02 RX ADMIN — MORPHINE SULFATE 4 MG: 2 INJECTION, SOLUTION INTRAMUSCULAR; INTRAVENOUS at 06:51

## 2022-06-02 RX ADMIN — HYDROCODONE BITARTRATE AND ACETAMINOPHEN 1 TABLET: 7.5; 325 TABLET ORAL at 23:03

## 2022-06-02 RX ADMIN — DULOXETINE HYDROCHLORIDE 60 MG: 30 CAPSULE, DELAYED RELEASE ORAL at 11:58

## 2022-06-02 RX ADMIN — PRAMIPEXOLE DIHYDROCHLORIDE 0.75 MG: 0.25 TABLET ORAL at 11:59

## 2022-06-02 RX ADMIN — INSULIN LISPRO 2 UNITS: 100 INJECTION, SOLUTION INTRAVENOUS; SUBCUTANEOUS at 11:46

## 2022-06-02 RX ADMIN — INSULIN LISPRO 4 UNITS: 100 INJECTION, SOLUTION INTRAVENOUS; SUBCUTANEOUS at 09:17

## 2022-06-02 RX ADMIN — BUSPIRONE HYDROCHLORIDE 10 MG: 10 TABLET ORAL at 11:59

## 2022-06-02 RX ADMIN — GABAPENTIN 600 MG: 300 CAPSULE ORAL at 11:45

## 2022-06-02 RX ADMIN — INSULIN DETEMIR 30 UNITS: 100 INJECTION, SOLUTION SUBCUTANEOUS at 21:54

## 2022-06-02 RX ADMIN — LORAZEPAM 1 MG: 2 INJECTION INTRAMUSCULAR; INTRAVENOUS at 16:34

## 2022-06-02 RX ADMIN — HYDROMORPHONE HYDROCHLORIDE 0.5 MG: 1 INJECTION, SOLUTION INTRAMUSCULAR; INTRAVENOUS; SUBCUTANEOUS at 20:00

## 2022-06-02 RX ADMIN — FAMOTIDINE 20 MG: 20 TABLET, FILM COATED ORAL at 09:17

## 2022-06-02 RX ADMIN — VANCOMYCIN HYDROCHLORIDE 1000 MG: 1 INJECTION, POWDER, LYOPHILIZED, FOR SOLUTION INTRAVENOUS at 14:38

## 2022-06-02 RX ADMIN — HYDROMORPHONE HYDROCHLORIDE 0.5 MG: 1 INJECTION, SOLUTION INTRAMUSCULAR; INTRAVENOUS; SUBCUTANEOUS at 17:10

## 2022-06-02 RX ADMIN — SUCRALFATE 1 G: 1 TABLET ORAL at 17:14

## 2022-06-02 RX ADMIN — SODIUM CHLORIDE 100 ML/HR: 9 INJECTION, SOLUTION INTRAVENOUS at 06:07

## 2022-06-02 RX ADMIN — POTASSIUM CHLORIDE 40 MEQ: 10 CAPSULE, COATED, EXTENDED RELEASE ORAL at 09:17

## 2022-06-02 RX ADMIN — MORPHINE SULFATE 4 MG: 2 INJECTION, SOLUTION INTRAMUSCULAR; INTRAVENOUS at 10:45

## 2022-06-02 RX ADMIN — SODIUM CHLORIDE 100 ML/HR: 9 INJECTION, SOLUTION INTRAVENOUS at 23:21

## 2022-06-02 RX ADMIN — HYDROCODONE BITARTRATE AND ACETAMINOPHEN 1 TABLET: 7.5; 325 TABLET ORAL at 05:08

## 2022-06-02 RX ADMIN — HYDROMORPHONE HYDROCHLORIDE 0.5 MG: 1 INJECTION, SOLUTION INTRAMUSCULAR; INTRAVENOUS; SUBCUTANEOUS at 13:18

## 2022-06-02 RX ADMIN — INSULIN LISPRO 7 UNITS: 100 INJECTION, SOLUTION INTRAVENOUS; SUBCUTANEOUS at 17:19

## 2022-06-02 RX ADMIN — SUCRALFATE 1 G: 1 TABLET ORAL at 11:58

## 2022-06-02 RX ADMIN — PANTOPRAZOLE SODIUM 40 MG: 40 TABLET, DELAYED RELEASE ORAL at 11:45

## 2022-06-02 RX ADMIN — Medication 10 ML: at 09:18

## 2022-06-02 RX ADMIN — SUCRALFATE 1 G: 1 TABLET ORAL at 21:56

## 2022-06-02 RX ADMIN — MORPHINE SULFATE 4 MG: 2 INJECTION, SOLUTION INTRAMUSCULAR; INTRAVENOUS at 00:13

## 2022-06-02 RX ADMIN — GABAPENTIN 600 MG: 300 CAPSULE ORAL at 21:55

## 2022-06-02 RX ADMIN — Medication 10 ML: at 21:56

## 2022-06-02 RX ADMIN — MORPHINE SULFATE 4 MG: 2 INJECTION, SOLUTION INTRAMUSCULAR; INTRAVENOUS at 09:18

## 2022-06-02 RX ADMIN — SUCRALFATE 1 G: 1 TABLET ORAL at 09:30

## 2022-06-02 RX ADMIN — GABAPENTIN 600 MG: 300 CAPSULE ORAL at 17:19

## 2022-06-02 RX ADMIN — LIDOCAINE HYDROCHLORIDE ANHYDROUS 1 ML: 10 INJECTION, SOLUTION INFILTRATION at 13:07

## 2022-06-02 RX ADMIN — BUSPIRONE HYDROCHLORIDE 10 MG: 10 TABLET ORAL at 17:14

## 2022-06-02 NOTE — PAYOR COMM NOTE
ADMIT INPT 6-1-22  UR  [phone]    ATTN Zuly Tamez (51 y.o. Female)             Date of Birth   1970    Social Security Number       Address   [address] [place] [id number]    Home Phone   [phone]    MRN   [id number]       [employer]   [facility]    Marital Status   Single                            Admission Date   6/1/22    Admission Type   Emergency    Admitting Provider   Justin Perez MD    Attending Provider   Justin Perez MD    Department, Room/Bed   [facility] 3C, 378/1       Discharge Date       Discharge Disposition       Discharge Destination                               Attending Provider: Justin Perez MD    Allergies: Compazine [Prochlorperazine Edisylate], Meperidine, Norflex [Orphenadrine], Nortriptyline, Prochlorperazine, Prochlorperazine Maleate, Codeine, Penicillins, Calamine, Amoxicillin-pot Clavulanate, Avelox [Moxifloxacin Hcl], Cefazolin, Cephalexin, Ciprofloxacin, Clindamycin/lincomycin, Doxycycline, Hydroxyzine Hcl, Moxifloxacin, Orphenadrine Citrate, Sulfamethoxazole-trimethoprim, Tobramycin, Vistaril [Hydroxyzine]    Isolation: None   Infection: COVID (rule out) (06/01/22)   Code Status: CPR   Advance Care Planning Activity    Ht: 154.9 cm (61\")   Wt: 141 kg (311 lb)    Admission Cmt: None   Principal Problem: None                Active Insurance as of 6/1/2022     Primary Coverage     Payor Plan Insurance Group Employer/Plan Group    [employer] [place] [employer] [place]ICA[place]      Payor Plan Address Payor Plan Phone Number Payor Plan Fax Number Effective Dates    [address] [address] [phone]  10/1/2016 - None Entered    [facility] [id number]       Subscriber Name Subscriber Birth Date Member ID       ZULY CARPENTER 1970 [id number]                 Emergency Contacts      (Rel.) Home Phone Work Phone Mobile Phone    Betty Abebe (Mother) [phone] -- --    WINNIE HORAN (Significant Other) -- --  [phone]            Insurance Information                [facility] MEDICAID Phone: [phone]    Subscriber: Valeria Wilson Subscriber#: [id number]    Group#:  Precert#: --

## 2022-06-02 NOTE — PROGRESS NOTES
Pharmacy Dosing Service  Pharmacokinetics  Vancomycin Initial Evaluation  Assessment/Action/Plan:  Loading dose: 2000 mg 6/1/2022 1045  Current Order: Vancomycin 1000 mg IVPB every 12 hours  Current end date:6/8/2022 1400  Levels: Vancomycin trough ordered before dose due on 6/3/2022 at 1400  Additional antimicrobial agent(s): none    Vancomycin dosage initiated based on population pharmacokinetic parameters. Pharmacy will continue to follow daily and adjust dose accordingly.     Subjective:  Valeria Wilson is a 51 y.o. female initiated on Vancomycin 2000 mg IV once followed by 1000 mg every 12 hours by prescriber for the treatment of SSTI , day 2 of 7 of treatment.    AUC Model Data:  Loading dose: 2000 mg  Regimen: 1000 mg IV every 12 hours.  Start time: 14:00 on 06/02/2022  Exposure target: AUC24 (range)400-600 mg/L.hr   AUC24,ss: 608 mg/L.hr  PAUC*: 79 %  Ctrough,ss: 18.8 mg/L  Pconc*: 47 %  Tox.: 15 %      Objective:  Ht: 154.9 cm (61\"); Wt: (!) 141 kg (311 lb)  Estimated Creatinine Clearance: 87.1 mL/min (A) (by C-G formula based on SCr of 1.05 mg/dL (H)).   Creatinine   Date Value Ref Range Status   06/02/2022 1.05 (H) 0.57 - 1.00 mg/dL Final   06/01/2022 1.44 (H) 0.57 - 1.00 mg/dL Final   04/18/2022 1 (H) 0.5 - 0.9 mg/dL Final   01/25/2022 1.04 (H) 0.57 - 1.00 mg/dL Final   06/28/2021 0.8 0.5 - 0.9 mg/dL Final   04/11/2021 0.9 0.5 - 0.9 mg/dL Final      Lab Results   Component Value Date    WBC 7.66 06/02/2022    WBC 8.53 06/01/2022    WBC 6.5 04/15/2022      Baseline culture results:  Microbiology Results (last 10 days)       Procedure Component Value - Date/Time    Tissue / Bone Culture - Tissue, Foot, Left [[id number]] Collected: 06/01/22 1811    Lab Status: Preliminary result Specimen: Tissue from Foot, Left Updated: 06/02/22 0401     Gram Stain No WBCs seen      Few (2+) Gram positive cocci      Rare (1+) Gram negative bacilli    Tissue / Bone Culture - Tissue, Thigh, Left [[id number]]  (Abnormal)  Collected: 06/01/22 1722    Lab Status: Preliminary result Specimen: Tissue from Thigh, Left Updated: 06/02/22 1256     Tissue Culture Light growth (2+) Gram Negative Bacilli     Gram Stain Few (2+) WBCs seen      No organisms seen    Blood Culture - Blood, Arm, Right [[id number]]  (Normal) Collected: 06/01/22 0852    Lab Status: Preliminary result Specimen: Blood from Arm, Right Updated: 06/02/22 0918     Blood Culture No growth at 24 hours    COVID-19,[facility] IN-HOUSE,Nasal Swab No Transport Media 3-4 HR TAT - Swab, Nasal Cavity [[id number]]  (Normal) Collected: 06/01/22 0846    Lab Status: Final result Specimen: Swab from Nasal Cavity Updated: 06/01/22 0936     COVID19 Not Detected    Narrative:      Fact sheet for providers: https://www.fda.gov/media/801926/download     Fact sheet for patients: https://www.fda.gov/media/729723/download    Test performed by PCR.    Consider negative results in combination with clinical observations, patient history, and epidemiological information.    Wound Culture - Wound, Leg, Left [[id number]]  (Abnormal) Collected: 06/01/22 0846    Lab Status: Preliminary result Specimen: Wound from Leg, Left Updated: 06/02/22 0551     Wound Culture Moderate growth (3+) Gram Negative Bacilli     Gram Stain Rare (1+) WBCs seen      Moderate (3+) Gram negative bacilli    Blood Culture - Blood, Arm, Right [[id number]]  (Normal) Collected: 06/01/22 0837    Lab Status: Preliminary result Specimen: Blood from Arm, Right Updated: 06/02/22 1047     Blood Culture No growth at 24 hours            Joselito Kunz, PharmD  06/02/22 13:08 CDT

## 2022-06-02 NOTE — CONSULTS
[facility]  INPATIENT WOUND CONSULTATION    Today's Date: 06/02/22    Patient Name: Valeria Wilson  MRN: [id number]  CSN: [id number]  PCP: No primary care provider on file.  Referring Provider:   Consulting Provider (From admission, onward)    Start Ordered     Status Ordering Provider    06/02/22 1256 06/02/22 1256  Inpatient Wound Care Provider Consult  Once        Specialty:  Wound Care  Provider:  Yoko Chang APRN Acknowledged STIGALL, KEVIN E         Attending Provider: Justin Perez MD  Length of Stay: 1    SUBJECTIVE   Chief Complaint: Multiple wounds of bilateral lower extremities buttocks, sacral area    HPI: Valeria Wilson, a 51 y.o.female, presents with a past medical history of hypertension, diabetes, chronic pain syndrome, lymphedema, fibromyalgia.  A full past medical history as listed below.  Patient was being treated by [facility] for wounds of bilateral lower legs.  Wound treatment included silvercel AG, optilock, ABD pad, and rolled gauze with ace wrap.  Her last visit was on 03/07/22.  [facility].  Patient reports over the past month she has decreased her activity due to pain of heels.  She reports she was on her feet approximately 6 hours total a day, since decreased activity, she is up on her feet 1 to 2 hours a day total.      Patient reports wound of left thigh started 4 days prior to admission.  She applied heat to it and it ruptured 2 days prior to admission.  She made appointment with SHOLA Erickson to see this wound.  Patient reports SHOLA Erickson sent her to ED.  Patient states she went home due to wait in ED.  She reported back to emergency room early yesterday morning.  Patient was admitted by Dr. Perez and he took her to surgery to debride multiple wounds.  Wounds debrided included left calf, right calf, left heel, left thigh abscess, right buttock, left buttock, pilonidal region, and  sacrum.  Tissue from wounds of left thigh and left heel were collected during surgery for culture and tissue collected from coccyx and sacral area for pathology.  Patient reports she was unaware of wounds of buttocks and sacral area.  Had tissue patient pictures of wounds for her to understand what areas were debrided.    Patient has type 2 diabetes which she states she is on insulin.  She tells me she believes her A1c is around 11.  According to chart review her A1c was collected on 6/1 is 11.  Reviewed multiple A1c levels dating back to 12/17/2018 with all results being between 7.8 to 11.6.  During conversation her son comes in with a milkshake and she reports she does not follow a diet.      Visit Dx:    ICD-10-CM ICD-9-CM   1. Open wound  T14.8XXA 879.8   2. Anemia, unspecified type  D64.9 285.9     Patient Active Problem List   Diagnosis   • Hypertension   • Type 2 diabetes mellitus with hyperglycemia, without long-term current use of insulin ([facility])   • Obesity, morbid, BMI 50 or higher ([facility])   • Stage 3 chronic kidney disease   • Bacteremia due to Klebsiella pneumoniae   • Urinary tract infection   • Chronic pain syndrome   • Lymphedema of both lower extremities   • Bilateral lower leg cellulitis   • RLS (restless legs syndrome)   • Diabetic ulcer of left lower leg associated with type 2 diabetes mellitus, with fat layer exposed ([facility])   • Dyspnea   • MARCELINA (generalized anxiety disorder)   • Gastroesophageal reflux disease   • Headache   • Hypoglycemia   • Hypokalemia   • Migraine without aura and without status migrainosus, not intractable   • Mild single current episode of major depressive disorder ([facility])   • Morbid obesity with BMI of 50.0-59.9, adult ([facility])   • Old complex tear of medial meniscus of left knee   • Pleurisy   • Polypharmacy   • Type 2 diabetes mellitus with hyperglycemia, with long-term current use of insulin ([facility])   • Venous insufficiency of both lower extremities   • Anemia   • Open wound   •  Cellulitis of left leg       History:   Past Medical History:   Diagnosis Date   • Anxiety    • Arthritis     Osteoarthritis primarily left knee    • Back pain    • Chronic pain syndrome 10/27/2021   • Depression    • Diabetes mellitus (HCC)    • Fibromyalgia, primary    • Hx of tear of meniscus of knee joint 11/21/2016   • Hypertension    • Joint pain    • Kidney stone    • Knee pain    • Lymphedema of both lower extremities 10/27/2021   • Migraine    • Migraine headache    • Pain     knee, left ankle, left ankle     • Pes anserinus bursitis 04/28/2015   • Restless leg      Past Surgical History:   Procedure Laterality Date   • CATARACT EXTRACTION     • CATARACT EXTRACTION     • CATARACT EXTRACTION     • CORNEAL TRANSPLANT      bilateral    • ECTOPIC PREGNANCY     • INCISION AND DRAINAGE ABSCESS Left 6/1/2022    Procedure: DEBRIDEMENTAND PULSE LAVAGE LT MEDIAL THIGH PACKING ODOFORM GAUZE  WOUND SACRAL AND BILATERAL CALVES;  Surgeon: Justin Perez MD;  Location: [facility];  Service: General;  Laterality: Left;   • JOINT REPLACEMENT     • KNEE ARTHROSCOPY W/ MENISCAL REPAIR     • KNEE MENISCAL REPAIR     • MENISCECTOMY Left 12/20/2016   • TUBAL ABDOMINAL LIGATION       Social History     Socioeconomic History   • Marital status: Single   Tobacco Use   • Smoking status: Never Smoker   • Smokeless tobacco: Never Used   Vaping Use   • Vaping Use: Never used   Substance and Sexual Activity   • Alcohol use: Yes   • Drug use: No   • Sexual activity: Not Currently     Partners: Male     Family History   Problem Relation Age of Onset   • No Known Problems Mother    • Cancer Father    • Dementia Father    • Hypertension Maternal Grandmother    • Cancer Maternal Grandfather        Allergies:  Allergies   Allergen Reactions   • Compazine [Prochlorperazine Edisylate] Shortness Of Breath     Breathing    • Meperidine Hives     (Demerol)    • Norflex [Orphenadrine] Hives   • Nortriptyline Hives   • Prochlorperazine Shortness  Of Breath   • Prochlorperazine Maleate Shortness Of Breath   • Codeine Nausea And Vomiting     Rash    • Penicillins Rash     Nausea & vomiting    • Calamine Itching   • Amoxicillin-Pot Clavulanate Rash, Hives and Itching   • Avelox [Moxifloxacin Hcl] Rash   • Cefazolin Nausea And Vomiting   • Cephalexin Rash     (Keflex)    • Ciprofloxacin Rash   • Clindamycin/Lincomycin Rash   • Doxycycline Nausea And Vomiting   • Hydroxyzine Hcl Itching   • Moxifloxacin Nausea And Vomiting   • Orphenadrine Citrate Itching   • Sulfamethoxazole-Trimethoprim Nausea And Vomiting and Rash     States she also gets yeast infections with it   • Tobramycin Other (See Comments)     Eyes burn-feel like eyes are on fire     • Vistaril [Hydroxyzine] Rash       Medications:    Current Facility-Administered Medications:   •  albuterol (PROVENTIL) nebulizer solution 0.5% 2.5 mg/0.5mL, 2.5 mg, Nebulization, Q6H PRN, Justin Perez MD  •  busPIRone (BUSPAR) tablet 10 mg, 10 mg, Oral, TID, Lucas Tatum MD, 10 mg at 06/02/22 1159  •  dextrose (D50W) (25 g/50 mL) IV injection 25 g, 25 g, Intravenous, Q15 Min PRN, Justin Perez MD  •  dextrose (GLUTOSE) oral gel 15 g, 15 g, Oral, Q15 Min PRN, Justin ePrez MD  •  DULoxetine (CYMBALTA) DR capsule 60 mg, 60 mg, Oral, BID, Lucas Tautm MD, 60 mg at 06/02/22 1158  •  Enoxaparin Sodium (LOVENOX) syringe 30 mg, 30 mg, Subcutaneous, Q12H, Justin Perez MD  •  fluticasone (FLONASE) 50 MCG/ACT nasal spray 1 spray, 1 spray, Nasal, Daily, Lucas Tatum MD  •  gabapentin (NEURONTIN) capsule 600 mg, 600 mg, Oral, TID, Lucas Tatum MD, 600 mg at 06/02/22 1145  •  glucagon (human recombinant) (GLUCAGEN DIAGNOSTIC) injection 1 mg, 1 mg, Intramuscular, Q15 Min PRN, Justin Perez MD  •  HYDROcodone-acetaminophen (NORCO) 7.5-325 MG per tablet 1 tablet, 1 tablet, Oral, Q4H PRN, Justin Perez MD, 1 tablet at 06/02/22 1012  •  HYDROmorphone (DILAUDID) injection 0.5 mg, 0.5  mg, Intravenous, Q2H PRN, Justin Perez MD, 0.5 mg at 06/02/22 1318  •  insulin detemir (LEVEMIR) injection 30 Units, 30 Units, Subcutaneous, Nightly, Lucas Tatum MD  •  Insulin Lispro (humaLOG) injection 0-9 Units, 0-9 Units, Subcutaneous, TID AC, Justin Perez MD, 2 Units at 06/02/22 1146  •  LORazepam (ATIVAN) injection 1 mg, 1 mg, Intravenous, Q4H PRN, Justin Perez MD  •  mirtazapine (REMERON) tablet 15 mg, 15 mg, Oral, Nightly, Lucas Tatum MD  •  ondansetron (ZOFRAN) 8 mg/50 mL NS IVPB, 8 mg, Intravenous, Q6H PRN, Justin Perez MD  •  ondansetron ODT (ZOFRAN-ODT) disintegrating tablet 8 mg, 8 mg, Oral, Q6H PRN, Justin Perez MD  •  pantoprazole (PROTONIX) EC tablet 40 mg, 40 mg, Oral, QAM, Lucas Tatum MD, 40 mg at 06/02/22 1145  •  potassium chloride (MICRO-K) CR capsule 40 mEq, 40 mEq, Oral, Daily, Loki Mchugh MD, 40 mEq at 06/02/22 0917  •  pramipexole (MIRAPEX) tablet 0.75 mg, 0.75 mg, Oral, BID, Lucas Tatum MD, 0.75 mg at 06/02/22 1159  •  simethicone (MYLICON) chewable tablet 80 mg, 80 mg, Oral, 4x Daily PRN, Justin Perez MD  •  sodium chloride 0.9 % flush 1-10 mL, 1-10 mL, Intravenous, PRN, Justin Perez MD  •  sodium chloride 0.9 % flush 10 mL, 10 mL, Intravenous, Q12H, Justin Perez MD, 10 mL at 06/02/22 0918  •  sodium chloride 0.9 % infusion, 100 mL/hr, Intravenous, Continuous, Justin Perez MD, Last Rate: 100 mL/hr at 06/02/22 0607, 100 mL/hr at 06/02/22 0607  •  sucralfate (CARAFATE) tablet 1 g, 1 g, Oral, 4x Daily AC & at Bedtime, Justin Perez MD, 1 g at 06/02/22 1158  •  vancomycin (VANCOCIN) 1,000 mg in sodium chloride 0.9 % 250 mL IVPB, 1,000 mg, Intravenous, Q12H, Justin Perez MD, 1,000 mg at 06/02/22 1438    Review of Systems:   Review of Systems   Constitutional: Positive for activity change, chills and fatigue.   HENT: Negative for sinus pressure and sore throat.    Respiratory: Negative for cough and  shortness of breath.    Cardiovascular: Positive for leg swelling. Negative for chest pain and palpitations.   Gastrointestinal: Negative for diarrhea, nausea and vomiting.   Genitourinary: Positive for urgency. Negative for frequency.        Mixed (stress and urge) incontinence   Musculoskeletal: Positive for arthralgias, gait problem and myalgias.   Skin: Positive for color change and wound.   Neurological: Positive for weakness. Negative for dizziness.   Psychiatric/Behavioral: Negative for agitation, behavioral problems and confusion.         OBJECTIVE     Vitals:    06/02/22 1204   BP: 122/50   Pulse: 85   Resp: 16   Temp: 98.7 °F (37.1 °C)   SpO2: 92%       PHYSICAL EXAM  Physical Exam  Vitals and nursing note reviewed.   Constitutional:       Appearance: She is morbidly obese.      Comments: Body mass index is 58.76 kg/m².    HENT:      Head: Normocephalic and atraumatic.   Eyes:      General: Lids are normal. Gaze aligned appropriately.   Cardiovascular:      Rate and Rhythm: Normal rate and regular rhythm.   Pulmonary:      Effort: Pulmonary effort is normal. No respiratory distress.   Abdominal:      General: Abdomen is protuberant.      Palpations: Abdomen is soft.   Musculoskeletal:      Cervical back: Normal range of motion and neck supple.   Feet:      Right foot:      Skin integrity: Ulcer and skin breakdown present.      Left foot:      Skin integrity: Ulcer and skin breakdown present.      Comments: Pressure injury bilateral heels, stage 3-subcutaneous tissue present in wound bases of bilateral heels.  Surgical debridement completed.  No necrotic tissue present.  Edges are regular attached to wound bed.  No undermining or tunneling noted.  Periwound area is erythemic and blanchable.    Skin:     General: Skin is warm and dry.      Findings: Erythema and wound present.      Comments: Wound of sacral area is noted to be related to pressure.  Did not see prior to surgical debridement.  Difficult to see  during assessment as patient states is very painful to turn.  With chart review, assessment of picture and what is seen during turning, this appears to have mixed diagnosis of intertrigo with pressure and possible infectious process.  Wound base with subcutaneous tissue which is been surgically debrided.  Appeared to be a deep tissue injury prior to surgery with blister formation present and erythema of the periwound area.  Currently large open wound measuring approximately 4 cm x 11 cm x 4 cm.  Serosanguineous drainage present.  Edges are irregular no tunneling or undermining noted.  Patient has open wounds of bilateral posterior calves.  Left calf measures 3 cm x 3 cm x 1.2 cm.  Right calf measures 2 cm x 2 cm x 0.5 cm.  Subcutaneous tissue present.  Edges attached to wound bed.  No tunneling undermining present.  Serosanguineous drainage.  Periwound area intact.  Mixed etiology of pressure and diabetic ulceration.  Left thigh abscess was surgically debrided.  Wound VAC currently in place.  PT measured 10 cm x 4 cm x 5 cm.     Neurological:      Mental Status: She is alert, oriented to person, place, and time and easily aroused.      Motor: Weakness present.      Gait: Gait abnormal.   Psychiatric:         Attention and Perception: Attention normal.         Mood and Affect: Mood is depressed. Affect is tearful.         Speech: Speech normal.      Comments: After discussion of care, patient became tearful.  She states she is \"tired of being poked and prodded.\"              Results Review:  Lab Results (last 48 hours)     Procedure Component Value Units Date/Time    Tissue / Bone Culture - Tissue, Foot, Left [[id number]]  (Abnormal) Collected: 06/01/22 1811    Specimen: Tissue from Foot, Left Updated: 06/02/22 1320     Tissue Culture Scant growth (1+) Gram Negative Bacilli     Gram Stain No WBCs seen      Few (2+) Gram positive cocci      Rare (1+) Gram negative bacilli    Tissue / Bone Culture - Tissue, Thigh, Left  [[id number]]  (Abnormal) Collected: 06/01/22 1722    Specimen: Tissue from Thigh, Left Updated: 06/02/22 1256     Tissue Culture Light growth (2+) Gram Negative Bacilli     Gram Stain Few (2+) WBCs seen      No organisms seen    POC Glucose Once [[id number]]  (Abnormal) Collected: 06/02/22 1138    Specimen: Blood Updated: 06/02/22 1149     Glucose 170 mg/dL      Comment: : [id number] Dejon DeannaMeter ID: [id number]       Blood Culture - Blood, Arm, Right [[id number]]  (Normal) Collected: 06/01/22 0837    Specimen: Blood from Arm, Right Updated: 06/02/22 1047     Blood Culture No growth at 24 hours    Blood Culture - Blood, Arm, Right [[id number]]  (Normal) Collected: 06/01/22 0852    Specimen: Blood from Arm, Right Updated: 06/02/22 0918     Blood Culture No growth at 24 hours    POC Glucose Once [[id number]]  (Abnormal) Collected: 06/02/22 0817    Specimen: Blood Updated: 06/02/22 0828     Glucose 222 mg/dL      Comment: : [id number] SkillPod MediannaMeter ID: [id number]       Tissue Pathology Exam [[id number]] Collected: 06/01/22 1724    Specimen: Tissue from Thigh, Left; Tissue from Coccyx; Tissue from Spine, Sacral Updated: 06/02/22 0828    CBC Auto Differential [[id number]]  (Abnormal) Collected: 06/02/22 0553    Specimen: Blood Updated: 06/02/22 0607     WBC 7.66 10*3/mm3      RBC 3.58 10*6/mm3      Hemoglobin 8.9 g/dL      Hematocrit 28.6 %      MCV 79.9 fL      MCH 24.9 pg      MCHC 31.1 g/dL      RDW 16.6 %      RDW-SD 47.6 fl      MPV 9.3 fL      Platelets 182 10*3/mm3      Neutrophil % 77.8 %      Lymphocyte % 15.3 %      Monocyte % 2.5 %      Eosinophil % 3.1 %      Basophil % 0.3 %      Immature Grans % 1.0 %      Neutrophils, Absolute 5.96 10*3/mm3      Lymphocytes, Absolute 1.17 10*3/mm3      Monocytes, Absolute 0.19 10*3/mm3      Eosinophils, Absolute 0.24 10*3/mm3      Basophils, Absolute 0.02 10*3/mm3      Immature Grans, Absolute 0.08 10*3/mm3      nRBC 0.0 /100 WBC     Wound Culture - Wound, Leg,  Left [[id number]]  (Abnormal) Collected: 06/01/22 0846    Specimen: Wound from Leg, Left Updated: 06/02/22 0551     Wound Culture Moderate growth (3+) Gram Negative Bacilli     Gram Stain Rare (1+) WBCs seen      Moderate (3+) Gram negative bacilli    Comprehensive Metabolic Panel [[id number]]  (Abnormal) Collected: 06/02/22 0321    Specimen: Blood Updated: 06/02/22 0343     Glucose 205 mg/dL      BUN 15 mg/dL      Creatinine 1.05 mg/dL      Sodium 136 mmol/L      Potassium 3.0 mmol/L      Chloride 97 mmol/L      CO2 29.0 mmol/L      Calcium 7.2 mg/dL      Total Protein 5.3 g/dL      Albumin 2.00 g/dL      ALT (SGPT) 13 U/L      AST (SGOT) 14 U/L      Alkaline Phosphatase 76 U/L      Total Bilirubin 0.3 mg/dL      Globulin 3.3 gm/dL      A/G Ratio 0.6 g/dL      BUN/Creatinine Ratio 14.3     Anion Gap 10.0 mmol/L      eGFR 65.3 mL/min/1.73      Comment: National Kidney Foundation and American Society of Nephrology (ASN) Task Force recommended calculation based on the Chronic Kidney Disease Epidemiology Collaboration (CKD-EPI) equation refit without adjustment for race.       Narrative:      GFR Normal >60  Chronic Kidney Disease <60  Kidney Failure <15      Lipase [[id number]]  (Abnormal) Collected: 06/02/22 0321    Specimen: Blood Updated: 06/02/22 0341     Lipase 8 U/L     Amylase [[id number]]  (Abnormal) Collected: 06/02/22 0321    Specimen: Blood Updated: 06/02/22 0341     Amylase 8 U/L     STAT Lactic Acid, Reflex [[id number]]  (Normal) Collected: 06/02/22 0320    Specimen: Blood Updated: 06/02/22 0339     Lactate 1.9 mmol/L     Protime-INR [[id number]]  (Abnormal) Collected: 06/02/22 0322    Specimen: Blood Updated: 06/02/22 0335     Protime 13.9 Seconds      INR 1.11    STAT Lactic Acid, Reflex [[id number]]  (Abnormal) Collected: 06/01/22 2303    Specimen: Blood Updated: 06/01/22 2345     Lactate 2.3 mmol/L     Hemoglobin A1c [[id number]]  (Abnormal) Collected: 06/01/22 2303    Specimen: Blood Updated: 06/01/22 9682      Hemoglobin A1C 11.00 %     Narrative:      Hemoglobin A1C Ranges:    Increased Risk for Diabetes  5.7% to 6.4%  Diabetes                     >= 6.5%  Diabetic Goal                < 7.0%    POC Glucose Once [[id number]]  (Normal) Collected: 06/01/22 1859    Specimen: Blood Updated: 06/01/22 1911     Glucose 125 mg/dL      Comment: : [id number] Agnieszka Jacinto ID: [id number]       POC Glucose Once [[id number]]  (Abnormal) Collected: 06/01/22 1444    Specimen: Blood Updated: 06/01/22 1455     Glucose 221 mg/dL      Comment: : [id number] Jethro Rajan ID: [id number]       [place] Draw [[id number]] Collected: 06/01/22 0837    Specimen: Blood Updated: 06/01/22 1248    Narrative:      The following orders were created for panel order [place] Draw.  Procedure                               Abnormality         Status                     ---------                               -----------         ------                     Green Top (Gel)[[id number]]                                  Final result               Lavender Top[[id number]]                                     Final result               Red Top[[id number]]                                          Final result               [place] Blood Culture Kennedy...[[id number]]                      Final result               Hdz Top[[id number]]                                         Final result               Light Blue Top[[id number]]                                   Final result                 Please view results for these tests on the individual orders.    Hdz Top [[id number]] Collected: 06/01/22 0837    Specimen: Blood Updated: 06/01/22 1248     Extra Tube Hold for add-ons.     Comment: Auto resulted.       STAT Lactic Acid, Reflex [[id number]]  (Abnormal) Collected: 06/01/22 1128    Specimen: Blood Updated: 06/01/22 1202     Lactate 2.8 mmol/L     [place] Blood Culture Bottle Set [[id number]] Collected: 06/01/22 0837    Specimen: Blood from Arm, Right Updated: 06/01/22  0950     Extra Tube Hold for add-ons.     Comment: Auto resulted.       Lavender Top [[id number]] Collected: 06/01/22 0837    Specimen: Blood Updated: 06/01/22 0950     Extra Tube hold for add-on     Comment: Auto resulted       Light Blue Top [[id number]] Collected: 06/01/22 0837    Specimen: Blood Updated: 06/01/22 0950     Extra Tube Hold for add-ons.     Comment: Auto resulted       Green Top (Gel) [[id number]] Collected: 06/01/22 0837    Specimen: Blood Updated: 06/01/22 0950     Extra Tube Hold for add-ons.     Comment: Auto resulted.       Red Top [[id number]] Collected: 06/01/22 0837    Specimen: Blood Updated: 06/01/22 0950     Extra Tube Hold for add-ons.     Comment: Auto resulted.       COVID-19,[facility]-HOUSE,Nasal Swab No Transport Media 3-4 HR TAT - Swab, Nasal Cavity [[id number]]  (Normal) Collected: 06/01/22 0846    Specimen: Swab from Nasal Cavity Updated: 06/01/22 0936     COVID19 Not Detected    Narrative:      Fact sheet for providers: https://www.fda.gov/media/586385/download     Fact sheet for patients: https://www.fda.gov/media/192446/download    Test performed by PCR.    Consider negative results in combination with clinical observations, patient history, and epidemiological information.    aPTT [[id number]]  (Normal) Collected: 06/01/22 0837    Specimen: Blood Updated: 06/01/22 0922     PTT 31.3 seconds     Protime-INR [[id number]]  (Abnormal) Collected: 06/01/22 0837    Specimen: Blood Updated: 06/01/22 0922     Protime 14.5 Seconds      INR 1.18    C-reactive Protein [[id number]]  (Abnormal) Collected: 06/01/22 0837    Specimen: Blood Updated: 06/01/22 0911     C-Reactive Protein 15.00 mg/dL     Comprehensive Metabolic Panel [[id number]]  (Abnormal) Collected: 06/01/22 0837    Specimen: Blood Updated: 06/01/22 0911     Glucose 400 mg/dL      BUN 19 mg/dL      Creatinine 1.44 mg/dL      Sodium 131 mmol/L      Potassium 3.3 mmol/L      Chloride 91 mmol/L      CO2 28.0 mmol/L      Calcium 7.9  mg/dL      Total Protein 7.2 g/dL      Albumin 2.80 g/dL      ALT (SGPT) 19 U/L      AST (SGOT) 17 U/L      Alkaline Phosphatase 97 U/L      Total Bilirubin 0.3 mg/dL      Globulin 4.4 gm/dL      A/G Ratio 0.6 g/dL      BUN/Creatinine Ratio 13.2     Anion Gap 12.0 mmol/L      eGFR 44.7 mL/min/1.73      Comment: National Kidney Foundation and American Society of Nephrology (ASN) Task Force recommended calculation based on the Chronic Kidney Disease Epidemiology Collaboration (CKD-EPI) equation refit without adjustment for race.       Narrative:      GFR Normal >60  Chronic Kidney Disease <60  Kidney Failure <15      Lactic Acid, Plasma [[id number]]  (Abnormal) Collected: 06/01/22 0837    Specimen: Blood Updated: 06/01/22 0910     Lactate 2.5 mmol/L     CBC & Differential [[id number]]  (Abnormal) Collected: 06/01/22 0837    Specimen: Blood Updated: 06/01/22 0847    Narrative:      The following orders were created for panel order CBC & Differential.  Procedure                               Abnormality         Status                     ---------                               -----------         ------                     CBC Auto Differential[[id number]]        Abnormal            Final result                 Please view results for these tests on the individual orders.    CBC Auto Differential [[id number]]  (Abnormal) Collected: 06/01/22 0837    Specimen: Blood Updated: 06/01/22 0847     WBC 8.53 10*3/mm3      RBC 4.20 10*6/mm3      Hemoglobin 10.6 g/dL      Hematocrit 34.4 %      MCV 81.9 fL      MCH 25.2 pg      MCHC 30.8 g/dL      RDW 16.6 %      RDW-SD 49.5 fl      MPV 9.3 fL      Platelets 232 10*3/mm3      Neutrophil % 66.0 %      Lymphocyte % 23.3 %      Monocyte % 7.5 %      Eosinophil % 1.6 %      Basophil % 0.4 %      Immature Grans % 1.2 %      Neutrophils, Absolute 5.63 10*3/mm3      Lymphocytes, Absolute 1.99 10*3/mm3      Monocytes, Absolute 0.64 10*3/mm3      Eosinophils, Absolute 0.14 10*3/mm3       Basophils, Absolute 0.03 10*3/mm3      Immature Grans, Absolute 0.10 10*3/mm3      nRBC 0.0 /100 WBC         Imaging Results (Last 72 Hours)     Procedure Component Value Units Date/Time    CT Lower Extremity Left With Contrast [[id number]] Collected: 06/01/22 1131     Updated: 06/01/22 1151    Narrative:      EXAMINATION: CT LOWER EXTREMITY LEFT W CONTRAST-     6/1/2022 10:24 AM CDT     HISTORY: Infection ant thigh, r/o abscess     The CT scan of the left lower extremity is performed after intravenous  contrast enhancement. Images were acquired in axial plane with  subsequent reconstruction in coronal and sagittal planes.     There are no previous similar studies for comparison.     Correlation made with CT scan of the left knee dated 10/29/2020.     The study is a limited diagnostic value due to patient body habitus and  technical limitation.     There is a focal area of skin thickening, extensive subcutaneous soft  tissue infiltration and soft tissue air along the medial aspect of the  left mid thigh. The process does not extend into the common muscular  compartment. No extension to the left femur. No bony erosion. No  periosteal reaction. There is significant contrast enhancement  surrounding this process suggesting an inflammatory process/abscess.     There is moderate diffuse muscular atrophy/fatty infiltration.     The hip articulation is intact. No significant osteophytes. Moderate  diffuse narrowing may suggest chronic cartilage loss/osteoarthritis.  There are severe arthritic changes of the left knee joint which is  similar to the previous study suggesting a grade 3-4 tricompartment  chronic osteoarthritis. No joint effusion.     The limited included pelvis is not optimally visualized or evaluated. No  acute bony abnormality.       Impression:      1. The process along the medial aspect of the mid thigh is present and  abscess.  2. No evidence of bony involvement. No muscular involvement.  3. Other findings  as above.  This report was finalized on 06/01/2022 11:38 by Dr. Arash Farrell MD.             ASSESSMENT/PLAN       Examination and evaluation of wound(s) was performed.    DIAGNOSIS:   Pressure injury of sacrum, stage 3  Pressure injury of coccyx, stage 3  Pressure injury of left buttock, stage 3  Diabetic ulcer of left heel with fat layer exposed  Diabetic ulcer of right heel with fat layer exposed  Pressure injury of right calf, stage 3  Pressure injury of left calf, stage 3  Pressure injury of right calf, is stage 3  Abscess of left thigh-surgically debrided  Type 2 diabetes mellitus with hyperglycemia, with long-term current use of insulin  Venous insufficiency of both lower extremities  Lymphedema of bilateral lower extremities  Obesity      PLAN:     Start     Ordered    06/02/22 1621  Wound Care  Every 12 Hours        Question Answer Comment   Wound Locations Bilateral heels, bilateral calves, buttocks, and pilonidal area    Wound Care Instructions Clean wounds with NS.  Apply Opticell Ag to wound beds.  *Bilateral hees and calves: apply ABD pad and wrap with Kerlix.  *Buttocks and bilateral area: Cover with silicone border dressings.  May reinforce with pink tape (zinc tape) to secure from urine    Cleanse Normal Saline    Intervention Other    Other Opticell AG        06/02/22 1620    06/02/22 1621  Wound Care  Every 12 Hours        Comments: To be done until wound VAC is applied tomorrow - 6/3   Question Answer Comment   Wound Locations Sacrum    Wound Care Instructions Clean wound with normal saline.  Apply saline wet-to-dry dressing.  To be done until wound VAC is applied tomorrow.    Cleanse Normal Saline        06/02/22 1620    06/02/22 1617  Apply External Urinary Catheter  Once        Comments: Prefer Care Dry (new external urinary catheter)   Question:  Release to patient  Answer:  Immediate    06/02/22 1620    06/02/22 1616  Apply Dolphin Mattress Until Discontinued  Until Discontinued         Comments: Patient is to utilize Dolphin Mattress during admission.      Call [employer] to order a Excelsior bed frame  [phone].  Only need Excelsior bed frame if a standard dolphin mattress is being ordered.  Do not order bed frame if Bariatric Dolphin is being ordered.  Nurse or Huc is to call [employer], the rental company, to order the Dolphin Mattress       [phone].  Will have to provide patient's name, room number, if patient is in isolation, and what is being ordered.   Question:  Supply to be applied:  Answer:  Dolphin Mattress    06/02/22 1620    06/02/22 1613  Elevate Heels Off of Bed  Until Discontinued         06/02/22 1620    06/02/22 1613  Turn Patient  Now Then Every 2 Hours         06/02/22 1620    06/02/22 1613  Use Repositioning Wedge to Position Patient  Continuous         06/02/22 1620    06/02/22 1613  Use Seat Cushion When Up In Chair  Continuous         06/02/22 1620                  Discussed findings and treatment plan including risks, benefits, and treatment options with patient in detail. Patient agreed with treatment plan.      This document has been electronically signed by SHOLA Velazco on 6/2/2022 15:12 CDT

## 2022-06-02 NOTE — PROGRESS NOTES
LOS: 1 day   No care team member to display    Chief Complaint: Status post drainage of abscess cavities    Subjective     Subjective     12 hours out from debridement of abscess cavities.  Multiple noted, please follow the OR report.  She is extremely irritated and mad with the me today that I completed these drainage she is advised that seeing her in the emergency room the only one that I can really see was the one on the left thigh as well as slightly the one in the mid sacral area but on examination she had full-thickness injury to the left heel, debridement of the right and left posterior calf as well as debridement of the right and left gluteal region as well as the mid pilonidal region.  She is advised that all these needed to be debrided there were dead tissue and this needs to be completed she has a wound VAC on her left leg but she did not allow 1 for her back advised her that that is definitely 1 that I would suggest having a wound VAC on as it will be a significant problem to address and take care of.  Hospitalist is seeing her for better control of her diabetes.  Objective      Objective     Vital Signs  Temp:  [97.4 °F (36.3 °C)-98.7 °F (37.1 °C)] 98.7 °F (37.1 °C)  Heart Rate:  [65-93] 85  Resp:  [13-22] 16  BP: ()/(41-78) 122/50    Intake & Output (last 3 days)       05/30 0701  05/31 0700 05/31 0701  06/01 0700 06/01 0701  06/02 0700 06/02 0701  06/03 0700    I.V. (mL/kg)   1883 (13.4)     Total Intake(mL/kg)   1883 (13.4)     Net   +1883                   Physical Exam:     General Appearance:    Alert, cooperative, in no acute distress   Lungs:     Clear to auscultation,respirations regular, even and                  unlabored    Heart:    Regular rhythm and normal rate, normal S1 and S2, no            murmur, no gallop, no rub, no click   Chest Wall:    No abnormalities observed   Abdomen:    Unremarkable abdominal examination the extremities show dressings are complete, we will begin  wound dressings on her heel and calf tomorrow.  Will defer to the wound care nurse for their treatment.        Results Review:     I reviewed the patient's new clinical results.  I reviewed the patient's new imaging results and agree with the interpretation.    Results from last 7 days   Lab Units 06/02/22  0553 06/01/22  0837   WBC 10*3/mm3 7.66 8.53   HEMOGLOBIN g/dL 8.9* 10.6*   HEMATOCRIT % 28.6* 34.4   PLATELETS 10*3/mm3 182 232        Results from last 7 days   Lab Units 06/02/22  0321 06/01/22  0837   SODIUM mmol/L 136 131*   POTASSIUM mmol/L 3.0* 3.3*   CHLORIDE mmol/L 97* 91*   CO2 mmol/L 29.0 28.0   BUN mg/dL 15 19   CREATININE mg/dL 1.05* 1.44*   CALCIUM mg/dL 7.2* 7.9*   BILIRUBIN mg/dL 0.3 0.3   ALK PHOS U/L 76 97   ALT (SGPT) U/L 13 19   AST (SGOT) U/L 14 17   GLUCOSE mg/dL 205* 400*       Assessment/Plan     Assessment & Plan       Hypertension    Obesity, morbid, BMI 50 or higher ([facility])    Stage 3 chronic kidney disease    Chronic pain syndrome    Lymphedema of both lower extremities    RLS (restless legs syndrome)    Diabetic ulcer of left lower leg associated with type 2 diabetes mellitus, with fat layer exposed ([facility])    Gastroesophageal reflux disease    Polypharmacy    Type 2 diabetes mellitus with hyperglycemia, with long-term current use of insulin ([facility])    Venous insufficiency of both lower extremities    Anemia    Open wound    Cellulitis of left leg      Patient with significant problems listed above, she has a wound VAC on her left medial thigh that she needs 1 on her lower sacrum, and wound care to be completed for her heel decubiti as well as right and left calf as well as gluteal pressure sores.  Defer to wound care as to their suggestions for treatment continue IV vancomycin morphine is not desired she desires Dilaudid and will change this as well.      Justin Perez MD  06/02/22  12:50 CDT      Time: time spent with patient 15 minutes     EMR Dragon/Transcription disclaimer: Much of  this encounter note is an electronic transcription/translation of spoken language to printed text. The electronic translation of spoken language may permit erroneous, or at times, nonsensical words or phrases to be inadvertently transcribed; although I have reviewed the note for such errors, some may still exist.

## 2022-06-02 NOTE — OP NOTE
INCISION AND DRAINAGE ABSCESS  Procedure Note    Valeria Wilson  6/1/2022    Pre-op Diagnosis:   Open wound left anterior medial thigh, infected left heel, right and left posterior calf decubiti, decubiti in the sacral area.  Post-op Diagnosis:     Post-Op Diagnosis Codes:  10 x 5 x 5 cm left thigh abscess and debridement,   left heel with a 4 x 4 x 1 cm left heel decubiti  Left calf 3 x 3 cm decubiti  Right calf 2 x 2 centimeter decubiti  There is a 2 x 2 centimeter area on the right cheek on the buttock, 1 by one of the left cheek, and one by one in the central aspect of the pilonidal region from decubiti  Sacral [id number] 11 x 4 cm.      Procedure/CPT® Codes:      Procedure(s):  Cleaning excision debridement irrigation of a 10 x 5 x 5 cm left thigh abscess and debridement  Cleaning, excision, debridement, irrigation of a 4 x 4 x 1 cm left heel decubiti  Cleaning, debridement, irrigation of a 3 x 3 cm decubiti left posterior calf  Cleaning, debridement, irrigation of a 2 x 2 centimeter decubiti right posterior calf  Cleaning irrigation and debridement of a 2 x 2 centimeter area on the right cheek, 1 x 1 cm on the left cheek, and a 1 x 1 cm central area in the pilonidal region from decubiti  Excision of a sacral decubiti 11 x 4 x 4 cm.    Surgeon(s):  Justin Perez MD        Anesthesia: General    Staff:   Specimens     ID Source Type Tests Collected By Collected At Frozen?    1 Thigh, Left Tissue · TISSUE / BONE CULTURE   Justin Perez MD 6/1/22 1722     Description: Left thigh tissue for culture    2 Foot, Left Tissue · TISSUE / BONE CULTURE   Justin Perez MD 6/1/22 1811     Description: Left heel tissue for culture    A Thigh, Left Tissue · TISSUE PATHOLOGY EXAM   Justin Perez MD 6/1/22 1724     Description: Left thigh tissue for pathology    B Coccyx Tissue · TISSUE PATHOLOGY EXAM   Justin Perez MD 6/1/22 1814     Description: Tissue from coccyx area for path    C Spine, Sacral Bone  · TISSUE PATHOLOGY EXAM   Justin Perez MD 6/1/22 1815     Description: Tissue from sacral area for pathology          Estimated Blood Loss:   100 cc    Specimens:                ID Type Source Tests Collected by Time   1 : Left thigh tissue for culture Tissue Thigh, Left TISSUE / BONE CULTURE Justin Perez MD 6/1/2022 1722   2 : Left heel tissue for culture Tissue Foot, Left TISSUE / BONE CULTURE Justin Perez MD 6/1/2022 1811   A : Left thigh tissue for pathology Tissue Thigh, Left TISSUE PATHOLOGY EXAM Justin Perez MD 6/1/2022 1724   B : Tissue from coccyx area for path Tissue Coccyx TISSUE PATHOLOGY EXAM Justin Perez MD 6/1/2022 1814   C : Tissue from sacral area for pathology Bone Spine, Sacral TISSUE PATHOLOGY EXAM Justin Perez MD 6/1/2022 1815         Drains: There were no drains but the wounds were all packed with 1 inch iodoform gauze     Indications: Valeria Wilson  is a 51 y.o. female presents with history of significant lymphedema in both lower extremities, she has had a chronic draining area in the posterior aspect of the right and left calf from pressure, also right medial wound that is slowly healed, now she presents with an area 8 x 4 cm in the left medial thigh with surrounding cellulitis extending out at least 8 cm from the periphery.  It is has some necrotic material noted also on the CT scan there is some air in the subcutaneous tissue also in the sacral area there is an area 6 x 1-1/2 cm that has necrotic skin present.  Both of these areas need to be debrided as well as we would freshen up the areas in the right and left posterior calf, debride these and packed with iodoform gauze.  Later on a wound vacuum system can be applied.  We will also try to get better control of her diabetes current glucose is over 400.  She has multiple medical problems to include chronic lower back pain, chronic pain syndrome, multiple allergies to include Bactrim and penicillin and  cephalosporins.  Chronic knee pain chronic lymphedema of the both lower extremities.  Plan to admit, hydrate, get better control of her diabetes, look toward going the operating room today with debridement of the left medial thigh and this 8 x 4 cm necrotic draining area as well as the 6 x 1 and half centimeter necrotic area in the lower sacral area.  We will also pulse lavage irrigation these and packed these.  Risk and benefits discussed with full knowledge of this and apparent understanding she gives her informed consent for surgery.    Findings: Cleaning excision debridement irrigation of a 10 x 5 x 5 cm left thigh abscess and debridement  Cleaning, excision, debridement, irrigation of a 4 x 4 x 1 cm left heel decubiti  Cleaning, debridement, irrigation of a 3 x 3 cm decubiti left posterior calf  Cleaning, debridement, irrigation of a 2 x 2 centimeter decubiti right posterior calf  Cleaning irrigation and debridement of a 2 x 2 centimeter area on the right cheek, 1 x 1 cm on the left cheek, and a 1 x 1 cm central area in the pilonidal region from decubiti  Excision of a sacral decubiti 11 x 4 x 4 cm.      Complications: There were no complications    Procedure: Darlene Wilson was placed initially in a supine position on her bed timeout was accomplished antibiotics were given and once completed elliptical excision around the necrotic draining area in the left medial thigh was completed.  Ultimately it was 10 cm long 5 cm wide 5 cm deep and full excision of the necrotic draining area was completed.  Once completed full hemostasis was completed using suture of 2-0 silk as well as 3-0 Vicryl and once completed the wound was packed with 1 inch iodoform gauze.  With this completed the patient was then placed in a prone position.  Once in the prone position the areas were scrubbed prepped and draped with alcohol and Betadine excision of the overlying skin and the underlying necrotic tissue and subsequent debridement of  a 4 x 4 x 1 cm left heel decubiti was completed it was then had a small piece of \"snow placed over the area and then iodoform gauze fluffs and a Kerlix was completed.  Once completed debridement of the right and left posterior calf was completed minimal debridement was needed these were relatively clean and once completed it was packed with 1 inch iodoform gauze 4 x 4's and Kerlix were applied.  Over both of these areas.  Next there was some superficial necrosis around the right and left cheek on the right cheek there was 2 x 2 cm and on the left one by one and a 1 x 1 cm in the central area of the pilonidal region these were all debrided all a piece of iodoform gauze was placed 4 x 4's and an ABD was applied.  Finally there was a necrotic area in the sacral region and excision of this necrotic sacral region was completed the excision was 11 cm wide 4 cm high and 4 cm deep.  Full excision of this necrotic material was completed and once accomplished a full bottle of 1 inch iodoform gauze was placed in this cavity as well as 4 x 4's and ABD.  Once completed she was then placed back in a supine position transferred ported to the recovery room in good condition.  Blood loss was approximately 100 cc.    Justin Perez MD     Date: 6/1/2022  Time: 22:22 CDT    EMR Dragon/Transcription disclaimer: Much of this encounter note is an electronic transcription/translation of spoken language to printed text. The electronic translation of spoken language may permit erroneous, or at times, nonsensical words or phrases to be inadvertently transcribed; although I have reviewed the note for such errors, some may still exist.

## 2022-06-02 NOTE — PLAN OF CARE
Problem: Adult Inpatient Plan of Care  Goal: Plan of Care Review  Recent Flowsheet Documentation  Taken 6/2/2022 0845 by Jean Butcher, PT  Plan of Care Reviewed With: patient  Outcome Evaluation: PT IE complete.  Pt adamantly refuses wound vac to wound on her low back.  After a significant amount of education and encouragement, pt finally agreed to a wound vac for her L medial thigh wound.  L medial thigh wound measures 10cm x 4cm x 5cm.  Good seal achieved.  Informed pt/family if pt changes her mind about a wound vac to her low back wound to let her nurse know to contact PT and we will be happy to place wound vac on her low back wound.  We will check daily and change MWF.  Thank you for referral.   Goal Outcome Evaluation:  Plan of Care Reviewed With: patient           Outcome Evaluation: PT IE complete.  Pt adamantly refuses wound vac to wound on her low back.  After a significant amount of education and encouragement, pt finally agreed to a wound vac for her L medial thigh wound.  L medial thigh wound measures 10cm x 4cm x 5cm.  Good seal achieved.  Informed pt/family if pt changes her mind about a wound vac to her low back wound to let her nurse know to contact PT and we will be happy to place wound vac on her low back wound.  We will check daily and change MWF.  Thank you for referral.

## 2022-06-02 NOTE — THERAPY EVALUATION
Patient Name: Valeria Wilson  : 1970    MRN: [id number]                              Today's Date: 2022       Admit Date: 2022    Visit Dx:     ICD-10-CM ICD-9-CM   1. Open wound  T14.8XXA 879.8   2. Anemia, unspecified type  D64.9 285.9     Patient Active Problem List   Diagnosis   • Hypertension   • Type 2 diabetes mellitus with hyperglycemia, without long-term current use of insulin ([facility])   • Obesity, morbid, BMI 50 or higher ([facility])   • Stage 3 chronic kidney disease   • Bacteremia due to Klebsiella pneumoniae   • Urinary tract infection   • Chronic pain syndrome   • Lymphedema of both lower extremities   • Bilateral lower leg cellulitis   • RLS (restless legs syndrome)   • Diabetic ulcer of left lower leg associated with type 2 diabetes mellitus, with fat layer exposed ([facility])   • Dyspnea   • MARCELINA (generalized anxiety disorder)   • Gastroesophageal reflux disease   • Headache   • Hypoglycemia   • Hypokalemia   • Migraine without aura and without status migrainosus, not intractable   • Mild single current episode of major depressive disorder ([facility])   • Morbid obesity with BMI of 50.0-59.9, adult ([facility])   • Old complex tear of medial meniscus of left knee   • Pleurisy   • Polypharmacy   • Type 2 diabetes mellitus with hyperglycemia, with long-term current use of insulin ([facility])   • Venous insufficiency of both lower extremities   • Anemia   • Open wound   • Cellulitis of left leg     Past Medical History:   Diagnosis Date   • Anxiety    • Arthritis     Osteoarthritis primarily left knee    • Back pain    • Chronic pain syndrome 10/27/2021   • Depression    • Diabetes mellitus ([facility])    • Fibromyalgia, primary    • Hx of tear of meniscus of knee joint 2016   • Hypertension    • Joint pain    • Kidney stone    • Knee pain    • Lymphedema of both lower extremities 10/27/2021   • Migraine    • Migraine headache    • Pain     knee, left ankle, left ankle     • Pes anserinus bursitis 2015   • Restless  leg      Past Surgical History:   Procedure Laterality Date   • CATARACT EXTRACTION     • CATARACT EXTRACTION     • CATARACT EXTRACTION     • CORNEAL TRANSPLANT      bilateral    • ECTOPIC PREGNANCY     • INCISION AND DRAINAGE ABSCESS Left 6/1/2022    Procedure: DEBRIDEMENTAND PULSE LAVAGE LT MEDIAL THIGH PACKING ODOFORM GAUZE  WOUND SACRAL AND BILATERAL CALVES;  Surgeon: Justin Perez MD;  Location: [facility] OR;  Service: General;  Laterality: Left;   • JOINT REPLACEMENT     • KNEE ARTHROSCOPY W/ MENISCAL REPAIR     • KNEE MENISCAL REPAIR     • MENISCECTOMY Left 12/20/2016   • TUBAL ABDOMINAL LIGATION        General Information     Row Name 06/02/22 45          Physical Therapy Time and Intention    Document Type evaluation  wound evaluation, dressings on L medial thigh and sacral area, Sx:  debridement and lavage L medial thigh, L heel, L calf, R calf, R/L buttock, sacral area 6.1.22  -     Mode of Treatment physical therapy  -[facility] Name 06/02/22 08          General Information    Patient Profile Reviewed yes  -[facility] Name 06/02/22 08          Cognition    Orientation Status (Cognition) oriented x 4  -[facility] Name 06/02/22 08          Safety Issues, Functional Mobility    Safety Issues Affecting Function (Mobility) ability to follow commands  -           User Key  (r) = Recorded By, (t) = Taken By, (c) = Cosigned By    Initials Name Provider Type     Jean Butcher, PT Physical Therapist               Mobility    No documentation.                Obj/Interventions    No documentation.                Goals/Plan     Row Name 06/02/22 0845          Therapy Assessment/Plan (PT)    Planned Therapy Interventions (PT) patient/family education;wound care  -           User Key  (r) = Recorded By, (t) = Taken By, (c) = Cosigned By    Initials Name Provider Type     Jean Butcher, PT Physical Therapist               Clinical Impression     Row Name 06/02/22 0845          Pain    Pretreatment  Pain Rating 6/10  -     Pain Location generalized  -     Pain Intervention(s) Medication (See MAR);Repositioned  -     Row Name 06/02/22 0845          Plan of Care Review    Plan of Care Reviewed With patient  -     Outcome Evaluation PT IE complete.  Pt adamantly refuses wound vac to wound on her low back.  After a significant amount of education and encouragement, pt finally agreed to a wound vac for her L medial thigh wound.  L medial thigh wound measures 10cm x 4cm x 5cm.  Good seal achieved.  Informed pt/family if pt changes her mind about a wound vac to her low back wound to let her nurse know to contact PT and we will be happy to place wound vac on her low back wound.  We will check daily and change MWF.  Thank you for referral.  -     Row Name 06/02/22 0845          Therapy Assessment/Plan (PT)    Patient/Family Therapy Goals Statement (PT) healed wounds  -     Rehab Potential (PT) good, to achieve stated therapy goals  -     Criteria for Skilled Interventions Met (PT) yes;skilled treatment is necessary  [facility]     Therapy Frequency (PT) 3 times/wk  check daily, change MWF  -[facility] Name 06/02/22 0845          Positioning and Restraints    Pre-Treatment Position in bed  -     Post Treatment Position bed  -     In Bed fowlers;call light within reach;encouraged to call for assist;side rails up x2;with family/caregiver  -           User Key  (r) = Recorded By, (t) = Taken By, (c) = Cosigned By    Initials Name Provider Type     Jean Butcher, PT Physical Therapist               Outcome Measures    No documentation.                   PT Assessment (last 12 hours)     PT Evaluation and Treatment     Row Name             Wound 11/03/21 1619 Left posterior calf    Wound - Properties Group Placement Date: 11/03/21  -CT Placement Time: 1619  -CT Side: Left  -CT Orientation: posterior  -CT Location: calf  -CT     Retired Wound - Properties Group Placement Date: 11/03/21  -CT Placement Time: 1619   -CT Side: Left  -CT Orientation: posterior  -CT Location: calf  -CT     Retired Wound - Properties Group Date first assessed: 11/03/21  -CT Time first assessed: 1619  -CT Side: Left  -CT Location: calf  -CT     Row Name             Wound 11/03/21 1626 Right posterior calf    Wound - Properties Group Placement Date: 11/03/21  -CT Placement Time: 1626  -CT Side: Right  -CT Orientation: posterior  -CT Location: calf  -CT     Retired Wound - Properties Group Placement Date: 11/03/21  -CT Placement Time: 1626  -CT Side: Right  -CT Orientation: posterior  -CT Location: calf  -CT     Retired Wound - Properties Group Date first assessed: 11/03/21  -CT Time first assessed: 1626  -CT Side: Right  -CT Location: calf  -CT     Row Name             Wound 06/01/22 1522 lower thoracic spine    Wound - Properties Group Placement Date: 06/01/22  -LR Placement Time: 1522  -LR Orientation: lower  -LR Location: thoracic spine  -LR     Retired Wound - Properties Group Placement Date: 06/01/22  -LR Placement Time: 1522  -LR Orientation: lower  -LR Location: thoracic spine  -LR     Retired Wound - Properties Group Date first assessed: 06/01/22  -LR Time first assessed: 1522  -LR Location: thoracic spine  -LR     Row Name             Wound 06/01/22 1523 Left heel    Wound - Properties Group Placement Date: 06/01/22  -LR Placement Time: 1523  -LR Side: Left  -LR Location: heel  -LR     Retired Wound - Properties Group Placement Date: 06/01/22  -LR Placement Time: 1523  -LR Side: Left  -LR Location: heel  -LR     Retired Wound - Properties Group Date first assessed: 06/01/22  -LR Time first assessed: 1523  -LR Side: Left  -LR Location: heel  -LR     Row Name             Wound 06/01/22 1658 sacral spine    Wound - Properties Group Placement Date: 06/01/22  -SL Placement Time: 1658  -SL Present on Hospital Admission: Y  -SL Location: sacral spine  -SL Primary Wound Type: --  -SL     Retired Wound - Properties Group Placement Date: 06/01/22  -SL  Placement Time: 1658 -SL Present on Hospital Admission: Y  -SL Location: sacral spine  -SL Primary Wound Type: --  -SL     Retired Wound - Properties Group Date first assessed: 06/01/22  -SL Time first assessed: 1658 -SL Present on Hospital Admission: Y  -SL Location: sacral spine  -SL Primary Wound Type: --  -SL     Row Name             Wound 06/01/22 1658 Right heel    Wound - Properties Group Placement Date: 06/01/22  -SL Placement Time: 1658 -SL Present on Hospital Admission: Y  -SL Side: Right  -SL Location: heel  -SL     Retired Wound - Properties Group Placement Date: 06/01/22  -SL Placement Time: 1658 -SL Present on Hospital Admission: Y  -SL Side: Right  -SL Location: heel  -SL     Retired Wound - Properties Group Date first assessed: 06/01/22  -SL Time first assessed: 1658 -SL Present on Hospital Admission: Y  -SL Side: Right  -SL Location: heel  -SL     Row Name 06/02/22 0845          Wound 06/01/22 1847 Left anterior thigh    Wound - Properties Group Placement Date: 06/01/22  -WG Placement Time: 1847 -WG, done in OR  Side: Left  -WG Orientation: anterior  -WG Location: thigh  -WG     Wound Image View All Images View Images  -AB     Wound Length (cm) 10 cm  -LH     Wound Width (cm) 4 cm  -LH     Wound Depth (cm) 5 cm  -LH     Wound Surface Area (cm^2) 40 cm^2  -LH     Wound Volume (cm^3) 200 cm^3  -LH     Wound Output (mL) --  initial application of wound vac  -LH     Retired Wound - Properties Group Placement Date: 06/01/22  -WG Placement Time: 1847 -WG, done in OR  Side: Left  -WG Orientation: anterior  -WG Location: thigh  -WG     Retired Wound - Properties Group Date first assessed: 06/01/22  -WG Time first assessed: 1847  -WG, done in OR  Side: Left  -WG Location: thigh  -WG     Row Name 06/02/22 0845          NPWT (Negative Pressure Wound Therapy) 06/02/22 1000 L medial thigh    NPWT (Negative Pressure Wound Therapy) - Properties Group Placement Date: 06/02/22  -LH Placement Time: 1000  -LH  Location: L medial thigh  -     Therapy Setting continuous therapy  -     Dressing foam, black  -     Pressure Setting 125 mmHg  -     Sponges Inserted 1  -     Sponges Removed --  1 inch iodoform  -     Finger sweep complete Yes  -     Retired NPWT (Negative Pressure Wound Therapy) - Properties Group Placement Date: 06/02/22  - Placement Time: 1000  -LH Location: L medial thigh  -     Retired NPWT (Negative Pressure Wound Therapy) - Properties Group Placement Date: 06/02/22  - Placement Time: 1000  -LH Location: L medial thigh  -LH     Row Name 06/02/22 0845          Physical Therapy Goals    Wound Care Goal Selection (PT) wound care, PT goal 1  -     Row Name 06/02/22 0845          Wound Care Goal 1 (PT)    Wound Care Goal 1 (PT) L medial thigh wound decrease in size to 8cm x 3cm x 3cm  -     Time Frame (Wound Care Goal 1, PT) 10 days  -     Progress/Outcome (Wound Care Goal 1, PT) goal ongoing  -           User Key  (r) = Recorded By, (t) = Taken By, (c) = Cosigned By    Initials Name Provider Type    AB Grazyna Maria, PTA Physical Therapist Assistant     Jean Butcher, PT Physical Therapist    WG Debbie Bird, RN Registered Nurse    Lisa Acevedo, RN Registered Nurse    SL Courtney Diallo, RN Registered Nurse    LR Angy Knight, RN Registered Nurse                          Physical Therapy Education                 Title: PT OT SLP Therapies (Done)     Topic: Physical Therapy (Done)     Point: Precautions (Done)     Learning Progress Summary           Patient Acceptance, E,D, VU by  at 6/2/2022 0927    Comment: wound vac benefits, plan for checks and changes   Family Acceptance, E,D, VU by  at 6/2/2022 0927    Comment: wound vac benefits, plan for checks and changes                               User Key     Initials Effective Dates Name Provider Type Discipline     06/16/21 -  Jean Butcher, PT Physical Therapist PT              PT Recommendation and  Plan  Planned Therapy Interventions (PT): patient/family education, wound care  Plan of Care Reviewed With: patient  Outcome Evaluation: PT IE complete.  Pt adamantly refuses wound vac to wound on her low back.  After a significant amount of education and encouragement, pt finally agreed to a wound vac for her L medial thigh wound.  L medial thigh wound measures 10cm x 4cm x 5cm.  Good seal achieved.  Informed pt/family if pt changes her mind about a wound vac to her low back wound to let her nurse know to contact PT and we will be happy to place wound vac on her low back wound.  We will check daily and change MWF.  Thank you for referral.     Time Calculation:    PT Charges     Row Name 06/02/22 1024             Time Calculation    Start Time 0845  -      Stop Time 1015  -      Time Calculation (min) 90 min  -      PT Received On 06/02/22  -      PT Goal Re-Cert Due Date 06/12/22  -            User Key  (r) = Recorded By, (t) = Taken By, (c) = Cosigned By    Initials Name Provider Type     Jean Butcher, PT Physical Therapist              Therapy Charges for Today     Code Description Service Date Service Provider Modifiers Qty    72498029559 HC PT EVAL MOD COMPLEXITY 4 6/2/2022 Jean Butcher, PT GP 1    22090587159 HC PT NEG PRESS WOUND TO 50SQCM DME2 6/2/2022 Jean Butcher, PT GP 1               Jean Butcher PT  6/2/2022

## 2022-06-02 NOTE — ANESTHESIA POSTPROCEDURE EVALUATION
Patient: Valeria Wilson    Procedure Summary     Date: 06/01/22 Room / Location:  [facility] OR 09 /  [facility] OR    Anesthesia Start: 1700 Anesthesia Stop: 1852    Procedure: DEBRIDEMENTAND PULSE LAVAGE LT MEDIAL THIGH PACKING ODOFORM GAUZE  WOUND SACRAL AND BILATERAL CALVES (Left ) Diagnosis:       Open wound      Anemia, unspecified type      (Open wound [T14.8XXA])      (Anemia, unspecified type [D64.9])    Surgeons: Justin Perez MD Provider: Geneva Lawrence CRNA    Anesthesia Type: general ASA Status: 3 - Emergent          Anesthesia Type: general    Vitals  Vitals Value Taken Time   /41 06/01/22 1938   Temp 97.4 °F (36.3 °C) 06/01/22 1847   Pulse 81 06/01/22 1947   Resp 13 06/01/22 1935   SpO2 99 % 06/01/22 1947   Vitals shown include unvalidated device data.        Post Anesthesia Care and Evaluation    Patient location during evaluation: PACU  Patient participation: complete - patient participated  Level of consciousness: awake and alert  Pain score: 0  Pain management: adequate  Airway patency: patent  Anesthetic complications: No anesthetic complications  PONV Status: none  Cardiovascular status: acceptable and stable  Respiratory status: acceptable and unassisted  Hydration status: acceptable

## 2022-06-02 NOTE — CONSULTS
12 cm Midline catheter placed in pt left cephalic vein. Pt tolerated procedure well. Line flushes and draws well. Sterile dressing applied.

## 2022-06-02 NOTE — CASE MANAGEMENT/SOCIAL WORK
Continued Stay Note   [place]     Patient Name: Valeria Wilson  MRN: [id number]  Today's Date: 6/2/2022    Admit Date: 6/1/2022     Discharge Plan     Row Name 06/02/22 9909       Plan    Plan Home Health (prefers [facility] )    Patient/Family in Agreement with Plan yes    Provided Post Acute Provider List? Yes    Post Acute Provider List Home Health    Provided Post Acute Provider Quality & Resource List? Yes    Post Acute Provider Quality and Resource List Home Health    Delivered To Patient    Method of Delivery Telephone    Plan Comments Pt has order for wound vac so in process if submitting [employer] request.  Pt saying she wants [facility] to manage this at d/c.  Will inform upon approval of wound vac. Will follow.               Discharge Codes    No documentation.                     REBECCA Davenport

## 2022-06-03 LAB
CYTO UR: NORMAL
GLUCOSE BLDC GLUCOMTR-MCNC: 127 MG/DL (ref 70–130)
GLUCOSE BLDC GLUCOMTR-MCNC: 129 MG/DL (ref 70–130)
GLUCOSE BLDC GLUCOMTR-MCNC: 195 MG/DL (ref 70–130)
GLUCOSE BLDC GLUCOMTR-MCNC: 84 MG/DL (ref 70–130)
LAB AP CASE REPORT: NORMAL
Lab: NORMAL
PATH REPORT.FINAL DX SPEC: NORMAL
PATH REPORT.GROSS SPEC: NORMAL

## 2022-06-03 PROCEDURE — 25010000002 ENOXAPARIN PER 10 MG: Performed by: SPECIALIST

## 2022-06-03 PROCEDURE — 0 HYDROMORPHONE 1 MG/ML SOLUTION: Performed by: SPECIALIST

## 2022-06-03 PROCEDURE — 99233 SBSQ HOSP IP/OBS HIGH 50: CPT | Performed by: NURSE PRACTITIONER

## 2022-06-03 PROCEDURE — 82962 GLUCOSE BLOOD TEST: CPT

## 2022-06-03 PROCEDURE — 63710000001 INSULIN DETEMIR PER 5 UNITS: Performed by: INTERNAL MEDICINE

## 2022-06-03 PROCEDURE — 25010000002 VANCOMYCIN 1 G RECONSTITUTED SOLUTION 1 EACH VIAL: Performed by: SPECIALIST

## 2022-06-03 PROCEDURE — 63710000001 ONDANSETRON PER 8 MG: Performed by: SPECIALIST

## 2022-06-03 PROCEDURE — 99232 SBSQ HOSP IP/OBS MODERATE 35: CPT | Performed by: SPECIALIST

## 2022-06-03 RX ORDER — SODIUM HYPOCHLORITE 1.25 MG/ML
SOLUTION TOPICAL 2 TIMES DAILY
Status: DISCONTINUED | OUTPATIENT
Start: 2022-06-03 | End: 2022-06-10 | Stop reason: HOSPADM

## 2022-06-03 RX ADMIN — SUCRALFATE 1 G: 1 TABLET ORAL at 08:32

## 2022-06-03 RX ADMIN — ONDANSETRON 8 MG: 8 TABLET, ORALLY DISINTEGRATING ORAL at 20:43

## 2022-06-03 RX ADMIN — HYDROMORPHONE HYDROCHLORIDE 0.5 MG: 1 INJECTION, SOLUTION INTRAMUSCULAR; INTRAVENOUS; SUBCUTANEOUS at 06:41

## 2022-06-03 RX ADMIN — HYDROMORPHONE HYDROCHLORIDE 0.5 MG: 1 INJECTION, SOLUTION INTRAMUSCULAR; INTRAVENOUS; SUBCUTANEOUS at 23:49

## 2022-06-03 RX ADMIN — FLUTICASONE PROPIONATE 1 SPRAY: 50 SPRAY, METERED NASAL at 08:33

## 2022-06-03 RX ADMIN — SUCRALFATE 1 G: 1 TABLET ORAL at 22:34

## 2022-06-03 RX ADMIN — PRAMIPEXOLE DIHYDROCHLORIDE 0.75 MG: 0.25 TABLET ORAL at 08:32

## 2022-06-03 RX ADMIN — HYDROCODONE BITARTRATE AND ACETAMINOPHEN 1 TABLET: 7.5; 325 TABLET ORAL at 17:47

## 2022-06-03 RX ADMIN — BUSPIRONE HYDROCHLORIDE 10 MG: 10 TABLET ORAL at 08:33

## 2022-06-03 RX ADMIN — SUCRALFATE 1 G: 1 TABLET ORAL at 17:33

## 2022-06-03 RX ADMIN — PANTOPRAZOLE SODIUM 40 MG: 40 TABLET, DELAYED RELEASE ORAL at 06:41

## 2022-06-03 RX ADMIN — SODIUM CHLORIDE 100 ML/HR: 9 INJECTION, SOLUTION INTRAVENOUS at 11:41

## 2022-06-03 RX ADMIN — BUSPIRONE HYDROCHLORIDE 10 MG: 10 TABLET ORAL at 22:34

## 2022-06-03 RX ADMIN — VANCOMYCIN HYDROCHLORIDE 1000 MG: 1 INJECTION, POWDER, LYOPHILIZED, FOR SOLUTION INTRAVENOUS at 02:20

## 2022-06-03 RX ADMIN — VANCOMYCIN HYDROCHLORIDE 1000 MG: 1 INJECTION, POWDER, LYOPHILIZED, FOR SOLUTION INTRAVENOUS at 15:29

## 2022-06-03 RX ADMIN — DAKIN'S SOLUTION 0.125% (QUARTER STRENGTH): 0.12 SOLUTION at 08:37

## 2022-06-03 RX ADMIN — INSULIN DETEMIR 30 UNITS: 100 INJECTION, SOLUTION SUBCUTANEOUS at 22:44

## 2022-06-03 RX ADMIN — HYDROMORPHONE HYDROCHLORIDE 0.5 MG: 1 INJECTION, SOLUTION INTRAMUSCULAR; INTRAVENOUS; SUBCUTANEOUS at 20:43

## 2022-06-03 RX ADMIN — POTASSIUM CHLORIDE 40 MEQ: 10 CAPSULE, COATED, EXTENDED RELEASE ORAL at 08:33

## 2022-06-03 RX ADMIN — DULOXETINE HYDROCHLORIDE 60 MG: 30 CAPSULE, DELAYED RELEASE ORAL at 22:34

## 2022-06-03 RX ADMIN — HYDROCODONE BITARTRATE AND ACETAMINOPHEN 1 TABLET: 7.5; 325 TABLET ORAL at 12:28

## 2022-06-03 RX ADMIN — SUCRALFATE 1 G: 1 TABLET ORAL at 11:39

## 2022-06-03 RX ADMIN — GABAPENTIN 600 MG: 300 CAPSULE ORAL at 15:14

## 2022-06-03 RX ADMIN — DULOXETINE HYDROCHLORIDE 60 MG: 30 CAPSULE, DELAYED RELEASE ORAL at 08:33

## 2022-06-03 RX ADMIN — DAKIN'S SOLUTION 0.125% (QUARTER STRENGTH): 0.12 SOLUTION at 22:34

## 2022-06-03 RX ADMIN — GABAPENTIN 600 MG: 300 CAPSULE ORAL at 22:34

## 2022-06-03 RX ADMIN — Medication 10 ML: at 22:34

## 2022-06-03 RX ADMIN — PRAMIPEXOLE DIHYDROCHLORIDE 0.75 MG: 0.25 TABLET ORAL at 22:34

## 2022-06-03 RX ADMIN — MIRTAZAPINE 15 MG: 15 TABLET, FILM COATED ORAL at 22:34

## 2022-06-03 RX ADMIN — BUSPIRONE HYDROCHLORIDE 10 MG: 10 TABLET ORAL at 15:14

## 2022-06-03 NOTE — CASE MANAGEMENT/SOCIAL WORK
Continued Stay Note  MICHELLE Madera     Patient Name: Valeria Wilson  MRN: [id number]  Today's Date: 6/3/2022    Admit Date: 6/1/2022     Discharge Plan     Row Name 06/03/22 1354       Plan    Plan Home    Patient/Family in Agreement with Plan yes    Final Discharge Disposition Code     Final Note Pt's wound vac has been approved and will be delivered to pt's room day of dc.  SW will continue to follow.                Discharge Codes    No documentation.               Expected Discharge Date and Time     Expected Discharge Date Expected Discharge Time    Jun 6, 2022             CANDACE Resendez

## 2022-06-03 NOTE — PROGRESS NOTES
[facility]  INPATIENT WOUND CARE    PROGRESS NOTE    Today's Date: 06/03/22    Patient Name: Valeria Wilson  MRN: [id number]  [facility]: [id number]  PCP: No primary care provider on file.  Referring Provider: Justin Perez MD  Attending Provider: Justin Perez MD  Length of Stay: 2    SUBJECTIVE   Chief Complaint: Multiple wounds    HPI: Valeria Wilson continues care in room 378.  Tasia, RN states she was told and reported that patient refused dressing change during night shift.  Patient states nobody touched her wound since I had been in yesterday.  It was explained that she has dressings in place so somebody had to change dressings as I removed them but did not put anything back on at that time.  Patient agreed to wound VAC to low back/sacral area but is now refusing once again.  Continue to encourage wound VAC to this area by explaining increased risk of infection, complication due to incontinence, multiple dressing changes due to increased drainage from wound along with possible swelling of dressings from incontinence.  Patient continues to refuse.  She states she just does not want to do that right now.    Visit Dx:    ICD-10-CM ICD-9-CM   1. Open wound  T14.8XXA 879.8   2. Anemia, unspecified type  D64.9 285.9     Patient Active Problem List   Diagnosis   • Hypertension   • Type 2 diabetes mellitus with hyperglycemia, without long-term current use of insulin ([facility])   • Obesity, morbid, BMI 50 or higher ([facility])   • Stage 3 chronic kidney disease   • Bacteremia due to Klebsiella pneumoniae   • Urinary tract infection   • Chronic pain syndrome   • Lymphedema of both lower extremities   • Bilateral lower leg cellulitis   • RLS (restless legs syndrome)   • Diabetic ulcer of left lower leg associated with type 2 diabetes mellitus, with fat layer exposed ([facility])   • Dyspnea   • MARCELINA (generalized anxiety disorder)   • Gastroesophageal reflux disease   • Headache   • Hypoglycemia   • Hypokalemia   •  Migraine without aura and without status migrainosus, not intractable   • Mild single current episode of major depressive disorder (HCC)   • Morbid obesity with BMI of 50.0-59.9, adult ([facility])   • Old complex tear of medial meniscus of left knee   • Pleurisy   • Polypharmacy   • Type 2 diabetes mellitus with hyperglycemia, with long-term current use of insulin ([facility])   • Venous insufficiency of both lower extremities   • Anemia   • Open wound   • Cellulitis of left leg       History:   Past Medical History:   Diagnosis Date   • Anxiety    • Arthritis     Osteoarthritis primarily left knee    • Back pain    • Chronic pain syndrome 10/27/2021   • Depression    • Diabetes mellitus ([facility])    • Fibromyalgia, primary    • Hx of tear of meniscus of knee joint 11/21/2016   • Hypertension    • Joint pain    • Kidney stone    • Knee pain    • Lymphedema of both lower extremities 10/27/2021   • Migraine    • Migraine headache    • Pain     knee, left ankle, left ankle     • Pes anserinus bursitis 04/28/2015   • Restless leg      Past Surgical History:   Procedure Laterality Date   • CATARACT EXTRACTION     • CATARACT EXTRACTION     • CATARACT EXTRACTION     • CORNEAL TRANSPLANT      bilateral    • ECTOPIC PREGNANCY     • INCISION AND DRAINAGE ABSCESS Left 6/1/2022    Procedure: DEBRIDEMENTAND PULSE LAVAGE LT MEDIAL THIGH PACKING ODOFORM GAUZE  WOUND SACRAL AND BILATERAL CALVES;  Surgeon: Justin Perez MD;  Location: [facility] OR;  Service: General;  Laterality: Left;   • JOINT REPLACEMENT     • KNEE ARTHROSCOPY W/ MENISCAL REPAIR     • KNEE MENISCAL REPAIR     • MENISCECTOMY Left 12/20/2016   • TUBAL ABDOMINAL LIGATION       Social History     Socioeconomic History   • Marital status: Single   Tobacco Use   • Smoking status: Never Smoker   • Smokeless tobacco: Never Used   Vaping Use   • Vaping Use: Never used   Substance and Sexual Activity   • Alcohol use: Yes   • Drug use: No   • Sexual activity: Not Currently     Partners:  Male       Allergies:  Allergies   Allergen Reactions   • Compazine [Prochlorperazine Edisylate] Shortness Of Breath     Breathing    • Meperidine Hives     (Demerol)    • Norflex [Orphenadrine] Hives   • Nortriptyline Hives   • Prochlorperazine Shortness Of Breath   • Prochlorperazine Maleate Shortness Of Breath   • Codeine Nausea And Vomiting     Rash    • Penicillins Rash     Nausea & vomiting    • Calamine Itching   • Amoxicillin-Pot Clavulanate Rash, Hives and Itching   • Avelox [Moxifloxacin Hcl] Rash   • Cefazolin Nausea And Vomiting   • Cephalexin Rash     (Keflex)    • Ciprofloxacin Rash   • Clindamycin/Lincomycin Rash   • Doxycycline Nausea And Vomiting   • Hydroxyzine Hcl Itching   • Moxifloxacin Nausea And Vomiting   • Orphenadrine Citrate Itching   • Sulfamethoxazole-Trimethoprim Nausea And Vomiting and Rash     States she also gets yeast infections with it   • Tobramycin Other (See Comments)     Eyes burn-feel like eyes are on fire     • Vistaril [Hydroxyzine] Rash       Medications:    Current Facility-Administered Medications:   •  albuterol (PROVENTIL) nebulizer solution 0.5% 2.5 mg/0.5mL, 2.5 mg, Nebulization, Q6H PRN, Justin Perez MD  •  busPIRone (BUSPAR) tablet 10 mg, 10 mg, Oral, TID, Lucas Tatum MD, 10 mg at 06/03/22 0833  •  dextrose (D50W) (25 g/50 mL) IV injection 25 g, 25 g, Intravenous, Q15 Min PRN, Justin Perez MD  •  dextrose (GLUTOSE) oral gel 15 g, 15 g, Oral, Q15 Min PRN, Justin Perez MD  •  DULoxetine (CYMBALTA) DR capsule 60 mg, 60 mg, Oral, BID, Lucas Tatum MD, 60 mg at 06/03/22 0833  •  Enoxaparin Sodium (LOVENOX) syringe 30 mg, 30 mg, Subcutaneous, Q12H, Justin Perez MD  •  fluticasone (FLONASE) 50 MCG/ACT nasal spray 1 spray, 1 spray, Nasal, Daily, Lucas Tatum MD, 1 spray at 06/03/22 0833  •  gabapentin (NEURONTIN) capsule 600 mg, 600 mg, Oral, TID, Lucas Tatum MD, 600 mg at 06/02/22 0341  •  glucagon (human recombinant)  (GLUCAGEN DIAGNOSTIC) injection 1 mg, 1 mg, Intramuscular, Q15 Min PRN, Justin Perez MD  •  HYDROcodone-acetaminophen (NORCO) 7.5-325 MG per tablet 1 tablet, 1 tablet, Oral, Q4H PRN, Justin Perez MD, 1 tablet at 06/03/22 1228  •  HYDROmorphone (DILAUDID) injection 0.5 mg, 0.5 mg, Intravenous, Q2H PRN, Justin Perez MD, 0.5 mg at 06/03/22 0641  •  insulin detemir (LEVEMIR) injection 30 Units, 30 Units, Subcutaneous, Nightly, Lucas Tatum MD, 30 Units at 06/02/22 2154  •  Insulin Lispro (humaLOG) injection 0-14 Units, 0-14 Units, Subcutaneous, TID AC, Lucas Tatum MD  •  LORazepam (ATIVAN) injection 1 mg, 1 mg, Intravenous, Q4H PRN, Justin Perez MD, 1 mg at 06/02/22 1634  •  mirtazapine (REMERON) tablet 15 mg, 15 mg, Oral, Nightly, Lucas Tatum MD, 15 mg at 06/02/22 2156  •  ondansetron (ZOFRAN) 8 mg/50 mL NS IVPB, 8 mg, Intravenous, Q6H PRN, Justin Perez MD  •  ondansetron ODT (ZOFRAN-ODT) disintegrating tablet 8 mg, 8 mg, Oral, Q6H PRN, Justin Perez MD  •  pantoprazole (PROTONIX) EC tablet 40 mg, 40 mg, Oral, QAM, Lucas Tatum MD, 40 mg at 06/03/22 0641  •  potassium chloride (MICRO-K) CR capsule 40 mEq, 40 mEq, Oral, Daily, Loki Mchugh MD, 40 mEq at 06/03/22 0833  •  pramipexole (MIRAPEX) tablet 0.75 mg, 0.75 mg, Oral, BID, Lucas Tatum MD, 0.75 mg at 06/03/22 0832  •  simethicone (MYLICON) chewable tablet 80 mg, 80 mg, Oral, 4x Daily PRN, Justin Perez MD  •  sodium chloride 0.9 % flush 1-10 mL, 1-10 mL, Intravenous, PRN, Justin Perez MD  •  sodium chloride 0.9 % flush 10 mL, 10 mL, Intravenous, Q12H, Justin Perez MD, 10 mL at 06/02/22 2156  •  sodium chloride 0.9 % infusion, 100 mL/hr, Intravenous, Continuous, Justin Perez MD, Stopped at 06/03/22 1149  •  sodium hypochlorite (DAKIN'S 1/4 STRENGTH) 0.125 % topical solution 0.125% solution, , Topical, BID, Justin Perez MD, Given at 06/03/22 0837  •  sucralfate  (CARAFATE) tablet 1 g, 1 g, Oral, 4x Daily AC & at Bedtime, Justin Perez MD, 1 g at 06/03/22 1139  •  vancomycin (VANCOCIN) 1,000 mg in sodium chloride 0.9 % 250 mL IVPB, 1,000 mg, Intravenous, Q12H, Justin Perez MD, 1,000 mg at 06/03/22 0220    Review of Systems:  Review of Systems   Constitutional: Positive for activity change, chills and fatigue.   HENT: Negative for sinus pressure and sore throat.    Respiratory: Negative for cough and shortness of breath.    Cardiovascular: Positive for leg swelling. Negative for chest pain and palpitations.   Gastrointestinal: Negative for diarrhea, nausea and vomiting.   Genitourinary: Positive for urgency. Negative for frequency.        Mixed (stress and urge) incontinence   Musculoskeletal: Positive for arthralgias, gait problem and myalgias.   Skin: Positive for color change and wound.   Neurological: Positive for weakness. Negative for dizziness.   Psychiatric/Behavioral: Negative for agitation, behavioral problems and confusion.             OBJECTIVE     Vitals:    06/03/22 1142   BP: 136/71   Pulse: 77   Resp: 18   Temp: 98.8 °F (37.1 °C)   SpO2: 97%       PHYSICAL EXAM  Physical Exam  Vitals and nursing note reviewed.   Constitutional:       Appearance: She is morbidly obese.      Comments: Body mass index is 58.76 kg/m².    HENT:      Head: Normocephalic and atraumatic.   Eyes:      General: Lids are normal. Gaze aligned appropriately.   Cardiovascular:      Rate and Rhythm: Normal rate and regular rhythm.   Pulmonary:      Effort: Pulmonary effort is normal. No respiratory distress.   Abdominal:      General: Abdomen is protuberant.      Palpations: Abdomen is soft.   Musculoskeletal:      Cervical back: Normal range of motion and neck supple.   Feet:      Right foot:      Skin integrity: Ulcer and skin breakdown present.      Left foot:      Skin integrity: Ulcer and skin breakdown present.      Comments: Pressure injury bilateral heels, stage  3-subcutaneous tissue present in wound bases of bilateral heels.  Surgical debridement completed.  No necrotic tissue present.  Edges are regular attached to wound bed.  No undermining or tunneling noted.  Periwound area is erythemic and blanchable.    Skin:     General: Skin is warm and dry.      Findings: Erythema and wound present.      Comments: Wound of sacral area is noted to be related to pressure.  Did not see prior to surgical debridement.  Difficult to see during assessment as patient states is very painful to turn.  With chart review, assessment of picture and what is seen during turning, this appears to have mixed diagnosis of intertrigo with pressure and possible infectious process.  Wound base with subcutaneous tissue which is been surgically debrided.  Appeared to be a deep tissue injury prior to surgery with blister formation present and erythema of the periwound area.  Currently large open wound measuring approximately 4 cm x 11 cm x 4 cm.  Serosanguineous drainage present.  Edges are irregular no tunneling or undermining noted.  Patient has open wounds of bilateral posterior calves.  Left calf measures 3 cm x 3 cm x 1.2 cm.  Right calf measures 2 cm x 2 cm x 0.5 cm.  Subcutaneous tissue present.  Edges attached to wound bed.  No tunneling undermining present.  Serosanguineous drainage.  Periwound area intact.  Mixed etiology of pressure and diabetic ulceration.  Left thigh abscess was surgically debrided.  Wound VAC currently in place.  PT measured 10 cm x 4 cm x 5 cm.     Neurological:      Mental Status: She is alert, oriented to person, place, and time and easily aroused.      Motor: Weakness present.      Gait: Gait abnormal.   Psychiatric:         Attention and Perception: Attention normal.         Mood and Affect: Mood is depressed. Affect is tearful.         Speech: Speech normal.      Comments: After discussion of care, patient became tearful.  She states she is \"tired of being poked and  prodded.\"              Results Review:  Lab Results (last 48 hours)     Procedure Component Value Units Date/Time    Tissue / Bone Culture - Tissue, Foot, Left [[id number]]  (Abnormal) Collected: 06/01/22 1811    Specimen: Tissue from Foot, Left Updated: 06/03/22 1337     Tissue Culture Scant growth (1+) Gram Negative Bacilli      Scant growth (1+) Proteus species     Gram Stain No WBCs seen      Few (2+) Gram positive cocci      Rare (1+) Gram negative bacilli    Tissue Pathology Exam [[id number]] Collected: 06/01/22 1724    Specimen: Tissue from Thigh, Left; Tissue from Coccyx; Tissue from Spine, Sacral Updated: 06/03/22 1327     Note to Patients --     This report may contain a detailed description of human tissue sent by a health care provider to the laboratory for pathologic evaluation. The content of this report is essential for diagnosis and may provide important critical findings. This information may be unfamiliar to patients to review without a medical professional present. It is advised that the patient review this report in the presence of a health care provider who can answer questions and explain the results.       Case Report --     Surgical Pathology Report                         Case: [id number]                                  Authorizing Provider:  Justin Perez MD       Collected:           06/01/2022 05:24 PM          Ordering Location:     [facility] OR  Received:            06/02/2022 08:28 AM          Pathologist:           Iris Singletary MD                                                    Specimens:   1) - Thigh, Left, Left thigh tissue for pathology                                                   2) - Coccyx, Tissue from coccyx area for path                                                       3) - Spine, Sacral, Tissue from sacral area for pathology                                   Final Diagnosis --     1.  Left thigh tissue, excision:       - Benign skin and  soft tissue with necrosis and chronic reparative/reactive changes.    2.  Tissue from coccyx area, excision:       - Benign, ulcerated, skin and soft tissue with necrosis.    3.  Tissue from sacral area, excision:       - Benign skin and soft tissue with active chronic inflammation.       Gross Description --     1. Thigh, Left.   Received in a formalin filled container labeled with the patient's name, date of birth, and \"left thigh tissue\".  The specimen consists of an irregularly shaped fragment of yellow-brown dusky tissue with attached irregular skin excisions.  The specimen measures 8.3 x 4.8 x 2.2 cm.  The largest skin segment measures 8.3 cm in greatest dimension.  The skin surfaces show areas of tan-brown, dusky discoloration.  The external surfaces of the soft tissue are yellow-tan and slightly lobulated.  Sectioning reveals diffuse red-brown dusky fibroadipose tissue.  Representative sections are submitted in block 1A.      2. Coccyx.   Received in a formalin filled container labeled with the patient's name, date of birth, and \"tissue from coccyx area\".  The specimen consists of a fragment of yellow-gray soft tissue measuring 1.5 x 0.7 x 0.3 cm.  A possible segment of skin is noted measuring 1.5 cm in greatest dimension and appears yellow-gray, pale and flattened.  The cut surface shows yellow-gray soft necrotic tissue.  Representative sections are submitted in block 2A.      3. Spine, Sacral.   Received in a formalin filled container labeled with the patient's name, date of birth, and \"tissue from sacral area\".  The specimen consists of 2 fragments of yellow-brown soft necrotic fibroadipose tissue with an attached skin ellipse.  The fragments aggregate to 9.4 x 8.5 x 3.5 cm.  The external surfaces are dusky with surgical disruption and is otherwise lobulated.  The attached skin ellipse measures 7.5 x 2.3 cm and has a previous incision running longitudinally.  Sectioning reveals red-brown, dark and dusky  discoloration with thin yellow-pink fatty tissue.  Representative sections of skin and subcutaneous tissue are submitted in block 3A.           Microscopic Description --     Microscopic examination is performed and supports the final diagnosis.      POC Glucose Once [[id number]]  (Normal) Collected: 06/03/22 1137    Specimen: Blood Updated: 06/03/22 1148     Glucose 84 mg/dL      Comment: : [id number] Yvon PamelaMeter ID: [id number]       Tissue / Bone Culture - Tissue, Thigh, Left [[id number]]  (Abnormal) Collected: 06/01/22 1722    Specimen: Tissue from Thigh, Left Updated: 06/03/22 1113     Tissue Culture Light growth (2+) Gram Negative Bacilli      Rare Proteus species     Gram Stain Few (2+) WBCs seen      No organisms seen    Blood Culture - Blood, Arm, Right [[id number]]  (Normal) Collected: 06/01/22 0837    Specimen: Blood from Arm, Right Updated: 06/03/22 1046     Blood Culture No growth at 2 days    Blood Culture - Blood, Arm, Right [[id number]]  (Normal) Collected: 06/01/22 0852    Specimen: Blood from Arm, Right Updated: 06/03/22 0915     Blood Culture No growth at 2 days    POC Glucose Once [[id number]]  (Normal) Collected: 06/03/22 0802    Specimen: Blood Updated: 06/03/22 0813     Glucose 129 mg/dL      Comment: : [id number] Gerhard SarahMeter ID: [id number]       POC Glucose Once [[id number]]  (Abnormal) Collected: 06/02/22 2154    Specimen: Blood Updated: 06/02/22 2213     Glucose 171 mg/dL      Comment: : [id number] Eugene RachelMeter ID: [id number]       POC Glucose Once [[id number]]  (Abnormal) Collected: 06/02/22 1715    Specimen: Blood Updated: 06/02/22 1728     Glucose 311 mg/dL      Comment: : [id number] Dejon DeannaMeter ID: [id number]       POC Glucose Once [[id number]]  (Abnormal) Collected: 06/02/22 1138    Specimen: Blood Updated: 06/02/22 1149     Glucose 170 mg/dL      Comment: : [id number] Dejon DeannaMeter ID: [id number]       POC Glucose Once [[id number]]  (Abnormal)  Collected: 06/02/22 0817    Specimen: Blood Updated: 06/02/22 0828     Glucose 222 mg/dL      Comment: : [id number] Chinese DeannaMeter ID: [id number]       CBC Auto Differential [[id number]]  (Abnormal) Collected: 06/02/22 0553    Specimen: Blood Updated: 06/02/22 0607     WBC 7.66 10*3/mm3      RBC 3.58 10*6/mm3      Hemoglobin 8.9 g/dL      Hematocrit 28.6 %      MCV 79.9 fL      MCH 24.9 pg      MCHC 31.1 g/dL      RDW 16.6 %      RDW-SD 47.6 fl      MPV 9.3 fL      Platelets 182 10*3/mm3      Neutrophil % 77.8 %      Lymphocyte % 15.3 %      Monocyte % 2.5 %      Eosinophil % 3.1 %      Basophil % 0.3 %      Immature Grans % 1.0 %      Neutrophils, Absolute 5.96 10*3/mm3      Lymphocytes, Absolute 1.17 10*3/mm3      Monocytes, Absolute 0.19 10*3/mm3      Eosinophils, Absolute 0.24 10*3/mm3      Basophils, Absolute 0.02 10*3/mm3      Immature Grans, Absolute 0.08 10*3/mm3      nRBC 0.0 /100 WBC     Wound Culture - Wound, Leg, Left [[id number]]  (Abnormal) Collected: 06/01/22 0846    Specimen: Wound from Leg, Left Updated: 06/02/22 0551     Wound Culture Moderate growth (3+) Gram Negative Bacilli     Gram Stain Rare (1+) WBCs seen      Moderate (3+) Gram negative bacilli    Comprehensive Metabolic Panel [[id number]]  (Abnormal) Collected: 06/02/22 0321    Specimen: Blood Updated: 06/02/22 0343     Glucose 205 mg/dL      BUN 15 mg/dL      Creatinine 1.05 mg/dL      Sodium 136 mmol/L      Potassium 3.0 mmol/L      Chloride 97 mmol/L      CO2 29.0 mmol/L      Calcium 7.2 mg/dL      Total Protein 5.3 g/dL      Albumin 2.00 g/dL      ALT (SGPT) 13 U/L      AST (SGOT) 14 U/L      Alkaline Phosphatase 76 U/L      Total Bilirubin 0.3 mg/dL      Globulin 3.3 gm/dL      A/G Ratio 0.6 g/dL      BUN/Creatinine Ratio 14.3     Anion Gap 10.0 mmol/L      eGFR 65.3 mL/min/1.73      Comment: National Kidney Foundation and American Society of Nephrology (ASN) Task Force recommended calculation based on the Chronic Kidney Disease  Epidemiology Collaboration (CKD-EPI) equation refit without adjustment for race.       Narrative:      GFR Normal >60  Chronic Kidney Disease <60  Kidney Failure <15      Lipase [[id number]]  (Abnormal) Collected: 06/02/22 0321    Specimen: Blood Updated: 06/02/22 0341     Lipase 8 U/L     Amylase [[id number]]  (Abnormal) Collected: 06/02/22 0321    Specimen: Blood Updated: 06/02/22 0341     Amylase 8 U/L     STAT Lactic Acid, Reflex [[id number]]  (Normal) Collected: 06/02/22 0320    Specimen: Blood Updated: 06/02/22 0339     Lactate 1.9 mmol/L     Protime-INR [[id number]]  (Abnormal) Collected: 06/02/22 0322    Specimen: Blood Updated: 06/02/22 0335     Protime 13.9 Seconds      INR 1.11    STAT Lactic Acid, Reflex [[id number]]  (Abnormal) Collected: 06/01/22 2303    Specimen: Blood Updated: 06/01/22 2345     Lactate 2.3 mmol/L     Hemoglobin A1c [[id number]]  (Abnormal) Collected: 06/01/22 2303    Specimen: Blood Updated: 06/01/22 2328     Hemoglobin A1C 11.00 %     Narrative:      Hemoglobin A1C Ranges:    Increased Risk for Diabetes  5.7% to 6.4%  Diabetes                     >= 6.5%  Diabetic Goal                < 7.0%    POC Glucose Once [[id number]]  (Normal) Collected: 06/01/22 1859    Specimen: Blood Updated: 06/01/22 1911     Glucose 125 mg/dL      Comment: : [id number] Agnieszka Jacinto ID: [id number]       POC Glucose Once [[id number]]  (Abnormal) Collected: 06/01/22 1444    Specimen: Blood Updated: 06/01/22 1455     Glucose 221 mg/dL      Comment: : [id number] Jethro BeaversnaMeter ID: [id number]           Imaging Results (Last 72 Hours)     Procedure Component Value Units Date/Time    CT Lower Extremity Left With Contrast [[id number]] Collected: 06/01/22 1131     Updated: 06/01/22 1151    Narrative:      EXAMINATION: CT LOWER EXTREMITY LEFT W CONTRAST-     6/1/2022 10:24 AM CDT     HISTORY: Infection ant thigh, r/o abscess     The CT scan of the left lower extremity is performed after intravenous  contrast  enhancement. Images were acquired in axial plane with  subsequent reconstruction in coronal and sagittal planes.     There are no previous similar studies for comparison.     Correlation made with CT scan of the left knee dated 10/29/2020.     The study is a limited diagnostic value due to patient body habitus and  technical limitation.     There is a focal area of skin thickening, extensive subcutaneous soft  tissue infiltration and soft tissue air along the medial aspect of the  left mid thigh. The process does not extend into the common muscular  compartment. No extension to the left femur. No bony erosion. No  periosteal reaction. There is significant contrast enhancement  surrounding this process suggesting an inflammatory process/abscess.     There is moderate diffuse muscular atrophy/fatty infiltration.     The hip articulation is intact. No significant osteophytes. Moderate  diffuse narrowing may suggest chronic cartilage loss/osteoarthritis.  There are severe arthritic changes of the left knee joint which is  similar to the previous study suggesting a grade 3-4 tricompartment  chronic osteoarthritis. No joint effusion.     The limited included pelvis is not optimally visualized or evaluated. No  acute bony abnormality.       Impression:      1. The process along the medial aspect of the mid thigh is present and  abscess.  2. No evidence of bony involvement. No muscular involvement.  3. Other findings as above.  This report was finalized on 06/01/2022 11:38 by Dr. Arash Farrell MD.             ASSESSMENT/PLAN       Examination and evaluation of wound(s) was performed.    DIAGNOSIS:   Pressure injury of sacrum, stage 3  Pressure injury of coccyx, stage 3  Pressure injury of left buttock, stage 3  Diabetic ulcer of left heel with fat layer exposed  Diabetic ulcer of right heel with fat layer exposed  Pressure injury of right calf, stage 3  Pressure injury of left calf, stage 3  Pressure injury of right calf,  is stage 3  Abscess of left thigh-surgically debrided  Type 2 diabetes mellitus with hyperglycemia, with long-term current use of insulin  Venous insufficiency of both lower extremities  Lymphedema of bilateral lower extremities  Obesity    PLAN:   - Suggest to move forward with use of Opticell Ag to all wounds adressed other than where wound vac is placed.      - Highly suggest wound vac to sacral area as wound is large, no necrotic tissue, at high risk for infection, and increased drainage noted from this wound.      - Patient to follow up in the wound care center after discharge.        Discussed findings and treatment plan including risks, benefits, and treatment options with patient in detail. Patient agreed with treatment plan.      This document has been electronically signed by SHOLA Velazco on 6/3/2022 14:52 CDT       Time spent in face-to-face evaluation, chart review, planning and education 40 minutes with greater than 50% of time spent with patient and/or family and in coordination of care.  Counseling of patient and/or family includes discussing wound diagnosis and etiology , discussing risks and benefits, counseling on risk factor reduction, giving instructions including follow-up management, Importance of compliance with chosen management options and patient/family education.

## 2022-06-03 NOTE — PROGRESS NOTES
Pharmacy Dosing Service  Pharmacokinetics  Vancomycin Follow-up Evaluation    Assessment/Action/Plan:  Loading dose: 2000 mg 6/1/2022 1045  Current Order: Vancomycin 1000 mg IVPB every 12 hours  Current end date:6/7/2022 0200  Levels: Vancomycin trough re-ordered before dose due on 6/4/2022 at 1400    Patient refused Vancomycin trough ordered before dose due on 6/3/2022 at 1400  Additional antimicrobial agent(s): none     Continue Vancomycin 1000 mg iv q12h. Pharmacy will continue to follow daily and adjust dose accordingly.      Subjective:  Valeria Wilson is a 51 y.o. female initiated on Vancomycin 2000 mg IV once followed by 1000 mg every 12 hours by prescriber for the treatment of SSTI, day 3 of 6 of treatment.    AUC Model Data:  Regimen: 1000 mg IV every 12 hours.  Exposure target: AUC24 (range)400-600 mg/L.hr   AUC24,ss: 608 mg/L.hr  PAUC*: 79 %  Ctrough,ss: 18.8 mg/L  Pconc*: 46 %  Tox.: 15 %    Objective:  Ht: 154.9 cm (61\"); Wt: (!) 141 kg (311 lb)  Estimated Creatinine Clearance: 87.1 mL/min (A) (by C-G formula based on SCr of 1.05 mg/dL (H)).   Creatinine   Date Value Ref Range Status   06/02/2022 1.05 (H) 0.57 - 1.00 mg/dL Final   06/01/2022 1.44 (H) 0.57 - 1.00 mg/dL Final   04/18/2022 1 (H) 0.5 - 0.9 mg/dL Final   01/25/2022 1.04 (H) 0.57 - 1.00 mg/dL Final   06/28/2021 0.8 0.5 - 0.9 mg/dL Final   04/11/2021 0.9 0.5 - 0.9 mg/dL Final      Lab Results   Component Value Date    WBC 7.66 06/02/2022    WBC 8.53 06/01/2022    WBC 6.5 04/15/2022         Lab Results   Component Value Date    VANCOTROUGH 75.1 (C) 04/15/2022       Culture Results:  Microbiology Results (last 10 days)       Procedure Component Value - Date/Time    Tissue / Bone Culture - Tissue, Foot, Left [[id number]]  (Abnormal) Collected: 06/01/22 1811    Lab Status: Preliminary result Specimen: Tissue from Foot, Left Updated: 06/03/22 1337     Tissue Culture Scant growth (1+) Gram Negative Bacilli      Scant growth (1+) Proteus species      Gram Stain No WBCs seen      Few (2+) Gram positive cocci      Rare (1+) Gram negative bacilli    Tissue / Bone Culture - Tissue, Thigh, Left [[id number]]  (Abnormal) Collected: 06/01/22 1722    Lab Status: Preliminary result Specimen: Tissue from Thigh, Left Updated: 06/03/22 1113     Tissue Culture Light growth (2+) Gram Negative Bacilli      Rare Proteus species     Gram Stain Few (2+) WBCs seen      No organisms seen    Blood Culture - Blood, Arm, Right [[id number]]  (Normal) Collected: 06/01/22 0852    Lab Status: Preliminary result Specimen: Blood from Arm, Right Updated: 06/03/22 0915     Blood Culture No growth at 2 days    COVID-19,[facility] IN-HOUSE,Nasal Swab No Transport Media 3-4 HR TAT - Swab, Nasal Cavity [[id number]]  (Normal) Collected: 06/01/22 0846    Lab Status: Final result Specimen: Swab from Nasal Cavity Updated: 06/01/22 0936     COVID19 Not Detected    Narrative:      Fact sheet for providers: https://www.fda.gov/media/206513/download     Fact sheet for patients: https://www.fda.gov/media/044091/download    Test performed by PCR.    Consider negative results in combination with clinical observations, patient history, and epidemiological information.    Wound Culture - Wound, Leg, Left [[id number]]  (Abnormal) Collected: 06/01/22 0846    Lab Status: Preliminary result Specimen: Wound from Leg, Left Updated: 06/02/22 0551     Wound Culture Moderate growth (3+) Gram Negative Bacilli     Gram Stain Rare (1+) WBCs seen      Moderate (3+) Gram negative bacilli    Blood Culture - Blood, Arm, Right [[id number]]  (Normal) Collected: 06/01/22 0837    Lab Status: Preliminary result Specimen: Blood from Arm, Right Updated: 06/03/22 1046     Blood Culture No growth at 2 days            Joselito Kunz, PharmD   06/03/22 15:27 CDT

## 2022-06-03 NOTE — NURSING NOTE
Pt refused sacral wound packing at this time, stated that she is \"trying to sleep\" and it can be done in am. Increased risk of infection to site explained and pt continued to refuse drsg. Pt also stated that she not having a wound vac placed to sacral wound, said that she might earlier and has now decided that she doesn't want it.

## 2022-06-03 NOTE — CONSULTS
[facility] Medicine Consult  Inpatient Hospitalist Consult  Consult performed by: Lucas Tatum MD  Consult ordered by: Justin Perez MD          Date of Admission: 6/1/2022  Date of Consult: 06/02/22    Primary Care Physician: No primary care provider on file.  Referring Physician: Chris  Chief Complaint/Reason for Consultation: diabetes control, medical management    Subjective   History of Present Illness  Ms. Wilson is a 51-year-old female with past medical history of morbid obesity, poorly controlled diabetes, with an A1c of over 11, and numerous wounds with concern for infection.  Patient presented and was admitted by general surgery for the open wound with infection and concern for subcutaneous air and necrotic areas.  Patient has been less ambulatory over the last 1 to 2 years, and has developed pressure injuries from being mostly in bed.  Dr. Perez took the patient to the OR today for debridement of the numerous wounds.    In regards to the patient's diabetes, she indicates that she takes long-acting insulin, short acting insulin, and Victoza.  Patient cites having poor diet choices as one of the reasons that she has had poorly controlled diabetes.  A couple years ago, her A1c was much better around 9, which is still not ideal, it is crept up to greater than 11 fairly consistently.  It is noted that in the room whenever I see the patient, there are multiple boxes from [employer] and as well as a Brandt[employer] box.  Patient reports that she did eat some of the food with her boyfriend.  She cites COVID-19 as one of the reasons for this.  She has also cited that she has been a lot less active due to her deconditioning, as she used to be very active.    Review of Systems   Constitutional: Positive for activity change and fatigue. Negative for appetite change, chills and fever.   Respiratory: Negative for cough and shortness of breath.    Cardiovascular: Positive for  leg swelling. Negative for chest pain and palpitations.   Gastrointestinal: Negative for abdominal distention, abdominal pain, diarrhea, nausea and vomiting.   Neurological: Positive for weakness.   All other systems reviewed and are negative.     Otherwise complete ROS is negative except as mentioned above.    Past Medical History:   Past Medical History:   Diagnosis Date   • Anxiety    • Arthritis     Osteoarthritis primarily left knee    • Back pain    • Chronic pain syndrome 10/27/2021   • Depression    • Diabetes mellitus (HCC)    • Fibromyalgia, primary    • Hx of tear of meniscus of knee joint 11/21/2016   • Hypertension    • Joint pain    • Kidney stone    • Knee pain    • Lymphedema of both lower extremities 10/27/2021   • Migraine    • Migraine headache    • Pain     knee, left ankle, left ankle     • Pes anserinus bursitis 04/28/2015   • Restless leg      Past Surgical History:  Past Surgical History:   Procedure Laterality Date   • CATARACT EXTRACTION     • CATARACT EXTRACTION     • CATARACT EXTRACTION     • CORNEAL TRANSPLANT      bilateral    • ECTOPIC PREGNANCY     • INCISION AND DRAINAGE ABSCESS Left 6/1/2022    Procedure: DEBRIDEMENTAND PULSE LAVAGE LT MEDIAL THIGH PACKING ODOFORM GAUZE  WOUND SACRAL AND BILATERAL CALVES;  Surgeon: Justin Perez MD;  Location: [facility];  Service: General;  Laterality: Left;   • JOINT REPLACEMENT     • KNEE ARTHROSCOPY W/ MENISCAL REPAIR     • KNEE MENISCAL REPAIR     • MENISCECTOMY Left 12/20/2016   • TUBAL ABDOMINAL LIGATION       Social History:  reports that she has never smoked. She has never used smokeless tobacco. She reports current alcohol use. She reports that she does not use drugs.    Family History: family history includes Cancer in her father and maternal grandfather; Dementia in her father; Hypertension in her maternal grandmother; No Known Problems in her mother.     Allergies:   Allergies   Allergen Reactions   • Compazine [Prochlorperazine  Edisylate] Shortness Of Breath     Breathing    • Meperidine Hives     (Demerol)    • Norflex [Orphenadrine] Hives   • Nortriptyline Hives   • Prochlorperazine Shortness Of Breath   • Prochlorperazine Maleate Shortness Of Breath   • Codeine Nausea And Vomiting     Rash    • Penicillins Rash     Nausea & vomiting    • Calamine Itching   • Amoxicillin-Pot Clavulanate Rash, Hives and Itching   • Avelox [Moxifloxacin Hcl] Rash   • Cefazolin Nausea And Vomiting   • Cephalexin Rash     (Keflex)    • Ciprofloxacin Rash   • Clindamycin/Lincomycin Rash   • Doxycycline Nausea And Vomiting   • Hydroxyzine Hcl Itching   • Moxifloxacin Nausea And Vomiting   • Orphenadrine Citrate Itching   • Sulfamethoxazole-Trimethoprim Nausea And Vomiting and Rash     States she also gets yeast infections with it   • Tobramycin Other (See Comments)     Eyes burn-feel like eyes are on fire     • Vistaril [Hydroxyzine] Rash     Medications: Scheduled Meds:busPIRone, 10 mg, Oral, TID  DULoxetine, 60 mg, Oral, BID  enoxaparin, 30 mg, Subcutaneous, Q12H  fluticasone, 1 spray, Nasal, Daily  gabapentin, 600 mg, Oral, TID  insulin detemir, 30 Units, Subcutaneous, Nightly  insulin lispro, 0-9 Units, Subcutaneous, TID AC  mirtazapine, 15 mg, Oral, Nightly  pantoprazole, 40 mg, Oral, QAM  potassium chloride, 40 mEq, Oral, Daily  pramipexole, 0.75 mg, Oral, BID  sodium chloride, 10 mL, Intravenous, Q12H  sucralfate, 1 g, Oral, 4x Daily AC & at Bedtime  vancomycin, 1,000 mg, Intravenous, Q12H      Continuous Infusions:sodium chloride, 100 mL/hr, Last Rate: 100 mL/hr (06/02/22 0607)      PRN Meds:.•  albuterol  •  dextrose  •  dextrose  •  glucagon (human recombinant)  •  HYDROcodone-acetaminophen  •  HYDROmorphone  •  LORazepam  •  ondansetron  •  ondansetron ODT  •  simethicone  •  sodium chloride    I have utilized all available immediate resources to obtain, update, and review the patient's current medications.    Objective   Objective    Physical Exam:    Temp:  [98.2 °F (36.8 °C)-98.7 °F (37.1 °C)] 98.6 °F (37 °C)  Heart Rate:  [81-93] 81  Resp:  [16] 16  BP: (114-137)/(43-62) 114/48  Physical Exam  Vitals and nursing note reviewed.   Constitutional:       Appearance: She is obese.   HENT:      Head: Normocephalic and atraumatic.      Mouth/Throat:      Mouth: Mucous membranes are dry.      Pharynx: Oropharynx is clear.   Eyes:      General: No scleral icterus.     Conjunctiva/sclera: Conjunctivae normal.   Cardiovascular:      Rate and Rhythm: Normal rate and regular rhythm.   Pulmonary:      Effort: Pulmonary effort is normal. No respiratory distress.      Breath sounds: Normal breath sounds. No stridor.   Musculoskeletal:      Right lower leg: Edema present.      Left lower leg: Edema present.   Skin:     General: Skin is warm and dry.      Coloration: Skin is pale.   Neurological:      General: No focal deficit present.      Mental Status: She is alert and oriented to person, place, and time.   Psychiatric:         Mood and Affect: Mood normal.         Behavior: Behavior normal.           Results Reviewed:  I have personally reviewed current lab, radiology, and data and agree with results.  Lab Results (last 24 hours)     Procedure Component Value Units Date/Time    POC Glucose Once [[id number]]  (Abnormal) Collected: 06/02/22 1715    Specimen: Blood Updated: 06/02/22 1728     Glucose 311 mg/dL      Comment: : [id number] Dejon DeannaMeter ID: [id number]       Tissue / Bone Culture - Tissue, Foot, Left [[id number]]  (Abnormal) Collected: 06/01/22 1811    Specimen: Tissue from Foot, Left Updated: 06/02/22 1320     Tissue Culture Scant growth (1+) Gram Negative Bacilli     Gram Stain No WBCs seen      Few (2+) Gram positive cocci      Rare (1+) Gram negative bacilli    Tissue / Bone Culture - Tissue, Thigh, Left [[id number]]  (Abnormal) Collected: 06/01/22 1722    Specimen: Tissue from Thigh, Left Updated: 06/02/22 1256     Tissue Culture Light growth (2+) Gram  Negative Bacilli     Gram Stain Few (2+) WBCs seen      No organisms seen    POC Glucose Once [[id number]]  (Abnormal) Collected: 06/02/22 1138    Specimen: Blood Updated: 06/02/22 1149     Glucose 170 mg/dL      Comment: : [id number] Dejon DeannaMeter ID: [id number]       Blood Culture - Blood, Arm, Right [[id number]]  (Normal) Collected: 06/01/22 0837    Specimen: Blood from Arm, Right Updated: 06/02/22 1047     Blood Culture No growth at 24 hours    Blood Culture - Blood, Arm, Right [[id number]]  (Normal) Collected: 06/01/22 0852    Specimen: Blood from Arm, Right Updated: 06/02/22 0918     Blood Culture No growth at 24 hours    POC Glucose Once [[id number]]  (Abnormal) Collected: 06/02/22 0817    Specimen: Blood Updated: 06/02/22 0828     Glucose 222 mg/dL      Comment: : [id number] Dejon DeannaMeter ID: [id number]       Tissue Pathology Exam [[id number]] Collected: 06/01/22 1724    Specimen: Tissue from Thigh, Left; Tissue from Coccyx; Tissue from Spine, Sacral Updated: 06/02/22 0828    CBC Auto Differential [[id number]]  (Abnormal) Collected: 06/02/22 0553    Specimen: Blood Updated: 06/02/22 0607     WBC 7.66 10*3/mm3      RBC 3.58 10*6/mm3      Hemoglobin 8.9 g/dL      Hematocrit 28.6 %      MCV 79.9 fL      MCH 24.9 pg      MCHC 31.1 g/dL      RDW 16.6 %      RDW-SD 47.6 fl      MPV 9.3 fL      Platelets 182 10*3/mm3      Neutrophil % 77.8 %      Lymphocyte % 15.3 %      Monocyte % 2.5 %      Eosinophil % 3.1 %      Basophil % 0.3 %      Immature Grans % 1.0 %      Neutrophils, Absolute 5.96 10*3/mm3      Lymphocytes, Absolute 1.17 10*3/mm3      Monocytes, Absolute 0.19 10*3/mm3      Eosinophils, Absolute 0.24 10*3/mm3      Basophils, Absolute 0.02 10*3/mm3      Immature Grans, Absolute 0.08 10*3/mm3      nRBC 0.0 /100 WBC     Wound Culture - Wound, Leg, Left [[id number]]  (Abnormal) Collected: 06/01/22 0846    Specimen: Wound from Leg, Left Updated: 06/02/22 0551     Wound Culture Moderate growth (3+)  Gram Negative Bacilli     Gram Stain Rare (1+) WBCs seen      Moderate (3+) Gram negative bacilli    Comprehensive Metabolic Panel [[id number]]  (Abnormal) Collected: 06/02/22 0321    Specimen: Blood Updated: 06/02/22 0343     Glucose 205 mg/dL      BUN 15 mg/dL      Creatinine 1.05 mg/dL      Sodium 136 mmol/L      Potassium 3.0 mmol/L      Chloride 97 mmol/L      CO2 29.0 mmol/L      Calcium 7.2 mg/dL      Total Protein 5.3 g/dL      Albumin 2.00 g/dL      ALT (SGPT) 13 U/L      AST (SGOT) 14 U/L      Alkaline Phosphatase 76 U/L      Total Bilirubin 0.3 mg/dL      Globulin 3.3 gm/dL      A/G Ratio 0.6 g/dL      BUN/Creatinine Ratio 14.3     Anion Gap 10.0 mmol/L      eGFR 65.3 mL/min/1.73      Comment: National Kidney Foundation and American Society of Nephrology (ASN) Task Force recommended calculation based on the Chronic Kidney Disease Epidemiology Collaboration (CKD-EPI) equation refit without adjustment for race.       Narrative:      GFR Normal >60  Chronic Kidney Disease <60  Kidney Failure <15      Lipase [[id number]]  (Abnormal) Collected: 06/02/22 0321    Specimen: Blood Updated: 06/02/22 0341     Lipase 8 U/L     Amylase [[id number]]  (Abnormal) Collected: 06/02/22 0321    Specimen: Blood Updated: 06/02/22 0341     Amylase 8 U/L     STAT Lactic Acid, Reflex [[id number]]  (Normal) Collected: 06/02/22 0320    Specimen: Blood Updated: 06/02/22 0339     Lactate 1.9 mmol/L     Protime-INR [[id number]]  (Abnormal) Collected: 06/02/22 0322    Specimen: Blood Updated: 06/02/22 0335     Protime 13.9 Seconds      INR 1.11    STAT Lactic Acid, Reflex [[id number]]  (Abnormal) Collected: 06/01/22 2303    Specimen: Blood Updated: 06/01/22 2345     Lactate 2.3 mmol/L     Hemoglobin A1c [[id number]]  (Abnormal) Collected: 06/01/22 2303    Specimen: Blood Updated: 06/01/22 2328     Hemoglobin A1C 11.00 %     Narrative:      Hemoglobin A1C Ranges:    Increased Risk for Diabetes  5.7% to 6.4%  Diabetes                     >=  6.5%  Diabetic Goal                < 7.0%        Imaging Results (Last 24 Hours)     ** No results found for the last 24 hours. **          Assessment / Plan   Assessment:     Active Hospital Problems    Diagnosis    • Cellulitis of left leg    • Open wound    • Anemia    • Chronic pain syndrome    • Lymphedema of both lower extremities    • Polypharmacy    • Diabetic ulcer of left lower leg associated with type 2 diabetes mellitus, with fat layer exposed ([facility])    • Obesity, morbid, BMI 50 or higher ([facility])    • Stage 3 chronic kidney disease    • Hypertension    • Gastroesophageal reflux disease    • Type 2 diabetes mellitus with hyperglycemia, with long-term current use of insulin ([facility])    • Venous insufficiency of both lower extremities    • RLS (restless legs syndrome)       Plan:   Patient admitted by Dr. Perez on 6/1 for wound debridement and concern for infected wounds.  Hospitalist consulted for medical management and diabetes management.    Patients hemoglobin A1c >11 for sometime now.  Patient reports compliance with insulin, but does report struggle with diet.  Discussed with her it will be difficult to control her sugar in the hospital if she is eating a lot of snacks and food outside of the meals provided to her by the hospital.  Discussed with her improving diet selection will also help as in the room were multiple boxes from [employer] and [employer].     Will utilize long-acting insulin with levemir qhs and sliding scale insulin with qac/qhs accuchecks.  Hold victoza.     Diabetes educator.  Nutrition consult.     Carbohydrate consistent diet    Incentive spirometer    Her home medications were reviewed.  Resume medications for RLS, neuropathy, and mood disorder.    Patient should consider attempting to follow-up with the weight-loss surgery clinic with Dr. Mcdonald, but will defer that to her PCP.    Pain management, antibiotics, and wound care per general surgery.    PT/OT    Code Status/Advanced Care  Plan: Full    The patient's surrogate decision maker is mother Regina Mrat.     I discussed my findings and recommendations with the patient and bedside RN.    Patient seen and examined by me on 6/2/2022 at 1415.    Electronically signed by Lucas Tatum MD, 06/02/22, 21:48 CDT.

## 2022-06-03 NOTE — PROGRESS NOTES
LOS: 2 days   No care team member to display    Chief Complaint: Pressure necrosis decubiti multiple areas    Subjective     Subjective     Patient with multiple pressure necrosis decubiti in multiple areas, left medial thigh, right and left posterior calf, left heel, right heel, perineal, sacral area all somewhat sizable.  She is allowing a wound VAC on the left medial leg but the area on the sacral area is actually much larger strongly suggest that she needs a wound VAC she declines.  I advised her mother and her significant other about this and try to talk the patient into having this they will try.  Currently she does not desire wound VAC on the sacral area for what ever reason.  Objective      Objective     Vital Signs  Temp:  [98.2 °F (36.8 °C)-99.9 °F (37.7 °C)] 99.9 °F (37.7 °C)  Heart Rate:  [81-89] 84  Resp:  [16-18] 18  BP: (114-140)/(43-55) 140/55    Intake & Output (last 3 days)       05/31 0701  06/01 0700 06/01 0701  06/02 0700 06/02 0701  06/03 0700    I.V. (mL/kg)  1883 (13.4)     Total Intake(mL/kg)  1883 (13.4)     Urine (mL/kg/hr)   600 (0.2)    Total Output   600    Net  +1883 -600                 Physical Exam:     General Appearance:    Alert, cooperative, in no acute distress   Lungs:     Clear to auscultation,respirations regular, even and                  unlabored    Heart:    Regular rhythm and normal rate, normal S1 and S2, no            murmur, no gallop, no rub, no click   Chest Wall:    No abnormalities observed   Abdomen:    Patient with a wound VAC left thigh.  Draining wound on the mid sacral area, also left heel, and right and left calf.  We will begin Dakin's moistened dressing changes twice a day to the areas that do not have a wound VAC.  Wound VAC to be set up for home use.        Results Review:     I reviewed the patient's new clinical results.  I reviewed the patient's new imaging results and agree with the interpretation.    Results from last 7 days   Lab Units  06/02/22  0553 06/01/22  0837   WBC 10*3/mm3 7.66 8.53   HEMOGLOBIN g/dL 8.9* 10.6*   HEMATOCRIT % 28.6* 34.4   PLATELETS 10*3/mm3 182 232        Results from last 7 days   Lab Units 06/02/22  0321 06/01/22  0837   SODIUM mmol/L 136 131*   POTASSIUM mmol/L 3.0* 3.3*   CHLORIDE mmol/L 97* 91*   CO2 mmol/L 29.0 28.0   BUN mg/dL 15 19   CREATININE mg/dL 1.05* 1.44*   CALCIUM mg/dL 7.2* 7.9*   BILIRUBIN mg/dL 0.3 0.3   ALK PHOS U/L 76 97   ALT (SGPT) U/L 13 19   AST (SGOT) U/L 14 17   GLUCOSE mg/dL 205* 400*       Assessment/Plan     Assessment & Plan       Hypertension    Obesity, morbid, BMI 50 or higher ([facility])    Stage 3 chronic kidney disease    Chronic pain syndrome    Lymphedema of both lower extremities    RLS (restless legs syndrome)    Diabetic ulcer of left lower leg associated with type 2 diabetes mellitus, with fat layer exposed ([facility])    Gastroesophageal reflux disease    Polypharmacy    Type 2 diabetes mellitus with hyperglycemia, with long-term current use of insulin ([facility])    Venous insufficiency of both lower extremities    Anemia    Open wound    Cellulitis of left leg    Patient with a multitude of problems, this patient needs a wound VAC on her sacral area.  Hospitalist to see and to get her glucoses under much better control, once her glucoses are under better control and wound vacs are improved she may be discharged.  Follow-up with me in 1 month, follow-up with the wound care center.      Justin Perez MD  06/03/22  05:28 CDT      Time: time spent with patient 15 minutes     EMR Dragon/Transcription disclaimer: Much of this encounter note is an electronic transcription/translation of spoken language to printed text. The electronic translation of spoken language may permit erroneous, or at times, nonsensical words or phrases to be inadvertently transcribed; although I have reviewed the note for such errors, some may still exist.

## 2022-06-03 NOTE — PLAN OF CARE
Goal Outcome Evaluation:              Outcome Evaluation: Pt seen for diabetic diet teaching. Pt emotional, tearful, c/o headache, not feeling welll.  Teaching deferred until pt feeling better & receptive to teaching. Diabetic diet educ material left w/ pt in room. Wll follow.

## 2022-06-04 LAB
BACTERIA SPEC AEROBE CULT: ABNORMAL
CREAT SERPL-MCNC: 0.73 MG/DL (ref 0.57–1)
EGFRCR SERPLBLD CKD-EPI 2021: 101 ML/MIN/1.73
GLUCOSE BLDC GLUCOMTR-MCNC: 101 MG/DL (ref 70–130)
GLUCOSE BLDC GLUCOMTR-MCNC: 121 MG/DL (ref 70–130)
GLUCOSE BLDC GLUCOMTR-MCNC: 168 MG/DL (ref 70–130)
GLUCOSE BLDC GLUCOMTR-MCNC: 59 MG/DL (ref 70–130)
GRAM STN SPEC: ABNORMAL
MAGNESIUM SERPL-MCNC: 1.1 MG/DL (ref 1.6–2.6)
QT INTERVAL: 414 MS
QTC INTERVAL: 471 MS
VANCOMYCIN TROUGH SERPL-MCNC: 100.2 MCG/ML (ref 5–20)
VANCOMYCIN TROUGH SERPL-MCNC: 91.4 MCG/ML (ref 5–20)

## 2022-06-04 PROCEDURE — 63710000001 INSULIN DETEMIR PER 5 UNITS: Performed by: INTERNAL MEDICINE

## 2022-06-04 PROCEDURE — 83735 ASSAY OF MAGNESIUM: CPT | Performed by: INTERNAL MEDICINE

## 2022-06-04 PROCEDURE — 25010000002 VANCOMYCIN 1 G RECONSTITUTED SOLUTION 1 EACH VIAL: Performed by: SPECIALIST

## 2022-06-04 PROCEDURE — 80202 ASSAY OF VANCOMYCIN: CPT | Performed by: SPECIALIST

## 2022-06-04 PROCEDURE — 25010000002 MEROPENEM PER 100 MG: Performed by: INTERNAL MEDICINE

## 2022-06-04 PROCEDURE — 82565 ASSAY OF CREATININE: CPT | Performed by: SPECIALIST

## 2022-06-04 PROCEDURE — 0 HYDROMORPHONE 1 MG/ML SOLUTION: Performed by: SPECIALIST

## 2022-06-04 PROCEDURE — 25010000002 LORAZEPAM PER 2 MG: Performed by: SPECIALIST

## 2022-06-04 PROCEDURE — 99024 POSTOP FOLLOW-UP VISIT: CPT | Performed by: SPECIALIST

## 2022-06-04 PROCEDURE — 63710000001 ONDANSETRON PER 8 MG: Performed by: SPECIALIST

## 2022-06-04 PROCEDURE — 82962 GLUCOSE BLOOD TEST: CPT

## 2022-06-04 RX ADMIN — MEROPENEM 2 G: 1 INJECTION INTRAVENOUS at 14:30

## 2022-06-04 RX ADMIN — SUCRALFATE 1 G: 1 TABLET ORAL at 08:07

## 2022-06-04 RX ADMIN — HYDROCODONE BITARTRATE AND ACETAMINOPHEN 1 TABLET: 7.5; 325 TABLET ORAL at 14:31

## 2022-06-04 RX ADMIN — ONDANSETRON 8 MG: 8 TABLET, ORALLY DISINTEGRATING ORAL at 15:41

## 2022-06-04 RX ADMIN — SODIUM CHLORIDE 100 ML/HR: 9 INJECTION, SOLUTION INTRAVENOUS at 21:06

## 2022-06-04 RX ADMIN — SUCRALFATE 1 G: 1 TABLET ORAL at 16:57

## 2022-06-04 RX ADMIN — LORAZEPAM 1 MG: 2 INJECTION INTRAMUSCULAR; INTRAVENOUS at 08:19

## 2022-06-04 RX ADMIN — GABAPENTIN 600 MG: 300 CAPSULE ORAL at 21:07

## 2022-06-04 RX ADMIN — LORAZEPAM 1 MG: 2 INJECTION INTRAMUSCULAR; INTRAVENOUS at 16:35

## 2022-06-04 RX ADMIN — HYDROMORPHONE HYDROCHLORIDE 0.5 MG: 1 INJECTION, SOLUTION INTRAMUSCULAR; INTRAVENOUS; SUBCUTANEOUS at 08:19

## 2022-06-04 RX ADMIN — SODIUM CHLORIDE 100 ML/HR: 9 INJECTION, SOLUTION INTRAVENOUS at 10:24

## 2022-06-04 RX ADMIN — DULOXETINE HYDROCHLORIDE 60 MG: 30 CAPSULE, DELAYED RELEASE ORAL at 22:29

## 2022-06-04 RX ADMIN — INSULIN DETEMIR 20 UNITS: 100 INJECTION, SOLUTION SUBCUTANEOUS at 21:10

## 2022-06-04 RX ADMIN — DULOXETINE HYDROCHLORIDE 60 MG: 30 CAPSULE, DELAYED RELEASE ORAL at 08:07

## 2022-06-04 RX ADMIN — DAKIN'S SOLUTION 0.125% (QUARTER STRENGTH): 0.12 SOLUTION at 08:09

## 2022-06-04 RX ADMIN — MEROPENEM 2 G: 1 INJECTION, POWDER, FOR SOLUTION INTRAVENOUS at 21:06

## 2022-06-04 RX ADMIN — FLUTICASONE PROPIONATE 1 SPRAY: 50 SPRAY, METERED NASAL at 08:11

## 2022-06-04 RX ADMIN — PRAMIPEXOLE DIHYDROCHLORIDE 0.75 MG: 0.25 TABLET ORAL at 22:28

## 2022-06-04 RX ADMIN — BUSPIRONE HYDROCHLORIDE 10 MG: 10 TABLET ORAL at 15:41

## 2022-06-04 RX ADMIN — POTASSIUM CHLORIDE 40 MEQ: 10 CAPSULE, COATED, EXTENDED RELEASE ORAL at 08:07

## 2022-06-04 RX ADMIN — SODIUM CHLORIDE 100 ML/HR: 9 INJECTION, SOLUTION INTRAVENOUS at 02:09

## 2022-06-04 RX ADMIN — BUSPIRONE HYDROCHLORIDE 10 MG: 10 TABLET ORAL at 08:07

## 2022-06-04 RX ADMIN — HYDROMORPHONE HYDROCHLORIDE 0.5 MG: 1 INJECTION, SOLUTION INTRAMUSCULAR; INTRAVENOUS; SUBCUTANEOUS at 21:07

## 2022-06-04 RX ADMIN — BUSPIRONE HYDROCHLORIDE 10 MG: 10 TABLET ORAL at 22:29

## 2022-06-04 RX ADMIN — HYDROCODONE BITARTRATE AND ACETAMINOPHEN 1 TABLET: 7.5; 325 TABLET ORAL at 23:08

## 2022-06-04 RX ADMIN — MIRTAZAPINE 15 MG: 15 TABLET, FILM COATED ORAL at 22:29

## 2022-06-04 RX ADMIN — PRAMIPEXOLE DIHYDROCHLORIDE 0.75 MG: 0.25 TABLET ORAL at 08:07

## 2022-06-04 RX ADMIN — HYDROMORPHONE HYDROCHLORIDE 0.5 MG: 1 INJECTION, SOLUTION INTRAMUSCULAR; INTRAVENOUS; SUBCUTANEOUS at 15:25

## 2022-06-04 RX ADMIN — VANCOMYCIN HYDROCHLORIDE 1000 MG: 1 INJECTION, POWDER, LYOPHILIZED, FOR SOLUTION INTRAVENOUS at 02:10

## 2022-06-04 RX ADMIN — GABAPENTIN 600 MG: 300 CAPSULE ORAL at 08:08

## 2022-06-04 RX ADMIN — SUCRALFATE 1 G: 1 TABLET ORAL at 22:29

## 2022-06-04 RX ADMIN — DAKIN'S SOLUTION 0.125% (QUARTER STRENGTH): 0.12 SOLUTION at 21:09

## 2022-06-04 RX ADMIN — GABAPENTIN 600 MG: 300 CAPSULE ORAL at 15:41

## 2022-06-04 RX ADMIN — HYDROCODONE BITARTRATE AND ACETAMINOPHEN 1 TABLET: 7.5; 325 TABLET ORAL at 02:18

## 2022-06-04 RX ADMIN — Medication 10 ML: at 21:08

## 2022-06-04 RX ADMIN — PANTOPRAZOLE SODIUM 40 MG: 40 TABLET, DELAYED RELEASE ORAL at 08:07

## 2022-06-04 NOTE — PLAN OF CARE
Goal Outcome Evaluation:           Progress: no change     VSS, O2 at 2L, dressings changed per orders.  Dakin's wet to dry to lower thoracic spine.... other wound care carried out as ordered by Yoko with wound care.  Patient tearful much of shift, refuses to agree to wound vac placement to back after much discussion and encouragement.  Patient resistant to wound care in general.  Medicated for pain.  Tolerating ADA diet.  Accuchecks WNL.  Purewick for urinary incontinence.  IVF and abx as ordered. Refuses Lovenox. Safety maintained.  Refuses turns and repositioning.

## 2022-06-04 NOTE — PROGRESS NOTES
Pharmacy Dosing Service  Antimicrobial Stewardship   Meropenem      Assessment/Action/Plan:  Initiated Meropenem 2 grams IV every 8 hours for 7 days for ESBL SSTI.     Subjective:   Valeria Wilson is a 51 y.o. female currently being treated for SSTI (ESBL).    Objective:  Lab Results   Component Value Date    WBC 7.66 06/02/2022     Temp Readings from Last 1 Encounters:   06/04/22 98.5 °F (36.9 °C) (Oral)     Culture Results:  Microbiology Results (last 10 days)       Procedure Component Value - Date/Time    Tissue / Bone Culture - Tissue, Foot, Left [[id number]]  (Abnormal)  (Susceptibility) Collected: 06/01/22 1811    Lab Status: Final result Specimen: Tissue from Foot, Left Updated: 06/04/22 1147     Tissue Culture Scant growth (1+) Escherichia coli ESBL     Comment: Consider infectious disease consult.  Susceptibility results may not correlate to clinical outcomes.         Scant growth (1+) Proteus mirabilis     Gram Stain No WBCs seen      Few (2+) Gram positive cocci      Rare (1+) Gram negative bacilli    Susceptibility        Escherichia coli ESBL      ADE      Ertapenem Susceptible      Levofloxacin Susceptible      Meropenem Susceptible      Tetracycline Resistant      Trimethoprim + Sulfamethoxazole Susceptible                       Susceptibility        Proteus mirabilis      ADE      Ampicillin Susceptible      Ampicillin + Sulbactam Susceptible      Cefepime Susceptible      Ceftazidime Susceptible      Ceftriaxone Susceptible      Gentamicin Susceptible      Levofloxacin Susceptible      Piperacillin + Tazobactam Susceptible      Tetracycline Resistant      Trimethoprim + Sulfamethoxazole Susceptible                       Susceptibility Comments       Escherichia coli ESBL    Cefazolin sensitivity will not be reported for Enterobacteriaceae in non-urine isolates. If cefazolin is preferred, please call the microbiology lab to request an E-test.  With the exception of urinary-sourced infections,  aminoglycosides should not be used as monotherapy.      Proteus mirabilis    Cefazolin sensitivity will not be reported for Enterobacteriaceae in non-urine isolates. If cefazolin is preferred, please call the microbiology lab to request an E-test.  With the exception of urinary-sourced infections, aminoglycosides should not be used as monotherapy.               Tissue / Bone Culture - Tissue, Thigh, Left [[id number]]  (Abnormal)  (Susceptibility) Collected: 06/01/22 1722    Lab Status: Final result Specimen: Tissue from Thigh, Left Updated: 06/04/22 1033     Tissue Culture Light growth (2+) Serratia marcescens      Rare Proteus mirabilis     Gram Stain Few (2+) WBCs seen      No organisms seen    Susceptibility        Serratia marcescens      ADE      Cefepime Susceptible      Ceftazidime Susceptible      Ceftriaxone Susceptible      Gentamicin Susceptible      Levofloxacin Intermediate      Piperacillin + Tazobactam Susceptible      Tetracycline Intermediate      Trimethoprim + Sulfamethoxazole Susceptible                       Susceptibility        Proteus mirabilis      ADE      Ampicillin Susceptible      Ampicillin + Sulbactam Susceptible      Cefepime Susceptible      Ceftazidime Susceptible      Ceftriaxone Susceptible      Gentamicin Susceptible      Levofloxacin Susceptible      Piperacillin + Tazobactam Susceptible      Tetracycline Resistant      Trimethoprim + Sulfamethoxazole Susceptible                       Susceptibility Comments       Serratia marcescens    Cefazolin sensitivity will not be reported for Enterobacteriaceae in non-urine isolates. If cefazolin is preferred, please call the microbiology lab to request an E-test.  With the exception of urinary-sourced infections, aminoglycosides should not be used as monotherapy.      Proteus mirabilis    Cefazolin sensitivity will not be reported for Enterobacteriaceae in non-urine isolates. If cefazolin is preferred, please call the microbiology lab to  request an E-test.  With the exception of urinary-sourced infections, aminoglycosides should not be used as monotherapy.               Blood Culture - Blood, Arm, Right [[id number]]  (Normal) Collected: 06/01/22 0852    Lab Status: Preliminary result Specimen: Blood from Arm, Right Updated: 06/04/22 0916     Blood Culture No growth at 3 days    COVID-19,[facility] IN-HOUSE,Nasal Swab No Transport Media 3-4 HR TAT - Swab, Nasal Cavity [[id number]]  (Normal) Collected: 06/01/22 0846    Lab Status: Final result Specimen: Swab from Nasal Cavity Updated: 06/01/22 0936     COVID19 Not Detected    Narrative:      Fact sheet for providers: https://www.fda.gov/media/352299/download     Fact sheet for patients: https://www.fda.gov/media/075954/download    Test performed by PCR.    Consider negative results in combination with clinical observations, patient history, and epidemiological information.    Wound Culture - Wound, Leg, Left [[id number]]  (Abnormal) Collected: 06/01/22 0846    Lab Status: Final result Specimen: Wound from Leg, Left Updated: 06/04/22 1033     Wound Culture Moderate growth (3+) Serratia marcescens      Moderate growth (3+) Proteus mirabilis     Gram Stain Rare (1+) WBCs seen      Moderate (3+) Gram negative bacilli    Narrative:      Refer to previous tissue culture collected on 6/1/2022 1822 for MICs    Blood Culture - Blood, Arm, Right [[id number]]  (Normal) Collected: 06/01/22 0837    Lab Status: Preliminary result Specimen: Blood from Arm, Right Updated: 06/04/22 1047     Blood Culture No growth at 3 days          Current Antimicrobials:   Anti-Infectives (From admission, onward)      Ordered     Dose/Rate Route Frequency Start Stop    06/04/22 1342  meropenem (MERREM) 2 g in sodium chloride 0.9 % 100 mL IVPB        Ordering Provider: Lucas Tatum MD    2 g  over 3 Hours Intravenous Every 8 Hours 06/04/22 2030 06/11/22 2029 06/04/22 1342  meropenem (MERREM) 2 g in sodium chloride 0.9 % 100 mL  IVPB        Ordering Provider: Lucas Tatum MD    2 g  over 30 Minutes Intravenous Once 06/04/22 1430      06/04/22 1334  Pharmacy to Dose meropenem (MERREM)        Ordering Provider: Lucas Tatum MD     Does not apply Continuous PRN 06/04/22 1334 06/11/22 1333    06/02/22 1256  vancomycin (VANCOCIN) 1,000 mg in sodium chloride 0.9 % 250 mL IVPB        Ordering Provider: Justin Perez MD    1,000 mg Intravenous Every 12 Hours 06/02/22 1400 06/09/22 0159    06/01/22 1900  vancomycin (VANCOCIN) 1,000 mg in sodium chloride 0.9 % 250 mL IVPB        Ordering Provider: Justin Perez MD    1,000 mg Intravenous Once 06/01/22 2245 06/02/22 0010    06/01/22 0937  vancomycin 2000 mg/500 mL 0.9% NS IVPB ([facility])        Ordering Provider: Loki Mchugh MD    2,000 mg Intravenous Once 06/01/22 0939 06/01/22 1045            Tammy Okeefe, PharmD  06/04/22 13:47 CDT

## 2022-06-04 NOTE — PROGRESS NOTES
[facility] Medicine Services  INPATIENT PROGRESS NOTE    Patient Name: Valeria Wilson  Date of Admission: 6/1/2022  Today's Date: 06/03/22  Length of Stay: 2  Primary Care Physician: No primary care provider on file.    Subjective   Chief Complaint: diabetes management   HPI     Patient was seen and examined at bedside.  Blood sugars stable.    Review of Systems   Constitutional: Negative for activity change, chills and fever.   Respiratory: Negative for cough and shortness of breath.    Cardiovascular: Negative for chest pain and palpitations.   Gastrointestinal: Negative for abdominal distention, abdominal pain, diarrhea, nausea and vomiting.   Musculoskeletal: Positive for arthralgias and myalgias.   Skin: Positive for wound.        All pertinent negatives and positives are as above. All other systems have been reviewed and are negative unless otherwise stated.     Objective    Temp:  [98.2 °F (36.8 °C)-99.9 °F (37.7 °C)] 99.9 °F (37.7 °C)  Heart Rate:  [77-84] 82  Resp:  [18-20] 20  BP: (130-140)/(49-71) 130/49  Physical Exam  Vitals and nursing note reviewed.   Constitutional:       Appearance: She is obese.   HENT:      Head: Normocephalic and atraumatic.      Nose: No congestion or rhinorrhea.      Mouth/Throat:      Mouth: Mucous membranes are dry.      Pharynx: Oropharynx is clear.   Eyes:      General: No scleral icterus.     Conjunctiva/sclera: Conjunctivae normal.   Cardiovascular:      Rate and Rhythm: Normal rate and regular rhythm.      Heart sounds: No murmur heard.  Pulmonary:      Effort: Pulmonary effort is normal. No respiratory distress.      Breath sounds: Normal breath sounds. No stridor.   Abdominal:      General: Abdomen is flat. Bowel sounds are normal.      Palpations: Abdomen is soft.   Musculoskeletal:      Right lower leg: Edema present.      Left lower leg: Edema present.   Skin:     General: Skin is warm and dry.      Coloration: Skin is not  jaundiced or pale.   Neurological:      General: No focal deficit present.      Mental Status: She is alert and oriented to person, place, and time.   Psychiatric:         Mood and Affect: Mood normal.         Behavior: Behavior normal.             Results Review:  I have reviewed the labs, radiology results, and diagnostic studies.    Laboratory Data:   Results from last 7 days   Lab Units 06/02/22  0553 06/01/22  0837   WBC 10*3/mm3 7.66 8.53   HEMOGLOBIN g/dL 8.9* 10.6*   HEMATOCRIT % 28.6* 34.4   PLATELETS 10*3/mm3 182 232        Results from last 7 days   Lab Units 06/02/22  0321 06/01/22  0837   SODIUM mmol/L 136 131*   POTASSIUM mmol/L 3.0* 3.3*   CHLORIDE mmol/L 97* 91*   CO2 mmol/L 29.0 28.0   BUN mg/dL 15 19   CREATININE mg/dL 1.05* 1.44*   CALCIUM mg/dL 7.2* 7.9*   BILIRUBIN mg/dL 0.3 0.3   ALK PHOS U/L 76 97   ALT (SGPT) U/L 13 19   AST (SGOT) U/L 14 17   GLUCOSE mg/dL 205* 400*       Culture Data:   Blood Culture   Date Value Ref Range Status   06/01/2022 No growth at 2 days  Preliminary   06/01/2022 No growth at 2 days  Preliminary     Wound Culture   Date Value Ref Range Status   06/01/2022 Moderate growth (3+) Gram Negative Bacilli (A)  Preliminary       Radiology Data:   Imaging Results (Last 24 Hours)     ** No results found for the last 24 hours. **          I have reviewed the patient's current medications.     Assessment/Plan     Active Hospital Problems    Diagnosis    • Cellulitis of left leg    • Open wound    • Anemia    • Chronic pain syndrome    • Lymphedema of both lower extremities    • Polypharmacy    • Diabetic ulcer of left lower leg associated with type 2 diabetes mellitus, with fat layer exposed ([facility])    • Obesity, morbid, BMI 50 or higher ([facility])    • Stage 3 chronic kidney disease    • Hypertension    • Gastroesophageal reflux disease    • Type 2 diabetes mellitus with hyperglycemia, with long-term current use of insulin ([facility])    • Venous insufficiency of both lower extremities    •  RLS (restless legs syndrome)        Plan:  Patient was admitted on 6/1 by Dr. Sanderson.  The patient was admitted for numerous wounds for debridement.  Patient had a cleaning excision and debridement of left thigh abscess, left heel decubitus, left posterior Calf decubitus, right posterior calf decubitus, right buttock, left buttock, pilonidal region, and a sacral decubitus.    He performed extensive debridement and irrigation of these wounds.  Wound VAC is recommended, but she is declining the wound VAC on the sacral region.    Patient is on vancomycin.  Cultures from surgery are pending.    We were consulted for management of her diabetes.  Hemoglobin A1c has noted to be greater than 11.  Patient reportedly takes glargine 45 units at night and Victoza, and she does take some lispro insulin, but was unsure of the amount.  Patient admits to not being overly compliant with diet and insulin.                Discharge Planning: I expect the patient to be discharged per attending physician.    Electronically signed by Lucas Tatum MD, 06/03/22, 22:13 CDT.

## 2022-06-04 NOTE — PROGRESS NOTES
Pharmacy Dosing Service  Drug Level Evaluation  Vancomycin Discontinuation    Assessment/Action/Plan:  Pharmacist was called 06/04/22 for alert of critical vancomycin levels on this patient. My investigation of this is as follows:     Patient initially received 2000 mg IV Vancomycin 6/1/22 at 1045  Second dose of 1000 mg IV given 6/2/22 0010  Patient subsequently was started on vancomycin per provider order of 1000 mg IV every 12 hours, along with a separate order for pharmacy to dose a few minutes later on 6/2/22 around 1300    Patient received 1000 mg IV doses as follows:   6/2/22 1438  6/3/22 0220  6/3/22 1530  6/4/22 0210  Vancomycin level 6/4/22 14:45 = 100.2 mcg/mL   Level was questioned as true initially. It was confirmed that the level was a new collection, new stick, not from a line, no vancomycin was infusing, no previous infiltration of tissue reported. Doses were given as per MAR according to the patient's nurse today.   A STAT recollection of this level was ordered and at 16:16 it resulted 91.4 mcg/mL    Estimated Creatinine Clearance: 125.2 mL/min (by C-G formula based on SCr of 0.73 mg/dL).   Creatinine   Date Value Ref Range Status   06/04/2022 0.73 0.57 - 1.00 mg/dL Final   06/02/2022 1.05 (H) 0.57 - 1.00 mg/dL Final   06/01/2022 1.44 (H) 0.57 - 1.00 mg/dL Final     I discussed the results of the initial level with the attending service, Dr. Neri, 06/04/22 and we found it most appropriate to permanently discontinue vancomycin; further investigation of this level was underway.     Upon further chart review, I noted the following additional lab results (the two lab results above included in this chart as well):  Component   Ref Range & Units 16:16   (6/4/22)   Shayla/[place] 14:45   (6/4/22)   Shayla/[place] 1 mo ago   (4/15/22)   [place] 1 yr ago   (4/13/21)   [place] 1 yr ago   (7/2/20)   Shayla/[place] 1 yr ago   (6/18/20)   Shayla/[place]   Vancomycin Trough   5.00 -  20.00 mcg/mL 91.40 High Critical   100.20 High Critical   75.1 High Critical  R  68.5 High Critical  R  13.3 R  25.5 High Critical  R    Resulting Agency  [facility]  [facility]     Patient had experienced extreme supratherapeutic levels recently at [facility] as well. I reviewed in CareEverywhere notes that were available for me to see about this.     This patient is not a fit to Bayesian / Population model kinetics in any standard situation. It is possible that there is a peculiar effect on drug distribution and/or drug metabolism and/or drug clearance in this patient that cannot be explained at this time. It is my recommendation that Vancomycin not be used in the future for this patient as it will be very difficult to dose appropriately. If Vancomycin is absolutely indicated in the future, intermittent pulse dosing based on repetitive immediate-level evaluations will likely be the only way to proceed.     Vancomycin discontinued - an excerpt of this information was placed in the patient's allergy section of her chart documenting Vancomycin as contraindicated.      Subjective:  Valeria Wilson is a 51 y.o. female previously on Vancomycin for the treatment of cellulitis/LLE diabetic ulcer, day 4 of treatment in this encounter    Objective:  Ht: 154.9 cm (61\"); Wt: (!) 141 kg (311 lb)  Estimated Creatinine Clearance: 125.2 mL/min (by C-G formula based on SCr of 0.73 mg/dL).   Creatinine   Date Value Ref Range Status   06/04/2022 0.73 0.57 - 1.00 mg/dL Final   06/02/2022 1.05 (H) 0.57 - 1.00 mg/dL Final   06/01/2022 1.44 (H) 0.57 - 1.00 mg/dL Final   04/18/2022 1 (H) 0.5 - 0.9 mg/dL Final   06/28/2021 0.8 0.5 - 0.9 mg/dL Final   04/11/2021 0.9 0.5 - 0.9 mg/dL Final      Lab Results   Component Value Date    WBC 7.66 06/02/2022    WBC 8.53 06/01/2022    WBC 6.5 04/15/2022         Lab  Results   Component Value Date    [facility] 91.40 (C) 06/04/2022       Culture Results:  Microbiology Results (last 10 days)       Procedure Component Value - Date/Time    Tissue / Bone Culture - Tissue, Foot, Left [[id number]]  (Abnormal)  (Susceptibility) Collected: 06/01/22 1811    Lab Status: Final result Specimen: Tissue from Foot, Left Updated: 06/04/22 1147     Tissue Culture Scant growth (1+) Escherichia coli ESBL     Comment: Consider infectious disease consult.  Susceptibility results may not correlate to clinical outcomes.         Scant growth (1+) Proteus mirabilis     Gram Stain No WBCs seen      Few (2+) Gram positive cocci      Rare (1+) Gram negative bacilli    Susceptibility        Escherichia coli ESBL      ADE      Ertapenem Susceptible      Levofloxacin Susceptible      Meropenem Susceptible      Tetracycline Resistant      Trimethoprim + Sulfamethoxazole Susceptible                       Susceptibility        Proteus mirabilis      ADE      Ampicillin Susceptible      Ampicillin + Sulbactam Susceptible      Cefepime Susceptible      Ceftazidime Susceptible      Ceftriaxone Susceptible      Gentamicin Susceptible      Levofloxacin Susceptible      Piperacillin + Tazobactam Susceptible      Tetracycline Resistant      Trimethoprim + Sulfamethoxazole Susceptible                       Susceptibility Comments       Escherichia coli ESBL    Cefazolin sensitivity will not be reported for Enterobacteriaceae in non-urine isolates. If cefazolin is preferred, please call the microbiology lab to request an E-test.  With the exception of urinary-sourced infections, aminoglycosides should not be used as monotherapy.      Proteus mirabilis    Cefazolin sensitivity will not be reported for Enterobacteriaceae in non-urine isolates. If cefazolin is preferred, please call the microbiology lab to request an E-test.  With the exception of urinary-sourced infections, aminoglycosides should not be used as  monotherapy.               Tissue / Bone Culture - Tissue, Thigh, Left [[id number]]  (Abnormal)  (Susceptibility) Collected: 06/01/22 1722    Lab Status: Final result Specimen: Tissue from Thigh, Left Updated: 06/04/22 1033     Tissue Culture Light growth (2+) Serratia marcescens      Rare Proteus mirabilis     Gram Stain Few (2+) WBCs seen      No organisms seen    Susceptibility        Serratia marcescens      ADE      Cefepime Susceptible      Ceftazidime Susceptible      Ceftriaxone Susceptible      Gentamicin Susceptible      Levofloxacin Intermediate      Piperacillin + Tazobactam Susceptible      Tetracycline Intermediate      Trimethoprim + Sulfamethoxazole Susceptible                       Susceptibility        Proteus mirabilis      ADE      Ampicillin Susceptible      Ampicillin + Sulbactam Susceptible      Cefepime Susceptible      Ceftazidime Susceptible      Ceftriaxone Susceptible      Gentamicin Susceptible      Levofloxacin Susceptible      Piperacillin + Tazobactam Susceptible      Tetracycline Resistant      Trimethoprim + Sulfamethoxazole Susceptible                       Susceptibility Comments       Serratia marcescens    Cefazolin sensitivity will not be reported for Enterobacteriaceae in non-urine isolates. If cefazolin is preferred, please call the microbiology lab to request an E-test.  With the exception of urinary-sourced infections, aminoglycosides should not be used as monotherapy.      Proteus mirabilis    Cefazolin sensitivity will not be reported for Enterobacteriaceae in non-urine isolates. If cefazolin is preferred, please call the microbiology lab to request an E-test.  With the exception of urinary-sourced infections, aminoglycosides should not be used as monotherapy.               Blood Culture - Blood, Arm, Right [[id number]]  (Normal) Collected: 06/01/22 0852    Lab Status: Preliminary result Specimen: Blood from Arm, Right Updated: 06/04/22 0916     Blood Culture No growth at 3  days    COVID-19,[facility] IN-HOUSE,Nasal Swab No Transport Media 3-4 HR TAT - Swab, Nasal Cavity [[id number]]  (Normal) Collected: 06/01/22 0846    Lab Status: Final result Specimen: Swab from Nasal Cavity Updated: 06/01/22 0936     COVID19 Not Detected    Narrative:      Fact sheet for providers: https://www.fda.gov/media/973603/download     Fact sheet for patients: https://www.fda.gov/media/929341/download    Test performed by PCR.    Consider negative results in combination with clinical observations, patient history, and epidemiological information.    Wound Culture - Wound, Leg, Left [[id number]]  (Abnormal) Collected: 06/01/22 0846    Lab Status: Final result Specimen: Wound from Leg, Left Updated: 06/04/22 1033     Wound Culture Moderate growth (3+) Serratia marcescens      Moderate growth (3+) Proteus mirabilis     Gram Stain Rare (1+) WBCs seen      Moderate (3+) Gram negative bacilli    Narrative:      Refer to previous tissue culture collected on 6/1/2022 1822 for MICs    Blood Culture - Blood, Arm, Right [[id number]]  (Normal) Collected: 06/01/22 0837    Lab Status: Preliminary result Specimen: Blood from Arm, Right Updated: 06/04/22 1047     Blood Culture No growth at 3 days            Jb Pandey, PharmD   06/04/22 17:28 CDT

## 2022-06-04 NOTE — PROGRESS NOTES
Patient is sleeping.  Wounds dressed.  Will consult  for discharge needs including possible placement.

## 2022-06-04 NOTE — PLAN OF CARE
Goal Outcome Evaluation:           Progress: no change  Outcome Evaluation: PRN PAIN MED AVAILABLE AND PT. HAS REQUIRED THIS SHIFT. CONT. PULSE OX INTACT.  PT. UP WITH CANE  AND MINIMAL ASSISTANCE. ROOM AIR. ACCUCHECKS DONE PER ORDER. VANCOMYCIN DC'D (ELEVATED VANCO TROUGH). PURE WIK IN PLACE. DOLPHIN MATTRESS IN PLACE.  DRESSINGS INTACT AND DUE TO BE CHANGED. NO DISTRESS NOTED.

## 2022-06-05 LAB
ALBUMIN SERPL-MCNC: 2.2 G/DL (ref 3.5–5.2)
ALBUMIN/GLOB SERPL: 0.6 G/DL
ALP SERPL-CCNC: 84 U/L (ref 39–117)
ALT SERPL W P-5'-P-CCNC: 9 U/L (ref 1–33)
ANION GAP SERPL CALCULATED.3IONS-SCNC: 8 MMOL/L (ref 5–15)
AST SERPL-CCNC: 14 U/L (ref 1–32)
BILIRUB SERPL-MCNC: 0.4 MG/DL (ref 0–1.2)
BUN SERPL-MCNC: 7 MG/DL (ref 6–20)
BUN/CREAT SERPL: 8.9 (ref 7–25)
CALCIUM SPEC-SCNC: 6.7 MG/DL (ref 8.6–10.5)
CHLORIDE SERPL-SCNC: 103 MMOL/L (ref 98–107)
CO2 SERPL-SCNC: 27 MMOL/L (ref 22–29)
CREAT SERPL-MCNC: 0.79 MG/DL (ref 0.57–1)
DEPRECATED RDW RBC AUTO: 50.1 FL (ref 37–54)
EGFRCR SERPLBLD CKD-EPI 2021: 91.8 ML/MIN/1.73
ERYTHROCYTE [DISTWIDTH] IN BLOOD BY AUTOMATED COUNT: 16.7 % (ref 12.3–15.4)
GLOBULIN UR ELPH-MCNC: 3.5 GM/DL
GLUCOSE BLDC GLUCOMTR-MCNC: 126 MG/DL (ref 70–130)
GLUCOSE BLDC GLUCOMTR-MCNC: 127 MG/DL (ref 70–130)
GLUCOSE BLDC GLUCOMTR-MCNC: 139 MG/DL (ref 70–130)
GLUCOSE BLDC GLUCOMTR-MCNC: 148 MG/DL (ref 70–130)
GLUCOSE SERPL-MCNC: 132 MG/DL (ref 65–99)
HCT VFR BLD AUTO: 27.4 % (ref 34–46.6)
HGB BLD-MCNC: 8.1 G/DL (ref 12–15.9)
MCH RBC QN AUTO: 24.4 PG (ref 26.6–33)
MCHC RBC AUTO-ENTMCNC: 29.6 G/DL (ref 31.5–35.7)
MCV RBC AUTO: 82.5 FL (ref 79–97)
PLATELET # BLD AUTO: 147 10*3/MM3 (ref 140–450)
PMV BLD AUTO: 11.1 FL (ref 6–12)
POTASSIUM SERPL-SCNC: 4.3 MMOL/L (ref 3.5–5.2)
PROT SERPL-MCNC: 5.7 G/DL (ref 6–8.5)
RBC # BLD AUTO: 3.32 10*6/MM3 (ref 3.77–5.28)
SODIUM SERPL-SCNC: 138 MMOL/L (ref 136–145)
WBC NRBC COR # BLD: 6.57 10*3/MM3 (ref 3.4–10.8)

## 2022-06-05 PROCEDURE — 25010000002 KETOROLAC TROMETHAMINE PER 15 MG: Performed by: SPECIALIST

## 2022-06-05 PROCEDURE — 0 HYDROMORPHONE 1 MG/ML SOLUTION: Performed by: SPECIALIST

## 2022-06-05 PROCEDURE — 82962 GLUCOSE BLOOD TEST: CPT

## 2022-06-05 PROCEDURE — 99024 POSTOP FOLLOW-UP VISIT: CPT | Performed by: SPECIALIST

## 2022-06-05 PROCEDURE — 83540 ASSAY OF IRON: CPT | Performed by: INTERNAL MEDICINE

## 2022-06-05 PROCEDURE — 85027 COMPLETE CBC AUTOMATED: CPT | Performed by: INTERNAL MEDICINE

## 2022-06-05 PROCEDURE — 25010000002 MEROPENEM PER 100 MG: Performed by: INTERNAL MEDICINE

## 2022-06-05 PROCEDURE — 80053 COMPREHEN METABOLIC PANEL: CPT | Performed by: INTERNAL MEDICINE

## 2022-06-05 PROCEDURE — 84466 ASSAY OF TRANSFERRIN: CPT | Performed by: INTERNAL MEDICINE

## 2022-06-05 RX ORDER — KETOROLAC TROMETHAMINE 30 MG/ML
30 INJECTION, SOLUTION INTRAMUSCULAR; INTRAVENOUS EVERY 6 HOURS
Status: DISCONTINUED | OUTPATIENT
Start: 2022-06-05 | End: 2022-06-10 | Stop reason: HOSPADM

## 2022-06-05 RX ADMIN — MIRTAZAPINE 15 MG: 15 TABLET, FILM COATED ORAL at 21:31

## 2022-06-05 RX ADMIN — DULOXETINE HYDROCHLORIDE 60 MG: 30 CAPSULE, DELAYED RELEASE ORAL at 08:14

## 2022-06-05 RX ADMIN — PRAMIPEXOLE DIHYDROCHLORIDE 0.75 MG: 0.25 TABLET ORAL at 08:15

## 2022-06-05 RX ADMIN — KETOROLAC TROMETHAMINE 30 MG: 30 INJECTION, SOLUTION INTRAMUSCULAR; INTRAVENOUS at 17:59

## 2022-06-05 RX ADMIN — BUSPIRONE HYDROCHLORIDE 10 MG: 10 TABLET ORAL at 08:16

## 2022-06-05 RX ADMIN — POTASSIUM CHLORIDE 40 MEQ: 10 CAPSULE, COATED, EXTENDED RELEASE ORAL at 08:15

## 2022-06-05 RX ADMIN — HYDROCODONE BITARTRATE AND ACETAMINOPHEN 1 TABLET: 7.5; 325 TABLET ORAL at 17:09

## 2022-06-05 RX ADMIN — MEROPENEM 2 G: 1 INJECTION, POWDER, FOR SOLUTION INTRAVENOUS at 21:36

## 2022-06-05 RX ADMIN — SUCRALFATE 1 G: 1 TABLET ORAL at 17:04

## 2022-06-05 RX ADMIN — DULOXETINE HYDROCHLORIDE 60 MG: 30 CAPSULE, DELAYED RELEASE ORAL at 21:31

## 2022-06-05 RX ADMIN — GABAPENTIN 600 MG: 300 CAPSULE ORAL at 15:21

## 2022-06-05 RX ADMIN — MEROPENEM 2 G: 1 INJECTION, POWDER, FOR SOLUTION INTRAVENOUS at 04:30

## 2022-06-05 RX ADMIN — KETOROLAC TROMETHAMINE 30 MG: 30 INJECTION, SOLUTION INTRAMUSCULAR; INTRAVENOUS at 12:30

## 2022-06-05 RX ADMIN — GABAPENTIN 600 MG: 300 CAPSULE ORAL at 21:31

## 2022-06-05 RX ADMIN — PANTOPRAZOLE SODIUM 40 MG: 40 TABLET, DELAYED RELEASE ORAL at 08:18

## 2022-06-05 RX ADMIN — SUCRALFATE 1 G: 1 TABLET ORAL at 11:38

## 2022-06-05 RX ADMIN — BUSPIRONE HYDROCHLORIDE 10 MG: 10 TABLET ORAL at 21:31

## 2022-06-05 RX ADMIN — FLUTICASONE PROPIONATE 1 SPRAY: 50 SPRAY, METERED NASAL at 08:10

## 2022-06-05 RX ADMIN — DAKIN'S SOLUTION 0.125% (QUARTER STRENGTH): 0.12 SOLUTION at 21:30

## 2022-06-05 RX ADMIN — KETOROLAC TROMETHAMINE 30 MG: 30 INJECTION, SOLUTION INTRAMUSCULAR; INTRAVENOUS at 23:46

## 2022-06-05 RX ADMIN — GABAPENTIN 600 MG: 300 CAPSULE ORAL at 08:18

## 2022-06-05 RX ADMIN — HYDROMORPHONE HYDROCHLORIDE 0.5 MG: 1 INJECTION, SOLUTION INTRAMUSCULAR; INTRAVENOUS; SUBCUTANEOUS at 10:13

## 2022-06-05 RX ADMIN — SUCRALFATE 1 G: 1 TABLET ORAL at 21:31

## 2022-06-05 RX ADMIN — HYDROCODONE BITARTRATE AND ACETAMINOPHEN 1 TABLET: 7.5; 325 TABLET ORAL at 08:07

## 2022-06-05 RX ADMIN — DAKIN'S SOLUTION 0.125% (QUARTER STRENGTH): 0.12 SOLUTION at 08:11

## 2022-06-05 RX ADMIN — PRAMIPEXOLE DIHYDROCHLORIDE 0.75 MG: 0.25 TABLET ORAL at 21:31

## 2022-06-05 RX ADMIN — HYDROMORPHONE HYDROCHLORIDE 0.5 MG: 1 INJECTION, SOLUTION INTRAMUSCULAR; INTRAVENOUS; SUBCUTANEOUS at 04:29

## 2022-06-05 RX ADMIN — SUCRALFATE 1 G: 1 TABLET ORAL at 08:14

## 2022-06-05 RX ADMIN — BUSPIRONE HYDROCHLORIDE 10 MG: 10 TABLET ORAL at 15:22

## 2022-06-05 RX ADMIN — MEROPENEM 2 G: 1 INJECTION, POWDER, FOR SOLUTION INTRAVENOUS at 11:39

## 2022-06-05 RX ADMIN — SODIUM CHLORIDE 100 ML/HR: 9 INJECTION, SOLUTION INTRAVENOUS at 17:09

## 2022-06-05 NOTE — PROGRESS NOTES
[facility] Medicine Services  INPATIENT PROGRESS NOTE    Patient Name: Valeria Wilson  Date of Admission: 6/1/2022  Today's Date: 06/05/22  Length of Stay: 4  Primary Care Physician: No primary care provider on file.    Subjective   Chief Complaint: diabetes management   Leg Swelling  Associated symptoms include arthralgias and myalgias. Pertinent negatives include no abdominal pain, chest pain, chills, coughing, fever, nausea or vomiting.        Patient was seen and examined at bedside.  Blood sugars stable.    Review of Systems   Constitutional: Negative for activity change, chills and fever.   Respiratory: Negative for cough and shortness of breath.    Cardiovascular: Negative for chest pain and palpitations.   Gastrointestinal: Negative for abdominal distention, abdominal pain, diarrhea, nausea and vomiting.   Musculoskeletal: Positive for arthralgias and myalgias.   Skin: Positive for wound.        All pertinent negatives and positives are as above. All other systems have been reviewed and are negative unless otherwise stated.     Objective    Temp:  [98 °F (36.7 °C)-98.7 °F (37.1 °C)] 98.4 °F (36.9 °C)  Heart Rate:  [79-87] 80  Resp:  [16-18] 18  BP: (113-144)/(52-61) 130/57  Physical Exam  Vitals and nursing note reviewed.   Constitutional:       Appearance: She is obese.   HENT:      Head: Normocephalic and atraumatic.      Nose: No congestion or rhinorrhea.      Mouth/Throat:      Mouth: Mucous membranes are dry.      Pharynx: Oropharynx is clear.   Eyes:      General: No scleral icterus.     Conjunctiva/sclera: Conjunctivae normal.   Cardiovascular:      Rate and Rhythm: Normal rate and regular rhythm.      Heart sounds: No murmur heard.  Pulmonary:      Effort: Pulmonary effort is normal. No respiratory distress.      Breath sounds: Normal breath sounds. No stridor.   Abdominal:      General: Abdomen is flat. Bowel sounds are normal.      Palpations: Abdomen is soft.    Musculoskeletal:      Right lower leg: Edema present.      Left lower leg: Edema present.   Skin:     General: Skin is warm and dry.      Coloration: Skin is not jaundiced or pale.   Neurological:      General: No focal deficit present.      Mental Status: She is alert and oriented to person, place, and time.   Psychiatric:         Mood and Affect: Mood normal.         Behavior: Behavior normal.             Results Review:  I have reviewed the labs, radiology results, and diagnostic studies.    Laboratory Data:   Results from last 7 days   Lab Units 06/02/22  0553 06/01/22  0837   WBC 10*3/mm3 7.66 8.53   HEMOGLOBIN g/dL 8.9* 10.6*   HEMATOCRIT % 28.6* 34.4   PLATELETS 10*3/mm3 182 232        Results from last 7 days   Lab Units 06/04/22  1616 06/02/22  0321 06/01/22  0837   SODIUM mmol/L  --  136 131*   POTASSIUM mmol/L  --  3.0* 3.3*   CHLORIDE mmol/L  --  97* 91*   CO2 mmol/L  --  29.0 28.0   BUN mg/dL  --  15 19   CREATININE mg/dL 0.73 1.05* 1.44*   CALCIUM mg/dL  --  7.2* 7.9*   BILIRUBIN mg/dL  --  0.3 0.3   ALK PHOS U/L  --  76 97   ALT (SGPT) U/L  --  13 19   AST (SGOT) U/L  --  14 17   GLUCOSE mg/dL  --  205* 400*       Culture Data:   Blood Culture   Date Value Ref Range Status   06/01/2022 No growth at 2 days  Preliminary   06/01/2022 No growth at 2 days  Preliminary     Wound Culture   Date Value Ref Range Status   06/01/2022 Moderate growth (3+) Gram Negative Bacilli (A)  Preliminary       Radiology Data:   Imaging Results (Last 24 Hours)     ** No results found for the last 24 hours. **          I have reviewed the patient's current medications.     Assessment/Plan     Active Hospital Problems    Diagnosis    • Cellulitis of left leg    • Open wound    • Anemia    • Chronic pain syndrome    • Lymphedema of both lower extremities    • Polypharmacy    • Diabetic ulcer of left lower leg associated with type 2 diabetes mellitus, with fat layer exposed (HCC)    • Obesity, morbid, BMI 50 or higher ([facility])     • Stage 3 chronic kidney disease    • Hypertension    • Gastroesophageal reflux disease    • Type 2 diabetes mellitus with hyperglycemia, with long-term current use of insulin (HCC)    • Venous insufficiency of both lower extremities    • RLS (restless legs syndrome)        Plan:  Patient was admitted on 6/1 by Dr. Sanderson.  The patient was admitted for numerous wounds for debridement.  Patient had a cleaning excision and debridement of left thigh abscess, left heel decubitus, left posterior Calf decubitus, right posterior calf decubitus, right buttock, left buttock, pilonidal region, and a sacral decubitus.    He performed extensive debridement and irrigation of these wounds.  Wound VAC is recommended, but she is declining the wound VAC on the sacral region.    Patient was on empiric vancomycin per general surgery.  Cultures starting to come back and one of the wounds had ESBL.  Will add meropenem.  Recommend ID consultation.    We were consulted for management of her diabetes.  Hemoglobin A1c has noted to be greater than 11.  Patient reportedly takes glargine 45 units at night and Victoza, and she does take some lispro insulin, but was unsure of the amount.  Patient admits to not being overly compliant with diet and insulin.  Blood sugars adequately controlled.    Incentive spirometer    Increase activity              Discharge Planning: I expect the patient to be discharged per attending physician.    Electronically signed by Lucas Tatum MD, 06/05/22, 06:13 CDT.

## 2022-06-05 NOTE — CASE MANAGEMENT/SOCIAL WORK
Continued Stay Note   [facility]     Patient Name: Valeria Wilson  MRN: [id number]  Today's Date: 6/5/2022    Admit Date: 6/1/2022     Discharge Plan     Row Name 06/05/22 1027       Plan    Plan Comments SW consulted for possible rehab placement. PT has declined rehab placement. PT continues to wish to dc home with HH. She is requesting [facility]  upon dc. PT has been approved for a home Wound Vac, it will be delivered to her room on day of dc.               Discharge Codes    No documentation.                     MARY GRACE Charles

## 2022-06-05 NOTE — PLAN OF CARE
Goal Outcome Evaluation:  Plan of Care Reviewed With: patient        Progress: no change  Outcome Evaluation: PRN PAIN MEDS AVAILABLE AND PT. HAS REQUIRED THIS SHIFT. ROOM AIR. PT. UP WITH CANE AND MINIMAL ASSISTANCE X 1. ACCUCHECKS WITH SS COVERAGE AVAILABLE. DRESSINGS INTACT/WOUND VAC INTACT. NO DISTRESS NOTED.

## 2022-06-05 NOTE — PROGRESS NOTES
Marianela Neri MD FACS  Progress Note     LOS: 4 days   No care team member to display      Subjective     Interval History:      weeping and then completely alert and talkative.  Refusing vac coverage at the back. Multiple packages of fast food in the room     Objective     Vital Signs  Temp:  [98.2 °F (36.8 °C)-98.7 °F (37.1 °C)] 98.6 °F (37 °C)  Heart Rate:  [80-88] 88  Resp:  [16-18] 18  BP: (113-134)/(57-61) 119/58    Physical Exam:  General appearance - alert  Back exam - bilateral lower extremities dressed, vac at left thigh, back dressedd  Extrem    Results Review:    Lab Results (last 24 hours)     Procedure Component Value Units Date/Time    Blood Culture - Blood, Arm, Right [[id number]]  (Normal) Collected: 06/01/22 0837    Specimen: Blood from Arm, Right Updated: 06/05/22 1047     Blood Culture No growth at 4 days    Comprehensive Metabolic Panel [[id number]]  (Abnormal) Collected: 06/05/22 0859    Specimen: Blood Updated: 06/05/22 0939     Glucose 132 mg/dL      BUN 7 mg/dL      Creatinine 0.79 mg/dL      Sodium 138 mmol/L      Potassium 4.3 mmol/L      Chloride 103 mmol/L      CO2 27.0 mmol/L      Calcium 6.7 mg/dL      Total Protein 5.7 g/dL      Albumin 2.20 g/dL      ALT (SGPT) 9 U/L      AST (SGOT) 14 U/L      Alkaline Phosphatase 84 U/L      Total Bilirubin 0.4 mg/dL      Globulin 3.5 gm/dL      A/G Ratio 0.6 g/dL      BUN/Creatinine Ratio 8.9     Anion Gap 8.0 mmol/L      eGFR 91.8 mL/min/1.73      Comment: National Kidney Foundation and American Society of Nephrology (ASN) Task Force recommended calculation based on the Chronic Kidney Disease Epidemiology Collaboration (CKD-EPI) equation refit without adjustment for race.       Narrative:      GFR Normal >60  Chronic Kidney Disease <60  Kidney Failure <15      CBC (No Diff) [[id number]]  (Abnormal) Collected: 06/05/22 0913    Specimen: Blood Updated: 06/05/22 0922     WBC 6.57 10*3/mm3      RBC 3.32 10*6/mm3      Hemoglobin 8.1 g/dL       Hematocrit 27.4 %      MCV 82.5 fL      MCH 24.4 pg      MCHC 29.6 g/dL      RDW 16.7 %      RDW-SD 50.1 fl      MPV 11.1 fL      Platelets 147 10*3/mm3     Blood Culture - Blood, Arm, Right [[id number]]  (Normal) Collected: 06/01/22 0852    Specimen: Blood from Arm, Right Updated: 06/05/22 0917     Blood Culture No growth at 4 days    POC Glucose Once [[id number]]  (Normal) Collected: 06/05/22 0809    Specimen: Blood Updated: 06/05/22 0824     Glucose 126 mg/dL      Comment: : [id number] Kodi Bhat  AMeter ID: [id number]       POC Glucose Once [[id number]]  (Abnormal) Collected: 06/04/22 2050    Specimen: Blood Updated: 06/04/22 2101     Glucose 168 mg/dL      Comment: : [id number] Eugene BrittaneyMeter ID: [id number]       Vancomycin, Trough REDRAW [[id number]]  (Abnormal) Collected: 06/04/22 1616    Specimen: Blood Updated: 06/04/22 1725     Vancomycin Trough 91.40 mcg/mL     POC Glucose Once [[id number]]  (Normal) Collected: 06/04/22 1656    Specimen: Blood Updated: 06/04/22 1710     Glucose 121 mg/dL      Comment: : [id number] Kodi Bhat  AMeter ID: [id number]       Creatinine, Serum [[id number]]  (Normal) Collected: 06/04/22 1616    Specimen: Blood Updated: 06/04/22 1637     Creatinine 0.73 mg/dL      eGFR 101.0 mL/min/1.73      Comment: National Kidney Foundation and American Society of Nephrology (ASN) Task Force recommended calculation based on the Chronic Kidney Disease Epidemiology Collaboration (CKD-EPI) equation refit without adjustment for race.       Narrative:      GFR Normal >60  Chronic Kidney Disease <60  Kidney Failure <15      Vancomycin, Trough [[id number]]  (Abnormal) Collected: 06/04/22 1445    Specimen: Blood Updated: 06/04/22 1552     Vancomycin Trough 100.20 mcg/mL     Magnesium [[id number]]  (Abnormal) Collected: 06/04/22 1445    Specimen: Blood Updated: 06/04/22 1504     Magnesium 1.1 mg/dL     POC Glucose Once [[id number]]  (Normal) Collected: 06/04/22 1338    Specimen: Blood  Updated: 06/04/22 1349     Glucose 101 mg/dL      Comment: : [id number] Kodi Rodriguez ID: [id number]           Imaging Results (Last 24 Hours)     ** No results found for the last 24 hours. **            Assessment & Plan       S/p I and D multiple areas    Will add toradol for additional pain med  Continue wound care   consult for assistance with home needs      Marianela Neri MD  06/05/22  11:57 CDT

## 2022-06-06 LAB
BACTERIA SPEC AEROBE CULT: NORMAL
BACTERIA SPEC AEROBE CULT: NORMAL
GLUCOSE BLDC GLUCOMTR-MCNC: 114 MG/DL (ref 70–130)
GLUCOSE BLDC GLUCOMTR-MCNC: 117 MG/DL (ref 70–130)
GLUCOSE BLDC GLUCOMTR-MCNC: 143 MG/DL (ref 70–130)
GLUCOSE BLDC GLUCOMTR-MCNC: 146 MG/DL (ref 70–130)
IRON 24H UR-MRATE: 23 MCG/DL (ref 37–145)
IRON SATN MFR SERPL: 12 % (ref 20–50)
TIBC SERPL-MCNC: 189 MCG/DL (ref 298–536)
TRANSFERRIN SERPL-MCNC: 127 MG/DL (ref 200–360)

## 2022-06-06 PROCEDURE — 25010000002 MAGNESIUM SULFATE IN D5W 1G/100ML (PREMIX) 1-5 GM/100ML-% SOLUTION: Performed by: INTERNAL MEDICINE

## 2022-06-06 PROCEDURE — 25010000002 KETOROLAC TROMETHAMINE PER 15 MG: Performed by: SPECIALIST

## 2022-06-06 PROCEDURE — 97605 NEG PRS WND THER DME<=50SQCM: CPT

## 2022-06-06 PROCEDURE — 0 HYDROMORPHONE 1 MG/ML SOLUTION: Performed by: SPECIALIST

## 2022-06-06 PROCEDURE — 99233 SBSQ HOSP IP/OBS HIGH 50: CPT | Performed by: NURSE PRACTITIONER

## 2022-06-06 PROCEDURE — 25010000002 MEROPENEM PER 100 MG: Performed by: INTERNAL MEDICINE

## 2022-06-06 PROCEDURE — 82962 GLUCOSE BLOOD TEST: CPT

## 2022-06-06 RX ORDER — MORPHINE SULFATE 15 MG/1
15 TABLET ORAL 2 TIMES DAILY
Status: DISCONTINUED | OUTPATIENT
Start: 2022-06-06 | End: 2022-06-10 | Stop reason: HOSPADM

## 2022-06-06 RX ORDER — LORAZEPAM 2 MG/ML
0.5 INJECTION INTRAMUSCULAR EVERY 4 HOURS PRN
Status: DISCONTINUED | OUTPATIENT
Start: 2022-06-06 | End: 2022-06-07

## 2022-06-06 RX ORDER — OXYCODONE AND ACETAMINOPHEN 7.5; 325 MG/1; MG/1
1 TABLET ORAL EVERY 8 HOURS PRN
Status: DISCONTINUED | OUTPATIENT
Start: 2022-06-06 | End: 2022-06-06

## 2022-06-06 RX ORDER — CYCLOBENZAPRINE HCL 10 MG
10 TABLET ORAL 2 TIMES DAILY PRN
COMMUNITY

## 2022-06-06 RX ORDER — INSULIN GLARGINE 100 [IU]/ML
40 INJECTION, SOLUTION SUBCUTANEOUS NIGHTLY
COMMUNITY
Start: 2022-07-20

## 2022-06-06 RX ORDER — CYCLOBENZAPRINE HCL 10 MG
10 TABLET ORAL 2 TIMES DAILY PRN
Status: DISCONTINUED | OUTPATIENT
Start: 2022-06-06 | End: 2022-06-10 | Stop reason: HOSPADM

## 2022-06-06 RX ORDER — OXYCODONE AND ACETAMINOPHEN 7.5; 325 MG/1; MG/1
1 TABLET ORAL EVERY 8 HOURS
Status: DISCONTINUED | OUTPATIENT
Start: 2022-06-06 | End: 2022-06-08

## 2022-06-06 RX ORDER — MAGNESIUM SULFATE 1 G/100ML
1 INJECTION INTRAVENOUS ONCE
Status: COMPLETED | OUTPATIENT
Start: 2022-06-06 | End: 2022-06-06

## 2022-06-06 RX ADMIN — PRAMIPEXOLE DIHYDROCHLORIDE 0.75 MG: 0.25 TABLET ORAL at 08:22

## 2022-06-06 RX ADMIN — GABAPENTIN 600 MG: 300 CAPSULE ORAL at 22:13

## 2022-06-06 RX ADMIN — KETOROLAC TROMETHAMINE 30 MG: 30 INJECTION, SOLUTION INTRAMUSCULAR; INTRAVENOUS at 11:51

## 2022-06-06 RX ADMIN — SUCRALFATE 1 G: 1 TABLET ORAL at 11:54

## 2022-06-06 RX ADMIN — MEROPENEM 2 G: 1 INJECTION, POWDER, FOR SOLUTION INTRAVENOUS at 04:49

## 2022-06-06 RX ADMIN — MEROPENEM 2 G: 1 INJECTION, POWDER, FOR SOLUTION INTRAVENOUS at 22:14

## 2022-06-06 RX ADMIN — BUSPIRONE HYDROCHLORIDE 10 MG: 10 TABLET ORAL at 15:23

## 2022-06-06 RX ADMIN — OXYCODONE HYDROCHLORIDE AND ACETAMINOPHEN 1 TABLET: 7.5; 325 TABLET ORAL at 22:13

## 2022-06-06 RX ADMIN — HYDROMORPHONE HYDROCHLORIDE 0.25 MG: 1 INJECTION, SOLUTION INTRAMUSCULAR; INTRAVENOUS; SUBCUTANEOUS at 19:50

## 2022-06-06 RX ADMIN — BUSPIRONE HYDROCHLORIDE 10 MG: 10 TABLET ORAL at 22:13

## 2022-06-06 RX ADMIN — DULOXETINE HYDROCHLORIDE 60 MG: 30 CAPSULE, DELAYED RELEASE ORAL at 08:22

## 2022-06-06 RX ADMIN — GABAPENTIN 600 MG: 300 CAPSULE ORAL at 15:23

## 2022-06-06 RX ADMIN — KETOROLAC TROMETHAMINE 30 MG: 30 INJECTION, SOLUTION INTRAMUSCULAR; INTRAVENOUS at 05:50

## 2022-06-06 RX ADMIN — MORPHINE SULFATE 15 MG: 15 TABLET ORAL at 22:13

## 2022-06-06 RX ADMIN — MAGNESIUM SULFATE IN DEXTROSE 1 G: 10 INJECTION, SOLUTION INTRAVENOUS at 14:09

## 2022-06-06 RX ADMIN — DAKIN'S SOLUTION 0.125% (QUARTER STRENGTH): 0.12 SOLUTION at 08:23

## 2022-06-06 RX ADMIN — SUCRALFATE 1 G: 1 TABLET ORAL at 08:22

## 2022-06-06 RX ADMIN — MIRTAZAPINE 15 MG: 15 TABLET, FILM COATED ORAL at 22:13

## 2022-06-06 RX ADMIN — GABAPENTIN 600 MG: 300 CAPSULE ORAL at 08:26

## 2022-06-06 RX ADMIN — MEROPENEM 2 G: 1 INJECTION, POWDER, FOR SOLUTION INTRAVENOUS at 11:54

## 2022-06-06 RX ADMIN — HYDROMORPHONE HYDROCHLORIDE 0.5 MG: 1 INJECTION, SOLUTION INTRAMUSCULAR; INTRAVENOUS; SUBCUTANEOUS at 13:22

## 2022-06-06 RX ADMIN — SODIUM CHLORIDE 100 ML/HR: 9 INJECTION, SOLUTION INTRAVENOUS at 04:49

## 2022-06-06 RX ADMIN — SUCRALFATE 1 G: 1 TABLET ORAL at 22:13

## 2022-06-06 RX ADMIN — BUSPIRONE HYDROCHLORIDE 10 MG: 10 TABLET ORAL at 08:22

## 2022-06-06 RX ADMIN — PANTOPRAZOLE SODIUM 40 MG: 40 TABLET, DELAYED RELEASE ORAL at 05:50

## 2022-06-06 RX ADMIN — HYDROMORPHONE HYDROCHLORIDE 0.5 MG: 1 INJECTION, SOLUTION INTRAMUSCULAR; INTRAVENOUS; SUBCUTANEOUS at 04:49

## 2022-06-06 RX ADMIN — KETOROLAC TROMETHAMINE 30 MG: 30 INJECTION, SOLUTION INTRAMUSCULAR; INTRAVENOUS at 17:52

## 2022-06-06 RX ADMIN — SODIUM CHLORIDE 100 ML/HR: 9 INJECTION, SOLUTION INTRAVENOUS at 17:15

## 2022-06-06 RX ADMIN — HYDROCODONE BITARTRATE AND ACETAMINOPHEN 1 TABLET: 7.5; 325 TABLET ORAL at 14:05

## 2022-06-06 RX ADMIN — POTASSIUM CHLORIDE 40 MEQ: 10 CAPSULE, COATED, EXTENDED RELEASE ORAL at 08:22

## 2022-06-06 RX ADMIN — DAKIN'S SOLUTION 0.125% (QUARTER STRENGTH): 0.12 SOLUTION at 22:26

## 2022-06-06 RX ADMIN — DULOXETINE HYDROCHLORIDE 60 MG: 30 CAPSULE, DELAYED RELEASE ORAL at 22:12

## 2022-06-06 RX ADMIN — PRAMIPEXOLE DIHYDROCHLORIDE 0.75 MG: 0.25 TABLET ORAL at 22:12

## 2022-06-06 RX ADMIN — KETOROLAC TROMETHAMINE 30 MG: 30 INJECTION, SOLUTION INTRAMUSCULAR; INTRAVENOUS at 23:49

## 2022-06-06 RX ADMIN — SUCRALFATE 1 G: 1 TABLET ORAL at 16:41

## 2022-06-06 NOTE — CONSULTS
Pt asleep when entering room.  Pt admitted with A1C of 11.0%.  Offered pt a CGM sample for home use and pt stated \"I don't like those\".  Pt wouldn't really wake up.  Will try again.

## 2022-06-06 NOTE — PROGRESS NOTES
[facility] Medicine Services  INPATIENT PROGRESS NOTE    Patient Name: Valeria Wilson  Date of Admission: 6/1/2022  Today's Date: 06/05/22  Length of Stay: 4  Primary Care Physician: No primary care provider on file.    Subjective   Chief Complaint: diabetes management   Leg Swelling  Associated symptoms include arthralgias and myalgias. Pertinent negatives include no abdominal pain, chest pain, chills, coughing, fever, nausea or vomiting.        Patient was seen and examined at bedside.  Patient was calm before I entered the room, she subsequently began crying when I walked in.  Patient complained of severe leg pain.  Defer pain management to surgery.    Patient yet again had fast food in the room and indicated she had biscuits and gravy from [employer] and a soft drink.  Re-iterated to the patient the importance of improving diet compliance and taking her insulin.    Review of Systems   Constitutional: Negative for activity change, chills and fever.   Respiratory: Negative for cough and shortness of breath.    Cardiovascular: Negative for chest pain and palpitations.   Gastrointestinal: Negative for abdominal distention, abdominal pain, diarrhea, nausea and vomiting.   Musculoskeletal: Positive for arthralgias and myalgias.   Skin: Positive for wound.        All pertinent negatives and positives are as above. All other systems have been reviewed and are negative unless otherwise stated.     Objective    Temp:  [98.1 °F (36.7 °C)-98.9 °F (37.2 °C)] 98.6 °F (37 °C)  Heart Rate:  [80-96] 84  Resp:  [16-18] 16  BP: (119-144)/(47-73) 129/47  Physical Exam  Vitals and nursing note reviewed.   Constitutional:       Appearance: She is obese.   HENT:      Head: Normocephalic and atraumatic.      Nose: No congestion or rhinorrhea.      Mouth/Throat:      Mouth: Mucous membranes are dry.      Pharynx: Oropharynx is clear.   Eyes:      General: No scleral icterus.     Conjunctiva/sclera:  Conjunctivae normal.   Cardiovascular:      Rate and Rhythm: Normal rate and regular rhythm.      Heart sounds: No murmur heard.  Pulmonary:      Effort: Pulmonary effort is normal. No respiratory distress.      Breath sounds: Normal breath sounds. No stridor.   Abdominal:      General: Abdomen is flat. Bowel sounds are normal.      Palpations: Abdomen is soft.   Musculoskeletal:      Right lower leg: Edema present.      Left lower leg: Edema present.   Skin:     General: Skin is warm and dry.      Coloration: Skin is not jaundiced or pale.   Neurological:      General: No focal deficit present.      Mental Status: She is alert and oriented to person, place, and time.   Psychiatric:         Mood and Affect: Mood normal.         Behavior: Behavior normal.      Comments: Crying, agitated             Results Review:  I have reviewed the labs, radiology results, and diagnostic studies.    Laboratory Data:   Results from last 7 days   Lab Units 06/05/22  0913 06/02/22  0553 06/01/22  0837   WBC 10*3/mm3 6.57 7.66 8.53   HEMOGLOBIN g/dL 8.1* 8.9* 10.6*   HEMATOCRIT % 27.4* 28.6* 34.4   PLATELETS 10*3/mm3 147 182 232        Results from last 7 days   Lab Units 06/05/22  0859 06/04/22  1616 06/02/22  0321 06/01/22  0837   SODIUM mmol/L 138  --  136 131*   POTASSIUM mmol/L 4.3  --  3.0* 3.3*   CHLORIDE mmol/L 103  --  97* 91*   CO2 mmol/L 27.0  --  29.0 28.0   BUN mg/dL 7  --  15 19   CREATININE mg/dL 0.79 0.73 1.05* 1.44*   CALCIUM mg/dL 6.7*  --  7.2* 7.9*   BILIRUBIN mg/dL 0.4  --  0.3 0.3   ALK PHOS U/L 84  --  76 97   ALT (SGPT) U/L 9  --  13 19   AST (SGOT) U/L 14  --  14 17   GLUCOSE mg/dL 132*  --  205* 400*       Culture Data:   Blood Culture   Date Value Ref Range Status   06/01/2022 No growth at 2 days  Preliminary   06/01/2022 No growth at 2 days  Preliminary     Wound Culture   Date Value Ref Range Status   06/01/2022 Moderate growth (3+) Gram Negative Bacilli (A)  Preliminary       Radiology Data:   Imaging  Results (Last 24 Hours)     ** No results found for the last 24 hours. **          I have reviewed the patient's current medications.     Assessment/Plan     Active Hospital Problems    Diagnosis    • Cellulitis of left leg    • Open wound    • Anemia    • Chronic pain syndrome    • Lymphedema of both lower extremities    • Polypharmacy    • Diabetic ulcer of left lower leg associated with type 2 diabetes mellitus, with fat layer exposed ([facility])    • Obesity, morbid, BMI 50 or higher ([facility])    • Stage 3 chronic kidney disease    • Hypertension    • Gastroesophageal reflux disease    • Type 2 diabetes mellitus with hyperglycemia, with long-term current use of insulin ([facility])    • Venous insufficiency of both lower extremities    • RLS (restless legs syndrome)        Plan:  Patient was admitted on 6/1 by Dr. Sanderson.  The patient was admitted for numerous wounds for debridement.  Patient had a cleaning excision and debridement of left thigh abscess, left heel decubitus, left posterior Calf decubitus, right posterior calf decubitus, right buttock, left buttock, pilonidal region, and a sacral decubitus.    He performed extensive debridement and irrigation of these wounds.  Wound VAC is recommended, but she is declining the wound VAC on the sacral region.    Patient was on empiric vancomycin per general surgery.  Cultures starting to come back and one of the wounds had ESBL.  Will add meropenem.  Recommend ID consultation.    We were consulted for management of her diabetes.  Hemoglobin A1c has noted to be greater than 11.  Patient reportedly takes glargine 45 units at night and Victoza, and she does take some lispro insulin, but was unsure of the amount.  Patient admits to not being overly compliant with diet and insulin.  Blood sugars adequately controlled.    Counseled repeatedly on diet compliance and compliance with insulin.  We are utilizing quite a bit less insulin than she is supposed to use with adequate  control.    Incentive spirometer    Increase activity              Discharge Planning: I expect the patient to be discharged per attending physician.    Electronically signed by Lucas Tatum MD, 06/05/22, 21:27 CDT.

## 2022-06-06 NOTE — PLAN OF CARE
Goal Outcome Evaluation:  Plan of Care Reviewed With: patient        Progress: no change  Outcome Evaluation: WOUND VAC CHANGE DONE TODAY @ LEFT THIGH AREA PER PT DEPT. BILATERAL HEEL AND CALF DRESSING CHANGES DONE PER SIGNIFICANT OTHER. SPINE DRESSING CHANGE DONE PER NURSING STAFF USING MOIST KERLEX WITH DAKIN'S SOLUTION AND THEN AREA COVERED WITH MEPILEX. ISOLATION OBSERVED. MAG RUN GIVEN X 1 PER ORDER.  ROOM AIR. PT. UP WITH ASSISTANCE USING CANE AND ASSISTANCE OF 1 PERSON. ACCUCHECKS DONE. NO DISTRESS NOTED.

## 2022-06-06 NOTE — PLAN OF CARE
Goal Outcome Evaluation:  Plan of Care Reviewed With: patient        Progress: no change  Outcome Evaluation: pt tolerated wound vac dressing change to left thigh, 100ml drainage overnight, canister changed, picture taken, will cont to check daily, change MWF or PRN

## 2022-06-06 NOTE — PROGRESS NOTES
[facility] Medicine Services  INPATIENT PROGRESS NOTE    Patient Name: Valeria Wilson  Date of Admission: 6/1/2022  Today's Date: 06/06/22  Length of Stay: 5  Primary Care Physician: No primary care provider on file.    Subjective   Chief Complaint: Pain in feet  HPI   Patient reports that she is having quite a bit of pain today.  She just got up and went to the bathroom, and has now returned to the bed.  Reports that her left foot is throbbing.  \"Please don't touch\" when I approached her lower extremities for exam.  She does state that she has issues with neuropathy, and states that her Neurontin was titrated approximately 2 weeks before this hospitalization.  She is followed in pain management.  She voices no other new complaints.  Her blood sugar trend has more recently been better controlled.  We discussed her A1c results.  Significant other at bedside.    Review of Systems     All pertinent negatives and positives are as above. All other systems have been reviewed and are negative unless otherwise stated.     Objective    Temp:  [98.1 °F (36.7 °C)-98.9 °F (37.2 °C)] 98.4 °F (36.9 °C)  Heart Rate:  [81-96] 86  Resp:  [16-18] 16  BP: (125-175)/(47-79) 175/79  Physical Exam  Vitals reviewed.   HENT:      Head: Normocephalic.      Mouth/Throat:      Pharynx: No oropharyngeal exudate.   Eyes:      Pupils: Pupils are equal, round, and reactive to light.   Pulmonary:      Effort: Pulmonary effort is normal. No respiratory distress.      Comments: On room air  Abdominal:      Comments: obese   Musculoskeletal:      Cervical back: Neck supple.      Comments: Dressings in place to bilateral lower extremities (dressings not uncovered at patient request)   Neurological:      General: No focal deficit present.      Mental Status: She is alert.   Psychiatric:         Mood and Affect: Mood normal.         Results Review:  I have reviewed the labs, radiology results, and diagnostic  studies.    Laboratory Data:   Results from last 7 days   Lab Units 06/05/22  0913 06/02/22  0553 06/01/22  0837   WBC 10*3/mm3 6.57 7.66 8.53   HEMOGLOBIN g/dL 8.1* 8.9* 10.6*   HEMATOCRIT % 27.4* 28.6* 34.4   PLATELETS 10*3/mm3 147 182 232        Results from last 7 days   Lab Units 06/05/22  0859 06/04/22  1616 06/02/22  0321 06/01/22  0837   SODIUM mmol/L 138  --  136 131*   POTASSIUM mmol/L 4.3  --  3.0* 3.3*   CHLORIDE mmol/L 103  --  97* 91*   CO2 mmol/L 27.0  --  29.0 28.0   BUN mg/dL 7  --  15 19   CREATININE mg/dL 0.79 0.73 1.05* 1.44*   CALCIUM mg/dL 6.7*  --  7.2* 7.9*   BILIRUBIN mg/dL 0.4  --  0.3 0.3   ALK PHOS U/L 84  --  76 97   ALT (SGPT) U/L 9  --  13 19   AST (SGOT) U/L 14  --  14 17   GLUCOSE mg/dL 132*  --  205* 400*       Culture Data:   Blood Culture   Date Value Ref Range Status   06/01/2022 No growth at 5 days  Final   06/01/2022 No growth at 4 days  Preliminary     Wound Culture   Date Value Ref Range Status   06/01/2022 Moderate growth (3+) Serratia marcescens (A)  Final   06/01/2022 Moderate growth (3+) Proteus mirabilis (A)  Final       Radiology Data:   Imaging Results (Last 24 Hours)     ** No results found for the last 24 hours. **          I have reviewed the patient's current medications.     Assessment/Plan     Active Hospital Problems    Diagnosis    • Cellulitis of left leg    • Open wound    • Anemia    • Chronic pain syndrome    • Lymphedema of both lower extremities    • Polypharmacy    • Diabetic ulcer of left lower leg associated with type 2 diabetes mellitus, with fat layer exposed ([facility])    • Obesity, morbid, BMI 50 or higher ([facility])    • Stage 3 chronic kidney disease    • Hypertension    • Gastroesophageal reflux disease    • Type 2 diabetes mellitus with hyperglycemia, with long-term current use of insulin ([facility])    • Venous insufficiency of both lower extremities    • RLS (restless legs syndrome)        Plan:  1.  Hemoglobin A1c 11.  Dr. Tatum's note reviewed; needs  better compliance, including with her diet.  2.  Blood sugar trend reviewed:    3.  Continue insulin Levemir 20 units subcu nightly  4.  Sliding scale insulin coverage with insulin lispro  5.  Currently on IV antibiotics with Merrem -recently added for ESBL E. coli found on wound culture  6.  Wound care  7.  Hgb 8.1  MCV 82.  Will add iron profile  8.  Neurontin 600mg TID (patient reports dose of Neurontin was recently titrated about 2 weeks ago by her pain management specialist).  9.  Mg replacement  10.  Patient will need a new primary care provider on discharge, she reports that due to noncompliance her previous primary care provider asked her to establish with someone else.      Electronically signed by Paul Herrera MD, 06/06/22, 10:27 CDT.

## 2022-06-06 NOTE — PROGRESS NOTES
[facility]  INPATIENT WOUND CARE    PROGRESS NOTE    Today's Date: 06/06/22    Patient Name: Valeria Wilson  MRN: [id number]  CSN: [id number]  PCP: No primary care provider on file.  Referring Provider: Justin Perez MD  Attending Provider: Justin Perez MD  Length of Stay: 5    SUBJECTIVE   Chief Complaint: Wounds of Sacrum, buttocks, left thigh, bilateral calves and bilateral heels    HPI: Valeria Wilson continues care in 378.  According to report from RN, patient has refused dressing changes, refused Lovenox, and is now requesting removal of wound VAC to left thigh.  In the room patient is sleeping.  She continues care on bariatric [employer].  Significant other is in the chair at the bedside.  Discussed care with significant other.  He states he has been encouraging patient to move forward with wound VAC to the left thigh and sacral area and he states he reached out to her mother and she did the same.  Patient continues to refuse.  We will patient up and discussed this with patient.  Patient reports she is very sleepy but pain is not bad right now.  She talks to male with eyes closed all time.  Patient states she did receive her pain medication this morning.  Patient received Dilaudid at 0449 this morning.  Ketorolac and Neurontin also received this morning.  Wound vac has approximately 425ml in canister.  According to chart review it appears she has put out approximately 75 mL over the past 24 hours.    Visit Dx:    ICD-10-CM ICD-9-CM   1. Abscess of left thigh  L02.416 682.6   2. Open wound  T14.8XXA 879.8   3. Anemia, unspecified type  D64.9 285.9   4. Pressure injury of sacral region, stage 3 (HCC)  L89.153 707.03     707.23   5. Pressure injury of right buttock, stage 3 (HCC)  L89.313 707.05     707.23   6. Pressure injury of left buttock, stage 3 (HCC)  L89.323 707.05     707.23   7. Pressure injury of coccygeal region, stage 3 (HCC)  L89.153 707.03     707.23   8. Pressure  injury of right calf, stage 3 ([facility])  L89.893 707.09     707.23   9. Pressure injury of left calf, stage 3 ([facility])  L89.893 707.09     707.23   10. Diabetic ulcer of right heel associated with type 2 diabetes mellitus, with fat layer exposed ([facility])  E11.621 250.80    L97.412 707.14   11. Diabetic ulcer of left heel associated with type 2 diabetes mellitus, with fat layer exposed ([facility])  E11.621 250.80    L97.412 707.14   12. Cellulitis of left leg  L03.116 682.6     Patient Active Problem List   Diagnosis   • Hypertension   • Type 2 diabetes mellitus with hyperglycemia, without long-term current use of insulin ([facility])   • Obesity, morbid, BMI 50 or higher ([facility])   • Stage 3 chronic kidney disease   • Bacteremia due to Klebsiella pneumoniae   • Urinary tract infection   • Chronic pain syndrome   • Lymphedema of both lower extremities   • Bilateral lower leg cellulitis   • RLS (restless legs syndrome)   • Diabetic ulcer of left lower leg associated with type 2 diabetes mellitus, with fat layer exposed ([facility])   • Dyspnea   • MARCELINA (generalized anxiety disorder)   • Gastroesophageal reflux disease   • Headache   • Hypoglycemia   • Hypokalemia   • Migraine without aura and without status migrainosus, not intractable   • Mild single current episode of major depressive disorder ([facility])   • Morbid obesity with BMI of 50.0-59.9, adult ([facility])   • Old complex tear of medial meniscus of left knee   • Pleurisy   • Polypharmacy   • Type 2 diabetes mellitus with hyperglycemia, with long-term current use of insulin ([facility])   • Venous insufficiency of both lower extremities   • Anemia   • Open wound   • Cellulitis of left leg       History:   Past Medical History:   Diagnosis Date   • Anxiety    • Arthritis     Osteoarthritis primarily left knee    • Back pain    • Chronic pain syndrome 10/27/2021   • Depression    • Diabetes mellitus ([facility])    • Fibromyalgia, primary    • Hx of tear of meniscus of knee joint 11/21/2016   • Hypertension    • Joint  pain    • Kidney stone    • Knee pain    • Lymphedema of both lower extremities 10/27/2021   • Migraine    • Migraine headache    • Pain     knee, left ankle, left ankle     • Pes anserinus bursitis 04/28/2015   • Restless leg      Past Surgical History:   Procedure Laterality Date   • CATARACT EXTRACTION     • CATARACT EXTRACTION     • CATARACT EXTRACTION     • CORNEAL TRANSPLANT      bilateral    • ECTOPIC PREGNANCY     • INCISION AND DRAINAGE ABSCESS Left 6/1/2022    Procedure: DEBRIDEMENTAND PULSE LAVAGE LT MEDIAL THIGH PACKING ODOFORM GAUZE  WOUND SACRAL AND BILATERAL CALVES;  Surgeon: Justin Perez MD;  Location: [facility];  Service: General;  Laterality: Left;   • JOINT REPLACEMENT     • KNEE ARTHROSCOPY W/ MENISCAL REPAIR     • KNEE MENISCAL REPAIR     • MENISCECTOMY Left 12/20/2016   • TUBAL ABDOMINAL LIGATION       Social History     Socioeconomic History   • Marital status: Single   Tobacco Use   • Smoking status: Never Smoker   • Smokeless tobacco: Never Used   Vaping Use   • Vaping Use: Never used   Substance and Sexual Activity   • Alcohol use: Yes   • Drug use: No   • Sexual activity: Not Currently     Partners: Male       Allergies:  Allergies   Allergen Reactions   • Compazine [Prochlorperazine Edisylate] Shortness Of Breath     Breathing    • Meperidine Hives     (Demerol)    • Norflex [Orphenadrine] Hives   • Nortriptyline Hives   • Prochlorperazine Shortness Of Breath   • Prochlorperazine Maleate Shortness Of Breath   • Vancomycin Provider Review Needed and Unknown - High Severity     CRITICAL LEVEL RESULTS - PATIENT LIKELY HAS METABOLIC / CLEARANCE ISSUE WITH VANCOMYCIN. ON STANDARD REGIMENS BASED ON BAYESIAN/POPULATION PK PARAMETERS, VANCOMYCIN TROUGH LEVELS IN THE 60s, 70s, 90s, 100s WERE OBTAINED. RECOMMEND AGAINST EVER USING VANCOMYCIN IN THIS PATIENT. IF VANCOMYCIN IS ABSOLUTELY INDICATED WITHOUT ANY OTHER OPTIONS, PULSE INTERMITTENT DOSING WOULD LIKELY BE THE ONLY APPROPRIATE METHOD.     • Codeine Nausea And Vomiting     Rash    • Penicillins Rash     Nausea & vomiting    • Calamine Itching   • Amoxicillin-Pot Clavulanate Rash, Hives and Itching   • Avelox [Moxifloxacin Hcl] Rash   • Cefazolin Nausea And Vomiting   • Cephalexin Rash     (Keflex)    • Ciprofloxacin Rash   • Clindamycin/Lincomycin Rash   • Doxycycline Nausea And Vomiting   • Hydroxyzine Hcl Itching   • Moxifloxacin Nausea And Vomiting   • Orphenadrine Citrate Itching   • Sulfamethoxazole-Trimethoprim Nausea And Vomiting and Rash     States she also gets yeast infections with it   • Tobramycin Other (See Comments)     Eyes burn-feel like eyes are on fire     • Vistaril [Hydroxyzine] Rash       Medications:    Current Facility-Administered Medications:   •  albuterol (PROVENTIL) nebulizer solution 0.5% 2.5 mg/0.5mL, 2.5 mg, Nebulization, Q6H PRN, Justin Perez MD  •  busPIRone (BUSPAR) tablet 10 mg, 10 mg, Oral, TID, Lucas Tatum MD, 10 mg at 06/06/22 0822  •  dextrose (D50W) (25 g/50 mL) IV injection 25 g, 25 g, Intravenous, Q15 Min PRN, Justin Perez MD  •  dextrose (GLUTOSE) oral gel 15 g, 15 g, Oral, Q15 Min PRN, Justin Perez MD  •  DULoxetine (CYMBALTA) DR capsule 60 mg, 60 mg, Oral, BID, Lucas Tatum MD, 60 mg at 06/06/22 0822  •  Enoxaparin Sodium (LOVENOX) syringe 30 mg, 30 mg, Subcutaneous, Q12H, Justin Perez MD  •  fluticasone (FLONASE) 50 MCG/ACT nasal spray 1 spray, 1 spray, Nasal, Daily, Lucas Tatum MD, 1 spray at 06/05/22 0810  •  gabapentin (NEURONTIN) capsule 600 mg, 600 mg, Oral, TID, Lucas Tatum MD, 600 mg at 06/06/22 0826  •  glucagon (human recombinant) (GLUCAGEN DIAGNOSTIC) injection 1 mg, 1 mg, Intramuscular, Q15 Min PRN, Justin Perez MD  •  HYDROcodone-acetaminophen (NORCO) 7.5-325 MG per tablet 1 tablet, 1 tablet, Oral, Q4H PRN, Justin Perez MD, 1 tablet at 06/05/22 1709  •  HYDROmorphone (DILAUDID) injection 0.5 mg, 0.5 mg, Intravenous, Q2H PRN,  Justin Perez MD, 0.5 mg at 06/06/22 0449  •  insulin detemir (LEVEMIR) injection 20 Units, 20 Units, Subcutaneous, Nightly, Lucas Tatum MD, 20 Units at 06/04/22 2110  •  Insulin Lispro (humaLOG) injection 0-14 Units, 0-14 Units, Subcutaneous, TID AC, Lucas Tatum MD  •  ketorolac (TORADOL) injection 30 mg, 30 mg, Intravenous, Q6H, Marianela Neri MD, 30 mg at 06/06/22 0550  •  LORazepam (ATIVAN) injection 1 mg, 1 mg, Intravenous, Q4H PRN, Justin Perez MD, 1 mg at 06/04/22 1635  •  meropenem (MERREM) 2 g in sodium chloride 0.9 % 100 mL IVPB, 2 g, Intravenous, Q8H, Lucas Tatum MD, 2 g at 06/06/22 0449  •  mirtazapine (REMERON) tablet 15 mg, 15 mg, Oral, Nightly, Lucas Tatum MD, 15 mg at 06/05/22 2131  •  ondansetron (ZOFRAN) 8 mg/50 mL NS IVPB, 8 mg, Intravenous, Q6H PRN, Justin Perez MD  •  pantoprazole (PROTONIX) EC tablet 40 mg, 40 mg, Oral, QAM, Lucas Tatum MD, 40 mg at 06/06/22 0550  •  Pharmacy to Dose meropenem (MERREM), , Does not apply, Continuous PRN, Lucas Tatum MD  •  potassium chloride (MICRO-K) CR capsule 40 mEq, 40 mEq, Oral, Daily, Loki Mchugh MD, 40 mEq at 06/06/22 0822  •  pramipexole (MIRAPEX) tablet 0.75 mg, 0.75 mg, Oral, BID, Lucas Tatum MD, 0.75 mg at 06/06/22 0822  •  sodium chloride 0.9 % flush 1-10 mL, 1-10 mL, Intravenous, PRN, Justin Perez MD  •  sodium chloride 0.9 % flush 10 mL, 10 mL, Intravenous, Q12H, Justin Perez MD, 10 mL at 06/04/22 2108  •  sodium chloride 0.9 % infusion, 100 mL/hr, Intravenous, Continuous, Justin Perez MD, Last Rate: 100 mL/hr at 06/06/22 0449, 100 mL/hr at 06/06/22 0449  •  sodium hypochlorite (DAKIN'S 1/4 STRENGTH) 0.125 % topical solution 0.125% solution, , Topical, BID, Justin Perez MD, Given at 06/06/22 0823  •  sucralfate (CARAFATE) tablet 1 g, 1 g, Oral, 4x Daily AC & at Bedtime, Justin Perez MD, 1 g at 06/06/22 0822    Review of Systems:  Review of  Systems   Constitutional: Positive for activity change, chills and fatigue.   HENT: Negative for sinus pressure and sore throat.    Respiratory: Negative for cough and shortness of breath.    Cardiovascular: Positive for leg swelling. Negative for chest pain and palpitations.   Gastrointestinal: Negative for diarrhea, nausea and vomiting.   Genitourinary: Positive for urgency. Negative for frequency.        Mixed (stress and urge) incontinence   Musculoskeletal: Positive for arthralgias, gait problem and myalgias.   Skin: Positive for color change and wound.   Neurological: Positive for weakness. Negative for dizziness.   Psychiatric/Behavioral: Negative for agitation, behavioral problems and confusion.     OBJECTIVE     Vitals:    06/06/22 0700   BP: 175/79   Pulse: 86   Resp: 16   Temp: 98.4 °F (36.9 °C)   SpO2: 95%       PHYSICAL EXAM  Physical Exam  Vitals and nursing note reviewed.   Constitutional:       Appearance: She is morbidly obese.      Comments: Body mass index is 58.76 kg/m².    HENT:      Head: Normocephalic and atraumatic.   Eyes:      General: Lids are normal. Gaze aligned appropriately.   Cardiovascular:      Rate and Rhythm: Normal rate and regular rhythm.   Pulmonary:      Effort: Pulmonary effort is normal. No respiratory distress.   Abdominal:      General: Abdomen is protuberant.      Palpations: Abdomen is soft.   Musculoskeletal:      Cervical back: Normal range of motion and neck supple.   Feet:      Right foot:      Skin integrity: Ulcer and skin breakdown present.      Left foot:      Skin integrity: Ulcer and skin breakdown present.      Comments: Pressure injury bilateral heels, stage 3-subcutaneous tissue present in wound bases of bilateral heels.  Surgical debridement completed.  No necrotic tissue present.  Edges are regular attached to wound bed.  No undermining or tunneling noted.  Periwound area is erythemic and blanchable.    Skin:     General: Skin is warm and dry.       Findings: Erythema and wound present.      Comments: Wound of sacral area is noted to be related to pressure.  Did not see prior to surgical debridement.  Difficult to see during assessment as patient states is very painful to turn.  With chart review, assessment of picture and what is seen during turning, this appears to have mixed diagnosis of intertrigo with pressure and possible infectious process.  Wound base with subcutaneous tissue which is been surgically debrided.  Appeared to be a deep tissue injury prior to surgery with blister formation present and erythema of the periwound area.  Currently large open wound measuring approximately 4 cm x 11 cm x 4 cm.  Serosanguineous drainage present.  Edges are irregular no tunneling or undermining noted.  Patient has open wounds of bilateral posterior calves.  Left calf measures 3 cm x 3 cm x 1.2 cm.  Right calf measures 2 cm x 2 cm x 0.5 cm.  Subcutaneous tissue present.  Edges attached to wound bed.  No tunneling undermining present.  Serosanguineous drainage.  Periwound area intact.  Mixed etiology of pressure and diabetic ulceration.  Left thigh abscess was surgically debrided.  Wound VAC currently in place.  PT measured 10 cm x 4 cm x 5 cm.  Neurological:      Mental Status: She is alert, oriented to person, place, and time and easily aroused.      Motor: Weakness present.      Gait: Gait abnormal.   Psychiatric:         Attention and Perception: Attention normal.         Mood and Affect: Mood is somnolent. Affect is flat        Speech: Speech normal.      Comments: Patient keeps eyes closed during assessment and appears to have flat affect during discussion.  She states she \"just wants to be left alone\"           Results Review:  Lab Results (last 48 hours)     Procedure Component Value Units Date/Time    Blood Culture - Blood, Arm, Right [[id number]]  (Normal) Collected: 06/01/22 0852    Specimen: Blood from Arm, Right Updated: 06/06/22 0969     Blood  Culture No growth at 5 days    POC Glucose Once [[id number]]  (Normal) Collected: 06/06/22 0755    Specimen: Blood Updated: 06/06/22 0807     Glucose 117 mg/dL      Comment: : [id number] Kodi Bhat  AMeter ID: [id number]       POC Glucose Once [[id number]]  (Normal) Collected: 06/05/22 2114    Specimen: Blood Updated: 06/05/22 2130     Glucose 127 mg/dL      Comment: : [id number] gonzalo Clinton AmberMeter ID: [id number]       POC Glucose Once [[id number]]  (Abnormal) Collected: 06/05/22 1703    Specimen: Blood Updated: 06/05/22 1727     Glucose 148 mg/dL      Comment: : [id number] Kodi Bhat  AMeter ID: [id number]       POC Glucose Once [[id number]]  (Abnormal) Collected: 06/05/22 1138    Specimen: Blood Updated: 06/05/22 1207     Glucose 139 mg/dL      Comment: : [id number] Kodi Bhat  AMeter ID: [id number]       Blood Culture - Blood, Arm, Right [[id number]]  (Normal) Collected: 06/01/22 0837    Specimen: Blood from Arm, Right Updated: 06/05/22 1047     Blood Culture No growth at 4 days    Comprehensive Metabolic Panel [[id number]]  (Abnormal) Collected: 06/05/22 0859    Specimen: Blood Updated: 06/05/22 0939     Glucose 132 mg/dL      BUN 7 mg/dL      Creatinine 0.79 mg/dL      Sodium 138 mmol/L      Potassium 4.3 mmol/L      Chloride 103 mmol/L      CO2 27.0 mmol/L      Calcium 6.7 mg/dL      Total Protein 5.7 g/dL      Albumin 2.20 g/dL      ALT (SGPT) 9 U/L      AST (SGOT) 14 U/L      Alkaline Phosphatase 84 U/L      Total Bilirubin 0.4 mg/dL      Globulin 3.5 gm/dL      A/G Ratio 0.6 g/dL      BUN/Creatinine Ratio 8.9     Anion Gap 8.0 mmol/L      eGFR 91.8 mL/min/1.73      Comment: National Kidney Foundation and American Society of Nephrology (ASN) Task Force recommended calculation based on the Chronic Kidney Disease Epidemiology Collaboration (CKD-EPI) equation refit without adjustment for race.       Narrative:      GFR Normal >60  Chronic Kidney Disease <60  Kidney Failure <15       CBC (No Diff) [[id number]]  (Abnormal) Collected: 06/05/22 0913    Specimen: Blood Updated: 06/05/22 0922     WBC 6.57 10*3/mm3      RBC 3.32 10*6/mm3      Hemoglobin 8.1 g/dL      Hematocrit 27.4 %      MCV 82.5 fL      MCH 24.4 pg      MCHC 29.6 g/dL      RDW 16.7 %      RDW-SD 50.1 fl      MPV 11.1 fL      Platelets 147 10*3/mm3     POC Glucose Once [[id number]]  (Normal) Collected: 06/05/22 0809    Specimen: Blood Updated: 06/05/22 0824     Glucose 126 mg/dL      Comment: : [id number] Mariee Perlita  AMeter ID: [id number]       POC Glucose Once [[id number]]  (Abnormal) Collected: 06/04/22 2050    Specimen: Blood Updated: 06/04/22 2101     Glucose 168 mg/dL      Comment: : [id number] Eugene BrittaneyMeter ID: [id number]       Vancomycin, Trough REDRAW [[id number]]  (Abnormal) Collected: 06/04/22 1616    Specimen: Blood Updated: 06/04/22 1725     Vancomycin Trough 91.40 mcg/mL     POC Glucose Once [[id number]]  (Normal) Collected: 06/04/22 1656    Specimen: Blood Updated: 06/04/22 1710     Glucose 121 mg/dL      Comment: : [id number] Mariee Perlita  AMeter ID: [id number]       Creatinine, Serum [[id number]]  (Normal) Collected: 06/04/22 1616    Specimen: Blood Updated: 06/04/22 1637     Creatinine 0.73 mg/dL      eGFR 101.0 mL/min/1.73      Comment: National Kidney Foundation and American Society of Nephrology (ASN) Task Force recommended calculation based on the Chronic Kidney Disease Epidemiology Collaboration (CKD-EPI) equation refit without adjustment for race.       Narrative:      GFR Normal >60  Chronic Kidney Disease <60  Kidney Failure <15      Vancomycin, Trough [[id number]]  (Abnormal) Collected: 06/04/22 1445    Specimen: Blood Updated: 06/04/22 1552     Vancomycin Trough 100.20 mcg/mL     Magnesium [[id number]]  (Abnormal) Collected: 06/04/22 1445    Specimen: Blood Updated: 06/04/22 1504     Magnesium 1.1 mg/dL     POC Glucose Once [[id number]]  (Normal) Collected: 06/04/22 1338    Specimen: Blood  Updated: 06/04/22 1349     Glucose 101 mg/dL      Comment: : [id number] Kodi Rodriguez ID: [id number]       Tissue / Bone Culture - Tissue, Foot, Left [[id number]]  (Abnormal)  (Susceptibility) Collected: 06/01/22 1811    Specimen: Tissue from Foot, Left Updated: 06/04/22 1147     Tissue Culture Scant growth (1+) Escherichia coli ESBL     Comment: Consider infectious disease consult.  Susceptibility results may not correlate to clinical outcomes.         Scant growth (1+) Proteus mirabilis     Gram Stain No WBCs seen      Few (2+) Gram positive cocci      Rare (1+) Gram negative bacilli    Susceptibility      Escherichia coli ESBL      ADE      Ertapenem Susceptible      Levofloxacin Susceptible      Meropenem Susceptible      Tetracycline Resistant     Trimethoprim + Sulfamethoxazole Susceptible                    Linear View               Susceptibility      Proteus mirabilis      ADE      Ampicillin Susceptible      Ampicillin + Sulbactam Susceptible      Cefepime Susceptible      Ceftazidime Susceptible      Ceftriaxone Susceptible      Gentamicin Susceptible      Levofloxacin Susceptible      Piperacillin + Tazobactam Susceptible      Tetracycline Resistant     Trimethoprim + Sulfamethoxazole Susceptible                    Linear View               Susceptibility Comments     Escherichia coli ESBL    Cefazolin sensitivity will not be reported for Enterobacteriaceae in non-urine isolates. If cefazolin is preferred, please call the microbiology lab to request an E-test.  With the exception of urinary-sourced infections, aminoglycosides should not be used as monotherapy.    Proteus mirabilis    Cefazolin sensitivity will not be reported for Enterobacteriaceae in non-urine isolates. If cefazolin is preferred, please call the microbiology lab to request an E-test.  With the exception of urinary-sourced infections, aminoglycosides should not be used as monotherapy.                 Imaging Results (Last 72  Hours)     ** No results found for the last 72 hours. **             ASSESSMENT/PLAN       Examination and evaluation of wound(s) was performed.    DIAGNOSIS:   Pressure injury of sacrum, stage 3  Pressure injury of coccyx, stage 3  Pressure injury of left buttock, stage 3  Diabetic ulcer of left heel with fat layer exposed  Diabetic ulcer of right heel with fat layer exposed  Pressure injury of right calf, stage 3  Pressure injury of left calf, stage 3  Pressure injury of right calf, is stage 3  Abscess of left thigh-surgically debrided  Type 2 diabetes mellitus with hyperglycemia, with long-term current use of insulin  Venous insufficiency of both lower extremities  Lymphedema of bilateral lower extremities  Obesity    PLAN:   Patient is continuing to refuse dressing changes, now requesting wound VAC to be removed from left thigh.  She continues to refuse wound VAC to the sacral area.  Multiple conversations with patient about increased risk of infection, increase in dressing changes, and typically decreasing comfort when not using wound VAC.  It appears patient is managing her own care as she sees fit despite encouragement from physicians, nursing staff, and family.    Highly suggest use of wound VAC to left thigh and sacral area.  If not continue wound VAC, suggest Opticell AG to all wounds.  Patient is receiving Merrem I.V.    Discussed findings and treatment plan including risks, benefits, and treatment options with patient and significant other in detail. Patient agreed with treatment plan.      This document has been electronically signed by SHOLA Velazco on 6/6/2022 10:34 CDT       Time spent in face-to-face evaluation, chart review, planning and education 40 minutes with greater than 50% of time spent with patient and/or family and in coordination of care.  Counseling of patient and/or family includes discussing wound diagnosis and etiology , talking about test results, discussing risks and  benefits, counseling on risk factor reduction, Importance of compliance with chosen management options and patient/family education.

## 2022-06-06 NOTE — THERAPY TREATMENT NOTE
Acute Care - Physical Therapy Treatment Note  [facility]     Patient Name: Valeria Wilson  : 1970  MRN: [id number]  Today's Date: 2022      Visit Dx:     ICD-10-CM ICD-9-CM   1. Abscess of left thigh  L02.416 682.6   2. Open wound  T14.8XXA 879.8   3. Anemia, unspecified type  D64.9 285.9   4. Pressure injury of sacral region, stage 3 ([facility])  L89.153 707.03     707.23   5. Pressure injury of right buttock, stage 3 ([facility])  L89.313 707.05     707.23   6. Pressure injury of left buttock, stage 3 ([facility])  L89.323 707.05     707.23   7. Pressure injury of coccygeal region, stage 3 ([facility])  L89.153 707.03     707.23   8. Pressure injury of right calf, stage 3 ([facility])  L89.893 707.09     707.23   9. Pressure injury of left calf, stage 3 ([facility])  L89.893 707.09     707.23   10. Diabetic ulcer of right heel associated with type 2 diabetes mellitus, with fat layer exposed ([facility])  E11.621 250.80    L97.412 707.14   11. Diabetic ulcer of left heel associated with type 2 diabetes mellitus, with fat layer exposed ([facility])  E11.621 250.80    L97.412 707.14   12. Cellulitis of left leg  L03.116 682.6     Patient Active Problem List   Diagnosis   • Hypertension   • Type 2 diabetes mellitus with hyperglycemia, without long-term current use of insulin ([facility])   • Obesity, morbid, BMI 50 or higher ([facility])   • Stage 3 chronic kidney disease   • Bacteremia due to Klebsiella pneumoniae   • Urinary tract infection   • Chronic pain syndrome   • Lymphedema of both lower extremities   • Bilateral lower leg cellulitis   • RLS (restless legs syndrome)   • Diabetic ulcer of left lower leg associated with type 2 diabetes mellitus, with fat layer exposed ([facility])   • Dyspnea   • MARCELINA (generalized anxiety disorder)   • Gastroesophageal reflux disease   • Headache   • Hypoglycemia   • Hypokalemia   • Migraine without aura and without status migrainosus, not intractable   • Mild single current episode of major depressive disorder ([facility])   • Morbid obesity with  BMI of 50.0-59.9, adult ([facility])   • Old complex tear of medial meniscus of left knee   • Pleurisy   • Polypharmacy   • Type 2 diabetes mellitus with hyperglycemia, with long-term current use of insulin ([facility])   • Venous insufficiency of both lower extremities   • Anemia   • Open wound   • Cellulitis of left leg     Past Medical History:   Diagnosis Date   • Anxiety    • Arthritis     Osteoarthritis primarily left knee    • Back pain    • Chronic pain syndrome 10/27/2021   • Depression    • Diabetes mellitus ([facility])    • Fibromyalgia, primary    • Hx of tear of meniscus of knee joint 11/21/2016   • Hypertension    • Joint pain    • Kidney stone    • Knee pain    • Lymphedema of both lower extremities 10/27/2021   • Migraine    • Migraine headache    • Pain     knee, left ankle, left ankle     • Pes anserinus bursitis 04/28/2015   • Restless leg      Past Surgical History:   Procedure Laterality Date   • CATARACT EXTRACTION     • CATARACT EXTRACTION     • CATARACT EXTRACTION     • CORNEAL TRANSPLANT      bilateral    • ECTOPIC PREGNANCY     • INCISION AND DRAINAGE ABSCESS Left 6/1/2022    Procedure: DEBRIDEMENTAND PULSE LAVAGE LT MEDIAL THIGH PACKING ODOFORM GAUZE  WOUND SACRAL AND BILATERAL CALVES;  Surgeon: Justin Perez MD;  Location: [facility];  Service: General;  Laterality: Left;   • JOINT REPLACEMENT     • KNEE ARTHROSCOPY W/ MENISCAL REPAIR     • KNEE MENISCAL REPAIR     • MENISCECTOMY Left 12/20/2016   • TUBAL ABDOMINAL LIGATION       PT Assessment (last 12 hours)     PT Evaluation and Treatment     Row Name 06/06/22 1355 06/06/22 0844       Physical Therapy Time and Intention    Subjective Information complains of;pain  -AH --    Document Type wound care  -AH --    Mode of Treatment physical therapy  - physical therapy  -    Session Not Performed -- other (see comments)  -    Comment, Session Not Performed -- pt wants to talk to MD about d/c of W/V, pt thinks she would rather have dressing changes 2x  day vs w/v 3x a wk, will wait till pt has spoke with MD DOBSON    Comment changed canister and dressing  -AH --    Row Name 06/06/22 1355          General Information    Existing Precautions/Restrictions --  [place]  -AH     Row Name 06/06/22 1355          Pain    Pretreatment Pain Rating 6/10  -     Posttreatment Pain Rating 6/10  -     Pain Location - Side/Orientation Left  -AH     Pain Location lower  -AH     Pain Location - extremity  -AH     Pain Intervention(s) Medication (See MAR);Repositioned  -AH     Row Name             Wound 11/03/21 1619 Left posterior calf    Wound - Properties Group Placement Date: 11/03/21  -CT Placement Time: 1619  -CT Side: Left  -CT Orientation: posterior  -CT Location: calf  -CT     Retired Wound - Properties Group Placement Date: 11/03/21  -CT Placement Time: 1619  -CT Side: Left  -CT Orientation: posterior  -CT Location: calf  -CT     Retired Wound - Properties Group Date first assessed: 11/03/21  -CT Time first assessed: 1619  -CT Side: Left  -CT Location: calf  -CT     Row Name             Wound 11/03/21 1626 Right posterior calf    Wound - Properties Group Placement Date: 11/03/21  -CT Placement Time: 1626  -CT Side: Right  -CT Orientation: posterior  -CT Location: calf  -CT     Retired Wound - Properties Group Placement Date: 11/03/21  -CT Placement Time: 1626  -CT Side: Right  -CT Orientation: posterior  -CT Location: calf  -CT     Retired Wound - Properties Group Date first assessed: 11/03/21  -CT Time first assessed: 1626  -CT Side: Right  -CT Location: calf  -CT     Row Name             Wound 06/01/22 1522 lower thoracic spine    Wound - Properties Group Placement Date: 06/01/22  -LR Placement Time: 1522  -LR Orientation: lower  -LR Location: thoracic spine  -LR     Retired Wound - Properties Group Placement Date: 06/01/22  -LR Placement Time: 1522  -LR Orientation: lower  -LR Location: thoracic spine  -LR     Retired Wound - Properties Group Date first assessed: 06/01/22   -LR Time first assessed: 1522  -LR Location: thoracic spine  -LR     Row Name             Wound 06/01/22 1523 Left heel    Wound - Properties Group Placement Date: 06/01/22  -LR Placement Time: 1523  -LR Side: Left  -LR Location: heel  -LR     Retired Wound - Properties Group Placement Date: 06/01/22  -LR Placement Time: 1523  -LR Side: Left  -LR Location: heel  -LR     Retired Wound - Properties Group Date first assessed: 06/01/22  -LR Time first assessed: 1523  -LR Side: Left  -LR Location: heel  -LR     Row Name             Wound 06/01/22 1658 sacral spine    Wound - Properties Group Placement Date: 06/01/22  -SL Placement Time: 1658  -SL Present on Hospital Admission: Y  -SL Location: sacral spine  -SL Primary Wound Type: --  -SL     Retired Wound - Properties Group Placement Date: 06/01/22  -SL Placement Time: 1658  -SL Present on Hospital Admission: Y  -SL Location: sacral spine  -SL Primary Wound Type: --  -SL     Retired Wound - Properties Group Date first assessed: 06/01/22  -SL Time first assessed: 1658  -SL Present on Hospital Admission: Y  -SL Location: sacral spine  -SL Primary Wound Type: --  -SL     Row Name             Wound 06/01/22 1658 Right heel    Wound - Properties Group Placement Date: 06/01/22  -SL Placement Time: 1658  -SL Present on Hospital Admission: Y  -SL Side: Right  -SL Location: heel  -SL     Retired Wound - Properties Group Placement Date: 06/01/22  -SL Placement Time: 1658  -SL Present on Hospital Admission: Y  -SL Side: Right  -SL Location: heel  -SL     Retired Wound - Properties Group Date first assessed: 06/01/22  -SL Time first assessed: 1658  -SL Present on Hospital Admission: Y  -SL Side: Right  -SL Location: heel  -SL     Row Name 06/06/22 1355          Wound 06/01/22 1847 Left anterior thigh    Wound - Properties Group Placement Date: 06/01/22  -WG Placement Time: 1847  -WG, done in OR  Side: Left  -WG Orientation: anterior  -WG Location: thigh  -WG     Wound Image View  All Images View Images  -     Dressing Appearance intact  -     Wound Length (cm) 10 cm  -     Wound Width (cm) 4 cm  -     Wound Depth (cm) 4.5 cm  -     Wound Surface Area (cm^2) 40 cm^2  -     Wound Volume (cm^3) 180 cm^3  -AH     Care, Wound negative pressure wound therapy  -     Dressing Care dressing changed  -     Periwound Care barrier film applied  -     Wound Output (mL) 100   450 total, canister changed  -     Retired Wound - Properties Group Placement Date: 06/01/22  - Placement Time: 1847 -WG, done in OR  Side: Left  -WG Orientation: anterior  -WG Location: thigh  -WG     Retired Wound - Properties Group Date first assessed: 06/01/22  - Time first assessed: 1847 -WG, done in OR  Side: Left  -WG Location: thigh  -WG     Row Name 06/06/22 1355          NPWT (Negative Pressure Wound Therapy) 06/02/22 1000 L medial thigh    NPWT (Negative Pressure Wound Therapy) - Properties Group Placement Date: 06/02/22  - Placement Time: 1000  -LH Location: L medial thigh  -LH     Therapy Setting continuous therapy  -     Dressing foam, black  -     Pressure Setting 125 mmHg  -AH     Sponges Inserted 1  -AH     Sponges Removed 1  -     Finger sweep complete Yes  -     Retired NPWT (Negative Pressure Wound Therapy) - Properties Group Placement Date: 06/02/22  - Placement Time: 1000  -LH Location: L medial thigh  -LH     Retired NPWT (Negative Pressure Wound Therapy) - Properties Group Placement Date: 06/02/22  - Placement Time: 1000  -LH Location: L medial thigh  -LH     Row Name 06/06/22 1869          Plan of Care Review    Plan of Care Reviewed With patient  -     Progress no change  -     Outcome Evaluation pt tolerated wound vac dressing change to left thigh, 100ml drainage overnight, canister changed, picture taken, will cont to check daily, change MWF or PRN  -     Row Name 06/06/22 7321          Positioning and Restraints    Pre-Treatment Position in bed  -     Post  Treatment Position bed  -     In Bed fowlers;call light within reach;encouraged to call for assist  -           User Key  (r) = Recorded By, (t) = Taken By, (c) = Cosigned By    Initials Name Provider Type     Lien Cardenas, SUSHILA Physical Therapist Assistant     Jean Butcher, PT Physical Therapist    Debbie Rojas, RN Registered Nurse    Lisa Acevedo, RN Registered Nurse    Courtney Spaulding, RN Registered Nurse    Angy Morris RN Registered Nurse                Physical Therapy Education                 Title: PT OT SLP Therapies (Done)     Topic: Physical Therapy (Done)     Point: Precautions (Done)     Learning Progress Summary           Patient Acceptance, E,TB, VU by  at 6/6/2022 1446    Comment: BENEFITS OF WOUND VAC    Acceptance, E,D, VU by  at 6/2/2022 0927    Comment: wound vac benefits, plan for checks and changes   Family Acceptance, E,D, VU by  at 6/2/2022 0927    Comment: wound vac benefits, plan for checks and changes                               User Key     Initials Effective Dates Name Provider Type [facility] 06/16/21 -  Lien Cardenas PTA Physical Therapist Assistant PT     06/16/21 -  Jean Butcher, PT Physical Therapist PT              PT Recommendation and Plan     Plan of Care Reviewed With: patient  Progress: no change  Outcome Evaluation: pt tolerated wound vac dressing change to left thigh, 100ml drainage overnight, canister changed, picture taken, will cont to check daily, change MWF or PRN       Time Calculation:    PT Charges     Row Name 06/06/22 1449             Time Calculation    Start Time 1355  -      Stop Time 1448  -      Time Calculation (min) 53 min  -      PT Received On 06/06/22  -            User Key  (r) = Recorded By, (t) = Taken By, (c) = Cosigned By    Initials Name Provider Type     Lien Cardenas PTA Physical Therapist Assistant              Therapy Charges for Today     Code Description Service Date Service  Provider Modifiers Qty    [id number]  PT NEG PRESS WOUND TO 50SQCM DME4 6/6/2022 Lien Cardenas, PTA  1               Lien Cardenas, PTA  6/6/2022

## 2022-06-07 LAB
GLUCOSE BLDC GLUCOMTR-MCNC: 139 MG/DL (ref 70–130)
GLUCOSE BLDC GLUCOMTR-MCNC: 140 MG/DL (ref 70–130)
GLUCOSE BLDC GLUCOMTR-MCNC: 150 MG/DL (ref 70–130)
GLUCOSE BLDC GLUCOMTR-MCNC: 176 MG/DL (ref 70–130)

## 2022-06-07 PROCEDURE — 25010000002 ONDANSETRON PER 1 MG: Performed by: SPECIALIST

## 2022-06-07 PROCEDURE — 82962 GLUCOSE BLOOD TEST: CPT

## 2022-06-07 PROCEDURE — 25010000002 MEROPENEM PER 100 MG: Performed by: INTERNAL MEDICINE

## 2022-06-07 PROCEDURE — 25010000002 NA FERRIC GLUC CPLX PER 12.5 MG: Performed by: INTERNAL MEDICINE

## 2022-06-07 PROCEDURE — 0 HYDROMORPHONE 1 MG/ML SOLUTION: Performed by: SPECIALIST

## 2022-06-07 PROCEDURE — 63710000001 INSULIN DETEMIR PER 5 UNITS: Performed by: INTERNAL MEDICINE

## 2022-06-07 PROCEDURE — 99024 POSTOP FOLLOW-UP VISIT: CPT | Performed by: SPECIALIST

## 2022-06-07 PROCEDURE — 25010000002 KETOROLAC TROMETHAMINE PER 15 MG: Performed by: SPECIALIST

## 2022-06-07 RX ORDER — CETIRIZINE HYDROCHLORIDE 10 MG/1
10 TABLET ORAL ONCE
Status: COMPLETED | OUTPATIENT
Start: 2022-06-07 | End: 2022-06-07

## 2022-06-07 RX ORDER — LORAZEPAM 2 MG/ML
0.25 INJECTION INTRAMUSCULAR EVERY 6 HOURS PRN
Status: DISCONTINUED | OUTPATIENT
Start: 2022-06-07 | End: 2022-06-08

## 2022-06-07 RX ADMIN — CYCLOBENZAPRINE 10 MG: 10 TABLET, FILM COATED ORAL at 04:43

## 2022-06-07 RX ADMIN — Medication 10 ML: at 21:43

## 2022-06-07 RX ADMIN — DAKIN'S SOLUTION 0.125% (QUARTER STRENGTH): 0.12 SOLUTION at 21:48

## 2022-06-07 RX ADMIN — GABAPENTIN 600 MG: 300 CAPSULE ORAL at 15:15

## 2022-06-07 RX ADMIN — DULOXETINE HYDROCHLORIDE 60 MG: 30 CAPSULE, DELAYED RELEASE ORAL at 08:10

## 2022-06-07 RX ADMIN — MEROPENEM 2 G: 1 INJECTION, POWDER, FOR SOLUTION INTRAVENOUS at 03:58

## 2022-06-07 RX ADMIN — SUCRALFATE 1 G: 1 TABLET ORAL at 08:10

## 2022-06-07 RX ADMIN — PRAMIPEXOLE DIHYDROCHLORIDE 0.75 MG: 0.25 TABLET ORAL at 08:10

## 2022-06-07 RX ADMIN — OXYCODONE HYDROCHLORIDE AND ACETAMINOPHEN 1 TABLET: 7.5; 325 TABLET ORAL at 05:31

## 2022-06-07 RX ADMIN — SODIUM CHLORIDE 100 ML/HR: 9 INJECTION, SOLUTION INTRAVENOUS at 03:58

## 2022-06-07 RX ADMIN — PANTOPRAZOLE SODIUM 40 MG: 40 TABLET, DELAYED RELEASE ORAL at 05:31

## 2022-06-07 RX ADMIN — KETOROLAC TROMETHAMINE 30 MG: 30 INJECTION, SOLUTION INTRAMUSCULAR; INTRAVENOUS at 18:39

## 2022-06-07 RX ADMIN — GABAPENTIN 600 MG: 300 CAPSULE ORAL at 08:10

## 2022-06-07 RX ADMIN — OXYCODONE HYDROCHLORIDE AND ACETAMINOPHEN 1 TABLET: 7.5; 325 TABLET ORAL at 13:03

## 2022-06-07 RX ADMIN — DULOXETINE HYDROCHLORIDE 60 MG: 30 CAPSULE, DELAYED RELEASE ORAL at 21:42

## 2022-06-07 RX ADMIN — HYDROMORPHONE HYDROCHLORIDE 0.25 MG: 1 INJECTION, SOLUTION INTRAMUSCULAR; INTRAVENOUS; SUBCUTANEOUS at 14:00

## 2022-06-07 RX ADMIN — MORPHINE SULFATE 15 MG: 15 TABLET ORAL at 21:53

## 2022-06-07 RX ADMIN — KETOROLAC TROMETHAMINE 30 MG: 30 INJECTION, SOLUTION INTRAMUSCULAR; INTRAVENOUS at 05:32

## 2022-06-07 RX ADMIN — ONDANSETRON 8 MG: 2 INJECTION INTRAMUSCULAR; INTRAVENOUS at 19:23

## 2022-06-07 RX ADMIN — OXYCODONE HYDROCHLORIDE AND ACETAMINOPHEN 1 TABLET: 7.5; 325 TABLET ORAL at 23:18

## 2022-06-07 RX ADMIN — GABAPENTIN 600 MG: 300 CAPSULE ORAL at 21:42

## 2022-06-07 RX ADMIN — INSULIN DETEMIR 20 UNITS: 100 INJECTION, SOLUTION SUBCUTANEOUS at 21:56

## 2022-06-07 RX ADMIN — CETIRIZINE HYDROCHLORIDE 10 MG: 10 TABLET ORAL at 11:48

## 2022-06-07 RX ADMIN — BUSPIRONE HYDROCHLORIDE 10 MG: 10 TABLET ORAL at 08:10

## 2022-06-07 RX ADMIN — BUSPIRONE HYDROCHLORIDE 10 MG: 10 TABLET ORAL at 15:15

## 2022-06-07 RX ADMIN — BUSPIRONE HYDROCHLORIDE 10 MG: 10 TABLET ORAL at 21:43

## 2022-06-07 RX ADMIN — SODIUM CHLORIDE 125 MG: 9 INJECTION, SOLUTION INTRAVENOUS at 11:49

## 2022-06-07 RX ADMIN — MEROPENEM 2 G: 1 INJECTION, POWDER, FOR SOLUTION INTRAVENOUS at 21:43

## 2022-06-07 RX ADMIN — PRAMIPEXOLE DIHYDROCHLORIDE 0.75 MG: 0.25 TABLET ORAL at 21:42

## 2022-06-07 RX ADMIN — KETOROLAC TROMETHAMINE 30 MG: 30 INJECTION, SOLUTION INTRAMUSCULAR; INTRAVENOUS at 11:49

## 2022-06-07 RX ADMIN — MORPHINE SULFATE 15 MG: 15 TABLET ORAL at 08:10

## 2022-06-07 RX ADMIN — MIRTAZAPINE 15 MG: 15 TABLET, FILM COATED ORAL at 21:42

## 2022-06-07 RX ADMIN — MEROPENEM 2 G: 1 INJECTION, POWDER, FOR SOLUTION INTRAVENOUS at 13:03

## 2022-06-07 NOTE — PLAN OF CARE
Goal Outcome Evaluation:              Outcome Evaluation: Basic diabetic diet teaching completed w/ pt; encouraged carb controlled intake.  Available for nutritional guidance as needed.  Will follow per protocol.

## 2022-06-07 NOTE — PROGRESS NOTES
Sleeping.  Resumed home oral narcotics and greatly decreased elective narcotics.  Hopefully, she will be able to be discharged soon.

## 2022-06-07 NOTE — PLAN OF CARE
Goal Outcome Evaluation:  Plan of Care Reviewed With: patient        Progress: no change  Outcome Evaluation: RN called to notify that wound vac alarming, pt c/o itching and had been picking at dressing, was able to reinforce edges to regain seal, educated pt to NOT touch the wound vac dressing!!!!

## 2022-06-07 NOTE — PROGRESS NOTES
[facility] Medicine Services  INPATIENT PROGRESS NOTE    Patient Name: Valeria Wilson  Date of Admission: 6/1/2022  Today's Date: 06/07/22  Length of Stay: 6  Primary Care Physician: No primary care provider on file.    Subjective   Chief Complaint: Itching at wound vac site  HPI   Patient reports that she is having a lot of itching at the site where the wound VAC has been placed on the left anterior thigh.  Nursing also reports the patient has been very somnolent and lethargic at times, particularly after receiving pain medication.  We discussed her recent blood sugar trend.  We discussed her iron profile.  She inquires as to when she will be ready for discharge home.  We also discussed the importance of transitioning off of IV pain medication, especially with plan for potential discharge home in the near future.    Review of Systems     All pertinent negatives and positives are as above. All other systems have been reviewed and are negative unless otherwise stated.     Objective    Temp:  [97.8 °F (36.6 °C)-98.9 °F (37.2 °C)] 98.2 °F (36.8 °C)  Heart Rate:  [76-92] 81  Resp:  [16-18] 18  BP: (125-148)/(54-77) 147/77  Physical Exam  Vitals reviewed.   HENT:      Head: Normocephalic.      Mouth/Throat:      Pharynx: No oropharyngeal exudate.   Eyes:      Pupils: Pupils are equal, round, and reactive to light.   Pulmonary:      Effort: Pulmonary effort is normal. No respiratory distress.      Comments: On room air  Abdominal:      Comments: obese   Musculoskeletal:      Cervical back: Neck supple.      Comments: Dressings in place to bilateral lower extremities; wound vac to left anterior thigh; surrounding skin without significant erythema   Neurological:      General: No focal deficit present.      Mental Status: She is alert.   Psychiatric:         Mood and Affect: Mood normal.         Results Review:  I have reviewed the labs, radiology results, and diagnostic  studies.    Laboratory Data:   Results from last 7 days   Lab Units 06/05/22  0913 06/02/22  0553 06/01/22  0837   WBC 10*3/mm3 6.57 7.66 8.53   HEMOGLOBIN g/dL 8.1* 8.9* 10.6*   HEMATOCRIT % 27.4* 28.6* 34.4   PLATELETS 10*3/mm3 147 182 232        Results from last 7 days   Lab Units 06/05/22  0859 06/04/22  1616 06/02/22  0321 06/01/22  0837   SODIUM mmol/L 138  --  136 131*   POTASSIUM mmol/L 4.3  --  3.0* 3.3*   CHLORIDE mmol/L 103  --  97* 91*   CO2 mmol/L 27.0  --  29.0 28.0   BUN mg/dL 7  --  15 19   CREATININE mg/dL 0.79 0.73 1.05* 1.44*   CALCIUM mg/dL 6.7*  --  7.2* 7.9*   BILIRUBIN mg/dL 0.4  --  0.3 0.3   ALK PHOS U/L 84  --  76 97   ALT (SGPT) U/L 9  --  13 19   AST (SGOT) U/L 14  --  14 17   GLUCOSE mg/dL 132*  --  205* 400*       Culture Data:   Blood Culture   Date Value Ref Range Status   06/01/2022 No growth at 5 days  Final   06/01/2022 No growth at 4 days  Preliminary     Wound Culture   Date Value Ref Range Status   06/01/2022 Moderate growth (3+) Serratia marcescens (A)  Final   06/01/2022 Moderate growth (3+) Proteus mirabilis (A)  Final       Radiology Data:   Imaging Results (Last 24 Hours)     ** No results found for the last 24 hours. **          I have reviewed the patient's current medications.     Assessment/Plan     Active Hospital Problems    Diagnosis    • Cellulitis of left leg    • Open wound    • Anemia    • Chronic pain syndrome    • Lymphedema of both lower extremities    • Polypharmacy    • Diabetic ulcer of left lower leg associated with type 2 diabetes mellitus, with fat layer exposed ([facility])    • Obesity, morbid, BMI 50 or higher ([facility])    • Stage 3 chronic kidney disease    • Hypertension    • Gastroesophageal reflux disease    • Type 2 diabetes mellitus with hyperglycemia, with long-term current use of insulin ([facility])    • Venous insufficiency of both lower extremities    • RLS (restless legs syndrome)        Plan:  1.  Hemoglobin A1c 11.  Blood sugar trend reviewed; stable  2.   Continue insulin Levemir 20 units subcu nightly  3.  Sliding scale insulin coverage with insulin lispro  4.  Currently on IV antibiotics with Merrem -recently added for ESBL E. coli found on wound culture  5.  Wound care  6.  Hgb 8.1  MCV 82.  Iron profile reviewed.  Will dose with Ferric gluconate today  7.  Neurontin 600mg TID (patient reports dose of Neurontin was recently titrated about 2 weeks ago by her pain management specialist).  Patient also on MS Contin in addition to Percocet.  8.  Mg replacement yesterday; repeat Mg level pending  9.  I'm okay with dc'ing IVFs; patient tolerating PO without problem  10.  Patient will need a new primary care provider on discharge, she reports that due to noncompliance her previous primary care provider asked her to establish with someone else.      Electronically signed by Paul Herrera MD, 06/07/22, 11:08 CDT.

## 2022-06-07 NOTE — PLAN OF CARE
Goal Outcome Evaluation:      Pt getting scheduled Toradol and Oxycodone PO for pain control. Has required 1 dose of IV Hydromorphone for breakthrough pain. Saline locked fluids today. Pt needs encouragement with activity. Dressing changed to sacral decubitus wound. Refused dressings to bilateral calfs and heel. IV iron administered today. Continuing to monitor glucose levels. Wound vac remains in place to L thigh.

## 2022-06-08 LAB
ANION GAP SERPL CALCULATED.3IONS-SCNC: 10 MMOL/L (ref 5–15)
BUN SERPL-MCNC: 9 MG/DL (ref 6–20)
BUN/CREAT SERPL: 11.7 (ref 7–25)
CALCIUM SPEC-SCNC: 7.3 MG/DL (ref 8.6–10.5)
CHLORIDE SERPL-SCNC: 105 MMOL/L (ref 98–107)
CO2 SERPL-SCNC: 25 MMOL/L (ref 22–29)
CREAT SERPL-MCNC: 0.77 MG/DL (ref 0.57–1)
DEPRECATED RDW RBC AUTO: 49.9 FL (ref 37–54)
EGFRCR SERPLBLD CKD-EPI 2021: 94.7 ML/MIN/1.73
ERYTHROCYTE [DISTWIDTH] IN BLOOD BY AUTOMATED COUNT: 16.8 % (ref 12.3–15.4)
GLUCOSE BLDC GLUCOMTR-MCNC: 155 MG/DL (ref 70–130)
GLUCOSE BLDC GLUCOMTR-MCNC: 77 MG/DL (ref 70–130)
GLUCOSE BLDC GLUCOMTR-MCNC: 82 MG/DL (ref 70–130)
GLUCOSE SERPL-MCNC: 93 MG/DL (ref 65–99)
HCT VFR BLD AUTO: 28.7 % (ref 34–46.6)
HGB BLD-MCNC: 8.8 G/DL (ref 12–15.9)
MAGNESIUM SERPL-MCNC: 1.4 MG/DL (ref 1.6–2.6)
MCH RBC QN AUTO: 24.9 PG (ref 26.6–33)
MCHC RBC AUTO-ENTMCNC: 30.7 G/DL (ref 31.5–35.7)
MCV RBC AUTO: 81.3 FL (ref 79–97)
PLATELET # BLD AUTO: 196 10*3/MM3 (ref 140–450)
PMV BLD AUTO: 9.2 FL (ref 6–12)
POTASSIUM SERPL-SCNC: 3.8 MMOL/L (ref 3.5–5.2)
RBC # BLD AUTO: 3.53 10*6/MM3 (ref 3.77–5.28)
SODIUM SERPL-SCNC: 140 MMOL/L (ref 136–145)
WBC NRBC COR # BLD: 3.89 10*3/MM3 (ref 3.4–10.8)

## 2022-06-08 PROCEDURE — 97605 NEG PRS WND THER DME<=50SQCM: CPT

## 2022-06-08 PROCEDURE — 85027 COMPLETE CBC AUTOMATED: CPT | Performed by: INTERNAL MEDICINE

## 2022-06-08 PROCEDURE — 63710000001 INSULIN DETEMIR PER 5 UNITS: Performed by: INTERNAL MEDICINE

## 2022-06-08 PROCEDURE — 80048 BASIC METABOLIC PNL TOTAL CA: CPT | Performed by: INTERNAL MEDICINE

## 2022-06-08 PROCEDURE — 25010000002 KETOROLAC TROMETHAMINE PER 15 MG: Performed by: SPECIALIST

## 2022-06-08 PROCEDURE — 25010000002 ENOXAPARIN PER 10 MG: Performed by: SPECIALIST

## 2022-06-08 PROCEDURE — 83735 ASSAY OF MAGNESIUM: CPT | Performed by: INTERNAL MEDICINE

## 2022-06-08 PROCEDURE — 82962 GLUCOSE BLOOD TEST: CPT

## 2022-06-08 PROCEDURE — 99024 POSTOP FOLLOW-UP VISIT: CPT | Performed by: SPECIALIST

## 2022-06-08 PROCEDURE — 25010000002 MEROPENEM PER 100 MG: Performed by: INTERNAL MEDICINE

## 2022-06-08 RX ORDER — OXYCODONE AND ACETAMINOPHEN 7.5; 325 MG/1; MG/1
1 TABLET ORAL EVERY 8 HOURS PRN
Status: DISCONTINUED | OUTPATIENT
Start: 2022-06-08 | End: 2022-06-10 | Stop reason: HOSPADM

## 2022-06-08 RX ORDER — FERROUS SULFATE 325(65) MG
325 TABLET ORAL
Status: DISCONTINUED | OUTPATIENT
Start: 2022-06-09 | End: 2022-06-10 | Stop reason: HOSPADM

## 2022-06-08 RX ADMIN — OXYCODONE HYDROCHLORIDE AND ACETAMINOPHEN 1 TABLET: 7.5; 325 TABLET ORAL at 22:43

## 2022-06-08 RX ADMIN — KETOROLAC TROMETHAMINE 30 MG: 30 INJECTION, SOLUTION INTRAMUSCULAR; INTRAVENOUS at 06:10

## 2022-06-08 RX ADMIN — KETOROLAC TROMETHAMINE 30 MG: 30 INJECTION, SOLUTION INTRAMUSCULAR; INTRAVENOUS at 13:01

## 2022-06-08 RX ADMIN — OXYCODONE HYDROCHLORIDE AND ACETAMINOPHEN 1 TABLET: 7.5; 325 TABLET ORAL at 13:24

## 2022-06-08 RX ADMIN — OXYCODONE HYDROCHLORIDE AND ACETAMINOPHEN 1 TABLET: 7.5; 325 TABLET ORAL at 06:10

## 2022-06-08 RX ADMIN — GABAPENTIN 600 MG: 300 CAPSULE ORAL at 21:24

## 2022-06-08 RX ADMIN — GABAPENTIN 600 MG: 300 CAPSULE ORAL at 15:16

## 2022-06-08 RX ADMIN — BUSPIRONE HYDROCHLORIDE 10 MG: 10 TABLET ORAL at 15:16

## 2022-06-08 RX ADMIN — DAKIN'S SOLUTION 0.125% (QUARTER STRENGTH): 0.12 SOLUTION at 09:06

## 2022-06-08 RX ADMIN — DULOXETINE HYDROCHLORIDE 60 MG: 30 CAPSULE, DELAYED RELEASE ORAL at 21:24

## 2022-06-08 RX ADMIN — MORPHINE SULFATE 15 MG: 15 TABLET ORAL at 21:24

## 2022-06-08 RX ADMIN — PRAMIPEXOLE DIHYDROCHLORIDE 0.75 MG: 0.25 TABLET ORAL at 21:24

## 2022-06-08 RX ADMIN — MEROPENEM 2 G: 1 INJECTION, POWDER, FOR SOLUTION INTRAVENOUS at 11:44

## 2022-06-08 RX ADMIN — INSULIN DETEMIR 20 UNITS: 100 INJECTION, SOLUTION SUBCUTANEOUS at 21:33

## 2022-06-08 RX ADMIN — Medication 10 ML: at 09:06

## 2022-06-08 RX ADMIN — PANTOPRAZOLE SODIUM 40 MG: 40 TABLET, DELAYED RELEASE ORAL at 06:10

## 2022-06-08 RX ADMIN — KETOROLAC TROMETHAMINE 30 MG: 30 INJECTION, SOLUTION INTRAMUSCULAR; INTRAVENOUS at 00:15

## 2022-06-08 RX ADMIN — Medication 10 ML: at 21:25

## 2022-06-08 RX ADMIN — MEROPENEM 2 G: 1 INJECTION, POWDER, FOR SOLUTION INTRAVENOUS at 21:23

## 2022-06-08 RX ADMIN — MEROPENEM 2 G: 1 INJECTION, POWDER, FOR SOLUTION INTRAVENOUS at 05:40

## 2022-06-08 RX ADMIN — MIRTAZAPINE 15 MG: 15 TABLET, FILM COATED ORAL at 21:24

## 2022-06-08 RX ADMIN — BUSPIRONE HYDROCHLORIDE 10 MG: 10 TABLET ORAL at 21:24

## 2022-06-08 RX ADMIN — KETOROLAC TROMETHAMINE 30 MG: 30 INJECTION, SOLUTION INTRAMUSCULAR; INTRAVENOUS at 18:12

## 2022-06-08 NOTE — PROGRESS NOTES
[facility] Medicine Services  INPATIENT PROGRESS NOTE    Patient Name: Valeria Wilson  Date of Admission: 6/1/2022  Today's Date: 06/08/22  Length of Stay: 7  Primary Care Physician: No primary care provider on file.    Subjective   Chief Complaint: Pain  Leg Swelling       Patient worries about pain control-this has been a frequent issue.  We have discussed the importance of getting off of IV pain medication, especially with plans for transitioning the outpatient setting in the near future.  She reports that her significant other will assist in taking care of her.  She has just been up to the bathroom and utilizes her cane to assist her.  She is currently sitting up on the side of the bed.  She is emotional today, stating \"I was not prepared for all of this.\"    Review of Systems     All pertinent negatives and positives are as above. All other systems have been reviewed and are negative unless otherwise stated.     Objective    Temp:  [97.6 °F (36.4 °C)-98.7 °F (37.1 °C)] 98.7 °F (37.1 °C)  Heart Rate:  [59-88] 79  Resp:  [16-18] 16  BP: (121-146)/(61-79) 133/68  Physical Exam  Vitals reviewed.   Constitutional:       Comments: Sitting up on side of bed   HENT:      Head: Normocephalic.      Mouth/Throat:      Pharynx: No oropharyngeal exudate.   Eyes:      Pupils: Pupils are equal, round, and reactive to light.   Pulmonary:      Effort: Pulmonary effort is normal. No respiratory distress.      Comments: On room air  Abdominal:      Comments: obese   Musculoskeletal:      Cervical back: Neck supple.      Comments: Dressings in place to bilateral lower extremities; wound vac to left anterior thigh; wound in midline of lower back also assessed; currently packed; no surrounding erythema and no purulence   Neurological:      General: No focal deficit present.      Mental Status: She is alert.   Psychiatric:         Mood and Affect: Mood normal.      Comments: A little anxious and  tearful         Results Review:  I have reviewed the labs, radiology results, and diagnostic studies.    Laboratory Data:   Results from last 7 days   Lab Units 06/08/22  0645 06/05/22  0913 06/02/22  0553   WBC 10*3/mm3 3.89 6.57 7.66   HEMOGLOBIN g/dL 8.8* 8.1* 8.9*   HEMATOCRIT % 28.7* 27.4* 28.6*   PLATELETS 10*3/mm3 196 147 182        Results from last 7 days   Lab Units 06/08/22  0645 06/05/22  0859 06/04/22  1616 06/02/22  0321   SODIUM mmol/L 140 138  --  136   POTASSIUM mmol/L 3.8 4.3  --  3.0*   CHLORIDE mmol/L 105 103  --  97*   CO2 mmol/L 25.0 27.0  --  29.0   BUN mg/dL 9 7  --  15   CREATININE mg/dL 0.77 0.79 0.73 1.05*   CALCIUM mg/dL 7.3* 6.7*  --  7.2*   BILIRUBIN mg/dL  --  0.4  --  0.3   ALK PHOS U/L  --  84  --  76   ALT (SGPT) U/L  --  9  --  13   AST (SGOT) U/L  --  14  --  14   GLUCOSE mg/dL 93 132*  --  205*       Culture Data:   Blood Culture   Date Value Ref Range Status   06/01/2022 No growth at 5 days  Final   06/01/2022 No growth at 4 days  Preliminary     Wound Culture   Date Value Ref Range Status   06/01/2022 Moderate growth (3+) Serratia marcescens (A)  Final   06/01/2022 Moderate growth (3+) Proteus mirabilis (A)  Final       Radiology Data:   Imaging Results (Last 24 Hours)     ** No results found for the last 24 hours. **          I have reviewed the patient's current medications.     Assessment/Plan     Active Hospital Problems    Diagnosis    • Cellulitis of left leg    • Open wound    • Anemia    • Chronic pain syndrome    • Lymphedema of both lower extremities    • Polypharmacy    • Diabetic ulcer of left lower leg associated with type 2 diabetes mellitus, with fat layer exposed ([facility])    • Obesity, morbid, BMI 50 or higher ([facility])    • Stage 3 chronic kidney disease    • Hypertension    • Gastroesophageal reflux disease    • Type 2 diabetes mellitus with hyperglycemia, with long-term current use of insulin ([facility])    • Venous insufficiency of both lower extremities    • RLS  (restless legs syndrome)        Plan:  1.  Hemoglobin A1c 11.  Blood sugar trend reviewed; stable    We discussed again today the importance of good blood sugar control specifically for the purposes of augmenting good wound healing.  2.  Continue insulin Levemir 20 units subcu nightly  3.  Sliding scale insulin coverage with insulin lispro  4.  Currently on IV antibiotics with Merrem -recently added for ESBL E. coli found on wound culture  5.  Wound care  6.  Did provide dose of ferric gluconate and will transition to once daily ferrous sulfate  7.  Neurontin 600mg TID (patient reports dose of Neurontin was recently titrated about 2 weeks ago by her pain management specialist).  Patient also on MS Contin in addition to Percocet.  IV Dilaudid has been discontinued  8.  Repeat Mg level pending  9.  Patient will need a new primary care provider on discharge, she reports that due to noncompliance her previous primary care provider asked her to establish with someone else.        Electronically signed by Paul Herrera MD, 06/08/22, 13:17 CDT.

## 2022-06-08 NOTE — CASE MANAGEMENT/SOCIAL WORK
Continued Stay Note  [facility]HEL[facility]     Patient Name: Valeria Wilson  MRN: [id number]  Today's Date: 6/8/2022    Admit Date: 6/1/2022     Discharge Plan     Row Name 06/08/22 1202       Plan    Plan Home with possible home health    Plan Comments Wound vac is approved and can be delivered to pt's room on day of d/c. She is requesting [facility] for wound vac dressing changes. Will follow.               Discharge Codes    No documentation.               Expected Discharge Date and Time     Expected Discharge Date Expected Discharge Time    Jun 8, 2022             REBECCA Ruiz

## 2022-06-08 NOTE — PLAN OF CARE
Goal Outcome Evaluation:  Plan of Care Reviewed With: patient        Progress: improving  Outcome Evaluation: Pt rated pain 10/10 with dressing removal and screamed out\"I can't do this anymore\" Pt refused to have wound vac re-applied and wishes to discontinue. RN gave additional pain meds and PTA returned after 30 min to remove dressing and wet-dry dressing was applied.

## 2022-06-08 NOTE — PROGRESS NOTES
Sleeping again.    Per nursing staff, she had only one dose of iv dilaudid yesterday, but did request iv benadryl.  Plan to d/c dilaudid and ativan.  Her oral morphine is scheduled as at home, but her percocet will be made prn.  toradol will continue will admitted.  She is tolerating a regular diet including family bringing in fast food.

## 2022-06-08 NOTE — THERAPY TREATMENT NOTE
Acute Care - Physical Therapy Treatment Note  [facility]     Patient Name: Valeria Wilson  : 1970  MRN: [id number]  Today's Date: 2022      Visit Dx:     ICD-10-CM ICD-9-CM   1. Abscess of left thigh  L02.416 682.6   2. Open wound  T14.8XXA 879.8   3. Anemia, unspecified type  D64.9 285.9   4. Pressure injury of sacral region, stage 3 ([facility])  L89.153 707.03     707.23   5. Pressure injury of right buttock, stage 3 ([facility])  L89.313 707.05     707.23   6. Pressure injury of left buttock, stage 3 ([facility])  L89.323 707.05     707.23   7. Pressure injury of coccygeal region, stage 3 ([facility])  L89.153 707.03     707.23   8. Pressure injury of right calf, stage 3 ([facility])  L89.893 707.09     707.23   9. Pressure injury of left calf, stage 3 ([facility])  L89.893 707.09     707.23   10. Diabetic ulcer of right heel associated with type 2 diabetes mellitus, with fat layer exposed ([facility])  E11.621 250.80    L97.412 707.14   11. Diabetic ulcer of left heel associated with type 2 diabetes mellitus, with fat layer exposed ([facility])  E11.621 250.80    L97.412 707.14   12. Cellulitis of left leg  L03.116 682.6     Patient Active Problem List   Diagnosis   • Hypertension   • Type 2 diabetes mellitus with hyperglycemia, without long-term current use of insulin ([facility])   • Obesity, morbid, BMI 50 or higher ([facility])   • Stage 3 chronic kidney disease   • Bacteremia due to Klebsiella pneumoniae   • Urinary tract infection   • Chronic pain syndrome   • Lymphedema of both lower extremities   • Bilateral lower leg cellulitis   • RLS (restless legs syndrome)   • Diabetic ulcer of left lower leg associated with type 2 diabetes mellitus, with fat layer exposed ([facility])   • Dyspnea   • MARCELINA (generalized anxiety disorder)   • Gastroesophageal reflux disease   • Headache   • Hypoglycemia   • Hypokalemia   • Migraine without aura and without status migrainosus, not intractable   • Mild single current episode of major depressive disorder ([facility])   • Morbid obesity with  BMI of 50.0-59.9, adult ([facility])   • Old complex tear of medial meniscus of left knee   • Pleurisy   • Polypharmacy   • Type 2 diabetes mellitus with hyperglycemia, with long-term current use of insulin ([facility])   • Venous insufficiency of both lower extremities   • Anemia   • Open wound   • Cellulitis of left leg     Past Medical History:   Diagnosis Date   • Anxiety    • Arthritis     Osteoarthritis primarily left knee    • Back pain    • Chronic pain syndrome 10/27/2021   • Depression    • Diabetes mellitus ([facility])    • Fibromyalgia, primary    • Hx of tear of meniscus of knee joint 11/21/2016   • Hypertension    • Joint pain    • Kidney stone    • Knee pain    • Lymphedema of both lower extremities 10/27/2021   • Migraine    • Migraine headache    • Pain     knee, left ankle, left ankle     • Pes anserinus bursitis 04/28/2015   • Restless leg      Past Surgical History:   Procedure Laterality Date   • CATARACT EXTRACTION     • CATARACT EXTRACTION     • CATARACT EXTRACTION     • CORNEAL TRANSPLANT      bilateral    • ECTOPIC PREGNANCY     • INCISION AND DRAINAGE ABSCESS Left 6/1/2022    Procedure: DEBRIDEMENTAND PULSE LAVAGE LT MEDIAL THIGH PACKING ODOFORM GAUZE  WOUND SACRAL AND BILATERAL CALVES;  Surgeon: Justin Perez MD;  Location: [facility] OR;  Service: General;  Laterality: Left;   • JOINT REPLACEMENT     • KNEE ARTHROSCOPY W/ MENISCAL REPAIR     • KNEE MENISCAL REPAIR     • MENISCECTOMY Left 12/20/2016   • TUBAL ABDOMINAL LIGATION       PT Assessment (last 12 hours)     PT Evaluation and Treatment     Row Name             Wound 11/03/21 1619 Left posterior calf    Wound - Properties Group Placement Date: 11/03/21  -CT Placement Time: 1619  -CT Side: Left  -CT Orientation: posterior  -CT Location: calf  -CT     Retired Wound - Properties Group Placement Date: 11/03/21  -CT Placement Time: 1619  -CT Side: Left  -CT Orientation: posterior  -CT Location: calf  -CT     Retired Wound - Properties Group Date first  assessed: 11/03/21  -CT Time first assessed: 1619  -CT Side: Left  -CT Location: calf  -CT     Row Name             Wound 11/03/21 1626 Right posterior calf    Wound - Properties Group Placement Date: 11/03/21  -CT Placement Time: 1626  -CT Side: Right  -CT Orientation: posterior  -CT Location: calf  -CT     Retired Wound - Properties Group Placement Date: 11/03/21  -CT Placement Time: 1626  -CT Side: Right  -CT Orientation: posterior  -CT Location: calf  -CT     Retired Wound - Properties Group Date first assessed: 11/03/21  -CT Time first assessed: 1626  -CT Side: Right  -CT Location: calf  -CT     Row Name             Wound 06/01/22 1522 lower thoracic spine    Wound - Properties Group Placement Date: 06/01/22  -LR Placement Time: 1522  -LR Orientation: lower  -LR Location: thoracic spine  -LR     Retired Wound - Properties Group Placement Date: 06/01/22  -LR Placement Time: 1522  -LR Orientation: lower  -LR Location: thoracic spine  -LR     Retired Wound - Properties Group Date first assessed: 06/01/22  -LR Time first assessed: 1522  -LR Location: thoracic spine  -LR     Row Name             Wound 06/01/22 1523 Left heel    Wound - Properties Group Placement Date: 06/01/22  -LR Placement Time: 1523  -LR Side: Left  -LR Location: heel  -LR     Retired Wound - Properties Group Placement Date: 06/01/22  -LR Placement Time: 1523  -LR Side: Left  -LR Location: heel  -LR     Retired Wound - Properties Group Date first assessed: 06/01/22  -LR Time first assessed: 1523  -LR Side: Left  -LR Location: heel  -LR     Row Name             Wound 06/01/22 1658 sacral spine    Wound - Properties Group Placement Date: 06/01/22  -SL Placement Time: 1658  -SL Present on Hospital Admission: Y  -SL Location: sacral spine  -SL Primary Wound Type: --  -SL     Retired Wound - Properties Group Placement Date: 06/01/22  -SL Placement Time: 1658  -SL Present on Hospital Admission: Y  -SL Location: sacral spine  -SL Primary Wound Type: --   -SL     Retired Wound - Properties Group Date first assessed: 06/01/22  -SL Time first assessed: 1658  -SL Present on Hospital Admission: Y  -SL Location: sacral spine  -SL Primary Wound Type: --  -SL     Row Name             Wound 06/01/22 1658 Right heel    Wound - Properties Group Placement Date: 06/01/22  -SL Placement Time: 1658  -SL Present on Hospital Admission: Y  -SL Side: Right  -SL Location: heel  -SL     Retired Wound - Properties Group Placement Date: 06/01/22  -SL Placement Time: 1658  -SL Present on Hospital Admission: Y  -SL Side: Right  -SL Location: heel  -SL     Retired Wound - Properties Group Date first assessed: 06/01/22  -SL Time first assessed: 1658  -SL Present on Hospital Admission: Y  -SL Side: Right  -SL Location: heel  -SL     Row Name             Wound 06/01/22 1847 Left anterior thigh    Wound - Properties Group Placement Date: 06/01/22  -WG Placement Time: 1847  -WG, done in OR  Side: Left  -WG Orientation: anterior  -WG Location: thigh  -WG     Retired Wound - Properties Group Placement Date: 06/01/22  -WG Placement Time: 1847  -WG, done in OR  Side: Left  -WG Orientation: anterior  -WG Location: thigh  -WG     Retired Wound - Properties Group Date first assessed: 06/01/22  -WG Time first assessed: 1847  -WG, done in OR  Side: Left  -WG Location: thigh  -WG     Row Name             NPWT (Negative Pressure Wound Therapy) 06/02/22 1000 L medial thigh    NPWT (Negative Pressure Wound Therapy) - Properties Group Placement Date: 06/02/22  -LH Placement Time: 1000  -LH Location: L medial thigh  -LH     Retired NPWT (Negative Pressure Wound Therapy) - Properties Group Placement Date: 06/02/22  -LH Placement Time: 1000  -LH Location: L medial thigh  -LH     Retired NPWT (Negative Pressure Wound Therapy) - Properties Group Placement Date: 06/02/22  -LH Placement Time: 1000  -LH Location: L medial thigh  -LH           User Key  (r) = Recorded By, (t) = Taken By, (c) = Cosigned By    Initials  Name Provider Type     Jean Butcher, PT Physical Therapist    WG Debbie Bird, RN Registered Nurse    Lisa Acevedo, RN Registered Nurse    Courtney Spaulding, RN Registered Nurse    Angy Morris, RN Registered Nurse                Physical Therapy Education                 Title: PT OT SLP Therapies (Done)     Topic: Physical Therapy (Done)     Point: Precautions (Done)     Learning Progress Summary           Patient Acceptance, E,TB, VU by  at 6/7/2022 1110    Comment: DO NOT TOUCH WOUND VAC DRESSING    Acceptance, E,TB, VU by  at 6/6/2022 1446    Comment: BENEFITS OF WOUND VAC    Acceptance, E,D, VU by  at 6/2/2022 0927    Comment: wound vac benefits, plan for checks and changes   Family Acceptance, E,D, VU by  at 6/2/2022 0927    Comment: wound vac benefits, plan for checks and changes                               User Key     Initials Effective Dates Name Provider Type [facility] 06/16/21 -  Lien Cardenas PTA Physical Therapist Assistant PT     06/16/21 -  Jean Butcher, PT Physical Therapist PT              PT Recommendation and Plan     Plan of Care Reviewed With: patient  Progress: improving  Outcome Evaluation: Pt rated pain 10/10 with dressing removal and screamed out\"I can't do this anymore\" Pt refused to have wound vac re-applied and wishes to discontinue. RN gave additional pain meds and PTA returned after 30 min to remove dressing and wet-dry dressing was applied.       Time Calculation:    PT Charges     Row Name 06/08/22 1453             Time Calculation    Start Time 1409  -AE      Stop Time 1450  -AE      Time Calculation (min) 41 min  -AE      PT Received On 06/08/22  -AE      PT Goal Re-Cert Due Date 06/12/22  -AE              Time Calculation- PT    Total Timed Code Minutes- PT 41 minute(s)  -AE            User Key  (r) = Recorded By, (t) = Taken By, (c) = Cosigned By    Initials Name Provider Type    AE Terrie Tian PTA Physical Therapist Assistant               Therapy Charges for Today     Code Description Service Date Service Provider Modifiers Qty    [id number] HC PT NEG PRESS WOUND TO 50SQCM DME3 6/8/2022 Terrie Tian, PTA GP 1               Terrie Tian, PTA  6/8/2022

## 2022-06-09 ENCOUNTER — HOME HEALTH ADMISSION (OUTPATIENT)
Dept: HOME HEALTH SERVICES | Facility: HOME HEALTHCARE | Age: 52
End: 2022-06-09

## 2022-06-09 VITALS
DIASTOLIC BLOOD PRESSURE: 58 MMHG | HEART RATE: 82 BPM | BODY MASS INDEX: 55.32 KG/M2 | SYSTOLIC BLOOD PRESSURE: 134 MMHG | OXYGEN SATURATION: 92 % | WEIGHT: 293 LBS | RESPIRATION RATE: 16 BRPM | TEMPERATURE: 98.9 F | HEIGHT: 61 IN

## 2022-06-09 LAB
GLUCOSE BLDC GLUCOMTR-MCNC: 114 MG/DL (ref 70–130)
GLUCOSE BLDC GLUCOMTR-MCNC: 120 MG/DL (ref 70–130)
GLUCOSE BLDC GLUCOMTR-MCNC: 64 MG/DL (ref 70–130)

## 2022-06-09 PROCEDURE — 99238 HOSP IP/OBS DSCHRG MGMT 30/<: CPT | Performed by: SPECIALIST

## 2022-06-09 PROCEDURE — 99024 POSTOP FOLLOW-UP VISIT: CPT | Performed by: SPECIALIST

## 2022-06-09 PROCEDURE — 82962 GLUCOSE BLOOD TEST: CPT

## 2022-06-09 PROCEDURE — 25010000002 MEROPENEM PER 100 MG: Performed by: INTERNAL MEDICINE

## 2022-06-09 PROCEDURE — 25010000002 KETOROLAC TROMETHAMINE PER 15 MG: Performed by: SPECIALIST

## 2022-06-09 PROCEDURE — 25010000002 ONDANSETRON PER 1 MG: Performed by: SPECIALIST

## 2022-06-09 RX ORDER — OXYCODONE AND ACETAMINOPHEN 7.5; 325 MG/1; MG/1
1 TABLET ORAL EVERY 8 HOURS PRN
Qty: 30 TABLET | Refills: 0 | Status: SHIPPED | OUTPATIENT
Start: 2022-06-09

## 2022-06-09 RX ADMIN — Medication 10 ML: at 08:26

## 2022-06-09 RX ADMIN — BUSPIRONE HYDROCHLORIDE 10 MG: 10 TABLET ORAL at 08:25

## 2022-06-09 RX ADMIN — ONDANSETRON 8 MG: 2 INJECTION INTRAMUSCULAR; INTRAVENOUS at 08:05

## 2022-06-09 RX ADMIN — PANTOPRAZOLE SODIUM 40 MG: 40 TABLET, DELAYED RELEASE ORAL at 06:45

## 2022-06-09 RX ADMIN — DULOXETINE HYDROCHLORIDE 60 MG: 30 CAPSULE, DELAYED RELEASE ORAL at 08:25

## 2022-06-09 RX ADMIN — DAKIN'S SOLUTION 0.125% (QUARTER STRENGTH): 0.12 SOLUTION at 08:26

## 2022-06-09 RX ADMIN — GABAPENTIN 600 MG: 300 CAPSULE ORAL at 08:24

## 2022-06-09 RX ADMIN — FLUTICASONE PROPIONATE 1 SPRAY: 50 SPRAY, METERED NASAL at 08:24

## 2022-06-09 RX ADMIN — KETOROLAC TROMETHAMINE 30 MG: 30 INJECTION, SOLUTION INTRAMUSCULAR; INTRAVENOUS at 11:56

## 2022-06-09 RX ADMIN — KETOROLAC TROMETHAMINE 30 MG: 30 INJECTION, SOLUTION INTRAMUSCULAR; INTRAVENOUS at 06:44

## 2022-06-09 RX ADMIN — GABAPENTIN 600 MG: 300 CAPSULE ORAL at 16:38

## 2022-06-09 RX ADMIN — GABAPENTIN 600 MG: 300 CAPSULE ORAL at 22:40

## 2022-06-09 RX ADMIN — MORPHINE SULFATE 15 MG: 15 TABLET ORAL at 08:24

## 2022-06-09 RX ADMIN — OXYCODONE HYDROCHLORIDE AND ACETAMINOPHEN 1 TABLET: 7.5; 325 TABLET ORAL at 06:44

## 2022-06-09 RX ADMIN — KETOROLAC TROMETHAMINE 30 MG: 30 INJECTION, SOLUTION INTRAMUSCULAR; INTRAVENOUS at 00:24

## 2022-06-09 RX ADMIN — MORPHINE SULFATE 15 MG: 15 TABLET ORAL at 22:40

## 2022-06-09 RX ADMIN — PRAMIPEXOLE DIHYDROCHLORIDE 0.75 MG: 0.25 TABLET ORAL at 22:40

## 2022-06-09 RX ADMIN — BUSPIRONE HYDROCHLORIDE 10 MG: 10 TABLET ORAL at 16:38

## 2022-06-09 RX ADMIN — BUSPIRONE HYDROCHLORIDE 10 MG: 10 TABLET ORAL at 22:40

## 2022-06-09 RX ADMIN — KETOROLAC TROMETHAMINE 30 MG: 30 INJECTION, SOLUTION INTRAMUSCULAR; INTRAVENOUS at 18:28

## 2022-06-09 RX ADMIN — PRAMIPEXOLE DIHYDROCHLORIDE 0.75 MG: 0.25 TABLET ORAL at 08:25

## 2022-06-09 RX ADMIN — SUCRALFATE 1 G: 1 TABLET ORAL at 22:40

## 2022-06-09 RX ADMIN — DULOXETINE HYDROCHLORIDE 60 MG: 30 CAPSULE, DELAYED RELEASE ORAL at 22:40

## 2022-06-09 NOTE — PLAN OF CARE
Goal Outcome Evaluation:      Continuing IV antibiotic-Merrem. Pt receiving scheduled Toradol and PO oxycodone and Morphine for pain control. Needs encouragement with activity. Wound vac removed this afternoon. Pt refused dressing changes today. Continuing to monitor blood glucose levels.

## 2022-06-09 NOTE — THERAPY DISCHARGE NOTE
Acute Care - Physical Therapy Discharge Summary  [facility]       Patient Name: Valeria Wilson  : 1970  MRN: [id number]    Today's Date: 2022                 Admit Date: 2022      PT Recommendation and Plan    Visit Dx:    ICD-10-CM ICD-9-CM   1. Abscess of left thigh  L02.416 682.6   2. Open wound  T14.8XXA 879.8   3. Anemia, unspecified type  D64.9 285.9   4. Pressure injury of sacral region, stage 3 ([facility])  L89.153 707.03     707.23   5. Pressure injury of right buttock, stage 3 ([facility])  L89.313 707.05     707.23   6. Pressure injury of left buttock, stage 3 ([facility])  L89.323 707.05     707.23   7. Pressure injury of coccygeal region, stage 3 ([facility])  L89.153 707.03     707.23   8. Pressure injury of right calf, stage 3 ([facility])  L89.893 707.09     707.23   9. Pressure injury of left calf, stage 3 ([facility])  L89.893 707.09     707.23   10. Diabetic ulcer of right heel associated with type 2 diabetes mellitus, with fat layer exposed ([facility])  E11.621 250.80    L97.412 707.14   11. Diabetic ulcer of left heel associated with type 2 diabetes mellitus, with fat layer exposed ([facility])  E11.621 250.80    L97.412 707.14   12. Cellulitis of left leg  L03.116 682.6                PT Rehab Goals     Row Name 22 0937             Wound Care Goal 1 (PT)    Wound Care Goal 1 (PT) L medial thigh wound decrease in size to 8cm x 3cm x 3cm  -      Time Frame (Wound Care Goal 1, PT) 10 days  -      Progress/Outcome (Wound Care Goal 1, PT) goal not met  -            User Key  (r) = Recorded By, (t) = Taken By, (c) = Cosigned By    Initials Name Provider Type Discipline    Ivonne Garcia, PTA Physical Therapist Assistant PT                    PT Discharge Summary  Reason for Discharge: Patient/Caregiver request  Outcomes Achieved: Unable to make functional progress toward goals at this time      Ivonne Turpin PTA   2022

## 2022-06-09 NOTE — CASE MANAGEMENT/SOCIAL WORK
Continued Stay Note   [facility]     Patient Name: Valeria Wilson  MRN: [id number]  Today's Date: 6/9/2022    Admit Date: 6/1/2022     Discharge Plan     Row Name 06/09/22 1611       Plan    Plan Home with [facility]    Final Discharge Disposition Code 06 - home with home health care    Final Note Pt has refused the wound vac so will cancel the [facility] order. Pt has orders for home health and had [facility] in the past. Spoke with Radha with [facility] [phone] and inboxing the referral. They do have an opening for tomorrow.               Discharge Codes    No documentation.               Expected Discharge Date and Time     Expected Discharge Date Expected Discharge Time    Jun 9, 2022             REBECCA Ruiz

## 2022-06-09 NOTE — PAYOR COMM NOTE
Zuly Carpenter (51 y.o. Female) [id number]    [facility] phone    Fax                Date of Birth   1970    Social Security Number       Address   [address] [place] [address] B Located within [facility] [address]    Home Phone   [phone]    MRN   [id number]       Sabianist   [facility]    Marital Status   Single                            Admission Date   6/1/22    Admission Type   Emergency    Admitting Provider   Justin Perez MD    Attending Provider   Justin Perez MD    Department, Room/Bed   [facility] 3C, 378/1       Discharge Date       Discharge Disposition       Discharge Destination                               Attending Provider: Justin Perez MD    Allergies: Compazine [Prochlorperazine Edisylate], Meperidine, Norflex [Orphenadrine], Nortriptyline, Prochlorperazine, Prochlorperazine Maleate, Vancomycin, Codeine, Penicillins, Calamine, Amoxicillin-pot Clavulanate, Avelox [Moxifloxacin Hcl], Cefazolin, Cephalexin, Ciprofloxacin, Clindamycin/lincomycin, Doxycycline, Hydroxyzine Hcl, Moxifloxacin, Orphenadrine Citrate, Sulfamethoxazole-trimethoprim, Tobramycin, Vistaril [Hydroxyzine]    Isolation: Contact   Infection: ESBL E coli (06/04/22)   Code Status: CPR   Advance Care Planning Activity    Ht: 154.9 cm (61\")   Wt: 141 kg (311 lb)    Admission Cmt: None   Principal Problem: None                Active Insurance as of 6/1/2022     Primary Coverage     Payor Plan Insurance Group Employer/Plan Group    [place]      Payor Plan Address Payor Plan Phone Number Payor Plan Fax Number Effective Dates    [address] [address] [phone]  10/1/2016 - None Entered    [facility] [id number]       Subscriber Name Subscriber Birth Date Member ID       ZULY CARPENTER 1970 [id number]                 Emergency Contacts      (Rel.) Home Phone Work Phone Mobile Phone    Betty Abebe (Mother) [phone] -- --    MARLINE  WINNIE (Significant Other) -- -- [phone]              Current Facility-Administered Medications   Medication Dose Route Frequency Provider Last Rate Last Admin   • albuterol (PROVENTIL) nebulizer solution 0.5% 2.5 mg/0.5mL  2.5 mg Nebulization Q6H PRN Justin Perez MD       • busPIRone (BUSPAR) tablet 10 mg  10 mg Oral TID Lucas Tatum MD   10 mg at 06/09/22 0825   • cyclobenzaprine (FLEXERIL) tablet 10 mg  10 mg Oral BID PRN Marianela Neri MD   10 mg at 06/07/22 0443   • dextrose (D50W) (25 g/50 mL) IV injection 25 g  25 g Intravenous Q15 Min PRN Justin Perez MD       • dextrose (GLUTOSE) oral gel 15 g  15 g Oral Q15 Min PRN Justin Perez MD       • DULoxetine (CYMBALTA) DR capsule 60 mg  60 mg Oral BID Lucas Tatum MD   60 mg at 06/09/22 0825   • Enoxaparin Sodium (LOVENOX) syringe 30 mg  30 mg Subcutaneous Q12H Justin Perez MD       • ferrous sulfate tablet 325 mg  325 mg Oral Daily With Breakfast Paul Herrera MD       • fluticasone (FLONASE) 50 MCG/ACT nasal spray 1 spray  1 spray Nasal Daily Lucas Tatum MD   1 spray at 06/09/22 0824   • gabapentin (NEURONTIN) capsule 600 mg  600 mg Oral TID Lucas Tatum MD   600 mg at 06/09/22 0824   • glucagon (human recombinant) (GLUCAGEN DIAGNOSTIC) injection 1 mg  1 mg Intramuscular Q15 Min PRN Justin Perez MD       • hydrocortisone-bacitracin-zinc oxide-nystatin (MAGIC BARRIER) ointment 1 application  1 application Topical PRN Paul Herrera MD       • insulin detemir (LEVEMIR) injection 20 Units  20 Units Subcutaneous Nightly Lucas Tatum MD   20 Units at 06/08/22 2133   • Insulin Lispro (humaLOG) injection 0-14 Units  0-14 Units Subcutaneous TID AC Lucas Tatum MD       • ketorolac (TORADOL) injection 30 mg  30 mg Intravenous Q6H Marianela Neri MD   30 mg at 06/09/22 1156   • meropenem (MERREM) 2 g in sodium chloride 0.9 % 100 mL IVPB  2 g Intravenous Q8H Lucas Tatum MD    2 g at 06/08/22 2123   • mirtazapine (REMERON) tablet 15 mg  15 mg Oral Nightly Lucas Tatum MD   15 mg at 06/08/22 2124   • Morphine (MSIR) tablet 15 mg  15 mg Oral BID Marianela Neri MD   15 mg at 06/09/22 0824   • ondansetron (ZOFRAN) 8 mg/50 mL NS IVPB  8 mg Intravenous Q6H PRN Justin Perez MD   8 mg at 06/09/22 0805   • oxyCODONE-acetaminophen (PERCOCET) 7.5-325 MG per tablet 1 tablet  1 tablet Oral Q8H PRN Marianela Neri MD   1 tablet at 06/09/22 0644   • pantoprazole (PROTONIX) EC tablet 40 mg  40 mg Oral QAM Lucas Tatum MD   40 mg at 06/09/22 0645   • Pharmacy to Dose meropenem (MERREM)   Does not apply Continuous PRN Lucas Tatum MD       • potassium chloride (MICRO-K) CR capsule 40 mEq  40 mEq Oral Daily Loki Mchugh MD   40 mEq at 06/06/22 0822   • pramipexole (MIRAPEX) tablet 0.75 mg  0.75 mg Oral BID Lucas Tatum MD   0.75 mg at 06/09/22 0825   • sodium chloride 0.9 % flush 1-10 mL  1-10 mL Intravenous PRN Justin Perez MD       • sodium chloride 0.9 % flush 10 mL  10 mL Intravenous Q12H Justin Perez MD   10 mL at 06/09/22 0826   • sodium hypochlorite (DAKIN'S 1/4 STRENGTH) 0.125 % topical solution 0.125% solution   Topical BID Justin Perez MD   Given at 06/09/22 0826   • sucralfate (CARAFATE) tablet 1 g  1 g Oral 4x Daily AC & at Bedtime Justin Perez MD   1 g at 06/07/22 0810        Physician Progress Notes (last 48 hours)      Paul Herrera MD at 06/08/22 1317              [facility] Medicine Services  INPATIENT PROGRESS NOTE    Patient Name: Valeria Wilson  Date of Admission: 6/1/2022  Today's Date: 06/08/22  Length of Stay: 7  Primary Care Physician: No primary care provider on file.    Subjective   Chief Complaint: Pain  Leg Swelling       Patient worries about pain control-this has been a frequent issue.  We have discussed the importance of getting off of IV pain medication, especially  with plans for transitioning the outpatient setting in the near future.  She reports that her significant other will assist in taking care of her.  She has just been up to the bathroom and utilizes her cane to assist her.  She is currently sitting up on the side of the bed.  She is emotional today, stating \"I was not prepared for all of this.\"    Review of Systems     All pertinent negatives and positives are as above. All other systems have been reviewed and are negative unless otherwise stated.     Objective    Temp:  [97.6 °F (36.4 °C)-98.7 °F (37.1 °C)] 98.7 °F (37.1 °C)  Heart Rate:  [59-88] 79  Resp:  [16-18] 16  BP: (121-146)/(61-79) 133/68  Physical Exam  Vitals reviewed.   Constitutional:       Comments: Sitting up on side of bed   HENT:      Head: Normocephalic.      Mouth/Throat:      Pharynx: No oropharyngeal exudate.   Eyes:      Pupils: Pupils are equal, round, and reactive to light.   Pulmonary:      Effort: Pulmonary effort is normal. No respiratory distress.      Comments: On room air  Abdominal:      Comments: obese   Musculoskeletal:      Cervical back: Neck supple.      Comments: Dressings in place to bilateral lower extremities; wound vac to left anterior thigh; wound in midline of lower back also assessed; currently packed; no surrounding erythema and no purulence   Neurological:      General: No focal deficit present.      Mental Status: She is alert.   Psychiatric:         Mood and Affect: Mood normal.      Comments: A little anxious and tearful         Results Review:  I have reviewed the labs, radiology results, and diagnostic studies.    Laboratory Data:   Results from last 7 days   Lab Units 06/08/22  0645 06/05/22  0913 06/02/22  0553   WBC 10*3/mm3 3.89 6.57 7.66   HEMOGLOBIN g/dL 8.8* 8.1* 8.9*   HEMATOCRIT % 28.7* 27.4* 28.6*   PLATELETS 10*3/mm3 196 147 182        Results from last 7 days   Lab Units 06/08/22  0645 06/05/22  0859 06/04/22  1616 06/02/22  0321   SODIUM mmol/L 140 138   --  136   POTASSIUM mmol/L 3.8 4.3  --  3.0*   CHLORIDE mmol/L 105 103  --  97*   CO2 mmol/L 25.0 27.0  --  29.0   BUN mg/dL 9 7  --  15   CREATININE mg/dL 0.77 0.79 0.73 1.05*   CALCIUM mg/dL 7.3* 6.7*  --  7.2*   BILIRUBIN mg/dL  --  0.4  --  0.3   ALK PHOS U/L  --  84  --  76   ALT (SGPT) U/L  --  9  --  13   AST (SGOT) U/L  --  14  --  14   GLUCOSE mg/dL 93 132*  --  205*       Culture Data:   Blood Culture   Date Value Ref Range Status   06/01/2022 No growth at 5 days  Final   06/01/2022 No growth at 4 days  Preliminary     Wound Culture   Date Value Ref Range Status   06/01/2022 Moderate growth (3+) Serratia marcescens (A)  Final   06/01/2022 Moderate growth (3+) Proteus mirabilis (A)  Final       Radiology Data:   Imaging Results (Last 24 Hours)     ** No results found for the last 24 hours. **          I have reviewed the patient's current medications.     Assessment/Plan     Active Hospital Problems    Diagnosis    • Cellulitis of left leg    • Open wound    • Anemia    • Chronic pain syndrome    • Lymphedema of both lower extremities    • Polypharmacy    • Diabetic ulcer of left lower leg associated with type 2 diabetes mellitus, with fat layer exposed ([facility])    • Obesity, morbid, BMI 50 or higher ([facility])    • Stage 3 chronic kidney disease    • Hypertension    • Gastroesophageal reflux disease    • Type 2 diabetes mellitus with hyperglycemia, with long-term current use of insulin ([facility])    • Venous insufficiency of both lower extremities    • RLS (restless legs syndrome)        Plan:  1.  Hemoglobin A1c 11.  Blood sugar trend reviewed; stable    We discussed again today the importance of good blood sugar control specifically for the purposes of augmenting good wound healing.  2.  Continue insulin Levemir 20 units subcu nightly  3.  Sliding scale insulin coverage with insulin lispro  4.  Currently on IV antibiotics with Merrem -recently added for ESBL E. coli found on wound culture  5.  Wound care  6.  Did  provide dose of ferric gluconate and will transition to once daily ferrous sulfate  7.  Neurontin 600mg TID (patient reports dose of Neurontin was recently titrated about 2 weeks ago by her pain management specialist).  Patient also on MS Contin in addition to Percocet.  IV Dilaudid has been discontinued  8.  Repeat Mg level pending  9.  Patient will need a new primary care provider on discharge, she reports that due to noncompliance her previous primary care provider asked her to establish with someone else.        Electronically signed by Paul Herrera MD, 06/08/22, 13:17 CDT.    Electronically signed by Paul Herrera MD at 06/08/22 1323     Marianela Neri MD at 06/08/22 0833        Sleeping again.    Per nursing staff, she had only one dose of iv dilaudid yesterday, but did request iv benadryl.  Plan to d/c dilaudid and ativan.  Her oral morphine is scheduled as at home, but her percocet will be made prn.  toradol will continue will admitted.  She is tolerating a regular diet including family bringing in fast food.      Electronically signed by Marianela Neri MD at 06/08/22 0809

## 2022-06-09 NOTE — PROGRESS NOTES
[facility] Medicine Services  INPATIENT PROGRESS NOTE    Patient Name: Valeria Wilson  Date of Admission: 6/1/2022  Today's Date: 06/09/22  Length of Stay: 8  Primary Care Physician: No primary care provider on file.    Subjective   Chief Complaint: diabetes management   HPI     Patient was seen and examined at bedside.  Patient is very somnolent when I enter the room.  The nurse indicates that she just received her pain medicine a little bit earlier.  Patient was very difficult to arouse.   The patient was very upset earlier that she could not get more pain medicine , but the nurse indicated to her that she was not awake enough to be able to receive the medication at that time.  She has slept since then.  Patient was also found with some of her home medications in the bed with her.    Discussed with the patient that compliance with her diet and taking her insulin after discharge will provide adequate control.  We have not been giving her near as much insulin as is listed on her home medication list.  Patient reports noncompliance with her diet as well as noncompliance with her insulin.    Throughout the patient's hospital stay, there has been numerous times when she has been seen eating fast food as well as her hospital meals.    Review of Systems   Constitutional: Negative for activity change.   Respiratory: Negative for shortness of breath.    Cardiovascular: Negative for palpitations.   Gastrointestinal: Negative for abdominal distention and diarrhea.   Skin: Positive for wound.        All pertinent negatives and positives are as above. All other systems have been reviewed and are negative unless otherwise stated.     Objective    Temp:  [98.1 °F (36.7 °C)-98.8 °F (37.1 °C)] 98.7 °F (37.1 °C)  Heart Rate:  [79-87] 87  Resp:  [16] 16  BP: (143-159)/(67-78) 151/70  Physical Exam  Vitals and nursing note reviewed.   Constitutional:       Appearance: She is obese.   HENT:       Head: Normocephalic and atraumatic.      Nose: No congestion or rhinorrhea.      Mouth/Throat:      Mouth: Mucous membranes are dry.      Pharynx: Oropharynx is clear.   Eyes:      General: No scleral icterus.     Conjunctiva/sclera: Conjunctivae normal.   Cardiovascular:      Rate and Rhythm: Normal rate and regular rhythm.      Heart sounds: No murmur heard.  Pulmonary:      Effort: Pulmonary effort is normal. No respiratory distress.      Breath sounds: Normal breath sounds. No stridor.   Abdominal:      General: Abdomen is flat. Bowel sounds are normal.      Palpations: Abdomen is soft.   Musculoskeletal:      Right lower leg: Edema present.      Left lower leg: Edema present.   Skin:     General: Skin is warm and dry.      Coloration: Skin is not jaundiced or pale.   Neurological:      General: No focal deficit present.      Mental Status: She is alert and oriented to person, place, and time.   Psychiatric:         Mood and Affect: Mood normal.         Behavior: Behavior normal.      Comments: Crying, agitated             Results Review:  I have reviewed the labs, radiology results, and diagnostic studies.    Laboratory Data:   Results from last 7 days   Lab Units 06/08/22  0645 06/05/22  0913   WBC 10*3/mm3 3.89 6.57   HEMOGLOBIN g/dL 8.8* 8.1*   HEMATOCRIT % 28.7* 27.4*   PLATELETS 10*3/mm3 196 147        Results from last 7 days   Lab Units 06/08/22  0645 06/05/22  0859 06/04/22  1616   SODIUM mmol/L 140 138  --    POTASSIUM mmol/L 3.8 4.3  --    CHLORIDE mmol/L 105 103  --    CO2 mmol/L 25.0 27.0  --    BUN mg/dL 9 7  --    CREATININE mg/dL 0.77 0.79 0.73   CALCIUM mg/dL 7.3* 6.7*  --    BILIRUBIN mg/dL  --  0.4  --    ALK PHOS U/L  --  84  --    ALT (SGPT) U/L  --  9  --    AST (SGOT) U/L  --  14  --    GLUCOSE mg/dL 93 132*  --        Culture Data:   Blood Culture   Date Value Ref Range Status   06/01/2022 No growth at 2 days  Preliminary   06/01/2022 No growth at 2 days  Preliminary     Wound Culture    Date Value Ref Range Status   06/01/2022 Moderate growth (3+) Gram Negative Bacilli (A)  Preliminary       Radiology Data:   Imaging Results (Last 24 Hours)     ** No results found for the last 24 hours. **          I have reviewed the patient's current medications.     Assessment/Plan     Active Hospital Problems    Diagnosis    • Cellulitis of left leg    • Open wound    • Anemia    • Chronic pain syndrome    • Lymphedema of both lower extremities    • Polypharmacy    • Diabetic ulcer of left lower leg associated with type 2 diabetes mellitus, with fat layer exposed ([facility])    • Obesity, morbid, BMI 50 or higher ([facility])    • Stage 3 chronic kidney disease    • Hypertension    • Gastroesophageal reflux disease    • Type 2 diabetes mellitus with hyperglycemia, with long-term current use of insulin ([facility])    • Venous insufficiency of both lower extremities    • RLS (restless legs syndrome)        Plan:  Patient was admitted on 6/1 by Dr. Sanderson.  The patient was admitted for numerous wounds for debridement.  Patient had a cleaning excision and debridement of left thigh abscess, left heel decubitus, left posterior Calf decubitus, right posterior calf decubitus, right buttock, left buttock, pilonidal region, and a sacral decubitus.    He performed extensive debridement and irrigation of these wounds.  Wound VAC is recommended, but she is declining the wound VAC on the sacral region.    Antibiotics per general surgery.  Recommended infectious disease consultation previously.    Recommend patient discuss her high-doses of opiates with pain management physician.  I do have concern of pain medication usage at home as she is on percocet, morphine, gabapentin.  All of these medications combined place the patient at risk for opiate use disorder as well as increased risk of falls.  During her hospitalization despite somnolence she would continue to ask for more pain medication.    We were consulted for management of her  diabetes.  Hemoglobin A1c has noted to be greater than 11.  Patient reportedly takes glargine 45 units at night and Victoza, and she does take some lispro insulin, but was unsure of the amount.  Patient admits to not being compliant with diet and insulin.  Blood sugars adequately controlled.    Counseled repeatedly on diet compliance and compliance with insulin.  We are utilizing quite a bit less insulin than she is supposed to use with adequate control.    Incentive spirometer    Increase activity    Will sign-off.  Patients DM well-controlled during hospitalization when compliant with diet.  Patient needs to work with her PCP on diet and DM management at discharge.  Much of patients problems are due to compliance issues.  Re-consult if needed.              Discharge Planning: I expect the patient to be discharged per attending physician.    Electronically signed by Lucas Tatum MD, 06/09/22, 16:44 CDT.

## 2022-06-09 NOTE — PLAN OF CARE
Goal Outcome Evaluation:  Plan of Care Reviewed With: patient        Progress: no change  Outcome Evaluation: Yesterday's notes show that pt. refused wound vac to re-applied after it was removed. Spoke with nursing today and she reports that pt. is continuing to refuse any wound vac dressings. PT will discharge pt at this time. If patient changes her mind, please place new orders. Thank you.

## 2022-06-09 NOTE — PROGRESS NOTES
Patient has been found with nonhospital neurotin in her bed.  She has not had requirement for iv pain meds.  She also requests vac removal.  She will be discharged with wet to dry bid at all locations.  Her  is instructed and willing to change her dressings.  She will follow up with Dr. Perez and home health will continue to assist with wound care.  She will be given a script for percocet.  She is allergic to all susceptible oral antibiotics.

## 2022-06-10 ENCOUNTER — HOME CARE VISIT (OUTPATIENT)
Dept: HOME HEALTH SERVICES | Facility: CLINIC | Age: 52
End: 2022-06-10

## 2022-06-10 ENCOUNTER — READMISSION MANAGEMENT (OUTPATIENT)
Dept: CALL CENTER | Facility: HOSPITAL | Age: 52
End: 2022-06-10

## 2022-06-10 PROCEDURE — G0299 HHS/HOSPICE OF RN EA 15 MIN: HCPCS

## 2022-06-10 NOTE — OUTREACH NOTE
Prep Survey    Flowsheet Row Responses   [facility] patient discharged from? [facility]   Is LACE score < 7 ? No   Emergency Room discharge w/ pulse ox? No   Eligibility [facility]   Date of Admission 06/01/22   Date of Discharge 06/09/22   Discharge Disposition Home-Health Care Sv   Discharge diagnosis debridement of the numerous wounds.   Does the patient have one of the following disease processes/diagnoses(primary or secondary)? General Surgery   Does the patient have Home health ordered? Yes   What is the Home health agency?  [facility], later [facility]    Is there a DME ordered? No   Prep survey completed? Yes          JANAK XIONG - Registered Nurse

## 2022-06-10 NOTE — PAYOR COMM NOTE
[facility] 6-9-22  [id number]   [phone]    Zuly Carpenter (51 y.o. Female)             Date of Birth   1970    Social Security Number       Address   [address] [place] [id number]    Home Phone   [phone]    MRN   [id number]       Restorationist   [facility]    Marital Status   Single                            Admission Date   6/1/22    Admission Type   Emergency    Admitting Provider   Justin Perez MD    Attending Provider       Department, Room/Bed   [facility] 3C, 378/1       Discharge Date   6/9/2022    Discharge Disposition   Home or Self Care    Discharge Destination                               Attending Provider: (none)   Allergies: Compazine [Prochlorperazine Edisylate], Meperidine, Norflex [Orphenadrine], Nortriptyline, Prochlorperazine, Prochlorperazine Maleate, Vancomycin, Codeine, Penicillins, Calamine, Amoxicillin-pot Clavulanate, Avelox [Moxifloxacin Hcl], Cefazolin, Cephalexin, Ciprofloxacin, Clindamycin/lincomycin, Doxycycline, Hydroxyzine Hcl, Moxifloxacin, Orphenadrine Citrate, Sulfamethoxazole-trimethoprim, Tobramycin, Vistaril [Hydroxyzine]    Isolation: Contact   Infection: ESBL E coli (06/04/22)   Code Status: Prior   Advance Care Planning Activity    Ht: 154.9 cm (61\")   Wt: 141 kg (311 lb)    Admission Cmt: None   Principal Problem: None                Active Insurance as of 6/1/2022     Primary Coverage     Payor Plan Insurance Group Employer/Plan Group    [place]      Payor Plan Address Payor Plan Phone Number Payor Plan Fax Number Effective Dates    [address] [address] [phone]  10/1/2016 - None Entered    [facility] [address]       Subscriber Name Subscriber Birth Date Member ID       ZULY CARPENTER 1970 [id number]                 Emergency Contacts      (Rel.) Home Phone Work Phone Mobile Phone    Betty Abebe (Mother) [phone] -- --    WINNIE HORAN (Significant Other) -- -- [phone]             Discharge Summary    No notes of this type exist for this encounter.

## 2022-06-13 VITALS
SYSTOLIC BLOOD PRESSURE: 120 MMHG | OXYGEN SATURATION: 99 % | DIASTOLIC BLOOD PRESSURE: 70 MMHG | TEMPERATURE: 98.1 F | HEART RATE: 77 BPM | RESPIRATION RATE: 18 BRPM

## 2022-06-13 NOTE — HOME HEALTH
SOC Note: A&O x 4, VSS, wound care provided    Home Health ordered for: disciplines skilled nursing    Reason for Hosp/Primary Dx/Co-morbidities: stage 3 pressure  ulcers    Focus of Care: wound care, teaching/education    Current Functional status/mobility/DME: cane, wound care supplies, up with assistance    HB status/Living Arrangements: lives with boyfriend    Skin Integrity/wound status: 3 stage 3 pressure ulcers    Code Status: patient wants DNR    Fall Risk: patient is fall risk    POC confirmed with Dr Perez 6/10/22

## 2022-06-14 ENCOUNTER — READMISSION MANAGEMENT (OUTPATIENT)
Dept: CALL CENTER | Facility: HOSPITAL | Age: 52
End: 2022-06-14

## 2022-06-14 NOTE — OUTREACH NOTE
General Surgery Week 1 Survey    Flowsheet Row Responses   [facility] patient discharged from? [place]   Does the patient have one of the following disease processes/diagnoses(primary or secondary)? General Surgery   Week 1 attempt successful? Yes   Call start time 1114   Call end time 1120   Discharge diagnosis debridement of the numerous wounds.   Meds reviewed with patient/caregiver? Yes   Is the patient having any side effects they believe may be caused by any medication additions or changes? No   Does the patient have all medications related to this admission filled (includes all antibiotics, pain medications, etc.) N/A   Is the patient taking all medications as directed (includes completed medication regime)? Yes   What is the Home health agency?  [facility]    Has home health visited the patient within 72 hours of discharge? Yes   Psychosocial issues? No   Comments sig other helping with dressing changes   Did the patient receive a copy of their discharge instructions? Yes   Nursing interventions Reviewed instructions with patient   What is the patient's perception of their health status since discharge? Improving   Nursing interventions Nurse provided patient education   Is the patient /caregiver able to teach back basic post-op care? Continue use of incentive spirometry at least 1 week post discharge, Drive as instructed by MD in discharge instructions, Take showers only when approved by MD-sponge bathe until then, Practice 'cough and deep breath', No tub bath, swimming, or hot tub until instructed by MD, Keep incision areas clean,dry and protected, Do not remove steri-strips, Lifting as instructed by MD in discharge instructions  [uses Respirex]   Is the patient/caregiver able to teach back signs and symptoms of incisional infection? Increased redness, swelling or pain at the incisonal site, Increased drainage or bleeding, Incisional warmth, Pus or odor from incision, Fever   Is the  patient/caregiver able to teach back steps to recovery at home? Set small, achievable goals for return to baseline health, Rest and rebuild strength, gradually increase activity, Eat a well-balance diet   Is the patient/caregiver able to teach back the hierarchy of who to call/visit for symptoms/problems? PCP, Specialist, Home health nurse, Urgent Care, ED, 911 Yes   Week 1 call completed? Yes          DIEUDONNE HUSSEIN - Registered Nurse

## 2022-06-15 ENCOUNTER — HOME CARE VISIT (OUTPATIENT)
Dept: HOME HEALTH SERVICES | Facility: HOME HEALTHCARE | Age: 52
End: 2022-06-15

## 2022-06-15 PROCEDURE — G0300 HHS/HOSPICE OF LPN EA 15 MIN: HCPCS

## 2022-06-15 RX ORDER — KETOROLAC TROMETHAMINE 10 MG/1
10 TABLET, FILM COATED ORAL EVERY 6 HOURS PRN
Qty: 30 TABLET | Refills: 0 | Status: ON HOLD | OUTPATIENT
Start: 2022-06-15 | End: 2022-11-29 | Stop reason: SDUPTHER

## 2022-06-16 NOTE — DISCHARGE SUMMARY
Date of Discharge:  6/16/2022    Discharge Diagnosis: Cellulitis and abscess formation in multiple areas    Presenting Problem/History of Present Illness    Valeria Wilson  is a 51 y.o. female presents with history of significant lymphedema in both lower extremities, she has had a chronic draining area in the posterior aspect of the right and left calf from pressure, also right medial wound that is slowly healed, now she presents with an area 8 x 4 cm in the left medial thigh with surrounding cellulitis extending out at least 8 cm from the periphery.  It is has some necrotic material noted also on the CT scan there is some air in the subcutaneous tissue also in the sacral area there is an area 6 x 1-1/2 cm that has necrotic skin present.  Both of these areas need to be debrided as well as we would freshen up the areas in the right and left posterior calf, debride these and packed with iodoform gauze.  Later on a wound vacuum system can be applied.  We will also try to get better control of her diabetes current glucose is over 400.  She has multiple medical problems to include chronic lower back pain, chronic pain syndrome, multiple allergies to include Bactrim and penicillin and cephalosporins.  Chronic knee pain chronic lymphedema of the both lower extremities.  Plan to admit, hydrate, get better control of her diabetes, look toward going the operating room today with debridement of the left medial thigh and this 8 x 4 cm necrotic draining area as well as the 6 x 1 and half centimeter necrotic area in the lower sacral area.  We will also pulse lavage irrigation these and packed these.  Risk and benefits discussed with full knowledge of this and apparent understanding she gives her informed consent for surgery.     Findings: Cleaning excision debridement irrigation of a 10 x 5 x 5 cm left thigh abscess and debridement  Cleaning, excision, debridement, irrigation of a 4 x 4 x 1 cm left heel decubiti  Cleaning,  debridement, irrigation of a 3 x 3 cm decubiti left posterior calf  Cleaning, debridement, irrigation of a 2 x 2 centimeter decubiti right posterior calf  Cleaning irrigation and debridement of a 2 x 2 centimeter area on the right cheek, 1 x 1 cm on the left cheek, and a 1 x 1 cm central area in the pilonidal region from decubiti  Excision of a sacral decubiti 11 x 4 x 4 cm.     Tissue Culture   Lab   Scant growth (1+) Escherichia coli ESBL Abnormal   [facility]   Consider infectious disease consult.  Susceptibility results may not correlate to clinical outcomes.   Scant growth (1+) Proteus mirabilis Abnormal   [facility]             She was subsequently admitted had the debridement completed, with the finding of the openings noted above and documented.  Cultures were obtained from this and it returned as E. coli and Proteus.  It was highly suggested that she have a wound vacuum placed on the wounds on the left thigh and also the sacral area she declined the sacral area she was monitored by the internal medicine service and treated for her poorly controlled diabetes.  Over the next week I was out of town but from reading the note she subsequently declined both of the wound vacs, and desired wet-to-dry dressings, also was found with non-Hospital prescribe Neurontin, and with this she was discharged from the hospital.  She will follow-up with the wound care specialist.  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  Procedures Performed  Procedure(s):  DEBRIDEMENTAND PULSE LAVAGE LT MEDIAL THIGH PACKING ODOFORM GAUZE  WOUND SACRAL AND BILATERAL CALVES       Consults:   Consults     Date and Time Order Name Status Description    6/2/2022 12:56 PM Inpatient Wound Care Provider Consult Completed     6/1/2022  7:00 PM Inpatient Hospitalist Consult Completed            Pertinent Test Results:   Lab Results (last 24 hours)     Procedure Component Value Units Date/Time    POC Glucose Once [[id number]]  (Normal) Collected: 06/09/22 1638    Specimen: Blood Updated: 06/09/22 1658     Glucose 114 mg/dL      Comment: : [id number] Taylor ConleyMeter ID: [id number]       POC Glucose Once [[id number]]  (Normal) Collected: 06/09/22 1148    Specimen: Blood Updated: 06/09/22 1200     Glucose 120 mg/dL      Comment: : [id number] Taylor ConleyMeter ID: [id number]       POC Glucose Once [[id number]]  (Abnormal) Collected: 06/09/22 0807    Specimen: Blood Updated: 06/09/22 0818     Glucose 64 mg/dL      Comment: : [id number] Taylor ConleyMeter ID: [id number]               Condition on Discharge: Stable condition      Discharge Disposition discharge to home for her family to care for the wounds they disallowed physical therapy or home health care for these wound evaluations and care  Home or Self Care    Discharge Medications     Discharge Medications      Continue These Medications      Instructions Start Date   busPIRone 10 MG tablet  Commonly known as: BUSPAR   10 mg, Oral, 3 Times Daily      cyclobenzaprine 10 MG tablet  Commonly known as: FLEXERIL   10 mg, Oral, 2 Times Daily PRN      docusate sodium 100 MG capsule  Commonly known as: COLACE   100 mg, Oral, 2 Times Daily      DULoxetine 60 MG capsule  Commonly known as: CYMBALTA   60 mg, Oral, 2 Times Daily      fluticasone 50 MCG/ACT nasal spray  Commonly known as: FLONASE   1 spray, Nasal, Daily      furosemide 80 MG tablet  Commonly known as: LASIX   40 mg, Oral, 2 Times Daily PRN      gabapentin 300 MG capsule  Commonly known as: NEURONTIN   600 mg, Oral, 3 Times Daily      gabapentin 100 MG capsule  Commonly known as: NEURONTIN   100 mg, Oral, Daily, PT TAKES AT 4PM      insulin glargine 100 UNIT/ML injection  Commonly known as: LANTUS SEMGLEE   30 Units, Subcutaneous, Nightly, Patient states  using 10 units three times daily      Magnesium Oxide 400 (240 Mg) MG tablet   200 mg, Oral, Daily      mirtazapine 15 MG tablet  Commonly known as: REMERON   15 mg, Oral, Nightly      Morphine Sulfate ER 15 MG tablet extended-release 12 hour   15 mg, Oral, 2 times daily      omeprazole 40 MG capsule  Commonly known as: priLOSEC   40 mg, Oral, 2 Times Daily      oxyCODONE-acetaminophen 7.5-325 MG per tablet  Commonly known as: PERCOCET   Take 1 tablet by mouth Every 8 (Eight) Hours As Needed for Moderate Pain  or Severe Pain-Do not take with other Percocet prescription      polyethylene glycol 17 GM/SCOOP powder  Commonly known as: MIRALAX   17 g, Oral, Daily PRN      potassium chloride 20 MEQ CR tablet  Commonly known as: K-DUR,KLOR-CON   20 mEq, Oral, As Needed      pramipexole 0.75 MG tablet  Commonly known as: MIRAPEX   0.75 mg, Oral, 2 times daily      Victoza 18 MG/3ML solution pen-injector injection  Generic drug: Liraglutide   1.8 mg, Subcutaneous, Daily      vitamin D 1.25 MG (06312 UT) capsule capsule  Commonly known as: ERGOCALCIFEROL   50,000 Units, Oral, Weekly      ZOLMitriptan 5 MG tablet  Commonly known as: Zomig   5 mg, Oral, Once As Needed             Discharge Diet: Regular diet 1800 ADA diet    Activity at Discharge: No restrictions on activity dressing change on EACH of these wounds twice a day with quarter percent Dakin's moistened 4 x 4's or Curlex in the open wounds.    Follow-up Appointments  Future Appointments   Date Time Provider Department Center   6/17/2022 To Be Determined Arlene Dow LPN [facility]   6/22/2022 To Be Determined Arlene Dow LPN [facility]   6/24/2022 To Be Determined Arlene Dow LPN [facility]   6/28/2022  3:30 PM Justin Perez MD [facility]   6/29/2022 To Be Determined Arlene Dow LPN [facility]   7/6/2022 To Be Determined Arlene Dow LPN [facility]   7/13/2022 To Be Determined Arlene Dow LPN [facility]   7/20/2022 To Be  Determined [facility]   7/27/2022 To Be Determined [facility]   8/3/2022 To Be Determined [facility]   8/8/2022 To Be Determined Janina Vee RN [facility]     Additional Instructions for the Follow-ups that You Need to Schedule     Ambulatory Referral to Home Health   As directed      Face to Face Visit Date: 6/9/2022    Follow-up provider for Plan of Care?: I will be treating the patient on an ongoing basis.  Please send me the Plan of Care for signature.    Follow-up provider: TONNY PEREZ [[id number]]    Reason/Clinical Findings: Diabetic Wound Ulcers    Describe mobility limitations that make leaving home difficult: Wounds    Nursing/Therapeutic Services Requested: Skilled Nursing    Skilled nursing orders: Pain management Wound care dressing/changes    Instructions: Wet to dry-Normal saline dressing change to bilateral heels, calves, pilonidal area, and left thigh    Frequency: 1 Week 1         Ambulatory Referral to Wound Clinic   As directed            Test Results Pending at Discharge       Tonny Perez MD  06/16/22  11:12 CDT    Time: Time spent at discharge 30 minutes     EMR Dragon/Transcription disclaimer: Much of this encounter note is an electronic transcription/translation of spoken language to printed text. The electronic translation of spoken language may permit erroneous, or at times, nonsensical words or phrases to be inadvertently transcribed; although I have reviewed the note for such errors, some may still exist.

## 2022-06-17 ENCOUNTER — HOME CARE VISIT (OUTPATIENT)
Dept: HOME HEALTH SERVICES | Facility: CLINIC | Age: 52
End: 2022-06-17

## 2022-06-17 VITALS
RESPIRATION RATE: 18 BRPM | TEMPERATURE: 98.5 F | OXYGEN SATURATION: 98 % | SYSTOLIC BLOOD PRESSURE: 142 MMHG | HEART RATE: 95 BPM | DIASTOLIC BLOOD PRESSURE: 78 MMHG

## 2022-06-17 VITALS
RESPIRATION RATE: 20 BRPM | DIASTOLIC BLOOD PRESSURE: 78 MMHG | TEMPERATURE: 98.6 F | SYSTOLIC BLOOD PRESSURE: 128 MMHG | HEART RATE: 78 BPM | OXYGEN SATURATION: 99 %

## 2022-06-17 PROCEDURE — G0299 HHS/HOSPICE OF RN EA 15 MIN: HCPCS

## 2022-06-17 NOTE — HOME HEALTH
FOCUS OF CARE/SKILLED NEED: HOME SAFETY/FALL PREVENTION, MEDICATION EDUCATION, PAIN MANAGEMENT, PREVENTATIVE SKIN CARE, wound care, s/s of infection, sm diet, dm foot assessment/care, dm accucks, insulin administration, emergency plans, depresion/anxiety, pressure wound prevention    TEACHING/INTERVENTIONS: FALL PREVENTION, MEDICATION EDUCATION, PAIN MANAGEMENT, PREVENTATIVE SKIN CARE,  wound care, s/s of infection, dm home care/dm foot care, emergency preparedness, depression/anxiety, pressure wound prevention    PROGRESS TOWARDS GOALS: wounds are without infection, emergency preparedness, med education    RECENT FALLS:  none    RECENT MEDICATION CHANGES:  none    D/C PLAN:  DISCHARGE WITH GOALS MET    PHYSICIAN CONTACT:  none    INSURANCE CHANGES: none    PRE-SCREEN FOR COVID 19: no s/s of covid 19

## 2022-06-21 ENCOUNTER — READMISSION MANAGEMENT (OUTPATIENT)
Dept: CALL CENTER | Facility: HOSPITAL | Age: 52
End: 2022-06-21

## 2022-06-21 NOTE — HOME HEALTH
Routine Visit Note:    SN visit made for wound care and pt needing supplies. Supplies left with patient for NS wet to dry dressnig changes, pt requesting a silver type dressing but SN instructed that the only wound care orders in her chart were for NS wet to dry dressings until she follows up with Dr. Perez. Pt reports her boyfriend changed her dressings this morning, and will changes dressings again this evenings and requsted to not remove dressings at this time due to her pain just now starting to decrease from changing the dressings this morning.     Plan for next visit: Assess, measure, and takes pictures of all wounds.

## 2022-06-21 NOTE — OUTREACH NOTE
General Surgery Week 2 Survey    Flowsheet Row Responses   [facility] facility patient discharged from? [place]   Does the patient have one of the following disease processes/diagnoses(primary or secondary)? General Surgery   Week 2 attempt successful? No   Unsuccessful attempts Attempt 1          OMAYRA YOUNGBLOOD - Registered Nurse

## 2022-06-22 ENCOUNTER — HOME CARE VISIT (OUTPATIENT)
Dept: HOME HEALTH SERVICES | Facility: HOME HEALTHCARE | Age: 52
End: 2022-06-22

## 2022-06-22 PROCEDURE — G0300 HHS/HOSPICE OF LPN EA 15 MIN: HCPCS

## 2022-06-23 ENCOUNTER — READMISSION MANAGEMENT (OUTPATIENT)
Dept: CALL CENTER | Facility: HOSPITAL | Age: 52
End: 2022-06-23

## 2022-06-23 RX ORDER — POTASSIUM CHLORIDE 20 MEQ/1
20 TABLET, EXTENDED RELEASE ORAL DAILY
Qty: 30 TABLET | Refills: 0 | Status: SHIPPED | OUTPATIENT
Start: 2022-06-23

## 2022-06-23 NOTE — TELEPHONE ENCOUNTER
Caller: Valeria Wilson    Relationship: Self    Best call back number:[phone]    Requested Prescriptions:   ZOLMitriptan (Zomig) 5 MG tablet  5 mg, Once As Needed 5 ordered        vitamin D (ERGOCALCIFEROL) 1.25 MG (69194 UT) capsule capsule            AND   pramipexole (MIRAPEX) 0.75 MG tablet  0.75 mg, 2 times daily          potassium chloride (K-DUR,KLOR-CON) 20 MEQ CR tablet  20 mEq, As Needed 1 ordered  Edit      polyethylene glycol (MIRALAX) 17 GM/SCOOP powder  17 g, Daily PRN 3 ordered        oxyCODONE-acetaminophen (PERCOCET) 7.5-325 MG per tablet  1 tablet, Every 8 Hours PRN 0 of 0 remaining        omeprazole (priLOSEC) 40 MG capsule  40 mg, 2 Times Daily         Morphine Sulfate ER 15 MG tablet extended-release 12 hour  15 mg, 2 times daily         mirtazapine (REMERON) 15 MG tablet  15 mg, Nightly         Magnesium Oxide 400 (240 Mg) MG tablet  200 mg, Daily         Liraglutide (Victoza) 18 MG/3ML solution pen-injector injection  1.8 mg, Daily         ketorolac (TORADOL) 10 MG tablet  10 mg, Every 6 Hours PRN 0 ordered        insulin glargine (LANTUS, SEMGLEE) 100 UNIT/ML injection  30 Units, Nightly         gabapentin (NEURONTIN) 300 MG capsule  600 mg, 3 Times Daily         gabapentin (NEURONTIN) 100 MG capsule  100 mg, Daily         furosemide (LASIX) 80 MG tablet  40 mg, 2 Times Daily PRN         fluticasone (FLONASE) 50 MCG/ACT nasal spray  1 spray, Daily 0 ordered        DULoxetine (CYMBALTA) 60 MG capsule  60 mg, 2 Times Daily         docusate sodium (COLACE) 100 MG capsule  100 mg, 2 Times Daily         cyclobenzaprine (FLEXERIL) 10 MG tablet  10 mg, 2 Times Daily PRN               busPIRone (BUSPAR) 10 MG tablet              Requested Prescriptions     Pending Prescriptions Disp Refills   • potassium chloride (K-DUR,KLOR-CON) 20 MEQ CR tablet [Pharmacy Med Name: POTASSIUM CL 20MEQ ER TABLETS] 30 tablet 1     Sig: Take 1 tablet by mouth As Needed.        Pharm[facility] where request should be  [id number] - [place] - [address] AT [facility] [address] - [phone] [facility] [phone] FX     Additional details provided by patient: PATIENT WAS DISMISSED FROM [facility] BUT TOLD THAT SHE WOULD TAKE CARE OF HER REFILLS FOR 30 DAYS. PATIENT IS IN NEED OF FILLING ALL HER PRESCRIPTIONS. PATIENT ALSO STATES THAT SHE HAS BEEN APPROVED BEFORE FOR INCONTINENCE PADS. COULD THOSE BE SENT TO HER.    Does the patient have less than a 3 day supply:  [] Yes  [x] No    Korin Mejia Rep   06/23/22 10:35 CDT

## 2022-06-23 NOTE — OUTREACH NOTE
General Surgery Week 2 Survey    Flowsheet Row Responses   [facility] [place] operated by [facility] patient discharged from? [place]   Does the patient have one of the following disease processes/diagnoses(primary or secondary)? General Surgery   Week 2 attempt successful? Yes   Call start time 1111   Call end time 1113   Discharge diagnosis debridement of the numerous wounds.   Meds reviewed with patient/caregiver? Yes   Is the patient having any side effects they believe may be caused by any medication additions or changes? No   Does the patient have all medications related to this admission filled (includes all antibiotics, pain medications, etc.) Yes   Is the patient taking all medications as directed (includes completed medication regime)? Yes   Does the patient have a follow up appointment scheduled with their surgeon? Yes   Has the patient kept scheduled appointments due by today? N/A   Comments Has a appt on June 28 with surgeon   What is the Home health agency?  [facility]    Has home health visited the patient within 72 hours of discharge? Yes   Home health comments HH comes 3 x a week   Psychosocial issues? No   Did the patient receive a copy of their discharge instructions? Yes   Nursing interventions Reviewed instructions with patient   What is the patient's perception of their health status since discharge? Improving   Nursing interventions Nurse provided patient education   Is the patient/caregiver able to teach back signs and symptoms of incisional infection? Increased redness, swelling or pain at the incisonal site, Increased drainage or bleeding, Incisional warmth, Pus or odor from incision, Fever   Is the patient/caregiver able to teach back steps to recovery at home? Set small, achievable goals for return to baseline health, Rest and rebuild strength, gradually increase activity, Eat a well-balance diet   If the patient is a current smoker, are they able to teach back resources for cessation? Not a smoker   Is the  patient/caregiver able to teach back the hierarchy of who to call/visit for symptoms/problems? PCP, Specialist, Home health nurse, Urgent Care, ED, 911 Yes   Week 2 call completed? Yes   Wrap up additional comments Still sore but getting better.           ILENE SALAS - Registered Nurse

## 2022-06-23 NOTE — HOME HEALTH
FOCUS OF CARE/SKILLED NEED: HOME SAFETY/FALL PREVENTION, MEDICATION EDUCATION, PAIN MANAGEMENT, PREVENTATIVE SKIN CARE, wound care, s/s of infection,     TEACHING/INTERVENTIONS: FALL PREVENTION, MEDICATION EDUCATION, PAIN MANAGEMENT, PREVENTATIVE SKIN CARE, woundc are, s/s of infection    PROGRESS TOWARDS GOALS:     RECENT FALLS:    RECENT MEDICATION CHANGES:    D/C PLAN:  DISCHARGE WITH GOALS MET    PHYSICIAN CONTACT:      INSURANCE CHANGES:     PRE-SCREEN FOR COVID 19:

## 2022-06-28 ENCOUNTER — OFFICE VISIT (OUTPATIENT)
Dept: SURGERY | Facility: CLINIC | Age: 52
End: 2022-06-28

## 2022-06-28 VITALS
SYSTOLIC BLOOD PRESSURE: 134 MMHG | RESPIRATION RATE: 16 BRPM | BODY MASS INDEX: 55.32 KG/M2 | WEIGHT: 293 LBS | HEIGHT: 61 IN | HEART RATE: 82 BPM | DIASTOLIC BLOOD PRESSURE: 58 MMHG | OXYGEN SATURATION: 92 %

## 2022-06-28 DIAGNOSIS — Z79.4 TYPE 2 DIABETES MELLITUS WITH HYPERGLYCEMIA, WITH LONG-TERM CURRENT USE OF INSULIN: ICD-10-CM

## 2022-06-28 DIAGNOSIS — L97.922 DIABETIC ULCER OF LEFT LOWER LEG ASSOCIATED WITH TYPE 2 DIABETES MELLITUS, WITH FAT LAYER EXPOSED: ICD-10-CM

## 2022-06-28 DIAGNOSIS — I10 HYPERTENSION, UNSPECIFIED TYPE: ICD-10-CM

## 2022-06-28 DIAGNOSIS — E66.01 OBESITY, MORBID, BMI 50 OR HIGHER: ICD-10-CM

## 2022-06-28 DIAGNOSIS — E66.01 MORBID OBESITY WITH BMI OF 50.0-59.9, ADULT: ICD-10-CM

## 2022-06-28 DIAGNOSIS — E11.65 TYPE 2 DIABETES MELLITUS WITH HYPERGLYCEMIA, WITHOUT LONG-TERM CURRENT USE OF INSULIN: ICD-10-CM

## 2022-06-28 DIAGNOSIS — T14.8XXA OPEN WOUND: ICD-10-CM

## 2022-06-28 DIAGNOSIS — E11.65 TYPE 2 DIABETES MELLITUS WITH HYPERGLYCEMIA, WITH LONG-TERM CURRENT USE OF INSULIN: ICD-10-CM

## 2022-06-28 DIAGNOSIS — E11.622 DIABETIC ULCER OF LEFT LOWER LEG ASSOCIATED WITH TYPE 2 DIABETES MELLITUS, WITH FAT LAYER EXPOSED: ICD-10-CM

## 2022-06-28 DIAGNOSIS — L03.116 CELLULITIS OF LEFT LEG: Primary | ICD-10-CM

## 2022-06-28 DIAGNOSIS — L03.115 BILATERAL LOWER LEG CELLULITIS: ICD-10-CM

## 2022-06-28 DIAGNOSIS — L03.116 BILATERAL LOWER LEG CELLULITIS: ICD-10-CM

## 2022-06-28 DIAGNOSIS — I87.2 VENOUS INSUFFICIENCY OF BOTH LOWER EXTREMITIES: ICD-10-CM

## 2022-06-28 PROCEDURE — 99214 OFFICE O/P EST MOD 30 MIN: CPT | Performed by: SPECIALIST

## 2022-06-28 NOTE — PROGRESS NOTES
Office Established Patient Note:     Referring Provider: Emergency room    Chief complaint follow-up wound infections from decubiti.    Subjective .     History of present illness: .Valeria Wilson  is a 51 y.o. female presents with history of significant lymphedema in both lower extremities, she has had a chronic draining area in the posterior aspect of the right and left calf from pressure, also right medial wound that is slowly healed, now she presents with an area 8 x 4 cm in the left medial thigh with surrounding cellulitis extending out at least 8 cm from the periphery.  It is has some necrotic material noted also on the CT scan there is some air in the subcutaneous tissue also in the sacral area there is an area 6 x 1-1/2 cm that has necrotic skin present.  Both of these areas need to be debrided as well as we would freshen up the areas in the right and left posterior calf, debride these and packed with iodoform gauze.  Later on a wound vacuum system can be applied.  We will also try to get better control of her diabetes current glucose is over 400.  She has multiple medical problems to include chronic lower back pain, chronic pain syndrome, multiple allergies to include Bactrim and penicillin and cephalosporins.  Chronic knee pain chronic lymphedema of the both lower extremities.  Plan to admit, hydrate, get better control of her diabetes, look toward going the operating room today with debridement of the left medial thigh and this 8 x 4 cm necrotic draining area as well as the 6 x 1 and half centimeter necrotic area in the lower sacral area.  We will also pulse lavage irrigation these and packed these.  Risk and benefits discussed with full knowledge of this and apparent understanding she gives her informed consent for surgery.     Findings: Cleaning excision debridement irrigation of a 10 x 5 x 5 cm left thigh abscess and debridement  Cleaning, excision, debridement, irrigation of a 4 x 4 x 1 cm left heel  decubiti  Cleaning, debridement, irrigation of a 3 x 3 cm decubiti left posterior calf  Cleaning, debridement, irrigation of a 2 x 2 centimeter decubiti right posterior calf  Cleaning irrigation and debridement of a 2 x 2 centimeter area on the right cheek, 1 x 1 cm on the left cheek, and a 1 x 1 cm central area in the pilonidal region from decubiti  Excision of a sacral decubiti 11 x 4 x 4 cm.    She is here for follow-up.  Initially we debrided her she was very irritated that I debrided multiple of these areas initially she came in for a left thigh abscess after we uncovered think she had the decubiti that were noted in her calf right and left as well as her cheek of her buttock area and pilonidal region as well as her heels.  We placed a wound VAC on the larger wound that was in her posterior sacral area that was 11 x 4 x 4 cm and she declined the wound VAC on her leg and her back.  She is followed by the wound care specialist at [facility] and I would prefer that she continue their follow-up as she has not continued what I would desire the wound vacs and she is here for follow-up examination.  She is also on pain management and receives all medication through pain management.    History  Past Medical History:   Diagnosis Date   • Anxiety    • Arthritis     Osteoarthritis primarily left knee    • Back pain    • Chronic pain syndrome 10/27/2021   • Depression    • Diabetes mellitus (HCC)    • Fibromyalgia, primary    • Hx of tear of meniscus of knee joint 11/21/2016   • Hypertension    • Joint pain    • Kidney stone    • Knee pain    • Lymphedema of both lower extremities 10/27/2021   • Migraine    • Migraine headache    • Pain     knee, left ankle, left ankle     • Pes anserinus bursitis 04/28/2015   • Restless leg    ,   Past Surgical History:   Procedure Laterality Date   • CATARACT EXTRACTION     • CATARACT EXTRACTION     • CATARACT EXTRACTION     • CORNEAL TRANSPLANT      bilateral    • ECTOPIC PREGNANCY     •  INCISION AND DRAINAGE ABSCESS Left 6/1/2022    Procedure: DEBRIDEMENTAND PULSE LAVAGE LT MEDIAL THIGH PACKING ODOFORM GAUZE  WOUND SACRAL AND BILATERAL CALVES;  Surgeon: Justin Perez MD;  Location: [facility] OR;  Service: General;  Laterality: Left;   • JOINT REPLACEMENT     • KNEE ARTHROSCOPY W/ MENISCAL REPAIR     • KNEE MENISCAL REPAIR     • MENISCECTOMY Left 12/20/2016   • TUBAL ABDOMINAL LIGATION     ,   Family History   Problem Relation Age of Onset   • No Known Problems Mother    • Cancer Father    • Dementia Father    • Hypertension Maternal Grandmother    • Cancer Maternal Grandfather    ,   Social History     Tobacco Use   • Smoking status: Never Smoker   • Smokeless tobacco: Never Used   Vaping Use   • Vaping Use: Never used   Substance Use Topics   • Alcohol use: Yes   • Drug use: No   , (Not in a hospital admission)   and Allergies:  Compazine [prochlorperazine edisylate], Meperidine, Norflex [orphenadrine], Nortriptyline, Prochlorperazine, Prochlorperazine maleate, Vancomycin, Codeine, Penicillins, Calamine, Amoxicillin-pot clavulanate, Avelox [moxifloxacin hcl], Cefazolin, Cephalexin, Ciprofloxacin, Clindamycin/lincomycin, Doxycycline, Hydroxyzine hcl, Moxifloxacin, Orphenadrine citrate, Sulfamethoxazole-trimethoprim, Tobramycin, and Vistaril [hydroxyzine]    Current Outpatient Medications:   •  busPIRone (BUSPAR) 10 MG tablet, Take 10 mg by mouth 3 (Three) Times a Day., Disp: , Rfl:   •  cyclobenzaprine (FLEXERIL) 10 MG tablet, Take 10 mg by mouth 2 (Two) Times a Day As Needed for Muscle Spasms., Disp: , Rfl:   •  docusate sodium (COLACE) 100 MG capsule, Take 100 mg by mouth 2 (Two) Times a Day., Disp: , Rfl:   •  DULoxetine (CYMBALTA) 60 MG capsule, Take 60 mg by mouth 2 (Two) Times a Day., Disp: , Rfl:   •  fluticasone (FLONASE) 50 MCG/ACT nasal spray, 1 spray into the nostril(s) as directed by provider Daily., Disp: 16 g, Rfl: 0  •  furosemide (LASIX) 80 MG tablet, Take 40 mg by mouth 2 (Two)  Times a Day As Needed (swelling)., Disp: , Rfl:   •  gabapentin (NEURONTIN) 100 MG capsule, Take 100 mg by mouth Daily. PT TAKES AT 4PM, Disp: , Rfl:   •  gabapentin (NEURONTIN) 300 MG capsule, Take 600 mg by mouth 3 (Three) Times a Day., Disp: , Rfl:   •  insulin glargine (LANTUS, SEMGLEE) 100 UNIT/ML injection, Inject 30 Units under the skin into the appropriate area as directed Every Night. Patient states using 10 units three times daily, Disp: , Rfl:   •  ketorolac (TORADOL) 10 MG tablet, Take 1 tablet by mouth Every 6 (Six) Hours As Needed for Moderate Pain ., Disp: 30 tablet, Rfl: 0  •  Liraglutide (Victoza) 18 MG/3ML solution pen-injector injection, Inject 1.8 mg under the skin into the appropriate area as directed Daily., Disp: , Rfl:   •  Magnesium Oxide 400 (240 Mg) MG tablet, Take 200 mg by mouth Daily., Disp: , Rfl:   •  mirtazapine (REMERON) 15 MG tablet, Take 15 mg by mouth Every Night., Disp: , Rfl:   •  Morphine Sulfate ER 15 MG tablet extended-release 12 hour, Take 15 mg by mouth 2 (two) times a day., Disp: , Rfl:   •  omeprazole (priLOSEC) 40 MG capsule, Take 40 mg by mouth 2 (Two) Times a Day., Disp: , Rfl:   •  oxyCODONE-acetaminophen (PERCOCET) 7.5-325 MG per tablet, Take 1 tablet by mouth Every 8 (Eight) Hours As Needed for Moderate Pain  or Severe Pain-Do not take with other Percocet prescription, Disp: 30 tablet, Rfl: 0  •  polyethylene glycol (MIRALAX) 17 GM/SCOOP powder, Take 17 g by mouth Daily As Needed (CONSTIPATION)., Disp: 1530 g, Rfl: 3  •  potassium chloride (K-DUR,KLOR-CON) 20 MEQ CR tablet, Take 1 tablet by mouth Daily., Disp: 30 tablet, Rfl: 0  •  pramipexole (MIRAPEX) 0.75 MG tablet, Take 0.75 mg by mouth 2 (two) times a day., Disp: , Rfl:   •  vitamin D (ERGOCALCIFEROL) 1.25 MG (99526 UT) capsule capsule, Take 50,000 Units by mouth 1 (One) Time Per Week., Disp: , Rfl:   •  ZOLMitriptan (Zomig) 5 MG tablet, Take 1 tablet by mouth 1 (One) Time As Needed for Migraine., Disp: 6  "tablet, Rfl: 5    Objective     Vital Signs   /58 (BP Location: Left arm, Patient Position: Sitting, Cuff Size: Adult)   Pulse 82   Resp 16   Ht 154 cm (60.63\")   Wt (!) 141 kg (311 lb)   LMP  (LMP Unknown)   SpO2 92%   BMI 59.48 kg/m²      Physical Exam:  The wound in the left thigh has healed very nicely it is 1 cm wide, and 3 cm long, excellent granulation tissue noted pictures were shown to me of her right and left heel and these are stable granulating no need for debridement the area on her sacral area was evaluated it is not reduced any incised and I strongly suggested that she consider a wound VAC for change and for treatment of this the area continues to be extremely long at 11 cm, 3 cm deep and 3 cm wide.  Strongly suggested to her  and herself that a wound VAC needs to be completed she wants to follow-up with her previous wound care specialist at [facility] and I am not opposed to this.    Results Review:  Result Review :  Lab Results   Component Value Date    FINALDX  06/01/2022     1.  Left thigh tissue, excision:       - Benign skin and soft tissue with necrosis and chronic reparative/reactive changes.    2.  Tissue from coccyx area, excision:       - Benign, ulcerated, skin and soft tissue with necrosis.    3.  Tissue from sacral area, excision:       - Benign skin and soft tissue with active chronic inflammation.      GROSSDES  06/01/2022     1. Thigh, Left.   Received in a formalin filled container labeled with the patient's name, date of birth, and \"left thigh tissue\".  The specimen consists of an irregularly shaped fragment of yellow-brown dusky tissue with attached irregular skin excisions.  The specimen measures 8.3 x 4.8 x 2.2 cm.  The largest skin segment measures 8.3 cm in greatest dimension.  The skin surfaces show areas of tan-brown, dusky discoloration.  The external surfaces of the soft tissue are yellow-tan and slightly lobulated.  Sectioning reveals diffuse " "red-brown dusky fibroadipose tissue.  Representative sections are submitted in block 1A.      2. Coccyx.   Received in a formalin filled container labeled with the patient's name, date of birth, and \"tissue from coccyx area\".  The specimen consists of a fragment of yellow-gray soft tissue measuring 1.5 x 0.7 x 0.3 cm.  A possible segment of skin is noted measuring 1.5 cm in greatest dimension and appears yellow-gray, pale and flattened.  The cut surface shows yellow-gray soft necrotic tissue.  Representative sections are submitted in block 2A.      3. Spine, Sacral.   Received in a formalin filled container labeled with the patient's name, date of birth, and \"tissue from sacral area\".  The specimen consists of 2 fragments of yellow-brown soft necrotic fibroadipose tissue with an attached skin ellipse.  The fragments aggregate to 9.4 x 8.5 x 3.5 cm.  The external surfaces are dusky with surgical disruption and is otherwise lobulated.  The attached skin ellipse measures 7.5 x 2.3 cm and has a previous incision running longitudinally.  Sectioning reveals red-brown, dark and dusky discoloration with thin yellow-pink fatty tissue.  Representative sections of skin and subcutaneous tissue are submitted in block 3A.               Assessment & Plan       Diagnoses and all orders for this visit:    1. Cellulitis of left leg (Primary)  -     Ambulatory Referral to Wound Clinic    2. Hypertension, unspecified type    3. Venous insufficiency of both lower extremities    4. Type 2 diabetes mellitus with hyperglycemia, without long-term current use of insulin ([facility])    5. Obesity, morbid, BMI 50 or higher ([facility])  -     Ambulatory Referral to Wound Clinic    6. Diabetic ulcer of left lower leg associated with type 2 diabetes mellitus, with fat layer exposed ([facility])  -     Ambulatory Referral to Wound Clinic    7. Morbid obesity with BMI of 50.0-59.9, adult ([facility])    8. Type 2 diabetes mellitus with hyperglycemia, with long-term current " use of insulin (HCC)  -     Ambulatory Referral to Wound Clinic    9. Open wound  -     Ambulatory Referral to Wound Clinic    10. Bilateral lower leg cellulitis       51-year-old with the above medical problems by report her glucoses have been under better control, she is scheduled to have a pump to be placed.  She desires follow-up and treatment by the wound care specialist at [facility] and Dr. Renteria and we will work toward getting her evaluated and treated at [facility].    Discussed BMI elevation and need to move toward a reduced BMI for health concerns she appears to understand she is a non smoker and her meds were reviewed.      Justin Perez MD  06/28/22  16:38 CDT

## 2022-06-28 NOTE — PATIENT INSTRUCTIONS
She will continue to receive all medications from the pains center.  No medications from me, continue her wound care with the wound care specialist at [facility] continue your follow-up with your previous specialist at [facility].

## 2022-06-29 ENCOUNTER — HOME CARE VISIT (OUTPATIENT)
Dept: HOME HEALTH SERVICES | Facility: HOME HEALTHCARE | Age: 52
End: 2022-06-29

## 2022-06-29 PROCEDURE — G0300 HHS/HOSPICE OF LPN EA 15 MIN: HCPCS

## 2022-06-29 NOTE — HOME HEALTH
FOCUS OF CARE/SKILLED NEED: HOME SAFETY/FALL PREVENTION, MEDICATION EDUCATION, PAIN MANAGEMENT, PREVENTATIVE SKIN CARE, wound care, s/s of infection, DM home management    TEACHING/INTERVENTIONS: FALL PREVENTION, MEDICATION EDUCATION, PAIN MANAGEMENT, PREVENTATIVE SKIN CARE, wound care, s/s of infection, DM home management    PROGRESS TOWARDS GOALS: wound sare healing without s/s of infection    RECENT FALLS:  none    RECENT MEDICATION CHANGES:  none    D/C PLAN:  DISCHARGE WITH GOALS MET    PHYSICIAN CONTACT:  yes, Dr Perez's assistant Natalie re: wounds    INSURANCE CHANGES:  none    PRE-SCREEN FOR COVID 19:  denies any s/s of covid 19    Left anterior thigh wound dressing: moist to dry with bordered gauze.  Lower Thoracic Spine dressing: Dakins moistened kerlex, dry 4x4s, abd pad, medfix tape  Patient was experiencing severe pain and refused dressings to calves and left heel, CG in home to change dressings when patient's pain subsides.  Patient is being referred to [facility] ([facility]) by Dr. Perez's office.

## 2022-06-30 ENCOUNTER — READMISSION MANAGEMENT (OUTPATIENT)
Dept: CALL CENTER | Facility: HOSPITAL | Age: 52
End: 2022-06-30

## 2022-06-30 NOTE — OUTREACH NOTE
General Surgery Week 3 Survey    Flowsheet Row Responses   [facility] facility patient discharged from? [place]   Does the patient have one of the following disease processes/diagnoses(primary or secondary)? General Surgery   Week 3 attempt successful? Yes   Call start time 0850   Rescheduled Rescheduled-pt requested   Call end time 0850          SILAS ASHRAF - Registered Nurse

## 2022-07-01 ENCOUNTER — HOME CARE VISIT (OUTPATIENT)
Dept: HOME HEALTH SERVICES | Facility: HOME HEALTHCARE | Age: 52
End: 2022-07-01

## 2022-07-01 ENCOUNTER — READMISSION MANAGEMENT (OUTPATIENT)
Dept: CALL CENTER | Facility: HOSPITAL | Age: 52
End: 2022-07-01

## 2022-07-01 NOTE — OUTREACH NOTE
General Surgery Week 3 Survey    Flowsheet Row Responses   [facility] facility patient discharged from? [place]   Does the patient have one of the following disease processes/diagnoses(primary or secondary)? General Surgery   Week 3 attempt successful? No   Rescheduled Revoked  [No answer after asking to reschedule. ]          ILENE SALAS - Registered Nurse

## 2022-07-06 ENCOUNTER — HOSPITAL ENCOUNTER (OUTPATIENT)
Dept: WOUND CARE | Age: 52
Discharge: HOME | End: 2022-07-06

## 2022-07-08 ENCOUNTER — HOSPITAL ENCOUNTER (EMERGENCY)
Facility: HOSPITAL | Age: 52
Discharge: HOME | End: 2022-07-08
Admitting: EMERGENCY MEDICINE

## 2022-07-08 ENCOUNTER — APPOINTMENT (OUTPATIENT)
Dept: GENERAL RADIOLOGY | Facility: HOSPITAL | Age: 52
End: 2022-07-08

## 2022-07-08 ENCOUNTER — TELEPHONE (OUTPATIENT)
Dept: WOUND CARE | Age: 52
End: 2022-07-08

## 2022-07-08 ENCOUNTER — HOME CARE VISIT (OUTPATIENT)
Dept: HOME HEALTH SERVICES | Facility: CLINIC | Age: 52
End: 2022-07-08

## 2022-07-08 VITALS
OXYGEN SATURATION: 100 % | DIASTOLIC BLOOD PRESSURE: 82 MMHG | SYSTOLIC BLOOD PRESSURE: 151 MMHG | BODY MASS INDEX: 55.32 KG/M2 | TEMPERATURE: 98 F | HEIGHT: 61 IN | WEIGHT: 293 LBS | RESPIRATION RATE: 18 BRPM | HEART RATE: 92 BPM

## 2022-07-08 DIAGNOSIS — Z51.89 VISIT FOR WOUND CHECK: Primary | ICD-10-CM

## 2022-07-08 LAB
ALBUMIN SERPL-MCNC: 3.1 G/DL (ref 3.5–5.2)
ALBUMIN/GLOB SERPL: 0.6 G/DL
ALP SERPL-CCNC: 100 U/L (ref 39–117)
ALT SERPL W P-5'-P-CCNC: 6 U/L (ref 1–33)
ANION GAP SERPL CALCULATED.3IONS-SCNC: 10 MMOL/L (ref 5–15)
AST SERPL-CCNC: 12 U/L (ref 1–32)
BASOPHILS # BLD AUTO: 0.03 10*3/MM3 (ref 0–0.2)
BASOPHILS NFR BLD AUTO: 0.5 % (ref 0–1.5)
BILIRUB SERPL-MCNC: 0.2 MG/DL (ref 0–1.2)
BUN SERPL-MCNC: 10 MG/DL (ref 6–20)
BUN/CREAT SERPL: 9.8 (ref 7–25)
CALCIUM SPEC-SCNC: 9.1 MG/DL (ref 8.6–10.5)
CHLORIDE SERPL-SCNC: 96 MMOL/L (ref 98–107)
CO2 SERPL-SCNC: 28 MMOL/L (ref 22–29)
CREAT SERPL-MCNC: 1.02 MG/DL (ref 0.57–1)
D-LACTATE SERPL-SCNC: 2.7 MMOL/L (ref 0.5–2)
DEPRECATED RDW RBC AUTO: 46.5 FL (ref 37–54)
EGFRCR SERPLBLD CKD-EPI 2021: 67.6 ML/MIN/1.73
EOSINOPHIL # BLD AUTO: 0.18 10*3/MM3 (ref 0–0.4)
EOSINOPHIL NFR BLD AUTO: 2.8 % (ref 0.3–6.2)
ERYTHROCYTE [DISTWIDTH] IN BLOOD BY AUTOMATED COUNT: 15.9 % (ref 12.3–15.4)
GLOBULIN UR ELPH-MCNC: 5 GM/DL
GLUCOSE SERPL-MCNC: 305 MG/DL (ref 65–99)
HCT VFR BLD AUTO: 32.8 % (ref 34–46.6)
HGB BLD-MCNC: 9.7 G/DL (ref 12–15.9)
IMM GRANULOCYTES # BLD AUTO: 0.01 10*3/MM3 (ref 0–0.05)
IMM GRANULOCYTES NFR BLD AUTO: 0.2 % (ref 0–0.5)
LYMPHOCYTES # BLD AUTO: 1.72 10*3/MM3 (ref 0.7–3.1)
LYMPHOCYTES NFR BLD AUTO: 27.1 % (ref 19.6–45.3)
MCH RBC QN AUTO: 23.9 PG (ref 26.6–33)
MCHC RBC AUTO-ENTMCNC: 29.6 G/DL (ref 31.5–35.7)
MCV RBC AUTO: 80.8 FL (ref 79–97)
MONOCYTES # BLD AUTO: 0.41 10*3/MM3 (ref 0.1–0.9)
MONOCYTES NFR BLD AUTO: 6.5 % (ref 5–12)
NEUTROPHILS NFR BLD AUTO: 4 10*3/MM3 (ref 1.7–7)
NEUTROPHILS NFR BLD AUTO: 62.9 % (ref 42.7–76)
NRBC BLD AUTO-RTO: 0 /100 WBC (ref 0–0.2)
PLATELET # BLD AUTO: 288 10*3/MM3 (ref 140–450)
PMV BLD AUTO: 9.4 FL (ref 6–12)
POTASSIUM SERPL-SCNC: 3.8 MMOL/L (ref 3.5–5.2)
PROCALCITONIN SERPL-MCNC: 0.08 NG/ML (ref 0–0.25)
PROT SERPL-MCNC: 8.1 G/DL (ref 6–8.5)
RBC # BLD AUTO: 4.06 10*6/MM3 (ref 3.77–5.28)
SARS-COV-2 RNA PNL SPEC NAA+PROBE: NOT DETECTED
SODIUM SERPL-SCNC: 134 MMOL/L (ref 136–145)
WBC NRBC COR # BLD: 6.35 10*3/MM3 (ref 3.4–10.8)

## 2022-07-08 PROCEDURE — 0 HYDROMORPHONE 1 MG/ML SOLUTION: Performed by: NURSE PRACTITIONER

## 2022-07-08 PROCEDURE — 25010000002 ONDANSETRON PER 1 MG: Performed by: NURSE PRACTITIONER

## 2022-07-08 PROCEDURE — 87635 SARS-COV-2 COVID-19 AMP PRB: CPT | Performed by: NURSE PRACTITIONER

## 2022-07-08 PROCEDURE — 96365 THER/PROPH/DIAG IV INF INIT: CPT

## 2022-07-08 PROCEDURE — 36415 COLL VENOUS BLD VENIPUNCTURE: CPT

## 2022-07-08 PROCEDURE — 73630 X-RAY EXAM OF FOOT: CPT

## 2022-07-08 PROCEDURE — G0300 HHS/HOSPICE OF LPN EA 15 MIN: HCPCS

## 2022-07-08 PROCEDURE — 80053 COMPREHEN METABOLIC PANEL: CPT | Performed by: NURSE PRACTITIONER

## 2022-07-08 PROCEDURE — 99283 EMERGENCY DEPT VISIT LOW MDM: CPT

## 2022-07-08 PROCEDURE — 84145 PROCALCITONIN (PCT): CPT | Performed by: NURSE PRACTITIONER

## 2022-07-08 PROCEDURE — 85025 COMPLETE CBC W/AUTO DIFF WBC: CPT | Performed by: NURSE PRACTITIONER

## 2022-07-08 PROCEDURE — 83605 ASSAY OF LACTIC ACID: CPT | Performed by: NURSE PRACTITIONER

## 2022-07-08 PROCEDURE — 87040 BLOOD CULTURE FOR BACTERIA: CPT | Performed by: NURSE PRACTITIONER

## 2022-07-08 PROCEDURE — 96375 TX/PRO/DX INJ NEW DRUG ADDON: CPT

## 2022-07-08 PROCEDURE — 25010000002 MEROPENEM PER 100 MG: Performed by: NURSE PRACTITIONER

## 2022-07-08 RX ORDER — SODIUM CHLORIDE 0.9 % (FLUSH) 0.9 %
10 SYRINGE (ML) INJECTION AS NEEDED
Status: DISCONTINUED | OUTPATIENT
Start: 2022-07-08 | End: 2022-07-08 | Stop reason: HOSPADM

## 2022-07-08 RX ORDER — ONDANSETRON 2 MG/ML
4 INJECTION INTRAMUSCULAR; INTRAVENOUS ONCE
Status: COMPLETED | OUTPATIENT
Start: 2022-07-08 | End: 2022-07-08

## 2022-07-08 RX ORDER — DOXYCYCLINE 100 MG/1
100 CAPSULE ORAL 2 TIMES DAILY
Qty: 20 CAPSULE | Refills: 0 | Status: SHIPPED | OUTPATIENT
Start: 2022-07-08 | End: 2022-07-18

## 2022-07-08 RX ADMIN — ONDANSETRON 4 MG: 2 INJECTION INTRAMUSCULAR; INTRAVENOUS at 17:59

## 2022-07-08 RX ADMIN — MEROPENEM 1 G: 1 INJECTION, POWDER, FOR SOLUTION INTRAVENOUS at 18:52

## 2022-07-08 RX ADMIN — HYDROMORPHONE HYDROCHLORIDE 0.5 MG: 1 INJECTION, SOLUTION INTRAMUSCULAR; INTRAVENOUS; SUBCUTANEOUS at 18:00

## 2022-07-08 NOTE — DISCHARGE INSTRUCTIONS
F/u with wound care on Monday  You may begin antibiotics tomorrow, you received a dose in the emergency dept.   Continue wet to dry dressing changes as previously instructed.  Monitor blood sugars closely.

## 2022-07-08 NOTE — TELEPHONE ENCOUNTER
Received call from [facility] nurse re: patient c/o severe pain and wound appears necrotic, patient unable to get to clinic right now it will take awhile after speaking to Catrina Alatorre, patient is to go to the ER today for further evaluation. [place] verbalized understanding.

## 2022-07-08 NOTE — PROGRESS NOTES
Received call from  [address] [place] [address] with [facility] stating she was at patient home now. Patient was having severe pain in the left heel and the wound bed appeared necrotic. [facility] stated it would take awhile for the patient to get to the clinic today. Spoke with Chaparro FITZPATRICK, patient is to go to ER for further evaluation. Mirta Ramírez [id number] nurse verbalized understanding.

## 2022-07-08 NOTE — ED PROVIDER NOTES
Subjective   Patient is a 51-year-old female who presents the ER with worsening wound infection.  Patient has history significant for lymphedema in both lower extremities.  She has chronic drainage in her legs.  Patient has had debridements to multiple areas including the left thigh, back, and her heels bilaterally.  Patient presents today with worsening tissue to her heels bilaterally.  She states that she has noted a darkened tissue to the area.  She was evaluated by home health today who was concerned about worsening wounds to her heels bilaterally.  She states she was supposed to begin [facility] wound care yesterday however had issues with her air conditioner and was unable to make that appointment.  Patient states she was evaluated by Dr. Perez last week who released her from his care, felt wounds were healing nicely.  She states within the last few days the tissue to her heels began to look somewhat necrotic and she has had worsening drainage.  Per review patient was admitted June 9, 2022 to June 16, 2022.  She had a pressure area to the posterior aspect of her right calf and left calf.  There was a right medial wound that was slowly healing.  She presented with an area approximately 8 x 4 cm in the left medial thigh with surrounding cellulitis extending out at least 8 cm from the periphery.  She had some necrotic material noted on CT scan and some air in the subcutaneous tissue also in the sacral area a 6 x 1-1/2 cm that has necrotic skin present.  The note mentions patient needed both debrided and packed with iodoform gauze.  Later wound VAC could be applied.  It was felt she needed better control of her diabetes which was greater than 400.  She has multiple medical problems including chronic lower back pain, chronic pain syndrome, multiple allergies including Bactrim and penicillin and cephalosporins.  She has chronic knee pain chronic lymphedema of both lower extremities.  The patient was admitted,  hydrated, blood sugars were monitored.  The areas were debrided.  Patient had the debridement completed.  Cultures were obtained and it returned as E. coli and protease.  It was highly suggested that she have a wound VAC placed on the wounds on the left thigh and also the sacral area however she declined the sacral area.  Over the next week she was found with nonhospital prescribe Neurontin and discharged from the hospital.  She was instructed to follow-up with wound care.  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  Please see note for complete details.  Patient presents today with worsening skin tissue to her heels bilaterally.  She denies any fevers.  She has not followed up with wound care.            Review of Systems   Constitutional: Negative for fever.   HENT: Negative.  Negative for congestion.    Eyes: Negative.    Respiratory: Negative.  Negative for cough and shortness of breath.    Cardiovascular: Negative.  Negative for chest pain.   Gastrointestinal: Positive for nausea. Negative for abdominal pain, constipation, diarrhea and vomiting.   Genitourinary: Negative.    Skin: Positive for wound.   All other systems reviewed and are negative.      Past Medical History:   Diagnosis Date   • Anxiety    • Arthritis     Osteoarthritis primarily left knee    • Back pain    • Chronic pain syndrome 10/27/2021   • Depression    • Diabetes mellitus (HCC)    • Fibromyalgia, primary    • Hx of tear of meniscus of knee joint 11/21/2016   • Hypertension    • Joint pain    • Kidney stone    • Knee pain    • Lymphedema of both lower extremities 10/27/2021   • Migraine    • Migraine headache    • Pain     knee, left ankle, left ankle     • Pes anserinus bursitis 04/28/2015   • Restless leg        Allergies   Allergen Reactions   • Compazine  [Prochlorperazine Edisylate] Shortness Of Breath     Breathing    • Meperidine Hives     (Demerol)    • Norflex [Orphenadrine] Hives   • Nortriptyline Hives   • Prochlorperazine Shortness Of Breath   • Prochlorperazine Maleate Shortness Of Breath   • Vancomycin Provider Review Needed and Unknown - High Severity     CRITICAL LEVEL RESULTS - PATIENT LIKELY HAS METABOLIC / CLEARANCE ISSUE WITH VANCOMYCIN. ON STANDARD REGIMENS BASED ON BAYESIAN/POPULATION PK PARAMETERS, VANCOMYCIN TROUGH LEVELS IN THE 60s, 70s, 90s, 100s WERE OBTAINED. RECOMMEND AGAINST EVER USING VANCOMYCIN IN THIS PATIENT. IF VANCOMYCIN IS ABSOLUTELY INDICATED WITHOUT ANY OTHER OPTIONS, PULSE INTERMITTENT DOSING WOULD LIKELY BE THE ONLY APPROPRIATE METHOD.    • Codeine Nausea And Vomiting     Rash    • Penicillins Rash     Nausea & vomiting    • Calamine Itching   • Amoxicillin-Pot Clavulanate Rash, Hives and Itching   • Avelox [Moxifloxacin Hcl] Rash   • Cefazolin Nausea And Vomiting   • Cephalexin Rash     (Keflex)    • Ciprofloxacin Rash   • Clindamycin/Lincomycin Rash   • Doxycycline Nausea And Vomiting   • Hydroxyzine Hcl Itching   • Moxifloxacin Nausea And Vomiting   • Orphenadrine Citrate Itching   • Sulfamethoxazole-Trimethoprim Nausea And Vomiting and Rash     States she also gets yeast infections with it   • Tobramycin Other (See Comments)     Eyes burn-feel like eyes are on fire     • Vistaril [Hydroxyzine] Rash       Past Surgical History:   Procedure Laterality Date   • CATARACT EXTRACTION     • CATARACT EXTRACTION     • CATARACT EXTRACTION     • CORNEAL TRANSPLANT      bilateral    • ECTOPIC PREGNANCY     • INCISION AND DRAINAGE ABSCESS Left 6/1/2022    Procedure: DEBRIDEMENTAND PULSE LAVAGE LT MEDIAL THIGH PACKING ODOFORM GAUZE  WOUND SACRAL AND BILATERAL CALVES;  Surgeon: Justin Perez MD;  Location: [facility];  Service: General;  Laterality: Left;   • JOINT REPLACEMENT     • KNEE ARTHROSCOPY W/ MENISCAL REPAIR     • KNEE MENISCAL  REPAIR     • MENISCECTOMY Left 12/20/2016   • TUBAL ABDOMINAL LIGATION         Family History   Problem Relation Age of Onset   • No Known Problems Mother    • Cancer Father    • Dementia Father    • Hypertension Maternal Grandmother    • Cancer Maternal Grandfather        Social History     Socioeconomic History   • Marital status: Single   Tobacco Use   • Smoking status: Never Smoker   • Smokeless tobacco: Never Used   Vaping Use   • Vaping Use: Never used   Substance and Sexual Activity   • Alcohol use: Yes   • Drug use: No   • Sexual activity: Not Currently     Partners: Male           Objective   Physical Exam  Vitals and nursing note reviewed.   Constitutional:       Appearance: She is well-developed. She is ill-appearing. She is not toxic-appearing.   HENT:      Head: Normocephalic and atraumatic.      Right Ear: External ear normal.      Left Ear: External ear normal.      Nose: Nose normal.      Mouth/Throat:      Pharynx: Oropharynx is clear.   Eyes:      Conjunctiva/sclera: Conjunctivae normal.      Pupils: Pupils are equal, round, and reactive to light.   Cardiovascular:      Rate and Rhythm: Normal rate and regular rhythm.      Heart sounds: Normal heart sounds.   Pulmonary:      Effort: Pulmonary effort is normal.      Breath sounds: Normal breath sounds.   Abdominal:      General: Bowel sounds are normal.      Palpations: Abdomen is soft.   Musculoskeletal:         General: Swelling and tenderness present. Normal range of motion.      Cervical back: Normal range of motion and neck supple.      Comments: Multiple large healing wounds all pictures put in the chart.     Bilateral heels with wounds measured in media, no surrounding erythema, slight darkened tissue at margin edges   Skin:     General: Skin is warm and dry.      Capillary Refill: Capillary refill takes less than 2 seconds.   Neurological:      General: No focal deficit present.      Mental Status: She is alert and oriented to person, place,  and time.   Psychiatric:         Mood and Affect: Mood normal.         Behavior: Behavior normal.         Thought Content: Thought content normal.         Judgment: Judgment normal.         Procedures           ED Course patient has hx of noncompliance with treatment recommended. She will need to f/u with wound care as instructed. She will need to return with any worsening sxs such as worsening skin changes or fever. She needs to do a better job with controlling blood sugars and maintaining diabetic regimen. She previously refused wound vac that was recommended by surgeon. See his note for details. At any rate, all reviewed by Dr. Perry who feels this may be treated by wound care. She received a dose of iv antibiotics and will prescribe po doxycycline. She has multiple allergies and states that this medication she has tolerated.  Labs Reviewed   COMPREHENSIVE METABOLIC PANEL - Abnormal; Notable for the following components:       Result Value    Glucose 305 (*)     Creatinine 1.02 (*)     Sodium 134 (*)     Chloride 96 (*)     Albumin 3.10 (*)     All other components within normal limits    Narrative:     GFR Normal >60  Chronic Kidney Disease <60  Kidney Failure <15     LACTIC ACID, PLASMA - Abnormal; Notable for the following components:    Lactate 2.7 (*)     All other components within normal limits   CBC WITH AUTO DIFFERENTIAL - Abnormal; Notable for the following components:    Hemoglobin 9.7 (*)     Hematocrit 32.8 (*)     MCH 23.9 (*)     MCHC 29.6 (*)     RDW 15.9 (*)     All other components within normal limits   COVID-19,[facility] IN-HOUSE,NASAL SWAB NO TRANSPORT MEDIA 2 HR TAT - Normal    Narrative:     Fact sheet for providers: https://www.fda.gov/media/475568/download     Fact sheet for patients: https://www.fda.gov/media/778759/download    Test performed by PCR.    Consider negative results in combination with clinical observations, patient history, and epidemiological information.   PROCALCITONIN  "- Normal    Narrative:     As a Marker for Sepsis (Non-Neonates):    1. <0.5 ng/mL represents a low risk of severe sepsis and/or septic shock.  2. >2 ng/mL represents a high risk of severe sepsis and/or septic shock.    As a Marker for Lower Respiratory Tract Infections that require antibiotic therapy:    PCT on Admission    Antibiotic Therapy       6-12 Hrs later    >0.5                Strongly Recommended  >0.25 - <0.5        Recommended   0.1 - 0.25          Discouraged              Remeasure/reassess PCT  <0.1                Strongly Discouraged     Remeasure/reassess PCT    As 28 day mortality risk marker: \"Change in Procalcitonin Result\" (>80% or <=80%) if Day 0 (or Day 1) and Day 4 values are available. Refer to http://www.[email or web address] [place]-pct-calculator.com    Change in PCT <=80%  A decrease of PCT levels below or equal to 80% defines a positive change in PCT test result representing a higher risk for 28-day all-cause mortality of patients diagnosed with severe sepsis for septic shock.    Change in PCT >80%  A decrease of PCT levels of more than 80% defines a negative change in PCT result representing a lower risk for 28-day all-cause mortality of patients diagnosed with severe sepsis or septic shock.      BLOOD CULTURE   BLOOD CULTURE   LACTIC ACID, REFLEX   CBC AND DIFFERENTIAL    Narrative:     The following orders were created for panel order CBC & Differential.  Procedure                               Abnormality         Status                     ---------                               -----------         ------                     CBC Auto Differential[[id number]]        Abnormal            Final result                 Please view results for these tests on the individual orders.     ED Course as of 07/08/22 1940 Fri Jul 08, 2022 1815 Dr bowen reviewed wounds and labs. He reviewed case. Patient will need to f/u with wound care as previously instructed. She received a dose of iv antibiotics. She tells me " she has tolerated doxycycline for cellulitis, she has numerous drug allergies. We recommend close f/u with wound care on Monday. She will need to continue to do the wet to dry dressings as instructed previously. She will need to return with worsening sxs.  [TW]      ED Course User Index  [TW] Mireya Thomas, APRKAIA                                           MDM  Number of Diagnoses or Management Options  Visit for wound check: new and requires workup     Amount and/or Complexity of Data Reviewed  Clinical lab tests: ordered and reviewed  Tests in the radiology section of CPT®: ordered and reviewed  Decide to obtain previous medical records or to obtain history from someone other than the patient: yes  Discuss the patient with other providers: yes    Risk of Complications, Morbidity, and/or Mortality  Presenting problems: moderate  Diagnostic procedures: moderate  Management options: moderate    Patient Progress  Patient progress: improved      Final diagnoses:   Visit for wound check       ED Disposition  ED Disposition     ED Disposition   Discharge    Condition   Good    Comment   --             No follow-up provider specified.       Medication List      New Prescriptions    doxycycline 100 MG capsule  Commonly known as: MONODOX  Take 1 capsule by mouth 2 (Two) Times a Day for 10 days.           Where to Get Your Medications      These medications were sent to [id number] - [place], [place] - [address] AT [facility] [address] - [phone]  - [phone] FX  [address], [facility] [address]    Phone: [phone]   · doxycycline 100 MG capsule          Mireya Thomas, APRN  07/08/22 1942

## 2022-07-08 NOTE — HOME HEALTH
FOCUS OF CARE/SKILLED NEED: HOME SAFETY/FALL PREVENTION, MEDICATION EDUCATION, PAIN MANAGEMENT, PREVENTATIVE SKIN CARE, wound care, s/s of infection    TEACHING/INTERVENTIONS: FALL PREVENTION, MEDICATION EDUCATION, PAIN MANAGEMENT, PREVENTATIVE SKIN CARE, wound care, s/s of infection    PROGRESS TOWARDS GOALS: most of the wounds are healing without s/s of infection    RECENT FALLS:  none    RECENT MEDICATION CHANGES: none    D/C PLAN:  DISCHARGE WITH GOALS MET    PHYSICIAN CONTACT:  Yes, [facility]     INSURANCE CHANGES:  none    PRE-SCREEN FOR COVID 19: denies any s/s of covid    Patient's left heel has 60 -70% brown/black tissue. Edges are dry and callused looking. Wound care could not see her today and states that with the pain the patient is having she would be better served at the emergency department. Patient agreed to go to [facility] Emergency Department via personal vehicle. Report called to Janice in ED, [facility] notified as well.

## 2022-07-13 ENCOUNTER — HOME CARE VISIT (OUTPATIENT)
Dept: HOME HEALTH SERVICES | Facility: HOME HEALTHCARE | Age: 52
End: 2022-07-13

## 2022-07-13 VITALS — TEMPERATURE: 99.3 F | RESPIRATION RATE: 16 BRPM | OXYGEN SATURATION: 96 % | HEART RATE: 98 BPM

## 2022-07-13 LAB
BACTERIA SPEC AEROBE CULT: NORMAL
BACTERIA SPEC AEROBE CULT: NORMAL

## 2022-07-13 PROCEDURE — G0300 HHS/HOSPICE OF LPN EA 15 MIN: HCPCS

## 2022-07-13 NOTE — HOME HEALTH
FOCUS OF CARE/SKILLED NEED: HOME SAFETY/FALL PREVENTION, MEDICATION EDUCATION, PAIN MANAGEMENT, PREVENTATIVE SKIN CARE, wound care, s/s of infection    TEACHING/INTERVENTIONS: FALL PREVENTION, MEDICATION EDUCATION, PAIN MANAGEMENT, PREVENTATIVE SKIN CARE, wound care, s/s of infection    PROGRESS TOWARDS GOALS: most wounds are healing without s/s of infection    RECENT FALLS:  none    RECENT MEDICATION CHANGES:  none    D/C PLAN:  DISCHARGE WITH GOALS MET    PHYSICIAN CONTACT:  none    INSURANCE CHANGES:  none    PRE-SCREEN FOR COVID 19:  Denies any s/s of covid 19

## 2022-07-19 ENCOUNTER — HOSPITAL ENCOUNTER (OUTPATIENT)
Dept: WOUND CARE | Age: 52
Discharge: HOME | End: 2022-07-19
Payer: MEDICAID

## 2022-07-19 VITALS
RESPIRATION RATE: 20 BRPM | DIASTOLIC BLOOD PRESSURE: 73 MMHG | TEMPERATURE: 97 F | SYSTOLIC BLOOD PRESSURE: 135 MMHG | HEIGHT: 61 IN | WEIGHT: 293 LBS | BODY MASS INDEX: 55.32 KG/M2 | HEART RATE: 85 BPM

## 2022-07-19 DIAGNOSIS — E11.622 DIABETIC ULCER OF LEFT LOWER LEG ASSOCIATED WITH TYPE 2 DIABETES MELLITUS, WITH FAT LAYER EXPOSED (HCC): Chronic | ICD-10-CM

## 2022-07-19 DIAGNOSIS — I89.0 LYMPHEDEMA OF BOTH LOWER EXTREMITIES: ICD-10-CM

## 2022-07-19 DIAGNOSIS — E11.65 UNCONTROLLED TYPE 2 DIABETES MELLITUS WITH HYPERGLYCEMIA, WITHOUT LONG-TERM CURRENT USE OF INSULIN (HCC): Chronic | ICD-10-CM

## 2022-07-19 DIAGNOSIS — L97.422 DIABETIC ULCER OF LEFT HEEL ASSOCIATED WITH TYPE 2 DIABETES MELLITUS, WITH FAT LAYER EXPOSED (HCC): ICD-10-CM

## 2022-07-19 DIAGNOSIS — I87.2 VENOUS INSUFFICIENCY OF BOTH LOWER EXTREMITIES: ICD-10-CM

## 2022-07-19 DIAGNOSIS — E66.01 MORBID OBESITY WITH BMI OF 50.0-59.9, ADULT (HCC): ICD-10-CM

## 2022-07-19 DIAGNOSIS — E11.621 DIABETIC ULCER OF LEFT HEEL ASSOCIATED WITH TYPE 2 DIABETES MELLITUS, WITH FAT LAYER EXPOSED (HCC): ICD-10-CM

## 2022-07-19 DIAGNOSIS — I87.2 PERIPHERAL VENOUS INSUFFICIENCY: ICD-10-CM

## 2022-07-19 DIAGNOSIS — E11.622: ICD-10-CM

## 2022-07-19 DIAGNOSIS — E08.622: ICD-10-CM

## 2022-07-19 DIAGNOSIS — L97.922 SKIN ULCER OF LEFT LOWER LEG, WITH FAT LAYER EXPOSED (HCC): Primary | ICD-10-CM

## 2022-07-19 DIAGNOSIS — L98.422: ICD-10-CM

## 2022-07-19 DIAGNOSIS — L97.102: ICD-10-CM

## 2022-07-19 DIAGNOSIS — L97.922 DIABETIC ULCER OF LEFT LOWER LEG ASSOCIATED WITH TYPE 2 DIABETES MELLITUS, WITH FAT LAYER EXPOSED (HCC): Chronic | ICD-10-CM

## 2022-07-19 PROCEDURE — 99215 OFFICE O/P EST HI 40 MIN: CPT | Performed by: NURSE PRACTITIONER

## 2022-07-19 PROCEDURE — 99215 OFFICE O/P EST HI 40 MIN: CPT

## 2022-07-19 RX ORDER — COLLAGENASE SANTYL 250 [ARB'U]/G
1 OINTMENT TOPICAL DAILY
Qty: 30 EACH | Refills: 0 | Status: SHIPPED | OUTPATIENT
Start: 2022-07-19 | End: 2022-07-19

## 2022-07-19 ASSESSMENT — PAIN DESCRIPTION - PAIN TYPE: TYPE: ACUTE PAIN

## 2022-07-19 ASSESSMENT — PAIN DESCRIPTION - ORIENTATION: ORIENTATION: RIGHT;LEFT

## 2022-07-19 ASSESSMENT — PAIN DESCRIPTION - LOCATION: LOCATION: LEG

## 2022-07-19 ASSESSMENT — PAIN DESCRIPTION - FREQUENCY: FREQUENCY: CONTINUOUS

## 2022-07-19 ASSESSMENT — PAIN DESCRIPTION - DESCRIPTORS: DESCRIPTORS: ACHING;THROBBING

## 2022-07-19 ASSESSMENT — PAIN SCALES - GENERAL: PAINLEVEL_OUTOF10: 7

## 2022-07-19 ASSESSMENT — PAIN DESCRIPTION - ONSET: ONSET: AWAKENED FROM SLEEP

## 2022-07-19 NOTE — PLAN OF CARE
Patient called asking that santyl prescription be electronically sent to [facility] on the [facility] side to see if this can be approved through [facility], stated she has talked to the pharmacy, and she would like to see if [facility] can come 3 times per week. Discussed with Justin Mitchell. She has sent santyl in electronically to [facility] side. AVS faxed to [facility] today.

## 2022-07-19 NOTE — PROGRESS NOTES
Patient Care Team:  NANETTE Haque as PCP - 12 NANETTE Vigil as Advanced Practice Nurse (Neurology)    TODAY'S DATE:  7/19/2022 6/1/22- Dr. Gennaro Bhandari    Findings: Cleaning excision debridement irrigation of a 10 x 5 x 5 cm left thigh abscess and debridement  Cleaning, excision, debridement, irrigation of a 4 x 4 x 1 cm left heel decubiti  Cleaning, debridement, irrigation of a 3 x 3 cm decubiti left posterior calf  Cleaning, debridement, irrigation of a 2 x 2 centimeter decubiti right posterior calf  Cleaning irrigation and debridement of a 2 x 2 centimeter area on the right cheek, 1 x 1 cm on the left cheek, and a 1 x 1 cm central area in the pilonidal region from decubiti  Excision of a sacral decubiti 11 x 4 x 4 cm. Tissue Culture Lab   Scant growth (1+) Escherichia coli ESBL Abnormal  [facility]   Consider infectious disease consult. Susceptibility results may not correlate to clinical outcomes. Scant growth (1+) Proteus mirabilis Abnormal  [facility]     HISTORY of PRESENTILLNESS HPI   Phillip Trivedi is a 54 y.o. female who presents today for wound evaluation. She reports she developed a wound on left foot, thigh, and back. This started 2 month(s) ago. She believes this is  healing. She has been applying moist gauze into the wound bed. She has not had  fever or chills. She has a history of diabetes mellitus and lymphedema. Ms. Lit Duarte has had a history of non-compliance with wound care in the past.  She states she cannot tolerate debridement nor lymphedema pumps due to the pain. She was previously ordered a wound vac but states it was too painful as well. I can try to use santyl to help debride the wounds but cost is a factor with Santyl.   Wound Type: surgical, venous, diabetic  Wound Location: left heel, left leg, left thigh, and back  Modifying factors:edema, lymphedema, diabetes, poor glucose control, chronic pressure, shear force, obesity, and non-adherence    Patient Active Problem List   Diagnosis Code    RLS (restless legs syndrome) G25.81    Hypertension I10    Headache R51.9    Venous insufficiency of both lower extremities I87.2    Mild single current episode of major depressive disorder ([facility]) F32.0    [place] (generalized anxiety disorder) F41.1    Morbid obesity due to excess calories ([facility]) E66.01    Old complex tear of medial meniscus of left knee M23.204    Uncontrolled type 2 diabetes mellitus with hyperglycemia, without long-term current use of insulin ([facility]) E11.65    Migraine without aura and without status migrainosus, not intractable G43.009    Morbid obesity with BMI of 50.0-59.9, adult ([facility]) E66.01, Z68.43    Uncontrolled type 2 diabetes mellitus with diabetic neuropathy, with long-term current use of insulin ([facility]) E11.40, Z79.4, E11.65    Dyspnea R06.00    Upper respiratory tract infection J06.9    Pleurisy R09.1    Gastroesophageal reflux disease K21.9    Hypoglycemia E16.2    Hypokalemia E87.6    Recurrent cellulitis of lower extremity L03.119    Bilateral lower leg cellulitis L03.116, L03.115    Cellulitis of both lower extremities L03.115, L03.116    Lymphedema of both lower extremities I89.0    Diabetic ulcer of left lower leg associated with type 2 diabetes mellitus, with fat layer exposed ([facility]) E11.622, B54.181    Stage 3 chronic kidney disease ([facility]) N18.30    Stasis dermatitis I87.2    Chronic pain G89.29    Polypharmacy Z79.899    Peripheral venous insufficiency I87.2    Personal history of allergy to antibiotic agent Z88.1    Skin ulcer of left lower leg, with fat layer exposed ([address] L97.922    Cellulitis L03.90    Cellulitis and abscess of right leg L03.115, L02.415    Diabetic ulcer of thigh associated with type 2 diabetes mellitus, with fat layer exposed ([address] E11.622, L97.102    Diabetic ulcer of back associated with diabetes mellitus due to underlying condition, with fat layer exposed ([facility] J19.204, N27.766    Diabetic ulcer of left heel associated with type 2 diabetes mellitus, with fat layer exposed ([facility] E11.621, L97.422     Tianna Trivedi is a 54 y.o. female with the following history reviewed and recorded in [facility]:    Current Outpatient Medications   Medication Sig Dispense Refill    SANTYL 250 UNIT/GM ointment Apply 1 g topically in the morning. Apply topically to wounds with yellow slough. Wound sizes 1.5x1x0.1, 2.1x1.3x0.1, 0.5x3. 9x1.5, 4x4.5x0.3, and 4x6x1. 5. 30 each 0    vitamin D (ERGOCALCIFEROL) 1.25 MG (22131 UT) CAPS capsule Take 1 capsule by mouth once a week 4 capsule 0    gabapentin (NEURONTIN) 100 MG capsule Take 100 mg by mouth daily.  Takes at 4 pm      cyclobenzaprine (FLEXERIL) 10 MG tablet Take 10 mg by mouth 3 times daily as needed for Muscle spasms      topiramate (TOPAMAX) 25 MG tablet Take 1 tablet by mouth at bedtime  (Patient not taking: Reported on 4/12/2022)      ondansetron (ZOFRAN) 4 MG tablet Take 1 tablet by mouth 3 times daily as needed for Nausea or Vomiting 30 tablet 0    insulin glargine (LANTUS SOLOSTAR) 100 UNIT/ML injection pen Inject 30 Units into the skin every evening (Patient taking differently: Inject 10 Units into the skin 3 times daily (with meals) Has been taking 10 units tid with meals) 5 pen 5    blood glucose test strips (ONETOUCH ULTRA) strip USE TO TEST BLOOD SUGAR THREE TIMES DAILY AND AS NEEDED AS DIRECTED 150 strip 5    Lancets (ONETOUCH DELICA PLUS [id number]) MISC USE TO CHECK BLOOD SUGAR AS DIRECTED 100 each 5    pramipexole (MIRAPEX) 0.75 MG tablet TAKE 1 TABLET BY MOUTH TWICE DAILY 60 tablet 5    Insulin Pen Needle ([employer] PEN NEEDLES) 31G X 6 MM MISC 1 each by Does not apply route daily 100 each 3    potassium chloride (KLOR-CON M) 20 MEQ extended release tablet Take 1 tablet by mouth daily as needed (low potassium) 30 tablet 0    ondansetron (ZOFRAN-ODT) 4 MG disintegrating tablet DISSOLVE 1 TABLET UNDER THE TONGUE EVERY 8 HOURS AS NEEDED FOR NAUSEA AND VOMITING PRIOR TO DOXYCYCLINE DOSES 20 tablet 0    Liraglutide (VICTOZA) 18 MG/3ML SOPN SC injection ADMINISTER 1.8 MG UNDER THE SKIN EVERY DAY 9 mL 3    mirtazapine (REMERON) 15 MG tablet TAKE 1 TABLET BY MOUTH EVERY NIGHT 30 tablet 3    busPIRone (BUSPAR) 10 MG tablet TAKE 1 TABLET BY MOUTH THREE TIMES DAILY (Patient taking differently: 2 times daily ) 90 tablet 3    insulin lispro, 1 Unit Dial, (ADMELOG SOLOSTAR) 100 UNIT/ML SOPN ADMINISTER 45 UNITS UNDER THE SKIN EVERY EVENING. INCREASE BY 3 UNITS EVERY NIGHT UNTIL 60 UNITS IS REACHED AND. CONTINUE 60 UNITS EVERY EVENING (Patient taking differently: 45 UNITS EVERY EVENING) 18 mL 5    fluticasone (FLONASE) 50 MCG/ACT nasal spray SHAKE LIQUID AND USE 1 SPRAY IN EACH NOSTRIL DAILY 16 g 0    furosemide (LASIX) 80 MG tablet Take 0.5 tablets by mouth 2 times daily 60 tablet 0    DULoxetine (CYMBALTA) 60 MG extended release capsule TAKE 1 CAPSULE BY MOUTH TWICE DAILY 30 capsule 5    omeprazole (PRILOSEC) 20 MG delayed release capsule TAKE ONE CAPSULE BY MOUTH TWICE DAILY BEFORE MEALS 180 capsule 1    Diabetic Shoe MISC by Does not apply route DISPENSE ONE PAIR OF DIABETIC SHOE AND 3 PAIRS HEAT MOLDED INSERTS 1 each 0    oxyCODONE-acetaminophen (PERCOCET) 7.5-325 MG per tablet Take 1 tablet by mouth three times daily. Morphine Sulfate ER 15 MG T12A Take 15 mg by mouth 2 times daily. Cream Base CREA APPLY ONE PUMP (GRAM) TO AFFECTED AREA(S) THREE TO FOUR TIMES A DAY TO THE APPENDAGES AND TRUNK AS DIRECTED 30 g 5    celecoxib (CELEBREX) 200 MG capsule TAKE 1 CAPSULE BY MOUTH EVERY DAY 60 capsule 5    blood glucose monitor kit and supplies Test 1 times a day & as needed for symptoms of irregular blood glucose. 1 kit 0    gabapentin (NEURONTIN) 300 MG capsule Take 400 mg by mouth 3 times daily.    5    Blood Glucose Monitoring Suppl ([address]) w/Device KIT 1 kit by Does not apply route daily as needed (Dx 250.00) 1 kit 0    ONE TOUCH LANCETS MISC 1 each by Does not apply route daily 100 each 3     No current facility-administered medications for this encounter.      Allergies: Compazine [prochlorperazine], Ciprofloxacin, Calamine, Prochlorperazine edisylate, Tobramycin, Tramadol, Amoxicillin-pot clavulanate, Ancef [cefazolin], Bactrim [sulfamethoxazole-trimethoprim], Cephalexin, Clindamycin/lincomycin, Codeine, Demerol, Doxycycline, Hydroxyzine hcl, Ibuprofen, Meperidine, Moxifloxacin, Nortriptyline, Orphenadrine citrate, Penicillins, and Vistaril [hydroxyzine hcl]  Past Medical History:   Diagnosis Date    Anxiety     Constipation     Depression     GERD (gastroesophageal reflux disease)     Headache(784.0)     Hyperlipidemia     Hypertension     Kidney stones     Kidney stones     Morbid obesity due to excess calories ([address]     Neuromuscular disorder ([address]     Restless legs syndrome     Type 2 diabetes mellitus ([address]        Past Surgical History:   Procedure Laterality Date    ECTOPIC PREGNANCY SURGERY  2002    EYE SURGERY Bilateral      & : cornea transplant and cataracts removed    KNEE CARTILAGE SURGERY Left 2016    KNEE ARTHROSCOPIC PARTIAL MEDIAL MENISCECTOMY performed by Amina Dunn MD at [address] [place] [address]       Family History   Problem Relation Age of Onset    Obesity Mother     Arthritis Mother         has had knees replaced    Cancer Father         \"adrenal gland & lung\"    Parkinsonism Father     Cancer Maternal Grandmother     COPD Maternal Grandmother     Obesity Maternal Grandmother     Heart Failure Maternal Grandmother     Cancer Maternal Grandfather     Other Sister         spina biffida    No Known Problems Son     No Known Problems Son     No Known Problems Daughter     Obesity Maternal Aunt     Other Maternal Aunt          of sepsis related to a spider bite     Social History     Tobacco Use    Smoking status: Never    Smokeless tobacco: Never   Substance Use Topics    Alcohol use: Not Currently     Comment: \"socially, not recently though\"         Review of Systems    Review of Systems    All other review of systems are negative. Physical Exam    /73   Pulse 85   Temp 97 °F (36.1 °C) (Temporal)   Resp 20   Ht 5' 1\" (1.549 m)   Wt (!) 305 lb (138.3 kg)   BMI 57.63 kg/m²     Physical Exam        Post Debridement Measurements and Assessment:    The patientspain isPain Level: 7 Pain Type: Acute pain. Please refer to nursing measurements and assessment regarding wound pre and postdebridement. Wound 03/07/22 Pretibial Right;Lateral wound 3- right lateral venous (Active)   Wound Image   07/19/22 1025   Wound Etiology Venous 07/19/22 1025   Dressing Status New dressing applied 07/19/22 1216   Wound Cleansed Soap and water 07/19/22 1025   Dressing/Treatment Ace wrap;Moist to dry; Roll gauze 07/19/22 1216   Wound Length (cm) 0 cm 07/19/22 1025   Wound Width (cm) 0 cm 07/19/22 1025   Wound Depth (cm) 0 cm 07/19/22 1025   Wound Surface Area (cm^2) 0 cm^2 07/19/22 1025   Change in Wound Size % (l*w) 100 07/19/22 1025   Wound Volume (cm^3) 0 cm^3 07/19/22 1025   Wound Healing % 100 07/19/22 1025   Wound Assessment Epithelialization 07/19/22 1025   Drainage Amount Moderate 03/07/22 1027   Drainage Description Serosanguinous 03/07/22 1027   Odor None 03/07/22 1027   Octavia-wound Assessment Blanchable erythema 03/07/22 1027   Margins Defined edges 03/07/22 1027   Wound Thickness Description not for Pressure Injury Full thickness 03/07/22 1027   Number of days: 134       Wound 03/07/22 Tibial Posterior; Left wound 1- left posterior venous (Active)   Wound Image   07/19/22 1025   Wound Etiology Venous 07/19/22 1025   Dressing Status New dressing applied 07/19/22 1216   Wound Cleansed Soap and water 07/19/22 1025   Dressing/Treatment Ace wrap;Roll gauze; Moist to dry 07/19/22 1216   Wound Length (cm) 2.1 cm 07/19/22 1025   Wound Width (cm) 1.3 cm 07/19/22 1025   Wound Depth (cm) 0.1 cm 07/19/22 1025   Wound Surface Area (cm^2) 2.73 cm^2 07/19/22 1025   Change in Wound Size % (l*w) 50.36 07/19/22 1025   Wound Volume (cm^3) 0.273 cm^3 07/19/22 1025   Wound Healing % 97 07/19/22 1025   Wound Assessment Slough 07/19/22 1025   Drainage Amount Moderate 07/19/22 1025   Drainage Description Serosanguinous 07/19/22 1025   Odor None 07/19/22 1025   Octaiva-wound Assessment Blanchable erythema 07/19/22 1025   Margins Defined edges 07/19/22 1025   Wound Thickness Description not for Pressure Injury Full thickness 07/19/22 1025   Number of days: 134       Wound 03/07/22 Tibial Posterior;Right wound 2- right posterior venous (Active)   Wound Image   07/19/22 1025   Wound Etiology Venous 07/19/22 1025   Dressing Status New dressing applied 07/19/22 1216   Wound Cleansed Soap and water 07/19/22 1025   Dressing/Treatment Ace wrap;Moist to dry; Roll gauze 07/19/22 1216   Wound Length (cm) 1.5 cm 07/19/22 1025   Wound Width (cm) 1 cm 07/19/22 1025   Wound Depth (cm) 0.1 cm 07/19/22 1025   Wound Surface Area (cm^2) 1.5 cm^2 07/19/22 1025   Change in Wound Size % (l*w) 58.33 07/19/22 1025   Wound Volume (cm^3) 0.15 cm^3 07/19/22 1025   Wound Healing % 92 07/19/22 1025   Wound Assessment Slough 07/19/22 1025   Drainage Amount Moderate 07/19/22 1025   Drainage Description Serosanguinous 07/19/22 1025   Odor None 07/19/22 1025   Octavia-wound Assessment Blanchable erythema 07/19/22 1025   Margins Defined edges 07/19/22 1025   Wound Thickness Description not for Pressure Injury Full thickness 07/19/22 1025   Number of days: 134       Wound 07/19/22 Thigh Anterior; Left Wound #4 Left Anterior Thigh/Surgical wound (Active)   Wound Image   07/19/22 1025   Wound Etiology Surgical 07/19/22 1025   Dressing Status New dressing applied 07/19/22 1216   Wound Cleansed Soap and water 07/19/22 1025   Dressing/Treatment Moist to dry; Ace wrap;Roll gauze 07/19/22 1216   Wound Length (cm) 0.5 cm 07/19/22 1025   Wound Width (cm) 3.9 cm 07/19/22 1025   Wound Depth (cm) 1.5 cm 07/19/22 1025   Wound Surface Area (cm^2) 1.95 cm^2 07/19/22 1025   Wound Volume (cm^3) 2.925 cm^3 07/19/22 1025   Wound Assessment Slough 07/19/22 1025   Drainage Amount Copious 07/19/22 1025   Drainage Description Serous 07/19/22 1025   Odor None 07/19/22 1025   Octavia-wound Assessment Blanchable erythema 07/19/22 1025   Margins Attached edges 07/19/22 1025   Wound Thickness Description not for Pressure Injury Full thickness 07/19/22 1025   Number of days: 0       Wound 07/19/22 Heel Left Wound #5 Left Heel/Surgical (Active)   Wound Image   07/19/22 1025   Wound Etiology Surgical 07/19/22 1025   Dressing Status New dressing applied 07/19/22 1216   Wound Cleansed Soap and water 07/19/22 1025   Dressing/Treatment Moist to dry; Ace wrap 07/19/22 1216   Offloading for Diabetic Foot Ulcers Felt or foam 07/19/22 1216   Wound Length (cm) 4 cm 07/19/22 1025   Wound Width (cm) 4.5 cm 07/19/22 1025   Wound Depth (cm) 0.3 cm 07/19/22 1025   Wound Surface Area (cm^2) 18 cm^2 07/19/22 1025   Wound Volume (cm^3) 5.4 cm^3 07/19/22 1025   Wound Assessment Slough;Eschar moist 07/19/22 1025   Drainage Amount Moderate 07/19/22 1025   Drainage Description Serosanguinous 07/19/22 1025   Odor None 07/19/22 1025   Octavia-wound Assessment Blanchable erythema 07/19/22 1025   Margins Attached edges 07/19/22 1025   Wound Thickness Description not for Pressure Injury Full thickness 07/19/22 1025   Number of days: 0       Wound 07/19/22 Back Lower;Right Wound #6 Lower Right Back/Surgical (Active)   Wound Image   07/19/22 1025   Wound Etiology Surgical 07/19/22 1025   Dressing Status New dressing applied 07/19/22 1216   Wound Cleansed Soap and water 07/19/22 1025   Dressing/Treatment ABD; Moist to dry 07/19/22 1216   Wound Length (cm) 4 cm 07/19/22 1025   Wound Width (cm) 6 cm 07/19/22 1025   Wound Depth (cm) 1.5 cm 07/19/22 1025   Wound Surface Area (cm^2) 24 cm^2 07/19/22 1025   Wound Volume (cm^3) 36 cm^3 07/19/22 1025   Undermining Starts ___ O'Clock 4 07/19/22 1025   Undermining Ends___ O'Clock 6 07/19/22 1025   Undermining Maxium Distance (cm) 1.7 07/19/22 1025   Wound Assessment Eschar moist;Pink/red;Slough 07/19/22 1025   Drainage Amount Moderate 07/19/22 1025   Drainage Description Serosanguinous 07/19/22 1025   Odor None 07/19/22 1025   Octavia-wound Assessment Blanchable erythema 07/19/22 1025   Margins Attached edges 07/19/22 1025   Wound Thickness Description not for Pressure Injury Full thickness 07/19/22 1025   Number of days: 0            Assessment    1. Skin ulcer of left lower leg, with fat layer exposed ([address]    2. Diabetic ulcer of thigh associated with type 2 diabetes mellitus, with fat layer exposed ([address]    3. Diabetic ulcer of back associated with diabetes mellitus due to underlying condition, with fat layer exposed ([address]    4. Diabetic ulcer of left heel associated with type 2 diabetes mellitus, with fat layer exposed ([address]    5. Lymphedema of both lower extremities    6. Peripheral venous insufficiency    7. Uncontrolled type 2 diabetes mellitus with hyperglycemia, without long-term current use of insulin ([address]    8. Diabetic ulcer of left lower leg associated with type 2 diabetes mellitus, with fat layer exposed ([address]    9. Venous insufficiency of both lower extremities    10. Morbid obesity with BMI of 50.0-59.9, adult ([address]      Plan for wound - Dress per physician order  Treatment:     Compression : Yes   Offloading : Yes   Dressing Orders:  Left Heel,Left thigh,Right lower back,Right lateral leg,Right lower back:Wash with soap and water. Apply Santyl (nickel thickness) to wound, apply saline moistened gauze over the Santyl, cover with dry gauze or ABD pad. Secure leg wounds with kerlix and medipore tape. Secure back wound with ABD pad and medipore tape twice daily.     Discussed importance of debridement, compliance, wound care, plan of care, nutrition, glucose control, and offloading. Patient understanding and questions answered. I spent a total of  60 minutes face to face with the patient, reviewing records, and charting. Over 75% of that time was spent on counseling and care coordination. Patient was told that if symptoms worsen or new symptoms develop they are to go to the emergency department immediately. Patient was educated on diagnosis and treatment plan. All of patient's questions were answered, and the patient understands the discharge plan. Discussed appropriate home care of this wound. Wound redressed. Patient instructions were given. Recommend no smoking  Offloading instructions given. [address] and Hyperbaric Oxygen Therapy   Physician Orders and Discharge Instructions  [address]  Telephone: [phone]    NAME:  Caitlyn De La Cruz:  1970  MEDICAL RECORD NUMBER:  [id number]  DATE:  @ED@    Discharge condition: Stable    Discharge to: Home    Left via:Private automobile    Accompanied by:  self    [facility]: [facility]    Dressing Orders:  Left Heel,Left thigh,Right lower back,Right lateral leg,Right lower back:Wash with soap and water. Apply Santyl (nickel thickness) to wound, apply saline moistened gauze over the Santyl, cover with dry gauze or ABD pad. Secure leg wounds with kerlix and medipore tape. Secure back wound with ABD pad and medipore tape twice daily. Treatment Orders:    Apply Ace Wraps to right and left legs. Felt and Foam left foot    Protein rich diet (unless restricted by your physician); Multivitamin daily; Elevate legs above the level of your heart when sitting 3-4 times daily for at least one hour each time, avoid standing for long periods of time. PRESSURE RELIEF INSTRUCTONS    Avoid Pressure to wound site.   Turn frequently (turn at least every two hours when in bed)  While in chair reposition every 30 minutes  Patient advised to sit up no more than 30 minutes at a time for meals ONLY. Please do not elevate the head of the bed higher than 30 degrees. Off-load heels by applying heel-lift boots or \"float\" heels by placing pillows under calves so that heels do not touch mattress. Continue Protein rich diet (unless restricted by your physician)  Take Multivitamin daily    Please use memory foam cushion in chair          [facility] follow up visit ________________1 week with Jina_____________  (Please note your next appointment above and if you are unable to keep, kindly give a 24 hour notice. Thank you.)          If you experience any of the following, please call the [facility] during business hours:    * Increase in Pain  * Temperature over 101  * Increase in drainage from your wound  * Drainage with a foul odor  * Bleeding  * Increase in swelling  * Need for compression bandage changes due to slippage, breakthrough drainage. If you need medical attention outside of the business hours of the [facility] please contact your PCP or go to the nearest emergency room.

## 2022-07-19 NOTE — DISCHARGE INSTRUCTIONS
the following, please call the [facility] during business hours:    * Increase in Pain  * Temperature over 101  * Increase in drainage from your wound  * Drainage with a foul odor  * Bleeding  * Increase in swelling  * Need for compression bandage changes due to slippage, breakthrough drainage. If you need medical attention outside of the business hours of the [facility] please contact your PCP or go to the nearest emergency room.

## 2022-07-19 NOTE — PLAN OF CARE
Problem: Chronic Conditions and Co-morbidities  Goal: Patient's chronic conditions and co-morbidity symptoms are monitored and maintained or improved  Outcome: Progressing Towards Goal     Problem: Discharge Planning  Goal: Discharge to home or other facility with appropriate resources  Outcome: Progressing Towards Goal     Problem: Pain  Goal: Verbalizes/displays adequate comfort level or baseline comfort level  Outcome: Progressing Towards Goal     Problem: Skin/Tissue Integrity  Goal: Absence of new skin breakdown  Description: 1. Monitor for areas of redness and/or skin breakdown  2. Assess vascular access sites hourly  3. Every 4-6 hours minimum:  Change oxygen saturation probe site  4. Every 4-6 hours:  If on nasal continuous positive airway pressure, respiratory therapy assess nares and determine need for appliance change or resting period.   Outcome: Progressing Towards Goal     Problem: Cognitive:  Goal: Knowledge of wound care  Description: Knowledge of wound care  Outcome: Progressing Towards Goal  Goal: Understands risk factors for wounds  Description: Understands risk factors for wounds  Outcome: Progressing Towards Goal     Problem: Wound:  Goal: Will show signs of wound healing; wound closure and no evidence of infection  Description: Will show signs of wound healing; wound closure and no evidence of infection  Outcome: Progressing Towards Goal     Problem: Pressure Ulcer:  Goal: Signs of wound healing will improve  Description: Signs of wound healing will improve  Outcome: Progressing Towards Goal  Goal: Absence of new pressure ulcer  Description: Absence of new pressure ulcer  Outcome: Progressing Towards Goal  Goal: Will show no infection signs and symptoms  Description: Will show no infection signs and symptoms  Outcome: Progressing Towards Goal     Problem: Arterial:  Goal: Optimize blood flow for wound healing  Description: Optimize blood flow for wound healing  Outcome: Progressing Towards

## 2022-07-20 ENCOUNTER — HOME CARE VISIT (OUTPATIENT)
Dept: HOME HEALTH SERVICES | Facility: CLINIC | Age: 52
End: 2022-07-20

## 2022-07-20 VITALS
DIASTOLIC BLOOD PRESSURE: 70 MMHG | TEMPERATURE: 98.8 F | RESPIRATION RATE: 16 BRPM | HEART RATE: 91 BPM | OXYGEN SATURATION: 97 % | SYSTOLIC BLOOD PRESSURE: 128 MMHG

## 2022-07-20 PROCEDURE — G0299 HHS/HOSPICE OF RN EA 15 MIN: HCPCS

## 2022-07-20 NOTE — HOME HEALTH
FOCUS OF CARE/SKILLED NEED: Wound care    TEACHING/INTERVENTIONS: A&O X 4. Pt c/o pain in her left foot. VSS. Wound care performed per MD order. However, pt refused for me to see or touch her left heel wound due to the excruciating pain it would cause.    PROGRESS TOWARD GOALS: Ongoing, progressing    PHYSICIAN CONTACT: No need.    INSURANCE CHANGES? No    FALLS SINCE LAST VISIT? Pt fell this day, 7/20. Pt fell in her kitchen after she lost her balance when she was standing. Pt called and had EMS come help pick her up. No injuries noted. [facility] Nascimento office notified.    MEDICATION CHANGES SINCE LAST VISIT? No    PLAN FOR NEXT VISIT: Wound care    PRE-SCREENED FOR COVID? Yes, No s/s of COVID. No recent exposure.

## 2022-07-22 ENCOUNTER — TELEPHONE (OUTPATIENT)
Dept: WOUND CARE | Age: 52
End: 2022-07-22

## 2022-07-22 NOTE — TELEPHONE ENCOUNTER
Telephone message from [address] [place] [address]. Santyl not covered by patient insurance, [facility] does not carry and it is very expensive.

## 2022-07-22 NOTE — PROGRESS NOTES
Received message from [employer] that santyl not covered by patient insurance, [facility] does not carry the medication and it is very expensive.

## 2022-07-23 ENCOUNTER — NURSE TRIAGE (OUTPATIENT)
Dept: CALL CENTER | Facility: HOSPITAL | Age: 52
End: 2022-07-23

## 2022-07-23 NOTE — TELEPHONE ENCOUNTER
"Caller asking if Pharmacy has Santyl ointment that she has been prescribed as she has called other locations and they are out. Spoke with Sanket in Pharmacy states Pharmacy closed over weekend but could call back on Monday morning. Caller advised retail pharmacy open on Monday morning and would need to call back as that time and they can check. Advised to call back as needed.     Reason for Disposition  • General information question, no triage required and triager able to answer question    Additional Information  • Negative: [1] Caller is not with the adult (patient) AND [2] reporting urgent symptoms  • Negative: Lab result questions  • Negative: Medication questions  • Negative: Caller can't be reached by phone  • Negative: Caller has already spoken to PCP or another triager  • Negative: RN needs further essential information from caller in order to complete triage  • Negative: Requesting regular office appointment  • Negative: [1] Caller requesting NON-URGENT health information AND [2] PCP's office is the best resource  • Negative: Health Information question, no triage required and triager able to answer question    Answer Assessment - Initial Assessment Questions  1. REASON FOR CALL or QUESTION: \"What is your reason for calling today?\" or \"How can I best help you?\" or \"What question do you have that I can help answer?\"      Caller asking if Pharmacy has Santyl ointment?    Protocols used: INFORMATION ONLY CALL - NO TRIAGE-ADULT-      "

## 2022-07-25 NOTE — DISCHARGE INSTRUCTIONS
[address] [facility]   Physician Orders and Discharge Instructions  [address]  Telephone: [phone]    NAME:  Santiago Neither:  1970  MEDICAL RECORD NUMBER:  [id number]  DATE:  @ED@    Discharge condition: {STABLE/UNSTABLE:645127782}    Discharge to: {CHP Wound Discharge To:87680}    Left via:{Left EDT:40139}    Accompanied by:  {:[id number]}    ECF/HHA: [facility]     Dressing Orders:  Left Heel,Left thigh,Right lower back,Right lateral leg,Right lower back:Wash with soap and water. Apply Santyl (nickel thickness) to wound, apply saline moistened gauze over the Santyl, cover with dry gauze or ABD pad. Secure leg wounds with kerlix and medipore tape. Secure back wound with ABD pad and medipore tape twice daily. Treatment Orders:     Apply Ace Wraps to right and left legs. Felt and Foam left foot     Protein rich diet (unless restricted by your physician); Multivitamin daily; Elevate legs above the level of your heart when sitting 3-4 times daily for at least one hour each time, avoid standing for long periods of time. PRESSURE RELIEF INSTRUCTONS     Avoid Pressure to wound site. Turn frequently (turn at least every two hours when in bed)  While in chair reposition every 30 minutes  Patient advised to sit up no more than 30 minutes at a time for meals ONLY. Please do not elevate the head of the bed higher than 30 degrees. Off-load heels by applying heel-lift boots or \"float\" heels by placing pillows under calves so that heels do not touch mattress.      Continue Protein rich diet (unless restricted by your physician)  Take Multivitamin daily     Please use memory foam cushion in chair            [facility] follow up visit ________________1 week with Jina_____________  (Please note your next appointment above and if you are unable to keep, kindly give a

## 2022-07-26 ENCOUNTER — HOSPITAL ENCOUNTER (OUTPATIENT)
Dept: WOUND CARE | Age: 52
Discharge: HOME | End: 2022-07-26

## 2022-07-26 ENCOUNTER — TELEPHONE (OUTPATIENT)
Dept: WOUND CARE | Age: 52
End: 2022-07-26

## 2022-07-26 RX ORDER — POTASSIUM CHLORIDE 20 MEQ/1
20 TABLET, EXTENDED RELEASE ORAL DAILY
Qty: 30 TABLET | Refills: 0 | OUTPATIENT
Start: 2022-07-26

## 2022-07-26 NOTE — TELEPHONE ENCOUNTER
Patient called saying she was not coming into appointment today, stated she did not want to be debrided, wants to come in for felt and foam. Discussed the importance of debridement to help the wound to heal, and that when she comes to appointment we can evaluate the wound, explained that felt and foam are place during the appointment. Patient stated she has [address], I discussed with patient I would [facility] and talk with [place] [address] if there are any changes to the plan. Patient stated she has already rescheduled for next week, but she cannot do a debridement. Stated she was having trouble getting the [facility] prescription filled has gone to [facility] [address] was the cheapest with the Good RX coupon, but this is still $300. Discussed currently using the moistened gauze to wound bed, explained this is a form of mechanical debridement to help clean the wound until she can get the Santyl.

## 2022-07-26 NOTE — PROGRESS NOTES
Patient called saying she was not coming into appointment today, stated she did not want to be debrided, wants to come in for felt and foam. Discussed the importance of debridement to help the wound to heal, and that when she comes to appointment we can evaluate the wound, explained that felt and foam are place during the appointment. Patient stated she has [facility], I discussed with patient I would review the [facility] orders and talk with [facility] if there are any changes to the plan. Patient stated she has already rescheduled for next week, but she cannot do a debridement. Stated she was having trouble getting the [facility] prescription filled has gone to [address] was the cheapest with the Good RX coupon, but this is still $300. Discussed currently using the moistened gauze to wound bed, explained this is a form of mechanical debridement to help clean the wound until she can get the Santyl.

## 2022-07-27 ENCOUNTER — TELEPHONE (OUTPATIENT)
Dept: WOUND CARE | Age: 52
End: 2022-07-27

## 2022-07-27 NOTE — TELEPHONE ENCOUNTER
Received call from [facility] specialty support center [phone] and spoke with Ina Campos. and Pharmacist Haydee, Pharmacist verifying number of wounds for Santyl, she calculated as 3 tubes 90 gms for 2 weeks supply for twice daily dressing changes, stated there are 5 refills on the prescription. They are in the process of filling this prescription.

## 2022-07-27 NOTE — PROGRESS NOTES
Received call from [facility] specialty support center [phone] and spoke with Anastacio Strong. and Pharmacist Haydee, Pharmacist verifying number of wounds for Santyl, she calculated as 3 tubes 90 gms for 2 weeks supply for twice daily dressing changes, stated there are 5 refills on the prescription. They are in the process of filling this prescription.

## 2022-07-29 ENCOUNTER — HOME CARE VISIT (OUTPATIENT)
Dept: HOME HEALTH SERVICES | Facility: CLINIC | Age: 52
End: 2022-07-29

## 2022-08-01 NOTE — DISCHARGE INSTRUCTIONS
[address] [facility]   Physician Orders and Discharge Instructions  [address]  Telephone: [phone]    NAME:  Judie Mittal:  1970  MEDICAL RECORD NUMBER:  [id number]  DATE:  @ED@    Discharge condition: {STABLE/UNSTABLE:124860640}    Discharge to: {CHP Wound Discharge To:14676}    Left via:{Left AIT:95457}    Accompanied by:  {:[id number]}    ECF/HHA: [facility]     Dressing Orders:  Left Heel,Left thigh,Right lower back,Right lateral leg,Right lower back:Wash with soap and water. Apply Santyl (nickel thickness) to wound, apply saline moistened gauze over the Santyl, cover with dry gauze or ABD pad. Secure leg wounds with kerlix and medipore tape. Secure back wound with ABD pad and medipore tape twice daily. Treatment Orders:     Apply Ace Wraps to right and left legs. Felt and Foam left foot     Protein rich diet (unless restricted by your physician); Multivitamin daily; Elevate legs above the level of your heart when sitting 3-4 times daily for at least one hour each time, avoid standing for long periods of time. PRESSURE RELIEF INSTRUCTONS     Avoid Pressure to wound site. Turn frequently (turn at least every two hours when in bed)  While in chair reposition every 30 minutes  Patient advised to sit up no more than 30 minutes at a time for meals ONLY. Please do not elevate the head of the bed higher than 30 degrees. Off-load heels by applying heel-lift boots or \"float\" heels by placing pillows under calves so that heels do not touch mattress.      Continue Protein rich diet (unless restricted by your physician)  Take Multivitamin daily     Please use memory foam cushion in chair       [address] [facility] follow up visit ________________1 week with Jina_____________     (Please note your next appointment above and if you are unable to keep, kindly give a 24 hour notice. Thank you.)          If you experience any of the following, please call the [facility] during business hours:    * Increase in Pain  * Temperature over 101  * Increase in drainage from your wound  * Drainage with a foul odor  * Bleeding  * Increase in swelling  * Need for compression bandage changes due to slippage, breakthrough drainage. If you need medical attention outside of the business hours of the [facility] please contact your PCP or go to the nearest emergency room.

## 2022-08-02 ENCOUNTER — HOSPITAL ENCOUNTER (OUTPATIENT)
Dept: WOUND CARE | Age: 52
Discharge: HOME | End: 2022-08-02

## 2022-08-03 ENCOUNTER — HOME CARE VISIT (OUTPATIENT)
Dept: HOME HEALTH SERVICES | Facility: CLINIC | Age: 52
End: 2022-08-03

## 2022-08-03 PROCEDURE — G0300 HHS/HOSPICE OF LPN EA 15 MIN: HCPCS

## 2022-08-05 VITALS
OXYGEN SATURATION: 98 % | HEART RATE: 94 BPM | SYSTOLIC BLOOD PRESSURE: 142 MMHG | TEMPERATURE: 98.1 F | DIASTOLIC BLOOD PRESSURE: 82 MMHG | RESPIRATION RATE: 120 BRPM

## 2022-08-05 NOTE — HOME HEALTH
FOCUS OF CARE/SKILLED NEED: HOME SAFETY/FALL PREVENTION, MEDICATION EDUCATION, PAIN MANAGEMENT, PREVENTATIVE SKIN CARE, wound care, s/s of infection    TEACHING/INTERVENTIONS: FALL PREVENTION, MEDICATION EDUCATION, PAIN MANAGEMENT, PREVENTATIVE SKIN CARE, wound care, s/s of infection    PROGRESS TOWARDS GOALS: progressing    RECENT FALLS:   none    RECENT MEDICATION CHANGES: none    D/C PLAN:  DISCHARGE WITH GOALS MET    PHYSICIAN CONTACT:  none    INSURANCE CHANGES: none    PRE-SCREEN FOR COVID 19:  denies any s/s of covid    Patient refused to allow clinician to see left heel, she states CG just changed bandage and it is hurting really bad.

## 2022-08-08 ENCOUNTER — HOME CARE VISIT (OUTPATIENT)
Dept: HOME HEALTH SERVICES | Facility: CLINIC | Age: 52
End: 2022-08-08

## 2022-08-08 NOTE — CASE COMMUNICATION
I called the pt on 8/7/22 and scheduled her recertification visit for 8/8/22 and pt was agreeable to be seen this day. Pt called on 8/8/22 and requested not to be seen by nursing this day due to feeling unwell. I educated the pt that SN must see her today in order for SN to continue seeing her without any interruption. Pt refused SN visit for this day. Pt was educated that she would be discharged from [facility] without a visit t his day. Pt was also educated that we would have to get another referral from her MD before we could readmit her to [facility], and that this process could lead to her not being seen for possibly up to 2 weeks due to waiting time on the MD and homecare availability. Pt v/u, stated this was okay, and agreed for this SN to discharge her from [facility] without a visit.

## 2022-08-08 NOTE — DISCHARGE INSTRUCTIONS
[address] [facility]   Physician Orders and Discharge Instructions  [address]  Telephone: [phone]    NAME:  Sherley Cody:  1970  MEDICAL RECORD NUMBER:  [id number]  DATE:  @ED@    Discharge condition: {STABLE/UNSTABLE:[id number]}    Discharge to: {CHP Wound Discharge To:38143}    Left via:{Left GBK:85638}    Accompanied by:  {:[id number]}    ECF/HHA: [facility]     Dressing Orders:   Left Heel,Left thigh,Right lower back,Right lateral leg,Right lower back:Wash with soap and water. Apply Santyl (nickel thickness) to wound, apply saline moistened gauze over the Santyl, cover with dry gauze or ABD pad. Secure leg wounds with kerlix and medipore tape. Secure back wound with ABD pad and medipore tape twice daily. Treatment Orders:     Apply Ace Wraps to right and left legs. Felt and Foam left foot     Protein rich diet (unless restricted by your physician); Multivitamin daily; Elevate legs above the level of your heart when sitting 3-4 times daily for at least one hour each time, avoid standing for long periods of time. PRESSURE RELIEF INSTRUCTONS     Avoid Pressure to wound site. Turn frequently (turn at least every two hours when in bed)   While in chair reposition every 30 minutes   Patient advised to sit up no more than 30 minutes at a time for meals ONLY. Please do not elevate the head of the bed higher than 30 degrees. Off-load heels by applying heel-lift boots or \"float\" heels by placing pillows under calves so that heels do not touch mattress. Continue Protein rich diet (unless restricted by your physician)   Take Multivitamin daily     Please use memory foam cushion in chair  [facility] follow up visit _____________________________  (Please note your next appointment above and if you are unable to keep, kindly give a 24 hour notice.  Thank you.)          If you experience any of the following, please call the [facility] during business hours:    * Increase in Pain  * Temperature over 101  * Increase in drainage from your wound  * Drainage with a foul odor  * Bleeding  * Increase in swelling  * Need for compression bandage changes due to slippage, breakthrough drainage. If you need medical attention outside of the business hours of the [facility] please contact your PCP or go to the nearest emergency room.

## 2022-08-08 NOTE — HOME HEALTH
I called the pt on 8/7/22 and scheduled her recertification visit for 8/8/22 and pt was agreeable to be seen this day. Pt called on 8/8/22 and requested not to be seen by nursing this day due to feeling unwell. I educated the pt that SN must see her today in order for SN to continue seeing her without any interruption. Pt refused SN visit for this day. Pt was educated that she would be discharged from [facility] without a visit this day. Pt was also educated that we would have to get another referral from her MD before we could readmit her to [facility], and that this process could lead to her not being seen for possibly up to 2 weeks due to waiting time on the MD and homecare availability. Pt v/u, stated this was okay, and agreed for this SN to discharge her from [facility] without a visit.

## 2022-08-09 ENCOUNTER — HOSPITAL ENCOUNTER (OUTPATIENT)
Dept: WOUND CARE | Age: 52
Discharge: HOME | End: 2022-08-09

## 2022-08-11 ENCOUNTER — APPOINTMENT (OUTPATIENT)
Dept: GENERAL RADIOLOGY | Facility: HOSPITAL | Age: 52
End: 2022-08-11

## 2022-08-11 ENCOUNTER — HOSPITAL ENCOUNTER (EMERGENCY)
Facility: HOSPITAL | Age: 52
Discharge: HOME | End: 2022-08-11
Admitting: EMERGENCY MEDICINE

## 2022-08-11 VITALS
HEIGHT: 61 IN | WEIGHT: 293 LBS | OXYGEN SATURATION: 98 % | RESPIRATION RATE: 18 BRPM | BODY MASS INDEX: 55.32 KG/M2 | DIASTOLIC BLOOD PRESSURE: 78 MMHG | HEART RATE: 86 BPM | TEMPERATURE: 98 F | SYSTOLIC BLOOD PRESSURE: 160 MMHG
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
## 2022-08-11 DIAGNOSIS — S22.31XA CLOSED FRACTURE OF ONE RIB OF RIGHT SIDE, INITIAL ENCOUNTER: Primary | ICD-10-CM

## 2022-08-11 PROCEDURE — 71101 X-RAY EXAM UNILAT RIBS/CHEST: CPT

## 2022-08-11 PROCEDURE — 96372 THER/PROPH/DIAG INJ SC/IM: CPT

## 2022-08-11 PROCEDURE — 99283 EMERGENCY DEPT VISIT LOW MDM: CPT

## 2022-08-11 PROCEDURE — 25010000002 KETOROLAC TROMETHAMINE PER 15 MG: Performed by: NURSE PRACTITIONER

## 2022-08-11 RX ORDER — KETOROLAC TROMETHAMINE 30 MG/ML
60 INJECTION, SOLUTION INTRAMUSCULAR; INTRAVENOUS ONCE
Status: COMPLETED | OUTPATIENT
Start: 2022-08-11 | End: 2022-08-11

## 2022-08-11 RX ADMIN — KETOROLAC TROMETHAMINE 60 MG: 30 INJECTION, SOLUTION INTRAMUSCULAR at 17:20

## 2022-08-11 NOTE — DISCHARGE INSTRUCTIONS
Return to ER if symptoms worsen   Ice to area  Follow up with primary care provider next week, return before if symptoms worsen

## 2022-08-11 NOTE — ED PROVIDER NOTES
Subjective   Patient is a 51-year-old white female presents emergency department after falling at home when she was try to get up from her chair.  She states she fell this morning around 6:00 as well and slid down her electric wheelchair.  She is complaining of pain to her right lateral chest wall however she states that that pain is been there for about a week when she fell then as well.  Patient takes a lot of medications that cause sedation.  She took a Flexeril and Percocet before coming to emergency department today.  She denies any head or neck trauma.  No loss of consciousness.  She states she just lost her balance today.  She denies any abdominal pain.  She does have a history of chronic lymphedema which home health comes out and states her dressings.  She also follows with wound care.      History provided by:  Patient   used: No        Review of Systems   Constitutional: Negative.    HENT: Negative.    Eyes: Negative.    Respiratory: Negative.    Cardiovascular: Negative.    Gastrointestinal: Negative.    Endocrine: Negative.    Genitourinary: Negative.    Musculoskeletal:        Patient is a 51-year-old white female presents emergency department after falling at home when she was try to get up from her chair.  She states she fell this morning around 6:00 as well and slid down her electric wheelchair.  She is complaining of pain to her right lateral chest wall however she states that that pain is been there for about a week when she fell then as well.  Patient takes a lot of medications that cause sedation.  She took a Flexeril and Percocet before coming to emergency department today.  She denies any head or neck trauma.  No loss of consciousness.  She states she just lost her balance today.  She denies any abdominal pain.  She does have a history of chronic lymphedema which home health comes out and states her dressings.  She also follows with wound care.     Skin: Negative.     Allergic/Immunologic: Negative.    Neurological: Negative.    Hematological: Negative.    Psychiatric/Behavioral: Negative.    All other systems reviewed and are negative.      Past Medical History:   Diagnosis Date   • Anxiety    • Arthritis     Osteoarthritis primarily left knee    • Back pain    • Chronic pain syndrome 10/27/2021   • Depression    • Diabetes mellitus (HCC)    • Fibromyalgia, primary    • Hx of tear of meniscus of knee joint 11/21/2016   • Hypertension    • Joint pain    • Kidney stone    • Knee pain    • Lymphedema of both lower extremities 10/27/2021   • Migraine    • Migraine headache    • Pain     knee, left ankle, left ankle     • Pes anserinus bursitis 04/28/2015   • Restless leg        Allergies   Allergen Reactions   • Compazine [Prochlorperazine Edisylate] Shortness Of Breath     Breathing    • Meperidine Hives     (Demerol)    • Norflex [Orphenadrine] Hives   • Nortriptyline Hives   • Prochlorperazine Shortness Of Breath   • Prochlorperazine Maleate Shortness Of Breath   • Vancomycin Provider Review Needed and Unknown - High Severity     CRITICAL LEVEL RESULTS - PATIENT LIKELY HAS METABOLIC / CLEARANCE ISSUE WITH VANCOMYCIN. ON STANDARD REGIMENS BASED ON BAYESIAN/POPULATION PK PARAMETERS, VANCOMYCIN TROUGH LEVELS IN THE 60s, 70s, 90s, 100s WERE OBTAINED. RECOMMEND AGAINST EVER USING VANCOMYCIN IN THIS PATIENT. IF VANCOMYCIN IS ABSOLUTELY INDICATED WITHOUT ANY OTHER OPTIONS, PULSE INTERMITTENT DOSING WOULD LIKELY BE THE ONLY APPROPRIATE METHOD.    • Codeine Nausea And Vomiting     Rash    • Penicillins Rash     Nausea & vomiting    • Calamine Itching   • Amoxicillin-Pot Clavulanate Rash, Hives and Itching   • Avelox [Moxifloxacin Hcl] Rash   • Cefazolin Nausea And Vomiting   • Cephalexin Rash     (Keflex)    • Ciprofloxacin Rash   • Clindamycin/Lincomycin Rash   • Doxycycline Nausea And Vomiting   • Hydroxyzine Hcl Itching   • Moxifloxacin Nausea And Vomiting   • Orphenadrine Citrate  Itching   • Sulfamethoxazole-Trimethoprim Nausea And Vomiting and Rash     States she also gets yeast infections with it   • Tobramycin Other (See Comments)     Eyes burn-feel like eyes are on fire     • Vistaril [Hydroxyzine] Rash       Past Surgical History:   Procedure Laterality Date   • CATARACT EXTRACTION     • CATARACT EXTRACTION     • CATARACT EXTRACTION     • CORNEAL TRANSPLANT      bilateral    • ECTOPIC PREGNANCY     • INCISION AND DRAINAGE ABSCESS Left 6/1/2022    Procedure: DEBRIDEMENTAND PULSE LAVAGE LT MEDIAL THIGH PACKING ODOFORM GAUZE  WOUND SACRAL AND BILATERAL CALVES;  Surgeon: Justin Perez MD;  Location: [facility];  Service: General;  Laterality: Left;   • JOINT REPLACEMENT     • KNEE ARTHROSCOPY W/ MENISCAL REPAIR     • KNEE MENISCAL REPAIR     • MENISCECTOMY Left 12/20/2016   • TUBAL ABDOMINAL LIGATION         Family History   Problem Relation Age of Onset   • No Known Problems Mother    • Cancer Father    • Dementia Father    • Hypertension Maternal Grandmother    • Cancer Maternal Grandfather        Social History     Socioeconomic History   • Marital status: Single   Tobacco Use   • Smoking status: Never Smoker   • Smokeless tobacco: Never Used   Vaping Use   • Vaping Use: Never used   Substance and Sexual Activity   • Alcohol use: Yes   • Drug use: No   • Sexual activity: Not Currently     Partners: Male       Prior to Admission medications    Medication Sig Start Date End Date Taking? Authorizing Provider   busPIRone (BUSPAR) 10 MG tablet Take 10 mg by mouth 3 (Three) Times a Day. 4/19/21   Emergency, Nurse Epic, RN   cyclobenzaprine (FLEXERIL) 10 MG tablet Take 10 mg by mouth 2 (Two) Times a Day As Needed for Muscle Spasms.    ProviderAvani MD   docusate sodium (COLACE) 100 MG capsule Take 100 mg by mouth 2 (Two) Times a Day.    Avani Kelly MD   DULoxetine (CYMBALTA) 60 MG capsule Take 60 mg by mouth 2 (Two) Times a Day.    Avani Kelly MD   fluticasone  (FLONASE) 50 MCG/ACT nasal spray 1 spray into the nostril(s) as directed by provider Daily. 3/29/22   Viri Doty APRN   furosemide (LASIX) 80 MG tablet Take 40 mg by mouth 2 (Two) Times a Day As Needed (swelling).    Avani Kelly MD   gabapentin (NEURONTIN) 100 MG capsule Take 100 mg by mouth Daily. PT TAKES AT 4PM 2/1/22   Avani Kelly MD   gabapentin (NEURONTIN) 300 MG capsule Take 600 mg by mouth 3 (Three) Times a Day. 5/19/19   Avani Kelly MD   insulin glargine (LANTUS, SEMGLEE) 100 UNIT/ML injection Inject 40 Units under the skin into the appropriate area as directed Every Night. 7/20/22   Avani Kelly MD   ketorolac (TORADOL) 10 MG tablet Take 1 tablet by mouth Every 6 (Six) Hours As Needed for Moderate Pain . 6/15/22   Marianela Neri MD   Liraglutide (Victoza) 18 MG/3ML solution pen-injector injection Inject 1.8 mg under the skin into the appropriate area as directed Daily.    ProviderAvani MD   Magnesium Oxide 400 (240 Mg) MG tablet Take 200 mg by mouth Daily. 4/15/22   Avani Kelly MD   mirtazapine (REMERON) 15 MG tablet Take 15 mg by mouth Every Night. 5/13/21   Emergency, Nurse Janette, RN   Morphine Sulfate ER 15 MG tablet extended-release 12 hour Take 15 mg by mouth 2 (two) times a day.    Emergency, Nurse Janette RN   omeprazole (priLOSEC) 40 MG capsule Take 40 mg by mouth 2 (Two) Times a Day.    Avani Kelly MD   oxyCODONE-acetaminophen (PERCOCET) 7.5-325 MG per tablet Take 1 tablet by mouth Every 8 (Eight) Hours As Needed for Moderate Pain  or Severe Pain-Do not take with other Percocet prescription 6/9/22   Marianela Neri MD   polyethylene glycol (MIRALAX) 17 GM/SCOOP powder Take 17 g by mouth Daily As Needed (CONSTIPATION). 4/22/22   Viri Doty APRN   potassium chloride (K-DUR,KLOR-CON) 20 MEQ CR tablet Take 1 tablet by mouth Daily. 6/23/22   TERRY Manuel MD   pramipexole (MIRAPEX) 0.75 MG tablet Take 0.75 mg by  "mouth 2 (two) times a day.    Avani Kelly MD   vitamin D (ERGOCALCIFEROL) 1.25 MG (58969 UT) capsule capsule Take 50,000 Units by mouth 1 (One) Time Per Week. 4/15/22   Avani Kelly MD   ZOLMitriptan (Zomig) 5 MG tablet Take 1 tablet by mouth 1 (One) Time As Needed for Migraine. 11/22/21   TERRY Manuel MD   clonazePAM (KlonoPIN) 0.5 MG tablet Take 0.5 mg by mouth 2 (Two) Times a Day As Needed for Anxiety.  11/3/21  Avani Kelly MD   topiramate (TOPAMAX) 25 MG tablet Take 25 mg by mouth Daily. 2/21/22 4/20/22  Emergency, Nurse Janette, TAQUERIA       /78 (BP Location: Right arm, Patient Position: Lying)   Pulse 86   Temp 98 °F (36.7 °C) (Oral)   Resp 18   Ht 154.9 cm (61\")   Wt (!) 143 kg (315 lb)   LMP  (LMP Unknown)   SpO2 98%   BMI 59.52 kg/m²     Objective   Physical Exam  Vitals and nursing note reviewed.   Constitutional:       Appearance: She is well-developed. She is obese.      Comments: Chronically ill appearing. No acute distress.    HENT:      Head: Normocephalic and atraumatic.   Eyes:      Conjunctiva/sclera: Conjunctivae normal.      Pupils: Pupils are equal, round, and reactive to light.   Neck:      Thyroid: No thyromegaly.      Trachea: No tracheal deviation.   Cardiovascular:      Rate and Rhythm: Normal rate and regular rhythm.      Heart sounds: Normal heart sounds.   Pulmonary:      Effort: Pulmonary effort is normal. No respiratory distress.      Breath sounds: Normal breath sounds. No wheezing or rales.   Chest:      Chest wall: No tenderness.   Abdominal:      General: Bowel sounds are normal.      Palpations: Abdomen is soft.      Comments: abd is obese, nontender on palpation. No signs of trauma. No ecchymosis noted.    Musculoskeletal:      Cervical back: Normal range of motion and neck supple.      Comments: Chronic lymphedema noted to bilat les with dressings noted. Pt has tenderness on palpation on palpation of right lateral chest wall. No crepitus " noted. Lungs cta.    Skin:     General: Skin is warm and dry.   Neurological:      Mental Status: She is alert and oriented to person, place, and time.      Cranial Nerves: No cranial nerve deficit.      Deep Tendon Reflexes: Reflexes are normal and symmetric.   Psychiatric:         Behavior: Behavior normal.         Thought Content: Thought content normal.         Judgment: Judgment normal.         Procedures         Lab Results (last 24 hours)     ** No results found for the last 24 hours. **          XR Ribs Right With PA Chest   Final Result   1. Questionable right anterior ninth rib fracture. No pleural effusion   or pneumothorax.   2. Bilateral interstitial coarsening which appears to be an underlying   fibrotic change.   This report was finalized on 08/11/2022 16:17 by Dr Azam Pardo, .          ED Course  ED Course as of 08/11/22 1954   Thu Aug 11, 2022   1626 Upon reevaluation the patient was sleeping.  Walk the patient advised her of the questionable right anterior ninth rib fracture.  No pneumothorax noted.  Advised this was the reason most likely for her right chest wall pain.  Patient is on multiple pain medications at home at this time.  Advised to apply ice to the area.  Advised to avoid lifting greater than 5 pounds.  Advised to apply ice to the area.  Advised to follow-up with a primary care provider next week.  Advised return before symptoms worsen [CW]      ED Course User Index  [CW] Janett Whittington APRN          MDM  Number of Diagnoses or Management Options  Closed fracture of one rib of right side, initial encounter: minor  Fall, initial encounter: minor     Amount and/or Complexity of Data Reviewed  Tests in the radiology section of CPT®: ordered and reviewed    Patient Progress  Patient progress: stable      Final diagnoses:   Closed fracture of one rib of right side, initial encounter   Fall, initial encounter          Janett Whittington APRN  08/11/22 1954

## 2022-08-25 DIAGNOSIS — G43.009 MIGRAINE WITHOUT AURA AND WITHOUT STATUS MIGRAINOSUS, NOT INTRACTABLE: ICD-10-CM

## 2022-08-25 RX ORDER — ZOLMITRIPTAN 5 MG/1
TABLET, FILM COATED ORAL
Qty: 6 TABLET | Refills: 5 | OUTPATIENT
Start: 2022-08-25

## 2022-09-25 ENCOUNTER — HOSPITAL ENCOUNTER (EMERGENCY)
Facility: HOSPITAL | Age: 52
Discharge: HOME | End: 2022-09-25
Attending: STUDENT IN AN ORGANIZED HEALTH CARE EDUCATION/TRAINING PROGRAM | Admitting: STUDENT IN AN ORGANIZED HEALTH CARE EDUCATION/TRAINING PROGRAM

## 2022-09-25 VITALS
DIASTOLIC BLOOD PRESSURE: 66 MMHG | SYSTOLIC BLOOD PRESSURE: 138 MMHG | BODY MASS INDEX: 55.32 KG/M2 | RESPIRATION RATE: 15 BRPM | HEART RATE: 80 BPM | OXYGEN SATURATION: 96 % | WEIGHT: 293 LBS | TEMPERATURE: 98 F | HEIGHT: 61 IN

## 2022-09-25 DIAGNOSIS — S71.111A LACERATION OF RIGHT THIGH, INITIAL ENCOUNTER: Primary | ICD-10-CM

## 2022-09-25 PROCEDURE — 99283 EMERGENCY DEPT VISIT LOW MDM: CPT

## 2022-09-25 RX ORDER — LIDOCAINE HYDROCHLORIDE AND EPINEPHRINE BITARTRATE 20; .01 MG/ML; MG/ML
10 INJECTION, SOLUTION SUBCUTANEOUS ONCE
Status: COMPLETED | OUTPATIENT
Start: 2022-09-25 | End: 2022-09-25

## 2022-09-25 RX ORDER — OXYCODONE AND ACETAMINOPHEN 7.5; 325 MG/1; MG/1
1 TABLET ORAL ONCE
Status: COMPLETED | OUTPATIENT
Start: 2022-09-25 | End: 2022-09-25

## 2022-09-25 RX ADMIN — OXYCODONE HYDROCHLORIDE AND ACETAMINOPHEN 1 TABLET: 7.5; 325 TABLET ORAL at 06:04

## 2022-09-25 RX ADMIN — LIDOCAINE HYDROCHLORIDE AND EPINEPHRINE 10 ML: 20; 10 INJECTION, SOLUTION INFILTRATION; PERINEURAL at 06:05

## 2022-09-25 NOTE — ED PROCEDURE NOTE
Place of Service:[facility] EMERGENCY DEPARTMENT  Patient Name:Valeria Wilson  :1970    Procedure  Laceration Repair    Date/Time: 2022 5:57 AM  Performed by: Tima Tabares MD  Authorized by: Tima Tabares MD     Consent:     Consent obtained:  Verbal    Consent given by:  Patient and spouse    Risks, benefits, and alternatives were discussed: yes      Risks discussed:  Infection, need for additional repair, pain, poor cosmetic result, poor wound healing, nerve damage, retained foreign body, tendon damage and vascular damage    Alternatives discussed:  No treatment  Universal protocol:     Patient identity confirmed:  Verbally with patient  Anesthesia:     Anesthesia method:  Local infiltration    Local anesthetic:  Lidocaine 1% WITH epi  Laceration details:     Location:  Leg    Leg location:  R upper leg    Length (cm):  5    Depth (mm):  15  Exploration:     Wound exploration: entire depth of wound visualized    Treatment:     Area cleansed with:  Saline    Amount of cleaning:  Standard    Irrigation solution:  Sterile saline    Irrigation volume:  1000mL    Irrigation method:  Pressure wash    Visualized foreign bodies/material removed: no    Skin repair:     Repair method:  Sutures    Suture size:  3-0    Suture material:  Nylon    Number of sutures:  8  Approximation:     Approximation:  Close  Repair type:     Repair type:  Simple  Post-procedure details:     Dressing:  Open (no dressing)    Procedure completion:  Tolerated well, no immediate complications              Tima Tabares MD  22 [id number]

## 2022-09-25 NOTE — DISCHARGE INSTRUCTIONS
Today you are seen for your laceration.  I repaired using sutures.  This is at high risk for getting infected.  I would like to follow-up with plastic surgery for wound check in 7 to 10 days he will need sutures removed.  Like to follow-up with chronic wound care I have attached their number please call schedule appointment.  If your symptoms worsen please return to emergency department.

## 2022-09-25 NOTE — ED PROVIDER NOTES
EMERGENCY DEPARTMENT HISTORY AND PHYSICAL EXAM    Patient Name: Valeria Wilson    Chief Complaint   Patient presents with   • Fall   • Laceration       History of Presenting Illness:  Valeria Wilson is a 52 y.o. female with a history of chronic pain as well as chronic lower extremity edema in the setting of lymphedema with prior surgeries who presents emerged department after a trip and fall with a laceration to her right thigh.    Patient states that she had a ground-level trip and fall getting out of a chair unsure exactly she hit her leg on but noticed a laceration to her leg.  Had some bleeding from the area which is why she called EMS.  Denies any significant pain only to the skin denies any associate knee pain or ankle pain.  Did not hit her head or lose conscious during event.  No new neck or back pain or any numbness or tingling arms or legs.  No chest pain or shortness of breath.    Past Medical History:  Past Medical History:   Diagnosis Date   • Anxiety    • Arthritis     Osteoarthritis primarily left knee    • Back pain    • Chronic pain syndrome 10/27/2021   • Depression    • Diabetes mellitus (HCC)    • Fibromyalgia, primary    • Hx of tear of meniscus of knee joint 11/21/2016   • Hypertension    • Joint pain    • Kidney stone    • Knee pain    • Lymphedema of both lower extremities 10/27/2021   • Migraine    • Migraine headache    • Pain     knee, left ankle, left ankle     • Pes anserinus bursitis 04/28/2015   • Restless leg        Past Surgical History:  Past Surgical History:   Procedure Laterality Date   • CATARACT EXTRACTION     • CATARACT EXTRACTION     • CATARACT EXTRACTION     • CORNEAL TRANSPLANT      bilateral    • ECTOPIC PREGNANCY     • INCISION AND DRAINAGE ABSCESS Left 6/1/2022    Procedure: DEBRIDEMENTAND PULSE LAVAGE LT MEDIAL THIGH PACKING ODOFORM GAUZE  WOUND SACRAL AND BILATERAL CALVES;  Surgeon: Justin Perez MD;  Location: [facility];  Service: General;  Laterality: Left;   •  JOINT REPLACEMENT     • KNEE ARTHROSCOPY W/ MENISCAL REPAIR     • KNEE MENISCAL REPAIR     • MENISCECTOMY Left 12/20/2016   • TUBAL ABDOMINAL LIGATION         Social History:   Denies tobacco  Denies EtOH  Denies marijuana, cocaine, or IV drugs      Allergies:  Allergies   Allergen Reactions   • Compazine [Prochlorperazine Edisylate] Shortness Of Breath     Breathing    • Meperidine Hives     (Demerol)    • Norflex [Orphenadrine] Hives   • Nortriptyline Hives   • Prochlorperazine Shortness Of Breath   • Prochlorperazine Maleate Shortness Of Breath   • Vancomycin Provider Review Needed and Unknown - High Severity     CRITICAL LEVEL RESULTS - PATIENT LIKELY HAS METABOLIC / CLEARANCE ISSUE WITH VANCOMYCIN. ON STANDARD REGIMENS BASED ON BAYESIAN/POPULATION PK PARAMETERS, VANCOMYCIN TROUGH LEVELS IN THE 60s, 70s, 90s, 100s WERE OBTAINED. RECOMMEND AGAINST EVER USING VANCOMYCIN IN THIS PATIENT. IF VANCOMYCIN IS ABSOLUTELY INDICATED WITHOUT ANY OTHER OPTIONS, PULSE INTERMITTENT DOSING WOULD LIKELY BE THE ONLY APPROPRIATE METHOD.    • Codeine Nausea And Vomiting     Rash    • Penicillins Rash     Nausea & vomiting    • Calamine Itching   • Amoxicillin-Pot Clavulanate Rash, Hives and Itching   • Avelox [Moxifloxacin Hcl] Rash   • Cefazolin Nausea And Vomiting   • Cephalexin Rash     (Keflex)    • Ciprofloxacin Rash   • Clindamycin/Lincomycin Rash   • Doxycycline Nausea And Vomiting   • Hydroxyzine Hcl Itching   • Moxifloxacin Nausea And Vomiting   • Orphenadrine Citrate Itching   • Sulfamethoxazole-Trimethoprim Nausea And Vomiting and Rash     States she also gets yeast infections with it   • Tobramycin Other (See Comments)     Eyes burn-feel like eyes are on fire     • Vistaril [Hydroxyzine] Rash       Medications:  No current facility-administered medications for this encounter.    Current Outpatient Medications:   •  busPIRone (BUSPAR) 10 MG tablet, Take 10 mg by mouth 3 (Three) Times a Day., Disp: , Rfl:   •   cyclobenzaprine (FLEXERIL) 10 MG tablet, Take 10 mg by mouth 2 (Two) Times a Day As Needed for Muscle Spasms., Disp: , Rfl:   •  docusate sodium (COLACE) 100 MG capsule, Take 100 mg by mouth 2 (Two) Times a Day., Disp: , Rfl:   •  DULoxetine (CYMBALTA) 60 MG capsule, Take 60 mg by mouth 2 (Two) Times a Day., Disp: , Rfl:   •  fluticasone (FLONASE) 50 MCG/ACT nasal spray, 1 spray into the nostril(s) as directed by provider Daily., Disp: 16 g, Rfl: 0  •  furosemide (LASIX) 80 MG tablet, Take 40 mg by mouth 2 (Two) Times a Day As Needed (swelling)., Disp: , Rfl:   •  gabapentin (NEURONTIN) 100 MG capsule, Take 100 mg by mouth Daily. PT TAKES AT 4PM, Disp: , Rfl:   •  gabapentin (NEURONTIN) 300 MG capsule, Take 600 mg by mouth 3 (Three) Times a Day., Disp: , Rfl:   •  insulin glargine (LANTUS, SEMGLEE) 100 UNIT/ML injection, Inject 40 Units under the skin into the appropriate area as directed Every Night., Disp: , Rfl:   •  ketorolac (TORADOL) 10 MG tablet, Take 1 tablet by mouth Every 6 (Six) Hours As Needed for Moderate Pain ., Disp: 30 tablet, Rfl: 0  •  Liraglutide (Victoza) 18 MG/3ML solution pen-injector injection, Inject 1.8 mg under the skin into the appropriate area as directed Daily., Disp: , Rfl:   •  Magnesium Oxide 400 (240 Mg) MG tablet, Take 200 mg by mouth Daily., Disp: , Rfl:   •  mirtazapine (REMERON) 15 MG tablet, Take 15 mg by mouth Every Night., Disp: , Rfl:   •  Morphine Sulfate ER 15 MG tablet extended-release 12 hour, Take 15 mg by mouth 2 (two) times a day., Disp: , Rfl:   •  omeprazole (priLOSEC) 40 MG capsule, Take 40 mg by mouth 2 (Two) Times a Day., Disp: , Rfl:   •  oxyCODONE-acetaminophen (PERCOCET) 7.5-325 MG per tablet, Take 1 tablet by mouth Every 8 (Eight) Hours As Needed for Moderate Pain  or Severe Pain-Do not take with other Percocet prescription, Disp: 30 tablet, Rfl: 0  •  polyethylene glycol (MIRALAX) 17 GM/SCOOP powder, Take 17 g by mouth Daily As Needed (CONSTIPATION)., Disp:  "1530 g, Rfl: 3  •  potassium chloride (K-DUR,KLOR-CON) 20 MEQ CR tablet, Take 1 tablet by mouth Daily., Disp: 30 tablet, Rfl: 0  •  pramipexole (MIRAPEX) 0.75 MG tablet, Take 0.75 mg by mouth 2 (two) times a day., Disp: , Rfl:   •  vitamin D (ERGOCALCIFEROL) 1.25 MG (17014 UT) capsule capsule, Take 50,000 Units by mouth 1 (One) Time Per Week., Disp: , Rfl:   •  ZOLMitriptan (Zomig) 5 MG tablet, Take 1 tablet by mouth 1 (One) Time As Needed for Migraine., Disp: 6 tablet, Rfl: 5    Review of Systems:  A full review of systems was obtained and is negative unless otherwise stated in HPI.    Physical Exam:  VS: /66   Pulse 80   Temp 98 °F (36.7 °C)   Resp 15   Ht 154.9 cm (61\")   Wt (!) 138 kg (305 lb)   LMP  (LMP Unknown)   SpO2 96%   BMI 57.63 kg/m²   GENERAL: Tearful but otherwise well-appearing young woman sitting up in stretcher no acute distress; well nourished, well developed, awake, alert, no acute distress, nontoxic appearing, comfortable  MUSCULOSKELETAL/EXTREMITIES: No skin significant tenderness on the thigh in the area of the laceration; extremities without obvious deformity, no cyanosis or clubbing  SKIN: Laceration to anterior medial thigh about 6 m in width about 10 mm in depth with some fat exposure with some venous oozing seen in the photo below; otherwise chronic appearing lower extremities with notable lymphedema; otherwise warm and dry with no obvious rashes    NEUROLOGIC: moving all 4 extremities symmetrically, CN II-XII grossly intact; GCS 15  PSYCHIATRIC: alert, pleasant and cooperative. Appropriate mood and affect.      Procedures: laceration repair        Medical Decision Making:  Valeria Wilson is a 52 y.o. female who presents emerged department after scraping her leg during a ground-level fall with a laceration to her leg.    Reassuring vital signs fairly unremarkable exam except for laceration which shows some fat exposure.    Wound is irrigated and closed with 8 nylon " sutures.    Patient was able to ambulate emergency department that difficulty emerged from falling.    Patient presentation most consistent with traumatic laceration.  No evidence of foreign body.  Patient stated her last Tdap was within 3 years and medication for repeat Tdap vaccination.  Patient with no significant tenderness I have low sufficient for any fracture from the setting of her fall she is on to ambulate within the emergency part without difficulty.    Patient was discharged home with plan to follow with plastic surgery for a wound check and suture removal in 7 to 10 days.  I think patient is at high risk for developing infection given her chronic leg wounds which have weeping qualities that are more distal but given the area otherwise appears clean I did not prophylactically prescribe antibiotics.  Patient follow-up with blood surgery as an outpatient given return precautions the emergency department to her and her partner who takes care of her at home.      ED Diagnosis:  Laceration of right thigh, initial encounter      Disposition: to home    Follow up plan: Plastic surgery in 7 to 10 days for wound check and suture removal, return to ED immediately if symptoms worse        Signed:  Tima Tabares MD  Emergency Medicine Physician    Please note that portions of this note were completed with a voice recognition program.      Tima Tabares MD  09/25/22 0791

## 2022-10-20 DIAGNOSIS — M17.0 PRIMARY OSTEOARTHRITIS OF BOTH KNEES: ICD-10-CM

## 2022-10-20 DIAGNOSIS — E11.65 UNCONTROLLED TYPE 2 DIABETES MELLITUS WITH HYPERGLYCEMIA, WITHOUT LONG-TERM CURRENT USE OF INSULIN (HCC): ICD-10-CM

## 2022-10-20 RX ORDER — CELECOXIB 200 MG/1
CAPSULE ORAL
Qty: 60 CAPSULE | Refills: 5 | OUTPATIENT
Start: 2022-10-20

## 2022-10-20 RX ORDER — BLOOD SUGAR DIAGNOSTIC
STRIP MISCELLANEOUS
Qty: 150 STRIP | Refills: 5 | OUTPATIENT
Start: 2022-10-20

## 2022-11-14 ENCOUNTER — TRANSCRIBE ORDERS (OUTPATIENT)
Dept: PHYSICAL THERAPY | Facility: CLINIC | Age: 52
End: 2022-11-14

## 2022-11-14 DIAGNOSIS — R26.2 DIFFICULTY IN WALKING, NOT ELSEWHERE CLASSIFIED: Primary | ICD-10-CM

## 2022-11-15 NOTE — TELEPHONE ENCOUNTER
Patient has history of paroxysmal AFL, on eliquis.   CHADS-Vasc score = 2 (CHF + DM).   History of thrombus on RA lead (patient has PPM), but latest limited ECHO (3/10/2022) shows no thrombus.   Last office visit with Dr. Perez was 6/6/2022.     Vasectomy is scheduled for 12/6/2022.   Will review further with Dr. Perez. - [place]   Tianna  called to request a refill on her medication. Last office visit : 9/2/2020   Next office visit : 11/9/2020     Last UDS:   Amphetamine Screen, Urine   Date Value Ref Range Status   05/19/2017 neg  Final     Barbiturate Screen, Urine   Date Value Ref Range Status   05/19/2017 neg  Final     Benzodiazepine Screen, Urine   Date Value Ref Range Status   05/19/2017 neg  Final     Cocaine Metabolite Screen, Urine   Date Value Ref Range Status   05/19/2017 neg  Final     MDMA, Urine   Date Value Ref Range Status   05/19/2017 neg  Final     Methamphetamine, Urine   Date Value Ref Range Status   05/19/2017 neg  Final     Opiate Scrn, Ur   Date Value Ref Range Status   05/19/2017 neg  Final     Oxycodone Screen, Ur   Date Value Ref Range Status   05/19/2017 pos  Final     Comment:     MOP - Positive     PCP Screen, Urine   Date Value Ref Range Status   05/19/2017 neg  Final     Propoxyphene Screen, Urine   Date Value Ref Range Status   05/19/2017 neg  Final     Tricyclic Antidepressants, Urine   Date Value Ref Range Status   05/19/2017 neg  Final       Last Colby Schaumann: 8-19  Medication Contract: 2018   Last Fill: 8-19    Requested Prescriptions     Pending Prescriptions Disp Refills    clonazePAM (KLONOPIN) 0.5 MG tablet 45 tablet 0     Sig: Take 1 tablet by mouth 2 times daily as needed for Anxiety for up to 30 days. Please approve or refuse this medication.    Ca Acosta LPN

## 2022-11-22 ENCOUNTER — HOSPITAL ENCOUNTER (INPATIENT)
Facility: HOSPITAL | Age: 52
LOS: 7 days | Discharge: HOME HEALTH | End: 2022-11-29
Attending: EMERGENCY MEDICINE | Admitting: INTERNAL MEDICINE

## 2022-11-22 ENCOUNTER — APPOINTMENT (OUTPATIENT)
Dept: GENERAL RADIOLOGY | Facility: HOSPITAL | Age: 52
End: 2022-11-22

## 2022-11-22 DIAGNOSIS — E11.65 TYPE 2 DIABETES MELLITUS WITH HYPERGLYCEMIA, WITHOUT LONG-TERM CURRENT USE OF INSULIN: ICD-10-CM

## 2022-11-22 DIAGNOSIS — E66.01 MORBID OBESITY WITH BMI OF 50.0-59.9, ADULT: ICD-10-CM

## 2022-11-22 DIAGNOSIS — D64.9 ANEMIA, UNSPECIFIED TYPE: ICD-10-CM

## 2022-11-22 DIAGNOSIS — L03.119 CELLULITIS OF LOWER EXTREMITY, UNSPECIFIED LATERALITY: Primary | ICD-10-CM

## 2022-11-22 DIAGNOSIS — T14.8XXA OPEN WOUND: ICD-10-CM

## 2022-11-22 DIAGNOSIS — Z74.09 IMPAIRED MOBILITY: ICD-10-CM

## 2022-11-22 DIAGNOSIS — L02.91 ABSCESS: ICD-10-CM

## 2022-11-22 LAB
ALBUMIN SERPL-MCNC: 3.4 G/DL (ref 3.5–5.2)
ALBUMIN/GLOB SERPL: 0.8 G/DL
ALP SERPL-CCNC: 101 U/L (ref 39–117)
ALT SERPL W P-5'-P-CCNC: 6 U/L (ref 1–33)
ANION GAP SERPL CALCULATED.3IONS-SCNC: 9 MMOL/L (ref 5–15)
ANISOCYTOSIS BLD QL: NORMAL
AST SERPL-CCNC: 11 U/L (ref 1–32)
BASOPHILS # BLD AUTO: 0.04 10*3/MM3 (ref 0–0.2)
BASOPHILS NFR BLD AUTO: 0.3 % (ref 0–1.5)
BILIRUB SERPL-MCNC: 0.3 MG/DL (ref 0–1.2)
BUN SERPL-MCNC: 11 MG/DL (ref 6–20)
BUN/CREAT SERPL: 11.2 (ref 7–25)
CALCIUM SPEC-SCNC: 8.9 MG/DL (ref 8.6–10.5)
CHLORIDE SERPL-SCNC: 93 MMOL/L (ref 98–107)
CLUMPED PLATELETS: PRESENT
CO2 SERPL-SCNC: 30 MMOL/L (ref 22–29)
CREAT SERPL-MCNC: 0.98 MG/DL (ref 0.57–1)
D-LACTATE SERPL-SCNC: 2.2 MMOL/L (ref 0.5–2)
DEPRECATED RDW RBC AUTO: 46.9 FL (ref 37–54)
EGFRCR SERPLBLD CKD-EPI 2021: 70.5 ML/MIN/1.73
EOSINOPHIL # BLD AUTO: 0.12 10*3/MM3 (ref 0–0.4)
EOSINOPHIL NFR BLD AUTO: 1 % (ref 0.3–6.2)
ERYTHROCYTE [DISTWIDTH] IN BLOOD BY AUTOMATED COUNT: 17.6 % (ref 12.3–15.4)
GLOBULIN UR ELPH-MCNC: 4.5 GM/DL
GLUCOSE BLDC GLUCOMTR-MCNC: 275 MG/DL (ref 70–130)
GLUCOSE SERPL-MCNC: 309 MG/DL (ref 65–99)
HCT VFR BLD AUTO: 27 % (ref 34–46.6)
HGB BLD-MCNC: 7.6 G/DL (ref 12–15.9)
HYPOCHROMIA BLD QL: NORMAL
LYMPHOCYTES # BLD AUTO: 1.62 10*3/MM3 (ref 0.7–3.1)
LYMPHOCYTES NFR BLD AUTO: 13.4 % (ref 19.6–45.3)
MCH RBC QN AUTO: 20.5 PG (ref 26.6–33)
MCHC RBC AUTO-ENTMCNC: 28.1 G/DL (ref 31.5–35.7)
MCV RBC AUTO: 73 FL (ref 79–97)
MICROCYTES BLD QL: NORMAL
MONOCYTES # BLD AUTO: 0.85 10*3/MM3 (ref 0.1–0.9)
MONOCYTES NFR BLD AUTO: 7 % (ref 5–12)
NEUTROPHILS NFR BLD AUTO: 77.6 % (ref 42.7–76)
NEUTROPHILS NFR BLD AUTO: 9.35 10*3/MM3 (ref 1.7–7)
PLATELET # BLD AUTO: 341 10*3/MM3 (ref 140–450)
PMV BLD AUTO: 8.9 FL (ref 6–12)
POLYCHROMASIA BLD QL SMEAR: NORMAL
POTASSIUM SERPL-SCNC: 3.9 MMOL/L (ref 3.5–5.2)
PROT SERPL-MCNC: 7.9 G/DL (ref 6–8.5)
RBC # BLD AUTO: 3.7 10*6/MM3 (ref 3.77–5.28)
SMALL PLATELETS BLD QL SMEAR: ADEQUATE
SODIUM SERPL-SCNC: 132 MMOL/L (ref 136–145)
WBC MORPH BLD: NORMAL
WBC NRBC COR # BLD: 12.06 10*3/MM3 (ref 3.4–10.8)

## 2022-11-22 PROCEDURE — 82962 GLUCOSE BLOOD TEST: CPT

## 2022-11-22 PROCEDURE — 85025 COMPLETE CBC W/AUTO DIFF WBC: CPT | Performed by: EMERGENCY MEDICINE

## 2022-11-22 PROCEDURE — 85007 BL SMEAR W/DIFF WBC COUNT: CPT | Performed by: EMERGENCY MEDICINE

## 2022-11-22 PROCEDURE — 87040 BLOOD CULTURE FOR BACTERIA: CPT | Performed by: EMERGENCY MEDICINE

## 2022-11-22 PROCEDURE — 25010000002 ONDANSETRON PER 1 MG: Performed by: EMERGENCY MEDICINE

## 2022-11-22 PROCEDURE — 83605 ASSAY OF LACTIC ACID: CPT | Performed by: EMERGENCY MEDICINE

## 2022-11-22 PROCEDURE — 99284 EMERGENCY DEPT VISIT MOD MDM: CPT

## 2022-11-22 PROCEDURE — 0 HYDROMORPHONE 1 MG/ML SOLUTION: Performed by: EMERGENCY MEDICINE

## 2022-11-22 PROCEDURE — 71045 X-RAY EXAM CHEST 1 VIEW: CPT

## 2022-11-22 PROCEDURE — 87147 CULTURE TYPE IMMUNOLOGIC: CPT | Performed by: EMERGENCY MEDICINE

## 2022-11-22 PROCEDURE — 25010000002 MEROPENEM PER 100 MG: Performed by: EMERGENCY MEDICINE

## 2022-11-22 PROCEDURE — 80053 COMPREHEN METABOLIC PANEL: CPT | Performed by: EMERGENCY MEDICINE

## 2022-11-22 PROCEDURE — 83036 HEMOGLOBIN GLYCOSYLATED A1C: CPT | Performed by: FAMILY MEDICINE

## 2022-11-22 RX ORDER — ONDANSETRON 2 MG/ML
4 INJECTION INTRAMUSCULAR; INTRAVENOUS ONCE
Status: COMPLETED | OUTPATIENT
Start: 2022-11-22 | End: 2022-11-22

## 2022-11-22 RX ORDER — SODIUM CHLORIDE 0.9 % (FLUSH) 0.9 %
10 SYRINGE (ML) INJECTION AS NEEDED
Status: DISCONTINUED | OUTPATIENT
Start: 2022-11-22 | End: 2022-11-29 | Stop reason: HOSPADM

## 2022-11-22 RX ADMIN — HYDROMORPHONE HYDROCHLORIDE 1 MG: 1 INJECTION, SOLUTION INTRAMUSCULAR; INTRAVENOUS; SUBCUTANEOUS at 22:04

## 2022-11-22 RX ADMIN — MEROPENEM 1 G: 1 INJECTION, POWDER, FOR SOLUTION INTRAVENOUS at 21:50

## 2022-11-22 RX ADMIN — ONDANSETRON 4 MG: 2 INJECTION INTRAMUSCULAR; INTRAVENOUS at 22:03

## 2022-11-23 LAB
ANION GAP SERPL CALCULATED.3IONS-SCNC: 11 MMOL/L (ref 5–15)
BASOPHILS # BLD AUTO: 0.03 10*3/MM3 (ref 0–0.2)
BASOPHILS NFR BLD AUTO: 0.3 % (ref 0–1.5)
BUN SERPL-MCNC: 11 MG/DL (ref 6–20)
BUN/CREAT SERPL: 11.7 (ref 7–25)
CALCIUM SPEC-SCNC: 8.6 MG/DL (ref 8.6–10.5)
CHLORIDE SERPL-SCNC: 93 MMOL/L (ref 98–107)
CO2 SERPL-SCNC: 29 MMOL/L (ref 22–29)
CREAT SERPL-MCNC: 0.94 MG/DL (ref 0.57–1)
D-LACTATE SERPL-SCNC: 1.5 MMOL/L (ref 0.5–2)
DEPRECATED RDW RBC AUTO: 46.7 FL (ref 37–54)
EGFRCR SERPLBLD CKD-EPI 2021: 74.1 ML/MIN/1.73
EOSINOPHIL # BLD AUTO: 0.11 10*3/MM3 (ref 0–0.4)
EOSINOPHIL NFR BLD AUTO: 0.9 % (ref 0.3–6.2)
ERYTHROCYTE [DISTWIDTH] IN BLOOD BY AUTOMATED COUNT: 17.6 % (ref 12.3–15.4)
GLUCOSE BLDC GLUCOMTR-MCNC: 142 MG/DL (ref 70–130)
GLUCOSE BLDC GLUCOMTR-MCNC: 224 MG/DL (ref 70–130)
GLUCOSE BLDC GLUCOMTR-MCNC: 280 MG/DL (ref 70–130)
GLUCOSE BLDC GLUCOMTR-MCNC: 55 MG/DL (ref 70–130)
GLUCOSE BLDC GLUCOMTR-MCNC: 59 MG/DL (ref 70–130)
GLUCOSE BLDC GLUCOMTR-MCNC: 67 MG/DL (ref 70–130)
GLUCOSE BLDC GLUCOMTR-MCNC: 88 MG/DL (ref 70–130)
GLUCOSE SERPL-MCNC: 272 MG/DL (ref 65–99)
HBA1C MFR BLD: 10.7 % (ref 4.8–5.6)
HCT VFR BLD AUTO: 28.4 % (ref 34–46.6)
HGB BLD-MCNC: 7.7 G/DL (ref 12–15.9)
LYMPHOCYTES # BLD AUTO: 1.61 10*3/MM3 (ref 0.7–3.1)
LYMPHOCYTES NFR BLD AUTO: 13.4 % (ref 19.6–45.3)
MCH RBC QN AUTO: 19.7 PG (ref 26.6–33)
MCHC RBC AUTO-ENTMCNC: 27.1 G/DL (ref 31.5–35.7)
MCV RBC AUTO: 72.8 FL (ref 79–97)
MONOCYTES # BLD AUTO: 0.94 10*3/MM3 (ref 0.1–0.9)
MONOCYTES NFR BLD AUTO: 7.8 % (ref 5–12)
NEUTROPHILS NFR BLD AUTO: 76.9 % (ref 42.7–76)
NEUTROPHILS NFR BLD AUTO: 9.22 10*3/MM3 (ref 1.7–7)
PLATELET # BLD AUTO: 373 10*3/MM3 (ref 140–450)
PMV BLD AUTO: 9.2 FL (ref 6–12)
POTASSIUM SERPL-SCNC: 4 MMOL/L (ref 3.5–5.2)
RBC # BLD AUTO: 3.9 10*6/MM3 (ref 3.77–5.28)
SODIUM SERPL-SCNC: 133 MMOL/L (ref 136–145)
WBC NRBC COR # BLD: 11.99 10*3/MM3 (ref 3.4–10.8)

## 2022-11-23 PROCEDURE — 25010000002 KETOROLAC TROMETHAMINE PER 15 MG: Performed by: FAMILY MEDICINE

## 2022-11-23 PROCEDURE — 85025 COMPLETE CBC W/AUTO DIFF WBC: CPT | Performed by: FAMILY MEDICINE

## 2022-11-23 PROCEDURE — 94799 UNLISTED PULMONARY SVC/PX: CPT

## 2022-11-23 PROCEDURE — 80048 BASIC METABOLIC PNL TOTAL CA: CPT | Performed by: FAMILY MEDICINE

## 2022-11-23 PROCEDURE — 63710000001 INSULIN LISPRO (HUMAN) PER 5 UNITS: Performed by: FAMILY MEDICINE

## 2022-11-23 PROCEDURE — 83605 ASSAY OF LACTIC ACID: CPT | Performed by: EMERGENCY MEDICINE

## 2022-11-23 PROCEDURE — 25010000002 NA FERRIC GLUC CPLX PER 12.5 MG: Performed by: FAMILY MEDICINE

## 2022-11-23 PROCEDURE — 82962 GLUCOSE BLOOD TEST: CPT

## 2022-11-23 PROCEDURE — 63710000001 INSULIN DETEMIR PER 5 UNITS: Performed by: FAMILY MEDICINE

## 2022-11-23 PROCEDURE — 36415 COLL VENOUS BLD VENIPUNCTURE: CPT | Performed by: EMERGENCY MEDICINE

## 2022-11-23 PROCEDURE — 25010000002 MEROPENEM PER 100 MG: Performed by: FAMILY MEDICINE

## 2022-11-23 RX ORDER — FLUTICASONE PROPIONATE 50 MCG
1 SPRAY, SUSPENSION (ML) NASAL DAILY
Status: DISCONTINUED | OUTPATIENT
Start: 2022-11-23 | End: 2022-11-29 | Stop reason: HOSPADM

## 2022-11-23 RX ORDER — FAMOTIDINE 20 MG/1
40 TABLET, FILM COATED ORAL DAILY
Status: DISCONTINUED | OUTPATIENT
Start: 2022-11-23 | End: 2022-11-29 | Stop reason: HOSPADM

## 2022-11-23 RX ORDER — SODIUM CHLORIDE 9 MG/ML
40 INJECTION, SOLUTION INTRAVENOUS AS NEEDED
Status: DISCONTINUED | OUTPATIENT
Start: 2022-11-23 | End: 2022-11-29 | Stop reason: HOSPADM

## 2022-11-23 RX ORDER — SODIUM CHLORIDE 0.9 % (FLUSH) 0.9 %
10 SYRINGE (ML) INJECTION AS NEEDED
Status: DISCONTINUED | OUTPATIENT
Start: 2022-11-23 | End: 2022-11-29 | Stop reason: HOSPADM

## 2022-11-23 RX ORDER — PRAMIPEXOLE DIHYDROCHLORIDE 0.25 MG/1
0.75 TABLET ORAL 2 TIMES DAILY
Status: DISCONTINUED | OUTPATIENT
Start: 2022-11-23 | End: 2022-11-29 | Stop reason: HOSPADM

## 2022-11-23 RX ORDER — BUSPIRONE HYDROCHLORIDE 10 MG/1
10 TABLET ORAL 3 TIMES DAILY
Status: DISCONTINUED | OUTPATIENT
Start: 2022-11-23 | End: 2022-11-29 | Stop reason: HOSPADM

## 2022-11-23 RX ORDER — MORPHINE SULFATE 15 MG/1
15 TABLET, FILM COATED, EXTENDED RELEASE ORAL 2 TIMES DAILY
Status: DISCONTINUED | OUTPATIENT
Start: 2022-11-23 | End: 2022-11-29 | Stop reason: HOSPADM

## 2022-11-23 RX ORDER — INSULIN LISPRO 100 [IU]/ML
0-9 INJECTION, SOLUTION INTRAVENOUS; SUBCUTANEOUS
Status: DISCONTINUED | OUTPATIENT
Start: 2022-11-23 | End: 2022-11-29 | Stop reason: HOSPADM

## 2022-11-23 RX ORDER — OXYCODONE AND ACETAMINOPHEN 10; 325 MG/1; MG/1
1 TABLET ORAL EVERY 4 HOURS PRN
Status: DISCONTINUED | OUTPATIENT
Start: 2022-11-23 | End: 2022-11-29 | Stop reason: HOSPADM

## 2022-11-23 RX ORDER — CELECOXIB 200 MG/1
200 CAPSULE ORAL DAILY
Status: ON HOLD | COMMUNITY
End: 2022-11-23

## 2022-11-23 RX ORDER — CYCLOBENZAPRINE HCL 10 MG
10 TABLET ORAL 2 TIMES DAILY PRN
Status: DISCONTINUED | OUTPATIENT
Start: 2022-11-23 | End: 2022-11-29 | Stop reason: HOSPADM

## 2022-11-23 RX ORDER — GABAPENTIN 400 MG/1
400 CAPSULE ORAL 3 TIMES DAILY
Status: ON HOLD | COMMUNITY
End: 2022-11-23

## 2022-11-23 RX ORDER — KETOROLAC TROMETHAMINE 30 MG/ML
15 INJECTION, SOLUTION INTRAMUSCULAR; INTRAVENOUS EVERY 6 HOURS PRN
Status: DISPENSED | OUTPATIENT
Start: 2022-11-23 | End: 2022-11-25

## 2022-11-23 RX ORDER — SODIUM CHLORIDE 0.9 % (FLUSH) 0.9 %
10 SYRINGE (ML) INJECTION EVERY 12 HOURS SCHEDULED
Status: DISCONTINUED | OUTPATIENT
Start: 2022-11-23 | End: 2022-11-29 | Stop reason: HOSPADM

## 2022-11-23 RX ORDER — POLYETHYLENE GLYCOL 3350 17 G/17G
17 POWDER, FOR SOLUTION ORAL DAILY
Status: DISCONTINUED | OUTPATIENT
Start: 2022-11-23 | End: 2022-11-29 | Stop reason: HOSPADM

## 2022-11-23 RX ORDER — ONDANSETRON 4 MG/1
4 TABLET, ORALLY DISINTEGRATING ORAL EVERY 6 HOURS PRN
Status: ON HOLD | COMMUNITY
End: 2022-11-29 | Stop reason: SDUPTHER

## 2022-11-23 RX ORDER — DULOXETIN HYDROCHLORIDE 30 MG/1
60 CAPSULE, DELAYED RELEASE ORAL 2 TIMES DAILY
Status: DISCONTINUED | OUTPATIENT
Start: 2022-11-23 | End: 2022-11-29 | Stop reason: HOSPADM

## 2022-11-23 RX ORDER — DEXTROSE MONOHYDRATE 25 G/50ML
25 INJECTION, SOLUTION INTRAVENOUS
Status: DISCONTINUED | OUTPATIENT
Start: 2022-11-23 | End: 2022-11-29 | Stop reason: HOSPADM

## 2022-11-23 RX ORDER — GABAPENTIN 600 MG/1
600 TABLET ORAL 3 TIMES DAILY
COMMUNITY

## 2022-11-23 RX ORDER — NICOTINE POLACRILEX 4 MG
15 LOZENGE BUCCAL
Status: DISCONTINUED | OUTPATIENT
Start: 2022-11-23 | End: 2022-11-29 | Stop reason: HOSPADM

## 2022-11-23 RX ORDER — ONDANSETRON 2 MG/ML
4 INJECTION INTRAMUSCULAR; INTRAVENOUS EVERY 6 HOURS PRN
Status: DISCONTINUED | OUTPATIENT
Start: 2022-11-23 | End: 2022-11-29 | Stop reason: HOSPADM

## 2022-11-23 RX ORDER — GABAPENTIN 400 MG/1
400 CAPSULE ORAL 3 TIMES DAILY
Status: DISCONTINUED | OUTPATIENT
Start: 2022-11-23 | End: 2022-11-29 | Stop reason: HOSPADM

## 2022-11-23 RX ORDER — MIRTAZAPINE 15 MG/1
15 TABLET, FILM COATED ORAL NIGHTLY
Status: DISCONTINUED | OUTPATIENT
Start: 2022-11-23 | End: 2022-11-29 | Stop reason: HOSPADM

## 2022-11-23 RX ORDER — GABAPENTIN 300 MG/1
300 CAPSULE ORAL 3 TIMES DAILY
Status: ON HOLD | COMMUNITY
End: 2022-11-23

## 2022-11-23 RX ORDER — DOCUSATE SODIUM 100 MG/1
100 CAPSULE, LIQUID FILLED ORAL 2 TIMES DAILY
Status: DISCONTINUED | OUTPATIENT
Start: 2022-11-23 | End: 2022-11-29 | Stop reason: HOSPADM

## 2022-11-23 RX ADMIN — INSULIN LISPRO 6 UNITS: 100 INJECTION, SOLUTION INTRAVENOUS; SUBCUTANEOUS at 11:48

## 2022-11-23 RX ADMIN — SODIUM CHLORIDE 125 MG: 9 INJECTION, SOLUTION INTRAVENOUS at 08:44

## 2022-11-23 RX ADMIN — PRAMIPEXOLE DIHYDROCHLORIDE 0.75 MG: 0.25 TABLET ORAL at 01:15

## 2022-11-23 RX ADMIN — KETOROLAC TROMETHAMINE 15 MG: 30 INJECTION, SOLUTION INTRAMUSCULAR; INTRAVENOUS at 17:50

## 2022-11-23 RX ADMIN — DOCUSATE SODIUM 100 MG: 100 CAPSULE, LIQUID FILLED ORAL at 20:44

## 2022-11-23 RX ADMIN — BUSPIRONE HYDROCHLORIDE 10 MG: 5 TABLET ORAL at 20:44

## 2022-11-23 RX ADMIN — PRAMIPEXOLE DIHYDROCHLORIDE 0.75 MG: 0.25 TABLET ORAL at 20:45

## 2022-11-23 RX ADMIN — DOCUSATE SODIUM 100 MG: 100 CAPSULE, LIQUID FILLED ORAL at 08:43

## 2022-11-23 RX ADMIN — GABAPENTIN 400 MG: 400 CAPSULE ORAL at 08:43

## 2022-11-23 RX ADMIN — MORPHINE SULFATE 15 MG: 15 TABLET, EXTENDED RELEASE ORAL at 20:42

## 2022-11-23 RX ADMIN — MEROPENEM 1 G: 1 INJECTION, POWDER, FOR SOLUTION INTRAVENOUS at 11:48

## 2022-11-23 RX ADMIN — Medication 10 ML: at 20:45

## 2022-11-23 RX ADMIN — CYCLOBENZAPRINE 10 MG: 10 TABLET, FILM COATED ORAL at 02:35

## 2022-11-23 RX ADMIN — KETOROLAC TROMETHAMINE 15 MG: 30 INJECTION, SOLUTION INTRAMUSCULAR; INTRAVENOUS at 08:50

## 2022-11-23 RX ADMIN — DULOXETINE HYDROCHLORIDE 60 MG: 30 CAPSULE, DELAYED RELEASE ORAL at 01:15

## 2022-11-23 RX ADMIN — MEROPENEM 1 G: 1 INJECTION, POWDER, FOR SOLUTION INTRAVENOUS at 03:29

## 2022-11-23 RX ADMIN — DULOXETINE HYDROCHLORIDE 60 MG: 30 CAPSULE, DELAYED RELEASE ORAL at 20:43

## 2022-11-23 RX ADMIN — GABAPENTIN 400 MG: 400 CAPSULE ORAL at 16:36

## 2022-11-23 RX ADMIN — INSULIN LISPRO 4 UNITS: 100 INJECTION, SOLUTION INTRAVENOUS; SUBCUTANEOUS at 08:44

## 2022-11-23 RX ADMIN — BUSPIRONE HYDROCHLORIDE 10 MG: 5 TABLET ORAL at 16:36

## 2022-11-23 RX ADMIN — FAMOTIDINE 40 MG: 20 TABLET, FILM COATED ORAL at 08:43

## 2022-11-23 RX ADMIN — PRAMIPEXOLE DIHYDROCHLORIDE 0.75 MG: 0.25 TABLET ORAL at 08:47

## 2022-11-23 RX ADMIN — MORPHINE SULFATE 15 MG: 15 TABLET, EXTENDED RELEASE ORAL at 02:00

## 2022-11-23 RX ADMIN — Medication 10 ML: at 01:15

## 2022-11-23 RX ADMIN — FLUTICASONE PROPIONATE 1 SPRAY: 50 SPRAY, METERED NASAL at 08:44

## 2022-11-23 RX ADMIN — DOCUSATE SODIUM 100 MG: 100 CAPSULE, LIQUID FILLED ORAL at 01:15

## 2022-11-23 RX ADMIN — INSULIN DETEMIR 40 UNITS: 100 INJECTION, SOLUTION SUBCUTANEOUS at 01:56

## 2022-11-23 RX ADMIN — OXYCODONE AND ACETAMINOPHEN 1 TABLET: 325; 10 TABLET ORAL at 08:43

## 2022-11-23 RX ADMIN — DEXTROSE 15 G: 15 GEL ORAL at 16:26

## 2022-11-23 RX ADMIN — KETOROLAC TROMETHAMINE 15 MG: 30 INJECTION, SOLUTION INTRAMUSCULAR; INTRAVENOUS at 01:15

## 2022-11-23 RX ADMIN — DULOXETINE HYDROCHLORIDE 60 MG: 30 CAPSULE, DELAYED RELEASE ORAL at 08:43

## 2022-11-23 RX ADMIN — BUSPIRONE HYDROCHLORIDE 10 MG: 5 TABLET ORAL at 08:43

## 2022-11-23 RX ADMIN — INSULIN DETEMIR 20 UNITS: 100 INJECTION, SOLUTION SUBCUTANEOUS at 20:46

## 2022-11-23 RX ADMIN — GABAPENTIN 400 MG: 400 CAPSULE ORAL at 20:43

## 2022-11-23 RX ADMIN — OXYCODONE AND ACETAMINOPHEN 1 TABLET: 325; 10 TABLET ORAL at 03:09

## 2022-11-23 RX ADMIN — DEXTROSE MONOHYDRATE 25 G: 25 INJECTION, SOLUTION INTRAVENOUS at 17:31

## 2022-11-23 RX ADMIN — OXYCODONE AND ACETAMINOPHEN 1 TABLET: 325; 10 TABLET ORAL at 22:07

## 2022-11-23 RX ADMIN — DEXTROSE 15 G: 15 GEL ORAL at 16:43

## 2022-11-23 RX ADMIN — MEROPENEM 1 G: 1 INJECTION, POWDER, FOR SOLUTION INTRAVENOUS at 20:42

## 2022-11-24 LAB
ABO GROUP BLD: NORMAL
ANION GAP SERPL CALCULATED.3IONS-SCNC: 8 MMOL/L (ref 5–15)
BASOPHILS # BLD AUTO: 0.02 10*3/MM3 (ref 0–0.2)
BASOPHILS NFR BLD AUTO: 0.2 % (ref 0–1.5)
BLD GP AB SCN SERPL QL: NEGATIVE
BUN SERPL-MCNC: 17 MG/DL (ref 6–20)
BUN/CREAT SERPL: 14.9 (ref 7–25)
CALCIUM SPEC-SCNC: 8.6 MG/DL (ref 8.6–10.5)
CHLORIDE SERPL-SCNC: 96 MMOL/L (ref 98–107)
CO2 SERPL-SCNC: 30 MMOL/L (ref 22–29)
CREAT SERPL-MCNC: 1.14 MG/DL (ref 0.57–1)
DEPRECATED RDW RBC AUTO: 46.6 FL (ref 37–54)
EGFRCR SERPLBLD CKD-EPI 2021: 58.8 ML/MIN/1.73
EOSINOPHIL # BLD AUTO: 0.17 10*3/MM3 (ref 0–0.4)
EOSINOPHIL NFR BLD AUTO: 1.6 % (ref 0.3–6.2)
ERYTHROCYTE [DISTWIDTH] IN BLOOD BY AUTOMATED COUNT: 17.8 % (ref 12.3–15.4)
FERRITIN SERPL-MCNC: 274.8 NG/ML (ref 13–150)
GLUCOSE BLDC GLUCOMTR-MCNC: 135 MG/DL (ref 70–130)
GLUCOSE BLDC GLUCOMTR-MCNC: 203 MG/DL (ref 70–130)
GLUCOSE BLDC GLUCOMTR-MCNC: 50 MG/DL (ref 70–130)
GLUCOSE BLDC GLUCOMTR-MCNC: 98 MG/DL (ref 70–130)
GLUCOSE SERPL-MCNC: 58 MG/DL (ref 65–99)
HCT VFR BLD AUTO: 23 % (ref 34–46.6)
HEMOCCULT STL QL: NEGATIVE
HGB BLD-MCNC: 6.5 G/DL (ref 12–15.9)
IRON 24H UR-MRATE: 18 MCG/DL (ref 37–145)
IRON SATN MFR SERPL: 10 % (ref 20–50)
LYMPHOCYTES # BLD AUTO: 1.81 10*3/MM3 (ref 0.7–3.1)
LYMPHOCYTES NFR BLD AUTO: 17.4 % (ref 19.6–45.3)
MCH RBC QN AUTO: 20.2 PG (ref 26.6–33)
MCHC RBC AUTO-ENTMCNC: 28.3 G/DL (ref 31.5–35.7)
MCV RBC AUTO: 71.7 FL (ref 79–97)
MONOCYTES # BLD AUTO: 0.96 10*3/MM3 (ref 0.1–0.9)
MONOCYTES NFR BLD AUTO: 9.2 % (ref 5–12)
NEUTROPHILS NFR BLD AUTO: 7.34 10*3/MM3 (ref 1.7–7)
NEUTROPHILS NFR BLD AUTO: 70.5 % (ref 42.7–76)
PLATELET # BLD AUTO: 328 10*3/MM3 (ref 140–450)
PMV BLD AUTO: 9 FL (ref 6–12)
POTASSIUM SERPL-SCNC: 3.8 MMOL/L (ref 3.5–5.2)
RBC # BLD AUTO: 3.21 10*6/MM3 (ref 3.77–5.28)
RH BLD: POSITIVE
SODIUM SERPL-SCNC: 134 MMOL/L (ref 136–145)
T&S EXPIRATION DATE: NORMAL
TIBC SERPL-MCNC: 179 MCG/DL (ref 298–536)
TRANSFERRIN SERPL-MCNC: 120 MG/DL (ref 200–360)
WBC NRBC COR # BLD: 10.41 10*3/MM3 (ref 3.4–10.8)

## 2022-11-24 PROCEDURE — 82962 GLUCOSE BLOOD TEST: CPT

## 2022-11-24 PROCEDURE — 82728 ASSAY OF FERRITIN: CPT | Performed by: INTERNAL MEDICINE

## 2022-11-24 PROCEDURE — 63710000001 INSULIN DETEMIR PER 5 UNITS: Performed by: FAMILY MEDICINE

## 2022-11-24 PROCEDURE — 25010000002 MEROPENEM PER 100 MG: Performed by: FAMILY MEDICINE

## 2022-11-24 PROCEDURE — 25010000002 KETOROLAC TROMETHAMINE PER 15 MG: Performed by: FAMILY MEDICINE

## 2022-11-24 PROCEDURE — 85025 COMPLETE CBC W/AUTO DIFF WBC: CPT | Performed by: FAMILY MEDICINE

## 2022-11-24 PROCEDURE — 86901 BLOOD TYPING SEROLOGIC RH(D): CPT | Performed by: INTERNAL MEDICINE

## 2022-11-24 PROCEDURE — 86920 COMPATIBILITY TEST SPIN: CPT

## 2022-11-24 PROCEDURE — 83540 ASSAY OF IRON: CPT | Performed by: INTERNAL MEDICINE

## 2022-11-24 PROCEDURE — 80048 BASIC METABOLIC PNL TOTAL CA: CPT | Performed by: FAMILY MEDICINE

## 2022-11-24 PROCEDURE — 84466 ASSAY OF TRANSFERRIN: CPT | Performed by: INTERNAL MEDICINE

## 2022-11-24 PROCEDURE — 86900 BLOOD TYPING SEROLOGIC ABO: CPT | Performed by: INTERNAL MEDICINE

## 2022-11-24 PROCEDURE — 25010000002 NA FERRIC GLUC CPLX PER 12.5 MG: Performed by: FAMILY MEDICINE

## 2022-11-24 PROCEDURE — 86900 BLOOD TYPING SEROLOGIC ABO: CPT

## 2022-11-24 PROCEDURE — 86850 RBC ANTIBODY SCREEN: CPT | Performed by: INTERNAL MEDICINE

## 2022-11-24 PROCEDURE — 36430 TRANSFUSION BLD/BLD COMPNT: CPT

## 2022-11-24 PROCEDURE — P9016 RBC LEUKOCYTES REDUCED: HCPCS

## 2022-11-24 PROCEDURE — 82272 OCCULT BLD FECES 1-3 TESTS: CPT | Performed by: INTERNAL MEDICINE

## 2022-11-24 RX ADMIN — Medication 10 ML: at 08:36

## 2022-11-24 RX ADMIN — PRAMIPEXOLE DIHYDROCHLORIDE 0.75 MG: 0.25 TABLET ORAL at 08:34

## 2022-11-24 RX ADMIN — MEROPENEM 1 G: 1 INJECTION, POWDER, FOR SOLUTION INTRAVENOUS at 21:00

## 2022-11-24 RX ADMIN — BUSPIRONE HYDROCHLORIDE 10 MG: 5 TABLET ORAL at 17:00

## 2022-11-24 RX ADMIN — OXYCODONE AND ACETAMINOPHEN 1 TABLET: 325; 10 TABLET ORAL at 19:09

## 2022-11-24 RX ADMIN — DULOXETINE HYDROCHLORIDE 60 MG: 30 CAPSULE, DELAYED RELEASE ORAL at 20:59

## 2022-11-24 RX ADMIN — INSULIN DETEMIR 10 UNITS: 100 INJECTION, SOLUTION SUBCUTANEOUS at 20:56

## 2022-11-24 RX ADMIN — MEROPENEM 1 G: 1 INJECTION, POWDER, FOR SOLUTION INTRAVENOUS at 04:58

## 2022-11-24 RX ADMIN — MORPHINE SULFATE 15 MG: 15 TABLET, EXTENDED RELEASE ORAL at 20:58

## 2022-11-24 RX ADMIN — MEROPENEM 1 G: 1 INJECTION, POWDER, FOR SOLUTION INTRAVENOUS at 13:52

## 2022-11-24 RX ADMIN — DOCUSATE SODIUM 100 MG: 100 CAPSULE, LIQUID FILLED ORAL at 08:34

## 2022-11-24 RX ADMIN — MORPHINE SULFATE 15 MG: 15 TABLET, EXTENDED RELEASE ORAL at 08:34

## 2022-11-24 RX ADMIN — GABAPENTIN 400 MG: 400 CAPSULE ORAL at 17:00

## 2022-11-24 RX ADMIN — DOCUSATE SODIUM 100 MG: 100 CAPSULE, LIQUID FILLED ORAL at 20:59

## 2022-11-24 RX ADMIN — BUSPIRONE HYDROCHLORIDE 10 MG: 5 TABLET ORAL at 20:59

## 2022-11-24 RX ADMIN — FAMOTIDINE 40 MG: 20 TABLET, FILM COATED ORAL at 08:34

## 2022-11-24 RX ADMIN — KETOROLAC TROMETHAMINE 15 MG: 30 INJECTION, SOLUTION INTRAMUSCULAR; INTRAVENOUS at 08:53

## 2022-11-24 RX ADMIN — FLUTICASONE PROPIONATE 1 SPRAY: 50 SPRAY, METERED NASAL at 08:38

## 2022-11-24 RX ADMIN — PRAMIPEXOLE DIHYDROCHLORIDE 0.75 MG: 0.25 TABLET ORAL at 20:59

## 2022-11-24 RX ADMIN — SODIUM CHLORIDE 125 MG: 9 INJECTION, SOLUTION INTRAVENOUS at 08:34

## 2022-11-24 RX ADMIN — Medication 10 ML: at 21:03

## 2022-11-24 RX ADMIN — GABAPENTIN 400 MG: 400 CAPSULE ORAL at 08:34

## 2022-11-24 RX ADMIN — DULOXETINE HYDROCHLORIDE 60 MG: 30 CAPSULE, DELAYED RELEASE ORAL at 08:34

## 2022-11-24 RX ADMIN — BUSPIRONE HYDROCHLORIDE 10 MG: 5 TABLET ORAL at 08:34

## 2022-11-24 RX ADMIN — KETOROLAC TROMETHAMINE 15 MG: 30 INJECTION, SOLUTION INTRAMUSCULAR; INTRAVENOUS at 21:04

## 2022-11-24 RX ADMIN — GABAPENTIN 400 MG: 400 CAPSULE ORAL at 20:57

## 2022-11-24 RX ADMIN — OXYCODONE AND ACETAMINOPHEN 1 TABLET: 325; 10 TABLET ORAL at 14:34

## 2022-11-25 LAB
BASOPHILS # BLD AUTO: 0.02 10*3/MM3 (ref 0–0.2)
BASOPHILS NFR BLD AUTO: 0.2 % (ref 0–1.5)
BH BB BLOOD EXPIRATION DATE: NORMAL
BH BB BLOOD TYPE BARCODE: 6200
BH BB DISPENSE STATUS: NORMAL
BH BB PRODUCT CODE: NORMAL
BH BB UNIT NUMBER: NORMAL
CROSSMATCH INTERPRETATION: NORMAL
DEPRECATED RDW RBC AUTO: 45.1 FL (ref 37–54)
EOSINOPHIL # BLD AUTO: 0.21 10*3/MM3 (ref 0–0.4)
EOSINOPHIL NFR BLD AUTO: 2.3 % (ref 0.3–6.2)
ERYTHROCYTE [DISTWIDTH] IN BLOOD BY AUTOMATED COUNT: 18.1 % (ref 12.3–15.4)
GLUCOSE BLDC GLUCOMTR-MCNC: 140 MG/DL (ref 70–130)
GLUCOSE BLDC GLUCOMTR-MCNC: 146 MG/DL (ref 70–130)
GLUCOSE BLDC GLUCOMTR-MCNC: 215 MG/DL (ref 70–130)
GLUCOSE BLDC GLUCOMTR-MCNC: 222 MG/DL (ref 70–130)
HCT VFR BLD AUTO: 24.5 % (ref 34–46.6)
HGB BLD-MCNC: 7.4 G/DL (ref 12–15.9)
LYMPHOCYTES # BLD AUTO: 1.26 10*3/MM3 (ref 0.7–3.1)
LYMPHOCYTES NFR BLD AUTO: 13.9 % (ref 19.6–45.3)
MCH RBC QN AUTO: 21.1 PG (ref 26.6–33)
MCHC RBC AUTO-ENTMCNC: 30.2 G/DL (ref 31.5–35.7)
MCV RBC AUTO: 69.8 FL (ref 79–97)
MONOCYTES # BLD AUTO: 0.75 10*3/MM3 (ref 0.1–0.9)
MONOCYTES NFR BLD AUTO: 8.3 % (ref 5–12)
NEUTROPHILS NFR BLD AUTO: 6.68 10*3/MM3 (ref 1.7–7)
NEUTROPHILS NFR BLD AUTO: 73.6 % (ref 42.7–76)
PLATELET # BLD AUTO: 274 10*3/MM3 (ref 140–450)
PMV BLD AUTO: 10 FL (ref 6–12)
RBC # BLD AUTO: 3.51 10*6/MM3 (ref 3.77–5.28)
UNIT  ABO: NORMAL
UNIT  RH: NORMAL
WBC NRBC COR # BLD: 9.07 10*3/MM3 (ref 3.4–10.8)

## 2022-11-25 PROCEDURE — 85025 COMPLETE CBC W/AUTO DIFF WBC: CPT | Performed by: FAMILY MEDICINE

## 2022-11-25 PROCEDURE — 63710000001 INSULIN DETEMIR PER 5 UNITS: Performed by: FAMILY MEDICINE

## 2022-11-25 PROCEDURE — 25010000002 NA FERRIC GLUC CPLX PER 12.5 MG: Performed by: FAMILY MEDICINE

## 2022-11-25 PROCEDURE — 82962 GLUCOSE BLOOD TEST: CPT

## 2022-11-25 PROCEDURE — 25010000002 MEROPENEM PER 100 MG: Performed by: FAMILY MEDICINE

## 2022-11-25 PROCEDURE — 25010000002 KETOROLAC TROMETHAMINE PER 15 MG: Performed by: INTERNAL MEDICINE

## 2022-11-25 RX ORDER — KETOROLAC TROMETHAMINE 30 MG/ML
15 INJECTION, SOLUTION INTRAMUSCULAR; INTRAVENOUS EVERY 6 HOURS PRN
Status: DISPENSED | OUTPATIENT
Start: 2022-11-25 | End: 2022-11-27

## 2022-11-25 RX ADMIN — DOCUSATE SODIUM 100 MG: 100 CAPSULE, LIQUID FILLED ORAL at 20:50

## 2022-11-25 RX ADMIN — OXYCODONE AND ACETAMINOPHEN 1 TABLET: 325; 10 TABLET ORAL at 08:54

## 2022-11-25 RX ADMIN — MEROPENEM 1 G: 1 INJECTION, POWDER, FOR SOLUTION INTRAVENOUS at 14:16

## 2022-11-25 RX ADMIN — KETOROLAC TROMETHAMINE 15 MG: 30 INJECTION, SOLUTION INTRAMUSCULAR; INTRAVENOUS at 17:59

## 2022-11-25 RX ADMIN — INSULIN DETEMIR 10 UNITS: 100 INJECTION, SOLUTION SUBCUTANEOUS at 20:52

## 2022-11-25 RX ADMIN — BUSPIRONE HYDROCHLORIDE 10 MG: 5 TABLET ORAL at 09:35

## 2022-11-25 RX ADMIN — GABAPENTIN 400 MG: 400 CAPSULE ORAL at 17:32

## 2022-11-25 RX ADMIN — GABAPENTIN 400 MG: 400 CAPSULE ORAL at 08:54

## 2022-11-25 RX ADMIN — FAMOTIDINE 40 MG: 20 TABLET, FILM COATED ORAL at 09:35

## 2022-11-25 RX ADMIN — MORPHINE SULFATE 15 MG: 15 TABLET, EXTENDED RELEASE ORAL at 08:54

## 2022-11-25 RX ADMIN — PRAMIPEXOLE DIHYDROCHLORIDE 0.75 MG: 0.25 TABLET ORAL at 20:50

## 2022-11-25 RX ADMIN — BUSPIRONE HYDROCHLORIDE 10 MG: 5 TABLET ORAL at 17:32

## 2022-11-25 RX ADMIN — DULOXETINE HYDROCHLORIDE 60 MG: 30 CAPSULE, DELAYED RELEASE ORAL at 20:50

## 2022-11-25 RX ADMIN — OXYCODONE AND ACETAMINOPHEN 1 TABLET: 325; 10 TABLET ORAL at 23:31

## 2022-11-25 RX ADMIN — SODIUM CHLORIDE 125 MG: 9 INJECTION, SOLUTION INTRAVENOUS at 09:37

## 2022-11-25 RX ADMIN — GABAPENTIN 400 MG: 400 CAPSULE ORAL at 20:50

## 2022-11-25 RX ADMIN — MEROPENEM 1 G: 1 INJECTION, POWDER, FOR SOLUTION INTRAVENOUS at 04:36

## 2022-11-25 RX ADMIN — DOCUSATE SODIUM 100 MG: 100 CAPSULE, LIQUID FILLED ORAL at 09:35

## 2022-11-25 RX ADMIN — DULOXETINE HYDROCHLORIDE 60 MG: 30 CAPSULE, DELAYED RELEASE ORAL at 09:34

## 2022-11-25 RX ADMIN — MORPHINE SULFATE 15 MG: 15 TABLET, EXTENDED RELEASE ORAL at 20:50

## 2022-11-25 RX ADMIN — OXYCODONE AND ACETAMINOPHEN 1 TABLET: 325; 10 TABLET ORAL at 12:58

## 2022-11-25 RX ADMIN — PRAMIPEXOLE DIHYDROCHLORIDE 0.75 MG: 0.25 TABLET ORAL at 09:34

## 2022-11-25 RX ADMIN — MEROPENEM 1 G: 1 INJECTION, POWDER, FOR SOLUTION INTRAVENOUS at 20:50

## 2022-11-26 ENCOUNTER — ANESTHESIA EVENT (OUTPATIENT)
Dept: PERIOP | Facility: HOSPITAL | Age: 52
End: 2022-11-26

## 2022-11-26 ENCOUNTER — ANESTHESIA (OUTPATIENT)
Dept: PERIOP | Facility: HOSPITAL | Age: 52
End: 2022-11-26

## 2022-11-26 LAB
ANION GAP SERPL CALCULATED.3IONS-SCNC: 12 MMOL/L (ref 5–15)
BUN SERPL-MCNC: 14 MG/DL (ref 6–20)
BUN/CREAT SERPL: 17.7 (ref 7–25)
CALCIUM SPEC-SCNC: 9 MG/DL (ref 8.6–10.5)
CHLORIDE SERPL-SCNC: 98 MMOL/L (ref 98–107)
CO2 SERPL-SCNC: 26 MMOL/L (ref 22–29)
CREAT SERPL-MCNC: 0.79 MG/DL (ref 0.57–1)
DEPRECATED RDW RBC AUTO: 46.2 FL (ref 37–54)
EGFRCR SERPLBLD CKD-EPI 2021: 91.3 ML/MIN/1.73
ERYTHROCYTE [DISTWIDTH] IN BLOOD BY AUTOMATED COUNT: 18.3 % (ref 12.3–15.4)
GLUCOSE BLDC GLUCOMTR-MCNC: 63 MG/DL (ref 70–130)
GLUCOSE BLDC GLUCOMTR-MCNC: 64 MG/DL (ref 70–130)
GLUCOSE BLDC GLUCOMTR-MCNC: 73 MG/DL (ref 70–130)
GLUCOSE BLDC GLUCOMTR-MCNC: 88 MG/DL (ref 70–130)
GLUCOSE BLDC GLUCOMTR-MCNC: 89 MG/DL (ref 70–130)
GLUCOSE SERPL-MCNC: 90 MG/DL (ref 65–99)
HCT VFR BLD AUTO: 28.2 % (ref 34–46.6)
HGB BLD-MCNC: 8.2 G/DL (ref 12–15.9)
MCH RBC QN AUTO: 20.6 PG (ref 26.6–33)
MCHC RBC AUTO-ENTMCNC: 29.1 G/DL (ref 31.5–35.7)
MCV RBC AUTO: 70.9 FL (ref 79–97)
PLATELET # BLD AUTO: 367 10*3/MM3 (ref 140–450)
PMV BLD AUTO: 9.4 FL (ref 6–12)
POTASSIUM SERPL-SCNC: 4.6 MMOL/L (ref 3.5–5.2)
RBC # BLD AUTO: 3.98 10*6/MM3 (ref 3.77–5.28)
SODIUM SERPL-SCNC: 136 MMOL/L (ref 136–145)
WBC NRBC COR # BLD: 8.96 10*3/MM3 (ref 3.4–10.8)

## 2022-11-26 PROCEDURE — 25010000002 MEROPENEM PER 100 MG: Performed by: FAMILY MEDICINE

## 2022-11-26 PROCEDURE — 25010000002 MEROPENEM PER 100 MG: Performed by: SPECIALIST

## 2022-11-26 PROCEDURE — 80048 BASIC METABOLIC PNL TOTAL CA: CPT | Performed by: INTERNAL MEDICINE

## 2022-11-26 PROCEDURE — 25010000002 KETOROLAC TROMETHAMINE PER 15 MG: Performed by: INTERNAL MEDICINE

## 2022-11-26 PROCEDURE — 25010000002 DROPERIDOL PER 5 MG: Performed by: NURSE ANESTHETIST, CERTIFIED REGISTERED

## 2022-11-26 PROCEDURE — 25010000002 FENTANYL CITRATE (PF) 50 MCG/ML SOLUTION: Performed by: NURSE ANESTHETIST, CERTIFIED REGISTERED

## 2022-11-26 PROCEDURE — 25010000002 FENTANYL CITRATE (PF) 100 MCG/2ML SOLUTION: Performed by: NURSE ANESTHETIST, CERTIFIED REGISTERED

## 2022-11-26 PROCEDURE — 0J9C0ZX DRAINAGE OF PELVIC REGION SUBCUTANEOUS TISSUE AND FASCIA, OPEN APPROACH, DIAGNOSTIC: ICD-10-PCS | Performed by: SPECIALIST

## 2022-11-26 PROCEDURE — 99223 1ST HOSP IP/OBS HIGH 75: CPT | Performed by: SPECIALIST

## 2022-11-26 PROCEDURE — 85027 COMPLETE CBC AUTOMATED: CPT | Performed by: INTERNAL MEDICINE

## 2022-11-26 PROCEDURE — 82962 GLUCOSE BLOOD TEST: CPT

## 2022-11-26 PROCEDURE — 25010000002 KETOROLAC TROMETHAMINE PER 15 MG: Performed by: SPECIALIST

## 2022-11-26 PROCEDURE — 87070 CULTURE OTHR SPECIMN AEROBIC: CPT | Performed by: SPECIALIST

## 2022-11-26 PROCEDURE — 25010000002 PROPOFOL 10 MG/ML EMULSION: Performed by: NURSE ANESTHETIST, CERTIFIED REGISTERED

## 2022-11-26 PROCEDURE — 25010000002 HYDROMORPHONE PER 4 MG: Performed by: NURSE ANESTHETIST, CERTIFIED REGISTERED

## 2022-11-26 PROCEDURE — 87205 SMEAR GRAM STAIN: CPT | Performed by: SPECIALIST

## 2022-11-26 PROCEDURE — 27301 DRAIN THIGH/KNEE LESION: CPT | Performed by: SPECIALIST

## 2022-11-26 RX ORDER — NALOXONE HCL 0.4 MG/ML
0.04 VIAL (ML) INJECTION AS NEEDED
Status: DISCONTINUED | OUTPATIENT
Start: 2022-11-26 | End: 2022-11-26 | Stop reason: HOSPADM

## 2022-11-26 RX ORDER — FENTANYL CITRATE 50 UG/ML
25 INJECTION, SOLUTION INTRAMUSCULAR; INTRAVENOUS
Status: DISCONTINUED | OUTPATIENT
Start: 2022-11-26 | End: 2022-11-26 | Stop reason: HOSPADM

## 2022-11-26 RX ORDER — MAGNESIUM HYDROXIDE 1200 MG/15ML
LIQUID ORAL AS NEEDED
Status: DISCONTINUED | OUTPATIENT
Start: 2022-11-26 | End: 2022-11-26 | Stop reason: HOSPADM

## 2022-11-26 RX ORDER — FLUMAZENIL 0.1 MG/ML
0.2 INJECTION INTRAVENOUS AS NEEDED
Status: DISCONTINUED | OUTPATIENT
Start: 2022-11-26 | End: 2022-11-26 | Stop reason: HOSPADM

## 2022-11-26 RX ORDER — DROPERIDOL 2.5 MG/ML
0.62 INJECTION, SOLUTION INTRAMUSCULAR; INTRAVENOUS ONCE AS NEEDED
Status: COMPLETED | OUTPATIENT
Start: 2022-11-26 | End: 2022-11-26

## 2022-11-26 RX ORDER — ONDANSETRON 2 MG/ML
4 INJECTION INTRAMUSCULAR; INTRAVENOUS
Status: DISCONTINUED | OUTPATIENT
Start: 2022-11-26 | End: 2022-11-26 | Stop reason: HOSPADM

## 2022-11-26 RX ORDER — LIDOCAINE HYDROCHLORIDE 20 MG/ML
INJECTION, SOLUTION EPIDURAL; INFILTRATION; INTRACAUDAL; PERINEURAL AS NEEDED
Status: DISCONTINUED | OUTPATIENT
Start: 2022-11-26 | End: 2022-11-26 | Stop reason: SURG

## 2022-11-26 RX ORDER — IBUPROFEN 600 MG/1
600 TABLET ORAL ONCE AS NEEDED
Status: DISCONTINUED | OUTPATIENT
Start: 2022-11-26 | End: 2022-11-26 | Stop reason: HOSPADM

## 2022-11-26 RX ORDER — SODIUM CHLORIDE, SODIUM LACTATE, POTASSIUM CHLORIDE, CALCIUM CHLORIDE 600; 310; 30; 20 MG/100ML; MG/100ML; MG/100ML; MG/100ML
INJECTION, SOLUTION INTRAVENOUS CONTINUOUS PRN
Status: DISCONTINUED | OUTPATIENT
Start: 2022-11-26 | End: 2022-11-26 | Stop reason: SURG

## 2022-11-26 RX ORDER — OXYCODONE AND ACETAMINOPHEN 10; 325 MG/1; MG/1
1 TABLET ORAL ONCE AS NEEDED
Status: DISCONTINUED | OUTPATIENT
Start: 2022-11-26 | End: 2022-11-26 | Stop reason: HOSPADM

## 2022-11-26 RX ORDER — HYDROMORPHONE HYDROCHLORIDE 1 MG/ML
0.5 INJECTION, SOLUTION INTRAMUSCULAR; INTRAVENOUS; SUBCUTANEOUS
Status: DISCONTINUED | OUTPATIENT
Start: 2022-11-26 | End: 2022-11-26 | Stop reason: HOSPADM

## 2022-11-26 RX ORDER — LABETALOL HYDROCHLORIDE 5 MG/ML
5 INJECTION, SOLUTION INTRAVENOUS
Status: DISCONTINUED | OUTPATIENT
Start: 2022-11-26 | End: 2022-11-26 | Stop reason: HOSPADM

## 2022-11-26 RX ORDER — FENTANYL CITRATE 50 UG/ML
INJECTION, SOLUTION INTRAMUSCULAR; INTRAVENOUS AS NEEDED
Status: DISCONTINUED | OUTPATIENT
Start: 2022-11-26 | End: 2022-11-26 | Stop reason: SURG

## 2022-11-26 RX ORDER — ROCURONIUM BROMIDE 10 MG/ML
INJECTION, SOLUTION INTRAVENOUS AS NEEDED
Status: DISCONTINUED | OUTPATIENT
Start: 2022-11-26 | End: 2022-11-26 | Stop reason: SURG

## 2022-11-26 RX ORDER — SUCCINYLCHOLINE/SOD CL,ISO/PF 200MG/10ML
SYRINGE (ML) INTRAVENOUS AS NEEDED
Status: DISCONTINUED | OUTPATIENT
Start: 2022-11-26 | End: 2022-11-26 | Stop reason: SURG

## 2022-11-26 RX ORDER — PROPOFOL 10 MG/ML
VIAL (ML) INTRAVENOUS AS NEEDED
Status: DISCONTINUED | OUTPATIENT
Start: 2022-11-26 | End: 2022-11-26 | Stop reason: SURG

## 2022-11-26 RX ORDER — EPHEDRINE SULFATE 50 MG/ML
INJECTION, SOLUTION INTRAVENOUS AS NEEDED
Status: DISCONTINUED | OUTPATIENT
Start: 2022-11-26 | End: 2022-11-26 | Stop reason: SURG

## 2022-11-26 RX ADMIN — MEROPENEM 1 G: 1 INJECTION, POWDER, FOR SOLUTION INTRAVENOUS at 12:08

## 2022-11-26 RX ADMIN — DROPERIDOL 0.62 MG: 2.5 INJECTION, SOLUTION INTRAMUSCULAR; INTRAVENOUS at 15:38

## 2022-11-26 RX ADMIN — MEROPENEM 1 G: 1 INJECTION, POWDER, FOR SOLUTION INTRAVENOUS at 21:00

## 2022-11-26 RX ADMIN — DOCUSATE SODIUM 100 MG: 100 CAPSULE, LIQUID FILLED ORAL at 08:13

## 2022-11-26 RX ADMIN — EPHEDRINE SULFATE 20 MG: 50 INJECTION INTRAVENOUS at 14:21

## 2022-11-26 RX ADMIN — PROPOFOL 200 MG: 10 INJECTION, EMULSION INTRAVENOUS at 14:09

## 2022-11-26 RX ADMIN — FAMOTIDINE 40 MG: 20 TABLET, FILM COATED ORAL at 08:13

## 2022-11-26 RX ADMIN — MORPHINE SULFATE 15 MG: 15 TABLET, EXTENDED RELEASE ORAL at 08:12

## 2022-11-26 RX ADMIN — BUSPIRONE HYDROCHLORIDE 10 MG: 5 TABLET ORAL at 08:12

## 2022-11-26 RX ADMIN — FENTANYL CITRATE 25 MCG: 50 INJECTION INTRAMUSCULAR; INTRAVENOUS at 15:28

## 2022-11-26 RX ADMIN — GABAPENTIN 400 MG: 400 CAPSULE ORAL at 08:12

## 2022-11-26 RX ADMIN — Medication 10 ML: at 08:13

## 2022-11-26 RX ADMIN — HYDROMORPHONE HYDROCHLORIDE 0.5 MG: 1 INJECTION, SOLUTION INTRAMUSCULAR; INTRAVENOUS; SUBCUTANEOUS at 15:25

## 2022-11-26 RX ADMIN — HYDROMORPHONE HYDROCHLORIDE 0.5 MG: 1 INJECTION, SOLUTION INTRAMUSCULAR; INTRAVENOUS; SUBCUTANEOUS at 15:44

## 2022-11-26 RX ADMIN — BUSPIRONE HYDROCHLORIDE 10 MG: 5 TABLET ORAL at 21:12

## 2022-11-26 RX ADMIN — FLUTICASONE PROPIONATE 1 SPRAY: 50 SPRAY, METERED NASAL at 08:14

## 2022-11-26 RX ADMIN — KETOROLAC TROMETHAMINE 15 MG: 30 INJECTION, SOLUTION INTRAMUSCULAR; INTRAVENOUS at 15:22

## 2022-11-26 RX ADMIN — FENTANYL CITRATE 50 MCG: 50 INJECTION INTRAMUSCULAR; INTRAVENOUS at 14:39

## 2022-11-26 RX ADMIN — SODIUM CHLORIDE, POTASSIUM CHLORIDE, SODIUM LACTATE AND CALCIUM CHLORIDE: 600; 310; 30; 20 INJECTION, SOLUTION INTRAVENOUS at 14:04

## 2022-11-26 RX ADMIN — GABAPENTIN 400 MG: 400 CAPSULE ORAL at 21:12

## 2022-11-26 RX ADMIN — KETOROLAC TROMETHAMINE 15 MG: 30 INJECTION, SOLUTION INTRAMUSCULAR; INTRAVENOUS at 08:13

## 2022-11-26 RX ADMIN — EPHEDRINE SULFATE 10 MG: 50 INJECTION INTRAVENOUS at 14:15

## 2022-11-26 RX ADMIN — FENTANYL CITRATE 50 MCG: 50 INJECTION INTRAMUSCULAR; INTRAVENOUS at 14:09

## 2022-11-26 RX ADMIN — LIDOCAINE HYDROCHLORIDE 100 MG: 20 INJECTION, SOLUTION EPIDURAL; INFILTRATION; INTRACAUDAL; PERINEURAL at 14:09

## 2022-11-26 RX ADMIN — DULOXETINE HYDROCHLORIDE 60 MG: 30 CAPSULE, DELAYED RELEASE ORAL at 08:13

## 2022-11-26 RX ADMIN — FENTANYL CITRATE 25 MCG: 50 INJECTION INTRAMUSCULAR; INTRAVENOUS at 15:39

## 2022-11-26 RX ADMIN — PRAMIPEXOLE DIHYDROCHLORIDE 0.75 MG: 0.25 TABLET ORAL at 08:12

## 2022-11-26 RX ADMIN — ROCURONIUM BROMIDE 10 MG: 10 SOLUTION INTRAVENOUS at 14:09

## 2022-11-26 RX ADMIN — MEROPENEM 1 G: 1 INJECTION, POWDER, FOR SOLUTION INTRAVENOUS at 04:48

## 2022-11-26 RX ADMIN — KETOROLAC TROMETHAMINE 15 MG: 30 INJECTION, SOLUTION INTRAMUSCULAR; INTRAVENOUS at 21:12

## 2022-11-26 RX ADMIN — MORPHINE SULFATE 15 MG: 15 TABLET, EXTENDED RELEASE ORAL at 21:12

## 2022-11-26 RX ADMIN — PRAMIPEXOLE DIHYDROCHLORIDE 0.75 MG: 0.25 TABLET ORAL at 21:12

## 2022-11-26 RX ADMIN — DOCUSATE SODIUM 100 MG: 100 CAPSULE, LIQUID FILLED ORAL at 21:12

## 2022-11-26 RX ADMIN — Medication 200 MG: at 14:09

## 2022-11-26 RX ADMIN — DULOXETINE HYDROCHLORIDE 60 MG: 30 CAPSULE, DELAYED RELEASE ORAL at 21:12

## 2022-11-26 NOTE — ANESTHESIA PROCEDURE NOTES
Airway  Urgency: elective    Date/Time: 11/26/2022 2:10 PM  Airway not difficult    General Information and Staff    Patient location during procedure: OR  CRNA/CAA: Caleb Morel CRNA    Indications and Patient Condition  Indications for airway management: airway protection    Preoxygenated: yes  Mask difficulty assessment: 1 - vent by mask    Final Airway Details  Final airway type: endotracheal airway      Successful airway: ETT  Cuffed: yes   Successful intubation technique: video laryngoscopy  Endotracheal tube insertion site: oral  Blade: Miranda  Blade size: 3  ETT size (mm): 7.0  Cormack-Lehane Classification: grade I - full view of glottis  Placement verified by: capnometry   Measured from: lips  Number of attempts at approach: 1  Assessment: lips, teeth, and gum same as pre-op and atraumatic intubation

## 2022-11-26 NOTE — ANESTHESIA PREPROCEDURE EVALUATION
Anesthesia Evaluation     NPO Solid Status: > 6 hours  NPO Liquid Status: > 6 hours           Airway   Mallampati: II  TM distance: >3 FB  Neck ROM: full  No difficulty expected  Dental      Pulmonary    (-) shortness of breath  Cardiovascular     (+) hypertension,       Neuro/Psych  GI/Hepatic/Renal/Endo    (+) obesity, morbid obesity, GERD,  renal disease, diabetes mellitus,     Musculoskeletal     (+) back pain,   Abdominal    Substance History      OB/GYN          Other                        Anesthesia Plan    ASA 3 - emergent     general     intravenous induction     Anesthetic plan, risks, benefits, and alternatives have been provided, discussed and informed consent has been obtained with: patient.        CODE STATUS:    Code Status (Patient has no pulse and is not breathing): CPR (Attempt to Resuscitate)  Medical Interventions (Patient has pulse or is breathing): Full Support

## 2022-11-27 LAB
BACTERIA SPEC AEROBE CULT: NORMAL
GLUCOSE BLDC GLUCOMTR-MCNC: 113 MG/DL (ref 70–130)
GLUCOSE BLDC GLUCOMTR-MCNC: 143 MG/DL (ref 70–130)
GLUCOSE BLDC GLUCOMTR-MCNC: 165 MG/DL (ref 70–130)
GLUCOSE BLDC GLUCOMTR-MCNC: 167 MG/DL (ref 70–130)
GLUCOSE BLDC GLUCOMTR-MCNC: 178 MG/DL (ref 70–130)

## 2022-11-27 PROCEDURE — 25010000002 KETOROLAC TROMETHAMINE PER 15 MG: Performed by: SPECIALIST

## 2022-11-27 PROCEDURE — 25010000002 MEROPENEM PER 100 MG: Performed by: SPECIALIST

## 2022-11-27 PROCEDURE — 63710000001 INSULIN DETEMIR PER 5 UNITS: Performed by: SPECIALIST

## 2022-11-27 PROCEDURE — 25010000002 MEROPENEM PER 100 MG: Performed by: INTERNAL MEDICINE

## 2022-11-27 PROCEDURE — 82962 GLUCOSE BLOOD TEST: CPT

## 2022-11-27 PROCEDURE — 63710000001 INSULIN LISPRO (HUMAN) PER 5 UNITS: Performed by: SPECIALIST

## 2022-11-27 PROCEDURE — 99024 POSTOP FOLLOW-UP VISIT: CPT | Performed by: SPECIALIST

## 2022-11-27 RX ADMIN — MEROPENEM 1 G: 1 INJECTION, POWDER, FOR SOLUTION INTRAVENOUS at 21:00

## 2022-11-27 RX ADMIN — BUSPIRONE HYDROCHLORIDE 10 MG: 5 TABLET ORAL at 09:23

## 2022-11-27 RX ADMIN — FAMOTIDINE 40 MG: 20 TABLET, FILM COATED ORAL at 09:23

## 2022-11-27 RX ADMIN — CYCLOBENZAPRINE 10 MG: 10 TABLET, FILM COATED ORAL at 16:21

## 2022-11-27 RX ADMIN — PRAMIPEXOLE DIHYDROCHLORIDE 0.75 MG: 0.25 TABLET ORAL at 09:23

## 2022-11-27 RX ADMIN — PRAMIPEXOLE DIHYDROCHLORIDE 0.75 MG: 0.25 TABLET ORAL at 21:16

## 2022-11-27 RX ADMIN — MORPHINE SULFATE 15 MG: 15 TABLET, EXTENDED RELEASE ORAL at 21:14

## 2022-11-27 RX ADMIN — DOCUSATE SODIUM 100 MG: 100 CAPSULE, LIQUID FILLED ORAL at 21:16

## 2022-11-27 RX ADMIN — OXYCODONE AND ACETAMINOPHEN 1 TABLET: 325; 10 TABLET ORAL at 18:54

## 2022-11-27 RX ADMIN — DOCUSATE SODIUM 100 MG: 100 CAPSULE, LIQUID FILLED ORAL at 09:23

## 2022-11-27 RX ADMIN — Medication 10 ML: at 09:23

## 2022-11-27 RX ADMIN — INSULIN DETEMIR 10 UNITS: 100 INJECTION, SOLUTION SUBCUTANEOUS at 21:17

## 2022-11-27 RX ADMIN — INSULIN LISPRO 2 UNITS: 100 INJECTION, SOLUTION INTRAVENOUS; SUBCUTANEOUS at 11:57

## 2022-11-27 RX ADMIN — MORPHINE SULFATE 15 MG: 15 TABLET, EXTENDED RELEASE ORAL at 09:23

## 2022-11-27 RX ADMIN — MEROPENEM 1 G: 1 INJECTION, POWDER, FOR SOLUTION INTRAVENOUS at 11:59

## 2022-11-27 RX ADMIN — GABAPENTIN 400 MG: 400 CAPSULE ORAL at 09:23

## 2022-11-27 RX ADMIN — OXYCODONE AND ACETAMINOPHEN 1 TABLET: 325; 10 TABLET ORAL at 09:47

## 2022-11-27 RX ADMIN — DULOXETINE HYDROCHLORIDE 60 MG: 30 CAPSULE, DELAYED RELEASE ORAL at 09:23

## 2022-11-27 RX ADMIN — GABAPENTIN 400 MG: 400 CAPSULE ORAL at 21:14

## 2022-11-27 RX ADMIN — BUSPIRONE HYDROCHLORIDE 10 MG: 5 TABLET ORAL at 21:15

## 2022-11-27 RX ADMIN — OXYCODONE AND ACETAMINOPHEN 1 TABLET: 325; 10 TABLET ORAL at 03:27

## 2022-11-27 RX ADMIN — GABAPENTIN 400 MG: 400 CAPSULE ORAL at 16:19

## 2022-11-27 RX ADMIN — DULOXETINE HYDROCHLORIDE 60 MG: 30 CAPSULE, DELAYED RELEASE ORAL at 21:15

## 2022-11-27 RX ADMIN — BUSPIRONE HYDROCHLORIDE 10 MG: 5 TABLET ORAL at 16:19

## 2022-11-27 RX ADMIN — FLUTICASONE PROPIONATE 1 SPRAY: 50 SPRAY, METERED NASAL at 09:24

## 2022-11-27 RX ADMIN — KETOROLAC TROMETHAMINE 15 MG: 30 INJECTION, SOLUTION INTRAMUSCULAR; INTRAVENOUS at 05:39

## 2022-11-27 RX ADMIN — MEROPENEM 1 G: 1 INJECTION, POWDER, FOR SOLUTION INTRAVENOUS at 03:28

## 2022-11-27 NOTE — ANESTHESIA POSTPROCEDURE EVALUATION
"Patient: Valeria Wilson    Procedure Summary     Date: 11/26/22 Room / Location:  [facility] OR 09 /  [facility] OR    Anesthesia Start: 1404 Anesthesia Stop: 1453    Procedure: INCISION AND DRAINAGE TRUNK (Right: Thigh) Diagnosis:     Surgeons: Sara Guerrier MD Provider: Caleb Morel CRNA    Anesthesia Type: general ASA Status: 3 - Emergent          Anesthesia Type: general    Vitals  Vitals Value Taken Time   BP 90/52 11/26/22 1610   Temp 100 °F (37.8 °C) 11/26/22 1610   Pulse 73 11/26/22 1611   Resp 14 11/26/22 1610   SpO2 99 % 11/26/22 1611   Vitals shown include unvalidated device data.        Post Anesthesia Care and Evaluation    Patient location during evaluation: PACU  Patient participation: complete - patient participated  Level of consciousness: awake and alert  Pain management: adequate    Airway patency: patent  Anesthetic complications: No anesthetic complications    Cardiovascular status: acceptable  Respiratory status: acceptable  Hydration status: acceptable    Comments: Blood pressure 125/56, pulse 76, temperature 98.3 °F (36.8 °C), temperature source Oral, resp. rate 16, height 154.9 cm (61\"), weight 134 kg (296 lb), SpO2 99 %, not currently breastfeeding.    Pt discharged from PACU based on serenity score >8      "

## 2022-11-28 LAB
BACTERIA SPEC AEROBE CULT: ABNORMAL
GLUCOSE BLDC GLUCOMTR-MCNC: 100 MG/DL (ref 70–130)
GLUCOSE BLDC GLUCOMTR-MCNC: 103 MG/DL (ref 70–130)
GLUCOSE BLDC GLUCOMTR-MCNC: 120 MG/DL (ref 70–130)
GLUCOSE BLDC GLUCOMTR-MCNC: 143 MG/DL (ref 70–130)
GRAM STN SPEC: ABNORMAL
ISOLATED FROM: ABNORMAL

## 2022-11-28 PROCEDURE — 99232 SBSQ HOSP IP/OBS MODERATE 35: CPT | Performed by: NURSE PRACTITIONER

## 2022-11-28 PROCEDURE — 97161 PT EVAL LOW COMPLEX 20 MIN: CPT | Performed by: PHYSICAL THERAPIST

## 2022-11-28 PROCEDURE — 25010000002 KETOROLAC TROMETHAMINE PER 15 MG: Performed by: INTERNAL MEDICINE

## 2022-11-28 PROCEDURE — 25010000002 MEROPENEM PER 100 MG: Performed by: INTERNAL MEDICINE

## 2022-11-28 PROCEDURE — 63710000001 INSULIN DETEMIR PER 5 UNITS: Performed by: SPECIALIST

## 2022-11-28 PROCEDURE — 82962 GLUCOSE BLOOD TEST: CPT

## 2022-11-28 RX ORDER — KETOROLAC TROMETHAMINE 30 MG/ML
15 INJECTION, SOLUTION INTRAMUSCULAR; INTRAVENOUS EVERY 6 HOURS PRN
Status: DISCONTINUED | OUTPATIENT
Start: 2022-11-28 | End: 2022-11-29 | Stop reason: HOSPADM

## 2022-11-28 RX ADMIN — FAMOTIDINE 40 MG: 20 TABLET, FILM COATED ORAL at 08:41

## 2022-11-28 RX ADMIN — BUSPIRONE HYDROCHLORIDE 10 MG: 5 TABLET ORAL at 08:41

## 2022-11-28 RX ADMIN — MEROPENEM 1 G: 1 INJECTION, POWDER, FOR SOLUTION INTRAVENOUS at 05:00

## 2022-11-28 RX ADMIN — BUSPIRONE HYDROCHLORIDE 10 MG: 5 TABLET ORAL at 21:44

## 2022-11-28 RX ADMIN — Medication 10 ML: at 21:58

## 2022-11-28 RX ADMIN — KETOROLAC TROMETHAMINE 15 MG: 30 INJECTION, SOLUTION INTRAMUSCULAR; INTRAVENOUS at 19:54

## 2022-11-28 RX ADMIN — DOCUSATE SODIUM 100 MG: 100 CAPSULE, LIQUID FILLED ORAL at 21:44

## 2022-11-28 RX ADMIN — DULOXETINE HYDROCHLORIDE 60 MG: 30 CAPSULE, DELAYED RELEASE ORAL at 08:41

## 2022-11-28 RX ADMIN — KETOROLAC TROMETHAMINE 15 MG: 30 INJECTION, SOLUTION INTRAMUSCULAR; INTRAVENOUS at 11:41

## 2022-11-28 RX ADMIN — DULOXETINE HYDROCHLORIDE 60 MG: 30 CAPSULE, DELAYED RELEASE ORAL at 21:43

## 2022-11-28 RX ADMIN — MORPHINE SULFATE 15 MG: 15 TABLET, EXTENDED RELEASE ORAL at 08:41

## 2022-11-28 RX ADMIN — OXYCODONE AND ACETAMINOPHEN 1 TABLET: 325; 10 TABLET ORAL at 05:31

## 2022-11-28 RX ADMIN — DOCUSATE SODIUM 100 MG: 100 CAPSULE, LIQUID FILLED ORAL at 08:41

## 2022-11-28 RX ADMIN — GABAPENTIN 400 MG: 400 CAPSULE ORAL at 21:44

## 2022-11-28 RX ADMIN — PRAMIPEXOLE DIHYDROCHLORIDE 0.75 MG: 0.25 TABLET ORAL at 08:41

## 2022-11-28 RX ADMIN — MEROPENEM 1 G: 1 INJECTION, POWDER, FOR SOLUTION INTRAVENOUS at 12:01

## 2022-11-28 RX ADMIN — BUSPIRONE HYDROCHLORIDE 10 MG: 5 TABLET ORAL at 16:25

## 2022-11-28 RX ADMIN — MEROPENEM 1 G: 1 INJECTION, POWDER, FOR SOLUTION INTRAVENOUS at 21:49

## 2022-11-28 RX ADMIN — INSULIN DETEMIR 20 UNITS: 100 INJECTION, SOLUTION SUBCUTANEOUS at 21:44

## 2022-11-28 RX ADMIN — PRAMIPEXOLE DIHYDROCHLORIDE 0.75 MG: 0.25 TABLET ORAL at 21:43

## 2022-11-28 RX ADMIN — MORPHINE SULFATE 15 MG: 15 TABLET, EXTENDED RELEASE ORAL at 21:44

## 2022-11-28 RX ADMIN — GABAPENTIN 400 MG: 400 CAPSULE ORAL at 08:41

## 2022-11-28 RX ADMIN — GABAPENTIN 400 MG: 400 CAPSULE ORAL at 16:25

## 2022-11-29 ENCOUNTER — READMISSION MANAGEMENT (OUTPATIENT)
Dept: CALL CENTER | Facility: HOSPITAL | Age: 52
End: 2022-11-29

## 2022-11-29 ENCOUNTER — HOME HEALTH ADMISSION (OUTPATIENT)
Dept: HOME HEALTH SERVICES | Facility: HOME HEALTHCARE | Age: 52
End: 2022-11-29
Payer: MEDICARE

## 2022-11-29 VITALS
RESPIRATION RATE: 18 BRPM | WEIGHT: 293 LBS | SYSTOLIC BLOOD PRESSURE: 130 MMHG | OXYGEN SATURATION: 98 % | HEART RATE: 71 BPM | DIASTOLIC BLOOD PRESSURE: 62 MMHG | BODY MASS INDEX: 55.32 KG/M2 | TEMPERATURE: 98.4 F | HEIGHT: 61 IN

## 2022-11-29 LAB
BACTERIA SPEC AEROBE CULT: NORMAL
GLUCOSE BLDC GLUCOMTR-MCNC: 100 MG/DL (ref 70–130)
GLUCOSE BLDC GLUCOMTR-MCNC: 45 MG/DL (ref 70–130)
GLUCOSE BLDC GLUCOMTR-MCNC: 51 MG/DL (ref 70–130)
GLUCOSE BLDC GLUCOMTR-MCNC: 56 MG/DL (ref 70–130)
GLUCOSE BLDC GLUCOMTR-MCNC: 67 MG/DL (ref 70–130)
GLUCOSE BLDC GLUCOMTR-MCNC: 93 MG/DL (ref 70–130)
GRAM STN SPEC: NORMAL
GRAM STN SPEC: NORMAL

## 2022-11-29 PROCEDURE — 25010000002 KETOROLAC TROMETHAMINE PER 15 MG: Performed by: INTERNAL MEDICINE

## 2022-11-29 PROCEDURE — 97116 GAIT TRAINING THERAPY: CPT

## 2022-11-29 PROCEDURE — 82962 GLUCOSE BLOOD TEST: CPT

## 2022-11-29 PROCEDURE — 25010000002 MEROPENEM PER 100 MG: Performed by: INTERNAL MEDICINE

## 2022-11-29 RX ORDER — DOXYCYCLINE HYCLATE 100 MG/1
100 CAPSULE ORAL 2 TIMES DAILY
Qty: 10 CAPSULE | Refills: 0 | Status: SHIPPED | OUTPATIENT
Start: 2022-11-29 | End: 2022-12-04

## 2022-11-29 RX ORDER — KETOROLAC TROMETHAMINE 10 MG/1
10 TABLET, FILM COATED ORAL EVERY 6 HOURS PRN
Qty: 20 TABLET | Refills: 0 | Status: SHIPPED | OUTPATIENT
Start: 2022-11-29 | End: 2022-12-04

## 2022-11-29 RX ORDER — ONDANSETRON 4 MG/1
4 TABLET, ORALLY DISINTEGRATING ORAL EVERY 6 HOURS PRN
Qty: 21 TABLET | Refills: 0 | Status: SHIPPED | OUTPATIENT
Start: 2022-11-29

## 2022-11-29 RX ADMIN — MEROPENEM 1 G: 1 INJECTION, POWDER, FOR SOLUTION INTRAVENOUS at 04:45

## 2022-11-29 RX ADMIN — KETOROLAC TROMETHAMINE 15 MG: 30 INJECTION, SOLUTION INTRAMUSCULAR; INTRAVENOUS at 08:49

## 2022-11-29 RX ADMIN — KETOROLAC TROMETHAMINE 15 MG: 30 INJECTION, SOLUTION INTRAMUSCULAR; INTRAVENOUS at 03:08

## 2022-11-29 RX ADMIN — Medication 10 ML: at 08:56

## 2022-11-29 RX ADMIN — MORPHINE SULFATE 15 MG: 15 TABLET, EXTENDED RELEASE ORAL at 08:50

## 2022-11-29 RX ADMIN — FLUTICASONE PROPIONATE 1 SPRAY: 50 SPRAY, METERED NASAL at 12:24

## 2022-11-29 RX ADMIN — PRAMIPEXOLE DIHYDROCHLORIDE 0.75 MG: 0.25 TABLET ORAL at 08:50

## 2022-11-29 RX ADMIN — GABAPENTIN 400 MG: 400 CAPSULE ORAL at 08:50

## 2022-11-29 RX ADMIN — DULOXETINE HYDROCHLORIDE 60 MG: 30 CAPSULE, DELAYED RELEASE ORAL at 08:49

## 2022-11-29 RX ADMIN — FAMOTIDINE 40 MG: 20 TABLET, FILM COATED ORAL at 08:49

## 2022-11-29 RX ADMIN — DOCUSATE SODIUM 100 MG: 100 CAPSULE, LIQUID FILLED ORAL at 08:50

## 2022-11-29 RX ADMIN — BUSPIRONE HYDROCHLORIDE 10 MG: 5 TABLET ORAL at 08:49

## 2022-11-29 RX ADMIN — MEROPENEM 1 G: 1 INJECTION, POWDER, FOR SOLUTION INTRAVENOUS at 12:24

## 2022-11-30 NOTE — OUTREACH NOTE
Prep Survey    Flowsheet Row Responses   [facility] facility patient discharged from? [place]   Is LACE score < 7 ? No   Emergency Room discharge w/ pulse ox? No   Eligibility Readm Mgmt   Discharge diagnosis Pain and erythema on right thigh and legCellulitis of lower extremity, unspecified laterality   Does the patient have one of the following disease processes/diagnoses(primary or secondary)? Other   Does the patient have Home health ordered? Yes   What is the Home health agency?  [facility] -pending at d/c   Is there a DME ordered? No   Prep survey completed? Yes          JANAK XIONG - Registered Nurse

## 2022-12-01 ENCOUNTER — HOME CARE VISIT (OUTPATIENT)
Dept: HOME HEALTH SERVICES | Facility: CLINIC | Age: 52
End: 2022-12-01
Payer: MEDICARE

## 2022-12-01 PROCEDURE — G0299 HHS/HOSPICE OF RN EA 15 MIN: HCPCS

## 2022-12-02 VITALS
RESPIRATION RATE: 18 BRPM | SYSTOLIC BLOOD PRESSURE: 122 MMHG | TEMPERATURE: 98.1 F | OXYGEN SATURATION: 96 % | HEART RATE: 73 BPM | DIASTOLIC BLOOD PRESSURE: 64 MMHG

## 2022-12-02 NOTE — HOME HEALTH
SOC Note: Pt discharged home from [facility] after cutaneous abcess removal from her right upper thigh by Dr. Sara Guerrier. Pt has type 2 diabetes and lymphedema in both legs. She lives with her boyfriend and ambulates with a cane. Pt is on room air. Dressings are ordered to be changed twice daily or PRN if soiled.   Home Health ordered for: SN, PT, OT  Reason for Hosp/Primary Dx/Co-morbidities: cutaneous abcess of right upper thigh, type 2 diabetes, lymphedema of legs bilaterally  Focus of Care: CPA, med education, wound assessment, dressing changes  Current Functional status/mobility/DME: Pt ambulates with a cane  HB status/Living Arrangements: Pt lives with her boyfriend  Skin Integrity/wound status: Surgical wound to the right upper thigh  Code Status: CPR  Fall Risk: 7

## 2022-12-05 ENCOUNTER — HOME CARE VISIT (OUTPATIENT)
Dept: HOME HEALTH SERVICES | Facility: CLINIC | Age: 52
End: 2022-12-05

## 2022-12-05 ENCOUNTER — TELEPHONE (OUTPATIENT)
Dept: SURGERY | Facility: CLINIC | Age: 52
End: 2022-12-05

## 2022-12-05 VITALS
RESPIRATION RATE: 16 BRPM | SYSTOLIC BLOOD PRESSURE: 140 MMHG | DIASTOLIC BLOOD PRESSURE: 82 MMHG | OXYGEN SATURATION: 97 % | HEART RATE: 68 BPM | TEMPERATURE: 97.4 F

## 2022-12-05 VITALS
HEART RATE: 82 BPM | SYSTOLIC BLOOD PRESSURE: 130 MMHG | OXYGEN SATURATION: 98 % | RESPIRATION RATE: 16 BRPM | DIASTOLIC BLOOD PRESSURE: 84 MMHG | TEMPERATURE: 97.4 F

## 2022-12-05 PROCEDURE — G0151 HHCP-SERV OF PT,EA 15 MIN: HCPCS

## 2022-12-05 PROCEDURE — G0152 HHCP-SERV OF OT,EA 15 MIN: HCPCS

## 2022-12-05 NOTE — HOME HEALTH
SUBJECTIVE: reports fear of falling, has not attempted taking shower since d/c home  SURGICAL PROCEDURE: procedure to  remove cutaneous abcess on right LE  PRECAUTIONS: wound on right thigh  OXYGEN USE: no  EQUIPMENT: quad cane, tub bench, reports PT is getting a walker for patient, has single point cane, BSC, lift chair; reports she is getting a hoveround and a hospital bed  PRIOR LEVEL OF FUNCTION: Has used cane for last few years, lives with boyfriend,  was independent with ADLs except cooking, hx of falls  MEDICAL NECESSITY: skilled OT recommended to educate on safety, adaptive techniques/AE use and energy conservation for ADLs   PATIENT GOALS: independent with LE dressing, lifting legs into bed, kitchen tasks    DATE OF NEXT APPOINTMENT WITH DOCTOR: Dec 8th primary physician  ANTICIPATED DISCHARGE PLAN: to self care when goals met  PATIENT/CAREGIVER AGREE WITH DISCHARGE PLAN: yes  SPECIFIC INTERVENTIONS AND GOALS TO ADDRESS ON NEXT VISIT: AE training  FREQUENCY AND DURATION: 1 wk 3      PATIENT HAS RECEIVED EDUCATION ON COVID-19, PREVENTION, HANDWASHING, SOCIAL DISTANCING PER CDC

## 2022-12-05 NOTE — TELEPHONE ENCOUNTER
Provider: PATIENCE THURMAN    Caller: ZULY     Phone Number: [phone]    Reason for Call: PT CANCELLED APPT TOMORROW FOR POST OP OF I&D OF TRUNK DUE TO SCHEDULING CONFLICT AND TRANSPORTATION. PT WOULD LIKE TO KNOW IF HOME HEALTH CAN JUST KEEP AN EYE ON IT OR IF SHE STILL NEEDS TO COME IN OFFICE. SHE WOULD ALSO LIKE A REFILL OF TORADOL TO [facility] ON [address]

## 2022-12-06 ENCOUNTER — HOME CARE VISIT (OUTPATIENT)
Dept: HOME HEALTH SERVICES | Facility: CLINIC | Age: 52
End: 2022-12-06

## 2022-12-06 VITALS
TEMPERATURE: 97.9 F | SYSTOLIC BLOOD PRESSURE: 140 MMHG | DIASTOLIC BLOOD PRESSURE: 80 MMHG | OXYGEN SATURATION: 97 % | RESPIRATION RATE: 16 BRPM | HEART RATE: 80 BPM

## 2022-12-06 PROCEDURE — G0299 HHS/HOSPICE OF RN EA 15 MIN: HCPCS

## 2022-12-06 NOTE — HOME HEALTH
Patient is a 52 y.o.F.  with history of diabetes, lymphedema, R thigh wound. Patient lives in a single story home with a few steps for entry. Prior to the recent decline patient was independent with all mobility. At evaluation patient confirmed she owns a lymphedema pump, had underwent a complete deconjestive therapy treatment in the past and has an exercise program for lymphedema management.  Patient ambulates with a LBQC at home, however demonstrated unsteady gait pattern. Tinetti score was obtained at17/28 - patient is at high risk for falls.  Patient will benefit from the use of rollator for ambulation. A request for order was placed with the referring provider. Patient requires Mod A for bed mobility for BLE elevation/lowering. Patient will benefit from skilled physical therapy for gait and balance training, development and progression of HEP.

## 2022-12-06 NOTE — HOME HEALTH
Routine Visit Note: Patient alert and oriented x3 sitting up in chair upon nurses arrival. Patient reports generalized pain manged with current pain regimen. Incision to right thigh assessed. see wound assessment. Patient instucted on S/S of infection to report to SN/MD, edema management, Diabetic foot care and to call [facility] with any changes or concerns. Patient voiced understanding.    Plan for next visit: wound care/assessment, continue education

## 2022-12-09 ENCOUNTER — READMISSION MANAGEMENT (OUTPATIENT)
Dept: CALL CENTER | Facility: HOSPITAL | Age: 52
End: 2022-12-09

## 2022-12-09 ENCOUNTER — HOME CARE VISIT (OUTPATIENT)
Dept: HOME HEALTH SERVICES | Facility: CLINIC | Age: 52
End: 2022-12-09

## 2022-12-09 PROCEDURE — G0299 HHS/HOSPICE OF RN EA 15 MIN: HCPCS

## 2022-12-09 NOTE — OUTREACH NOTE
Medical Week 2 Survey    Flowsheet Row Responses   [facility] patient discharged from? [place]   Does the patient have one of the following disease processes/diagnoses(primary or secondary)? Other   Week 2 attempt successful? Yes   Call start time 1434   Discharge diagnosis Pain and erythema on right thigh and legCellulitis of lower extremity, unspecified laterality   Call end time 1436   Meds reviewed with patient/caregiver? Yes   Is the patient having any side effects they believe may be caused by any medication additions or changes? No   Does the patient have all medications ordered at discharge? Yes   Is the patient taking all medications as directed (includes completed medication regime)? Yes   Does the patient have a primary care provider?  Yes   Does the patient have an appointment with their PCP within 7 days of discharge? Greater than 7 days   Comments regarding PCP patient cancelled PCP appt, will reschedule   What is preventing the patient from scheduling follow up appointments within 7 days of discharge? Haven't had time   Nursing Interventions Educated patient on importance of making appointment, Advised patient to make appointment   Has the patient kept scheduled appointments due by today? N/A   What is the Home health agency?  [facility] -pending at d/c   Has home health visited the patient within 72 hours of discharge? Yes   Home health comments  visit 12/9/22   Psychosocial issues? No   Did the patient receive a copy of their discharge instructions? Yes   Nursing interventions Reviewed instructions with patient   What is the patient's perception of their health status since discharge? Improving   Is the patient/caregiver able to teach back signs and symptoms related to disease process for when to call PCP? Yes   Is the patient/caregiver able to teach back signs and symptoms related to disease process for when to call 911? Yes   Is the patient/caregiver able to teach back the hierarchy  of who to call/visit for symptoms/problems? PCP, Specialist, Home health nurse, Urgent Care, ED, 911 Yes   If the patient is a current smoker, are they able to teach back resources for cessation? Not a smoker   Week 2 Call Completed? Yes          CARMEN XIONG - Registered Nurse

## 2022-12-11 VITALS
RESPIRATION RATE: 18 BRPM | TEMPERATURE: 97.8 F | DIASTOLIC BLOOD PRESSURE: 80 MMHG | HEART RATE: 80 BPM | OXYGEN SATURATION: 98 % | SYSTOLIC BLOOD PRESSURE: 128 MMHG

## 2022-12-12 NOTE — HOME HEALTH
FOCUS OF CARE/SKILLED NEED: CPA/EDUCATION, WOUND CARE AND ASSESSMENT    TEACHING/INTERVENTIONS: Wound care amd assessment to right thigh. Wound beefy red with yellow and dark slough noted. Educated on the importance of wet to dry dressing and the purpose of it sticking and pulling from the wounnd bed the slough. Pt verb understanding. Pt with lymphedema noted to ble. PP+ bilat and weak. Homans negative.     PROGRESS TOWARD GOALS: Progressing as expected.     PHYSICIAN CONTACT:  None needed    INSURANCE CHANGES?  Denies    FALLS SINCE LAST VISIT?  Denies    MEDICATION CHANGES? Denies    PLAN FOR NEXT VISIT: Wound care and assessment    PRE-SCREENED FOR COVID? No s/s

## 2022-12-13 ENCOUNTER — HOME CARE VISIT (OUTPATIENT)
Dept: HOME HEALTH SERVICES | Facility: CLINIC | Age: 52
End: 2022-12-13

## 2022-12-13 NOTE — CASE COMMUNICATION
Patient missed a HH PT visit from [facility] on 12/13/22     Reason: Pt requested no PT      For your records only.   As per home health protocol, MD must be notified of missed/cancelled visits; therefore the prescribed frequency was not met.

## 2022-12-14 ENCOUNTER — HOME CARE VISIT (OUTPATIENT)
Dept: HOME HEALTH SERVICES | Facility: CLINIC | Age: 52
End: 2022-12-14

## 2022-12-14 VITALS
RESPIRATION RATE: 18 BRPM | OXYGEN SATURATION: 97 % | TEMPERATURE: 98.6 F | HEART RATE: 78 BPM | SYSTOLIC BLOOD PRESSURE: 136 MMHG | DIASTOLIC BLOOD PRESSURE: 70 MMHG

## 2022-12-14 PROCEDURE — G0300 HHS/HOSPICE OF LPN EA 15 MIN: HCPCS

## 2022-12-14 NOTE — HOME HEALTH
FOCUS OF CARE/SKILLED NEED: HOME SAFETY/FALL PREVENTION, MEDICATION EDUCATION, PAIN MANAGEMENT, PREVENTATIVE SKIN CARE, Diabetes management/diet/foot care, wound care, s/s of infection    TEACHING/INTERVENTIONS:  HOME SAFETY/FALL PREVENTION, MEDICATION EDUCATION, PAIN MANAGEMENT, PREVENTATIVE SKIN CARE. diabetes management/diet/footcare, wound care, s/s of infection    PROGRESS TOWARDS GOALS: progressing    RECENT FALLS:  none    RECENT MEDICATION CHANGES: none    WOUND(S): yes, right anterior thigh. Followed by [facility] Nasc[facility] Provider    D/C PLAN:  DISCHARGE WITH GOALS MET    PHYSICIAN CONTACT:  yes    INSURANCE CHANGES: none    PRE-SCREEN FOR COVID 19: denies any s/s of covid    Contacted patient's PCP, Rhonda JOLLEY at [facility]. Spoke with Nurse Bhat regarding patient's other, older wounds. Ashlee states she will consult [facility] Provider and will call patient for an appt to formulate a plan to follow for those wounds as well. She will sent orders to . Currently we only have orders for the right thigh wound.

## 2022-12-16 ENCOUNTER — HOME CARE VISIT (OUTPATIENT)
Dept: HOME HEALTH SERVICES | Facility: CLINIC | Age: 52
End: 2022-12-16

## 2022-12-16 ENCOUNTER — HOME CARE VISIT (OUTPATIENT)
Dept: HOME HEALTH SERVICES | Facility: HOME HEALTHCARE | Age: 52
End: 2022-12-16

## 2022-12-16 VITALS
SYSTOLIC BLOOD PRESSURE: 140 MMHG | HEART RATE: 93 BPM | TEMPERATURE: 97.8 F | OXYGEN SATURATION: 99 % | DIASTOLIC BLOOD PRESSURE: 70 MMHG | RESPIRATION RATE: 18 BRPM

## 2022-12-16 PROCEDURE — G0299 HHS/HOSPICE OF RN EA 15 MIN: HCPCS

## 2022-12-16 NOTE — HOME HEALTH
Routine Visit Note:Patient sitting up in chair upon nurses arrival with feet down. Patient reports pain managed with current pain regimen. Wound to right htigh assessed.see wound assessment for detains. SN instucted patient on S/S of infection to report to SN/MD, importance of diabetic foot care, and to call [facility] with any changes or concerns. Patient voiced understanding.      Plan for next visit: wound assessment.continue education

## 2022-12-19 ENCOUNTER — HOME CARE VISIT (OUTPATIENT)
Dept: HOME HEALTH SERVICES | Facility: CLINIC | Age: 52
End: 2022-12-19

## 2022-12-19 VITALS
TEMPERATURE: 97 F | OXYGEN SATURATION: 98 % | RESPIRATION RATE: 16 BRPM | SYSTOLIC BLOOD PRESSURE: 120 MMHG | HEART RATE: 79 BPM | DIASTOLIC BLOOD PRESSURE: 70 MMHG

## 2022-12-19 PROCEDURE — G0152 HHCP-SERV OF OT,EA 15 MIN: HCPCS

## 2022-12-19 NOTE — HOME HEALTH
"    COVID SCREENING: no signs/symptoms of COVID    SUBJECTIVE:Patient reports she has showered since last visit with assist of boyfriend. Patient has MD appt tomorrow and plans for assessment for motorized w/c    FOCUS OF CARE/SKILLED NEED: ADL training    TEACHING/INTERVENTIONS: ADL training on AE use for LE dressing, bathing. Education on energy conservation for ADLs, safety with ADL transfers and functional mobility for ADLs    PATIENT/CG RESPONSE: Patient demo good understanding of use of AE for LE dressing. Unable to use sock aide due to edema in LEs and wraps on legs. Demo mod difficulty and substantially increased time with ADL transfers and mobility for ADLs. Fatigues easily with mobility and transfers and would benefit from use of motorized w/c for energy conservation and to increase safety with mobility. Home assessment for mobility device - open concept kitchen /living room with adequate space to use w/c. Main hallway to bedroom/bathroom area 35\" wide. Doorways to bedroom and bathroom 27\". Would allow adequate space to use w/c to get to bedroom door and patient could use walker for short distance to bathroom.     PROGRESS TOWARD GOALS: goals met to max potential at this time    PHYSICIAN CONTACT: tomorrow appt scheduled    FALLS SINCE LAST VISIT? no    MEDICATION CHANGES SINCE LAST VISIT? no"

## 2022-12-20 ENCOUNTER — HOME CARE VISIT (OUTPATIENT)
Dept: HOME HEALTH SERVICES | Facility: CLINIC | Age: 52
End: 2022-12-20

## 2022-12-20 NOTE — CASE COMMUNICATION
Patient missed a PT visit from [facility] on week of 12/18/22-12/24/22    Reason: Pt request       For your records only.   As per home health protocol, MD must be notified of missed/cancelled visits; therefore the prescribed frequency was not met.

## 2022-12-21 ENCOUNTER — HOME CARE VISIT (OUTPATIENT)
Dept: HOME HEALTH SERVICES | Facility: CLINIC | Age: 52
End: 2022-12-21

## 2022-12-21 VITALS
OXYGEN SATURATION: 99 % | HEART RATE: 75 BPM | RESPIRATION RATE: 18 BRPM | TEMPERATURE: 97.6 F | SYSTOLIC BLOOD PRESSURE: 140 MMHG | DIASTOLIC BLOOD PRESSURE: 82 MMHG

## 2022-12-21 PROCEDURE — G0299 HHS/HOSPICE OF RN EA 15 MIN: HCPCS

## 2022-12-22 NOTE — HOME HEALTH
FOCUS OF CARE/SKILLED NEED: direct skill   TEACHING/INTERVENTIONS: wound assessment and dressing change to right thigh wound  PROGRESS TOWARD GOALS: ongoing  PHYSICIAN CONTACT:  no  INSURANCE CHANGES?  no  FALLS SINCE LAST VISIT?  no  MEDICATION CHANGES? no  PLAN FOR NEXT VISIT: wound assessment and dressing change  PRE-SCREENED FOR COVID? no s/s

## 2022-12-23 ENCOUNTER — HOME CARE VISIT (OUTPATIENT)
Dept: HOME HEALTH SERVICES | Facility: CLINIC | Age: 52
End: 2022-12-23

## 2022-12-28 ENCOUNTER — HOME CARE VISIT (OUTPATIENT)
Dept: HOME HEALTH SERVICES | Facility: CLINIC | Age: 52
End: 2022-12-28

## 2022-12-28 VITALS
TEMPERATURE: 97.7 F | RESPIRATION RATE: 18 BRPM | DIASTOLIC BLOOD PRESSURE: 62 MMHG | SYSTOLIC BLOOD PRESSURE: 138 MMHG | HEART RATE: 81 BPM | OXYGEN SATURATION: 99 %

## 2022-12-28 PROCEDURE — G0299 HHS/HOSPICE OF RN EA 15 MIN: HCPCS

## 2022-12-28 NOTE — CASE COMMUNICATION
Patient missed a PT visit from [facility] on 12/28/22     Reason: Pt cancelled PT       For your records only.   As per home health protocol, MD must be notified of missed/cancelled visits; therefore the prescribed frequency was not met.

## 2022-12-30 ENCOUNTER — HOME CARE VISIT (OUTPATIENT)
Dept: HOME HEALTH SERVICES | Facility: CLINIC | Age: 52
End: 2022-12-30

## 2022-12-30 VITALS
HEART RATE: 78 BPM | TEMPERATURE: 98.3 F | DIASTOLIC BLOOD PRESSURE: 62 MMHG | SYSTOLIC BLOOD PRESSURE: 132 MMHG | OXYGEN SATURATION: 96 % | RESPIRATION RATE: 18 BRPM

## 2022-12-30 PROCEDURE — G0299 HHS/HOSPICE OF RN EA 15 MIN: HCPCS

## 2022-12-30 NOTE — HOME HEALTH
FOCUS OF CARE/SKILLED NEED: direct skill   TEACHING/INTERVENTIONS: dressing change and assessment to right lower limb wound  PROGRESS TOWARD GOALS: ongoing  PHYSICIAN CONTACT:  no  INSURANCE CHANGES?  no  FALLS SINCE LAST VISIT?  no  MEDICATION CHANGES? no  PLAN FOR NEXT VISIT: wound assessment and dressing change  PRE-SCREENED FOR COVID? no s/s

## 2022-12-30 NOTE — HOME HEALTH
Routine Visit Note: Patient alert and oriented sitting up in chair upon nurses arrival. Wounds assessed. CG performed woundcare to bilateral posterior calves, left heel, and right thigh 100% adequately with SN observing. Patient refused woundcare to lower back. Caregiver painted right heel with betadine and left open to air. SN instructed patient/caregiver on importance of following MD orders concerning woundcare, S/S of infection to report SN/MD, emergency plan,low salt/low sugar diet, fall precautions, and to call [facility] with any changes or concerns. Patient voiced understanding.    Plan for next visit: wound assessment/woundcare

## 2023-01-03 ENCOUNTER — HOME CARE VISIT (OUTPATIENT)
Dept: HOME HEALTH SERVICES | Facility: CLINIC | Age: 53
End: 2023-01-03
Payer: MEDICARE

## 2023-01-03 VITALS
TEMPERATURE: 97.8 F | RESPIRATION RATE: 18 BRPM | SYSTOLIC BLOOD PRESSURE: 142 MMHG | OXYGEN SATURATION: 97 % | HEART RATE: 84 BPM | DIASTOLIC BLOOD PRESSURE: 78 MMHG

## 2023-01-03 PROCEDURE — G0299 HHS/HOSPICE OF RN EA 15 MIN: HCPCS

## 2023-01-05 ENCOUNTER — HOME CARE VISIT (OUTPATIENT)
Dept: HOME HEALTH SERVICES | Facility: CLINIC | Age: 53
End: 2023-01-05
Payer: MEDICARE

## 2023-01-05 NOTE — HOME HEALTH
FOCUS OF CARE/SKILLED NEED: direct skill   TEACHING/INTERVENTIONS: wound care to bilateral lower leg wounds, right thigh wound  PROGRESS TOWARD GOALS: ongoing  PHYSICIAN CONTACT:  no  INSURANCE CHANGES?  no  FALLS SINCE LAST VISIT?  no  MEDICATION CHANGES? no  PLAN FOR NEXT VISIT: wound care to bilateral lower leg wounds, right thigh wound  PRE-SCREENED FOR COVID? no s/s

## 2023-01-06 ENCOUNTER — HOME CARE VISIT (OUTPATIENT)
Dept: HOME HEALTH SERVICES | Facility: CLINIC | Age: 53
End: 2023-01-06
Payer: MEDICARE

## 2023-01-06 PROCEDURE — G0299 HHS/HOSPICE OF RN EA 15 MIN: HCPCS

## 2023-01-07 NOTE — HOME HEALTH
Patient wishes to be discharged from skilled PT. Patient has initially agreed to the evaluation, however has not participated with any schedulaed subsequent visits. Discharged today per patient's request.

## 2023-01-09 VITALS
HEART RATE: 76 BPM | SYSTOLIC BLOOD PRESSURE: 118 MMHG | OXYGEN SATURATION: 99 % | RESPIRATION RATE: 16 BRPM | TEMPERATURE: 97.7 F | DIASTOLIC BLOOD PRESSURE: 80 MMHG

## 2023-01-09 NOTE — HOME HEALTH
FOCUS OF CARE/SKILLED NEED: direct skill   TEACHING/INTERVENTIONS: dressing changes to right thigh, right and left calves, left heel  PROGRESS TOWARD GOALS: ongoing  PHYSICIAN CONTACT:  SHOLA Nascimento  contacted for request for santyl to bilateral calf wounds  INSURANCE CHANGES?  no  FALLS SINCE LAST VISIT?  no  MEDICATION CHANGES? no  PLAN FOR NEXT VISIT: dressing changes to right thigh, right and left calves, left heel  PRE-SCREENED FOR COVID? no s/s

## 2023-01-10 ENCOUNTER — HOME CARE VISIT (OUTPATIENT)
Dept: HOME HEALTH SERVICES | Facility: CLINIC | Age: 53
End: 2023-01-10
Payer: MEDICARE

## 2023-01-10 PROCEDURE — G0299 HHS/HOSPICE OF RN EA 15 MIN: HCPCS

## 2023-01-11 VITALS
TEMPERATURE: 98.1 F | DIASTOLIC BLOOD PRESSURE: 70 MMHG | SYSTOLIC BLOOD PRESSURE: 120 MMHG | OXYGEN SATURATION: 99 % | RESPIRATION RATE: 18 BRPM | HEART RATE: 78 BPM

## 2023-01-12 NOTE — HOME HEALTH
FOCUS OF CARE/SKILLED NEED: direct skill   TEACHING/INTERVENTIONS: wound assessment and dressing change  PROGRESS TOWARD GOALS: ongoing  PHYSICIAN CONTACT:  SHOLA Nascimento called about redness and warmth to left lower leg  INSURANCE CHANGES?  no  FALLS SINCE LAST VISIT?  no  MEDICATION CHANGES? no  PLAN FOR NEXT VISIT: wound assessment and dressing change  PRE-SCREENED FOR COVID? no s/s

## 2023-01-13 ENCOUNTER — HOME CARE VISIT (OUTPATIENT)
Dept: HOME HEALTH SERVICES | Facility: CLINIC | Age: 53
End: 2023-01-13
Payer: MEDICARE

## 2023-01-13 PROCEDURE — G0299 HHS/HOSPICE OF RN EA 15 MIN: HCPCS

## 2023-01-15 VITALS
OXYGEN SATURATION: 96 % | SYSTOLIC BLOOD PRESSURE: 108 MMHG | RESPIRATION RATE: 16 BRPM | HEART RATE: 85 BPM | DIASTOLIC BLOOD PRESSURE: 60 MMHG | TEMPERATURE: 98.5 F

## 2023-01-15 NOTE — HOME HEALTH
FOCUS OF CARE/SKILLED NEED: direct skill   TEACHING/INTERVENTIONS: wound assessment and dressing changes  PROGRESS TOWARD GOALS: ongoing  PHYSICIAN CONTACT:  no  INSURANCE CHANGES?  no  FALLS SINCE LAST VISIT?  no  MEDICATION CHANGES? no  PLAN FOR NEXT VISIT: wound assessment and dressing changes  PRE-SCREENED FOR COVID? no s/s

## 2023-01-16 ENCOUNTER — HOME CARE VISIT (OUTPATIENT)
Dept: HOME HEALTH SERVICES | Facility: CLINIC | Age: 53
End: 2023-01-16
Payer: MEDICARE

## 2023-01-16 PROCEDURE — G0299 HHS/HOSPICE OF RN EA 15 MIN: HCPCS

## 2023-01-17 NOTE — HOME HEALTH
Routine Visit Note: Patient alert and oriented x 3 sitting up in chair upon nurses arrival. Patient refused wound assessments and care due to woundcare performed per caregiver this AM. SN attempted to obtain CBC and CMP per venipuncture x 1 stick to left and AC and x 1 stick to right AC unsuccessfully. Reported to Rylee/ Dr Knox and patient to have labwork performed in MD office on 1/17/23. Patient instructed of MD instructions on obtaining labwork in MD office and to call [facility] with any changes or concerns. Patient voiced understanding.      Plan for next visit: woundcare/assessment

## 2023-01-20 ENCOUNTER — HOME CARE VISIT (OUTPATIENT)
Dept: HOME HEALTH SERVICES | Facility: CLINIC | Age: 53
End: 2023-01-20
Payer: MEDICARE

## 2023-01-20 PROCEDURE — G0299 HHS/HOSPICE OF RN EA 15 MIN: HCPCS

## 2023-01-23 ENCOUNTER — HOSPITAL ENCOUNTER (EMERGENCY)
Facility: HOSPITAL | Age: 53
Discharge: HOME | End: 2023-01-23
Attending: STUDENT IN AN ORGANIZED HEALTH CARE EDUCATION/TRAINING PROGRAM | Admitting: STUDENT IN AN ORGANIZED HEALTH CARE EDUCATION/TRAINING PROGRAM
Payer: MEDICARE

## 2023-01-23 VITALS
RESPIRATION RATE: 18 BRPM | SYSTOLIC BLOOD PRESSURE: 130 MMHG | OXYGEN SATURATION: 97 % | DIASTOLIC BLOOD PRESSURE: 88 MMHG | HEART RATE: 80 BPM | TEMPERATURE: 98 F

## 2023-01-23 VITALS
OXYGEN SATURATION: 98 % | TEMPERATURE: 98.2 F | HEART RATE: 86 BPM | HEIGHT: 61 IN | DIASTOLIC BLOOD PRESSURE: 55 MMHG | RESPIRATION RATE: 16 BRPM | SYSTOLIC BLOOD PRESSURE: 126 MMHG | BODY MASS INDEX: 55.32 KG/M2 | WEIGHT: 293 LBS

## 2023-01-23 DIAGNOSIS — I89.0 LYMPHEDEMA DUE TO CHRONIC INFLAMMATION: ICD-10-CM

## 2023-01-23 DIAGNOSIS — G89.4 CHRONIC PAIN SYNDROME: ICD-10-CM

## 2023-01-23 DIAGNOSIS — R25.2 BILATERAL LEG CRAMPS: Primary | ICD-10-CM

## 2023-01-23 LAB
ALBUMIN SERPL-MCNC: 3.1 G/DL (ref 3.5–5.2)
ALBUMIN/GLOB SERPL: 0.7 G/DL
ALP SERPL-CCNC: 76 U/L (ref 39–117)
ALT SERPL W P-5'-P-CCNC: 7 U/L (ref 1–33)
ANION GAP SERPL CALCULATED.3IONS-SCNC: 10 MMOL/L (ref 5–15)
AST SERPL-CCNC: 13 U/L (ref 1–32)
BASOPHILS # BLD AUTO: 0.02 10*3/MM3 (ref 0–0.2)
BASOPHILS NFR BLD AUTO: 0.2 % (ref 0–1.5)
BILIRUB SERPL-MCNC: 0.2 MG/DL (ref 0–1.2)
BUN SERPL-MCNC: 17 MG/DL (ref 6–20)
BUN/CREAT SERPL: 19.1 (ref 7–25)
CALCIUM SPEC-SCNC: 8.9 MG/DL (ref 8.6–10.5)
CHLORIDE SERPL-SCNC: 100 MMOL/L (ref 98–107)
CO2 SERPL-SCNC: 25 MMOL/L (ref 22–29)
CREAT SERPL-MCNC: 0.89 MG/DL (ref 0.57–1)
DEPRECATED RDW RBC AUTO: 55.8 FL (ref 37–54)
EGFRCR SERPLBLD CKD-EPI 2021: 79.1 ML/MIN/1.73
EOSINOPHIL # BLD AUTO: 0.04 10*3/MM3 (ref 0–0.4)
EOSINOPHIL NFR BLD AUTO: 0.3 % (ref 0.3–6.2)
ERYTHROCYTE [DISTWIDTH] IN BLOOD BY AUTOMATED COUNT: 19.9 % (ref 12.3–15.4)
GLOBULIN UR ELPH-MCNC: 4.5 GM/DL
GLUCOSE SERPL-MCNC: 179 MG/DL (ref 65–99)
HCT VFR BLD AUTO: 27 % (ref 34–46.6)
HGB BLD-MCNC: 7.8 G/DL (ref 12–15.9)
IMM GRANULOCYTES # BLD AUTO: 0.05 10*3/MM3 (ref 0–0.05)
IMM GRANULOCYTES NFR BLD AUTO: 0.4 % (ref 0–0.5)
LYMPHOCYTES # BLD AUTO: 2.3 10*3/MM3 (ref 0.7–3.1)
LYMPHOCYTES NFR BLD AUTO: 19.6 % (ref 19.6–45.3)
MAGNESIUM SERPL-MCNC: 1.7 MG/DL (ref 1.6–2.6)
MCH RBC QN AUTO: 22.1 PG (ref 26.6–33)
MCHC RBC AUTO-ENTMCNC: 28.9 G/DL (ref 31.5–35.7)
MCV RBC AUTO: 76.5 FL (ref 79–97)
MONOCYTES # BLD AUTO: 0.5 10*3/MM3 (ref 0.1–0.9)
MONOCYTES NFR BLD AUTO: 4.3 % (ref 5–12)
NEUTROPHILS NFR BLD AUTO: 75.2 % (ref 42.7–76)
NEUTROPHILS NFR BLD AUTO: 8.85 10*3/MM3 (ref 1.7–7)
NRBC BLD AUTO-RTO: 0 /100 WBC (ref 0–0.2)
PLATELET # BLD AUTO: 314 10*3/MM3 (ref 140–450)
PMV BLD AUTO: 8.5 FL (ref 6–12)
POTASSIUM SERPL-SCNC: 4.3 MMOL/L (ref 3.5–5.2)
PROT SERPL-MCNC: 7.6 G/DL (ref 6–8.5)
RBC # BLD AUTO: 3.53 10*6/MM3 (ref 3.77–5.28)
SODIUM SERPL-SCNC: 135 MMOL/L (ref 136–145)
WBC NRBC COR # BLD: 11.76 10*3/MM3 (ref 3.4–10.8)

## 2023-01-23 PROCEDURE — 96374 THER/PROPH/DIAG INJ IV PUSH: CPT

## 2023-01-23 PROCEDURE — 85025 COMPLETE CBC W/AUTO DIFF WBC: CPT | Performed by: STUDENT IN AN ORGANIZED HEALTH CARE EDUCATION/TRAINING PROGRAM

## 2023-01-23 PROCEDURE — 25010000002 HYDROMORPHONE PER 4 MG: Performed by: STUDENT IN AN ORGANIZED HEALTH CARE EDUCATION/TRAINING PROGRAM

## 2023-01-23 PROCEDURE — 83735 ASSAY OF MAGNESIUM: CPT | Performed by: STUDENT IN AN ORGANIZED HEALTH CARE EDUCATION/TRAINING PROGRAM

## 2023-01-23 PROCEDURE — 99284 EMERGENCY DEPT VISIT MOD MDM: CPT

## 2023-01-23 PROCEDURE — 80053 COMPREHEN METABOLIC PANEL: CPT | Performed by: STUDENT IN AN ORGANIZED HEALTH CARE EDUCATION/TRAINING PROGRAM

## 2023-01-23 RX ORDER — SODIUM CHLORIDE 0.9 % (FLUSH) 0.9 %
10 SYRINGE (ML) INJECTION AS NEEDED
Status: DISCONTINUED | OUTPATIENT
Start: 2023-01-23 | End: 2023-01-23 | Stop reason: HOSPADM

## 2023-01-23 RX ORDER — HYDROMORPHONE HYDROCHLORIDE 1 MG/ML
0.5 INJECTION, SOLUTION INTRAMUSCULAR; INTRAVENOUS; SUBCUTANEOUS ONCE
Status: COMPLETED | OUTPATIENT
Start: 2023-01-23 | End: 2023-01-23

## 2023-01-23 RX ORDER — OXYCODONE AND ACETAMINOPHEN 7.5; 325 MG/1; MG/1
1 TABLET ORAL ONCE
Status: COMPLETED | OUTPATIENT
Start: 2023-01-23 | End: 2023-01-23

## 2023-01-23 RX ORDER — DIAZEPAM 5 MG/1
5 TABLET ORAL ONCE
Status: DISCONTINUED | OUTPATIENT
Start: 2023-01-23 | End: 2023-01-23 | Stop reason: HOSPADM

## 2023-01-23 RX ORDER — CYCLOBENZAPRINE HCL 10 MG
10 TABLET ORAL ONCE
Status: DISCONTINUED | OUTPATIENT
Start: 2023-01-23 | End: 2023-01-23 | Stop reason: HOSPADM

## 2023-01-23 RX ADMIN — OXYCODONE HYDROCHLORIDE AND ACETAMINOPHEN 1 TABLET: 7.5; 325 TABLET ORAL at 05:49

## 2023-01-23 RX ADMIN — HYDROMORPHONE HYDROCHLORIDE 0.5 MG: 1 INJECTION, SOLUTION INTRAMUSCULAR; INTRAVENOUS; SUBCUTANEOUS at 06:04

## 2023-01-23 NOTE — ED PROVIDER NOTES
"EMERGENCY DEPARTMENT ATTENDING NOTE    Patient Name: Valeria Wilson    Chief Complaint   Patient presents with   • Leg Pain       PATIENT PRESENTATION:  Valeria Wilson is a 52 y.o. female with history of chronic lymphedema presents emergency department due to leg cramping.    Patient states that she often gets leg cramping sometimes due to electrolyte abnormalities which she gets wound care by home health and they were unable to get labs from her which is why she ultimately presents emergency department due to concerning symptoms.  No recent fevers or chills no chest pain or shortness of breath no nausea vomit abdominal pain.  No changes in her wounds.    PHYSICAL EXAM:   VS: /53 (BP Location: Right arm, Patient Position: Lying)   Pulse 89   Temp 98 °F (36.7 °C) (Oral)   Resp 18   Ht 154.9 cm (61\")   Wt (!) 148 kg (327 lb)   LMP  (LMP Unknown)   SpO2 100%   BMI 61.79 kg/m²   GENERAL: Well-appearing middle-aged woman sitting up in stretcher no acute distress; well-nourished, well-developed, awake, alert, no acute distress, nontoxic appearing, comfortable  EYES: PERRL, sclerae anicteric, extraocular movements grossly intact, symmetric lids  EARS, NOSE, MOUTH, THROAT: atraumatic external nose and ears, moist mucous membranes  NECK: Symmetric, trachea midline, no thyromegaly, no adenopathy, no meningismus  RESPIRATORY: Unlabored respiratory effort, clear to auscultation bilaterally, good air movement  CARDIOVASCULAR: No murmurs or gallops, good cap refill bilaterally of the lower feet  GI: Soft, nontender, nondistended, bowel sounds present, no hepatosplenomegaly  LYMPHATIC: no lymphadenopathy  MUSCULOSKELETAL/EXTREMITIES: Bilateral chronic appearing notable edema of the bilateral lower extremities with extremities also wrapped in Ace bandage; extremities without obvious deformity, no cyanosis or clubbing  SKIN: warm and dry with no obvious rashes  NEUROLOGIC: moving all 4 extremities symmetrically, CN " II-XII grossly intact  PSYCHIATRIC: alert, pleasant and cooperative. Appropriate mood and affect.      MEDICAL DECISION MAKING:    Valeria Wilson is a 52 y.o. female with history of chronic lymphedema who presents emergency department due to leg cramping.    Differential Diagnosis Considered: Hypokalemia, hypocalcemia, musculoskeletal spasm    Labs Ordered:  Labs Reviewed   COMPREHENSIVE METABOLIC PANEL - Abnormal; Notable for the following components:       Result Value    Glucose 179 (*)     Sodium 135 (*)     Albumin 3.1 (*)     All other components within normal limits    Narrative:     GFR Normal >60  Chronic Kidney Disease <60  Kidney Failure <15     CBC WITH AUTO DIFFERENTIAL - Abnormal; Notable for the following components:    WBC 11.76 (*)     RBC 3.53 (*)     Hemoglobin 7.8 (*)     Hematocrit 27.0 (*)     MCV 76.5 (*)     MCH 22.1 (*)     MCHC 28.9 (*)     RDW 19.9 (*)     RDW-SD 55.8 (*)     Monocyte % 4.3 (*)     Neutrophils, Absolute 8.85 (*)     All other components within normal limits   MAGNESIUM - Normal   CBC AND DIFFERENTIAL    Narrative:     The following orders were created for panel order CBC & Differential.  Procedure                               Abnormality         Status                     ---------                               -----------         ------                     CBC Auto Differential[[id number]]        Abnormal            Final result                 Please view results for these tests on the individual orders.        My lab interpretation: Only slightly elevated white blood cell count 11.76.  Chronically decreased hemoglobin 7.8.  CMP with reassuring electrolytes normal potassium.  She also has a normal calcium.    ED Course and Re-evaluation: 51yo F presenting to the emergency department due to bilateral leg cramps in the setting of chronic lymphedema.  Initial exam with notable chronic lymphedema otherwise patient well-appearing.  I ordered Valium in setting of leg cramps  but patient refused this medicine.  Ordered Flexeril but patient also did not want to take this medicine.  Ordered oxycodone patient took oxycodone.  Patient had told nursing staff that she did not want to take Valium for muscle cramps as it makes her sleepy despite patient states that she was in severe pain due to leg cramps.  Given this I suspicion that chronic pain is contributing to this presentation.  Given patient's acute pain on chronic pain I gave 1 dose of low-dose Dilaudid prior to discharge as I understand patient is struggling with her pain. Only slightly elevated white blood cell count no new spreading redness changes in her chronic wounds.  Low suspicion that her increased muscle cramp pains due to infectious cause at this time.  She was discharged home with plan to follow-up with her primary care provider within 2 days for further management return to the emergency department immediately if symptoms worsen.      ED Diagnosis:  Bilateral leg cramps; Lymphedema due to chronic inflammation; Chronic pain syndrome    Disposition: to home  Follow up plan: PCP follow up within 2 days, return to ED immediately if symptoms worsen      Signed:  Tima Tabares MD  Emergency Medicine Physician    Please note that portions of this note were completed with a voice recognition program.      Tima Tabares MD  01/23/23 0649

## 2023-01-23 NOTE — DISCHARGE INSTRUCTIONS
Today you are seen for your symptoms your lab work was reassuring.  Please call your primary care provider to schedule close follow-up appointment within 2 days.  Return to the emergency department immediately if your symptoms worsen prior.

## 2023-01-24 ENCOUNTER — HOME CARE VISIT (OUTPATIENT)
Dept: HOME HEALTH SERVICES | Facility: CLINIC | Age: 53
End: 2023-01-24
Payer: MEDICARE

## 2023-01-24 NOTE — HOME HEALTH
FOCUS OF CARE/SKILLED NEED: direct skill   TEACHING/INTERVENTIONS: wound assessment and dressing change  PROGRESS TOWARD GOALS: ongoing  PHYSICIAN CONTACT:  yes, MD contacted regarding pt reporting cramping in legs bilaterally  INSURANCE CHANGES?  no  FALLS SINCE LAST VISIT?  no  MEDICATION CHANGES? no  PLAN FOR NEXT VISIT: wound assessment and dressing change  PRE-SCREENED FOR COVID? no s/s

## 2023-01-25 NOTE — CASE COMMUNICATION
Patient missed a skilled nursing visit from [facility] on 1-24-23.     Reason: Pt declined visit today.       For your records only.   As per home health protocol, MD must be notified of missed/cancelled visits; therefore the prescribed frequency was not met.

## 2023-01-26 ENCOUNTER — HOME CARE VISIT (OUTPATIENT)
Dept: HOME HEALTH SERVICES | Facility: CLINIC | Age: 53
End: 2023-01-26
Payer: MEDICARE

## 2023-01-26 PROCEDURE — G0299 HHS/HOSPICE OF RN EA 15 MIN: HCPCS

## 2023-01-27 VITALS
DIASTOLIC BLOOD PRESSURE: 88 MMHG | RESPIRATION RATE: 18 BRPM | SYSTOLIC BLOOD PRESSURE: 142 MMHG | TEMPERATURE: 98.2 F | HEART RATE: 104 BPM | OXYGEN SATURATION: 99 %

## 2023-01-28 NOTE — HOME HEALTH
60 Day Summary    Home Health need continues for: wound care to right thigh, left heel, bilateral calf wounds    Primary diagnoses/co-morbidities/recent procedures in past 60 days that impact current episode: type 2 diabetes, lymphedema    Current level of functional ability: Pt needs assistance with ADLs and supervision when ambulating    Homebound status and living arrangements: Pt is homebound due to fall risk, multiple wounds; she lives with her significant other who is her primary cg    Goals accomplished and/or measurable progress toward unmet goals in past 60 days: Right thigh wound has made progress in healing  Focus of care for next 60 days for each discipline ordered: wound assessmend and dressing changes    Skin integrity/wound status: Wounds to right thigh, bilateral calves, left heel    Code status: CPR    Most recent fall risk: 6    Estimated date when home care services will end: no estimated date at this time as pt still has open wounds
HT 62 inches,  pounds, BMI 20.1,  pounds  Dxd with diverticulitis, r/o Cdiff.  hx of depression.  Skin intact.  Generally well nourished.

## 2023-01-31 ENCOUNTER — HOME CARE VISIT (OUTPATIENT)
Dept: HOME HEALTH SERVICES | Facility: HOME HEALTHCARE | Age: 53
End: 2023-01-31
Payer: MEDICARE

## 2023-02-03 ENCOUNTER — HOME CARE VISIT (OUTPATIENT)
Dept: HOME HEALTH SERVICES | Facility: CLINIC | Age: 53
End: 2023-02-03
Payer: MEDICARE

## 2023-02-03 PROCEDURE — G0299 HHS/HOSPICE OF RN EA 15 MIN: HCPCS

## 2023-02-06 ENCOUNTER — LAB REQUISITION (OUTPATIENT)
Dept: LAB | Facility: HOSPITAL | Age: 53
End: 2023-02-06
Payer: MEDICARE

## 2023-02-06 ENCOUNTER — HOME CARE VISIT (OUTPATIENT)
Dept: HOME HEALTH SERVICES | Facility: CLINIC | Age: 53
End: 2023-02-06
Payer: MEDICARE

## 2023-02-06 VITALS
TEMPERATURE: 98.3 F | HEART RATE: 88 BPM | DIASTOLIC BLOOD PRESSURE: 80 MMHG | SYSTOLIC BLOOD PRESSURE: 144 MMHG | OXYGEN SATURATION: 99 %

## 2023-02-06 DIAGNOSIS — Z00.00 ENCOUNTER FOR GENERAL ADULT MEDICAL EXAMINATION WITHOUT ABNORMAL FINDINGS: ICD-10-CM

## 2023-02-06 PROCEDURE — 87205 SMEAR GRAM STAIN: CPT | Performed by: NURSE PRACTITIONER

## 2023-02-06 PROCEDURE — 87186 SC STD MICRODIL/AGAR DIL: CPT | Performed by: NURSE PRACTITIONER

## 2023-02-06 PROCEDURE — 87070 CULTURE OTHR SPECIMN AEROBIC: CPT | Performed by: NURSE PRACTITIONER

## 2023-02-06 PROCEDURE — G0299 HHS/HOSPICE OF RN EA 15 MIN: HCPCS

## 2023-02-06 PROCEDURE — 87077 CULTURE AEROBIC IDENTIFY: CPT | Performed by: NURSE PRACTITIONER

## 2023-02-07 VITALS
RESPIRATION RATE: 18 BRPM | TEMPERATURE: 98 F | OXYGEN SATURATION: 98 % | HEART RATE: 86 BPM | SYSTOLIC BLOOD PRESSURE: 132 MMHG | DIASTOLIC BLOOD PRESSURE: 80 MMHG

## 2023-02-07 NOTE — HOME HEALTH
FOCUS OF CARE/SKILLED NEED: direct skill   TEACHING/INTERVENTIONS: dressing change   PROGRESS TOWARD GOALS: ongoing  PHYSICIAN CONTACT:  SHOLA Nascimento contacted regarding cramping in pt's legs and need for wound cultures to posterior calf wounds  INSURANCE CHANGES?  no  FALLS SINCE LAST VISIT?  no  MEDICATION CHANGES? no  PLAN FOR NEXT VISIT: dressing change   PRE-SCREENED FOR COVID? no s/s

## 2023-02-07 NOTE — HOME HEALTH
FOCUS OF CARE/SKILLED NEED: direct skill   TEACHING/INTERVENTIONS: dressing change and wound assessment, wound cultures from right and left calf wounds  PROGRESS TOWARD GOALS: ongoing  PHYSICIAN CONTACT:  no  INSURANCE CHANGES?  no   FALLS SINCE LAST VISIT?  no  MEDICATION CHANGES? no  PLAN FOR NEXT VISIT:  dressing change and wound assessment  PRE-SCREENED FOR COVID? no s/s

## 2023-02-08 ENCOUNTER — HOME CARE VISIT (OUTPATIENT)
Dept: HOME HEALTH SERVICES | Facility: CLINIC | Age: 53
End: 2023-02-08
Payer: MEDICARE

## 2023-02-08 PROCEDURE — G0300 HHS/HOSPICE OF LPN EA 15 MIN: HCPCS

## 2023-02-08 NOTE — HOME HEALTH
Attempt x 1 for venipuncture to left ac unsuccessful. Patient has commented in the past that if she is going to need frequent labs drqwn she would need a picc line.  and Superviser notified of unsuccessful attempt.

## 2023-02-10 ENCOUNTER — HOME CARE VISIT (OUTPATIENT)
Dept: HOME HEALTH SERVICES | Facility: CLINIC | Age: 53
End: 2023-02-10
Payer: MEDICARE

## 2023-02-10 LAB
BACTERIA SPEC AEROBE CULT: ABNORMAL
GRAM STN SPEC: ABNORMAL

## 2023-02-10 PROCEDURE — G0299 HHS/HOSPICE OF RN EA 15 MIN: HCPCS

## 2023-02-12 VITALS
OXYGEN SATURATION: 99 % | HEART RATE: 51 BPM | RESPIRATION RATE: 18 BRPM | DIASTOLIC BLOOD PRESSURE: 86 MMHG | SYSTOLIC BLOOD PRESSURE: 140 MMHG | TEMPERATURE: 98.5 F

## 2023-02-12 LAB
BACTERIA SPEC AEROBE CULT: ABNORMAL
GRAM STN SPEC: ABNORMAL

## 2023-02-13 ENCOUNTER — HOME CARE VISIT (OUTPATIENT)
Dept: HOME HEALTH SERVICES | Facility: CLINIC | Age: 53
End: 2023-02-13
Payer: MEDICARE

## 2023-02-13 PROCEDURE — G0300 HHS/HOSPICE OF LPN EA 15 MIN: HCPCS

## 2023-02-13 NOTE — HOME HEALTH
FOCUS OF CARE/SKILLED NEED: direct skill   TEACHING/INTERVENTIONS: assessment and dressing change of right thigh wound, right and left lower calf wounds  PROGRESS TOWARD GOALS: ongoing  PHYSICIAN CONTACT:  no  INSURANCE CHANGES?  no  FALLS SINCE LAST VISIT?  no  MEDICATION CHANGES? no  PLAN FOR NEXT VISIT: assessment and dressing change of right thigh wound, right and left lower calf wounds  PRE-SCREENED FOR COVID? no s/s

## 2023-02-17 ENCOUNTER — HOME CARE VISIT (OUTPATIENT)
Dept: HOME HEALTH SERVICES | Facility: CLINIC | Age: 53
End: 2023-02-17
Payer: MEDICARE

## 2023-02-17 VITALS
OXYGEN SATURATION: 97 % | RESPIRATION RATE: 18 BRPM | HEART RATE: 79 BPM | TEMPERATURE: 98.1 F | DIASTOLIC BLOOD PRESSURE: 80 MMHG | SYSTOLIC BLOOD PRESSURE: 140 MMHG

## 2023-02-17 PROCEDURE — G0299 HHS/HOSPICE OF RN EA 15 MIN: HCPCS

## 2023-02-20 ENCOUNTER — HOSPITAL ENCOUNTER (EMERGENCY)
Facility: HOSPITAL | Age: 53
Discharge: HOME | End: 2023-02-21
Attending: EMERGENCY MEDICINE | Admitting: EMERGENCY MEDICINE
Payer: MEDICARE

## 2023-02-20 VITALS
TEMPERATURE: 98.7 F | OXYGEN SATURATION: 96 % | DIASTOLIC BLOOD PRESSURE: 56 MMHG | RESPIRATION RATE: 16 BRPM | HEART RATE: 61 BPM | SYSTOLIC BLOOD PRESSURE: 98 MMHG

## 2023-02-20 DIAGNOSIS — S81.809A MULTIPLE OPEN WOUNDS OF LOWER LEG, UNSPECIFIED LATERALITY, INITIAL ENCOUNTER: Primary | ICD-10-CM

## 2023-02-20 LAB
ALBUMIN SERPL-MCNC: 3.8 G/DL (ref 3.5–5.2)
ALBUMIN/GLOB SERPL: 0.8 G/DL
ALP SERPL-CCNC: 84 U/L (ref 39–117)
ALT SERPL W P-5'-P-CCNC: 6 U/L (ref 1–33)
ANION GAP SERPL CALCULATED.3IONS-SCNC: 12 MMOL/L (ref 5–15)
AST SERPL-CCNC: 19 U/L (ref 1–32)
BASOPHILS # BLD AUTO: 0.04 10*3/MM3 (ref 0–0.2)
BASOPHILS NFR BLD AUTO: 0.5 % (ref 0–1.5)
BILIRUB SERPL-MCNC: 0.2 MG/DL (ref 0–1.2)
BUN SERPL-MCNC: 16 MG/DL (ref 6–20)
BUN/CREAT SERPL: 16 (ref 7–25)
CALCIUM SPEC-SCNC: 9.2 MG/DL (ref 8.6–10.5)
CHLORIDE SERPL-SCNC: 101 MMOL/L (ref 98–107)
CO2 SERPL-SCNC: 25 MMOL/L (ref 22–29)
CREAT SERPL-MCNC: 1 MG/DL (ref 0.57–1)
D-LACTATE SERPL-SCNC: 1.1 MMOL/L (ref 0.5–2)
DEPRECATED RDW RBC AUTO: 50.9 FL (ref 37–54)
EGFRCR SERPLBLD CKD-EPI 2021: 68.8 ML/MIN/1.73
EOSINOPHIL # BLD AUTO: 0.16 10*3/MM3 (ref 0–0.4)
EOSINOPHIL NFR BLD AUTO: 2.1 % (ref 0.3–6.2)
ERYTHROCYTE [DISTWIDTH] IN BLOOD BY AUTOMATED COUNT: 18.3 % (ref 12.3–15.4)
GLOBULIN UR ELPH-MCNC: 4.7 GM/DL
GLUCOSE SERPL-MCNC: 82 MG/DL (ref 65–99)
HCT VFR BLD AUTO: 32.2 % (ref 34–46.6)
HGB BLD-MCNC: 9.5 G/DL (ref 12–15.9)
IMM GRANULOCYTES # BLD AUTO: 0.03 10*3/MM3 (ref 0–0.05)
IMM GRANULOCYTES NFR BLD AUTO: 0.4 % (ref 0–0.5)
LYMPHOCYTES # BLD AUTO: 2.02 10*3/MM3 (ref 0.7–3.1)
LYMPHOCYTES NFR BLD AUTO: 26.2 % (ref 19.6–45.3)
MCH RBC QN AUTO: 22.5 PG (ref 26.6–33)
MCHC RBC AUTO-ENTMCNC: 29.5 G/DL (ref 31.5–35.7)
MCV RBC AUTO: 76.1 FL (ref 79–97)
MONOCYTES # BLD AUTO: 0.51 10*3/MM3 (ref 0.1–0.9)
MONOCYTES NFR BLD AUTO: 6.6 % (ref 5–12)
NEUTROPHILS NFR BLD AUTO: 4.96 10*3/MM3 (ref 1.7–7)
NEUTROPHILS NFR BLD AUTO: 64.2 % (ref 42.7–76)
NRBC BLD AUTO-RTO: 0 /100 WBC (ref 0–0.2)
PLATELET # BLD AUTO: 300 10*3/MM3 (ref 140–450)
PMV BLD AUTO: 8.4 FL (ref 6–12)
POTASSIUM SERPL-SCNC: 4 MMOL/L (ref 3.5–5.2)
PROT SERPL-MCNC: 8.5 G/DL (ref 6–8.5)
RBC # BLD AUTO: 4.23 10*6/MM3 (ref 3.77–5.28)
SODIUM SERPL-SCNC: 138 MMOL/L (ref 136–145)
WBC NRBC COR # BLD: 7.72 10*3/MM3 (ref 3.4–10.8)

## 2023-02-20 PROCEDURE — 85025 COMPLETE CBC W/AUTO DIFF WBC: CPT | Performed by: EMERGENCY MEDICINE

## 2023-02-20 PROCEDURE — 83605 ASSAY OF LACTIC ACID: CPT | Performed by: EMERGENCY MEDICINE

## 2023-02-20 PROCEDURE — 25010000002 HYDROMORPHONE PER 4 MG: Performed by: EMERGENCY MEDICINE

## 2023-02-20 PROCEDURE — 36415 COLL VENOUS BLD VENIPUNCTURE: CPT

## 2023-02-20 PROCEDURE — 87040 BLOOD CULTURE FOR BACTERIA: CPT | Performed by: EMERGENCY MEDICINE

## 2023-02-20 PROCEDURE — 80053 COMPREHEN METABOLIC PANEL: CPT | Performed by: EMERGENCY MEDICINE

## 2023-02-20 PROCEDURE — 63710000001 ONDANSETRON ODT 4 MG TABLET DISPERSIBLE: Performed by: EMERGENCY MEDICINE

## 2023-02-20 PROCEDURE — 99283 EMERGENCY DEPT VISIT LOW MDM: CPT

## 2023-02-20 PROCEDURE — 96372 THER/PROPH/DIAG INJ SC/IM: CPT

## 2023-02-20 RX ORDER — ONDANSETRON 4 MG/1
4 TABLET, ORALLY DISINTEGRATING ORAL ONCE
Status: COMPLETED | OUTPATIENT
Start: 2023-02-20 | End: 2023-02-20

## 2023-02-20 RX ORDER — HYDROMORPHONE HYDROCHLORIDE 1 MG/ML
0.5 INJECTION, SOLUTION INTRAMUSCULAR; INTRAVENOUS; SUBCUTANEOUS ONCE
Status: DISCONTINUED | OUTPATIENT
Start: 2023-02-20 | End: 2023-02-20

## 2023-02-20 RX ORDER — ONDANSETRON 2 MG/ML
4 INJECTION INTRAMUSCULAR; INTRAVENOUS ONCE
Status: DISCONTINUED | OUTPATIENT
Start: 2023-02-20 | End: 2023-02-20

## 2023-02-20 RX ORDER — SODIUM CHLORIDE 0.9 % (FLUSH) 0.9 %
10 SYRINGE (ML) INJECTION AS NEEDED
Status: DISCONTINUED | OUTPATIENT
Start: 2023-02-20 | End: 2023-02-21 | Stop reason: HOSPADM

## 2023-02-20 RX ORDER — HYDROMORPHONE HYDROCHLORIDE 1 MG/ML
0.5 INJECTION, SOLUTION INTRAMUSCULAR; INTRAVENOUS; SUBCUTANEOUS ONCE
Status: COMPLETED | OUTPATIENT
Start: 2023-02-20 | End: 2023-02-20

## 2023-02-20 RX ADMIN — HYDROMORPHONE HYDROCHLORIDE 0.5 MG: 1 INJECTION, SOLUTION INTRAMUSCULAR; INTRAVENOUS; SUBCUTANEOUS at 23:22

## 2023-02-20 RX ADMIN — ONDANSETRON 4 MG: 4 TABLET, ORALLY DISINTEGRATING ORAL at 23:25

## 2023-02-20 NOTE — HOME HEALTH
FOCUS OF CARE/SKILLED NEED: direct skill   TEACHING/INTERVENTIONS: dressing changes to posterior calf wounds  PROGRESS TOWARD GOALS: ongoing  PHYSICIAN CONTACT:  SHOLA Nascimento contacted regarding pt being unable to take levaquin  INSURANCE CHANGES?  no  FALLS SINCE LAST VISIT?  no  MEDICATION CHANGES? no  PLAN FOR NEXT VISIT: dressing changes to posterior calf wounds  PRE-SCREENED FOR COVID? no s/s

## 2023-02-21 ENCOUNTER — HOME CARE VISIT (OUTPATIENT)
Dept: HOME HEALTH SERVICES | Facility: CLINIC | Age: 53
End: 2023-02-21
Payer: MEDICARE

## 2023-02-21 VITALS
WEIGHT: 293 LBS | BODY MASS INDEX: 55.32 KG/M2 | HEIGHT: 61 IN | HEART RATE: 88 BPM | TEMPERATURE: 97.5 F | RESPIRATION RATE: 18 BRPM | OXYGEN SATURATION: 100 % | SYSTOLIC BLOOD PRESSURE: 156 MMHG | DIASTOLIC BLOOD PRESSURE: 84 MMHG

## 2023-02-21 NOTE — ED NOTES
Patient believed to have left the facility at this time. Patient seen leaving by other nursing staff. This nurse checked bathrooms for patient. Bathrooms empty and patient believe to have eloped at this time.

## 2023-02-21 NOTE — HOME HEALTH
FOCUS OF CARE/SKILLED NEED: HOME SAFETY/FALL PREVENTION, MEDICATION EDUCATION, PAIN MANAGEMENT, PREVENTATIVE SKIN CARE, diabetes management, daily diabetic footcare, wound care, s/s of infection    TEACHING/INTERVENTIONS:  HOME SAFETY/FALL PREVENTION, MEDICATION EDUCATION, PAIN MANAGEMENT, PREVENTATIVE SKIN CARE, daibetic management, daily diabetic foot care, wound care, s/s of infection    PROGRESS TOWARDS GOALS: progressing    RECENT FALLS: none    RECENT MEDICATION CHANGES: none    WOUND(S): patient continues with wounds to jennifer calves, left heel, lower back and right thigh    D/C PLAN:  DISCHARGE WITH GOALS MET    PHYSICIAN CONTACT:  Rhonda JOLLEY/Janina -  informed that venipuncture unsuccessful.    INSURANCE CHANGES:  none

## 2023-02-21 NOTE — ED PROVIDER NOTES
Subjective   History of Present Illness  Patient presents with complaint of pain in both of her legs with wounds in her legs.  She has a history of chronic lymphedema that is markedly severe and has ulcers on the lateral aspects of both calves.  These have been treated by home health and wound care.  She has been cleaning them and they have been giving her oral antibiotics.  The culture grew out Pseudomonas aeruginosa and she was put on Levaquin but says she only took 5 with a 10-day prescription because she started hurting worse in her legs and thought the Levaquin was causing it.  She stopped the Levaquin and the pain went away but the wounds were still not healed.  She says that she was told by her nurses today to come to the hospital to get IV antibiotics and to be admitted for IV antibiotics because that is what is gotten better before.  She says the pain is severe and is not relieved by her usual oral medication.    History provided by:  Patient   used: No    Leg Pain  Location:  Leg  Injury: no    Leg location:  L lower leg and R lower leg  Pain details:     Quality:  Aching    Radiates to:  Does not radiate    Severity:  Severe    Onset quality:  Gradual    Timing:  Constant    Progression:  Worsening  Chronicity:  Chronic  Dislocation: no    Foreign body present:  No foreign bodies  Tetanus status:  Unknown  Prior injury to area:  No  Relieved by:  Nothing  Worsened by:  Nothing  Ineffective treatments:  None tried  Associated symptoms: swelling    Associated symptoms: no back pain, no decreased ROM, no fatigue, no fever, no itching, no muscle weakness, no neck pain, no numbness, no stiffness and no tingling    Risk factors: obesity    Risk factors: no concern for non-accidental trauma, no frequent fractures, no known bone disorder and no recent illness        Review of Systems   Constitutional: Negative.  Negative for fatigue and fever.   HENT: Negative.    Respiratory: Negative.     Cardiovascular: Negative.    Gastrointestinal: Negative.    Genitourinary: Negative.    Musculoskeletal: Negative.  Negative for back pain, neck pain and stiffness.   Skin: Negative.  Negative for itching.   Neurological: Negative.    Psychiatric/Behavioral: Negative.    All other systems reviewed and are negative.      Past Medical History:   Diagnosis Date   • Anxiety    • Arthritis     Osteoarthritis primarily left knee    • Back pain    • Chronic pain syndrome 10/27/2021   • Depression    • Diabetes mellitus (HCC)    • Fibromyalgia, primary    • History of transfusion    • Hx of tear of meniscus of knee joint 11/21/2016   • Hypertension    • Joint pain    • Kidney stone    • Knee pain    • Lymphedema of both lower extremities 10/27/2021   • Migraine    • Migraine headache    • Pain     knee, left ankle, left ankle     • Pes anserinus bursitis 04/28/2015   • Restless leg        Allergies   Allergen Reactions   • Compazine [Prochlorperazine Edisylate] Shortness Of Breath     Breathing    • Meperidine Hives     (Demerol)    • Norflex [Orphenadrine] Hives   • Nortriptyline Hives   • Prochlorperazine Shortness Of Breath   • Prochlorperazine Maleate Shortness Of Breath   • Vancomycin Provider Review Needed and Unknown - High Severity     CRITICAL LEVEL RESULTS - PATIENT LIKELY HAS METABOLIC / CLEARANCE ISSUE WITH VANCOMYCIN. ON STANDARD REGIMENS BASED ON BAYESIAN/POPULATION PK PARAMETERS, VANCOMYCIN TROUGH LEVELS IN THE 60s, 70s, 90s, 100s WERE OBTAINED. RECOMMEND AGAINST EVER USING VANCOMYCIN IN THIS PATIENT. IF VANCOMYCIN IS ABSOLUTELY INDICATED WITHOUT ANY OTHER OPTIONS, PULSE INTERMITTENT DOSING WOULD LIKELY BE THE ONLY APPROPRIATE METHOD.    • Codeine Nausea And Vomiting     Rash    • Penicillins Rash     Nausea & vomiting    • Bacitracin Nausea And Vomiting   • Calamine Itching   • Zinc Other (See Comments)     ERRYTHEMA   • Amoxicillin-Pot Clavulanate Rash, Hives and Itching   • Avelox [Moxifloxacin Hcl] Rash    • Cefazolin Nausea And Vomiting   • Cephalexin Rash     (Keflex)    • Ciprofloxacin Rash   • Clindamycin/Lincomycin Rash   • Doxycycline Nausea And Vomiting     Can take but need zofran before   • Hydroxyzine Hcl Itching   • Moxifloxacin Nausea And Vomiting   • Orphenadrine Citrate Itching   • Sulfamethoxazole-Trimethoprim Nausea And Vomiting and Rash     States she also gets yeast infections with it   • Tobramycin Other (See Comments)     Eyes burn-feel like eyes are on fire     • Vistaril [Hydroxyzine] Rash       Past Surgical History:   Procedure Laterality Date   • CATARACT EXTRACTION     • CATARACT EXTRACTION     • CATARACT EXTRACTION     • CORNEAL TRANSPLANT      bilateral    • ECTOPIC PREGNANCY     • INCISION AND DRAINAGE ABSCESS Left 6/1/2022    Procedure: DEBRIDEMENTAND PULSE LAVAGE LT MEDIAL THIGH PACKING ODOFORM GAUZE  WOUND SACRAL AND BILATERAL CALVES;  Surgeon: Justin Perez MD;  Location:  [facility];  Service: General;  Laterality: Left;   • INCISION AND DRAINAGE TRUNK Right 11/26/2022    Procedure: INCISION AND DRAINAGE TRUNK;  Surgeon: Sara Guerrier MD;  Location:  [facility];  Service: General;  Laterality: Right;   • JOINT REPLACEMENT     • KNEE ARTHROSCOPY W/ MENISCAL REPAIR     • KNEE MENISCAL REPAIR     • MENISCECTOMY Left 12/20/2016   • TUBAL ABDOMINAL LIGATION         Family History   Problem Relation Age of Onset   • No Known Problems Mother    • Cancer Father    • Dementia Father    • Hypertension Maternal Grandmother    • Cancer Maternal Grandfather        Social History     Socioeconomic History   • Marital status: Significant Other   Tobacco Use   • Smoking status: Never   • Smokeless tobacco: Never   Vaping Use   • Vaping Use: Never used   Substance and Sexual Activity   • Alcohol use: Yes   • Drug use: No   • Sexual activity: Not Currently     Partners: Male       Prior to Admission medications    Medication Sig Start Date End Date Taking? Authorizing Provider   busPIRone (BUSPAR)  10 MG tablet Take 10 mg by mouth 3 (Three) Times a Day. Indications: Anxiety Disorder 4/19/21   Emergency, Nurse Janette, RN   Canagliflozin (Invokana) 100 MG tablet tablet Take 100 mg by mouth Daily. Indications: Type 2 Diabetes 12/28/22   Rhonda Knox APRN   cyclobenzaprine (FLEXERIL) 10 MG tablet Take 10 mg by mouth 2 (Two) Times a Day As Needed for Muscle Spasms. Indications: Muscle Spasm    Avani Kelly MD   docusate sodium (COLACE) 100 MG capsule Take 100 mg by mouth 2 (Two) Times a Day. Indications: Constipation    Avani Kelly MD   DULoxetine (CYMBALTA) 60 MG capsule Take 30 mg by mouth 2 (Two) Times a Day. Indications: Diabetes with Nerve Disease 12/1/22   Avani Kelly MD   fluticasone (FLONASE) 50 MCG/ACT nasal spray 1 spray into the nostril(s) as directed by provider Daily. 3/29/22   Viri Doty APRN   furosemide (LASIX) 80 MG tablet Take 80 mg by mouth 2 (Two) Times a Day As Needed. Indications: Edema 12/1/22   Avani Kelly MD   gabapentin (NEURONTIN) 100 MG capsule Take 100 mg by mouth Daily. PT TAKES AT 4PM  Indications: Neuropathic Pain 2/1/22   Avani Kelly MD   gabapentin (NEURONTIN) 600 MG tablet Take 600 mg by mouth 3 (Three) Times a Day. Indications: Neuropathic Pain    Avani Kelly MD   insulin aspart (NovoLOG FlexPen) 100 UNIT/ML solution pen-injector sc pen Inject 3 Units under the skin into the appropriate area as directed 3 (Three) Times a Day With Meals. Indications: Type 2 Diabetes 1/10/23   Avani Kelly MD   insulin glargine (LANTUS, SEMGLEE) 100 UNIT/ML injection Inject 40 Units under the skin into the appropriate area as directed Every Night. Indications: Type 2 Diabetes 7/20/22   Avani Kelly MD   Insulin Lispro (ADMELOG SOLOSTAR) 100 UNIT/ML injection pen Inject 2 Units under the skin into the appropriate area as directed 3 (Three) Times a Day As Needed (Sliding Scale). sliding scale  Indications: High Blood  Sugar 12/28/22   Avani Kelly MD   Magnesium Oxide 400 (240 Mg) MG tablet Take 200 mg by mouth Daily. Indications: Disorder with Low Magnesium Levels 4/15/22   Avani Kelly MD   mirtazapine (REMERON) 15 MG tablet Take 15 mg by mouth Every Night. Indications: sleep 5/13/21   Emergency, Nurse TAQUERIA Savage   Morphine Sulfate ER 15 MG tablet extended-release 12 hour Take 15 mg by mouth 2 (two) times a day. Indications: Pain    Emergency, Nurse Epic, RN   omeprazole (priLOSEC) 40 MG capsule Take 40 mg by mouth 2 (Two) Times a Day. Indications: Gastroesophageal Reflux Disease    Avani Kelly MD   ondansetron ODT (ZOFRAN-ODT) 4 MG disintegrating tablet Place 1 tablet on the tongue Every 6 (Six) Hours As Needed for Nausea or Vomiting for up to 21 doses. 11/29/22   Hoda Bañuelos MD   oxyCODONE-acetaminophen (PERCOCET) 7.5-325 MG per tablet Take 1 tablet by mouth Every 8 (Eight) Hours As Needed for Moderate Pain  or Severe Pain-Do not take with other Percocet prescription 6/9/22   Marianela Neri MD   potassium chloride (K-DUR,KLOR-CON) 20 MEQ CR tablet Take 1 tablet by mouth Daily. 6/23/22   TERRY Manuel MD   pramipexole (MIRAPEX) 0.75 MG tablet Take 0.75 mg by mouth 2 (two) times a day. Indications: Restless Leg Syndrome    ProviderAvani MD   Semaglutide,0.25 or 0.5MG/DOS, (Ozempic, 0.25 or 0.5 MG/DOSE,) 2 MG/1.5ML solution pen-injector Inject 0.25 mg under the skin into the appropriate area as directed 1 (One) Time Per Week. 12/28/22   Rhonda Knox APRN   Tirzepatide 7.5 MG/0.5ML solution pen-injector Inject 7.5 mg under the skin into the appropriate area as directed 1 (One) Time Per Week. Indications: Type 2 Diabetes 2/3/23   Avani Kelly MD   vitamin D (ERGOCALCIFEROL) 1.25 MG (49848 UT) capsule capsule Take 50,000 Units by mouth 1 (One) Time Per Week. 4/15/22   Provider, MD Avani   ZOLMitriptan (Zomig) 5 MG tablet Take 1 tablet by mouth 1 (One) Time As Needed  for Migraine. 11/22/21   TERRY Manuel MD   clonazePAM (KlonoPIN) 0.5 MG tablet Take 0.5 mg by mouth 2 (Two) Times a Day As Needed for Anxiety.  11/3/21  Provider, MD Avani   topiramate (TOPAMAX) 25 MG tablet Take 25 mg by mouth Daily. 2/21/22 4/20/22  Emergency, Nurse Epic, RN       Medications   ondansetron ODT (ZOFRAN-ODT) disintegrating tablet 4 mg (4 mg Oral Given 2/20/23 2325)   HYDROmorphone (DILAUDID) injection 0.5 mg (0.5 mg Intramuscular Given 2/20/23 2322)       Vitals:    02/20/23 2320   BP: 156/84   Pulse: 88   Resp: 18   Temp:    SpO2: 100%         Objective   Physical Exam  Vitals and nursing note reviewed.   Constitutional:       Appearance: Normal appearance.   HENT:      Head: Normocephalic and atraumatic.   Eyes:      Extraocular Movements: Extraocular movements intact.      Pupils: Pupils are equal, round, and reactive to light.   Cardiovascular:      Rate and Rhythm: Normal rate and regular rhythm.   Pulmonary:      Effort: Pulmonary effort is normal.      Breath sounds: Normal breath sounds.   Abdominal:      Palpations: Abdomen is soft.      Comments: Patient is morbidly obese.   Musculoskeletal:         General: Swelling and tenderness present. Normal range of motion.      Comments: Patient has marked lymphedema of both legs all the way down.  There are is a wound on the lateral right calf that is approximately 2 cm across.  There is no obvious purulence or erythema.  It is open and looks clean.  There is a similar 1 on the left side according the patient I did not examine it.   Neurological:      General: No focal deficit present.      Mental Status: She is alert and oriented to person, place, and time.   Psychiatric:         Mood and Affect: Mood normal.         Behavior: Behavior normal.         Procedures         Lab Results (last 24 hours)     Procedure Component Value Units Date/Time    Blood Culture - Blood, Arm, Left [[id number]] Collected: 02/20/23 2300    Specimen: Blood  from Arm, Left Updated: 02/20/23 2314    CBC & Differential [[id number]]  (Abnormal) Collected: 02/20/23 2308    Specimen: Blood Updated: 02/20/23 2319    Narrative:      The following orders were created for panel order CBC & Differential.  Procedure                               Abnormality         Status                     ---------                               -----------         ------                     CBC Auto Differential[[id number]]        Abnormal            Final result                 Please view results for these tests on the individual orders.    Comprehensive Metabolic Panel [[id number]] Collected: 02/20/23 2308    Specimen: Blood Updated: 02/20/23 2333     Glucose 82 mg/dL      BUN 16 mg/dL      Creatinine 1.00 mg/dL      Sodium 138 mmol/L      Potassium 4.0 mmol/L      Chloride 101 mmol/L      CO2 25.0 mmol/L      Calcium 9.2 mg/dL      Total Protein 8.5 g/dL      Albumin 3.8 g/dL      ALT (SGPT) 6 U/L      AST (SGOT) 19 U/L      Alkaline Phosphatase 84 U/L      Total Bilirubin 0.2 mg/dL      Globulin 4.7 gm/dL      A/G Ratio 0.8 g/dL      BUN/Creatinine Ratio 16.0     Anion Gap 12.0 mmol/L      eGFR 68.8 mL/min/1.73     Narrative:      GFR Normal >60  Chronic Kidney Disease <60  Kidney Failure <15      Blood Culture - Blood, Arm, Right [[id number]] Collected: 02/20/23 2308    Specimen: Blood from Arm, Right Updated: 02/20/23 2314    Lactic Acid, Plasma [[id number]]  (Normal) Collected: 02/20/23 2308    Specimen: Blood Updated: 02/20/23 2331     Lactate 1.1 mmol/L     CBC Auto Differential [[id number]]  (Abnormal) Collected: 02/20/23 2308    Specimen: Blood Updated: 02/20/23 2319     WBC 7.72 10*3/mm3      RBC 4.23 10*6/mm3      Hemoglobin 9.5 g/dL      Hematocrit 32.2 %      MCV 76.1 fL      MCH 22.5 pg      MCHC 29.5 g/dL      RDW 18.3 %      RDW-SD 50.9 fl      MPV 8.4 fL      Platelets 300 10*3/mm3      Neutrophil % 64.2 %      Lymphocyte % 26.2 %      Monocyte % 6.6 %      Eosinophil % 2.1 %       Basophil % 0.5 %      Immature Grans % 0.4 %      Neutrophils, Absolute 4.96 10*3/mm3      Lymphocytes, Absolute 2.02 10*3/mm3      Monocytes, Absolute 0.51 10*3/mm3      Eosinophils, Absolute 0.16 10*3/mm3      Basophils, Absolute 0.04 10*3/mm3      Immature Grans, Absolute 0.03 10*3/mm3      nRBC 0.0 /100 WBC           No orders to display       ED Course  ED Course as of 02/21/23 0909 Tue Feb 21, 2023   0903 I left patient with Dr. Jeong at shift change.  I did talk with patient at length about difficulty of treating patient's infections with all of the allergies she had listed and showed her the sensitivities on her last culture.  Also told her that with the wounds open and clean I am not sure antibiotics are the key to getting them well as much as aggressive wound care and that a large part of the difficulty of getting them well is simply her underlying lymphedema and resulting poor perfusion. [TR]      ED Course User Index  [TR] Barron Perry Jr., MD          [facility]    Final diagnoses:   Multiple open wounds of lower leg, unspecified laterality, initial encounter          Barron Perry Jr., MD  02/21/23 0909

## 2023-02-24 ENCOUNTER — HOME CARE VISIT (OUTPATIENT)
Dept: HOME HEALTH SERVICES | Facility: CLINIC | Age: 53
End: 2023-02-24
Payer: MEDICARE

## 2023-02-24 PROCEDURE — G0299 HHS/HOSPICE OF RN EA 15 MIN: HCPCS

## 2023-02-25 LAB
BACTERIA SPEC AEROBE CULT: NORMAL
BACTERIA SPEC AEROBE CULT: NORMAL

## 2023-02-27 VITALS
DIASTOLIC BLOOD PRESSURE: 90 MMHG | SYSTOLIC BLOOD PRESSURE: 154 MMHG | HEART RATE: 90 BPM | RESPIRATION RATE: 18 BRPM | TEMPERATURE: 97.7 F | OXYGEN SATURATION: 98 %

## 2023-02-27 NOTE — HOME HEALTH
FOCUS OF CARE/SKILLED NEED: direct skill   TEACHING/INTERVENTIONS: wound assessment and dressing changes to right thigh wound, bilateral posterior calf wounds  PROGRESS TOWARD GOALS: ongoing  PHYSICIAN CONTACT:  no  INSURANCE CHANGES?  no  FALLS SINCE LAST VISIT?  no  MEDICATION CHANGES? no  PLAN FOR NEXT VISIT: Wound care and dressing changes  PRE-SCREENED FOR COVID? no s/s

## 2023-02-28 ENCOUNTER — HOME CARE VISIT (OUTPATIENT)
Dept: HOME HEALTH SERVICES | Facility: CLINIC | Age: 53
End: 2023-02-28
Payer: MEDICARE

## 2023-02-28 PROCEDURE — G0300 HHS/HOSPICE OF LPN EA 15 MIN: HCPCS

## 2023-03-01 VITALS
TEMPERATURE: 46.4 F | HEART RATE: 88 BPM | OXYGEN SATURATION: 98 % | SYSTOLIC BLOOD PRESSURE: 138 MMHG | RESPIRATION RATE: 20 BRPM | DIASTOLIC BLOOD PRESSURE: 88 MMHG

## 2023-03-01 NOTE — HOME HEALTH
FOCUS OF CARE/SKILLED NEED: HOME SAFETY/FALL PREVENTION, MEDICATION EDUCATION, PAIN MANAGEMENT, PREVENTATIVE SKIN CARE, wound care, s/s of infection    TEACHING/INTERVENTIONS:  HOME SAFETY/FALL PREVENTION, MEDICATION EDUCATION, PAIN MANAGEMENT, PREVENTATIVE SKIN CARE, wound care, s/s of infection    PROGRESS TOWARDS GOALS: progressing    RECENT FALLS:  none    RECENT MEDICATION CHANGES:  none    WOUND(S):jennifer calves, right thigh    D/C PLAN:  DISCHARGE WITH GOALS MET    PHYSICIAN CONTACT:  yes, [facility] appt for 2/28/23 at 1:30pm    INSURANCE CHANGES:  none

## 2023-03-03 ENCOUNTER — HOME CARE VISIT (OUTPATIENT)
Dept: HOME HEALTH SERVICES | Facility: CLINIC | Age: 53
End: 2023-03-03
Payer: MEDICARE

## 2023-03-05 NOTE — HOME HEALTH
Patient called this clinicin to cancel her HH visit for today r/t she had not felt well all morning and because of the inclement weather situation occuring. Stated that she would just defer to her scheduled Tuesday HH visit.  Arlene Dow LPN  [facility]

## 2023-03-07 ENCOUNTER — HOME CARE VISIT (OUTPATIENT)
Dept: HOME HEALTH SERVICES | Facility: CLINIC | Age: 53
End: 2023-03-07
Payer: MEDICARE

## 2023-03-07 PROCEDURE — G0300 HHS/HOSPICE OF LPN EA 15 MIN: HCPCS

## 2023-03-08 ENCOUNTER — LAB REQUISITION (OUTPATIENT)
Dept: LAB | Facility: HOSPITAL | Age: 53
End: 2023-03-08
Payer: MEDICARE

## 2023-03-08 ENCOUNTER — OFFICE VISIT (OUTPATIENT)
Dept: WOUND CARE | Facility: HOSPITAL | Age: 53
End: 2023-03-08
Payer: MEDICARE

## 2023-03-08 VITALS
HEART RATE: 78 BPM | RESPIRATION RATE: 18 BRPM | SYSTOLIC BLOOD PRESSURE: 132 MMHG | TEMPERATURE: 98.4 F | DIASTOLIC BLOOD PRESSURE: 76 MMHG | OXYGEN SATURATION: 97 %

## 2023-03-08 DIAGNOSIS — L98.499 TYPE 2 DIABETES MELLITUS WITH OTHER SKIN ULCER, UNSPECIFIED WHETHER LONG TERM INSULIN USE: ICD-10-CM

## 2023-03-08 DIAGNOSIS — L97.812 NON-PRESSURE CHRONIC ULCER OF OTHER PART OF RIGHT LOWER LEG WITH FAT LAYER EXPOSED: ICD-10-CM

## 2023-03-08 DIAGNOSIS — L97.822 NON-PRESSURE CHRONIC ULCER OF OTHER PART OF LEFT LOWER LEG WITH FAT LAYER EXPOSED: ICD-10-CM

## 2023-03-08 DIAGNOSIS — E11.622 TYPE 2 DIABETES MELLITUS WITH OTHER SKIN ULCER, UNSPECIFIED WHETHER LONG TERM INSULIN USE: ICD-10-CM

## 2023-03-08 DIAGNOSIS — Z00.00 ENCOUNTER FOR GENERAL ADULT MEDICAL EXAMINATION WITHOUT ABNORMAL FINDINGS: ICD-10-CM

## 2023-03-08 DIAGNOSIS — S81.002D UNSPECIFIED OPEN WOUND, LEFT KNEE, SUBSEQUENT ENCOUNTER: ICD-10-CM

## 2023-03-08 PROCEDURE — G0463 HOSPITAL OUTPT CLINIC VISIT: HCPCS

## 2023-03-08 PROCEDURE — 87070 CULTURE OTHR SPECIMN AEROBIC: CPT | Performed by: NURSE PRACTITIONER

## 2023-03-08 PROCEDURE — 87186 SC STD MICRODIL/AGAR DIL: CPT | Performed by: NURSE PRACTITIONER

## 2023-03-08 PROCEDURE — 11042 DBRDMT SUBQ TIS 1ST 20SQCM/<: CPT | Performed by: NURSE PRACTITIONER

## 2023-03-08 PROCEDURE — 87176 TISSUE HOMOGENIZATION CULTR: CPT | Performed by: NURSE PRACTITIONER

## 2023-03-08 PROCEDURE — 87076 CULTURE ANAEROBE IDENT EACH: CPT | Performed by: NURSE PRACTITIONER

## 2023-03-08 PROCEDURE — 99214 OFFICE O/P EST MOD 30 MIN: CPT | Performed by: NURSE PRACTITIONER

## 2023-03-08 PROCEDURE — 11045 DBRDMT SUBQ TISS EACH ADDL: CPT | Performed by: NURSE PRACTITIONER

## 2023-03-08 PROCEDURE — 87077 CULTURE AEROBIC IDENTIFY: CPT | Performed by: NURSE PRACTITIONER

## 2023-03-08 PROCEDURE — 87075 CULTR BACTERIA EXCEPT BLOOD: CPT | Performed by: NURSE PRACTITIONER

## 2023-03-08 PROCEDURE — 87205 SMEAR GRAM STAIN: CPT | Performed by: NURSE PRACTITIONER

## 2023-03-08 NOTE — HOME HEALTH
FOCUS OF CARE/SKILLED NEED: HOME SAFETY/FALL PREVENTION, MEDICATION EDUCATION, PAIN MANAGEMENT, PREVENTATIVE SKIN CARE, diabetic home care, wound care, s/s of infection    TEACHING/INTERVENTIONS:  HOME SAFETY/FALL PREVENTION, MEDICATION EDUCATION, PAIN MANAGEMENT, PREVENTATIVE SKIN CARE, diabetic home management, wound care, s/s of infection    PROGRESS TOWARDS GOALS: progressing    RECENT FALLS: none    RECENT MEDICATION CHANGES:  none    WOUND(S):     D/C PLAN:  DISCHARGE WITH GOALS MET    PHYSICIAN CONTACT:  none    INSURANCE CHANGES:  none

## 2023-03-10 ENCOUNTER — HOME CARE VISIT (OUTPATIENT)
Dept: HOME HEALTH SERVICES | Facility: CLINIC | Age: 53
End: 2023-03-10
Payer: MEDICARE

## 2023-03-10 VITALS
RESPIRATION RATE: 18 BRPM | HEART RATE: 77 BPM | SYSTOLIC BLOOD PRESSURE: 148 MMHG | TEMPERATURE: 97.7 F | OXYGEN SATURATION: 98 % | DIASTOLIC BLOOD PRESSURE: 86 MMHG

## 2023-03-10 PROCEDURE — G0299 HHS/HOSPICE OF RN EA 15 MIN: HCPCS

## 2023-03-11 DIAGNOSIS — G43.009 MIGRAINE WITHOUT AURA AND WITHOUT STATUS MIGRAINOSUS, NOT INTRACTABLE: ICD-10-CM

## 2023-03-13 ENCOUNTER — HOME CARE VISIT (OUTPATIENT)
Dept: HOME HEALTH SERVICES | Facility: CLINIC | Age: 53
End: 2023-03-13
Payer: MEDICARE

## 2023-03-13 VITALS
OXYGEN SATURATION: 98 % | TEMPERATURE: 97.9 F | RESPIRATION RATE: 18 BRPM | DIASTOLIC BLOOD PRESSURE: 78 MMHG | HEART RATE: 82 BPM | SYSTOLIC BLOOD PRESSURE: 132 MMHG

## 2023-03-13 LAB
BACTERIA SPEC AEROBE CULT: ABNORMAL
BACTERIA SPEC AEROBE CULT: ABNORMAL
BACTERIA SPEC ANAEROBE CULT: ABNORMAL
GRAM STN SPEC: ABNORMAL

## 2023-03-13 PROCEDURE — G0300 HHS/HOSPICE OF LPN EA 15 MIN: HCPCS

## 2023-03-13 RX ORDER — ZOLMITRIPTAN 5 MG/1
TABLET, FILM COATED ORAL
Qty: 6 TABLET | Refills: 5 | OUTPATIENT
Start: 2023-03-13

## 2023-03-13 NOTE — TELEPHONE ENCOUNTER
Looks like patient is with a different provider.    Last office visit 11/22/2021. Looks patient patient canceled the last couple appointments. Please advise

## 2023-03-14 NOTE — HOME HEALTH
FOCUS OF CARE/SKILLED NEED: HOME SAFETY/FALL PREVENTION, MEDICATION EDUCATION, PAIN MANAGEMENT, PREVENTATIVE SKIN CARE, wound care, s/s of infection    TEACHING/INTERVENTIONS:  HOME SAFETY/FALL PREVENTION, MEDICATION EDUCATION, PAIN MANAGEMENT, PREVENTATIVE SKIN CARE, wound care, s/s of infection    PROGRESS TOWARDS GOALS: Patient went to wound care last week, her next appt is 3/16/2023    RECENT FALLS: none    RECENT MEDICATION CHANGES: none    WOUND(S): wound to : right thigh, right calf, left calf.    D/C PLAN:  DISCHARGE WITH GOALS MET    PHYSICIAN CONTACT:  none    INSURANCE CHANGES: none    santyl to calf wounds.

## 2023-03-17 ENCOUNTER — HOME CARE VISIT (OUTPATIENT)
Dept: HOME HEALTH SERVICES | Facility: CLINIC | Age: 53
End: 2023-03-17
Payer: MEDICARE

## 2023-03-17 PROCEDURE — G0300 HHS/HOSPICE OF LPN EA 15 MIN: HCPCS

## 2023-03-19 VITALS
DIASTOLIC BLOOD PRESSURE: 74 MMHG | HEART RATE: 87 BPM | SYSTOLIC BLOOD PRESSURE: 142 MMHG | OXYGEN SATURATION: 97 % | RESPIRATION RATE: 18 BRPM | TEMPERATURE: 98.6 F

## 2023-03-19 NOTE — HOME HEALTH
FOCUS OF CARE/SKILLED NEED: HOME SAFETY/FALL PREVENTION, MEDICATION EDUCATION, PAIN MANAGEMENT, PREVENTATIVE SKIN CARE    TEACHING/INTERVENTIONS:  HOME SAFETY/FALL PREVENTION, MEDICATION EDUCATION, PAIN MANAGEMENT, PREVENTATIVE SKIN CARE    PROGRESS TOWARDS GOALS:     RECENT FALLS:    RECENT MEDICATION CHANGES:    WOUND(S):    D/C PLAN:  DISCHARGE WITH GOALS MET    PHYSICIAN CONTACT:      INSURANCE CHANGES:

## 2023-03-21 ENCOUNTER — TRANSCRIBE ORDERS (OUTPATIENT)
Dept: PHYSICAL THERAPY | Facility: CLINIC | Age: 53
End: 2023-03-21
Payer: MEDICARE

## 2023-03-21 ENCOUNTER — TRANSCRIBE ORDERS (OUTPATIENT)
Dept: ADMINISTRATIVE | Facility: HOSPITAL | Age: 53
End: 2023-03-21
Payer: MEDICARE

## 2023-03-21 ENCOUNTER — HOME CARE VISIT (OUTPATIENT)
Dept: HOME HEALTH SERVICES | Facility: CLINIC | Age: 53
End: 2023-03-21
Payer: MEDICARE

## 2023-03-21 DIAGNOSIS — I89.0 OBLITERATION OF LYMPHATIC VESSEL: Primary | ICD-10-CM

## 2023-03-21 DIAGNOSIS — R19.07 GENERALIZED ABDOMINAL OR PELVIC SWELLING OR MASS OR LUMP: ICD-10-CM

## 2023-03-21 DIAGNOSIS — I10 HYPERTENSION, ESSENTIAL: ICD-10-CM

## 2023-03-21 DIAGNOSIS — R22.43 LOCALIZED SWELLING, MASS AND LUMP, LOWER LIMB, BILATERAL: ICD-10-CM

## 2023-03-21 DIAGNOSIS — I87.2 VENOUS INSUFFICIENCY: ICD-10-CM

## 2023-03-21 DIAGNOSIS — I89.0 LYMPHEDEMA: Primary | ICD-10-CM

## 2023-03-21 DIAGNOSIS — I89.0 LYMPHATIC EDEMA: ICD-10-CM

## 2023-03-21 DIAGNOSIS — R19.07 GENERALIZED ABDOMINAL SWELLING: ICD-10-CM

## 2023-03-21 DIAGNOSIS — L03.115 CELLULITIS OF RIGHT FOOT: ICD-10-CM

## 2023-03-21 DIAGNOSIS — R60.0 LOCALIZED EDEMA: ICD-10-CM

## 2023-03-21 DIAGNOSIS — I89.0 LYMPHATIC EDEMA: Primary | ICD-10-CM

## 2023-03-21 DIAGNOSIS — L03.115 CELLULITIS OF RIGHT FOOT: Primary | ICD-10-CM

## 2023-03-21 PROCEDURE — G0300 HHS/HOSPICE OF LPN EA 15 MIN: HCPCS

## 2023-03-22 VITALS
HEART RATE: 76 BPM | SYSTOLIC BLOOD PRESSURE: 130 MMHG | OXYGEN SATURATION: 93 % | RESPIRATION RATE: 18 BRPM | TEMPERATURE: 98.1 F | DIASTOLIC BLOOD PRESSURE: 82 MMHG

## 2023-03-22 NOTE — HOME HEALTH
FOCUS OF CARE/SKILLED NEED: HOME SAFETY/FALL PREVENTION, MEDICATION EDUCATION, PAIN MANAGEMENT, PREVENTATIVE SKIN CARE, wound care, s/s of infection    TEACHING/INTERVENTIONS:  HOME SAFETY/FALL PREVENTION, MEDICATION EDUCATION, PAIN MANAGEMENT, PREVENTATIVE SKIN CARE, wound care, s/s of infection    PROGRESS TOWARDS GOALS: wound beds are moist, no improvement in measurement    RECENT FALLS:  none    RECENT MEDICATION CHANGES: none    WOUND(S): wounds to jennifer calves    D/C PLAN:  DISCHARGE WITH GOALS MET    PHYSICIAN CONTACT:  none    INSURANCE CHANGES:  none    Patient is follopwed by [facility], appt this Thursday.

## 2023-03-24 ENCOUNTER — HOME CARE VISIT (OUTPATIENT)
Dept: HOME HEALTH SERVICES | Facility: CLINIC | Age: 53
End: 2023-03-24
Payer: MEDICARE

## 2023-03-27 ENCOUNTER — INFUSION (OUTPATIENT)
Dept: ONCOLOGY | Facility: HOSPITAL | Age: 53
End: 2023-03-27
Payer: MEDICARE

## 2023-03-27 ENCOUNTER — LAB (OUTPATIENT)
Dept: LAB | Facility: HOSPITAL | Age: 53
End: 2023-03-27
Payer: MEDICARE

## 2023-03-27 ENCOUNTER — APPOINTMENT (OUTPATIENT)
Dept: CT IMAGING | Facility: HOSPITAL | Age: 53
End: 2023-03-27
Payer: MEDICARE

## 2023-03-27 ENCOUNTER — APPOINTMENT (OUTPATIENT)
Dept: OTHER | Facility: HOSPITAL | Age: 53
End: 2023-03-27
Payer: MEDICARE

## 2023-03-27 VITALS
HEIGHT: 61 IN | TEMPERATURE: 98.3 F | DIASTOLIC BLOOD PRESSURE: 69 MMHG | WEIGHT: 293 LBS | OXYGEN SATURATION: 99 % | RESPIRATION RATE: 18 BRPM | BODY MASS INDEX: 55.32 KG/M2 | HEART RATE: 83 BPM | SYSTOLIC BLOOD PRESSURE: 135 MMHG

## 2023-03-27 DIAGNOSIS — I89.0 OBLITERATION OF LYMPHATIC VESSEL: ICD-10-CM

## 2023-03-27 DIAGNOSIS — L03.115 CELLULITIS OF RIGHT FOOT: ICD-10-CM

## 2023-03-27 DIAGNOSIS — R78.81 BACTEREMIA DUE TO KLEBSIELLA PNEUMONIAE: ICD-10-CM

## 2023-03-27 DIAGNOSIS — R19.07 GENERALIZED ABDOMINAL OR PELVIC SWELLING OR MASS OR LUMP: ICD-10-CM

## 2023-03-27 DIAGNOSIS — B96.1 BACTEREMIA DUE TO KLEBSIELLA PNEUMONIAE: ICD-10-CM

## 2023-03-27 LAB
ALBUMIN SERPL-MCNC: 3.7 G/DL (ref 3.5–5.2)
ALBUMIN/GLOB SERPL: 0.7 G/DL
ALP SERPL-CCNC: 90 U/L (ref 39–117)
ALT SERPL W P-5'-P-CCNC: 8 U/L (ref 1–33)
ANION GAP SERPL CALCULATED.3IONS-SCNC: 11 MMOL/L (ref 5–15)
AST SERPL-CCNC: 14 U/L (ref 1–32)
BASOPHILS # BLD AUTO: 0.02 10*3/MM3 (ref 0–0.2)
BASOPHILS NFR BLD AUTO: 0.3 % (ref 0–1.5)
BILIRUB SERPL-MCNC: 0.2 MG/DL (ref 0–1.2)
BUN SERPL-MCNC: 11 MG/DL (ref 6–20)
BUN/CREAT SERPL: 13.9 (ref 7–25)
CALCIUM SPEC-SCNC: 9.4 MG/DL (ref 8.6–10.5)
CHLORIDE SERPL-SCNC: 98 MMOL/L (ref 98–107)
CO2 SERPL-SCNC: 27 MMOL/L (ref 22–29)
CREAT SERPL-MCNC: 0.79 MG/DL (ref 0.57–1)
DEPRECATED RDW RBC AUTO: 49.9 FL (ref 37–54)
EGFRCR SERPLBLD CKD-EPI 2021: 91.3 ML/MIN/1.73
EOSINOPHIL # BLD AUTO: 0.11 10*3/MM3 (ref 0–0.4)
EOSINOPHIL NFR BLD AUTO: 1.6 % (ref 0.3–6.2)
ERYTHROCYTE [DISTWIDTH] IN BLOOD BY AUTOMATED COUNT: 17.7 % (ref 12.3–15.4)
GLOBULIN UR ELPH-MCNC: 5.1 GM/DL
GLUCOSE SERPL-MCNC: 204 MG/DL (ref 65–99)
HCT VFR BLD AUTO: 33.3 % (ref 34–46.6)
HGB BLD-MCNC: 9.4 G/DL (ref 12–15.9)
IMM GRANULOCYTES # BLD AUTO: 0.04 10*3/MM3 (ref 0–0.05)
IMM GRANULOCYTES NFR BLD AUTO: 0.6 % (ref 0–0.5)
LYMPHOCYTES # BLD AUTO: 1.95 10*3/MM3 (ref 0.7–3.1)
LYMPHOCYTES NFR BLD AUTO: 28.1 % (ref 19.6–45.3)
MCH RBC QN AUTO: 22.3 PG (ref 26.6–33)
MCHC RBC AUTO-ENTMCNC: 28.2 G/DL (ref 31.5–35.7)
MCV RBC AUTO: 78.9 FL (ref 79–97)
MONOCYTES # BLD AUTO: 0.37 10*3/MM3 (ref 0.1–0.9)
MONOCYTES NFR BLD AUTO: 5.3 % (ref 5–12)
NEUTROPHILS NFR BLD AUTO: 4.46 10*3/MM3 (ref 1.7–7)
NEUTROPHILS NFR BLD AUTO: 64.1 % (ref 42.7–76)
NRBC BLD AUTO-RTO: 0 /100 WBC (ref 0–0.2)
PLATELET # BLD AUTO: 344 10*3/MM3 (ref 140–450)
PMV BLD AUTO: 8.7 FL (ref 6–12)
POTASSIUM SERPL-SCNC: 4.4 MMOL/L (ref 3.5–5.2)
PROT SERPL-MCNC: 8.8 G/DL (ref 6–8.5)
RBC # BLD AUTO: 4.22 10*6/MM3 (ref 3.77–5.28)
SODIUM SERPL-SCNC: 136 MMOL/L (ref 136–145)
WBC NRBC COR # BLD: 6.95 10*3/MM3 (ref 3.4–10.8)

## 2023-03-27 PROCEDURE — 85025 COMPLETE CBC W/AUTO DIFF WBC: CPT

## 2023-03-27 PROCEDURE — G0463 HOSPITAL OUTPT CLINIC VISIT: HCPCS

## 2023-03-27 PROCEDURE — 36415 COLL VENOUS BLD VENIPUNCTURE: CPT

## 2023-03-27 PROCEDURE — 80053 COMPREHEN METABOLIC PANEL: CPT

## 2023-03-27 NOTE — PROGRESS NOTES
The patient stated that her legs was cramping and asked to leave, asked about different ATB and home health.  Did explain, that this ATB was for hospital outpatient use only.  The patient insisted she could have it at home.  Called Dr. Blackburn's office and left a message on the nurse line.  Also instructed the patient and her support person to call both her MD and her home health nurse.  TAQUERIA Monroy    Received a call back from Dr. Blackburn's office and explained about the patient leaving and legs cramping/home health and not rescheduling today's ATB.  The Nurse stated she was non-complaint patient and she would let Dr. Blackburn's know and take it from there.

## 2023-03-28 ENCOUNTER — HOME CARE VISIT (OUTPATIENT)
Dept: HOME HEALTH SERVICES | Facility: CLINIC | Age: 53
End: 2023-03-28
Payer: MEDICARE

## 2023-03-28 PROCEDURE — G0299 HHS/HOSPICE OF RN EA 15 MIN: HCPCS

## 2023-03-29 VITALS
DIASTOLIC BLOOD PRESSURE: 70 MMHG | RESPIRATION RATE: 18 BRPM | OXYGEN SATURATION: 99 % | SYSTOLIC BLOOD PRESSURE: 122 MMHG | HEART RATE: 77 BPM | TEMPERATURE: 98.3 F

## 2023-03-30 ENCOUNTER — HOME CARE VISIT (OUTPATIENT)
Dept: HOME HEALTH SERVICES | Facility: CLINIC | Age: 53
End: 2023-03-30
Payer: MEDICARE

## 2023-03-30 VITALS
OXYGEN SATURATION: 99 % | DIASTOLIC BLOOD PRESSURE: 80 MMHG | TEMPERATURE: 98.3 F | SYSTOLIC BLOOD PRESSURE: 148 MMHG | RESPIRATION RATE: 18 BRPM | HEART RATE: 88 BPM

## 2023-03-30 PROCEDURE — G0299 HHS/HOSPICE OF RN EA 15 MIN: HCPCS

## 2023-03-31 ENCOUNTER — HOSPITAL ENCOUNTER (EMERGENCY)
Facility: HOSPITAL | Age: 53
Discharge: HOME | End: 2023-03-31
Attending: FAMILY MEDICINE | Admitting: FAMILY MEDICINE
Payer: MEDICARE

## 2023-03-31 VITALS
TEMPERATURE: 98.2 F | BODY MASS INDEX: 55.32 KG/M2 | WEIGHT: 293 LBS | RESPIRATION RATE: 17 BRPM | SYSTOLIC BLOOD PRESSURE: 112 MMHG | HEART RATE: 86 BPM | DIASTOLIC BLOOD PRESSURE: 57 MMHG | HEIGHT: 61 IN | OXYGEN SATURATION: 100 %

## 2023-03-31 DIAGNOSIS — I89.0 LYMPHEDEMA OF BOTH LOWER EXTREMITIES: ICD-10-CM

## 2023-03-31 DIAGNOSIS — S81.809D MULTIPLE OPEN WOUNDS OF LOWER LEG, UNSPECIFIED LATERALITY, SUBSEQUENT ENCOUNTER: Primary | ICD-10-CM

## 2023-03-31 LAB
ALBUMIN SERPL-MCNC: 3.5 G/DL (ref 3.5–5.2)
ALBUMIN/GLOB SERPL: 0.8 G/DL
ALP SERPL-CCNC: 85 U/L (ref 39–117)
ALT SERPL W P-5'-P-CCNC: 9 U/L (ref 1–33)
ANION GAP SERPL CALCULATED.3IONS-SCNC: 13 MMOL/L (ref 5–15)
AST SERPL-CCNC: 14 U/L (ref 1–32)
BASOPHILS # BLD AUTO: 0.04 10*3/MM3 (ref 0–0.2)
BASOPHILS NFR BLD AUTO: 0.6 % (ref 0–1.5)
BILIRUB SERPL-MCNC: 0.3 MG/DL (ref 0–1.2)
BUN SERPL-MCNC: 14 MG/DL (ref 6–20)
BUN/CREAT SERPL: 16.5 (ref 7–25)
CALCIUM SPEC-SCNC: 9.2 MG/DL (ref 8.6–10.5)
CHLORIDE SERPL-SCNC: 102 MMOL/L (ref 98–107)
CK SERPL-CCNC: 87 U/L (ref 20–180)
CO2 SERPL-SCNC: 26 MMOL/L (ref 22–29)
CREAT SERPL-MCNC: 0.85 MG/DL (ref 0.57–1)
D-LACTATE SERPL-SCNC: 1.5 MMOL/L (ref 0.5–2)
DEPRECATED RDW RBC AUTO: 49.5 FL (ref 37–54)
EGFRCR SERPLBLD CKD-EPI 2021: 83.6 ML/MIN/1.73
EOSINOPHIL # BLD AUTO: 0.12 10*3/MM3 (ref 0–0.4)
EOSINOPHIL NFR BLD AUTO: 1.7 % (ref 0.3–6.2)
ERYTHROCYTE [DISTWIDTH] IN BLOOD BY AUTOMATED COUNT: 17.5 % (ref 12.3–15.4)
GLOBULIN UR ELPH-MCNC: 4.5 GM/DL
GLUCOSE SERPL-MCNC: 182 MG/DL (ref 65–99)
HCT VFR BLD AUTO: 30.9 % (ref 34–46.6)
HGB BLD-MCNC: 8.9 G/DL (ref 12–15.9)
IMM GRANULOCYTES # BLD AUTO: 0.02 10*3/MM3 (ref 0–0.05)
IMM GRANULOCYTES NFR BLD AUTO: 0.3 % (ref 0–0.5)
LYMPHOCYTES # BLD AUTO: 2.2 10*3/MM3 (ref 0.7–3.1)
LYMPHOCYTES NFR BLD AUTO: 30.3 % (ref 19.6–45.3)
MAGNESIUM SERPL-MCNC: 1.8 MG/DL (ref 1.6–2.6)
MCH RBC QN AUTO: 22.3 PG (ref 26.6–33)
MCHC RBC AUTO-ENTMCNC: 28.8 G/DL (ref 31.5–35.7)
MCV RBC AUTO: 77.4 FL (ref 79–97)
MONOCYTES # BLD AUTO: 0.51 10*3/MM3 (ref 0.1–0.9)
MONOCYTES NFR BLD AUTO: 7 % (ref 5–12)
NEUTROPHILS NFR BLD AUTO: 4.37 10*3/MM3 (ref 1.7–7)
NEUTROPHILS NFR BLD AUTO: 60.1 % (ref 42.7–76)
NRBC BLD AUTO-RTO: 0 /100 WBC (ref 0–0.2)
PLATELET # BLD AUTO: 315 10*3/MM3 (ref 140–450)
PMV BLD AUTO: 8.8 FL (ref 6–12)
POTASSIUM SERPL-SCNC: 4.3 MMOL/L (ref 3.5–5.2)
PROCALCITONIN SERPL-MCNC: 0.14 NG/ML (ref 0–0.25)
PROT SERPL-MCNC: 8 G/DL (ref 6–8.5)
RBC # BLD AUTO: 3.99 10*6/MM3 (ref 3.77–5.28)
SODIUM SERPL-SCNC: 141 MMOL/L (ref 136–145)
WBC NRBC COR # BLD: 7.26 10*3/MM3 (ref 3.4–10.8)

## 2023-03-31 PROCEDURE — 80053 COMPREHEN METABOLIC PANEL: CPT | Performed by: FAMILY MEDICINE

## 2023-03-31 PROCEDURE — 0 HYDROMORPHONE 1 MG/ML SOLUTION: Performed by: FAMILY MEDICINE

## 2023-03-31 PROCEDURE — 99282 EMERGENCY DEPT VISIT SF MDM: CPT

## 2023-03-31 PROCEDURE — 83605 ASSAY OF LACTIC ACID: CPT | Performed by: FAMILY MEDICINE

## 2023-03-31 PROCEDURE — 84145 PROCALCITONIN (PCT): CPT | Performed by: FAMILY MEDICINE

## 2023-03-31 PROCEDURE — 87040 BLOOD CULTURE FOR BACTERIA: CPT | Performed by: FAMILY MEDICINE

## 2023-03-31 PROCEDURE — 82550 ASSAY OF CK (CPK): CPT | Performed by: FAMILY MEDICINE

## 2023-03-31 PROCEDURE — 36415 COLL VENOUS BLD VENIPUNCTURE: CPT

## 2023-03-31 PROCEDURE — 96372 THER/PROPH/DIAG INJ SC/IM: CPT

## 2023-03-31 PROCEDURE — 83735 ASSAY OF MAGNESIUM: CPT | Performed by: FAMILY MEDICINE

## 2023-03-31 PROCEDURE — 85025 COMPLETE CBC W/AUTO DIFF WBC: CPT | Performed by: FAMILY MEDICINE

## 2023-03-31 RX ORDER — SODIUM CHLORIDE 0.9 % (FLUSH) 0.9 %
10 SYRINGE (ML) INJECTION AS NEEDED
Status: DISCONTINUED | OUTPATIENT
Start: 2023-03-31 | End: 2023-03-31 | Stop reason: HOSPADM

## 2023-03-31 RX ORDER — DOXYCYCLINE 100 MG/1
100 CAPSULE ORAL 2 TIMES DAILY
Qty: 14 CAPSULE | Refills: 0 | Status: SHIPPED | OUTPATIENT
Start: 2023-03-31 | End: 2023-04-07

## 2023-03-31 RX ADMIN — HYDROMORPHONE HYDROCHLORIDE 1 MG: 1 INJECTION, SOLUTION INTRAMUSCULAR; INTRAVENOUS; SUBCUTANEOUS at 18:12

## 2023-03-31 NOTE — ED PROVIDER NOTES
Subjective   History of Present Illness  Patient presents with complaint of pain in both of her legs with wounds in her legs.  She has a history of chronic lymphedema that is markedly severe and has ulcers on the lateral aspects of both calves.  These have been treated by home health and wound care.  She has been cleaning them patient went to her primary care doctor.  Patient states that her primary care doctor tried to give her IV antibiotics but they were unable to get an IV in her.  Patient states that she was scheduled to get a midline on Monday however did not get that midline on Monday.  Patient states that she has been instructed by her primary care provider to come to the emergency room to get IV antibiotics and possible admission.  Patient states that the wounds on her leg or not improving.        Review of Systems   All other systems reviewed and are negative.      Past Medical History:   Diagnosis Date   • Anxiety    • Arthritis     Osteoarthritis primarily left knee    • Back pain    • Chronic pain syndrome 10/27/2021   • Depression    • Diabetes mellitus (HCC)    • Fibromyalgia, primary    • History of transfusion    • Hx of tear of meniscus of knee joint 11/21/2016   • Hypertension    • Joint pain    • Kidney stone    • Knee pain    • Lymphedema of both lower extremities 10/27/2021   • Migraine    • Migraine headache    • Pain     knee, left ankle, left ankle     • Pes anserinus bursitis 04/28/2015   • Restless leg        Allergies   Allergen Reactions   • Compazine [Prochlorperazine Edisylate] Shortness Of Breath     Breathing    • Meperidine Hives     (Demerol)    • Norflex [Orphenadrine] Hives   • Nortriptyline Hives   • Prochlorperazine Shortness Of Breath   • Prochlorperazine Maleate Shortness Of Breath   • Vancomycin Provider Review Needed and Unknown - High Severity     CRITICAL LEVEL RESULTS - PATIENT LIKELY HAS METABOLIC / CLEARANCE ISSUE WITH VANCOMYCIN. ON STANDARD REGIMENS BASED ON  BAYESIAN/POPULATION PK PARAMETERS, VANCOMYCIN TROUGH LEVELS IN THE 60s, 70s, 90s, 100s WERE OBTAINED. RECOMMEND AGAINST EVER USING VANCOMYCIN IN THIS PATIENT. IF VANCOMYCIN IS ABSOLUTELY INDICATED WITHOUT ANY OTHER OPTIONS, PULSE INTERMITTENT DOSING WOULD LIKELY BE THE ONLY APPROPRIATE METHOD.    • Codeine Nausea And Vomiting     Rash    • Penicillins Rash     Nausea & vomiting    • Bacitracin Nausea And Vomiting   • Calamine Itching   • Zinc Other (See Comments)     ERRYTHEMA   • Amoxicillin-Pot Clavulanate Rash, Hives and Itching   • Avelox [Moxifloxacin Hcl] Rash   • Cefazolin Nausea And Vomiting   • Cephalexin Rash     (Keflex)    • Ciprofloxacin Rash   • Clindamycin/Lincomycin Rash   • Doxycycline Nausea And Vomiting     Can take but need zofran before   • Hydroxyzine Hcl Itching   • Moxifloxacin Nausea And Vomiting   • Orphenadrine Citrate Itching   • Sulfamethoxazole-Trimethoprim Nausea And Vomiting and Rash     States she also gets yeast infections with it   • Tobramycin Other (See Comments)     Eyes burn-feel like eyes are on fire     • Vistaril [Hydroxyzine] Rash       Past Surgical History:   Procedure Laterality Date   • CATARACT EXTRACTION     • CATARACT EXTRACTION     • CATARACT EXTRACTION     • CORNEAL TRANSPLANT      bilateral    • ECTOPIC PREGNANCY     • INCISION AND DRAINAGE ABSCESS Left 6/1/2022    Procedure: DEBRIDEMENTAND PULSE LAVAGE LT MEDIAL THIGH PACKING ODOFORM GAUZE  WOUND SACRAL AND BILATERAL CALVES;  Surgeon: Justin Perez MD;  Location:  [facility];  Service: General;  Laterality: Left;   • INCISION AND DRAINAGE TRUNK Right 11/26/2022    Procedure: INCISION AND DRAINAGE TRUNK;  Surgeon: Sara Guerrier MD;  Location:  [facility];  Service: General;  Laterality: Right;   • JOINT REPLACEMENT     • KNEE ARTHROSCOPY W/ MENISCAL REPAIR     • KNEE MENISCAL REPAIR     • MENISCECTOMY Left 12/20/2016   • TUBAL ABDOMINAL LIGATION         Family History   Problem Relation Age of Onset   • No  Known Problems Mother    • Cancer Father    • Dementia Father    • Hypertension Maternal Grandmother    • Cancer Maternal Grandfather        Social History     Socioeconomic History   • Marital status: Significant Other   Tobacco Use   • Smoking status: Never   • Smokeless tobacco: Never   Vaping Use   • Vaping Use: Never used   Substance and Sexual Activity   • Alcohol use: Yes   • Drug use: No   • Sexual activity: Not Currently     Partners: Male           Objective   Physical Exam  Vitals and nursing note reviewed.   Constitutional:       Appearance: Normal appearance. She is obese.   HENT:      Head: Normocephalic and atraumatic.      Mouth/Throat:      Mouth: Mucous membranes are moist.   Cardiovascular:      Rate and Rhythm: Normal rate and regular rhythm.      Heart sounds: Normal heart sounds.   Pulmonary:      Effort: Pulmonary effort is normal.      Breath sounds: Normal breath sounds.   Skin:     General: Skin is warm and dry.      Comments: Bilateral lower extremity erythema purulent drainage from left extremity.  Multiple wounds on bilateral lower extremities.  Left anterior upper thigh indurated, swelling, erythema.   Neurological:      General: No focal deficit present.      Mental Status: She is alert and oriented to person, place, and time.   Psychiatric:         Mood and Affect: Mood normal.         Behavior: Behavior normal.         Procedures           ED Course                                         Lab Results (last 24 hours)     Procedure Component Value Units Date/Time    CBC & Differential [[id number]]  (Abnormal) Collected: 03/31/23 1719    Specimen: Blood Updated: 03/31/23 1728    Narrative:      The following orders were created for panel order CBC & Differential.  Procedure                               Abnormality         Status                     ---------                               -----------         ------                     CBC Auto Differential[[id number]]        Abnormal       "      Final result                 Please view results for these tests on the individual orders.    Comprehensive Metabolic Panel [[id number]]  (Abnormal) Collected: 03/31/23 1719    Specimen: Blood Updated: 03/31/23 1748     Glucose 182 mg/dL      BUN 14 mg/dL      Creatinine 0.85 mg/dL      Sodium 141 mmol/L      Potassium 4.3 mmol/L      Chloride 102 mmol/L      CO2 26.0 mmol/L      Calcium 9.2 mg/dL      Total Protein 8.0 g/dL      Albumin 3.5 g/dL      ALT (SGPT) 9 U/L      AST (SGOT) 14 U/L      Alkaline Phosphatase 85 U/L      Total Bilirubin 0.3 mg/dL      Globulin 4.5 gm/dL      A/G Ratio 0.8 g/dL      BUN/Creatinine Ratio 16.5     Anion Gap 13.0 mmol/L      eGFR 83.6 mL/min/1.73     Narrative:      GFR Normal >60  Chronic Kidney Disease <60  Kidney Failure <15      Blood Culture - Blood, Arm, Right [[id number]] Collected: 03/31/23 1719    Specimen: Blood from Arm, Right Updated: 03/31/23 1730    Lactic Acid, Plasma [[id number]]  (Normal) Collected: 03/31/23 1719    Specimen: Blood Updated: 03/31/23 1746     Lactate 1.5 mmol/L     Procalcitonin [[id number]]  (Normal) Collected: 03/31/23 1719    Specimen: Blood Updated: 03/31/23 1754     Procalcitonin 0.14 ng/mL     Narrative:      As a Marker for Sepsis (Non-Neonates):    1. <0.5 ng/mL represents a low risk of severe sepsis and/or septic shock.  2. >2 ng/mL represents a high risk of severe sepsis and/or septic shock.    As a Marker for Lower Respiratory Tract Infections that require antibiotic therapy:    PCT on Admission    Antibiotic Therapy       6-12 Hrs later    >0.5                Strongly Recommended  >0.25 - <0.5        Recommended   0.1 - 0.25          Discouraged              Remeasure/reassess PCT  <0.1                Strongly Discouraged     Remeasure/reassess PCT    As 28 day mortality risk marker: \"Change in Procalcitonin Result\" (>80% or <=80%) if Day 0 (or Day 1) and Day 4 values are available. Refer to " http://www.[facility]-pct-calculator.com    Change in PCT <=80%  A decrease of PCT levels below or equal to 80% defines a positive change in PCT test result representing a higher risk for 28-day all-cause mortality of patients diagnosed with severe sepsis for septic shock.    Change in PCT >80%  A decrease of PCT levels of more than 80% defines a negative change in PCT result representing a lower risk for 28-day all-cause mortality of patients diagnosed with severe sepsis or septic shock.       CK [[id number]]  (Normal) Collected: 03/31/23 1719    Specimen: Blood Updated: 03/31/23 1748     Creatine Kinase 87 U/L     Magnesium [[id number]]  (Normal) Collected: 03/31/23 1719    Specimen: Blood Updated: 03/31/23 1742     Magnesium 1.8 mg/dL     CBC Auto Differential [[id number]]  (Abnormal) Collected: 03/31/23 1719    Specimen: Blood Updated: 03/31/23 1728     WBC 7.26 10*3/mm3      RBC 3.99 10*6/mm3      Hemoglobin 8.9 g/dL      Hematocrit 30.9 %      MCV 77.4 fL      MCH 22.3 pg      MCHC 28.8 g/dL      RDW 17.5 %      RDW-SD 49.5 fl      MPV 8.8 fL      Platelets 315 10*3/mm3      Neutrophil % 60.1 %      Lymphocyte % 30.3 %      Monocyte % 7.0 %      Eosinophil % 1.7 %      Basophil % 0.6 %      Immature Grans % 0.3 %      Neutrophils, Absolute 4.37 10*3/mm3      Lymphocytes, Absolute 2.20 10*3/mm3      Monocytes, Absolute 0.51 10*3/mm3      Eosinophils, Absolute 0.12 10*3/mm3      Basophils, Absolute 0.04 10*3/mm3      Immature Grans, Absolute 0.02 10*3/mm3      nRBC 0.0 /100 WBC           Medical Decision Making  This was a 52-year-old female with history of chronic lower extremity infections.  Patient does not appear to be septic.  Patient is in no acute distress.  Patient will be discharged home in a stable condition.  Patient does get wound care.  Patient has wound care coming to her house.  Patient also does see wound care on an outpatient basis at the clinic.  Patient has been advised to follow-up with her primary  care provider on outpatient basis.  Patient has been advised to return to the emergency room with new or worsening symptoms.  All questions were answered for the patient prior to discharge.  Patient will be placed on doxycycline for her lower extremity infection.  Patient was supposed to be taking Levaquin however she self discontinued the Levaquin because she did not like how it made her feel.  Patient will be advised to follow-up with wound care doctor for any recommendations regarding IV antibiotics.  Patient at this time does not appear to be needing IV antibiotics.  Patient verbalized understanding was agreeable to plan as discussed.    Amount and/or Complexity of Data Reviewed  Labs: ordered. Decision-making details documented in ED Course.      Risk  Prescription drug management.          Final diagnoses:   Multiple open wounds of lower leg, unspecified laterality, subsequent encounter   Lymphedema of both lower extremities       ED Disposition  ED Disposition     ED Disposition   Discharge    Condition   Stable    Comment   --             Rhonda Knox, APRN  [address]  [place] [id number] [phone]    Schedule an appointment as soon as possible for a visit       Lul Tom, DPMAK  [address] [facility] [address]  [facility] [id number] [phone]    Schedule an appointment as soon as possible for a visit            Medication List      New Prescriptions    doxycycline 100 MG capsule  Commonly known as: MONODOX  Take 1 capsule by mouth 2 (Two) Times a Day for 7 days.           Where to Get Your Medications      These medications were sent to [id number] - [place], [place] - [address] AT [facility] [address] - [phone]  - [phone] FX  [address], [facility] [id number]    Phone: [phone]   · doxycycline 100 MG capsule          Fransico Valadez MD  03/31/23 7373

## 2023-03-31 NOTE — HOME HEALTH
60 Day Summary    Home Health need continues for: wound assessment and dressing changes    Primary diagnoses/co-morbidities/recent procedures in past 60 days that impact current episode: type 2 diaberes, lymphedema, bilateral posterior diabetic ulcer calf wounds    Current level of functional ability: Pt needs assistance with ADLs and uses a cane when ambulating    Homebound status and living arrangements: Pt lives with boyfriend and is unable to drive    Goals accomplished and/or measurable progress toward unmet goals in past 60 days: No falls in 4 weeks, pt compliant with fall precautions  Focus of care for next 60 days for each discipline ordered: Wound assessment and dressing changes of bilateral posterior diabetic ulcer calf wounds    Skin integrity/wound status: Wounds noted to bilateral posterior calves, lymphedema noted to BLE    Code status: CPR    Most recent fall risk: 7    Estimated date when home care services will end: No estimated end date at this time due to open wounds

## 2023-03-31 NOTE — CONSULTS
Assessed bilateral arms with ultrasound for possible IV lines.  Pt has no viable veins in either arm for picc, midline, or peripheral iv line at this time.  Only viable vein in either arm was underneath brachial artery and not accessible at this time.  Pt's nurse notified.  Would strongly recommend that patient have some sort of long term access (like a port) placed due to history of repeated difficult iv access and chronicity of patient's illness.

## 2023-04-03 ENCOUNTER — HOME CARE VISIT (OUTPATIENT)
Dept: HOME HEALTH SERVICES | Facility: CLINIC | Age: 53
End: 2023-04-03
Payer: MEDICARE

## 2023-04-03 ENCOUNTER — TRANSCRIBE ORDERS (OUTPATIENT)
Dept: ADMINISTRATIVE | Facility: HOSPITAL | Age: 53
End: 2023-04-03
Payer: MEDICARE

## 2023-04-03 VITALS
TEMPERATURE: 98.7 F | DIASTOLIC BLOOD PRESSURE: 98 MMHG | SYSTOLIC BLOOD PRESSURE: 152 MMHG | OXYGEN SATURATION: 97 % | RESPIRATION RATE: 18 BRPM | HEART RATE: 82 BPM

## 2023-04-03 PROCEDURE — G0299 HHS/HOSPICE OF RN EA 15 MIN: HCPCS

## 2023-04-04 ENCOUNTER — HOME CARE VISIT (OUTPATIENT)
Dept: HOME HEALTH SERVICES | Facility: CLINIC | Age: 53
End: 2023-04-04
Payer: MEDICARE

## 2023-04-04 VITALS
HEART RATE: 84 BPM | OXYGEN SATURATION: 97 % | DIASTOLIC BLOOD PRESSURE: 78 MMHG | TEMPERATURE: 98.6 F | SYSTOLIC BLOOD PRESSURE: 118 MMHG | RESPIRATION RATE: 18 BRPM

## 2023-04-04 PROCEDURE — G0299 HHS/HOSPICE OF RN EA 15 MIN: HCPCS

## 2023-04-04 NOTE — HOME HEALTH
FOCUS OF CARE/SKILLED NEED: direct skill  TEACHING/INTERVENTIONS: wound assessment and dressing change  PROGRESS TOWARD GOALS: ongoing  PHYSICIAN CONTACT:  Dr. Mann's office contacted regarding pt needing midline placement and infusion scheduled.   INSURANCE CHANGES?  no  FALLS SINCE LAST VISIT?  no  MEDICATION CHANGES? no  PLAN FOR NEXT VISIT: wound assessment and dressing change  PRE-SCREENED FOR COVID?

## 2023-04-04 NOTE — HOME HEALTH
FOCUS OF CARE/SKILLED NEED: direct skill  TEACHING/INTERVENTIONS: wound assessment and dressing change  PROGRESS TOWARD GOALS: Not progressing  PHYSICIAN CONTACT:  Dr. Mann's office contacted due to worsening condition of cellulitis of left lower leg and copious amounts of drainage noted. SN instructed to sent pt to ER for evaluation. Pt refuses to go to ER this date stating she is tired from being up all night with leg cramps and will go in the morning. SN instructed pt of concern for left leg and possible sepsis. Pt v/u and stated she wanted to sleep and go to ER in the morning.   INSURANCE CHANGES?  no  FALLS SINCE LAST VISIT?  no  MEDICATION CHANGES? no  PLAN FOR NEXT VISIT: Wound assessment and dressing change  PRE-SCREENED FOR COVID? no s/s

## 2023-04-05 ENCOUNTER — HOME CARE VISIT (OUTPATIENT)
Dept: HOME HEALTH SERVICES | Facility: CLINIC | Age: 53
End: 2023-04-05
Payer: MEDICARE

## 2023-04-05 ENCOUNTER — HOSPITAL ENCOUNTER (OUTPATIENT)
Dept: OTHER | Facility: HOSPITAL | Age: 53
Discharge: HOME | End: 2023-04-05
Admitting: INTERNAL MEDICINE
Payer: MEDICARE

## 2023-04-05 DIAGNOSIS — L03.115 CELLULITIS OF RIGHT FOOT: ICD-10-CM

## 2023-04-05 DIAGNOSIS — R19.07 GENERALIZED ABDOMINAL SWELLING: ICD-10-CM

## 2023-04-05 DIAGNOSIS — I89.0 LYMPHATIC EDEMA: ICD-10-CM

## 2023-04-05 LAB — BACTERIA SPEC AEROBE CULT: NORMAL

## 2023-04-05 PROCEDURE — 36410 VNPNXR 3YR/> PHY/QHP DX/THER: CPT

## 2023-04-05 PROCEDURE — C1751 CATH, INF, PER/CENT/MIDLINE: HCPCS

## 2023-04-05 RX ORDER — LIDOCAINE HYDROCHLORIDE 10 MG/ML
1 INJECTION, SOLUTION EPIDURAL; INFILTRATION; INTRACAUDAL; PERINEURAL ONCE
Status: COMPLETED | OUTPATIENT
Start: 2023-04-05 | End: 2023-04-05

## 2023-04-05 RX ADMIN — LIDOCAINE HYDROCHLORIDE ANHYDROUS 1 ML: 10 INJECTION, SOLUTION INFILTRATION at 15:09

## 2023-04-05 NOTE — NURSING NOTE
9 cm Midline catheter placed in pt left brachial vein. Pt tolerated procedure well. Line flushes and draws well. Sterile dressing applied.

## 2023-04-05 NOTE — HOME HEALTH
FOCUS OF CARE/SKILLED NEED: direct skill  TEACHING/INTERVENTIONS: wound assessment and dressing change  PROGRESS TOWARD GOALS: ongoing  PHYSICIAN CONTACT:  no  INSURANCE CHANGES?  no  FALLS SINCE LAST VISIT?  no  MEDICATION CHANGES? no  PLAN FOR NEXT VISIT: wound assessment and dressing change  PRE-SCREENED FOR COVID? no s/s

## 2023-04-06 ENCOUNTER — LAB (OUTPATIENT)
Dept: LAB | Facility: HOSPITAL | Age: 53
End: 2023-04-06
Payer: MEDICARE

## 2023-04-06 ENCOUNTER — INFUSION (OUTPATIENT)
Dept: ONCOLOGY | Facility: HOSPITAL | Age: 53
End: 2023-04-06
Payer: MEDICARE

## 2023-04-06 VITALS
WEIGHT: 292.8 LBS | DIASTOLIC BLOOD PRESSURE: 56 MMHG | OXYGEN SATURATION: 100 % | HEIGHT: 61 IN | SYSTOLIC BLOOD PRESSURE: 128 MMHG | BODY MASS INDEX: 55.28 KG/M2 | TEMPERATURE: 98.6 F | HEART RATE: 85 BPM | RESPIRATION RATE: 18 BRPM

## 2023-04-06 DIAGNOSIS — R78.81 BACTEREMIA DUE TO KLEBSIELLA PNEUMONIAE: Primary | ICD-10-CM

## 2023-04-06 DIAGNOSIS — B96.1 BACTEREMIA DUE TO KLEBSIELLA PNEUMONIAE: Primary | ICD-10-CM

## 2023-04-06 DIAGNOSIS — L03.115 CELLULITIS OF RIGHT FOOT: ICD-10-CM

## 2023-04-06 LAB
ALBUMIN SERPL-MCNC: 3.6 G/DL (ref 3.5–5.2)
ALBUMIN/GLOB SERPL: 0.8 G/DL
ALP SERPL-CCNC: 73 U/L (ref 39–117)
ALT SERPL W P-5'-P-CCNC: 7 U/L (ref 1–33)
ANION GAP SERPL CALCULATED.3IONS-SCNC: 9 MMOL/L (ref 5–15)
AST SERPL-CCNC: 15 U/L (ref 1–32)
BASOPHILS # BLD AUTO: 0.03 10*3/MM3 (ref 0–0.2)
BASOPHILS NFR BLD AUTO: 0.4 % (ref 0–1.5)
BILIRUB SERPL-MCNC: <0.2 MG/DL (ref 0–1.2)
BUN SERPL-MCNC: 17 MG/DL (ref 6–20)
BUN/CREAT SERPL: 14.4 (ref 7–25)
CALCIUM SPEC-SCNC: 8.9 MG/DL (ref 8.6–10.5)
CHLORIDE SERPL-SCNC: 99 MMOL/L (ref 98–107)
CO2 SERPL-SCNC: 28 MMOL/L (ref 22–29)
CREAT SERPL-MCNC: 1.18 MG/DL (ref 0.57–1)
DEPRECATED RDW RBC AUTO: 50.1 FL (ref 37–54)
EGFRCR SERPLBLD CKD-EPI 2021: 56.4 ML/MIN/1.73
EOSINOPHIL # BLD AUTO: 0.23 10*3/MM3 (ref 0–0.4)
EOSINOPHIL NFR BLD AUTO: 3.4 % (ref 0.3–6.2)
ERYTHROCYTE [DISTWIDTH] IN BLOOD BY AUTOMATED COUNT: 17.7 % (ref 12.3–15.4)
GLOBULIN UR ELPH-MCNC: 4.3 GM/DL
GLUCOSE SERPL-MCNC: 107 MG/DL (ref 65–99)
HCT VFR BLD AUTO: 31.2 % (ref 34–46.6)
HGB BLD-MCNC: 8.6 G/DL (ref 12–15.9)
IMM GRANULOCYTES # BLD AUTO: 0.02 10*3/MM3 (ref 0–0.05)
IMM GRANULOCYTES NFR BLD AUTO: 0.3 % (ref 0–0.5)
LYMPHOCYTES # BLD AUTO: 1.65 10*3/MM3 (ref 0.7–3.1)
LYMPHOCYTES NFR BLD AUTO: 24.1 % (ref 19.6–45.3)
MCH RBC QN AUTO: 21.7 PG (ref 26.6–33)
MCHC RBC AUTO-ENTMCNC: 27.6 G/DL (ref 31.5–35.7)
MCV RBC AUTO: 78.8 FL (ref 79–97)
MONOCYTES # BLD AUTO: 0.51 10*3/MM3 (ref 0.1–0.9)
MONOCYTES NFR BLD AUTO: 7.4 % (ref 5–12)
NEUTROPHILS NFR BLD AUTO: 4.42 10*3/MM3 (ref 1.7–7)
NEUTROPHILS NFR BLD AUTO: 64.4 % (ref 42.7–76)
NRBC BLD AUTO-RTO: 0 /100 WBC (ref 0–0.2)
PLATELET # BLD AUTO: 297 10*3/MM3 (ref 140–450)
PMV BLD AUTO: 8.9 FL (ref 6–12)
POTASSIUM SERPL-SCNC: 4.1 MMOL/L (ref 3.5–5.2)
PROT SERPL-MCNC: 7.9 G/DL (ref 6–8.5)
RBC # BLD AUTO: 3.96 10*6/MM3 (ref 3.77–5.28)
SODIUM SERPL-SCNC: 136 MMOL/L (ref 136–145)
WBC NRBC COR # BLD: 6.86 10*3/MM3 (ref 3.4–10.8)

## 2023-04-06 PROCEDURE — 85025 COMPLETE CBC W/AUTO DIFF WBC: CPT

## 2023-04-06 PROCEDURE — 80053 COMPREHEN METABOLIC PANEL: CPT | Performed by: INTERNAL MEDICINE

## 2023-04-06 PROCEDURE — 25010000002 ORITAVANCIN DIPHOSPHATE 400 MG RECONSTITUTED SOLUTION 1 EACH VIAL: Performed by: INTERNAL MEDICINE

## 2023-04-06 PROCEDURE — 96366 THER/PROPH/DIAG IV INF ADDON: CPT

## 2023-04-06 PROCEDURE — 96365 THER/PROPH/DIAG IV INF INIT: CPT

## 2023-04-06 RX ADMIN — ORITAVANCIN 1200 MG: 400 INJECTION, POWDER, LYOPHILIZED, FOR SOLUTION INTRAVENOUS at 11:24

## 2023-04-06 NOTE — PROGRESS NOTES
1038-Called Infectious Disease, spoke with JOSE DAVID Dickerson, to report labs prior to antibiotic infusion, she spoke with Dr. Mann and hope to prpceed with treatment.  Patient will NOT need labs prior to next infusion.  Appt time given from JOSE DAVID Dickerson, for patient's next visit with Dr. Mann, 04/26/2023, at 1050; patient informed and voiced understanding.

## 2023-04-10 ENCOUNTER — HOME CARE VISIT (OUTPATIENT)
Dept: HOME HEALTH SERVICES | Facility: CLINIC | Age: 53
End: 2023-04-10
Payer: MEDICARE

## 2023-04-10 VITALS
TEMPERATURE: 98.6 F | OXYGEN SATURATION: 98 % | SYSTOLIC BLOOD PRESSURE: 122 MMHG | RESPIRATION RATE: 18 BRPM | DIASTOLIC BLOOD PRESSURE: 68 MMHG | HEART RATE: 82 BPM

## 2023-04-10 PROCEDURE — G0300 HHS/HOSPICE OF LPN EA 15 MIN: HCPCS

## 2023-04-10 RX ORDER — POTASSIUM CHLORIDE 20 MEQ/1
20 TABLET, EXTENDED RELEASE ORAL AS NEEDED
Qty: 30 TABLET | Refills: 1 | OUTPATIENT
Start: 2023-04-10

## 2023-04-10 NOTE — HOME HEALTH
FOCUS OF CARE/SKILLED NEED: HOME SAFETY/FALL PREVENTION, MEDICATION EDUCATION, PAIN MANAGEMENT, PREVENTATIVE SKIN CARE, wound care, s/s of infection    TEACHING/INTERVENTIONS:  HOME SAFETY/FALL PREVENTION, MEDICATION EDUCATION, PAIN MANAGEMENT, PREVENTATIVE SKIN CARE, wound care, s/s of infection    PROGRESS TOWARDS GOALS: progressing    RECENT FALLS:  none    RECENT MEDICATION CHANGES:  none    WOUND(S): jennifer calves    D/C PLAN:  DISCHARGE WITH GOALS MET    PHYSICIAN CONTACT:  none    INSURANCE CHANGES:  none    Patient has rescheduled her [facility] for 4/11/2023 at 14:15. Patient did not feel well enough to have bandages changed again, she states her sign other changed them this AM. Reminded patient to keep her wound care appt.

## 2023-04-11 ENCOUNTER — OFFICE VISIT (OUTPATIENT)
Dept: WOUND CARE | Facility: HOSPITAL | Age: 53
End: 2023-04-11
Payer: MEDICARE

## 2023-04-12 ENCOUNTER — HOME CARE VISIT (OUTPATIENT)
Dept: HOME HEALTH SERVICES | Facility: CLINIC | Age: 53
End: 2023-04-12
Payer: MEDICARE

## 2023-04-12 PROCEDURE — G0299 HHS/HOSPICE OF RN EA 15 MIN: HCPCS

## 2023-04-13 ENCOUNTER — INFUSION (OUTPATIENT)
Dept: ONCOLOGY | Facility: HOSPITAL | Age: 53
End: 2023-04-13
Payer: MEDICARE

## 2023-04-13 ENCOUNTER — HOSPITAL ENCOUNTER (OUTPATIENT)
Dept: CT IMAGING | Facility: HOSPITAL | Age: 53
Discharge: HOME | End: 2023-04-13
Payer: MEDICARE

## 2023-04-13 VITALS
RESPIRATION RATE: 16 BRPM | SYSTOLIC BLOOD PRESSURE: 154 MMHG | WEIGHT: 293 LBS | BODY MASS INDEX: 55.32 KG/M2 | HEART RATE: 84 BPM | TEMPERATURE: 96.8 F | DIASTOLIC BLOOD PRESSURE: 72 MMHG | HEIGHT: 61 IN | OXYGEN SATURATION: 100 %

## 2023-04-13 VITALS
RESPIRATION RATE: 18 BRPM | OXYGEN SATURATION: 99 % | HEART RATE: 62 BPM | DIASTOLIC BLOOD PRESSURE: 72 MMHG | TEMPERATURE: 98.2 F | SYSTOLIC BLOOD PRESSURE: 150 MMHG

## 2023-04-13 DIAGNOSIS — R19.07 GENERALIZED ABDOMINAL SWELLING: ICD-10-CM

## 2023-04-13 DIAGNOSIS — L03.115 CELLULITIS OF RIGHT FOOT: ICD-10-CM

## 2023-04-13 DIAGNOSIS — L03.115 CELLULITIS OF RIGHT FOOT: Primary | ICD-10-CM

## 2023-04-13 DIAGNOSIS — I89.0 LYMPHATIC EDEMA: ICD-10-CM

## 2023-04-13 PROCEDURE — 25010000002 ORITAVANCIN DIPHOSPHATE 400 MG RECONSTITUTED SOLUTION 1 EACH VIAL: Performed by: INTERNAL MEDICINE

## 2023-04-13 PROCEDURE — 74177 CT ABD & PELVIS W/CONTRAST: CPT

## 2023-04-13 PROCEDURE — 96365 THER/PROPH/DIAG IV INF INIT: CPT

## 2023-04-13 PROCEDURE — 96366 THER/PROPH/DIAG IV INF ADDON: CPT

## 2023-04-13 PROCEDURE — 25510000001 IOPAMIDOL 61 % SOLUTION: Performed by: INTERNAL MEDICINE

## 2023-04-13 RX ADMIN — IOPAMIDOL 100 ML: 612 INJECTION, SOLUTION INTRAVENOUS at 16:58

## 2023-04-13 RX ADMIN — ORITAVANCIN 1200 MG: 400 INJECTION, POWDER, LYOPHILIZED, FOR SOLUTION INTRAVENOUS at 12:39

## 2023-04-13 NOTE — PROGRESS NOTES
Spoke with TAQUERIA Serna, [facility], okay to pull midline today after treatment and CT today.

## 2023-04-13 NOTE — PROGRESS NOTES
7846-Called QAMAR Mart, told him patient was under the impression he was to push the IV contrast through her midline when she has her CT this afternoon; he said he does not need to be there, he had just needed to check to make sure okay to push contrast via midline and it is.

## 2023-04-13 NOTE — PROGRESS NOTES
1400-Called CT, spoke with Peter, told him patient has midline and it is okay to use it for IV contrast for CT abd/pelvis; asked if there was someone over there that can pull it after they are done because there is an order to remove it today after scan completed. Per Peter, they have someone that can remove it.

## 2023-04-14 ENCOUNTER — HOME CARE VISIT (OUTPATIENT)
Dept: HOME HEALTH SERVICES | Facility: CLINIC | Age: 53
End: 2023-04-14
Payer: MEDICARE

## 2023-04-14 VITALS
OXYGEN SATURATION: 99 % | TEMPERATURE: 98.8 F | RESPIRATION RATE: 18 BRPM | SYSTOLIC BLOOD PRESSURE: 142 MMHG | DIASTOLIC BLOOD PRESSURE: 72 MMHG | HEART RATE: 82 BPM

## 2023-04-14 PROCEDURE — G0299 HHS/HOSPICE OF RN EA 15 MIN: HCPCS

## 2023-04-17 ENCOUNTER — HOME CARE VISIT (OUTPATIENT)
Dept: HOME HEALTH SERVICES | Facility: CLINIC | Age: 53
End: 2023-04-17
Payer: MEDICARE

## 2023-04-17 VITALS
TEMPERATURE: 97.7 F | RESPIRATION RATE: 18 BRPM | HEART RATE: 65 BPM | DIASTOLIC BLOOD PRESSURE: 80 MMHG | OXYGEN SATURATION: 99 % | SYSTOLIC BLOOD PRESSURE: 142 MMHG

## 2023-04-17 PROCEDURE — G0299 HHS/HOSPICE OF RN EA 15 MIN: HCPCS

## 2023-04-19 ENCOUNTER — HOME CARE VISIT (OUTPATIENT)
Dept: HOME HEALTH SERVICES | Facility: CLINIC | Age: 53
End: 2023-04-19
Payer: MEDICARE

## 2023-04-19 VITALS
DIASTOLIC BLOOD PRESSURE: 70 MMHG | SYSTOLIC BLOOD PRESSURE: 132 MMHG | OXYGEN SATURATION: 97 % | RESPIRATION RATE: 18 BRPM | HEART RATE: 76 BPM | TEMPERATURE: 98.2 F

## 2023-04-19 PROCEDURE — G0300 HHS/HOSPICE OF LPN EA 15 MIN: HCPCS

## 2023-04-19 NOTE — HOME HEALTH
FOCUS OF CARE/SKILLED NEED: HOME SAFETY/FALL PREVENTION, MEDICATION EDUCATION, PAIN MANAGEMENT, PREVENTATIVE SKIN CARE, wound care, s/s of infection,     TEACHING/INTERVENTIONS:  HOME SAFETY/FALL PREVENTION, MEDICATION EDUCATION, PAIN MANAGEMENT, PREVENTATIVE SKIN CARE, wound care, s/s of infection    PROGRESS TOWARDS GOALS: progressing    RECENT FALLS:  none    RECENT MEDICATION CHANGES:  none    WOUND(S): jennifer calves    D/C PLAN:  DISCHARGE WITH GOALS MET    PHYSICIAN CONTACT:  none    INSURANCE CHANGES: none    Patient has yet to make it to [facility] r/t family emergencies, encouraged patient to call today and make the appointment.    Wound Care consists of cleansing wounds, pat dry. Apply nickel thick Santyl to wound bed, moist to dry gauze, abd pad and elastic wrap.

## 2023-04-21 ENCOUNTER — HOME CARE VISIT (OUTPATIENT)
Dept: HOME HEALTH SERVICES | Facility: CLINIC | Age: 53
End: 2023-04-21
Payer: MEDICARE

## 2023-04-21 PROCEDURE — G0299 HHS/HOSPICE OF RN EA 15 MIN: HCPCS

## 2023-04-24 ENCOUNTER — HOME CARE VISIT (OUTPATIENT)
Dept: HOME HEALTH SERVICES | Facility: CLINIC | Age: 53
End: 2023-04-24
Payer: MEDICARE

## 2023-04-24 VITALS
HEART RATE: 93 BPM | TEMPERATURE: 98.3 F | RESPIRATION RATE: 18 BRPM | SYSTOLIC BLOOD PRESSURE: 136 MMHG | DIASTOLIC BLOOD PRESSURE: 70 MMHG | OXYGEN SATURATION: 97 %

## 2023-04-24 NOTE — HOME HEALTH
FOCUS OF CARE/SKILLED NEED: direct skill  TEACHING/INTERVENTIONS: wound assessment and dressing change  PROGRESS TOWARD GOALS: ongoing  PHYSICIAN CONTACT:  [facility] contacted regarding green drainage in base of left lower leg wound  INSURANCE CHANGES?  no  FALLS SINCE LAST VISIT?  no  MEDICATION CHANGES? no  PLAN FOR NEXT VISIT: wound assessment and dressing change  PRE-SCREENED FOR COVID? No s/s

## 2023-04-26 ENCOUNTER — HOME CARE VISIT (OUTPATIENT)
Dept: HOME HEALTH SERVICES | Facility: CLINIC | Age: 53
End: 2023-04-26
Payer: MEDICARE

## 2023-04-26 PROCEDURE — G0299 HHS/HOSPICE OF RN EA 15 MIN: HCPCS

## 2023-04-27 VITALS
OXYGEN SATURATION: 99 % | TEMPERATURE: 98.3 F | DIASTOLIC BLOOD PRESSURE: 60 MMHG | HEART RATE: 89 BPM | RESPIRATION RATE: 18 BRPM | SYSTOLIC BLOOD PRESSURE: 94 MMHG

## 2023-04-28 ENCOUNTER — OFFICE VISIT (OUTPATIENT)
Dept: WOUND CARE | Facility: HOSPITAL | Age: 53
End: 2023-04-28
Payer: MEDICARE

## 2023-04-28 ENCOUNTER — HOME CARE VISIT (OUTPATIENT)
Dept: HOME HEALTH SERVICES | Facility: HOME HEALTHCARE | Age: 53
End: 2023-04-28
Payer: MEDICARE

## 2023-04-30 NOTE — HOME HEALTH
FOCUS OF CARE/SKILLED NEED: direct skill  TEACHING/INTERVENTIONS: dressing change and wound assessment  PROGRESS TOWARD GOALS: ongoing  PHYSICIAN CONTACT:  no  INSURANCE CHANGES?  no  FALLS SINCE LAST VISIT?  no  MEDICATION CHANGES? no  PLAN FOR NEXT VISIT:  dressing change and wound assessment  PRE-SCREENED FOR COVID? no s/s

## 2023-05-01 ENCOUNTER — HOME CARE VISIT (OUTPATIENT)
Dept: HOME HEALTH SERVICES | Facility: CLINIC | Age: 53
End: 2023-05-01
Payer: MEDICARE

## 2023-05-01 PROCEDURE — G0299 HHS/HOSPICE OF RN EA 15 MIN: HCPCS

## 2023-05-03 ENCOUNTER — HOME CARE VISIT (OUTPATIENT)
Dept: HOME HEALTH SERVICES | Facility: CLINIC | Age: 53
End: 2023-05-03
Payer: MEDICARE

## 2023-05-03 VITALS
DIASTOLIC BLOOD PRESSURE: 80 MMHG | SYSTOLIC BLOOD PRESSURE: 132 MMHG | HEART RATE: 80 BPM | OXYGEN SATURATION: 96 % | TEMPERATURE: 98.8 F

## 2023-05-03 PROCEDURE — G0299 HHS/HOSPICE OF RN EA 15 MIN: HCPCS

## 2023-05-03 NOTE — HOME HEALTH
FOCUS OF CARE/SKILLED NEED: direct skill  TEACHING/INTERVENTIONS: wound assessment, dressing change, pictures and measurements  PROGRESS TOWARD GOALS: ongoing  PHYSICIAN CONTACT:  no  INSURANCE CHANGES?  no  FALLS SINCE LAST VISIT?  no  MEDICATION CHANGES? no  PLAN FOR NEXT VISIT: wound assessment, dressing change  PRE-SCREENED FOR COVID? no s/s

## 2023-05-04 ENCOUNTER — HOME CARE VISIT (OUTPATIENT)
Dept: HOME HEALTH SERVICES | Facility: CLINIC | Age: 53
End: 2023-05-04
Payer: MEDICARE

## 2023-05-04 VITALS
HEART RATE: 104 BPM | OXYGEN SATURATION: 97 % | DIASTOLIC BLOOD PRESSURE: 70 MMHG | TEMPERATURE: 97.9 F | RESPIRATION RATE: 18 BRPM | SYSTOLIC BLOOD PRESSURE: 134 MMHG

## 2023-05-04 PROCEDURE — G0299 HHS/HOSPICE OF RN EA 15 MIN: HCPCS

## 2023-05-05 ENCOUNTER — OFFICE VISIT (OUTPATIENT)
Dept: WOUND CARE | Facility: HOSPITAL | Age: 53
End: 2023-05-05
Payer: MEDICARE

## 2023-05-05 VITALS
OXYGEN SATURATION: 98 % | SYSTOLIC BLOOD PRESSURE: 122 MMHG | TEMPERATURE: 98.7 F | HEART RATE: 78 BPM | DIASTOLIC BLOOD PRESSURE: 70 MMHG | RESPIRATION RATE: 18 BRPM

## 2023-05-05 NOTE — HOME HEALTH
FOCUS OF CARE/SKILLED NEED: teaching/ education  TEACHING/INTERVENTIONS: SN instructed pt to increase water intake and keep legs propped up for at least 30 minutes a day to improve blood flow and wound healing  PROGRESS TOWARD GOALS: ongoing  PHYSICIAN CONTACT:  no  INSURANCE CHANGES?  no  FALLS SINCE LAST VISIT?  no  MEDICATION CHANGES? no  PLAN FOR NEXT VISIT: wound assessment and dressing change  PRE-SCREENED FOR COVID? no s/s

## 2023-05-06 ENCOUNTER — HOME CARE VISIT (OUTPATIENT)
Dept: HOME HEALTH SERVICES | Facility: HOME HEALTHCARE | Age: 53
End: 2023-05-06
Payer: MEDICARE

## 2023-05-06 NOTE — CASE COMMUNICATION
Received call from patient Saturday at 0941 AM - patient reported to  temperature of 103 degrees.  Call didn't go through with the transfer.  Called patient several times, went to message that no voice mail has been set up - called  and confirmed the number of [phone] - is the correct number.  Texted patient at that number.  No response.  Sent second text to the patient and informed to please be further  evaluated at the hospital with a temperature at 103.

## 2023-05-08 ENCOUNTER — HOME CARE VISIT (OUTPATIENT)
Dept: HOME HEALTH SERVICES | Facility: CLINIC | Age: 53
End: 2023-05-08
Payer: MEDICARE

## 2023-05-08 VITALS
OXYGEN SATURATION: 99 % | TEMPERATURE: 98.3 F | HEART RATE: 81 BPM | SYSTOLIC BLOOD PRESSURE: 122 MMHG | DIASTOLIC BLOOD PRESSURE: 60 MMHG | RESPIRATION RATE: 18 BRPM

## 2023-05-08 PROCEDURE — G0299 HHS/HOSPICE OF RN EA 15 MIN: HCPCS

## 2023-05-10 ENCOUNTER — HOME CARE VISIT (OUTPATIENT)
Dept: HOME HEALTH SERVICES | Facility: CLINIC | Age: 53
End: 2023-05-10
Payer: MEDICARE

## 2023-05-10 VITALS
TEMPERATURE: 98.6 F | DIASTOLIC BLOOD PRESSURE: 70 MMHG | SYSTOLIC BLOOD PRESSURE: 110 MMHG | RESPIRATION RATE: 18 BRPM | OXYGEN SATURATION: 95 % | HEART RATE: 88 BPM

## 2023-05-10 PROCEDURE — G0299 HHS/HOSPICE OF RN EA 15 MIN: HCPCS

## 2023-05-12 ENCOUNTER — HOME CARE VISIT (OUTPATIENT)
Dept: HOME HEALTH SERVICES | Facility: CLINIC | Age: 53
End: 2023-05-12
Payer: MEDICARE

## 2023-05-15 ENCOUNTER — HOME CARE VISIT (OUTPATIENT)
Dept: HOME HEALTH SERVICES | Facility: CLINIC | Age: 53
End: 2023-05-15
Payer: MEDICARE

## 2023-05-15 PROCEDURE — G0299 HHS/HOSPICE OF RN EA 15 MIN: HCPCS

## 2023-05-16 VITALS
HEART RATE: 89 BPM | TEMPERATURE: 98.8 F | DIASTOLIC BLOOD PRESSURE: 80 MMHG | RESPIRATION RATE: 18 BRPM | SYSTOLIC BLOOD PRESSURE: 124 MMHG | OXYGEN SATURATION: 96 %

## 2023-05-17 ENCOUNTER — HOME CARE VISIT (OUTPATIENT)
Dept: HOME HEALTH SERVICES | Facility: CLINIC | Age: 53
End: 2023-05-17
Payer: MEDICARE

## 2023-05-17 PROCEDURE — G0299 HHS/HOSPICE OF RN EA 15 MIN: HCPCS

## 2023-05-18 VITALS
HEART RATE: 84 BPM | DIASTOLIC BLOOD PRESSURE: 70 MMHG | TEMPERATURE: 99 F | RESPIRATION RATE: 18 BRPM | OXYGEN SATURATION: 99 % | SYSTOLIC BLOOD PRESSURE: 110 MMHG

## 2023-05-18 NOTE — CASE COMMUNICATION
Patient missed an SN visit from [facility] on 4/28/2023.     Reason:  MD Appt.       For your records only.   As per home health protocol, MD must be notified of missed/cancelled visits; therefore the prescribed frequency was not met.

## 2023-05-19 ENCOUNTER — HOME CARE VISIT (OUTPATIENT)
Dept: HOME HEALTH SERVICES | Facility: CLINIC | Age: 53
End: 2023-05-19
Payer: MEDICARE

## 2023-05-19 PROCEDURE — G0299 HHS/HOSPICE OF RN EA 15 MIN: HCPCS

## 2023-05-21 VITALS
TEMPERATURE: 98.4 F | SYSTOLIC BLOOD PRESSURE: 118 MMHG | OXYGEN SATURATION: 99 % | HEART RATE: 68 BPM | RESPIRATION RATE: 18 BRPM | DIASTOLIC BLOOD PRESSURE: 68 MMHG

## 2023-05-22 ENCOUNTER — HOME CARE VISIT (OUTPATIENT)
Dept: HOME HEALTH SERVICES | Facility: CLINIC | Age: 53
End: 2023-05-22
Payer: MEDICARE

## 2023-05-23 NOTE — CASE COMMUNICATION
Patient missed a skilled nursing visit from [facility] on 05-22-23.     Reason: MD Appointment.       For your records only.   As per home health protocol, MD must be notified of missed/cancelled visits; therefore the prescribed frequency was not met.

## 2023-05-24 ENCOUNTER — HOME CARE VISIT (OUTPATIENT)
Dept: HOME HEALTH SERVICES | Facility: CLINIC | Age: 53
End: 2023-05-24
Payer: MEDICARE

## 2023-05-24 PROCEDURE — G0299 HHS/HOSPICE OF RN EA 15 MIN: HCPCS

## 2023-05-25 VITALS
OXYGEN SATURATION: 99 % | HEART RATE: 83 BPM | RESPIRATION RATE: 18 BRPM | SYSTOLIC BLOOD PRESSURE: 132 MMHG | DIASTOLIC BLOOD PRESSURE: 80 MMHG | TEMPERATURE: 98.3 F

## 2023-05-26 ENCOUNTER — HOME CARE VISIT (OUTPATIENT)
Dept: HOME HEALTH SERVICES | Facility: CLINIC | Age: 53
End: 2023-05-26
Payer: MEDICARE

## 2023-05-26 VITALS
HEART RATE: 82 BPM | RESPIRATION RATE: 18 BRPM | DIASTOLIC BLOOD PRESSURE: 74 MMHG | SYSTOLIC BLOOD PRESSURE: 128 MMHG | OXYGEN SATURATION: 98 % | TEMPERATURE: 97.9 F

## 2023-05-26 PROCEDURE — G0299 HHS/HOSPICE OF RN EA 15 MIN: HCPCS

## 2023-05-29 NOTE — CASE COMMUNICATION
To: Rhonda Knox, SHOLA    60 Day Summary     Home Health need continues for: Wound care to BLE    Primary diagnoses/co-morbidities/recent procedures in past 60 days that impact current episode: Type 2 diaberes, lymphedema, bilateral posterior diabetic ulcer calf wounds     Current level of functional ability: Pt needs assistance with ADLs and uses a cane when ambulating     Homebound status and living arrangements: Pt lives with boyfriend  and is unable to drive     Goals accomplished and/or measurable progress toward unmet goals in past 60 days: No recent falls, wounds improving     Focus of care for next 60 days for each discipline ordered: Wound care of bilateral posterior diabetic ulcer calf wounds     Skin integrity/wound status: Wounds noted to bilateral posterior calves, lymphedema noted to BLE     Code status: Full code    Most recent fall risk: 6    Estimated da te when home care services will end: No estimated end date at this time due to open wounds

## 2023-05-29 NOTE — HOME HEALTH
60 Day Summary     Home Health need continues for: Wound care to BLE    Primary diagnoses/co-morbidities/recent procedures in past 60 days that impact current episode: Type 2 diaberes, lymphedema, bilateral posterior diabetic ulcer calf wounds     Current level of functional ability: Pt needs assistance with ADLs and uses a cane when ambulating     Homebound status and living arrangements: Pt lives with boyfriend and is unable to drive     Goals accomplished and/or measurable progress toward unmet goals in past 60 days: No recent falls: wounds improving     Focus of care for next 60 days for each discipline ordered: Wound care of bilateral posterior diabetic ulcer calf wounds     Skin integrity/wound status: Wounds noted to bilateral posterior calves, lymphedema noted to BLE     Code status: Full code    Most recent fall risk: 6    Estimated date when home care services will end: No estimated end date at this time due to open wounds

## 2023-05-31 ENCOUNTER — HOME CARE VISIT (OUTPATIENT)
Dept: HOME HEALTH SERVICES | Facility: CLINIC | Age: 53
End: 2023-05-31
Payer: MEDICARE

## 2023-05-31 VITALS
DIASTOLIC BLOOD PRESSURE: 76 MMHG | SYSTOLIC BLOOD PRESSURE: 120 MMHG | OXYGEN SATURATION: 97 % | RESPIRATION RATE: 18 BRPM | TEMPERATURE: 98.1 F | HEART RATE: 77 BPM

## 2023-05-31 PROCEDURE — G0299 HHS/HOSPICE OF RN EA 15 MIN: HCPCS

## 2023-06-02 ENCOUNTER — HOME CARE VISIT (OUTPATIENT)
Dept: HOME HEALTH SERVICES | Facility: CLINIC | Age: 53
End: 2023-06-02
Payer: MEDICARE

## 2023-06-02 NOTE — HOME HEALTH
COVID SCREENING: DENIES S/SX OF COVID.     FOCUS OF CARE/SKILLED NEED: CPA/EDUCATION, WOUND CARE AND EDUCATION    TEACHING/INTERVENTIONS: PT DENIES CHEST PAIN, FALLS OR MEDICATION CHANGES. WOUND CARE PROVIDED AS ORDERED. PT S/O HAD PERFORMED WOUND CARE TO FEET ANKLES PRIOR TO SN ARRIVAL. WOUNDS HEALING SLOWLY. NO S/SX OF INFECTION NOTED TO WOUND BEDS AT THIS TIME. LS CLEAR BILAT. SKIN NATURAL WARM/DRY. NO COMPLAINTS.     PATIENT/CG RESPONSE: PT/CG CAN TEACH BACK ALL EDUCATION    PROGRESS TOWARD GOALS: PROGRESSING AS EXPECTED    PHYSICIAN CONTACT: NONE NEEDED    FALLS SINCE LAST VISIT? DENIES    MEDICATION CHANGES SINCE LAST VISIT? DENIES    PLAN FOR NEXT VISIT: CPA/EDUCATION

## 2023-06-05 ENCOUNTER — HOME CARE VISIT (OUTPATIENT)
Dept: HOME HEALTH SERVICES | Facility: CLINIC | Age: 53
End: 2023-06-05
Payer: MEDICARE

## 2023-06-05 ENCOUNTER — HOME CARE VISIT (OUTPATIENT)
Dept: HOME HEALTH SERVICES | Facility: HOME HEALTHCARE | Age: 53
End: 2023-06-05
Payer: MEDICARE

## 2023-06-07 ENCOUNTER — HOME CARE VISIT (OUTPATIENT)
Dept: HOME HEALTH SERVICES | Facility: CLINIC | Age: 53
End: 2023-06-07
Payer: MEDICARE

## 2023-06-08 NOTE — CASE COMMUNICATION
Patient missed a skilled nursing visit from [facility] on 6-7-23.     Reason: Pt declined visit for today.       For your records only.   As per home health protocol, MD must be notified of missed/cancelled visits; therefore the prescribed frequency was not met.

## 2023-06-09 ENCOUNTER — TELEPHONE (OUTPATIENT)
Dept: FAMILY MEDICINE CLINIC | Facility: CLINIC | Age: 53
End: 2023-06-09
Payer: MEDICARE

## 2023-06-09 ENCOUNTER — HOME CARE VISIT (OUTPATIENT)
Dept: HOME HEALTH SERVICES | Facility: CLINIC | Age: 53
End: 2023-06-09
Payer: MEDICARE

## 2023-06-09 VITALS
TEMPERATURE: 96.9 F | OXYGEN SATURATION: 97 % | HEART RATE: 84 BPM | RESPIRATION RATE: 18 BRPM | DIASTOLIC BLOOD PRESSURE: 58 MMHG | SYSTOLIC BLOOD PRESSURE: 110 MMHG

## 2023-06-09 PROCEDURE — G0299 HHS/HOSPICE OF RN EA 15 MIN: HCPCS

## 2023-06-09 NOTE — TELEPHONE ENCOUNTER
Patient came into the office and states she used to see Dr. Joshi and was prescribed Concerta and then eventually Vyvanse. She is on Morphine 15 mg BID and Percocet 7.5 mg TID for Lymphedema. She would like to get back on ADHD medication but has heard you can't be on both. Informed that Dr. Joshi typically will not prescribe controlled ADHD medication with pain medication. She request that we check with him and give her a call back. Routed to Dr. Joshi.

## 2023-06-11 NOTE — HOME HEALTH
FOCUS OF CARE/SKILLED NEED :  woundcare    TEACHING/INTERVENTIONS: educated patient and  on infection prevention and fall precautions , woundcare                                                                         y provided per   and dressings changed  per      PATIENT/CAREGIVER RESPONSE : patient and caregiver verbalizes understanding, friend demonstrates proper woundcare     PROGRESS TOWARD GOALS: ongoing and progressing     PHYSICAN CONTACT:no    FALLS SINCE LAST VISIT : no    MEDICATION CHANGE SINCE LAST VISIT: no    PLAN FOR NEXT VISIT: woundcare

## 2023-06-12 ENCOUNTER — HOME CARE VISIT (OUTPATIENT)
Dept: HOME HEALTH SERVICES | Facility: CLINIC | Age: 53
End: 2023-06-12
Payer: MEDICARE

## 2023-06-12 VITALS
DIASTOLIC BLOOD PRESSURE: 60 MMHG | RESPIRATION RATE: 18 BRPM | HEART RATE: 76 BPM | SYSTOLIC BLOOD PRESSURE: 118 MMHG | TEMPERATURE: 97.9 F | OXYGEN SATURATION: 99 %

## 2023-06-12 PROCEDURE — G0300 HHS/HOSPICE OF LPN EA 15 MIN: HCPCS

## 2023-06-14 ENCOUNTER — HOME CARE VISIT (OUTPATIENT)
Dept: HOME HEALTH SERVICES | Facility: HOME HEALTHCARE | Age: 53
End: 2023-06-14
Payer: MEDICARE

## 2023-06-14 NOTE — HOME HEALTH
FOCUS OF CARE/SKILLED NEED: wound care, fall prevention  TEACHING/INTERVENTIONS:  wound care,  fall prevention  PROGRESS TOWARD GOALS: ongoing  PHYSICIAN CONTACT:  no  INSURANCE CHANGES?  no  FALLS SINCE LAST VISIT?  no  MEDICATION CHANGES? no  PLAN FOR NEXT VISIT:  wound care, fall prevention  PRE-SCREENED FOR COVID? no s/s of COVID voiced or noted

## 2023-06-15 ENCOUNTER — OFFICE VISIT (OUTPATIENT)
Dept: WOUND CARE | Facility: HOSPITAL | Age: 53
End: 2023-06-15
Payer: MEDICARE

## 2023-06-16 ENCOUNTER — HOME CARE VISIT (OUTPATIENT)
Dept: HOME HEALTH SERVICES | Facility: CLINIC | Age: 53
End: 2023-06-16
Payer: MEDICARE

## 2023-06-16 VITALS
OXYGEN SATURATION: 100 % | HEART RATE: 77 BPM | TEMPERATURE: 96.9 F | SYSTOLIC BLOOD PRESSURE: 110 MMHG | DIASTOLIC BLOOD PRESSURE: 70 MMHG | RESPIRATION RATE: 16 BRPM

## 2023-06-16 PROCEDURE — G0299 HHS/HOSPICE OF RN EA 15 MIN: HCPCS

## 2023-06-16 NOTE — HOME HEALTH
FOCUS OF CARE/SKILLED NEED : woundcare     TEACHING/INTERVENTIONS: educated patient on fall precautions and infection prevention , instructed caregiver  on new woundcare orders and he agreed to change dressing and provide woundcare as ordered   when SN not present , Patient refused dressing changed during visit due to legs being sore and caregiver available to change dressings . Dressing are clean,dry and intact to BLE with ace wraps im place     PATIENT/CAREGIVER RESPONSE :patient and caregiver verbalized understanding     PROGRESS TOWARD GOALS: ongoing and progressing     PHYSICAN CONTACT: no     FALLS SINCE LAST VISIT : no     MEDICATION CHANGE SINCE LAST VISIT: no    PLAN FOR NEXT VISIT: woundcare

## 2023-06-28 ENCOUNTER — HOME CARE VISIT (OUTPATIENT)
Dept: HOME HEALTH SERVICES | Facility: HOME HEALTHCARE | Age: 53
End: 2023-06-28
Payer: MEDICARE

## 2023-07-12 ENCOUNTER — HOME CARE VISIT (OUTPATIENT)
Dept: HOME HEALTH SERVICES | Facility: HOME HEALTHCARE | Age: 53
End: 2023-07-12
Payer: MEDICARE

## 2023-07-20 ENCOUNTER — HOME CARE VISIT (OUTPATIENT)
Dept: HOME HEALTH SERVICES | Facility: CLINIC | Age: 53
End: 2023-07-20
Payer: MEDICARE

## 2023-07-20 VITALS
DIASTOLIC BLOOD PRESSURE: 70 MMHG | TEMPERATURE: 97.7 F | RESPIRATION RATE: 18 BRPM | SYSTOLIC BLOOD PRESSURE: 130 MMHG | OXYGEN SATURATION: 100 % | HEART RATE: 66 BPM

## 2023-07-20 PROCEDURE — G0300 HHS/HOSPICE OF LPN EA 15 MIN: HCPCS

## 2023-07-24 NOTE — HOME HEALTH
Routine Visit Note:    Skill/education provided: Wound care, fall prevention    Patient/caregiver response: ongoing    Plan for next visit: wound care, fall prevention    Other pertinent info: no s/s of COVID voiced or noted

## 2023-07-25 ENCOUNTER — HOME CARE VISIT (OUTPATIENT)
Dept: HOME HEALTH SERVICES | Facility: CLINIC | Age: 53
End: 2023-07-25
Payer: MEDICARE

## 2023-07-25 ENCOUNTER — HOME CARE VISIT (OUTPATIENT)
Dept: HOME HEALTH SERVICES | Facility: HOME HEALTHCARE | Age: 53
End: 2023-07-25
Payer: MEDICARE

## 2023-07-25 VITALS
SYSTOLIC BLOOD PRESSURE: 124 MMHG | OXYGEN SATURATION: 100 % | TEMPERATURE: 45.7 F | HEART RATE: 74 BPM | DIASTOLIC BLOOD PRESSURE: 68 MMHG | RESPIRATION RATE: 16 BRPM

## 2023-07-25 PROCEDURE — G0299 HHS/HOSPICE OF RN EA 15 MIN: HCPCS

## 2023-07-25 NOTE — CASE COMMUNICATION
To: Rhonda Knox, SHOLA     60 Day Summary     Home Health need continues for: Wound care to BLE     Primary diagnoses/co-morbidities/recent procedures in past 60 days that impact current episode: Type 2 diaberes, lymphedema, bilateral posterior diabetic ulcer calf wounds     Current level of functional ability: Pt needs assistance with ADLs and uses a cane when ambulating     Homebound status and living arrangements: Pt lives with noel larkin and is unable to drive     Goals accomplished and/or measurable progress toward unmet goals in past 60 days: No recent falls, wounds improving     Focus of care for next 60 days for each discipline ordered: Wound care of bilateral posterior diabetic ulcer calf wounds     Skin integrity/wound status: Wounds noted to bilateral posterior calves, lymphedema noted to BLE     Code status: Full code     Most recent fall risk: 7     Estimate d da te when home care services will end: No estimated end date at this time due to open wounds

## 2023-07-25 NOTE — HOME HEALTH
Home Health need continues for: Wound care to BLE     Primary diagnoses/co-morbidities/recent procedures in past 60 days that impact current episode: Type 2 diaberes, lymphedema, bilateral posterior diabetic ulcer calf wounds     Current level of functional ability: Pt needs assistance with ADLs and uses a cane when ambulating     Homebound status and living arrangements: Pt lives with boyfriend and is unable to drive     Goals accomplished and/or measurable progress toward unmet goals in past 60 days: No recent falls, wounds improving     Focus of care for next 60 days for each discipline ordered: Wound care of bilateral posterior diabetic ulcer calf wounds     Skin integrity/wound status: Wounds noted to bilateral posterior calves, lymphedema noted to BLE     Code status: Full code     Most recent fall risk: 7    Estimated da te when home care services will end: No estimated end date at this time due to open wounds

## 2023-07-26 NOTE — CASE COMMUNICATION
"Pt called and stated she thinks she is allergic to the \"hydrothera\" that was put on her legs in place of the opticell today because it is burning. Stated that she has cut and removed most of the dressing and needs another wrap done in the morning. Boyfriend will be able to assist with wrapping tomorrow if she knows a time:  States boyfriend will put ace and abdominal pads on legs tonight when he gets home.  Pt also stated that she is pr obably not going to be able to go to wound care this week.  "

## 2023-07-27 ENCOUNTER — OFFICE VISIT (OUTPATIENT)
Dept: WOUND CARE | Facility: HOSPITAL | Age: 53
End: 2023-07-27
Payer: MEDICARE

## 2023-08-04 ENCOUNTER — HOSPITAL ENCOUNTER (EMERGENCY)
Dept: GENERAL RADIOLOGY | Facility: HOSPITAL | Age: 53
Discharge: HOME | End: 2023-08-04
Payer: MEDICARE

## 2023-08-04 PROCEDURE — 87077 CULTURE AEROBIC IDENTIFY: CPT | Performed by: NURSE PRACTITIONER

## 2023-08-04 PROCEDURE — 74018 RADEX ABDOMEN 1 VIEW: CPT

## 2023-08-04 PROCEDURE — 87086 URINE CULTURE/COLONY COUNT: CPT | Performed by: NURSE PRACTITIONER

## 2023-08-04 PROCEDURE — 87186 SC STD MICRODIL/AGAR DIL: CPT | Performed by: NURSE PRACTITIONER

## 2023-08-05 ENCOUNTER — HOME CARE VISIT (OUTPATIENT)
Dept: HOME HEALTH SERVICES | Facility: CLINIC | Age: 53
End: 2023-08-05
Payer: MEDICARE

## 2023-08-05 VITALS
DIASTOLIC BLOOD PRESSURE: 70 MMHG | RESPIRATION RATE: 14 BRPM | HEART RATE: 75 BPM | OXYGEN SATURATION: 96 % | SYSTOLIC BLOOD PRESSURE: 120 MMHG | TEMPERATURE: 98.9 F

## 2023-08-05 PROCEDURE — G0299 HHS/HOSPICE OF RN EA 15 MIN: HCPCS

## 2023-08-07 ENCOUNTER — HOME CARE VISIT (OUTPATIENT)
Dept: HOME HEALTH SERVICES | Facility: HOME HEALTHCARE | Age: 53
End: 2023-08-07
Payer: MEDICARE

## 2023-08-07 ENCOUNTER — OFFICE VISIT (OUTPATIENT)
Dept: WOUND CARE | Facility: HOSPITAL | Age: 53
End: 2023-08-07
Payer: MEDICARE

## 2023-08-07 PROCEDURE — G0463 HOSPITAL OUTPT CLINIC VISIT: HCPCS

## 2023-08-07 NOTE — CASE COMMUNICATION
Pt missed scheduled wound care appointments on 8/3 and 8/4. Shell at wound care was unavailable at time of call. Spoke with Tania at [facility] wound care and informed that we would be discharging pt effective today r/t missing scheduled visit and not adhering to plan of care. Tania to relay message to shell, and have shell call with any further issues or concerns.

## 2023-08-08 ENCOUNTER — HOME CARE VISIT (OUTPATIENT)
Dept: HOME HEALTH SERVICES | Facility: HOME HEALTHCARE | Age: 53
End: 2023-08-08
Payer: MEDICARE

## 2023-08-08 NOTE — CASE COMMUNICATION
AMAURY goff at [facility] Wound care. Wound care has discharged pt at this time. We have no further orders for wound care. Plan to DC pt on 8/9/23 r/t no current signing physician.

## 2023-08-09 ENCOUNTER — HOME CARE VISIT (OUTPATIENT)
Dept: HOME HEALTH SERVICES | Facility: CLINIC | Age: 53
End: 2023-08-09
Payer: MEDICARE

## 2023-08-09 NOTE — CASE COMMUNICATION
Please route to carmen goldman    Patient missed a  visit from [facility] on August 9, 2023.     Reason: Pt states she has a stomach bug.       For your records only.   As per home health protocol, MD must be notified of missed/cancelled visits; therefore the prescribed frequency was not met.

## 2023-08-10 ENCOUNTER — HOME CARE VISIT (OUTPATIENT)
Dept: HOME HEALTH SERVICES | Facility: HOME HEALTHCARE | Age: 53
End: 2023-08-10
Payer: MEDICARE

## 2023-08-10 NOTE — HOME HEALTH
Patient discharged from [facility] center.  No signing physician from wound care at this time.  Home health discharge required.  Spoke with patient via phone.  Patient awake and alert, understanding of her disease process, pleasant and cooperative.  Patient refused in home visit for discharge but agreeable to phone question and answer.  Patient verbalized understanding of discharge instructions and those will be mailed out to patient home today.  Medication list reconciled via phone and copy sent to MD office along with discharge instructions.  Patient discharged per protocol.

## 2023-09-12 NOTE — CASE COMMUNICATION
Patient missed an SN visit from [facility] on 6/28/2023.     Reason:  Pt refused SN visit this date.        For your records only.   As per home health protocol, MD must be notified of missed/cancelled visits; therefore the prescribed frequency was not met.

## 2023-09-12 NOTE — CASE COMMUNICATION
Patient missed an SN visit from [facility] on 7/12/2023.     Reason:  Unable to reach pt to schedule visit.       For your records only.   As per home health protocol, MD must be notified of missed/cancelled visits; therefore the prescribed frequency was not met.

## 2023-09-13 ENCOUNTER — HOSPITAL ENCOUNTER (OUTPATIENT)
Dept: WOUND CARE | Age: 53
Discharge: HOME | End: 2023-09-13

## 2023-09-18 LAB
25(OH)D3 SERPL-MCNC: 13.5 NG/ML
ALBUMIN SERPL-MCNC: 3.5 G/DL (ref 3.5–5.2)
ALP SERPL-CCNC: 86 U/L (ref 35–104)
ALT SERPL-CCNC: 10 U/L (ref 5–33)
ANION GAP SERPL CALCULATED.3IONS-SCNC: 16 MMOL/L (ref 7–19)
AST SERPL-CCNC: 20 U/L (ref 5–32)
BASOPHILS # BLD: 0 K/UL (ref 0–0.2)
BASOPHILS NFR BLD: 0.5 % (ref 0–1)
BILIRUB SERPL-MCNC: <0.2 MG/DL (ref 0.2–1.2)
BUN SERPL-MCNC: 23 MG/DL (ref 6–20)
CALCIUM SERPL-MCNC: 9 MG/DL (ref 8.6–10)
CHLORIDE SERPL-SCNC: 103 MMOL/L (ref 98–111)
CHOLEST SERPL-MCNC: 153 MG/DL (ref 160–199)
CO2 SERPL-SCNC: 21 MMOL/L (ref 22–29)
CREAT SERPL-MCNC: 1.1 MG/DL (ref 0.5–0.9)
EOSINOPHIL # BLD: 0.2 K/UL (ref 0–0.6)
EOSINOPHIL NFR BLD: 1.8 % (ref 0–5)
ERYTHROCYTE [DISTWIDTH] IN BLOOD BY AUTOMATED COUNT: 17.1 % (ref 11.5–14.5)
GLUCOSE SERPL-MCNC: 93 MG/DL (ref 74–109)
HBA1C MFR BLD: 6.2 % (ref 4–6)
HCT VFR BLD AUTO: 33.4 % (ref 37–47)
HDLC SERPL-MCNC: 46 MG/DL (ref 65–121)
HGB BLD-MCNC: 9.4 G/DL (ref 12–16)
IMM GRANULOCYTES # BLD: 0 K/UL
LDLC SERPL CALC-MCNC: 94 MG/DL
LYMPHOCYTES # BLD: 1.3 K/UL (ref 1.1–4.5)
LYMPHOCYTES NFR BLD: 14.4 % (ref 20–40)
MAGNESIUM SERPL-MCNC: 2.2 MG/DL (ref 1.6–2.6)
MCH RBC QN AUTO: 22.9 PG (ref 27–31)
MCHC RBC AUTO-ENTMCNC: 28.1 G/DL (ref 33–37)
MCV RBC AUTO: 81.3 FL (ref 81–99)
MONOCYTES # BLD: 0.5 K/UL (ref 0–0.9)
MONOCYTES NFR BLD: 5.6 % (ref 0–10)
NEUTROPHILS # BLD: 6.8 K/UL (ref 1.5–7.5)
NEUTS SEG NFR BLD: 77.4 % (ref 50–65)
PLATELET # BLD AUTO: 275 K/UL (ref 130–400)
PLATELET SLIDE REVIEW: ADEQUATE
PMV BLD AUTO: 8.8 FL (ref 9.4–12.3)
POTASSIUM SERPL-SCNC: 5.3 MMOL/L (ref 3.5–5)
PROT SERPL-MCNC: 8.2 G/DL (ref 6.6–8.7)
RBC # BLD AUTO: 4.11 M/UL (ref 4.2–5.4)
RBC MORPH BLD: NORMAL
SODIUM SERPL-SCNC: 140 MMOL/L (ref 136–145)
TRIGL SERPL-MCNC: 67 MG/DL (ref 0–149)
TSH SERPL DL<=0.005 MIU/L-ACNC: 3.25 UIU/ML (ref 0.27–4.2)
WBC # BLD AUTO: 8.7 K/UL (ref 4.8–10.8)

## 2023-09-21 ENCOUNTER — HOSPITAL ENCOUNTER (OUTPATIENT)
Dept: WOUND CARE | Age: 53
Discharge: HOME | End: 2023-09-21
Payer: MEDICARE

## 2023-09-21 VITALS
DIASTOLIC BLOOD PRESSURE: 67 MMHG | HEIGHT: 61 IN | RESPIRATION RATE: 18 BRPM | BODY MASS INDEX: 55.32 KG/M2 | WEIGHT: 293 LBS | TEMPERATURE: 97.4 F | SYSTOLIC BLOOD PRESSURE: 102 MMHG | HEART RATE: 90 BPM

## 2023-09-21 DIAGNOSIS — L97.422 DIABETIC ULCER OF LEFT HEEL ASSOCIATED WITH TYPE 2 DIABETES MELLITUS, WITH FAT LAYER EXPOSED (HCC): Primary | ICD-10-CM

## 2023-09-21 DIAGNOSIS — L97.922 DIABETIC ULCER OF LEFT LOWER LEG ASSOCIATED WITH TYPE 2 DIABETES MELLITUS, WITH FAT LAYER EXPOSED (HCC): Chronic | ICD-10-CM

## 2023-09-21 DIAGNOSIS — E11.622 DIABETIC ULCER OF RIGHT LOWER LEG ASSOCIATED WITH TYPE 2 DIABETES MELLITUS, WITH FAT LAYER EXPOSED (HCC): ICD-10-CM

## 2023-09-21 DIAGNOSIS — E11.621 DIABETIC ULCER OF LEFT HEEL ASSOCIATED WITH TYPE 2 DIABETES MELLITUS, WITH FAT LAYER EXPOSED (HCC): Primary | ICD-10-CM

## 2023-09-21 DIAGNOSIS — I89.0 LYMPHEDEMA OF BOTH LOWER EXTREMITIES: ICD-10-CM

## 2023-09-21 DIAGNOSIS — L97.912 DIABETIC ULCER OF RIGHT LOWER LEG ASSOCIATED WITH TYPE 2 DIABETES MELLITUS, WITH FAT LAYER EXPOSED (HCC): ICD-10-CM

## 2023-09-21 DIAGNOSIS — E11.622 DIABETIC ULCER OF LEFT LOWER LEG ASSOCIATED WITH TYPE 2 DIABETES MELLITUS, WITH FAT LAYER EXPOSED (HCC): Chronic | ICD-10-CM

## 2023-09-21 DIAGNOSIS — E66.01 MORBID OBESITY DUE TO EXCESS CALORIES (HCC): Chronic | ICD-10-CM

## 2023-09-21 DIAGNOSIS — L97.922 SKIN ULCER OF LEFT LOWER LEG, WITH FAT LAYER EXPOSED (HCC): ICD-10-CM

## 2023-09-21 DIAGNOSIS — E11.65 UNCONTROLLED TYPE 2 DIABETES MELLITUS WITH HYPERGLYCEMIA, WITHOUT LONG-TERM CURRENT USE OF INSULIN (HCC): Chronic | ICD-10-CM

## 2023-09-21 DIAGNOSIS — I87.2 PERIPHERAL VENOUS INSUFFICIENCY: ICD-10-CM

## 2023-09-21 PROBLEM — E08.622: Status: RESOLVED | Noted: 2022-07-19 | Resolved: 2023-09-21

## 2023-09-21 PROBLEM — L03.116 BILATERAL LOWER LEG CELLULITIS: Status: RESOLVED | Noted: 2020-06-16 | Resolved: 2023-09-21

## 2023-09-21 PROBLEM — Z88.1: Status: RESOLVED | Noted: 2021-05-06 | Resolved: 2023-09-21

## 2023-09-21 PROBLEM — L03.90 CELLULITIS: Status: RESOLVED | Noted: 2022-03-09 | Resolved: 2023-09-21

## 2023-09-21 PROBLEM — L03.115 BILATERAL LOWER LEG CELLULITIS: Status: RESOLVED | Noted: 2020-06-16 | Resolved: 2023-09-21

## 2023-09-21 PROBLEM — L98.422: Status: RESOLVED | Noted: 2022-07-19 | Resolved: 2023-09-21

## 2023-09-21 PROBLEM — L97.102: Status: RESOLVED | Noted: 2022-07-19 | Resolved: 2023-09-21

## 2023-09-21 LAB — BACTERIA UR CULT: NORMAL

## 2023-09-21 PROCEDURE — 99215 OFFICE O/P EST HI 40 MIN: CPT | Performed by: NURSE PRACTITIONER

## 2023-09-21 PROCEDURE — 99215 OFFICE O/P EST HI 40 MIN: CPT

## 2023-09-21 PROCEDURE — 29581 APPL MULTLAYER CMPRN SYS LEG: CPT

## 2023-09-21 RX ORDER — GENTAMICIN SULFATE 1 MG/G
OINTMENT TOPICAL ONCE
OUTPATIENT
Start: 2023-09-21 | End: 2023-09-21

## 2023-09-21 RX ORDER — CLOBETASOL PROPIONATE 0.5 MG/G
OINTMENT TOPICAL ONCE
OUTPATIENT
Start: 2023-09-21 | End: 2023-09-21

## 2023-09-21 RX ORDER — DOXYCYCLINE 100 MG/1
100 CAPSULE ORAL 2 TIMES DAILY
Qty: 60 CAPSULE | Refills: 0 | Status: SHIPPED | OUTPATIENT
Start: 2023-09-21 | End: 2023-10-21

## 2023-09-21 RX ORDER — LIDOCAINE HYDROCHLORIDE 20 MG/ML
JELLY TOPICAL ONCE
OUTPATIENT
Start: 2023-09-21 | End: 2023-09-21

## 2023-09-21 RX ORDER — LIDOCAINE 40 MG/G
CREAM TOPICAL ONCE
OUTPATIENT
Start: 2023-09-21 | End: 2023-09-21

## 2023-09-21 RX ORDER — BETAMETHASONE DIPROPIONATE 0.05 %
OINTMENT (GRAM) TOPICAL ONCE
OUTPATIENT
Start: 2023-09-21 | End: 2023-09-21

## 2023-09-21 RX ORDER — GINSENG 100 MG
CAPSULE ORAL ONCE
OUTPATIENT
Start: 2023-09-21 | End: 2023-09-21

## 2023-09-21 RX ORDER — TRIAMCINOLONE ACETONIDE 1 MG/G
OINTMENT TOPICAL ONCE
OUTPATIENT
Start: 2023-09-21 | End: 2023-09-21

## 2023-09-21 RX ORDER — LIDOCAINE 50 MG/G
OINTMENT TOPICAL ONCE
OUTPATIENT
Start: 2023-09-21 | End: 2023-09-21

## 2023-09-21 RX ORDER — IBUPROFEN 200 MG
TABLET ORAL ONCE
OUTPATIENT
Start: 2023-09-21 | End: 2023-09-21

## 2023-09-21 RX ORDER — SODIUM CHLOR/HYPOCHLOROUS ACID 0.033 %
SOLUTION, IRRIGATION IRRIGATION ONCE
OUTPATIENT
Start: 2023-09-21 | End: 2023-09-21

## 2023-09-21 RX ORDER — BACITRACIN ZINC AND POLYMYXIN B SULFATE 500; 1000 [USP'U]/G; [USP'U]/G
OINTMENT TOPICAL ONCE
OUTPATIENT
Start: 2023-09-21 | End: 2023-09-21

## 2023-09-21 RX ORDER — LIDOCAINE HYDROCHLORIDE 40 MG/ML
SOLUTION TOPICAL ONCE
OUTPATIENT
Start: 2023-09-21 | End: 2023-09-21

## 2023-09-21 ASSESSMENT — PAIN SCALES - GENERAL: PAINLEVEL_OUTOF10: 10

## 2023-09-21 ASSESSMENT — VISUAL ACUITY: OU: 1

## 2023-09-21 ASSESSMENT — PAIN DESCRIPTION - DESCRIPTORS: DESCRIPTORS: ACHING

## 2023-09-21 ASSESSMENT — PAIN DESCRIPTION - LOCATION: LOCATION: LEG

## 2023-09-21 ASSESSMENT — PAIN DESCRIPTION - ORIENTATION: ORIENTATION: RIGHT;LEFT

## 2023-09-21 NOTE — PLAN OF CARE
Problem: Chronic Conditions and Co-morbidities  Goal: Patient's chronic conditions and co-morbidity symptoms are monitored and maintained or improved  Outcome: Progressing     Problem: Pain  Goal: Verbalizes/displays adequate comfort level or baseline comfort level  Outcome: Progressing     Problem: Wound:  Goal: Will show signs of wound healing; wound closure and no evidence of infection  Description: Will show signs of wound healing; wound closure and no evidence of infection  Outcome: Progressing     Problem: Venous:  Goal: Signs of wound healing will improve  Description: Signs of wound healing will improve  Outcome: Progressing     Problem: Compression therapy:  Goal: Will be free from complications associated with compression therapy  Description: Will be free from complications associated with compression therapy  Outcome: Progressing

## 2023-09-21 NOTE — PLAN OF CARE
Multilayer Compression Wrap   (Not Unna) Below the Knee    NAME:  Tianna Trivedi  YOB: 1970  MEDICAL RECORD NUMBER:  [id number]  DATE:  9/21/2023    Multilayer compression wrap: Removed old Multilayer wrap if indicated and wash leg with mild soap/water. Applied moisturizing agent to dry skin as needed. Applied primary and secondary dressing as ordered. Applied multilayered dressing below the knee to right lower leg. Applied multilayered dressing below the knee to left lower leg. Instructed patient/caregiver not to remove dressing and to keep it clean and dry. Instructed patient/caregiver on complications to report to provider, such as pain, numbness in toes, heavy drainage, and slippage of dressing. Instructed patient on purpose of compression dressing and on activity and exercise recommendations.       Electronically signed by Sami Mata RN on 9/21/2023 at 4:51 PM

## 2023-09-21 NOTE — DISCHARGE INSTRUCTIONS
[address] and Hyper[facility] Oxygen Therapy   Physician Orders and Discharge Instructions  [address] [facility],[address], [address]  Telephone: [phone]    NAME:  Nichelle Almendarez:  1970  MEDICAL RECORD NUMBER:  [id number]  DATE:  2023    Discharge condition: Stable    Discharge to: Home    Left via:Private automobile    Accompanied by:  spouse    ECF/HHA: [facility] please set up    Doxycyline as prescribed    Please go downstairs in this building to Room 103 to register. The procedure will be completed in Room 401. Please be aware of appointment time for this procedure. Please arrive to room 103 at  on  for procedure. Please do not smoke prior to test.    Dressing Orders:  Left and right leg Wound: met pad around foot wounds  Wash with soap and water. Apply Aqua ashok AG to wound bed then apply Zetuvite then 4 layers wrap, from toes to knee. Change three times weekly. .      Compression Therapy is an important part of your program of care. Compression makes it easier for your body to pump the blood from your legs back to your heart, helping alleviate a condition called chronic venous insufficiency. Chronic venous insufficiency is an underlying cause of your injury, and managing it properly will help you to heal.      Keep the bandage in place at all times unless you experience foot pain or numbness or coolness of the toes. Do not attempt to remove and reapply the bandage system. Give it time. Your bandage may feel tight at first. This is normal. Your bandage will become more comfortable as swelling goes down. Be active, as suggested by your healthcare provider. Daily walks or mild exercise encourages better blood flow to aid healing. Put your feet up when sitting for long periods of time. Keep the bandage dry. Wet compression bandages are more likely to slip down and/or cause damage to good skin.     PRECAUTIONS For your

## 2023-09-21 NOTE — PROGRESS NOTES
Patient Care Team:  NANETTE Downey as PCP - [address], NANETTE as Advanced Practice Nurse (Neurology)    TODAY'S DATE:  9/21/2023     HISTORY of PRESENTILLNESS HPI   Bishop Rachel Trivedi is a 48 y.o. female who presents today for wound evaluation. She reports she developed a wound on right/leg leg and feet. This started 1 year(s) ago. She believes this is not healing. She has been applying Santyl and 4 layer wrap. She has seen me in the past for these wounds then went to [facility] for the last year. She has not had  fever orchills. She has a history of diabetes mellitus (HGBA1c 6.3) and lymphedema.   Wound Type:venous and diabetic  Wound Location: left leg, leg foot, right leg, right foot  Modifying factors:edema, lymphedema, diabetes, poor glucose control, chronic pressure, shear force, and obesity    Patient Active Problem List   Diagnosis Code    RLS (restless legs syndrome) G25.81    Hypertension I10    Headache R51.9    Venous insufficiency of both lower extremities I87.2    Mild single current episode of major depressive disorder ([facility]) F32.0    [place] (generalized anxiety disorder) F41.1    Morbid obesity due to excess calories ([facility]) E66.01    Old complex tear of medial meniscus of left knee M23.204    Uncontrolled type 2 diabetes mellitus with hyperglycemia, without long-term current use of insulin ([facility]) E11.65    Migraine without aura and without status migrainosus, not intractable G43.009    Morbid obesity with BMI of 50.0-59.9, adult ([facility]) E66.01, Z68.43    Uncontrolled type 2 diabetes mellitus with diabetic neuropathy, with long-term current use of insulin [id number]    Dyspnea R06.00    Upper respiratory tract infection J06.9    Pleurisy R09.1    Gastroesophageal reflux disease K21.9    Hypoglycemia E16.2    Hypokalemia E87.6    Recurrent cellulitis of lower extremity L03.119    Cellulitis of both lower extremities L03.115, L03.116    Lymphedema of both lower extremities I89.0

## 2023-10-02 ENCOUNTER — TELEPHONE (OUTPATIENT)
Dept: WOUND CARE | Age: 53
End: 2023-10-02

## 2023-10-02 NOTE — TELEPHONE ENCOUNTER
Returned Deon Shen from [facility] re:needing verbal order to see next week as patient could not be seen today. Discussed with Jaja FITZPATRICK re: order, Deon Shen given telephone order may see next week.

## 2023-10-12 RX ORDER — CEPHALEXIN 500 MG/1
500 CAPSULE ORAL 2 TIMES DAILY
Qty: 20 CAPSULE | Refills: 0 | Status: SHIPPED | OUTPATIENT
Start: 2023-10-12 | End: 2023-10-22

## 2023-10-12 RX ORDER — FLUCONAZOLE 150 MG/1
150 TABLET ORAL DAILY
Qty: 10 TABLET | Refills: 0 | Status: SHIPPED | OUTPATIENT
Start: 2023-10-12

## 2023-10-12 RX ORDER — FLUCONAZOLE 150 MG/1
150 TABLET ORAL DAILY
Qty: 10 TABLET | Refills: 0 | Status: SHIPPED | OUTPATIENT
Start: 2023-10-12 | End: 2023-10-22

## 2023-10-12 NOTE — PROGRESS NOTES
Patient is complaining of left leg redness and burning. Patient states she can tolerate some antibiotics listed under allergy she she agreed to try keflex. Diflucan prescribed as well.

## 2023-10-16 NOTE — DISCHARGE INSTRUCTIONS
and/or cause damage to good skin. PRECAUTIONS For your safety, compression therapy should be used under the supervision of a healthcare professional. Contact your care provider if you experience any of the following:  Pain that does not go away with elevation. Discoloration or numbness of the toes. The bandage slipping out of place  Fluid leaking through the bandage. Treatment Orders:  Protein rich diet (unless restricted by your physician); Multivitamin daily; Elevate legs above the level of your heart when sitting 3-4 times daily for at least one hour each time, avoid standing for long periods of time. [facility] follow up visit ___4 weeks with Jina__________________________  (Please note your next appointment above and if you are unable to keep, kindly give a 24 hour notice. Thank you.)          If you experience any of the following, please call the [facility] during business hours:    * Increase in Pain  * Temperature over 101  * Increase in drainage from your wound  * Drainage with a foul odor  * Bleeding  * Increase in swelling  * Need for compression bandage changes due to slippage, breakthrough drainage. If you need medical attention outside of the business hours of the [facility] please contact your PCP or go to the nearest emergency room.

## 2023-10-19 ENCOUNTER — HOSPITAL ENCOUNTER (OUTPATIENT)
Dept: WOUND CARE | Age: 53
Discharge: HOME | End: 2023-10-19

## 2023-10-23 ENCOUNTER — HOSPITAL ENCOUNTER (EMERGENCY)
Facility: HOSPITAL | Age: 53
Discharge: HOME | End: 2023-10-23
Attending: STUDENT IN AN ORGANIZED HEALTH CARE EDUCATION/TRAINING PROGRAM | Admitting: STUDENT IN AN ORGANIZED HEALTH CARE EDUCATION/TRAINING PROGRAM
Payer: MEDICARE

## 2023-10-23 VITALS
BODY MASS INDEX: 55.32 KG/M2 | TEMPERATURE: 97.8 F | SYSTOLIC BLOOD PRESSURE: 109 MMHG | WEIGHT: 293 LBS | HEART RATE: 80 BPM | HEIGHT: 61 IN | DIASTOLIC BLOOD PRESSURE: 66 MMHG | OXYGEN SATURATION: 94 % | RESPIRATION RATE: 18 BRPM

## 2023-10-23 DIAGNOSIS — G43.909 ACUTE MIGRAINE: Primary | ICD-10-CM

## 2023-10-23 DIAGNOSIS — Z86.69 HISTORY OF MIGRAINE HEADACHES: ICD-10-CM

## 2023-10-23 LAB — HOLD SPECIMEN: NORMAL

## 2023-10-23 PROCEDURE — 25010000002 DEXAMETHASONE PER 1 MG: Performed by: STUDENT IN AN ORGANIZED HEALTH CARE EDUCATION/TRAINING PROGRAM

## 2023-10-23 PROCEDURE — 25010000002 DROPERIDOL PER 5 MG: Performed by: STUDENT IN AN ORGANIZED HEALTH CARE EDUCATION/TRAINING PROGRAM

## 2023-10-23 PROCEDURE — 0 HYDROMORPHONE 1 MG/ML SOLUTION: Performed by: STUDENT IN AN ORGANIZED HEALTH CARE EDUCATION/TRAINING PROGRAM

## 2023-10-23 PROCEDURE — 99282 EMERGENCY DEPT VISIT SF MDM: CPT

## 2023-10-23 PROCEDURE — 96372 THER/PROPH/DIAG INJ SC/IM: CPT

## 2023-10-23 RX ORDER — DIPHENHYDRAMINE HYDROCHLORIDE 50 MG/ML
25 INJECTION INTRAMUSCULAR; INTRAVENOUS ONCE
Status: DISCONTINUED | OUTPATIENT
Start: 2023-10-23 | End: 2023-10-23

## 2023-10-23 RX ORDER — DROPERIDOL 2.5 MG/ML
2.5 INJECTION, SOLUTION INTRAMUSCULAR; INTRAVENOUS ONCE
Status: DISCONTINUED | OUTPATIENT
Start: 2023-10-23 | End: 2023-10-23

## 2023-10-23 RX ORDER — DROPERIDOL 2.5 MG/ML
2.5 INJECTION, SOLUTION INTRAMUSCULAR; INTRAVENOUS ONCE
Status: COMPLETED | OUTPATIENT
Start: 2023-10-23 | End: 2023-10-23

## 2023-10-23 RX ORDER — DEXAMETHASONE SODIUM PHOSPHATE 10 MG/ML
10 INJECTION INTRAMUSCULAR; INTRAVENOUS ONCE
Status: COMPLETED | OUTPATIENT
Start: 2023-10-23 | End: 2023-10-23

## 2023-10-23 RX ORDER — SUMATRIPTAN 6 MG/.5ML
6 INJECTION, SOLUTION SUBCUTANEOUS ONCE
Status: DISCONTINUED | OUTPATIENT
Start: 2023-10-23 | End: 2023-10-23 | Stop reason: HOSPADM

## 2023-10-23 RX ORDER — SODIUM CHLORIDE 0.9 % (FLUSH) 0.9 %
10 SYRINGE (ML) INJECTION AS NEEDED
Status: DISCONTINUED | OUTPATIENT
Start: 2023-10-23 | End: 2023-10-23 | Stop reason: HOSPADM

## 2023-10-23 RX ADMIN — DEXAMETHASONE SODIUM PHOSPHATE 10 MG: 10 INJECTION INTRAMUSCULAR; INTRAVENOUS at 07:12

## 2023-10-23 RX ADMIN — HYDROMORPHONE HYDROCHLORIDE 1 MG: 1 INJECTION, SOLUTION INTRAMUSCULAR; INTRAVENOUS; SUBCUTANEOUS at 07:13

## 2023-10-23 RX ADMIN — DROPERIDOL 2.5 MG: 2.5 INJECTION, SOLUTION INTRAMUSCULAR; INTRAVENOUS at 07:13

## 2023-10-23 NOTE — ED PROVIDER NOTES
Subjective   History of Present Illness this is an addendum to Dr. Tabares's note.  Please see his note for complete history and physical.    Review of Systems    Past Medical History:   Diagnosis Date    Anxiety     Arthritis     Osteoarthritis primarily left knee     Back pain     Chronic pain syndrome 10/27/2021    Depression     Diabetes mellitus     Fibromyalgia, primary     History of transfusion     Hx of tear of meniscus of knee joint 11/21/2016    Hypertension     Joint pain     Kidney stone     Knee pain     Lymphedema of both lower extremities 10/27/2021    Migraine     Migraine headache     Pain     knee, left ankle, left ankle      Pes anserinus bursitis 04/28/2015    Restless leg        Allergies   Allergen Reactions    Compazine [Prochlorperazine Edisylate] Shortness Of Breath     Breathing     Meperidine Hives     (Demerol)     Norflex [Orphenadrine] Hives    Nortriptyline Hives    Prochlorperazine Shortness Of Breath    Prochlorperazine Maleate Shortness Of Breath    Vancomycin Other (See Comments) and Unknown - High Severity     CRITICAL LEVEL RESULTS - PATIENT LIKELY HAS METABOLIC / CLEARANCE ISSUE WITH VANCOMYCIN. ON STANDARD REGIMENS BASED ON BAYESIAN/POPULATION PK PARAMETERS, VANCOMYCIN TROUGH LEVELS IN THE 60s, 70s, 90s, 100s WERE OBTAINED. RECOMMEND AGAINST EVER USING VANCOMYCIN IN THIS PATIENT. IF VANCOMYCIN IS ABSOLUTELY INDICATED WITHOUT ANY OTHER OPTIONS, PULSE INTERMITTENT DOSING WOULD LIKELY BE THE ONLY APPROPRIATE METHOD.     Codeine Nausea And Vomiting     Rash     Penicillins Rash     Nausea & vomiting     Amoxicillin-Pot Clavulanate Rash, Hives and Itching    Avelox [Moxifloxacin Hcl] Rash    Bacitracin Nausea And Vomiting    Calamine Itching    Cefazolin Nausea And Vomiting    Cephalexin Rash     (Keflex)     Ciprofloxacin Rash    Clindamycin/Lincomycin Rash    Doxycycline Nausea And Vomiting     Can take but need zofran before    Hydroxyzine Hcl Itching    Moxifloxacin Nausea And  Vomiting    Orphenadrine Citrate Itching    Sulfamethoxazole-Trimethoprim Nausea And Vomiting and Rash     States she also gets yeast infections with it    Tobramycin Other (See Comments)     Eyes burn-feel like eyes are on fire      Vistaril [Hydroxyzine] Rash    Zinc Other (See Comments)     ERRYTHEMA       Past Surgical History:   Procedure Laterality Date    CATARACT EXTRACTION      CATARACT EXTRACTION      CATARACT EXTRACTION      CORNEAL TRANSPLANT      bilateral     ECTOPIC PREGNANCY      INCISION AND DRAINAGE ABSCESS Left 6/1/2022    Procedure: DEBRIDEMENTAND PULSE LAVAGE LT MEDIAL THIGH PACKING ODOFORM GAUZE  WOUND SACRAL AND BILATERAL CALVES;  Surgeon: Justin Perez MD;  Location:  [facility] OR;  Service: General;  Laterality: Left;    INCISION AND DRAINAGE TRUNK Right 11/26/2022    Procedure: INCISION AND DRAINAGE TRUNK;  Surgeon: Sara Guerrier MD;  Location:  [facility] OR;  Service: General;  Laterality: Right;    JOINT REPLACEMENT      KNEE ARTHROSCOPY W/ MENISCAL REPAIR      KNEE MENISCAL REPAIR      MENISCECTOMY Left 12/20/2016    TUBAL ABDOMINAL LIGATION         Family History   Problem Relation Age of Onset    No Known Problems Mother     Cancer Father     Dementia Father     Hypertension Maternal Grandmother     Cancer Maternal Grandfather        Social History     Socioeconomic History    Marital status: Single   Tobacco Use    Smoking status: Never    Smokeless tobacco: Never   Vaping Use    Vaping Use: Never used   Substance and Sexual Activity    Alcohol use: Yes    Drug use: No    Sexual activity: Not Currently     Partners: Male           Objective   Physical Exam    Procedures           ED Course      See MDM                                     Medical Decision Making  When care was transferred to me, we were awaiting lab results and response to pain medications.  Patient refused labs.  She understands the risk of missing pathology that could lead to disability or death but she still chose to  refuse labs.  Patient states her pain has resolved with the pain medication.  She wants to be discharged home to go home and sleep.  She has no signs of meningitis or subarachnoid hemorrhage.  She is to follow-up with her PCP and is return for any worse or new pain, fever or other concerns.  Patient agreeable.    Problems Addressed:  Acute migraine: complicated acute illness or injury  History of migraine headaches: complicated acute illness or injury    Risk  Prescription drug management.        Final diagnoses:   Acute migraine   History of migraine headaches       ED Disposition  ED Disposition       ED Disposition   Discharge    Condition   Good    Comment   --               Rhonda Knox, APRN  [address] [place] [address]  [phone]    Schedule an appointment as soon as possible for a visit            Medication List      No changes were made to your prescriptions during this visit.            Kaci Maloney MD  10/23/23 0752

## 2023-10-23 NOTE — ED PROVIDER NOTES
"EMERGENCY DEPARTMENT ATTENDING NOTE    Patient Name: Valeria Wilson    Chief Complaint   Patient presents with    Headache       PATIENT PRESENTATION:  Valeria Wilson is a very pleasant 53 y.o. female with a history of chronic migraine headaches present emerged department due to current acute on chronic migraine.    Patient states that she has history of chronic migraine headaches recently took her zolmitriptan as well as her Zofran but is continue to monitor the last 2 days which flushed with her presents.  States that most migraine medicines do not work for her states that usually works as a shot of intramuscular Dilaudid.  She states this is consistent with her chronic migraine and there are no acute changes generalized headache with nausea vomiting.  Otherwise no recent trauma or falls.  No nausea extremity or upper legs any vision changes or hearing changes or abnormal symptoms that are different from her normal migraine.    PHYSICAL EXAM:   VS: /64 (BP Location: Right arm, Patient Position: Sitting)   Pulse 86   Temp 97.8 °F (36.6 °C) (Oral)   Resp 18   Ht 154.9 cm (61\")   Wt 136 kg (300 lb)   LMP  (LMP Unknown)   SpO2 97%   BMI 56.68 kg/m²   GENERAL: Well-appearing middle-aged woman sitting up in chair next to stretcher in no acute distress; well-nourished, well-developed, awake, alert, no acute distress, nontoxic appearing, comfortable  EYES: PERRL, sclerae anicteric, extraocular movements grossly intact, symmetric lids  EARS, NOSE, MOUTH, THROAT: atraumatic external nose and ears, moist mucous membranes  NECK: symmetric, trachea midline  RESPIRATORY: unlabored respiratory effort, clear to auscultation bilaterally, good air movement  CARDIOVASCULAR: no murmurs, peripheral pulses 2+ and equal in all extremities  NEUROLOGIC: moving all 4 extremities symmetrically, CN II-XII grossly intact; GCS 15; alert and oriented x3  PSYCHIATRIC: alert, pleasant and cooperative. Appropriate " mood and affect.      MEDICAL DECISION MAKING:    Valeria Wilson is a 53 y.o. female with history of chronic migraine headaches present emergency department due to acute on chronic migraine.    Differential Diagnosis Considered: Chronic migraine headache, dehydration    Labs Ordered:  Labs Reviewed   COMPREHENSIVE METABOLIC PANEL   CBC WITH AUTO DIFFERENTIAL   LIPASE   LACTIC ACID, PLASMA   RAINBOW URINE   CBC AND DIFFERENTIAL    Narrative:     The following orders were created for panel order CBC & Differential.  Procedure                               Abnormality         Status                     ---------                               -----------         ------                     CBC Auto Differential[[id number]]                                                         Please view results for these tests on the individual orders.        Internal chart review:   Past Medical History:   Diagnosis Date    Anxiety     Arthritis     Osteoarthritis primarily left knee     Back pain     Chronic pain syndrome 10/27/2021    Depression     Diabetes mellitus     Fibromyalgia, primary     History of transfusion     Hx of tear of meniscus of knee joint 11/21/2016    Hypertension     Joint pain     Kidney stone     Knee pain     Lymphedema of both lower extremities 10/27/2021    Migraine     Migraine headache     Pain     knee, left ankle, left ankle      Pes anserinus bursitis 04/28/2015    Restless leg        Past Surgical History:   Procedure Laterality Date    CATARACT EXTRACTION      CATARACT EXTRACTION      CATARACT EXTRACTION      CORNEAL TRANSPLANT      bilateral     ECTOPIC PREGNANCY      INCISION AND DRAINAGE ABSCESS Left 6/1/2022    Procedure: DEBRIDEMENTAND PULSE LAVAGE LT MEDIAL THIGH PACKING ODOFORM GAUZE  WOUND SACRAL AND BILATERAL CALVES;  Surgeon: Justin Perez MD;  Location: [facility] OR;  Service: General;  Laterality: Left;    INCISION AND DRAINAGE TRUNK Right 11/26/2022    Procedure: INCISION AND  DRAINAGE TRUNK;  Surgeon: Sara Guerrier MD;  Location: [facility] OR;  Service: General;  Laterality: Right;    JOINT REPLACEMENT      KNEE ARTHROSCOPY W/ MENISCAL REPAIR      KNEE MENISCAL REPAIR      MENISCECTOMY Left 12/20/2016    TUBAL ABDOMINAL LIGATION         Allergies   Allergen Reactions    Compazine [Prochlorperazine Edisylate] Shortness Of Breath     Breathing     Meperidine Hives     (Demerol)     Norflex [Orphenadrine] Hives    Nortriptyline Hives    Prochlorperazine Shortness Of Breath    Prochlorperazine Maleate Shortness Of Breath    Vancomycin Other (See Comments) and Unknown - High Severity     CRITICAL LEVEL RESULTS - PATIENT LIKELY HAS METABOLIC / CLEARANCE ISSUE WITH VANCOMYCIN. ON STANDARD REGIMENS BASED ON BAYESIAN/POPULATION PK PARAMETERS, VANCOMYCIN TROUGH LEVELS IN THE 60s, 70s, 90s, 100s WERE OBTAINED. RECOMMEND AGAINST EVER USING VANCOMYCIN IN THIS PATIENT. IF VANCOMYCIN IS ABSOLUTELY INDICATED WITHOUT ANY OTHER OPTIONS, PULSE INTERMITTENT DOSING WOULD LIKELY BE THE ONLY APPROPRIATE METHOD.     Codeine Nausea And Vomiting     Rash     Penicillins Rash     Nausea & vomiting     Amoxicillin-Pot Clavulanate Rash, Hives and Itching    Avelox [Moxifloxacin Hcl] Rash    Bacitracin Nausea And Vomiting    Calamine Itching    Cefazolin Nausea And Vomiting    Cephalexin Rash     (Keflex)     Ciprofloxacin Rash    Clindamycin/Lincomycin Rash    Doxycycline Nausea And Vomiting     Can take but need zofran before    Hydroxyzine Hcl Itching    Moxifloxacin Nausea And Vomiting    Orphenadrine Citrate Itching    Sulfamethoxazole-Trimethoprim Nausea And Vomiting and Rash     States she also gets yeast infections with it    Tobramycin Other (See Comments)     Eyes burn-feel like eyes are on fire      Vistaril [Hydroxyzine] Rash    Zinc Other (See Comments)     ERRYTHEMA         Current Facility-Administered Medications:     dexAMETHasone (DECADRON) injection 10 mg, 10 mg, Intramuscular, Once, White, Tima  MD KEENAN    droperidol (INAPSINE) injection 2.5 mg, 2.5 mg, Intramuscular, Once, Tima Tabares MD    HYDROmorphone (DILAUDID) injection 1 mg, 1 mg, Intramuscular, Once, Tima Tabares MD    Insert Peripheral IV, , , Once **AND** sodium chloride 0.9 % flush 10 mL, 10 mL, Intravenous, PRN, Tima Tabares MD    SUMAtriptan (IMITREX) injection 6 mg, 6 mg, Subcutaneous, Once, Tima Tabares MD    Current Outpatient Medications:     busPIRone (BUSPAR) 10 MG tablet, Take 1 tablet by mouth 3 (Three) Times a Day. Indications: Anxiety Disorder, Disp: , Rfl:     Canagliflozin (Invokana) 100 MG tablet tablet, Take 1 tablet by mouth Daily. Indications: Type 2 Diabetes, Disp: , Rfl:     cyclobenzaprine (FLEXERIL) 10 MG tablet, Take 1 tablet by mouth 2 (Two) Times a Day As Needed for Muscle Spasms. Indications: Muscle Spasm, Disp: , Rfl:     docusate sodium (COLACE) 100 MG capsule, Take 1 capsule by mouth 2 (Two) Times a Day. Indications: Constipation, Disp: , Rfl:     DULoxetine (CYMBALTA) 60 MG capsule, Take 30 mg by mouth 2 (Two) Times a Day. Indications: Diabetes with Nerve Disease, Disp: , Rfl:     fluticasone (FLONASE) 50 MCG/ACT nasal spray, 1 spray into the nostril(s) as directed by provider Daily., Disp: 16 g, Rfl: 0    furosemide (LASIX) 80 MG tablet, Take 1 tablet by mouth 2 (Two) Times a Day As Needed. Indications: Edema, Disp: , Rfl:     gabapentin (NEURONTIN) 100 MG capsule, Take 1 capsule by mouth Daily. PT TAKES AT 4PM  Indications: Neuropathic Pain, Disp: , Rfl:     gabapentin (NEURONTIN) 600 MG tablet, Take 1 tablet by mouth 3 (Three) Times a Day. Indications: Neuropathic Pain, Disp: , Rfl:     insulin aspart (NovoLOG FlexPen) 100 UNIT/ML solution pen-injector sc pen, Inject 3 Units under the skin into the appropriate area as directed 3 (Three) Times a Day With Meals. Indications: Type 2 Diabetes, Disp: , Rfl:     insulin glargine (LANTUS, SEMGLEE) 100 UNIT/ML injection, Inject 10 Units under the skin  into the appropriate area as directed Every Night. Indications: Type 2 Diabetes, Disp: , Rfl:     Magnesium Oxide 400 (240 Mg) MG tablet, Take 0.5 tablets by mouth Daily. Indications: Disorder with Low Magnesium Levels, Disp: , Rfl:     mirtazapine (REMERON) 15 MG tablet, Take 1 tablet by mouth Every Night. Indications: sleep, Disp: , Rfl:     Morphine Sulfate ER 15 MG tablet extended-release 12 hour, Take 15 mg by mouth 2 (two) times a day. Indications: Pain, Disp: , Rfl:     omeprazole (priLOSEC) 40 MG capsule, Take 40 mg by mouth 2 (Two) Times a Day. Indications: Gastroesophageal Reflux Disease, Disp: , Rfl:     ondansetron ODT (ZOFRAN-ODT) 4 MG disintegrating tablet, Place 1 tablet on the tongue Every 6 (Six) Hours As Needed for Nausea or Vomiting for up to 21 doses. Indications: Nausea and Vomiting, Disp: 21 tablet, Rfl: 0    oxyCODONE-acetaminophen (PERCOCET) 7.5-325 MG per tablet, Take 1 tablet by mouth Every 8 (Eight) Hours As Needed for Moderate Pain  or Severe Pain-Do not take with other Percocet prescription, Disp: 30 tablet, Rfl: 0    polyethylene glycol (MIRALAX) 17 GM/SCOOP powder, Take 17 g by mouth Daily., Disp: 225 g, Rfl: 0    potassium chloride (K-DUR,KLOR-CON) 20 MEQ CR tablet, Take 1 tablet by mouth Daily., Disp: 30 tablet, Rfl: 0    pramipexole (MIRAPEX) 0.75 MG tablet, Take 1 tablet by mouth 2 (two) times a day. Indications: Restless Leg Syndrome, Disp: , Rfl:     Tirzepatide 7.5 MG/0.5ML solution pen-injector, Inject 12.5 mg under the skin into the appropriate area as directed 1 (One) Time Per Week. Indications: Type 2 Diabetes, Disp: , Rfl:     ZOLMitriptan (Zomig) 5 MG tablet, Take 1 tablet by mouth 1 (One) Time As Needed for Migraine., Disp: 6 tablet, Rfl: 5    My lab interpretation: Labs ordered and pending.    ED Course and Re-evaluation: 53-year-old with a history of chronic migraine headaches for which she has multiple allergies to almost every migraine medicine that exists  specifically requesting Dilaudid on arrival.  Attempted to give alternate medications and intramuscular or IV Dilaudid with Imitrex patient states that he got she got sick to her child droperidol intramuscular as well as Decadron intramuscular but ultimately gave 1 dose of intramuscular Dilaudid.  Labs been ordered and are pending.  Patient's neurologic exam reassuring no red flag symptoms in the setting of chronic migraine headaches indication for imaging at this time.      Time my signout plan is to reassess patient following medication administration for resolution of symptoms.  Final disposition pending improvement and results of labs at time of my signout to the [facility] emergency medicine attending, Dr. Maloney.       ED Diagnosis:  Acute migraine; History of migraine headaches    Disposition: pending reevaluation following medication administration and improvement in symptoms at time of signout to [facility] emergency medicine attending, Dr. Maloney, who is assuming care of this patient      Signed:  Tima Tabares MD  Emergency Medicine Physician    Please note that portions of this note were completed with a voice recognition program.      Tima Tabares MD  10/23/23 0619

## 2023-10-26 NOTE — TELEPHONE ENCOUNTER
Agree OB Discharge Summary        Admit Date:  10/26/2023  Date of Delivery: 10/27/2023   Discharge Date: 10/29/23      Reason for Admission/Chief Complaint: Scheduled IOL    Final Diagnosis:  39+6, IOL  Late care with positive UDS for THC-follow-up UDS negative  Hepatitis C  Obesity in pregnancy-prepregnancy BMI 38    To do at FU: GI hepatitis clinic postpartum, routine postpartum    Antepartum:  Prenatal care is complicated by:  See above Final Diagnosis for list    Intrapartum/Delivery:  OB Surgeon:  Dr. Janene Clay, DO  Anesthesia: Epidural, x2  Delivery Type:   Perineum : 1st degree laceration  Feeding method: Breast    Infant: male  infant; Ameya    Weight: 3380 g (7 lb 7.2 oz)      APGARS: 7  @ 1 minute / 7  @ 5 minutes    Hospital Course/Significant Findings:  Patient arrived for scheduled IOL, declined 80/80 cervical catheter.  Cytotec placed.  Epidural placed, required replacement.  Once patient comfortable 80/80 cervical catheter placed and Pitocin continued.  Once AROM and active labor, progressed on the labor curve.  Uncomplicated   On the day of discharge POSTPARTUM pt tolerating a regular diet, ambulating, pain well controlled, urinating spontaneously and lochia appropriate.   Vital signs were stable and afebrile.  Exam was within normal limits.  Fundus was below umbilicus and nontender.  Meeting discharge criteria and desired discharge home.  Postpartum instructions and FU reviewed and questions answered.    Results from last 7 days   Lab Units 10/26/23  0539   WBC 10*3/mm3 10.93*   HEMOGLOBIN g/dL 10.0*   HEMATOCRIT % 31.5*   PLATELETS 10*3/mm3 229       Results from last 7 days   Lab Units 10/26/23  0539   GLUCOSE mg/dL 87   CREATININE mg/dL 0.63   SODIUM mmol/L 133*   POTASSIUM mmol/L 3.9   CHLORIDE mmol/L 101   AST (SGOT) U/L 37*   ALT (SGPT) U/L 31         Discharge:         Discharge Medications        New Medications        Instructions Start Date   acetaminophen 325 MG tablet  Commonly  known as: Tylenol   650 mg, Oral, Every 6 Hours PRN      ibuprofen 800 MG tablet  Commonly known as: ADVIL,MOTRIN   800 mg, Oral, Every 8 Hours PRN             Continue These Medications        Instructions Start Date   prenatal (CLASSIC) vitamin 28-0.8 MG tablet tablet  Generic drug: prenatal vitamin   Oral, Daily                 Disposition: Home  Diet: Regular    Activity: Pelvic rest 6 weeks    Condition at discharge: Good    Follow up with: Dr. Janene Clay DO or provider of her choice    Follow up in: 2 weeks early PP FU to fu [facility]      Complications: None      Signature: Isi Contreras MD      [facility] LABOR AND DELIVERY  [address] [place] [address]  [facility] [id number]  Dept: [phone]  Loc: [phone]

## 2023-10-26 NOTE — PATIENT INSTRUCTIONS
[address] and Hyperbaric Oxygen Therapy   Physician Orders and Discharge Instructions  [address] [facility],[address] [place] [address] Blvd, [address]  Telephone: [phone]     NAME:  Clarita Moise:  1970  MEDICAL RECORD NUMBER:  [id number]  DATE:  10/27/23     Discharge condition: Stable     Discharge to: Home     Left via:Private automobile     Accompanied by:  spouse     ECF/[facility]: [facility] please set up  Please let spouse wrap the patient legs while the nurse does the assessment of the wounds. Alternate wrap after shower Monday, Wednesday and Friday  and before clinic visits to the [address] [place] [address]: soap and water wash, optilock, Zevuite, Abd pad and 6 inch ace wrap. Dressing Orders:  Left and right leg Wound: met pad around foot wounds  Wash with soap and water. Apply Aqua ashok AG to wound bed then apply Zetuvite then 4 layers wrap, from toes to knee. Change three times weekly. .        Compression Therapy is an important part of your program of care. Compression makes it easier for your body to pump the blood from your legs back to your heart, helping alleviate a condition called chronic venous insufficiency. Chronic venous insufficiency is an underlying cause of your injury, and managing it properly will help you to heal.        Keep the bandage in place at all times unless you experience foot pain or numbness or coolness of the toes. Do not attempt to remove and reapply the bandage system. Give it time. Your bandage may feel tight at first. This is normal. Your bandage will become more comfortable as swelling goes down. Be active, as suggested by your healthcare provider. Daily walks or mild exercise encourages better blood flow to aid healing. Put your feet up when sitting for long periods of time. Keep the bandage dry. Wet compression bandages are more likely to slip down and/or cause damage to good skin.      PRECAUTIONS For your

## 2023-10-27 ENCOUNTER — HOSPITAL ENCOUNTER (OUTPATIENT)
Dept: WOUND CARE | Age: 53
Discharge: HOME | End: 2023-10-27
Payer: MEDICARE

## 2023-10-27 VITALS
TEMPERATURE: 98 F | HEART RATE: 82 BPM | WEIGHT: 293 LBS | SYSTOLIC BLOOD PRESSURE: 139 MMHG | HEIGHT: 61 IN | DIASTOLIC BLOOD PRESSURE: 78 MMHG | RESPIRATION RATE: 18 BRPM | BODY MASS INDEX: 55.32 KG/M2

## 2023-10-27 DIAGNOSIS — E11.622 DIABETIC ULCER OF LEFT LOWER LEG ASSOCIATED WITH TYPE 2 DIABETES MELLITUS, WITH FAT LAYER EXPOSED (HCC): Chronic | ICD-10-CM

## 2023-10-27 DIAGNOSIS — E11.621 DIABETIC ULCER OF LEFT HEEL ASSOCIATED WITH TYPE 2 DIABETES MELLITUS, WITH FAT LAYER EXPOSED (HCC): ICD-10-CM

## 2023-10-27 DIAGNOSIS — E11.65 UNCONTROLLED TYPE 2 DIABETES MELLITUS WITH HYPERGLYCEMIA, WITHOUT LONG-TERM CURRENT USE OF INSULIN (HCC): Chronic | ICD-10-CM

## 2023-10-27 DIAGNOSIS — L97.422 DIABETIC ULCER OF LEFT HEEL ASSOCIATED WITH TYPE 2 DIABETES MELLITUS, WITH FAT LAYER EXPOSED (HCC): ICD-10-CM

## 2023-10-27 DIAGNOSIS — L97.922 DIABETIC ULCER OF LEFT LOWER LEG ASSOCIATED WITH TYPE 2 DIABETES MELLITUS, WITH FAT LAYER EXPOSED (HCC): Chronic | ICD-10-CM

## 2023-10-27 DIAGNOSIS — E11.622 DIABETIC ULCER OF RIGHT LOWER LEG ASSOCIATED WITH TYPE 2 DIABETES MELLITUS, WITH FAT LAYER EXPOSED (HCC): Primary | ICD-10-CM

## 2023-10-27 DIAGNOSIS — E66.01 MORBID OBESITY DUE TO EXCESS CALORIES (HCC): Chronic | ICD-10-CM

## 2023-10-27 DIAGNOSIS — L97.912 DIABETIC ULCER OF RIGHT LOWER LEG ASSOCIATED WITH TYPE 2 DIABETES MELLITUS, WITH FAT LAYER EXPOSED (HCC): Primary | ICD-10-CM

## 2023-10-27 PROCEDURE — 99212 OFFICE O/P EST SF 10 MIN: CPT | Performed by: NURSE PRACTITIONER

## 2023-10-27 PROCEDURE — 99214 OFFICE O/P EST MOD 30 MIN: CPT

## 2023-10-27 ASSESSMENT — PAIN SCALES - GENERAL: PAINLEVEL_OUTOF10: 0

## 2023-11-10 ENCOUNTER — HOSPITAL ENCOUNTER (EMERGENCY)
Facility: HOSPITAL | Age: 53
Discharge: HOME | End: 2023-11-10
Attending: STUDENT IN AN ORGANIZED HEALTH CARE EDUCATION/TRAINING PROGRAM
Payer: MEDICARE

## 2023-11-10 VITALS
HEIGHT: 61 IN | RESPIRATION RATE: 20 BRPM | HEART RATE: 74 BPM | DIASTOLIC BLOOD PRESSURE: 89 MMHG | WEIGHT: 293 LBS | OXYGEN SATURATION: 100 % | SYSTOLIC BLOOD PRESSURE: 163 MMHG | TEMPERATURE: 98.3 F | BODY MASS INDEX: 55.32 KG/M2

## 2023-11-10 DIAGNOSIS — G89.4 CHRONIC PAIN SYNDROME: Primary | ICD-10-CM

## 2023-11-10 DIAGNOSIS — I89.0 CHRONIC ACQUIRED LYMPHEDEMA: ICD-10-CM

## 2023-11-10 DIAGNOSIS — R25.2 MUSCLE CRAMPING: ICD-10-CM

## 2023-11-10 LAB
ALBUMIN SERPL-MCNC: 4.1 G/DL (ref 3.5–5.2)
ALBUMIN/GLOB SERPL: 0.9 G/DL
ALP SERPL-CCNC: 103 U/L (ref 39–117)
ALT SERPL W P-5'-P-CCNC: 9 U/L (ref 1–33)
ANION GAP SERPL CALCULATED.3IONS-SCNC: 9 MMOL/L (ref 5–15)
AST SERPL-CCNC: 17 U/L (ref 1–32)
BASOPHILS # BLD AUTO: 0.03 10*3/MM3 (ref 0–0.2)
BASOPHILS NFR BLD AUTO: 0.5 % (ref 0–1.5)
BILIRUB SERPL-MCNC: 0.2 MG/DL (ref 0–1.2)
BUN SERPL-MCNC: 17 MG/DL (ref 6–20)
BUN/CREAT SERPL: 17 (ref 7–25)
CALCIUM SPEC-SCNC: 9.2 MG/DL (ref 8.6–10.5)
CHLORIDE SERPL-SCNC: 98 MMOL/L (ref 98–107)
CK SERPL-CCNC: 101 U/L (ref 20–180)
CO2 SERPL-SCNC: 29 MMOL/L (ref 22–29)
CREAT SERPL-MCNC: 1 MG/DL (ref 0.57–1)
DEPRECATED RDW RBC AUTO: 49.4 FL (ref 37–54)
EGFRCR SERPLBLD CKD-EPI 2021: 68.3 ML/MIN/1.73
EOSINOPHIL # BLD AUTO: 0.26 10*3/MM3 (ref 0–0.4)
EOSINOPHIL NFR BLD AUTO: 4.1 % (ref 0.3–6.2)
ERYTHROCYTE [DISTWIDTH] IN BLOOD BY AUTOMATED COUNT: 17.7 % (ref 12.3–15.4)
GLOBULIN UR ELPH-MCNC: 4.4 GM/DL
GLUCOSE SERPL-MCNC: 203 MG/DL (ref 65–99)
HCT VFR BLD AUTO: 34.7 % (ref 34–46.6)
HGB BLD-MCNC: 10 G/DL (ref 12–15.9)
IMM GRANULOCYTES # BLD AUTO: 0.02 10*3/MM3 (ref 0–0.05)
IMM GRANULOCYTES NFR BLD AUTO: 0.3 % (ref 0–0.5)
LYMPHOCYTES # BLD AUTO: 1.87 10*3/MM3 (ref 0.7–3.1)
LYMPHOCYTES NFR BLD AUTO: 29.3 % (ref 19.6–45.3)
MCH RBC QN AUTO: 22.4 PG (ref 26.6–33)
MCHC RBC AUTO-ENTMCNC: 28.8 G/DL (ref 31.5–35.7)
MCV RBC AUTO: 77.8 FL (ref 79–97)
MONOCYTES # BLD AUTO: 0.47 10*3/MM3 (ref 0.1–0.9)
MONOCYTES NFR BLD AUTO: 7.4 % (ref 5–12)
NEUTROPHILS NFR BLD AUTO: 3.74 10*3/MM3 (ref 1.7–7)
NEUTROPHILS NFR BLD AUTO: 58.4 % (ref 42.7–76)
NRBC BLD AUTO-RTO: 0 /100 WBC (ref 0–0.2)
PLATELET # BLD AUTO: 275 10*3/MM3 (ref 140–450)
PMV BLD AUTO: 8.6 FL (ref 6–12)
POTASSIUM SERPL-SCNC: 4.2 MMOL/L (ref 3.5–5.2)
PROT SERPL-MCNC: 8.5 G/DL (ref 6–8.5)
RBC # BLD AUTO: 4.46 10*6/MM3 (ref 3.77–5.28)
SODIUM SERPL-SCNC: 136 MMOL/L (ref 136–145)
WBC NRBC COR # BLD: 6.39 10*3/MM3 (ref 3.4–10.8)

## 2023-11-10 PROCEDURE — 82550 ASSAY OF CK (CPK): CPT | Performed by: STUDENT IN AN ORGANIZED HEALTH CARE EDUCATION/TRAINING PROGRAM

## 2023-11-10 PROCEDURE — 96372 THER/PROPH/DIAG INJ SC/IM: CPT

## 2023-11-10 PROCEDURE — 99283 EMERGENCY DEPT VISIT LOW MDM: CPT

## 2023-11-10 PROCEDURE — 80053 COMPREHEN METABOLIC PANEL: CPT | Performed by: STUDENT IN AN ORGANIZED HEALTH CARE EDUCATION/TRAINING PROGRAM

## 2023-11-10 PROCEDURE — 96374 THER/PROPH/DIAG INJ IV PUSH: CPT

## 2023-11-10 PROCEDURE — 25010000002 KETOROLAC TROMETHAMINE PER 15 MG: Performed by: STUDENT IN AN ORGANIZED HEALTH CARE EDUCATION/TRAINING PROGRAM

## 2023-11-10 PROCEDURE — 0 HYDROMORPHONE 1 MG/ML SOLUTION: Performed by: STUDENT IN AN ORGANIZED HEALTH CARE EDUCATION/TRAINING PROGRAM

## 2023-11-10 PROCEDURE — 85025 COMPLETE CBC W/AUTO DIFF WBC: CPT | Performed by: STUDENT IN AN ORGANIZED HEALTH CARE EDUCATION/TRAINING PROGRAM

## 2023-11-10 RX ORDER — SODIUM CHLORIDE 0.9 % (FLUSH) 0.9 %
10 SYRINGE (ML) INJECTION AS NEEDED
Status: DISCONTINUED | OUTPATIENT
Start: 2023-11-10 | End: 2023-11-10

## 2023-11-10 RX ORDER — KETOROLAC TROMETHAMINE 15 MG/ML
15 INJECTION, SOLUTION INTRAMUSCULAR; INTRAVENOUS ONCE
Status: COMPLETED | OUTPATIENT
Start: 2023-11-10 | End: 2023-11-10

## 2023-11-10 RX ADMIN — HYDROMORPHONE HYDROCHLORIDE 1 MG: 1 INJECTION, SOLUTION INTRAMUSCULAR; INTRAVENOUS; SUBCUTANEOUS at 06:28

## 2023-11-10 RX ADMIN — KETOROLAC TROMETHAMINE 15 MG: 15 INJECTION, SOLUTION INTRAMUSCULAR; INTRAVENOUS at 06:26

## 2023-11-10 NOTE — DISCHARGE INSTRUCTIONS
Today you are seen for your chronic pain and leg cramping.  Your lab work was reassuring.  As we discussed it is extremely important that you continue to follow-up with wound care and your pain management doctor for long-term management which I know you know well.  If anything acutely worsens prior please may return to the emergency department for reevaluation.

## 2023-11-10 NOTE — ED PROVIDER NOTES
"EMERGENCY DEPARTMENT ATTENDING NOTE    Patient Name: Valeria Wilson    Chief Complaint   Patient presents with    Leg Pain       PATIENT PRESENTATION:  Valeria Wilson is a very pleasant 53 y.o. female with history of chronic pain presenting to emergency department due to acute on chronic leg cramping and leg pain.    Patient states that she has not been able to use her normal wound care wraps as the supply center at the wound care clinic ran out.  Especially ordered but still not arrived.  Not think she is having slight increase chronic pain from her baseline.  Her pain doctor also left the country she has been referred to a new pain doctor at [facility] but she is going to run out of her pain medicine the 22nd and is also slightly worried about that as her appointment is until the 28th.  She is on the cancellation list so that she get an earlier appointment if 1 becomes available.  She endorsed some muscle cramping which she states is chronic no recent changes in her diet or medications otherwise.  Still taking Norco and morphine for pain.    PHYSICAL EXAM:   VS: /65 (BP Location: Left arm, Patient Position: Sitting)   Pulse 86   Temp 97.9 °F (36.6 °C) (Oral)   Resp 18   Ht 154.9 cm (61\")   Wt (!) 141 kg (310 lb)   LMP  (LMP Unknown)   SpO2 100%   BMI 58.57 kg/m²   GENERAL: Chronically ill-appearing middle-aged woman lying stretcher no acute distress; well-nourished, well-developed, awake, alert, no acute distress, nontoxic appearing, comfortable  MUSCULOSKELETAL/EXTREMITIES: Chronic lymphedema of the bilateral lower extremities  SKIN: Chronic wounds to lower extremities as in the photo below; otherwise warm and dry with no obvious rashes          PSYCHIATRIC: alert, pleasant and cooperative. Appropriate mood and affect.      MEDICAL DECISION MAKING:    Valeria Wilson is a 53 y.o. female who presented to the ED due to chronic pain.  Really no acute changes in patient's pain I " seen this patient many times had multiple conversations about how she will be done with chronic pain for quite some time and that she really needs to have her pain management doctor prior to managing her pain.  Given reported muscle cramping labs were obtained there is no electrolyte derangement such as hyperkalemia to explain her muscle cramping most likely chronic pain.  She cannot does not have a pain management doctor is referred to but her appointment is not for a month.  I understand patient does have significant chronic illness and chronic pain due to her significant lymphedema so going to give 1 dose of intramuscular Dilaudid I did give her IV Toradol she is allergic to most multimodal pain control other options otherwise.    Labs Ordered:  Labs Reviewed   COMPREHENSIVE METABOLIC PANEL - Abnormal; Notable for the following components:       Result Value    Glucose 203 (*)     All other components within normal limits    Narrative:     GFR Normal >60  Chronic Kidney Disease <60  Kidney Failure <15     CBC WITH AUTO DIFFERENTIAL - Abnormal; Notable for the following components:    Hemoglobin 10.0 (*)     MCV 77.8 (*)     MCH 22.4 (*)     MCHC 28.8 (*)     RDW 17.7 (*)     All other components within normal limits   CK - Normal   CBC AND DIFFERENTIAL    Narrative:     The following orders were created for panel order CBC & Differential.  Procedure                               Abnormality         Status                     ---------                               -----------         ------                     CBC Auto Differential[[id number]]        Abnormal            Final result                 Please view results for these tests on the individual orders.        Internal chart review:   Past Medical History:   Diagnosis Date    Anxiety     Arthritis     Osteoarthritis primarily left knee     Back pain     Chronic pain syndrome 10/27/2021    Depression     Diabetes mellitus     Fibromyalgia, primary     History  of transfusion     Hx of tear of meniscus of knee joint 11/21/2016    Hypertension     Joint pain     Kidney stone     Knee pain     Lymphedema of both lower extremities 10/27/2021    Migraine     Migraine headache     Pain     knee, left ankle, left ankle      Pes anserinus bursitis 04/28/2015    Restless leg        Past Surgical History:   Procedure Laterality Date    CATARACT EXTRACTION      CATARACT EXTRACTION      CATARACT EXTRACTION      CORNEAL TRANSPLANT      bilateral     ECTOPIC PREGNANCY      INCISION AND DRAINAGE ABSCESS Left 6/1/2022    Procedure: DEBRIDEMENTAND PULSE LAVAGE LT MEDIAL THIGH PACKING ODOFORM GAUZE  WOUND SACRAL AND BILATERAL CALVES;  Surgeon: Justin Perez MD;  Location:  [facility];  Service: General;  Laterality: Left;    INCISION AND DRAINAGE TRUNK Right 11/26/2022    Procedure: INCISION AND DRAINAGE TRUNK;  Surgeon: Sara Guerrier MD;  Location:  [facility];  Service: General;  Laterality: Right;    JOINT REPLACEMENT      KNEE ARTHROSCOPY W/ MENISCAL REPAIR      KNEE MENISCAL REPAIR      MENISCECTOMY Left 12/20/2016    TUBAL ABDOMINAL LIGATION         Allergies   Allergen Reactions    Compazine [Prochlorperazine Edisylate] Shortness Of Breath     Breathing     Meperidine Hives     (Demerol)     Norflex [Orphenadrine] Hives    Nortriptyline Hives    Prochlorperazine Shortness Of Breath    Prochlorperazine Maleate Shortness Of Breath    Vancomycin Other (See Comments) and Unknown - High Severity     CRITICAL LEVEL RESULTS - PATIENT LIKELY HAS METABOLIC / CLEARANCE ISSUE WITH VANCOMYCIN. ON STANDARD REGIMENS BASED ON BAYESIAN/POPULATION PK PARAMETERS, VANCOMYCIN TROUGH LEVELS IN THE 60s, 70s, 90s, 100s WERE OBTAINED. RECOMMEND AGAINST EVER USING VANCOMYCIN IN THIS PATIENT. IF VANCOMYCIN IS ABSOLUTELY INDICATED WITHOUT ANY OTHER OPTIONS, PULSE INTERMITTENT DOSING WOULD LIKELY BE THE ONLY APPROPRIATE METHOD.     Codeine Nausea And Vomiting     Rash     Penicillins Rash     Nausea &  vomiting     Amoxicillin-Pot Clavulanate Rash, Hives and Itching    Avelox [Moxifloxacin Hcl] Rash    Bacitracin Nausea And Vomiting    Calamine Itching    Cefazolin Nausea And Vomiting    Cephalexin Rash     (Keflex)     Ciprofloxacin Rash    Clindamycin/Lincomycin Rash    Doxycycline Nausea And Vomiting     Can take but need zofran before    Hydroxyzine Hcl Itching    Moxifloxacin Nausea And Vomiting    Orphenadrine Citrate Itching    Sulfamethoxazole-Trimethoprim Nausea And Vomiting and Rash     States she also gets yeast infections with it    Tobramycin Other (See Comments)     Eyes burn-feel like eyes are on fire      Vistaril [Hydroxyzine] Rash    Zinc Other (See Comments)     ERRYTHEMA       No current facility-administered medications for this encounter.    Current Outpatient Medications:     busPIRone (BUSPAR) 10 MG tablet, Take 1 tablet by mouth 3 (Three) Times a Day. Indications: Anxiety Disorder, Disp: , Rfl:     Canagliflozin (Invokana) 100 MG tablet tablet, Take 1 tablet by mouth Daily. Indications: Type 2 Diabetes, Disp: , Rfl:     cyclobenzaprine (FLEXERIL) 10 MG tablet, Take 1 tablet by mouth 2 (Two) Times a Day As Needed for Muscle Spasms. Indications: Muscle Spasm, Disp: , Rfl:     docusate sodium (COLACE) 100 MG capsule, Take 1 capsule by mouth 2 (Two) Times a Day. Indications: Constipation, Disp: , Rfl:     DULoxetine (CYMBALTA) 60 MG capsule, Take 30 mg by mouth 2 (Two) Times a Day. Indications: Diabetes with Nerve Disease, Disp: , Rfl:     fluticasone (FLONASE) 50 MCG/ACT nasal spray, 1 spray into the nostril(s) as directed by provider Daily., Disp: 16 g, Rfl: 0    furosemide (LASIX) 80 MG tablet, Take 1 tablet by mouth 2 (Two) Times a Day As Needed. Indications: Edema, Disp: , Rfl:     gabapentin (NEURONTIN) 100 MG capsule, Take 1 capsule by mouth Daily. PT TAKES AT 4PM  Indications: Neuropathic Pain, Disp: , Rfl:     gabapentin (NEURONTIN) 600 MG tablet, Take 1 tablet by mouth 3 (Three)  Times a Day. Indications: Neuropathic Pain, Disp: , Rfl:     insulin aspart (NovoLOG FlexPen) 100 UNIT/ML solution pen-injector sc pen, Inject 3 Units under the skin into the appropriate area as directed 3 (Three) Times a Day With Meals. Indications: Type 2 Diabetes, Disp: , Rfl:     insulin glargine (LANTUS, SEMGLEE) 100 UNIT/ML injection, Inject 10 Units under the skin into the appropriate area as directed Every Night. Indications: Type 2 Diabetes, Disp: , Rfl:     Magnesium Oxide 400 (240 Mg) MG tablet, Take 0.5 tablets by mouth Daily. Indications: Disorder with Low Magnesium Levels, Disp: , Rfl:     mirtazapine (REMERON) 15 MG tablet, Take 1 tablet by mouth Every Night. Indications: sleep, Disp: , Rfl:     Morphine Sulfate ER 15 MG tablet extended-release 12 hour, Take 15 mg by mouth 2 (two) times a day. Indications: Pain, Disp: , Rfl:     omeprazole (priLOSEC) 40 MG capsule, Take 40 mg by mouth 2 (Two) Times a Day. Indications: Gastroesophageal Reflux Disease, Disp: , Rfl:     ondansetron ODT (ZOFRAN-ODT) 4 MG disintegrating tablet, Place 1 tablet on the tongue Every 6 (Six) Hours As Needed for Nausea or Vomiting for up to 21 doses. Indications: Nausea and Vomiting, Disp: 21 tablet, Rfl: 0    oxyCODONE-acetaminophen (PERCOCET) 7.5-325 MG per tablet, Take 1 tablet by mouth Every 8 (Eight) Hours As Needed for Moderate Pain  or Severe Pain-Do not take with other Percocet prescription, Disp: 30 tablet, Rfl: 0    polyethylene glycol (MIRALAX) 17 GM/SCOOP powder, Take 17 g by mouth Daily., Disp: 225 g, Rfl: 0    potassium chloride (K-DUR,KLOR-CON) 20 MEQ CR tablet, Take 1 tablet by mouth Daily., Disp: 30 tablet, Rfl: 0    pramipexole (MIRAPEX) 0.75 MG tablet, Take 1 tablet by mouth 2 (two) times a day. Indications: Restless Leg Syndrome, Disp: , Rfl:     Tirzepatide 7.5 MG/0.5ML solution pen-injector, Inject 12.5 mg under the skin into the appropriate area as directed 1 (One) Time Per Week. Indications: Type 2  Diabetes, Disp: , Rfl:     ZOLMitriptan (Zomig) 5 MG tablet, Take 1 tablet by mouth 1 (One) Time As Needed for Migraine., Disp: 6 tablet, Rfl: 5      ED Diagnosis:  Chronic pain syndrome; Muscle cramping; Chronic acquired lymphedema    Disposition: to home  Follow up plan: PCP follow up within 2 days, return to ED immediately if symptoms worsen        Signed:  Tima Tabares MD  Emergency Medicine Physician    Please note that portions of this note were completed with a voice recognition program.      Tima Tabares MD  11/10/23 0644

## 2023-11-10 NOTE — Clinical Note
[facility] EMERGENCY DEPARTMENT  [address]  [facility] [address]  Phone: [phone]    Valeria Wilson was seen and treated in our emergency department on 11/10/2023.  She may return to work on 11/12/2023.         Thank you for choosing [facility].    Tima Tabares MD

## 2023-11-11 ENCOUNTER — NURSE TRIAGE (OUTPATIENT)
Dept: CALL CENTER | Facility: HOSPITAL | Age: 53
End: 2023-11-11
Payer: MEDICARE

## 2023-11-11 ENCOUNTER — HOSPITAL ENCOUNTER (EMERGENCY)
Facility: HOSPITAL | Age: 53
Discharge: LEFT BEFORE DISCHARGE | End: 2023-11-11
Attending: STUDENT IN AN ORGANIZED HEALTH CARE EDUCATION/TRAINING PROGRAM
Payer: MEDICARE

## 2023-11-11 ENCOUNTER — APPOINTMENT (OUTPATIENT)
Dept: CT IMAGING | Facility: HOSPITAL | Age: 53
End: 2023-11-11
Payer: MEDICARE

## 2023-11-11 ENCOUNTER — APPOINTMENT (OUTPATIENT)
Dept: GENERAL RADIOLOGY | Facility: HOSPITAL | Age: 53
End: 2023-11-11
Payer: MEDICARE

## 2023-11-11 VITALS
BODY MASS INDEX: 55.32 KG/M2 | TEMPERATURE: 100.2 F | SYSTOLIC BLOOD PRESSURE: 110 MMHG | RESPIRATION RATE: 17 BRPM | DIASTOLIC BLOOD PRESSURE: 60 MMHG | WEIGHT: 293 LBS | OXYGEN SATURATION: 96 % | HEART RATE: 88 BPM | HEIGHT: 61 IN

## 2023-11-11 DIAGNOSIS — L24.A9 WOUND DRAINAGE: Primary | ICD-10-CM

## 2023-11-11 DIAGNOSIS — G43.909 ACUTE MIGRAINE: ICD-10-CM

## 2023-11-11 DIAGNOSIS — T14.8XXA CHRONIC WOUND: ICD-10-CM

## 2023-11-11 DIAGNOSIS — R11.2 NAUSEA AND VOMITING, UNSPECIFIED VOMITING TYPE: ICD-10-CM

## 2023-11-11 DIAGNOSIS — R50.9 FEVER, UNSPECIFIED: ICD-10-CM

## 2023-11-11 DIAGNOSIS — Z86.69 HISTORY OF MIGRAINE HEADACHES: ICD-10-CM

## 2023-11-11 DIAGNOSIS — G89.4 CHRONIC PAIN SYNDROME: ICD-10-CM

## 2023-11-11 LAB
ALBUMIN SERPL-MCNC: 3.6 G/DL (ref 3.5–5.2)
ALBUMIN/GLOB SERPL: 0.8 G/DL
ALP SERPL-CCNC: 99 U/L (ref 39–117)
ALT SERPL W P-5'-P-CCNC: 8 U/L (ref 1–33)
ANION GAP SERPL CALCULATED.3IONS-SCNC: 12 MMOL/L (ref 5–15)
AST SERPL-CCNC: 17 U/L (ref 1–32)
BASOPHILS # BLD AUTO: 0.04 10*3/MM3 (ref 0–0.2)
BASOPHILS NFR BLD AUTO: 0.5 % (ref 0–1.5)
BILIRUB SERPL-MCNC: 0.5 MG/DL (ref 0–1.2)
BUN SERPL-MCNC: 18 MG/DL (ref 6–20)
BUN/CREAT SERPL: 16.7 (ref 7–25)
CALCIUM SPEC-SCNC: 8.7 MG/DL (ref 8.6–10.5)
CHLORIDE SERPL-SCNC: 98 MMOL/L (ref 98–107)
CO2 SERPL-SCNC: 24 MMOL/L (ref 22–29)
CREAT SERPL-MCNC: 1.08 MG/DL (ref 0.57–1)
CRP SERPL-MCNC: 10.4 MG/DL (ref 0–0.5)
D-LACTATE SERPL-SCNC: 1.4 MMOL/L (ref 0.5–2)
DEPRECATED RDW RBC AUTO: 49.1 FL (ref 37–54)
EGFRCR SERPLBLD CKD-EPI 2021: 62.3 ML/MIN/1.73
EOSINOPHIL # BLD AUTO: 0.05 10*3/MM3 (ref 0–0.4)
EOSINOPHIL NFR BLD AUTO: 0.6 % (ref 0.3–6.2)
ERYTHROCYTE [DISTWIDTH] IN BLOOD BY AUTOMATED COUNT: 17.6 % (ref 12.3–15.4)
ERYTHROCYTE [SEDIMENTATION RATE] IN BLOOD: >130 MM/HR (ref 0–30)
FLUAV RNA RESP QL NAA+PROBE: NOT DETECTED
FLUBV RNA RESP QL NAA+PROBE: NOT DETECTED
GLOBULIN UR ELPH-MCNC: 4.5 GM/DL
GLUCOSE SERPL-MCNC: 194 MG/DL (ref 65–99)
HCT VFR BLD AUTO: 33.6 % (ref 34–46.6)
HGB BLD-MCNC: 9.7 G/DL (ref 12–15.9)
IMM GRANULOCYTES # BLD AUTO: 0.04 10*3/MM3 (ref 0–0.05)
IMM GRANULOCYTES NFR BLD AUTO: 0.5 % (ref 0–0.5)
LIPASE SERPL-CCNC: 38 U/L (ref 13–60)
LYMPHOCYTES # BLD AUTO: 1.12 10*3/MM3 (ref 0.7–3.1)
LYMPHOCYTES NFR BLD AUTO: 13.9 % (ref 19.6–45.3)
MCH RBC QN AUTO: 22.1 PG (ref 26.6–33)
MCHC RBC AUTO-ENTMCNC: 28.9 G/DL (ref 31.5–35.7)
MCV RBC AUTO: 76.5 FL (ref 79–97)
MONOCYTES # BLD AUTO: 0.47 10*3/MM3 (ref 0.1–0.9)
MONOCYTES NFR BLD AUTO: 5.8 % (ref 5–12)
NEUTROPHILS NFR BLD AUTO: 6.36 10*3/MM3 (ref 1.7–7)
NEUTROPHILS NFR BLD AUTO: 78.7 % (ref 42.7–76)
NRBC BLD AUTO-RTO: 0 /100 WBC (ref 0–0.2)
PLATELET # BLD AUTO: 251 10*3/MM3 (ref 140–450)
PMV BLD AUTO: 8.7 FL (ref 6–12)
POTASSIUM SERPL-SCNC: 4.2 MMOL/L (ref 3.5–5.2)
PROT SERPL-MCNC: 8.1 G/DL (ref 6–8.5)
RBC # BLD AUTO: 4.39 10*6/MM3 (ref 3.77–5.28)
SARS-COV-2 RNA RESP QL NAA+PROBE: NOT DETECTED
SODIUM SERPL-SCNC: 134 MMOL/L (ref 136–145)
WBC NRBC COR # BLD: 8.08 10*3/MM3 (ref 3.4–10.8)

## 2023-11-11 PROCEDURE — 36415 COLL VENOUS BLD VENIPUNCTURE: CPT

## 2023-11-11 PROCEDURE — 96374 THER/PROPH/DIAG INJ IV PUSH: CPT

## 2023-11-11 PROCEDURE — 87636 SARSCOV2 & INF A&B AMP PRB: CPT | Performed by: STUDENT IN AN ORGANIZED HEALTH CARE EDUCATION/TRAINING PROGRAM

## 2023-11-11 PROCEDURE — 80053 COMPREHEN METABOLIC PANEL: CPT | Performed by: STUDENT IN AN ORGANIZED HEALTH CARE EDUCATION/TRAINING PROGRAM

## 2023-11-11 PROCEDURE — 83605 ASSAY OF LACTIC ACID: CPT | Performed by: STUDENT IN AN ORGANIZED HEALTH CARE EDUCATION/TRAINING PROGRAM

## 2023-11-11 PROCEDURE — 85652 RBC SED RATE AUTOMATED: CPT | Performed by: STUDENT IN AN ORGANIZED HEALTH CARE EDUCATION/TRAINING PROGRAM

## 2023-11-11 PROCEDURE — 83690 ASSAY OF LIPASE: CPT | Performed by: STUDENT IN AN ORGANIZED HEALTH CARE EDUCATION/TRAINING PROGRAM

## 2023-11-11 PROCEDURE — 85025 COMPLETE CBC W/AUTO DIFF WBC: CPT | Performed by: STUDENT IN AN ORGANIZED HEALTH CARE EDUCATION/TRAINING PROGRAM

## 2023-11-11 PROCEDURE — 87186 SC STD MICRODIL/AGAR DIL: CPT | Performed by: STUDENT IN AN ORGANIZED HEALTH CARE EDUCATION/TRAINING PROGRAM

## 2023-11-11 PROCEDURE — 99283 EMERGENCY DEPT VISIT LOW MDM: CPT

## 2023-11-11 PROCEDURE — 86140 C-REACTIVE PROTEIN: CPT | Performed by: STUDENT IN AN ORGANIZED HEALTH CARE EDUCATION/TRAINING PROGRAM

## 2023-11-11 PROCEDURE — 87040 BLOOD CULTURE FOR BACTERIA: CPT | Performed by: STUDENT IN AN ORGANIZED HEALTH CARE EDUCATION/TRAINING PROGRAM

## 2023-11-11 PROCEDURE — 87150 DNA/RNA AMPLIFIED PROBE: CPT | Performed by: STUDENT IN AN ORGANIZED HEALTH CARE EDUCATION/TRAINING PROGRAM

## 2023-11-11 PROCEDURE — 25010000002 DROPERIDOL PER 5 MG: Performed by: STUDENT IN AN ORGANIZED HEALTH CARE EDUCATION/TRAINING PROGRAM

## 2023-11-11 PROCEDURE — 71045 X-RAY EXAM CHEST 1 VIEW: CPT

## 2023-11-11 PROCEDURE — 25810000003 LACTATED RINGERS SOLUTION: Performed by: STUDENT IN AN ORGANIZED HEALTH CARE EDUCATION/TRAINING PROGRAM

## 2023-11-11 RX ORDER — DROPERIDOL 2.5 MG/ML
2.5 INJECTION, SOLUTION INTRAMUSCULAR; INTRAVENOUS ONCE
Status: COMPLETED | OUTPATIENT
Start: 2023-11-11 | End: 2023-11-11

## 2023-11-11 RX ORDER — CARIPRAZINE 1.5 MG/1
1.5 CAPSULE, GELATIN COATED ORAL DAILY
COMMUNITY
Start: 2023-10-03

## 2023-11-11 RX ORDER — OXYCODONE HYDROCHLORIDE 5 MG/1
10 TABLET ORAL ONCE AS NEEDED
Status: COMPLETED | OUTPATIENT
Start: 2023-11-11 | End: 2023-11-11

## 2023-11-11 RX ORDER — ACETAMINOPHEN 500 MG
1000 TABLET ORAL ONCE
Status: COMPLETED | OUTPATIENT
Start: 2023-11-11 | End: 2023-11-11

## 2023-11-11 RX ORDER — POTASSIUM CHLORIDE 1500 MG/1
1 TABLET, EXTENDED RELEASE ORAL DAILY
COMMUNITY
Start: 2023-09-17

## 2023-11-11 RX ORDER — CELECOXIB 200 MG/1
1 CAPSULE ORAL DAILY
COMMUNITY
Start: 2023-10-04

## 2023-11-11 RX ORDER — SODIUM CHLORIDE 0.9 % (FLUSH) 0.9 %
10 SYRINGE (ML) INJECTION AS NEEDED
Status: DISCONTINUED | OUTPATIENT
Start: 2023-11-11 | End: 2023-11-11 | Stop reason: HOSPADM

## 2023-11-11 RX ADMIN — ACETAMINOPHEN 1000 MG: 500 TABLET, FILM COATED ORAL at 05:53

## 2023-11-11 RX ADMIN — DROPERIDOL 2.5 MG: 2.5 INJECTION, SOLUTION INTRAMUSCULAR; INTRAVENOUS at 06:34

## 2023-11-11 RX ADMIN — OXYCODONE HYDROCHLORIDE 10 MG: 5 TABLET ORAL at 06:34

## 2023-11-11 RX ADMIN — SODIUM CHLORIDE, POTASSIUM CHLORIDE, SODIUM LACTATE AND CALCIUM CHLORIDE 1000 ML: 600; 310; 30; 20 INJECTION, SOLUTION INTRAVENOUS at 06:29

## 2023-11-11 NOTE — ED NOTES
"Patient refusing CT Scan and further care. Pt. States, \"I want this IV out and I want to go home.\" AMA paper signed and benefits/risks explained to patient and spouse.   "

## 2023-11-11 NOTE — ED PROVIDER NOTES
Subjective   History of Present Illness    Review of Systems    Past Medical History:   Diagnosis Date    Anxiety     Arthritis     Osteoarthritis primarily left knee     Back pain     Chronic pain syndrome 10/27/2021    Depression     Diabetes mellitus     Fibromyalgia, primary     History of transfusion     Hx of tear of meniscus of knee joint 11/21/2016    Hypertension     Joint pain     Kidney stone     Knee pain     Lymphedema of both lower extremities 10/27/2021    Migraine     Migraine headache     Pain     knee, left ankle, left ankle      Pes anserinus bursitis 04/28/2015    Restless leg        Allergies   Allergen Reactions    Compazine [Prochlorperazine Edisylate] Shortness Of Breath     Breathing     Meperidine Hives     (Demerol)     Norflex [Orphenadrine] Hives    Nortriptyline Hives    Prochlorperazine Shortness Of Breath    Prochlorperazine Maleate Shortness Of Breath    Vancomycin Other (See Comments) and Unknown - High Severity     CRITICAL LEVEL RESULTS - PATIENT LIKELY HAS METABOLIC / CLEARANCE ISSUE WITH VANCOMYCIN. ON STANDARD REGIMENS BASED ON BAYESIAN/POPULATION PK PARAMETERS, VANCOMYCIN TROUGH LEVELS IN THE 60s, 70s, 90s, 100s WERE OBTAINED. RECOMMEND AGAINST EVER USING VANCOMYCIN IN THIS PATIENT. IF VANCOMYCIN IS ABSOLUTELY INDICATED WITHOUT ANY OTHER OPTIONS, PULSE INTERMITTENT DOSING WOULD LIKELY BE THE ONLY APPROPRIATE METHOD.     Codeine Nausea And Vomiting     Rash     Penicillins Rash     Nausea & vomiting     Amoxicillin-Pot Clavulanate Rash, Hives and Itching    Avelox [Moxifloxacin Hcl] Rash    Bacitracin Nausea And Vomiting    Calamine Itching    Cefazolin Nausea And Vomiting    Cephalexin Rash     (Keflex)     Ciprofloxacin Rash    Clindamycin/Lincomycin Rash    Doxycycline Nausea And Vomiting     Can take but need zofran before    Hydroxyzine Hcl Itching    Moxifloxacin Nausea And Vomiting    Orphenadrine Citrate Itching    Sulfamethoxazole-Trimethoprim Nausea And Vomiting and  Rash     States she also gets yeast infections with it    Tobramycin Other (See Comments)     Eyes burn-feel like eyes are on fire      Vistaril [Hydroxyzine] Rash    Zinc Other (See Comments)     ERRYTHEMA       Past Surgical History:   Procedure Laterality Date    CATARACT EXTRACTION      CATARACT EXTRACTION      CATARACT EXTRACTION      CORNEAL TRANSPLANT      bilateral     ECTOPIC PREGNANCY      INCISION AND DRAINAGE ABSCESS Left 6/1/2022    Procedure: DEBRIDEMENTAND PULSE LAVAGE LT MEDIAL THIGH PACKING ODOFORM GAUZE  WOUND SACRAL AND BILATERAL CALVES;  Surgeon: Justin Perez MD;  Location:  [facility];  Service: General;  Laterality: Left;    INCISION AND DRAINAGE TRUNK Right 11/26/2022    Procedure: INCISION AND DRAINAGE TRUNK;  Surgeon: Sara Guerrier MD;  Location:  [facility];  Service: General;  Laterality: Right;    JOINT REPLACEMENT      KNEE ARTHROSCOPY W/ MENISCAL REPAIR      KNEE MENISCAL REPAIR      MENISCECTOMY Left 12/20/2016    TUBAL ABDOMINAL LIGATION         Family History   Problem Relation Age of Onset    No Known Problems Mother     Cancer Father     Dementia Father     Hypertension Maternal Grandmother     Cancer Maternal Grandfather        Social History     Socioeconomic History    Marital status: Single   Tobacco Use    Smoking status: Never    Smokeless tobacco: Never   Vaping Use    Vaping Use: Never used   Substance and Sexual Activity    Alcohol use: Yes    Drug use: No    Sexual activity: Not Currently     Partners: Male           Objective   Physical Exam    Procedures           ED Course  ED Course as of 11/11/23 0718   Sat Nov 11, 2023   0712 This patient was seen by Dr. Tabares please refer to his records for further details CTs of the lower extremities obtained to rule out osteomyelitis and of the etiology of pathology since the patient came in with a temp.  Her COVID swabs are negative.  Rest of lab work essentially unremarkable except for elevated sed rate and CRP. [TS]   0712  Patient has been insisting on pain medications which she was given. [TS]   0713 Now the patient is refusing to get a CT scan to be performed and wants to be discharged home.  I went back and personally discussed this case the patient currently with elevated sed rate and CRP in the way her wounds appear CAT scan will be the appropriate study to do in order to rule out osteomyelitis and fasciitis and placed on antibiotics if needed.  The possibility of increased morbidity mortality associated with incomplete work-up and not undergoing complete diagnostic testing has been explained to the patient patient understands that she is awake and alert but does not want a CAT scan and does not want to stay in the hospital wants to go home [TS]   0714 The patient has requested to leave the ED against medical advice. The patient reason(s) for leaving include, but are not limited to, the following patient does not want further testing done she wants to go home and rest.. I believe this patient is of sound mind and competent to refuse medical care. The patient is responding and asking questions appropriately. The patient is oriented to person, place and time. The patient is not psychotic, delusional, suicidal, homicidal or hallucinating. The patient demonstrates a normal mental capacity to make decisions regarding their healthcare. The patient is clinically sober and does not appear to be under the influence of any illicit drugs at this time. The patient has been advised of the risks, in layman terms, of leaving AMA which include, but are not limited to death, coma, permanent disability, loss of current lifestyle, delay in diagnosis. Alternatives have been offered - the patient remains steadfast in their wish to leave. The patient has been advised that should they change their mind they are welcome to return to this hospital, or any other, at any time. The patient understands that in no way does an AMA discharge mean that I do not  want them to have the best medical care available. To this end, I have provided appropriate discharge instructions. . The above discussion was witnessed by another member of staff. [TS]      ED Course User Index  [TS] John Mendoza MD                                           Medical Decision Making  Problems Addressed:  Acute migraine: chronic illness or injury with exacerbation, progression, or side effects of treatment     Details: Patient with recurrent migraine headaches with exacerbation multiple episodes of visit to the ED for the same thing.  Chronic pain syndrome: chronic illness or injury  Chronic wound: chronic illness or injury     Details: Using further work-up or CAT scans.  Fever, unspecified: acute illness or injury     Details: Undermine etiology of the fever no clinical evidence of shock.  Has got lower extremity wounds is refusing further work-up and assessment.  History of migraine headaches: chronic illness or injury  Nausea and vomiting, unspecified vomiting type: acute illness or injury     Details: No obvious vomiting in the ED.  Wound drainage: chronic illness or injury    Amount and/or Complexity of Data Reviewed  Labs: ordered.  Radiology: ordered.    Risk  OTC drugs.  Prescription drug management.  Risk Details: Patient is leaving AMA has been advised of the risks the possibly increased morbidity mortality associate incomplete work-up.        Final diagnoses:   Acute migraine   Nausea and vomiting, unspecified vomiting type   History of migraine headaches   Chronic pain syndrome   Fever, unspecified   Wound drainage   Chronic wound       ED Disposition  ED Disposition       ED Disposition   AMA    Condition   --    Comment   --               Rhonda Knox, APRN  [address]  [place] [address]  [phone]    Schedule an appointment as soon as possible for a visit on 11/13/2023      [facility] EMERGENCY DEPARTMENT  [address] [place] [address]  [id number] [phone]    If symptoms worsen    Rhonda Knox, APRN  [address] [facility] [address] [phone]    In 1 day           Medication List      No changes were made to your prescriptions during this visit.            John Mendoza MD  11/11/23 0718

## 2023-11-11 NOTE — TELEPHONE ENCOUNTER
She is running a fever 102.3 axillary. She is concerned that she has an infection in her legs from wounds.   Triage completed and patient advised to be seen by a health care provider within the next 4 hours. I suggested that she go to  when they are open or back to the ED. She said she will watch it for 1 more hour and then decide what she wants to do.   Reason for Disposition   [1] Fever > 100.0 F (37.8 C) AND [2] bedridden (e.g., CVA, chronic illness, recovering from surgery)    Additional Information   Negative: Shock suspected (e.g., cold/pale/clammy skin, too weak to stand, low BP, rapid pulse)   Negative: Difficult to awaken or acting confused (e.g., disoriented, slurred speech)   Negative: [1] Difficulty breathing AND [2] bluish lips, tongue or face   Negative: New-onset rash with multiple purple (or blood-colored) spots or dots   Negative: Sounds like a life-threatening emergency to the triager   Negative: Fever in a cancer patient who is currently (or recently) receiving chemotherapy or radiation therapy, or cancer patient who has metastatic or end-stage cancer and is receiving palliative care   Negative: Pregnant   Negative: Postpartum (from 0 to 6 weeks after delivery)   Negative: Fever onset within 24 hours of receiving vaccine   Negative: [1] Fever AND [2] within 14 days of COVID-19 Exposure   Negative: Other symptom is present, see that guideline (e.g., symptoms of cough, runny nose, sore throat, earache, abdominal pain, diarrhea, vomiting)   Negative: [1] Headache AND [2] stiff neck (can't touch chin to chest)   Negative: Difficulty breathing   Negative: IV Drug Use (IVDU)   Negative: [1] Drinking very little AND [2] dehydration suspected (e.g., no urine > 12 hours, very dry mouth, very lightheaded)   Negative: Widespread rash and cause unknown   Negative: Patient sounds very sick or weak to the triager  (Exception: Mild weakness and hasn't taken fever medicine.)   Negative: Fever > 104 F (40 C)    "Negative: [1] Fever > 101 F (38.3 C) AND [2] age > 60 years    Answer Assessment - Initial Assessment Questions  1. TEMPERATURE: \"What is the most recent temperature?\"  \"How was it measured?\"       102.3  2. ONSET: \"When did the fever start?\"       5 pm last night   3. CHILLS: \"Do you have chills?\" If yes: \"How bad are they?\"  (e.g., none, mild, moderate, severe)    - NONE: no chills    - MILD: feeling cold    - MODERATE: feeling very cold, some shivering (feels better under a thick blanket)    - SEVERE: feeling extremely cold with shaking chills (general body shaking, rigors; even under a thick blanket)       Mild   4. OTHER SYMPTOMS: \"Do you have any other symptoms besides the fever?\"  (e.g., abdomen pain, cough, diarrhea, earache, headache, sore throat, urination pain)  Vomited, chronic pain in her legs   5. CAUSE: If there are no symptoms, ask: \"What do you think is causing the fever?\"   Possibly infection in her legs from wounds in her legs, she has lymphedema   6. CONTACTS: \"Does anyone else in the family have an infection?\"     no  7. TREATMENT: \"What have you done so far to treat this fever?\" (e.g., medications)      Ibuprofen 1.5 hour ago   8. IMMUNOCOMPROMISE: \"Do you have of the following: diabetes, HIV positive, splenectomy, cancer chemotherapy, chronic steroid treatment, transplant patient, etc.\"      No   9. PREGNANCY: \"Is there any chance you are pregnant?\" \"When was your last menstrual period?\"      No   10. TRAVEL: \"Have you traveled out of the country in the last month?\" (e.g., travel history, exposures)   no    Protocols used: Fever-ADULT-AH    "

## 2023-11-11 NOTE — ED PROVIDER NOTES
"EMERGENCY DEPARTMENT ATTENDING NOTE    Patient Name: Valeria Wilson    Chief Complaint   Patient presents with    Nausea    Vomiting    Headache    Fever       PATIENT PRESENTATION:  Valeria Wilson is a very pleasant 53 y.o. female with history of chronic pain as well as chronic migraines presenting emergency department reporting headache with nausea and vomiting as well as fevers.    Patient states that yesterday at about 1700 he started having some chills.  She states that she has any cough nasal congestion started on a headache.  States her headaches not like her chronic migraines more just a general mild headache she thought she might have a viral illness.  She also has some nausea and vomiting.  Denies any vision changes hearing changes numbness of extremities arms or legs difficulty walking.  No chest pain or shortness of breath.  Tmax at home to 102.3 Fahrenheit she has taken some ibuprofen since the fever has constant down slightly before arrival.  Denies any abdominal pain.    She follows with wound care for chronic wounds and she has a wound that is being managed by them.  She had had some foul-smelling drainage recently but denies any increased pain to the area.   Prior photos from her visit with me 24h prior:              PHYSICAL EXAM:   VS: /60 (BP Location: Right arm, Patient Position: Sitting)   Pulse 88   Temp 100.2 °F (37.9 °C) (Oral)   Resp 17   Ht 154.9 cm (61\")   Wt (!) 141 kg (310 lb)   LMP  (LMP Unknown)   SpO2 96%   BMI 58.57 kg/m²   GENERAL: Well-appearing middle-aged woman sitting up in chair next to stretcher no acute distress; well-nourished, well-developed, awake, alert, no acute distress, nontoxic appearing, comfortable  EYES: PERRL, sclerae anicteric, extraocular movements grossly intact, symmetric lids  EARS, NOSE, MOUTH, THROAT: atraumatic external nose and ears, moist mucous membranes  NECK: symmetric, trachea midline  RESPIRATORY: unlabored respiratory " effort, clear to auscultation bilaterally, good air movement  CARDIOVASCULAR: no murmurs, peripheral pulses 2+ and equal in all extremities  GI: obese but otherwise soft, nontender, nondistended  MUSCULOSKELETAL/EXTREMITIES: Chronic lymphedema of the bilateral extremities; extremities without obvious deformity  SKIN: chronic wounds of the extremity today with foul smelling drainage as seen in photo below; otherwise warm and dry with no obvious rashes                  NEUROLOGIC: moving all 4 extremities symmetrically, CN II-XII grossly intact: GCS 15  PSYCHIATRIC: alert, pleasant and cooperative. Appropriate mood and affect.      MEDICAL DECISION MAKING:    Valeria Wilson is a 53 y.o. female who presented to the ED due to fever and chills with headache and nausea in the setting of chronic wounds with some increased drainage of her chronic wound.    Differential Diagnosis Considered: Cellulitis, sepsis, influenza, COVID, viral illness, urinary tract infection    Labs Ordered:  Labs Reviewed   COMPREHENSIVE METABOLIC PANEL - Abnormal; Notable for the following components:       Result Value    Glucose 194 (*)     Creatinine 1.08 (*)     Sodium 134 (*)     All other components within normal limits    Narrative:     GFR Normal >60  Chronic Kidney Disease <60  Kidney Failure <15     CBC WITH AUTO DIFFERENTIAL - Abnormal; Notable for the following components:    Hemoglobin 9.7 (*)     Hematocrit 33.6 (*)     MCV 76.5 (*)     MCH 22.1 (*)     MCHC 28.9 (*)     RDW 17.6 (*)     Neutrophil % 78.7 (*)     Lymphocyte % 13.9 (*)     All other components within normal limits   SEDIMENTATION RATE - Abnormal; Notable for the following components:    Sed Rate >130 (*)     All other components within normal limits   COVID-19 AND FLU A/B, NP SWAB IN TRANSPORT MEDIA 1 HR TAT - Normal    Narrative:     Fact sheet for providers: https://www.fda.gov/media/816475/download    Fact sheet for patients:  https://www.fda.gov/media/724292/download    Test performed by PCR.   LACTIC ACID, PLASMA - Normal   LIPASE - Normal   BLOOD CULTURE   BLOOD CULTURE   URINALYSIS W/ CULTURE IF INDICATED   C-REACTIVE PROTEIN   CBC AND DIFFERENTIAL    Narrative:     The following orders were created for panel order CBC & Differential.  Procedure                               Abnormality         Status                     ---------                               -----------         ------                     CBC Auto Differential[[id number]]        Abnormal            Final result                 Please view results for these tests on the individual orders.        Imaging Ordered:   XR Chest 1 View    (Results Pending)   CT Lower Extremity Bilateral With Contrast    (Results Pending)       Internal chart review:   Past Medical History:   Diagnosis Date    Anxiety     Arthritis     Osteoarthritis primarily left knee     Back pain     Chronic pain syndrome 10/27/2021    Depression     Diabetes mellitus     Fibromyalgia, primary     History of transfusion     Hx of tear of meniscus of knee joint 11/21/2016    Hypertension     Joint pain     Kidney stone     Knee pain     Lymphedema of both lower extremities 10/27/2021    Migraine     Migraine headache     Pain     knee, left ankle, left ankle      Pes anserinus bursitis 04/28/2015    Restless leg        Past Surgical History:   Procedure Laterality Date    CATARACT EXTRACTION      CATARACT EXTRACTION      CATARACT EXTRACTION      CORNEAL TRANSPLANT      bilateral     ECTOPIC PREGNANCY      INCISION AND DRAINAGE ABSCESS Left 6/1/2022    Procedure: DEBRIDEMENTAND PULSE LAVAGE LT MEDIAL THIGH PACKING ODOFORM GAUZE  WOUND SACRAL AND BILATERAL CALVES;  Surgeon: Justin Perez MD;  Location:  [facility] OR;  Service: General;  Laterality: Left;    INCISION AND DRAINAGE TRUNK Right 11/26/2022    Procedure: INCISION AND DRAINAGE TRUNK;  Surgeon: Sara Guerrier MD;  Location:  [facility];  Service:  General;  Laterality: Right;    JOINT REPLACEMENT      KNEE ARTHROSCOPY W/ MENISCAL REPAIR      KNEE MENISCAL REPAIR      MENISCECTOMY Left 12/20/2016    TUBAL ABDOMINAL LIGATION         Allergies   Allergen Reactions    Compazine [Prochlorperazine Edisylate] Shortness Of Breath     Breathing     Meperidine Hives     (Demerol)     Norflex [Orphenadrine] Hives    Nortriptyline Hives    Prochlorperazine Shortness Of Breath    Prochlorperazine Maleate Shortness Of Breath    Vancomycin Other (See Comments) and Unknown - High Severity     CRITICAL LEVEL RESULTS - PATIENT LIKELY HAS METABOLIC / CLEARANCE ISSUE WITH VANCOMYCIN. ON STANDARD REGIMENS BASED ON BAYESIAN/POPULATION PK PARAMETERS, VANCOMYCIN TROUGH LEVELS IN THE 60s, 70s, 90s, 100s WERE OBTAINED. RECOMMEND AGAINST EVER USING VANCOMYCIN IN THIS PATIENT. IF VANCOMYCIN IS ABSOLUTELY INDICATED WITHOUT ANY OTHER OPTIONS, PULSE INTERMITTENT DOSING WOULD LIKELY BE THE ONLY APPROPRIATE METHOD.     Codeine Nausea And Vomiting     Rash     Penicillins Rash     Nausea & vomiting     Amoxicillin-Pot Clavulanate Rash, Hives and Itching    Avelox [Moxifloxacin Hcl] Rash    Bacitracin Nausea And Vomiting    Calamine Itching    Cefazolin Nausea And Vomiting    Cephalexin Rash     (Keflex)     Ciprofloxacin Rash    Clindamycin/Lincomycin Rash    Doxycycline Nausea And Vomiting     Can take but need zofran before    Hydroxyzine Hcl Itching    Moxifloxacin Nausea And Vomiting    Orphenadrine Citrate Itching    Sulfamethoxazole-Trimethoprim Nausea And Vomiting and Rash     States she also gets yeast infections with it    Tobramycin Other (See Comments)     Eyes burn-feel like eyes are on fire      Vistaril [Hydroxyzine] Rash    Zinc Other (See Comments)     ERRYTHEMA         Current Facility-Administered Medications:     lactated ringers bolus 1,000 mL, 1,000 mL, Intravenous, Once, Tima Tabares MD, Last Rate: 2,000 mL/hr at 11/11/23 0629, 1,000 mL at 11/11/23 0629     [COMPLETED] Insert Peripheral IV, , , Once **AND** sodium chloride 0.9 % flush 10 mL, 10 mL, Intravenous, PRN, Tima Tabares MD    Current Outpatient Medications:     celecoxib (CeleBREX) 200 MG capsule, Take 1 capsule by mouth Daily., Disp: , Rfl:     potassium chloride ER (K-TAB) 20 MEQ tablet controlled-release ER tablet, Take 1 tablet by mouth Daily., Disp: , Rfl:     Vraylar 1.5 MG capsule capsule, Take 1 capsule by mouth Daily., Disp: , Rfl:     busPIRone (BUSPAR) 10 MG tablet, Take 1 tablet by mouth 3 (Three) Times a Day. Indications: Anxiety Disorder, Disp: , Rfl:     Canagliflozin (Invokana) 100 MG tablet tablet, Take 1 tablet by mouth Daily. Indications: Type 2 Diabetes, Disp: , Rfl:     cyclobenzaprine (FLEXERIL) 10 MG tablet, Take 1 tablet by mouth 2 (Two) Times a Day As Needed for Muscle Spasms. Indications: Muscle Spasm, Disp: , Rfl:     docusate sodium (COLACE) 100 MG capsule, Take 1 capsule by mouth 2 (Two) Times a Day. Indications: Constipation, Disp: , Rfl:     DULoxetine (CYMBALTA) 60 MG capsule, Take 30 mg by mouth 2 (Two) Times a Day. Indications: Diabetes with Nerve Disease, Disp: , Rfl:     fluticasone (FLONASE) 50 MCG/ACT nasal spray, 1 spray into the nostril(s) as directed by provider Daily., Disp: 16 g, Rfl: 0    furosemide (LASIX) 80 MG tablet, Take 1 tablet by mouth 2 (Two) Times a Day As Needed. Indications: Edema, Disp: , Rfl:     gabapentin (NEURONTIN) 100 MG capsule, Take 1 capsule by mouth Daily. PT TAKES AT 4PM  Indications: Neuropathic Pain, Disp: , Rfl:     gabapentin (NEURONTIN) 600 MG tablet, Take 1 tablet by mouth 3 (Three) Times a Day. Indications: Neuropathic Pain, Disp: , Rfl:     insulin aspart (NovoLOG FlexPen) 100 UNIT/ML solution pen-injector sc pen, Inject 3 Units under the skin into the appropriate area as directed 3 (Three) Times a Day With Meals. Indications: Type 2 Diabetes, Disp: , Rfl:     insulin glargine (LANTUS, SEMGLEE) 100 UNIT/ML injection, Inject 10  Units under the skin into the appropriate area as directed Every Night. Indications: Type 2 Diabetes, Disp: , Rfl:     Magnesium Oxide 400 (240 Mg) MG tablet, Take 0.5 tablets by mouth Daily. Indications: Disorder with Low Magnesium Levels, Disp: , Rfl:     mirtazapine (REMERON) 15 MG tablet, Take 1 tablet by mouth Every Night. Indications: sleep, Disp: , Rfl:     Morphine Sulfate ER 15 MG tablet extended-release 12 hour, Take 15 mg by mouth 2 (two) times a day. Indications: Pain, Disp: , Rfl:     omeprazole (priLOSEC) 40 MG capsule, Take 40 mg by mouth 2 (Two) Times a Day. Indications: Gastroesophageal Reflux Disease, Disp: , Rfl:     ondansetron ODT (ZOFRAN-ODT) 4 MG disintegrating tablet, Place 1 tablet on the tongue Every 6 (Six) Hours As Needed for Nausea or Vomiting for up to 21 doses. Indications: Nausea and Vomiting, Disp: 21 tablet, Rfl: 0    oxyCODONE-acetaminophen (PERCOCET) 7.5-325 MG per tablet, Take 1 tablet by mouth Every 8 (Eight) Hours As Needed for Moderate Pain  or Severe Pain-Do not take with other Percocet prescription, Disp: 30 tablet, Rfl: 0    polyethylene glycol (MIRALAX) 17 GM/SCOOP powder, Take 17 g by mouth Daily., Disp: 225 g, Rfl: 0    potassium chloride (K-DUR,KLOR-CON) 20 MEQ CR tablet, Take 1 tablet by mouth Daily., Disp: 30 tablet, Rfl: 0    pramipexole (MIRAPEX) 0.75 MG tablet, Take 1 tablet by mouth 2 (two) times a day. Indications: Restless Leg Syndrome, Disp: , Rfl:     Tirzepatide 7.5 MG/0.5ML solution pen-injector, Inject 12.5 mg under the skin into the appropriate area as directed 1 (One) Time Per Week. Indications: Type 2 Diabetes, Disp: , Rfl:     ZOLMitriptan (Zomig) 5 MG tablet, Take 1 tablet by mouth 1 (One) Time As Needed for Migraine., Disp: 6 tablet, Rfl: 5    My lab interpretation: Normal able to count.  Hemoglobin 9.7.  Undetectably high ESR greater than 130.  Normal lipase.  Lactic normal at 1.4.  Negative COVID and influenza testing.  Urinalysis pending.    My  imaging interpretation: CXR pending.  CT bilateral lower extremities with contrast pending.    ED Course and Re-evaluation: 52yo F well-known to this provider with frequent presentations to this emergency department setting of chronic pain as well as chronic migraines presenting to the emergency department complaining of chills with fever at home with headache and nausea as well as with increased drainage from her chronic wounds.  Patient kal-febrile to 100.2 Fahrenheit in the setting of having just taken an antipyretic at home prior to arrival with a Tmax of 102 Fahrenheit at home.  Patient also complaining of acute on chronic pain.  I gave the patient IV fluids and Tylenol as well as droperidol for her fever and headache.  I gave her her home oxycodone.  Chest x-ray is pending.  Her COVID and flu testing are negative and I have suspicion this could be the cause of her symptoms.  Infectious labs were sent given her chronic wound with worsening drainage.  Given negative COVID and signs of testing I have ordered a CT of the bilateral lower extremities to evaluate for possible abscess cyst with her chronic wound significant purulent drainage.  It is pending.  Would also consider urinary tract infection urine is pending.    Final disposition pending completion of work-up at time I signed the [facility] emergency medicine attending.      ED Diagnosis:  Acute migraine; Nausea and vomiting, unspecified vomiting type; History of migraine headaches; Chronic pain syndrome; Fever, unspecified; Wound drainage; Chronic wound    Disposition: pending completion of work-up at time of my signout to the [facility] emergency medicine attending, Dr. Mendoza        Signed:  Tima Tabares MD  Emergency Medicine Physician    Please note that portions of this note were completed with a voice recognition program.      Tima Tabares MD  11/11/23 1994

## 2023-11-12 LAB
BACTERIA BLD CULT: ABNORMAL
BACTERIA ID TEST ISLT QL CULT: ABNORMAL
BOTTLE TYPE: ABNORMAL

## 2023-11-13 ENCOUNTER — TELEPHONE (OUTPATIENT)
Dept: WOUND CARE | Age: 53
End: 2023-11-13

## 2023-11-13 NOTE — TELEPHONE ENCOUNTER
Returned patient voice mail message re: setorpres wraps, unable to reach patient nor leave message. Call placed to diienmono Moreno [phone] after speaking with Marie Ann RN at [facility]  on how to reach patient. Boyfriend stated he would have patient call me at the [facility]. Patient returned call. Discussed [facility] concerns with ordering so many 4 layer wraps as patient has been taking them off, sometimes multiple times per day. Discussed that setopres wrap and dressing can be changed daily. Patient stated she was having trouble with the tape holding the wrap in place, stated it did not have a velcro closure, discussed we can try different types of tape to help keep the wrap in place. . Discussed follow up with Marck Hudson if she wanted a sooner appointment, stated she could not come in sooner as she had other appointments and depended on others to take her. Stated she could not do the setopres, the 4 layer worked, stated she was just going to forget it. Informed patient my advise for her is continue with [facility] and the treatment  plan with septopres wraps and to continue with follow up with Marck Hudson as scheduled 11/30/23. Call placed to [facility] with [facility] to inform of the above conversation. Informed my advise to patient is to keep [facility] and to let them do the wound care as ordered and to follow up with Marck Hudson 11/30 or if she needs an appointment sooner we can schedule her an appointment sooner. Iman MCKEON verbalized understanding.

## 2023-11-16 ENCOUNTER — TELEPHONE (OUTPATIENT)
Dept: EMERGENCY DEPT | Facility: HOSPITAL | Age: 53
End: 2023-11-16
Payer: MEDICARE

## 2023-11-16 ENCOUNTER — TELEPHONE (OUTPATIENT)
Dept: HEMATOLOGY | Age: 53
End: 2023-11-16

## 2023-11-16 LAB
BACTERIA SPEC AEROBE CULT: ABNORMAL
GRAM STN SPEC: ABNORMAL
ISOLATED FROM: ABNORMAL

## 2023-11-16 NOTE — TELEPHONE ENCOUNTER
Patient called to cancel new patient appt with Nicki Lima on 11/16/23. Appointment was pushed out to 2/6/24. PCP notified.

## 2023-11-28 ENCOUNTER — HOSPITAL ENCOUNTER (OUTPATIENT)
Dept: PAIN MANAGEMENT | Age: 53
Discharge: HOME | End: 2023-11-28
Payer: MEDICARE

## 2023-11-28 VITALS
WEIGHT: 293 LBS | TEMPERATURE: 97.4 F | RESPIRATION RATE: 16 BRPM | DIASTOLIC BLOOD PRESSURE: 89 MMHG | BODY MASS INDEX: 55.32 KG/M2 | HEIGHT: 61 IN | SYSTOLIC BLOOD PRESSURE: 153 MMHG | OXYGEN SATURATION: 99 %

## 2023-11-28 DIAGNOSIS — G89.4 CHRONIC PAIN SYNDROME: Primary | ICD-10-CM

## 2023-11-28 PROCEDURE — 99215 OFFICE O/P EST HI 40 MIN: CPT

## 2023-11-28 RX ORDER — GABAPENTIN 600 MG/1
600 TABLET ORAL 3 TIMES DAILY
COMMUNITY

## 2023-11-28 RX ORDER — MORPHINE SULFATE 15 MG/1
15 TABLET, FILM COATED, EXTENDED RELEASE ORAL 2 TIMES DAILY
Qty: 6 TABLET | Refills: 0 | Status: SHIPPED | OUTPATIENT
Start: 2023-11-28 | End: 2023-11-30 | Stop reason: SDUPTHER

## 2023-11-28 RX ORDER — ZOLMITRIPTAN 5 MG/1
5 TABLET, FILM COATED ORAL PRN
COMMUNITY

## 2023-11-28 RX ORDER — GABAPENTIN 300 MG/1
300 CAPSULE ORAL
COMMUNITY

## 2023-11-28 RX ORDER — DOCUSATE SODIUM 100 MG/1
100 CAPSULE, LIQUID FILLED ORAL 2 TIMES DAILY
COMMUNITY

## 2023-11-28 RX ORDER — OXYCODONE AND ACETAMINOPHEN 7.5; 325 MG/1; MG/1
1 TABLET ORAL EVERY 8 HOURS PRN
Qty: 9 TABLET | Refills: 0 | Status: SHIPPED | OUTPATIENT
Start: 2023-11-28 | End: 2023-11-30 | Stop reason: SDUPTHER

## 2023-11-28 RX ORDER — TIRZEPATIDE 7.5 MG/.5ML
7.5 INJECTION, SOLUTION SUBCUTANEOUS WEEKLY
COMMUNITY

## 2023-11-28 ASSESSMENT — PAIN SCALES - GENERAL: PAINLEVEL_OUTOF10: 6

## 2023-11-28 ASSESSMENT — PAIN DESCRIPTION - INTENSITY: RATING_2: 4

## 2023-11-28 ASSESSMENT — PAIN DESCRIPTION - LOCATION
LOCATION: KNEE
LOCATION_2: HIP

## 2023-11-28 ASSESSMENT — PAIN DESCRIPTION - ORIENTATION
ORIENTATION_2: RIGHT
ORIENTATION: LEFT

## 2023-11-28 ASSESSMENT — PAIN DESCRIPTION - PAIN TYPE: TYPE: CHRONIC PAIN

## 2023-11-28 NOTE — PROGRESS NOTES
Clinic Documentation      Education Provided:  [x] Review Mirna Hays  [x] Agreement Review  [x] PEG Score Calculated [x] PHQ Score Calculated [x] ORT Score Calculated    [] Compliance Issues Discussed [] Cognitive Behavior Needs [x] Exercise [] Review of Test [] Financial Issues  [x] Tobacco/Alcohol Use Reviewed [x] Teaching [x] New Patient [x] Picture Obtained    Physician Plan:  [] Outgoing Referral  [] Pharmacy Consult  [] Test Ordered [x] Prescription Ordered/Changed   [] Obtained Test Results / Consult Notes        Complete if patient is withholding blood thinner for procedure     Blood Thinner Patient is currently taking:      [] Plavix (Hold for 7 days)  [] Aspirin (Hold for 5 days)     [] Pletal (Hold for 2 days)  [] Pradaxa (Hold for 3 days)    [] Effient (Hold for 7 days)  [] Xarelto (Hold for 2 days)    [] Eliquis (Hold for 2 days)  [] Brilinta (Hold for 7 days)    [] Coumadin (Hold for 5 days) - (INR needs to be drawn the day prior to procedure- INR < 2.0)    [] Aggrenox (Hold for 7 days)        [] Patient will stop medication on their own.    [] Blood Thinner Form Faxed for approval to hold.    Provider form faxed to:     Assessment Completed by:  Electronically signed by Saul Condon MA on 11/28/2023 at 2:56 PM         Clinic Documentation      Education Provided:  [x] Review ileana Mirza  [] Agreement Review  [x] PEG Score Calculated [] PHQ Score Calculated [] ORT Score Calculated    [] Compliance Issues Discussed [] Cognitive Behavior Needs [x] Exercise [] Review of Test [] Financial Issues  [x] Tobacco/Alcohol Use Reviewed [x] Teaching [] New Patient [] Picture Obtained    Physician Plan:  [] Outgoing Referral  [] Pharmacy Consult  [] Test Ordered [] Prescription Ordered/Changed   [] Obtained Test Results / Consult Notes        Complete if patient is withholding blood thinner for procedure     Blood Thinner Patient is currently taking:      [] Plavix (Hold for 7 days)  [] Aspirin (Hold for 5

## 2023-11-28 NOTE — H&P
[facility] Physical & Pain Medicine    History and Physical    Patient Name: Yeni oGnzalez    MR #: [id number]    Account [de-identified]    : 1970    Age: 48 y.o. Sex: female    Date: ***    PCP: NANETTE Guillen    Referring Provider:    Chief Complaint: No chief complaint on file. History of Present Illness: The patient is a 48 y.o. female who was referrred with primary complaints of ***. Does pain affect ADLs {YES / NO:} {AWV ADL FUNCTIONAL:2124071229}  Does pain affect quality of life? {YES / LS:18852}  Does pain affect activities of enjoyment? {YES / NW:90915}  Have you been on pain medication for your pain? {YES / YL:04349}  If so, does the pain medication keep your pain tolerable to perform the tasks that affect your pain? {YES / WX:39274}     Of Note: This is a work related injury {YES / SP:74299}    HPI    Patient is on or has tried and failed following medications:   Anticonvulsant: {MEDS; ANTICONVULSANTS:09323}; Antidepressant: {ANTIDEPRESSANTS:537006814}; Muscle relaxer:  {EVISIT MUSCLE RELAXANTS:2876449324},   NSAID: {EVISIT NSAIDS VO:8716090797}  Narcotics: {Narcotic list:28313}; Past Pain Management History  {PAIN INTERVENTIONAL PROCEDURES:09099}    {PAIN MANAG PROCEDURES PAIN EMD:853399268}    Past Imaging  {RADIOLOGY HKQAXO:642269002}    Past Therapy  ***  Has patient continued to complete (HEP) home exercise program as instructed by therapy?   {YES / NX:80987}    Screening Tools:     PEG: ***    Past PEG: NA    ORT: ***    PHQ-9: ***    Current Pain Assessment       Past Visit HPI:  na    Employment:     Past Medical History  Past Medical History:   Diagnosis Date    Anxiety     Constipation     Depression     GERD (gastroesophageal reflux disease)     Headache(784.0)     Hyperlipidemia     Hypertension     Kidney stones     Kidney stones     Morbid obesity due to excess calories (HCC)     Neuromuscular disorder (HCC)     Restless legs syndrome

## 2023-11-30 ENCOUNTER — HOSPITAL ENCOUNTER (OUTPATIENT)
Dept: WOUND CARE | Age: 53
Discharge: HOME | End: 2023-11-30

## 2023-11-30 DIAGNOSIS — G89.4 CHRONIC PAIN SYNDROME: ICD-10-CM

## 2023-11-30 DIAGNOSIS — G89.4 CHRONIC PAIN SYNDROME: Primary | ICD-10-CM

## 2023-11-30 RX ORDER — MORPHINE SULFATE 15 MG/1
15 TABLET, FILM COATED, EXTENDED RELEASE ORAL 2 TIMES DAILY
Qty: 60 TABLET | Refills: 0 | Status: CANCELLED | OUTPATIENT
Start: 2023-11-30 | End: 2023-12-30

## 2023-11-30 RX ORDER — OXYCODONE AND ACETAMINOPHEN 7.5; 325 MG/1; MG/1
1 TABLET ORAL EVERY 8 HOURS PRN
Qty: 90 TABLET | Refills: 0 | Status: CANCELLED | OUTPATIENT
Start: 2023-11-30 | End: 2023-12-30

## 2023-11-30 RX ORDER — OXYCODONE AND ACETAMINOPHEN 7.5; 325 MG/1; MG/1
1 TABLET ORAL EVERY 8 HOURS PRN
Qty: 9 TABLET | Refills: 0 | Status: SHIPPED | OUTPATIENT
Start: 2023-11-30 | End: 2023-12-01 | Stop reason: SDUPTHER

## 2023-11-30 RX ORDER — MORPHINE SULFATE 15 MG/1
15 TABLET, FILM COATED, EXTENDED RELEASE ORAL 2 TIMES DAILY
Qty: 6 TABLET | Refills: 0 | Status: SHIPPED | OUTPATIENT
Start: 2023-11-30 | End: 2023-12-01 | Stop reason: SDUPTHER

## 2023-11-30 NOTE — TELEPHONE ENCOUNTER
30 day scripts were not sent at the patient's appointment to the md to sign. Sent message to md requesting them to be signed today.

## 2023-12-01 RX ORDER — MORPHINE SULFATE 15 MG/1
15 TABLET, FILM COATED, EXTENDED RELEASE ORAL 2 TIMES DAILY
Qty: 60 TABLET | Refills: 0 | Status: SHIPPED | OUTPATIENT
Start: 2023-12-03 | End: 2024-01-02

## 2023-12-01 RX ORDER — OXYCODONE AND ACETAMINOPHEN 7.5; 325 MG/1; MG/1
1 TABLET ORAL EVERY 8 HOURS PRN
Qty: 90 TABLET | Refills: 0 | Status: SHIPPED | OUTPATIENT
Start: 2023-12-03 | End: 2024-01-02

## 2023-12-07 ENCOUNTER — HOSPITAL ENCOUNTER (OUTPATIENT)
Dept: WOUND CARE | Age: 53
Discharge: HOME | End: 2023-12-07

## 2023-12-27 DIAGNOSIS — G89.4 CHRONIC PAIN SYNDROME: ICD-10-CM

## 2023-12-27 RX ORDER — MORPHINE SULFATE 15 MG/1
15 TABLET, FILM COATED, EXTENDED RELEASE ORAL 2 TIMES DAILY
Qty: 60 TABLET | Refills: 0 | Status: SHIPPED | OUTPATIENT
Start: 2024-01-02 | End: 2024-02-01

## 2023-12-27 RX ORDER — OXYCODONE AND ACETAMINOPHEN 7.5; 325 MG/1; MG/1
1 TABLET ORAL EVERY 8 HOURS PRN
Qty: 90 TABLET | Refills: 0 | Status: SHIPPED | OUTPATIENT
Start: 2024-01-02 | End: 2024-02-01

## 2023-12-27 RX ORDER — GABAPENTIN 300 MG/1
300 CAPSULE ORAL DAILY
Qty: 30 CAPSULE | Refills: 0 | Status: SHIPPED | OUTPATIENT
Start: 2023-12-27 | End: 2024-01-26

## 2023-12-27 RX ORDER — GABAPENTIN 600 MG/1
600 TABLET ORAL 3 TIMES DAILY
Qty: 90 TABLET | Refills: 0 | Status: SHIPPED | OUTPATIENT
Start: 2023-12-27 | End: 2024-01-26

## 2024-01-11 DIAGNOSIS — G43.009 MIGRAINE WITHOUT AURA AND WITHOUT STATUS MIGRAINOSUS, NOT INTRACTABLE: ICD-10-CM

## 2024-01-11 RX ORDER — ZOLMITRIPTAN 5 MG/1
5 TABLET, FILM COATED ORAL ONCE AS NEEDED
Qty: 6 TABLET | OUTPATIENT
Start: 2024-01-11

## 2024-01-11 NOTE — TELEPHONE ENCOUNTER
Rx Refill Note  Requested Prescriptions     Pending Prescriptions Disp Refills    ZOLMitriptan (ZOMIG) 5 MG tablet [Pharmacy Med Name: ZOLMITRIPTAN 5MG TABLETS] 6 tablet 5     Sig: TAKE 1 TABLET BY MOUTH 1 TIME AS NEEDED FOR MIGRAINE      Last office visit with prescribing clinician: Visit date not found   Last telemedicine visit with prescribing clinician: Visit date not found   Next office visit with prescribing clinician: Visit date not found                         Would you like a call back once the refill request has been completed: [] Yes [] No    If the office needs to give you a call back, can they leave a voicemail: [] Yes [] No    Cristobal Henriquez MA  01/11/24, 14:58 CST

## 2024-01-12 PROBLEM — T07.XXXA WOUNDS, MULTIPLE: Status: ACTIVE | Noted: 2024-01-12

## 2024-01-12 PROBLEM — M15.0 PRIMARY OSTEOARTHRITIS INVOLVING MULTIPLE JOINTS: Status: ACTIVE | Noted: 2024-01-12

## 2024-01-12 PROBLEM — F11.90 CHRONIC, CONTINUOUS USE OF OPIOIDS: Status: ACTIVE | Noted: 2024-01-12

## 2024-01-12 PROBLEM — M15.9 PRIMARY OSTEOARTHRITIS INVOLVING MULTIPLE JOINTS: Status: ACTIVE | Noted: 2024-01-12

## 2024-01-12 PROBLEM — I89.0 LYMPHEDEMA: Status: ACTIVE | Noted: 2024-01-12

## 2024-01-13 ENCOUNTER — HOSPITAL ENCOUNTER (EMERGENCY)
Facility: HOSPITAL | Age: 54
Discharge: LEFT BEFORE DISCHARGE | End: 2024-01-13
Payer: MEDICARE

## 2024-01-13 VITALS
HEIGHT: 61 IN | SYSTOLIC BLOOD PRESSURE: 125 MMHG | HEART RATE: 90 BPM | RESPIRATION RATE: 16 BRPM | OXYGEN SATURATION: 97 % | DIASTOLIC BLOOD PRESSURE: 67 MMHG | BODY MASS INDEX: 49.28 KG/M2 | WEIGHT: 261 LBS | TEMPERATURE: 97.1 F

## 2024-01-13 DIAGNOSIS — R51.9 NONINTRACTABLE HEADACHE, UNSPECIFIED CHRONICITY PATTERN, UNSPECIFIED HEADACHE TYPE: Primary | ICD-10-CM

## 2024-01-13 PROCEDURE — 25010000002 DIPHENHYDRAMINE PER 50 MG: Performed by: INTERNAL MEDICINE

## 2024-01-13 PROCEDURE — 63710000001 ONDANSETRON ODT 4 MG TABLET DISPERSIBLE: Performed by: INTERNAL MEDICINE

## 2024-01-13 PROCEDURE — 96372 THER/PROPH/DIAG INJ SC/IM: CPT

## 2024-01-13 PROCEDURE — 99283 EMERGENCY DEPT VISIT LOW MDM: CPT

## 2024-01-13 PROCEDURE — 25010000002 DEXAMETHASONE PER 1 MG: Performed by: INTERNAL MEDICINE

## 2024-01-13 PROCEDURE — 25010000002 METOCLOPRAMIDE PER 10 MG: Performed by: INTERNAL MEDICINE

## 2024-01-13 RX ORDER — DEXAMETHASONE SODIUM PHOSPHATE 10 MG/ML
10 INJECTION INTRAMUSCULAR; INTRAVENOUS ONCE
Status: DISCONTINUED | OUTPATIENT
Start: 2024-01-13 | End: 2024-01-13

## 2024-01-13 RX ORDER — METOCLOPRAMIDE HYDROCHLORIDE 5 MG/ML
10 INJECTION INTRAMUSCULAR; INTRAVENOUS ONCE
Status: DISCONTINUED | OUTPATIENT
Start: 2024-01-13 | End: 2024-01-13

## 2024-01-13 RX ORDER — ONDANSETRON 4 MG/1
4 TABLET, ORALLY DISINTEGRATING ORAL ONCE
Status: COMPLETED | OUTPATIENT
Start: 2024-01-13 | End: 2024-01-13

## 2024-01-13 RX ORDER — SODIUM CHLORIDE 0.9 % (FLUSH) 0.9 %
10 SYRINGE (ML) INJECTION AS NEEDED
Status: DISCONTINUED | OUTPATIENT
Start: 2024-01-13 | End: 2024-01-13 | Stop reason: HOSPADM

## 2024-01-13 RX ORDER — DEXAMETHASONE SODIUM PHOSPHATE 10 MG/ML
10 INJECTION INTRAMUSCULAR; INTRAVENOUS ONCE
Status: COMPLETED | OUTPATIENT
Start: 2024-01-13 | End: 2024-01-13

## 2024-01-13 RX ORDER — METOCLOPRAMIDE HYDROCHLORIDE 5 MG/ML
10 INJECTION INTRAMUSCULAR; INTRAVENOUS ONCE
Status: COMPLETED | OUTPATIENT
Start: 2024-01-13 | End: 2024-01-13

## 2024-01-13 RX ORDER — DIPHENHYDRAMINE HYDROCHLORIDE 50 MG/ML
25 INJECTION INTRAMUSCULAR; INTRAVENOUS ONCE
Status: DISCONTINUED | OUTPATIENT
Start: 2024-01-13 | End: 2024-01-13

## 2024-01-13 RX ORDER — DIPHENHYDRAMINE HYDROCHLORIDE 50 MG/ML
25 INJECTION INTRAMUSCULAR; INTRAVENOUS ONCE
Status: COMPLETED | OUTPATIENT
Start: 2024-01-13 | End: 2024-01-13

## 2024-01-13 RX ADMIN — DEXAMETHASONE SODIUM PHOSPHATE 10 MG: 10 INJECTION INTRAMUSCULAR; INTRAVENOUS at 16:46

## 2024-01-13 RX ADMIN — METOCLOPRAMIDE HYDROCHLORIDE 10 MG: 5 INJECTION INTRAMUSCULAR; INTRAVENOUS at 16:46

## 2024-01-13 RX ADMIN — ONDANSETRON 4 MG: 4 TABLET, ORALLY DISINTEGRATING ORAL at 17:19

## 2024-01-13 RX ADMIN — DIPHENHYDRAMINE HYDROCHLORIDE 25 MG: 50 INJECTION, SOLUTION INTRAMUSCULAR; INTRAVENOUS at 16:46

## 2024-01-13 NOTE — DISCHARGE INSTRUCTIONS
Today you are leaving AGAINST MEDICAL ADVICE understand that you can return back to the ER for treatment at any time.  I do advise that you have labs and further evaluation for your migraine.  Follow-up with your primary care as needed

## 2024-01-13 NOTE — ED NOTES
Yusuf MENG at bedside to assess pt and start IV.  Pt states to Yusuf MENG that she only wants medicines ordered for H/A.  Pt also informs yusuf MENG that she is difficult stick and would prefer meds to be administered IM.  Yarelis JOLLEY updated.

## 2024-01-13 NOTE — ED PROVIDER NOTES
Subjective   History of Present Illness  patient a 53-year-old female who presents to the ER with a migraine that started today.  Patient states that she gets chronic migraines.  Patient states that her medication is not working at home.  Patient denies that this is the worst headache of her life.  Patient states this feels like her normal migraines.  She denies any other issues including chest pain or shortness of air at this time.        Review of Systems   Neurological:  Positive for headaches.   All other systems reviewed and are negative.      Past Medical History:   Diagnosis Date    Anxiety     Arthritis     Osteoarthritis primarily left knee     Back pain     Chronic pain syndrome 10/27/2021    Depression     Diabetes mellitus     Fibromyalgia, primary     History of transfusion     Hx of tear of meniscus of knee joint 11/21/2016    Hypertension     Joint pain     Kidney stone     Knee pain     Lymphedema of both lower extremities 10/27/2021    Migraine     Migraine headache     Pain     knee, left ankle, left ankle      Pes anserinus bursitis 04/28/2015    Restless leg        Allergies   Allergen Reactions    Compazine [Prochlorperazine Edisylate] Shortness Of Breath     Breathing     Meperidine Hives     (Demerol)     Norflex [Orphenadrine] Hives    Nortriptyline Hives    Prochlorperazine Shortness Of Breath    Prochlorperazine Maleate Shortness Of Breath    Vancomycin Other (See Comments) and Unknown - High Severity     CRITICAL LEVEL RESULTS - PATIENT LIKELY HAS METABOLIC / CLEARANCE ISSUE WITH VANCOMYCIN. ON STANDARD REGIMENS BASED ON BAYESIAN/POPULATION PK PARAMETERS, VANCOMYCIN TROUGH LEVELS IN THE 60s, 70s, 90s, 100s WERE OBTAINED. RECOMMEND AGAINST EVER USING VANCOMYCIN IN THIS PATIENT. IF VANCOMYCIN IS ABSOLUTELY INDICATED WITHOUT ANY OTHER OPTIONS, PULSE INTERMITTENT DOSING WOULD LIKELY BE THE ONLY APPROPRIATE METHOD.     Codeine Nausea And Vomiting     Rash     Penicillins Rash     Nausea &  vomiting     Amoxicillin-Pot Clavulanate Rash, Hives and Itching    Avelox [Moxifloxacin Hcl] Rash    Bacitracin Nausea And Vomiting    Calamine Itching    Cefazolin Nausea And Vomiting    Cephalexin Rash     (Keflex)     Ciprofloxacin Rash    Clindamycin/Lincomycin Rash    Doxycycline Nausea And Vomiting     Can take but need zofran before    Hydroxyzine Hcl Itching    Moxifloxacin Nausea And Vomiting    Orphenadrine Citrate Itching    Sulfamethoxazole-Trimethoprim Nausea And Vomiting and Rash     States she also gets yeast infections with it    Tobramycin Other (See Comments)     Eyes burn-feel like eyes are on fire      Vistaril [Hydroxyzine] Rash    Zinc Other (See Comments)     ERRYTHEMA       Past Surgical History:   Procedure Laterality Date    CATARACT EXTRACTION      CATARACT EXTRACTION      CATARACT EXTRACTION      CORNEAL TRANSPLANT      bilateral     ECTOPIC PREGNANCY      INCISION AND DRAINAGE ABSCESS Left 6/1/2022    Procedure: DEBRIDEMENTAND PULSE LAVAGE LT MEDIAL THIGH PACKING ODOFORM GAUZE  WOUND SACRAL AND BILATERAL CALVES;  Surgeon: Justin Perez MD;  Location:  [facility] OR;  Service: General;  Laterality: Left;    INCISION AND DRAINAGE TRUNK Right 11/26/2022    Procedure: INCISION AND DRAINAGE TRUNK;  Surgeon: Sara Guerrier MD;  Location:  [facility] OR;  Service: General;  Laterality: Right;    JOINT REPLACEMENT      KNEE ARTHROSCOPY W/ MENISCAL REPAIR      KNEE MENISCAL REPAIR      MENISCECTOMY Left 12/20/2016    TUBAL ABDOMINAL LIGATION         Family History   Problem Relation Age of Onset    No Known Problems Mother     Cancer Father     Dementia Father     Hypertension Maternal Grandmother     Cancer Maternal Grandfather        Social History     Socioeconomic History    Marital status: Single   Tobacco Use    Smoking status: Never    Smokeless tobacco: Never   Vaping Use    Vaping Use: Never used   Substance and Sexual Activity    Alcohol use: Yes    Drug use: No    Sexual activity: Not  Currently     Partners: Male           Objective   Physical Exam  Vitals and nursing note reviewed.   Constitutional:       General: She is not in acute distress.     Appearance: Normal appearance. She is normal weight. She is not toxic-appearing or diaphoretic.   HENT:      Head: Normocephalic and atraumatic.      Right Ear: External ear normal.      Left Ear: External ear normal.      Nose: Nose normal.      Mouth/Throat:      Mouth: Mucous membranes are moist.   Eyes:      General:         Right eye: No discharge.         Left eye: No discharge.      Extraocular Movements: Extraocular movements intact.      Conjunctiva/sclera: Conjunctivae normal.      Pupils: Pupils are equal, round, and reactive to light.   Cardiovascular:      Rate and Rhythm: Normal rate and regular rhythm.   Pulmonary:      Effort: Pulmonary effort is normal. No respiratory distress.      Breath sounds: Normal breath sounds. No rhonchi.   Abdominal:      General: Abdomen is flat. Bowel sounds are normal.      Palpations: Abdomen is soft.      Tenderness: There is no abdominal tenderness. There is no guarding or rebound.   Musculoskeletal:         General: No deformity or signs of injury. Normal range of motion.      Cervical back: Normal range of motion.   Skin:     General: Skin is warm.      Capillary Refill: Capillary refill takes less than 2 seconds.      Coloration: Skin is not jaundiced.   Neurological:      General: No focal deficit present.      Mental Status: She is alert and oriented to person, place, and time. Mental status is at baseline.   Psychiatric:         Mood and Affect: Mood normal.         Behavior: Behavior normal.         Thought Content: Thought content normal.         Judgment: Judgment normal.         Procedures           ED Course                                             Medical Decision Making  patient a 53-year-old female who presents to the ER with a migraine that started today.  Patient states that she gets  chronic migraines.  Patient states that her medication is not working at home.  Patient denies that this is the worst headache of her life.  Patient states this feels like her normal migraines.  She denies any other issues including chest pain or shortness of air at this time.  Patient was seen by a healthcare provider and left AMA. Pt was counseled on risks of worsening symptoms and death and verbalized understanding of risks.  Was stating the only treatment that she wanted was an injection of Stadol.  I explained that patient needed labs to make sure there was nothing acutely wrong with her I offered to have an ultrasound IV done and patient refused and stated she only wanted IM Stadol and she would leave at this time.  I had explained that Stadol is on national backorder and we did not have it at this hospital.  I explained it was AGAINST MEDICAL ADVICE and that she could return back to the ER at any time for further evaluation.  Patient gave verbal understanding will discharge home at this time    Problems Addressed:  Nonintractable headache, unspecified chronicity pattern, unspecified headache type: complicated acute illness or injury    Amount and/or Complexity of Data Reviewed  Labs: ordered.    Risk  Prescription drug management.        Final diagnoses:   Nonintractable headache, unspecified chronicity pattern, unspecified headache type       ED Disposition  ED Disposition       ED Disposition   AMA    Condition   --    Comment   --               Rhonda Knox, APRN  [address]  [facility] [address]  [phone]    Schedule an appointment as soon as possible for a visit       [facility] EMERGENCY DEPARTMENT  [address] [place] [id number] [phone]    If symptoms worsen         Medication List      No changes were made to your prescriptions during this visit.            Yarelis Chaudhary, APRN  01/13/24 [id number]

## 2024-01-30 DIAGNOSIS — G89.4 CHRONIC PAIN SYNDROME: ICD-10-CM

## 2024-01-30 RX ORDER — MORPHINE SULFATE 15 MG/1
15 TABLET, FILM COATED, EXTENDED RELEASE ORAL 2 TIMES DAILY
Qty: 60 TABLET | Refills: 0 | Status: SHIPPED | OUTPATIENT
Start: 2024-02-01 | End: 2024-03-02

## 2024-01-30 RX ORDER — OXYCODONE AND ACETAMINOPHEN 7.5; 325 MG/1; MG/1
1 TABLET ORAL EVERY 8 HOURS PRN
Qty: 90 TABLET | Refills: 0 | Status: SHIPPED | OUTPATIENT
Start: 2024-02-01 | End: 2024-03-02

## 2024-02-01 ENCOUNTER — TELEPHONE (OUTPATIENT)
Dept: PAIN MANAGEMENT | Age: 54
End: 2024-02-01

## 2024-02-02 ENCOUNTER — TELEPHONE (OUTPATIENT)
Dept: HEMATOLOGY | Age: 54
End: 2024-02-02

## 2024-02-05 ENCOUNTER — TELEPHONE (OUTPATIENT)
Dept: HEMATOLOGY | Age: 54
End: 2024-02-05

## 2024-02-07 ENCOUNTER — HOSPITAL ENCOUNTER (OUTPATIENT)
Dept: PAIN MANAGEMENT | Age: 54
Discharge: HOME | End: 2024-02-07
Payer: MEDICARE

## 2024-02-07 VITALS
OXYGEN SATURATION: 100 % | HEART RATE: 72 BPM | WEIGHT: 279 LBS | RESPIRATION RATE: 18 BRPM | SYSTOLIC BLOOD PRESSURE: 102 MMHG | DIASTOLIC BLOOD PRESSURE: 63 MMHG | BODY MASS INDEX: 52.67 KG/M2 | TEMPERATURE: 97.6 F | HEIGHT: 61 IN

## 2024-02-07 DIAGNOSIS — I89.0 LYMPHEDEMA: ICD-10-CM

## 2024-02-07 DIAGNOSIS — M70.61 TROCHANTERIC BURSITIS OF RIGHT HIP: ICD-10-CM

## 2024-02-07 DIAGNOSIS — F11.90 CHRONIC, CONTINUOUS USE OF OPIOIDS: ICD-10-CM

## 2024-02-07 DIAGNOSIS — Z51.81 ENCOUNTER FOR MONITORING OPIOID MAINTENANCE THERAPY: ICD-10-CM

## 2024-02-07 DIAGNOSIS — T07.XXXA WOUNDS, MULTIPLE: ICD-10-CM

## 2024-02-07 DIAGNOSIS — Z79.891 ENCOUNTER FOR MONITORING OPIOID MAINTENANCE THERAPY: ICD-10-CM

## 2024-02-07 DIAGNOSIS — M15.9 PRIMARY OSTEOARTHRITIS INVOLVING MULTIPLE JOINTS: Primary | ICD-10-CM

## 2024-02-07 DIAGNOSIS — G89.4 CHRONIC PAIN SYNDROME: ICD-10-CM

## 2024-02-07 PROCEDURE — 99214 OFFICE O/P EST MOD 30 MIN: CPT

## 2024-02-07 RX ORDER — OXYCODONE AND ACETAMINOPHEN 7.5; 325 MG/1; MG/1
1 TABLET ORAL EVERY 8 HOURS PRN
Qty: 90 TABLET | Refills: 0 | Status: SHIPPED | OUTPATIENT
Start: 2024-04-02 | End: 2024-02-08

## 2024-02-07 RX ORDER — GABAPENTIN 600 MG/1
600 TABLET ORAL 3 TIMES DAILY
Qty: 90 TABLET | Refills: 0 | Status: SHIPPED | OUTPATIENT
Start: 2024-02-07 | End: 2024-03-08

## 2024-02-07 RX ORDER — GABAPENTIN 300 MG/1
300 CAPSULE ORAL DAILY
Qty: 30 CAPSULE | Refills: 0 | Status: SHIPPED | OUTPATIENT
Start: 2024-02-07 | End: 2024-03-08

## 2024-02-07 ASSESSMENT — PAIN SCALES - GENERAL: PAINLEVEL_OUTOF10: 6

## 2024-02-07 ASSESSMENT — PAIN DESCRIPTION - LOCATION: LOCATION: KNEE;HIP

## 2024-02-07 ASSESSMENT — PAIN DESCRIPTION - PAIN TYPE: TYPE: CHRONIC PAIN

## 2024-02-07 NOTE — PROGRESS NOTES
Clinic Documentation      Education Provided:  [x] Review of Hermes  [] Agreement Review  [x] PEG Score Calculated [] PHQ Score Calculated [] ORT Score Calculated    [] Compliance Issues Discussed [] Cognitive Behavior Needs [x] Exercise [] Review of Test [] Financial Issues  [x] Tobacco/Alcohol Use Reviewed [x] Teaching [] New Patient [] Picture Obtained    Physician Plan:  [] Outgoing Referral  [] Pharmacy Consult  [x] Test Ordered [x] Prescription Ordered/Changed   [] Obtained Test Results / Consult Notes        Complete if patient is withholding blood thinner for procedure     Blood Thinner Patient is currently taking:      [] Plavix (Hold for 7 days)  [] Aspirin (Hold for 5 days)     [] Pletal (Hold for 2 days)  [] Pradaxa (Hold for 3 days)    [] Effient (Hold for 7 days)  [] Xarelto (Hold for 2 days)    [] Eliquis (Hold for 2 days)  [] Brilinta (Hold for 7 days)    [] Coumadin (Hold for 5 days) - (INR needs to be drawn the day prior to procedure- INR < 2.0)    [] Aggrenox (Hold for 7 days)        [] Patient will stop medication on their own.    [] Blood Thinner Form Faxed for approval to hold.   Provider form faxed to:     Assessment Completed by:  Electronically signed by Nida Lockhart MA on 2/7/2024 at 1:43 PM  
NANETTE Pappas, NANETTE - CNP, 2/8/2024 at 8:52 AM    EMR dragon/transcription disclaimer: Much of this encounter note is electronic transcription/translation of spoken language to printed tach. Electronic translation of spoken language may be erroneous, or at times, nonsensical words or phrases may be inadvertently transcribed. Although, I have reviewed the note for such errors, some may still exist.

## 2024-02-07 NOTE — TELEPHONE ENCOUNTER
1. Chronic pain syndrome      Requested Prescriptions     Pending Prescriptions Disp Refills    gabapentin (NEURONTIN) 300 MG capsule 30 capsule 0     Sig: Take 1 capsule by mouth daily for 30 days. (Patient takes at 4 pm)    gabapentin (NEURONTIN) 600 MG tablet 90 tablet 0     Sig: Take 1 tablet by mouth 3 times daily for 30 days.    oxyCODONE-acetaminophen (PERCOCET) 7.5-325 MG per tablet 90 tablet 0     Sig: Take 1 tablet by mouth every 8 hours as needed for Pain for up to 30 days. (May fill 03/02/24)       Continue medication with refill sent at appointment yes; refill sent to medical director at appointment yes, see refill encounter dated 2/7/2024    Electronically signed by NANETTE Brody CNP on 2/7/2024 at 3:32 PM

## 2024-02-08 DIAGNOSIS — G89.4 CHRONIC PAIN SYNDROME: ICD-10-CM

## 2024-02-08 PROBLEM — Z79.891 ENCOUNTER FOR MONITORING OPIOID MAINTENANCE THERAPY: Status: ACTIVE | Noted: 2024-02-08

## 2024-02-08 PROBLEM — Z51.81 ENCOUNTER FOR MONITORING OPIOID MAINTENANCE THERAPY: Status: ACTIVE | Noted: 2024-02-08

## 2024-02-08 ASSESSMENT — ENCOUNTER SYMPTOMS
BACK PAIN: 1
EYES NEGATIVE: 1
GASTROINTESTINAL NEGATIVE: 1
RESPIRATORY NEGATIVE: 1

## 2024-02-09 RX ORDER — OXYCODONE AND ACETAMINOPHEN 7.5; 325 MG/1; MG/1
1 TABLET ORAL EVERY 8 HOURS PRN
Qty: 90 TABLET | Refills: 0 | Status: SHIPPED | OUTPATIENT
Start: 2024-03-02 | End: 2024-04-01

## 2024-02-26 DIAGNOSIS — G89.4 CHRONIC PAIN SYNDROME: ICD-10-CM

## 2024-02-26 RX ORDER — MORPHINE SULFATE 15 MG/1
15 TABLET, FILM COATED, EXTENDED RELEASE ORAL 2 TIMES DAILY
Qty: 60 TABLET | Refills: 0 | Status: SHIPPED | OUTPATIENT
Start: 2024-03-09 | End: 2024-04-08

## 2024-03-14 NOTE — TELEPHONE ENCOUNTER
Pt called asking for toradol to get her through to her next appt.  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  She has not rescheduled this appt not knowing how well her mom will do and wether she can find a sitter.  Spoke with GOLDIE Delcid about the above and she has ok'd the order for toradol 10 mg 1 po every 6 hrs x 3 days.  Pt states she is fine with this and to call it into [facility].

## 2024-03-20 RX ORDER — KETOROLAC TROMETHAMINE 10 MG/1
10 TABLET, FILM COATED ORAL EVERY 6 HOURS
Qty: 12 TABLET | Refills: 0 | Status: SHIPPED | OUTPATIENT
Start: 2024-03-20 | End: 2024-03-23

## 2024-03-27 DIAGNOSIS — G89.4 CHRONIC PAIN SYNDROME: ICD-10-CM

## 2024-03-29 RX ORDER — OXYCODONE AND ACETAMINOPHEN 7.5; 325 MG/1; MG/1
1 TABLET ORAL EVERY 8 HOURS PRN
Qty: 90 TABLET | Refills: 0 | Status: SHIPPED | OUTPATIENT
Start: 2024-04-01 | End: 2024-05-01

## 2024-03-29 RX ORDER — MORPHINE SULFATE 15 MG/1
15 TABLET, FILM COATED, EXTENDED RELEASE ORAL 2 TIMES DAILY
Qty: 60 TABLET | Refills: 0 | Status: SHIPPED | OUTPATIENT
Start: 2024-04-09 | End: 2024-05-09

## 2024-04-02 DIAGNOSIS — M62.838 MUSCLE SPASM: Primary | ICD-10-CM

## 2024-04-02 DIAGNOSIS — G89.4 CHRONIC PAIN SYNDROME: ICD-10-CM

## 2024-04-02 RX ORDER — GABAPENTIN 600 MG/1
600 TABLET ORAL 3 TIMES DAILY
Qty: 90 TABLET | Refills: 0 | Status: CANCELLED | OUTPATIENT
Start: 2024-04-02 | End: 2024-05-02

## 2024-04-02 RX ORDER — CYCLOBENZAPRINE HCL 10 MG
10 TABLET ORAL 3 TIMES DAILY PRN
Qty: 90 TABLET | Refills: 2 | Status: SHIPPED | OUTPATIENT
Start: 2024-04-02 | End: 2024-07-01

## 2024-04-02 RX ORDER — GABAPENTIN 300 MG/1
300 CAPSULE ORAL DAILY
Qty: 30 CAPSULE | Refills: 0 | Status: CANCELLED | OUTPATIENT
Start: 2024-04-02 | End: 2024-05-02

## 2024-04-03 RX ORDER — GABAPENTIN 300 MG/1
300 CAPSULE ORAL DAILY
Qty: 30 CAPSULE | Refills: 0 | Status: SHIPPED | OUTPATIENT
Start: 2024-04-03 | End: 2024-05-03

## 2024-04-03 RX ORDER — GABAPENTIN 600 MG/1
600 TABLET ORAL 3 TIMES DAILY
Qty: 90 TABLET | Refills: 0 | Status: SHIPPED | OUTPATIENT
Start: 2024-04-03 | End: 2024-05-03

## 2024-04-17 ENCOUNTER — OFFICE VISIT (OUTPATIENT)
Age: 54
End: 2024-04-17
Payer: MEDICARE

## 2024-04-17 VITALS
DIASTOLIC BLOOD PRESSURE: 70 MMHG | RESPIRATION RATE: 20 BRPM | OXYGEN SATURATION: 98 % | BODY MASS INDEX: 58.6 KG/M2 | TEMPERATURE: 97.9 F | HEART RATE: 74 BPM | SYSTOLIC BLOOD PRESSURE: 118 MMHG | WEIGHT: 293 LBS

## 2024-04-17 DIAGNOSIS — R30.0 DYSURIA: Primary | ICD-10-CM

## 2024-04-17 DIAGNOSIS — N30.00 ACUTE CYSTITIS WITHOUT HEMATURIA: ICD-10-CM

## 2024-04-17 LAB
APPEARANCE FLUID: ABNORMAL
BILIRUBIN, POC: ABNORMAL
BLOOD URINE, POC: ABNORMAL
CLARITY, POC: ABNORMAL
COLOR, POC: ABNORMAL
GLUCOSE URINE, POC: ABNORMAL
KETONES, POC: ABNORMAL
LEUKOCYTE EST, POC: ABNORMAL
NITRITE, POC: ABNORMAL
PH, POC: 5
PROTEIN, POC: ABNORMAL
SPECIFIC GRAVITY, POC: 1.01
UROBILINOGEN, POC: 8

## 2024-04-17 PROCEDURE — 3078F DIAST BP <80 MM HG: CPT

## 2024-04-17 PROCEDURE — 81002 URINALYSIS NONAUTO W/O SCOPE: CPT

## 2024-04-17 PROCEDURE — 3074F SYST BP LT 130 MM HG: CPT

## 2024-04-17 PROCEDURE — 99213 OFFICE O/P EST LOW 20 MIN: CPT

## 2024-04-17 RX ORDER — FLUCONAZOLE 150 MG/1
150 TABLET ORAL ONCE
Qty: 1 TABLET | Refills: 0 | Status: SHIPPED | OUTPATIENT
Start: 2024-04-17 | End: 2024-04-17

## 2024-04-17 RX ORDER — TIRZEPATIDE 15 MG/.5ML
INJECTION, SOLUTION SUBCUTANEOUS
COMMUNITY
Start: 2024-02-27

## 2024-04-17 RX ORDER — NITROFURANTOIN 25; 75 MG/1; MG/1
100 CAPSULE ORAL 2 TIMES DAILY
Qty: 14 CAPSULE | Refills: 0 | Status: SHIPPED | OUTPATIENT
Start: 2024-04-17 | End: 2024-04-24

## 2024-04-17 RX ORDER — PHENAZOPYRIDINE HYDROCHLORIDE 100 MG/1
100 TABLET, FILM COATED ORAL 3 TIMES DAILY PRN
Qty: 3 TABLET | Refills: 0 | Status: SHIPPED | OUTPATIENT
Start: 2024-04-17 | End: 2024-04-19

## 2024-04-17 ASSESSMENT — ENCOUNTER SYMPTOMS
BLOOD IN STOOL: 0
NAUSEA: 0
SHORTNESS OF BREATH: 0
COUGH: 0
DIARRHEA: 0
ABDOMINAL PAIN: 0
VOMITING: 0
COLOR CHANGE: 0
CONSTIPATION: 0
EYE ITCHING: 0
SINUS PRESSURE: 0
WHEEZING: 0
EYE DISCHARGE: 0
SORE THROAT: 0
RHINORRHEA: 0

## 2024-04-17 NOTE — PROGRESS NOTES
[facility]  [facility]  [address] [place] [address] [address]IVE  [place] [address]  Dept: [phone]  Dept Fax: [phone]  Loc: [phone]    Tianna Trivedi is a 53 y.o. female who presents today for her medical conditions/complaints as noted below.  Tianna Trivedi is complaining of urinary pressure and Flank Pain (Left side/3 days)        HPI:   Flank Pain  This is a new problem. The problem has been gradually worsening since onset. The pain is present in the sacro-iliac. The quality of the pain is described as aching. The pain is moderate. Associated symptoms include dysuria. Pertinent negatives include no abdominal pain, bladder incontinence, chest pain, fever, headaches or pelvic pain.   Dysuria   This is a new problem. The problem has been waxing and waning. The quality of the pain is described as burning. The pain is moderate. There has been no fever. Associated symptoms include flank pain. Pertinent negatives include no chills, discharge, hematuria, nausea or vomiting.       Past Medical History:   Diagnosis Date    Anemia     Anxiety     Bipolar 1 disorder ([facility])     Chronic acquired lymphedema     Constipation     Depression     Depression     GERD (gastroesophageal reflux disease)     Headache(784.0)     Hyperlipidemia     Hypertension     Kidney stones     Kidney stones     Morbid obesity due to excess calories ([facility])     Neuromuscular disorder ([facility])     Primary osteoarthritis involving multiple joints 1/12/2024    Restless legs syndrome     Shingles     Type 2 diabetes mellitus ([facility])        Past Surgical History:   Procedure Laterality Date    BACK SURGERY      ECTOPIC PREGNANCY SURGERY  2002    EYE SURGERY Bilateral     2005 & 2007: cornea transplant and cataracts removed    FOOT SURGERY Bilateral     Heels    KNEE CARTILAGE SURGERY Left 12/20/2016    KNEE ARTHROSCOPIC PARTIAL MEDIAL MENISCECTOMY performed by Russell Serrano MD at [facility] OR    TUBAL LIGATION  2001

## 2024-04-17 NOTE — PATIENT INSTRUCTIONS
- Urine culture ordered, will call with results.  - Take full course of antibiotics  - Increase water intake  - Avoid baths or hot tubs  - The patient is to follow up with PCP or return to clinic if symptoms worsen/fail to improve.     Urgent Care evaluation today is not a substitute for PCP visit. Follow up care is your responsibility to discuss and review this [facility] visit.

## 2024-04-19 LAB
BACTERIA UR CULT: ABNORMAL
BACTERIA UR CULT: ABNORMAL
ORGANISM: ABNORMAL

## 2024-04-19 RX ORDER — CEPHALEXIN 500 MG/1
500 CAPSULE ORAL 2 TIMES DAILY
Qty: 20 CAPSULE | Refills: 0 | Status: SHIPPED | OUTPATIENT
Start: 2024-04-19 | End: 2024-04-29

## 2024-04-25 DIAGNOSIS — G89.4 CHRONIC PAIN SYNDROME: ICD-10-CM

## 2024-04-25 NOTE — TELEPHONE ENCOUNTER
Pt called the refill line requesting refills on percocet, gabapentin 300mg and 600mg, Morphine and Flexeril.  Flexeril has refills on it, and patient has an office visit with Arely on 05/06/24 Morphine will be sent in at that appointment to be filled 05/09/24.

## 2024-04-26 ENCOUNTER — OFFICE VISIT (OUTPATIENT)
Age: 54
End: 2024-04-26
Payer: MEDICARE

## 2024-04-26 VITALS
RESPIRATION RATE: 20 BRPM | DIASTOLIC BLOOD PRESSURE: 74 MMHG | OXYGEN SATURATION: 99 % | SYSTOLIC BLOOD PRESSURE: 126 MMHG | BODY MASS INDEX: 58.6 KG/M2 | TEMPERATURE: 97.7 F | WEIGHT: 293 LBS | HEART RATE: 78 BPM

## 2024-04-26 DIAGNOSIS — L23.9 ALLERGIC CONTACT DERMATITIS, UNSPECIFIED TRIGGER: Primary | ICD-10-CM

## 2024-04-26 PROCEDURE — 96372 THER/PROPH/DIAG INJ SC/IM: CPT

## 2024-04-26 PROCEDURE — 3074F SYST BP LT 130 MM HG: CPT

## 2024-04-26 PROCEDURE — 3078F DIAST BP <80 MM HG: CPT

## 2024-04-26 PROCEDURE — 99213 OFFICE O/P EST LOW 20 MIN: CPT

## 2024-04-26 RX ORDER — TRIAMCINOLONE ACETONIDE 1 MG/G
CREAM TOPICAL
Qty: 80 G | Refills: 0 | Status: SHIPPED | OUTPATIENT
Start: 2024-04-26

## 2024-04-26 RX ORDER — GABAPENTIN 600 MG/1
600 TABLET ORAL 3 TIMES DAILY
Qty: 90 TABLET | Refills: 0 | Status: SHIPPED | OUTPATIENT
Start: 2024-05-03 | End: 2024-06-02

## 2024-04-26 RX ORDER — OXYCODONE AND ACETAMINOPHEN 7.5; 325 MG/1; MG/1
1 TABLET ORAL EVERY 8 HOURS PRN
Qty: 90 TABLET | Refills: 0 | Status: SHIPPED | OUTPATIENT
Start: 2024-05-01 | End: 2024-05-31

## 2024-04-26 RX ORDER — DEXAMETHASONE SODIUM PHOSPHATE 10 MG/ML
5 INJECTION, SOLUTION INTRAMUSCULAR; INTRAVENOUS ONCE
Status: COMPLETED | OUTPATIENT
Start: 2024-04-26 | End: 2024-04-26

## 2024-04-26 RX ORDER — GABAPENTIN 300 MG/1
300 CAPSULE ORAL DAILY
Qty: 30 CAPSULE | Refills: 0 | Status: SHIPPED | OUTPATIENT
Start: 2024-05-03 | End: 2024-06-02

## 2024-04-26 RX ADMIN — DEXAMETHASONE SODIUM PHOSPHATE 5 MG: 10 INJECTION, SOLUTION INTRAMUSCULAR; INTRAVENOUS at 10:14

## 2024-04-26 ASSESSMENT — ENCOUNTER SYMPTOMS: SHORTNESS OF BREATH: 0

## 2024-04-26 NOTE — PATIENT INSTRUCTIONS
- Dexamethasone injection administered during clinic visit.  - Apply steroid cream as directed.  - OTC Children's Benadryl/Zyrtec as needed for itching.  - Avoid scratching as much as possible.  - May apply cool wet compresses to reduce itching.  - May soak in oatmeal baths and apply Calamine lotion for soothing effects.  - Avoid aggravated triggers or allergens.  - Monitor for any swelling of throat/lips/tongue, development of SOA or severe N/V/D. If these occur, see treatment in the ER immediately.  - Return to the clinic or follow up with PCP if symptoms worsen or fail to improve.   Return precautions and home care education completed. Patient verbalized understanding.

## 2024-04-26 NOTE — PROGRESS NOTES
Medication was administered by Kindra Hemphill MA at 10:15 AM.    Medication: dexamethasone  Amount: 5mg  Route: intramuscular  Site: Dorsogluteal right    Patient tolerated well.  
Failure Maternal Grandmother     Cancer Maternal Grandfather     Other Sister         spina biffida    No Known Problems Son     No Known Problems Son     No Known Problems Daughter     Obesity Maternal Aunt     Other Maternal Aunt          of sepsis related to a spider bite       Social History     Tobacco Use    Smoking status: Never    Smokeless tobacco: Never   Substance Use Topics    Alcohol use: Not Currently     Comment: \"socially, not recently though\"      Current Outpatient Medications   Medication Sig Dispense Refill    [START ON 5/3/2024] gabapentin (NEURONTIN) 300 MG capsule Take 1 capsule by mouth daily for 30 days. May fill 24  (Patient takes at 4 pm) 30 capsule 0    [START ON 5/3/2024] gabapentin (NEURONTIN) 600 MG tablet Take 1 tablet by mouth 3 times daily for 30 days. May fill 24 90 tablet 0    [START ON 2024] oxyCODONE-acetaminophen (PERCOCET) 7.5-325 MG per tablet Take 1 tablet by mouth every 8 hours as needed for Pain for up to 30 days. (May fill 24) 90 tablet 0    triamcinolone (KENALOG) 0.1 % cream Apply topically 2 times daily. 80 g 0    cephALEXin (KEFLEX) 500 MG capsule Take 1 capsule by mouth 2 times daily for 10 days 20 capsule 0    MOUNJARO 15 MG/0.5ML SOPN SC injection ADMINISTER 15 MG UNDER THE SKIN WEEKLY      cyclobenzaprine (FLEXERIL) 10 MG tablet Take 1 tablet by mouth 3 times daily as needed for Muscle spasms 90 tablet 2    morphine (MS CONTIN) 15 MG extended release tablet Take 1 tablet by mouth 2 times daily for 30 days. (May fill 24) 60 tablet 0    Tirzepatide (MOUNJARO) 7.5 MG/0.5ML SOPN SC injection Inject 0.5 mLs into the skin once a week      docusate sodium (COLACE) 100 MG capsule Take 1 capsule by mouth 2 times daily      Baclofen, Cmpd Kit, 2 % CREA Apply topically      ZOLMitriptan (ZOMIG) 5 MG tablet Take 1 tablet by mouth as needed for Migraine      topiramate (TOPAMAX) 25 MG tablet Take 1 tablet by mouth at bedtime      ondansetron

## 2024-05-02 NOTE — HOME HEALTH
No recent falls and no insurance changes. The pt was started on diflucan for a yeast infection and losartan for her BP. Pt/cg had no further questions or concerns regarding the pt's medications or plan of care. A&O X 4. Pt c/o pain in both of her feet. VSS. I educated the pt on her medications, fall and pressure injury prevention, edema, pain management, diabetes, diabetic foot care, S&S of infection, and how to perform wound care to her bottom. I applied 2 layer compression wraps bilaterally on the pt's legs from toes to knees. No S&S of infection noted in the pt's bottom. The pt hasn't gotten her Jeanine's butt cream yet due to issues with her pharmacy. I called and spoke with JOSE DAVID Jain with [facility] and notified them of the pt's increased pain in her feet, the pt needing more diflucan due to continued symptoms of a yeast infection, to see if the Jeanine's butt cream could be sent to a different pharmacy, the pt's darker colored urine even though she is staying hydrated, the pt's fasting BS of 150-160, and the pt's need for a smaller sized shower chair. I am waiting for a call back.
Detail Level: Zone

## 2024-05-06 ENCOUNTER — HOSPITAL ENCOUNTER (OUTPATIENT)
Dept: PAIN MANAGEMENT | Age: 54
Discharge: HOME | End: 2024-05-06
Payer: MEDICARE

## 2024-05-06 VITALS
RESPIRATION RATE: 16 BRPM | WEIGHT: 293 LBS | TEMPERATURE: 97.4 F | OXYGEN SATURATION: 100 % | HEIGHT: 61 IN | HEART RATE: 66 BPM | BODY MASS INDEX: 55.32 KG/M2 | SYSTOLIC BLOOD PRESSURE: 157 MMHG | DIASTOLIC BLOOD PRESSURE: 77 MMHG

## 2024-05-06 DIAGNOSIS — G89.4 CHRONIC PAIN SYNDROME: ICD-10-CM

## 2024-05-06 DIAGNOSIS — I89.0 LYMPHEDEMA: ICD-10-CM

## 2024-05-06 DIAGNOSIS — Z51.81 ENCOUNTER FOR MONITORING OPIOID MAINTENANCE THERAPY: ICD-10-CM

## 2024-05-06 DIAGNOSIS — G89.4 CHRONIC PAIN SYNDROME: Primary | ICD-10-CM

## 2024-05-06 DIAGNOSIS — Z79.891 ENCOUNTER FOR MONITORING OPIOID MAINTENANCE THERAPY: ICD-10-CM

## 2024-05-06 DIAGNOSIS — G25.81 RLS (RESTLESS LEGS SYNDROME): ICD-10-CM

## 2024-05-06 DIAGNOSIS — F11.90 CHRONIC, CONTINUOUS USE OF OPIOIDS: ICD-10-CM

## 2024-05-06 PROCEDURE — 99214 OFFICE O/P EST MOD 30 MIN: CPT

## 2024-05-06 ASSESSMENT — ENCOUNTER SYMPTOMS
EYES NEGATIVE: 1
GASTROINTESTINAL NEGATIVE: 1
BACK PAIN: 1
RESPIRATORY NEGATIVE: 1

## 2024-05-06 ASSESSMENT — PAIN DESCRIPTION - LOCATION: LOCATION: GENERALIZED

## 2024-05-06 ASSESSMENT — PAIN DESCRIPTION - PAIN TYPE: TYPE: CHRONIC PAIN

## 2024-05-06 ASSESSMENT — PAIN SCALES - GENERAL: PAINLEVEL_OUTOF10: 6

## 2024-05-06 NOTE — PROGRESS NOTES
[facility]    Office Note    Patient Name: Tianna Trivedi    MR #: [id number]    Account #:[id number]    : 1970    Age: 53 y.o.    Sex: female    Date: 2024    PCP: Herbert Epps MD    Chief Complaint:   Chief Complaint   Patient presents with    Generalized Body Aches     6/10       History of Present Illness:   The patient is a 53 y.o. female who presents to the office for a 3 month follow up.     At last visit, patient completed a urine drug screen that showed positive for methamphetamine but d- methamphetamine was negative. Repeat urine drug screen completed at todays visit.    Patient was scheduled for a right trochanteric bursa injection with toradol and left knee injection with toradol but had to cancel her injections related to her mother being on hospice.     Since last visit, patient has seen [facility] for knee pain. Patient had bilateral knee injections with kenalog related to severe degenerative joint disease. Patient has lymphedema and is not a candidate for total knee replacements.     Patient continues Morphine ER, oxycodone, gabapentin, & flexeril with some relief in pain. Patient noted to be in a wheelchair for visit today.     Of Note: This is a work related injury No    Hip Pain  This is a chronic problem. The current episode started more than 1 year ago. The problem occurs constantly. The problem has been gradually worsening. Associated symptoms include arthralgias, joint swelling, myalgias and weakness. Pertinent negatives include no numbness. The symptoms are aggravated by standing and walking (prolonged sitting). She has tried position changes, oral narcotics, NSAIDs, ice, heat and rest for the symptoms. The treatment provided moderate relief.   Knee Pain  This is a chronic problem. The current episode started more than 1 year ago. The problem occurs constantly. The problem has been waxing and waning. Associated symptoms include arthralgias, joint

## 2024-05-06 NOTE — PROGRESS NOTES
Clinic Documentation      Education Provided:  [x] Review of Hermes  [] Agreement Review  [x] PEG Score Calculated [] PHQ Score Calculated [] ORT Score Calculated    [] Compliance Issues Discussed [] Cognitive Behavior Needs [x] Exercise [] Review of Test [] Financial Issues  [x] Tobacco/Alcohol Use Reviewed [x] Teaching [] New Patient [] Picture Obtained    Physician Plan:  [] Outgoing Referral  [] Pharmacy Consult  [x] Test Ordered [x] Prescription Ordered/Changed   [] Obtained Test Results / Consult Notes        Complete if patient is withholding blood thinner for procedure     Blood Thinner Patient is currently taking:      [] Plavix (Hold for 7 days)  [] Aspirin (Hold for 5 days)     [] Pletal (Hold for 2 days)  [] Pradaxa (Hold for 3 days)    [] Effient (Hold for 7 days)  [] Xarelto (Hold for 2 days)    [] Eliquis (Hold for 2 days)  [] Brilinta (Hold for 7 days)    [] Coumadin (Hold for 5 days) - (INR needs to be drawn the day prior to procedure- INR < 2.0)    [] Aggrenox (Hold for 7 days)        [] Patient will stop medication on their own.    [] Blood Thinner Form Faxed for approval to hold.   Provider form faxed to:     Assessment Completed by:  Electronically signed by Ariana Perez MA on 5/6/2024 at 1:18 PM

## 2024-05-06 NOTE — TELEPHONE ENCOUNTER
1. Chronic pain syndrome      Requested Prescriptions     Pending Prescriptions Disp Refills    gabapentin (NEURONTIN) 300 MG capsule 30 capsule 0     Sig: Take 1 capsule by mouth daily for 30 days. May fill 06/2/24  (Patient takes at 4 pm)    gabapentin (NEURONTIN) 600 MG tablet 90 tablet 0     Sig: Take 1 tablet by mouth 3 times daily for 30 days. May fill 06/02/24    oxyCODONE-acetaminophen (PERCOCET) 7.5-325 MG per tablet 90 tablet 0     Sig: Take 1 tablet by mouth every 8 hours as needed for Pain for up to 30 days. (May fill 05/31/24)    morphine (MS CONTIN) 15 MG extended release tablet 60 tablet 0     Sig: Take 1 tablet by mouth 2 times daily for 30 days. (May fill 5/9/24)       Continue medication with refill sent at appointment yes; refill sent to medical director at appointment no, see refill encounter dated 5/6/2024    Electronically signed by NANETTE Brody CNP on 5/6/2024 at 1:13 PM

## 2024-05-07 RX ORDER — MORPHINE SULFATE 15 MG/1
15 TABLET, FILM COATED, EXTENDED RELEASE ORAL 2 TIMES DAILY
Qty: 60 TABLET | Refills: 0 | Status: SHIPPED | OUTPATIENT
Start: 2024-05-09 | End: 2024-06-08

## 2024-05-07 RX ORDER — GABAPENTIN 600 MG/1
600 TABLET ORAL 3 TIMES DAILY
Qty: 90 TABLET | Refills: 0 | Status: SHIPPED | OUTPATIENT
Start: 2024-06-02 | End: 2024-07-02

## 2024-05-07 RX ORDER — OXYCODONE AND ACETAMINOPHEN 7.5; 325 MG/1; MG/1
1 TABLET ORAL EVERY 8 HOURS PRN
Qty: 90 TABLET | Refills: 0 | Status: SHIPPED | OUTPATIENT
Start: 2024-05-31 | End: 2024-06-30

## 2024-05-07 RX ORDER — GABAPENTIN 300 MG/1
300 CAPSULE ORAL DAILY
Qty: 30 CAPSULE | Refills: 0 | Status: SHIPPED | OUTPATIENT
Start: 2024-06-02 | End: 2024-07-02

## 2024-05-23 ENCOUNTER — HOSPITAL ENCOUNTER (OUTPATIENT)
Dept: PAIN MANAGEMENT | Age: 54
Discharge: HOME | End: 2024-05-23

## 2024-05-29 DIAGNOSIS — G89.4 CHRONIC PAIN SYNDROME: ICD-10-CM

## 2024-05-31 RX ORDER — MORPHINE SULFATE 15 MG/1
15 TABLET, FILM COATED, EXTENDED RELEASE ORAL 2 TIMES DAILY
Qty: 60 TABLET | Refills: 0 | Status: SHIPPED | OUTPATIENT
Start: 2024-06-08 | End: 2024-07-08

## 2024-06-11 DIAGNOSIS — G89.4 CHRONIC PAIN SYNDROME: ICD-10-CM

## 2024-06-11 RX ORDER — MORPHINE SULFATE 15 MG/1
15 TABLET, FILM COATED, EXTENDED RELEASE ORAL 2 TIMES DAILY
Qty: 60 TABLET | Refills: 0 | Status: SHIPPED | OUTPATIENT
Start: 2024-06-11 | End: 2024-07-11

## 2024-06-13 ENCOUNTER — TELEPHONE (OUTPATIENT)
Dept: PAIN MANAGEMENT | Age: 54
End: 2024-06-13

## 2024-06-13 NOTE — TELEPHONE ENCOUNTER
Pt was covered with medicaid and medicare D, pt is no longer covered by part D and Medicaid denied the request.  I called the pharmacy and was told pt paid out of pocket with a discount card until she figures out her insurance.

## 2024-06-20 ENCOUNTER — HOSPITAL ENCOUNTER (OUTPATIENT)
Dept: WOUND CARE | Age: 54
Discharge: HOME | End: 2024-06-20

## 2024-06-25 DIAGNOSIS — G89.4 CHRONIC PAIN SYNDROME: ICD-10-CM

## 2024-06-25 RX ORDER — GABAPENTIN 600 MG/1
600 TABLET ORAL 3 TIMES DAILY
Qty: 90 TABLET | Refills: 0 | Status: SHIPPED | OUTPATIENT
Start: 2024-07-02 | End: 2024-08-01

## 2024-06-25 RX ORDER — MORPHINE SULFATE 15 MG/1
15 TABLET, FILM COATED, EXTENDED RELEASE ORAL 2 TIMES DAILY
Qty: 60 TABLET | Refills: 0 | Status: SHIPPED | OUTPATIENT
Start: 2024-07-13 | End: 2024-08-12

## 2024-06-25 RX ORDER — GABAPENTIN 300 MG/1
300 CAPSULE ORAL DAILY
Qty: 30 CAPSULE | Refills: 0 | Status: SHIPPED | OUTPATIENT
Start: 2024-07-02 | End: 2024-08-01

## 2024-06-25 RX ORDER — OXYCODONE AND ACETAMINOPHEN 7.5; 325 MG/1; MG/1
1 TABLET ORAL EVERY 8 HOURS PRN
Qty: 90 TABLET | Refills: 0 | Status: SHIPPED | OUTPATIENT
Start: 2024-06-29 | End: 2024-07-29

## 2024-07-02 ENCOUNTER — HOSPITAL ENCOUNTER (OUTPATIENT)
Dept: WOUND CARE | Age: 54
Discharge: HOME | End: 2024-07-02
Payer: MEDICARE

## 2024-07-02 VITALS
WEIGHT: 293 LBS | HEART RATE: 79 BPM | HEIGHT: 61 IN | SYSTOLIC BLOOD PRESSURE: 138 MMHG | RESPIRATION RATE: 20 BRPM | TEMPERATURE: 97.2 F | DIASTOLIC BLOOD PRESSURE: 75 MMHG | BODY MASS INDEX: 55.32 KG/M2

## 2024-07-02 DIAGNOSIS — E11.622 DIABETIC ULCER OF LEFT LOWER LEG ASSOCIATED WITH TYPE 2 DIABETES MELLITUS, WITH FAT LAYER EXPOSED (HCC): Chronic | ICD-10-CM

## 2024-07-02 DIAGNOSIS — I87.2 VENOUS INSUFFICIENCY OF BOTH LOWER EXTREMITIES: ICD-10-CM

## 2024-07-02 DIAGNOSIS — E66.01 MORBID OBESITY WITH BMI OF 50.0-59.9, ADULT (HCC): ICD-10-CM

## 2024-07-02 DIAGNOSIS — L97.422 DIABETIC ULCER OF LEFT HEEL ASSOCIATED WITH TYPE 2 DIABETES MELLITUS, WITH FAT LAYER EXPOSED (HCC): Primary | ICD-10-CM

## 2024-07-02 DIAGNOSIS — L97.922 DIABETIC ULCER OF LEFT LOWER LEG ASSOCIATED WITH TYPE 2 DIABETES MELLITUS, WITH FAT LAYER EXPOSED (HCC): Chronic | ICD-10-CM

## 2024-07-02 DIAGNOSIS — E11.621 DIABETIC ULCER OF LEFT HEEL ASSOCIATED WITH TYPE 2 DIABETES MELLITUS, WITH FAT LAYER EXPOSED (HCC): Primary | ICD-10-CM

## 2024-07-02 DIAGNOSIS — E11.65 UNCONTROLLED TYPE 2 DIABETES MELLITUS WITH HYPERGLYCEMIA, WITHOUT LONG-TERM CURRENT USE OF INSULIN (HCC): Chronic | ICD-10-CM

## 2024-07-02 DIAGNOSIS — I89.0 LYMPHEDEMA: ICD-10-CM

## 2024-07-02 DIAGNOSIS — I89.0 LYMPHEDEMA OF BOTH LOWER EXTREMITIES: ICD-10-CM

## 2024-07-02 PROCEDURE — 99215 OFFICE O/P EST HI 40 MIN: CPT

## 2024-07-02 PROCEDURE — 99215 OFFICE O/P EST HI 40 MIN: CPT | Performed by: NURSE PRACTITIONER

## 2024-07-02 RX ORDER — LIDOCAINE HYDROCHLORIDE 20 MG/ML
JELLY TOPICAL ONCE
OUTPATIENT
Start: 2024-07-02 | End: 2024-07-02

## 2024-07-02 RX ORDER — IBUPROFEN 200 MG
TABLET ORAL ONCE
OUTPATIENT
Start: 2024-07-02 | End: 2024-07-02

## 2024-07-02 RX ORDER — LIDOCAINE 40 MG/G
CREAM TOPICAL ONCE
OUTPATIENT
Start: 2024-07-02 | End: 2024-07-02

## 2024-07-02 RX ORDER — GINSENG 100 MG
CAPSULE ORAL ONCE
OUTPATIENT
Start: 2024-07-02 | End: 2024-07-02

## 2024-07-02 RX ORDER — SODIUM CHLOR/HYPOCHLOROUS ACID 0.033 %
SOLUTION, IRRIGATION IRRIGATION ONCE
OUTPATIENT
Start: 2024-07-02 | End: 2024-07-02

## 2024-07-02 RX ORDER — BACITRACIN ZINC AND POLYMYXIN B SULFATE 500; 1000 [USP'U]/G; [USP'U]/G
OINTMENT TOPICAL ONCE
OUTPATIENT
Start: 2024-07-02 | End: 2024-07-02

## 2024-07-02 RX ORDER — GENTAMICIN SULFATE 1 MG/G
OINTMENT TOPICAL ONCE
OUTPATIENT
Start: 2024-07-02 | End: 2024-07-02

## 2024-07-02 RX ORDER — LIDOCAINE 50 MG/G
OINTMENT TOPICAL ONCE
OUTPATIENT
Start: 2024-07-02 | End: 2024-07-02

## 2024-07-02 RX ORDER — CLOBETASOL PROPIONATE 0.5 MG/G
OINTMENT TOPICAL ONCE
OUTPATIENT
Start: 2024-07-02 | End: 2024-07-02

## 2024-07-02 RX ORDER — TRIAMCINOLONE ACETONIDE 1 MG/G
OINTMENT TOPICAL ONCE
OUTPATIENT
Start: 2024-07-02 | End: 2024-07-02

## 2024-07-02 RX ORDER — BETAMETHASONE DIPROPIONATE 0.5 MG/G
CREAM TOPICAL ONCE
OUTPATIENT
Start: 2024-07-02 | End: 2024-07-02

## 2024-07-02 RX ORDER — CYCLOBENZAPRINE HCL 10 MG
10 TABLET ORAL 3 TIMES DAILY PRN
COMMUNITY

## 2024-07-02 RX ORDER — LIDOCAINE HYDROCHLORIDE 40 MG/ML
SOLUTION TOPICAL ONCE
OUTPATIENT
Start: 2024-07-02 | End: 2024-07-02

## 2024-07-02 RX ORDER — INSULIN GLARGINE 100 [IU]/ML
10 INJECTION, SOLUTION SUBCUTANEOUS NIGHTLY
COMMUNITY

## 2024-07-02 ASSESSMENT — PAIN SCALES - GENERAL: PAINLEVEL_OUTOF10: 8

## 2024-07-02 ASSESSMENT — PAIN DESCRIPTION - ORIENTATION: ORIENTATION: RIGHT;LEFT

## 2024-07-02 ASSESSMENT — PAIN DESCRIPTION - DESCRIPTORS: DESCRIPTORS: ACHING

## 2024-07-02 ASSESSMENT — PAIN DESCRIPTION - LOCATION: LOCATION: LEG

## 2024-07-02 ASSESSMENT — VISUAL ACUITY: OU: 1

## 2024-07-02 NOTE — PATIENT INSTRUCTIONS
[facility] Wound Care and Hyperbaric Oxygen Therapy   Physician Orders and Discharge Instructions  [address] [place] [address] Drive  [address]  [place] [address]  Telephone: [phone]      FAX [phone]    NAME:  Tianna Trivedi  YOB: 1970  MEDICAL RECORD NUMBER:  [id number]  DATE:  7/2/2024    Discharge condition: Stable    Discharge to: Home    Left via:Private automobile    Accompanied by:  spouse    ECF/HHA: [facility]    Please go downstairs in this building to Room 103 to register. The test will be completed in Room 401. Please be aware of appointment time for this test.  Please arrive to room 103 at 3 on August 1st for test. Please do not smoke prior to test.    Dressing Orders:  Left leg wound: soap and water wash, apply silver ashok AG, secure with optilock, ace wrap daily.  Left foot wound: soap and water wash, apply iodoflex to the wound bed, secure with rolled gauze and ace wrap daily.    Treatment Orders:  Protein rich diet (unless restricted by your physician); Multivitamin daily; Elevate legs above the level of your heart when sitting 3-4 times daily for at least one hour each time, avoid standing for long periods of time.    [facility] follow up visit ___2 week with Jina__________________________  (Please note your next appointment above and if you are unable to keep, kindly give a 24 hour notice. Thank you.)          If you experience any of the following, please call the Wound Care Center during business hours:    * Increase in Pain  * Temperature over 101  * Increase in drainage from your wound  * Drainage with a foul odor  * Bleeding  * Increase in swelling  * Need for compression bandage changes due to slippage, breakthrough drainage.    If you need medical attention outside of the business hours of the Wound Care Centers please contact your PCP or go to the nearest emergency room.

## 2024-07-02 NOTE — DISCHARGE INSTRUCTIONS
[facility] Wound Care and Hyperbaric Oxygen Therapy   Physician Orders and Discharge Instructions  [address] [place] [address] Drive  [address]  [place] [address]  Telephone: [phone]      FAX [phone]    NAME:  Tianna Trivedi  YOB: 1970  MEDICAL RECORD NUMBER:  [id number]  DATE:  7/2/2024    Discharge condition: Stable    Discharge to: Home    Left via:Private automobile    Accompanied by:  spouse    ECF/HHA: [facility]    Please go downstairs in this building to Room 103 to register. The test will be completed in Room 401. Please be aware of appointment time for this test.  Please arrive to room 103 at 3 on August 1st for test. Please do not smoke prior to test.    Dressing Orders:  Left leg wound: soap and water wash, apply silver ashok AG, secure with optilock, ace wrap daily.  Left foot wound: soap and water wash, apply iodoflex to the wound bed, secure with rolled gauze and ace wrap daily.    Treatment Orders:  Protein rich diet (unless restricted by your physician); Multivitamin daily; Elevate legs above the level of your heart when sitting 3-4 times daily for at least one hour each time, avoid standing for long periods of time.    [facility] follow up visit ___2 week with Jina__________________________  (Please note your next appointment above and if you are unable to keep, kindly give a 24 hour notice. Thank you.)          If you experience any of the following, please call the Wound Care Center during business hours:    * Increase in Pain  * Temperature over 101  * Increase in drainage from your wound  * Drainage with a foul odor  * Bleeding  * Increase in swelling  * Need for compression bandage changes due to slippage, breakthrough drainage.    If you need medical attention outside of the business hours of the Wound Care Centers please contact your PCP or go to the nearest emergency room.

## 2024-07-02 NOTE — HOME CARE
1451   Number of days: 0       Wound 07/02/24 Pretibial Left;Lower wound 3-left leg medial anterior (Active)   Wound Image   07/02/24 1451   Wound Etiology Venous 07/02/24 1451   Dressing Status Old drainage noted 07/02/24 1451   Wound Cleansed Soap and water 07/02/24 1451   Dressing/Treatment Ace wrap;Alginate with Ag 07/02/24 1611   Wound Length (cm) 1.5 cm 07/02/24 1451   Wound Width (cm) 2 cm 07/02/24 1451   Wound Depth (cm) 0.1 cm 07/02/24 1451   Wound Surface Area (cm^2) 3 cm^2 07/02/24 1451   Wound Volume (cm^3) 0.3 cm^3 07/02/24 1451   Wound Assessment Pink/red 07/02/24 1451   Drainage Amount Moderate (25-50%) 07/02/24 1451   Drainage Description Serosanguinous 07/02/24 1451   Odor None 07/02/24 1451   Ocatvia-wound Assessment Edematous 07/02/24 1451   Margins Defined edges 07/02/24 1451   Wound Thickness Description not for Pressure Injury Full thickness 07/02/24 1451   Number of days: 0       Wound 07/02/24 Ankle Left;Anterior wound 4-left ankle lateral venous (Active)   Wound Image   07/02/24 1451   Wound Etiology Venous 07/02/24 1451   Dressing Status Old drainage noted 07/02/24 1451   Wound Cleansed Soap and water 07/02/24 1451   Dressing/Treatment Ace wrap;Alginate with Ag 07/02/24 1611   Wound Length (cm) 9 cm 07/02/24 1451   Wound Width (cm) 10 cm 07/02/24 1451   Wound Depth (cm) 0.1 cm 07/02/24 1451   Wound Surface Area (cm^2) 90 cm^2 07/02/24 1451   Wound Volume (cm^3) 9 cm^3 07/02/24 1451   Wound Assessment Slough;Pink/red 07/02/24 1451   Drainage Amount Moderate (25-50%) 07/02/24 1451   Drainage Description Serosanguinous 07/02/24 1451   Odor None 07/02/24 1451   Octavia-wound Assessment Edematous 07/02/24 1451   Margins Flat/open edges 07/02/24 1451   Wound Thickness Description not for Pressure Injury Full thickness 07/02/24 1451   Number of days: 0       Wound 07/02/24 Pedal Left wound 5- left heel wag 1 (Active)   Wound Image   07/02/24 1451   Wound Etiology Diabetic Lindo 1 07/02/24 1451

## 2024-07-02 NOTE — PROGRESS NOTES
Patient Care Team:  Herbert Epps MD as PCP - General (Family Medicine)  Conchis Menendez APRN as Advanced Practice Nurse (Neurology)    TODAY'S DATE:  7/2/2024     HISTORY of PRESENTILLNESS HPI   Tianna Trivedi is a 53 y.o. female who presents today for wound evaluation.  She reports she developed a wound on left leg and foot.This started 3 year(s) ago, they will heal and reopen. She believes this is not healing. She has been applying silver ashok AG and ace wrap. She has not had  fever orchills. She has a history of diabetes mellitus (HGBA1c 7.1) and lymphedema.  Wound Type:venous and diabetic  Wound Location: left leg and left foot  Modifying factors:lymphedema, diabetes, poor glucose control, chronic pressure, shear force, and obesity    Patient Active Problem List   Diagnosis Code    RLS (restless legs syndrome) G25.81    Hypertension I10    Headache R51.9    Venous insufficiency of both lower extremities I87.2    Mild single current episode of major depressive disorder ([facility]) F32.0    [place] (generalized anxiety disorder) F41.1    Morbid obesity due to excess calories ([facility]) E66.01    Old complex tear of medial meniscus of left knee M23.204    Trochanteric bursitis of right hip M70.61    Uncontrolled type 2 diabetes mellitus with hyperglycemia, without long-term current use of insulin ([facility]) E11.65    Migraine without aura and without status migrainosus, not intractable G43.009    Morbid obesity with BMI of 50.0-59.9, adult ([facility]) E66.01, Z68.43    Uncontrolled type 2 diabetes mellitus with diabetic neuropathy, with long-term current use of insulin [id number]    Dyspnea R06.00    Upper respiratory tract infection J06.9    Pleurisy R09.1    Gastroesophageal reflux disease K21.9    Hypoglycemia E16.2    Hypokalemia E87.6    Recurrent cellulitis of lower extremity L03.119    Cellulitis of both lower extremities L03.115, L03.116    Lymphedema of both lower extremities I89.0    Diabetic ulcer of left lower leg associated

## 2024-07-08 ENCOUNTER — TELEPHONE (OUTPATIENT)
Dept: WOUND CARE | Age: 54
End: 2024-07-08

## 2024-07-08 NOTE — TELEPHONE ENCOUNTER
Returned patient call re: supplies. Follow up call with [facility] [phone], noted order has been shipped out today to patient. Patient was informed that supplies were shipped today today. Should get in a couple of days. To call back to wound care center if not received in a few days. Patient verbalized understanding.

## 2024-07-09 ENCOUNTER — TELEPHONE (OUTPATIENT)
Dept: WOUND CARE | Age: 54
End: 2024-07-09

## 2024-07-09 NOTE — TELEPHONE ENCOUNTER
Returned patient call re: wound care supplies. Reviewed after visit summary instructions and how to do the wound care. Patient stated she would look through the supplies received again and if she still has questions or the products do not seem right she will call [facility], phone number int eh packing received. If she continues to have questions re: the supplies will call the [facility] to follow up with [facility](JOSE ALEJANDRO).

## 2024-07-15 ENCOUNTER — TELEPHONE (OUTPATIENT)
Dept: PAIN MANAGEMENT | Age: 54
End: 2024-07-15

## 2024-07-18 ENCOUNTER — HOSPITAL ENCOUNTER (OUTPATIENT)
Dept: WOUND CARE | Age: 54
Discharge: HOME | End: 2024-07-18
Payer: MEDICARE

## 2024-07-18 VITALS
TEMPERATURE: 97.4 F | HEIGHT: 61 IN | HEART RATE: 73 BPM | BODY MASS INDEX: 55.32 KG/M2 | SYSTOLIC BLOOD PRESSURE: 115 MMHG | RESPIRATION RATE: 20 BRPM | DIASTOLIC BLOOD PRESSURE: 66 MMHG | WEIGHT: 293 LBS

## 2024-07-18 DIAGNOSIS — E11.622 DIABETIC ULCER OF LEFT LOWER LEG ASSOCIATED WITH TYPE 2 DIABETES MELLITUS, WITH FAT LAYER EXPOSED (HCC): Chronic | ICD-10-CM

## 2024-07-18 DIAGNOSIS — L97.922 DIABETIC ULCER OF LEFT LOWER LEG ASSOCIATED WITH TYPE 2 DIABETES MELLITUS, WITH FAT LAYER EXPOSED (HCC): Chronic | ICD-10-CM

## 2024-07-18 DIAGNOSIS — E11.621 DIABETIC ULCER OF LEFT HEEL ASSOCIATED WITH TYPE 2 DIABETES MELLITUS, WITH FAT LAYER EXPOSED (HCC): Primary | ICD-10-CM

## 2024-07-18 DIAGNOSIS — G89.4 CHRONIC PAIN SYNDROME: ICD-10-CM

## 2024-07-18 DIAGNOSIS — L97.422 DIABETIC ULCER OF LEFT HEEL ASSOCIATED WITH TYPE 2 DIABETES MELLITUS, WITH FAT LAYER EXPOSED (HCC): Primary | ICD-10-CM

## 2024-07-18 DIAGNOSIS — I89.0 LYMPHEDEMA OF BOTH LOWER EXTREMITIES: ICD-10-CM

## 2024-07-18 PROCEDURE — 99212 OFFICE O/P EST SF 10 MIN: CPT | Performed by: NURSE PRACTITIONER

## 2024-07-18 PROCEDURE — 99213 OFFICE O/P EST LOW 20 MIN: CPT

## 2024-07-18 RX ORDER — LIDOCAINE HYDROCHLORIDE 20 MG/ML
JELLY TOPICAL ONCE
OUTPATIENT
Start: 2024-07-18 | End: 2024-07-18

## 2024-07-18 RX ORDER — GENTAMICIN SULFATE 1 MG/G
OINTMENT TOPICAL ONCE
OUTPATIENT
Start: 2024-07-18 | End: 2024-07-18

## 2024-07-18 RX ORDER — BETAMETHASONE DIPROPIONATE 0.5 MG/G
CREAM TOPICAL ONCE
OUTPATIENT
Start: 2024-07-18 | End: 2024-07-18

## 2024-07-18 RX ORDER — TRIAMCINOLONE ACETONIDE 1 MG/G
OINTMENT TOPICAL ONCE
OUTPATIENT
Start: 2024-07-18 | End: 2024-07-18

## 2024-07-18 RX ORDER — IBUPROFEN 200 MG
TABLET ORAL ONCE
OUTPATIENT
Start: 2024-07-18 | End: 2024-07-18

## 2024-07-18 RX ORDER — LIDOCAINE 50 MG/G
OINTMENT TOPICAL ONCE
OUTPATIENT
Start: 2024-07-18 | End: 2024-07-18

## 2024-07-18 RX ORDER — GINSENG 100 MG
CAPSULE ORAL ONCE
OUTPATIENT
Start: 2024-07-18 | End: 2024-07-18

## 2024-07-18 RX ORDER — LIDOCAINE HYDROCHLORIDE 40 MG/ML
SOLUTION TOPICAL ONCE
OUTPATIENT
Start: 2024-07-18 | End: 2024-07-18

## 2024-07-18 RX ORDER — SODIUM CHLOR/HYPOCHLOROUS ACID 0.033 %
SOLUTION, IRRIGATION IRRIGATION ONCE
OUTPATIENT
Start: 2024-07-18 | End: 2024-07-18

## 2024-07-18 RX ORDER — BACITRACIN ZINC AND POLYMYXIN B SULFATE 500; 1000 [USP'U]/G; [USP'U]/G
OINTMENT TOPICAL ONCE
OUTPATIENT
Start: 2024-07-18 | End: 2024-07-18

## 2024-07-18 RX ORDER — LIDOCAINE 40 MG/G
CREAM TOPICAL ONCE
OUTPATIENT
Start: 2024-07-18 | End: 2024-07-18

## 2024-07-18 RX ORDER — CLOBETASOL PROPIONATE 0.5 MG/G
OINTMENT TOPICAL ONCE
OUTPATIENT
Start: 2024-07-18 | End: 2024-07-18

## 2024-07-18 ASSESSMENT — PAIN DESCRIPTION - DESCRIPTORS: DESCRIPTORS: ACHING;SORE

## 2024-07-18 ASSESSMENT — PAIN DESCRIPTION - FREQUENCY: FREQUENCY: INTERMITTENT

## 2024-07-18 ASSESSMENT — PAIN SCALES - GENERAL: PAINLEVEL_OUTOF10: 9

## 2024-07-18 ASSESSMENT — PAIN DESCRIPTION - ORIENTATION: ORIENTATION: LEFT;RIGHT

## 2024-07-18 ASSESSMENT — PAIN DESCRIPTION - ONSET: ONSET: ON-GOING

## 2024-07-18 ASSESSMENT — PAIN DESCRIPTION - PAIN TYPE: TYPE: CHRONIC PAIN

## 2024-07-18 ASSESSMENT — PAIN - FUNCTIONAL ASSESSMENT: PAIN_FUNCTIONAL_ASSESSMENT: PREVENTS OR INTERFERES SOME ACTIVE ACTIVITIES AND ADLS

## 2024-07-18 ASSESSMENT — PAIN DESCRIPTION - LOCATION: LOCATION: LEG

## 2024-07-18 NOTE — PLAN OF CARE
Problem: Chronic Conditions and Co-morbidities  Goal: Patient's chronic conditions and co-morbidity symptoms are monitored and maintained or improved  Outcome: Progressing     Problem: Pain  Goal: Verbalizes/displays adequate comfort level or baseline comfort level  Outcome: Progressing     Problem: Wound:  Goal: Will show signs of wound healing; wound closure and no evidence of infection  Description: Will show signs of wound healing; wound closure and no evidence of infection  Outcome: Progressing     Problem: Venous:  Goal: Signs of wound healing will improve  Description: Signs of wound healing will improve  Outcome: Progressing     Problem: Compression therapy:  Goal: Will be free from complications associated with compression therapy  Description: Will be free from complications associated with compression therapy  Outcome: Progressing     Problem: Weight control:  Goal: Ability to maintain an optimal weight for height and age will be supported  Description: Ability to maintain an optimal weight for height and age will be supported  Outcome: Progressing     Problem: Falls - Risk of:  Goal: Will remain free from falls  Description: Will remain free from falls  Outcome: Progressing     Problem: Blood Glucose:  Goal: Ability to maintain appropriate glucose levels will improve  Description: Ability to maintain appropriate glucose levels will improve  Outcome: Progressing

## 2024-07-18 NOTE — PATIENT INSTRUCTIONS
[facility] Wound Care and Hyperbaric Oxygen Therapy   Physician Orders and Discharge Instructions  [address] [place] [address] Drive  [address]  [place] [address]  Telephone: [phone]      FAX [phone]    NAME:  Tianna Trivedi  YOB: 1970  MEDICAL RECORD NUMBER:  [id number]  DATE:  7/18/2024    Discharge condition: Stable    Discharge to: Home    Left via:Private automobile    Accompanied by:  spouse    ECF/HHA: [facility]    Please go downstairs in this building to Room 103 to register. The test will be completed in Room 401. Please be aware of appointment time for this test.  Please arrive to room 103 at 3 on August 1st for test. Please do not smoke prior to test.    Dressing Orders:  Left leg wound: soap and water wash, apply silver ashok AG, secure with optilock, ace wrap daily.  Left foot wound: soap and water wash, apply iodoflex to the wound bed, secure with rolled gauze and ace wrap daily.    Treatment Orders:  Protein rich diet (unless restricted by your physician); Multivitamin daily; Elevate legs above the level of your heart when sitting 3-4 times daily for at least one hour each time, avoid standing for long periods of time.    [facility] follow up visit ___2 week with Jina__________________________  (Please note your next appointment above and if you are unable to keep, kindly give a 24 hour notice. Thank you.)          If you experience any of the following, please call the Wound Care Center during business hours:    * Increase in Pain  * Temperature over 101  * Increase in drainage from your wound  * Drainage with a foul odor  * Bleeding  * Increase in swelling  * Need for compression bandage changes due to slippage, breakthrough drainage.    If you need medical attention outside of the business hours of the Wound Care Centers please contact your PCP or go to the nearest emergency room.

## 2024-07-18 NOTE — PROGRESS NOTES
[facility] Wound Care Center   Progress Note and Procedure Note      Tianna Trivedi  MEDICAL RECORD NUMBER:  [id number]  AGE: 53 y.o.   GENDER: female  : 1970  EPISODE DATE:  2024    Subjective:     Chief Complaint   Patient presents with    Wound Check     Pt presents for recheck of LLE wounds         HISTORY of PRESENT ILLNESS HPI     Tianna Trivedi is a 53 y.o. female who presents today for wound/ulcer evaluation.   History of Wound Context: left leg and foot wound follow up/eval and treat    Ulcer Identification:  Ulcer Type: venous and diabetic  Contributing Factors: lymphedema, diabetes, chronic pressure, shear force, obesity, and non-adherence    Wound: N/A        PAST MEDICAL HISTORY        Diagnosis Date    Anemia     Anxiety     Bipolar 1 disorder (HCC)     Chronic acquired lymphedema     Constipation     Depression     Depression     GERD (gastroesophageal reflux disease)     Headache(784.0)     Hyperlipidemia     Hypertension     Kidney stones     Kidney stones     Morbid obesity due to excess calories (HCC)     Neuromuscular disorder (HCC)     Primary osteoarthritis involving multiple joints 2024    Restless legs syndrome     Shingles     Type 2 diabetes mellitus (HCC)        PAST SURGICAL HISTORY    Past Surgical History:   Procedure Laterality Date    BACK SURGERY      ECTOPIC PREGNANCY SURGERY  2002    EYE SURGERY Bilateral     2005 & 2007: cornea transplant and cataracts removed    FOOT SURGERY Bilateral     Heels    KNEE CARTILAGE SURGERY Left 2016    KNEE ARTHROSCOPIC PARTIAL MEDIAL MENISCECTOMY performed by Russell Serrano MD at [facility] OR    TUBAL LIGATION         FAMILY HISTORY    Family History   Problem Relation Age of Onset    Obesity Mother     Arthritis Mother         has had knees replaced    Cancer Father         \"adrenal gland & lung\"    Parkinsonism Father     Cancer Maternal Grandmother     COPD Maternal Grandmother     Obesity Maternal Grandmother

## 2024-07-19 RX ORDER — GABAPENTIN 300 MG/1
300 CAPSULE ORAL DAILY
Qty: 30 CAPSULE | Refills: 0 | Status: SHIPPED | OUTPATIENT
Start: 2024-08-01 | End: 2024-08-31

## 2024-07-19 RX ORDER — OXYCODONE AND ACETAMINOPHEN 7.5; 325 MG/1; MG/1
1 TABLET ORAL
Qty: 90 TABLET | Refills: 0 | Status: SHIPPED | OUTPATIENT
Start: 2024-07-29 | End: 2024-08-28

## 2024-07-19 RX ORDER — GABAPENTIN 600 MG/1
600 TABLET ORAL 3 TIMES DAILY
Qty: 90 TABLET | Refills: 0 | Status: SHIPPED | OUTPATIENT
Start: 2024-08-01 | End: 2024-08-31

## 2024-07-19 RX ORDER — MORPHINE SULFATE 15 MG/1
15 TABLET, FILM COATED, EXTENDED RELEASE ORAL 2 TIMES DAILY
Qty: 60 TABLET | Refills: 0 | Status: SHIPPED | OUTPATIENT
Start: 2024-08-12 | End: 2024-09-11

## 2024-07-22 ENCOUNTER — OFFICE VISIT (OUTPATIENT)
Age: 54
End: 2024-07-22
Payer: MEDICARE

## 2024-07-22 VITALS
RESPIRATION RATE: 20 BRPM | HEART RATE: 74 BPM | OXYGEN SATURATION: 99 % | WEIGHT: 293 LBS | SYSTOLIC BLOOD PRESSURE: 126 MMHG | DIASTOLIC BLOOD PRESSURE: 76 MMHG | BODY MASS INDEX: 56.68 KG/M2 | TEMPERATURE: 97.7 F

## 2024-07-22 DIAGNOSIS — R25.2 CRAMPING OF FEET: ICD-10-CM

## 2024-07-22 DIAGNOSIS — R82.998 LEUKOCYTES IN URINE: ICD-10-CM

## 2024-07-22 DIAGNOSIS — R39.9 UTI SYMPTOMS: ICD-10-CM

## 2024-07-22 DIAGNOSIS — R82.79 POSITIVE URINE CULTURE: Primary | ICD-10-CM

## 2024-07-22 LAB
APPEARANCE FLUID: CLEAR
BILIRUBIN, POC: NEGATIVE
BLOOD URINE, POC: ABNORMAL
CLARITY, POC: CLEAR
COLOR, POC: YELLOW
GLUCOSE URINE, POC: NEGATIVE
KETONES, POC: NEGATIVE
LEUKOCYTE EST, POC: ABNORMAL
NITRITE, POC: NEGATIVE
PH, POC: 5.5
PROTEIN, POC: ABNORMAL
SPECIFIC GRAVITY, POC: >=1.03
UROBILINOGEN, POC: ABNORMAL

## 2024-07-22 PROCEDURE — 81002 URINALYSIS NONAUTO W/O SCOPE: CPT

## 2024-07-22 PROCEDURE — 3078F DIAST BP <80 MM HG: CPT

## 2024-07-22 PROCEDURE — 99213 OFFICE O/P EST LOW 20 MIN: CPT

## 2024-07-22 PROCEDURE — 3074F SYST BP LT 130 MM HG: CPT

## 2024-07-22 ASSESSMENT — ENCOUNTER SYMPTOMS
VOMITING: 0
COUGH: 0
CHEST TIGHTNESS: 0
DIARRHEA: 0
EYE ITCHING: 0
ALLERGIC/IMMUNOLOGIC NEGATIVE: 1
EYE REDNESS: 0
CONSTIPATION: 0
BACK PAIN: 1
SINUS PAIN: 0
SORE THROAT: 0
RHINORRHEA: 0
EYE DISCHARGE: 0
SHORTNESS OF BREATH: 0
ABDOMINAL PAIN: 0
WHEEZING: 0
NAUSEA: 0

## 2024-07-22 NOTE — PROGRESS NOTES
further evaluation.     Urgent Care evaluation today is not a substitute for PCP visit. Follow up care is the patient's responsibility to discuss and review this UC visit.       Electronically signed by NANETTE Cabezas NP on 7/22/2024 at 3:21 PM    EMR Dragon/translation disclaimer: Much of this encounter note is an electronic transcription/translation of spoken language to printed text.  The electronic translation of spoken language may be erroneous, or at times, nonsensical words or phrases may be inadvertently transcribed.  Although I have reviewed the note for such errors, some may still exist.

## 2024-07-22 NOTE — PATIENT INSTRUCTIONS
Urine sent to lab for culture  No medications prescribed today.  Antibiotic therapy pending culture results.  Increase water intake. Avoid carbonated beverages as these can irritate the bladder.   Avoid baths or hot tubs, especially bubble baths   Take daily cranberry supplement or cranberry juice to aide in bladder health   Remember to always wipe front to back after urinating.   The patient is to follow up with PCP or return to clinic if symptoms worsen/fail to improve.    Any condition can change, despite proper treatment. Therefore, if symptoms still persist or worsen after treatment plan intitated today, patient is to go to the nearest ER, call PCP, or return to urgent care for further evaluation.     Urgent Care evaluation today is not a substitute for PCP visit. Follow up care is the patient's responsibility to discuss and review this UC visit.

## 2024-07-24 ENCOUNTER — TELEPHONE (OUTPATIENT)
Age: 54
End: 2024-07-24

## 2024-07-24 DIAGNOSIS — B37.9 ANTIBIOTIC-INDUCED YEAST INFECTION: Primary | ICD-10-CM

## 2024-07-24 DIAGNOSIS — T36.95XA ANTIBIOTIC-INDUCED YEAST INFECTION: Primary | ICD-10-CM

## 2024-07-24 LAB
BACTERIA UR CULT: ABNORMAL
BACTERIA UR CULT: ABNORMAL
ORGANISM: ABNORMAL

## 2024-07-24 RX ORDER — CIPROFLOXACIN 250 MG/1
250 TABLET, FILM COATED ORAL 2 TIMES DAILY
Qty: 6 TABLET | Refills: 0 | Status: SHIPPED | OUTPATIENT
Start: 2024-07-24 | End: 2024-07-27

## 2024-07-24 RX ORDER — FLUCONAZOLE 150 MG/1
150 TABLET ORAL ONCE
Qty: 1 TABLET | Refills: 0 | Status: SHIPPED | OUTPATIENT
Start: 2024-07-24 | End: 2024-07-24

## 2024-07-24 NOTE — TELEPHONE ENCOUNTER
----- Message from NANETTE Cabezas NP sent at 7/24/2024 10:21 AM CDT -----  Please call and notify patient that she has a mild UTI. The bacteria that was found in her urine is hard to treat with oral antibiotics, especially due to patient's allergies.  The most appropriate choice of treatment is ciprofloxacin.  Per patient's allergy list, she develops hives when taking this medication.  Recommend taking a Benadryl with each dose of antibiotic.  If she develops difficulty breathing or sensation of throat swelling, she needs to stop the antibiotic immediately.  Increase water intake.

## 2024-08-01 ENCOUNTER — HOSPITAL ENCOUNTER (OUTPATIENT)
Dept: WOUND CARE | Age: 54
Discharge: HOME | End: 2024-08-01

## 2024-08-07 ENCOUNTER — TELEPHONE (OUTPATIENT)
Dept: PAIN MANAGEMENT | Age: 54
End: 2024-08-07

## 2024-08-07 NOTE — TELEPHONE ENCOUNTER
Patient called for pain medication refill &next appointment date & time. Gave her script line phone number for script & next appointment 8/13/24@3:30

## 2024-08-13 ENCOUNTER — HOSPITAL ENCOUNTER (OUTPATIENT)
Dept: PAIN MANAGEMENT | Age: 54
Discharge: HOME | End: 2024-08-13
Payer: MEDICARE

## 2024-08-13 VITALS
HEART RATE: 73 BPM | WEIGHT: 293 LBS | RESPIRATION RATE: 16 BRPM | DIASTOLIC BLOOD PRESSURE: 73 MMHG | SYSTOLIC BLOOD PRESSURE: 139 MMHG | BODY MASS INDEX: 55.32 KG/M2 | TEMPERATURE: 97 F | HEIGHT: 61 IN | OXYGEN SATURATION: 100 %

## 2024-08-13 DIAGNOSIS — G89.4 CHRONIC PAIN SYNDROME: Primary | ICD-10-CM

## 2024-08-13 DIAGNOSIS — I89.0 LYMPHEDEMA OF BOTH LOWER EXTREMITIES: ICD-10-CM

## 2024-08-13 DIAGNOSIS — G89.4 CHRONIC PAIN SYNDROME: ICD-10-CM

## 2024-08-13 DIAGNOSIS — F11.90 CHRONIC, CONTINUOUS USE OF OPIOIDS: ICD-10-CM

## 2024-08-13 PROCEDURE — 99214 OFFICE O/P EST MOD 30 MIN: CPT

## 2024-08-13 PROCEDURE — 99213 OFFICE O/P EST LOW 20 MIN: CPT

## 2024-08-13 RX ORDER — TIRZEPATIDE 10 MG/.5ML
INJECTION, SOLUTION SUBCUTANEOUS
COMMUNITY
Start: 2024-07-03

## 2024-08-13 RX ORDER — CARIPRAZINE 1.5 MG/1
1.5 CAPSULE, GELATIN COATED ORAL DAILY
COMMUNITY

## 2024-08-13 ASSESSMENT — PAIN DESCRIPTION - LOCATION: LOCATION: GENERALIZED

## 2024-08-13 ASSESSMENT — ENCOUNTER SYMPTOMS
EYES NEGATIVE: 1
GASTROINTESTINAL NEGATIVE: 1
RESPIRATORY NEGATIVE: 1

## 2024-08-13 ASSESSMENT — PAIN DESCRIPTION - PAIN TYPE: TYPE: CHRONIC PAIN

## 2024-08-13 NOTE — PROGRESS NOTES
Clinic Documentation      Education Provided:  [x] Review of Hermes  [] Agreement Review  [x] PEG Score Calculated [] PHQ Score Calculated [] ORT Score Calculated    [] Compliance Issues Discussed [] Cognitive Behavior Needs [x] Exercise [] Review of Test [] Financial Issues  [x] Tobacco/Alcohol Use Reviewed [x] Teaching [] New Patient [] Picture Obtained    Physician Plan:  [] Outgoing Referral  [] Pharmacy Consult  [] Test Ordered [x] Prescription Ordered/Changed   [] Obtained Test Results / Consult Notes        Complete if patient is withholding blood thinner for procedure     Blood Thinner Patient is currently taking:      [] Plavix (Hold for 7 days)  [] Aspirin (Hold for 5 days)     [] Pletal (Hold for 2 days)  [] Pradaxa (Hold for 3 days)    [] Effient (Hold for 7 days)  [] Xarelto (Hold for 2 days)    [] Eliquis (Hold for 2 days)  [] Brilinta (Hold for 7 days)    [] Coumadin (Hold for 5 days) - (INR needs to be drawn the day prior to procedure- INR < 2.0)    [] Aggrenox (Hold for 7 days)        [] Patient will stop medication on their own.    [] Blood Thinner Form Faxed for approval to hold.   Provider form faxed to:     Assessment Completed by:  Electronically signed by Ariana Perez MA on 8/13/2024 at 3:26 PM

## 2024-08-13 NOTE — PROGRESS NOTES
[facility] Physical & Pain Medicine    Office Note    Patient Name: Tianna Trivedi    MR #: [id number]    Account #:[id number]    : 1970    Age: 53 y.o.    Sex: female    Date: 2024    PCP: Herbert Epps MD    Chief Complaint:   Chief Complaint   Patient presents with    Generalized Body Aches     5/10; Pain is worse at night        History of Present Illness:   The patient is a 53 y.o. female who presents to the office for a 3 month follow up.     Patient states her pain has been worse as her legs have been weeping. Patient has lymphedema to bilateral lower extremities. Patient noted to be in wound care for treatment as she currently as open areas. Patient reports pain is not managed with current dosage of MS Contin and Percocet.     Of Note: This is a work related injury No    Hip Pain  This is a chronic problem. The current episode started more than 1 year ago. The problem occurs constantly. The problem has been gradually worsening. Associated symptoms include arthralgias, joint swelling, myalgias and weakness. Pertinent negatives include no numbness. The symptoms are aggravated by standing and walking (prolonged sitting). She has tried position changes, oral narcotics, NSAIDs, ice, heat and rest for the symptoms. The treatment provided moderate relief.   Knee Pain  This is a chronic problem. The current episode started more than 1 year ago. The problem occurs constantly. The problem has been waxing and waning. Associated symptoms include arthralgias, joint swelling, myalgias and weakness. Pertinent negatives include no numbness. The symptoms are aggravated by standing and walking (prlonged sitting,). She has tried heat, ice, NSAIDs, oral narcotics, position changes and rest for the symptoms. The treatment provided moderate relief.   Generalized Body Aches  This is a chronic problem. The current episode started more than 1 year ago. The problem occurs constantly. The problem has been waxing

## 2024-08-14 RX ORDER — GABAPENTIN 600 MG/1
600 TABLET ORAL 3 TIMES DAILY
Qty: 90 TABLET | Refills: 0 | Status: SHIPPED | OUTPATIENT
Start: 2024-08-31 | End: 2024-09-30

## 2024-08-14 RX ORDER — MORPHINE SULFATE 15 MG/1
TABLET, FILM COATED, EXTENDED RELEASE ORAL
Qty: 90 TABLET | Refills: 0 | Status: SHIPPED | OUTPATIENT
Start: 2024-09-11 | End: 2024-10-11

## 2024-08-14 RX ORDER — OXYCODONE AND ACETAMINOPHEN 7.5; 325 MG/1; MG/1
1 TABLET ORAL
Qty: 90 TABLET | Refills: 0 | Status: SHIPPED | OUTPATIENT
Start: 2024-08-28 | End: 2024-09-27

## 2024-08-14 RX ORDER — GABAPENTIN 300 MG/1
300 CAPSULE ORAL DAILY
Qty: 30 CAPSULE | Refills: 0 | Status: SHIPPED | OUTPATIENT
Start: 2024-08-31 | End: 2024-09-30

## 2024-08-14 ASSESSMENT — ENCOUNTER SYMPTOMS: BACK PAIN: 1

## 2024-08-15 ENCOUNTER — HOSPITAL ENCOUNTER (OUTPATIENT)
Dept: WOUND CARE | Age: 54
Discharge: HOME | End: 2024-08-15
Payer: MEDICARE

## 2024-08-15 VITALS
HEART RATE: 73 BPM | SYSTOLIC BLOOD PRESSURE: 145 MMHG | RESPIRATION RATE: 16 BRPM | TEMPERATURE: 96.7 F | DIASTOLIC BLOOD PRESSURE: 84 MMHG

## 2024-08-15 DIAGNOSIS — E11.621 DIABETIC ULCER OF LEFT HEEL ASSOCIATED WITH TYPE 2 DIABETES MELLITUS, WITH FAT LAYER EXPOSED (HCC): Primary | ICD-10-CM

## 2024-08-15 DIAGNOSIS — I87.2 VENOUS INSUFFICIENCY OF BOTH LOWER EXTREMITIES: ICD-10-CM

## 2024-08-15 DIAGNOSIS — E11.65 UNCONTROLLED TYPE 2 DIABETES MELLITUS WITH HYPERGLYCEMIA, WITHOUT LONG-TERM CURRENT USE OF INSULIN (HCC): Chronic | ICD-10-CM

## 2024-08-15 DIAGNOSIS — L97.422 DIABETIC ULCER OF LEFT HEEL ASSOCIATED WITH TYPE 2 DIABETES MELLITUS, WITH FAT LAYER EXPOSED (HCC): Primary | ICD-10-CM

## 2024-08-15 DIAGNOSIS — L97.922 DIABETIC ULCER OF LEFT LOWER LEG ASSOCIATED WITH TYPE 2 DIABETES MELLITUS, WITH FAT LAYER EXPOSED (HCC): ICD-10-CM

## 2024-08-15 DIAGNOSIS — E11.622 DIABETIC ULCER OF LEFT LOWER LEG ASSOCIATED WITH TYPE 2 DIABETES MELLITUS, WITH FAT LAYER EXPOSED (HCC): ICD-10-CM

## 2024-08-15 DIAGNOSIS — I89.0 LYMPHEDEMA OF BOTH LOWER EXTREMITIES: ICD-10-CM

## 2024-08-15 PROCEDURE — 99214 OFFICE O/P EST MOD 30 MIN: CPT

## 2024-08-15 RX ORDER — IBUPROFEN 200 MG
TABLET ORAL ONCE
OUTPATIENT
Start: 2024-08-15 | End: 2024-08-15

## 2024-08-15 RX ORDER — BETAMETHASONE DIPROPIONATE 0.5 MG/G
CREAM TOPICAL ONCE
OUTPATIENT
Start: 2024-08-15 | End: 2024-08-15

## 2024-08-15 RX ORDER — CLOBETASOL PROPIONATE 0.5 MG/G
OINTMENT TOPICAL ONCE
OUTPATIENT
Start: 2024-08-15 | End: 2024-08-15

## 2024-08-15 RX ORDER — LIDOCAINE 50 MG/G
OINTMENT TOPICAL ONCE
OUTPATIENT
Start: 2024-08-15 | End: 2024-08-15

## 2024-08-15 RX ORDER — SODIUM CHLOR/HYPOCHLOROUS ACID 0.033 %
SOLUTION, IRRIGATION IRRIGATION ONCE
OUTPATIENT
Start: 2024-08-15 | End: 2024-08-15

## 2024-08-15 RX ORDER — TRIAMCINOLONE ACETONIDE 1 MG/G
OINTMENT TOPICAL ONCE
OUTPATIENT
Start: 2024-08-15 | End: 2024-08-15

## 2024-08-15 RX ORDER — LIDOCAINE 40 MG/G
CREAM TOPICAL ONCE
OUTPATIENT
Start: 2024-08-15 | End: 2024-08-15

## 2024-08-15 RX ORDER — GINSENG 100 MG
CAPSULE ORAL ONCE
OUTPATIENT
Start: 2024-08-15 | End: 2024-08-15

## 2024-08-15 RX ORDER — LIDOCAINE HYDROCHLORIDE 20 MG/ML
JELLY TOPICAL ONCE
OUTPATIENT
Start: 2024-08-15 | End: 2024-08-15

## 2024-08-15 RX ORDER — NITROFURANTOIN 25; 75 MG/1; MG/1
100 CAPSULE ORAL 2 TIMES DAILY
COMMUNITY

## 2024-08-15 RX ORDER — LIDOCAINE HYDROCHLORIDE 40 MG/ML
SOLUTION TOPICAL ONCE
OUTPATIENT
Start: 2024-08-15 | End: 2024-08-15

## 2024-08-15 RX ORDER — GENTAMICIN SULFATE 1 MG/G
OINTMENT TOPICAL ONCE
OUTPATIENT
Start: 2024-08-15 | End: 2024-08-15

## 2024-08-15 RX ORDER — BACITRACIN ZINC AND POLYMYXIN B SULFATE 500; 1000 [USP'U]/G; [USP'U]/G
OINTMENT TOPICAL ONCE
OUTPATIENT
Start: 2024-08-15 | End: 2024-08-15

## 2024-08-15 ASSESSMENT — PAIN SCALES - GENERAL: PAINLEVEL_OUTOF10: 6

## 2024-08-15 ASSESSMENT — PAIN DESCRIPTION - FREQUENCY: FREQUENCY: CONTINUOUS

## 2024-08-15 ASSESSMENT — PAIN DESCRIPTION - ORIENTATION: ORIENTATION: LEFT

## 2024-08-15 ASSESSMENT — PAIN DESCRIPTION - ONSET: ONSET: ON-GOING

## 2024-08-15 ASSESSMENT — PAIN - FUNCTIONAL ASSESSMENT: PAIN_FUNCTIONAL_ASSESSMENT: PREVENTS OR INTERFERES SOME ACTIVE ACTIVITIES AND ADLS

## 2024-08-15 ASSESSMENT — PAIN DESCRIPTION - DESCRIPTORS: DESCRIPTORS: SPASM;SHOOTING

## 2024-08-15 ASSESSMENT — PAIN DESCRIPTION - LOCATION: LOCATION: FOOT

## 2024-08-15 ASSESSMENT — PAIN DESCRIPTION - PAIN TYPE: TYPE: CHRONIC PAIN

## 2024-08-15 NOTE — PROGRESS NOTES
[facility] Wound Care Center   Progress Note and Procedure Note      Tianna Trivedi  MEDICAL RECORD NUMBER:  [id number]  AGE: 53 y.o.   GENDER: female  : 1970  EPISODE DATE:  8/15/2024    Subjective:     Chief Complaint   Patient presents with    Wound Check     Patient presents for bilateral leg assessment. Reports has been prescribed macrobid for UTI.          HISTORY of PRESENT ILLNESS HPI     Tianna Trivedi is a 53 y.o. female who presents today for wound/ulcer evaluation.   History of Wound Context: bilateral leg wound follow up/eval and treat    Ulcer Identification:  Ulcer Type: venous and diabetic  Contributing Factors: edema, lymphedema, diabetes, poor glucose control, chronic pressure, shear force, and obesity    Wound: N/A        PAST MEDICAL HISTORY        Diagnosis Date    Anemia     Anxiety     Bipolar 1 disorder (HCC)     Chronic acquired lymphedema     Constipation     Depression     Depression     GERD (gastroesophageal reflux disease)     Headache(784.0)     Hyperlipidemia     Hypertension     Kidney stones     Kidney stones     Morbid obesity due to excess calories (HCC)     Neuromuscular disorder (HCC)     Primary osteoarthritis involving multiple joints 2024    Restless legs syndrome     Shingles     Type 2 diabetes mellitus (HCC)        PAST SURGICAL HISTORY    Past Surgical History:   Procedure Laterality Date    BACK SURGERY      ECTOPIC PREGNANCY SURGERY  2002    EYE SURGERY Bilateral     2005 & 2007: cornea transplant and cataracts removed    FOOT SURGERY Bilateral     Heels    KNEE CARTILAGE SURGERY Left 2016    KNEE ARTHROSCOPIC PARTIAL MEDIAL MENISCECTOMY performed by Russell Serrano MD at [facility] OR    TUBAL LIGATION         FAMILY HISTORY    Family History   Problem Relation Age of Onset    Obesity Mother     Arthritis Mother         has had knees replaced    Cancer Father         \"adrenal gland & lung\"    Parkinsonism Father     Cancer Maternal Grandmother

## 2024-08-15 NOTE — PATIENT INSTRUCTIONS
[facility] Wound Care and Hyperbaric Oxygen Therapy   Physician Orders and Discharge Instructions  [address] [place] [address] Drive  [address]  [place] [address]  Telephone: [phone]      FAX [phone]    NAME:  Tianna Trivedi  YOB: 1970  MEDICAL RECORD NUMBER:  [id number]  DATE:  7/18/2024    Discharge condition: Stable    Discharge to: Home    Left via:Private automobile    Accompanied by:  spouse    ECF/HHA: [facility]    Call [employer] to see regarding extensions for lymphedema pumps    Please go downstairs in this building to Room 103 to register. The test will be completed in Room 401. Please be aware of appointment time for this test.  Please arrive to room 103 at 3 on August 1st for test. Please do not smoke prior to test.    Dressing Orders:  Left leg wound: soap and water wash, apply silver ahsok AG, secure with Drymax extra, 6 inch ace wrap daily.  Left foot wound: soap and water wash, apply silver ashok AG to the wound bed, secure with rolled gauze and 6 inch ace wrap daily.  Right leg wound:soap and water wash, apply silver ashok AG, secure with Drymax extra, 6 inch ace wrap daily.     Treatment Orders:  Protein rich diet (unless restricted by your physician); Multivitamin daily; Elevate legs above the level of your heart when sitting 3-4 times daily for at least one hour each time, avoid standing for long periods of time.    [facility] follow up visit ___3 weeks with Jina__________________________  (Please note your next appointment above and if you are unable to keep, kindly give a 24 hour notice. Thank you.)          If you experience any of the following, please call the Wound Care Center during business hours:    * Increase in Pain  * Temperature over 101  * Increase in drainage from your wound  * Drainage with a foul odor  * Bleeding  * Increase in swelling  * Need for compression bandage changes due to slippage, breakthrough drainage.    If you need medical attention outside of the business

## 2024-08-15 NOTE — HOME CARE
Wound Care Supplies      Supply Company:     [employer] [address] [place] [address] f: [phone] f: [phone] p: [phone] [email or web address]      Ordering Center:     [facility]  [address] [place] [address] [address]  [facility] [id number] [phone]  WOUND CARE Dept: [phone]   FAX NUMBER [phone]    Patient Information:      Wai Lamar  [address]  [place] [address]   [phone]   : 1970  AGE: 53 y.o.     GENDER: female   EPISODE DATE: 8/15/2024    Insurance:      PRIMARY INSURANCE:  Plan: ANTHEM FULL DUAL ADVANTAGE 2  Coverage: [employer] MEDICARE  Effective Date: 2024  Group Number: [unfilled]  Subscriber Number: [id number] - (Medicare Managed)    Payer/Plan Subscr  Sex Relation Sub. Ins. ID Effective Group Num   1. MEDICARE - [place]* WAI LAMAR 1970 Female Self [id number]                                    [address]    2. BCBS MEDICARE* WAI LAMAR 1970 Female Self [id number]                                          Patient Wound Information:      Problem List Items Addressed This Visit          Circulatory    Venous insufficiency of both lower extremities       Endocrine    Uncontrolled type 2 diabetes mellitus with hyperglycemia, without long-term current use of insulin (HCC) (Chronic)    * (Principal) Diabetic ulcer of left lower leg associated with type 2 diabetes mellitus, with fat layer exposed (HCC) (Chronic)    Relevant Orders    Initiate Outpatient Wound Care Protocol    Diabetic ulcer of left heel associated with type 2 diabetes mellitus, with fat layer exposed (HCC) - Primary    Relevant Orders    Initiate Outpatient Wound Care Protocol       Other    Lymphedema of both lower extremities    Relevant Orders    Initiate Outpatient Wound Care Protocol       WOUNDS REQUIRING DRESSING SUPPLIES:     Wound 24 Tibial Posterior;Right wound 1-left leg posterior venous

## 2024-08-26 DIAGNOSIS — M62.838 MUSCLE SPASM: ICD-10-CM

## 2024-08-26 RX ORDER — CYCLOBENZAPRINE HCL 10 MG
10 TABLET ORAL 3 TIMES DAILY PRN
Qty: 90 TABLET | Refills: 2 | Status: SHIPPED | OUTPATIENT
Start: 2024-08-26 | End: 2024-11-24

## 2024-09-05 ENCOUNTER — HOSPITAL ENCOUNTER (OUTPATIENT)
Dept: WOUND CARE | Age: 54
Discharge: HOME | End: 2024-09-05
Payer: MEDICARE

## 2024-09-05 VITALS
SYSTOLIC BLOOD PRESSURE: 151 MMHG | DIASTOLIC BLOOD PRESSURE: 79 MMHG | RESPIRATION RATE: 16 BRPM | TEMPERATURE: 98.1 F | HEART RATE: 91 BPM

## 2024-09-05 DIAGNOSIS — E11.621 DIABETIC ULCER OF LEFT HEEL ASSOCIATED WITH TYPE 2 DIABETES MELLITUS, WITH FAT LAYER EXPOSED (HCC): Primary | ICD-10-CM

## 2024-09-05 DIAGNOSIS — I89.0 LYMPHEDEMA OF BOTH LOWER EXTREMITIES: ICD-10-CM

## 2024-09-05 DIAGNOSIS — L97.912 DIABETIC ULCER OF RIGHT LOWER LEG ASSOCIATED WITH TYPE 2 DIABETES MELLITUS, WITH FAT LAYER EXPOSED (HCC): ICD-10-CM

## 2024-09-05 DIAGNOSIS — L97.422 DIABETIC ULCER OF LEFT HEEL ASSOCIATED WITH TYPE 2 DIABETES MELLITUS, WITH FAT LAYER EXPOSED (HCC): Primary | ICD-10-CM

## 2024-09-05 DIAGNOSIS — I87.2 VENOUS INSUFFICIENCY OF BOTH LOWER EXTREMITIES: ICD-10-CM

## 2024-09-05 DIAGNOSIS — E11.622 DIABETIC ULCER OF RIGHT LOWER LEG ASSOCIATED WITH TYPE 2 DIABETES MELLITUS, WITH FAT LAYER EXPOSED (HCC): ICD-10-CM

## 2024-09-05 DIAGNOSIS — E66.01 MORBID OBESITY DUE TO EXCESS CALORIES (HCC): Chronic | ICD-10-CM

## 2024-09-05 DIAGNOSIS — L97.922 DIABETIC ULCER OF LEFT LOWER LEG ASSOCIATED WITH TYPE 2 DIABETES MELLITUS, WITH FAT LAYER EXPOSED (HCC): ICD-10-CM

## 2024-09-05 DIAGNOSIS — E11.622 DIABETIC ULCER OF LEFT LOWER LEG ASSOCIATED WITH TYPE 2 DIABETES MELLITUS, WITH FAT LAYER EXPOSED (HCC): ICD-10-CM

## 2024-09-05 PROCEDURE — 99213 OFFICE O/P EST LOW 20 MIN: CPT | Performed by: NURSE PRACTITIONER

## 2024-09-05 PROCEDURE — 99214 OFFICE O/P EST MOD 30 MIN: CPT

## 2024-09-05 RX ORDER — CLOBETASOL PROPIONATE 0.5 MG/G
OINTMENT TOPICAL ONCE
OUTPATIENT
Start: 2024-09-05 | End: 2024-09-05

## 2024-09-05 RX ORDER — GENTAMICIN SULFATE 1 MG/G
OINTMENT TOPICAL ONCE
OUTPATIENT
Start: 2024-09-05 | End: 2024-09-05

## 2024-09-05 RX ORDER — LIDOCAINE HYDROCHLORIDE 40 MG/ML
SOLUTION TOPICAL ONCE
OUTPATIENT
Start: 2024-09-05 | End: 2024-09-05

## 2024-09-05 RX ORDER — LIDOCAINE HYDROCHLORIDE 20 MG/ML
JELLY TOPICAL ONCE
OUTPATIENT
Start: 2024-09-05 | End: 2024-09-05

## 2024-09-05 RX ORDER — MUPIROCIN 20 MG/G
OINTMENT TOPICAL ONCE
OUTPATIENT
Start: 2024-09-05 | End: 2024-09-05

## 2024-09-05 RX ORDER — BETAMETHASONE DIPROPIONATE 0.5 MG/G
CREAM TOPICAL ONCE
OUTPATIENT
Start: 2024-09-05 | End: 2024-09-05

## 2024-09-05 RX ORDER — LIDOCAINE 50 MG/G
OINTMENT TOPICAL ONCE
OUTPATIENT
Start: 2024-09-05 | End: 2024-09-05

## 2024-09-05 RX ORDER — NEOMYCIN/BACITRACIN/POLYMYXINB 3.5-400-5K
OINTMENT (GRAM) TOPICAL ONCE
OUTPATIENT
Start: 2024-09-05 | End: 2024-09-05

## 2024-09-05 RX ORDER — GINSENG 100 MG
CAPSULE ORAL ONCE
OUTPATIENT
Start: 2024-09-05 | End: 2024-09-05

## 2024-09-05 RX ORDER — LIDOCAINE 40 MG/G
CREAM TOPICAL ONCE
OUTPATIENT
Start: 2024-09-05 | End: 2024-09-05

## 2024-09-05 RX ORDER — SILVER SULFADIAZINE 10 MG/G
CREAM TOPICAL ONCE
OUTPATIENT
Start: 2024-09-05 | End: 2024-09-05

## 2024-09-05 RX ORDER — BACITRACIN ZINC AND POLYMYXIN B SULFATE 500; 1000 [USP'U]/G; [USP'U]/G
OINTMENT TOPICAL ONCE
OUTPATIENT
Start: 2024-09-05 | End: 2024-09-05

## 2024-09-05 RX ORDER — SODIUM CHLOR/HYPOCHLOROUS ACID 0.033 %
SOLUTION, IRRIGATION IRRIGATION ONCE
OUTPATIENT
Start: 2024-09-05 | End: 2024-09-05

## 2024-09-05 RX ORDER — TRIAMCINOLONE ACETONIDE 1 MG/G
OINTMENT TOPICAL ONCE
OUTPATIENT
Start: 2024-09-05 | End: 2024-09-05

## 2024-09-05 ASSESSMENT — PAIN DESCRIPTION - LOCATION: LOCATION: FOOT

## 2024-09-05 ASSESSMENT — PAIN DESCRIPTION - ORIENTATION: ORIENTATION: LEFT;RIGHT

## 2024-09-05 ASSESSMENT — PAIN - FUNCTIONAL ASSESSMENT: PAIN_FUNCTIONAL_ASSESSMENT: PREVENTS OR INTERFERES SOME ACTIVE ACTIVITIES AND ADLS

## 2024-09-05 ASSESSMENT — PAIN DESCRIPTION - PAIN TYPE: TYPE: CHRONIC PAIN

## 2024-09-05 ASSESSMENT — PAIN DESCRIPTION - ONSET: ONSET: ON-GOING

## 2024-09-05 ASSESSMENT — PAIN DESCRIPTION - FREQUENCY: FREQUENCY: INTERMITTENT

## 2024-09-05 ASSESSMENT — PAIN SCALES - GENERAL: PAINLEVEL_OUTOF10: 6

## 2024-09-05 ASSESSMENT — PAIN DESCRIPTION - DESCRIPTORS: DESCRIPTORS: PINS AND NEEDLES

## 2024-09-05 NOTE — PLAN OF CARE
Problem: Chronic Conditions and Co-morbidities  Goal: Patient's chronic conditions and co-morbidity symptoms are monitored and maintained or improved  Outcome: Progressing     Problem: Pain  Goal: Verbalizes/displays adequate comfort level or baseline comfort level  Outcome: Progressing     Problem: Pain  Goal: Verbalizes/displays adequate comfort level or baseline comfort level  Outcome: Progressing     Problem: Wound:  Goal: Will show signs of wound healing; wound closure and no evidence of infection  Description: Will show signs of wound healing; wound closure and no evidence of infection  Outcome: Progressing     Problem: Compression therapy:  Goal: Will be free from complications associated with compression therapy  Description: Will be free from complications associated with compression therapy  Outcome: Progressing     Problem: Venous:  Goal: Signs of wound healing will improve  Description: Signs of wound healing will improve  Outcome: Progressing

## 2024-09-05 NOTE — HOME CARE
Amount Moderate (25-50%) 09/05/24 1529   Drainage Description Serosanguinous 09/05/24 1529   Odor None 09/05/24 1529   Octavia-wound Assessment Intact 09/05/24 1529   Margins Defined edges 09/05/24 1529   Wound Thickness Description not for Pressure Injury Full thickness 09/05/24 1529   Number of days: 64       Wound 07/02/24 Ankle Left;Anterior wound 4-left ankle lateral venous (Active)   Wound Image   09/05/24 1529   Wound Etiology Venous 09/05/24 1529   Dressing Status Old drainage noted 09/05/24 1529   Wound Cleansed Soap and water 09/05/24 1529   Dressing/Treatment Alginate with Ag;Gauze dressing/dressing sponge;Roll gauze;Ace wrap;ABD 08/15/24 1525   Wound Length (cm) 9.5 cm 09/05/24 1529   Wound Width (cm) 10.5 cm 09/05/24 1529   Wound Depth (cm) 0.1 cm 09/05/24 1529   Wound Surface Area (cm^2) 99.75 cm^2 09/05/24 1529   Change in Wound Size % (l*w) -10.83 09/05/24 1529   Wound Volume (cm^3) 9.975 cm^3 09/05/24 1529   Wound Healing % -11 09/05/24 1529   Distance Tunneling (cm) 0 cm 07/18/24 1539   Tunneling Position ___ O'Clock 0 07/18/24 1539   Undermining Starts ___ O'Clock 0 07/18/24 1539   Undermining Ends___ O'Clock 0 07/18/24 1539   Undermining Maxium Distance (cm) 0 07/18/24 1539   Wound Assessment Slough;Pink/red 09/05/24 1529   Drainage Amount Large (50-75% saturated) 09/05/24 1529   Drainage Description Serous;Yellow 09/05/24 1529   Odor None 09/05/24 1529   Octavia-wound Assessment Maceration 09/05/24 1529   Margins Flat/open edges 09/05/24 1529   Wound Thickness Description not for Pressure Injury Full thickness 09/05/24 1529   Number of days: 64       Wound 07/02/24 Pedal Left wound 5- left heel wag 1 (Active)   Wound Image   09/05/24 1529   Wound Etiology Diabetic Lindo 1 09/05/24 1529   Dressing Status Old drainage noted 09/05/24 1529   Wound Cleansed Soap and water 09/05/24 1529   Dressing/Treatment Alginate with Ag;Gauze dressing/dressing sponge;Roll gauze;Ace wrap 08/15/24 1525   Offloading for

## 2024-09-05 NOTE — PATIENT INSTRUCTIONS
[facility] Wound Care and Hyperbaric Oxygen Therapy   Physician Orders and Discharge Instructions  [address] [place] [address] Drive  [address]  [place] [address]  Telephone: [phone]      FAX [phone]    NAME:  Tianna Trievdi  YOB: 1970  MEDICAL RECORD NUMBER:  [id number]  DATE:  9/5/2024    Discharge condition: Stable    Discharge to: Home    Left via:Private automobile    Accompanied by:  spouse    ECF/[facility]: [facility]    Call [employer] to see regarding extensions for lymphedema pumps    Dressing Orders:  Left leg wound: soap and water wash, apply silver ashok AG, secure with Drymax extra, 6 inch ace wrap daily.  Left foot wound: soap and water wash, apply silver ashok AG to the wound bed, secure with rolled gauze and 6 inch ace wrap daily.  Right leg wound:soap and water wash, apply silver ashok AG, secure with Drymax extra, 6 inch ace wrap daily.     Treatment Orders:  Protein rich diet (unless restricted by your physician); Multivitamin daily; Elevate legs above the level of your heart when sitting 3-4 times daily for at least one hour each time, avoid standing for long periods of time.    [facility] follow up visit ___4 weeks with Jina__________________________  (Please note your next appointment above and if you are unable to keep, kindly give a 24 hour notice. Thank you.)          If you experience any of the following, please call the Wound Care Center during business hours:    * Increase in Pain  * Temperature over 101  * Increase in drainage from your wound  * Drainage with a foul odor  * Bleeding  * Increase in swelling  * Need for compression bandage changes due to slippage, breakthrough drainage.    If you need medical attention outside of the business hours of the Wound Care Centers please contact your PCP or go to the nearest emergency room.

## 2024-09-05 NOTE — PROGRESS NOTES
Undermining Starts ___ O'Clock 0 07/18/24 1539   Undermining Ends___ O'Clock 0 07/18/24 1539   Undermining Maxium Distance (cm) 0 07/18/24 1539   Wound Assessment Slough;Pink/red 09/05/24 1529   Drainage Amount Large (50-75% saturated) 09/05/24 1529   Drainage Description Serous;Yellow 09/05/24 1529   Odor None 09/05/24 1529   Octavia-wound Assessment Maceration 09/05/24 1529   Margins Flat/open edges 09/05/24 1529   Wound Thickness Description not for Pressure Injury Full thickness 09/05/24 1529   Number of days: 64       Wound 07/02/24 Pedal Left wound 5- left heel wag 1 (Active)   Wound Image   09/05/24 1529   Wound Etiology Diabetic Lindo 1 09/05/24 1529   Dressing Status Old drainage noted 09/05/24 1529   Wound Cleansed Soap and water 09/05/24 1529   Dressing/Treatment Alginate with Ag;Gauze dressing/dressing sponge;Roll gauze;Ace wrap 08/15/24 1525   Offloading for Diabetic Foot Ulcers Offloading ordered;Other (comment) 09/05/24 1529   Wound Length (cm) 0.7 cm 09/05/24 1529   Wound Width (cm) 0.7 cm 09/05/24 1529   Wound Depth (cm) 0.3 cm 09/05/24 1529   Wound Surface Area (cm^2) 0.49 cm^2 09/05/24 1529   Change in Wound Size % (l*w) 2 09/05/24 1529   Wound Volume (cm^3) 0.147 cm^3 09/05/24 1529   Wound Healing % -194 09/05/24 1529   Distance Tunneling (cm) 0 cm 07/18/24 1539   Tunneling Position ___ O'Clock 0 07/18/24 1539   Undermining Starts ___ O'Clock 0 07/18/24 1539   Undermining Ends___ O'Clock 0 07/18/24 1539   Undermining Maxium Distance (cm) 0 07/18/24 1539   Wound Assessment Pink/red;Slough 09/05/24 1529   Drainage Amount Moderate (25-50%) 09/05/24 1529   Drainage Description Serosanguinous 09/05/24 1529   Odor None 09/05/24 1529   Octavia-wound Assessment Hyperkeratosis (callous) 09/05/24 1529   Margins Defined edges 09/05/24 1529   Wound Thickness Description not for Pressure Injury Full thickness 09/05/24 1529   Number of days: 64       Plan:     Problem List Items Addressed This Visit

## 2024-09-19 ENCOUNTER — APPOINTMENT (OUTPATIENT)
Dept: GENERAL RADIOLOGY | Facility: HOSPITAL | Age: 54
End: 2024-09-19
Payer: MEDICARE

## 2024-09-19 ENCOUNTER — APPOINTMENT (OUTPATIENT)
Dept: ULTRASOUND IMAGING | Facility: HOSPITAL | Age: 54
End: 2024-09-19
Payer: MEDICARE

## 2024-09-19 ENCOUNTER — APPOINTMENT (OUTPATIENT)
Dept: MRI IMAGING | Facility: HOSPITAL | Age: 54
End: 2024-09-19
Payer: MEDICARE

## 2024-09-19 ENCOUNTER — HOSPITAL ENCOUNTER (INPATIENT)
Facility: HOSPITAL | Age: 54
LOS: 4 days | Discharge: HOME | End: 2024-09-23
Attending: FAMILY MEDICINE | Admitting: FAMILY MEDICINE
Payer: MEDICARE

## 2024-09-19 DIAGNOSIS — R09.02 HYPOXEMIA: ICD-10-CM

## 2024-09-19 DIAGNOSIS — M86.9 OSTEOMYELITIS OF LEFT FOOT, UNSPECIFIED TYPE: Primary | ICD-10-CM

## 2024-09-19 DIAGNOSIS — Z74.09 IMPAIRED MOBILITY: ICD-10-CM

## 2024-09-19 DIAGNOSIS — I87.2 STASIS DERMATITIS OF BOTH LEGS: ICD-10-CM

## 2024-09-19 DIAGNOSIS — E66.01 MORBID OBESITY: ICD-10-CM

## 2024-09-19 LAB
ANION GAP SERPL CALCULATED.3IONS-SCNC: 10 MMOL/L (ref 5–15)
APTT PPP: 38.4 SECONDS (ref 24.5–36)
BASOPHILS # BLD AUTO: 0.02 10*3/MM3 (ref 0–0.2)
BASOPHILS NFR BLD AUTO: 0.2 % (ref 0–1.5)
BUN SERPL-MCNC: 14 MG/DL (ref 6–20)
BUN/CREAT SERPL: 14.3 (ref 7–25)
CALCIUM SPEC-SCNC: 8.7 MG/DL (ref 8.6–10.5)
CHLORIDE SERPL-SCNC: 98 MMOL/L (ref 98–107)
CK SERPL-CCNC: 173 U/L (ref 20–180)
CO2 SERPL-SCNC: 27 MMOL/L (ref 22–29)
CREAT SERPL-MCNC: 0.98 MG/DL (ref 0.57–1)
CRP SERPL-MCNC: 20.63 MG/DL (ref 0–0.5)
D DIMER PPP FEU-MCNC: 1.43 MCGFEU/ML (ref 0–0.52)
D-LACTATE SERPL-SCNC: 1.3 MMOL/L (ref 0.5–2)
DEPRECATED RDW RBC AUTO: 47.9 FL (ref 37–54)
EGFRCR SERPLBLD CKD-EPI 2021: 69.6 ML/MIN/1.73
EOSINOPHIL # BLD AUTO: 0.12 10*3/MM3 (ref 0–0.4)
EOSINOPHIL NFR BLD AUTO: 1.3 % (ref 0.3–6.2)
ERYTHROCYTE [DISTWIDTH] IN BLOOD BY AUTOMATED COUNT: 16.6 % (ref 12.3–15.4)
GLUCOSE BLDC GLUCOMTR-MCNC: 202 MG/DL (ref 70–130)
GLUCOSE BLDC GLUCOMTR-MCNC: 224 MG/DL (ref 70–130)
GLUCOSE SERPL-MCNC: 230 MG/DL (ref 65–99)
HBA1C MFR BLD: 9.3 % (ref 4.8–5.6)
HCT VFR BLD AUTO: 32.1 % (ref 34–46.6)
HGB BLD-MCNC: 9.5 G/DL (ref 12–15.9)
IMM GRANULOCYTES # BLD AUTO: 0.04 10*3/MM3 (ref 0–0.05)
IMM GRANULOCYTES NFR BLD AUTO: 0.4 % (ref 0–0.5)
INR PPP: 1.09 (ref 0.91–1.09)
LYMPHOCYTES # BLD AUTO: 1.39 10*3/MM3 (ref 0.7–3.1)
LYMPHOCYTES NFR BLD AUTO: 14.8 % (ref 19.6–45.3)
MCH RBC QN AUTO: 23.6 PG (ref 26.6–33)
MCHC RBC AUTO-ENTMCNC: 29.6 G/DL (ref 31.5–35.7)
MCV RBC AUTO: 79.7 FL (ref 79–97)
MONOCYTES # BLD AUTO: 0.58 10*3/MM3 (ref 0.1–0.9)
MONOCYTES NFR BLD AUTO: 6.2 % (ref 5–12)
MRSA DNA SPEC QL NAA+PROBE: ABNORMAL
NEUTROPHILS NFR BLD AUTO: 7.26 10*3/MM3 (ref 1.7–7)
NEUTROPHILS NFR BLD AUTO: 77.1 % (ref 42.7–76)
NRBC BLD AUTO-RTO: 0 /100 WBC (ref 0–0.2)
NT-PROBNP SERPL-MCNC: 1539 PG/ML (ref 0–900)
PLATELET # BLD AUTO: 216 10*3/MM3 (ref 140–450)
PMV BLD AUTO: 8.9 FL (ref 6–12)
POTASSIUM SERPL-SCNC: 4.2 MMOL/L (ref 3.5–5.2)
PROTHROMBIN TIME: 14.6 SECONDS (ref 11.8–14.8)
RBC # BLD AUTO: 4.03 10*6/MM3 (ref 3.77–5.28)
SODIUM SERPL-SCNC: 135 MMOL/L (ref 136–145)
WBC NRBC COR # BLD AUTO: 9.41 10*3/MM3 (ref 3.4–10.8)

## 2024-09-19 PROCEDURE — 82948 REAGENT STRIP/BLOOD GLUCOSE: CPT

## 2024-09-19 PROCEDURE — 99222 1ST HOSP IP/OBS MODERATE 55: CPT | Performed by: INTERNAL MEDICINE

## 2024-09-19 PROCEDURE — 73630 X-RAY EXAM OF FOOT: CPT

## 2024-09-19 PROCEDURE — 83605 ASSAY OF LACTIC ACID: CPT | Performed by: FAMILY MEDICINE

## 2024-09-19 PROCEDURE — 25010000002 KETOROLAC TROMETHAMINE PER 15 MG: Performed by: FAMILY MEDICINE

## 2024-09-19 PROCEDURE — 63710000001 INSULIN LISPRO (HUMAN) PER 5 UNITS: Performed by: NURSE PRACTITIONER

## 2024-09-19 PROCEDURE — 25810000003 SODIUM CHLORIDE 0.9 % SOLUTION: Performed by: FAMILY MEDICINE

## 2024-09-19 PROCEDURE — 25010000002 METOCLOPRAMIDE PER 10 MG: Performed by: FAMILY MEDICINE

## 2024-09-19 PROCEDURE — 85610 PROTHROMBIN TIME: CPT | Performed by: FAMILY MEDICINE

## 2024-09-19 PROCEDURE — 82550 ASSAY OF CK (CPK): CPT | Performed by: FAMILY MEDICINE

## 2024-09-19 PROCEDURE — 87641 MR-STAPH DNA AMP PROBE: CPT | Performed by: NURSE PRACTITIONER

## 2024-09-19 PROCEDURE — 87040 BLOOD CULTURE FOR BACTERIA: CPT | Performed by: FAMILY MEDICINE

## 2024-09-19 PROCEDURE — 86140 C-REACTIVE PROTEIN: CPT | Performed by: NURSE PRACTITIONER

## 2024-09-19 PROCEDURE — 99285 EMERGENCY DEPT VISIT HI MDM: CPT

## 2024-09-19 PROCEDURE — 25010000002 KETOROLAC TROMETHAMINE PER 15 MG: Performed by: NURSE PRACTITIONER

## 2024-09-19 PROCEDURE — 25010000002 CEFTRIAXONE PER 250 MG: Performed by: FAMILY MEDICINE

## 2024-09-19 PROCEDURE — 85379 FIBRIN DEGRADATION QUANT: CPT | Performed by: FAMILY MEDICINE

## 2024-09-19 PROCEDURE — 85730 THROMBOPLASTIN TIME PARTIAL: CPT | Performed by: FAMILY MEDICINE

## 2024-09-19 PROCEDURE — 80048 BASIC METABOLIC PNL TOTAL CA: CPT | Performed by: FAMILY MEDICINE

## 2024-09-19 PROCEDURE — 83036 HEMOGLOBIN GLYCOSYLATED A1C: CPT | Performed by: NURSE PRACTITIONER

## 2024-09-19 PROCEDURE — 36415 COLL VENOUS BLD VENIPUNCTURE: CPT

## 2024-09-19 PROCEDURE — 85025 COMPLETE CBC W/AUTO DIFF WBC: CPT | Performed by: FAMILY MEDICINE

## 2024-09-19 PROCEDURE — 83880 ASSAY OF NATRIURETIC PEPTIDE: CPT | Performed by: FAMILY MEDICINE

## 2024-09-19 RX ORDER — BISACODYL 10 MG
10 SUPPOSITORY, RECTAL RECTAL DAILY PRN
Status: DISCONTINUED | OUTPATIENT
Start: 2024-09-19 | End: 2024-09-23 | Stop reason: HOSPADM

## 2024-09-19 RX ORDER — NICOTINE POLACRILEX 4 MG
15 LOZENGE BUCCAL
Status: DISCONTINUED | OUTPATIENT
Start: 2024-09-19 | End: 2024-09-23 | Stop reason: HOSPADM

## 2024-09-19 RX ORDER — INSULIN LISPRO 100 [IU]/ML
2-9 INJECTION, SOLUTION INTRAVENOUS; SUBCUTANEOUS
Status: DISCONTINUED | OUTPATIENT
Start: 2024-09-19 | End: 2024-09-23 | Stop reason: HOSPADM

## 2024-09-19 RX ORDER — SODIUM CHLORIDE 0.9 % (FLUSH) 0.9 %
10 SYRINGE (ML) INJECTION AS NEEDED
Status: DISCONTINUED | OUTPATIENT
Start: 2024-09-19 | End: 2024-09-23 | Stop reason: HOSPADM

## 2024-09-19 RX ORDER — ONDANSETRON 4 MG/1
4 TABLET, ORALLY DISINTEGRATING ORAL EVERY 6 HOURS PRN
Status: DISCONTINUED | OUTPATIENT
Start: 2024-09-19 | End: 2024-09-21

## 2024-09-19 RX ORDER — ACETAMINOPHEN 650 MG/1
650 SUPPOSITORY RECTAL EVERY 4 HOURS PRN
Status: DISCONTINUED | OUTPATIENT
Start: 2024-09-19 | End: 2024-09-23 | Stop reason: HOSPADM

## 2024-09-19 RX ORDER — METOCLOPRAMIDE HYDROCHLORIDE 5 MG/ML
10 INJECTION INTRAMUSCULAR; INTRAVENOUS ONCE
Status: COMPLETED | OUTPATIENT
Start: 2024-09-19 | End: 2024-09-19

## 2024-09-19 RX ORDER — LINEZOLID 600 MG/1
600 TABLET, FILM COATED ORAL EVERY 12 HOURS SCHEDULED
Status: DISCONTINUED | OUTPATIENT
Start: 2024-09-19 | End: 2024-09-21

## 2024-09-19 RX ORDER — SODIUM CHLORIDE 9 MG/ML
75 INJECTION, SOLUTION INTRAVENOUS CONTINUOUS
Status: DISCONTINUED | OUTPATIENT
Start: 2024-09-19 | End: 2024-09-23 | Stop reason: HOSPADM

## 2024-09-19 RX ORDER — SODIUM CHLORIDE 9 MG/ML
40 INJECTION, SOLUTION INTRAVENOUS AS NEEDED
Status: DISCONTINUED | OUTPATIENT
Start: 2024-09-19 | End: 2024-09-23 | Stop reason: HOSPADM

## 2024-09-19 RX ORDER — ACETAMINOPHEN 160 MG/5ML
650 SOLUTION ORAL EVERY 4 HOURS PRN
Status: DISCONTINUED | OUTPATIENT
Start: 2024-09-19 | End: 2024-09-23 | Stop reason: HOSPADM

## 2024-09-19 RX ORDER — KETOROLAC TROMETHAMINE 15 MG/ML
10 INJECTION, SOLUTION INTRAMUSCULAR; INTRAVENOUS ONCE
Status: COMPLETED | OUTPATIENT
Start: 2024-09-19 | End: 2024-09-19

## 2024-09-19 RX ORDER — IBUPROFEN 600 MG/1
1 TABLET ORAL
Status: DISCONTINUED | OUTPATIENT
Start: 2024-09-19 | End: 2024-09-23 | Stop reason: HOSPADM

## 2024-09-19 RX ORDER — PANTOPRAZOLE SODIUM 40 MG/1
40 TABLET, DELAYED RELEASE ORAL
Status: DISCONTINUED | OUTPATIENT
Start: 2024-09-20 | End: 2024-09-23 | Stop reason: HOSPADM

## 2024-09-19 RX ORDER — FAMOTIDINE 40 MG/5ML
40 POWDER, FOR SUSPENSION ORAL ONCE
Status: COMPLETED | OUTPATIENT
Start: 2024-09-19 | End: 2024-09-19

## 2024-09-19 RX ORDER — SODIUM CHLORIDE 0.9 % (FLUSH) 0.9 %
10 SYRINGE (ML) INJECTION EVERY 12 HOURS SCHEDULED
Status: DISCONTINUED | OUTPATIENT
Start: 2024-09-19 | End: 2024-09-23 | Stop reason: HOSPADM

## 2024-09-19 RX ORDER — ACETAMINOPHEN 325 MG/1
650 TABLET ORAL EVERY 4 HOURS PRN
Status: DISCONTINUED | OUTPATIENT
Start: 2024-09-19 | End: 2024-09-23 | Stop reason: HOSPADM

## 2024-09-19 RX ORDER — KETOROLAC TROMETHAMINE 30 MG/ML
30 INJECTION, SOLUTION INTRAMUSCULAR; INTRAVENOUS EVERY 6 HOURS PRN
Status: DISCONTINUED | OUTPATIENT
Start: 2024-09-19 | End: 2024-09-23 | Stop reason: HOSPADM

## 2024-09-19 RX ORDER — ENOXAPARIN SODIUM 100 MG/ML
100 INJECTION SUBCUTANEOUS ONCE
Status: DISCONTINUED | OUTPATIENT
Start: 2024-09-19 | End: 2024-09-19 | Stop reason: SDUPTHER

## 2024-09-19 RX ORDER — TIRZEPATIDE 10 MG/.5ML
INJECTION, SOLUTION SUBCUTANEOUS
Status: ON HOLD | COMMUNITY
Start: 2024-08-12 | End: 2024-09-19

## 2024-09-19 RX ORDER — INSULIN LISPRO 100 [IU]/ML
10 INJECTION, SOLUTION INTRAVENOUS; SUBCUTANEOUS NIGHTLY
Status: ON HOLD | COMMUNITY
End: 2024-09-19

## 2024-09-19 RX ORDER — ONDANSETRON 2 MG/ML
4 INJECTION INTRAMUSCULAR; INTRAVENOUS EVERY 6 HOURS PRN
Status: DISCONTINUED | OUTPATIENT
Start: 2024-09-19 | End: 2024-09-21

## 2024-09-19 RX ORDER — OXYCODONE AND ACETAMINOPHEN 7.5; 325 MG/1; MG/1
1 TABLET ORAL EVERY 8 HOURS PRN
Status: DISCONTINUED | OUTPATIENT
Start: 2024-09-19 | End: 2024-09-23 | Stop reason: HOSPADM

## 2024-09-19 RX ORDER — INSULIN GLARGINE 100 [IU]/ML
10 INJECTION, SOLUTION SUBCUTANEOUS NIGHTLY
Status: DISCONTINUED | OUTPATIENT
Start: 2024-09-19 | End: 2024-09-21

## 2024-09-19 RX ORDER — BISACODYL 5 MG/1
5 TABLET, DELAYED RELEASE ORAL DAILY PRN
Status: DISCONTINUED | OUTPATIENT
Start: 2024-09-19 | End: 2024-09-23 | Stop reason: HOSPADM

## 2024-09-19 RX ORDER — DEXTROSE MONOHYDRATE 25 G/50ML
25 INJECTION, SOLUTION INTRAVENOUS
Status: DISCONTINUED | OUTPATIENT
Start: 2024-09-19 | End: 2024-09-23 | Stop reason: HOSPADM

## 2024-09-19 RX ORDER — AMOXICILLIN 250 MG
2 CAPSULE ORAL 2 TIMES DAILY
Status: DISCONTINUED | OUTPATIENT
Start: 2024-09-19 | End: 2024-09-23 | Stop reason: HOSPADM

## 2024-09-19 RX ORDER — ENOXAPARIN SODIUM 100 MG/ML
60 INJECTION SUBCUTANEOUS EVERY 12 HOURS
Status: DISCONTINUED | OUTPATIENT
Start: 2024-09-19 | End: 2024-09-23 | Stop reason: HOSPADM

## 2024-09-19 RX ORDER — POLYETHYLENE GLYCOL 3350 17 G/17G
17 POWDER, FOR SOLUTION ORAL DAILY PRN
Status: DISCONTINUED | OUTPATIENT
Start: 2024-09-19 | End: 2024-09-23 | Stop reason: HOSPADM

## 2024-09-19 RX ADMIN — METOCLOPRAMIDE 10 MG: 5 INJECTION, SOLUTION INTRAMUSCULAR; INTRAVENOUS at 12:01

## 2024-09-19 RX ADMIN — SODIUM CHLORIDE 75 ML/HR: 9 INJECTION, SOLUTION INTRAVENOUS at 22:35

## 2024-09-19 RX ADMIN — OXYCODONE HYDROCHLORIDE AND ACETAMINOPHEN 1 TABLET: 7.5; 325 TABLET ORAL at 23:45

## 2024-09-19 RX ADMIN — INSULIN LISPRO 4 UNITS: 100 INJECTION, SOLUTION INTRAVENOUS; SUBCUTANEOUS at 20:59

## 2024-09-19 RX ADMIN — KETOROLAC TROMETHAMINE 10 MG: 15 INJECTION, SOLUTION INTRAMUSCULAR; INTRAVENOUS at 12:02

## 2024-09-19 RX ADMIN — Medication 10 ML: at 14:23

## 2024-09-19 RX ADMIN — KETOROLAC TROMETHAMINE 30 MG: 30 INJECTION, SOLUTION INTRAMUSCULAR; INTRAVENOUS at 18:47

## 2024-09-19 RX ADMIN — SODIUM CHLORIDE 1000 ML: 9 INJECTION, SOLUTION INTRAVENOUS at 20:59

## 2024-09-19 RX ADMIN — Medication 10 ML: at 21:01

## 2024-09-19 RX ADMIN — LINEZOLID 600 MG: 600 TABLET, FILM COATED ORAL at 21:01

## 2024-09-19 RX ADMIN — SODIUM CHLORIDE 1000 ML: 9 INJECTION, SOLUTION INTRAVENOUS at 10:42

## 2024-09-19 RX ADMIN — SODIUM CHLORIDE 1000 MG: 900 INJECTION INTRAVENOUS at 10:43

## 2024-09-19 RX ADMIN — OXYCODONE HYDROCHLORIDE AND ACETAMINOPHEN 1 TABLET: 7.5; 325 TABLET ORAL at 13:32

## 2024-09-19 RX ADMIN — FAMOTIDINE 40 MG: 40 POWDER, FOR SUSPENSION ORAL at 12:02

## 2024-09-19 RX ADMIN — INSULIN LISPRO 4 UNITS: 100 INJECTION, SOLUTION INTRAVENOUS; SUBCUTANEOUS at 18:47

## 2024-09-19 NOTE — ED NOTES
Nursing report ED to floor  Valeria Wilson  54 y.o.  female    HPI:   Chief Complaint   Patient presents with    LEG NUMBNESS       Admitting doctor:   Amada Farris DO    Consulting provider(s):  Consults       No orders found from 8/21/2024 to 9/20/2024.             Admitting diagnosis:   The primary encounter diagnosis was Osteomyelitis of left foot, unspecified type. Diagnoses of Stasis dermatitis of both legs, Morbid obesity, and Hypoxemia were also pertinent to this visit.    Code status:   Current Code Status       Date Active Code Status Order ID Comments User Context       9/19/2024 1200 CPR (Attempt to Resuscitate) [id number]  Gina Clarke APRN ED        Question Answer    Code Status (Patient has no pulse and is not breathing) CPR (Attempt to Resuscitate)    Medical Interventions (Patient has pulse or is breathing) Limited Support    Medical Intervention Limits: No intubation (DNI)    Level Of Support Discussed With Patient                    Allergies:   Compazine [prochlorperazine edisylate], Meperidine, Norflex [orphenadrine], Nortriptyline, Prochlorperazine, Prochlorperazine maleate, Vancomycin, Codeine, Penicillins, Amoxicillin-pot clavulanate, Avelox [moxifloxacin hcl], Bacitracin, Calamine, Cefazolin, Cephalexin, Ciprofloxacin, Clindamycin/lincomycin, Doxycycline, Hydroxyzine hcl, Moxifloxacin, Orphenadrine citrate, Sulfamethoxazole-trimethoprim, Tobramycin, Vistaril [hydroxyzine], and Zinc    Intake and Output    Intake/Output Summary (Last 24 hours) at 9/19/2024 1206  Last data filed at 9/19/2024 1201  Gross per 24 hour   Intake 100 ml   Output --   Net 100 ml       Weight:       09/19/24  0759   Weight: 135 kg (297 lb)       Most recent vitals:   Vitals:    09/19/24 0759 09/19/24 0831 09/19/24 1043   BP: 132/57 103/79 116/60   BP Location: Left arm Left arm Left arm   Patient Position: Lying Lying Lying   Pulse: 95 85 74   Resp: 18 16 16   Temp: 97.8 °F (36.6 °C)     TempSrc: Oral    "  SpO2: 92% 93% 95%   Weight: 135 kg (297 lb)     Height: 154.9 cm (61\")       Oxygen Therapy: .    Active LDAs/IV Access:   Lines, Drains & Airways       Active LDAs       Name Placement date Placement time Site Days    Peripheral IV 09/19/24 1013 Anterior;Left Forearm 09/19/24  1013  Forearm  less than 1                    Labs (abnormal labs have a star):   Labs Reviewed   BASIC METABOLIC PANEL - Abnormal; Notable for the following components:       Result Value    Glucose 230 (*)     Sodium 135 (*)     All other components within normal limits    Narrative:     GFR Normal >60  Chronic Kidney Disease <60  Kidney Failure <15     APTT - Abnormal; Notable for the following components:    PTT 38.4 (*)     All other components within normal limits   D-DIMER, QUANTITATIVE - Abnormal; Notable for the following components:    D-Dimer, Quantitative 1.43 (*)     All other components within normal limits    Narrative:     According to the assay 's published package insert, a normal (<0.50 MCGFEU/mL) D-dimer result in conjunction with a non-high clinical probability assessment, excludes deep vein thrombosis (DVT) and pulmonary embolism (PE) with high sensitivity.    D-dimer values increase with age and this can make VTE exclusion of an older population difficult. To address this, the American College of [employer], based on best available evidence and recent guidelines, recommends that clinicians use age-adjusted D-dimer thresholds in patients greater than 50 years of age with: a) a low probability of PE who do not meet all Pulmonary Embolism Rule Out Criteria, or b) in those with intermediate probability of PE.   The formula for an age-adjusted D-dimer cut-off is \"age/100\".  For example, a 60 year old patient would have an age-adjusted cut-off of 0.60 MCGFEU/mL and an 80 year old 0.80 MCGFEU/mL.   BNP (IN-HOUSE) - Abnormal; Notable for the following components:    proBNP 1,539.0 (*)     All other components " within normal limits    Narrative:     This assay is used as an aid in the diagnosis of individuals suspected of having heart failure. It can be used as an aid in the diagnosis of acute decompensated heart failure (ADHF) in patients presenting with signs and symptoms of ADHF to the emergency department (ED). In addition, NT-proBNP of <300 pg/mL indicates ADHF is not likely.    Age Range Result Interpretation  NT-proBNP Concentration (pg/mL:      <50             Positive            >450                   Gray                 300-450                    Negative             <300    50-75           Positive            >900                  Gray                300-900                  Negative            <300      >75             Positive            >1800                  Gray                300-1800                  Negative            <300   CBC WITH AUTO DIFFERENTIAL - Abnormal; Notable for the following components:    Hemoglobin 9.5 (*)     Hematocrit 32.1 (*)     MCH 23.6 (*)     MCHC 29.6 (*)     RDW 16.6 (*)     Neutrophil % 77.1 (*)     Lymphocyte % 14.8 (*)     Neutrophils, Absolute 7.26 (*)     All other components within normal limits   PROTIME-INR - Normal   CK - Normal   LACTIC ACID, PLASMA - Normal   BLOOD CULTURE   BLOOD CULTURE   CBC AND DIFFERENTIAL    Narrative:     The following orders were created for panel order CBC & Differential.  Procedure                               Abnormality         Status                     ---------                               -----------         ------                     CBC Auto Differential[[id number]]        Abnormal            Final result                 Please view results for these tests on the individual orders.       Meds given in ED:   Medications   sodium chloride 0.9 % bolus 1,000 mL (1,000 mL Intravenous New Bag 9/19/24 1042)   Enoxaparin Sodium (LOVENOX) syringe 100 mg (100 mg Subcutaneous Not Given 9/19/24 8709)   cefTRIAXone (ROCEPHIN) 1,000 mg in  sodium chloride 0.9 % 100 mL MBP (0 mg Intravenous Stopped 9/19/24 1201)   ketorolac (TORADOL) injection 10 mg (10 mg Intravenous Given 9/19/24 1202)   famotidine (PEPCID) 40 mg/5 mL oral suspension 40 mg (40 mg Oral Given 9/19/24 1202)   metoclopramide (REGLAN) injection 10 mg (10 mg Intravenous Given 9/19/24 1201)           NIH Stroke Scale:       Isolation/Infection(s):  No active isolations   ESBL E coli     COVID Testing  Collected .  Resulted .    Nursing report ED to floor:  Mental status: . A/o x4  Ambulatory status: . SBA  Precautions: .    ED nurse phone extentsion- ..  [phone]

## 2024-09-19 NOTE — H&P
"    [facility]  HISTORY AND PHYSICAL    Date of Admission: 9/19/2024  Primary Care Physician: Adriel Bruno Jr., MD    Subjective   Primary Historian: patient    Chief Complaint: Increased warmth, erythema, edema and pain to left lower extremity    History of Present Illness  Ms. Wilson is a 54-year-old female who presented to [facility] on 9/19 with worsening erythema, edema and pain to left leg.  She has a past medical history significant for diabetes, lymphedema, morbid obesity, chronic venous stasis ulcers, chronic pain syndrome followed by pain management at [facility].  Of note, she has had prior abscess in her left and right thigh in 2022. She has followed with [facility] wound care intermittently since then and started going back regularly since March.  She has been working with wound care/home health to get appropriate compression/wraps to legs.  She normally ambulates with a cane.  She has had difficulty bearing weight on her left foot.  States that the area on \"the bottom of my foot popped up in the last week.\"  No fever or chills.  No drainage. No shortness of breath, chest pain or pressure. No nausea, vomiting, abdominal pain, diarrhea. No recent antibiotic use.    In the ED, x-ray left foot reviewed First interphalangeal joint is expanded with endplate erosions, concerning for septic joint and osteomyelitis. This is new compared to 7/8/2022. Soft tissue defect at the heel, suspect soft tissue ulceration. No underlying bony erosions. Note that radiographic findings of osteomyelitis can lag behind clinical presentation. If high clinical suspicion, consider MRI.  Given multiple allergies, ED provider Dr. Dwyer gave her a dose of IV Rocephin.  She was given 1 L fluid bolus.  D-dimer noted to be 1.43 -venous duplex lower extremities attempted but patient could not tolerate.  WBC and lactate normal.  CRP 20.  Patient seen and examined in " ED bed 23 -her main complaint is a headache and left foot pain rated 5 out of 10.  She will be admitted for further evaluation and management.    Review of Systems   Otherwise complete ROS reviewed and negative except as mentioned in the HPI.    Past Medical History:   Past Medical History:   Diagnosis Date    Anxiety     Arthritis     Osteoarthritis primarily left knee     Back pain     Chronic pain syndrome 10/27/2021    Depression     Diabetes mellitus     Fibromyalgia, primary     History of transfusion     Hx of tear of meniscus of knee joint 11/21/2016    Hypertension     Joint pain     Kidney stone     Knee pain     Lymphedema of both lower extremities 10/27/2021    Migraine     Migraine headache     Pain     knee, left ankle, left ankle      Pes anserinus bursitis 04/28/2015    Restless leg      Past Surgical History:  Past Surgical History:   Procedure Laterality Date    CATARACT EXTRACTION      CATARACT EXTRACTION      CATARACT EXTRACTION      CORNEAL TRANSPLANT      bilateral     ECTOPIC PREGNANCY      INCISION AND DRAINAGE ABSCESS Left 6/1/2022    Procedure: DEBRIDEMENTAND PULSE LAVAGE LT MEDIAL THIGH PACKING ODOFORM GAUZE  WOUND SACRAL AND BILATERAL CALVES;  Surgeon: Justin Perez MD;  Location:  [facility] OR;  Service: General;  Laterality: Left;    INCISION AND DRAINAGE TRUNK Right 11/26/2022    Procedure: INCISION AND DRAINAGE TRUNK;  Surgeon: Sara Guerrier MD;  Location:  [facility] OR;  Service: General;  Laterality: Right;    JOINT REPLACEMENT      KNEE ARTHROSCOPY W/ MENISCAL REPAIR      KNEE MENISCAL REPAIR      MENISCECTOMY Left 12/20/2016    TUBAL ABDOMINAL LIGATION       Social History:  reports that she has never smoked. She has never used smokeless tobacco. She reports current alcohol use. She reports that she does not use drugs.    Family History: family history includes Cancer in her father and maternal grandfather; Dementia in her father; Hypertension in her maternal grandmother; No Known  Problems in her mother.       Allergies:  Allergies   Allergen Reactions    Compazine [Prochlorperazine Edisylate] Shortness Of Breath     Breathing     Meperidine Hives     (Demerol)     Norflex [Orphenadrine] Hives    Nortriptyline Hives    Prochlorperazine Shortness Of Breath    Prochlorperazine Maleate Shortness Of Breath    Vancomycin Other (See Comments) and Unknown - High Severity     CRITICAL LEVEL RESULTS - PATIENT LIKELY HAS METABOLIC / CLEARANCE ISSUE WITH VANCOMYCIN. ON STANDARD REGIMENS BASED ON BAYESIAN/POPULATION PK PARAMETERS, VANCOMYCIN TROUGH LEVELS IN THE 60s, 70s, 90s, 100s WERE OBTAINED. RECOMMEND AGAINST EVER USING VANCOMYCIN IN THIS PATIENT. IF VANCOMYCIN IS ABSOLUTELY INDICATED WITHOUT ANY OTHER OPTIONS, PULSE INTERMITTENT DOSING WOULD LIKELY BE THE ONLY APPROPRIATE METHOD.     Codeine Nausea And Vomiting     Rash     Penicillins Rash     Nausea & vomiting     Amoxicillin-Pot Clavulanate Rash, Hives and Itching    Avelox [Moxifloxacin Hcl] Rash    Bacitracin Nausea And Vomiting    Calamine Itching    Cefazolin Nausea And Vomiting    Cephalexin Rash     (Keflex)     Ciprofloxacin Rash    Clindamycin/Lincomycin Rash    Doxycycline Nausea And Vomiting     Can take but need zofran before    Hydroxyzine Hcl Itching    Moxifloxacin Nausea And Vomiting    Orphenadrine Citrate Itching    Sulfamethoxazole-Trimethoprim Nausea And Vomiting and Rash     States she also gets yeast infections with it    Tobramycin Other (See Comments)     Eyes burn-feel like eyes are on fire      Vistaril [Hydroxyzine] Rash    Zinc Other (See Comments)     ERRYTHEMA       Medications:  Prior to Admission medications    Medication Sig Start Date End Date Taking? Authorizing Provider   Mounjaro 10 MG/0.5ML solution pen-injector pen inject 10MG UNDER THE SKIN ONCE WEEKLY ON THE same DAY each WEEK 8/12/24  Yes Provider, MD Avani   busPIRone (BUSPAR) 10 MG tablet Take 1 tablet by mouth 3 (Three) Times a Day. Indications:  Anxiety Disorder 4/19/21   Emergency, Nurse Janette, RN   Canagliflozin (Invokana) 100 MG tablet tablet Take 1 tablet by mouth Daily. Indications: Type 2 Diabetes 12/28/22   Rhonda Knox APRN   celecoxib (CeleBREX) 200 MG capsule Take 1 capsule by mouth Daily. 10/4/23   Avani Kelly MD   cyclobenzaprine (FLEXERIL) 10 MG tablet Take 1 tablet by mouth 2 (Two) Times a Day As Needed for Muscle Spasms. Indications: Muscle Spasm    Avani Kelly MD   docusate sodium (COLACE) 100 MG capsule Take 1 capsule by mouth 2 (Two) Times a Day. Indications: Constipation    Avani Kelly MD   DULoxetine (CYMBALTA) 60 MG capsule Take 30 mg by mouth 2 (Two) Times a Day. Indications: Diabetes with Nerve Disease 12/1/22   Avani Kelly MD   fluticasone (FLONASE) 50 MCG/ACT nasal spray 1 spray into the nostril(s) as directed by provider Daily. 3/29/22   Viri Doty APRN   furosemide (LASIX) 80 MG tablet Take 1 tablet by mouth 2 (Two) Times a Day As Needed. Indications: Edema 12/1/22   Avani Kelly MD   gabapentin (NEURONTIN) 100 MG capsule Take 1 capsule by mouth Daily. PT TAKES AT 4PM  Indications: Neuropathic Pain 2/1/22   Avani Kelly MD   gabapentin (NEURONTIN) 600 MG tablet Take 1 tablet by mouth 3 (Three) Times a Day. Indications: Neuropathic Pain    Avani Kelly MD   insulin aspart (NovoLOG FlexPen) 100 UNIT/ML solution pen-injector sc pen Inject 3 Units under the skin into the appropriate area as directed 3 (Three) Times a Day With Meals. Indications: Type 2 Diabetes 1/10/23   Aavni Kelly MD   insulin glargine (LANTUS, SEMGLEE) 100 UNIT/ML injection Inject 10 Units under the skin into the appropriate area as directed Every Night. Indications: Type 2 Diabetes 7/20/22   Avani Kelly MD   Magnesium Oxide 400 (240 Mg) MG tablet Take 0.5 tablets by mouth Daily. Indications: Disorder with Low Magnesium Levels 4/15/22   Avani Kelly MD    mirtazapine (REMERON) 15 MG tablet Take 1 tablet by mouth Every Night. Indications: sleep 5/13/21   Emergency, Nurse TAQUERIA Savage   Morphine Sulfate ER 15 MG tablet extended-release 12 hour Take 15 mg by mouth 2 (two) times a day. Indications: Pain    Emergency, Nurse Epic, RN   omeprazole (priLOSEC) 40 MG capsule Take 40 mg by mouth 2 (Two) Times a Day. Indications: Gastroesophageal Reflux Disease    ProviderAvani MD   ondansetron ODT (ZOFRAN-ODT) 4 MG disintegrating tablet Place 1 tablet on the tongue Every 6 (Six) Hours As Needed for Nausea or Vomiting for up to 21 doses. Indications: Nausea and Vomiting 8/4/23   Cherri Rose APRN   oxyCODONE-acetaminophen (PERCOCET) 7.5-325 MG per tablet Take 1 tablet by mouth Every 8 (Eight) Hours As Needed for Moderate Pain  or Severe Pain-Do not take with other Percocet prescription 6/9/22   Marianela Neri MD   polyethylene glycol (MIRALAX) 17 GM/SCOOP powder Take 17 g by mouth Daily. 8/4/23   Cherri Rose APRN   potassium chloride (K-DUR,KLOR-CON) 20 MEQ CR tablet Take 1 tablet by mouth Daily. 6/23/22   TERRY Manuel MD   potassium chloride ER (K-TAB) 20 MEQ tablet controlled-release ER tablet Take 1 tablet by mouth Daily. 9/17/23   Avani Kelly MD   pramipexole (MIRAPEX) 0.75 MG tablet Take 1 tablet by mouth 2 (two) times a day. Indications: Restless Leg Syndrome    ProviderAvani MD   Vraylar 1.5 MG capsule capsule Take 1 capsule by mouth Daily. 10/3/23   Avani Kelly MD   ZOLMitriptan (Zomig) 5 MG tablet Take 1 tablet by mouth 1 (One) Time As Needed for Migraine. 11/22/21   TERRY Manuel MD   clonazePAM (KlonoPIN) 0.5 MG tablet Take 0.5 mg by mouth 2 (Two) Times a Day As Needed for Anxiety.  11/3/21  Avani Kelly MD   Tirzepatide 7.5 MG/0.5ML solution pen-injector Inject 12.5 mg under the skin into the appropriate area as directed 1 (One) Time Per Week. Indications: Type 2 Diabetes 2/3/23 9/19/24  Provider,  "MD Avani   topiramate (TOPAMAX) 25 MG tablet Take 25 mg by mouth Daily. 2/21/22 4/20/22  Emergency, Nurse Epic, RN     I have utilized all available immediate resources to obtain, update, or review the patient's current medications (including all prescriptions, over-the-counter products, herbals, cannabis/cannabidiol products, and vitamin/mineral/dietary (nutritional) supplements).    Objective     Vital Signs: /52 (BP Location: Left arm, Patient Position: Lying)   Pulse 78   Temp 98.5 °F (36.9 °C) (Oral)   Resp 15   Ht 154.9 cm (61\")   Wt 135 kg (297 lb)   LMP  (LMP Unknown)   SpO2 96%   BMI 56.12 kg/m²   Physical Exam  Vitals reviewed.   Constitutional:       General: She is not in acute distress.     Appearance: She is morbidly obese. She is not toxic-appearing.   HENT:      Head: Normocephalic and atraumatic.      Mouth/Throat:      Mouth: Mucous membranes are moist.      Dentition: Abnormal dentition.      Pharynx: Oropharynx is clear.   Eyes:      Extraocular Movements: Extraocular movements intact.      Conjunctiva/sclera: Conjunctivae normal.      Pupils: Pupils are equal, round, and reactive to light.   Cardiovascular:      Rate and Rhythm: Normal rate and regular rhythm.      Pulses: Normal pulses.      Heart sounds: No murmur heard.  Pulmonary:      Effort: Pulmonary effort is normal. No respiratory distress.      Breath sounds: Normal breath sounds. No wheezing or rhonchi.   Abdominal:      General: Bowel sounds are normal. There is no distension.      Palpations: Abdomen is soft.      Tenderness: There is no abdominal tenderness.   Musculoskeletal:         General: No swelling or tenderness. Normal range of motion.      Cervical back: Normal range of motion and neck supple. No muscular tenderness.   Skin:     General: Skin is warm and dry.      Findings: No erythema or rash.   Neurological:      General: No focal deficit present.      Mental Status: She is alert and oriented to " person, place, and time.      Cranial Nerves: No cranial nerve deficit.      Motor: No weakness.   Psychiatric:         Mood and Affect: Mood normal.         Behavior: Behavior normal.              Results Reviewed:  Lab Results (last 24 hours)       Procedure Component Value Units Date/Time    Hemoglobin A1c [[id number]]  (Abnormal) Collected: 09/19/24 0945    Specimen: Blood Updated: 09/19/24 1247     Hemoglobin A1C 9.30 %     Narrative:      Hemoglobin A1C Ranges:    Increased Risk for Diabetes  5.7% to 6.4%  Diabetes                     >= 6.5%  Diabetic Goal                < 7.0%    C-reactive Protein [[id number]]  (Abnormal) Collected: 09/19/24 0945    Specimen: Blood Updated: 09/19/24 1245     C-Reactive Protein 20.63 mg/dL     Basic Metabolic Panel [[id number]]  (Abnormal) Collected: 09/19/24 0945    Specimen: Blood Updated: 09/19/24 1023     Glucose 230 mg/dL      BUN 14 mg/dL      Creatinine 0.98 mg/dL      Sodium 135 mmol/L      Potassium 4.2 mmol/L      Chloride 98 mmol/L      CO2 27.0 mmol/L      Calcium 8.7 mg/dL      BUN/Creatinine Ratio 14.3     Anion Gap 10.0 mmol/L      eGFR 69.6 mL/min/1.73     Narrative:      GFR Normal >60  Chronic Kidney Disease <60  Kidney Failure <15      CK [[id number]]  (Normal) Collected: 09/19/24 0945    Specimen: Blood Updated: 09/19/24 1023     Creatine Kinase 173 U/L     Lactic Acid, Plasma [[id number]]  (Normal) Collected: 09/19/24 0945    Specimen: Blood Updated: 09/19/24 1022     Lactate 1.3 mmol/L     BNP [[id number]]  (Abnormal) Collected: 09/19/24 0945    Specimen: Blood Updated: 09/19/24 1018     proBNP 1,539.0 pg/mL     Narrative:      This assay is used as an aid in the diagnosis of individuals suspected of having heart failure. It can be used as an aid in the diagnosis of acute decompensated heart failure (ADHF) in patients presenting with signs and symptoms of ADHF to the emergency department (ED). In addition, NT-proBNP of <300 pg/mL indicates ADHF is not  "likely.    Age Range Result Interpretation  NT-proBNP Concentration (pg/mL:      <50             Positive            >450                   Gray                 300-450                    Negative             <300    50-75           Positive            >900                  Gray                300-900                  Negative            <300      >75             Positive            >1800                  Gray                300-1800                  Negative            <300    Protime-INR [[id number]]  (Normal) Collected: 09/19/24 0945    Specimen: Blood Updated: 09/19/24 1008     Protime 14.6 Seconds      INR 1.09    aPTT [[id number]]  (Abnormal) Collected: 09/19/24 0945    Specimen: Blood Updated: 09/19/24 1008     PTT 38.4 seconds     D-dimer, Quantitative [[id number]]  (Abnormal) Collected: 09/19/24 0945    Specimen: Blood Updated: 09/19/24 1008     D-Dimer, Quantitative 1.43 MCGFEU/mL     Narrative:      According to the assay 's published package insert, a normal (<0.50 MCGFEU/mL) D-dimer result in conjunction with a non-high clinical probability assessment, excludes deep vein thrombosis (DVT) and pulmonary embolism (PE) with high sensitivity.    D-dimer values increase with age and this can make VTE exclusion of an older population difficult. To address this, the [employer], based on best available evidence and recent guidelines, recommends that clinicians use age-adjusted D-dimer thresholds in patients greater than 50 years of age with: a) a low probability of PE who do not meet all Pulmonary Embolism Rule Out Criteria, or b) in those with intermediate probability of PE.   The formula for an age-adjusted D-dimer cut-off is \"age/100\".  For example, a 60 year old patient would have an age-adjusted cut-off of 0.60 MCGFEU/mL and an 80 year old 0.80 MCGFEU/mL.    CBC & Differential [[id number]]  (Abnormal) Collected: 09/19/24 0945    Specimen: Blood Updated: 09/19/24 0958    " Narrative:      The following orders were created for panel order CBC & Differential.  Procedure                               Abnormality         Status                     ---------                               -----------         ------                     CBC Auto Differential[[id number]]        Abnormal            Final result                 Please view results for these tests on the individual orders.    CBC Auto Differential [[id number]]  (Abnormal) Collected: 09/19/24 0945    Specimen: Blood Updated: 09/19/24 0958     WBC 9.41 10*3/mm3      RBC 4.03 10*6/mm3      Hemoglobin 9.5 g/dL      Hematocrit 32.1 %      MCV 79.7 fL      MCH 23.6 pg      MCHC 29.6 g/dL      RDW 16.6 %      RDW-SD 47.9 fl      MPV 8.9 fL      Platelets 216 10*3/mm3      Neutrophil % 77.1 %      Lymphocyte % 14.8 %      Monocyte % 6.2 %      Eosinophil % 1.3 %      Basophil % 0.2 %      Immature Grans % 0.4 %      Neutrophils, Absolute 7.26 10*3/mm3      Lymphocytes, Absolute 1.39 10*3/mm3      Monocytes, Absolute 0.58 10*3/mm3      Eosinophils, Absolute 0.12 10*3/mm3      Basophils, Absolute 0.02 10*3/mm3      Immature Grans, Absolute 0.04 10*3/mm3      nRBC 0.0 /100 WBC     Blood Culture - Blood, Hand, Digit Left [[id number]] Collected: 09/19/24 0945    Specimen: Blood from Hand, Digit Left Updated: 09/19/24 0956    Blood Culture - Blood, Hand, Digit Right [[id number]] Collected: 09/19/24 0945    Specimen: Blood from Hand, Digit Right Updated: 09/19/24 0956          Imaging Results (Last 24 Hours)       Procedure Component Value Units Date/Time    XR Foot 3+ View Left [[id number]] Collected: 09/19/24 0925     Updated: 09/19/24 0931    Narrative:      EXAM: XR FOOT 3+ VW LEFT-      DATE: 9/19/2024 8:17 AM     HISTORY: [place]       COMPARISON: 7/8/2022.     TECHNIQUE:  DP, oblique, lateral views. 3 images.       FINDINGS:    Severe diffuse soft tissue prominence. First interphalangeal joint is  expanded with cortical erosions. Appearance  concerning for septic joint  and osteomyelitis.     Diffuse bone demineralization. No convincing acute bony finding.  Degenerative spurring in the midfoot. Plantar enthesophyte.     Suspect soft tissue defect/ulceration at the heel. No associated bony  erosions.          Impression:      1. First interphalangeal joint is expanded with endplate erosions,  concerning for septic joint and osteomyelitis. This is new compared to  7/8/2022.     2. Soft tissue defect at the heel, suspect soft tissue ulceration. No  underlying bony erosions. Note that radiographic findings of  osteomyelitis can lag behind clinical presentation. If high clinical  suspicion, consider MRI or follow-up radiographs in 10-14 days.     3. Diffuse soft tissue prominence, which is a nonspecific finding.     This report was signed and finalized on 9/19/2024 9:28 AM by Dr Kaci Ji MD.             I have personally reviewed and interpreted the radiology studies and ECG obtained at time of admission.     Assessment / Plan   Assessment:   Active Hospital Problems    Diagnosis     **Osteomyelitis     Cellulitis of left leg     Lymphedema of both lower extremities     Morbid obesity with BMI of 50.0-59.9, adult     Type 2 diabetes mellitus with hyperglycemia, with long-term current use of insulin        Treatment Plan  The patient will be admitted to Dr. Farris's service at [facility].     Cellulitis of left leg with concern for osteomyelitis to left foot.  Obtain MRI.  Consider podiatry consult once MRI complete. Given multiple allergies case discussed with pharmacist Lucas -she was given ceftriaxone in ED and tolerated, initiate Cefazolin 2 g every 8 hours.  Check MRSA nasal screen.  Follow-up blood cultures.    Venogram bilateral lower extremities.    Type 2 diabetes with A1c 9.3.  Start Accu-Cheks with sliding scale insulin, Levemir.  Diabetes and nutrition consult.    PT.    Will review and resume home medications once med rec  complete.    Medical Decision Making  Number and Complexity of problems: 4 acute problems  Differential Diagnosis: osteomyelitis    Conditions and Status        Condition is worsening.     [facility] Data  External documents reviewed: prior epic records  Cardiac tracing (EKG, telemetry) interpretation: none  Radiology interpretation: Interpreted by radiology  Labs reviewed: As above  Any tests that were considered but not ordered: None considered at present     Decision rules/scores evaluated (example [id number], Wells, etc): None considered at present     Discussed with: Patient and Dr. Farris     Care Planning  Shared decision making: Patient is agreeable to ongoing workup and treatment  Code status and discussions: Full code with limited support to include no intubation    Disposition  Social Determinants of Health that impact treatment or disposition: none  Estimated length of stay is undetermined at present.     I confirmed that the patient's advanced care plan is present, code status is documented, and a surrogate decision maker is listed in the patient's medical record.     The patient's surrogate decision maker is her significant other Barron Taylor and daughter Xiao Wilson.     The patient was seen and examined by me on 9/19/2024 at 11:30.    Electronically signed by SHOLA Gannon, 09/19/24, 13:03 CDT.

## 2024-09-19 NOTE — ED PROVIDER NOTES
"Subjective   History of Present Illness  54-year-old  female presents to ED with chief complaint of lower extremity tenderness.  There is a protracted history that extends back to the year 2022 when Dr. Padilla performed I&D of the left thigh, the bilateral feet, the posteroinferior trunk, and since that point in time the patient has been suffering with multiple bouts of cellulitis, abscesses, multiple rounds of antibiotics.  Please note the patient does not fact have multiple allergies to various antibiotics (to varying degrees, patient states that\" I cannot take certain antibiotics if you give me Zofran\").  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  However the patient has noted the following to the bilateral lower extremity, although mostly to the left lower extremity, which include #1 increased edema, #2 increased erythema although mostly again to the left lower extremity, increased tenderness to bilateral lower extremities from the hips inferiorly although again mostly to the left.  The patient has been battling lymphedema for a number of years now and has tried multiple solutions, including Ace wrap's, compression devices (although patient states that they have attempted multiple times to obtain #1 the proper sized device and #2 have made multiple attempts to place those devices on her with both home health and/or the spouse although this has been a great challenge).  Adding to the equation and the fact that the patient is morbidly obese with a BMI of 56, and is a diabetic (although the patient says that the sugars have been running in the 130s to 150s range on her glucose checks).  At this point in time in the recent past the patient has denied constitutional symptoms although states \"I am always cold as far back as I can remember (and does not recall having a diagnosis of hypothyroidism or " "ever having been checked for thyroid).  The patient has noticed, further along the historical information regarding the lower extremities, that the left foot is abundantly more edematous and tender than it usually is which extends to the level of the midshaft.  Again she has been battling with this for many years however it has taken a turn for the worse in the last 10 days or so.  There is some mild drainage she states about the anterior lower extremity (\"spots that drain on occasion; however the larger one is on the bottom left foot\").  The last round of antibiotics was approximately 6 weeks ago when the patient had a UTI, discussed this with being placed on Macrobid however was lost to follow-up (therefore we do not know what the culture and susceptibility were regarding that).  The bladder today is unremarkable in terms of dysuria pyuria hematuria suprapubic tenderness flank tenderness or any foul odor or discharge.  The bowel is unremarkable although the patient states that consistently nauseous on an intermittent basis which is nothing.  However he denies hematemesis denies changes in stool pattern.  Chest and lungs are unremarkable at this point in time although the patient is I do have a mild chronic dry cough.  The spouse is in the room who discusses that the cough is worse in the mornings and the patient does snore at night and no does not have a CPAP at this point in time and does not require the use of home oxygen (although here on our initial SpO2 was in the 91 to 92% on room air).  Neuro is unremarkable.  Nothing further acute        Review of Systems   All other systems reviewed and are negative.      Past Medical History:   Diagnosis Date    Anxiety     Arthritis     Osteoarthritis primarily left knee     Back pain     Chronic pain syndrome 10/27/2021    Depression     Diabetes mellitus     Fibromyalgia, primary     History of transfusion     Hx of tear of meniscus of knee joint 11/21/2016    " Hypertension     Joint pain     Kidney stone     Knee pain     Lymphedema of both lower extremities 10/27/2021    Migraine     Migraine headache     Pain     knee, left ankle, left ankle      Pes anserinus bursitis 04/28/2015    Restless leg        Allergies   Allergen Reactions    Compazine [Prochlorperazine Edisylate] Shortness Of Breath     Breathing     Meperidine Hives     (Demerol)     Norflex [Orphenadrine] Hives    Nortriptyline Hives    Prochlorperazine Shortness Of Breath    Prochlorperazine Maleate Shortness Of Breath    Vancomycin Other (See Comments) and Unknown - High Severity     CRITICAL LEVEL RESULTS - PATIENT LIKELY HAS METABOLIC / CLEARANCE ISSUE WITH VANCOMYCIN. ON STANDARD REGIMENS BASED ON BAYESIAN/POPULATION PK PARAMETERS, VANCOMYCIN TROUGH LEVELS IN THE 60s, 70s, 90s, 100s WERE OBTAINED. RECOMMEND AGAINST EVER USING VANCOMYCIN IN THIS PATIENT. IF VANCOMYCIN IS ABSOLUTELY INDICATED WITHOUT ANY OTHER OPTIONS, PULSE INTERMITTENT DOSING WOULD LIKELY BE THE ONLY APPROPRIATE METHOD.     Codeine Nausea And Vomiting     Rash     Penicillins Rash     Nausea & vomiting     Amoxicillin-Pot Clavulanate Rash, Hives and Itching    Avelox [Moxifloxacin Hcl] Rash    Bacitracin Nausea And Vomiting    Calamine Itching    Cefazolin Nausea And Vomiting    Cephalexin Rash     (Keflex)     Ciprofloxacin Rash    Clindamycin/Lincomycin Rash    Doxycycline Nausea And Vomiting     Can take but need zofran before    Hydroxyzine Hcl Itching    Moxifloxacin Nausea And Vomiting    Orphenadrine Citrate Itching    Sulfamethoxazole-Trimethoprim Nausea And Vomiting and Rash     States she also gets yeast infections with it    Tobramycin Other (See Comments)     Eyes burn-feel like eyes are on fire      Vistaril [Hydroxyzine] Rash    Zinc Other (See Comments)     ERRYTHEMA       Past Surgical History:   Procedure Laterality Date    CATARACT EXTRACTION      CATARACT EXTRACTION      CATARACT EXTRACTION      CORNEAL TRANSPLANT       bilateral     ECTOPIC PREGNANCY      INCISION AND DRAINAGE ABSCESS Left 6/1/2022    Procedure: DEBRIDEMENTAND PULSE LAVAGE LT MEDIAL THIGH PACKING ODOFORM GAUZE  WOUND SACRAL AND BILATERAL CALVES;  Surgeon: Justin Perez MD;  Location:  [facility];  Service: General;  Laterality: Left;    INCISION AND DRAINAGE TRUNK Right 11/26/2022    Procedure: INCISION AND DRAINAGE TRUNK;  Surgeon: Sara Guerrier MD;  Location:  [facility];  Service: General;  Laterality: Right;    JOINT REPLACEMENT      KNEE ARTHROSCOPY W/ MENISCAL REPAIR      KNEE MENISCAL REPAIR      MENISCECTOMY Left 12/20/2016    TUBAL ABDOMINAL LIGATION         Family History   Problem Relation Age of Onset    No Known Problems Mother     Cancer Father     Dementia Father     Hypertension Maternal Grandmother     Cancer Maternal Grandfather        Social History     Socioeconomic History    Marital status: Single   Tobacco Use    Smoking status: Never    Smokeless tobacco: Never   Vaping Use    Vaping status: Never Used   Substance and Sexual Activity    Alcohol use: Yes    Drug use: No    Sexual activity: Not Currently     Partners: Male           Objective   Physical Exam  Vitals reviewed.   Constitutional:       Comments: Generally speaking the patient appears to be unfortunately morbidly obese, chronically ill-appearing and disheveled.  At first glance the legs are quite impressive and reminiscent of elephantiasis, are loosely wrapped with Ace wraps.  Although the patient is cooperative and amicable   HENT:      Head: Normocephalic and atraumatic.      Nose: Nose normal.      Mouth/Throat:      Mouth: Mucous membranes are moist.      Pharynx: Oropharynx is clear.   Eyes:      Extraocular Movements: Extraocular movements intact.      Pupils: Pupils are equal, round, and reactive to light.   Cardiovascular:      Rate and Rhythm: Normal rate.   Pulmonary:      Effort: Pulmonary effort is normal.      Breath sounds: Normal breath sounds.      Comments:  Although admittedly the breath sounds are diminished at the bases, 1 must take into account the body habitus of this patient and the fact that there was not a complete full inspiration.  Abdominal:      Comments: There is a pendulous abdomen, hypoactive bowel sounds, generally speaking is soft, is nonfocal I do not appreciate any masses, I do not appreciate any accentuated vasculature, nothing further I do not appreciate any color changes,   Genitourinary:     Comments: Deferred  Musculoskeletal:      Cervical back: Normal range of motion.      Comments: Again the bilateral lower extremities are reminiscent of elephantiasis.  Gross beyond 5+ edema bilaterally to the level of the hips.  There is erythema in various stages ranging from pale pink to a bright violaceous, there are multiple stages of various stasis dermatitis with weeping clear to yellow-colored serous fluid.  The left foot in particular is a bright red color, beefy red, angry and these skin on the plantar surface is friable with an obvious area that is bulging with fluid.  The toenails are in various stages of health ranging from onychomycotic to near healthy.  There is abundant tenderness from the level of the toes superiorly to the popliteal fossa mostly on the left however although the right does have some   Skin:     Comments: Please see the above examination the musculoskeletal for description   Neurological:      General: No focal deficit present.      Mental Status: She is alert and oriented to person, place, and time.   Psychiatric:      Comments: Generally speaking has a normal mood and affect however there is some mild anxiety         Procedures           ED Course                   Labs Reviewed   BASIC METABOLIC PANEL - Abnormal; Notable for the following components:       Result Value    Glucose 230 (*)     Sodium 135 (*)     All other components within normal limits    Narrative:     GFR Normal >60  Chronic Kidney Disease <60  Kidney  "Failure <15     APTT - Abnormal; Notable for the following components:    PTT 38.4 (*)     All other components within normal limits   D-DIMER, QUANTITATIVE - Abnormal; Notable for the following components:    D-Dimer, Quantitative 1.43 (*)     All other components within normal limits    Narrative:     According to the assay 's published package insert, a normal (<0.50 MCGFEU/mL) D-dimer result in conjunction with a non-high clinical probability assessment, excludes deep vein thrombosis (DVT) and pulmonary embolism (PE) with high sensitivity.    D-dimer values increase with age and this can make VTE exclusion of an older population difficult. To address this, the [employer], based on best available evidence and recent guidelines, recommends that clinicians use age-adjusted D-dimer thresholds in patients greater than 50 years of age with: a) a low probability of PE who do not meet all Pulmonary Embolism Rule Out Criteria, or b) in those with intermediate probability of PE.   The formula for an age-adjusted D-dimer cut-off is \"age/100\".  For example, a 60 year old patient would have an age-adjusted cut-off of 0.60 MCGFEU/mL and an 80 year old 0.80 MCGFEU/mL.   BNP (IN-HOUSE) - Abnormal; Notable for the following components:    proBNP 1,539.0 (*)     All other components within normal limits    Narrative:     This assay is used as an aid in the diagnosis of individuals suspected of having heart failure. It can be used as an aid in the diagnosis of acute decompensated heart failure (ADHF) in patients presenting with signs and symptoms of ADHF to the emergency department (ED). In addition, NT-proBNP of <300 pg/mL indicates ADHF is not likely.    Age Range Result Interpretation  NT-proBNP Concentration (pg/mL:      <50             Positive            >450                   Gray                 300-450                    Negative             <300    50-75           Positive            " >900                  Hdz                300-900                  Negative            <300      >75             Positive            >1800                  Gray                300-1800                  Negative            <300   CBC WITH AUTO DIFFERENTIAL - Abnormal; Notable for the following components:    Hemoglobin 9.5 (*)     Hematocrit 32.1 (*)     MCH 23.6 (*)     MCHC 29.6 (*)     RDW 16.6 (*)     Neutrophil % 77.1 (*)     Lymphocyte % 14.8 (*)     Neutrophils, Absolute 7.26 (*)     All other components within normal limits   PROTIME-INR - Normal   CK - Normal   LACTIC ACID, PLASMA - Normal   BLOOD CULTURE   BLOOD CULTURE   CBC AND DIFFERENTIAL    Narrative:     The following orders were created for panel order CBC & Differential.  Procedure                               Abnormality         Status                     ---------                               -----------         ------                     CBC Auto Differential[[id number]]        Abnormal            Final result                 Please view results for these tests on the individual orders.     XR Foot 3+ View Left   Final Result   1. First interphalangeal joint is expanded with endplate erosions,   concerning for septic joint and osteomyelitis. This is new compared to   7/8/2022.       2. Soft tissue defect at the heel, suspect soft tissue ulceration. No   underlying bony erosions. Note that radiographic findings of   osteomyelitis can lag behind clinical presentation. If high clinical   suspicion, consider MRI or follow-up radiographs in 10-14 days.       3. Diffuse soft tissue prominence, which is a nonspecific finding.       This report was signed and finalized on 9/19/2024 9:28 AM by Dr Kaci Ji MD.                                        Medical Decision Making  All things being considered, the patient's medical history, comorbidities, her body habitus, findings today in the emergency department, I must consider an abscess in the left  foot, at least 1 DVT in the left lower extremity if not bilateral lower extremity DVTs, compounded by the fact that the patient became mildly hypoxemic with raise a red flag for pulmonary embolus, the patient is hyperglycemic and a longstanding diabetic, therefore at this point we need to bring in a house and in fact treat for osteo-, possible abscess, cellulitis, DVT, suspect PE and work that up.  Medicine, surgery, wound care, physical therapy will have to get involved from the get go.  I spoke with the hospitalist advanced practitioner who accepts and the patient will go to Dr. Geronimo as an inpatient.    Problems Addressed:  Hypoxemia: complicated acute illness or injury  Morbid obesity: complicated acute illness or injury  Osteomyelitis of left foot, unspecified type: complicated acute illness or injury  Stasis dermatitis of both legs: complicated acute illness or injury    Amount and/or Complexity of Data Reviewed  Independent Historian: spouse  External Data Reviewed: labs and notes.  Labs: ordered.  Radiology: ordered.     Details: Reveals Osteo LEFT Foot     Risk  Prescription drug management.  Decision regarding hospitalization.        Final diagnoses:   Osteomyelitis of left foot, unspecified type   Stasis dermatitis of both legs   Morbid obesity   Hypoxemia       ED Disposition  ED Disposition       ED Disposition   Decision to Admit    Condition   --    Comment   Level of Care: Med/Surg [1]   Diagnosis: Osteomyelitis [136642]   Admitting Physician: JUAN ANTONIO PORTER [[id number]]   Certification: I Certify That Inpatient Hospital Services Are Medically Necessary For Greater Than 2 Midnights                 No follow-up provider specified.       Medication List        ASK your doctor about these medications      Mounjaro 10 MG/0.5ML solution pen-injector pen  Generic drug: Tirzepatide  Ask about: Which instructions should I use?                 Uvaldo Dwyer MD  09/19/24 0255

## 2024-09-19 NOTE — CONSULTS
INFECTIOUS DISEASES CONSULT NOTE    Patient:  Valeria Wilson 54 y.o. female  ROOM # 333/1  YOB: 1970  MRN: [id number]  CSN:  [id number]  Admit date: 9/19/2024   Admitting Physician: Amada Farris DO  Primary Care Physician: Adriel Bruno Jr., MD  REFERRING PROVIDER: No ref. provider found    Inpatient Infectious Diseases Consult  Consult performed by: Nancy Burnham MD  Consult ordered by: Gina Clarke APRN          REASON FOR CONSULTATION :    Cellulitis of left leg with concern for osteomyelitis to left foot         HISTORY OF PRESENT ILLNESS: The patient is a 54 year old female admitted with LLE cellulitus and foot abscess as well concern for osteomyelitis.    She notes she sees SHOLA Travis at [facility] and has not been on abx other than nitrofurantoin for a UTI    She has a lymphedema pump device but has issues with needing adjustment as upper thighs getting bigger.      Past Medical History:   Diagnosis Date    Anxiety     Arthritis     Osteoarthritis primarily left knee     Back pain     Chronic pain syndrome 10/27/2021    Depression     Diabetes mellitus     Fibromyalgia, primary     History of transfusion     Hx of tear of meniscus of knee joint 11/21/2016    Hypertension     Joint pain     Kidney stone     Knee pain     Lymphedema of both lower extremities 10/27/2021    Migraine     Migraine headache     Pain     knee, left ankle, left ankle      Pes anserinus bursitis 04/28/2015    Restless leg      Past Surgical History:   Procedure Laterality Date    CATARACT EXTRACTION      CATARACT EXTRACTION      CATARACT EXTRACTION      CORNEAL TRANSPLANT      bilateral     ECTOPIC PREGNANCY      INCISION AND DRAINAGE ABSCESS Left 6/1/2022    Procedure: DEBRIDEMENTAND PULSE LAVAGE LT MEDIAL THIGH PACKING ODOFORM GAUZE  WOUND SACRAL AND BILATERAL CALVES;  Surgeon: Justin Perez MD;  Location: [facility] OR;  Service: General;  Laterality: Left;    INCISION AND DRAINAGE  TRUNK Right 11/26/2022    Procedure: INCISION AND DRAINAGE TRUNK;  Surgeon: Sara Guerrier MD;  Location: [facility] OR;  Service: General;  Laterality: Right;    JOINT REPLACEMENT      KNEE ARTHROSCOPY W/ MENISCAL REPAIR      KNEE MENISCAL REPAIR      MENISCECTOMY Left 12/20/2016    TUBAL ABDOMINAL LIGATION       Family History   Problem Relation Age of Onset    No Known Problems Mother     Cancer Father     Dementia Father     Hypertension Maternal Grandmother     Cancer Maternal Grandfather      Social History     Socioeconomic History    Marital status: Single   Tobacco Use    Smoking status: Never    Smokeless tobacco: Never   Vaping Use    Vaping status: Never Used   Substance and Sexual Activity    Alcohol use: Yes    Drug use: No    Sexual activity: Not Currently     Partners: Male       Current Scheduled Medications:   [START ON 9/20/2024] ceFAZolin, 2,000 mg, Intravenous, Q8H  enoxaparin, 60 mg, Subcutaneous, Q12H  insulin glargine, 10 Units, Subcutaneous, Nightly  insulin lispro, 2-9 Units, Subcutaneous, 4x Daily AC & at Bedtime  [START ON 9/20/2024] pantoprazole, 40 mg, Oral, Q AM  senna-docusate sodium, 2 tablet, Oral, BID  sodium chloride, 10 mL, Intravenous, Q12H              Current PRN Medications:    acetaminophen **OR** acetaminophen **OR** acetaminophen    senna-docusate sodium **AND** polyethylene glycol **AND** bisacodyl **AND** bisacodyl    dextrose    dextrose    glucagon (human recombinant)    ketorolac    ondansetron ODT **OR** ondansetron    oxyCODONE-acetaminophen    sodium chloride    sodium chloride                Allergies   Allergen Reactions    Compazine [Prochlorperazine Edisylate] Shortness Of Breath     Breathing     Meperidine Hives     (Demerol)     Norflex [Orphenadrine] Hives    Nortriptyline Hives    Prochlorperazine Shortness Of Breath    Prochlorperazine Maleate Shortness Of Breath    Vancomycin Other (See Comments) and Unknown - High Severity     CRITICAL LEVEL RESULTS -  "PATIENT LIKELY HAS METABOLIC / CLEARANCE ISSUE WITH VANCOMYCIN. ON STANDARD REGIMENS BASED ON BAYESIAN/POPULATION PK PARAMETERS, VANCOMYCIN TROUGH LEVELS IN THE 60s, 70s, 90s, 100s WERE OBTAINED. RECOMMEND AGAINST EVER USING VANCOMYCIN IN THIS PATIENT. IF VANCOMYCIN IS ABSOLUTELY INDICATED WITHOUT ANY OTHER OPTIONS, PULSE INTERMITTENT DOSING WOULD LIKELY BE THE ONLY APPROPRIATE METHOD.     Codeine Nausea And Vomiting     Rash     Penicillins Rash     Nausea & vomiting     Amoxicillin-Pot Clavulanate Rash, Hives and Itching    Avelox [Moxifloxacin Hcl] Rash    Bacitracin Nausea And Vomiting    Calamine Itching    Cefazolin Nausea And Vomiting    Cephalexin Rash     (Keflex)     Ciprofloxacin Rash    Clindamycin/Lincomycin Rash    Doxycycline Nausea And Vomiting     Can take but need zofran before    Hydroxyzine Hcl Itching    Moxifloxacin Nausea And Vomiting    Orphenadrine Citrate Itching    Sulfamethoxazole-Trimethoprim Nausea And Vomiting and Rash     States she also gets yeast infections with it    Tobramycin Other (See Comments)     Eyes burn-feel like eyes are on fire      Vistaril [Hydroxyzine] Rash    Zinc Other (See Comments)     ERRYTHEMA           Vital Signs:  /48 (BP Location: Left arm, Patient Position: Lying)   Pulse 76   Temp 98.1 °F (36.7 °C) (Oral)   Resp 16   Ht 154.9 cm (61\")   Wt 135 kg (297 lb)   LMP  (LMP Unknown)   SpO2 93%   BMI 56.12 kg/m²   Temp (24hrs), Av.1 °F (36.7 °C), Min:97.8 °F (36.6 °C), Max:98.5 °F (36.9 °C)      Physical Exam  Gen: lying in bed and  at bedside  HEENT AT  RESP effort even and unlabored  JAZMÍN LEs  lymphedema, erythema greater LLE involving thigh. Some superficial erosions. Left foot - fluctuant, tender area dorsal surface      Results Review:    I reviewed the patient's new clinical results.    Lab Results:    CBC:   Lab Results   Lab 24  0945   WBC 9.41   HEMOGLOBIN 9.5*   HEMATOCRIT 32.1*   PLATELETS 216        AutoDiff:   Lab Results "   Lab 09/19/24 0945   NEUTROPHIL % 77.1*   LYMPHOCYTE % 14.8*   MONOCYTES % 6.2   EOSINOPHIL % 1.3   BASOPHIL % 0.2   NEUTROS ABS 7.26*   LYMPHS ABS 1.39   MONOS ABS 0.58   EOS ABS 0.12   BASOS ABS 0.02        Manual Diff:    Lab Results   Lab 09/19/24 0945   NEUTROS ABS 7.26*        CMP:   Lab Results   Lab 09/19/24 0945   SODIUM 135*   POTASSIUM 4.2   CHLORIDE 98   CO2 27.0   BUN 14   CREATININE 0.98   CALCIUM 8.7   GLUCOSE 230*       Lab Results (last 72 hours)       Procedure Component Value Units Date/Time    POC Glucose Once [[id number]]  (Abnormal) Collected: 09/19/24 1808    Specimen: Blood Updated: 09/19/24 1819     Glucose 224 mg/dL      Comment: : [id number] TompkinsMeter ID: [id number]       MRSA Screen, PCR (Inpatient) - Swab, Nares [[id number]]  (Abnormal) Collected: 09/19/24 1406    Specimen: Swab from Nares Updated: 09/19/24 1618     MRSA PCR MRSA Detected    Narrative:      The negative predictive value of this diagnostic test is high and should only be used to consider de-escalating anti-MRSA therapy. A positive result may indicate colonization with MRSA and must be correlated clinically.    Hemoglobin A1c [[id number]]  (Abnormal) Collected: 09/19/24 0945    Specimen: Blood Updated: 09/19/24 1247     Hemoglobin A1C 9.30 %     Narrative:      Hemoglobin A1C Ranges:    Increased Risk for Diabetes  5.7% to 6.4%  Diabetes                     >= 6.5%  Diabetic Goal                < 7.0%    C-reactive Protein [[id number]]  (Abnormal) Collected: 09/19/24 0945    Specimen: Blood Updated: 09/19/24 1245     C-Reactive Protein 20.63 mg/dL     Basic Metabolic Panel [[id number]]  (Abnormal) Collected: 09/19/24 0945    Specimen: Blood Updated: 09/19/24 1023     Glucose 230 mg/dL      BUN 14 mg/dL      Creatinine 0.98 mg/dL      Sodium 135 mmol/L      Potassium 4.2 mmol/L      Chloride 98 mmol/L      CO2 27.0 mmol/L      Calcium 8.7 mg/dL      BUN/Creatinine Ratio 14.3     Anion Gap 10.0 mmol/L       eGFR 69.6 mL/min/1.73     Narrative:      GFR Normal >60  Chronic Kidney Disease <60  Kidney Failure <15      CK [[id number]]  (Normal) Collected: 09/19/24 0945    Specimen: Blood Updated: 09/19/24 1023     Creatine Kinase 173 U/L     Lactic Acid, Plasma [[id number]]  (Normal) Collected: 09/19/24 0945    Specimen: Blood Updated: 09/19/24 1022     Lactate 1.3 mmol/L     BNP [[id number]]  (Abnormal) Collected: 09/19/24 0945    Specimen: Blood Updated: 09/19/24 1018     proBNP 1,539.0 pg/mL     Narrative:      This assay is used as an aid in the diagnosis of individuals suspected of having heart failure. It can be used as an aid in the diagnosis of acute decompensated heart failure (ADHF) in patients presenting with signs and symptoms of ADHF to the emergency department (ED). In addition, NT-proBNP of <300 pg/mL indicates ADHF is not likely.    Age Range Result Interpretation  NT-proBNP Concentration (pg/mL:      <50             Positive            >450                   Gray                 300-450                    Negative             <300    50-75           Positive            >900                  Gray                300-900                  Negative            <300      >75             Positive            >1800                  Gray                300-1800                  Negative            <300    Protime-INR [[id number]]  (Normal) Collected: 09/19/24 0945    Specimen: Blood Updated: 09/19/24 1008     Protime 14.6 Seconds      INR 1.09    aPTT [[id number]]  (Abnormal) Collected: 09/19/24 0945    Specimen: Blood Updated: 09/19/24 1008     PTT 38.4 seconds     D-dimer, Quantitative [[id number]]  (Abnormal) Collected: 09/19/24 0945    Specimen: Blood Updated: 09/19/24 1008     D-Dimer, Quantitative 1.43 MCGFEU/mL     Narrative:      According to the assay 's published package insert, a normal (<0.50 MCGFEU/mL) D-dimer result in conjunction with a non-high clinical probability assessment, excludes deep vein  "thrombosis (DVT) and pulmonary embolism (PE) with high sensitivity.    D-dimer values increase with age and this can make VTE exclusion of an older population difficult. To address this, the [employer], based on best available evidence and recent guidelines, recommends that clinicians use age-adjusted D-dimer thresholds in patients greater than 50 years of age with: a) a low probability of PE who do not meet all Pulmonary Embolism Rule Out Criteria, or b) in those with intermediate probability of PE.   The formula for an age-adjusted D-dimer cut-off is \"age/100\".  For example, a 60 year old patient would have an age-adjusted cut-off of 0.60 MCGFEU/mL and an 80 year old 0.80 MCGFEU/mL.    CBC & Differential [[id number]]  (Abnormal) Collected: 09/19/24 0945    Specimen: Blood Updated: 09/19/24 0958    Narrative:      The following orders were created for panel order CBC & Differential.  Procedure                               Abnormality         Status                     ---------                               -----------         ------                     CBC Auto Differential[[id number]]        Abnormal            Final result                 Please view results for these tests on the individual orders.    CBC Auto Differential [[id number]]  (Abnormal) Collected: 09/19/24 0945    Specimen: Blood Updated: 09/19/24 0958     WBC 9.41 10*3/mm3      RBC 4.03 10*6/mm3      Hemoglobin 9.5 g/dL      Hematocrit 32.1 %      MCV 79.7 fL      MCH 23.6 pg      MCHC 29.6 g/dL      RDW 16.6 %      RDW-SD 47.9 fl      MPV 8.9 fL      Platelets 216 10*3/mm3      Neutrophil % 77.1 %      Lymphocyte % 14.8 %      Monocyte % 6.2 %      Eosinophil % 1.3 %      Basophil % 0.2 %      Immature Grans % 0.4 %      Neutrophils, Absolute 7.26 10*3/mm3      Lymphocytes, Absolute 1.39 10*3/mm3      Monocytes, Absolute 0.58 10*3/mm3      Eosinophils, Absolute 0.12 10*3/mm3      Basophils, Absolute 0.02 10*3/mm3      Immature " Grans, Absolute 0.04 10*3/mm3      nRBC 0.0 /100 WBC     Blood Culture - Blood, Hand, Digit Left [[id number]] Collected: 09/19/24 0945    Specimen: Blood from Hand, Digit Left Updated: 09/19/24 0956    Blood Culture - Blood, Hand, Digit Right [[id number]] Collected: 09/19/24 0945    Specimen: Blood from Hand, Digit Right Updated: 09/19/24 0956            Estimated Creatinine Clearance: 87.7 mL/min (by C-G formula based on SCr of 0.98 mg/dL).    Culture Results:    Microbiology Results (last 10 days)       Procedure Component Value - Date/Time    MRSA Screen, PCR (Inpatient) - Swab, Nares [[id number]]  (Abnormal) Collected: 09/19/24 1406    Lab Status: Final result Specimen: Swab from Nares Updated: 09/19/24 1618     MRSA PCR MRSA Detected    Narrative:      The negative predictive value of this diagnostic test is high and should only be used to consider de-escalating anti-MRSA therapy. A positive result may indicate colonization with MRSA and must be correlated clinically.               Radiology:   Imaging Results (Last 72 Hours)       Procedure Component Value Units Date/Time    XR Foot 3+ View Left [[id number]] Collected: 09/19/24 0925     Updated: 09/19/24 0931    Narrative:      EXAM: XR FOOT 3+ VW LEFT-      DATE: 9/19/2024 8:17 AM     HISTORY: [place]       COMPARISON: 7/8/2022.     TECHNIQUE:  DP, oblique, lateral views. 3 images.       FINDINGS:    Severe diffuse soft tissue prominence. First interphalangeal joint is  expanded with cortical erosions. Appearance concerning for septic joint  and osteomyelitis.     Diffuse bone demineralization. No convincing acute bony finding.  Degenerative spurring in the midfoot. Plantar enthesophyte.     Suspect soft tissue defect/ulceration at the heel. No associated bony  erosions.          Impression:      1. First interphalangeal joint is expanded with endplate erosions,  concerning for septic joint and osteomyelitis. This is new compared to  7/8/2022.     2. Soft  tissue defect at the heel, suspect soft tissue ulceration. No  underlying bony erosions. Note that radiographic findings of  osteomyelitis can lag behind clinical presentation. If high clinical  suspicion, consider MRI or follow-up radiographs in 10-14 days.     3. Diffuse soft tissue prominence, which is a nonspecific finding.     This report was signed and finalized on 9/19/2024 9:28 AM by Dr Kaci Ji MD.                 HOSPITAL PROBLEM LIST:      Osteomyelitis    Lymphedema of both lower extremities    Morbid obesity with BMI of 50.0-59.9, adult    Type 2 diabetes mellitus with hyperglycemia, with long-term current use of insulin    Cellulitis of left leg      IMPRESSION:  Cellulitis LLE in patient with bilateral lymphedema  Suspect left foot abscess  MRSA nasal screen positive  Uncontrolled type diabetes mellitus  Septic arthritis/possible osteomyelitis left 1st IP joint      RECOMMENDATION:   Start linezolid 600 mg po q 12  Await podiatry input  MRI? Per nursing, patient declined and coil may not be large enough  Elevate LEs above level of heart  Glucose management per hospitalist         Nancy Burnham MD  09/19/24  18:22 CDT

## 2024-09-20 LAB
ALBUMIN SERPL-MCNC: 2.8 G/DL (ref 3.5–5.2)
ALBUMIN/GLOB SERPL: 0.8 G/DL
ALP SERPL-CCNC: 62 U/L (ref 39–117)
ALT SERPL W P-5'-P-CCNC: <5 U/L (ref 1–33)
ANION GAP SERPL CALCULATED.3IONS-SCNC: 5 MMOL/L (ref 5–15)
AST SERPL-CCNC: 10 U/L (ref 1–32)
BASOPHILS # BLD AUTO: 0.01 10*3/MM3 (ref 0–0.2)
BASOPHILS NFR BLD AUTO: 0.2 % (ref 0–1.5)
BILIRUB SERPL-MCNC: 0.2 MG/DL (ref 0–1.2)
BUN SERPL-MCNC: 16 MG/DL (ref 6–20)
BUN/CREAT SERPL: 14.2 (ref 7–25)
CALCIUM SPEC-SCNC: 7.9 MG/DL (ref 8.6–10.5)
CHLORIDE SERPL-SCNC: 101 MMOL/L (ref 98–107)
CO2 SERPL-SCNC: 29 MMOL/L (ref 22–29)
CREAT SERPL-MCNC: 1.13 MG/DL (ref 0.57–1)
DEPRECATED RDW RBC AUTO: 48.6 FL (ref 37–54)
EGFRCR SERPLBLD CKD-EPI 2021: 58.7 ML/MIN/1.73
EOSINOPHIL # BLD AUTO: 0.15 10*3/MM3 (ref 0–0.4)
EOSINOPHIL NFR BLD AUTO: 3 % (ref 0.3–6.2)
ERYTHROCYTE [DISTWIDTH] IN BLOOD BY AUTOMATED COUNT: 16.9 % (ref 12.3–15.4)
GLOBULIN UR ELPH-MCNC: 3.3 GM/DL
GLUCOSE BLDC GLUCOMTR-MCNC: 190 MG/DL (ref 70–130)
GLUCOSE BLDC GLUCOMTR-MCNC: 200 MG/DL (ref 70–130)
GLUCOSE BLDC GLUCOMTR-MCNC: 228 MG/DL (ref 70–130)
GLUCOSE SERPL-MCNC: 206 MG/DL (ref 65–99)
HCT VFR BLD AUTO: 26.9 % (ref 34–46.6)
HGB BLD-MCNC: 8 G/DL (ref 12–15.9)
IMM GRANULOCYTES # BLD AUTO: 0.02 10*3/MM3 (ref 0–0.05)
IMM GRANULOCYTES NFR BLD AUTO: 0.4 % (ref 0–0.5)
LYMPHOCYTES # BLD AUTO: 1.2 10*3/MM3 (ref 0.7–3.1)
LYMPHOCYTES NFR BLD AUTO: 24.2 % (ref 19.6–45.3)
MCH RBC QN AUTO: 23.9 PG (ref 26.6–33)
MCHC RBC AUTO-ENTMCNC: 29.7 G/DL (ref 31.5–35.7)
MCV RBC AUTO: 80.3 FL (ref 79–97)
MONOCYTES # BLD AUTO: 0.35 10*3/MM3 (ref 0.1–0.9)
MONOCYTES NFR BLD AUTO: 7.1 % (ref 5–12)
NEUTROPHILS NFR BLD AUTO: 3.23 10*3/MM3 (ref 1.7–7)
NEUTROPHILS NFR BLD AUTO: 65.1 % (ref 42.7–76)
NRBC BLD AUTO-RTO: 0 /100 WBC (ref 0–0.2)
PLATELET # BLD AUTO: 151 10*3/MM3 (ref 140–450)
PMV BLD AUTO: 9.3 FL (ref 6–12)
POTASSIUM SERPL-SCNC: 4.7 MMOL/L (ref 3.5–5.2)
PROT SERPL-MCNC: 6.1 G/DL (ref 6–8.5)
RBC # BLD AUTO: 3.35 10*6/MM3 (ref 3.77–5.28)
SODIUM SERPL-SCNC: 135 MMOL/L (ref 136–145)
WBC NRBC COR # BLD AUTO: 4.96 10*3/MM3 (ref 3.4–10.8)

## 2024-09-20 PROCEDURE — 63710000001 INSULIN LISPRO (HUMAN) PER 5 UNITS: Performed by: NURSE PRACTITIONER

## 2024-09-20 PROCEDURE — 25010000002 CEFAZOLIN PER 500 MG: Performed by: NURSE PRACTITIONER

## 2024-09-20 PROCEDURE — 25010000002 KETOROLAC TROMETHAMINE PER 15 MG: Performed by: NURSE PRACTITIONER

## 2024-09-20 PROCEDURE — 85025 COMPLETE CBC W/AUTO DIFF WBC: CPT | Performed by: NURSE PRACTITIONER

## 2024-09-20 PROCEDURE — 97161 PT EVAL LOW COMPLEX 20 MIN: CPT | Performed by: PHYSICAL THERAPIST

## 2024-09-20 PROCEDURE — 99232 SBSQ HOSP IP/OBS MODERATE 35: CPT | Performed by: INTERNAL MEDICINE

## 2024-09-20 PROCEDURE — 82948 REAGENT STRIP/BLOOD GLUCOSE: CPT

## 2024-09-20 PROCEDURE — 80053 COMPREHEN METABOLIC PANEL: CPT | Performed by: NURSE PRACTITIONER

## 2024-09-20 PROCEDURE — 97165 OT EVAL LOW COMPLEX 30 MIN: CPT

## 2024-09-20 PROCEDURE — 63710000001 INSULIN GLARGINE PER 5 UNITS: Performed by: NURSE PRACTITIONER

## 2024-09-20 RX ORDER — CYCLOBENZAPRINE HCL 10 MG
10 TABLET ORAL 3 TIMES DAILY PRN
Status: DISCONTINUED | OUTPATIENT
Start: 2024-09-20 | End: 2024-09-23 | Stop reason: HOSPADM

## 2024-09-20 RX ORDER — DOCUSATE SODIUM 100 MG/1
100 CAPSULE, LIQUID FILLED ORAL 2 TIMES DAILY
Status: DISCONTINUED | OUTPATIENT
Start: 2024-09-20 | End: 2024-09-23 | Stop reason: HOSPADM

## 2024-09-20 RX ORDER — GABAPENTIN 300 MG/1
600 CAPSULE ORAL DAILY
Status: DISCONTINUED | OUTPATIENT
Start: 2024-09-20 | End: 2024-09-23

## 2024-09-20 RX ORDER — PRAMIPEXOLE DIHYDROCHLORIDE 0.25 MG/1
0.75 TABLET ORAL 2 TIMES DAILY
Status: DISCONTINUED | OUTPATIENT
Start: 2024-09-20 | End: 2024-09-23

## 2024-09-20 RX ORDER — FLUTICASONE PROPIONATE 50 UG/1
1 SPRAY, METERED NASAL DAILY
Status: DISCONTINUED | OUTPATIENT
Start: 2024-09-21 | End: 2024-09-23 | Stop reason: HOSPADM

## 2024-09-20 RX ADMIN — LINEZOLID 600 MG: 600 TABLET, FILM COATED ORAL at 08:15

## 2024-09-20 RX ADMIN — LINEZOLID 600 MG: 600 TABLET, FILM COATED ORAL at 20:11

## 2024-09-20 RX ADMIN — Medication 10 ML: at 09:08

## 2024-09-20 RX ADMIN — PANTOPRAZOLE SODIUM 40 MG: 40 TABLET, DELAYED RELEASE ORAL at 05:32

## 2024-09-20 RX ADMIN — INSULIN LISPRO 4 UNITS: 100 INJECTION, SOLUTION INTRAVENOUS; SUBCUTANEOUS at 08:15

## 2024-09-20 RX ADMIN — INSULIN LISPRO 2 UNITS: 100 INJECTION, SOLUTION INTRAVENOUS; SUBCUTANEOUS at 20:19

## 2024-09-20 RX ADMIN — OXYCODONE HYDROCHLORIDE AND ACETAMINOPHEN 1 TABLET: 7.5; 325 TABLET ORAL at 12:06

## 2024-09-20 RX ADMIN — GABAPENTIN 600 MG: 300 CAPSULE ORAL at 18:26

## 2024-09-20 RX ADMIN — KETOROLAC TROMETHAMINE 30 MG: 30 INJECTION, SOLUTION INTRAMUSCULAR; INTRAVENOUS at 00:55

## 2024-09-20 RX ADMIN — CEFAZOLIN 2000 MG: 2 INJECTION, POWDER, FOR SOLUTION INTRAMUSCULAR; INTRAVENOUS at 05:32

## 2024-09-20 RX ADMIN — KETOROLAC TROMETHAMINE 30 MG: 30 INJECTION, SOLUTION INTRAMUSCULAR; INTRAVENOUS at 14:58

## 2024-09-20 RX ADMIN — INSULIN LISPRO 4 UNITS: 100 INJECTION, SOLUTION INTRAVENOUS; SUBCUTANEOUS at 12:06

## 2024-09-20 RX ADMIN — Medication 10 ML: at 20:11

## 2024-09-20 RX ADMIN — DOCUSATE SODIUM 50 MG AND SENNOSIDES 8.6 MG 2 TABLET: 8.6; 5 TABLET, FILM COATED ORAL at 20:10

## 2024-09-20 RX ADMIN — INSULIN GLARGINE 10 UNITS: 100 INJECTION, SOLUTION SUBCUTANEOUS at 20:19

## 2024-09-20 RX ADMIN — KETOROLAC TROMETHAMINE 30 MG: 30 INJECTION, SOLUTION INTRAMUSCULAR; INTRAVENOUS at 08:15

## 2024-09-20 RX ADMIN — PRAMIPEXOLE DIHYDROCHLORIDE 0.75 MG: 0.25 TABLET ORAL at 20:11

## 2024-09-20 RX ADMIN — OXYCODONE HYDROCHLORIDE AND ACETAMINOPHEN 1 TABLET: 7.5; 325 TABLET ORAL at 20:20

## 2024-09-20 NOTE — PLAN OF CARE
Goal Outcome Evaluation:  Plan of Care Reviewed With: patient        Progress: improving     VSS. RA. A/Ox4. Severe edema in legs, slight weeping. BP low in night, 1000ml bolus, then NS @ 75 LFA. PRN pain meds for c/o pain. Upx1 w/cane pivot to BSC. Call light in reach, safety maintained.

## 2024-09-20 NOTE — PAYOR COMM NOTE
"Zuly Carpenter (54 y.o. Female) [id number]  Admit 09/20  Please review   [facility] [phone]      Social Security Number       Address   [address] [place] [address] [place] [address]    Home Phone   [phone]    MRN   [id number]       [facility]    Marital Status   Single                            Admission Date   9/19/24    Admission Type   Emergency    Admitting Provider   Amada Farris DO    Attending Provider   Amada Farris DO    Department, Room/Bed   [facility] 3A, 333/1       Discharge Date       Discharge Disposition       Discharge Destination                                 Attending Provider: Amada Farris DO    Allergies: Compazine [Prochlorperazine Edisylate], Meperidine, Norflex [Orphenadrine], Nortriptyline, Prochlorperazine, Prochlorperazine Maleate, Vancomycin, Codeine, Penicillins, Amoxicillin-pot Clavulanate, Avelox [Moxifloxacin Hcl], Bacitracin, Calamine, Cefazolin, Cephalexin, Ciprofloxacin, Clindamycin/lincomycin, Doxycycline, Hydroxyzine Hcl, Moxifloxacin, Orphenadrine Citrate, Sulfamethoxazole-trimethoprim, Tobramycin, Vistaril [Hydroxyzine], Zinc    Isolation: Contact   Infection: ESBL E coli (06/04/22), MRSA (09/19/24)   Code Status: CPR    Ht: 154.9 cm (61\")   Wt: 135 kg (297 lb)    Admission Cmt: None   Principal Problem: Osteomyelitis [M86.9]                   Active Insurance as of 9/19/2024       Primary Coverage       Payor Plan Insurance Group Employer/Plan Group    ANTHEM MEDICARE REPLACEMENT ANTHEM MEDICARE ADVANTAGE [id number]       Payor Plan Address Payor Plan Phone Number Payor Plan Fax Number Effective Dates    [address] [phone]  7/1/2024 - None Entered    [facility] [id number]         Subscriber Name Subscriber Birth Date Member ID       ZULY CARPENTER 1970 [id number]               Secondary Coverage       Payor Plan Insurance Group Employer/Plan Group    [place]        Payor Plan Address Payor Plan Phone Number Payor Plan Fax Number Effective Dates    [address] [phone]  9/19/2024 - None Entered    [place] [id number]         Subscriber Name Subscriber Birth Date Member ID       ZULY CARPENTER 1970 [id number]                     Emergency Contacts        (Rel.) Home Phone Work Phone Mobile Phone    Betty Abebe (Mother) [phone] -- --    WINNIE HORAN (Significant Other) -- -- [phone]                 History & Physical        Gina Clarke APRN at 09/19/24 1102       Attestation signed by Amada Farris DO at 09/19/24 1722    Patient examined.  Chart reviewed.    Marked erythema of the left lower extremity.  There is a wound on her left foot with fluctuance.  MRI results pending.  I performed a substantive part of the MDM during the patient’s E/M visit. I personally evaluated   and examined the patient. I personally made or approved the documented management plan and acknowledge its risk   of complications.   (Independent Interpretation) My (EKG/X-Ray/US/CT) interpretation reviewed.   (Discussion) Management/test interpretation discussed with Gina JOLLEY.       Electronically signed by Amada Farris DO, 9/19/2024, 17:22 CDT.                     [facility] Hospital Medicine Services  HISTORY AND PHYSICAL    Date of Admission: 9/19/2024  Primary Care Physician: Adriel Bruno Jr., MD    Subjective   Primary Historian: patient    Chief Complaint: Increased warmth, erythema, edema and pain to left lower extremity    History of Present Illness  Ms. Carpenter is a 54-year-old female who presented to [facility] on 9/19 with worsening erythema, edema and pain to left leg.  She has a past medical history significant for diabetes, lymphedema, morbid obesity, chronic venous stasis ulcers, chronic pain syndrome followed by pain management at [facility]  Of note,  "she has had prior abscess in her left and right thigh in 2022. She has followed with [facility] wound care intermittently since then and started going back regularly since March.  She has been working with wound care/home health to get appropriate compression/wraps to legs.  She normally ambulates with a cane.  She has had difficulty bearing weight on her left foot.  States that the area on \"the bottom of my foot popped up in the last week.\"  No fever or chills.  No drainage. No shortness of breath, chest pain or pressure. No nausea, vomiting, abdominal pain, diarrhea. No recent antibiotic use.    In the ED, x-ray left foot reviewed First interphalangeal joint is expanded with endplate erosions, concerning for septic joint and osteomyelitis. This is new compared to 7/8/2022. Soft tissue defect at the heel, suspect soft tissue ulceration. No underlying bony erosions. Note that radiographic findings of osteomyelitis can lag behind clinical presentation. If high clinical suspicion, consider MRI.  Given multiple allergies, ED provider Dr. Dwyer gave her a dose of IV Rocephin.  She was given 1 L fluid bolus.  D-dimer noted to be 1.43 -venous duplex lower extremities attempted but patient could not tolerate.  WBC and lactate normal.  CRP 20.  Patient seen and examined in ED bed 23 -her main complaint is a headache and left foot pain rated 5 out of 10.  She will be admitted for further evaluation and management.    Review of Systems   Otherwise complete ROS reviewed and negative except as mentioned in the HPI.    Past Medical History:   Past Medical History:   Diagnosis Date    Anxiety     Arthritis     Osteoarthritis primarily left knee     Back pain     Chronic pain syndrome 10/27/2021    Depression     Diabetes mellitus     Fibromyalgia, primary     History of transfusion     Hx of tear of meniscus of knee joint 11/21/2016    Hypertension     Joint pain     Kidney stone     Knee pain     Lymphedema of both lower " extremities 10/27/2021    Migraine     Migraine headache     Pain     knee, left ankle, left ankle      Pes anserinus bursitis 04/28/2015    Restless leg      Past Surgical History:  Past Surgical History:   Procedure Laterality Date    CATARACT EXTRACTION      CATARACT EXTRACTION      CATARACT EXTRACTION      CORNEAL TRANSPLANT      bilateral     ECTOPIC PREGNANCY      INCISION AND DRAINAGE ABSCESS Left 6/1/2022    Procedure: DEBRIDEMENTAND PULSE LAVAGE LT MEDIAL THIGH PACKING ODOFORM GAUZE  WOUND SACRAL AND BILATERAL CALVES;  Surgeon: Justin Perez MD;  Location:  [facility] OR;  Service: General;  Laterality: Left;    INCISION AND DRAINAGE TRUNK Right 11/26/2022    Procedure: INCISION AND DRAINAGE TRUNK;  Surgeon: Sara Guerrier MD;  Location:  [facility] OR;  Service: General;  Laterality: Right;    JOINT REPLACEMENT      KNEE ARTHROSCOPY W/ MENISCAL REPAIR      KNEE MENISCAL REPAIR      MENISCECTOMY Left 12/20/2016    TUBAL ABDOMINAL LIGATION       Social History:  reports that she has never smoked. She has never used smokeless tobacco. She reports current alcohol use. She reports that she does not use drugs.    Family History: family history includes Cancer in her father and maternal grandfather; Dementia in her father; Hypertension in her maternal grandmother; No Known Problems in her mother.       Allergies:  Allergies   Allergen Reactions    Compazine [Prochlorperazine Edisylate] Shortness Of Breath     Breathing     Meperidine Hives     (Demerol)     Norflex [Orphenadrine] Hives    Nortriptyline Hives    Prochlorperazine Shortness Of Breath    Prochlorperazine Maleate Shortness Of Breath    Vancomycin Other (See Comments) and Unknown - High Severity     CRITICAL LEVEL RESULTS - PATIENT LIKELY HAS METABOLIC / CLEARANCE ISSUE WITH VANCOMYCIN. ON STANDARD REGIMENS BASED ON BAYESIAN/POPULATION PK PARAMETERS, VANCOMYCIN TROUGH LEVELS IN THE 60s, 70s, 90s, 100s WERE OBTAINED. RECOMMEND AGAINST EVER USING VANCOMYCIN  IN THIS PATIENT. IF VANCOMYCIN IS ABSOLUTELY INDICATED WITHOUT ANY OTHER OPTIONS, PULSE INTERMITTENT DOSING WOULD LIKELY BE THE ONLY APPROPRIATE METHOD.     Codeine Nausea And Vomiting     Rash     Penicillins Rash     Nausea & vomiting     Amoxicillin-Pot Clavulanate Rash, Hives and Itching    Avelox [Moxifloxacin Hcl] Rash    Bacitracin Nausea And Vomiting    Calamine Itching    Cefazolin Nausea And Vomiting    Cephalexin Rash     (Keflex)     Ciprofloxacin Rash    Clindamycin/Lincomycin Rash    Doxycycline Nausea And Vomiting     Can take but need zofran before    Hydroxyzine Hcl Itching    Moxifloxacin Nausea And Vomiting    Orphenadrine Citrate Itching    Sulfamethoxazole-Trimethoprim Nausea And Vomiting and Rash     States she also gets yeast infections with it    Tobramycin Other (See Comments)     Eyes burn-feel like eyes are on fire      Vistaril [Hydroxyzine] Rash    Zinc Other (See Comments)     ERRYTHEMA       Medications:  Prior to Admission medications    Medication Sig Start Date End Date Taking? Authorizing Provider   Mounjaro 10 MG/0.5ML solution pen-injector pen inject 10MG UNDER THE SKIN ONCE WEEKLY ON THE same DAY each WEEK 8/12/24  Yes Avani Kelly MD   busPIRone (BUSPAR) 10 MG tablet Take 1 tablet by mouth 3 (Three) Times a Day. Indications: Anxiety Disorder 4/19/21   Emergency, Nurse Janette, RN   Canagliflozin (Invokana) 100 MG tablet tablet Take 1 tablet by mouth Daily. Indications: Type 2 Diabetes 12/28/22   Rhonda Knox APRN   celecoxib (CeleBREX) 200 MG capsule Take 1 capsule by mouth Daily. 10/4/23   Avani Kelly MD   cyclobenzaprine (FLEXERIL) 10 MG tablet Take 1 tablet by mouth 2 (Two) Times a Day As Needed for Muscle Spasms. Indications: Muscle Spasm    Avani Kelly MD   docusate sodium (COLACE) 100 MG capsule Take 1 capsule by mouth 2 (Two) Times a Day. Indications: Constipation    Avani Kelly MD   DULoxetine (CYMBALTA) 60 MG capsule Take 30 mg by  mouth 2 (Two) Times a Day. Indications: Diabetes with Nerve Disease 12/1/22   Avani Kelly MD   fluticasone (FLONASE) 50 MCG/ACT nasal spray 1 spray into the nostril(s) as directed by provider Daily. 3/29/22   Viri Doty APRN   furosemide (LASIX) 80 MG tablet Take 1 tablet by mouth 2 (Two) Times a Day As Needed. Indications: Edema 12/1/22   Avani Kelly MD   gabapentin (NEURONTIN) 100 MG capsule Take 1 capsule by mouth Daily. PT TAKES AT 4PM  Indications: Neuropathic Pain 2/1/22   Avani Kelly MD   gabapentin (NEURONTIN) 600 MG tablet Take 1 tablet by mouth 3 (Three) Times a Day. Indications: Neuropathic Pain    Avani Kelly MD   insulin aspart (NovoLOG FlexPen) 100 UNIT/ML solution pen-injector sc pen Inject 3 Units under the skin into the appropriate area as directed 3 (Three) Times a Day With Meals. Indications: Type 2 Diabetes 1/10/23   Avani Kelly MD   insulin glargine (LANTUS, SEMGLEE) 100 UNIT/ML injection Inject 10 Units under the skin into the appropriate area as directed Every Night. Indications: Type 2 Diabetes 7/20/22   Avani Kelly MD   Magnesium Oxide 400 (240 Mg) MG tablet Take 0.5 tablets by mouth Daily. Indications: Disorder with Low Magnesium Levels 4/15/22   Avani Kelly MD   mirtazapine (REMERON) 15 MG tablet Take 1 tablet by mouth Every Night. Indications: sleep 5/13/21   Emergency, Nurse TAQUERIA Savage   Morphine Sulfate ER 15 MG tablet extended-release 12 hour Take 15 mg by mouth 2 (two) times a day. Indications: Pain    Emergency, Nurse Epic, RN   omeprazole (priLOSEC) 40 MG capsule Take 40 mg by mouth 2 (Two) Times a Day. Indications: Gastroesophageal Reflux Disease    Avani Kelly MD   ondansetron ODT (ZOFRAN-ODT) 4 MG disintegrating tablet Place 1 tablet on the tongue Every 6 (Six) Hours As Needed for Nausea or Vomiting for up to 21 doses. Indications: Nausea and Vomiting 8/4/23   Cherri Rose APRN  "  oxyCODONE-acetaminophen (PERCOCET) 7.5-325 MG per tablet Take 1 tablet by mouth Every 8 (Eight) Hours As Needed for Moderate Pain  or Severe Pain-Do not take with other Percocet prescription 6/9/22   Marianela Neri MD   polyethylene glycol (MIRALAX) 17 GM/SCOOP powder Take 17 g by mouth Daily. 8/4/23   Cherri Rose APRN   potassium chloride (K-DUR,KLOR-CON) 20 MEQ CR tablet Take 1 tablet by mouth Daily. 6/23/22   TERRY Manuel MD   potassium chloride ER (K-TAB) 20 MEQ tablet controlled-release ER tablet Take 1 tablet by mouth Daily. 9/17/23   Avani Kelly MD   pramipexole (MIRAPEX) 0.75 MG tablet Take 1 tablet by mouth 2 (two) times a day. Indications: Restless Leg Syndrome    Avani Kelly MD   Vraylar 1.5 MG capsule capsule Take 1 capsule by mouth Daily. 10/3/23   Avani Kelly MD   ZOLMitriptan (Zomig) 5 MG tablet Take 1 tablet by mouth 1 (One) Time As Needed for Migraine. 11/22/21   TERRY Manuel MD   clonazePAM (KlonoPIN) 0.5 MG tablet Take 0.5 mg by mouth 2 (Two) Times a Day As Needed for Anxiety.  11/3/21  Avani Kelly MD   Tirzepatide 7.5 MG/0.5ML solution pen-injector Inject 12.5 mg under the skin into the appropriate area as directed 1 (One) Time Per Week. Indications: Type 2 Diabetes 2/3/23 9/19/24  Avani Kelly MD   topiramate (TOPAMAX) 25 MG tablet Take 25 mg by mouth Daily. 2/21/22 4/20/22  Emergency, Nurse Epic, RN     I have utilized all available immediate resources to obtain, update, or review the patient's current medications (including all prescriptions, over-the-counter products, herbals, cannabis/cannabidiol products, and vitamin/mineral/dietary (nutritional) supplements).    Objective     Vital Signs: /52 (BP Location: Left arm, Patient Position: Lying)   Pulse 78   Temp 98.5 °F (36.9 °C) (Oral)   Resp 15   Ht 154.9 cm (61\")   Wt 135 kg (297 lb)   LMP  (LMP Unknown)   SpO2 96%   BMI 56.12 kg/m²   Physical Exam  Vitals " reviewed.   Constitutional:       General: She is not in acute distress.     Appearance: She is morbidly obese. She is not toxic-appearing.   HENT:      Head: Normocephalic and atraumatic.      Mouth/Throat:      Mouth: Mucous membranes are moist.      Dentition: Abnormal dentition.      Pharynx: Oropharynx is clear.   Eyes:      Extraocular Movements: Extraocular movements intact.      Conjunctiva/sclera: Conjunctivae normal.      Pupils: Pupils are equal, round, and reactive to light.   Cardiovascular:      Rate and Rhythm: Normal rate and regular rhythm.      Pulses: Normal pulses.      Heart sounds: No murmur heard.  Pulmonary:      Effort: Pulmonary effort is normal. No respiratory distress.      Breath sounds: Normal breath sounds. No wheezing or rhonchi.   Abdominal:      General: Bowel sounds are normal. There is no distension.      Palpations: Abdomen is soft.      Tenderness: There is no abdominal tenderness.   Musculoskeletal:         General: No swelling or tenderness. Normal range of motion.      Cervical back: Normal range of motion and neck supple. No muscular tenderness.   Skin:     General: Skin is warm and dry.      Findings: No erythema or rash.   Neurological:      General: No focal deficit present.      Mental Status: She is alert and oriented to person, place, and time.      Cranial Nerves: No cranial nerve deficit.      Motor: No weakness.   Psychiatric:         Mood and Affect: Mood normal.         Behavior: Behavior normal.              Results Reviewed:  Lab Results (last 24 hours)       Procedure Component Value Units Date/Time    Hemoglobin A1c [[id number]]  (Abnormal) Collected: 09/19/24 0945    Specimen: Blood Updated: 09/19/24 1247     Hemoglobin A1C 9.30 %     Narrative:      Hemoglobin A1C Ranges:    Increased Risk for Diabetes  5.7% to 6.4%  Diabetes                     >= 6.5%  Diabetic Goal                < 7.0%    C-reactive Protein [[id number]]  (Abnormal) Collected: 09/19/24 0945     Specimen: Blood Updated: 09/19/24 1245     C-Reactive Protein 20.63 mg/dL     Basic Metabolic Panel [[id number]]  (Abnormal) Collected: 09/19/24 0945    Specimen: Blood Updated: 09/19/24 1023     Glucose 230 mg/dL      BUN 14 mg/dL      Creatinine 0.98 mg/dL      Sodium 135 mmol/L      Potassium 4.2 mmol/L      Chloride 98 mmol/L      CO2 27.0 mmol/L      Calcium 8.7 mg/dL      BUN/Creatinine Ratio 14.3     Anion Gap 10.0 mmol/L      eGFR 69.6 mL/min/1.73     Narrative:      GFR Normal >60  Chronic Kidney Disease <60  Kidney Failure <15      CK [[id number]]  (Normal) Collected: 09/19/24 0945    Specimen: Blood Updated: 09/19/24 1023     Creatine Kinase 173 U/L     Lactic Acid, Plasma [[id number]]  (Normal) Collected: 09/19/24 0945    Specimen: Blood Updated: 09/19/24 1022     Lactate 1.3 mmol/L     BNP [[id number]]  (Abnormal) Collected: 09/19/24 0945    Specimen: Blood Updated: 09/19/24 1018     proBNP 1,539.0 pg/mL     Narrative:      This assay is used as an aid in the diagnosis of individuals suspected of having heart failure. It can be used as an aid in the diagnosis of acute decompensated heart failure (ADHF) in patients presenting with signs and symptoms of ADHF to the emergency department (ED). In addition, NT-proBNP of <300 pg/mL indicates ADHF is not likely.    Age Range Result Interpretation  NT-proBNP Concentration (pg/mL:      <50             Positive            >450                   Gray                 300-450                    Negative             <300    50-75           Positive            >900                  Gray                300-900                  Negative            <300      >75             Positive            >1800                  Gray                300-1800                  Negative            <300    Protime-INR [[id number]]  (Normal) Collected: 09/19/24 0945    Specimen: Blood Updated: 09/19/24 1008     Protime 14.6 Seconds      INR 1.09    aPTT [[id number]]  (Abnormal) Collected:  "09/19/24 0945    Specimen: Blood Updated: 09/19/24 1008     PTT 38.4 seconds     D-dimer, Quantitative [[id number]]  (Abnormal) Collected: 09/19/24 0945    Specimen: Blood Updated: 09/19/24 1008     D-Dimer, Quantitative 1.43 MCGFEU/mL     Narrative:      According to the assay 's published package insert, a normal (<0.50 MCGFEU/mL) D-dimer result in conjunction with a non-high clinical probability assessment, excludes deep vein thrombosis (DVT) and pulmonary embolism (PE) with high sensitivity.    D-dimer values increase with age and this can make VTE exclusion of an older population difficult. To address this, the American College of Physicians, based on best available evidence and recent guidelines, recommends that clinicians use age-adjusted D-dimer thresholds in patients greater than 50 years of age with: a) a low probability of PE who do not meet all Pulmonary Embolism Rule Out Criteria, or b) in those with intermediate probability of PE.   The formula for an age-adjusted D-dimer cut-off is \"age/100\".  For example, a 60 year old patient would have an age-adjusted cut-off of 0.60 MCGFEU/mL and an 80 year old 0.80 MCGFEU/mL.    CBC & Differential [[id number]]  (Abnormal) Collected: 09/19/24 0945    Specimen: Blood Updated: 09/19/24 0958    Narrative:      The following orders were created for panel order CBC & Differential.  Procedure                               Abnormality         Status                     ---------                               -----------         ------                     CBC Auto Differential[[id number]]        Abnormal            Final result                 Please view results for these tests on the individual orders.    CBC Auto Differential [[id number]]  (Abnormal) Collected: 09/19/24 0945    Specimen: Blood Updated: 09/19/24 0958     WBC 9.41 10*3/mm3      RBC 4.03 10*6/mm3      Hemoglobin 9.5 g/dL      Hematocrit 32.1 %      MCV 79.7 fL      MCH 23.6 pg      MCHC 29.6 g/dL  "     RDW 16.6 %      RDW-SD 47.9 fl      MPV 8.9 fL      Platelets 216 10*3/mm3      Neutrophil % 77.1 %      Lymphocyte % 14.8 %      Monocyte % 6.2 %      Eosinophil % 1.3 %      Basophil % 0.2 %      Immature Grans % 0.4 %      Neutrophils, Absolute 7.26 10*3/mm3      Lymphocytes, Absolute 1.39 10*3/mm3      Monocytes, Absolute 0.58 10*3/mm3      Eosinophils, Absolute 0.12 10*3/mm3      Basophils, Absolute 0.02 10*3/mm3      Immature Grans, Absolute 0.04 10*3/mm3      nRBC 0.0 /100 WBC     Blood Culture - Blood, Hand, Digit Left [[id number]] Collected: 09/19/24 0945    Specimen: Blood from Hand, Digit Left Updated: 09/19/24 0956    Blood Culture - Blood, Hand, Digit Right [[id number]] Collected: 09/19/24 0945    Specimen: Blood from Hand, Digit Right Updated: 09/19/24 0956          Imaging Results (Last 24 Hours)       Procedure Component Value Units Date/Time    XR Foot 3+ View Left [[id number]] Collected: 09/19/24 0925     Updated: 09/19/24 0931    Narrative:      EXAM: XR FOOT 3+ VW LEFT-      DATE: 9/19/2024 8:17 AM     HISTORY: [place]       COMPARISON: 7/8/2022.     TECHNIQUE:  DP, oblique, lateral views. 3 images.       FINDINGS:    Severe diffuse soft tissue prominence. First interphalangeal joint is  expanded with cortical erosions. Appearance concerning for septic joint  and osteomyelitis.     Diffuse bone demineralization. No convincing acute bony finding.  Degenerative spurring in the midfoot. Plantar enthesophyte.     Suspect soft tissue defect/ulceration at the heel. No associated bony  erosions.          Impression:      1. First interphalangeal joint is expanded with endplate erosions,  concerning for septic joint and osteomyelitis. This is new compared to  7/8/2022.     2. Soft tissue defect at the heel, suspect soft tissue ulceration. No  underlying bony erosions. Note that radiographic findings of  osteomyelitis can lag behind clinical presentation. If high clinical  suspicion, consider MRI or  follow-up radiographs in 10-14 days.     3. Diffuse soft tissue prominence, which is a nonspecific finding.     This report was signed and finalized on 9/19/2024 9:28 AM by Dr Kaci Ji MD.             I have personally reviewed and interpreted the radiology studies and ECG obtained at time of admission.     Assessment / Plan   Assessment:   Active Hospital Problems    Diagnosis     **Osteomyelitis     Cellulitis of left leg     Lymphedema of both lower extremities     Morbid obesity with BMI of 50.0-59.9, adult     Type 2 diabetes mellitus with hyperglycemia, with long-term current use of insulin        Treatment Plan  The patient will be admitted to Dr. Farris's service at [facility].     Cellulitis of left leg with concern for osteomyelitis to left foot.  Obtain MRI.  Consider podiatry consult once MRI complete. Given multiple allergies case discussed with pharmacist Lucas -she was given ceftriaxone in ED and tolerated, initiate Cefazolin 2 g every 8 hours.  Check MRSA nasal screen.  Follow-up blood cultures.    Venogram bilateral lower extremities.    Type 2 diabetes with A1c 9.3.  Start Accu-Cheks with sliding scale insulin, Levemir.  Diabetes and nutrition consult.    PT.    Will review and resume home medications once med rec complete.    Medical Decision Making  Number and Complexity of problems: 4 acute problems  Differential Diagnosis: osteomyelitis    Conditions and Status        Condition is worsening.     [facility] Data  External documents reviewed: prior epic records  Cardiac tracing (EKG, telemetry) interpretation: none  Radiology interpretation: Interpreted by radiology  Labs reviewed: As above  Any tests that were considered but not ordered: None considered at present     Decision rules/scores evaluated (example [id number], Wells, etc): None considered at present     Discussed with: Patient and Dr. Farris     Care Planning  Shared decision making: Patient is agreeable to ongoing workup and  treatment  Code status and discussions: Full code with limited support to include no intubation    Disposition  Social Determinants of Health that impact treatment or disposition: none  Estimated length of stay is undetermined at present.     I confirmed that the patient's advanced care plan is present, code status is documented, and a surrogate decision maker is listed in the patient's medical record.     The patient's surrogate decision maker is her significant other Barron Taylro and daughter Xiao Wilson.     The patient was seen and examined by me on 9/19/2024 at 11:30.    Electronically signed by SHOLA Gannon, 09/19/24, 13:03 CDT.                Electronically signed by Amada Farris DO at 09/19/24 1722       Vital Signs (last day)       Date/Time Temp Temp src Pulse Resp BP Patient Position SpO2    09/20/24 1043 98.1 (36.7) Oral 67 18 117/45 Lying 94    09/20/24 0750 97.9 (36.6) Oral 70 16 126/52 Lying 94    09/20/24 0336 98.3 (36.8) Oral 66 12 117/53 Lying 94    09/19/24 2245 98.6 (37) Oral 75 16 119/47 Lying 94    09/19/24 2001 98.5 (36.9) Oral 82 14 -- Lying 94    09/19/24 1442 98.1 (36.7) Oral 76 16 104/48 Lying 93    09/19/24 1233 98.5 (36.9) Oral 78 15 134/52 Lying 96    09/19/24 10:43:30 -- -- 74 16 116/60 Lying 95    09/19/24 0831 -- -- 85 16 103/79 Lying 93    09/19/24 0759 97.8 (36.6) Oral 95 18 132/57 Lying 92          Current Facility-Administered Medications   Medication Dose Route Frequency Provider Last Rate Last Admin    acetaminophen (TYLENOL) tablet 650 mg  650 mg Oral Q4H PRN Gina Clarke APRN        Or    acetaminophen (TYLENOL) 160 MG/5ML oral solution 650 mg  650 mg Oral Q4H PRN Gina Clarke APRN        Or    acetaminophen (TYLENOL) suppository 650 mg  650 mg Rectal Q4H PRN Gina Clarke APRN        sennosides-docusate (PERICOLACE) 8.6-50 MG per tablet 2 tablet  2 tablet Oral BID Gina Clarke APRN        And    polyethylene glycol (MIRALAX) packet 17 g  17 g Oral Daily  PRN Gina Clarke APRN        And    bisacodyl (DULCOLAX) EC tablet 5 mg  5 mg Oral Daily PRN Gina Clarke APRN        And    bisacodyl (DULCOLAX) suppository 10 mg  10 mg Rectal Daily PRN Gina Clarke, APRN        ceFAZolin 2000 mg IVPB in 100 mL NS (MBP)  2,000 mg Intravenous Q8H Tricia, Gina, APRN   2,000 mg at 09/20/24 0532    dextrose (D50W) (25 g/50 mL) IV injection 25 g  25 g Intravenous Q15 Min PRN Gina Clarke, SHOLA        dextrose (GLUTOSE) oral gel 15 g  15 g Oral Q15 Min PRN Gina Clarke, SHOLA        Enoxaparin Sodium (LOVENOX) syringe 60 mg  60 mg Subcutaneous Q12H Gina Clarke, APRKAIA        glucagon (GLUCAGEN) injection 1 mg  1 mg Intramuscular Q15 Min PRN Gina Clarke, APRKAIA        insulin glargine (LANTUS, SEMGLEE) injection 10 Units  10 Units Subcutaneous Nightly Gina Clarke, SHOLA        Insulin Lispro (humaLOG) injection 2-9 Units  2-9 Units Subcutaneous 4x Daily AC & at Bedtime Gina Clarke APRN   4 Units at 09/20/24 0815    ketorolac (TORADOL) injection 30 mg  30 mg Intravenous Q6H PRN Gina Clarke, APRN   30 mg at 09/20/24 0815    linezolid (ZYVOX) tablet 600 mg  600 mg Oral Q12H Nancy Burnham MD   600 mg at 09/20/24 0815    ondansetron ODT (ZOFRAN-ODT) disintegrating tablet 4 mg  4 mg Oral Q6H PRN Gina Clarke APRN        Or    ondansetron (ZOFRAN) injection 4 mg  4 mg Intravenous Q6H PRN Gina Clarke, SHOLA        oxyCODONE-acetaminophen (PERCOCET) 7.5-325 MG per tablet 1 tablet  1 tablet Oral Q8H PRN Gina Clarke, APRN   1 tablet at 09/19/24 2345    pantoprazole (PROTONIX) EC tablet 40 mg  40 mg Oral Q AM Clarke, Gina, APRN   40 mg at 09/20/24 0532    sodium chloride 0.9 % flush 10 mL  10 mL Intravenous Q12H Clarke, Gina, APRN   10 mL at 09/20/24 0908    sodium chloride 0.9 % flush 10 mL  10 mL Intravenous PRN Clarke, Gina, APRN        sodium chloride 0.9 % infusion 40 mL  40 mL Intravenous PRN Clarke, Gina, APRN        sodium chloride 0.9 % infusion  75 mL/hr Intravenous Continuous Oswald Anderson  MD Brandin 75 mL/hr at 09/19/24 2235 75 mL/hr at 09/19/24 2235     Physician Progress Notes (last 24 hours)  Notes from 09/19/24 1054 through 09/20/24 1054   No notes of this type exist for this encounter.

## 2024-09-20 NOTE — PLAN OF CARE
Goal Outcome Evaluation:  Plan of Care Reviewed With: patient        Progress: no change  Outcome Evaluation: OT eval completed.  Pt alert and oriented x4.  Pt sitting EOB with significant other present in room.  Pt is [facility] to therapy staff at [facility].  She has a history of noncompliance.  She has a wound on the plantar surface of L foot.  There are no orders in the medical record for NWB however PT/OT recommended she maintain NWB to LLE to reduce pressure to L foot due to wound.  [place] stood with [facility] and [place] [facility] [place]  She was unable to maintain NWB status.  Strength in BUE appears WFL and a rw was provided to allow for pt to keep more weight of her LLE.  Spoke with pt and spouse, she does not feel she needs skilled OT and does not want to participate.  She has all the DME needed for home upon discharge.  OT also left rolling shower chair in pt's room for use while at hospital and educated her on its use.  OT will sign off at this time as pt does not wish to receive OT services.      Anticipated Discharge Disposition (OT): home with assist

## 2024-09-20 NOTE — PROGRESS NOTES
[facility] Medicine Services  INPATIENT PROGRESS NOTE    Patient Name: Valeria Wilson  Date of Admission: 9/19/2024  Today's Date: 09/20/24  Length of Stay: 1  Primary Care Physician: Adriel Bruno Jr., MD    Subjective   Chief Complaint: wounds foot, redness pain lower lleg  HPI   Patient able to have MRI secondary to fluid not fitting in the coil.  Patient continues to have a wound on the bottom of the left foot.    Blood sugars since admit ranged from 202 to 228.  A1c on admit was one 9.3.    MRSA screen positive.  ID was placed on linezolid.          Review of Systems   All pertinent negatives and positives are as above. All other systems have been reviewed and are negative unless otherwise stated.     Objective    Temp:  [97.9 °F (36.6 °C)-98.6 °F (37 °C)] 98.1 °F (36.7 °C)  Heart Rate:  [66-82] 67  Resp:  [12-18] 18  BP: (104-126)/(45-53) 117/45  Physical Exam  Vitals and nursing note reviewed.   Constitutional:       Appearance: She is obese.   HENT:      Head: Normocephalic and atraumatic.      Right Ear: External ear normal.      Mouth/Throat:      Mouth: Mucous membranes are moist.   Eyes:      Extraocular Movements: Extraocular movements intact.      Conjunctiva/sclera: Conjunctivae normal.      Pupils: Pupils are equal, round, and reactive to light.   Cardiovascular:      Rate and Rhythm: Normal rate and regular rhythm.      Pulses: Normal pulses.      Heart sounds: Normal heart sounds.   Pulmonary:      Effort: Pulmonary effort is normal.      Breath sounds: Normal breath sounds.   Abdominal:      General: Bowel sounds are normal.      Palpations: Abdomen is soft.   Musculoskeletal:      Cervical back: No rigidity.      Comments: Patient is able to move all extremities.  She has marked lymphedema bilateral lower legs.  The left lower extremity has cellulitis changes wounds.   Skin:     General: Skin is warm and dry.      Capillary Refill: Capillary refill  takes less than 2 seconds.      Comments: Abscess that sole of the left foot appears unchanged overnight.   Neurological:      General: No focal deficit present.      Mental Status: She is alert and oriented to person, place, and time.   Psychiatric:         Mood and Affect: Mood normal.             Results Review:  I have reviewed the labs, radiology results, and diagnostic studies.    Laboratory Data:   Results from last 7 days   Lab Units 09/20/24  0453 09/19/24  0945   WBC 10*3/mm3 4.96 9.41   HEMOGLOBIN g/dL 8.0* 9.5*   HEMATOCRIT % 26.9* 32.1*   PLATELETS 10*3/mm3 151 216        Results from last 7 days   Lab Units 09/20/24  0453 09/19/24  0945   SODIUM mmol/L 135* 135*   POTASSIUM mmol/L 4.7 4.2   CHLORIDE mmol/L 101 98   CO2 mmol/L 29.0 27.0   BUN mg/dL 16 14   CREATININE mg/dL 1.13* 0.98   CALCIUM mg/dL 7.9* 8.7   BILIRUBIN mg/dL 0.2  --    ALK PHOS U/L 62  --    ALT (SGPT) U/L <5  --    AST (SGOT) U/L 10  --    GLUCOSE mg/dL 206* 230*       Culture Data:   Blood Culture   Date Value Ref Range Status   09/19/2024 No growth at 24 hours  Preliminary   09/19/2024 No growth at 24 hours  Preliminary       Radiology Data:   Imaging Results (Last 24 Hours)       ** No results found for the last 24 hours. **            I have reviewed the patient's current medications.     Assessment/Plan   Assessment  Active Hospital Problems    Diagnosis     **Osteomyelitis     Cellulitis of left leg     Lymphedema of both lower extremities     Morbid obesity with BMI of 50.0-59.9, adult     Type 2 diabetes mellitus with hyperglycemia, with long-term current use of insulin        Treatment Plan  Consulted.  Unfortunately they are out of town.  Will explore other options for addressing the wound on the bottom of the left foot.  Antibiotics  linezolid  Elevate leg above heart  Monitor blood sugars.  Sliding scale insulin to cover    Medical Decision Making  Number and Complexity of problems:   Osteomyelitis high  complexity  Cellulitis left leg high complexity  Lymphedema edema high complexity  Morbid obesity moderate complexity  Diabetes mellitus type 2 moderate complexity    Differential Diagnosis: None  Conditions and Status        Condition is unchanged.     [facility] Data  External documents reviewed: Reviewed  Cardiac tracing (EKG, telemetry) interpretation: Reviewed  Radiology interpretation: Reviewed  Labs reviewed: Reviewed  Any tests that were considered but not ordered: None     Decision rules/scores evaluated (example [id number], [place], etc): None     Discussed with: Patient     Care Planning  Shared decision making: Patient  Code status and discussions: Full    Disposition  Social Determinants of Health that impact treatment or disposition: None  I expect the patient to be discharged to home in 3-7 days.     Electronically signed by Amada Farris DO, 09/20/24, 12:36 CDT.

## 2024-09-20 NOTE — PLAN OF CARE
Goal Outcome Evaluation:   VSS. Accuchecks. Up stand by to bedside toilet. Room air. A&Ox4. Complaints of pain. See MAR for interventions. Will continue to monitor.

## 2024-09-20 NOTE — THERAPY DISCHARGE NOTE
Acute Care - Occupational Therapy Initial Evaluation/Discharge  [facility]     Patient Name: Valeria Wilson  : 1970  MRN: [id number]  Today's Date: 2024  Onset of Illness/Injury or Date of Surgery: 24     Referring Physician: Dr. Farris      Admit Date: 2024       ICD-10-CM ICD-9-CM   1. Osteomyelitis of left foot, unspecified type  M86.9 730.27   2. Stasis dermatitis of both legs  I87.2 454.1   3. Morbid obesity  E66.01 278.01   4. Hypoxemia  R09.02 799.02     Patient Active Problem List   Diagnosis    Hypertension    Type 2 diabetes mellitus with hyperglycemia, without long-term current use of insulin    Obesity, morbid, BMI 50 or higher    Stage 3 chronic kidney disease    Bacteremia due to Klebsiella pneumoniae    Urinary tract infection    Chronic pain syndrome    Lymphedema of both lower extremities    Bilateral lower leg cellulitis    RLS (restless legs syndrome)    Diabetic ulcer of left lower leg associated with type 2 diabetes mellitus, with fat layer exposed    Dyspnea    MARCELINA (generalized anxiety disorder)    Gastroesophageal reflux disease    Headache    Hypoglycemia    Hypokalemia    Migraine without aura and without status migrainosus, not intractable    Mild single current episode of major depressive disorder    Morbid obesity with BMI of 50.0-59.9, adult    Old complex tear of medial meniscus of left knee    Pleurisy    Polypharmacy    Type 2 diabetes mellitus with hyperglycemia, with long-term current use of insulin    Venous insufficiency of both lower extremities    Anemia    Open wound    Cellulitis of left leg    Cellulitis of lower extremity, unspecified laterality    Cellulitis of right foot    Osteomyelitis     Past Medical History:   Diagnosis Date    Anxiety     Arthritis     Osteoarthritis primarily left knee     Back pain     Chronic pain syndrome 10/27/2021    Depression     Diabetes mellitus     Fibromyalgia, primary     History of transfusion     Hx of tear  of meniscus of knee joint 11/21/2016    Hypertension     Joint pain     Kidney stone     Knee pain     Lymphedema of both lower extremities 10/27/2021    Migraine     Migraine headache     Pain     knee, left ankle, left ankle      Pes anserinus bursitis 04/28/2015    Restless leg      Past Surgical History:   Procedure Laterality Date    CATARACT EXTRACTION      CATARACT EXTRACTION      CATARACT EXTRACTION      CORNEAL TRANSPLANT      bilateral     ECTOPIC PREGNANCY      INCISION AND DRAINAGE ABSCESS Left 6/1/2022    Procedure: DEBRIDEMENTAND PULSE LAVAGE LT MEDIAL THIGH PACKING ODOFORM GAUZE  WOUND SACRAL AND BILATERAL CALVES;  Surgeon: Justin Perez MD;  Location:  [facility] OR;  Service: General;  Laterality: Left;    INCISION AND DRAINAGE TRUNK Right 11/26/2022    Procedure: INCISION AND DRAINAGE TRUNK;  Surgeon: Sara Guerrier MD;  Location:  [facility] OR;  Service: General;  Laterality: Right;    JOINT REPLACEMENT      KNEE ARTHROSCOPY W/ MENISCAL REPAIR      KNEE MENISCAL REPAIR      MENISCECTOMY Left 12/20/2016    TUBAL ABDOMINAL LIGATION         OT ASSESSMENT FLOWSHEET (Last 12 Hours)       OT Evaluation and Treatment       Row Name 09/20/24 1330 09/20/24 0740                OT Time and Intention    Subjective Information complains of;pain  -AC --       Document Type evaluation  -AC --       Mode of Treatment occupational therapy  -AC --       Session Not Performed -- other (see comments)  -AC       Comment, Session Not Performed -- awaiting MRI and podiatry consult  -AC          General Information    Patient Profile Reviewed yes  -AC --       Onset of Illness/Injury or Date of Surgery 09/19/24  -AC --       Referring Physician Dr. Farris  -AC --       Prior Level of Function independent:;transfer;w/c or scooter;bed mobility;ADL's  -AC --       Equipment Currently Used at Home cane, quad;wheelchair;commode, bedside  -AC --       Pertinent History of Current Functional Problem worsening erythema, edema and  pain to LLE; Dx: Osteomyelitis, LLE cellulitis, BLE Lymphedema  -AC --       Existing Precautions/Restrictions fall  -AC --       Risks Reviewed LOB;increased discomfort;change in vital signs;increased drainage;lines disloged;patient and family:  -AC --       Benefits Reviewed improve function;increase independence;increase strength;increase balance;decrease pain;decrease risk of DVT;improve skin integrity;increase knowledge;patient and family:  -AC --       Barriers to Rehab medically complex;previous functional deficit  - --          Living Environment    Current Living Arrangements home  -AC --          Pain Assessment    Pain Intervention(s) Repositioned  -AC --       Additional Documentation Pain Scale: FACES Pre/Post-Treatment (Group)  - --          Pain Scale: FACES Pre/Post-Treatment    Pain: FACES Scale, Pretreatment 2-->hurts little bit  -AC --       Posttreatment Pain Rating 2-->hurts little bit  -AC --       Pain Location - Side/Orientation Left  -AC --       Pain Location - foot  -AC --          Cognition    Orientation Status (Cognition) oriented x 4  - --       Follows Commands (Cognition) [facility]  - --       Personal Safety Interventions fall prevention program maintained;gait belt;muscle strengthening facilitated;nonskid shoes/slippers when out of bed;supervised activity;toileting scheduled  -AC --          Range of Motion Comprehensive    General Range of Motion bilateral upper extremity ROM WFL  - --          Strength Comprehensive (MMT)    Comment, General Manual Muscle Testing (MMT) Assessment functionally 4+/5 BUE  -AC --          Activities of Daily Living    BADL Assessment/Intervention --  -AC --          Lower Body Dressing Assessment/Training    [place] Level (Lower Body Dressing) --  -AC --       Position (Lower Body Dressing) --  -AC --          BADL Safety/Performance    Impairments, BADL Safety/Performance balance;pain;range of motion;endurance/activity  tolerance;strength;maintains weight bearing status  -AC --          Mobility    Extremity Weight-bearing Status left lower extremity  -AC --       Left Lower Extremity (Weight-bearing Status) non weight-bearing (NWB)   opted to keep pt NWB despite no orders due to foot wound  -AC --          Bed Mobility    Bed Mobility supine-sit  -AC --       Supine-Sit [place] (Bed Mobility) modified independence  -AC --       Assistive Device (Bed Mobility) bed rails;head of bed elevated  -AC --          Functional Mobility    Functional Mobility- Ind. Level standby assist  -AC --       Functional Mobility- Device cane, quad  -AC --       Functional Mobility-Maintain WBing unable to maintain weight bearing status  -AC --       Functional Mobility- Comment steps to [facility]  - --          Transfer Assessment/Treatment    Transfers sit-stand transfer;stand-sit transfer  -AC --          Sit-Stand Transfer    Sit-Stand [place] (Transfers) standby assist;contact guard  -AC --          Stand-Sit Transfer    Stand-Sit [place] (Transfers) standby assist  - --       Comment, (Stand-Sit Transfer) [facility]  - --          Safety Issues, Functional Mobility    Impairments Affecting Function (Mobility) balance;pain;range of motion (ROM);strength;endurance/activity tolerance  -AC --          Balance    Balance Assessment sitting static balance;sitting dynamic balance;standing static balance;standing dynamic balance  -AC --       Static Sitting Balance independent  -AC --       Dynamic Sitting Balance independent  -AC --       Position, Sitting Balance sitting edge of bed  -AC --       Static Standing Balance standby assist  -AC --       Dynamic Standing Balance contact guard  -AC --       Position/Device Used, Standing Balance cane, quad  -AC --          Plan of Care Review    Plan of Care Reviewed With patient  -AC --       Progress no change  -AC --       Outcome Evaluation OT eval completed.  Pt alert and oriented x4.  Pt sitting  EOB with significant other present in room.  Pt is familiar to therapy staff at [facility].  She has a history of noncompliance.  She has a wound on the plantar surface of L foot.  There are no orders in the medical record for NWB however PT/OT recommended she maintain NWB to LLE to reduce pressure to L foot due to wound.  Pt stood with CGA and transferred to [facility] with cane.  She was unable to maintain NWB status.  Strength in BUE appears WFL and a rw was provided to allow for pt to keep more weight of her LLE.  Spoke with pt and spouse, she does not feel she needs skilled OT and does not want to participate.  She has all the DME needed for home upon discharge.  OT also left rolling shower chair in pt's room for use while at hospital and educated her on its use.  OT will sign off at this time as pt does not wish to receive OT services.  -AC --          Positioning and Restraints    Pre-Treatment Position in bed  -AC --       Post Treatment Position [facility]  -AC --       On BS commode sitting;call light within reach;encouraged to call for assist;with family/caregiver  -AC --          Therapy Assessment/Plan (OT)    OT Diagnosis --  -AC --       Rehab Potential (OT) --  -AC --       Criteria for Skilled Therapeutic Interventions Met (OT) no problems identified which require skilled intervention  -AC --       Therapy Frequency (OT) evaluation only  -AC --       Predicted Duration of Therapy Intervention (OT) --  -AC --       Activity Limitations Related to Problem List (OT) --  -AC --       Planned Therapy Interventions (OT) --  -AC --          OT Goals    Transfer Goal Selection (OT) --  -AC --       Bathing Goal Selection (OT) --  -AC --       Dressing Goal Selection (OT) --  -AC --       Problem Specific Goal Selection (OT) --  -AC --          Transfer Goal 1 (OT)    Activity/Assistive Device (Transfer Goal 1, OT) --  -AC --       Time Frame (Transfer Goal 1, OT) --  -AC --       Progress/Outcome (Transfer Goal 1, OT) --  -AC  --          Bathing Goal 1 (OT)    Activity/Device (Bathing Goal 1, OT) --  -AC --       Time Frame (Bathing Goal 1, OT) --  -AC --       Progress/Outcomes (Bathing Goal 1, OT) --  -AC --          Dressing Goal 1 (OT)    Activity/Device (Dressing Goal 1, OT) --  -AC --       Time Frame (Dressing Goal 1, OT) --  -AC --       Progress/Outcome (Dressing Goal 1, OT) --  -AC --          Problem Specific Goal 1 (OT)    Problem Specific Goal 1 (OT) --  -AC --       Time Frame (Problem Specific Goal 1, OT) --  -AC --       Progress/Outcome (Problem Specific Goal 1, OT) --  -AC --                 User Key  (r) = Recorded By, (t) = Taken By, (c) = Cosigned By      Initials Name Effective Dates     Rikki Jacobo OTR/L, MIKEY 02/03/23 -                     Occupational Therapy Education       Title: PT OT SLP Therapies (Done)       Topic: Occupational Therapy (Done)       Point: ADL training (Done)       Description:   Instruct learner(s) on proper safety adaptation and remediation techniques during self care or transfers.   Instruct in proper use of assistive devices.                  Learning Progress Summary             Patient Acceptance, E, VU by  at 9/20/2024 1441   Family Acceptance, E, VU by  at 9/20/2024 1441                         Point: Body mechanics (Done)       Description:   Instruct learner(s) on proper positioning and spine alignment during self-care, functional mobility activities and/or exercises.                  Learning Progress Summary             Patient Acceptance, E, VU by  at 9/20/2024 1441   Family Acceptance, E, VU by  at 9/20/2024 1441                                         User Key       Initials Effective Dates Name Provider Type Discipline     02/03/23 -  Rikki Jacobo, OTR/L, CNT Occupational Therapist OT                    OT Recommendation and Plan  Therapy Frequency (OT): evaluation only  Plan of Care Review  Plan of Care Reviewed With: patient  Progress: no  change  Outcome Evaluation: OT eval completed.  Pt alert and oriented x4.  Pt sitting EOB with significant other present in room.  Pt is familiar to therapy staff at [facility].  She has a history of noncompliance.  She has a wound on the plantar surface of L foot.  There are no orders in the medical record for NWB however PT/OT recommended she maintain NWB to LLE to reduce pressure to L foot due to wound.  Pt stood with CGA and transferred to [facility] with cane.  She was unable to maintain NWB status.  Strength in BUE appears WFL and a rw was provided to allow for pt to keep more weight of her LLE.  Spoke with pt and spouse, she does not feel she needs skilled OT and does not want to participate.  She has all the DME needed for home upon discharge.  OT also left rolling shower chair in pt's room for use while at hospital and educated her on its use.  OT will sign off at this time as pt does not wish to receive OT services.  Plan of Care Reviewed With: patient  Outcome Evaluation: OT peteral completed.  Pt alert and oriented x4.  Pt sitting EOB with significant other present in room.  Pt is familiar to therapy staff at [facility].  She has a history of noncompliance.  She has a wound on the plantar surface of L foot.  There are no orders in the medical record for NWB however PT/OT recommended she maintain NWB to LLE to reduce pressure to L foot due to wound.  Pt stood with CGA and transferred to [facility] with cane.  She was unable to maintain NWB status.  Strength in BUE appears WFL and a rw was provided to allow for pt to keep more weight of her LLE.  Spoke with pt and spouse, she does not feel she needs skilled OT and does not want to participate.  She has all the DME needed for home upon discharge.  OT also left rolling shower chair in pt's room for use while at hospital and educated her on its use.  OT will sign off at this time as pt does not wish to receive OT services.      OT Rehab Goals       Row Name 09/20/24 3265              Transfer Goal 1 (OT)    Activity/Assistive Device (Transfer Goal 1, OT) --  -AC      Time Frame (Transfer Goal 1, OT) --  -AC      Progress/Outcome (Transfer Goal 1, OT) --  -AC         Bathing Goal 1 (OT)    Activity/Device (Bathing Goal 1, OT) --  -AC      Time Frame (Bathing Goal 1, OT) --  -AC      Progress/Outcomes (Bathing Goal 1, OT) --  -AC         Dressing Goal 1 (OT)    Activity/Device (Dressing Goal 1, OT) --  -AC      Time Frame (Dressing Goal 1, OT) --  -AC      Progress/Outcome (Dressing Goal 1, OT) --  -AC         Problem Specific Goal 1 (OT)    Problem Specific Goal 1 (OT) --  -AC      Time Frame (Problem Specific Goal 1, OT) --  -AC      Progress/Outcome (Problem Specific Goal 1, OT) --  -AC                User Key  (r) = Recorded By, (t) = Taken By, (c) = Cosigned By      Initials Name Provider Type Discipline    Rikki Kaur, OTR/L, MIKEY Occupational Therapist OT                     Outcome Measures       Row Name 09/20/24 1330             How much help from another is currently needed...    Putting on and taking off regular lower body clothing? 3  -AC      Bathing (including washing, rinsing, and drying) 3  -AC      Toileting (which includes using toilet bed pan or urinal) 4  -AC      Putting on and taking off regular upper body clothing 4  -AC      Taking care of personal grooming (such as brushing teeth) 4  -AC      Eating meals 4  -AC      AM-PAC 6 Clicks Score (OT) 22  -AC         Functional Assessment    Outcome Measure Options AM-PAC 6 Clicks Daily Activity (OT)  -AC                User Key  (r) = Recorded By, (t) = Taken By, (c) = Cosigned By      Initials Name Provider Type    Rikki Kaur OTR/L, MIKEY Occupational Therapist                    Time Calculation:    Time Calculation- OT       Row Name 09/20/24 1441             Time Calculation- OT    OT Start Time 1330  -AC      OT Stop Time 1410  -AC      OT Time Calculation (min) 40 min  -      OT Received On 09/20/24  -                 User Key  (r) = Recorded By, (t) = Taken By, (c) = Cosigned By      Initials Name Provider Type     Rikki Jacobo, OTR/L, MIKEY Occupational Therapist                    Therapy Charges for Today       Code Description Service Date Service Provider Modifiers Qty    [id number]  OT EVAL LOW COMPLEXITY 3 9/20/2024 Rikki Jacobo OTR/L, MIKEY GO 1                 OT Discharge Summary  Anticipated Discharge Disposition (OT): home with assist  Reason for Discharge: Patient/Caregiver request  Outcomes Achieved: Other  Discharge Destination: Home with assist    JAIMEE Olmos/L, CNT  9/20/2024

## 2024-09-20 NOTE — PLAN OF CARE
Goal Outcome Evaluation:  Plan of Care Reviewed With: patient, significant other        Progress: no change  Outcome Evaluation: The patient presents alert and oriented x4 sitting up in the bed attempting to get out of bed with the assist of her significant other. She states that she is about to go take a shower. When asked how she plans to get there, she states she will walk. We had a long conversation with her about the wound on her foot and how recommendations have not came in from wound care or podiatry and it would be in her best interest to keep weight off her L foot. She states that she has been keeping the weight off her foot when she transfers, however when she performed the transfer for me she was fully weight bearing on a flat foot. I attempted to direct her for correction, but she would not listen to correction. She has the way she is going to perform her transfers and she does not want to change it. She is chronically noncompliant with most ofher medical care. Her siginifcant other assists her at home. No further needs for PT if she will not follow directions. PT will sign off at this time.      Anticipated Discharge Disposition (PT): home with 24/7 care

## 2024-09-20 NOTE — THERAPY DISCHARGE NOTE
Patient Name: Valeria Gomez   : 1970    MRN: [id number]                              Today's Date: 2024       Admit Date: 2024    Visit Dx:     ICD-10-CM ICD-9-CM   1. Osteomyelitis of left foot, unspecified type  M86.9 730.27   2. Stasis dermatitis of both legs  I87.2 454.1   3. Morbid obesity  E66.01 278.01   4. Hypoxemia  R09.02 799.02   5. Impaired mobility [Z74.09]  Z74.09 799.89     Patient Active Problem List   Diagnosis    Hypertension    Type 2 diabetes mellitus with hyperglycemia, without long-term current use of insulin    Obesity, morbid, BMI 50 or higher    Stage 3 chronic kidney disease    Bacteremia due to Klebsiella pneumoniae    Urinary tract infection    Chronic pain syndrome    Lymphedema of both lower extremities    Bilateral lower leg cellulitis    RLS (restless legs syndrome)    Diabetic ulcer of left lower leg associated with type 2 diabetes mellitus, with fat layer exposed    Dyspnea    MARCELINA (generalized anxiety disorder)    Gastroesophageal reflux disease    Headache    Hypoglycemia    Hypokalemia    Migraine without aura and without status migrainosus, not intractable    Mild single current episode of major depressive disorder    Morbid obesity with BMI of 50.0-59.9, adult    Old complex tear of medial meniscus of left knee    Pleurisy    Polypharmacy    Type 2 diabetes mellitus with hyperglycemia, with long-term current use of insulin    Venous insufficiency of both lower extremities    Anemia    Open wound    Cellulitis of left leg    Cellulitis of lower extremity, unspecified laterality    Cellulitis of right foot    Osteomyelitis     Past Medical History:   Diagnosis Date    Anxiety     Arthritis     Osteoarthritis primarily left knee     Back pain     Chronic pain syndrome 10/27/2021    Depression     Diabetes mellitus     Fibromyalgia, primary     History of transfusion     Hx of tear of meniscus of knee joint 2016    Hypertension     Joint pain      Kidney stone     Knee pain     Lymphedema of both lower extremities 10/27/2021    Migraine     Migraine headache     Pain     knee, left ankle, left ankle      Pes anserinus bursitis 04/28/2015    Restless leg      Past Surgical History:   Procedure Laterality Date    CATARACT EXTRACTION      CATARACT EXTRACTION      CATARACT EXTRACTION      CORNEAL TRANSPLANT      bilateral     ECTOPIC PREGNANCY      INCISION AND DRAINAGE ABSCESS Left 6/1/2022    Procedure: DEBRIDEMENTAND PULSE LAVAGE LT MEDIAL THIGH PACKING ODOFORM GAUZE  WOUND SACRAL AND BILATERAL CALVES;  Surgeon: Justin Perez MD;  Location:  [facility];  Service: General;  Laterality: Left;    INCISION AND DRAINAGE TRUNK Right 11/26/2022    Procedure: INCISION AND DRAINAGE TRUNK;  Surgeon: Sara Guerrier MD;  Location:  [facility];  Service: General;  Laterality: Right;    JOINT REPLACEMENT      KNEE ARTHROSCOPY W/ MENISCAL REPAIR      KNEE MENISCAL REPAIR      MENISCECTOMY Left 12/20/2016    TUBAL ABDOMINAL LIGATION        General Information       Row Name 09/20/24 1325          Physical Therapy Time and Intention    Document Type evaluation  -MS     Mode of Treatment physical therapy;co-treatment  -MS       Row Name 09/20/24 1325          General Information    Patient Profile Reviewed yes  -MS     Prior Level of Function independent:;transfer;w/c or scooter;bed mobility;ADL's  -MS     Existing Precautions/Restrictions fall  -MS     Barriers to Rehab medically complex;previous functional deficit;physical barrier  -MS       Row Name 09/20/24 1325          Living Environment    People in Home significant other  -MS       Row Name 09/20/24 1325          Home Main Entrance    Number of Stairs, Main Entrance other (see comments)  ramp  -MS       Row Name 09/20/24 1325          Cognition    Orientation Status (Cognition) oriented x 4  -MS       Row Name 09/20/24 1325          Safety Issues, Functional Mobility    Safety Issues Affecting Function (Mobility)  insight into deficits/self-awareness;safety precautions follow-through/compliance  -MS     Impairments Affecting Function (Mobility) balance;endurance/activity tolerance;strength  -MS               User Key  (r) = Recorded By, (t) = Taken By, (c) = Cosigned By      Initials Name Provider Type    Gwen Salas R, PT, DPT, NCS Physical Therapist                   Mobility       Row Name 09/20/24 1325          Bed Mobility    Bed Mobility supine-sit  -MS     Supine-Sit [place] (Bed Mobility) minimum assist (75% patient effort)  significant other assisted with R LE  -MS     Assistive Device (Bed Mobility) bed rails;head of bed elevated  -MS       Row Name 09/20/24 1325          Bed-Chair Transfer    Bed-Chair [place] (Transfers) contact guard;verbal cues;nonverbal cues (demo/gesture)  -MS     Assistive Device (Bed-Chair Transfers) cane, quad  -MS       Row Name 09/20/24 1325          Sit-Stand Transfer    Sit-Stand [place] (Transfers) standby assist;contact guard  -MS     Comment, (Sit-Stand Transfer) pt fully weight beared through L foot. Pt repeatedly stated that she was only weight bearing through her toes  -MS       Row Name 09/20/24 1325          Mobility    Extremity Weight-bearing Status left lower extremity  -MS     Left Lower Extremity (Weight-bearing Status) non weight-bearing (NWB)  -MS               User Key  (r) = Recorded By, (t) = Taken By, (c) = Cosigned By      Initials Name Provider Type    Gwen Salas R, PT, DPT, NCS Physical Therapist                   Obj/Interventions       Row Name 09/20/24 1325          Range of Motion Comprehensive    General Range of Motion bilateral upper extremity ROM WFL  -MS     Comment, General Range of Motion B LE impaired 50% due to approximation of tissue  -MS       Row Name 09/20/24 1325          Strength Comprehensive (MMT)    Comment, General Manual Muscle Testing (MMT) Assessment B LE grossly 3-/5  -MS       Row Name 09/20/24 1327           Balance    Balance Assessment sitting static balance;sitting dynamic balance;standing static balance;standing dynamic balance  -MS     Static Sitting Balance independent  -MS     Dynamic Sitting Balance independent  -MS     Position, Sitting Balance unsupported;sitting edge of bed  -MS     Static Standing Balance standby assist  -MS     Dynamic Standing Balance contact guard  -MS     Position/Device Used, Standing Balance supported;cane, quad  -MS               User Key  (r) = Recorded By, (t) = Taken By, (c) = Cosigned By      Initials Name Provider Type    MS Gwen Yee R, PT, DPT, NCS Physical Therapist                   Goals/Plan    No documentation.                  Clinical Impression       Row Name 09/20/24 1321          Pain Scale: FACES Pre/Post-Treatment    Pain: FACES Scale, Pretreatment 2-->hurts little bit  -MS     Posttreatment Pain Rating 2-->hurts little bit  -MS     Pain Location - Side/Orientation Left  -MS     Pain Location - foot  -MS       Row Name 09/20/24 1143          Plan of Care Review    Plan of Care Reviewed With patient;significant other  -MS     Progress no change  -MS     Outcome Evaluation The patient presents alert and oriented x4 sitting up in the bed attempting to get out of bed with the assist of her significant other. She states that she is about to go take a shower. When asked how she plans to get there, she states she will walk. We had a long conversation with her about the wound on her foot and how recommendations have not came in from wound care or podiatry and it would be in her best interest to keep weight off her L foot. She states that she has been keeping the weight off her foot when she transfers, however when she performed the transfer for me she was fully weight bearing on a flat foot. I attempted to direct her for correction, but she would not listen to correction. She has the way she is going to perform her transfers and she does not want to change it. She is  chronically noncompliant with most ofher medical care. Her siginifcant other assists her at home. No further needs for PT if she will not follow directions. PT will sign off at this time.  -MS       Row Name 09/20/24 1325          Therapy Assessment/Plan (PT)    Criteria for Skilled Interventions Met (PT) does not meet criteria for skilled intervention  -MS     Therapy Frequency (PT) evaluation only  -MS       Row Name 09/20/24 1325          Positioning and Restraints    Post Treatment Position bsc  -MS     On BS commode sitting;with OT;with family/caregiver  -MS               User Key  (r) = Recorded By, (t) = Taken By, (c) = Cosigned By      Initials Name Provider Type    Gwen Salas, PT, DPT, NCS Physical Therapist                   Outcome Measures       Row Name 09/20/24 1325 09/20/24 0900       How much help from another person do you currently need...    Turning from your back to your side while in flat bed without using bedrails? 2  -MS 3  -MR    Moving from lying on back to sitting on the side of a flat bed without bedrails? 2  -MS 3  -MR    Moving to and from a bed to a chair (including a wheelchair)? 3  -MS 3  -MR    Standing up from a chair using your arms (e.g., wheelchair, bedside chair)? 3  -MS 4  -MR    Climbing 3-5 steps with a railing? 1  -MS 1  -MR    To walk in hospital room? 1  -MS 2  -MR    AM-PAC 6 Clicks Score (PT) 12  -MS 16  -MR    Highest Level of Mobility Goal 4 --> Transfer to chair/commode  -MS 5 --> Static standing  -MR      Row Name 09/20/24 1330 09/20/24 1325       Functional Assessment    Outcome Measure Options AM-PAC 6 Clicks Daily Activity (OT)  -AC AM-PAC 6 Clicks Basic Mobility (PT)  -MS              User Key  (r) = Recorded By, (t) = Taken By, (c) = Cosigned By      Initials Name Provider Type    Rikki Kaur, OTR/L, CNT Occupational Therapist    Gwen Salas, PT, DPT, NCS Physical Therapist    Zabrina Marinelli, RN Registered Nurse                    PT  Recommendation and Plan     Plan of Care Reviewed With: patient, significant other  Progress: no change  Outcome Evaluation: The patient presents alert and oriented x4 sitting up in the bed attempting to get out of bed with the assist of her significant other. She states that she is about to go take a shower. When asked how she plans to get there, she states she will walk. We had a long conversation with her about the wound on her foot and how recommendations have not came in from wound care or podiatry and it would be in her best interest to keep weight off her L foot. She states that she has been keeping the weight off her foot when she transfers, however when she performed the transfer for me she was fully weight bearing on a flat foot. I attempted to direct her for correction, but she would not listen to correction. She has the way she is going to perform her transfers and she does not want to change it. She is chronically noncompliant with most ofher medical care. Her siginifcant other assists her at home. No further needs for PT if she will not follow directions. PT will sign off at this time.     Time Calculation:         PT Charges       Row Name 09/20/24 1325             Time Calculation    Start Time 1325  5 min chart review  -MS      Stop Time 1358  -MS      Time Calculation (min) 33 min  -MS      PT Received On 09/20/24  -MS         Untimed Charges    PT Eval/Re-eval Minutes 38  -MS         Total Minutes    Untimed Charges Total Minutes 38  -MS       Total Minutes 38  -MS                User Key  (r) = Recorded By, (t) = Taken By, (c) = Cosigned By      Initials Name Provider Type    Gwen Salas PT, DPT, NCS Physical Therapist                      PT G-Codes  Outcome Measure Options: AM-PAC 6 Clicks Daily Activity (OT)  AM-PAC 6 Clicks Score (PT): 12  AM-PAC 6 Clicks Score (OT): 22    PT Discharge Summary  Anticipated Discharge Disposition (PT): home with 24/7 care    Gwen Yee, PT, DPT,  NCS  9/20/2024

## 2024-09-20 NOTE — PROGRESS NOTES
INFECTIOUS DISEASES PROGRESS NOTE    Patient:  Valeria Wilson  YOB: 1970  MRN: [id number]   Admit date: 9/19/2024   Admitting Physician: Amada Farris DO  Primary Care Physician: Adriel Bruno Jr., MD    Chief Complaint:left foot pain      Interval History: The patient still with some left foot pain.  She was unaware that Dr. Palomo was out of town.    No fevers or chills    Allergies:   Allergies   Allergen Reactions    Compazine [Prochlorperazine Edisylate] Shortness Of Breath     Breathing     Meperidine Hives     (Demerol)     Norflex [Orphenadrine] Hives    Nortriptyline Hives    Prochlorperazine Shortness Of Breath    Prochlorperazine Maleate Shortness Of Breath    Vancomycin Other (See Comments) and Unknown - High Severity     CRITICAL LEVEL RESULTS - PATIENT LIKELY HAS METABOLIC / CLEARANCE ISSUE WITH VANCOMYCIN. ON STANDARD REGIMENS BASED ON BAYESIAN/POPULATION PK PARAMETERS, VANCOMYCIN TROUGH LEVELS IN THE 60s, 70s, 90s, 100s WERE OBTAINED. RECOMMEND AGAINST EVER USING VANCOMYCIN IN THIS PATIENT. IF VANCOMYCIN IS ABSOLUTELY INDICATED WITHOUT ANY OTHER OPTIONS, PULSE INTERMITTENT DOSING WOULD LIKELY BE THE ONLY APPROPRIATE METHOD.     Codeine Nausea And Vomiting     Rash     Penicillins Rash     Nausea & vomiting     Amoxicillin-Pot Clavulanate Rash, Hives and Itching    Avelox [Moxifloxacin Hcl] Rash    Bacitracin Nausea And Vomiting    Calamine Itching    Cefazolin Nausea And Vomiting    Cephalexin Rash     (Keflex)     Ciprofloxacin Rash    Clindamycin/Lincomycin Rash    Doxycycline Nausea And Vomiting     Can take but need zofran before    Hydroxyzine Hcl Itching    Moxifloxacin Nausea And Vomiting    Orphenadrine Citrate Itching    Sulfamethoxazole-Trimethoprim Nausea And Vomiting and Rash     States she also gets yeast infections with it    Tobramycin Other (See Comments)     Eyes burn-feel like eyes are on fire      Vistaril [Hydroxyzine] Rash    Zinc Other (See  "Comments)     ERRYTHEMA       Current Scheduled Medications:   enoxaparin, 60 mg, Subcutaneous, Q12H  insulin glargine, 10 Units, Subcutaneous, Nightly  insulin lispro, 2-9 Units, Subcutaneous, 4x Daily AC & at Bedtime  linezolid, 600 mg, Oral, Q12H  pantoprazole, 40 mg, Oral, Q AM  senna-docusate sodium, 2 tablet, Oral, BID  sodium chloride, 10 mL, Intravenous, Q12H      Current PRN Medications:    acetaminophen **OR** acetaminophen **OR** acetaminophen    senna-docusate sodium **AND** polyethylene glycol **AND** bisacodyl **AND** bisacodyl    dextrose    dextrose    glucagon (human recombinant)    ketorolac    ondansetron ODT **OR** ondansetron    oxyCODONE-acetaminophen    sodium chloride    sodium chloride    sodium chloride, 75 mL/hr, Last Rate: 75 mL/hr (24)           Objective     Vital Signs:  Temp (24hrs), Av.3 °F (36.8 °C), Min:97.9 °F (36.6 °C), Max:98.6 °F (37 °C)      /45 (BP Location: Right arm, Patient Position: Lying)   Pulse 67   Temp 98.1 °F (36.7 °C) (Oral)   Resp 18   Ht 154.9 cm (61\")   Wt 135 kg (297 lb)   LMP  (LMP Unknown)   SpO2 94%   BMI 56.12 kg/m²         Physical Exam:  General: The patient is nontoxic-appearing morbidly obese female lying in bed in no acute distress  Left lower extremity erythema a little more concentrated around the foot.  She has tenderness around the first toe as well as heel and plantar surface of the foot.  Wound heel noted.  Psych: Pleasant cooperative      Results Review:    I reviewed the patient's new clinical results.    Lab Results:    CBC:   Lab Results   Lab 24  0945 24  0453   WBC 9.41 4.96   HEMOGLOBIN 9.5* 8.0*   HEMATOCRIT 32.1* 26.9*   PLATELETS 216 151        AutoDiff:   Lab Results   Lab 24  0945 24  0453   NEUTROPHIL % 77.1* 65.1   LYMPHOCYTE % 14.8* 24.2   MONOCYTES % 6.2 7.1   EOSINOPHIL % 1.3 3.0   BASOPHIL % 0.2 0.2   NEUTROS ABS 7.26* 3.23   LYMPHS ABS 1.39 1.20   MONOS ABS 0.58 0.35   EOS " ABS 0.12 0.15   BASOS ABS 0.02 0.01        Manual Diff:    Lab Results   Lab 09/19/24  0945 09/20/24  0453   NEUTROS ABS 7.26* 3.23           CMP:   Lab Results   Lab 09/19/24 0945 09/20/24  0453   SODIUM 135* 135*   POTASSIUM 4.2 4.7   CHLORIDE 98 101   CO2 27.0 29.0   BUN 14 16   CREATININE 0.98 1.13*   CALCIUM 8.7 7.9*   BILIRUBIN  --  0.2   ALK PHOS  --  62   ALT (SGPT)  --  <5   AST (SGOT)  --  10   GLUCOSE 230* 206*       Estimated Creatinine Clearance: 76 mL/min (A) (by C-G formula based on SCr of 1.13 mg/dL (H)).    Culture Results:    Microbiology Results (last 10 days)       Procedure Component Value - Date/Time    MRSA Screen, PCR (Inpatient) - Swab, Nares [[id number]]  (Abnormal) Collected: 09/19/24 1406    Lab Status: Final result Specimen: Swab from Nares Updated: 09/19/24 1618     MRSA PCR MRSA Detected    Narrative:      The negative predictive value of this diagnostic test is high and should only be used to consider de-escalating anti-MRSA therapy. A positive result may indicate colonization with MRSA and must be correlated clinically.    Blood Culture - Blood, Hand, Digit Left [[id number]]  (Normal) Collected: 09/19/24 0945    Lab Status: Preliminary result Specimen: Blood from Hand, Digit Left Updated: 09/20/24 1001     Blood Culture No growth at 24 hours    Blood Culture - Blood, Hand, Digit Right [[id number]]  (Normal) Collected: 09/19/24 0945    Lab Status: Preliminary result Specimen: Blood from Hand, Digit Right Updated: 09/20/24 1001     Blood Culture No growth at 24 hours                 Radiology:   Imaging Results (Last 72 Hours)       Procedure Component Value Units Date/Time    XR Foot 3+ View Left [[id number]] Collected: 09/19/24 0925     Updated: 09/19/24 0931    Narrative:      EXAM: XR FOOT 3+ VW LEFT-      DATE: 9/19/2024 8:17 AM     HISTORY: [place]       COMPARISON: 7/8/2022.     TECHNIQUE:  DP, oblique, lateral views. 3 images.       FINDINGS:    Severe diffuse soft tissue  prominence. First interphalangeal joint is  expanded with cortical erosions. Appearance concerning for septic joint  and osteomyelitis.     Diffuse bone demineralization. No convincing acute bony finding.  Degenerative spurring in the midfoot. Plantar enthesophyte.     Suspect soft tissue defect/ulceration at the heel. No associated bony  erosions.          Impression:      1. First interphalangeal joint is expanded with endplate erosions,  concerning for septic joint and osteomyelitis. This is new compared to  7/8/2022.     2. Soft tissue defect at the heel, suspect soft tissue ulceration. No  underlying bony erosions. Note that radiographic findings of  osteomyelitis can lag behind clinical presentation. If high clinical  suspicion, consider MRI or follow-up radiographs in 10-14 days.     3. Diffuse soft tissue prominence, which is a nonspecific finding.     This report was signed and finalized on 9/19/2024 9:28 AM by Dr Kaci Ji MD.                   Active Hospital Problems    Diagnosis     **Osteomyelitis     Cellulitis of left leg     Lymphedema of both lower extremities     Morbid obesity with BMI of 50.0-59.9, adult     Type 2 diabetes mellitus with hyperglycemia, with long-term current use of insulin        IMPRESSION:  Lower extremity cellulitis-more concentrated on the foot.  Patient with wound of the heel and also with bullae on plantar surface.  Concern for septic interphalangeal joint great toe and associated osteomyelitis  Uncontrolled diabetes mellitus with hemoglobin A1c 9.3  MRSA nasal screen positive      RECOMMENDATION:   Continue linezolid 600 mg p.o. every 12 hours  Glucose management per hospitalist  Elevate bilateral lower extremities above the level of the heart  CT of any benefit?  Await podiatry input-Yoandy Gerber, health unit coordinator, Dr. Palomo out of town.      Nancy Burnham MD  09/20/24  13:19 CDT

## 2024-09-20 NOTE — PLAN OF CARE
Goal Outcome Evaluation:  Plan of Care Reviewed With: patient, caregiver        Progress: no change  Outcome Evaluation: RDN consult for diabetes education. Pt with past medical history of diabetes. Pt reports she has been provided diabetes education in the past. Current HgbA1c 9.3%. RDN provided basic reveiw of diabetes and sources of carbohydrates. RDN provided pt with written material on Carb counting for people with diabetes. RDN name and contact information provided for any additional questions or concerns. CCHO diet. Boost Glucose Control daily with breakfast. Con to follow for plan of care.

## 2024-09-20 NOTE — NURSING NOTE
[facility]  INPATIENT WOUND & OSTOMY CARE    Today's Date: 09/20/24    Patient Name: Valeria Wilson  MRN: [id number]  [facility]: [id number]  PCP: Adriel Bruno Jr., MD  Attending Provider: Amada Farris DO  Length of Stay: 1    Pressure injury prevention measures ordered per protocol due to patient being at risk for skin breakdown.    Apply silicone foam border dressing per protocol to sacral spine/bilateral heels for protection.  Nursing to change dressing every 3 days and PRN if soiled. Nursing is to peel back dressing with every assessment to assess skin underneath dressing. No barrier cream under dressing.     Please reach out to wound care nurse if any skin issues arise.       This document has been electronically signed by Kimmy Romero RN on 9/20/2024 06:39 CDT

## 2024-09-20 NOTE — PLAN OF CARE
Goal Outcome Evaluation:  Plan of Care Reviewed With: patient, significant other      Pt A/Ox4; VSS. RA. Lovenox for VTE refused. MRI of foot unable to be completed, pt reluctant and coil size insufficient for ankle size per MRI staff. Prn pain meds given. Pt up Ax1 to BSC. Safety maintained. Continue plan of care.

## 2024-09-20 NOTE — PAYOR COMM NOTE
"Zuly Carpenter (54 y.o. Female  [id number]  Admit 09/19 Please review [facility]  [place] [phone]   [phone]      Date of Birth   1970    Social Security Number       Address   [address] [place] [address] [address] [place] [address]    Home Phone   [phone]    MRN   [id number]       Amish   Episcopalian    Marital Status   Single                            Admission Date   9/19/24    Admission Type   Emergency    Admitting Provider   Amada Farris DO    Attending Provider   Amada Farris DO    Department, Room/Bed   [facility] 3A, 333/1       Discharge Date       Discharge Disposition       Discharge Destination                                 Attending Provider: Amada Farris DO    Allergies: Compazine [Prochlorperazine Edisylate], Meperidine, Norflex [Orphenadrine], Nortriptyline, Prochlorperazine, Prochlorperazine Maleate, Vancomycin, Codeine, Penicillins, Amoxicillin-pot Clavulanate, Avelox [Moxifloxacin Hcl], Bacitracin, Calamine, Cefazolin, Cephalexin, Ciprofloxacin, Clindamycin/lincomycin, Doxycycline, Hydroxyzine Hcl, Moxifloxacin, Orphenadrine Citrate, Sulfamethoxazole-trimethoprim, Tobramycin, Vistaril [Hydroxyzine], Zinc    Isolation: Contact   Infection: ESBL E coli (06/04/22), MRSA (09/19/24)   Code Status: CPR    Ht: 154.9 cm (61\")   Wt: 135 kg (297 lb)    Admission Cmt: None   Principal Problem: Osteomyelitis [M86.9]                   Active Insurance as of 9/19/2024       Primary Coverage       Payor Plan Insurance Group Employer/Plan Group    ANTHEM MEDICARE REPLACEMENT ANTHEM MEDICARE ADVANTAGE [id number]       Payor Plan Address Payor Plan Phone Number Payor Plan Fax Number Effective Dates    [address] [phone]  7/1/2024 - None Entered    [facility] [id number]         Subscriber Name Subscriber Birth Date Member ID       ZULY CARPENTER 1970 [id number]               Secondary Coverage       Payor Plan Insurance " Group Employer/Plan Group    [place]        Payor Plan Address Payor Plan Phone Number Payor Plan Fax Number Effective Dates    [address] [phone]  9/19/2024 - None Entered    [facility] [address]         Subscriber Name Subscriber Birth Date Member ID       ZULY CARPENTER 1970 [id number]                     Emergency Contacts        (Rel.) Home Phone Work Phone Mobile Phone    Betty Abebe (Mother) [phone] -- --    WINNIE HORAN (Significant Other) -- -- [phone]                 History & Physical        Gina Clarke APRN at 09/19/24 1102       Attestation signed by Amada Farris DO at 09/19/24 1728    Patient examined.  Chart reviewed.    Marked erythema of the left lower extremity.  There is a wound on her left foot with fluctuance.  MRI results pending.  I performed a substantive part of the MDM during the patient’s E/M visit. I personally evaluated   and examined the patient. I personally made or approved the documented management plan and acknowledge its risk   of complications.   (Independent Interpretation) My (EKG/X-Ray/US/CT) interpretation reviewed.   (Discussion) Management/test interpretation discussed with Gina JOLLEY.       Electronically signed by Amada Farris DO, 9/19/2024, 17:22 CDT.                     [facility] Medicine Services  HISTORY AND PHYSICAL    Date of Admission: 9/19/2024  Primary Care Physician: Adriel Bruno Jr., MD    Subjective   Primary Historian: patient    Chief Complaint: Increased warmth, erythema, edema and pain to left lower extremity    History of Present Illness  Ms. Carpenter is a 54-year-old female who presented to [facility] on 9/19 with worsening erythema, edema and pain to left leg.  She has a past medical history significant for diabetes, lymphedema, morbid obesity, chronic venous stasis ulcers, chronic pain syndrome followed by pain  "management at [facility].  Of note, she has had prior abscess in her left and right thigh in 2022. She has followed with [facility] wound care intermittently since then and started going back regularly since March.  She has been working with wound care/home health to get appropriate compression/wraps to legs.  She normally ambulates with a cane.  She has had difficulty bearing weight on her left foot.  States that the area on \"the bottom of my foot popped up in the last week.\"  No fever or chills.  No drainage. No shortness of breath, chest pain or pressure. No nausea, vomiting, abdominal pain, diarrhea. No recent antibiotic use.    In the ED, x-ray left foot reviewed First interphalangeal joint is expanded with endplate erosions, concerning for septic joint and osteomyelitis. This is new compared to 7/8/2022. Soft tissue defect at the heel, suspect soft tissue ulceration. No underlying bony erosions. Note that radiographic findings of osteomyelitis can lag behind clinical presentation. If high clinical suspicion, consider MRI.  Given multiple allergies, ED provider Dr. Dwyer gave her a dose of IV Rocephin.  She was given 1 L fluid bolus.  D-dimer noted to be 1.43 -venous duplex lower extremities attempted but patient could not tolerate.  WBC and lactate normal.  CRP 20.  Patient seen and examined in ED bed 23 -her main complaint is a headache and left foot pain rated 5 out of 10.  She will be admitted for further evaluation and management.    Review of Systems   Otherwise complete ROS reviewed and negative except as mentioned in the HPI.    Past Medical History:   Past Medical History:   Diagnosis Date    Anxiety     Arthritis     Osteoarthritis primarily left knee     Back pain     Chronic pain syndrome 10/27/2021    Depression     Diabetes mellitus     Fibromyalgia, primary     History of transfusion     Hx of tear of meniscus of knee joint 11/21/2016    Hypertension     Joint pain     Kidney stone     Knee " pain     Lymphedema of both lower extremities 10/27/2021    Migraine     Migraine headache     Pain     knee, left ankle, left ankle      Pes anserinus bursitis 04/28/2015    Restless leg      Past Surgical History:  Past Surgical History:   Procedure Laterality Date    CATARACT EXTRACTION      CATARACT EXTRACTION      CATARACT EXTRACTION      CORNEAL TRANSPLANT      bilateral     ECTOPIC PREGNANCY      INCISION AND DRAINAGE ABSCESS Left 6/1/2022    Procedure: DEBRIDEMENTAND PULSE LAVAGE LT MEDIAL THIGH PACKING ODOFORM GAUZE  WOUND SACRAL AND BILATERAL CALVES;  Surgeon: Justin Perez MD;  Location:  [facility] OR;  Service: General;  Laterality: Left;    INCISION AND DRAINAGE TRUNK Right 11/26/2022    Procedure: INCISION AND DRAINAGE TRUNK;  Surgeon: Sara Guerrier MD;  Location:  [facility] OR;  Service: General;  Laterality: Right;    JOINT REPLACEMENT      KNEE ARTHROSCOPY W/ MENISCAL REPAIR      KNEE MENISCAL REPAIR      MENISCECTOMY Left 12/20/2016    TUBAL ABDOMINAL LIGATION       Social History:  reports that she has never smoked. She has never used smokeless tobacco. She reports current alcohol use. She reports that she does not use drugs.    Family History: family history includes Cancer in her father and maternal grandfather; Dementia in her father; Hypertension in her maternal grandmother; No Known Problems in her mother.       Allergies:  Allergies   Allergen Reactions    Compazine [Prochlorperazine Edisylate] Shortness Of Breath     Breathing     Meperidine Hives     (Demerol)     Norflex [Orphenadrine] Hives    Nortriptyline Hives    Prochlorperazine Shortness Of Breath    Prochlorperazine Maleate Shortness Of Breath    Vancomycin Other (See Comments) and Unknown - High Severity     CRITICAL LEVEL RESULTS - PATIENT LIKELY HAS METABOLIC / CLEARANCE ISSUE WITH VANCOMYCIN. ON STANDARD REGIMENS BASED ON BAYESIAN/POPULATION PK PARAMETERS, VANCOMYCIN TROUGH LEVELS IN THE 60s, 70s, 90s, 100s WERE OBTAINED.  RECOMMEND AGAINST EVER USING VANCOMYCIN IN THIS PATIENT. IF VANCOMYCIN IS ABSOLUTELY INDICATED WITHOUT ANY OTHER OPTIONS, PULSE INTERMITTENT DOSING WOULD LIKELY BE THE ONLY APPROPRIATE METHOD.     Codeine Nausea And Vomiting     Rash     Penicillins Rash     Nausea & vomiting     Amoxicillin-Pot Clavulanate Rash, Hives and Itching    Avelox [Moxifloxacin Hcl] Rash    Bacitracin Nausea And Vomiting    Calamine Itching    Cefazolin Nausea And Vomiting    Cephalexin Rash     (Keflex)     Ciprofloxacin Rash    Clindamycin/Lincomycin Rash    Doxycycline Nausea And Vomiting     Can take but need zofran before    Hydroxyzine Hcl Itching    Moxifloxacin Nausea And Vomiting    Orphenadrine Citrate Itching    Sulfamethoxazole-Trimethoprim Nausea And Vomiting and Rash     States she also gets yeast infections with it    Tobramycin Other (See Comments)     Eyes burn-feel like eyes are on fire      Vistaril [Hydroxyzine] Rash    Zinc Other (See Comments)     ERRYTHEMA       Medications:  Prior to Admission medications    Medication Sig Start Date End Date Taking? Authorizing Provider   Mounjaro 10 MG/0.5ML solution pen-injector pen inject 10MG UNDER THE SKIN ONCE WEEKLY ON THE same DAY each WEEK 8/12/24  Yes Avani Kelly MD   busPIRone (BUSPAR) 10 MG tablet Take 1 tablet by mouth 3 (Three) Times a Day. Indications: Anxiety Disorder 4/19/21   Emergency, Nurse Janette, RN   Canagliflozin (Invokana) 100 MG tablet tablet Take 1 tablet by mouth Daily. Indications: Type 2 Diabetes 12/28/22   Rhonda Knox APRN   celecoxib (CeleBREX) 200 MG capsule Take 1 capsule by mouth Daily. 10/4/23   Avani Kelly MD   cyclobenzaprine (FLEXERIL) 10 MG tablet Take 1 tablet by mouth 2 (Two) Times a Day As Needed for Muscle Spasms. Indications: Muscle Spasm    ProviderAvani MD   docusate sodium (COLACE) 100 MG capsule Take 1 capsule by mouth 2 (Two) Times a Day. Indications: Constipation    ProviderAvain MD   DULoxetine  (CYMBALTA) 60 MG capsule Take 30 mg by mouth 2 (Two) Times a Day. Indications: Diabetes with Nerve Disease 12/1/22   Avani Kelly MD   fluticasone (FLONASE) 50 MCG/ACT nasal spray 1 spray into the nostril(s) as directed by provider Daily. 3/29/22   Viri Doty APRN   furosemide (LASIX) 80 MG tablet Take 1 tablet by mouth 2 (Two) Times a Day As Needed. Indications: Edema 12/1/22   Avani Kelly MD   gabapentin (NEURONTIN) 100 MG capsule Take 1 capsule by mouth Daily. PT TAKES AT 4PM  Indications: Neuropathic Pain 2/1/22   Avani Kelly MD   gabapentin (NEURONTIN) 600 MG tablet Take 1 tablet by mouth 3 (Three) Times a Day. Indications: Neuropathic Pain    Avani Kelly MD   insulin aspart (NovoLOG FlexPen) 100 UNIT/ML solution pen-injector sc pen Inject 3 Units under the skin into the appropriate area as directed 3 (Three) Times a Day With Meals. Indications: Type 2 Diabetes 1/10/23   Avani Kelly MD   insulin glargine (LANTUS, SEMGLEE) 100 UNIT/ML injection Inject 10 Units under the skin into the appropriate area as directed Every Night. Indications: Type 2 Diabetes 7/20/22   Avani Kelly MD   Magnesium Oxide 400 (240 Mg) MG tablet Take 0.5 tablets by mouth Daily. Indications: Disorder with Low Magnesium Levels 4/15/22   Avani Kelly MD   mirtazapine (REMERON) 15 MG tablet Take 1 tablet by mouth Every Night. Indications: sleep 5/13/21   Emergency, Nurse TAQUERIA Savage   Morphine Sulfate ER 15 MG tablet extended-release 12 hour Take 15 mg by mouth 2 (two) times a day. Indications: Pain    Emergency, Nurse Epic, RN   omeprazole (priLOSEC) 40 MG capsule Take 40 mg by mouth 2 (Two) Times a Day. Indications: Gastroesophageal Reflux Disease    Avani Kelly MD   ondansetron ODT (ZOFRAN-ODT) 4 MG disintegrating tablet Place 1 tablet on the tongue Every 6 (Six) Hours As Needed for Nausea or Vomiting for up to 21 doses. Indications: Nausea and Vomiting 8/4/23  "  Cherri Rose APRN   oxyCODONE-acetaminophen (PERCOCET) 7.5-325 MG per tablet Take 1 tablet by mouth Every 8 (Eight) Hours As Needed for Moderate Pain  or Severe Pain-Do not take with other Percocet prescription 6/9/22   Marianela Neri MD   polyethylene glycol (MIRALAX) 17 GM/SCOOP powder Take 17 g by mouth Daily. 8/4/23   Cherri Rose APRN   potassium chloride (K-DUR,KLOR-CON) 20 MEQ CR tablet Take 1 tablet by mouth Daily. 6/23/22   TERRY Manuel MD   potassium chloride ER (K-TAB) 20 MEQ tablet controlled-release ER tablet Take 1 tablet by mouth Daily. 9/17/23   Avani Kelly MD   pramipexole (MIRAPEX) 0.75 MG tablet Take 1 tablet by mouth 2 (two) times a day. Indications: Restless Leg Syndrome    Avani Kelly MD   Vraylar 1.5 MG capsule capsule Take 1 capsule by mouth Daily. 10/3/23   Avani Kelly MD   ZOLMitriptan (Zomig) 5 MG tablet Take 1 tablet by mouth 1 (One) Time As Needed for Migraine. 11/22/21   TERRY Manuel MD   clonazePAM (KlonoPIN) 0.5 MG tablet Take 0.5 mg by mouth 2 (Two) Times a Day As Needed for Anxiety.  11/3/21  Avani Kelly MD   Tirzepatide 7.5 MG/0.5ML solution pen-injector Inject 12.5 mg under the skin into the appropriate area as directed 1 (One) Time Per Week. Indications: Type 2 Diabetes 2/3/23 9/19/24  Avani Kelly MD   topiramate (TOPAMAX) 25 MG tablet Take 25 mg by mouth Daily. 2/21/22 4/20/22  Emergency, Nurse Epic, RN     I have utilized all available immediate resources to obtain, update, or review the patient's current medications (including all prescriptions, over-the-counter products, herbals, cannabis/cannabidiol products, and vitamin/mineral/dietary (nutritional) supplements).    Objective     Vital Signs: /52 (BP Location: Left arm, Patient Position: Lying)   Pulse 78   Temp 98.5 °F (36.9 °C) (Oral)   Resp 15   Ht 154.9 cm (61\")   Wt 135 kg (297 lb)   LMP  (LMP Unknown)   SpO2 96%   BMI 56.12 kg/m² "   Physical Exam  Vitals reviewed.   Constitutional:       General: She is not in acute distress.     Appearance: She is morbidly obese. She is not toxic-appearing.   HENT:      Head: Normocephalic and atraumatic.      Mouth/Throat:      Mouth: Mucous membranes are moist.      Dentition: Abnormal dentition.      Pharynx: Oropharynx is clear.   Eyes:      Extraocular Movements: Extraocular movements intact.      Conjunctiva/sclera: Conjunctivae normal.      Pupils: Pupils are equal, round, and reactive to light.   Cardiovascular:      Rate and Rhythm: Normal rate and regular rhythm.      Pulses: Normal pulses.      Heart sounds: No murmur heard.  Pulmonary:      Effort: Pulmonary effort is normal. No respiratory distress.      Breath sounds: Normal breath sounds. No wheezing or rhonchi.   Abdominal:      General: Bowel sounds are normal. There is no distension.      Palpations: Abdomen is soft.      Tenderness: There is no abdominal tenderness.   Musculoskeletal:         General: No swelling or tenderness. Normal range of motion.      Cervical back: Normal range of motion and neck supple. No muscular tenderness.   Skin:     General: Skin is warm and dry.      Findings: No erythema or rash.   Neurological:      General: No focal deficit present.      Mental Status: She is alert and oriented to person, place, and time.      Cranial Nerves: No cranial nerve deficit.      Motor: No weakness.   Psychiatric:         Mood and Affect: Mood normal.         Behavior: Behavior normal.              Results Reviewed:  Lab Results (last 24 hours)       Procedure Component Value Units Date/Time    Hemoglobin A1c [[id number]]  (Abnormal) Collected: 09/19/24 0945    Specimen: Blood Updated: 09/19/24 1247     Hemoglobin A1C 9.30 %     Narrative:      Hemoglobin A1C Ranges:    Increased Risk for Diabetes  5.7% to 6.4%  Diabetes                     >= 6.5%  Diabetic Goal                < 7.0%    C-reactive Protein [[id number]]  (Abnormal)  Collected: 09/19/24 0945    Specimen: Blood Updated: 09/19/24 1245     C-Reactive Protein 20.63 mg/dL     Basic Metabolic Panel [[id number]]  (Abnormal) Collected: 09/19/24 0945    Specimen: Blood Updated: 09/19/24 1023     Glucose 230 mg/dL      BUN 14 mg/dL      Creatinine 0.98 mg/dL      Sodium 135 mmol/L      Potassium 4.2 mmol/L      Chloride 98 mmol/L      CO2 27.0 mmol/L      Calcium 8.7 mg/dL      BUN/Creatinine Ratio 14.3     Anion Gap 10.0 mmol/L      eGFR 69.6 mL/min/1.73     Narrative:      GFR Normal >60  Chronic Kidney Disease <60  Kidney Failure <15      CK [[id number]]  (Normal) Collected: 09/19/24 0945    Specimen: Blood Updated: 09/19/24 1023     Creatine Kinase 173 U/L     Lactic Acid, Plasma [[id number]]  (Normal) Collected: 09/19/24 0945    Specimen: Blood Updated: 09/19/24 1022     Lactate 1.3 mmol/L     BNP [[id number]]  (Abnormal) Collected: 09/19/24 0945    Specimen: Blood Updated: 09/19/24 1018     proBNP 1,539.0 pg/mL     Narrative:      This assay is used as an aid in the diagnosis of individuals suspected of having heart failure. It can be used as an aid in the diagnosis of acute decompensated heart failure (ADHF) in patients presenting with signs and symptoms of ADHF to the emergency department (ED). In addition, NT-proBNP of <300 pg/mL indicates ADHF is not likely.    Age Range Result Interpretation  NT-proBNP Concentration (pg/mL:      <50             Positive            >450                   Gray                 300-450                    Negative             <300    50-75           Positive            >900                  Gray                300-900                  Negative            <300      >75             Positive            >1800                  Gray                300-1800                  Negative            <300    Protime-INR [[id number]]  (Normal) Collected: 09/19/24 0945    Specimen: Blood Updated: 09/19/24 1008     Protime 14.6 Seconds      INR 1.09    aPTT [[id number]]   "(Abnormal) Collected: 09/19/24 0945    Specimen: Blood Updated: 09/19/24 1008     PTT 38.4 seconds     D-dimer, Quantitative [[id number]]  (Abnormal) Collected: 09/19/24 0945    Specimen: Blood Updated: 09/19/24 1008     D-Dimer, Quantitative 1.43 MCGFEU/mL     Narrative:      According to the assay 's published package insert, a normal (<0.50 MCGFEU/mL) D-dimer result in conjunction with a non-high clinical probability assessment, excludes deep vein thrombosis (DVT) and pulmonary embolism (PE) with high sensitivity.    D-dimer values increase with age and this can make VTE exclusion of an older population difficult. To address this, the American College of Physicians, based on best available evidence and recent guidelines, recommends that clinicians use age-adjusted D-dimer thresholds in patients greater than 50 years of age with: a) a low probability of PE who do not meet all Pulmonary Embolism Rule Out Criteria, or b) in those with intermediate probability of PE.   The formula for an age-adjusted D-dimer cut-off is \"age/100\".  For example, a 60 year old patient would have an age-adjusted cut-off of 0.60 MCGFEU/mL and an 80 year old 0.80 MCGFEU/mL.    CBC & Differential [[id number]]  (Abnormal) Collected: 09/19/24 0945    Specimen: Blood Updated: 09/19/24 0958    Narrative:      The following orders were created for panel order CBC & Differential.  Procedure                               Abnormality         Status                     ---------                               -----------         ------                     CBC Auto Differential[[id number]]        Abnormal            Final result                 Please view results for these tests on the individual orders.    CBC Auto Differential [[id number]]  (Abnormal) Collected: 09/19/24 0945    Specimen: Blood Updated: 09/19/24 0958     WBC 9.41 10*3/mm3      RBC 4.03 10*6/mm3      Hemoglobin 9.5 g/dL      Hematocrit 32.1 %      MCV 79.7 fL      MCH 23.6 " pg      MCHC 29.6 g/dL      RDW 16.6 %      RDW-SD 47.9 fl      MPV 8.9 fL      Platelets 216 10*3/mm3      Neutrophil % 77.1 %      Lymphocyte % 14.8 %      Monocyte % 6.2 %      Eosinophil % 1.3 %      Basophil % 0.2 %      Immature Grans % 0.4 %      Neutrophils, Absolute 7.26 10*3/mm3      Lymphocytes, Absolute 1.39 10*3/mm3      Monocytes, Absolute 0.58 10*3/mm3      Eosinophils, Absolute 0.12 10*3/mm3      Basophils, Absolute 0.02 10*3/mm3      Immature Grans, Absolute 0.04 10*3/mm3      nRBC 0.0 /100 WBC     Blood Culture - Blood, Hand, Digit Left [[id number]] Collected: 09/19/24 0945    Specimen: Blood from Hand, Digit Left Updated: 09/19/24 0956    Blood Culture - Blood, Hand, Digit Right [[id number]] Collected: 09/19/24 0945    Specimen: Blood from Hand, Digit Right Updated: 09/19/24 0956          Imaging Results (Last 24 Hours)       Procedure Component Value Units Date/Time    XR Foot 3+ View Left [[id number]] Collected: 09/19/24 0925     Updated: 09/19/24 0931    Narrative:      EXAM: XR FOOT 3+ VW LEFT-      DATE: 9/19/2024 8:17 AM     HISTORY: [place]       COMPARISON: 7/8/2022.     TECHNIQUE:  DP, oblique, lateral views. 3 images.       FINDINGS:    Severe diffuse soft tissue prominence. First interphalangeal joint is  expanded with cortical erosions. Appearance concerning for septic joint  and osteomyelitis.     Diffuse bone demineralization. No convincing acute bony finding.  Degenerative spurring in the midfoot. Plantar enthesophyte.     Suspect soft tissue defect/ulceration at the heel. No associated bony  erosions.          Impression:      1. First interphalangeal joint is expanded with endplate erosions,  concerning for septic joint and osteomyelitis. This is new compared to  7/8/2022.     2. Soft tissue defect at the heel, suspect soft tissue ulceration. No  underlying bony erosions. Note that radiographic findings of  osteomyelitis can lag behind clinical presentation. If high  clinical  suspicion, consider MRI or follow-up radiographs in 10-14 days.     3. Diffuse soft tissue prominence, which is a nonspecific finding.     This report was signed and finalized on 9/19/2024 9:28 AM by Dr Kaci Ji MD.             I have personally reviewed and interpreted the radiology studies and ECG obtained at time of admission.     Assessment / Plan   Assessment:   Active Hospital Problems    Diagnosis     **Osteomyelitis     Cellulitis of left leg     Lymphedema of both lower extremities     Morbid obesity with BMI of 50.0-59.9, adult     Type 2 diabetes mellitus with hyperglycemia, with long-term current use of insulin        Treatment Plan  The patient will be admitted to Dr. Farris's service at [facility].     Cellulitis of left leg with concern for osteomyelitis to left foot.  Obtain MRI.  Consider podiatry consult once MRI complete. Given multiple allergies case discussed with pharmacist Lucas -she was given ceftriaxone in ED and tolerated, initiate Cefazolin 2 g every 8 hours.  Check MRSA nasal screen.  Follow-up blood cultures.    Venogram bilateral lower extremities.    Type 2 diabetes with A1c 9.3.  Start Accu-Cheks with sliding scale insulin, Levemir.  Diabetes and nutrition consult.    PT.    Will review and resume home medications once med rec complete.    Medical Decision Making  Number and Complexity of problems: 4 acute problems  Differential Diagnosis: osteomyelitis    Conditions and Status        Condition is worsening.     [facility] Data  External documents reviewed: prior epic records  Cardiac tracing (EKG, telemetry) interpretation: none  Radiology interpretation: Interpreted by radiology  Labs reviewed: As above  Any tests that were considered but not ordered: None considered at present     Decision rules/scores evaluated (example [id number], Wells, etc): None considered at present     Discussed with: Patient and Dr. Farris     Care Planning  Shared decision making:  Patient is agreeable to ongoing workup and treatment  Code status and discussions: Full code with limited support to include no intubation    Disposition  Social Determinants of Health that impact treatment or disposition: none  Estimated length of stay is undetermined at present.     I confirmed that the patient's advanced care plan is present, code status is documented, and a surrogate decision maker is listed in the patient's medical record.     The patient's surrogate decision maker is her significant other Barron Taylor and daughter Xiao Wilson.     The patient was seen and examined by me on 9/19/2024 at 11:30.    Electronically signed by SHOLA Gannon, 09/19/24, 13:03 CDT.                Electronically signed by Amada Farris DO at 09/19/24 1722          Emergency Department Notes        Gramlisch, Robert, RN at 09/19/24 1206          Nursing report ED to floor  Valeria Wilson  54 y.o.  female    HPI:   Chief Complaint   Patient presents with    LEG NUMBNESS       Admitting doctor:   Amada Farris DO    Consulting provider(s):  Consults       No orders found from 8/21/2024 to 9/20/2024.             Admitting diagnosis:   The primary encounter diagnosis was Osteomyelitis of left foot, unspecified type. Diagnoses of Stasis dermatitis of both legs, Morbid obesity, and Hypoxemia were also pertinent to this visit.    Code status:   Current Code Status       Date Active Code Status Order ID Comments User Context       9/19/2024 1200 CPR (Attempt to Resuscitate) [id number]  Gina Clarke APRN ED        Question Answer    Code Status (Patient has no pulse and is not breathing) CPR (Attempt to Resuscitate)    Medical Interventions (Patient has pulse or is breathing) Limited Support    Medical Intervention Limits: No intubation (DNI)    Level Of Support Discussed With Patient                    Allergies:   Compazine [prochlorperazine edisylate], Meperidine, Norflex [orphenadrine],  "Nortriptyline, Prochlorperazine, Prochlorperazine maleate, Vancomycin, Codeine, Penicillins, Amoxicillin-pot clavulanate, Avelox [moxifloxacin hcl], Bacitracin, Calamine, Cefazolin, Cephalexin, Ciprofloxacin, Clindamycin/lincomycin, Doxycycline, Hydroxyzine hcl, Moxifloxacin, Orphenadrine citrate, Sulfamethoxazole-trimethoprim, Tobramycin, Vistaril [hydroxyzine], and Zinc    Intake and Output    Intake/Output Summary (Last 24 hours) at 9/19/2024 1206  Last data filed at 9/19/2024 1201  Gross per 24 hour   Intake 100 ml   Output --   Net 100 ml       Weight:       09/19/24  0759   Weight: 135 kg (297 lb)       Most recent vitals:   Vitals:    09/19/24 0759 09/19/24 0831 09/19/24 1043   BP: 132/57 103/79 116/60   BP Location: Left arm Left arm Left arm   Patient Position: Lying Lying Lying   Pulse: 95 85 74   Resp: 18 16 16   Temp: 97.8 °F (36.6 °C)     TempSrc: Oral     SpO2: 92% 93% 95%   Weight: 135 kg (297 lb)     Height: 154.9 cm (61\")       Oxygen Therapy: .    Active LDAs/IV Access:   Lines, Drains & Airways       Active LDAs       Name Placement date Placement time Site Days    Peripheral IV 09/19/24 1013 Anterior;Left Forearm 09/19/24  1013  Forearm  less than 1                    Labs (abnormal labs have a star):   Labs Reviewed   BASIC METABOLIC PANEL - Abnormal; Notable for the following components:       Result Value    Glucose 230 (*)     Sodium 135 (*)     All other components within normal limits    Narrative:     GFR Normal >60  Chronic Kidney Disease <60  Kidney Failure <15     APTT - Abnormal; Notable for the following components:    PTT 38.4 (*)     All other components within normal limits   D-DIMER, QUANTITATIVE - Abnormal; Notable for the following components:    D-Dimer, Quantitative 1.43 (*)     All other components within normal limits    Narrative:     According to the assay 's published package insert, a normal (<0.50 MCGFEU/mL) D-dimer result in conjunction with a non-high " "clinical probability assessment, excludes deep vein thrombosis (DVT) and pulmonary embolism (PE) with high sensitivity.    D-dimer values increase with age and this can make VTE exclusion of an older population difficult. To address this, the [employer], based on best available evidence and recent guidelines, recommends that clinicians use age-adjusted D-dimer thresholds in patients greater than 50 years of age with: a) a low probability of PE who do not meet all Pulmonary Embolism Rule Out Criteria, or b) in those with intermediate probability of PE.   The formula for an age-adjusted D-dimer cut-off is \"age/100\".  For example, a 60 year old patient would have an age-adjusted cut-off of 0.60 MCGFEU/mL and an 80 year old 0.80 MCGFEU/mL.   BNP (IN-HOUSE) - Abnormal; Notable for the following components:    proBNP 1,539.0 (*)     All other components within normal limits    Narrative:     This assay is used as an aid in the diagnosis of individuals suspected of having heart failure. It can be used as an aid in the diagnosis of acute decompensated heart failure (ADHF) in patients presenting with signs and symptoms of ADHF to the emergency department (ED). In addition, NT-proBNP of <300 pg/mL indicates ADHF is not likely.    Age Range Result Interpretation  NT-proBNP Concentration (pg/mL:      <50             Positive            >450                   Gray                 300-450                    Negative             <300    50-75           Positive            >900                  Gray                300-900                  Negative            <300      >75             Positive            >1800                  Gray                300-1800                  Negative            <300   CBC WITH AUTO DIFFERENTIAL - Abnormal; Notable for the following components:    Hemoglobin 9.5 (*)     Hematocrit 32.1 (*)     MCH 23.6 (*)     MCHC 29.6 (*)     RDW 16.6 (*)     Neutrophil % 77.1 (*)     Lymphocyte % " 14.8 (*)     Neutrophils, Absolute 7.26 (*)     All other components within normal limits   PROTIME-INR - Normal   CK - Normal   LACTIC ACID, PLASMA - Normal   BLOOD CULTURE   BLOOD CULTURE   CBC AND DIFFERENTIAL    Narrative:     The following orders were created for panel order CBC & Differential.  Procedure                               Abnormality         Status                     ---------                               -----------         ------                     CBC Auto Differential[[id number]]        Abnormal            Final result                 Please view results for these tests on the individual orders.       Meds given in ED:   Medications   sodium chloride 0.9 % bolus 1,000 mL (1,000 mL Intravenous New Bag 9/19/24 1042)   Enoxaparin Sodium (LOVENOX) syringe 100 mg (100 mg Subcutaneous Not Given 9/19/24 0949)   cefTRIAXone (ROCEPHIN) 1,000 mg in sodium chloride 0.9 % 100 mL MBP (0 mg Intravenous Stopped 9/19/24 1201)   ketorolac (TORADOL) injection 10 mg (10 mg Intravenous Given 9/19/24 1202)   famotidine (PEPCID) 40 mg/5 mL oral suspension 40 mg (40 mg Oral Given 9/19/24 1202)   metoclopramide (REGLAN) injection 10 mg (10 mg Intravenous Given 9/19/24 1201)           NIH Stroke Scale:       Isolation/Infection(s):  No active isolations   ESBL E coli     COVID Testing  Collected .  Resulted .    Nursing report ED to floor:  Mental status: . A/o x4  Ambulatory status: . SBA  Precautions: .    ED nurse phone extentsion- ..  [phone]    Electronically signed by Gramlisch, Robert, RN at 09/19/24 1206       Gramlisch, Robert, RN at 09/19/24 0946          THIS RN ATTEMTED VENIPUNCTURE X2 AND IV ATTEMPT X1. BLOOD OBTAINED, UNABLE TO GET IV ACCESS.    Electronically signed by Gramlisch, Robert, RN at 09/19/24 0946       Uvaldo Dwyer MD at 09/19/24 0890          Subjective   History of Present Illness  54-year-old  female presents to ED with chief complaint of lower extremity tenderness.  There is a  "protracted history that extends back to the year 2022 when Dr. Padilla performed I&D of the left thigh, the bilateral feet, the posteroinferior trunk, and since that point in time the patient has been suffering with multiple bouts of cellulitis, abscesses, multiple rounds of antibiotics.  Please note the patient does not fact have multiple allergies to various antibiotics (to varying degrees, patient states that\" I cannot take certain antibiotics if you give me Zofran\").  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  However the patient has noted the following to the bilateral lower extremity, although mostly to the left lower extremity, which include #1 increased edema, #2 increased erythema although mostly again to the left lower extremity, increased tenderness to bilateral lower extremities from the hips inferiorly although again mostly to the left.  The patient has been battling lymphedema for a number of years now and has tried multiple solutions, including Ace wrap's, compression devices (although patient states that they have attempted multiple times to obtain #1 the proper sized device and #2 have made multiple attempts to place those devices on her with both home health and/or the spouse although this has been a great challenge).  Adding to the equation and the fact that the patient is morbidly obese with a BMI of 56, and is a diabetic (although the patient says that the sugars have been running in the 130s to 150s range on her glucose checks).  At this point in time in the recent past the patient has denied constitutional symptoms although states \"I am always cold as far back as I can remember (and does not recall having a diagnosis of hypothyroidism or ever having been checked for thyroid).  The patient has noticed, further along the historical information regarding the lower extremities, that the " "left foot is abundantly more edematous and tender than it usually is which extends to the level of the midshaft.  Again she has been battling with this for many years however it has taken a turn for the worse in the last 10 days or so.  There is some mild drainage she states about the anterior lower extremity (\"spots that drain on occasion; however the larger one is on the bottom left foot\").  The last round of antibiotics was approximately 6 weeks ago when the patient had a UTI, discussed this with being placed on Macrobid however was lost to follow-up (therefore we do not know what the culture and susceptibility were regarding that).  The bladder today is unremarkable in terms of dysuria pyuria hematuria suprapubic tenderness flank tenderness or any foul odor or discharge.  The bowel is unremarkable although the patient states that consistently nauseous on an intermittent basis which is nothing.  However he denies hematemesis denies changes in stool pattern.  Chest and lungs are unremarkable at this point in time although the patient is I do have a mild chronic dry cough.  The spouse is in the room who discusses that the cough is worse in the mornings and the patient does snore at night and no does not have a CPAP at this point in time and does not require the use of home oxygen (although here on our initial SpO2 was in the 91 to 92% on room air).  Neuro is unremarkable.  Nothing further acute        Review of Systems   All other systems reviewed and are negative.      Past Medical History:   Diagnosis Date    Anxiety     Arthritis     Osteoarthritis primarily left knee     Back pain     Chronic pain syndrome 10/27/2021    Depression     Diabetes mellitus     Fibromyalgia, primary     History of transfusion     Hx of tear of meniscus of knee joint 11/21/2016    Hypertension     Joint pain     Kidney stone     Knee pain     Lymphedema of both lower extremities 10/27/2021    Migraine     Migraine headache     Pain  "    knee, left ankle, left ankle      Pes anserinus bursitis 04/28/2015    Restless leg        Allergies   Allergen Reactions    Compazine [Prochlorperazine Edisylate] Shortness Of Breath     Breathing     Meperidine Hives     (Demerol)     Norflex [Orphenadrine] Hives    Nortriptyline Hives    Prochlorperazine Shortness Of Breath    Prochlorperazine Maleate Shortness Of Breath    Vancomycin Other (See Comments) and Unknown - High Severity     CRITICAL LEVEL RESULTS - PATIENT LIKELY HAS METABOLIC / CLEARANCE ISSUE WITH VANCOMYCIN. ON STANDARD REGIMENS BASED ON BAYESIAN/POPULATION PK PARAMETERS, VANCOMYCIN TROUGH LEVELS IN THE 60s, 70s, 90s, 100s WERE OBTAINED. RECOMMEND AGAINST EVER USING VANCOMYCIN IN THIS PATIENT. IF VANCOMYCIN IS ABSOLUTELY INDICATED WITHOUT ANY OTHER OPTIONS, PULSE INTERMITTENT DOSING WOULD LIKELY BE THE ONLY APPROPRIATE METHOD.     Codeine Nausea And Vomiting     Rash     Penicillins Rash     Nausea & vomiting     Amoxicillin-Pot Clavulanate Rash, Hives and Itching    Avelox [Moxifloxacin Hcl] Rash    Bacitracin Nausea And Vomiting    Calamine Itching    Cefazolin Nausea And Vomiting    Cephalexin Rash     (Keflex)     Ciprofloxacin Rash    Clindamycin/Lincomycin Rash    Doxycycline Nausea And Vomiting     Can take but need zofran before    Hydroxyzine Hcl Itching    Moxifloxacin Nausea And Vomiting    Orphenadrine Citrate Itching    Sulfamethoxazole-Trimethoprim Nausea And Vomiting and Rash     States she also gets yeast infections with it    Tobramycin Other (See Comments)     Eyes burn-feel like eyes are on fire      Vistaril [Hydroxyzine] Rash    Zinc Other (See Comments)     ERRYTHEMA       Past Surgical History:   Procedure Laterality Date    CATARACT EXTRACTION      CATARACT EXTRACTION      CATARACT EXTRACTION      CORNEAL TRANSPLANT      bilateral     ECTOPIC PREGNANCY      INCISION AND DRAINAGE ABSCESS Left 6/1/2022    Procedure: DEBRIDEMENTAND PULSE LAVAGE LT MEDIAL THIGH PACKING  ODOFORM GAUZE  WOUND SACRAL AND BILATERAL CALVES;  Surgeon: Justin Perez MD;  Location:  [facility] OR;  Service: General;  Laterality: Left;    INCISION AND DRAINAGE TRUNK Right 11/26/2022    Procedure: INCISION AND DRAINAGE TRUNK;  Surgeon: Sara Guerrier MD;  Location:  [facility] OR;  Service: General;  Laterality: Right;    JOINT REPLACEMENT      KNEE ARTHROSCOPY W/ MENISCAL REPAIR      KNEE MENISCAL REPAIR      MENISCECTOMY Left 12/20/2016    TUBAL ABDOMINAL LIGATION         Family History   Problem Relation Age of Onset    No Known Problems Mother     Cancer Father     Dementia Father     Hypertension Maternal Grandmother     Cancer Maternal Grandfather        Social History     Socioeconomic History    Marital status: Single   Tobacco Use    Smoking status: Never    Smokeless tobacco: Never   Vaping Use    Vaping status: Never Used   Substance and Sexual Activity    Alcohol use: Yes    Drug use: No    Sexual activity: Not Currently     Partners: Male           Objective   Physical Exam  Vitals reviewed.   Constitutional:       Comments: Generally speaking the patient appears to be unfortunately morbidly obese, chronically ill-appearing and disheveled.  At first glance the legs are quite impressive and reminiscent of elephantiasis, are loosely wrapped with Ace wraps.  Although the patient is cooperative and amicable   HENT:      Head: Normocephalic and atraumatic.      Nose: Nose normal.      Mouth/Throat:      Mouth: Mucous membranes are moist.      Pharynx: Oropharynx is clear.   Eyes:      Extraocular Movements: Extraocular movements intact.      Pupils: Pupils are equal, round, and reactive to light.   Cardiovascular:      Rate and Rhythm: Normal rate.   Pulmonary:      Effort: Pulmonary effort is normal.      Breath sounds: Normal breath sounds.      Comments: Although admittedly the breath sounds are diminished at the bases, 1 must take into account the body habitus of this patient and the fact that there  was not a complete full inspiration.  Abdominal:      Comments: There is a pendulous abdomen, hypoactive bowel sounds, generally speaking is soft, is nonfocal I do not appreciate any masses, I do not appreciate any accentuated vasculature, nothing further I do not appreciate any color changes,   Genitourinary:     Comments: Deferred  Musculoskeletal:      Cervical back: Normal range of motion.      Comments: Again the bilateral lower extremities are reminiscent of elephantiasis.  Gross beyond 5+ edema bilaterally to the level of the hips.  There is erythema in various stages ranging from pale pink to a bright violaceous, there are multiple stages of various stasis dermatitis with weeping clear to yellow-colored serous fluid.  The left foot in particular is a bright red color, beefy red, angry and these skin on the plantar surface is friable with an obvious area that is bulging with fluid.  The toenails are in various stages of health ranging from onychomycotic to near healthy.  There is abundant tenderness from the level of the toes superiorly to the popliteal fossa mostly on the left however although the right does have some   Skin:     Comments: Please see the above examination the musculoskeletal for description   Neurological:      General: No focal deficit present.      Mental Status: She is alert and oriented to person, place, and time.   Psychiatric:      Comments: Generally speaking has a normal mood and affect however there is some mild anxiety         Procedures          ED Course                   Labs Reviewed   BASIC METABOLIC PANEL - Abnormal; Notable for the following components:       Result Value    Glucose 230 (*)     Sodium 135 (*)     All other components within normal limits    Narrative:     GFR Normal >60  Chronic Kidney Disease <60  Kidney Failure <15     APTT - Abnormal; Notable for the following components:    PTT 38.4 (*)     All other components within normal limits   D-DIMER,  "QUANTITATIVE - Abnormal; Notable for the following components:    D-Dimer, Quantitative 1.43 (*)     All other components within normal limits    Narrative:     According to the assay 's published package insert, a normal (<0.50 MCGFEU/mL) D-dimer result in conjunction with a non-high clinical probability assessment, excludes deep vein thrombosis (DVT) and pulmonary embolism (PE) with high sensitivity.    D-dimer values increase with age and this can make VTE exclusion of an older population difficult. To address this, the [employer], based on best available evidence and recent guidelines, recommends that clinicians use age-adjusted D-dimer thresholds in patients greater than 50 years of age with: a) a low probability of PE who do not meet all Pulmonary Embolism Rule Out Criteria, or b) in those with intermediate probability of PE.   The formula for an age-adjusted D-dimer cut-off is \"age/100\".  For example, a 60 year old patient would have an age-adjusted cut-off of 0.60 MCGFEU/mL and an 80 year old 0.80 MCGFEU/mL.   BNP (IN-HOUSE) - Abnormal; Notable for the following components:    proBNP 1,539.0 (*)     All other components within normal limits    Narrative:     This assay is used as an aid in the diagnosis of individuals suspected of having heart failure. It can be used as an aid in the diagnosis of acute decompensated heart failure (ADHF) in patients presenting with signs and symptoms of ADHF to the emergency department (ED). In addition, NT-proBNP of <300 pg/mL indicates ADHF is not likely.    Age Range Result Interpretation  NT-proBNP Concentration (pg/mL:      <50             Positive            >450                   Gray                 300-450                    Negative             <300    50-75           Positive            >900                  Gray                300-900                  Negative            <300      >75             Positive            >1800            "       Hdz                300-1800                  Negative            <300   CBC WITH AUTO DIFFERENTIAL - Abnormal; Notable for the following components:    Hemoglobin 9.5 (*)     Hematocrit 32.1 (*)     MCH 23.6 (*)     MCHC 29.6 (*)     RDW 16.6 (*)     Neutrophil % 77.1 (*)     Lymphocyte % 14.8 (*)     Neutrophils, Absolute 7.26 (*)     All other components within normal limits   PROTIME-INR - Normal   CK - Normal   LACTIC ACID, PLASMA - Normal   BLOOD CULTURE   BLOOD CULTURE   CBC AND DIFFERENTIAL    Narrative:     The following orders were created for panel order CBC & Differential.  Procedure                               Abnormality         Status                     ---------                               -----------         ------                     CBC Auto Differential[[id number]]        Abnormal            Final result                 Please view results for these tests on the individual orders.     XR Foot 3+ View Left   Final Result   1. First interphalangeal joint is expanded with endplate erosions,   concerning for septic joint and osteomyelitis. This is new compared to   7/8/2022.       2. Soft tissue defect at the heel, suspect soft tissue ulceration. No   underlying bony erosions. Note that radiographic findings of   osteomyelitis can lag behind clinical presentation. If high clinical   suspicion, consider MRI or follow-up radiographs in 10-14 days.       3. Diffuse soft tissue prominence, which is a nonspecific finding.       This report was signed and finalized on 9/19/2024 9:28 AM by Dr Kaci Ji MD.                                        Medical Decision Making  All things being considered, the patient's medical history, comorbidities, her body habitus, findings today in the emergency department, I must consider an abscess in the left foot, at least 1 DVT in the left lower extremity if not bilateral lower extremity DVTs, compounded by the fact that the patient became mildly  hypoxemic with raise a red flag for pulmonary embolus, the patient is hyperglycemic and a longstanding diabetic, therefore at this point we need to bring in a house and in fact treat for osteo-, possible abscess, cellulitis, DVT, suspect PE and work that up.  Medicine, surgery, wound care, physical therapy will have to get involved from the get go.  I spoke with the hospitalist advanced practitioner who accepts and the patient will go to Dr. Geronimo as an inpatient.    Problems Addressed:  Hypoxemia: complicated acute illness or injury  Morbid obesity: complicated acute illness or injury  Osteomyelitis of left foot, unspecified type: complicated acute illness or injury  Stasis dermatitis of both legs: complicated acute illness or injury    Amount and/or Complexity of Data Reviewed  Independent Historian: spouse  External Data Reviewed: labs and notes.  Labs: ordered.  Radiology: ordered.     Details: Reveals Osteo LEFT Foot     Risk  Prescription drug management.  Decision regarding hospitalization.        Final diagnoses:   Osteomyelitis of left foot, unspecified type   Stasis dermatitis of both legs   Morbid obesity   Hypoxemia       ED Disposition  ED Disposition       ED Disposition   Decision to Admit    Condition   --    Comment   Level of Care: Med/Surg [1]   Diagnosis: Osteomyelitis [085782]   Admitting Physician: JUAN ANTONIO PORTER [[id number]]   Certification: I Certify That Inpatient Hospital Services Are Medically Necessary For Greater Than 2 Midnights                 No follow-up provider specified.       Medication List        ASK your doctor about these medications      Mounjaro 10 MG/0.5ML solution pen-injector pen  Generic drug: Tirzepatide  Ask about: Which instructions should I use?                 Uvaldo Dwyer MD  09/19/24 1057      Electronically signed by Uvaldo Dwyer MD at 09/19/24 1057       Vital Signs (last day)       Date/Time Temp Temp src Pulse Resp BP Patient Position SpO2    09/20/24  1043 98.1 (36.7) Oral 67 18 117/45 Lying 94    09/20/24 0750 97.9 (36.6) Oral 70 16 126/52 Lying 94    09/20/24 0336 98.3 (36.8) Oral 66 12 117/53 Lying 94    09/19/24 2245 98.6 (37) Oral 75 16 119/47 Lying 94    09/19/24 2001 98.5 (36.9) Oral 82 14 -- Lying 94    09/19/24 1442 98.1 (36.7) Oral 76 16 104/48 Lying 93    09/19/24 1233 98.5 (36.9) Oral 78 15 134/52 Lying 96    09/19/24 10:43:30 -- -- 74 16 116/60 Lying 95    09/19/24 0831 -- -- 85 16 103/79 Lying 93    09/19/24 0759 97.8 (36.6) Oral 95 18 132/57 Lying 92          Current Facility-Administered Medications   Medication Dose Route Frequency Provider Last Rate Last Admin    acetaminophen (TYLENOL) tablet 650 mg  650 mg Oral Q4H PRN Clarke, Gina, APRN        Or    acetaminophen (TYLENOL) 160 MG/5ML oral solution 650 mg  650 mg Oral Q4H PRN Clarke, Gina, APRN        Or    acetaminophen (TYLENOL) suppository 650 mg  650 mg Rectal Q4H PRN Clarke, Gina, APRN        sennosides-docusate (PERICOLACE) 8.6-50 MG per tablet 2 tablet  2 tablet Oral BID Clarke, Gina, APRN        And    polyethylene glycol (MIRALAX) packet 17 g  17 g Oral Daily PRN Clarke, Gina, APRN        And    bisacodyl (DULCOLAX) EC tablet 5 mg  5 mg Oral Daily PRN Clarke, Gina, APRN        And    bisacodyl (DULCOLAX) suppository 10 mg  10 mg Rectal Daily PRN Clarke, Gina, APRN        ceFAZolin 2000 mg IVPB in 100 mL NS (MBP)  2,000 mg Intravenous Q8H Clarke, Gina, APRN   2,000 mg at 09/20/24 0532    dextrose (D50W) (25 g/50 mL) IV injection 25 g  25 g Intravenous Q15 Min PRN Clarke, Gina, APRN        dextrose (GLUTOSE) oral gel 15 g  15 g Oral Q15 Min PRN Gina Clarke APRN        Enoxaparin Sodium (LOVENOX) syringe 60 mg  60 mg Subcutaneous Q12H Gina Clarke APRN        glucagon (GLUCAGEN) injection 1 mg  1 mg Intramuscular Q15 Min PRN Gina Clarke APRN        insulin glargine (LANTUS, SEMGLEE) injection 10 Units  10 Units Subcutaneous Nightly Gina Clarke APRN        Insulin Lispro (humaLOG) injection 2-9  Units  2-9 Units Subcutaneous 4x Daily AC & at Bedtime Gina Clarke APRN   4 Units at 09/20/24 0815    ketorolac (TORADOL) injection 30 mg  30 mg Intravenous Q6H PRN Gina Clarke, APRN   30 mg at 09/20/24 0815    linezolid (ZYVOX) tablet 600 mg  600 mg Oral Q12H Nancy Burnham MD   600 mg at 09/20/24 0815    ondansetron ODT (ZOFRAN-ODT) disintegrating tablet 4 mg  4 mg Oral Q6H PRN Gina Clarke APRKAIA        Or    ondansetron (ZOFRAN) injection 4 mg  4 mg Intravenous Q6H PRN Gina Clarke, APRKAIA        oxyCODONE-acetaminophen (PERCOCET) 7.5-325 MG per tablet 1 tablet  1 tablet Oral Q8H PRN Gina Clarke, APRN   1 tablet at 09/19/24 2345    pantoprazole (PROTONIX) EC tablet 40 mg  40 mg Oral Q AM Gina Clarke, APRN   40 mg at 09/20/24 0532    sodium chloride 0.9 % flush 10 mL  10 mL Intravenous Q12H Gina Clarke, APRN   10 mL at 09/20/24 0908    sodium chloride 0.9 % flush 10 mL  10 mL Intravenous PRN Gina Clarke, APRN        sodium chloride 0.9 % infusion 40 mL  40 mL Intravenous PRN Clarke, Gina, APRN        sodium chloride 0.9 % infusion  75 mL/hr Intravenous Continuous Oswald Anderson MD 75 mL/hr at 09/19/24 2235 75 mL/hr at 09/19/24 2235     Physician Progress Notes (last 24 hours)  Notes from 09/19/24 1118 through 09/20/24 1118   No notes of this type exist for this encounter.          Consult Notes (last 24 hours)        Nancy Burnham MD at 09/19/24 1822        Consult Orders    1. Inpatient Infectious Diseases Consult [[id number]] ordered by Gina Clarke APRN                 INFECTIOUS DISEASES CONSULT NOTE    Patient:  Valeria Wilson 54 y.o. female  ROOM # 333/1  YOB: 1970  MRN: [id number]  CSN:  [id number]  Admit date: 9/19/2024   Admitting Physician: Amada Farris DO  Primary Care Physician: Adriel Bruno Jr., MD  REFERRING PROVIDER: No ref. provider found    Inpatient Infectious Diseases Consult  Consult performed by: Nancy Burnham MD  Consult  ordered by: Gina Clarke APRN          REASON FOR CONSULTATION :    Cellulitis of left leg with concern for osteomyelitis to left foot         HISTORY OF PRESENT ILLNESS: The patient is a 54 year old female admitted with LLE cellulitus and foot abscess as well concern for osteomyelitis.    She notes she sees SHOLA Travis at [facility] wound care and has not been on abx other than nitrofurantoin for a UTI    She has a lymphedema pump device but has issues with needing adjustment as upper thighs getting bigger.      Past Medical History:   Diagnosis Date    Anxiety     Arthritis     Osteoarthritis primarily left knee     Back pain     Chronic pain syndrome 10/27/2021    Depression     Diabetes mellitus     Fibromyalgia, primary     History of transfusion     Hx of tear of meniscus of knee joint 11/21/2016    Hypertension     Joint pain     Kidney stone     Knee pain     Lymphedema of both lower extremities 10/27/2021    Migraine     Migraine headache     Pain     knee, left ankle, left ankle      Pes anserinus bursitis 04/28/2015    Restless leg      Past Surgical History:   Procedure Laterality Date    CATARACT EXTRACTION      CATARACT EXTRACTION      CATARACT EXTRACTION      CORNEAL TRANSPLANT      bilateral     ECTOPIC PREGNANCY      INCISION AND DRAINAGE ABSCESS Left 6/1/2022    Procedure: DEBRIDEMENTAND PULSE LAVAGE LT MEDIAL THIGH PACKING ODOFORM GAUZE  WOUND SACRAL AND BILATERAL CALVES;  Surgeon: Justin Perez MD;  Location:  [facility] OR;  Service: General;  Laterality: Left;    INCISION AND DRAINAGE TRUNK Right 11/26/2022    Procedure: INCISION AND DRAINAGE TRUNK;  Surgeon: Sara Guerrier MD;  Location:  [facility] OR;  Service: General;  Laterality: Right;    JOINT REPLACEMENT      KNEE ARTHROSCOPY W/ MENISCAL REPAIR      KNEE MENISCAL REPAIR      MENISCECTOMY Left 12/20/2016    TUBAL ABDOMINAL LIGATION       Family History   Problem Relation Age of Onset    No Known Problems Mother     Cancer Father     Dementia  Father     Hypertension Maternal Grandmother     Cancer Maternal Grandfather      Social History     Socioeconomic History    Marital status: Single   Tobacco Use    Smoking status: Never    Smokeless tobacco: Never   Vaping Use    Vaping status: Never Used   Substance and Sexual Activity    Alcohol use: Yes    Drug use: No    Sexual activity: Not Currently     Partners: Male       Current Scheduled Medications:   [START ON 9/20/2024] ceFAZolin, 2,000 mg, Intravenous, Q8H  enoxaparin, 60 mg, Subcutaneous, Q12H  insulin glargine, 10 Units, Subcutaneous, Nightly  insulin lispro, 2-9 Units, Subcutaneous, 4x Daily AC & at Bedtime  [START ON 9/20/2024] pantoprazole, 40 mg, Oral, Q AM  senna-docusate sodium, 2 tablet, Oral, BID  sodium chloride, 10 mL, Intravenous, Q12H              Current PRN Medications:    acetaminophen **OR** acetaminophen **OR** acetaminophen    senna-docusate sodium **AND** polyethylene glycol **AND** bisacodyl **AND** bisacodyl    dextrose    dextrose    glucagon (human recombinant)    ketorolac    ondansetron ODT **OR** ondansetron    oxyCODONE-acetaminophen    sodium chloride    sodium chloride                Allergies   Allergen Reactions    Compazine [Prochlorperazine Edisylate] Shortness Of Breath     Breathing     Meperidine Hives     (Demerol)     Norflex [Orphenadrine] Hives    Nortriptyline Hives    Prochlorperazine Shortness Of Breath    Prochlorperazine Maleate Shortness Of Breath    Vancomycin Other (See Comments) and Unknown - High Severity     CRITICAL LEVEL RESULTS - PATIENT LIKELY HAS METABOLIC / CLEARANCE ISSUE WITH VANCOMYCIN. ON STANDARD REGIMENS BASED ON BAYESIAN/POPULATION PK PARAMETERS, VANCOMYCIN TROUGH LEVELS IN THE 60s, 70s, 90s, 100s WERE OBTAINED. RECOMMEND AGAINST EVER USING VANCOMYCIN IN THIS PATIENT. IF VANCOMYCIN IS ABSOLUTELY INDICATED WITHOUT ANY OTHER OPTIONS, PULSE INTERMITTENT DOSING WOULD LIKELY BE THE ONLY APPROPRIATE METHOD.     Codeine Nausea And Vomiting     " Rash     Penicillins Rash     Nausea & vomiting     Amoxicillin-Pot Clavulanate Rash, Hives and Itching    Avelox [Moxifloxacin Hcl] Rash    Bacitracin Nausea And Vomiting    Calamine Itching    Cefazolin Nausea And Vomiting    Cephalexin Rash     (Keflex)     Ciprofloxacin Rash    Clindamycin/Lincomycin Rash    Doxycycline Nausea And Vomiting     Can take but need zofran before    Hydroxyzine Hcl Itching    Moxifloxacin Nausea And Vomiting    Orphenadrine Citrate Itching    Sulfamethoxazole-Trimethoprim Nausea And Vomiting and Rash     States she also gets yeast infections with it    Tobramycin Other (See Comments)     Eyes burn-feel like eyes are on fire      Vistaril [Hydroxyzine] Rash    Zinc Other (See Comments)     ERRYTHEMA           Vital Signs:  /48 (BP Location: Left arm, Patient Position: Lying)   Pulse 76   Temp 98.1 °F (36.7 °C) (Oral)   Resp 16   Ht 154.9 cm (61\")   Wt 135 kg (297 lb)   LMP  (LMP Unknown)   SpO2 93%   BMI 56.12 kg/m²   Temp (24hrs), Av.1 °F (36.7 °C), Min:97.8 °F (36.6 °C), Max:98.5 °F (36.9 °C)      Physical Exam  Gen: lying in bed and  at bedside  HEENT AT  RESP effort even and unlabored  JAZMÍN LEs  lymphedema, erythema greater LLE involving thigh. Some superficial erosions. Left foot - fluctuant, tender area dorsal surface      Results Review:    I reviewed the patient's new clinical results.    Lab Results:    CBC:   Lab Results   Lab 24  0945   WBC 9.41   HEMOGLOBIN 9.5*   HEMATOCRIT 32.1*   PLATELETS 216        AutoDiff:   Lab Results   Lab 24  0945   NEUTROPHIL % 77.1*   LYMPHOCYTE % 14.8*   MONOCYTES % 6.2   EOSINOPHIL % 1.3   BASOPHIL % 0.2   NEUTROS ABS 7.26*   LYMPHS ABS 1.39   MONOS ABS 0.58   EOS ABS 0.12   BASOS ABS 0.02        Manual Diff:    Lab Results   Lab 24  0945   NEUTROS ABS 7.26*        CMP:   Lab Results   Lab 24  0945   SODIUM 135*   POTASSIUM 4.2   CHLORIDE 98   CO2 27.0   BUN 14   CREATININE 0.98   CALCIUM 8.7 "   GLUCOSE 230*       Lab Results (last 72 hours)       Procedure Component Value Units Date/Time    POC Glucose Once [[id number]]  (Abnormal) Collected: 09/19/24 1808    Specimen: Blood Updated: 09/19/24 1819     Glucose 224 mg/dL      Comment: : [id number] Keaneer ID: [id number]       MRSA Screen, PCR (Inpatient) - Swab, Nares [[id number]]  (Abnormal) Collected: 09/19/24 1406    Specimen: Swab from Nares Updated: 09/19/24 1618     MRSA PCR MRSA Detected    Narrative:      The negative predictive value of this diagnostic test is high and should only be used to consider de-escalating anti-MRSA therapy. A positive result may indicate colonization with MRSA and must be correlated clinically.    Hemoglobin A1c [[id number]]  (Abnormal) Collected: 09/19/24 0945    Specimen: Blood Updated: 09/19/24 1247     Hemoglobin A1C 9.30 %     Narrative:      Hemoglobin A1C Ranges:    Increased Risk for Diabetes  5.7% to 6.4%  Diabetes                     >= 6.5%  Diabetic Goal                < 7.0%    C-reactive Protein [[id number]]  (Abnormal) Collected: 09/19/24 0945    Specimen: Blood Updated: 09/19/24 1245     C-Reactive Protein 20.63 mg/dL     Basic Metabolic Panel [[id number]]  (Abnormal) Collected: 09/19/24 0945    Specimen: Blood Updated: 09/19/24 1023     Glucose 230 mg/dL      BUN 14 mg/dL      Creatinine 0.98 mg/dL      Sodium 135 mmol/L      Potassium 4.2 mmol/L      Chloride 98 mmol/L      CO2 27.0 mmol/L      Calcium 8.7 mg/dL      BUN/Creatinine Ratio 14.3     Anion Gap 10.0 mmol/L      eGFR 69.6 mL/min/1.73     Narrative:      GFR Normal >60  Chronic Kidney Disease <60  Kidney Failure <15      CK [[id number]]  (Normal) Collected: 09/19/24 0945    Specimen: Blood Updated: 09/19/24 1023     Creatine Kinase 173 U/L     Lactic Acid, Plasma [[id number]]  (Normal) Collected: 09/19/24 0945    Specimen: Blood Updated: 09/19/24 1022     Lactate 1.3 mmol/L     BNP [[id number]]  (Abnormal) Collected: 09/19/24 0945     Specimen: Blood Updated: 09/19/24 1018     proBNP 1,539.0 pg/mL     Narrative:      This assay is used as an aid in the diagnosis of individuals suspected of having heart failure. It can be used as an aid in the diagnosis of acute decompensated heart failure (ADHF) in patients presenting with signs and symptoms of ADHF to the emergency department (ED). In addition, NT-proBNP of <300 pg/mL indicates ADHF is not likely.    Age Range Result Interpretation  NT-proBNP Concentration (pg/mL:      <50             Positive            >450                   Gray                 300-450                    Negative             <300    50-75           Positive            >900                  Gray                300-900                  Negative            <300      >75             Positive            >1800                  Gray                300-1800                  Negative            <300    Protime-INR [[id number]]  (Normal) Collected: 09/19/24 0945    Specimen: Blood Updated: 09/19/24 1008     Protime 14.6 Seconds      INR 1.09    aPTT [[id number]]  (Abnormal) Collected: 09/19/24 0945    Specimen: Blood Updated: 09/19/24 1008     PTT 38.4 seconds     D-dimer, Quantitative [[id number]]  (Abnormal) Collected: 09/19/24 0945    Specimen: Blood Updated: 09/19/24 1008     D-Dimer, Quantitative 1.43 MCGFEU/mL     Narrative:      According to the assay 's published package insert, a normal (<0.50 MCGFEU/mL) D-dimer result in conjunction with a non-high clinical probability assessment, excludes deep vein thrombosis (DVT) and pulmonary embolism (PE) with high sensitivity.    D-dimer values increase with age and this can make VTE exclusion of an older population difficult. To address this, the [employer] College [employer], based on best available evidence and recent guidelines, recommends that clinicians use age-adjusted D-dimer thresholds in patients greater than 50 years of age with: a) a low probability of PE who do  "not meet all Pulmonary Embolism Rule Out Criteria, or b) in those with intermediate probability of PE.   The formula for an age-adjusted D-dimer cut-off is \"age/100\".  For example, a 60 year old patient would have an age-adjusted cut-off of 0.60 MCGFEU/mL and an 80 year old 0.80 MCGFEU/mL.    CBC & Differential [[id number]]  (Abnormal) Collected: 09/19/24 0945    Specimen: Blood Updated: 09/19/24 0958    Narrative:      The following orders were created for panel order CBC & Differential.  Procedure                               Abnormality         Status                     ---------                               -----------         ------                     CBC Auto Differential[[id number]]        Abnormal            Final result                 Please view results for these tests on the individual orders.    CBC Auto Differential [[id number]]  (Abnormal) Collected: 09/19/24 0945    Specimen: Blood Updated: 09/19/24 0958     WBC 9.41 10*3/mm3      RBC 4.03 10*6/mm3      Hemoglobin 9.5 g/dL      Hematocrit 32.1 %      MCV 79.7 fL      MCH 23.6 pg      MCHC 29.6 g/dL      RDW 16.6 %      RDW-SD 47.9 fl      MPV 8.9 fL      Platelets 216 10*3/mm3      Neutrophil % 77.1 %      Lymphocyte % 14.8 %      Monocyte % 6.2 %      Eosinophil % 1.3 %      Basophil % 0.2 %      Immature Grans % 0.4 %      Neutrophils, Absolute 7.26 10*3/mm3      Lymphocytes, Absolute 1.39 10*3/mm3      Monocytes, Absolute 0.58 10*3/mm3      Eosinophils, Absolute 0.12 10*3/mm3      Basophils, Absolute 0.02 10*3/mm3      Immature Grans, Absolute 0.04 10*3/mm3      nRBC 0.0 /100 WBC     Blood Culture - Blood, Hand, Digit Left [[id number]] Collected: 09/19/24 0945    Specimen: Blood from Hand, Digit Left Updated: 09/19/24 0956    Blood Culture - Blood, Hand, Digit Right [[id number]] Collected: 09/19/24 0945    Specimen: Blood from Hand, Digit Right Updated: 09/19/24 0956            Estimated Creatinine Clearance: 87.7 mL/min (by C-G formula based on " SCr of 0.98 mg/dL).    Culture Results:    Microbiology Results (last 10 days)       Procedure Component Value - Date/Time    MRSA Screen, PCR (Inpatient) - Swab, Nares [[id number]]  (Abnormal) Collected: 09/19/24 1406    Lab Status: Final result Specimen: Swab from Nares Updated: 09/19/24 1618     MRSA PCR MRSA Detected    Narrative:      The negative predictive value of this diagnostic test is high and should only be used to consider de-escalating anti-MRSA therapy. A positive result may indicate colonization with MRSA and must be correlated clinically.               Radiology:   Imaging Results (Last 72 Hours)       Procedure Component Value Units Date/Time    XR Foot 3+ View Left [[id number]] Collected: 09/19/24 0925     Updated: 09/19/24 0931    Narrative:      EXAM: XR FOOT 3+ VW LEFT-      DATE: 9/19/2024 8:17 AM     HISTORY: [place]       COMPARISON: 7/8/2022.     TECHNIQUE:  DP, oblique, lateral views. 3 images.       FINDINGS:    Severe diffuse soft tissue prominence. First interphalangeal joint is  expanded with cortical erosions. Appearance concerning for septic joint  and osteomyelitis.     Diffuse bone demineralization. No convincing acute bony finding.  Degenerative spurring in the midfoot. Plantar enthesophyte.     Suspect soft tissue defect/ulceration at the heel. No associated bony  erosions.          Impression:      1. First interphalangeal joint is expanded with endplate erosions,  concerning for septic joint and osteomyelitis. This is new compared to  7/8/2022.     2. Soft tissue defect at the heel, suspect soft tissue ulceration. No  underlying bony erosions. Note that radiographic findings of  osteomyelitis can lag behind clinical presentation. If high clinical  suspicion, consider MRI or follow-up radiographs in 10-14 days.     3. Diffuse soft tissue prominence, which is a nonspecific finding.     This report was signed and finalized on 9/19/2024 9:28 AM by Dr Kaci Ji MD.                  HOSPITAL PROBLEM LIST:      Osteomyelitis    Lymphedema of both lower extremities    Morbid obesity with BMI of 50.0-59.9, adult    Type 2 diabetes mellitus with hyperglycemia, with long-term current use of insulin    Cellulitis of left leg      IMPRESSION:  Cellulitis LLE in patient with bilateral lymphedema  Suspect left foot abscess  MRSA nasal screen positive  Uncontrolled type diabetes mellitus  Septic arthritis/possible osteomyelitis left 1st IP joint      RECOMMENDATION:   Start linezolid 600 mg po q 12  Await podiatry input  MRI? Per nursing, patient declined and coil may not be large enough  Elevate LEs above level of heart  Glucose management per hospitalist         Nancy Burnham MD  09/19/24  18:22 CDT          Electronically signed by Nancy Burnham MD at 09/19/24 9960

## 2024-09-21 LAB
GEN 5 2HR TROPONIN T REFLEX: 28 NG/L
GLUCOSE BLDC GLUCOMTR-MCNC: 144 MG/DL (ref 70–130)
GLUCOSE BLDC GLUCOMTR-MCNC: 176 MG/DL (ref 70–130)
GLUCOSE BLDC GLUCOMTR-MCNC: 178 MG/DL (ref 70–130)
GLUCOSE BLDC GLUCOMTR-MCNC: 179 MG/DL (ref 70–130)
TROPONIN T DELTA: 0 NG/L
TROPONIN T SERPL HS-MCNC: 28 NG/L

## 2024-09-21 PROCEDURE — 25010000002 ONDANSETRON PER 1 MG: Performed by: NURSE PRACTITIONER

## 2024-09-21 PROCEDURE — 25010000002 HYDROMORPHONE PER 4 MG: Performed by: FAMILY MEDICINE

## 2024-09-21 PROCEDURE — 25010000002 KETOROLAC TROMETHAMINE PER 15 MG: Performed by: NURSE PRACTITIONER

## 2024-09-21 PROCEDURE — 84484 ASSAY OF TROPONIN QUANT: CPT | Performed by: FAMILY MEDICINE

## 2024-09-21 PROCEDURE — 93005 ELECTROCARDIOGRAM TRACING: CPT | Performed by: FAMILY MEDICINE

## 2024-09-21 PROCEDURE — 25010000002 PROMETHAZINE PER 50 MG: Performed by: FAMILY MEDICINE

## 2024-09-21 PROCEDURE — 25010000002 LINEZOLID 600 MG/300ML SOLUTION: Performed by: INTERNAL MEDICINE

## 2024-09-21 PROCEDURE — 82948 REAGENT STRIP/BLOOD GLUCOSE: CPT

## 2024-09-21 PROCEDURE — 93010 ELECTROCARDIOGRAM REPORT: CPT | Performed by: INTERNAL MEDICINE

## 2024-09-21 PROCEDURE — 99232 SBSQ HOSP IP/OBS MODERATE 35: CPT | Performed by: INTERNAL MEDICINE

## 2024-09-21 PROCEDURE — 63710000001 INSULIN LISPRO (HUMAN) PER 5 UNITS: Performed by: NURSE PRACTITIONER

## 2024-09-21 PROCEDURE — 25010000002 MORPHINE PER 10 MG: Performed by: FAMILY MEDICINE

## 2024-09-21 RX ORDER — LORAZEPAM 0.5 MG/1
0.5 TABLET ORAL EVERY 6 HOURS PRN
Status: DISCONTINUED | OUTPATIENT
Start: 2024-09-21 | End: 2024-09-23 | Stop reason: HOSPADM

## 2024-09-21 RX ORDER — MORPHINE SULFATE 2 MG/ML
2 INJECTION, SOLUTION INTRAMUSCULAR; INTRAVENOUS
Status: DISCONTINUED | OUTPATIENT
Start: 2024-09-21 | End: 2024-09-22

## 2024-09-21 RX ORDER — LINEZOLID 2 MG/ML
600 INJECTION, SOLUTION INTRAVENOUS EVERY 12 HOURS
Status: DISCONTINUED | OUTPATIENT
Start: 2024-09-21 | End: 2024-09-23 | Stop reason: HOSPADM

## 2024-09-21 RX ORDER — HYDROMORPHONE HYDROCHLORIDE 1 MG/ML
0.5 INJECTION, SOLUTION INTRAMUSCULAR; INTRAVENOUS; SUBCUTANEOUS ONCE
Status: COMPLETED | OUTPATIENT
Start: 2024-09-21 | End: 2024-09-21

## 2024-09-21 RX ORDER — INSULIN GLARGINE 100 [IU]/ML
15 INJECTION, SOLUTION SUBCUTANEOUS NIGHTLY
Status: DISCONTINUED | OUTPATIENT
Start: 2024-09-21 | End: 2024-09-23 | Stop reason: HOSPADM

## 2024-09-21 RX ORDER — NITROGLYCERIN 0.4 MG/1
0.4 TABLET SUBLINGUAL
Status: DISCONTINUED | OUTPATIENT
Start: 2024-09-21 | End: 2024-09-23 | Stop reason: HOSPADM

## 2024-09-21 RX ORDER — ASPIRIN 325 MG
325 TABLET ORAL ONCE
Status: COMPLETED | OUTPATIENT
Start: 2024-09-21 | End: 2024-09-21

## 2024-09-21 RX ADMIN — KETOROLAC TROMETHAMINE 30 MG: 30 INJECTION, SOLUTION INTRAMUSCULAR; INTRAVENOUS at 00:49

## 2024-09-21 RX ADMIN — MORPHINE SULFATE 2 MG: 2 INJECTION, SOLUTION INTRAMUSCULAR; INTRAVENOUS at 20:21

## 2024-09-21 RX ADMIN — Medication 10 ML: at 09:22

## 2024-09-21 RX ADMIN — INSULIN LISPRO 2 UNITS: 100 INJECTION, SOLUTION INTRAVENOUS; SUBCUTANEOUS at 11:56

## 2024-09-21 RX ADMIN — PANTOPRAZOLE SODIUM 40 MG: 40 TABLET, DELAYED RELEASE ORAL at 05:56

## 2024-09-21 RX ADMIN — KETOROLAC TROMETHAMINE 30 MG: 30 INJECTION, SOLUTION INTRAMUSCULAR; INTRAVENOUS at 12:05

## 2024-09-21 RX ADMIN — PROMETHAZINE HYDROCHLORIDE 12.5 MG: 25 INJECTION INTRAMUSCULAR; INTRAVENOUS at 09:51

## 2024-09-21 RX ADMIN — ONDANSETRON 4 MG: 2 INJECTION INTRAMUSCULAR; INTRAVENOUS at 12:01

## 2024-09-21 RX ADMIN — DOCUSATE SODIUM 100 MG: 100 CAPSULE, LIQUID FILLED ORAL at 20:22

## 2024-09-21 RX ADMIN — Medication 10 ML: at 20:22

## 2024-09-21 RX ADMIN — INSULIN LISPRO 2 UNITS: 100 INJECTION, SOLUTION INTRAVENOUS; SUBCUTANEOUS at 17:15

## 2024-09-21 RX ADMIN — HYOSCYAMINE SULFATE 125 MCG: 0.12 TABLET SUBLINGUAL at 17:35

## 2024-09-21 RX ADMIN — LINEZOLID 600 MG: 600 INJECTION, SOLUTION INTRAVENOUS at 11:57

## 2024-09-21 RX ADMIN — ONDANSETRON 4 MG: 2 INJECTION INTRAMUSCULAR; INTRAVENOUS at 05:19

## 2024-09-21 RX ADMIN — LORAZEPAM 0.5 MG: 0.5 TABLET ORAL at 23:30

## 2024-09-21 RX ADMIN — PRAMIPEXOLE DIHYDROCHLORIDE 0.75 MG: 0.25 TABLET ORAL at 20:22

## 2024-09-21 RX ADMIN — OXYCODONE HYDROCHLORIDE AND ACETAMINOPHEN 1 TABLET: 7.5; 325 TABLET ORAL at 23:32

## 2024-09-21 RX ADMIN — ONDANSETRON 4 MG: 2 INJECTION INTRAMUSCULAR; INTRAVENOUS at 19:27

## 2024-09-21 RX ADMIN — HYDROMORPHONE HYDROCHLORIDE 0.5 MG: 1 INJECTION, SOLUTION INTRAMUSCULAR; INTRAVENOUS; SUBCUTANEOUS at 09:51

## 2024-09-21 RX ADMIN — INSULIN LISPRO 2 UNITS: 100 INJECTION, SOLUTION INTRAVENOUS; SUBCUTANEOUS at 09:22

## 2024-09-21 RX ADMIN — ASPIRIN 325 MG: 325 TABLET, FILM COATED ORAL at 20:22

## 2024-09-21 NOTE — PROGRESS NOTES
[facility] Medicine Services  INPATIENT PROGRESS NOTE    Patient Name: Valeria Wilson  Date of Admission: 9/19/2024  Today's Date: 09/21/24  Length of Stay: 2  Primary Care Physician: Adriel Bruno Jr., MD    Subjective   Chief Complaint: wounds foot, redness pain lower lleg  HPI   Patient complains of significant nausea this morning.  She notes that Zofran really did not help any at all.  Also complains of a significant headache.  She has had Toradol.  She would like to have some Dilaudid to get rid of the headache.  Dr. Franky Curran is at bedside.  She notes that linezolid can cause nausea.  She is going to change it to the IV form to avoid this.        Review of Systems   All pertinent negatives and positives are as above. All other systems have been reviewed and are negative unless otherwise stated.     Objective    Temp:  [98.1 °F (36.7 °C)-98.5 °F (36.9 °C)] 98.5 °F (36.9 °C)  Heart Rate:  [67-79] 79  Resp:  [18] 18  BP: (117-152)/(45-61) 135/52  Physical Exam  Vitals and nursing note reviewed.   Constitutional:       Appearance: She is obese.   HENT:      Head: Normocephalic and atraumatic.      Right Ear: External ear normal.      Mouth/Throat:      Mouth: Mucous membranes are moist.   Eyes:      Extraocular Movements: Extraocular movements intact.      Conjunctiva/sclera: Conjunctivae normal.      Pupils: Pupils are equal, round, and reactive to light.   Cardiovascular:      Rate and Rhythm: Normal rate and regular rhythm.      Pulses: Normal pulses.      Heart sounds: Normal heart sounds.   Pulmonary:      Effort: Pulmonary effort is normal.      Breath sounds: Normal breath sounds.   Abdominal:      General: Bowel sounds are normal.      Palpations: Abdomen is soft.   Musculoskeletal:      Cervical back: No rigidity.      Comments: Patient is able to move all extremities.  She has marked lymphedema bilateral lower legs.  The left lower extremity has  cellulitis changes wounds.   Skin:     General: Skin is warm and dry.      Capillary Refill: Capillary refill takes less than 2 seconds.      Comments: Abscess that sole of the left foot appears unchanged overnight.   Neurological:      General: No focal deficit present.      Mental Status: She is alert and oriented to person, place, and time.   Psychiatric:         Mood and Affect: Mood normal.            File Link    Scan on 9/20/2024 1452 by Zabrina Clark RN        Key Information    Document ID File Type Document Type Description   [id number] Image WOUND IMAGE      Import Information    Attached At Date Time User Dept   Encounter Level 9/20/2024  2:52 PM Zabrina Clark RN [facility]     Encounter    Hospital Encounter on 9/19/24        File Link    Scan on 9/20/2024 1450 by Zabrina Clark RN        Key Information    Document ID File Type Document Type Description   [id number] Image WOUND IMAGE      Import Information    Attached At Date Time User Dept   Encounter Level 9/20/2024  2:50 PM Zabrina Clark RN [facility]     Encounter    Hospital Encounter on 9/19/24         Results Review:  I have reviewed the labs, radiology results, and diagnostic studies.    Laboratory Data:   Results from last 7 days   Lab Units 09/20/24  0453 09/19/24  0945   WBC 10*3/mm3 4.96 9.41   HEMOGLOBIN g/dL 8.0* 9.5*   HEMATOCRIT % 26.9* 32.1*   PLATELETS 10*3/mm3 151 216        Results from last 7 days   Lab Units 09/20/24  0453 09/19/24  0945   SODIUM mmol/L 135* 135*   POTASSIUM mmol/L 4.7 4.2   CHLORIDE mmol/L 101 98   CO2 mmol/L 29.0 27.0   BUN mg/dL 16 14   CREATININE mg/dL 1.13* 0.98   CALCIUM mg/dL 7.9* 8.7   BILIRUBIN mg/dL 0.2  --    ALK PHOS U/L 62  --    ALT (SGPT) U/L <5  --    AST (SGOT) U/L 10  --    GLUCOSE mg/dL 206* 230*       Culture Data:   Blood Culture   Date Value Ref Range Status   09/19/2024 No growth at 24 hours  Preliminary   09/19/2024 No growth at 24 hours  Preliminary       Radiology Data:    Imaging Results (Last 24 Hours)       ** No results found for the last 24 hours. **            I have reviewed the patient's current medications.     Assessment/Plan   Assessment  Active Hospital Problems    Diagnosis     **Osteomyelitis     Cellulitis of left leg     Lymphedema of both lower extremities     Morbid obesity with BMI of 50.0-59.9, adult     Type 2 diabetes mellitus with hyperglycemia, with long-term current use of insulin        Treatment Plan    Dilaudid 0.5 mg IV x 1  Phenergan 12.5 mg IVPB q6h prn nausea that is not controlled by zofran  Elevate leg/foot.   Linezolid changed to IV from oral   Increase lantus to 15 units nightly  continue sliding scale insulin  Cbc cmp in AM  Consult Dr JOSUE Palomo  Monday    Medical Decision Making  Number and Complexity of problems:   Osteomyelitis high complexity  Cellulitis left leg high complexity  Lymphedema edema high complexity  Morbid obesity moderate complexity  Diabetes mellitus type 2 moderate complexity    Differential Diagnosis: None  Conditions and Status        Condition is unchanged.     MDM Data  External documents reviewed: Reviewed  Cardiac tracing (EKG, telemetry) interpretation: Reviewed  Radiology interpretation: Reviewed  Labs reviewed: Reviewed  Any tests that were considered but not ordered: None     Decision rules/scores evaluated (example [id number], Wells, etc): None     Discussed with: Patient     Care Planning  Shared decision making: Patient  Code status and discussions: Full    Disposition  Social Determinants of Health that impact treatment or disposition: None  I expect the patient to be discharged to home in 5-7 days.     Electronically signed by Amada Farris DO, 09/21/24, 10:23 CDT.

## 2024-09-21 NOTE — PLAN OF CARE
Goal Outcome Evaluation:      Alert and oriented x4. VSS. Safety maintained. Oral antibiotics continue.

## 2024-09-21 NOTE — PLAN OF CARE
Goal Outcome Evaluation:  Plan of Care Reviewed With: patient        Progress: no change       Pt a/o x4. Up x1 assist to BSC. Voiding. BM this morning. BLE edema. Pt c/o n/v and pain- see mar. Pt has lovenox but refusing. Room air. Achs sugar checks. COOK. VSS. Call light within reach. Safety maintained.

## 2024-09-21 NOTE — PROGRESS NOTES
INFECTIOUS DISEASES PROGRESS NOTE    Patient:  Valeria Wilson  YOB: 1970  MRN: [id number]   Admit date: 9/19/2024   Admitting Physician: Amada Farris DO  Primary Care Physician: Adriel Bruno Jr., MD    Chief Complaint: Nausea    Interval History: Patient indicates that she started having some severe nausea last night.  Nausea again this morning.    Still with left foot pain.    Allergies:   Allergies   Allergen Reactions    Compazine [Prochlorperazine Edisylate] Shortness Of Breath     Breathing     Meperidine Hives     (Demerol)     Norflex [Orphenadrine] Hives    Nortriptyline Hives    Prochlorperazine Shortness Of Breath    Prochlorperazine Maleate Shortness Of Breath    Vancomycin Other (See Comments) and Unknown - High Severity     CRITICAL LEVEL RESULTS - PATIENT LIKELY HAS METABOLIC / CLEARANCE ISSUE WITH VANCOMYCIN. ON STANDARD REGIMENS BASED ON BAYESIAN/POPULATION PK PARAMETERS, VANCOMYCIN TROUGH LEVELS IN THE 60s, 70s, 90s, 100s WERE OBTAINED. RECOMMEND AGAINST EVER USING VANCOMYCIN IN THIS PATIENT. IF VANCOMYCIN IS ABSOLUTELY INDICATED WITHOUT ANY OTHER OPTIONS, PULSE INTERMITTENT DOSING WOULD LIKELY BE THE ONLY APPROPRIATE METHOD.     Codeine Nausea And Vomiting     Rash     Penicillins Rash     Nausea & vomiting     Amoxicillin-Pot Clavulanate Rash, Hives and Itching    Avelox [Moxifloxacin Hcl] Rash    Bacitracin Nausea And Vomiting    Calamine Itching    Cefazolin Nausea And Vomiting    Cephalexin Rash     (Keflex)     Ciprofloxacin Rash    Clindamycin/Lincomycin Rash    Doxycycline Nausea And Vomiting     Can take but need zofran before    Hydroxyzine Hcl Itching    Moxifloxacin Nausea And Vomiting    Orphenadrine Citrate Itching    Sulfamethoxazole-Trimethoprim Nausea And Vomiting and Rash     States she also gets yeast infections with it    Tobramycin Other (See Comments)     Eyes burn-feel like eyes are on fire      Vistaril [Hydroxyzine] Rash    Zinc Other (See  "Comments)     ERRYTHEMA       Current Scheduled Medications:   docusate sodium, 100 mg, Oral, BID  enoxaparin, 60 mg, Subcutaneous, Q12H  fluticasone, 1 spray, Nasal, Daily  gabapentin, 600 mg, Oral, Daily  insulin glargine, 10 Units, Subcutaneous, Nightly  insulin lispro, 2-9 Units, Subcutaneous, 4x Daily AC & at Bedtime  linezolid, 600 mg, Oral, Q12H  pantoprazole, 40 mg, Oral, Q AM  pramipexole, 0.75 mg, Oral, BID  senna-docusate sodium, 2 tablet, Oral, BID  sodium chloride, 10 mL, Intravenous, Q12H      Current PRN Medications:    acetaminophen **OR** acetaminophen **OR** acetaminophen    senna-docusate sodium **AND** polyethylene glycol **AND** bisacodyl **AND** bisacodyl    cyclobenzaprine    dextrose    dextrose    glucagon (human recombinant)    ketorolac    ondansetron ODT **OR** ondansetron    oxyCODONE-acetaminophen    sodium chloride    sodium chloride    sodium chloride, 75 mL/hr, Last Rate: 75 mL/hr (24 0735)           Objective     Vital Signs:  Temp (24hrs), Av.3 °F (36.8 °C), Min:98.1 °F (36.7 °C), Max:98.5 °F (36.9 °C)      /52 (BP Location: Right arm, Patient Position: Lying)   Pulse 79   Temp 98.5 °F (36.9 °C) (Oral)   Resp 18   Ht 154.9 cm (61\")   Wt 135 kg (297 lb)   LMP  (LMP Unknown)   SpO2 91%   BMI 56.12 kg/m²         Physical Exam:  General: Patient is sitting up in bed with a blue emesis bag in front of her.  She does appear uncomfortable  Respiratory: Effort even and unlabored  Left foot.  Patient has bulla formation on the dorsal surface of her left foot that is a sensate.  Surrounding area of erythema quite tender.  She still has ulceration of her heel and it is not floated so I tried to move her left lower extremity to elevated however she asked that her  do that.  He elevated her left leg and we floated her foot on a pillow.  I discussed with him why that area needs to be offloaded given the ulcer.  Lower extremities bilaterally still with pale " erythema, no obvious weeping.  Lymphedema and scaled.      Results Review:    I reviewed the patient's new clinical results.    Lab Results:    CBC:   Lab Results   Lab 09/19/24  0945 09/20/24  0453   WBC 9.41 4.96   HEMOGLOBIN 9.5* 8.0*   HEMATOCRIT 32.1* 26.9*   PLATELETS 216 151        AutoDiff:   Lab Results   Lab 09/19/24  0945 09/20/24  0453   NEUTROPHIL % 77.1* 65.1   LYMPHOCYTE % 14.8* 24.2   MONOCYTES % 6.2 7.1   EOSINOPHIL % 1.3 3.0   BASOPHIL % 0.2 0.2   NEUTROS ABS 7.26* 3.23   LYMPHS ABS 1.39 1.20   MONOS ABS 0.58 0.35   EOS ABS 0.12 0.15   BASOS ABS 0.02 0.01        Manual Diff:    Lab Results   Lab 09/19/24  0945 09/20/24  0453   NEUTROS ABS 7.26* 3.23           CMP:   Lab Results   Lab 09/19/24  0945 09/20/24 0453   SODIUM 135* 135*   POTASSIUM 4.2 4.7   CHLORIDE 98 101   CO2 27.0 29.0   BUN 14 16   CREATININE 0.98 1.13*   CALCIUM 8.7 7.9*   BILIRUBIN  --  0.2   ALK PHOS  --  62   ALT (SGPT)  --  <5   AST (SGOT)  --  10   GLUCOSE 230* 206*       Estimated Creatinine Clearance: 76 mL/min (A) (by C-G formula based on SCr of 1.13 mg/dL (H)).    Culture Results:    Microbiology Results (last 10 days)       Procedure Component Value - Date/Time    MRSA Screen, PCR (Inpatient) - Swab, Nares [[id number]]  (Abnormal) Collected: 09/19/24 1406    Lab Status: Final result Specimen: Swab from Nares Updated: 09/19/24 1618     MRSA PCR MRSA Detected    Narrative:      The negative predictive value of this diagnostic test is high and should only be used to consider de-escalating anti-MRSA therapy. A positive result may indicate colonization with MRSA and must be correlated clinically.    Blood Culture - Blood, Hand, Digit Left [[id number]]  (Normal) Collected: 09/19/24 0945    Lab Status: Preliminary result Specimen: Blood from Hand, Digit Left Updated: 09/20/24 1001     Blood Culture No growth at 24 hours    Blood Culture - Blood, Hand, Digit Right [[id number]]  (Normal) Collected: 09/19/24 0945    Lab Status:  Preliminary result Specimen: Blood from Hand, Digit Right Updated: 09/20/24 1001     Blood Culture No growth at 24 hours                 Radiology:   Imaging Results (Last 72 Hours)       Procedure Component Value Units Date/Time    XR Foot 3+ View Left [[id number]] Collected: 09/19/24 0925     Updated: 09/19/24 0931    Narrative:      EXAM: XR FOOT 3+ VW LEFT-      DATE: 9/19/2024 8:17 AM     HISTORY: [place]       COMPARISON: 7/8/2022.     TECHNIQUE:  DP, oblique, lateral views. 3 images.       FINDINGS:    Severe diffuse soft tissue prominence. First interphalangeal joint is  expanded with cortical erosions. Appearance concerning for septic joint  and osteomyelitis.     Diffuse bone demineralization. No convincing acute bony finding.  Degenerative spurring in the midfoot. Plantar enthesophyte.     Suspect soft tissue defect/ulceration at the heel. No associated bony  erosions.          Impression:      1. First interphalangeal joint is expanded with endplate erosions,  concerning for septic joint and osteomyelitis. This is new compared to  7/8/2022.     2. Soft tissue defect at the heel, suspect soft tissue ulceration. No  underlying bony erosions. Note that radiographic findings of  osteomyelitis can lag behind clinical presentation. If high clinical  suspicion, consider MRI or follow-up radiographs in 10-14 days.     3. Diffuse soft tissue prominence, which is a nonspecific finding.     This report was signed and finalized on 9/19/2024 9:28 AM by Dr Kaci Ji MD.                   Active Hospital Problems    Diagnosis     **Osteomyelitis     Cellulitis of left leg     Lymphedema of both lower extremities     Morbid obesity with BMI of 50.0-59.9, adult     Type 2 diabetes mellitus with hyperglycemia, with long-term current use of insulin        IMPRESSION:  Nausea-likely secondary to oral linezolid as this does not appear any other new medications or any other etiology.  Lower extremity cellulitis-still  continues to be more concentrated involving the left foot.  Patient with wound of the heel that is chronic.  Concern for septic interphalangeal joint great toe and associated osteomyelitis noted from radiographs  Uncontrolled diabetes mellitus with hemoglobin A1c 9.3  MRSA nasal screen positive      RECOMMENDATION:   Change linezolid p.o. to linezolid IV  Dr. Farris addressing nausea  Glucose management per hospitalist  Continue to recommend to elevate bilateral lower extremities above the level of the heart and also to float left heel at all times given ulceration  Current plans to consult Dr. Palomo on Monday    Discussed with Dr. Brenton Burnham MD  09/21/24  08:48 CDT

## 2024-09-22 ENCOUNTER — APPOINTMENT (OUTPATIENT)
Dept: CARDIOLOGY | Facility: HOSPITAL | Age: 54
End: 2024-09-22
Payer: MEDICARE

## 2024-09-22 LAB
ALBUMIN SERPL-MCNC: 3.2 G/DL (ref 3.5–5.2)
ALBUMIN/GLOB SERPL: 0.9 G/DL
ALP SERPL-CCNC: 76 U/L (ref 39–117)
ALT SERPL W P-5'-P-CCNC: <5 U/L (ref 1–33)
ANION GAP SERPL CALCULATED.3IONS-SCNC: 14 MMOL/L (ref 5–15)
ANISOCYTOSIS BLD QL: ABNORMAL
AST SERPL-CCNC: 12 U/L (ref 1–32)
BASOPHILS # BLD MANUAL: 0.08 10*3/MM3 (ref 0–0.2)
BASOPHILS NFR BLD MANUAL: 1 % (ref 0–1.5)
BH CV ECHO MEAS - AO MAX PG: 8.4 MMHG
BH CV ECHO MEAS - AO MEAN PG: 3.8 MMHG
BH CV ECHO MEAS - AO ROOT DIAM: 3 CM
BH CV ECHO MEAS - AO V2 MAX: 145 CM/SEC
BH CV ECHO MEAS - AO V2 VTI: 26.7 CM
BH CV ECHO MEAS - AVA(I,D): 2.7 CM2
BH CV ECHO MEAS - EDV(CUBED): 86.9 ML
BH CV ECHO MEAS - EDV(MOD-SP4): 118 ML
BH CV ECHO MEAS - EF(MOD-SP4): 66.4 %
BH CV ECHO MEAS - ESV(CUBED): 26.7 ML
BH CV ECHO MEAS - ESV(MOD-SP4): 39.7 ML
BH CV ECHO MEAS - FS: 32.5 %
BH CV ECHO MEAS - IVS/LVPW: 1.25 CM
BH CV ECHO MEAS - IVSD: 1.17 CM
BH CV ECHO MEAS - LA DIMENSION: 3.6 CM
BH CV ECHO MEAS - LAT PEAK E' VEL: 5.3 CM/SEC
BH CV ECHO MEAS - LV DIASTOLIC VOL/BSA (35-75): 52.8 CM2
BH CV ECHO MEAS - LV MASS(C)D: 160.8 GRAMS
BH CV ECHO MEAS - LV MAX PG: 4.2 MMHG
BH CV ECHO MEAS - LV MEAN PG: 3 MMHG
BH CV ECHO MEAS - LV SYSTOLIC VOL/BSA (12-30): 17.8 CM2
BH CV ECHO MEAS - LV V1 MAX: 102.5 CM/SEC
BH CV ECHO MEAS - LV V1 VTI: 23 CM
BH CV ECHO MEAS - LVIDD: 4.4 CM
BH CV ECHO MEAS - LVIDS: 3 CM
BH CV ECHO MEAS - LVOT AREA: 3.1 CM2
BH CV ECHO MEAS - LVOT DIAM: 2 CM
BH CV ECHO MEAS - LVPWD: 0.94 CM
BH CV ECHO MEAS - MED PEAK E' VEL: 7.5 CM/SEC
BH CV ECHO MEAS - MV A MAX VEL: 121 CM/SEC
BH CV ECHO MEAS - MV DEC TIME: 0.14 SEC
BH CV ECHO MEAS - MV E MAX VEL: 137 CM/SEC
BH CV ECHO MEAS - MV E/A: 1.13
BH CV ECHO MEAS - MV MAX PG: 7 MMHG
BH CV ECHO MEAS - MV MEAN PG: 3 MMHG
BH CV ECHO MEAS - MV V2 VTI: 37.3 CM
BH CV ECHO MEAS - MVA(VTI): 1.94 CM2
BH CV ECHO MEAS - PA V2 MAX: 99.8 CM/SEC
BH CV ECHO MEAS - PI END-D VEL: 130 CM/SEC
BH CV ECHO MEAS - SV(LVOT): 72.3 ML
BH CV ECHO MEAS - SV(MOD-SP4): 78.3 ML
BH CV ECHO MEAS - SVI(LVOT): 32.3 ML/M2
BH CV ECHO MEAS - SVI(MOD-SP4): 35 ML/M2
BH CV ECHO MEASUREMENTS AVERAGE E/E' RATIO: 21.41
BH CV XLRA - TDI S': 11.1 CM/SEC
BILIRUB SERPL-MCNC: 0.4 MG/DL (ref 0–1.2)
BUN SERPL-MCNC: 14 MG/DL (ref 6–20)
BUN/CREAT SERPL: 17.1 (ref 7–25)
CALCIUM SPEC-SCNC: 8.3 MG/DL (ref 8.6–10.5)
CHLORIDE SERPL-SCNC: 102 MMOL/L (ref 98–107)
CO2 SERPL-SCNC: 22 MMOL/L (ref 22–29)
CREAT SERPL-MCNC: 0.82 MG/DL (ref 0.57–1)
DEPRECATED RDW RBC AUTO: 46 FL (ref 37–54)
EGFRCR SERPLBLD CKD-EPI 2021: 86.2 ML/MIN/1.73
EOSINOPHIL # BLD MANUAL: 0 10*3/MM3 (ref 0–0.4)
EOSINOPHIL NFR BLD MANUAL: 0 % (ref 0.3–6.2)
ERYTHROCYTE [DISTWIDTH] IN BLOOD BY AUTOMATED COUNT: 16.6 % (ref 12.3–15.4)
GLOBULIN UR ELPH-MCNC: 3.5 GM/DL
GLUCOSE BLDC GLUCOMTR-MCNC: 139 MG/DL (ref 70–130)
GLUCOSE BLDC GLUCOMTR-MCNC: 148 MG/DL (ref 70–130)
GLUCOSE BLDC GLUCOMTR-MCNC: 168 MG/DL (ref 70–130)
GLUCOSE BLDC GLUCOMTR-MCNC: 170 MG/DL (ref 70–130)
GLUCOSE SERPL-MCNC: 144 MG/DL (ref 65–99)
HCT VFR BLD AUTO: 30.5 % (ref 34–46.6)
HGB BLD-MCNC: 9.5 G/DL (ref 12–15.9)
LEFT ATRIUM VOLUME INDEX: 27.2 ML/M2
LYMPHOCYTES # BLD MANUAL: 1.37 10*3/MM3 (ref 0.7–3.1)
LYMPHOCYTES NFR BLD MANUAL: 6.1 % (ref 5–12)
MCH RBC QN AUTO: 23.6 PG (ref 26.6–33)
MCHC RBC AUTO-ENTMCNC: 31.1 G/DL (ref 31.5–35.7)
MCV RBC AUTO: 75.9 FL (ref 79–97)
MICROCYTES BLD QL: ABNORMAL
MONOCYTES # BLD: 0.51 10*3/MM3 (ref 0.1–0.9)
NEUTROPHILS # BLD AUTO: 6.43 10*3/MM3 (ref 1.7–7)
NEUTROPHILS NFR BLD MANUAL: 74.5 % (ref 42.7–76)
NEUTS BAND NFR BLD MANUAL: 2 % (ref 0–5)
PLAT MORPH BLD: NORMAL
PLATELET # BLD AUTO: 204 10*3/MM3 (ref 140–450)
PMV BLD AUTO: 9.5 FL (ref 6–12)
POIKILOCYTOSIS BLD QL SMEAR: ABNORMAL
POLYCHROMASIA BLD QL SMEAR: ABNORMAL
POTASSIUM SERPL-SCNC: 4.7 MMOL/L (ref 3.5–5.2)
PROT SERPL-MCNC: 6.7 G/DL (ref 6–8.5)
RBC # BLD AUTO: 4.02 10*6/MM3 (ref 3.77–5.28)
SODIUM SERPL-SCNC: 138 MMOL/L (ref 136–145)
TARGETS BLD QL SMEAR: ABNORMAL
TROPONIN T SERPL HS-MCNC: 36 NG/L
VARIANT LYMPHS NFR BLD MANUAL: 1 % (ref 0–5)
VARIANT LYMPHS NFR BLD MANUAL: 15.3 % (ref 19.6–45.3)
WBC MORPH BLD: NORMAL
WBC NRBC COR # BLD AUTO: 8.4 10*3/MM3 (ref 3.4–10.8)

## 2024-09-22 PROCEDURE — 82948 REAGENT STRIP/BLOOD GLUCOSE: CPT

## 2024-09-22 PROCEDURE — 84484 ASSAY OF TROPONIN QUANT: CPT | Performed by: FAMILY MEDICINE

## 2024-09-22 PROCEDURE — 25010000002 MORPHINE PER 10 MG: Performed by: FAMILY MEDICINE

## 2024-09-22 PROCEDURE — 63710000001 INSULIN LISPRO (HUMAN) PER 5 UNITS: Performed by: NURSE PRACTITIONER

## 2024-09-22 PROCEDURE — 25010000002 PROMETHAZINE PER 50 MG: Performed by: FAMILY MEDICINE

## 2024-09-22 PROCEDURE — 63710000001 PROMETHAZINE PER 25 MG: Performed by: FAMILY MEDICINE

## 2024-09-22 PROCEDURE — 80053 COMPREHEN METABOLIC PANEL: CPT | Performed by: FAMILY MEDICINE

## 2024-09-22 PROCEDURE — 85025 COMPLETE CBC W/AUTO DIFF WBC: CPT | Performed by: FAMILY MEDICINE

## 2024-09-22 PROCEDURE — 93306 TTE W/DOPPLER COMPLETE: CPT

## 2024-09-22 PROCEDURE — 93306 TTE W/DOPPLER COMPLETE: CPT | Performed by: INTERNAL MEDICINE

## 2024-09-22 PROCEDURE — 25010000002 KETOROLAC TROMETHAMINE PER 15 MG: Performed by: NURSE PRACTITIONER

## 2024-09-22 PROCEDURE — 25010000002 LINEZOLID 600 MG/300ML SOLUTION: Performed by: INTERNAL MEDICINE

## 2024-09-22 PROCEDURE — 25510000001 PERFLUTREN 6.52 MG/ML SUSPENSION: Performed by: FAMILY MEDICINE

## 2024-09-22 PROCEDURE — 99232 SBSQ HOSP IP/OBS MODERATE 35: CPT | Performed by: INTERNAL MEDICINE

## 2024-09-22 PROCEDURE — 85007 BL SMEAR W/DIFF WBC COUNT: CPT | Performed by: FAMILY MEDICINE

## 2024-09-22 PROCEDURE — 25010000002 ONDANSETRON PER 1 MG: Performed by: FAMILY MEDICINE

## 2024-09-22 RX ORDER — ONDANSETRON 2 MG/ML
4 INJECTION INTRAMUSCULAR; INTRAVENOUS EVERY 6 HOURS PRN
Status: DISCONTINUED | OUTPATIENT
Start: 2024-09-22 | End: 2024-09-23 | Stop reason: HOSPADM

## 2024-09-22 RX ORDER — PROMETHAZINE HYDROCHLORIDE 25 MG/1
25 TABLET ORAL ONCE
Status: COMPLETED | OUTPATIENT
Start: 2024-09-22 | End: 2024-09-22

## 2024-09-22 RX ORDER — MORPHINE SULFATE 2 MG/ML
2 INJECTION, SOLUTION INTRAMUSCULAR; INTRAVENOUS EVERY 4 HOURS PRN
Status: DISPENSED | OUTPATIENT
Start: 2024-09-22 | End: 2024-09-23

## 2024-09-22 RX ADMIN — MORPHINE SULFATE 2 MG: 2 INJECTION, SOLUTION INTRAMUSCULAR; INTRAVENOUS at 09:44

## 2024-09-22 RX ADMIN — ONDANSETRON 4 MG: 2 INJECTION INTRAMUSCULAR; INTRAVENOUS at 15:37

## 2024-09-22 RX ADMIN — PROMETHAZINE HYDROCHLORIDE 12.5 MG: 25 INJECTION INTRAMUSCULAR; INTRAVENOUS at 09:44

## 2024-09-22 RX ADMIN — INSULIN LISPRO 2 UNITS: 100 INJECTION, SOLUTION INTRAVENOUS; SUBCUTANEOUS at 12:18

## 2024-09-22 RX ADMIN — PERFLUTREN 6.52 MG: 6.52 INJECTION, SUSPENSION INTRAVENOUS at 14:42

## 2024-09-22 RX ADMIN — PROMETHAZINE HYDROCHLORIDE 12.5 MG: 25 INJECTION INTRAMUSCULAR; INTRAVENOUS at 20:56

## 2024-09-22 RX ADMIN — CYCLOBENZAPRINE 10 MG: 10 TABLET, FILM COATED ORAL at 02:00

## 2024-09-22 RX ADMIN — KETOROLAC TROMETHAMINE 30 MG: 30 INJECTION, SOLUTION INTRAMUSCULAR; INTRAVENOUS at 20:05

## 2024-09-22 RX ADMIN — HYOSCYAMINE SULFATE 125 MCG: 0.12 TABLET SUBLINGUAL at 23:40

## 2024-09-22 RX ADMIN — LINEZOLID 600 MG: 600 INJECTION, SOLUTION INTRAVENOUS at 20:57

## 2024-09-22 RX ADMIN — PRAMIPEXOLE DIHYDROCHLORIDE 0.75 MG: 0.25 TABLET ORAL at 09:27

## 2024-09-22 RX ADMIN — PROMETHAZINE HYDROCHLORIDE 25 MG: 25 TABLET ORAL at 02:00

## 2024-09-22 RX ADMIN — DOCUSATE SODIUM 100 MG: 100 CAPSULE, LIQUID FILLED ORAL at 09:26

## 2024-09-22 RX ADMIN — KETOROLAC TROMETHAMINE 30 MG: 30 INJECTION, SOLUTION INTRAMUSCULAR; INTRAVENOUS at 13:52

## 2024-09-22 RX ADMIN — Medication 10 ML: at 09:27

## 2024-09-22 RX ADMIN — HYOSCYAMINE SULFATE 125 MCG: 0.12 TABLET SUBLINGUAL at 15:37

## 2024-09-22 RX ADMIN — DOCUSATE SODIUM 50 MG AND SENNOSIDES 8.6 MG 2 TABLET: 8.6; 5 TABLET, FILM COATED ORAL at 09:26

## 2024-09-22 RX ADMIN — ONDANSETRON 4 MG: 2 INJECTION INTRAMUSCULAR; INTRAVENOUS at 23:09

## 2024-09-22 RX ADMIN — MORPHINE SULFATE 2 MG: 2 INJECTION, SOLUTION INTRAMUSCULAR; INTRAVENOUS at 18:34

## 2024-09-22 RX ADMIN — Medication 10 ML: at 20:10

## 2024-09-22 RX ADMIN — INSULIN LISPRO 2 UNITS: 100 INJECTION, SOLUTION INTRAVENOUS; SUBCUTANEOUS at 09:26

## 2024-09-22 RX ADMIN — GABAPENTIN 600 MG: 300 CAPSULE ORAL at 09:27

## 2024-09-22 NOTE — NURSING NOTE
Joyce rodrigues- charge RN contact Dr. Farris for me today when pt stated that she was getting no relief from the n/v and stomach cramping. Orders were placed and Dr. Farris saw the pt again to assess her.

## 2024-09-22 NOTE — PROGRESS NOTES
"INFECTIOUS DISEASES PROGRESS NOTE    Patient:  Valeria Wilson  YOB: 1970  MRN: [id number]   Admit date: 9/19/2024   Admitting Physician: Amada Farris DO  Primary Care Physician: Adriel Bruno Jr., MD    Chief Complaint: Nausea        Interval History: Patient still complains of nausea.  She said \"they are trying Phenergan\".  Noted she had an episode of chest pressure last night.  She indicates that she was evaluated and that has resolved.    She did say \"I think that antibiotic is making me sick\".  I did remind her that I changed it to IV yesterday        Allergies:   Allergies   Allergen Reactions    Compazine [Prochlorperazine Edisylate] Shortness Of Breath     Breathing     Meperidine Hives     (Demerol)     Norflex [Orphenadrine] Hives    Nortriptyline Hives    Prochlorperazine Shortness Of Breath    Prochlorperazine Maleate Shortness Of Breath    Vancomycin Other (See Comments) and Unknown - High Severity     CRITICAL LEVEL RESULTS - PATIENT LIKELY HAS METABOLIC / CLEARANCE ISSUE WITH VANCOMYCIN. ON STANDARD REGIMENS BASED ON BAYESIAN/POPULATION PK PARAMETERS, VANCOMYCIN TROUGH LEVELS IN THE 60s, 70s, 90s, 100s WERE OBTAINED. RECOMMEND AGAINST EVER USING VANCOMYCIN IN THIS PATIENT. IF VANCOMYCIN IS ABSOLUTELY INDICATED WITHOUT ANY OTHER OPTIONS, PULSE INTERMITTENT DOSING WOULD LIKELY BE THE ONLY APPROPRIATE METHOD.     Codeine Nausea And Vomiting     Rash     Penicillins Rash     Nausea & vomiting     Amoxicillin-Pot Clavulanate Rash, Hives and Itching    Avelox [Moxifloxacin Hcl] Rash    Bacitracin Nausea And Vomiting    Calamine Itching    Cefazolin Nausea And Vomiting    Cephalexin Rash     (Keflex)     Ciprofloxacin Rash    Clindamycin/Lincomycin Rash    Doxycycline Nausea And Vomiting     Can take but need zofran before    Hydroxyzine Hcl Itching    Moxifloxacin Nausea And Vomiting    Orphenadrine Citrate Itching    Sulfamethoxazole-Trimethoprim Nausea And Vomiting and " "Rash     States she also gets yeast infections with it    Tobramycin Other (See Comments)     Eyes burn-feel like eyes are on fire      Vistaril [Hydroxyzine] Rash    Zinc Other (See Comments)     ERRYTHEMA       Current Scheduled Medications:   docusate sodium, 100 mg, Oral, BID  enoxaparin, 60 mg, Subcutaneous, Q12H  fluticasone, 1 spray, Nasal, Daily  gabapentin, 600 mg, Oral, Daily  insulin glargine, 15 Units, Subcutaneous, Nightly  insulin lispro, 2-9 Units, Subcutaneous, 4x Daily AC & at Bedtime  Linezolid, 600 mg, Intravenous, Q12H  pantoprazole, 40 mg, Oral, Q AM  pramipexole, 0.75 mg, Oral, BID  senna-docusate sodium, 2 tablet, Oral, BID  sodium chloride, 10 mL, Intravenous, Q12H      Current PRN Medications:    acetaminophen **OR** acetaminophen **OR** acetaminophen    senna-docusate sodium **AND** polyethylene glycol **AND** bisacodyl **AND** bisacodyl    cyclobenzaprine    dextrose    dextrose    glucagon (human recombinant)    hyoscyamine    ketorolac    LORazepam    Morphine    nitroglycerin    oxyCODONE-acetaminophen    promethazine    sodium chloride    sodium chloride    sodium chloride, 75 mL/hr, Last Rate: 75 mL/hr (24)           Objective     Vital Signs:  Temp (24hrs), Av.3 °F (36.8 °C), Min:97.6 °F (36.4 °C), Max:99 °F (37.2 °C)      /63 (BP Location: Right arm, Patient Position: Lying)   Pulse 77   Temp 99 °F (37.2 °C) (Oral)   Resp 18   Ht 154.9 cm (61\")   Wt 135 kg (297 lb)   LMP  (LMP Unknown)   SpO2 94%   BMI 56.12 kg/m²         Physical Exam:  General: Patient is a morbidly obese female lying in bed in no acute distress  Respiratory: Effort even and unlabored.  She has oxygen in place.  Her O2 sats are 95%  Lower extremities.  Erythema continues to fade.  Bullae remains on the left plantar surface and is intact.  Patient still has tenderness around that area but some fading of the erythema.      Results Review:    I reviewed the patient's new clinical " results.    Lab Results:    CBC:   Lab Results   Lab 09/19/24  0945 09/20/24  0453   WBC 9.41 4.96   HEMOGLOBIN 9.5* 8.0*   HEMATOCRIT 32.1* 26.9*   PLATELETS 216 151        AutoDiff:   Lab Results   Lab 09/19/24  0945 09/20/24  0453   NEUTROPHIL % 77.1* 65.1   LYMPHOCYTE % 14.8* 24.2   MONOCYTES % 6.2 7.1   EOSINOPHIL % 1.3 3.0   BASOPHIL % 0.2 0.2   NEUTROS ABS 7.26* 3.23   LYMPHS ABS 1.39 1.20   MONOS ABS 0.58 0.35   EOS ABS 0.12 0.15   BASOS ABS 0.02 0.01        Manual Diff:    Lab Results   Lab 09/19/24  0945 09/20/24  0453   NEUTROS ABS 7.26* 3.23           CMP:   Lab Results   Lab 09/19/24  0945 09/20/24  0453   SODIUM 135* 135*   POTASSIUM 4.2 4.7   CHLORIDE 98 101   CO2 27.0 29.0   BUN 14 16   CREATININE 0.98 1.13*   CALCIUM 8.7 7.9*   BILIRUBIN  --  0.2   ALK PHOS  --  62   ALT (SGPT)  --  <5   AST (SGOT)  --  10   GLUCOSE 230* 206*       Estimated Creatinine Clearance: 76 mL/min (A) (by C-G formula based on SCr of 1.13 mg/dL (H)).    Culture Results:    Microbiology Results (last 10 days)       Procedure Component Value - Date/Time    MRSA Screen, PCR (Inpatient) - Swab, Nares [[id number]]  (Abnormal) Collected: 09/19/24 1406    Lab Status: Final result Specimen: Swab from Nares Updated: 09/19/24 1618     MRSA PCR MRSA Detected    Narrative:      The negative predictive value of this diagnostic test is high and should only be used to consider de-escalating anti-MRSA therapy. A positive result may indicate colonization with MRSA and must be correlated clinically.    Blood Culture - Blood, Hand, Digit Left [[id number]]  (Normal) Collected: 09/19/24 0945    Lab Status: Preliminary result Specimen: Blood from Hand, Digit Left Updated: 09/21/24 1001     Blood Culture No growth at 2 days    Blood Culture - Blood, Hand, Digit Right [[id number]]  (Normal) Collected: 09/19/24 0945    Lab Status: Preliminary result Specimen: Blood from Hand, Digit Right Updated: 09/21/24 1001     Blood Culture No growth at 2 days                  Radiology:   Imaging Results (Last 72 Hours)       ** No results found for the last 72 hours. **                Active Hospital Problems    Diagnosis     **Osteomyelitis     Cellulitis of left leg     Lymphedema of both lower extremities     Morbid obesity with BMI of 50.0-59.9, adult     Type 2 diabetes mellitus with hyperglycemia, with long-term current use of insulin        IMPRESSION:  Nausea-felt it could be secondary to oral linezolid.  Changed to IV linezolid September 21.  Lower extremity cellulitis-improved.  Erythema but more localized left foot  Concern for septic interphalangeal joint great toe and associated osteomyelitis noted from radiographs  Uncontrolled diabetes mellitus with hemoglobin A1c 9.3  MRSA nasal screen positive      RECOMMENDATION:   Discussed with patient that vancomycin not an option.  See documentation involving allergies.  Perhaps Phenergan will be helpful and she will tolerate.  She lists allergy to doxycycline and complains of nausea and vomiting with that.  At this point would not start daptomycin without definitive evidence of MRSA infection.  Continue antinausea management per Dr. Geronimo  Glucose management per hospitalist  Continue to recommend to elevate bilateral lower extremities above the level of the heart and also to float left heel at all times given ulceration of left foot.  Current plans to consult Dr. Palomo on Monday        Nancy Burnham MD  09/22/24  09:57 CDT

## 2024-09-22 NOTE — PLAN OF CARE
"Goal Outcome Evaluation:  Plan of Care Reviewed With: patient        Progress: no change  Outcome Evaluation: At handoff lastnight pt c/o having chest pressure, pt stated she was having a \"heavy feeling\" in her chest. Dr. Anderson notified, he came to pt's bs and placed orders. See previous nursing note in regards to IV. Up with spouse assistance to bsc. PW in place. Tele placed, pt running NSR. PRN meds given for nausea and pain, some relief noted. Pt still refusing some meds such as Lovenox. BLE appears edematous, encouraging pt to keep legs elevated. Safety maintained.                               "

## 2024-09-22 NOTE — PROGRESS NOTES
Patient complained of chest pressure tonight.  54 year-old female with cellulitis left leg and left foot and osteoconcerns followed by Dr. Yvonne Curran.  She has multiple allergies and has been on Zyvox p.o. now switched to IV.  She has complained of nausea during the day shift and tonight she also complains of chest pressure.  She has received Zofran 4 mg IV at 5 AM, noon and 7:30 PM.  Blood pressure tonight is 178/73, heart rate 77, she is afebrile.  Appears concerned but no distress.  Not chest pain with palpation she is a little tender in the epigastric area.  EKG obtained showing normal sinus rhythm.    Atypical chest pain  Check troponin  Echocardiogram in a.m.  Stop Zofran and add Ativan p.o. for nausea as needed    Electronically signed by Oswald Anderson MD, 09/21/24, 8:13 PM CDT.

## 2024-09-22 NOTE — PROGRESS NOTES
[facility] Medicine Services  INPATIENT PROGRESS NOTE    Patient Name: Valeria Wilson  Date of Admission: 9/19/2024  Today's Date: 09/22/24  Length of Stay: 3  Primary Care Physician: Adriel Bruno Jr., MD    Subjective   Chief Complaint: wounds foot, redness pain lower leg  HPI     Patient complains of headache.  She thinks it is migrainal.  It is better than it has been.  She did have nausea through the night.  Patient also had some chest pressure through the night.  At that time she was evaluated by the nocturnist.  Her Zofran was changed to Ativan  She is not having any significant change in how her leg feels at this time.      Review of Systems   All pertinent negatives and positives are as above. All other systems have been reviewed and are negative unless otherwise stated.     Objective    Temp:  [97.6 °F (36.4 °C)-99 °F (37.2 °C)] 99 °F (37.2 °C)  Heart Rate:  [76-77] 77  Resp:  [18] 18  BP: (145-172)/(56-78) 147/63  Physical Exam  Vitals and nursing note reviewed.   Constitutional:       Appearance: She is obese.   HENT:      Head: Normocephalic and atraumatic.      Right Ear: External ear normal.      Mouth/Throat:      Mouth: Mucous membranes are moist.   Eyes:      Extraocular Movements: Extraocular movements intact.      Conjunctiva/sclera: Conjunctivae normal.      Pupils: Pupils are equal, round, and reactive to light.   Cardiovascular:      Rate and Rhythm: Normal rate and regular rhythm.      Pulses: Normal pulses.      Heart sounds: Normal heart sounds.   Pulmonary:      Effort: Pulmonary effort is normal.      Breath sounds: Normal breath sounds.   Abdominal:      General: Bowel sounds are normal.      Palpations: Abdomen is soft.   Musculoskeletal:      Cervical back: No rigidity.      Comments: Patient is able to move all extremities.  She has marked lymphedema bilateral lower legs.  The left lower extremity has cellulitis changes wounds.    Skin:     General: Skin is warm and dry.      Capillary Refill: Capillary refill takes less than 2 seconds.      Comments: Continued issues with foot wound/abscess.  No new wounds.   Neurological:      General: No focal deficit present.      Mental Status: She is alert and oriented to person, place, and time.   Psychiatric:         Mood and Affect: Mood normal.            File Link    Scan on 9/20/2024 1452 by Zabrina Clark RN        Key Information    Document ID File Type Document Type Description   [id number] Image WOUND IMAGE      Import Information    Attached At Date Time User Dept   Encounter Level 9/20/2024  2:52 PM Zabrina Clark RN [facility]     Encounter    Hospital Encounter on 9/19/24        File Link    Scan on 9/20/2024 1450 by Zabrina Clark RN        Key Information    Document ID File Type Document Type Description   [id number] Image WOUND IMAGE      Import Information    Attached At Date Time User Dept   Encounter Level 9/20/2024  2:50 PM Zabrina Clark RN [facility]     Encounter    Hospital Encounter on 9/19/24         Results Review:  I have reviewed the labs, radiology results, and diagnostic studies.    Laboratory Data:   Results from last 7 days   Lab Units 09/20/24  0453 09/19/24  0945   WBC 10*3/mm3 4.96 9.41   HEMOGLOBIN g/dL 8.0* 9.5*   HEMATOCRIT % 26.9* 32.1*   PLATELETS 10*3/mm3 151 216        Results from last 7 days   Lab Units 09/20/24  0453 09/19/24  0945   SODIUM mmol/L 135* 135*   POTASSIUM mmol/L 4.7 4.2   CHLORIDE mmol/L 101 98   CO2 mmol/L 29.0 27.0   BUN mg/dL 16 14   CREATININE mg/dL 1.13* 0.98   CALCIUM mg/dL 7.9* 8.7   BILIRUBIN mg/dL 0.2  --    ALK PHOS U/L 62  --    ALT (SGPT) U/L <5  --    AST (SGOT) U/L 10  --    GLUCOSE mg/dL 206* 230*       Culture Data:   Blood Culture   Date Value Ref Range Status   09/19/2024 No growth at 24 hours  Preliminary   09/19/2024 No growth at 24 hours  Preliminary       Radiology Data:   Imaging Results (Last 24 Hours)        ** No results found for the last 24 hours. **            I have reviewed the patient's current medications.     Assessment/Plan   Assessment  Active Hospital Problems    Diagnosis     **Osteomyelitis     Cellulitis of left leg     Lymphedema of both lower extremities     Morbid obesity with BMI of 50.0-59.9, adult     Type 2 diabetes mellitus with hyperglycemia, with long-term current use of insulin    Wounds left foot/heel    Treatment Plan  Labs ordered for today are still pending.  Elevate leg/foot.  Keep foot/heel off of bed  Continue linezolid  Increase lantus to 15 units nightly  continue sliding scale insulin  Cbc cmp in AM  Consult Dr JOSUE Palomo  Monday    Medical Decision Making  Number and Complexity of problems:   Osteomyelitis high complexity  Cellulitis left leg high complexity  Lymphedema edema high complexity  Morbid obesity moderate complexity  Diabetes mellitus type 2 moderate complexity    Differential Diagnosis: None  Conditions and Status        Condition is unchanged.     [facility] Data  External documents reviewed: Reviewed  Cardiac tracing (EKG, telemetry) interpretation: Reviewed  Radiology interpretation: Reviewed  Labs reviewed: Reviewed  Any tests that were considered but not ordered: None     Decision rules/scores evaluated (example [id number], [place], etc): None     Discussed with: Patient     Care Planning  Shared decision making: Patient  Code status and discussions: Full    Disposition  Social Determinants of Health that impact treatment or disposition: None  I expect the patient to be discharged to home in 5-7 days.     Electronically signed by Amada Farris DO, 09/22/24, 10:30 CDT.

## 2024-09-22 NOTE — NURSING NOTE
X2 RN's attempted to restart IV both were unsuccessful. House Supervisor was able to get an IV in R distal forearm but pt stated that it was constantly burning and she couldn't tolerate it there. Unit RN attempted to get IV using the US machine, was unsuccessful too. Dr. Anderson notified.

## 2024-09-22 NOTE — PLAN OF CARE
Goal Outcome Evaluation:  Plan of Care Reviewed With: patient        Progress: no change       Pt a/o x4. 3L O2 n/c. Tele in place running normal sinus. Pt not eating much due to continuous n/v- see mar. MD is aware. Pt c/o pain- see mar. Up x1 with cane and SO. Pt refusing lovenox. COKO. PPP. VSS. Call light within reach. Safety maintained.

## 2024-09-23 ENCOUNTER — READMISSION MANAGEMENT (OUTPATIENT)
Dept: CALL CENTER | Facility: HOSPITAL | Age: 54
End: 2024-09-23
Payer: MEDICARE

## 2024-09-23 VITALS
RESPIRATION RATE: 20 BRPM | OXYGEN SATURATION: 96 % | DIASTOLIC BLOOD PRESSURE: 81 MMHG | HEIGHT: 61 IN | HEART RATE: 72 BPM | SYSTOLIC BLOOD PRESSURE: 163 MMHG | WEIGHT: 293 LBS | TEMPERATURE: 98.7 F | BODY MASS INDEX: 55.32 KG/M2

## 2024-09-23 LAB
GLUCOSE BLDC GLUCOMTR-MCNC: 130 MG/DL (ref 70–130)
GLUCOSE BLDC GLUCOMTR-MCNC: 148 MG/DL (ref 70–130)
GLUCOSE BLDC GLUCOMTR-MCNC: 169 MG/DL (ref 70–130)
QT INTERVAL: 400 MS
QTC INTERVAL: 440 MS

## 2024-09-23 PROCEDURE — 25010000002 LINEZOLID 600 MG/300ML SOLUTION: Performed by: INTERNAL MEDICINE

## 2024-09-23 PROCEDURE — 25010000002 ONDANSETRON PER 1 MG: Performed by: FAMILY MEDICINE

## 2024-09-23 PROCEDURE — 25010000002 ENOXAPARIN PER 10 MG: Performed by: NURSE PRACTITIONER

## 2024-09-23 PROCEDURE — 63710000001 INSULIN LISPRO (HUMAN) PER 5 UNITS: Performed by: NURSE PRACTITIONER

## 2024-09-23 PROCEDURE — 82948 REAGENT STRIP/BLOOD GLUCOSE: CPT

## 2024-09-23 PROCEDURE — 25010000002 MORPHINE PER 10 MG: Performed by: FAMILY MEDICINE

## 2024-09-23 PROCEDURE — 99232 SBSQ HOSP IP/OBS MODERATE 35: CPT | Performed by: INTERNAL MEDICINE

## 2024-09-23 PROCEDURE — 25010000002 PROMETHAZINE PER 50 MG: Performed by: FAMILY MEDICINE

## 2024-09-23 RX ORDER — GABAPENTIN 300 MG/1
600 CAPSULE ORAL DAILY
Status: DISCONTINUED | OUTPATIENT
Start: 2024-09-23 | End: 2024-09-23 | Stop reason: HOSPADM

## 2024-09-23 RX ORDER — PRAMIPEXOLE DIHYDROCHLORIDE 0.25 MG/1
0.75 TABLET ORAL 2 TIMES DAILY
Status: DISCONTINUED | OUTPATIENT
Start: 2024-09-23 | End: 2024-09-23 | Stop reason: HOSPADM

## 2024-09-23 RX ADMIN — PRAMIPEXOLE DIHYDROCHLORIDE 0.75 MG: 0.25 TABLET ORAL at 04:18

## 2024-09-23 RX ADMIN — INSULIN LISPRO 2 UNITS: 100 INJECTION, SOLUTION INTRAVENOUS; SUBCUTANEOUS at 18:01

## 2024-09-23 RX ADMIN — LORAZEPAM 0.5 MG: 0.5 TABLET ORAL at 01:59

## 2024-09-23 RX ADMIN — ONDANSETRON 4 MG: 2 INJECTION INTRAMUSCULAR; INTRAVENOUS at 12:35

## 2024-09-23 RX ADMIN — PROMETHAZINE HYDROCHLORIDE 12.5 MG: 25 INJECTION INTRAMUSCULAR; INTRAVENOUS at 03:06

## 2024-09-23 RX ADMIN — GABAPENTIN 600 MG: 300 CAPSULE ORAL at 04:18

## 2024-09-23 RX ADMIN — Medication 10 ML: at 09:04

## 2024-09-23 RX ADMIN — PANTOPRAZOLE SODIUM 40 MG: 40 TABLET, DELAYED RELEASE ORAL at 04:18

## 2024-09-23 RX ADMIN — MORPHINE SULFATE 2 MG: 2 INJECTION, SOLUTION INTRAMUSCULAR; INTRAVENOUS at 12:35

## 2024-09-23 RX ADMIN — MORPHINE SULFATE 2 MG: 2 INJECTION, SOLUTION INTRAMUSCULAR; INTRAVENOUS at 03:12

## 2024-09-23 RX ADMIN — FLUTICASONE PROPIONATE 1 SPRAY: 50 SPRAY, METERED NASAL at 08:58

## 2024-09-23 NOTE — CONSULTS
[facility]     Referring Provider: No ref. provider found    Reason for Consultation: Cellulitis, blister, left lower extremity    Patient Care Team:  Adriel Bruno Jr., MD as PCP - General (Family Medicine)  Justin Perez MD as Surgeon (General Surgery)      Subjective .     Chief complaint/History of present illness: Patient is a 54-year-old female who was admitted with left lower extremity pain and cellulitis as well as a blister to the bottom of the left foot.  The patient states that she has been followed recently in wound care due to a chronic ulceration to the heel.  She also has a longstanding history of lymphedema to the bilateral lower extremities.  She states that she had increasing pain to the left lower extremity and noticed a blister to the bottom of the foot.  She did present at the [facility] [place] emergency room.  She was admitted for antibiotic therapy.  She states that the blister did drain some yesterday.  She relates nausea from IV antibiotics.  She relates decrease in appetite due to nausea.    Did have x-rays revealing no evidence of fracture but did reveal irregularity to the great toe.  An MRI was ordered but the patient was unable to have the MRI due to size.  She relates on a baseline she has difficulty finding and wearing shoes.  Can only wear a slip on type shoe.    The patient states difficulty going out of the house on her own.  She states that she tries to do some things around the house but is difficult for her.    Review of x-rays left foot including AP, lateral, oblique views completed 9/19/2024 reveals abundant soft tissue edema.  Does have irregularity of the first interphalangeal joint space of the hallux.  Soft tissue defect/ulceration at the heel consistent with chronic ulceration    Review of Systems  Review of Systems   Constitutional:  Positive for appetite change. Negative for chills, diaphoresis and fever.   Eyes: Negative.    Respiratory:  Negative.     Cardiovascular:  Positive for leg swelling.   Gastrointestinal:  Positive for nausea.   Musculoskeletal:  Positive for gait problem.   Skin:  Positive for wound.       History  Past Medical History:   Diagnosis Date    Anxiety     Arthritis     Osteoarthritis primarily left knee     Back pain     Chronic pain syndrome 10/27/2021    Depression     Diabetes mellitus     Fibromyalgia, primary     History of transfusion     Hx of tear of meniscus of knee joint 11/21/2016    Hypertension     Joint pain     Kidney stone     Knee pain     Lymphedema of both lower extremities 10/27/2021    Migraine     Migraine headache     Pain     knee, left ankle, left ankle      Pes anserinus bursitis 04/28/2015    Restless leg    ,   Past Surgical History:   Procedure Laterality Date    CATARACT EXTRACTION      CATARACT EXTRACTION      CATARACT EXTRACTION      CORNEAL TRANSPLANT      bilateral     ECTOPIC PREGNANCY      INCISION AND DRAINAGE ABSCESS Left 6/1/2022    Procedure: DEBRIDEMENTAND PULSE LAVAGE LT MEDIAL THIGH PACKING ODOFORM GAUZE  WOUND SACRAL AND BILATERAL CALVES;  Surgeon: Justin Perez MD;  Location:  [facility] OR;  Service: General;  Laterality: Left;    INCISION AND DRAINAGE TRUNK Right 11/26/2022    Procedure: INCISION AND DRAINAGE TRUNK;  Surgeon: Sara Guerrier MD;  Location:  [facility] OR;  Service: General;  Laterality: Right;    JOINT REPLACEMENT      KNEE ARTHROSCOPY W/ MENISCAL REPAIR      KNEE MENISCAL REPAIR      MENISCECTOMY Left 12/20/2016    TUBAL ABDOMINAL LIGATION     ,   Family History   Problem Relation Age of Onset    No Known Problems Mother     Cancer Father     Dementia Father     Hypertension Maternal Grandmother     Cancer Maternal Grandfather    ,   Social History     Tobacco Use    Smoking status: Never    Smokeless tobacco: Never   Vaping Use    Vaping status: Never Used   Substance Use Topics    Alcohol use: Yes    Drug use: No   ,   Medications Prior to Admission   Medication Sig  Dispense Refill Last Dose    celecoxib (CeleBREX) 200 MG capsule Take 1 capsule by mouth Daily.       docusate sodium (COLACE) 100 MG capsule Take 1 capsule by mouth 2 (Two) Times a Day. Indications: Constipation       DULoxetine (CYMBALTA) 60 MG capsule Take 1 capsule by mouth Daily. Indications: Diabetes with Nerve Disease       fluticasone (FLONASE) 50 MCG/ACT nasal spray 1 spray into the nostril(s) as directed by provider Daily. 16 g 0     insulin aspart (NovoLOG FlexPen) 100 UNIT/ML solution pen-injector sc pen Inject 3 Units under the skin into the appropriate area as directed 3 (Three) Times a Day With Meals.   Patient Taking Differently    insulin glargine (LANTUS, SEMGLEE) 100 UNIT/ML injection Inject 40 Units under the skin into the appropriate area as directed Every Night. Indications: Type 2 Diabetes       Morphine Sulfate ER 15 MG tablet extended-release 12 hour Take 1 tablet by mouth 3 (Three) Times a Day. Indications: Pain       omeprazole (priLOSEC) 40 MG capsule Take 1 capsule by mouth 2 (Two) Times a Day. Indications: Gastroesophageal Reflux Disease       pramipexole (MIRAPEX) 0.75 MG tablet Take 1 tablet by mouth 2 (two) times a day. Indications: Restless Leg Syndrome       Tirzepatide (MOUNJARO) 10 MG/0.5ML solution pen-injector pen Inject 0.5 mL under the skin into the appropriate area as directed 1 (One) Time Per Week.   9/11/2024    busPIRone (BUSPAR) 10 MG tablet Take 1 tablet by mouth 3 (Three) Times a Day. Indications: Anxiety Disorder       Canagliflozin (Invokana) 100 MG tablet tablet Take 1 tablet by mouth Daily. Indications: Type 2 Diabetes       cyclobenzaprine (FLEXERIL) 10 MG tablet Take 1 tablet by mouth 3 (Three) Times a Day As Needed for Muscle Spasms. Indications: Muscle Spasm       gabapentin (NEURONTIN) 600 MG tablet Take 1 tablet by mouth 3 (Three) Times a Day. Indications: Neuropathic Pain       ondansetron ODT (ZOFRAN-ODT) 4 MG disintegrating tablet Place 1 tablet on the  tongue Every 6 (Six) Hours As Needed for Nausea or Vomiting for up to 21 doses. Indications: Nausea and Vomiting 21 tablet 0     oxyCODONE-acetaminophen (PERCOCET) 7.5-325 MG per tablet Take 1 tablet by mouth Every 8 (Eight) Hours As Needed for Moderate Pain  or Severe Pain-Do not take with other Percocet prescription 30 tablet 0     potassium chloride (K-DUR,KLOR-CON) 20 MEQ CR tablet Take 1 tablet by mouth Daily. 30 tablet 0     ZOLMitriptan (Zomig) 5 MG tablet Take 1 tablet by mouth 1 (One) Time As Needed for Migraine. 6 tablet 5     and Allergies:  Compazine [prochlorperazine edisylate], Meperidine, Norflex [orphenadrine], Nortriptyline, Prochlorperazine, Prochlorperazine maleate, Vancomycin, Codeine, Penicillins, Amoxicillin-pot clavulanate, Avelox [moxifloxacin hcl], Bacitracin, Calamine, Cefazolin, Cephalexin, Ciprofloxacin, Clindamycin/lincomycin, Doxycycline, Hydroxyzine hcl, Moxifloxacin, Orphenadrine citrate, Sulfamethoxazole-trimethoprim, Tobramycin, Vistaril [hydroxyzine], and Zinc    Objective     Vital Signs   Temp:  [98 °F (36.7 °C)-98.7 °F (37.1 °C)] 98.7 °F (37.1 °C)  Heart Rate:  [72-90] 72  Resp:  [18-22] 20  BP: (159-173)/(63-85) 163/81    Physical Exam:  Physical Exam  Vitals and nursing note reviewed.   HENT:      Head: Normocephalic and atraumatic.   Eyes:      Pupils: Pupils are equal, round, and reactive to light.   Pulmonary:      Effort: Pulmonary effort is normal.   Feet:      Left foot:      Skin integrity: Ulcer and blister present.   Neurological:      General: No focal deficit present.      Mental Status: She is alert.   Psychiatric:         Mood and Affect: Mood normal.         Thought Content: Thought content normal.       Edema/lymphedema throughout the left and right lower extremities.  Blister noted along the central arch of the left foot.  Nonviable blistered skin removed.  No deep ulceration.  Skin breakdown is superficial.  No purulent drainage or fluctuance of the  surrounding soft tissue.  There is chronic ulceration/hyperkeratosis plantar aspect of the heel.  Skin changes throughout the right and left lower extremity consistent with chronic lymphedema and venous stasis.  Noted to the left hallux.  no open area present to the left hallux.  No increased pain or discomfort with palpation of proximal distal phalanx, left.,  No Increasing warmth.  Results Review:  Lab Results (last 24 hours)       Procedure Component Value Units Date/Time    POC Glucose Once [[id number]]  (Normal) Collected: 09/23/24 1114    Specimen: Blood Updated: 09/23/24 1126     Glucose 130 mg/dL      Comment: : [id number] Cox KierstonMeter ID: [id number]       Blood Culture - Blood, Hand, Digit Left [[id number]]  (Normal) Collected: 09/19/24 0945    Specimen: Blood from Hand, Digit Left Updated: 09/23/24 1001     Blood Culture No growth at 4 days    Blood Culture - Blood, Hand, Digit Right [[id number]]  (Normal) Collected: 09/19/24 0945    Specimen: Blood from Hand, Digit Right Updated: 09/23/24 1001     Blood Culture No growth at 4 days    POC Glucose Once [[id number]]  (Abnormal) Collected: 09/23/24 0755    Specimen: Blood Updated: 09/23/24 0806     Glucose 148 mg/dL      Comment: : [id number] Cox KierstonMeter ID: [id number]       POC Glucose Once [[id number]]  (Abnormal) Collected: 09/22/24 2006    Specimen: Blood Updated: 09/22/24 2017     Glucose 148 mg/dL      Comment: : [id number] Grazyna UgarteMeter ID: [id number]       POC Glucose Once [[id number]]  (Abnormal) Collected: 09/22/24 1747    Specimen: Blood Updated: 09/22/24 1808     Glucose 139 mg/dL      Comment: : [id number] Greta Urbina ID: [id number]       CBC & Differential [[id number]]  (Abnormal) Collected: 09/22/24 1304    Specimen: Blood Updated: 09/22/24 1347    Narrative:      The following orders were created for panel order CBC & Differential.  Procedure                               Abnormality         Status                      ---------                               -----------         ------                     CBC Auto Differential[[id number]]        Abnormal            Final result                 Please view results for these tests on the individual orders.    CBC Auto Differential [[id number]]  (Abnormal) Collected: 09/22/24 1304    Specimen: Blood Updated: 09/22/24 1347     WBC 8.40 10*3/mm3      RBC 4.02 10*6/mm3      Hemoglobin 9.5 g/dL      Hematocrit 30.5 %      MCV 75.9 fL      MCH 23.6 pg      MCHC 31.1 g/dL      RDW 16.6 %      RDW-SD 46.0 fl      MPV 9.5 fL      Platelets 204 10*3/mm3     Manual Differential [[id number]]  (Abnormal) Collected: 09/22/24 1304    Specimen: Blood Updated: 09/22/24 1347     Neutrophil % 74.5 %      Lymphocyte % 15.3 %      Monocyte % 6.1 %      Eosinophil % 0.0 %      Basophil % 1.0 %      Bands %  2.0 %      Atypical Lymphocyte % 1.0 %      Neutrophils Absolute 6.43 10*3/mm3      Lymphocytes Absolute 1.37 10*3/mm3      Monocytes Absolute 0.51 10*3/mm3      Eosinophils Absolute 0.00 10*3/mm3      Basophils Absolute 0.08 10*3/mm3      Anisocytosis Slight/1+     Microcytes Mod/2+     Poikilocytes Slight/1+     Polychromasia Slight/1+     Target Cells Slight/1+     WBC Morphology Normal     Platelet Morphology Normal    Comprehensive Metabolic Panel [[id number]]  (Abnormal) Collected: 09/22/24 1304    Specimen: Blood Updated: 09/22/24 1347     Glucose 144 mg/dL      BUN 14 mg/dL      Creatinine 0.82 mg/dL      Sodium 138 mmol/L      Potassium 4.7 mmol/L      Chloride 102 mmol/L      CO2 22.0 mmol/L      Calcium 8.3 mg/dL      Total Protein 6.7 g/dL      Albumin 3.2 g/dL      ALT (SGPT) <5 U/L      AST (SGOT) 12 U/L      Alkaline Phosphatase 76 U/L      Total Bilirubin 0.4 mg/dL      Globulin 3.5 gm/dL      A/G Ratio 0.9 g/dL      BUN/Creatinine Ratio 17.1     Anion Gap 14.0 mmol/L      eGFR 86.2 mL/min/1.73     Narrative:      GFR Normal >60  Chronic Kidney Disease <60  Kidney Failure <15       High Sensitivity Troponin T [[id number]]  (Abnormal) Collected: 09/22/24 1304    Specimen: Blood Updated: 09/22/24 1347     HS Troponin T 36 ng/L     Narrative:      High Sensitive Troponin T Reference Range:  <14.0 ng/L- Negative Female for AMI  <22.0 ng/L- Negative Male for AMI  >=14 - Abnormal Female indicating possible myocardial injury.  >=22 - Abnormal Male indicating possible myocardial injury.   Clinicians would have to utilize clinical acumen, EKG, Troponin, and serial changes to determine if it is an Acute Myocardial Infarction or myocardial injury due to an underlying chronic condition.                   Assessment & Plan       Superficial blister left foot    Chronic ulceration, left heel    Lymphedema bilateral lower extremities    Obesity with BMI 50-59.9    Type 2 diabetes mellitus type was glycemia with long-term current use of insulin        Patient's condition was discussed.  X-rays reviewed.  Lab work was reviewed.  Nonviable tissue was removed from the blister of the arch of the left foot.  There is no increase in warmth or evidence of underlying abscess to this area.  She does have x-ray changes to the left hallux consistent with possible underlying osteomyelitis without increased pain to the left great toe or open area/wound or ulceration of left great toe.  There is no acute infection to the left hallux.      Did discuss continuing outpatient wound care at [facility].  She currently sees SHOLA Dunlap for wound care.  Continued compression for lymphedema.    Case reviewed by myself and Dr. Palomo.  Surgical intervention not indicated for left foot.  Continue outpatient wound care.       SHOLA Gray  09/23/24  12:55 CDT

## 2024-09-23 NOTE — PLAN OF CARE
Goal Outcome Evaluation:  Plan of Care Reviewed With: patient      Pt A&Ox4, titrated to RA, VSS. Podiatry came and assessed pt today. Plan to DC home with spouse today. Educated pt to keep all follow up appts, take all meds as prescribed, and report any s/s of worsening condition immediately to MD or ED. Pt voiced understanding.

## 2024-09-23 NOTE — DISCHARGE SUMMARY
"    [facility] Medicine Services  DISCHARGE SUMMARY       Date of Admission: 9/19/2024  Date of Discharge:  9/23/2024  Primary Care Physician: Adriel Bruno Jr., MD    Discharge Diagnoses:  Active Hospital Problems    Diagnosis     **Osteomyelitis     Cellulitis of left leg     Lymphedema of both lower extremities     Morbid obesity with BMI of 50.0-59.9, adult     Type 2 diabetes mellitus with hyperglycemia, with long-term current use of insulin          Presenting Problem/History of Present Illness:  Osteomyelitis [M86.9]     Chief Complaint on Day of Discharge:   Pain left foot    History of Present Illness on Day of Discharge:   The patient is doing well today.  She has some residual pain in the left lower extremity.  Podiatry has seen and evaluated the patient and she is no evidence of osteomyelitis.  Heel infection appears to be chronic.  The patient has completed a course of IV Zyvox.  Case discussed with infectious diseases.  All services agree the patient is appropriate for discharge home.  She has to follow-up with wound care at [facility] as previously scheduled, her PCP next week and with Dr. Palomo as scheduled.    Hospital Course  Ms. Wilson is a 54-year-old female who presented to [facility] on 9/19 with worsening erythema, edema and pain to left leg.  She has a past medical history significant for diabetes, lymphedema, morbid obesity, chronic venous stasis ulcers, chronic pain syndrome followed by pain management at [facility].  Of note, she has had prior abscess in her left and right thigh in 2022. She has followed with [facility] wound care intermittently since then and started going back regularly since March.  She has been working with wound care/home health to get appropriate compression/wraps to legs.  She normally ambulates with a cane.  She has had difficulty bearing weight on her left foot.  States that the area on \"the bottom of my foot popped " "up in the last week.\"  No fever or chills.  No drainage. No shortness of breath, chest pain or pressure. No nausea, vomiting, abdominal pain, diarrhea. No recent antibiotic use.     In the ED, x-ray left foot reviewed First interphalangeal joint is expanded with endplate erosions, concerning for septic joint and osteomyelitis. This is new compared to 7/8/2022. Soft tissue defect at the heel, suspect soft tissue ulceration. No underlying bony erosions. Note that radiographic findings of osteomyelitis can lag behind clinical presentation. If high clinical suspicion, consider MRI.  Given multiple allergies, ED provider Dr. Dwyer gave her a dose of IV Rocephin.  She was given 1 L fluid bolus.  D-dimer noted to be 1.43 -venous duplex lower extremities attempted but patient could not tolerate.  WBC and lactate normal.  CRP 20.  Patient seen and examined in ED bed 23 -her main complaint is a headache and left foot pain rated 5 out of 10.  She will be admitted for further evaluation and management.  Treatment Plan  The patient will be admitted to Dr. Farris's service at [facility].      Cellulitis of left leg with concern for osteomyelitis to left foot.  Obtain MRI.  Consider podiatry consult once MRI complete. Given multiple allergies case discussed with pharmacist Lucas -she was given ceftriaxone in ED and tolerated, initiate Cefazolin 2 g every 8 hours.  Check MRSA nasal screen.  Follow-up blood cultures.     Venogram bilateral lower extremities.     Type 2 diabetes with A1c 9.3.  Start Accu-Cheks with sliding scale insulin, Levemir.  Diabetes and nutrition consult.     PT.    MRI scan was unable to be completed because the patient was unable to fit into the coil.  Blood sugars in the 200 range and were stable.  The patient developed some nausea with oral Zyvox and was converted back to IV Zyvox.  She has completed a 5-day course today.   Has seen and evaluated the patient noting no evidence of increase in " warmth or underlying abscess in the foot.  X-ray changes of the left hallux were noted.  There was no evidence of acute infection of the left hallux.  Ulcer is agreed that the patient is appropriate for discharge having completed 5 days of IV Zyvox therapy.  He is to follow-up with wound care as directed above.    Consults:   Infectious diseases:  HOSPITAL PROBLEM LIST:       Osteomyelitis    Lymphedema of both lower extremities    Morbid obesity with BMI of 50.0-59.9, adult    Type 2 diabetes mellitus with hyperglycemia, with long-term current use of insulin    Cellulitis of left leg        IMPRESSION:  Cellulitis LLE in patient with bilateral lymphedema  Suspect left foot abscess  MRSA nasal screen positive  Uncontrolled type diabetes mellitus  Septic arthritis/possible osteomyelitis left 1st IP joint        RECOMMENDATION:   Start linezolid 600 mg po q 12  Await podiatry input  MRI? Per nursing, patient declined and coil may not be large enough  Elevate LEs above level of heart  Glucose management per hospitalist           Nancy Burnham MD    Podiatry:  Assessment & Plan  Superficial blister left foot     Chronic ulceration, left heel     Lymphedema bilateral lower extremities     Obesity with BMI 50-59.9     Type 2 diabetes mellitus type was glycemia with long-term current use of insulin           Patient's condition was discussed.  X-rays reviewed.  Lab work was reviewed.  Nonviable tissue was removed from the blister of the arch of the left foot.  There is no increase in warmth or evidence of underlying abscess to this area.  She does have x-ray changes to the left hallux consistent with possible underlying osteomyelitis without increased pain to the left great toe or open area/wound or ulceration of left great toe.  There is no acute infection to the left hallux.       Did discuss continuing outpatient wound care at [facility].  She currently sees SHOLA Dunlap for wound care.  Continued  "compression for lymphedema.     Case reviewed by myself and Dr. Palomo.  Surgical intervention not indicated for left foot.  Continue outpatient wound care.        SHOLA Gray    Result Review    Result Review:  I have personally reviewed the results from the time of this admission to 9/23/2024 15:36 CDT and agree with these findings:  []  Laboratory  []  Microbiology  []  Radiology  []  EKG/Telemetry   []  Cardiology/Vascular   []  Pathology  []  Old records  []  Other:    Condition on Discharge:    Stable and improved    Physical Exam on Discharge:  /81 (BP Location: Left arm, Patient Position: Lying)   Pulse 72   Temp 98.7 °F (37.1 °C) (Oral)   Resp 20   Ht 154.9 cm (61\")   Wt 135 kg (297 lb)   LMP  (LMP Unknown)   SpO2 96%   BMI 56.12 kg/m²   Physical Exam       Constitutional:       Appearance: She is obese.   HENT:      Head: Normocephalic and atraumatic.      Right Ear: External ear normal.      Mouth/Throat:      Mouth: Mucous membranes are moist.   Eyes:      Extraocular Movements: Extraocular movements intact.      Conjunctiva/sclera: Conjunctivae normal.      Pupils: Pupils are equal, round, and reactive to light.   Cardiovascular:      Rate and Rhythm: Normal rate and regular rhythm.      Pulses: Normal pulses.      Heart sounds: Normal heart sounds.   Pulmonary:      Effort: Pulmonary effort is normal.      Breath sounds: Normal breath sounds.   Abdominal:      General: Bowel sounds are normal.      Palpations: Abdomen is soft.   Musculoskeletal:      Cervical back: No rigidity.      Comments: Patient is able to move all extremities.  She has marked lymphedema bilateral lower legs.  The left lower extremity has cellulitis changes wounds.   Skin:     General: Skin is warm and dry.      Capillary Refill: Capillary refill takes less than 2 seconds.      Comments: Continued issues with foot wound/abscess.  No new wounds.   Neurological:      General: No focal deficit present.    "   Mental Status: She is alert and oriented to person, place, and time.   Psychiatric:         Mood and Affect: Mood normal.     Discharge Disposition:  Home or Self Care    Discharge Medications:     Discharge Medications        Continue These Medications        Instructions Start Date   busPIRone 10 MG tablet  Commonly known as: BUSPAR   10 mg, Oral, 3 Times Daily      celecoxib 200 MG capsule  Commonly known as: CeleBREX   1 capsule, Oral, Daily      cyclobenzaprine 10 MG tablet  Commonly known as: FLEXERIL   10 mg, Oral, 3 Times Daily PRN      docusate sodium 100 MG capsule  Commonly known as: COLACE   100 mg, Oral, 2 Times Daily      DULoxetine 60 MG capsule  Commonly known as: CYMBALTA   60 mg, Oral, Daily      fluticasone 50 MCG/ACT nasal spray  Commonly known as: FLONASE   1 spray, Nasal, Daily      gabapentin 600 MG tablet  Commonly known as: NEURONTIN   600 mg, Oral, 3 Times Daily      insulin glargine 100 UNIT/ML injection  Commonly known as: LANTUS, SEMGLEE   40 Units, Subcutaneous, Nightly      Invokana 100 MG tablet tablet  Generic drug: Canagliflozin   100 mg, Oral, Daily      Morphine Sulfate ER 15 MG tablet extended-release 12 hour   15 mg, Oral, 3 Times Daily      NovoLOG FlexPen 100 UNIT/ML solution pen-injector sc pen  Generic drug: insulin aspart   3 Units, Subcutaneous, 3 Times Daily With Meals      omeprazole 40 MG capsule  Commonly known as: priLOSEC   40 mg, Oral, 2 Times Daily      ondansetron ODT 4 MG disintegrating tablet  Commonly known as: ZOFRAN-ODT   4 mg, Translingual, Every 6 Hours PRN      oxyCODONE-acetaminophen 7.5-325 MG per tablet  Commonly known as: PERCOCET   Take 1 tablet by mouth Every 8 (Eight) Hours As Needed for Moderate Pain  or Severe Pain-Do not take with other Percocet prescription      potassium chloride 20 MEQ CR tablet  Commonly known as: KLOR-CON M20   20 mEq, Oral, Daily      pramipexole 0.75 MG tablet  Commonly known as: MIRAPEX   0.75 mg, Oral, 2 times daily       Tirzepatide 10 MG/0.5ML solution pen-injector pen  Commonly known as: MOUNJARO   10 mg, Subcutaneous, Weekly      ZOLMitriptan 5 MG tablet  Commonly known as: Zomig   5 mg, Oral, Once As Needed               Discharge Diet:   Diet Instructions       Diet: Diabetic Diets; Consistent Carbohydrate; Thin (IDDSI 0)      Discharge Diet: Diabetic Diets    Diabetic Diet: Consistent Carbohydrate    Fluid Consistency: Thin (IDDSI 0)            Discharge Care Plan / Instructions:   Discharge home    Activity at Discharge:   Activity Instructions       Activity as Tolerated              Follow-up Appointments:  Follow-up with PCP next week  Follow-up with Dr. Palomo next week  Follow-up with wound care as scheduled    Electronically signed by Javi Boswell DO, 09/23/24, 15:36 CDT.    Time: Discharge over 30 min    Part of this note may be an electronic transcription/translation of spoken language to printed text using the Dragon Dictation system.

## 2024-09-23 NOTE — PROGRESS NOTES
"INFECTIOUS DISEASES PROGRESS NOTE    Patient:  Valeria Wilson  YOB: 1970  MRN: [id number]   Admit date: 9/19/2024   Admitting Physician: Javi Boswell DO  Primary Care Physician: Adriel Bruno Jr., MD    Chief Complaint: Nausea      Interval History: Patient did take linezolid last night in again today.  She said, \"this is the last dose\".  She did improve some with Phenergan.    She was seen by SHOLA Sanon.  The blister on the plantar surface of her foot burst yesterday when she was up ambulating and I had spoken to the nurse.  This was further debrided per SHOLA Sanon    No concerns from podiatry standpoint for abscess of foot or septic arthritis/osteomyelitis great toe.    Allergies:   Allergies   Allergen Reactions    Compazine [Prochlorperazine Edisylate] Shortness Of Breath     Breathing     Meperidine Hives     (Demerol)     Norflex [Orphenadrine] Hives    Nortriptyline Hives    Prochlorperazine Shortness Of Breath    Prochlorperazine Maleate Shortness Of Breath    Vancomycin Other (See Comments) and Unknown - High Severity     CRITICAL LEVEL RESULTS - PATIENT LIKELY HAS METABOLIC / CLEARANCE ISSUE WITH VANCOMYCIN. ON STANDARD REGIMENS BASED ON BAYESIAN/POPULATION PK PARAMETERS, VANCOMYCIN TROUGH LEVELS IN THE 60s, 70s, 90s, 100s WERE OBTAINED. RECOMMEND AGAINST EVER USING VANCOMYCIN IN THIS PATIENT. IF VANCOMYCIN IS ABSOLUTELY INDICATED WITHOUT ANY OTHER OPTIONS, PULSE INTERMITTENT DOSING WOULD LIKELY BE THE ONLY APPROPRIATE METHOD.     Codeine Nausea And Vomiting     Rash     Penicillins Rash     Nausea & vomiting     Amoxicillin-Pot Clavulanate Rash, Hives and Itching    Avelox [Moxifloxacin Hcl] Rash    Bacitracin Nausea And Vomiting    Calamine Itching    Cefazolin Nausea And Vomiting    Cephalexin Rash     (Keflex)     Ciprofloxacin Rash    Clindamycin/Lincomycin Rash    Doxycycline Nausea And Vomiting     Can take but need zofran before    Hydroxyzine Hcl Itching    " "Moxifloxacin Nausea And Vomiting    Orphenadrine Citrate Itching    Sulfamethoxazole-Trimethoprim Nausea And Vomiting and Rash     States she also gets yeast infections with it    Tobramycin Other (See Comments)     Eyes burn-feel like eyes are on fire      Vistaril [Hydroxyzine] Rash    Zinc Other (See Comments)     ERRYTHEMA       Current Scheduled Medications:   docusate sodium, 100 mg, Oral, BID  enoxaparin, 60 mg, Subcutaneous, Q12H  fluticasone, 1 spray, Nasal, Daily  gabapentin, 600 mg, Oral, Daily  insulin glargine, 15 Units, Subcutaneous, Nightly  insulin lispro, 2-9 Units, Subcutaneous, 4x Daily AC & at Bedtime  Linezolid, 600 mg, Intravenous, Q12H  pantoprazole, 40 mg, Oral, Q AM  pramipexole, 0.75 mg, Oral, BID  senna-docusate sodium, 2 tablet, Oral, BID  sodium chloride, 10 mL, Intravenous, Q12H      Current PRN Medications:    acetaminophen **OR** acetaminophen **OR** acetaminophen    senna-docusate sodium **AND** polyethylene glycol **AND** bisacodyl **AND** bisacodyl    cyclobenzaprine    dextrose    dextrose    glucagon (human recombinant)    hyoscyamine    ketorolac    LORazepam    Morphine    nitroglycerin    ondansetron    oxyCODONE-acetaminophen    promethazine    sodium chloride    sodium chloride    sodium chloride, 75 mL/hr, Last Rate: 75 mL/hr (24 2235)           Objective     Vital Signs:  Temp (24hrs), Av.4 °F (36.9 °C), Min:98 °F (36.7 °C), Max:98.7 °F (37.1 °C)      /81 (BP Location: Left arm, Patient Position: Lying)   Pulse 72   Temp 98.7 °F (37.1 °C) (Oral)   Resp 20   Ht 154.9 cm (61\")   Wt 135 kg (297 lb)   LMP  (LMP Unknown)   SpO2 96%   BMI 56.12 kg/m²         Physical Exam:  General: Patient is nontoxic-appearing lying in bed in no acute distress.  Her  is at bedside  HEENT: Sclera anicteric.  Patient has make-up on today.  Left foot dressing in place and is clean dry and intact        Results Review:    I reviewed the patient's new clinical " results.    Lab Results:    CBC:   Lab Results   Lab 09/19/24  09 09/20/24 0453 09/22/24  1304   WBC 9.41 4.96 8.40   HEMOGLOBIN 9.5* 8.0* 9.5*   HEMATOCRIT 32.1* 26.9* 30.5*   PLATELETS 216 151 204        AutoDiff:   Lab Results   Lab 09/19/24  09 09/20/24  [facility] 09/22/24  1304   NEUTROPHIL % 77.1* 65.1  --    LYMPHOCYTE % 14.8* 24.2  --    MONOCYTES % 6.2 7.1  --    EOSINOPHIL % 1.3 3.0  --    BASOPHIL % 0.2 0.2  --    NEUTROS ABS 7.26* 3.23 6.43   LYMPHS ABS 1.39 1.20  --    MONOS ABS 0.58 0.35  --    EOS ABS 0.12 0.15 0.00   BASOS ABS 0.02 0.01 0.08        Manual Diff:    Lab Results   Lab 09/19/24 09 09/20/24 0453 09/22/24  1304   NEUTROPHIL %  --   --  74.5   LYMPHOCYTE %  --   --  15.3*   MONOCYTES %  --   --  6.1   BANDS %  --   --  2.0   NEUTROS ABS 7.26* 3.23 6.43   LYMPHS ABS  --   --  1.37   ANISOCYTOSIS  --   --  Slight/1+   MICROCYTES  --   --  Mod/2+   POIKILOCYTES  --   --  Slight/1+   WBC MORPHOLOGY  --   --  Normal   PLT MORPH  --   --  Normal           CMP:   Lab Results   Lab 09/19/24  [facility] 09/20/24 0453 09/22/24  1304   SODIUM 135* 135* 138   POTASSIUM 4.2 4.7 4.7   CHLORIDE 98 101 102   CO2 27.0 29.0 22.0   BUN 14 16 14   CREATININE 0.98 1.13* 0.82   CALCIUM 8.7 7.9* 8.3*   BILIRUBIN  --  0.2 0.4   ALK PHOS  --  62 76   ALT (SGPT)  --  <5 <5   AST (SGOT)  --  10 12   GLUCOSE 230* 206* 144*       Estimated Creatinine Clearance: 104.8 mL/min (by C-G formula based on SCr of 0.82 mg/dL).    Culture Results:    Microbiology Results (last 10 days)       Procedure Component Value - Date/Time    MRSA Screen, PCR (Inpatient) - Swab, Nares [[id number]]  (Abnormal) Collected: 09/19/24 1406    Lab Status: Final result Specimen: Swab from Nares Updated: 09/19/24 1618     MRSA PCR MRSA Detected    Narrative:      The negative predictive value of this diagnostic test is high and should only be used to consider de-escalating anti-MRSA therapy. A positive result may indicate colonization with MRSA and  must be correlated clinically.    Blood Culture - Blood, Hand, Digit Left [[id number]]  (Normal) Collected: 09/19/24 0945    Lab Status: Preliminary result Specimen: Blood from Hand, Digit Left Updated: 09/23/24 1001     Blood Culture No growth at 4 days    Blood Culture - Blood, Hand, Digit Right [[id number]]  (Normal) Collected: 09/19/24 0945    Lab Status: Preliminary result Specimen: Blood from Hand, Digit Right Updated: 09/23/24 1001     Blood Culture No growth at 4 days                 Radiology:   Imaging Results (Last 72 Hours)       ** No results found for the last 72 hours. **                Active Hospital Problems    Diagnosis     **Osteomyelitis     Cellulitis of left leg     Lymphedema of both lower extremities     Morbid obesity with BMI of 50.0-59.9, adult     Type 2 diabetes mellitus with hyperglycemia, with long-term current use of insulin        IMPRESSION:  Nausea-improved somewhat with Phenergan.  Seems to worsen when she takes linezolid whether oral or IV  Left lower extremity cellulitis-resolved.  Concern for septic interphalangeal joint great toe and associated osteomyelitis noted from radiographs-evaluated by podiatry in no concerns for acute infection  Uncontrolled diabetes mellitus with hemoglobin A1c 9.3  MRSA nasal screen positive      RECOMMENDATION:   Discussed with patient that future options may include doxycycline orally as she says she can tolerate that as long as she gets Zofran prior.  Recommend tight glucose management upon discharge  Float left heel to offload chronic ulceration  Patient to keep close follow-up with SHOLA Travis at [facility] wound care    Discussed with Dr. Boswell  Communicated with SHOLA Sanon MD  09/23/24  14:56 CDT

## 2024-09-23 NOTE — NURSING NOTE
[facility]  INPATIENT WOUND & OSTOMY CARE    Today's Date: 09/23/24    Patient Name: Valeria Wilson  MRN: [id number]  CSN: [id number]  PCP: Adriel Bruno Jr., MD  Attending Provider: Javi Boswell DO  Length of Stay: 4    Rounded with SHOLA Solano for initial wound care consult. See her consult note for details. Patient is currently about to get up to the bedside commode. She has no family at bedside.     Wound care orders placed for posterior left leg wounds. Patient follows up [facility] wound care and plans to follow up with them after discharge.     Inpatient wound care will continue to follow during hospital stay.  Please contact if any issues or concerns arise.     This document has been electronically signed by Kimmy Romero RN on 9/23/2024 14:32 CDT   \

## 2024-09-23 NOTE — PLAN OF CARE
Goal Outcome Evaluation:  Plan of Care Reviewed With: patient            Pt A&Ox4, able to make needs known. Medications given and BG monitored per orders. Many PRNs given for nausea this shift with minimal effect. Up x1 with cane to BSC voiding this shift. On tele NSR, weaned from 3L to 2L this shift. Edema to BLE, large blister to L posterior foot weeping, dressing applied.  US IV to LFA IID in between therapies. Contact precautions maintained, safety maintained, call light within reach.

## 2024-09-23 NOTE — CONSULTS
[facility]  INPATIENT WOUND & OSTOMY CONSULTATION    Today's Date: 09/23/24    Patient Name: Valeria Wilson  MRN: [id number]  CSN: [id number]  PCP: Adriel Bruno Jr., MD  Referring Provider:   Consulting Provider (From admission, onward)      Start Ordered     Status Ordering Provider    09/21/24 0702 09/21/24 0345  Inpatient Wound Care MD Consult  IN AM        Specialty:  Wound Care  Provider:  Yoko Chang APRN    Acknowledged JUAN ANTONIO PORTER    09/21/24 0342 09/21/24 0343  Inpatient Wound Care MD Consult  Once        Specialty:  Wound Care  Provider:  Yoko Chang APRN    Acknowledged JUAN ANTONIO PORTER    09/20/24 0639 09/20/24 0638    Once        Specialty:  Wound Care  Provider:  Yoko Chang APRN    Canceled JUAN ANTONIO PORTER           Attending Provider: Javi Boswell DO  Length of Stay: 4    SUBJECTIVE   Chief Complaint: ***    HPI: Valeria Wilson, a 54 y.o.female, presents with a past medical history of ***.  A full past medical history is listed below.  Inpatient wound care consulted due to ***      Visit Dx:    ICD-10-CM ICD-9-CM   1. Osteomyelitis of left foot, unspecified type  M86.9 730.27   2. Stasis dermatitis of both legs  I87.2 454.1   3. Morbid obesity  E66.01 278.01   4. Hypoxemia  R09.02 799.02   5. Impaired mobility [Z74.09]  Z74.09 799.89       Hospital Problem List:     Osteomyelitis    Lymphedema of both lower extremities    Morbid obesity with BMI of 50.0-59.9, adult    Type 2 diabetes mellitus with hyperglycemia, with long-term current use of insulin    Cellulitis of left leg      History:   Past Medical History:   Diagnosis Date    Anxiety     Arthritis     Osteoarthritis primarily left knee     Back pain     Chronic pain syndrome 10/27/2021    Depression     Diabetes mellitus     Fibromyalgia, primary     History of transfusion     Hx of tear of meniscus of knee joint 11/21/2016    Hypertension     Joint pain      Kidney stone     Knee pain     Lymphedema of both lower extremities 10/27/2021    Migraine     Migraine headache     Pain     knee, left ankle, left ankle      Pes anserinus bursitis 04/28/2015    Restless leg      Past Surgical History:   Procedure Laterality Date    CATARACT EXTRACTION      CATARACT EXTRACTION      CATARACT EXTRACTION      CORNEAL TRANSPLANT      bilateral     ECTOPIC PREGNANCY      INCISION AND DRAINAGE ABSCESS Left 6/1/2022    Procedure: DEBRIDEMENTAND PULSE LAVAGE LT MEDIAL THIGH PACKING ODOFORM GAUZE  WOUND SACRAL AND BILATERAL CALVES;  Surgeon: Justin Perez MD;  Location:  [facility];  Service: General;  Laterality: Left;    INCISION AND DRAINAGE TRUNK Right 11/26/2022    Procedure: INCISION AND DRAINAGE TRUNK;  Surgeon: Sara Guerrier MD;  Location:  [facility] OR;  Service: General;  Laterality: Right;    JOINT REPLACEMENT      KNEE ARTHROSCOPY W/ MENISCAL REPAIR      KNEE MENISCAL REPAIR      MENISCECTOMY Left 12/20/2016    TUBAL ABDOMINAL LIGATION       Social History     Socioeconomic History    Marital status: Single   Tobacco Use    Smoking status: Never    Smokeless tobacco: Never   Vaping Use    Vaping status: Never Used   Substance and Sexual Activity    Alcohol use: Yes    Drug use: No    Sexual activity: Not Currently     Partners: Male     Family History   Problem Relation Age of Onset    No Known Problems Mother     Cancer Father     Dementia Father     Hypertension Maternal Grandmother     Cancer Maternal Grandfather        Allergies:  Allergies   Allergen Reactions    Compazine [Prochlorperazine Edisylate] Shortness Of Breath     Breathing     Meperidine Hives     (Demerol)     Norflex [Orphenadrine] Hives    Nortriptyline Hives    Prochlorperazine Shortness Of Breath    Prochlorperazine Maleate Shortness Of Breath    Vancomycin Other (See Comments) and Unknown - High Severity     CRITICAL LEVEL RESULTS - PATIENT LIKELY HAS METABOLIC / CLEARANCE ISSUE WITH VANCOMYCIN. ON  STANDARD REGIMENS BASED ON BAYESIAN/POPULATION PK PARAMETERS, VANCOMYCIN TROUGH LEVELS IN THE 60s, 70s, 90s, 100s WERE OBTAINED. RECOMMEND AGAINST EVER USING VANCOMYCIN IN THIS PATIENT. IF VANCOMYCIN IS ABSOLUTELY INDICATED WITHOUT ANY OTHER OPTIONS, PULSE INTERMITTENT DOSING WOULD LIKELY BE THE ONLY APPROPRIATE METHOD.     Codeine Nausea And Vomiting     Rash     Penicillins Rash     Nausea & vomiting     Amoxicillin-Pot Clavulanate Rash, Hives and Itching    Avelox [Moxifloxacin Hcl] Rash    Bacitracin Nausea And Vomiting    Calamine Itching    Cefazolin Nausea And Vomiting    Cephalexin Rash     (Keflex)     Ciprofloxacin Rash    Clindamycin/Lincomycin Rash    Doxycycline Nausea And Vomiting     Can take but need zofran before    Hydroxyzine Hcl Itching    Moxifloxacin Nausea And Vomiting    Orphenadrine Citrate Itching    Sulfamethoxazole-Trimethoprim Nausea And Vomiting and Rash     States she also gets yeast infections with it    Tobramycin Other (See Comments)     Eyes burn-feel like eyes are on fire      Vistaril [Hydroxyzine] Rash    Zinc Other (See Comments)     ERRYTHEMA       Medications:    Current Facility-Administered Medications:     acetaminophen (TYLENOL) tablet 650 mg, 650 mg, Oral, Q4H PRN **OR** acetaminophen (TYLENOL) 160 MG/5ML oral solution 650 mg, 650 mg, Oral, Q4H PRN **OR** acetaminophen (TYLENOL) suppository 650 mg, 650 mg, Rectal, Q4H PRN, Tricia, Gina, APRN    sennosides-docusate (PERICOLACE) 8.6-50 MG per tablet 2 tablet, 2 tablet, Oral, BID, 2 tablet at 09/22/24 0926 **AND** polyethylene glycol (MIRALAX) packet 17 g, 17 g, Oral, Daily PRN **AND** bisacodyl (DULCOLAX) EC tablet 5 mg, 5 mg, Oral, Daily PRN **AND** bisacodyl (DULCOLAX) suppository 10 mg, 10 mg, Rectal, Daily PRN, Tricia, Gina, APRN    cyclobenzaprine (FLEXERIL) tablet 10 mg, 10 mg, Oral, TID PRN, Amada Farris DO, 10 mg at 09/22/24 0200    dextrose (D50W) (25 g/50 mL) IV injection 25 g, 25 g, Intravenous, Q15 Min  PRN, Gina Clarke APRN    dextrose (GLUTOSE) oral gel 15 g, 15 g, Oral, Q15 Min PRN, Gina Clarke APRN    docusate sodium (COLACE) capsule 100 mg, 100 mg, Oral, BID, Amada Farris DO, 100 mg at 09/22/24 0926    Enoxaparin Sodium (LOVENOX) syringe 60 mg, 60 mg, Subcutaneous, Q12H, Gina Clarke APRN    fluticasone (FLONASE) 50 MCG/ACT nasal spray 1 spray, 1 spray, Nasal, Daily, Amada Farris DO, 1 spray at 09/23/24 0858    gabapentin (NEURONTIN) capsule 600 mg, 600 mg, Oral, Daily, Akash Machado DO, 600 mg at 09/23/24 0418    glucagon (GLUCAGEN) injection 1 mg, 1 mg, Intramuscular, Q15 Min PRN, Gina Clarke APRN    hyoscyamine (LEVSIN) SL tablet 125 mcg, 125 mcg, Sublingual, Q6H PRN, Amada Farris DO, 125 mcg at 09/22/24 2340    insulin glargine (LANTUS, SEMGLEE) injection 15 Units, 15 Units, Subcutaneous, Nightly, Amada Farris DO    Insulin Lispro (humaLOG) injection 2-9 Units, 2-9 Units, Subcutaneous, 4x Daily AC & at Bedtime, Gina Clarke APRN, 2 Units at 09/22/24 1218    ketorolac (TORADOL) injection 30 mg, 30 mg, Intravenous, Q6H PRN, Gina Clarke APRN, 30 mg at 09/22/24 2005    Linezolid (ZYVOX) 600 mg 300 mL, 600 mg, Intravenous, Q12H, Nancy Burnham MD, Last Rate: 300 mL/hr at 09/23/24 1248, Restarted at 09/23/24 1248    LORazepam (ATIVAN) tablet 0.5 mg, 0.5 mg, Oral, Q6H PRN, Oswald Anderson MD, 0.5 mg at 09/23/24 0159    Morphine sulfate (PF) injection 2 mg, 2 mg, Intravenous, Q4H PRN, Amada Farris DO, 2 mg at 09/23/24 1235    nitroglycerin (NITROSTAT) SL tablet 0.4 mg, 0.4 mg, Sublingual, Q5 Min PRN, Oswald Anderson MD    ondansetron (ZOFRAN) injection 4 mg, 4 mg, Intravenous, Q6H PRN, Amada Farris DO, 4 mg at 09/23/24 1235    oxyCODONE-acetaminophen (PERCOCET) 7.5-325 MG per tablet 1 tablet, 1 tablet, Oral, Q8H PRN, Gina Clarke, APRN, 1 tablet at 09/21/24 2332    pantoprazole (PROTONIX) EC tablet 40 mg, 40 mg, Oral, Q AM, Clarke,  Gina, APRN, 40 mg at 09/23/24 0418    pramipexole (MIRAPEX) tablet 0.75 mg, 0.75 mg, Oral, BID, Akash Machado, DO, 0.75 mg at 09/23/24 0418    promethazine (PHENERGAN) 12.5 mg in sodium chloride 0.9 % 50 mL, 12.5 mg, Intravenous, Q6H PRN, Amada Farris DO, 12.5 mg at 09/23/24 0306    sodium chloride 0.9 % flush 10 mL, 10 mL, Intravenous, Q12H, Clarke, Gina, APRN, 10 mL at 09/23/24 0904    sodium chloride 0.9 % flush 10 mL, 10 mL, Intravenous, PRN, Clarke, Gina, APRN    sodium chloride 0.9 % infusion 40 mL, 40 mL, Intravenous, PRN, Clarke, Gina, APRN    sodium chloride 0.9 % infusion, 75 mL/hr, Intravenous, Continuous, Oswald Anderson MD, Last Rate: 75 mL/hr at 09/19/24 2235, 75 mL/hr at 09/19/24 2235    OBJECTIVE     Vitals:    09/23/24 1100   BP: 163/81   Pulse: 72   Resp: 20   Temp: 98.7 °F (37.1 °C)   SpO2: 96%       PHYSICAL EXAM: ***  Physical Exam       Results Review:  Lab Results (last 48 hours)       Procedure Component Value Units Date/Time    POC Glucose Once [[id number]]  (Normal) Collected: 09/23/24 1114    Specimen: Blood Updated: 09/23/24 1126     Glucose 130 mg/dL      Comment: : [id number] Burgess HuatonMeter ID: [id number]       Blood Culture - Blood, Hand, Digit Left [[id number]]  (Normal) Collected: 09/19/24 0945    Specimen: Blood from Hand, Digit Left Updated: 09/23/24 1001     Blood Culture No growth at 4 days    Blood Culture - Blood, Hand, Digit Right [[id number]]  (Normal) Collected: 09/19/24 0945    Specimen: Blood from Hand, Digit Right Updated: 09/23/24 1001     Blood Culture No growth at 4 days    POC Glucose Once [[id number]]  (Abnormal) Collected: 09/23/24 0755    Specimen: Blood Updated: 09/23/24 0806     Glucose 148 mg/dL      Comment: : [id number] Burgess TompkinsMeter ID: [id number]       POC Glucose Once [[id number]]  (Abnormal) Collected: 09/22/24 2006    Specimen: Blood Updated: 09/22/24 2017     Glucose 148 mg/dL      Comment: : [id number] Grazyna  CyrilmatthewMeter ID: [id number]       POC Glucose Once [[id number]]  (Abnormal) Collected: 09/22/24 1747    Specimen: Blood Updated: 09/22/24 1808     Glucose 139 mg/dL      Comment: : [id number] Greta Urbina ID: [id number]       CBC & Differential [[id number]]  (Abnormal) Collected: 09/22/24 1304    Specimen: Blood Updated: 09/22/24 1347    Narrative:      The following orders were created for panel order CBC & Differential.  Procedure                               Abnormality         Status                     ---------                               -----------         ------                     CBC Auto Differential[[id number]]        Abnormal            Final result                 Please view results for these tests on the individual orders.    CBC Auto Differential [[id number]]  (Abnormal) Collected: 09/22/24 1304    Specimen: Blood Updated: 09/22/24 1347     WBC 8.40 10*3/mm3      RBC 4.02 10*6/mm3      Hemoglobin 9.5 g/dL      Hematocrit 30.5 %      MCV 75.9 fL      MCH 23.6 pg      MCHC 31.1 g/dL      RDW 16.6 %      RDW-SD 46.0 fl      MPV 9.5 fL      Platelets 204 10*3/mm3     Manual Differential [[id number]]  (Abnormal) Collected: 09/22/24 1304    Specimen: Blood Updated: 09/22/24 1347     Neutrophil % 74.5 %      Lymphocyte % 15.3 %      Monocyte % 6.1 %      Eosinophil % 0.0 %      Basophil % 1.0 %      Bands %  2.0 %      Atypical Lymphocyte % 1.0 %      Neutrophils Absolute 6.43 10*3/mm3      Lymphocytes Absolute 1.37 10*3/mm3      Monocytes Absolute 0.51 10*3/mm3      Eosinophils Absolute 0.00 10*3/mm3      Basophils Absolute 0.08 10*3/mm3      Anisocytosis Slight/1+     Microcytes Mod/2+     Poikilocytes Slight/1+     Polychromasia Slight/1+     Target Cells Slight/1+     WBC Morphology Normal     Platelet Morphology Normal    Comprehensive Metabolic Panel [[id number]]  (Abnormal) Collected: 09/22/24 1304    Specimen: Blood Updated: 09/22/24 1347     Glucose 144 mg/dL      BUN 14 mg/dL      Creatinine  0.82 mg/dL      Sodium 138 mmol/L      Potassium 4.7 mmol/L      Chloride 102 mmol/L      CO2 22.0 mmol/L      Calcium 8.3 mg/dL      Total Protein 6.7 g/dL      Albumin 3.2 g/dL      ALT (SGPT) <5 U/L      AST (SGOT) 12 U/L      Alkaline Phosphatase 76 U/L      Total Bilirubin 0.4 mg/dL      Globulin 3.5 gm/dL      A/G Ratio 0.9 g/dL      BUN/Creatinine Ratio 17.1     Anion Gap 14.0 mmol/L      eGFR 86.2 mL/min/1.73     Narrative:      GFR Normal >60  Chronic Kidney Disease <60  Kidney Failure <15      High Sensitivity Troponin T [[id number]]  (Abnormal) Collected: 09/22/24 1304    Specimen: Blood Updated: 09/22/24 1347     HS Troponin T 36 ng/L     Narrative:      High Sensitive Troponin T Reference Range:  <14.0 ng/L- Negative Female for AMI  <22.0 ng/L- Negative Male for AMI  >=14 - Abnormal Female indicating possible myocardial injury.  >=22 - Abnormal Male indicating possible myocardial injury.   Clinicians would have to utilize clinical acumen, EKG, Troponin, and serial changes to determine if it is an Acute Myocardial Infarction or myocardial injury due to an underlying chronic condition.         POC Glucose Once [[id number]]  (Abnormal) Collected: 09/22/24 1055    Specimen: Blood Updated: 09/22/24 1106     Glucose 170 mg/dL      Comment: : [id number] SkyeTop Ropster ID: [id number]       POC Glucose Once [[id number]]  (Abnormal) Collected: 09/22/24 0904    Specimen: Blood Updated: 09/22/24 0915     Glucose 168 mg/dL      Comment: : [id number] ShaveLogicter ID: [id number]       High Sensitivity Troponin T 2Hr [[id number]]  (Abnormal) Collected: 09/21/24 2142    Specimen: Blood Updated: 09/21/24 2206     HS Troponin T 28 ng/L      Troponin T Delta 0 ng/L     Narrative:      High Sensitive Troponin T Reference Range:  <14.0 ng/L- Negative Female for AMI  <22.0 ng/L- Negative Male for AMI  >=14 - Abnormal Female indicating possible myocardial injury.  >=22 - Abnormal Male indicating possible  myocardial injury.   Clinicians would have to utilize clinical acumen, EKG, Troponin, and serial changes to determine if it is an Acute Myocardial Infarction or myocardial injury due to an underlying chronic condition.         High Sensitivity Troponin T [[id number]]  (Abnormal) Collected: 09/21/24 1942    Specimen: Blood Updated: 09/21/24 2030     HS Troponin T 28 ng/L     Narrative:      High Sensitive Troponin T Reference Range:  <14.0 ng/L- Negative Female for AMI  <22.0 ng/L- Negative Male for AMI  >=14 - Abnormal Female indicating possible myocardial injury.  >=22 - Abnormal Male indicating possible myocardial injury.   Clinicians would have to utilize clinical acumen, EKG, Troponin, and serial changes to determine if it is an Acute Myocardial Infarction or myocardial injury due to an underlying chronic condition.         POC Glucose Once [[id number]]  (Abnormal) Collected: 09/21/24 2014    Specimen: Blood Updated: 09/21/24 2024     Glucose 144 mg/dL      Comment: : [id number] [place] TeriMeter ID: [id number]       POC Glucose Once [[id number]]  (Abnormal) Collected: 09/21/24 1650    Specimen: Blood Updated: 09/21/24 1701     Glucose 179 mg/dL      Comment: : [id number] Jeet LaurenMeter ID: [id number]             Imaging Results (Last 72 Hours)       ** No results found for the last 72 hours. **               ASSESSMENT/PLAN       Examination and evaluation of wound(s) was performed.    DIAGNOSIS:   Non-pressure chronic ulcer of left calf with fat layer exposed x2    Osteomyelitis    Lymphedema of both lower extremities    Morbid obesity with BMI of 50.0-59.9, adult    Type 2 diabetes mellitus with hyperglycemia, with long-term current use of insulin    Cellulitis of left leg       PLAN:   Orders placed for wound care as listed below to remain consistent with current wound care plan per patient request.   Left foot was being addressed by podiatry.   Patient will follow up with SHOLA Dunlap for  wound care at [facility] after discharge.         Start     Ordered    09/23/24 1428  Wound Care  Daily      Question Answer Comment   Wound Locations Left posterior leg    Wound Care Instructions Clean with NS. Apply Opticell AG to wound, cover with abd pad, cover with roll gauze    Intervention Other    Other Opticell AG    Dressing: Abdominal Pad    Securement Roll Gauze        09/23/24 1427                  Discussed findings and treatment plan including risks, benefits, and treatment options with patient in detail. Patient agreed with treatment plan.      This document has been electronically signed by SHOLA Velazco on 9/23/2024 14:31 CDT     wound care at [facility] after discharge.         Start     Ordered    09/23/24 1428  Wound Care  Daily      Question Answer Comment   Wound Locations Left posterior leg    Wound Care Instructions Clean with NS. Apply Opticell AG to wound, cover with abd pad, cover with roll gauze    Intervention Other    Other Opticell AG    Dressing: Abdominal Pad    Securement Roll Gauze        09/23/24 1427                  Discussed findings and treatment plan including risks, benefits, and treatment options with patient in detail. Patient agreed with treatment plan.      This document has been electronically signed by SHOLA Velazco on 9/23/2024 14:31 CDT

## 2024-09-24 DIAGNOSIS — G89.4 CHRONIC PAIN SYNDROME: ICD-10-CM

## 2024-09-24 LAB
BACTERIA SPEC AEROBE CULT: NORMAL
BACTERIA SPEC AEROBE CULT: NORMAL

## 2024-09-24 RX ORDER — GABAPENTIN 600 MG/1
600 TABLET ORAL 3 TIMES DAILY
Qty: 90 TABLET | Refills: 0 | Status: SHIPPED | OUTPATIENT
Start: 2024-09-30 | End: 2024-10-30

## 2024-09-24 RX ORDER — GABAPENTIN 300 MG/1
300 CAPSULE ORAL DAILY
Qty: 30 CAPSULE | Refills: 0 | Status: SHIPPED | OUTPATIENT
Start: 2024-09-30 | End: 2024-10-30

## 2024-09-24 RX ORDER — OXYCODONE AND ACETAMINOPHEN 7.5; 325 MG/1; MG/1
1 TABLET ORAL
Qty: 90 TABLET | Refills: 0 | Status: SHIPPED | OUTPATIENT
Start: 2024-09-27 | End: 2024-10-27

## 2024-09-24 RX ORDER — MORPHINE SULFATE 15 MG/1
TABLET, FILM COATED, EXTENDED RELEASE ORAL
Qty: 90 TABLET | Refills: 0 | Status: SHIPPED | OUTPATIENT
Start: 2024-10-11 | End: 2024-11-10

## 2024-09-24 NOTE — PAYOR COMM NOTE
"REF:    [id number]     [facility]  FAX  [phone]    Zuly Carpenter (54 y.o. Female)       Date of Birth   1970    Social Security Number       Address   [address] [place] [address] [place] [address]    Home Phone   [phone]    MRN   [id number]       [facility]    Marital Status   Single                            Admission Date   9/19/24    Admission Type   Emergency    Admitting Provider   Javi Boswell DO    Attending Provider       Department, Room/Bed   [facility] 3A, 333/1       Discharge Date   9/23/2024    Discharge Disposition   Home or Self Care    Discharge Destination                                 Attending Provider: (none)   Allergies: Compazine [Prochlorperazine Edisylate], Meperidine, Norflex [Orphenadrine], Nortriptyline, Prochlorperazine, Prochlorperazine Maleate, Vancomycin, Codeine, Penicillins, Amoxicillin-pot Clavulanate, Avelox [Moxifloxacin Hcl], Bacitracin, Calamine, Cefazolin, Cephalexin, Ciprofloxacin, Clindamycin/lincomycin, Doxycycline, Hydroxyzine Hcl, Moxifloxacin, Orphenadrine Citrate, Sulfamethoxazole-trimethoprim, Tobramycin, Vistaril [Hydroxyzine], Zinc    Isolation: None   Infection: ESBL E coli (06/04/22), MRSA (09/19/24)   Code Status: Prior    Ht: 154.9 cm (61\")   Wt: 135 kg (297 lb)    Admission Cmt: None   Principal Problem: Osteomyelitis [M86.9]                   Active Insurance as of 9/19/2024       Primary Coverage       Payor Plan Insurance Group Employer/Plan Group    ANTHEM MEDICARE REPLACEMENT ANTHEM MEDICARE ADVANTAGE [id number]       Payor Plan Address Payor Plan Phone Number Payor Plan Fax Number Effective Dates    [address] [phone]  7/1/2024 - None Entered    [facility] [id number]         Subscriber Name Subscriber Birth Date Member ID       ZULY CARPENTER 1970 [id number]               Secondary Coverage       Payor Plan Insurance Group Employer/Plan Group    [place] [place]        Payor Plan Address Payor Plan Phone Number Payor Plan Fax Number Effective Dates    [address] [phone]  9/19/2024 - None Entered    [facility] [id number]         Subscriber Name Subscriber Birth Date Member ID       ZULY CARPENTER 1970 [id number]                     Emergency Contacts        (Rel.) Home Phone Work Phone Mobile Phone    Betty Abebe (Mother) [phone] -- --    WINNIE HORAN (Significant Other) -- -- [phone]                 Discharge Summary        Javi Boswell DO at 09/23/24 1534              [facility]  DISCHARGE SUMMARY       Date of Admission: 9/19/2024  Date of Discharge:  9/23/2024  Primary Care Physician: Adriel Bruno Jr., MD    Discharge Diagnoses:  Active Hospital Problems    Diagnosis     **Osteomyelitis     Cellulitis of left leg     Lymphedema of both lower extremities     Morbid obesity with BMI of 50.0-59.9, adult     Type 2 diabetes mellitus with hyperglycemia, with long-term current use of insulin          Presenting Problem/History of Present Illness:  Osteomyelitis [M86.9]     Chief Complaint on Day of Discharge:   Pain left foot    History of Present Illness on Day of Discharge:   The patient is doing well today.  She has some residual pain in the left lower extremity.  Podiatry has seen and evaluated the patient and she is no evidence of osteomyelitis.  Heel infection appears to be chronic.  The patient has completed a course of IV Zyvox.  Case discussed with infectious diseases.  All services agree the patient is appropriate for discharge home.  She has to follow-up with wound care at [facility] as previously scheduled, her PCP next week and with Dr. Palomo as scheduled.    Hospital Course  Ms. Carpenter is a 54-year-old female who presented to [facility] on 9/19 with worsening erythema, edema and pain to left leg.  She has a past medical history  "significant for diabetes, lymphedema, morbid obesity, chronic venous stasis ulcers, chronic pain syndrome followed by pain management at [facility].  Of note, she has had prior abscess in her left and right thigh in 2022. She has followed with [facility] wound care intermittently since then and started going back regularly since March.  She has been working with wound care/home health to get appropriate compression/wraps to legs.  She normally ambulates with a cane.  She has had difficulty bearing weight on her left foot.  States that the area on \"the bottom of my foot popped up in the last week.\"  No fever or chills.  No drainage. No shortness of breath, chest pain or pressure. No nausea, vomiting, abdominal pain, diarrhea. No recent antibiotic use.     In the ED, x-ray left foot reviewed First interphalangeal joint is expanded with endplate erosions, concerning for septic joint and osteomyelitis. This is new compared to 7/8/2022. Soft tissue defect at the heel, suspect soft tissue ulceration. No underlying bony erosions. Note that radiographic findings of osteomyelitis can lag behind clinical presentation. If high clinical suspicion, consider MRI.  Given multiple allergies, ED provider Dr. Dwyer gave her a dose of IV Rocephin.  She was given 1 L fluid bolus.  D-dimer noted to be 1.43 -venous duplex lower extremities attempted but patient could not tolerate.  WBC and lactate normal.  CRP 20.  Patient seen and examined in ED bed 23 -her main complaint is a headache and left foot pain rated 5 out of 10.  She will be admitted for further evaluation and management.  Treatment Plan  The patient will be admitted to Dr. Farris's service at [facility].      Cellulitis of left leg with concern for osteomyelitis to left foot.  Obtain MRI.  Consider podiatry consult once MRI complete. Given multiple allergies case discussed with pharmacist Lucas -she was given ceftriaxone in ED and tolerated, initiate Cefazolin 2 " g every 8 hours.  Check MRSA nasal screen.  Follow-up blood cultures.     Venogram bilateral lower extremities.     Type 2 diabetes with A1c 9.3.  Start Accu-Cheks with sliding scale insulin, Levemir.  Diabetes and nutrition consult.     PT.    MRI scan was unable to be completed because the patient was unable to fit into the coil.  Blood sugars in the 200 range and were stable.  The patient developed some nausea with oral Zyvox and was converted back to IV Zyvox.  She has completed a 5-day course today.   Has seen and evaluated the patient noting no evidence of increase in warmth or underlying abscess in the foot.  X-ray changes of the left hallux were noted.  There was no evidence of acute infection of the left hallux.  Ulcer is agreed that the patient is appropriate for discharge having completed 5 days of IV Zyvox therapy.  He is to follow-up with wound care as directed above.    Consults:   Infectious diseases:  HOSPITAL PROBLEM LIST:       Osteomyelitis    Lymphedema of both lower extremities    Morbid obesity with BMI of 50.0-59.9, adult    Type 2 diabetes mellitus with hyperglycemia, with long-term current use of insulin    Cellulitis of left leg        IMPRESSION:  Cellulitis LLE in patient with bilateral lymphedema  Suspect left foot abscess  MRSA nasal screen positive  Uncontrolled type diabetes mellitus  Septic arthritis/possible osteomyelitis left 1st IP joint        RECOMMENDATION:   Start linezolid 600 mg po q 12  Await podiatry input  MRI? Per nursing, patient declined and coil may not be large enough  Elevate LEs above level of heart  Glucose management per hospitalist           Nancy Burnham MD    Podiatry:  Assessment & Plan  Superficial blister left foot     Chronic ulceration, left heel     Lymphedema bilateral lower extremities     Obesity with BMI 50-59.9     Type 2 diabetes mellitus type was glycemia with long-term current use of insulin           Patient's condition was discussed.  " X-rays reviewed.  Lab work was reviewed.  Nonviable tissue was removed from the blister of the arch of the left foot.  There is no increase in warmth or evidence of underlying abscess to this area.  She does have x-ray changes to the left hallux consistent with possible underlying osteomyelitis without increased pain to the left great toe or open area/wound or ulceration of left great toe.  There is no acute infection to the left hallux.       Did discuss continuing outpatient wound care at [facility].  She currently sees SHOLA Dunlap for wound care.  Continued compression for lymphedema.     Case reviewed by myself and Dr. Palomo.  Surgical intervention not indicated for left foot.  Continue outpatient wound care.        SHOLA Gray    Result Review    Result Review:  I have personally reviewed the results from the time of this admission to 9/23/2024 15:36 CDT and agree with these findings:  []  Laboratory  []  Microbiology  []  Radiology  []  EKG/Telemetry   []  Cardiology/Vascular   []  Pathology  []  Old records  []  Other:    Condition on Discharge:    Stable and improved    Physical Exam on Discharge:  /81 (BP Location: Left arm, Patient Position: Lying)   Pulse 72   Temp 98.7 °F (37.1 °C) (Oral)   Resp 20   Ht 154.9 cm (61\")   Wt 135 kg (297 lb)   LMP  (LMP Unknown)   SpO2 96%   BMI 56.12 kg/m²   Physical Exam       Constitutional:       Appearance: She is obese.   HENT:      Head: Normocephalic and atraumatic.      Right Ear: External ear normal.      Mouth/Throat:      Mouth: Mucous membranes are moist.   Eyes:      Extraocular Movements: Extraocular movements intact.      Conjunctiva/sclera: Conjunctivae normal.      Pupils: Pupils are equal, round, and reactive to light.   Cardiovascular:      Rate and Rhythm: Normal rate and regular rhythm.      Pulses: Normal pulses.      Heart sounds: Normal heart sounds.   Pulmonary:      Effort: Pulmonary effort is normal.      " Breath sounds: Normal breath sounds.   Abdominal:      General: Bowel sounds are normal.      Palpations: Abdomen is soft.   Musculoskeletal:      Cervical back: No rigidity.      Comments: Patient is able to move all extremities.  She has marked lymphedema bilateral lower legs.  The left lower extremity has cellulitis changes wounds.   Skin:     General: Skin is warm and dry.      Capillary Refill: Capillary refill takes less than 2 seconds.      Comments: Continued issues with foot wound/abscess.  No new wounds.   Neurological:      General: No focal deficit present.      Mental Status: She is alert and oriented to person, place, and time.   Psychiatric:         Mood and Affect: Mood normal.     Discharge Disposition:  Home or Self Care    Discharge Medications:     Discharge Medications        Continue These Medications        Instructions Start Date   busPIRone 10 MG tablet  Commonly known as: BUSPAR   10 mg, Oral, 3 Times Daily      celecoxib 200 MG capsule  Commonly known as: CeleBREX   1 capsule, Oral, Daily      cyclobenzaprine 10 MG tablet  Commonly known as: FLEXERIL   10 mg, Oral, 3 Times Daily PRN      docusate sodium 100 MG capsule  Commonly known as: COLACE   100 mg, Oral, 2 Times Daily      DULoxetine 60 MG capsule  Commonly known as: CYMBALTA   60 mg, Oral, Daily      fluticasone 50 MCG/ACT nasal spray  Commonly known as: FLONASE   1 spray, Nasal, Daily      gabapentin 600 MG tablet  Commonly known as: NEURONTIN   600 mg, Oral, 3 Times Daily      insulin glargine 100 UNIT/ML injection  Commonly known as: LANTUS SEMGLEE   40 Units, Subcutaneous, Nightly      Invokana 100 MG tablet tablet  Generic drug: Canagliflozin   100 mg, Oral, Daily      Morphine Sulfate ER 15 MG tablet extended-release 12 hour   15 mg, Oral, 3 Times Daily      NovoLOG FlexPen 100 UNIT/ML solution pen-injector sc pen  Generic drug: insulin aspart   3 Units, Subcutaneous, 3 Times Daily With Meals      omeprazole 40 MG  capsule  Commonly known as: priLOSEC   40 mg, Oral, 2 Times Daily      ondansetron ODT 4 MG disintegrating tablet  Commonly known as: ZOFRAN-ODT   4 mg, Translingual, Every 6 Hours PRN      oxyCODONE-acetaminophen 7.5-325 MG per tablet  Commonly known as: PERCOCET   Take 1 tablet by mouth Every 8 (Eight) Hours As Needed for Moderate Pain  or Severe Pain-Do not take with other Percocet prescription      potassium chloride 20 MEQ CR tablet  Commonly known as: KLOR-CON M20   20 mEq, Oral, Daily      pramipexole 0.75 MG tablet  Commonly known as: MIRAPEX   0.75 mg, Oral, 2 times daily      Tirzepatide 10 MG/0.5ML solution pen-injector pen  Commonly known as: MOUNJARO   10 mg, Subcutaneous, Weekly      ZOLMitriptan 5 MG tablet  Commonly known as: Zomig   5 mg, Oral, Once As Needed               Discharge Diet:   Diet Instructions       Diet: Diabetic Diets; Consistent Carbohydrate; Thin (IDDSI 0)      Discharge Diet: Diabetic Diets    Diabetic Diet: Consistent Carbohydrate    Fluid Consistency: Thin (IDDSI 0)            Discharge Care Plan / Instructions:   Discharge home    Activity at Discharge:   Activity Instructions       Activity as Tolerated              Follow-up Appointments:  Follow-up with PCP next week  Follow-up with Dr. Palomo next week  Follow-up with wound care as scheduled    Electronically signed by Javi Boswell DO, 09/23/24, 15:36 CDT.    Time: Discharge over 30 min    Part of this note may be an electronic transcription/translation of spoken language to printed text using the Dragon Dictation system.        Electronically signed by Javi Boswell DO at 09/23/24 1543       Discharge Order (From admission, onward)       Start     Ordered    09/23/24 1532  Discharge patient  Once        Expected Discharge Date: 09/23/24   Discharge Disposition: Home or Self Care   Physician of Record for Attribution - Please select from Treatment Team: JUAN ANTONIO PORTER [[id number]]   Review needed by CMO to  determine Physician of Record: No      Question Answer Comment   Physician of Record for Attribution - Please select from Treatment Team JUAN ANTONIO PORTER    Review needed by CMO to determine Physician of Record No        09/23/24 6344

## 2024-09-24 NOTE — PAYOR COMM NOTE
"REF:   [id number]     [facility]  FAX  [phone]     Zuly Carpenter (54 y.o. Female)       Date of Birth   1970    Social Security Number       Address   [address] [place] [address] [place] [address]    Home Phone   [phone]    MRN   [id number]       Nondenominational   [facility]    Marital Status   Single                            Admission Date   9/19/24    Admission Type   Emergency    Admitting Provider   Javi Boswell DO    Attending Provider       Department, Room/Bed   [facility] 3A, 333/1       Discharge Date   9/23/2024    Discharge Disposition   Home or Self Care    Discharge Destination                                 Attending Provider: (none)   Allergies: Compazine [Prochlorperazine Edisylate], Meperidine, Norflex [Orphenadrine], Nortriptyline, Prochlorperazine, Prochlorperazine Maleate, Vancomycin, Codeine, Penicillins, Amoxicillin-pot Clavulanate, Avelox [Moxifloxacin Hcl], Bacitracin, Calamine, Cefazolin, Cephalexin, Ciprofloxacin, Clindamycin/lincomycin, Doxycycline, Hydroxyzine Hcl, Moxifloxacin, Orphenadrine Citrate, Sulfamethoxazole-trimethoprim, Tobramycin, Vistaril [Hydroxyzine], Zinc    Isolation: None   Infection: ESBL E coli (06/04/22), MRSA (09/19/24)   Code Status: Prior    Ht: 154.9 cm (61\")   Wt: 135 kg (297 lb)    Admission Cmt: None   Principal Problem: Osteomyelitis [M86.9]                   Active Insurance as of 9/19/2024       Primary Coverage       Payor Plan Insurance Group Employer/Plan Group    ANTHEM MEDICARE REPLACEMENT ANTHEM MEDICARE ADVANTAGE [id number]       Payor Plan Address Payor Plan Phone Number Payor Plan Fax Number Effective Dates    [address] [phone]  7/1/2024 - None Entered    [facility] [address]         Subscriber Name Subscriber Birth Date Member ID       ZULY CARPENTER 1970 [id number]               Secondary Coverage       Payor Plan Insurance Group Employer/Plan Group    [place] [place]        Payor Plan Address Payor Plan Phone Number Payor Plan Fax Number Effective Dates    [address] [phone]  9/19/2024 - None Entered    [facility] [address]         Subscriber Name Subscriber Birth Date Member ID       ZULY CARPENTER 1970 [id number]                     Emergency Contacts        (Rel.) Home Phone Work Phone Mobile Phone    Betty Abebe (Mother) [phone] -- --    WINNIE HORAN (Significant Other) -- -- [phone]                 Discharge Summary        Javi Boswell DO at 09/23/24 1534              [facility] Medicine Services  DISCHARGE SUMMARY       Date of Admission: 9/19/2024  Date of Discharge:  9/23/2024  Primary Care Physician: Adriel Bruno Jr., MD    Discharge Diagnoses:  Active Hospital Problems    Diagnosis     **Osteomyelitis     Cellulitis of left leg     Lymphedema of both lower extremities     Morbid obesity with BMI of 50.0-59.9, adult     Type 2 diabetes mellitus with hyperglycemia, with long-term current use of insulin          Presenting Problem/History of Present Illness:  Osteomyelitis [M86.9]     Chief Complaint on Day of Discharge:   Pain left foot    History of Present Illness on Day of Discharge:   The patient is doing well today.  She has some residual pain in the left lower extremity.  Podiatry has seen and evaluated the patient and she is no evidence of osteomyelitis.  Heel infection appears to be chronic.  The patient has completed a course of IV Zyvox.  Case discussed with infectious diseases.  All services agree the patient is appropriate for discharge home.  She has to follow-up with wound care at [facility] as previously scheduled, her PCP next week and with Dr. Palomo as scheduled.    Hospital Course  Ms. Carpenter is a 54-year-old female who presented to [facility] on 9/19 with worsening erythema, edema and pain to left leg.  She has a past medical history  "significant for diabetes, lymphedema, morbid obesity, chronic venous stasis ulcers, chronic pain syndrome followed by pain management at [facility].  Of note, she has had prior abscess in her left and right thigh in 2022. She has followed with [facility] wound care intermittently since then and started going back regularly since March.  She has been working with wound care/home health to get appropriate compression/wraps to legs.  She normally ambulates with a cane.  She has had difficulty bearing weight on her left foot.  States that the area on \"the bottom of my foot popped up in the last week.\"  No fever or chills.  No drainage. No shortness of breath, chest pain or pressure. No nausea, vomiting, abdominal pain, diarrhea. No recent antibiotic use.     In the ED, x-ray left foot reviewed First interphalangeal joint is expanded with endplate erosions, concerning for septic joint and osteomyelitis. This is new compared to 7/8/2022. Soft tissue defect at the heel, suspect soft tissue ulceration. No underlying bony erosions. Note that radiographic findings of osteomyelitis can lag behind clinical presentation. If high clinical suspicion, consider MRI.  Given multiple allergies, ED provider Dr. Dwyer gave her a dose of IV Rocephin.  She was given 1 L fluid bolus.  D-dimer noted to be 1.43 -venous duplex lower extremities attempted but patient could not tolerate.  WBC and lactate normal.  CRP 20.  Patient seen and examined in ED bed 23 -her main complaint is a headache and left foot pain rated 5 out of 10.  She will be admitted for further evaluation and management.  Treatment Plan  The patient will be admitted to Dr. Farris's service at [facility].      Cellulitis of left leg with concern for osteomyelitis to left foot.  Obtain MRI.  Consider podiatry consult once MRI complete. Given multiple allergies case discussed with pharmacist Lucas -she was given ceftriaxone in ED and tolerated, initiate Cefazolin 2 " g every 8 hours.  Check MRSA nasal screen.  Follow-up blood cultures.     Venogram bilateral lower extremities.     Type 2 diabetes with A1c 9.3.  Start Accu-Cheks with sliding scale insulin, Levemir.  Diabetes and nutrition consult.     PT.    MRI scan was unable to be completed because the patient was unable to fit into the coil.  Blood sugars in the 200 range and were stable.  The patient developed some nausea with oral Zyvox and was converted back to IV Zyvox.  She has completed a 5-day course today.   Has seen and evaluated the patient noting no evidence of increase in warmth or underlying abscess in the foot.  X-ray changes of the left hallux were noted.  There was no evidence of acute infection of the left hallux.  Ulcer is agreed that the patient is appropriate for discharge having completed 5 days of IV Zyvox therapy.  He is to follow-up with wound care as directed above.    Consults:   Infectious diseases:  HOSPITAL PROBLEM LIST:       Osteomyelitis    Lymphedema of both lower extremities    Morbid obesity with BMI of 50.0-59.9, adult    Type 2 diabetes mellitus with hyperglycemia, with long-term current use of insulin    Cellulitis of left leg        IMPRESSION:  Cellulitis LLE in patient with bilateral lymphedema  Suspect left foot abscess  MRSA nasal screen positive  Uncontrolled type diabetes mellitus  Septic arthritis/possible osteomyelitis left 1st IP joint        RECOMMENDATION:   Start linezolid 600 mg po q 12  Await podiatry input  MRI? Per nursing, patient declined and coil may not be large enough  Elevate LEs above level of heart  Glucose management per hospitalist           Nancy Burnham MD    Podiatry:  Assessment & Plan  Superficial blister left foot     Chronic ulceration, left heel     Lymphedema bilateral lower extremities     Obesity with BMI 50-59.9     Type 2 diabetes mellitus type was glycemia with long-term current use of insulin           Patient's condition was discussed.  " X-rays reviewed.  Lab work was reviewed.  Nonviable tissue was removed from the blister of the arch of the left foot.  There is no increase in warmth or evidence of underlying abscess to this area.  She does have x-ray changes to the left hallux consistent with possible underlying osteomyelitis without increased pain to the left great toe or open area/wound or ulceration of left great toe.  There is no acute infection to the left hallux.       Did discuss continuing outpatient wound care at [facility].  She currently sees SHOLA Dunlap for wound care.  Continued compression for lymphedema.     Case reviewed by myself and Dr. Palomo.  Surgical intervention not indicated for left foot.  Continue outpatient wound care.        SHOLA Gray    Result Review    Result Review:  I have personally reviewed the results from the time of this admission to 9/23/2024 15:36 CDT and agree with these findings:  []  Laboratory  []  Microbiology  []  Radiology  []  EKG/Telemetry   []  Cardiology/Vascular   []  Pathology  []  Old records  []  Other:    Condition on Discharge:    Stable and improved    Physical Exam on Discharge:  /81 (BP Location: Left arm, Patient Position: Lying)   Pulse 72   Temp 98.7 °F (37.1 °C) (Oral)   Resp 20   Ht 154.9 cm (61\")   Wt 135 kg (297 lb)   LMP  (LMP Unknown)   SpO2 96%   BMI 56.12 kg/m²   Physical Exam       Constitutional:       Appearance: She is obese.   HENT:      Head: Normocephalic and atraumatic.      Right Ear: External ear normal.      Mouth/Throat:      Mouth: Mucous membranes are moist.   Eyes:      Extraocular Movements: Extraocular movements intact.      Conjunctiva/sclera: Conjunctivae normal.      Pupils: Pupils are equal, round, and reactive to light.   Cardiovascular:      Rate and Rhythm: Normal rate and regular rhythm.      Pulses: Normal pulses.      Heart sounds: Normal heart sounds.   Pulmonary:      Effort: Pulmonary effort is normal.      " Breath sounds: Normal breath sounds.   Abdominal:      General: Bowel sounds are normal.      Palpations: Abdomen is soft.   Musculoskeletal:      Cervical back: No rigidity.      Comments: Patient is able to move all extremities.  She has marked lymphedema bilateral lower legs.  The left lower extremity has cellulitis changes wounds.   Skin:     General: Skin is warm and dry.      Capillary Refill: Capillary refill takes less than 2 seconds.      Comments: Continued issues with foot wound/abscess.  No new wounds.   Neurological:      General: No focal deficit present.      Mental Status: She is alert and oriented to person, place, and time.   Psychiatric:         Mood and Affect: Mood normal.     Discharge Disposition:  Home or Self Care    Discharge Medications:     Discharge Medications        Continue These Medications        Instructions Start Date   busPIRone 10 MG tablet  Commonly known as: BUSPAR   10 mg, Oral, 3 Times Daily      celecoxib 200 MG capsule  Commonly known as: CeleBREX   1 capsule, Oral, Daily      cyclobenzaprine 10 MG tablet  Commonly known as: FLEXERIL   10 mg, Oral, 3 Times Daily PRN      docusate sodium 100 MG capsule  Commonly known as: COLACE   100 mg, Oral, 2 Times Daily      DULoxetine 60 MG capsule  Commonly known as: CYMBALTA   60 mg, Oral, Daily      fluticasone 50 MCG/ACT nasal spray  Commonly known as: FLONASE   1 spray, Nasal, Daily      gabapentin 600 MG tablet  Commonly known as: NEURONTIN   600 mg, Oral, 3 Times Daily      insulin glargine 100 UNIT/ML injection  Commonly known as: LANTUS SEMGLEE   40 Units, Subcutaneous, Nightly      Invokana 100 MG tablet tablet  Generic drug: Canagliflozin   100 mg, Oral, Daily      Morphine Sulfate ER 15 MG tablet extended-release 12 hour   15 mg, Oral, 3 Times Daily      NovoLOG FlexPen 100 UNIT/ML solution pen-injector sc pen  Generic drug: insulin aspart   3 Units, Subcutaneous, 3 Times Daily With Meals      omeprazole 40 MG  capsule  Commonly known as: priLOSEC   40 mg, Oral, 2 Times Daily      ondansetron ODT 4 MG disintegrating tablet  Commonly known as: ZOFRAN-ODT   4 mg, Translingual, Every 6 Hours PRN      oxyCODONE-acetaminophen 7.5-325 MG per tablet  Commonly known as: PERCOCET   Take 1 tablet by mouth Every 8 (Eight) Hours As Needed for Moderate Pain  or Severe Pain-Do not take with other Percocet prescription      potassium chloride 20 MEQ CR tablet  Commonly known as: KLOR-CON M20   20 mEq, Oral, Daily      pramipexole 0.75 MG tablet  Commonly known as: MIRAPEX   0.75 mg, Oral, 2 times daily      Tirzepatide 10 MG/0.5ML solution pen-injector pen  Commonly known as: MOUNJARO   10 mg, Subcutaneous, Weekly      ZOLMitriptan 5 MG tablet  Commonly known as: Zomig   5 mg, Oral, Once As Needed               Discharge Diet:   Diet Instructions       Diet: Diabetic Diets; Consistent Carbohydrate; Thin (IDDSI 0)      Discharge Diet: Diabetic Diets    Diabetic Diet: Consistent Carbohydrate    Fluid Consistency: Thin (IDDSI 0)            Discharge Care Plan / Instructions:   Discharge home    Activity at Discharge:   Activity Instructions       Activity as Tolerated              Follow-up Appointments:  Follow-up with PCP next week  Follow-up with Dr. Palomo next week  Follow-up with wound care as scheduled    Electronically signed by Javi Boswell DO, 09/23/24, 15:36 CDT.    Time: Discharge over 30 min    Part of this note may be an electronic transcription/translation of spoken language to printed text using the Dragon Dictation system.        Electronically signed by Javi Boswell DO at 09/23/24 1543       Discharge Order (From admission, onward)       Start     Ordered    09/23/24 1532  Discharge patient  Once        Expected Discharge Date: 09/23/24   Discharge Disposition: Home or Self Care   Physician of Record for Attribution - Please select from Treatment Team: JUAN ANTONIO PORTER [[id number]]   Review needed by CMO to  determine Physician of Record: No      Question Answer Comment   Physician of Record for Attribution - Please select from Treatment Team JUAN ANTONIO PORTER    Review needed by CMO to determine Physician of Record No        09/23/24 5673

## 2024-09-27 ENCOUNTER — READMISSION MANAGEMENT (OUTPATIENT)
Dept: CALL CENTER | Facility: HOSPITAL | Age: 54
End: 2024-09-27
Payer: MEDICARE

## 2024-10-05 NOTE — PROGRESS NOTES
Enter Query Response Below      Query Response: Cannot specify.  That is why it is not listed as such in the discharge diagnosis list.    Electronically signed by Javi Boswell DO, 10/05/24, 08:59 CDT.               If applicable, please update the problem list.   Patient: Valeria Wilson        : 1970  Account: [id number]           Admit Date: 2024        How to Respond to this query:       a. Click New Note     b. Answer query within the yellow box.                c. Update the Problem List, if applicable.      If you have any questions about this query contact me at: [email or web address]      Dr. Boswell,    Diabetic patient is admitted with hyperglycemia and cellulitis of left leg.  Glucose 130-230, Hgb A1C 9.30.  Treatment includes IV antibiotics, IV fluids, insulin, diabetic diet, and monitoring labs and vital signs.    Please clarify the following:    Cellulitis due to diabetes mellitus  Cellulitis not due to diabetes mellitus  Other- specify_____________      By submitting this query, we are merely seeking further clarification of documentation to accurately reflect all conditions that you are monitoring, evaluating, treating or that extend the hospitalization or utilize additional resources of care. Please utilize your independent clinical judgment when addressing the question(s) above.     This query and your response, once completed, will be entered into the legal medical record.    Sincerely,  Cheri Lowery, BS, BSN, RN, CCDS   Clinical Documentation Integrity Program

## 2024-10-10 ENCOUNTER — HOSPITAL ENCOUNTER (OUTPATIENT)
Dept: WOUND CARE | Age: 54
Discharge: HOME | End: 2024-10-10
Payer: MEDICARE

## 2024-10-10 VITALS
DIASTOLIC BLOOD PRESSURE: 80 MMHG | HEART RATE: 74 BPM | RESPIRATION RATE: 18 BRPM | TEMPERATURE: 98.1 F | SYSTOLIC BLOOD PRESSURE: 150 MMHG

## 2024-10-10 DIAGNOSIS — E11.622 DIABETIC ULCER OF RIGHT LOWER LEG ASSOCIATED WITH TYPE 2 DIABETES MELLITUS, WITH FAT LAYER EXPOSED (HCC): ICD-10-CM

## 2024-10-10 DIAGNOSIS — E11.621 DIABETIC ULCER OF LEFT HEEL ASSOCIATED WITH TYPE 2 DIABETES MELLITUS, WITH FAT LAYER EXPOSED (HCC): ICD-10-CM

## 2024-10-10 DIAGNOSIS — E11.65 UNCONTROLLED TYPE 2 DIABETES MELLITUS WITH HYPERGLYCEMIA, WITHOUT LONG-TERM CURRENT USE OF INSULIN (HCC): Chronic | ICD-10-CM

## 2024-10-10 DIAGNOSIS — L97.912 DIABETIC ULCER OF RIGHT LOWER LEG ASSOCIATED WITH TYPE 2 DIABETES MELLITUS, WITH FAT LAYER EXPOSED (HCC): ICD-10-CM

## 2024-10-10 DIAGNOSIS — I87.2 PERIPHERAL VENOUS INSUFFICIENCY: ICD-10-CM

## 2024-10-10 DIAGNOSIS — L97.922 DIABETIC ULCER OF LEFT LOWER LEG ASSOCIATED WITH TYPE 2 DIABETES MELLITUS, WITH FAT LAYER EXPOSED (HCC): Chronic | ICD-10-CM

## 2024-10-10 DIAGNOSIS — M86.9 TOE OSTEOMYELITIS, LEFT: Primary | ICD-10-CM

## 2024-10-10 DIAGNOSIS — I89.0 LYMPHEDEMA OF BOTH LOWER EXTREMITIES: ICD-10-CM

## 2024-10-10 DIAGNOSIS — E11.622 DIABETIC ULCER OF LEFT LOWER LEG ASSOCIATED WITH TYPE 2 DIABETES MELLITUS, WITH FAT LAYER EXPOSED (HCC): Chronic | ICD-10-CM

## 2024-10-10 DIAGNOSIS — I89.0 LYMPHEDEMA: ICD-10-CM

## 2024-10-10 DIAGNOSIS — L97.422 DIABETIC ULCER OF LEFT HEEL ASSOCIATED WITH TYPE 2 DIABETES MELLITUS, WITH FAT LAYER EXPOSED (HCC): ICD-10-CM

## 2024-10-10 DIAGNOSIS — E66.01 MORBID OBESITY WITH BMI OF 50.0-59.9, ADULT: ICD-10-CM

## 2024-10-10 PROCEDURE — 99214 OFFICE O/P EST MOD 30 MIN: CPT

## 2024-10-10 PROCEDURE — 99213 OFFICE O/P EST LOW 20 MIN: CPT | Performed by: NURSE PRACTITIONER

## 2024-10-10 PROCEDURE — 87070 CULTURE OTHR SPECIMN AEROBIC: CPT

## 2024-10-10 PROCEDURE — 87186 SC STD MICRODIL/AGAR DIL: CPT

## 2024-10-10 PROCEDURE — 87205 SMEAR GRAM STAIN: CPT

## 2024-10-10 PROCEDURE — 87077 CULTURE AEROBIC IDENTIFY: CPT

## 2024-10-10 RX ORDER — NYSTATIN 100000 [USP'U]/G
POWDER TOPICAL
Qty: 60 G | Refills: 5 | Status: SHIPPED | OUTPATIENT
Start: 2024-10-10

## 2024-10-10 RX ORDER — GINSENG 100 MG
CAPSULE ORAL ONCE
OUTPATIENT
Start: 2024-10-10 | End: 2024-10-10

## 2024-10-10 RX ORDER — GENTAMICIN SULFATE 1 MG/G
OINTMENT TOPICAL ONCE
OUTPATIENT
Start: 2024-10-10 | End: 2024-10-10

## 2024-10-10 RX ORDER — FLUCONAZOLE 150 MG/1
150 TABLET ORAL DAILY
Qty: 7 TABLET | Refills: 0 | Status: SHIPPED | OUTPATIENT
Start: 2024-10-10 | End: 2024-10-17

## 2024-10-10 RX ORDER — SODIUM CHLOR/HYPOCHLOROUS ACID 0.033 %
SOLUTION, IRRIGATION IRRIGATION ONCE
OUTPATIENT
Start: 2024-10-10 | End: 2024-10-10

## 2024-10-10 RX ORDER — ONDANSETRON 4 MG/1
4 TABLET, ORALLY DISINTEGRATING ORAL 3 TIMES DAILY PRN
Qty: 60 TABLET | Refills: 0 | Status: SHIPPED | OUTPATIENT
Start: 2024-10-10

## 2024-10-10 RX ORDER — LIDOCAINE 40 MG/G
CREAM TOPICAL ONCE
OUTPATIENT
Start: 2024-10-10 | End: 2024-10-10

## 2024-10-10 RX ORDER — BACITRACIN ZINC AND POLYMYXIN B SULFATE 500; 1000 [USP'U]/G; [USP'U]/G
OINTMENT TOPICAL ONCE
OUTPATIENT
Start: 2024-10-10 | End: 2024-10-10

## 2024-10-10 RX ORDER — TRIAMCINOLONE ACETONIDE 1 MG/G
OINTMENT TOPICAL ONCE
OUTPATIENT
Start: 2024-10-10 | End: 2024-10-10

## 2024-10-10 RX ORDER — LIDOCAINE HYDROCHLORIDE 40 MG/ML
SOLUTION TOPICAL ONCE
OUTPATIENT
Start: 2024-10-10 | End: 2024-10-10

## 2024-10-10 RX ORDER — SILVER SULFADIAZINE 10 MG/G
CREAM TOPICAL ONCE
OUTPATIENT
Start: 2024-10-10 | End: 2024-10-10

## 2024-10-10 RX ORDER — LIDOCAINE HYDROCHLORIDE 20 MG/ML
JELLY TOPICAL ONCE
OUTPATIENT
Start: 2024-10-10 | End: 2024-10-10

## 2024-10-10 RX ORDER — MUPIROCIN 20 MG/G
OINTMENT TOPICAL ONCE
OUTPATIENT
Start: 2024-10-10 | End: 2024-10-10

## 2024-10-10 RX ORDER — LIDOCAINE 50 MG/G
OINTMENT TOPICAL ONCE
OUTPATIENT
Start: 2024-10-10 | End: 2024-10-10

## 2024-10-10 RX ORDER — CLOBETASOL PROPIONATE 0.5 MG/G
OINTMENT TOPICAL ONCE
OUTPATIENT
Start: 2024-10-10 | End: 2024-10-10

## 2024-10-10 RX ORDER — NEOMYCIN/BACITRACIN/POLYMYXINB 3.5-400-5K
OINTMENT (GRAM) TOPICAL ONCE
OUTPATIENT
Start: 2024-10-10 | End: 2024-10-10

## 2024-10-10 RX ORDER — DOXYCYCLINE 100 MG/1
100 CAPSULE ORAL 2 TIMES DAILY
Qty: 60 CAPSULE | Refills: 0 | Status: SHIPPED | OUTPATIENT
Start: 2024-10-10 | End: 2024-10-11

## 2024-10-10 RX ORDER — BETAMETHASONE DIPROPIONATE 0.5 MG/G
CREAM TOPICAL ONCE
OUTPATIENT
Start: 2024-10-10 | End: 2024-10-10

## 2024-10-10 ASSESSMENT — PAIN - FUNCTIONAL ASSESSMENT: PAIN_FUNCTIONAL_ASSESSMENT: PREVENTS OR INTERFERES SOME ACTIVE ACTIVITIES AND ADLS

## 2024-10-10 ASSESSMENT — PAIN DESCRIPTION - ORIENTATION: ORIENTATION: RIGHT;LEFT

## 2024-10-10 ASSESSMENT — PAIN DESCRIPTION - DESCRIPTORS: DESCRIPTORS: SHOOTING

## 2024-10-10 ASSESSMENT — PAIN DESCRIPTION - FREQUENCY: FREQUENCY: INTERMITTENT

## 2024-10-10 ASSESSMENT — PAIN DESCRIPTION - ONSET: ONSET: ON-GOING

## 2024-10-10 ASSESSMENT — PAIN SCALES - GENERAL: PAINLEVEL_OUTOF10: 5

## 2024-10-10 ASSESSMENT — PAIN DESCRIPTION - LOCATION: LOCATION: FOOT;LEG

## 2024-10-10 ASSESSMENT — PAIN DESCRIPTION - PAIN TYPE: TYPE: CHRONIC PAIN

## 2024-10-10 NOTE — PLAN OF CARE
Problem: Pain  Goal: Verbalizes/displays adequate comfort level or baseline comfort level  Outcome: Progressing     Problem: Wound:  Goal: Will show signs of wound healing; wound closure and no evidence of infection  Description: Will show signs of wound healing; wound closure and no evidence of infection  Outcome: Progressing     Problem: Compression therapy:  Goal: Will be free from complications associated with compression therapy  Description: Will be free from complications associated with compression therapy  Outcome: Progressing     Problem: Venous:  Goal: Signs of wound healing will improve  Description: Signs of wound healing will improve  Outcome: Progressing     Problem: Chronic Conditions and Co-morbidities  Goal: Patient's chronic conditions and co-morbidity symptoms are monitored and maintained or improved  Outcome: Progressing     Problem: Pain  Goal: Verbalizes/displays adequate comfort level or baseline comfort level  Outcome: Progressing

## 2024-10-10 NOTE — PATIENT INSTRUCTIONS
[facility] Wound Care and Hyperbaric Oxygen Therapy   Physician Orders and Discharge Instructions  [address] [place] [address] Drive  [address]  [place] [address]  Telephone: [phone]      FAX [phone]    NAME:  Tianna Trivedi  YOB: 1970  MEDICAL RECORD NUMBER:  [id number]  DATE:  9/5/2024    Discharge condition: Stable    Discharge to: Home    Left via:Private automobile    Accompanied by:  spouse    ECF/HHA: [facility]    Call [employer] to see regarding extensions for lymphedema pumps      Take Diflucan as directed  Take Doxycycline antibiotic as directed    Dressing Orders:  Left leg wound: soap and water wash, apply silver ashok AG, secure with Drymax extra, rolled gauze, and 6 inch ace wrap daily.  Left foot wound: soap and water wash, apply silver ashok AG to the wound bed, secure with rolled gauze and 6 inch ace wrap daily.  Right leg wound:soap and water wash, apply silver ashok AG, secure with Drymax extra, rolled gauze, and 6 inch ace wrap daily.     Treatment Orders:  Protein rich diet (unless restricted by your physician); Multivitamin daily; Elevate legs above the level of your heart when sitting 3-4 times daily for at least one hour each time, avoid standing for long periods of time.    No pressure to left Great toe from pressure    Use over the counter antifungal powder as directed to yeasty areas behind knees    Call Jina FITZPATRICK in one week regarding drainage [phone]    [facility] follow up visit ___4 weeks with Jina__________________________  (Please note your next appointment above and if you are unable to keep, kindly give a 24 hour notice. Thank you.)          If you experience any of the following, please call the Wound Care Center during business hours:    * Increase in Pain  * Temperature over 101  * Increase in drainage from your wound  * Drainage with a foul odor  * Bleeding  * Increase in swelling  * Need for compression bandage changes due to slippage, breakthrough drainage.    If

## 2024-10-10 NOTE — HOME CARE
antibiotic for their Wound(s): [] Yes if yes please add name and dose    [x] No    Patient currently being seen by Home Health: [] Yes   [x] No    Duration for needed supplies:  []15  [x]30  []60  []90 Days    Electronically signed by NANETTE James - CNP on 10/10/2024 at 4:59 PM     Provider Information:      PROVIDER'S NAME: Jina FITZPATRICK    NPI: [id number]  Call if not approved  [phone]

## 2024-10-10 NOTE — PROGRESS NOTES
[facility] Wound Care Center   Progress Note and Procedure Note      Tianna Trivedi  MEDICAL RECORD NUMBER:  [id number]  AGE: 54 y.o.   GENDER: female  : 1970  EPISODE DATE:  10/10/2024    Subjective:     Chief Complaint   Patient presents with    Wound Check     Right and left leg wounds         HISTORY of PRESENT ILLNESS HPI     Tianna Trivedi is a 54 y.o. female who presents today for wound/ulcer evaluation.   History of Wound Context: bilateral leg wound follow up/eval and treat    Ulcer Identification:  Ulcer Type: venous  Contributing Factors: edema, lymphedema, diabetes, poor glucose control, and obesity    Wound: N/A        PAST MEDICAL HISTORY        Diagnosis Date    Anemia     Anxiety     Bipolar 1 disorder (HCC)     Chronic acquired lymphedema     Constipation     Depression     Depression     GERD (gastroesophageal reflux disease)     Headache(784.0)     Hyperlipidemia     Hypertension     Kidney stones     Kidney stones     Morbid obesity due to excess calories     Neuromuscular disorder (HCC)     Primary osteoarthritis involving multiple joints 2024    Restless legs syndrome     Shingles     Type 2 diabetes mellitus (HCC)        PAST SURGICAL HISTORY    Past Surgical History:   Procedure Laterality Date    BACK SURGERY      ECTOPIC PREGNANCY SURGERY  2002    EYE SURGERY Bilateral      & : cornea transplant and cataracts removed    FOOT SURGERY Bilateral     Heels    KNEE CARTILAGE SURGERY Left 2016    KNEE ARTHROSCOPIC PARTIAL MEDIAL MENISCECTOMY performed by Russell Serrano MD at [facility] OR    TUBAL LIGATION         FAMILY HISTORY    Family History   Problem Relation Age of Onset    Obesity Mother     Arthritis Mother         has had knees replaced    Cancer Father         \"adrenal gland & lung\"    Parkinsonism Father     Cancer Maternal Grandmother     COPD Maternal Grandmother     Obesity Maternal Grandmother     Heart Failure Maternal Grandmother     Cancer Maternal

## 2024-10-13 LAB
BACTERIA SPEC ANAEROBE+AEROBE CULT: ABNORMAL
GRAM STN SPEC: ABNORMAL
ORGANISM: ABNORMAL

## 2024-10-14 LAB
BACTERIA SPEC ANAEROBE+AEROBE CULT: ABNORMAL
GRAM STN SPEC: ABNORMAL
ORGANISM: ABNORMAL

## 2024-10-21 DIAGNOSIS — G89.4 CHRONIC PAIN SYNDROME: ICD-10-CM

## 2024-10-21 DIAGNOSIS — M62.838 MUSCLE SPASM: ICD-10-CM

## 2024-10-21 RX ORDER — GABAPENTIN 300 MG/1
300 CAPSULE ORAL DAILY
Qty: 30 CAPSULE | Refills: 0 | Status: SHIPPED | OUTPATIENT
Start: 2024-10-30 | End: 2024-11-29

## 2024-10-21 RX ORDER — OXYCODONE AND ACETAMINOPHEN 7.5; 325 MG/1; MG/1
1 TABLET ORAL
Qty: 90 TABLET | Refills: 0 | Status: SHIPPED | OUTPATIENT
Start: 2024-10-26 | End: 2024-11-25

## 2024-10-21 RX ORDER — MORPHINE SULFATE 15 MG/1
TABLET, FILM COATED, EXTENDED RELEASE ORAL
Qty: 90 TABLET | Refills: 0 | Status: SHIPPED | OUTPATIENT
Start: 2024-11-09 | End: 2024-12-09

## 2024-10-21 RX ORDER — GABAPENTIN 600 MG/1
600 TABLET ORAL 3 TIMES DAILY
Qty: 90 TABLET | Refills: 0 | Status: SHIPPED | OUTPATIENT
Start: 2024-10-30 | End: 2024-11-29

## 2024-10-21 RX ORDER — CYCLOBENZAPRINE HCL 10 MG
10 TABLET ORAL 3 TIMES DAILY PRN
Qty: 90 TABLET | Refills: 2 | Status: SHIPPED | OUTPATIENT
Start: 2024-10-21 | End: 2025-01-19

## 2024-11-07 ENCOUNTER — HOSPITAL ENCOUNTER (OUTPATIENT)
Dept: WOUND CARE | Age: 54
Discharge: HOME | End: 2024-11-07
Payer: MEDICARE

## 2024-11-07 VITALS
WEIGHT: 293 LBS | SYSTOLIC BLOOD PRESSURE: 142 MMHG | TEMPERATURE: 98 F | RESPIRATION RATE: 18 BRPM | HEIGHT: 61 IN | DIASTOLIC BLOOD PRESSURE: 75 MMHG | HEART RATE: 70 BPM | BODY MASS INDEX: 55.32 KG/M2

## 2024-11-07 DIAGNOSIS — E11.622 DIABETIC ULCER OF RIGHT LOWER LEG ASSOCIATED WITH TYPE 2 DIABETES MELLITUS, WITH FAT LAYER EXPOSED (HCC): ICD-10-CM

## 2024-11-07 DIAGNOSIS — I89.0 LYMPHEDEMA: ICD-10-CM

## 2024-11-07 DIAGNOSIS — I89.0 LYMPHEDEMA OF BOTH LOWER EXTREMITIES: ICD-10-CM

## 2024-11-07 DIAGNOSIS — E11.622 DIABETIC ULCER OF LEFT LOWER LEG ASSOCIATED WITH TYPE 2 DIABETES MELLITUS, WITH FAT LAYER EXPOSED (HCC): ICD-10-CM

## 2024-11-07 DIAGNOSIS — L97.922 DIABETIC ULCER OF LEFT LOWER LEG ASSOCIATED WITH TYPE 2 DIABETES MELLITUS, WITH FAT LAYER EXPOSED (HCC): ICD-10-CM

## 2024-11-07 DIAGNOSIS — E11.65 UNCONTROLLED TYPE 2 DIABETES MELLITUS WITH HYPERGLYCEMIA, WITHOUT LONG-TERM CURRENT USE OF INSULIN (HCC): Chronic | ICD-10-CM

## 2024-11-07 DIAGNOSIS — M86.9 TOE OSTEOMYELITIS, LEFT: ICD-10-CM

## 2024-11-07 DIAGNOSIS — E11.621 DIABETIC ULCER OF LEFT HEEL ASSOCIATED WITH TYPE 2 DIABETES MELLITUS, WITH FAT LAYER EXPOSED (HCC): Primary | ICD-10-CM

## 2024-11-07 DIAGNOSIS — L97.422 DIABETIC ULCER OF LEFT HEEL ASSOCIATED WITH TYPE 2 DIABETES MELLITUS, WITH FAT LAYER EXPOSED (HCC): Primary | ICD-10-CM

## 2024-11-07 DIAGNOSIS — L97.912 DIABETIC ULCER OF RIGHT LOWER LEG ASSOCIATED WITH TYPE 2 DIABETES MELLITUS, WITH FAT LAYER EXPOSED (HCC): ICD-10-CM

## 2024-11-07 DIAGNOSIS — L97.912 SKIN ULCER OF RIGHT LOWER LEG WITH FAT LAYER EXPOSED (HCC): ICD-10-CM

## 2024-11-07 PROCEDURE — 99213 OFFICE O/P EST LOW 20 MIN: CPT

## 2024-11-07 PROCEDURE — 99213 OFFICE O/P EST LOW 20 MIN: CPT | Performed by: NURSE PRACTITIONER

## 2024-11-07 RX ORDER — SILVER SULFADIAZINE 10 MG/G
CREAM TOPICAL ONCE
OUTPATIENT
Start: 2024-11-07 | End: 2024-11-07

## 2024-11-07 RX ORDER — LIDOCAINE HYDROCHLORIDE 20 MG/ML
JELLY TOPICAL ONCE
OUTPATIENT
Start: 2024-11-07 | End: 2024-11-07

## 2024-11-07 RX ORDER — SODIUM CHLOR/HYPOCHLOROUS ACID 0.033 %
SOLUTION, IRRIGATION IRRIGATION ONCE
OUTPATIENT
Start: 2024-11-07 | End: 2024-11-07

## 2024-11-07 RX ORDER — TRIAMCINOLONE ACETONIDE 1 MG/G
OINTMENT TOPICAL ONCE
OUTPATIENT
Start: 2024-11-07 | End: 2024-11-07

## 2024-11-07 RX ORDER — GENTAMICIN SULFATE 1 MG/G
OINTMENT TOPICAL ONCE
OUTPATIENT
Start: 2024-11-07 | End: 2024-11-07

## 2024-11-07 RX ORDER — NEOMYCIN/BACITRACIN/POLYMYXINB 3.5-400-5K
OINTMENT (GRAM) TOPICAL ONCE
OUTPATIENT
Start: 2024-11-07 | End: 2024-11-07

## 2024-11-07 RX ORDER — BETAMETHASONE DIPROPIONATE 0.5 MG/G
CREAM TOPICAL ONCE
OUTPATIENT
Start: 2024-11-07 | End: 2024-11-07

## 2024-11-07 RX ORDER — LIDOCAINE 50 MG/G
OINTMENT TOPICAL ONCE
OUTPATIENT
Start: 2024-11-07 | End: 2024-11-07

## 2024-11-07 RX ORDER — LIDOCAINE HYDROCHLORIDE 40 MG/ML
SOLUTION TOPICAL ONCE
OUTPATIENT
Start: 2024-11-07 | End: 2024-11-07

## 2024-11-07 RX ORDER — CLOBETASOL PROPIONATE 0.5 MG/G
OINTMENT TOPICAL ONCE
OUTPATIENT
Start: 2024-11-07 | End: 2024-11-07

## 2024-11-07 RX ORDER — LIDOCAINE 40 MG/G
CREAM TOPICAL ONCE
OUTPATIENT
Start: 2024-11-07 | End: 2024-11-07

## 2024-11-07 RX ORDER — BACITRACIN ZINC AND POLYMYXIN B SULFATE 500; 1000 [USP'U]/G; [USP'U]/G
OINTMENT TOPICAL ONCE
OUTPATIENT
Start: 2024-11-07 | End: 2024-11-07

## 2024-11-07 RX ORDER — MUPIROCIN 20 MG/G
OINTMENT TOPICAL ONCE
OUTPATIENT
Start: 2024-11-07 | End: 2024-11-07

## 2024-11-07 RX ORDER — GINSENG 100 MG
CAPSULE ORAL ONCE
OUTPATIENT
Start: 2024-11-07 | End: 2024-11-07

## 2024-11-07 NOTE — PATIENT INSTRUCTIONS
[facility] Wound Care and Hyperbaric Oxygen Therapy   Physician Orders and Discharge Instructions  [address] [place] [address] Drive  [address]  [place] [address]  Telephone: [phone]      FAX [phone]    NAME:  Tianna Trivedi  YOB: 1970  MEDICAL RECORD NUMBER:  [id number]  DATE:  9/5/2024    Discharge condition: Stable    Discharge to: Home    Left via:Private automobile    Accompanied by:  spouse    ECF/HHA: [facility]    [employer] to mail extensions for lymphedema pumps      Take Diflucan as directed  Take Doxycycline antibiotic as directed    Dressing Orders:  Left leg wound: soap and water wash, apply Hydrofera Blue, secure with Drymax extra, rolled gauze, apply wrap  Left foot wound: soap and water wash, apply Hydrofera Blue to the wound bed, secure with rolled gauze, apply wrap  Right leg wound:soap and water wash, apply Hydrofera Blue, secure with Drymax extra, rolled gauze,  apply wrap     Treatment Orders:  Protein rich diet (unless restricted by your physician); Multivitamin daily; Elevate legs above the level of your heart when sitting 3-4 times daily for at least one hour each time, avoid standing for long periods of time.    Decrease pressure to back of calves    No pressure to left Great toe from pressure    Use over the counter antifungal powder as directed to yeasty areas behind knees    Call Jina FITZPATRICK in one week regarding drainage [phone]    [facility] follow up visit ___4 weeks with Jina__________________________  (Please note your next appointment above and if you are unable to keep, kindly give a 24 hour notice. Thank you.)          If you experience any of the following, please call the Wound Care Center during business hours:    * Increase in Pain  * Temperature over 101  * Increase in drainage from your wound  * Drainage with a foul odor  * Bleeding  * Increase in swelling  * Need for compression bandage changes due to slippage, breakthrough drainage.    If you need medical

## 2024-11-07 NOTE — PLAN OF CARE
Problem: Wound:  Goal: Will show signs of wound healing; wound closure and no evidence of infection  Description: Will show signs of wound healing; wound closure and no evidence of infection  Outcome: Progressing     Problem: Compression therapy:  Goal: Will be free from complications associated with compression therapy  Description: Will be free from complications associated with compression therapy  Outcome: Progressing     Problem: Venous:  Goal: Signs of wound healing will improve  Description: Signs of wound healing will improve  Outcome: Progressing     Problem: Chronic Conditions and Co-morbidities  Goal: Patient's chronic conditions and co-morbidity symptoms are monitored and maintained or improved  Outcome: Progressing     Problem: Pain  Goal: Verbalizes/displays adequate comfort level or baseline comfort level  Outcome: Progressing

## 2024-11-07 NOTE — HOME CARE
Wound Care Supplies      Supply Company:     [employer] [address] [place] [address] f: [phone] f: [phone] p: [phone] [email or web address]      Ordering Center:     [facility]  [address] [place] [address] [address]  [facility] [id number] [phone]  WOUND CARE Dept: [phone]   FAX NUMBER [phone]    Patient Information:      Wai Lamar  [address]  [place] [address] [phone]   : 1970  AGE: 54 y.o.     GENDER: female   EPISODE DATE: 2024    Insurance:      PRIMARY INSURANCE:  Plan: [employer] FULL DUAL ADVANTAGE 2  Coverage: BCBS MEDICARE  Effective Date: 2024  Group Number: [unfilled]  Subscriber Number: [id number] - (Medicare Managed)    Payer/Plan Subscr  Sex Relation Sub. Ins. ID Effective Group Num   1. MEDICARE - ME* AWI LAMAR 1970 Female Self [id number]                                    [address]    2. BCBS MEDICARE* WAI LAMAR 1970 Female Self [id number]                                          Patient Wound Information:      Problem List Items Addressed This Visit          Endocrine    Diabetic ulcer of left lower leg associated with type 2 diabetes mellitus, with fat layer exposed (HCC) (Chronic)    Relevant Orders    Initiate Outpatient Wound Care Protocol    Diabetic ulcer of left heel associated with type 2 diabetes mellitus, with fat layer exposed (HCC) - Primary    Relevant Orders    Initiate Outpatient Wound Care Protocol       Other    Lymphedema of both lower extremities    Relevant Orders    Initiate Outpatient Wound Care Protocol    Toe osteomyelitis, left    Relevant Orders    Initiate Outpatient Wound Care Protocol       WOUNDS REQUIRING DRESSING SUPPLIES:     Wound 24 Tibial Posterior;Left wound 1-left leg posterior venous (Active)   Wound Image   24 1544   Wound Etiology Venous 24 1544   Dressing Status Old drainage noted

## 2024-11-07 NOTE — PROGRESS NOTES
Pressure Injury Full thickness 11/07/24 1544   Number of days: 128       Wound 07/02/24 Pretibial Left;Lower wound 3-left leg medial anterior (Active)   Wound Image   11/07/24 1544   Wound Etiology Venous 11/07/24 1544   Dressing Status Old drainage noted 11/07/24 1544   Wound Cleansed Soap and water 11/07/24 1544   Dressing/Treatment Alginate with Ag;ABD;Ace wrap 10/10/24 1626   Wound Length (cm) 2 cm 11/07/24 1544   Wound Width (cm) 1.2 cm 11/07/24 1544   Wound Depth (cm) 0.2 cm 11/07/24 1544   Wound Surface Area (cm^2) 2.4 cm^2 11/07/24 1544   Change in Wound Size % (l*w) 20 11/07/24 1544   Wound Volume (cm^3) 0.48 cm^3 11/07/24 1544   Wound Healing % -60 11/07/24 1544   Distance Tunneling (cm) 0 cm 07/18/24 1539   Tunneling Position ___ O'Clock 0 07/18/24 1539   Undermining Starts ___ O'Clock 0 07/18/24 1539   Undermining Ends___ O'Clock 0 07/18/24 1539   Undermining Maxium Distance (cm) 0 07/18/24 1539   Wound Assessment Pink/red 11/07/24 1544   Drainage Amount Moderate (25-50%) 11/07/24 1544   Drainage Description Serosanguinous 11/07/24 1544   Odor None 11/07/24 1544   Octavia-wound Assessment Intact 11/07/24 1544   Margins Defined edges 11/07/24 1544   Wound Thickness Description not for Pressure Injury Full thickness 11/07/24 1544   Number of days: 128       Wound 07/02/24 Ankle Left;Anterior wound 4-left ankle lateral venous (Active)   Wound Image   11/07/24 1544   Wound Etiology Venous 11/07/24 1544   Dressing Status Old drainage noted 11/07/24 1544   Wound Cleansed Soap and water 11/07/24 1544   Dressing/Treatment Alginate with Ag;Ace wrap;ABD 10/10/24 1626   Wound Length (cm) 0 cm 11/07/24 1544   Wound Width (cm) 0 cm 11/07/24 1544   Wound Depth (cm) 0 cm 11/07/24 1544   Wound Surface Area (cm^2) 0 cm^2 11/07/24 1544   Change in Wound Size % (l*w) 100 11/07/24 1544   Wound Volume (cm^3) 0 cm^3 11/07/24 1544   Wound Healing % 100 11/07/24 1544   Distance Tunneling (cm) 0 cm 07/18/24 1539   Tunneling

## 2024-11-11 DIAGNOSIS — G89.4 CHRONIC PAIN SYNDROME: ICD-10-CM

## 2024-11-12 RX ORDER — MORPHINE SULFATE 15 MG/1
TABLET, FILM COATED, EXTENDED RELEASE ORAL
Qty: 90 TABLET | Refills: 0 | Status: SHIPPED | OUTPATIENT
Start: 2024-11-12 | End: 2024-12-12

## 2024-11-25 ENCOUNTER — HOSPITAL ENCOUNTER (OUTPATIENT)
Dept: PAIN MANAGEMENT | Age: 54
Discharge: HOME | End: 2024-11-25
Payer: MEDICARE

## 2024-11-25 VITALS
OXYGEN SATURATION: 95 % | BODY MASS INDEX: 55.32 KG/M2 | HEIGHT: 61 IN | WEIGHT: 293 LBS | RESPIRATION RATE: 16 BRPM | HEART RATE: 78 BPM | TEMPERATURE: 97.3 F

## 2024-11-25 DIAGNOSIS — M15.0 PRIMARY OSTEOARTHRITIS INVOLVING MULTIPLE JOINTS: ICD-10-CM

## 2024-11-25 DIAGNOSIS — F11.90 CHRONIC, CONTINUOUS USE OF OPIOIDS: ICD-10-CM

## 2024-11-25 DIAGNOSIS — E66.01 MORBID OBESITY DUE TO EXCESS CALORIES: Chronic | ICD-10-CM

## 2024-11-25 DIAGNOSIS — F11.20 UNCOMPLICATED OPIOID DEPENDENCE (HCC): ICD-10-CM

## 2024-11-25 DIAGNOSIS — G89.4 CHRONIC PAIN SYNDROME: ICD-10-CM

## 2024-11-25 DIAGNOSIS — E11.65 UNCONTROLLED TYPE 2 DIABETES MELLITUS WITH HYPERGLYCEMIA, WITHOUT LONG-TERM CURRENT USE OF INSULIN (HCC): Chronic | ICD-10-CM

## 2024-11-25 DIAGNOSIS — N18.30 STAGE 3 CHRONIC KIDNEY DISEASE, UNSPECIFIED WHETHER STAGE 3A OR 3B CKD (HCC): ICD-10-CM

## 2024-11-25 DIAGNOSIS — I89.0 LYMPHEDEMA OF BOTH LOWER EXTREMITIES: ICD-10-CM

## 2024-11-25 DIAGNOSIS — G89.4 CHRONIC PAIN SYNDROME: Primary | ICD-10-CM

## 2024-11-25 PROCEDURE — 99215 OFFICE O/P EST HI 40 MIN: CPT

## 2024-11-25 PROCEDURE — 99214 OFFICE O/P EST MOD 30 MIN: CPT

## 2024-11-25 RX ORDER — ROSUVASTATIN CALCIUM 10 MG/1
TABLET, COATED ORAL
COMMUNITY
Start: 2024-11-06

## 2024-11-25 ASSESSMENT — PAIN DESCRIPTION - PAIN TYPE: TYPE: CHRONIC PAIN

## 2024-11-25 ASSESSMENT — PAIN DESCRIPTION - ORIENTATION: ORIENTATION: LEFT

## 2024-11-25 ASSESSMENT — PAIN DESCRIPTION - LOCATION: LOCATION: BACK;LEG

## 2024-11-25 ASSESSMENT — ENCOUNTER SYMPTOMS
ABDOMINAL PAIN: 0
SWOLLEN GLANDS: 0
SORE THROAT: 0
BACK PAIN: 1

## 2024-11-25 ASSESSMENT — PAIN SCALES - GENERAL: PAINLEVEL_OUTOF10: 8

## 2024-11-25 NOTE — H&P
ago. The problem occurs constantly. The problem has been gradually worsening. Associated symptoms include arthralgias, headaches, myalgias and weakness. Pertinent negatives include no abdominal pain, anorexia, fatigue, fever, neck pain, rash, sore throat, swollen glands or vertigo. The symptoms are aggravated by walking, standing, exertion and bending. She has tried acetaminophen, NSAIDs, oral narcotics, position changes, relaxation, rest, sleep and walking for the symptoms. The treatment provided mild relief.       Does pain affect ADLs Yes transportation, shopping, laundry, housework, and walking  Does pain affect quality of life? Yes  Does pain affect activities of enjoyment? Yes  Radicular Pain: No   If yes, which dermatome: NA    Patient is on or has tried and failed following medications:   Narcotics: Darvocet, hydrocodone/acetaminophen (Lorcet, Lortab, Norco, Vicodin), morphine, Percocet, tramadol (Ultram);   Anticonvulsants: gabapentin (Neurontin), topiramate (Topamax), and lyrica;   Antidepressants:  Duloxetine (CYMBALTA), Fluoxetine (PROZAC), Paroxetine (PAXIL), Sertraline (ZOLOFT);   Muscle Relaxants: tizanidine (Zanaflex),   NSAIDs: celecoxib (Celebrex),   Acetaminophen: Yes  Supplements- Anti-Lipoic Acid, Turmeric, Low-Dose Naltrexone:   and Topicals: diclofenac gel (Voltaren Gel).     Prior Conservative Therapies:      TENS: No      Chiropractor: No      Acupuncture: No      Massage: No    Current PE.3    Previous PE    ORT Score: 3    PHQ-9 Score: 10    Current Pain Assessment  Pain Assessment  Pain Level: 8  Pain Location: Back, Leg  Pain Orientation: Left  Pain Type: Chronic pain    Employment: disabled    Previous Injury: No    Physical Therapy In the last 12 months? Yes inpatient only. Previously failed outpatient due to increase in pain level.    Did Physical Therapy make the pain better?  No     Previous Imaging: None since .     Nerve Conduction Study / EMG No    Injections in the

## 2024-11-25 NOTE — PROGRESS NOTES
Clinic Documentation      Education Provided:  [x] Review of Hermes  [x] Agreement Review  [x] PEG Score Calculated [x] PHQ Score Calculated [x] ORT Score Calculated    [] Compliance Issues Discussed [] Cognitive Behavior Needs [x] Exercise [] Review of Test [] Financial Issues  [x] Tobacco/Alcohol Use Reviewed [x] Teaching [] New Patient [] Picture Obtained    Physician Plan:  [] Outgoing Referral  [] Pharmacy Consult  [] Test Ordered [x] Prescription Ordered/Changed   [] Obtained Test Results / Consult Notes        Complete if patient is withholding blood thinner for procedure     Blood Thinner Patient is currently taking:      [] Plavix (Hold for 7 days)  [] Aspirin (Hold for 5 days)     [] Pletal (Hold for 2 days)  [] Pradaxa (Hold for 3 days)    [] Effient (Hold for 7 days)  [] Xarelto (Hold for 2 days)    [] Eliquis (Hold for 2 days)  [] Brilinta (Hold for 7 days)    [] Coumadin (Hold for 5 days) - (INR needs to be drawn the day prior to procedure- INR < 2.0)    [] Aggrenox (Hold for 7 days)        [] Patient will stop medication on their own.    [] Blood Thinner Form Faxed for approval to hold.   Provider form faxed to:     Assessment Completed by:  Electronically signed by Ariana Perez MA on 11/25/2024 at 4:00 PM

## 2024-11-25 NOTE — TELEPHONE ENCOUNTER
There are no diagnoses linked to this encounter.    Requested Prescriptions      No prescriptions requested or ordered in this encounter       Continue medication with refill sent at appointment yes; refill sent to medical director at appointment yes, see refill encounter dated 11/25/2024    Electronically signed by NANETTE Richard CNP on 11/25/2024 at 3:43 PM

## 2024-11-26 RX ORDER — OXYCODONE AND ACETAMINOPHEN 7.5; 325 MG/1; MG/1
1 TABLET ORAL
Qty: 90 TABLET | Refills: 0 | Status: SHIPPED | OUTPATIENT
Start: 2024-11-26 | End: 2024-12-26

## 2024-11-26 RX ORDER — GABAPENTIN 300 MG/1
300 CAPSULE ORAL DAILY
Qty: 30 CAPSULE | Refills: 0 | Status: SHIPPED | OUTPATIENT
Start: 2024-11-30 | End: 2024-12-30

## 2024-11-26 RX ORDER — GABAPENTIN 600 MG/1
600 TABLET ORAL 3 TIMES DAILY
Qty: 90 TABLET | Refills: 0 | Status: SHIPPED | OUTPATIENT
Start: 2024-11-30 | End: 2024-12-30

## 2024-11-26 RX ORDER — MORPHINE SULFATE 15 MG/1
TABLET, FILM COATED, EXTENDED RELEASE ORAL
Qty: 90 TABLET | Refills: 0 | Status: SHIPPED | OUTPATIENT
Start: 2024-11-26 | End: 2024-12-26

## 2024-11-27 ENCOUNTER — TELEPHONE (OUTPATIENT)
Dept: WOUND CARE | Age: 54
End: 2024-11-27

## 2024-11-27 RX ORDER — ONDANSETRON 2 MG/ML
8 INJECTION INTRAMUSCULAR; INTRAVENOUS
OUTPATIENT
Start: 2024-11-27

## 2024-11-27 RX ORDER — DIPHENHYDRAMINE HYDROCHLORIDE 50 MG/ML
50 INJECTION INTRAMUSCULAR; INTRAVENOUS
OUTPATIENT
Start: 2024-11-27

## 2024-11-27 RX ORDER — HYDROCORTISONE SODIUM SUCCINATE 100 MG/2ML
100 INJECTION INTRAMUSCULAR; INTRAVENOUS
OUTPATIENT
Start: 2024-11-27

## 2024-11-27 RX ORDER — HEPARIN 100 UNIT/ML
500 SYRINGE INTRAVENOUS PRN
OUTPATIENT
Start: 2024-11-27

## 2024-11-27 RX ORDER — ACETAMINOPHEN 325 MG/1
650 TABLET ORAL
OUTPATIENT
Start: 2024-11-27

## 2024-11-27 RX ORDER — SODIUM CHLORIDE 0.9 % (FLUSH) 0.9 %
5-40 SYRINGE (ML) INJECTION PRN
OUTPATIENT
Start: 2024-11-27

## 2024-11-27 RX ORDER — SODIUM CHLORIDE 9 MG/ML
5-250 INJECTION, SOLUTION INTRAVENOUS PRN
OUTPATIENT
Start: 2024-11-27

## 2024-11-27 RX ORDER — EPINEPHRINE 1 MG/ML
0.3 INJECTION, SOLUTION, CONCENTRATE INTRAVENOUS PRN
OUTPATIENT
Start: 2024-11-27

## 2024-11-27 RX ORDER — ALBUTEROL SULFATE 90 UG/1
4 INHALANT RESPIRATORY (INHALATION) PRN
OUTPATIENT
Start: 2024-11-27

## 2024-11-27 RX ORDER — SODIUM CHLORIDE 9 MG/ML
INJECTION, SOLUTION INTRAVENOUS CONTINUOUS
OUTPATIENT
Start: 2024-11-27

## 2024-11-27 NOTE — PROGRESS NOTES
Patient reports since she is not on doxycycline due to constant yeast infections her drainage is increased, she is willing to try Orbactiv IV.

## 2024-11-27 NOTE — TELEPHONE ENCOUNTER
[facility] Rosi [facility]. Telephone order given for Orbactiv 1200 mg x1 dose TORB. Rosi RN stated medication will need to have precert and then they will call patient to set up date and time for appointment,

## 2024-12-05 ENCOUNTER — HOSPITAL ENCOUNTER (OUTPATIENT)
Dept: INFUSION THERAPY | Age: 54
Setting detail: INFUSION SERIES
Discharge: HOME | End: 2024-12-05
Payer: MEDICARE

## 2024-12-05 VITALS
HEART RATE: 79 BPM | OXYGEN SATURATION: 98 % | RESPIRATION RATE: 17 BRPM | SYSTOLIC BLOOD PRESSURE: 136 MMHG | TEMPERATURE: 97.8 F | DIASTOLIC BLOOD PRESSURE: 81 MMHG

## 2024-12-05 DIAGNOSIS — L97.922 DIABETIC ULCER OF LEFT LOWER LEG ASSOCIATED WITH TYPE 2 DIABETES MELLITUS, WITH FAT LAYER EXPOSED (HCC): Primary | ICD-10-CM

## 2024-12-05 DIAGNOSIS — E11.622 DIABETIC ULCER OF LEFT LOWER LEG ASSOCIATED WITH TYPE 2 DIABETES MELLITUS, WITH FAT LAYER EXPOSED (HCC): Primary | ICD-10-CM

## 2024-12-05 PROCEDURE — 6360000002 HC RX W HCPCS: Performed by: NURSE PRACTITIONER

## 2024-12-05 PROCEDURE — 96366 THER/PROPH/DIAG IV INF ADDON: CPT

## 2024-12-05 PROCEDURE — 2580000003 HC RX 258: Performed by: NURSE PRACTITIONER

## 2024-12-05 PROCEDURE — 96365 THER/PROPH/DIAG IV INF INIT: CPT

## 2024-12-05 PROCEDURE — 96367 TX/PROPH/DG ADDL SEQ IV INF: CPT

## 2024-12-05 RX ORDER — SODIUM CHLORIDE 0.9 % (FLUSH) 0.9 %
5-40 SYRINGE (ML) INJECTION PRN
Status: DISCONTINUED | OUTPATIENT
Start: 2024-12-05 | End: 2024-12-06 | Stop reason: HOSPADM

## 2024-12-05 RX ORDER — SODIUM CHLORIDE 9 MG/ML
5-250 INJECTION, SOLUTION INTRAVENOUS PRN
Status: DISCONTINUED | OUTPATIENT
Start: 2024-12-05 | End: 2024-12-06 | Stop reason: HOSPADM

## 2024-12-05 RX ORDER — ACETAMINOPHEN 325 MG/1
650 TABLET ORAL
OUTPATIENT
Start: 2024-12-05

## 2024-12-05 RX ORDER — HYDROCORTISONE SODIUM SUCCINATE 100 MG/2ML
100 INJECTION INTRAMUSCULAR; INTRAVENOUS
OUTPATIENT
Start: 2024-12-05

## 2024-12-05 RX ORDER — EPINEPHRINE 1 MG/ML
0.3 INJECTION, SOLUTION, CONCENTRATE INTRAVENOUS PRN
OUTPATIENT
Start: 2024-12-05

## 2024-12-05 RX ORDER — SODIUM CHLORIDE 9 MG/ML
INJECTION, SOLUTION INTRAVENOUS CONTINUOUS
OUTPATIENT
Start: 2024-12-05

## 2024-12-05 RX ORDER — ONDANSETRON 2 MG/ML
8 INJECTION INTRAMUSCULAR; INTRAVENOUS
OUTPATIENT
Start: 2024-12-05

## 2024-12-05 RX ORDER — SODIUM CHLORIDE 0.9 % (FLUSH) 0.9 %
5-40 SYRINGE (ML) INJECTION PRN
OUTPATIENT
Start: 2024-12-05

## 2024-12-05 RX ORDER — DIPHENHYDRAMINE HYDROCHLORIDE 50 MG/ML
50 INJECTION INTRAMUSCULAR; INTRAVENOUS
OUTPATIENT
Start: 2024-12-05

## 2024-12-05 RX ORDER — ALBUTEROL SULFATE 90 UG/1
4 INHALANT RESPIRATORY (INHALATION) PRN
OUTPATIENT
Start: 2024-12-05

## 2024-12-05 RX ORDER — HEPARIN 100 UNIT/ML
500 SYRINGE INTRAVENOUS PRN
OUTPATIENT
Start: 2024-12-05

## 2024-12-05 RX ORDER — SODIUM CHLORIDE 9 MG/ML
5-250 INJECTION, SOLUTION INTRAVENOUS PRN
OUTPATIENT
Start: 2024-12-05

## 2024-12-05 RX ADMIN — ORITAVANCIN 1200 MG: 400 INJECTION, POWDER, LYOPHILIZED, FOR SOLUTION INTRAVENOUS at 10:12

## 2024-12-05 NOTE — FLOWSHEET NOTE
12/05/24 1204   AVS Reviewed   AVS & discharge instructions reviewed with patient and/or representative? Yes   Reviewed instructions with Patient;Other (name and relationship in comment)   Level of Understanding Questions answered;Verbalized understanding     PT UNABLE TO COMPLETE HER TREATMENT.  NO ADVERSE REACTION NOTED DURING INFUSION. PT UNABLE TO SIT SECONDARY TO EXTREME PAIN IN LOWER EXTREMITIES. OFFERED TO HELP PT REPOSITION BUT STATES EVEN AFTER TAKING HER HOME PAIN MEDICATION SHE CAN'T SIT ANY LONGER. PT DISCHARGED HOME

## 2024-12-05 NOTE — DISCHARGE INSTRUCTIONS
oritavancin  Pronunciation:  or IT a VAN sin  Brand:  Christophe Aggarwal  What is the most important information I should know about oritavancin?  Tell your doctor if you use other medicines or have other medical conditions or allergies.  What is oritavancin?  Oritavancin is an antibiotic that is used to treat severe skin infections caused by bacteria.  Oritavancin may also be used for purposes not listed in this medication guide.  What should I discuss with my healthcare provider before receiving oritavancin?  You should not receive oritavancin if you are allergic to it, or if you will be using \"unfractionated\" heparin within 5 days after your receive oritavancin injection.  Tell your doctor if you have ever had:  an allergic reaction to medicines such as dalbavancin, telavancin, or vancomycin; or  if you use a blood thinner (such as warfarin) and you have routine \"INR\" or prothrombin time tests.  Tell your doctor if you are pregnant or breastfeeding.  Oritavancin is not approved for use by anyone younger than 18 years old.  How is oritavancin given?  Oritavancin is injected into a vein by a healthcare provider, usually as a single dose.  This medicine is injected slowly over 3 hours.  Call your doctor promptly if your symptoms do not improve after receiving oritavancin.  Oritavancin can affect the results of certain medical tests for up to 5 days after your injection. Tell any doctor who treats you that you have been treated with oritavancin.  What happens if I miss a dose?  Since oritavancin is given in a single dose, it does not have a daily dosing schedule.  What happens if I overdose?  In a medical setting an overdose would be treated quickly.  What should I avoid after receiving oritavancin?  Avoid taking anti-diarrhea medicine without first asking your doctor. Diarrhea may be a sign of a new infection.  For at least 5 days after receiving oritavancin, avoid using heparin.  What are the possible side  that effect. Drug information contained herein may be time sensitive. [employer] information has been compiled for use by healthcare practitioners and consumers in the [place] and therefore [employer] does not warrant that uses outside of the [place] are appropriate, unless specifically indicated otherwise. [employer] drug information does not endorse drugs, diagnose patients or recommend therapy. [employer] drug information is an informational resource designed to assist licensed healthcare practitioners in caring for their patients and/or to serve consumers viewing this service as a supplement to, and not a substitute for, the expertise, skill, knowledge and judgment of healthcare practitioners. The absence of a warning for a given drug or drug combination in no way should be construed to indicate that the drug or drug combination is safe, effective or appropriate for any given patient. [facility] does not assume any responsibility for any aspect of healthcare administered with the aid of information [facility] provides. The information contained herein is not intended to cover all possible uses, directions, precautions, warnings, drug interactions, allergic reactions, or adverse effects. If you have questions about the drugs you are taking, check with your doctor, nurse or pharmacist.  Copyright 0176-9262 [facility] [employer]. Version: 5.01. Revision date: 6/15/2021.  Care instructions adapted under license by [employer]. If you have questions about a medical condition or this instruction, always ask your healthcare professional. [employer], [employer] disclaims any warranty or liability for your use of this information.

## 2024-12-05 NOTE — PROGRESS NOTES
ORBACITV 1200MG IV INFUSION STOPPED HALF WAY THROUGH COMPLETION.  FLUSHED IV WITH 20CC IV D5 PER ORDERS PT STATES SHE IS UNABLE TO SIT IN RECLINER ANY LONGER SECONDARY TO EXTREME PAIN IN LOWER EXTREMITIES. PT DID TAKE HER PAIN MEDICATION AN HOUR INTO THE INFUSION BUT STATES IT ISN'T HELPING.

## 2024-12-12 ENCOUNTER — HOSPITAL ENCOUNTER (OUTPATIENT)
Dept: WOUND CARE | Age: 54
Discharge: HOME | End: 2024-12-12
Attending: NURSE PRACTITIONER
Payer: MEDICARE

## 2024-12-12 VITALS
WEIGHT: 293 LBS | HEART RATE: 70 BPM | RESPIRATION RATE: 16 BRPM | HEIGHT: 61 IN | TEMPERATURE: 97.6 F | SYSTOLIC BLOOD PRESSURE: 142 MMHG | BODY MASS INDEX: 55.32 KG/M2 | DIASTOLIC BLOOD PRESSURE: 76 MMHG

## 2024-12-12 DIAGNOSIS — L97.912 SKIN ULCER OF RIGHT LOWER LEG WITH FAT LAYER EXPOSED (HCC): ICD-10-CM

## 2024-12-12 DIAGNOSIS — E11.65 UNCONTROLLED TYPE 2 DIABETES MELLITUS WITH HYPERGLYCEMIA, WITHOUT LONG-TERM CURRENT USE OF INSULIN (HCC): Chronic | ICD-10-CM

## 2024-12-12 DIAGNOSIS — E11.622 DIABETIC ULCER OF LEFT LOWER LEG ASSOCIATED WITH TYPE 2 DIABETES MELLITUS, WITH FAT LAYER EXPOSED (HCC): Primary | Chronic | ICD-10-CM

## 2024-12-12 DIAGNOSIS — L97.922 DIABETIC ULCER OF LEFT LOWER LEG ASSOCIATED WITH TYPE 2 DIABETES MELLITUS, WITH FAT LAYER EXPOSED (HCC): Primary | Chronic | ICD-10-CM

## 2024-12-12 PROCEDURE — 99213 OFFICE O/P EST LOW 20 MIN: CPT | Performed by: NURSE PRACTITIONER

## 2024-12-12 PROCEDURE — 99213 OFFICE O/P EST LOW 20 MIN: CPT

## 2024-12-12 RX ORDER — SODIUM HYPOCHLORITE 1.25 MG/ML
SOLUTION TOPICAL
Qty: 1000 ML | Refills: 5 | Status: SHIPPED | OUTPATIENT
Start: 2024-12-12

## 2024-12-12 ASSESSMENT — PAIN DESCRIPTION - LOCATION: LOCATION: LEG;BACK

## 2024-12-12 ASSESSMENT — PAIN SCALES - GENERAL: PAINLEVEL_OUTOF10: 5

## 2024-12-12 ASSESSMENT — PAIN - FUNCTIONAL ASSESSMENT: PAIN_FUNCTIONAL_ASSESSMENT: PREVENTS OR INTERFERES SOME ACTIVE ACTIVITIES AND ADLS

## 2024-12-12 ASSESSMENT — PAIN DESCRIPTION - PAIN TYPE: TYPE: CHRONIC PAIN

## 2024-12-12 ASSESSMENT — PAIN DESCRIPTION - ONSET: ONSET: ON-GOING

## 2024-12-12 ASSESSMENT — PAIN DESCRIPTION - FREQUENCY: FREQUENCY: INTERMITTENT

## 2024-12-12 ASSESSMENT — PAIN DESCRIPTION - DESCRIPTORS: DESCRIPTORS: CRAMPING;SHOOTING;DISCOMFORT

## 2024-12-12 NOTE — PROGRESS NOTES
Grandmother     Cancer Maternal Grandfather     Other Sister         meliton stoner    No Known Problems Son     No Known Problems Son     No Known Problems Daughter     Obesity Maternal Aunt     Other Maternal Aunt          of sepsis related to a spider bite       SOCIAL HISTORY    Social History     Tobacco Use    Smoking status: Never    Smokeless tobacco: Never   Vaping Use    Vaping status: Never Used   Substance Use Topics    Alcohol use: Not Currently     Comment: \"socially, not recently though\"    Drug use: Never       ALLERGIES    Allergies   Allergen Reactions    Compazine [Prochlorperazine] Shortness Of Breath    Ciprofloxacin Hives    Calamine Itching    Prochlorperazine Edisylate      Will discuss with patient at next visit     Tobramycin Other (See Comments)     States had corneal transplants - and was told not to    Tramadol      Will discuss with patient at next visit     Amoxicillin-Pot Clavulanate Hives, Itching and Rash    Ancef [Cefazolin] Nausea And Vomiting    Bactrim [Sulfamethoxazole-Trimethoprim] Nausea And Vomiting and Other (See Comments)     States she also gets yeast infections with it    Cephalexin Rash     22 received days of Cefepime without any ill-effect    Clindamycin/Lincomycin Rash    Codeine Nausea And Vomiting and Rash    Demerol Hives    Doxycycline Nausea And Vomiting    Hydroxyzine Hcl Itching    Ibuprofen Nausea Only    Meperidine Hives    Moxifloxacin Nausea And Vomiting    Nortriptyline Hives, Itching and Rash    Orphenadrine Citrate Itching    Penicillins Hives and Nausea Only    Vistaril [Hydroxyzine Hcl] Itching       MEDICATIONS    Current Outpatient Medications on File Prior to Encounter   Medication Sig Dispense Refill    gabapentin (NEURONTIN) 300 MG capsule Take 1 capsule by mouth daily for 30 days. May fill 24  (Patient takes at 4 pm) 30 capsule 0    gabapentin (NEURONTIN) 600 MG tablet Take 1 tablet by mouth 3 times daily for 30 days. May fill

## 2024-12-12 NOTE — HOME CARE
being seen by Home Health: [] Yes   [x] No    Duration for needed supplies:  []15  [x]30  []60  []90 Days    Electronically signed by NANETTE James - CNP on 12/12/2024 at 4:37 PM     Provider Information:      PROVIDER'S NAME: Jina FITZPATRICK    NPI: [id number]  Dispense as written

## 2024-12-12 NOTE — PLAN OF CARE
Problem: Pain  Goal: Verbalizes/displays adequate comfort level or baseline comfort level  Outcome: Progressing     Problem: Wound:  Goal: Will show signs of wound healing; wound closure and no evidence of infection  Description: Will show signs of wound healing; wound closure and no evidence of infection  Outcome: Progressing     Problem: Venous:  Goal: Signs of wound healing will improve  Description: Signs of wound healing will improve  Outcome: Progressing     Problem: Compression therapy:  Goal: Will be free from complications associated with compression therapy  Description: Will be free from complications associated with compression therapy  Outcome: Progressing     Problem: Weight control:  Goal: Ability to maintain an optimal weight for height and age will be supported  Description: Ability to maintain an optimal weight for height and age will be supported  Outcome: Progressing     Problem: Falls - Risk of:  Goal: Will remain free from falls  Description: Will remain free from falls  Outcome: Progressing     Problem: Blood Glucose:  Goal: Ability to maintain appropriate glucose levels will improve  Description: Ability to maintain appropriate glucose levels will improve  Outcome: Progressing

## 2024-12-12 NOTE — DISCHARGE INSTRUCTIONS
[facility]   Physician Orders and Discharge Instructions  [address] [place] [address] Drive  [address]  [place] [address]  Telephone: [phone]      FAX [phone]    NAME:  Tianna Trivedi  YOB: 1970  MEDICAL RECORD NUMBER:  [id number]  DATE:  12/12/2024    Discharge condition: Stable    Discharge to: Home    Left via:Private automobile    Accompanied by:  spouse    ECF/HHA: [facility]     Dressing Orders:  Left leg and right leg wounds: soap and water wash, apply Hydrofera Blue, secure with Drymax extra, rolled gauze, apply compression and change daily    Treatment Orders:  Protein rich diet (unless restricted by your physician); Multivitamin daily; Elevate legs above the level of your heart when sitting 3-4 times daily for at least one hour each time, avoid standing for long periods of time.  Use nystatin, antibacterial ointment, steroid ointment, and zinc ointment to help dry areas on skin    WCC follow up visit __4 weeks with Jina___________________________  (Please note your next appointment above and if you are unable to keep, kindly give a 24 hour notice. Thank you.)          If you experience any of the following, please call the Wound Care Center during business hours:    * Increase in Pain  * Temperature over 101  * Increase in drainage from your wound  * Drainage with a foul odor  * Bleeding  * Increase in swelling  * Need for compression bandage changes due to slippage, breakthrough drainage.    If you need medical attention outside of the business hours of the Wound Care Centers please contact your PCP or go to the nearest emergency room.

## 2024-12-12 NOTE — PATIENT INSTRUCTIONS
[facility] and Hyper[facility] Oxygen Therapy   Physician Orders and Discharge Instructions  [address] [place] [address] Drive  [address]  [place] [address]  Telephone: [phone]      FAX [phone]    NAME:  Tianna Trivedi  YOB: 1970  MEDICAL RECORD NUMBER:  [id number]  DATE:  12/12/2024    Discharge condition: Stable    Discharge to: Home    Left via:Private automobile    Accompanied by:  spouse    ECF/HHA: [facility]     Dressing Orders:  Left leg and right leg wounds: soap and water wash, apply Hydrofera Blue, secure with Drymax extra, rolled gauze, apply compression and change daily    Treatment Orders:  Protein rich diet (unless restricted by your physician); Multivitamin daily; Elevate legs above the level of your heart when sitting 3-4 times daily for at least one hour each time, avoid standing for long periods of time.  Use nystatin, antibacterial ointment, steroid ointment, and zinc ointment to help dry areas on skin    WCC follow up visit __4 weeks with Jina___________________________  (Please note your next appointment above and if you are unable to keep, kindly give a 24 hour notice. Thank you.)          If you experience any of the following, please call the Wound Care Center during business hours:    * Increase in Pain  * Temperature over 101  * Increase in drainage from your wound  * Drainage with a foul odor  * Bleeding  * Increase in swelling  * Need for compression bandage changes due to slippage, breakthrough drainage.    If you need medical attention outside of the business hours of the Wound Care Centers please contact your PCP or go to the nearest emergency room.

## 2024-12-19 DIAGNOSIS — G89.4 CHRONIC PAIN SYNDROME: ICD-10-CM

## 2024-12-22 RX ORDER — GABAPENTIN 300 MG/1
300 CAPSULE ORAL DAILY
Qty: 30 CAPSULE | Refills: 0 | Status: SHIPPED | OUTPATIENT
Start: 2024-12-30 | End: 2025-01-29

## 2024-12-22 RX ORDER — OXYCODONE AND ACETAMINOPHEN 7.5; 325 MG/1; MG/1
1 TABLET ORAL
Qty: 90 TABLET | Refills: 0 | Status: SHIPPED | OUTPATIENT
Start: 2024-12-26 | End: 2025-01-25

## 2024-12-22 RX ORDER — GABAPENTIN 600 MG/1
600 TABLET ORAL 3 TIMES DAILY
Qty: 90 TABLET | Refills: 0 | Status: SHIPPED | OUTPATIENT
Start: 2024-12-30 | End: 2025-01-29

## 2024-12-22 RX ORDER — MORPHINE SULFATE 15 MG/1
TABLET, FILM COATED, EXTENDED RELEASE ORAL
Qty: 90 TABLET | Refills: 0 | Status: SHIPPED | OUTPATIENT
Start: 2025-01-09 | End: 2025-02-08

## 2025-01-09 ENCOUNTER — HOSPITAL ENCOUNTER (OUTPATIENT)
Dept: WOUND CARE | Age: 55
Discharge: HOME | End: 2025-01-09
Attending: NURSE PRACTITIONER
Payer: MEDICARE

## 2025-01-09 VITALS
RESPIRATION RATE: 16 BRPM | DIASTOLIC BLOOD PRESSURE: 85 MMHG | SYSTOLIC BLOOD PRESSURE: 136 MMHG | TEMPERATURE: 97 F | HEART RATE: 67 BPM

## 2025-01-09 DIAGNOSIS — M86.9 TOE OSTEOMYELITIS, LEFT: ICD-10-CM

## 2025-01-09 DIAGNOSIS — I89.0 LYMPHEDEMA OF BOTH LOWER EXTREMITIES: ICD-10-CM

## 2025-01-09 DIAGNOSIS — E11.622 DIABETIC ULCER OF LEFT LOWER LEG ASSOCIATED WITH TYPE 2 DIABETES MELLITUS, WITH FAT LAYER EXPOSED (HCC): ICD-10-CM

## 2025-01-09 DIAGNOSIS — L97.422 DIABETIC ULCER OF LEFT HEEL ASSOCIATED WITH TYPE 2 DIABETES MELLITUS, WITH FAT LAYER EXPOSED (HCC): Primary | ICD-10-CM

## 2025-01-09 DIAGNOSIS — E11.621 DIABETIC ULCER OF LEFT HEEL ASSOCIATED WITH TYPE 2 DIABETES MELLITUS, WITH FAT LAYER EXPOSED (HCC): Primary | ICD-10-CM

## 2025-01-09 DIAGNOSIS — G89.4 CHRONIC PAIN SYNDROME: ICD-10-CM

## 2025-01-09 DIAGNOSIS — L97.922 DIABETIC ULCER OF LEFT LOWER LEG ASSOCIATED WITH TYPE 2 DIABETES MELLITUS, WITH FAT LAYER EXPOSED (HCC): ICD-10-CM

## 2025-01-09 DIAGNOSIS — L97.912 SKIN ULCER OF RIGHT LOWER LEG WITH FAT LAYER EXPOSED (HCC): ICD-10-CM

## 2025-01-09 PROCEDURE — 99213 OFFICE O/P EST LOW 20 MIN: CPT

## 2025-01-09 PROCEDURE — 99213 OFFICE O/P EST LOW 20 MIN: CPT | Performed by: NURSE PRACTITIONER

## 2025-01-09 RX ORDER — SILVER SULFADIAZINE 10 MG/G
CREAM TOPICAL ONCE
OUTPATIENT
Start: 2025-01-09 | End: 2025-01-09

## 2025-01-09 RX ORDER — MUPIROCIN 20 MG/G
OINTMENT TOPICAL ONCE
OUTPATIENT
Start: 2025-01-09 | End: 2025-01-09

## 2025-01-09 RX ORDER — TRIAMCINOLONE ACETONIDE 1 MG/G
OINTMENT TOPICAL ONCE
OUTPATIENT
Start: 2025-01-09 | End: 2025-01-09

## 2025-01-09 RX ORDER — BACITRACIN ZINC AND POLYMYXIN B SULFATE 500; 1000 [USP'U]/G; [USP'U]/G
OINTMENT TOPICAL ONCE
OUTPATIENT
Start: 2025-01-09 | End: 2025-01-09

## 2025-01-09 RX ORDER — SODIUM CHLOR/HYPOCHLOROUS ACID 0.033 %
SOLUTION, IRRIGATION IRRIGATION ONCE
OUTPATIENT
Start: 2025-01-09 | End: 2025-01-09

## 2025-01-09 RX ORDER — LIDOCAINE HYDROCHLORIDE 40 MG/ML
SOLUTION TOPICAL ONCE
OUTPATIENT
Start: 2025-01-09 | End: 2025-01-09

## 2025-01-09 RX ORDER — LIDOCAINE 40 MG/G
CREAM TOPICAL ONCE
OUTPATIENT
Start: 2025-01-09 | End: 2025-01-09

## 2025-01-09 RX ORDER — GINSENG 100 MG
CAPSULE ORAL ONCE
OUTPATIENT
Start: 2025-01-09 | End: 2025-01-09

## 2025-01-09 RX ORDER — LIDOCAINE 50 MG/G
OINTMENT TOPICAL ONCE
OUTPATIENT
Start: 2025-01-09 | End: 2025-01-09

## 2025-01-09 RX ORDER — CLOBETASOL PROPIONATE 0.5 MG/G
OINTMENT TOPICAL ONCE
OUTPATIENT
Start: 2025-01-09 | End: 2025-01-09

## 2025-01-09 RX ORDER — LIDOCAINE HYDROCHLORIDE 20 MG/ML
JELLY TOPICAL ONCE
OUTPATIENT
Start: 2025-01-09 | End: 2025-01-09

## 2025-01-09 RX ORDER — BETAMETHASONE DIPROPIONATE 0.5 MG/G
CREAM TOPICAL ONCE
OUTPATIENT
Start: 2025-01-09 | End: 2025-01-09

## 2025-01-09 RX ORDER — NEOMYCIN/BACITRACIN/POLYMYXINB 3.5-400-5K
OINTMENT (GRAM) TOPICAL ONCE
OUTPATIENT
Start: 2025-01-09 | End: 2025-01-09

## 2025-01-09 RX ORDER — GENTAMICIN SULFATE 1 MG/G
OINTMENT TOPICAL ONCE
OUTPATIENT
Start: 2025-01-09 | End: 2025-01-09

## 2025-01-09 ASSESSMENT — PAIN DESCRIPTION - LOCATION: LOCATION: LEG

## 2025-01-09 ASSESSMENT — PAIN SCALES - GENERAL: PAINLEVEL_OUTOF10: 5

## 2025-01-09 ASSESSMENT — PAIN DESCRIPTION - ONSET: ONSET: ON-GOING

## 2025-01-09 ASSESSMENT — PAIN DESCRIPTION - FREQUENCY: FREQUENCY: INTERMITTENT

## 2025-01-09 ASSESSMENT — PAIN DESCRIPTION - DESCRIPTORS: DESCRIPTORS: ACHING;SORE

## 2025-01-09 ASSESSMENT — PAIN DESCRIPTION - ORIENTATION: ORIENTATION: RIGHT;LEFT

## 2025-01-09 ASSESSMENT — PAIN DESCRIPTION - PAIN TYPE: TYPE: CHRONIC PAIN

## 2025-01-09 ASSESSMENT — PAIN - FUNCTIONAL ASSESSMENT: PAIN_FUNCTIONAL_ASSESSMENT: PREVENTS OR INTERFERES SOME ACTIVE ACTIVITIES AND ADLS

## 2025-01-09 NOTE — PATIENT INSTRUCTIONS
[facility] Wound Care and Hyperbaric Oxygen Therapy   Physician Orders and Discharge Instructions  [address] [place] [address] Drive  [address]  [place] [address]  Telephone: [phone]      FAX [phone]    NAME:  Tianna Trivedi  YOB: 1970  MEDICAL RECORD NUMBER:  [id number]  DATE:  1/9/2025    Discharge condition: Stable    Discharge to: Home    Left via:Private automobile    Accompanied by:  spouse    ECF/HHA: [facility] ([facility])     Dressing Orders:  Left leg and right leg wounds: soap and water wash, apply secure with Drymax extra, rolled gauze, apply compression and change daily    Treatment Orders:  Protein rich diet (unless restricted by your physician); Multivitamin daily; Elevate legs above the level of your heart when sitting 3-4 times daily for at least one hour each time, avoid standing for long periods of time.  Use nystatin, antibacterial ointment, steroid ointment, and zinc ointment to help dry areas on skin    WCC follow up visit __4 weeks with Jina___________________________  (Please note your next appointment above and if you are unable to keep, kindly give a 24 hour notice. Thank you.)          If you experience any of the following, please call the Wound Care Center during business hours:    * Increase in Pain  * Temperature over 101  * Increase in drainage from your wound  * Drainage with a foul odor  * Bleeding  * Increase in swelling  * Need for compression bandage changes due to slippage, breakthrough drainage.    If you need medical attention outside of the business hours of the Wound Care Centers please contact your PCP or go to the nearest emergency room.

## 2025-01-09 NOTE — HOME CARE
Wound Care Supplies      Supply Company:     [employer] [id number] [place] [address] p: [phone] f: [phone]     Ordering Center:     [facility]  [address] [place] [address] [address]  [facility] [id number] [phone]  WOUND CARE Dept: [phone]   FAX NUMBER [phone]    Patient Information:      Wai Lamar  [address]  [place] [address] [phone]   : 1970  AGE: 54 y.o.     GENDER: female   EPISODE DATE: 2025    Insurance:      PRIMARY INSURANCE:  Plan: [employer] FULL DUAL ADVANTAGE 2  Coverage: BCBS MEDICARE  Effective Date: 2025  Group Number: [unfilled]  Subscriber Number: [id number] - (Medicare Managed)    Payer/Plan Subscr  Sex Relation Sub. Ins. ID Effective Group Num   1. BCBS MEDICARE* WAI 1970 Female Self [id number]                                      2. MEDICAID [place] -* WAI LAMAR 1970 Female Self [id number]                                    [address]       Patient Wound Information:      Problem List Items Addressed This Visit          Endocrine    * (Principal) Diabetic ulcer of left lower leg associated with type 2 diabetes mellitus, with fat layer exposed (HCC) (Chronic)    Relevant Orders    Initiate Outpatient Wound Care Protocol    Diabetic ulcer of left heel associated with type 2 diabetes mellitus, with fat layer exposed (HCC) - Primary    Relevant Orders    Initiate Outpatient Wound Care Protocol       Other    Lymphedema of both lower extremities    Relevant Orders    Initiate Outpatient Wound Care Protocol    Toe osteomyelitis, left    Relevant Orders    Initiate Outpatient Wound Care Protocol    Skin ulcer of right lower leg with fat layer exposed (HCC)    Relevant Orders    Initiate Outpatient Wound Care Protocol       WOUNDS REQUIRING DRESSING SUPPLIES:     Wound 24 Tibial Posterior;Left wound 1-left leg posterior venous (Active)

## 2025-01-09 NOTE — PROGRESS NOTES
[facility]   Progress Note and Procedure Note      Tianna Trivedi  MEDICAL RECORD NUMBER:  [id number]  AGE: 54 y.o.   GENDER: female  : 1970  EPISODE DATE:  2025    Subjective:     Chief Complaint   Patient presents with    Wound Check     Right and left leg wounds         HISTORY of PRESENT ILLNESS HPI     Tianna Trivedi is a 54 y.o. female who presents today for wound/ulcer evaluation.   History of Wound Context: left leg and right leg wound follow up/eval and treat    Ulcer Identification:  Ulcer Type: venous  Contributing Factors: edema, lymphedema, and diabetes    Wound: N/A        PAST MEDICAL HISTORY        Diagnosis Date    Anemia     Anxiety     Bipolar 1 disorder (HCC)     Chronic acquired lymphedema     Constipation     Depression     Depression     GERD (gastroesophageal reflux disease)     Headache(784.0)     Hyperlipidemia     Hypertension     Kidney stones     Kidney stones     Morbid obesity due to excess calories     Neuromuscular disorder (HCC)     Primary osteoarthritis involving multiple joints 2024    Restless legs syndrome     Shingles     Type 2 diabetes mellitus (HCC)        PAST SURGICAL HISTORY    Past Surgical History:   Procedure Laterality Date    BACK SURGERY      ECTOPIC PREGNANCY SURGERY  2002    EYE SURGERY Bilateral      & : cornea transplant and cataracts removed    FOOT SURGERY Bilateral     Heels    KNEE CARTILAGE SURGERY Left 2016    KNEE ARTHROSCOPIC PARTIAL MEDIAL MENISCECTOMY performed by Russell Serrano MD at [facility] OR    TUBAL LIGATION         FAMILY HISTORY    Family History   Problem Relation Age of Onset    Obesity Mother     Arthritis Mother         has had knees replaced    Cancer Father         \"adrenal gland & lung\"    Parkinsonism Father     Cancer Maternal Grandmother     COPD Maternal Grandmother     Obesity Maternal Grandmother     Heart Failure Maternal Grandmother     Cancer Maternal Grandfather     Other

## 2025-01-09 NOTE — TELEPHONE ENCOUNTER
Script sent incorrectly to [facility].  Sending to [facility] per patient's request.

## 2025-01-14 RX ORDER — MORPHINE SULFATE 15 MG/1
TABLET, FILM COATED, EXTENDED RELEASE ORAL
Qty: 90 TABLET | Refills: 0 | Status: SHIPPED | OUTPATIENT
Start: 2025-01-14 | End: 2025-02-13

## 2025-01-21 DIAGNOSIS — G89.4 CHRONIC PAIN SYNDROME: ICD-10-CM

## 2025-01-21 RX ORDER — OXYCODONE AND ACETAMINOPHEN 7.5; 325 MG/1; MG/1
1 TABLET ORAL
Qty: 90 TABLET | Refills: 0 | Status: SHIPPED | OUTPATIENT
Start: 2025-01-25 | End: 2025-02-24

## 2025-02-03 DIAGNOSIS — G89.4 CHRONIC PAIN SYNDROME: ICD-10-CM

## 2025-02-04 RX ORDER — MORPHINE SULFATE 15 MG/1
TABLET, FILM COATED, EXTENDED RELEASE ORAL
Qty: 90 TABLET | Refills: 0 | Status: SHIPPED | OUTPATIENT
Start: 2025-02-13 | End: 2025-03-15

## 2025-02-04 RX ORDER — GABAPENTIN 300 MG/1
300 CAPSULE ORAL DAILY
Qty: 30 CAPSULE | Refills: 0 | Status: SHIPPED | OUTPATIENT
Start: 2025-02-04 | End: 2025-03-06

## 2025-02-04 RX ORDER — GABAPENTIN 600 MG/1
600 TABLET ORAL 3 TIMES DAILY
Qty: 90 TABLET | Refills: 0 | Status: SHIPPED | OUTPATIENT
Start: 2025-02-04 | End: 2025-03-06

## 2025-02-04 RX ORDER — OXYCODONE AND ACETAMINOPHEN 7.5; 325 MG/1; MG/1
1 TABLET ORAL
Qty: 90 TABLET | Refills: 0 | Status: SHIPPED | OUTPATIENT
Start: 2025-02-24 | End: 2025-03-26

## 2025-02-06 ENCOUNTER — HOSPITAL ENCOUNTER (OUTPATIENT)
Dept: WOUND CARE | Age: 55
Discharge: HOME | End: 2025-02-06
Attending: NURSE PRACTITIONER
Payer: MEDICARE

## 2025-02-06 VITALS
WEIGHT: 281.6 LBS | TEMPERATURE: 96.9 F | HEIGHT: 61 IN | HEART RATE: 65 BPM | DIASTOLIC BLOOD PRESSURE: 76 MMHG | SYSTOLIC BLOOD PRESSURE: 121 MMHG | BODY MASS INDEX: 53.17 KG/M2 | RESPIRATION RATE: 18 BRPM

## 2025-02-06 DIAGNOSIS — I89.0 LYMPHEDEMA OF BOTH LOWER EXTREMITIES: Primary | ICD-10-CM

## 2025-02-06 DIAGNOSIS — M86.9 TOE OSTEOMYELITIS, LEFT: ICD-10-CM

## 2025-02-06 DIAGNOSIS — L97.422 DIABETIC ULCER OF LEFT HEEL ASSOCIATED WITH TYPE 2 DIABETES MELLITUS, WITH FAT LAYER EXPOSED (HCC): ICD-10-CM

## 2025-02-06 DIAGNOSIS — L97.922 DIABETIC ULCER OF LEFT LOWER LEG ASSOCIATED WITH TYPE 2 DIABETES MELLITUS, WITH FAT LAYER EXPOSED (HCC): Chronic | ICD-10-CM

## 2025-02-06 DIAGNOSIS — E11.621 DIABETIC ULCER OF LEFT HEEL ASSOCIATED WITH TYPE 2 DIABETES MELLITUS, WITH FAT LAYER EXPOSED (HCC): ICD-10-CM

## 2025-02-06 DIAGNOSIS — E11.622 DIABETIC ULCER OF LEFT LOWER LEG ASSOCIATED WITH TYPE 2 DIABETES MELLITUS, WITH FAT LAYER EXPOSED (HCC): Chronic | ICD-10-CM

## 2025-02-06 DIAGNOSIS — L97.912 SKIN ULCER OF RIGHT LOWER LEG WITH FAT LAYER EXPOSED (HCC): ICD-10-CM

## 2025-02-06 DIAGNOSIS — E11.65 UNCONTROLLED TYPE 2 DIABETES MELLITUS WITH HYPERGLYCEMIA, WITHOUT LONG-TERM CURRENT USE OF INSULIN (HCC): Chronic | ICD-10-CM

## 2025-02-06 PROCEDURE — 99213 OFFICE O/P EST LOW 20 MIN: CPT | Performed by: NURSE PRACTITIONER

## 2025-02-06 PROCEDURE — 99213 OFFICE O/P EST LOW 20 MIN: CPT

## 2025-02-06 RX ORDER — TRIAMCINOLONE ACETONIDE 1 MG/G
OINTMENT TOPICAL ONCE
OUTPATIENT
Start: 2025-02-06 | End: 2025-02-06

## 2025-02-06 RX ORDER — MUPIROCIN 20 MG/G
OINTMENT TOPICAL ONCE
OUTPATIENT
Start: 2025-02-06 | End: 2025-02-06

## 2025-02-06 RX ORDER — LIDOCAINE HYDROCHLORIDE 20 MG/ML
JELLY TOPICAL ONCE
OUTPATIENT
Start: 2025-02-06 | End: 2025-02-06

## 2025-02-06 RX ORDER — LIDOCAINE 40 MG/G
CREAM TOPICAL ONCE
OUTPATIENT
Start: 2025-02-06 | End: 2025-02-06

## 2025-02-06 RX ORDER — LIDOCAINE HYDROCHLORIDE 40 MG/ML
SOLUTION TOPICAL ONCE
OUTPATIENT
Start: 2025-02-06 | End: 2025-02-06

## 2025-02-06 RX ORDER — GENTAMICIN SULFATE 1 MG/G
OINTMENT TOPICAL ONCE
OUTPATIENT
Start: 2025-02-06 | End: 2025-02-06

## 2025-02-06 RX ORDER — SILVER SULFADIAZINE 10 MG/G
CREAM TOPICAL ONCE
OUTPATIENT
Start: 2025-02-06 | End: 2025-02-06

## 2025-02-06 RX ORDER — GINSENG 100 MG
CAPSULE ORAL ONCE
OUTPATIENT
Start: 2025-02-06 | End: 2025-02-06

## 2025-02-06 RX ORDER — CLOBETASOL PROPIONATE 0.5 MG/G
OINTMENT TOPICAL ONCE
OUTPATIENT
Start: 2025-02-06 | End: 2025-02-06

## 2025-02-06 RX ORDER — FLUCONAZOLE 100 MG/1
200 TABLET ORAL DAILY
Qty: 14 TABLET | Refills: 0 | Status: SHIPPED | OUTPATIENT
Start: 2025-02-06 | End: 2025-02-13

## 2025-02-06 RX ORDER — BETAMETHASONE DIPROPIONATE 0.5 MG/G
CREAM TOPICAL ONCE
OUTPATIENT
Start: 2025-02-06 | End: 2025-02-06

## 2025-02-06 RX ORDER — LIDOCAINE 50 MG/G
OINTMENT TOPICAL ONCE
OUTPATIENT
Start: 2025-02-06 | End: 2025-02-06

## 2025-02-06 RX ORDER — NEOMYCIN/BACITRACIN/POLYMYXINB 3.5-400-5K
OINTMENT (GRAM) TOPICAL ONCE
OUTPATIENT
Start: 2025-02-06 | End: 2025-02-06

## 2025-02-06 RX ORDER — BACITRACIN ZINC AND POLYMYXIN B SULFATE 500; 1000 [USP'U]/G; [USP'U]/G
OINTMENT TOPICAL ONCE
OUTPATIENT
Start: 2025-02-06 | End: 2025-02-06

## 2025-02-06 RX ORDER — SODIUM CHLOR/HYPOCHLOROUS ACID 0.033 %
SOLUTION, IRRIGATION IRRIGATION ONCE
OUTPATIENT
Start: 2025-02-06 | End: 2025-02-06

## 2025-02-06 ASSESSMENT — PAIN DESCRIPTION - ORIENTATION: ORIENTATION: RIGHT;LEFT

## 2025-02-06 ASSESSMENT — PAIN DESCRIPTION - LOCATION: LOCATION: FOOT;LEG

## 2025-02-06 ASSESSMENT — PAIN - FUNCTIONAL ASSESSMENT: PAIN_FUNCTIONAL_ASSESSMENT: PREVENTS OR INTERFERES SOME ACTIVE ACTIVITIES AND ADLS

## 2025-02-06 ASSESSMENT — PAIN SCALES - GENERAL: PAINLEVEL_OUTOF10: 6

## 2025-02-06 ASSESSMENT — PAIN DESCRIPTION - DESCRIPTORS: DESCRIPTORS: ACHING;SORE;TENDER

## 2025-02-06 ASSESSMENT — PAIN DESCRIPTION - PAIN TYPE: TYPE: CHRONIC PAIN

## 2025-02-06 ASSESSMENT — PAIN DESCRIPTION - ONSET: ONSET: ON-GOING

## 2025-02-06 ASSESSMENT — PAIN DESCRIPTION - FREQUENCY: FREQUENCY: INTERMITTENT

## 2025-02-06 NOTE — PATIENT INSTRUCTIONS
[facility] Hyperbar[facility] Oxygen Therapy   Physician Orders and Discharge Instructions  [address] [place] [address] Drive  [address]  [place] [address]  Telephone: [phone]      FAX [phone]    NAME:  Tianna Trivedi  YOB: 1970  MEDICAL RECORD NUMBER:  [id number]  DATE:  2/6/2025    Discharge condition: Stable    Discharge to: Home    Left via:Private automobile    Accompanied by:  spouse    ECF/HHA: [facility])    Take Diflucan as directed     Dressing Orders:  Left leg and right leg wounds: soap and water wash, apply secure with Drymax extra, rolled gauze, apply compression and change daily    Treatment Orders:  Protein rich diet (unless restricted by your physician); Multivitamin daily; Elevate legs above the level of your heart when sitting 3-4 times daily for at least one hour each time, avoid standing for long periods of time.  Use nystatin, triple antibiotic ointment to help dry areas on skin    WCC follow up visit __3 weeks with Jina___________________________  (Please note your next appointment above and if you are unable to keep, kindly give a 24 hour notice. Thank you.)          If you experience any of the following, please call the Wound Care Center during business hours:    * Increase in Pain  * Temperature over 101  * Increase in drainage from your wound  * Drainage with a foul odor  * Bleeding  * Increase in swelling  * Need for compression bandage changes due to slippage, breakthrough drainage.    If you need medical attention outside of the business hours of the Wound Care Centers please contact your PCP or go to the nearest emergency room.

## 2025-02-06 NOTE — PROGRESS NOTES
[facility]   Progress Note and Procedure Note      Tianna Trivedi  MEDICAL RECORD NUMBER:  [id number]  AGE: 54 y.o.   GENDER: female  : 1970  EPISODE DATE:  2025    Subjective:     Chief Complaint   Patient presents with    Wound Check     Patient presents today for recheck bilateral leg wounds. Patient is asking if she can get new velcro compression wraps. Patient is also asking for Diflucan. States she has yeast behind he knees.         HISTORY of PRESENT ILLNESS HPI     Tianna Trivedi is a 54 y.o. female who presents today for wound/ulcer evaluation.   History of Wound Context: bilateral leg wound follow up/eval and treat    Ulcer Identification:  Ulcer Type: venous  Contributing Factors: lymphedema, diabetes, poor glucose control, obesity, and possible infection?    Wound: N/A        PAST MEDICAL HISTORY        Diagnosis Date    Anemia     Anxiety     Bipolar 1 disorder (HCC)     Chronic acquired lymphedema     Constipation     Depression     Depression     GERD (gastroesophageal reflux disease)     Headache(784.0)     Hyperlipidemia     Hypertension     Kidney stones     Kidney stones     Morbid obesity due to excess calories     Neuromuscular disorder (HCC)     Primary osteoarthritis involving multiple joints 2024    Restless legs syndrome     Shingles     Type 2 diabetes mellitus (HCC)        PAST SURGICAL HISTORY    Past Surgical History:   Procedure Laterality Date    BACK SURGERY      ECTOPIC PREGNANCY SURGERY  2002    EYE SURGERY Bilateral      & : cornea transplant and cataracts removed    FOOT SURGERY Bilateral     Heels    KNEE CARTILAGE SURGERY Left 2016    KNEE ARTHROSCOPIC PARTIAL MEDIAL MENISCECTOMY performed by Russell Serrano MD at [facility] OR    TUBAL LIGATION         FAMILY HISTORY    Family History   Problem Relation Age of Onset    Obesity Mother     Arthritis Mother         has had knees replaced    Cancer Father         \"adrenal gland &

## 2025-02-06 NOTE — HOME CARE
02/06/25 1458   Wound Etiology Venous 02/06/25 1458   Dressing Status Old drainage noted 02/06/25 1458   Wound Cleansed Soap and water 02/06/25 1458   Dressing/Treatment ABD;Alginate with Ag;Ace wrap 01/09/25 1444   Wound Length (cm) 4.3 cm 02/06/25 1458   Wound Width (cm) 2.9 cm 02/06/25 1458   Wound Depth (cm) 0.7 cm 02/06/25 1458   Wound Surface Area (cm^2) 12.47 cm^2 02/06/25 1458   Change in Wound Size % (l*w) -149.4 02/06/25 1458   Wound Volume (cm^3) 8.729 cm^3 02/06/25 1458   Wound Healing % -773 02/06/25 1458   Distance Tunneling (cm) 0 cm 02/06/25 1458   Tunneling Position ___ O'Clock 0 02/06/25 1458   Undermining Starts ___ O'Clock 0 02/06/25 1458   Undermining Ends___ O'Clock 0 02/06/25 1458   Undermining Maxium Distance (cm) 0 02/06/25 1458   Wound Assessment Pink/red;Slough 02/06/25 1458   Drainage Amount Moderate (25-50%) 02/06/25 1458   Drainage Description Serous;Yellow 02/06/25 1458   Odor None 02/06/25 1458   Octavia-wound Assessment Intact 02/06/25 1458   Margins Attached edges;Defined edges 02/06/25 1458   Wound Thickness Description not for Pressure Injury Full thickness 02/06/25 1458   Number of days: 219       Wound 07/02/24 Pretibial Left;Lower wound 3-left leg medial anterior (Active)   Wound Image   02/06/25 1458   Wound Etiology Venous 02/06/25 1458   Dressing Status Old drainage noted 02/06/25 1458   Wound Cleansed Soap and water 02/06/25 1458   Dressing/Treatment Alginate with Ag;ABD;Ace wrap 01/09/25 1444   Wound Length (cm) 1.7 cm 02/06/25 1458   Wound Width (cm) 1.4 cm 02/06/25 1458   Wound Depth (cm) 0.2 cm 02/06/25 1458   Wound Surface Area (cm^2) 2.38 cm^2 02/06/25 1458   Change in Wound Size % (l*w) 20.67 02/06/25 1458   Wound Volume (cm^3) 0.476 cm^3 02/06/25 1458   Wound Healing % -59 02/06/25 1458   Distance Tunneling (cm) 0 cm 02/06/25 1458   Tunneling Position ___ O'Clock 0 02/06/25 1458   Undermining Starts ___ O'Clock 0 02/06/25 1458   Undermining Ends___ O'Clock 0 02/06/25

## 2025-02-13 DIAGNOSIS — G89.4 CHRONIC PAIN SYNDROME: ICD-10-CM

## 2025-02-18 DIAGNOSIS — G89.4 CHRONIC PAIN SYNDROME: ICD-10-CM

## 2025-02-18 RX ORDER — MORPHINE SULFATE 15 MG/1
TABLET, FILM COATED, EXTENDED RELEASE ORAL
Qty: 90 TABLET | Refills: 0 | Status: SHIPPED | OUTPATIENT
Start: 2025-02-18 | End: 2025-03-20

## 2025-02-19 RX ORDER — GABAPENTIN 600 MG/1
600 TABLET ORAL 3 TIMES DAILY
Qty: 90 TABLET | Refills: 0 | Status: SHIPPED | OUTPATIENT
Start: 2025-03-06 | End: 2025-04-05

## 2025-02-19 RX ORDER — GABAPENTIN 300 MG/1
300 CAPSULE ORAL DAILY
Qty: 30 CAPSULE | Refills: 0 | Status: SHIPPED | OUTPATIENT
Start: 2025-03-06 | End: 2025-04-05

## 2025-02-25 ENCOUNTER — HOSPITAL ENCOUNTER (OUTPATIENT)
Dept: PAIN MANAGEMENT | Age: 55
Discharge: HOME | End: 2025-02-25
Payer: MEDICARE

## 2025-02-25 VITALS
TEMPERATURE: 97 F | DIASTOLIC BLOOD PRESSURE: 71 MMHG | OXYGEN SATURATION: 96 % | HEIGHT: 62 IN | WEIGHT: 286 LBS | SYSTOLIC BLOOD PRESSURE: 105 MMHG | HEART RATE: 70 BPM | RESPIRATION RATE: 16 BRPM | BODY MASS INDEX: 52.63 KG/M2

## 2025-02-25 DIAGNOSIS — N18.30 STAGE 3 CHRONIC KIDNEY DISEASE, UNSPECIFIED WHETHER STAGE 3A OR 3B CKD (HCC): ICD-10-CM

## 2025-02-25 DIAGNOSIS — N18.30 TYPE 2 DIABETES MELLITUS WITH STAGE 3 CHRONIC KIDNEY DISEASE, WITH LONG-TERM CURRENT USE OF INSULIN, UNSPECIFIED WHETHER STAGE 3A OR 3B CKD (HCC): ICD-10-CM

## 2025-02-25 DIAGNOSIS — I89.0 LYMPHEDEMA OF BOTH LOWER EXTREMITIES: ICD-10-CM

## 2025-02-25 DIAGNOSIS — G89.4 CHRONIC PAIN SYNDROME: ICD-10-CM

## 2025-02-25 DIAGNOSIS — Z79.899 POLYPHARMACY: ICD-10-CM

## 2025-02-25 DIAGNOSIS — G89.4 CHRONIC PAIN SYNDROME: Primary | ICD-10-CM

## 2025-02-25 DIAGNOSIS — M15.0 PRIMARY OSTEOARTHRITIS INVOLVING MULTIPLE JOINTS: ICD-10-CM

## 2025-02-25 DIAGNOSIS — E66.01 MORBID OBESITY WITH BMI OF 50.0-59.9, ADULT: ICD-10-CM

## 2025-02-25 DIAGNOSIS — Z51.81 ENCOUNTER FOR MONITORING OPIOID MAINTENANCE THERAPY: ICD-10-CM

## 2025-02-25 DIAGNOSIS — E11.22 TYPE 2 DIABETES MELLITUS WITH STAGE 3 CHRONIC KIDNEY DISEASE, WITH LONG-TERM CURRENT USE OF INSULIN, UNSPECIFIED WHETHER STAGE 3A OR 3B CKD (HCC): ICD-10-CM

## 2025-02-25 DIAGNOSIS — Z79.891 ENCOUNTER FOR MONITORING OPIOID MAINTENANCE THERAPY: ICD-10-CM

## 2025-02-25 DIAGNOSIS — Z79.4 TYPE 2 DIABETES MELLITUS WITH STAGE 3 CHRONIC KIDNEY DISEASE, WITH LONG-TERM CURRENT USE OF INSULIN, UNSPECIFIED WHETHER STAGE 3A OR 3B CKD (HCC): ICD-10-CM

## 2025-02-25 PROBLEM — N18.4 TYPE 2 DIABETES MELLITUS WITH STAGE 4 CHRONIC KIDNEY DISEASE, WITH LONG-TERM CURRENT USE OF INSULIN (HCC): Status: ACTIVE | Noted: 2018-06-25

## 2025-02-25 PROCEDURE — 99215 OFFICE O/P EST HI 40 MIN: CPT

## 2025-02-25 PROCEDURE — 99214 OFFICE O/P EST MOD 30 MIN: CPT

## 2025-02-25 ASSESSMENT — PAIN SCALES - GENERAL: PAINLEVEL_OUTOF10: 8

## 2025-02-25 ASSESSMENT — ENCOUNTER SYMPTOMS
EYES NEGATIVE: 1
BACK PAIN: 1
SWOLLEN GLANDS: 0
GASTROINTESTINAL NEGATIVE: 1
SORE THROAT: 0
RESPIRATORY NEGATIVE: 1

## 2025-02-25 ASSESSMENT — PAIN DESCRIPTION - PAIN TYPE: TYPE: CHRONIC PAIN

## 2025-02-25 ASSESSMENT — PAIN DESCRIPTION - LOCATION: LOCATION: GENERALIZED

## 2025-02-25 NOTE — TELEPHONE ENCOUNTER
1. Chronic pain syndrome      Requested Prescriptions     Pending Prescriptions Disp Refills    morphine (MS CONTIN) 15 MG extended release tablet 90 tablet 0     Sig: Take 1 tablet by mouth every morning (before breakfast) AND 2 tablets nightly. Do all this for 30 days.    oxyCODONE-acetaminophen (PERCOCET) 7.5-325 MG per tablet 90 tablet 0     Sig: Take 1 tablet by mouth three times daily for 30 days. May fill 02/24/25       Continue medication with refill sent at appointment yes; refill sent to medical director at appointment yes, see refill encounter dated 2/25/2025    Electronically signed by NANETTE Richard CNP on 2/25/2025 at 2:57 PM

## 2025-02-25 NOTE — PROGRESS NOTES
Clinic Documentation      Education Provided:  [x] Review of [facility]  [x] Agreement Review  [x] PEG Score Calculated [x] PHQ Score Calculated [x] ORT Score Calculated    [] Compliance Issues Discussed [] Cognitive Behavior Needs [x] Exercise [] Review of Test [] Financial Issues  [x] Tobacco/Alcohol Use Reviewed [x] Teaching [] New Patient [] Picture Obtained    Physician Plan:  [] Outgoing Referral  [] Pharmacy Consult  [x] Test Ordered [x] Prescription Ordered/Changed   [] Obtained Test Results / Consult Notes        Complete if patient is withholding blood thinner for procedure     Blood Thinner Patient is currently taking:      [] Plavix (Hold for 7 days)  [] Aspirin (Hold for 5 days)     [] Pletal (Hold for 2 days)  [] Pradaxa (Hold for 3 days)    [] Effient (Hold for 7 days)  [] Xarelto (Hold for 2 days)    [] Eliquis (Hold for 2 days)  [] Brilinta (Hold for 7 days)    [] Coumadin (Hold for 5 days) - (INR needs to be drawn the day prior to procedure- INR < 2.0)    [] Aggrenox (Hold for 7 days)        [] Patient will stop medication on their own.    [] Blood Thinner Form Faxed for approval to hold.   Provider form faxed to:     Assessment Completed by:  Electronically signed by Megan Hopkins MA on 2/25/2025 at 3:02 PM

## 2025-02-25 NOTE — PROGRESS NOTES
[facility]  [address] [address]  Phone: [phone]  Fax: [phone]        Follow Up Office Visit    Patient Name: Tianna Trivedi    MR #: [id number]    Account #:[id number]    : 1970    Age: 54 y.o.    Sex: female    Date: 25    PCP: Herbert Epps MD    Chief Complaint:   Chief Complaint   Patient presents with    Chronic Pain     8/10       History of Present Illness:   The patient is a 54 y.o. female who presents for follow up on primary complaints of chronic pain syndrome and OA multiple joints. Patient has been an established patient since 2023. Started Santyl yesterday for chronic wound left posterior leg. Has upcoming wound care appointment. Denies fever. She has not been able to use lymphedema pumps due to weeping wound. Complains of worsening knee pain - requests injections to help manage. Recently had osteomyelitis in a lower extremity.     Patient is currently managed on percocet, MS contin, gabapentin and flexeril. Patient takes opioids as prescribed. With pain medication patient is able to tolerate activities longer than without and is now able to participate with wound care. Patient reports side effects of constipation from their opioid therapy. Relieved by stool softener. Patient reports this therapy is effective.    Chronic Pain  This is a chronic problem. The current episode started more than 1 year ago. The problem occurs constantly. The problem has been unchanged. Associated symptoms include arthralgias, joint swelling and myalgias. Pertinent negatives include no anorexia, fever, headaches, numbness, rash, sore throat, swollen glands or weakness. The symptoms are aggravated by bending, exertion, standing and walking. She has tried acetaminophen, ice, heat, lying down, NSAIDs, oral narcotics, position changes, relaxation, rest and walking for the symptoms. The treatment provided mild relief.          Current PEG:

## 2025-02-26 ENCOUNTER — HOSPITAL ENCOUNTER (OUTPATIENT)
Dept: WOUND CARE | Age: 55
Discharge: HOME | End: 2025-02-26
Attending: NURSE PRACTITIONER
Payer: MEDICARE

## 2025-02-26 DIAGNOSIS — I89.0 LYMPHEDEMA OF BOTH LOWER EXTREMITIES: ICD-10-CM

## 2025-02-26 DIAGNOSIS — E11.622 DIABETIC ULCER OF LEFT LOWER LEG ASSOCIATED WITH TYPE 2 DIABETES MELLITUS, WITH FAT LAYER EXPOSED (HCC): ICD-10-CM

## 2025-02-26 DIAGNOSIS — L97.922 DIABETIC ULCER OF LEFT LOWER LEG ASSOCIATED WITH TYPE 2 DIABETES MELLITUS, WITH FAT LAYER EXPOSED (HCC): ICD-10-CM

## 2025-02-26 DIAGNOSIS — E11.621 DIABETIC ULCER OF LEFT HEEL ASSOCIATED WITH TYPE 2 DIABETES MELLITUS, WITH FAT LAYER EXPOSED (HCC): Primary | ICD-10-CM

## 2025-02-26 DIAGNOSIS — M86.9 TOE OSTEOMYELITIS, LEFT (HCC): ICD-10-CM

## 2025-02-26 DIAGNOSIS — L97.422 DIABETIC ULCER OF LEFT HEEL ASSOCIATED WITH TYPE 2 DIABETES MELLITUS, WITH FAT LAYER EXPOSED (HCC): Primary | ICD-10-CM

## 2025-02-26 DIAGNOSIS — L97.912 SKIN ULCER OF RIGHT LOWER LEG WITH FAT LAYER EXPOSED (HCC): ICD-10-CM

## 2025-02-26 PROCEDURE — 99213 OFFICE O/P EST LOW 20 MIN: CPT

## 2025-02-26 PROCEDURE — 99213 OFFICE O/P EST LOW 20 MIN: CPT | Performed by: NURSE PRACTITIONER

## 2025-02-26 RX ORDER — BACITRACIN ZINC AND POLYMYXIN B SULFATE 500; 1000 [USP'U]/G; [USP'U]/G
OINTMENT TOPICAL ONCE
OUTPATIENT
Start: 2025-02-26 | End: 2025-02-26

## 2025-02-26 RX ORDER — MORPHINE SULFATE 15 MG/1
TABLET, FILM COATED, EXTENDED RELEASE ORAL
Qty: 90 TABLET | Refills: 0 | Status: SHIPPED | OUTPATIENT
Start: 2025-03-20 | End: 2025-04-19

## 2025-02-26 RX ORDER — SILVER SULFADIAZINE 10 MG/G
CREAM TOPICAL ONCE
OUTPATIENT
Start: 2025-02-26 | End: 2025-02-26

## 2025-02-26 RX ORDER — BETAMETHASONE DIPROPIONATE 0.5 MG/G
CREAM TOPICAL ONCE
OUTPATIENT
Start: 2025-02-26 | End: 2025-02-26

## 2025-02-26 RX ORDER — LIDOCAINE HYDROCHLORIDE 20 MG/ML
JELLY TOPICAL ONCE
OUTPATIENT
Start: 2025-02-26 | End: 2025-02-26

## 2025-02-26 RX ORDER — CLOBETASOL PROPIONATE 0.5 MG/G
OINTMENT TOPICAL ONCE
OUTPATIENT
Start: 2025-02-26 | End: 2025-02-26

## 2025-02-26 RX ORDER — MUPIROCIN 20 MG/G
OINTMENT TOPICAL ONCE
OUTPATIENT
Start: 2025-02-26 | End: 2025-02-26

## 2025-02-26 RX ORDER — LIDOCAINE 40 MG/G
CREAM TOPICAL ONCE
OUTPATIENT
Start: 2025-02-26 | End: 2025-02-26

## 2025-02-26 RX ORDER — GENTAMICIN SULFATE 1 MG/G
OINTMENT TOPICAL ONCE
OUTPATIENT
Start: 2025-02-26 | End: 2025-02-26

## 2025-02-26 RX ORDER — SODIUM CHLOR/HYPOCHLOROUS ACID 0.033 %
SOLUTION, IRRIGATION IRRIGATION ONCE
OUTPATIENT
Start: 2025-02-26 | End: 2025-02-26

## 2025-02-26 RX ORDER — FLUCONAZOLE 150 MG/1
150 TABLET ORAL DAILY
Qty: 7 TABLET | Refills: 0 | Status: SHIPPED | OUTPATIENT
Start: 2025-02-26 | End: 2025-03-05

## 2025-02-26 RX ORDER — GINSENG 100 MG
CAPSULE ORAL ONCE
OUTPATIENT
Start: 2025-02-26 | End: 2025-02-26

## 2025-02-26 RX ORDER — DOXYCYCLINE 100 MG/1
100 CAPSULE ORAL 2 TIMES DAILY
Qty: 20 CAPSULE | Refills: 0 | Status: SHIPPED | OUTPATIENT
Start: 2025-02-26 | End: 2025-03-08

## 2025-02-26 RX ORDER — NEOMYCIN/BACITRACIN/POLYMYXINB 3.5-400-5K
OINTMENT (GRAM) TOPICAL ONCE
OUTPATIENT
Start: 2025-02-26 | End: 2025-02-26

## 2025-02-26 RX ORDER — LIDOCAINE 50 MG/G
OINTMENT TOPICAL ONCE
OUTPATIENT
Start: 2025-02-26 | End: 2025-02-26

## 2025-02-26 RX ORDER — OXYCODONE AND ACETAMINOPHEN 7.5; 325 MG/1; MG/1
1 TABLET ORAL
Qty: 90 TABLET | Refills: 0 | Status: SHIPPED | OUTPATIENT
Start: 2025-03-26 | End: 2025-04-25

## 2025-02-26 RX ORDER — LIDOCAINE HYDROCHLORIDE 40 MG/ML
SOLUTION TOPICAL ONCE
OUTPATIENT
Start: 2025-02-26 | End: 2025-02-26

## 2025-02-26 RX ORDER — TRIAMCINOLONE ACETONIDE 1 MG/G
OINTMENT TOPICAL ONCE
OUTPATIENT
Start: 2025-02-26 | End: 2025-02-26

## 2025-02-26 NOTE — HOME CARE
1-left leg posterior venous (Active)   Wound Image   02/26/25 1422   Wound Etiology Venous 02/26/25 1422   Dressing Status New dressing applied 02/26/25 1459   Wound Cleansed Soap and water 02/26/25 1422   Dressing/Treatment ABD;Roll gauze 02/26/25 1459   Wound Length (cm) 4.5 cm 02/26/25 1422   Wound Width (cm) 3.2 cm 02/26/25 1422   Wound Depth (cm) 0.6 cm 02/26/25 1422   Wound Surface Area (cm^2) 14.4 cm^2 02/26/25 1422   Change in Wound Size % (l*w) -188 02/26/25 1422   Wound Volume (cm^3) 8.64 cm^3 02/26/25 1422   Wound Healing % -764 02/26/25 1422   Distance Tunneling (cm) 0 cm 02/06/25 1458   Tunneling Position ___ O'Clock 0 02/06/25 1458   Undermining Starts ___ O'Clock 0 02/06/25 1458   Undermining Ends___ O'Clock 0 02/06/25 1458   Undermining Maxium Distance (cm) 0 02/06/25 1458   Wound Assessment Pink/red;Slough 02/26/25 1422   Drainage Amount Moderate (25-50%) 02/26/25 1422   Drainage Description Serous;Yellow 02/26/25 1422   Odor None 02/26/25 1422   Octavia-wound Assessment Intact 02/26/25 1422   Margins Attached edges;Defined edges 02/26/25 1422   Wound Thickness Description not for Pressure Injury Full thickness 02/26/25 1422   Number of days: 239       Wound 07/02/24 Pretibial Left;Lower wound 3-left leg medial anterior (Active)   Wound Image   02/26/25 1422   Wound Etiology Venous 02/26/25 1422   Dressing Status New dressing applied 02/26/25 1459   Wound Cleansed Soap and water 02/26/25 1422   Dressing/Treatment ABD;Roll gauze 02/26/25 1459   Wound Length (cm) 1.8 cm 02/26/25 1422   Wound Width (cm) 1.4 cm 02/26/25 1422   Wound Depth (cm) 0.2 cm 02/26/25 1422   Wound Surface Area (cm^2) 2.52 cm^2 02/26/25 1422   Change in Wound Size % (l*w) 16 02/26/25 1422   Wound Volume (cm^3) 0.504 cm^3 02/26/25 1422   Wound Healing % -68 02/26/25 1422   Distance Tunneling (cm) 0 cm 02/06/25 1458   Tunneling Position ___ O'Clock 0 02/06/25 1458   Undermining Starts ___ O'Clock 0 02/06/25 1458   Undermining Ends___

## 2025-02-26 NOTE — DISCHARGE INSTRUCTIONS
[facility] Wound Care and Hyperbaric Oxygen Therapy   Physician Orders and Discharge Instructions  [address] [place] [address] Drive  [address]  [place] [address]  Telephone: [phone]      FAX [phone]    NAME:  Tianna Trviedi  YOB: 1970  MEDICAL RECORD NUMBER:  [id number]  DATE:  2/26/2025    Discharge condition: Stable    Discharge to: Home    Left via:Private automobile    Accompanied by:  spouse    ECF/HHA: [facility]     Take Diflucan as directed  Doxycycline as prescribed    Dressing Orders:  Left leg and right leg wounds: soap and water wash, santyl nickel thick and saline moistened gauze, then apply secure with Drymax extra, rolled gauze, apply compression and change daily     Treatment Orders:  Protein rich diet (unless restricted by your physician); Multivitamin daily; Elevate legs above the level of your heart when sitting 3-4 times daily for at least one hour each time, avoid standing for long periods of time.  Use nystatin, triple antibiotic ointment to help dry areas on skin    WCC follow up visit ___4 weeks with Jina__________________________  (Please note your next appointment above and if you are unable to keep, kindly give a 24 hour notice. Thank you.)          If you experience any of the following, please call the Wound Care Center during business hours:    * Increase in Pain  * Temperature over 101  * Increase in drainage from your wound  * Drainage with a foul odor  * Bleeding  * Increase in swelling  * Need for compression bandage changes due to slippage, breakthrough drainage.    If you need medical attention outside of the business hours of the Wound Care Centers please contact your PCP or go to the nearest emergency room.

## 2025-02-26 NOTE — PROGRESS NOTES
[facility]   Progress Note and Procedure Note      Tianna Trivedi  MEDICAL RECORD NUMBER:  [id number]  AGE: 54 y.o.   GENDER: female  : 1970  EPISODE DATE:  2025    Subjective:     Chief Complaint   Patient presents with    Wound Check     Bilateral leg wounds         HISTORY of PRESENT ILLNESS HPI     Tianna Trivedi is a 54 y.o. female who presents today for wound/ulcer evaluation.   History of Wound Context: bilateral leg wound follow up/eval and treat    Ulcer Identification:  Ulcer Type: venous  Contributing Factors: edema, lymphedema, diabetes, obesity, and infection    Wound: N/A        PAST MEDICAL HISTORY        Diagnosis Date    Anemia     Anxiety     Bipolar 1 disorder (HCC)     Chronic acquired lymphedema     Constipation     Depression     Depression     GERD (gastroesophageal reflux disease)     Headache(784.0)     Hyperlipidemia     Hypertension     Kidney stones     Kidney stones     Morbid obesity due to excess calories     Neuromuscular disorder (HCC)     Primary osteoarthritis involving multiple joints 2024    Restless legs syndrome     Shingles     Type 2 diabetes mellitus (HCC)        PAST SURGICAL HISTORY    Past Surgical History:   Procedure Laterality Date    BACK SURGERY      ECTOPIC PREGNANCY SURGERY  2002    EYE SURGERY Bilateral      & : cornea transplant and cataracts removed    FOOT SURGERY Bilateral     Heels    KNEE CARTILAGE SURGERY Left 2016    KNEE ARTHROSCOPIC PARTIAL MEDIAL MENISCECTOMY performed by Russell Serrano MD at [facility] OR    TUBAL LIGATION         FAMILY HISTORY    Family History   Problem Relation Age of Onset    Obesity Mother     Arthritis Mother         has had knees replaced    Cancer Father         \"adrenal gland & lung\"    Parkinsonism Father     Cancer Maternal Grandmother     COPD Maternal Grandmother     Obesity Maternal Grandmother     Heart Failure Maternal Grandmother     Cancer Maternal Grandfather

## 2025-02-27 NOTE — PATIENT INSTRUCTIONS
[facility] Wound Care and Hyperbaric Oxygen Therapy   Physician Orders and Discharge Instructions  [address] [place] [address] Drive  [address]  [place] [address]  Telephone: [phone]      FAX [phone]    NAME:  Tianna Trivedi  YOB: 1970  MEDICAL RECORD NUMBER:  [id number]  DATE:  2/26/2025    Discharge condition: Stable    Discharge to: Home    Left via:Private automobile    Accompanied by:  spouse    ECF/HHA: [facility]     Take Diflucan as directed  Doxycycline as prescribed    Dressing Orders:  Left leg and right leg wounds: soap and water wash, santyl nickel thick and saline moistened gauze, then apply secure with Drymax extra, rolled gauze, apply compression and change daily     Treatment Orders:  Protein rich diet (unless restricted by your physician); Multivitamin daily; Elevate legs above the level of your heart when sitting 3-4 times daily for at least one hour each time, avoid standing for long periods of time.  Use nystatin, triple antibiotic ointment to help dry areas on skin    WCC follow up visit ___4 weeks with Jina__________________________  (Please note your next appointment above and if you are unable to keep, kindly give a 24 hour notice. Thank you.)          If you experience any of the following, please call the Wound Care Center during business hours:    * Increase in Pain  * Temperature over 101  * Increase in drainage from your wound  * Drainage with a foul odor  * Bleeding  * Increase in swelling  * Need for compression bandage changes due to slippage, breakthrough drainage.    If you need medical attention outside of the business hours of the Wound Care Centers please contact your PCP or go to the nearest emergency room.

## 2025-03-06 ENCOUNTER — TELEPHONE (OUTPATIENT)
Dept: WOUND CARE | Age: 55
End: 2025-03-06

## 2025-03-06 NOTE — TELEPHONE ENCOUNTER
Call placed to patient regarding thigh high compression stockings.  Patient has not heard from [facility] regarding the compression thigh high compression. Informed we have heard from the  company and they will need additional measurements to include the thighs, informed lower measurements were obtained, but not the thigh measurements. Patient stated she would feel better if we did the measurements, next appointment with Jina FITZPATRICK is 3/26/2025, patient stated she would wait until that appointment to have measurements done.

## 2025-03-12 ENCOUNTER — TRANSCRIBE ORDERS (OUTPATIENT)
Dept: ADMINISTRATIVE | Age: 55
End: 2025-03-12

## 2025-03-12 DIAGNOSIS — Z12.31 ENCOUNTER FOR SCREENING MAMMOGRAM FOR MALIGNANT NEOPLASM OF BREAST: Primary | ICD-10-CM

## 2025-03-26 ENCOUNTER — HOSPITAL ENCOUNTER (OUTPATIENT)
Dept: WOUND CARE | Age: 55
Discharge: HOME | End: 2025-03-26
Attending: NURSE PRACTITIONER

## 2025-04-02 ENCOUNTER — HOSPITAL ENCOUNTER (OUTPATIENT)
Dept: WOUND CARE | Age: 55
Discharge: HOME | End: 2025-04-02
Attending: NURSE PRACTITIONER
Payer: MEDICARE

## 2025-04-02 VITALS
RESPIRATION RATE: 18 BRPM | WEIGHT: 274 LBS | BODY MASS INDEX: 50.12 KG/M2 | DIASTOLIC BLOOD PRESSURE: 82 MMHG | SYSTOLIC BLOOD PRESSURE: 150 MMHG | HEART RATE: 71 BPM | TEMPERATURE: 97.4 F

## 2025-04-02 DIAGNOSIS — M86.9 TOE OSTEOMYELITIS, LEFT (HCC): ICD-10-CM

## 2025-04-02 DIAGNOSIS — E11.621 DIABETIC ULCER OF LEFT HEEL ASSOCIATED WITH TYPE 2 DIABETES MELLITUS, WITH FAT LAYER EXPOSED: Primary | ICD-10-CM

## 2025-04-02 DIAGNOSIS — I89.0 LYMPHEDEMA OF BOTH LOWER EXTREMITIES: ICD-10-CM

## 2025-04-02 DIAGNOSIS — L97.422 DIABETIC ULCER OF LEFT HEEL ASSOCIATED WITH TYPE 2 DIABETES MELLITUS, WITH FAT LAYER EXPOSED: Primary | ICD-10-CM

## 2025-04-02 DIAGNOSIS — E11.622 DIABETIC ULCER OF LEFT LOWER LEG ASSOCIATED WITH TYPE 2 DIABETES MELLITUS, WITH FAT LAYER EXPOSED: ICD-10-CM

## 2025-04-02 DIAGNOSIS — L97.912 SKIN ULCER OF RIGHT LOWER LEG WITH FAT LAYER EXPOSED (HCC): ICD-10-CM

## 2025-04-02 DIAGNOSIS — L97.922 DIABETIC ULCER OF LEFT LOWER LEG ASSOCIATED WITH TYPE 2 DIABETES MELLITUS, WITH FAT LAYER EXPOSED: ICD-10-CM

## 2025-04-02 PROCEDURE — 99213 OFFICE O/P EST LOW 20 MIN: CPT | Performed by: NURSE PRACTITIONER

## 2025-04-02 PROCEDURE — 99213 OFFICE O/P EST LOW 20 MIN: CPT

## 2025-04-02 RX ORDER — NYSTATIN AND TRIAMCINOLONE ACETONIDE 100000; 1 [USP'U]/G; MG/G
CREAM TOPICAL
Qty: 60 G | Refills: 1 | Status: SHIPPED | OUTPATIENT
Start: 2025-04-02

## 2025-04-02 RX ORDER — BACITRACIN ZINC AND POLYMYXIN B SULFATE 500; 1000 [USP'U]/G; [USP'U]/G
OINTMENT TOPICAL ONCE
OUTPATIENT
Start: 2025-04-02 | End: 2025-04-02

## 2025-04-02 RX ORDER — LIDOCAINE 40 MG/G
CREAM TOPICAL ONCE
OUTPATIENT
Start: 2025-04-02 | End: 2025-04-02

## 2025-04-02 RX ORDER — METHYLPREDNISOLONE 4 MG/1
TABLET ORAL
Qty: 1 KIT | Refills: 0 | Status: SHIPPED | OUTPATIENT
Start: 2025-04-02 | End: 2025-04-08

## 2025-04-02 RX ORDER — NEOMYCIN/BACITRACIN/POLYMYXINB 3.5-400-5K
OINTMENT (GRAM) TOPICAL ONCE
OUTPATIENT
Start: 2025-04-02 | End: 2025-04-02

## 2025-04-02 RX ORDER — MUPIROCIN 20 MG/G
OINTMENT TOPICAL ONCE
OUTPATIENT
Start: 2025-04-02 | End: 2025-04-02

## 2025-04-02 RX ORDER — LIDOCAINE HYDROCHLORIDE 20 MG/ML
JELLY TOPICAL ONCE
OUTPATIENT
Start: 2025-04-02 | End: 2025-04-02

## 2025-04-02 RX ORDER — TRIAMCINOLONE ACETONIDE 1 MG/G
OINTMENT TOPICAL ONCE
OUTPATIENT
Start: 2025-04-02 | End: 2025-04-02

## 2025-04-02 RX ORDER — SILVER SULFADIAZINE 10 MG/G
CREAM TOPICAL ONCE
OUTPATIENT
Start: 2025-04-02 | End: 2025-04-02

## 2025-04-02 RX ORDER — GINSENG 100 MG
CAPSULE ORAL ONCE
OUTPATIENT
Start: 2025-04-02 | End: 2025-04-02

## 2025-04-02 RX ORDER — BETAMETHASONE DIPROPIONATE 0.5 MG/G
CREAM TOPICAL ONCE
OUTPATIENT
Start: 2025-04-02 | End: 2025-04-02

## 2025-04-02 RX ORDER — SODIUM CHLOR/HYPOCHLOROUS ACID 0.033 %
SOLUTION, IRRIGATION IRRIGATION ONCE
OUTPATIENT
Start: 2025-04-02 | End: 2025-04-02

## 2025-04-02 RX ORDER — GENTAMICIN SULFATE 1 MG/G
OINTMENT TOPICAL ONCE
OUTPATIENT
Start: 2025-04-02 | End: 2025-04-02

## 2025-04-02 RX ORDER — LIDOCAINE 50 MG/G
OINTMENT TOPICAL ONCE
OUTPATIENT
Start: 2025-04-02 | End: 2025-04-02

## 2025-04-02 RX ORDER — LIDOCAINE HYDROCHLORIDE 40 MG/ML
SOLUTION TOPICAL ONCE
OUTPATIENT
Start: 2025-04-02 | End: 2025-04-02

## 2025-04-02 RX ORDER — CLOBETASOL PROPIONATE 0.5 MG/G
OINTMENT TOPICAL ONCE
OUTPATIENT
Start: 2025-04-02 | End: 2025-04-02

## 2025-04-02 ASSESSMENT — PAIN DESCRIPTION - ONSET: ONSET: ON-GOING

## 2025-04-02 ASSESSMENT — PAIN DESCRIPTION - DESCRIPTORS: DESCRIPTORS: ACHING;SORE

## 2025-04-02 ASSESSMENT — PAIN DESCRIPTION - ORIENTATION: ORIENTATION: RIGHT;LEFT

## 2025-04-02 ASSESSMENT — PAIN - FUNCTIONAL ASSESSMENT: PAIN_FUNCTIONAL_ASSESSMENT: PREVENTS OR INTERFERES SOME ACTIVE ACTIVITIES AND ADLS

## 2025-04-02 ASSESSMENT — PAIN DESCRIPTION - PAIN TYPE: TYPE: CHRONIC PAIN

## 2025-04-02 ASSESSMENT — PAIN DESCRIPTION - LOCATION: LOCATION: GENERALIZED

## 2025-04-02 ASSESSMENT — PAIN SCALES - GENERAL: PAINLEVEL_OUTOF10: 6

## 2025-04-02 ASSESSMENT — PAIN DESCRIPTION - FREQUENCY: FREQUENCY: INTERMITTENT

## 2025-04-02 NOTE — PROGRESS NOTES
(NEURONTIN) 300 MG capsule Take 1 capsule by mouth daily for 30 days. May fill 03/06/25  (Patient takes at 4 pm) 30 capsule 0    gabapentin (NEURONTIN) 600 MG tablet Take 1 tablet by mouth 3 times daily for 30 days. May fill 03/06/25 90 tablet 0    rosuvastatin (CRESTOR) 10 MG tablet TAKE 1 TABLET BY MOUTH IN THE EVENING FOR CHOLESTEROL      ondansetron (ZOFRAN-ODT) 4 MG disintegrating tablet Take 1 tablet by mouth 3 times daily as needed for Nausea or Vomiting 60 tablet 0    MOUNJARO 10 MG/0.5ML SOPN SC injection Inject 12.5 mg into the skin once a week      VRAYLAR 1.5 MG capsule Take 1 capsule by mouth daily      Insulin Aspart (NOVOLOG FLEXPEN SC) Inject into the skin Ssi tid      insulin glargine (LANTUS) 100 UNIT/ML injection vial Inject 10 Units into the skin nightly      diclofenac sodium (VOLTAREN) 1 % GEL Apply 2 g topically 4 times daily as needed for Pain      docusate sodium (COLACE) 100 MG capsule Take 1 capsule by mouth 2 times daily      ZOLMitriptan (ZOMIG) 5 MG tablet Take 1 tablet by mouth as needed for Migraine      blood glucose test strips (ONETOUCH ULTRA) strip USE TO TEST BLOOD SUGAR THREE TIMES DAILY AND AS NEEDED AS DIRECTED 150 strip 5    Lancets (ONETOUCH DELICA PLUS AMBEXJ49N) MISC USE TO CHECK BLOOD SUGAR AS DIRECTED 100 each 5    pramipexole (MIRAPEX) 0.75 MG tablet TAKE 1 TABLET BY MOUTH TWICE DAILY 60 tablet 5    Insulin Pen Needle ([employer] PEN NEEDLES) 31G X 6 MM MISC 1 each by Does not apply route daily 100 each 3    potassium chloride (KLOR-CON M) 20 MEQ extended release tablet Take 1 tablet by mouth daily as needed (low potassium) 30 tablet 0    mirtazapine (REMERON) 15 MG tablet TAKE 1 TABLET BY MOUTH EVERY NIGHT 30 tablet 3    busPIRone (BUSPAR) 10 MG tablet TAKE 1 TABLET BY MOUTH THREE TIMES DAILY (Patient taking differently: 2 times daily) 90 tablet 3    fluticasone (FLONASE) 50 MCG/ACT nasal spray SHAKE LIQUID AND USE 1 SPRAY IN EACH NOSTRIL DAILY 16 g 0    furosemide (LASIX)

## 2025-04-02 NOTE — PATIENT INSTRUCTIONS
[facility] Wound Care and Hyperbaric Oxygen Therapy   Physician Orders and Discharge Instructions  [address] [place] [address] Drive  [address]  [place] [address]  Telephone: [phone]      FAX [phone]    NAME:  Tianna Trivedi  YOB: 1970  MEDICAL RECORD NUMBER:  [id number]  DATE:  4/2/2025    Discharge condition: Stable    Discharge to: Home    Left via:Private automobile    Accompanied by:  spouse    ECF/HHA: [facility]    Dressing Orders:  Left leg and right leg wounds: soap and water wash, antibiotic ointment and dry gauze, then apply secure with Drymax extra, rolled gauze, apply compression and change daily-add abdominal pads to heels     Treatment Orders:  Protein rich diet (unless restricted by your physician); Multivitamin daily; Elevate legs above the level of your heart when sitting 3-4 times daily for at least one hour each time, avoid standing for long periods of time.  Use nystatin/steroid/miconazole cream to legs    [facility] follow up visit __4 weeks with Jina___________________________  (Please note your next appointment above and if you are unable to keep, kindly give a 24 hour notice. Thank you.)          If you experience any of the following, please call the Wound Care Center during business hours:    * Increase in Pain  * Temperature over 101  * Increase in drainage from your wound  * Drainage with a foul odor  * Bleeding  * Increase in swelling  * Need for compression bandage changes due to slippage, breakthrough drainage.    If you need medical attention outside of the business hours of the Wound Care Centers please contact your PCP or go to the nearest emergency room.

## 2025-04-02 NOTE — HOME CARE
Wound Care Supplies      Supply Company:     [employer] [address] [place] [address] f: [phone] f: [phone] p: [phone] [email or web address]      Ordering Center:     [facility]  [address] [place] [address] [address]  [facility] [id number] [phone]  WOUND CARE Dept: [phone]   FAX NUMBER [phone]    Patient Information:      Wai Lamar  [address]  [place] [address] [phone]   : 1970  AGE: 54 y.o.     GENDER: female   EPISODE DATE: 2025    Insurance:      PRIMARY INSURANCE:  Plan: [employer] DUAL COMPLETE  Coverage: [facility] MEDICARE  Effective Date: 3/1/2025  Group Number: [unfilled]  Subscriber Number: [id number] - (Medicare Managed)    Payer/Plan Subscr  Sex Relation Sub. Ins. ID Effective Group Num   1. MEDICARE - ME* WAI 1970 Female Self [id number] 22                                    [address]    2. [facility] MEDICARE * WAI LAMAR 1970 Female Self [id number] 3/1/25 KYDSNP                                   [address]       Patient Wound Information:      Problem List Items Addressed This Visit          Endocrine    Diabetic ulcer of left lower leg associated with type 2 diabetes mellitus, with fat layer exposed (Chronic)    Relevant Orders    Initiate Outpatient Wound Care Protocol    Diabetic ulcer of left heel associated with type 2 diabetes mellitus, with fat layer exposed - Primary    Relevant Orders    Initiate Outpatient Wound Care Protocol       Musculoskeletal and Integument    Toe osteomyelitis, left (HCC)    Relevant Orders    Initiate Outpatient Wound Care Protocol    Skin ulcer of right lower leg with fat layer exposed (HCC)    Relevant Orders    Initiate Outpatient Wound Care Protocol       Other    Lymphedema of both lower extremities    Relevant Orders    Initiate Outpatient Wound Care Protocol       WOUNDS REQUIRING DRESSING SUPPLIES:     Wound 24 Tibial

## 2025-04-02 NOTE — WOUND CARE
Patient had requested medrol dose pack, discussed with Jina FITZPATRICK. Patient was instructed to take to medication as directed, to use cream as directed. She and her partner verbalized understanding.

## 2025-04-08 DIAGNOSIS — M62.838 MUSCLE SPASM: ICD-10-CM

## 2025-04-08 DIAGNOSIS — G89.4 CHRONIC PAIN SYNDROME: ICD-10-CM

## 2025-04-08 RX ORDER — GABAPENTIN 300 MG/1
300 CAPSULE ORAL DAILY
Qty: 30 CAPSULE | Refills: 0 | Status: SHIPPED | OUTPATIENT
Start: 2025-04-08 | End: 2025-05-08

## 2025-04-08 RX ORDER — GABAPENTIN 600 MG/1
600 TABLET ORAL 3 TIMES DAILY
Qty: 90 TABLET | Refills: 0 | Status: SHIPPED | OUTPATIENT
Start: 2025-04-08 | End: 2025-05-08

## 2025-04-08 RX ORDER — CYCLOBENZAPRINE HCL 10 MG
10 TABLET ORAL 3 TIMES DAILY PRN
Qty: 90 TABLET | Refills: 2 | Status: SHIPPED | OUTPATIENT
Start: 2025-04-08 | End: 2025-07-07

## 2025-04-16 DIAGNOSIS — G89.4 CHRONIC PAIN SYNDROME: ICD-10-CM

## 2025-04-16 NOTE — TELEPHONE ENCOUNTER
Last seen in office visit: 2/25/25  Next scheduled office visit: 5/27/25 with Kareen  Last UDS results: compliant  Any discrepancies on Hermes (such as refills from another provider): none

## 2025-04-17 ENCOUNTER — TELEPHONE (OUTPATIENT)
Dept: PAIN MANAGEMENT | Age: 55
End: 2025-04-17

## 2025-04-22 RX ORDER — MORPHINE SULFATE 15 MG/1
TABLET, FILM COATED, EXTENDED RELEASE ORAL
Qty: 90 TABLET | Refills: 0 | Status: SHIPPED | OUTPATIENT
Start: 2025-04-26 | End: 2025-05-26

## 2025-04-30 ENCOUNTER — HOSPITAL ENCOUNTER (OUTPATIENT)
Dept: WOUND CARE | Age: 55
Discharge: HOME | End: 2025-04-30
Attending: NURSE PRACTITIONER
Payer: MEDICARE

## 2025-04-30 VITALS
HEART RATE: 79 BPM | RESPIRATION RATE: 18 BRPM | TEMPERATURE: 97.4 F | SYSTOLIC BLOOD PRESSURE: 138 MMHG | DIASTOLIC BLOOD PRESSURE: 79 MMHG

## 2025-04-30 DIAGNOSIS — L97.422 DIABETIC ULCER OF LEFT HEEL ASSOCIATED WITH TYPE 2 DIABETES MELLITUS, WITH FAT LAYER EXPOSED (HCC): Primary | ICD-10-CM

## 2025-04-30 DIAGNOSIS — E11.622 DIABETIC ULCER OF LEFT LOWER LEG ASSOCIATED WITH TYPE 2 DIABETES MELLITUS, WITH FAT LAYER EXPOSED (HCC): ICD-10-CM

## 2025-04-30 DIAGNOSIS — L97.912 SKIN ULCER OF RIGHT LOWER LEG WITH FAT LAYER EXPOSED (HCC): ICD-10-CM

## 2025-04-30 DIAGNOSIS — M86.9 TOE OSTEOMYELITIS, LEFT (HCC): ICD-10-CM

## 2025-04-30 DIAGNOSIS — E11.621 DIABETIC ULCER OF LEFT HEEL ASSOCIATED WITH TYPE 2 DIABETES MELLITUS, WITH FAT LAYER EXPOSED (HCC): Primary | ICD-10-CM

## 2025-04-30 DIAGNOSIS — I89.0 LYMPHEDEMA OF BOTH LOWER EXTREMITIES: ICD-10-CM

## 2025-04-30 DIAGNOSIS — L97.922 DIABETIC ULCER OF LEFT LOWER LEG ASSOCIATED WITH TYPE 2 DIABETES MELLITUS, WITH FAT LAYER EXPOSED (HCC): ICD-10-CM

## 2025-04-30 PROCEDURE — 99213 OFFICE O/P EST LOW 20 MIN: CPT

## 2025-04-30 PROCEDURE — 99213 OFFICE O/P EST LOW 20 MIN: CPT | Performed by: NURSE PRACTITIONER

## 2025-04-30 RX ORDER — MUPIROCIN 20 MG/G
OINTMENT TOPICAL ONCE
OUTPATIENT
Start: 2025-04-30 | End: 2025-04-30

## 2025-04-30 RX ORDER — LIDOCAINE HYDROCHLORIDE 20 MG/ML
JELLY TOPICAL ONCE
OUTPATIENT
Start: 2025-04-30 | End: 2025-04-30

## 2025-04-30 RX ORDER — GENTAMICIN SULFATE 1 MG/G
OINTMENT TOPICAL ONCE
OUTPATIENT
Start: 2025-04-30 | End: 2025-04-30

## 2025-04-30 RX ORDER — LIDOCAINE 50 MG/G
OINTMENT TOPICAL ONCE
OUTPATIENT
Start: 2025-04-30 | End: 2025-04-30

## 2025-04-30 RX ORDER — SILVER SULFADIAZINE 10 MG/G
CREAM TOPICAL ONCE
OUTPATIENT
Start: 2025-04-30 | End: 2025-04-30

## 2025-04-30 RX ORDER — TRIAMCINOLONE ACETONIDE 1 MG/G
OINTMENT TOPICAL ONCE
OUTPATIENT
Start: 2025-04-30 | End: 2025-04-30

## 2025-04-30 RX ORDER — BACITRACIN ZINC AND POLYMYXIN B SULFATE 500; 1000 [USP'U]/G; [USP'U]/G
OINTMENT TOPICAL ONCE
OUTPATIENT
Start: 2025-04-30 | End: 2025-04-30

## 2025-04-30 RX ORDER — NEOMYCIN/BACITRACIN/POLYMYXINB 3.5-400-5K
OINTMENT (GRAM) TOPICAL ONCE
OUTPATIENT
Start: 2025-04-30 | End: 2025-04-30

## 2025-04-30 RX ORDER — SODIUM CHLOR/HYPOCHLOROUS ACID 0.033 %
SOLUTION, IRRIGATION IRRIGATION ONCE
OUTPATIENT
Start: 2025-04-30 | End: 2025-04-30

## 2025-04-30 RX ORDER — BETAMETHASONE DIPROPIONATE 0.5 MG/G
CREAM TOPICAL ONCE
OUTPATIENT
Start: 2025-04-30 | End: 2025-04-30

## 2025-04-30 RX ORDER — GINSENG 100 MG
CAPSULE ORAL ONCE
OUTPATIENT
Start: 2025-04-30 | End: 2025-04-30

## 2025-04-30 RX ORDER — LIDOCAINE HYDROCHLORIDE 40 MG/ML
SOLUTION TOPICAL ONCE
OUTPATIENT
Start: 2025-04-30 | End: 2025-04-30

## 2025-04-30 RX ORDER — CLOBETASOL PROPIONATE 0.5 MG/G
OINTMENT TOPICAL ONCE
OUTPATIENT
Start: 2025-04-30 | End: 2025-04-30

## 2025-04-30 RX ORDER — LIDOCAINE 40 MG/G
CREAM TOPICAL ONCE
OUTPATIENT
Start: 2025-04-30 | End: 2025-04-30

## 2025-04-30 ASSESSMENT — PAIN DESCRIPTION - ONSET: ONSET: ON-GOING

## 2025-04-30 ASSESSMENT — PAIN DESCRIPTION - DESCRIPTORS: DESCRIPTORS: ACHING;SORE

## 2025-04-30 ASSESSMENT — PAIN DESCRIPTION - FREQUENCY: FREQUENCY: INTERMITTENT

## 2025-04-30 ASSESSMENT — PAIN DESCRIPTION - LOCATION: LOCATION: GENERALIZED

## 2025-04-30 ASSESSMENT — PAIN DESCRIPTION - PAIN TYPE: TYPE: CHRONIC PAIN

## 2025-04-30 ASSESSMENT — PAIN SCALES - GENERAL: PAINLEVEL_OUTOF10: 7

## 2025-04-30 ASSESSMENT — PAIN - FUNCTIONAL ASSESSMENT: PAIN_FUNCTIONAL_ASSESSMENT: PREVENTS OR INTERFERES SOME ACTIVE ACTIVITIES AND ADLS

## 2025-04-30 NOTE — PATIENT INSTRUCTIONS
[facility] Wound Care and Hyperbaric Oxygen Therapy   Physician Orders and Discharge Instructions  [address] [place] [address] Drive  [address]  [place] [address]  Telephone: [phone]      FAX [phone]    NAME:  Tianna Trivedi  YOB: 1970  MEDICAL RECORD NUMBER:  [id number]  DATE:  4/30/2025    Discharge condition: Stable    Discharge to: Home    Left via:Private automobile    Accompanied by:  spouse    ECF/HHA: [facility]     Dressing Orders:  Left leg and right leg wounds: soap and water wash, antibiotic ointment and dry gauze, then apply secure with Drymax extra, rolled gauze, apply compression and change daily-add abdominal pads to heels     Treatment Orders:  Protein rich diet (unless restricted by your physician); Multivitamin daily; Elevate legs above the level of your heart when sitting 3-4 times daily for at least one hour each time, avoid standing for long periods of time    [facility] follow up visit _4 weeks with Jina____________________________  (Please note your next appointment above and if you are unable to keep, kindly give a 24 hour notice. Thank you.)          If you experience any of the following, please call the Wound Care Center during business hours:    * Increase in Pain  * Temperature over 101  * Increase in drainage from your wound  * Drainage with a foul odor  * Bleeding  * Increase in swelling  * Need for compression bandage changes due to slippage, breakthrough drainage.    If you need medical attention outside of the business hours of the Wound Care Centers please contact your PCP or go to the nearest emergency room.     Progress Notes by Yuki Maldonado MD at 06/07/18 02:10 PM     Author:  Yuki Maldonado MD Service:  (none) Author Type:  Physician     Filed:  06/10/18 11:02 PM Encounter Date:  6/7/2018 Status:  Signed     :  Yuki Maldonado MD (Physician)            65[BM1.1M] year old female presents for follow up on chronic kidney disease and[BM1.2T] HTN[BM1.1M]  Had[BM1.2T] lab work[BM1.1M] and is here to discuss results.   Since previous visit denies any changes in medications. Denies use of NSAIDs.  Urine output is unchanged without dysuria, hematuria or frequency. No flank tenderness.Denies BP changes.      PMH:  Patient Active Problem List    Diagnosis    • Hypertension   • Adrenal mass, left   • Hyperparathyroidism   • Abnormal thyroid function test   • Nontoxic uninodular goiter   • Vitamin D insufficiency       Meds:  Current Outpatient Prescriptions     Medication  Sig   • amlodipine (NORVASC) 10 MG tablet Take 1 Tab by mouth daily.   • metoprolol (LOPRESSOR) 25 MG tablet TAKE 1 TABLET BY MOUTH 2 (TWO) TIMES DAILY.   • Calcium-Vitamin D (CALCIUM + D OR) Take 1 Tab by mouth 2 (two) times daily.   • Multiple Vitamin (MULTIVITAMIN OR) Take  by mouth daily.   • Cholecalciferol (VITAMIN D) 2000 UNITS tablet Take 2,000 Units by mouth daily.         All:  Patient Active Problem List 06/07/2018 - Albert as Reviewed 06/07/2018   -- THIMEROSAL -- Other - See Comments -- noted 10/30/2012      SH:  Social History     Social History      • Marital status:       Spouse name: N/A   • Number of children:  N/A   • Years of education:  N/A     Occupational History    • Not on file.     Social History Main Topics     • Smoking status: Never Smoker   • Smokeless tobacco: Never Used   • Alcohol use No   • Drug use: Not on file   • Sexual activity: Not on file     Other Topics  Concern   • .... Medical Equipment .... No   • Seat Belt Yes     Social History Narrative       FH:  Family History       Problem   Relation Age of Onset    • * Healthy  Mother    • Cancer  Father      prostate cancer         ROS:  --General: denies fatigue,[BM1.2T] has gained[BM1.1M] weight  --HEENT:denies vision changes, otalgia, rhinorrhea  --Respiratory:denies wheezing, cough,shortness of breath  --Cardiovascular:denies chest pain, palpitations, leg edema  --Gastrointestinal: denies nausea,vomiting,abdominal pain  --Genitourinary: denies frequency, dysuria, incontinence,hematuria  --Musculoskeletal: denies gout, gout pains  --Neurologic denies headache, focal weakness, seizures, loss of consciousness   --Hematologic/Lymphatic/Immunologic: denies bleeding,bruising  --Skin: denies rash, pruritis    PE:  /88  Pulse 61  Temp 97.3 °F (36.3 °C) (Tympanic)   Ht 5' 4\" (1.626 m)  Wt 182 lb (82.6 kg)  BMI 31.24 kg/m2 31.24    GEN:  Well appearing female in no apparent distress, appears stated age, well developed, well nourished.    HEENT:Anicteric sclerae. Pink conjuntivae.    Neck:Supple.Trachea midline.NoJVD.    Chest:Symmetric expansion.Good air entry bilaterally.Clear to auscultation.    Heart:Regular rate and rhythm.S1 and S2 are normal. No murmurs.    Abdomen:Soft, nontender. No guarding, rigidity or rebound.Bowel sounds normoactive.No abdominal bruits. No flank tenderness     Extremities:No edema. Palpable pulses in DP.    Neurologic: Alert, oriented X 3. No focal deficits identified.  Labs:   Lab Results      Component  Value Date    BUN 27 05/22/2018    CREAT 1.3 05/22/2018    CA 10.0 05/22/2018    CA 10.0 05/22/2018     05/22/2018    K 4.9 05/22/2018     05/22/2018    GLUCOSE 132 05/22/2018    GLUCOSE 90 03/18/2013     Lab Results      Component  Value Date    GFRNONAFAM 46 05/21/2015    GFRAFAM 56 05/21/2015      Lab Results      Component  Value Date    PTH 37.2 05/22/2018    PTH 40.2 05/17/2016      Lab Results      Component  Value Date    VITD 61.7 05/22/2018    VITD 36.3 08/22/2013    VITD 63 11/15/2012     Lab Results      Component   Value Date    PHOS 3.7 05/22/2018     Lab Results      Component  Value Date    HGB 15.6 05/22/2018     Lab Results      Component  Value Date    HCT 46.4 05/22/2018     Lab Results      Component  Value Date    IRON 116 08/22/2013     Lab Results      Component  Value Date    TIBC 260 08/22/2013     Lab Results      Component  Value Date    FERRITIN 186 06/02/2014     Lab Results      Component  Value Date    APPEARANCE CLEAR 05/19/2017    COLOR YELLOW 05/19/2017    MARGARETH 6.0 05/19/2017    SPGRAVU <=1.005 05/19/2017    GLUCOSEU NEGATIVE 05/19/2017    BLOOD NEGATIVE 05/19/2017    KETONES NEGATIVE 05/19/2017    PROTEINU NEGATIVE 05/19/2017    UROBILIN 0.2 05/19/2017    UABILI NEGATIVE 05/19/2017    LEUKEST SMALL 05/19/2017    NITRATE NEGATIVE 05/19/2017    WBCU 2-5 05/19/2017    RBCU NONE 05/19/2017     No results found for: MICROALBCREA  Lab Results      Component  Value Date    UPROTCREAT 0.20 05/22/2018         Discussed with patient.    Assessment and Plan:[BM1.2T]  1. Chronic renal disease, stage III    2. Essential hypertension    3. Dyslipidemia    4. Screen for colon cancer    5. Postsurgical hypoparathyroidism    6. Proteinuria, unspecified type[BM1.3T]      - Stable renal function.   - BP is within range. Continue current anti HTN medications.  - Proteinuria has decreased.  - Recommend check FBS and hgba1c in 3 months.  - Check lipid profile.  - Recommend follow up with PCP.[BM1.1M]    All questions answered to the patient's satisfaction.    Time spent[BM1.2T] 25[BM1.1M] min, more than 50 % spent in counseling.  Follow up in[BM1.2T] 12[BM1.1M] months.  Above narrative may have been transcribed using voice recognition software and thus accuracy and grammar are subject to errors.    Electronically Signed by:    Yuki Maldonado MD , 6/7/2018[BM1.2T]          Revision History        User Key Date/Time User Provider Type Action    > BM1.3 06/10/18 11:02 PM Yuki Maldonado MD Physician Sign     BM1.1 06/10/18 10:43  PM Yuki Maldonado MD Physician      BM1.2 06/07/18 02:10 PM Yuki Maldonado MD Physician     M - Manual, T - Template

## 2025-04-30 NOTE — PROGRESS NOTES
[facility]ound [facility]   Progress Note and Procedure Note      Tianna Trivedi  MEDICAL RECORD NUMBER:  [id number]  AGE: 54 y.o.   GENDER: female  : 1970  EPISODE DATE:  2025    Subjective:     Chief Complaint   Patient presents with    Wound Check     Left leg wound check         HISTORY of PRESENT ILLNESS HPI     Tianna Trivedi is a 54 y.o. female who presents today for wound/ulcer evaluation.   History of Wound Context: left leg wound follow up/eval and treat    Ulcer Identification:  Ulcer Type: venous  Contributing Factors: edema and diabetes    Wound: N/A        PAST MEDICAL HISTORY        Diagnosis Date    Anemia     Anxiety     Bipolar 1 disorder (HCC)     Chronic acquired lymphedema     Constipation     Depression     Depression     GERD (gastroesophageal reflux disease)     Headache(784.0)     Hyperlipidemia     Hypertension     Kidney stones     Kidney stones     Morbid obesity due to excess calories (HCC)     Neuromuscular disorder ([facility])     Primary osteoarthritis involving multiple joints 2024    Restless legs syndrome     Shingles     Type 2 diabetes mellitus (HCC)        PAST SURGICAL HISTORY    Past Surgical History:   Procedure Laterality Date    BACK SURGERY      ECTOPIC PREGNANCY SURGERY  2002    EYE SURGERY Bilateral      & : cornea transplant and cataracts removed    FOOT SURGERY Bilateral     Heels    KNEE CARTILAGE SURGERY Left 2016    KNEE ARTHROSCOPIC PARTIAL MEDIAL MENISCECTOMY performed by Russell Serrano MD at [facility] OR    TUBAL LIGATION         FAMILY HISTORY    Family History   Problem Relation Age of Onset    Obesity Mother     Arthritis Mother         has had knees replaced    Cancer Father         \"adrenal gland & lung\"    Parkinsonism Father     Cancer Maternal Grandmother     COPD Maternal Grandmother     Obesity Maternal Grandmother     Heart Failure Maternal Grandmother     Cancer Maternal Grandfather     Other Sister         spina

## 2025-04-30 NOTE — HOME CARE
Wound Care Supplies      Supply Company:     [employer] [address] [place] [address] f: [phone] f: [phone] p: [phone] [email or web address]      Ordering Center:     [facility]  [address] [place] [address] [address]  [facility] [id number] [phone]  WOUND CARE Dept: [phone]   FAX NUMBER [phone]    Patient Information:      Wai Lamar  [address]  [address]  [place] [address] [phone]   : 1970  AGE: 54 y.o.     GENDER: female   EPISODE DATE: 2025    Insurance:      PRIMARY INSURANCE:  Plan: [employer] DUAL COMPLETE  Coverage: [facility] MEDICARE  Effective Date: 3/1/2025  Group Number: [unfilled]  Subscriber Number: [id number] - (Medicare Managed)    Payer/Plan Subscr  Sex Relation Sub. Ins. ID Effective Group Num   1. MEDICARE - ME* WAI 1970 Female Self [id number] 22                                    [address] [address]   2. [facility] MEDICARE * WAI LAMAR 1970 Female Self [id number] 3/1/25 KYDSNP                                   [address]       Patient Wound Information:      Problem List Items Addressed This Visit          Endocrine    * (Principal) Diabetic ulcer of left lower leg associated with type 2 diabetes mellitus, with fat layer exposed (HCC) (Chronic)    Relevant Orders    Initiate Outpatient Wound Care Protocol    Diabetic ulcer of left heel associated with type 2 diabetes mellitus, with fat layer exposed (HCC) - Primary    Relevant Orders    Initiate Outpatient Wound Care Protocol       Musculoskeletal and Integument    Toe osteomyelitis, left (HCC)    Relevant Orders    Initiate Outpatient Wound Care Protocol    Skin ulcer of right lower leg with fat layer exposed (HCC)    Relevant Orders    Initiate Outpatient Wound Care Protocol       Other    Lymphedema of both lower extremities    Relevant Orders    Initiate Outpatient Wound Care Protocol       WOUNDS REQUIRING DRESSING SUPPLIES:     Wound

## 2025-05-01 DIAGNOSIS — G89.4 CHRONIC PAIN SYNDROME: ICD-10-CM

## 2025-05-01 RX ORDER — OXYCODONE AND ACETAMINOPHEN 7.5; 325 MG/1; MG/1
1 TABLET ORAL
Qty: 90 TABLET | Refills: 0 | OUTPATIENT
Start: 2025-05-03 | End: 2025-06-02

## 2025-05-01 RX ORDER — OXYCODONE AND ACETAMINOPHEN 7.5; 325 MG/1; MG/1
1 TABLET ORAL
Qty: 9 TABLET | Refills: 0 | Status: SHIPPED | OUTPATIENT
Start: 2025-05-01 | End: 2025-05-04

## 2025-05-01 NOTE — TELEPHONE ENCOUNTER
Last OV - 02/25/25  Next OV - 05/27/25  Last UDS - 02/25/25    Pt left message on the nurses line that she called in her refill and it was not at the pharmacy.  I called the patient to let her know there was no call on the refill line and that she did call the nurses line 04/30/25.  I told the patient I would send a 3 day to Kareen to sign and the 30 day to the MD and going forward she needed to call 7 days before her last dose.  Patient voiced she understood and that she had a lot going on this month.

## 2025-05-05 DIAGNOSIS — G89.4 CHRONIC PAIN SYNDROME: ICD-10-CM

## 2025-05-06 DIAGNOSIS — G89.4 CHRONIC PAIN SYNDROME: ICD-10-CM

## 2025-05-06 RX ORDER — GABAPENTIN 600 MG/1
600 TABLET ORAL 3 TIMES DAILY
Qty: 90 TABLET | Refills: 0 | Status: SHIPPED | OUTPATIENT
Start: 2025-05-08 | End: 2025-06-07

## 2025-05-06 RX ORDER — GABAPENTIN 300 MG/1
300 CAPSULE ORAL DAILY
Qty: 30 CAPSULE | Refills: 0 | Status: SHIPPED | OUTPATIENT
Start: 2025-05-08 | End: 2025-06-07

## 2025-05-06 RX ORDER — OXYCODONE AND ACETAMINOPHEN 7.5; 325 MG/1; MG/1
1 TABLET ORAL
Qty: 90 TABLET | Refills: 0 | Status: SHIPPED | OUTPATIENT
Start: 2025-05-06 | End: 2025-06-05

## 2025-05-23 NOTE — PROGRESS NOTES
Drug SCRN, Buprenorphine; Future  -Patient tested according to risk of opioid misuse, office policy, state regulations, and/or provider's discretion.  -UDS/Saliva collected and tested while patient in office today, 05/27/2025  -ORT Score: 4, Patient risk moderate.  -GEN/PDMP reviewed while patient in office today, 05/27/2025    5. Chronic, continuous use of opioids  - not a candidate for NSAIDs due to CKD  - not a candidate for injections due to recent history of osteomyelitis.  -Patient pain symptoms manage on current medication regimen. Consider adding Buprenorphine in future due to increased risk level.  - GEN reviewed, drug screens reviewed, visits appropriate. Pain contract up to date.     -Will continue to strive to explore alternatives to chronic opioid use in this patient with non-malignant pain. Counseling provided in regards to the risks of opioid medications including risk of tolerance, withdrawals and addiction. Furthermore, these medications are known to cause immunosuppression, respiratory depression and even early death.   -The CDC guideline is aimed at improving opioid prescribing practices to ensure safe and effective chronic pain treatment while reducing the risk of opioid use disorder (OUD), overdose, and death. Although the CDC advises against high doses of opioid pain medication therapy, it may be medically risky to abruptly discontinue opioids and other controlled substances in a patient who is physically dependent without psychosocial management, focused care, gradual taper, and/or adjuvant therapies to treat withdrawal symptoms. Patients must also be willing to taper and pursue alternative medications and treatments.     -Medication refills routed to [facility]:   gabapentin (NEURONTIN) 300 MG capsule 30 capsule 0       Sig: Take 1 capsule by mouth daily for 30 days. May fill 5/8/25  (Patient takes at 4 pm)       Authorizing Provider: BERNARDO LOVELACE    cyclobenzaprine

## 2025-05-27 ENCOUNTER — OFFICE VISIT (OUTPATIENT)
Dept: PAIN MANAGEMENT | Age: 55
End: 2025-05-27
Payer: MEDICARE

## 2025-05-27 VITALS
RESPIRATION RATE: 16 BRPM | HEART RATE: 66 BPM | BODY MASS INDEX: 49.09 KG/M2 | WEIGHT: 260 LBS | HEIGHT: 61 IN | TEMPERATURE: 97.6 F | OXYGEN SATURATION: 100 % | DIASTOLIC BLOOD PRESSURE: 72 MMHG | SYSTOLIC BLOOD PRESSURE: 109 MMHG

## 2025-05-27 DIAGNOSIS — M15.0 PRIMARY OSTEOARTHRITIS INVOLVING MULTIPLE JOINTS: Primary | ICD-10-CM

## 2025-05-27 DIAGNOSIS — I89.0 LYMPHEDEMA OF BOTH LOWER EXTREMITIES: ICD-10-CM

## 2025-05-27 DIAGNOSIS — M15.0 PRIMARY OSTEOARTHRITIS INVOLVING MULTIPLE JOINTS: ICD-10-CM

## 2025-05-27 DIAGNOSIS — M62.838 MUSCLE SPASM: ICD-10-CM

## 2025-05-27 DIAGNOSIS — F11.90 CHRONIC, CONTINUOUS USE OF OPIOIDS: ICD-10-CM

## 2025-05-27 DIAGNOSIS — G89.4 CHRONIC PAIN SYNDROME: Primary | ICD-10-CM

## 2025-05-27 DIAGNOSIS — G89.4 CHRONIC PAIN SYNDROME: ICD-10-CM

## 2025-05-27 DIAGNOSIS — Z51.81 ENCOUNTER FOR MONITORING OPIOID MAINTENANCE THERAPY: ICD-10-CM

## 2025-05-27 DIAGNOSIS — Z79.891 ENCOUNTER FOR MONITORING OPIOID MAINTENANCE THERAPY: ICD-10-CM

## 2025-05-27 PROCEDURE — 3074F SYST BP LT 130 MM HG: CPT

## 2025-05-27 PROCEDURE — 3078F DIAST BP <80 MM HG: CPT

## 2025-05-27 PROCEDURE — G8427 DOCREV CUR MEDS BY ELIG CLIN: HCPCS

## 2025-05-27 PROCEDURE — G8417 CALC BMI ABV UP PARAM F/U: HCPCS

## 2025-05-27 PROCEDURE — 99214 OFFICE O/P EST MOD 30 MIN: CPT

## 2025-05-27 PROCEDURE — 1036F TOBACCO NON-USER: CPT

## 2025-05-27 PROCEDURE — 3017F COLORECTAL CA SCREEN DOC REV: CPT

## 2025-05-27 RX ORDER — MORPHINE SULFATE 15 MG/1
15 TABLET, FILM COATED, EXTENDED RELEASE ORAL EVERY 8 HOURS PRN
Qty: 75 TABLET | Refills: 0 | Status: SHIPPED
Start: 2025-05-27 | End: 2025-05-29 | Stop reason: SDUPTHER

## 2025-05-27 RX ORDER — OXYCODONE AND ACETAMINOPHEN 7.5; 325 MG/1; MG/1
.5-1 TABLET ORAL
Qty: 60 TABLET | Refills: 0 | Status: SHIPPED | OUTPATIENT
Start: 2025-06-04 | End: 2025-07-04

## 2025-05-27 RX ORDER — CYCLOBENZAPRINE HCL 10 MG
10 TABLET ORAL 3 TIMES DAILY PRN
Qty: 90 TABLET | Refills: 2 | Status: SHIPPED | OUTPATIENT
Start: 2025-05-27 | End: 2025-08-25

## 2025-05-27 RX ORDER — GABAPENTIN 300 MG/1
300 CAPSULE ORAL DAILY
Qty: 30 CAPSULE | Refills: 0 | Status: SHIPPED | OUTPATIENT
Start: 2025-05-27 | End: 2025-06-26

## 2025-05-27 RX ORDER — GABAPENTIN 600 MG/1
600 TABLET ORAL 3 TIMES DAILY
Qty: 90 TABLET | Refills: 0 | Status: SHIPPED | OUTPATIENT
Start: 2025-05-27 | End: 2025-06-26

## 2025-05-27 ASSESSMENT — ENCOUNTER SYMPTOMS
CONSTIPATION: 0
ABDOMINAL PAIN: 0
BACK PAIN: 1
CHEST TIGHTNESS: 0
BLOOD IN STOOL: 0
ALLERGIC/IMMUNOLOGIC NEGATIVE: 1
DIARRHEA: 0
EYES NEGATIVE: 1
SHORTNESS OF BREATH: 0

## 2025-05-27 NOTE — TELEPHONE ENCOUNTER
1. Chronic pain syndrome    2. Muscle spasm    3. Primary osteoarthritis involving multiple joints    4. Lymphedema of both lower extremities    5. Chronic, continuous use of opioids      Requested Prescriptions     Pending Prescriptions Disp Refills    oxyCODONE-acetaminophen (PERCOCET) 7.5-325 MG per tablet 90 tablet 0     Sig: Take 1 tablet by mouth three times daily for 30 days. May fill 06/05/25 Max Daily Amount: 3 tablets    morphine (MS CONTIN) 15 MG extended release tablet 90 tablet 0     Sig: Take 1 tablet by mouth 3 times daily as needed for Pain for up to 30 days. Max Daily Amount: 45 mg     Signed Prescriptions Disp Refills    gabapentin (NEURONTIN) 300 MG capsule 30 capsule 0     Sig: Take 1 capsule by mouth daily for 30 days. May fill 5/8/25  (Patient takes at 4 pm)     Authorizing Provider: BERNARDO LOVELACE    cyclobenzaprine (FLEXERIL) 10 MG tablet 90 tablet 2     Sig: Take 1 tablet by mouth 3 times daily as needed for Muscle spasms     Authorizing Provider: BERNARDO LOVELACE    gabapentin (NEURONTIN) 600 MG tablet 90 tablet 0     Sig: Take 1 tablet by mouth 3 times daily for 30 days. May fill 5/8/25     Authorizing Provider: BERNARDO LOVELACE       Continue medication with refill sent at appointment yes; refill sent to medical director at appointment yes, see refill encounter dated 5/27/2025    Electronically signed by NANETTE Lopez CNP on 5/27/2025 at 12:26 PM

## 2025-05-28 ENCOUNTER — HOSPITAL ENCOUNTER (OUTPATIENT)
Dept: WOUND CARE | Age: 55
Discharge: HOME | End: 2025-05-28
Attending: NURSE PRACTITIONER
Payer: MEDICARE

## 2025-05-28 VITALS
SYSTOLIC BLOOD PRESSURE: 138 MMHG | RESPIRATION RATE: 16 BRPM | TEMPERATURE: 97.3 F | DIASTOLIC BLOOD PRESSURE: 73 MMHG | HEART RATE: 65 BPM

## 2025-05-28 DIAGNOSIS — L97.922 DIABETIC ULCER OF LEFT LOWER LEG ASSOCIATED WITH TYPE 2 DIABETES MELLITUS, WITH FAT LAYER EXPOSED (HCC): ICD-10-CM

## 2025-05-28 DIAGNOSIS — I89.0 LYMPHEDEMA OF BOTH LOWER EXTREMITIES: ICD-10-CM

## 2025-05-28 DIAGNOSIS — L97.422 DIABETIC ULCER OF LEFT HEEL ASSOCIATED WITH TYPE 2 DIABETES MELLITUS, WITH FAT LAYER EXPOSED (HCC): Primary | ICD-10-CM

## 2025-05-28 DIAGNOSIS — M15.0 PRIMARY OSTEOARTHRITIS INVOLVING MULTIPLE JOINTS: ICD-10-CM

## 2025-05-28 DIAGNOSIS — G89.4 CHRONIC PAIN SYNDROME: ICD-10-CM

## 2025-05-28 DIAGNOSIS — Z79.4 TYPE 2 DIABETES MELLITUS WITH STAGE 3 CHRONIC KIDNEY DISEASE, WITH LONG-TERM CURRENT USE OF INSULIN, UNSPECIFIED WHETHER STAGE 3A OR 3B CKD (HCC): ICD-10-CM

## 2025-05-28 DIAGNOSIS — M86.9 TOE OSTEOMYELITIS, LEFT (HCC): ICD-10-CM

## 2025-05-28 DIAGNOSIS — M62.838 MUSCLE SPASM: ICD-10-CM

## 2025-05-28 DIAGNOSIS — L97.912 SKIN ULCER OF RIGHT LOWER LEG WITH FAT LAYER EXPOSED (HCC): ICD-10-CM

## 2025-05-28 DIAGNOSIS — E11.22 TYPE 2 DIABETES MELLITUS WITH STAGE 3 CHRONIC KIDNEY DISEASE, WITH LONG-TERM CURRENT USE OF INSULIN, UNSPECIFIED WHETHER STAGE 3A OR 3B CKD (HCC): ICD-10-CM

## 2025-05-28 DIAGNOSIS — F11.90 CHRONIC, CONTINUOUS USE OF OPIOIDS: ICD-10-CM

## 2025-05-28 DIAGNOSIS — N18.30 TYPE 2 DIABETES MELLITUS WITH STAGE 3 CHRONIC KIDNEY DISEASE, WITH LONG-TERM CURRENT USE OF INSULIN, UNSPECIFIED WHETHER STAGE 3A OR 3B CKD (HCC): ICD-10-CM

## 2025-05-28 DIAGNOSIS — E11.621 DIABETIC ULCER OF LEFT HEEL ASSOCIATED WITH TYPE 2 DIABETES MELLITUS, WITH FAT LAYER EXPOSED (HCC): Primary | ICD-10-CM

## 2025-05-28 DIAGNOSIS — E11.622 DIABETIC ULCER OF LEFT LOWER LEG ASSOCIATED WITH TYPE 2 DIABETES MELLITUS, WITH FAT LAYER EXPOSED (HCC): ICD-10-CM

## 2025-05-28 PROCEDURE — 99213 OFFICE O/P EST LOW 20 MIN: CPT

## 2025-05-28 PROCEDURE — 99213 OFFICE O/P EST LOW 20 MIN: CPT | Performed by: NURSE PRACTITIONER

## 2025-05-28 RX ORDER — CLOBETASOL PROPIONATE 0.5 MG/G
OINTMENT TOPICAL ONCE
OUTPATIENT
Start: 2025-05-28 | End: 2025-05-28

## 2025-05-28 RX ORDER — BACITRACIN ZINC AND POLYMYXIN B SULFATE 500; 1000 [USP'U]/G; [USP'U]/G
OINTMENT TOPICAL ONCE
OUTPATIENT
Start: 2025-05-28 | End: 2025-05-28

## 2025-05-28 RX ORDER — LIDOCAINE 50 MG/G
OINTMENT TOPICAL ONCE
OUTPATIENT
Start: 2025-05-28 | End: 2025-05-28

## 2025-05-28 RX ORDER — BETAMETHASONE DIPROPIONATE 0.5 MG/G
CREAM TOPICAL ONCE
OUTPATIENT
Start: 2025-05-28 | End: 2025-05-28

## 2025-05-28 RX ORDER — NEOMYCIN/BACITRACIN/POLYMYXINB 3.5-400-5K
OINTMENT (GRAM) TOPICAL ONCE
OUTPATIENT
Start: 2025-05-28 | End: 2025-05-28

## 2025-05-28 RX ORDER — LIDOCAINE 40 MG/G
CREAM TOPICAL ONCE
OUTPATIENT
Start: 2025-05-28 | End: 2025-05-28

## 2025-05-28 RX ORDER — SILVER SULFADIAZINE 10 MG/G
CREAM TOPICAL ONCE
OUTPATIENT
Start: 2025-05-28 | End: 2025-05-28

## 2025-05-28 RX ORDER — SODIUM CHLOR/HYPOCHLOROUS ACID 0.033 %
SOLUTION, IRRIGATION IRRIGATION ONCE
OUTPATIENT
Start: 2025-05-28 | End: 2025-05-28

## 2025-05-28 RX ORDER — LIDOCAINE HYDROCHLORIDE 40 MG/ML
SOLUTION TOPICAL ONCE
OUTPATIENT
Start: 2025-05-28 | End: 2025-05-28

## 2025-05-28 RX ORDER — GENTAMICIN SULFATE 1 MG/G
OINTMENT TOPICAL ONCE
OUTPATIENT
Start: 2025-05-28 | End: 2025-05-28

## 2025-05-28 RX ORDER — LIDOCAINE HYDROCHLORIDE 20 MG/ML
JELLY TOPICAL ONCE
OUTPATIENT
Start: 2025-05-28 | End: 2025-05-28

## 2025-05-28 RX ORDER — TRIAMCINOLONE ACETONIDE 1 MG/G
OINTMENT TOPICAL ONCE
OUTPATIENT
Start: 2025-05-28 | End: 2025-05-28

## 2025-05-28 RX ORDER — MUPIROCIN 20 MG/G
OINTMENT TOPICAL ONCE
OUTPATIENT
Start: 2025-05-28 | End: 2025-05-28

## 2025-05-28 RX ORDER — MORPHINE SULFATE 15 MG/1
15 TABLET, FILM COATED, EXTENDED RELEASE ORAL EVERY 8 HOURS PRN
Qty: 75 TABLET | Refills: 0 | OUTPATIENT
Start: 2025-05-28 | End: 2025-06-27

## 2025-05-28 RX ORDER — GINSENG 100 MG
CAPSULE ORAL ONCE
OUTPATIENT
Start: 2025-05-28 | End: 2025-05-28

## 2025-05-28 ASSESSMENT — PAIN DESCRIPTION - FREQUENCY: FREQUENCY: CONTINUOUS

## 2025-05-28 ASSESSMENT — PAIN SCALES - GENERAL: PAINLEVEL_OUTOF10: 6

## 2025-05-28 ASSESSMENT — PAIN DESCRIPTION - DESCRIPTORS: DESCRIPTORS: ACHING;SORE

## 2025-05-28 ASSESSMENT — PAIN DESCRIPTION - ONSET: ONSET: ON-GOING

## 2025-05-28 ASSESSMENT — PAIN - FUNCTIONAL ASSESSMENT: PAIN_FUNCTIONAL_ASSESSMENT: PREVENTS OR INTERFERES SOME ACTIVE ACTIVITIES AND ADLS

## 2025-05-28 ASSESSMENT — PAIN DESCRIPTION - LOCATION: LOCATION: GENERALIZED;KNEE

## 2025-05-28 ASSESSMENT — PAIN DESCRIPTION - ORIENTATION: ORIENTATION: LEFT;RIGHT

## 2025-05-28 ASSESSMENT — PAIN DESCRIPTION - PAIN TYPE: TYPE: CHRONIC PAIN

## 2025-05-28 NOTE — TELEPHONE ENCOUNTER
Pt was seen 05/27/25, Morphine needed to be sent to [facility], but was sent to [facility] pharmacy with her Percocet refill.

## 2025-05-28 NOTE — HOME CARE
Wound Care Supplies      Supply Company:     [employer] [address] [place] [address] f: [phone] f: [phone] p: [phone] [email or web address]      Ordering Center:     [facility]  [address] [place] [address] [address]  [facility] [id number] [phone]  WOUND CARE Dept: [phone]   FAX NUMBER [phone]    Patient Information:      Wai Lamar  [address]  [place] [address] [phone]   : 1970  AGE: 54 y.o.     GENDER: female   EPISODE DATE: 2025    Insurance:      PRIMARY INSURANCE:  Plan: [employer] DUAL COMPLETE  Coverage: [facility] MEDICARE  Effective Date: 3/1/2025  Group Number: [unfilled]  Subscriber Number: [id number] - (Medicare Managed)    Payer/Plan Subscr  Sex Relation Sub. Ins. ID Effective Group Num   1. MEDICARE - ME* WAI 1970 Female Self [id number] 22                                    [address] [address]   2. [facility] MEDICARE * WAI LAMAR 1970 Female Self [id number] 3/1/25 KYDSNP                                   [address]       Patient Wound Information:      Problem List Items Addressed This Visit          Endocrine    Diabetic ulcer of left lower leg associated with type 2 diabetes mellitus, with fat layer exposed (HCC) (Chronic)    Relevant Orders    Initiate Outpatient Wound Care Protocol    Diabetic ulcer of left heel associated with type 2 diabetes mellitus, with fat layer exposed (HCC) - Primary    Relevant Orders    Initiate Outpatient Wound Care Protocol       Musculoskeletal and Integument    Toe osteomyelitis, left (HCC)    Relevant Orders    Initiate Outpatient Wound Care Protocol    Skin ulcer of right lower leg with fat layer exposed (HCC)    Relevant Orders    Initiate Outpatient Wound Care Protocol       Other    Lymphedema of both lower extremities    Relevant Orders    Initiate Outpatient Wound Care Protocol       WOUNDS REQUIRING DRESSING SUPPLIES:     Wound 24

## 2025-05-28 NOTE — PATIENT INSTRUCTIONS
[facility] Wound Care and Hyperbaric Oxygen Therapy   Physician Orders and Discharge Instructions  [address] [place] [address] Drive  [address]  [place] [address]  Telephone: [phone]      FAX [phone]    NAME:  Tianna Trivedi  YOB: 1970  MEDICAL RECORD NUMBER:  [id number]  DATE:  5/28/2025    Discharge condition: Stable    Discharge to: Home    Left via:Private automobile    Accompanied by:  spouse    ECF/HHA: [facility]     Dressing Orders:  Left leg and right leg wounds: soap and water wash, antibiotic ointment and dry gauze (2x2 gauze and 4x4 gauze), then apply secure with Drymax extra, rolled gauze, apply compression and change daily-add abdominal pads to heels     Treatment Orders:  Protein rich diet (unless restricted by your physician); Multivitamin daily; Elevate legs above the level of your heart when sitting 3-4 times daily for at least one hour each time, avoid standing for long periods of time    [facility] follow up visit _4 weeks with Jina____________________________  (Please note your next appointment above and if you are unable to keep, kindly give a 24 hour notice. Thank you.)          If you experience any of the following, please call the Wound Care Center during business hours:    * Increase in Pain  * Temperature over 101  * Increase in drainage from your wound  * Drainage with a foul odor  * Bleeding  * Increase in swelling  * Need for compression bandage changes due to slippage, breakthrough drainage.    If you need medical attention outside of the business hours of the Wound Care Centers please contact your PCP or go to the nearest emergency room.

## 2025-05-28 NOTE — PROGRESS NOTES
Polypharmacy Z79.899    Peripheral venous insufficiency I87.2    Diabetic ulcer of right lower leg associated with type 2 diabetes mellitus, with fat layer exposed ([facility]) E11.622, L97.912    Cellulitis and abscess of right leg L03.115, L02.415    Diabetic ulcer of left heel associated with type 2 diabetes mellitus, with fat layer exposed ([facility]) E11.621, L97.422    Chronic, continuous use of opioids F11.90    Lymphedema I89.0    Primary osteoarthritis involving multiple joints M15.0    Wounds, multiple T07.XXXA    Encounter for monitoring opioid maintenance therapy Z51.81, Z79.891    Toe osteomyelitis, left ([facility]) M86.9    Skin ulcer of right lower leg with fat layer exposed ([facility]) L97.912    Uncomplicated opioid dependence ([facility]) F11.20                 Wound 07/02/24 Tibial Posterior;Left wound 1-left leg posterior venous (Active)   Wound Image   05/28/25 1447   Wound Etiology Venous 05/28/25 1447   Dressing Status New dressing applied 05/28/25 1507   Wound Cleansed Soap and water 05/28/25 1447   Dressing/Treatment Antibacterial ointment;ABD;Ace wrap;Roll gauze 05/28/25 1507   Wound Length (cm) 1.4 cm 05/28/25 1447   Wound Width (cm) 1 cm 05/28/25 1447   Wound Depth (cm) 0.3 cm 05/28/25 1447   Wound Surface Area (cm^2) 1.4 cm^2 05/28/25 1447   Change in Wound Size % (l*w) 72 05/28/25 1447   Wound Volume (cm^3) 0.42 cm^3 05/28/25 1447   Wound Healing % 58 05/28/25 1447   Distance Tunneling (cm) 0 cm 02/06/25 1458   Tunneling Position ___ O'Clock 0 02/06/25 1458   Undermining Starts ___ O'Clock 0 02/06/25 1458   Undermining Ends___ O'Clock 0 02/06/25 1458   Undermining Maxium Distance (cm) 0 02/06/25 1458   Wound Assessment Pink/red;Slough 05/28/25 1447   Drainage Amount Moderate (25-50%) 05/28/25 1447   Drainage Description Serous;Yellow 05/28/25 1447   Odor None 05/28/25 1447   Octavia-wound Assessment Intact 05/28/25 1447   Margins Attached edges;Defined edges 05/28/25 1447   Wound Thickness Description not for Pressure

## 2025-05-29 DIAGNOSIS — I89.0 LYMPHEDEMA OF BOTH LOWER EXTREMITIES: ICD-10-CM

## 2025-05-29 DIAGNOSIS — G89.4 CHRONIC PAIN SYNDROME: ICD-10-CM

## 2025-05-29 DIAGNOSIS — M62.838 MUSCLE SPASM: ICD-10-CM

## 2025-05-29 DIAGNOSIS — F11.90 CHRONIC, CONTINUOUS USE OF OPIOIDS: ICD-10-CM

## 2025-05-29 DIAGNOSIS — M15.0 PRIMARY OSTEOARTHRITIS INVOLVING MULTIPLE JOINTS: ICD-10-CM

## 2025-05-29 RX ORDER — MORPHINE SULFATE 15 MG/1
15 TABLET, FILM COATED, EXTENDED RELEASE ORAL EVERY 8 HOURS PRN
Qty: 9 TABLET | Refills: 0 | Status: SHIPPED | OUTPATIENT
Start: 2025-05-29 | End: 2025-06-01

## 2025-05-30 DIAGNOSIS — G89.4 CHRONIC PAIN SYNDROME: ICD-10-CM

## 2025-05-30 DIAGNOSIS — F11.90 CHRONIC, CONTINUOUS USE OF OPIOIDS: ICD-10-CM

## 2025-05-30 DIAGNOSIS — M15.0 PRIMARY OSTEOARTHRITIS INVOLVING MULTIPLE JOINTS: ICD-10-CM

## 2025-05-30 DIAGNOSIS — M62.838 MUSCLE SPASM: ICD-10-CM

## 2025-05-30 DIAGNOSIS — I89.0 LYMPHEDEMA OF BOTH LOWER EXTREMITIES: ICD-10-CM

## 2025-05-30 RX ORDER — MORPHINE SULFATE 15 MG/1
15 TABLET, FILM COATED, EXTENDED RELEASE ORAL 2 TIMES DAILY
Qty: 9 TABLET | Refills: 0 | Status: CANCELLED | OUTPATIENT
Start: 2025-05-29 | End: 2025-08-27

## 2025-05-30 NOTE — TELEPHONE ENCOUNTER
1. Chronic pain syndrome    2. Muscle spasm    3. Primary osteoarthritis involving multiple joints    4. Lymphedema of both lower extremities    5. Chronic, continuous use of opioids      Requested Prescriptions     Pending Prescriptions Disp Refills    morphine (MS CONTIN) 15 MG extended release tablet 9 tablet 0     Sig: Take 1 tablet by mouth 2 times daily for 90 days. Take 1 tablet my mouth in the morning and 2 tablets at night. May fill 05/29/25 Max Daily Amount: 30 mg       Continue medication with refill sent at appointment yes; refill sent to medical director at appointment yes, see refill encounter dated 5/30/2025    Electronically signed by NANETTE Lopez CNP on 5/30/2025 at 6:57 PM

## 2025-06-02 ENCOUNTER — RESULTS FOLLOW-UP (OUTPATIENT)
Dept: PAIN MANAGEMENT | Age: 55
End: 2025-06-02

## 2025-06-02 DIAGNOSIS — M15.0 PRIMARY OSTEOARTHRITIS INVOLVING MULTIPLE JOINTS: ICD-10-CM

## 2025-06-02 DIAGNOSIS — Z51.81 ENCOUNTER FOR MONITORING OPIOID MAINTENANCE THERAPY: ICD-10-CM

## 2025-06-02 DIAGNOSIS — Z79.891 ENCOUNTER FOR MONITORING OPIOID MAINTENANCE THERAPY: ICD-10-CM

## 2025-06-02 DIAGNOSIS — F11.90 CHRONIC, CONTINUOUS USE OF OPIOIDS: ICD-10-CM

## 2025-06-02 DIAGNOSIS — I89.0 LYMPHEDEMA OF BOTH LOWER EXTREMITIES: ICD-10-CM

## 2025-06-02 DIAGNOSIS — G89.4 CHRONIC PAIN SYNDROME: ICD-10-CM

## 2025-06-02 DIAGNOSIS — M62.838 MUSCLE SPASM: ICD-10-CM

## 2025-06-02 RX ORDER — MORPHINE SULFATE 15 MG/1
15 TABLET, FILM COATED, EXTENDED RELEASE ORAL EVERY 8 HOURS PRN
Qty: 75 TABLET | Refills: 0 | OUTPATIENT
Start: 2025-06-02 | End: 2025-07-02

## 2025-06-02 NOTE — TELEPHONE ENCOUNTER
Refill was sent to the wrong pharmacy, [facility] has been out of stock and [facility] has been in stock. 30 day was sent to MD to sign.

## 2025-06-03 RX ORDER — MORPHINE SULFATE 15 MG/1
15 TABLET, FILM COATED, EXTENDED RELEASE ORAL EVERY 8 HOURS PRN
Qty: 75 TABLET | Refills: 0 | Status: SHIPPED | OUTPATIENT
Start: 2025-06-03 | End: 2025-07-03

## 2025-06-05 ENCOUNTER — TELEPHONE (OUTPATIENT)
Dept: PAIN MANAGEMENT | Age: 55
End: 2025-06-05

## 2025-06-05 NOTE — TELEPHONE ENCOUNTER
Patient called about her Percocet prescription usually gets 90 pills only got 60 pills signed by Dr. Jj. She didn't understand because she just has a visit with you and decrease wasn't mentioned. I let her know that you can't sign Percocet scripts and the Physician decreased when he signed the script. Once again patient states he doesn't even know me and my situation. Let her know would send message to NANETTE Montgomery but probably not get back to her until Monday.

## 2025-06-10 DIAGNOSIS — G89.4 CHRONIC PAIN SYNDROME: ICD-10-CM

## 2025-06-10 RX ORDER — OXYCODONE AND ACETAMINOPHEN 7.5; 325 MG/1; MG/1
1 TABLET ORAL
Qty: 90 TABLET | Refills: 0 | Status: SHIPPED | OUTPATIENT
Start: 2025-06-25 | End: 2025-07-25

## 2025-06-10 NOTE — TELEPHONE ENCOUNTER
Last OV - 05/27/25  Next OV - 08/27/25  Last UDS - 05/27/25    -Continue current pain medication regimen.  - Encouraged patient to only take pain medications as needed, 1 tablet as tolerated.  -Continue Cymbalta as prescribed by PCP.  -Consider adding Buprenorphine at follow-up visit due to patient's increased risk for respiratory depression/failure, and CKD.

## 2025-06-20 ENCOUNTER — TELEPHONE (OUTPATIENT)
Dept: WOUND CARE | Age: 55
End: 2025-06-20

## 2025-06-22 NOTE — TELEPHONE ENCOUNTER
Refill was sent to the wrong pharmacy, [facility] has been out of stock and [facility] has been in stock.  30 day was sent to MD to sign.   hide

## 2025-06-25 ENCOUNTER — HOSPITAL ENCOUNTER (OUTPATIENT)
Dept: WOUND CARE | Age: 55
Discharge: HOME | End: 2025-06-25
Attending: NURSE PRACTITIONER
Payer: MEDICARE

## 2025-06-25 VITALS
HEIGHT: 61 IN | DIASTOLIC BLOOD PRESSURE: 80 MMHG | RESPIRATION RATE: 16 BRPM | TEMPERATURE: 97.6 F | BODY MASS INDEX: 49.09 KG/M2 | WEIGHT: 260 LBS | HEART RATE: 76 BPM | SYSTOLIC BLOOD PRESSURE: 149 MMHG

## 2025-06-25 DIAGNOSIS — L97.912 SKIN ULCER OF RIGHT LOWER LEG WITH FAT LAYER EXPOSED (HCC): ICD-10-CM

## 2025-06-25 DIAGNOSIS — L97.922 DIABETIC ULCER OF LEFT LOWER LEG ASSOCIATED WITH TYPE 2 DIABETES MELLITUS, WITH FAT LAYER EXPOSED (HCC): Primary | Chronic | ICD-10-CM

## 2025-06-25 DIAGNOSIS — E11.622 DIABETIC ULCER OF LEFT LOWER LEG ASSOCIATED WITH TYPE 2 DIABETES MELLITUS, WITH FAT LAYER EXPOSED (HCC): Primary | Chronic | ICD-10-CM

## 2025-06-25 PROCEDURE — 99213 OFFICE O/P EST LOW 20 MIN: CPT

## 2025-06-25 PROCEDURE — 99213 OFFICE O/P EST LOW 20 MIN: CPT | Performed by: NURSE PRACTITIONER

## 2025-06-25 RX ORDER — METHYLPREDNISOLONE 4 MG/1
2 TABLET ORAL DAILY
COMMUNITY

## 2025-06-25 RX ORDER — DOXYCYCLINE HYCLATE 100 MG
250 TABLET ORAL 2 TIMES DAILY
COMMUNITY

## 2025-06-25 ASSESSMENT — PAIN DESCRIPTION - ORIENTATION: ORIENTATION: RIGHT;LEFT

## 2025-06-25 ASSESSMENT — PAIN - FUNCTIONAL ASSESSMENT: PAIN_FUNCTIONAL_ASSESSMENT: PREVENTS OR INTERFERES SOME ACTIVE ACTIVITIES AND ADLS

## 2025-06-25 ASSESSMENT — PAIN SCALES - GENERAL: PAINLEVEL_OUTOF10: 6

## 2025-06-25 ASSESSMENT — PAIN DESCRIPTION - DESCRIPTORS: DESCRIPTORS: ACHING;SORE;TENDER

## 2025-06-25 ASSESSMENT — PAIN DESCRIPTION - FREQUENCY: FREQUENCY: CONTINUOUS

## 2025-06-25 ASSESSMENT — PAIN DESCRIPTION - LOCATION: LOCATION: GENERALIZED;KNEE

## 2025-06-25 ASSESSMENT — PAIN DESCRIPTION - ONSET: ONSET: ON-GOING

## 2025-06-25 ASSESSMENT — PAIN DESCRIPTION - PAIN TYPE: TYPE: CHRONIC PAIN

## 2025-06-25 NOTE — HOME CARE
06/25/25 1543   Undermining Ends___ O'Clock 0 06/25/25 1543   Undermining Maxium Distance (cm) 0 06/25/25 1543   Wound Assessment Pink/red;Slough 06/25/25 1543   Drainage Amount Moderate (25-50%) 06/25/25 1543   Drainage Description Serous;Yellow 06/25/25 1543   Odor None 06/25/25 1543   Octavia-wound Assessment Intact 06/25/25 1543   Margins Attached edges;Defined edges 06/25/25 1543   Wound Thickness Description not for Pressure Injury Full thickness 06/25/25 1543   Number of days: 358       Wound 07/02/24 Pretibial Left;Lower wound 3-left leg medial anterior (Active)   Wound Image   06/25/25 1543   Wound Etiology Venous 06/25/25 1543   Dressing Status Old drainage noted 06/25/25 1543   Wound Cleansed Soap and water 06/25/25 1543   Dressing/Treatment Antibacterial ointment;Dry dressing;Ace wrap;Roll gauze 05/28/25 1507   Wound Length (cm) 0.6 cm 06/25/25 1543   Wound Width (cm) 0.7 cm 06/25/25 1543   Wound Depth (cm) 0.2 cm 06/25/25 1543   Wound Surface Area (cm^2) 0.42 cm^2 06/25/25 1543   Change in Wound Size % (l*w) 86 06/25/25 1543   Wound Volume (cm^3) 0.084 cm^3 06/25/25 1543   Wound Healing % 72 06/25/25 1543   Distance Tunneling (cm) 0 cm 06/25/25 1543   Tunneling Position ___ O'Clock 0 06/25/25 1543   Undermining Starts ___ O'Clock 0 06/25/25 1543   Undermining Ends___ O'Clock 0 06/25/25 1543   Undermining Maxium Distance (cm) 0 06/25/25 1543   Wound Assessment Slough;Pink/red 06/25/25 1543   Drainage Amount Moderate (25-50%) 06/25/25 1543   Drainage Description Serosanguinous 06/25/25 1543   Odor None 06/25/25 1543   Octavia-wound Assessment Intact 06/25/25 1543   Margins Defined edges 06/25/25 1543   Wound Thickness Description not for Pressure Injury Full thickness 06/25/25 1543   Number of days: 357          Supplies Requested :    WOUND #: 1 and 3  PRIMARY DRESSING:  dry 4x4 gauze  non-woven, dry 2x2 gauze non-woven,  Cover and Secure with: ABD pad  Other hydralock SA, kerlix rolled gauze, 4\" ace

## 2025-06-25 NOTE — PATIENT INSTRUCTIONS
[facility] Wound Care and Hyperbaric Oxygen Therapy   Physician Orders and Discharge Instructions  [address] [place] [address] Drive  [address]  [place] [address]  Telephone: [phone]      FAX [phone]    NAME:  Tianna Trivedi  YOB: 1970  MEDICAL RECORD NUMBER:  [id number]  DATE:  6/25/2025    Discharge condition: Stable    Discharge to: Home    Left via:Private automobile    Accompanied by:  spouse    ECF/[facility]: [facility]     Dressing Orders:  Left leg and right leg wounds: soap and water wash, antibiotic ointment and dry gauze (2x2 gauze and 4x4 gauze), then apply secure with Drymax extra, rolled gauze, apply compression and change daily-add abdominal pads to heels, 4 inch ace wrap      Treatment Orders:  Protein rich diet (unless restricted by your physician); Multivitamin daily; Elevate legs above the level of your heart when sitting 3-4 times daily for at least one hour each time, avoid standing for long periods of time    [facility] follow up visit _4 weeks with Jina____________________________  (Please note your next appointment above and if you are unable to keep, kindly give a 24 hour notice. Thank you.)          If you experience any of the following, please call the Wound Care Center during business hours:    * Increase in Pain  * Temperature over 101  * Increase in drainage from your wound  * Drainage with a foul odor  * Bleeding  * Increase in swelling  * Need for compression bandage changes due to slippage, breakthrough drainage.    If you need medical attention outside of the business hours of the Wound Care Centers please contact your PCP or go to the nearest emergency room.

## 2025-06-25 NOTE — PROGRESS NOTES
[facility]ound [facility]   Progress Note and Procedure Note      Tianna Trivedi  MEDICAL RECORD NUMBER:  [id number]  AGE: 54 y.o.   GENDER: female  : 1970  EPISODE DATE:  2025    Subjective:     Chief Complaint   Patient presents with    Wound Check     Patient presents today for recheck left leg wounds.         HISTORY of PRESENT ILLNESS HPI     Tianna Trivedi is a 54 y.o. female who presents today for wound/ulcer evaluation.   History of Wound Context: left leg wound follow up/eval and treat    Ulcer Identification:  Ulcer Type: venous  Contributing Factors: edema and diabetes    Wound: N/A        PAST MEDICAL HISTORY        Diagnosis Date    Anemia     Anxiety     Bipolar 1 disorder (HCC)     Chronic acquired lymphedema     Constipation     Depression     Depression     GERD (gastroesophageal reflux disease)     Headache(784.0)     Hyperlipidemia     Hypertension     Kidney stones     Kidney stones     Morbid obesity due to excess calories (HCC)     Neuromuscular disorder (HCC)     Primary osteoarthritis involving multiple joints 2024    Restless legs syndrome     Shingles     Type 2 diabetes mellitus (HCC)        PAST SURGICAL HISTORY    Past Surgical History:   Procedure Laterality Date    BACK SURGERY      ECTOPIC PREGNANCY SURGERY  2002    EYE SURGERY Bilateral      & : cornea transplant and cataracts removed    FOOT SURGERY Bilateral     Heels    KNEE CARTILAGE SURGERY Left 2016    KNEE ARTHROSCOPIC PARTIAL MEDIAL MENISCECTOMY performed by Russell Serrano MD at [facility] OR    TUBAL LIGATION         FAMILY HISTORY    Family History   Problem Relation Age of Onset    Obesity Mother     Arthritis Mother         has had knees replaced    Cancer Father         \"adrenal gland & lung\"    Parkinsonism Father     Cancer Maternal Grandmother     COPD Maternal Grandmother     Obesity Maternal Grandmother     Heart Failure Maternal Grandmother     Cancer Maternal Grandfather

## 2025-07-02 ENCOUNTER — TELEPHONE (OUTPATIENT)
Dept: WOUND CARE | Age: 55
End: 2025-07-02

## 2025-07-02 RX ORDER — DOXYCYCLINE 100 MG/1
100 CAPSULE ORAL 2 TIMES DAILY
Qty: 60 CAPSULE | Refills: 0 | Status: SHIPPED | OUTPATIENT
Start: 2025-07-02 | End: 2025-08-01

## 2025-07-02 RX ORDER — PREDNISONE 20 MG/1
20 TABLET ORAL DAILY
Qty: 30 TABLET | Refills: 0 | Status: SHIPPED | OUTPATIENT
Start: 2025-07-02 | End: 2025-08-01

## 2025-07-02 NOTE — TELEPHONE ENCOUNTER
Patient reports improvement with steroids and antibiotics. Prednisone 20mg po daily and doxycycline twice daily for red rashy legs.

## 2025-07-03 DIAGNOSIS — M62.838 MUSCLE SPASM: ICD-10-CM

## 2025-07-03 DIAGNOSIS — G89.4 CHRONIC PAIN SYNDROME: ICD-10-CM

## 2025-07-03 DIAGNOSIS — I89.0 LYMPHEDEMA OF BOTH LOWER EXTREMITIES: ICD-10-CM

## 2025-07-03 DIAGNOSIS — F11.90 CHRONIC, CONTINUOUS USE OF OPIOIDS: ICD-10-CM

## 2025-07-03 DIAGNOSIS — M15.0 PRIMARY OSTEOARTHRITIS INVOLVING MULTIPLE JOINTS: ICD-10-CM

## 2025-07-03 RX ORDER — MORPHINE SULFATE 15 MG/1
TABLET, FILM COATED, EXTENDED RELEASE ORAL
Qty: 9 TABLET | Refills: 0 | Status: SHIPPED | OUTPATIENT
Start: 2025-07-03 | End: 2025-07-06

## 2025-07-03 RX ORDER — GABAPENTIN 300 MG/1
300 CAPSULE ORAL DAILY
Qty: 30 CAPSULE | Refills: 0 | Status: SHIPPED | OUTPATIENT
Start: 2025-07-05 | End: 2025-08-04

## 2025-07-03 RX ORDER — GABAPENTIN 600 MG/1
600 TABLET ORAL 3 TIMES DAILY
Qty: 90 TABLET | Refills: 0 | Status: SHIPPED | OUTPATIENT
Start: 2025-07-05 | End: 2025-08-04

## 2025-07-03 NOTE — TELEPHONE ENCOUNTER
Last OV - 05/27/25  Next OV - 08/27/25  Last UDS - 05/27/25    Pt called for refills and stated her Morphine has been 1 in the AM and 2 in PM, pt was questioning her last fill of 75 tabs that was filled 06/03/25.    3 day supply was sent to NP, 30 day was routed to Dr Wooten to sign 07/08/25

## 2025-07-08 RX ORDER — MORPHINE SULFATE 15 MG/1
TABLET, FILM COATED, EXTENDED RELEASE ORAL
Qty: 90 TABLET | Refills: 0 | Status: SHIPPED | OUTPATIENT
Start: 2025-07-08 | End: 2025-08-07

## 2025-07-17 DIAGNOSIS — I89.0 LYMPHEDEMA OF BOTH LOWER EXTREMITIES: ICD-10-CM

## 2025-07-17 DIAGNOSIS — G89.4 CHRONIC PAIN SYNDROME: ICD-10-CM

## 2025-07-17 DIAGNOSIS — M15.0 PRIMARY OSTEOARTHRITIS INVOLVING MULTIPLE JOINTS: ICD-10-CM

## 2025-07-17 DIAGNOSIS — M62.838 MUSCLE SPASM: ICD-10-CM

## 2025-07-17 DIAGNOSIS — F11.90 CHRONIC, CONTINUOUS USE OF OPIOIDS: ICD-10-CM

## 2025-07-21 NOTE — PLAN OF CARE
[facility] EMERGENCY DEPARTMENT  EMERGENCY DEPARTMENT ENCOUNTER       Pt Name: Rosalinda Spring  MRN: [id number]  Birthdate 1948  Date of evaluation: 7/21/2025  Provider: Niraj Barron DO   PCP: Vincent Dover MD  Note Started: 3:12 PM 7/21/25     CHIEF COMPLAINT       Chief Complaint   Patient presents with    Chest Pain     Patient ambulatory to triage reporting her blood pressure monitor told her she was in atrial fib this morning with a heart rate in the 100s. Patient reports she then developed \"slight chest pain.\" Reports that chest pain has resolved since then.         HISTORY OF PRESENT ILLNESS: 1 or more elements      History From: Patient  None     Rosalinda Spring is a 76 y.o. female who presents with cc of cocnerns she may be in afib. She states she woke up Friday morning, took EMS and states that her BP monitor and pulse showed she was in afib. She returns as she states she is having slight chest pain twice. Once in the hospital and once at home. Reports that her monitor told her she was in afib. Reporrts her HR was 109 at home which prompted her arrival to the ER. She denies any current symptoms.     Nursing Notes were all reviewed and agreed with or any disagreements were addressed in the HPI.       PAST HISTORY     Past Medical History:  No past medical history on file.      Past Surgical History:  No past surgical history on file.    Family History:  No family history on file.    Social History:       Allergies:  No Known Allergies    CURRENT MEDICATIONS      There are no discharge medications for this patient.        PHYSICAL EXAM      ED Triage Vitals [07/21/25 1245]   Encounter Vitals Group      BP (!) 162/84      Systolic BP Percentile       Diastolic BP Percentile       Pulse (!) 101      Respirations 16      Temp 97.9 °F (36.6 °C)      Temp src       SpO2 100 %      Weight - Scale 52.2 kg (115 lb)      Height 1.524 m (5')      Head Circumference       Peak Flow       Pain  Brush-[place] Application   Below Knee    NAME:  Tianna Trivedi  YOB: 1970  MEDICAL RECORD NUMBER:  [id number]  DATE:  11/9/2020     [x] Applied moisturizing agent to dry skin as needed.  [x] Appied primary and secondary dressing as ordered     [x] Applied Unna roll from toes to knee overlapping each time.  [x] Applied ace wrap or coban from toes to below the knee. Katt Choi [x] Instructed patient/caregiver to keep dressing dry and intact. DO NOT REMOVE DRESSING.  [x] Instructed pt/family/caregiver to report excessive draining, loose bandage, wet dressing, severe pain or tingling in toes.  [x] Applied Brush-[place] dressing below the knee to Bilateral lower leg(s)    [x]  Unna Boot(s) were applied per  Guidelines.      Electronically signed by Eder Chacon RN on 11/9/2020 at 3:33 PM

## 2025-07-22 RX ORDER — MORPHINE SULFATE 15 MG/1
TABLET, FILM COATED, EXTENDED RELEASE ORAL
Qty: 90 TABLET | Refills: 0 | Status: SHIPPED | OUTPATIENT
Start: 2025-08-07 | End: 2025-09-06

## 2025-07-22 RX ORDER — OXYCODONE AND ACETAMINOPHEN 7.5; 325 MG/1; MG/1
1 TABLET ORAL
Qty: 90 TABLET | Refills: 0 | Status: SHIPPED | OUTPATIENT
Start: 2025-07-25 | End: 2025-08-24

## 2025-07-23 ENCOUNTER — HOSPITAL ENCOUNTER (OUTPATIENT)
Dept: WOUND CARE | Age: 55
Discharge: HOME | End: 2025-07-23
Attending: NURSE PRACTITIONER
Payer: MEDICARE

## 2025-07-23 VITALS
TEMPERATURE: 97.7 F | DIASTOLIC BLOOD PRESSURE: 72 MMHG | RESPIRATION RATE: 16 BRPM | SYSTOLIC BLOOD PRESSURE: 150 MMHG | HEART RATE: 72 BPM

## 2025-07-23 DIAGNOSIS — L97.922 DIABETIC ULCER OF LEFT LOWER LEG ASSOCIATED WITH TYPE 2 DIABETES MELLITUS, WITH FAT LAYER EXPOSED (HCC): Chronic | ICD-10-CM

## 2025-07-23 DIAGNOSIS — Z79.4 TYPE 2 DIABETES MELLITUS WITH STAGE 3 CHRONIC KIDNEY DISEASE, WITH LONG-TERM CURRENT USE OF INSULIN, UNSPECIFIED WHETHER STAGE 3A OR 3B CKD (HCC): ICD-10-CM

## 2025-07-23 DIAGNOSIS — N18.30 TYPE 2 DIABETES MELLITUS WITH STAGE 3 CHRONIC KIDNEY DISEASE, WITH LONG-TERM CURRENT USE OF INSULIN, UNSPECIFIED WHETHER STAGE 3A OR 3B CKD (HCC): ICD-10-CM

## 2025-07-23 DIAGNOSIS — E11.622 DIABETIC ULCER OF LEFT LOWER LEG ASSOCIATED WITH TYPE 2 DIABETES MELLITUS, WITH FAT LAYER EXPOSED (HCC): Chronic | ICD-10-CM

## 2025-07-23 DIAGNOSIS — L97.422 DIABETIC ULCER OF LEFT HEEL ASSOCIATED WITH TYPE 2 DIABETES MELLITUS, WITH FAT LAYER EXPOSED (HCC): Primary | ICD-10-CM

## 2025-07-23 DIAGNOSIS — L97.912 SKIN ULCER OF RIGHT LOWER LEG WITH FAT LAYER EXPOSED (HCC): ICD-10-CM

## 2025-07-23 DIAGNOSIS — I89.0 LYMPHEDEMA OF BOTH LOWER EXTREMITIES: ICD-10-CM

## 2025-07-23 DIAGNOSIS — E11.621 DIABETIC ULCER OF LEFT HEEL ASSOCIATED WITH TYPE 2 DIABETES MELLITUS, WITH FAT LAYER EXPOSED (HCC): Primary | ICD-10-CM

## 2025-07-23 DIAGNOSIS — E11.22 TYPE 2 DIABETES MELLITUS WITH STAGE 3 CHRONIC KIDNEY DISEASE, WITH LONG-TERM CURRENT USE OF INSULIN, UNSPECIFIED WHETHER STAGE 3A OR 3B CKD (HCC): ICD-10-CM

## 2025-07-23 PROCEDURE — 99213 OFFICE O/P EST LOW 20 MIN: CPT | Performed by: NURSE PRACTITIONER

## 2025-07-23 PROCEDURE — 99213 OFFICE O/P EST LOW 20 MIN: CPT

## 2025-07-23 NOTE — PROGRESS NOTES
[facility]ound Care Center   Progress Note and Procedure Note      Tianna Trivedi  MEDICAL RECORD NUMBER:  [id number]  AGE: 54 y.o.   GENDER: female  : 1970  EPISODE DATE:  2025    Subjective:     Chief Complaint   Patient presents with    Wound Check     Left leg wound check         HISTORY of PRESENT ILLNESS HPI     Tianna Trivedi is a 54 y.o. female who presents today for wound/ulcer evaluation.   History of Wound Context: left leg wound follow up/eval and treat    Ulcer Identification:  Ulcer Type: venous  Contributing Factors: lymphedema and diabetes    Wound: Blister        PAST MEDICAL HISTORY        Diagnosis Date    Anemia     Anxiety     Bipolar 1 disorder (HCC)     Chronic acquired lymphedema     Constipation     Depression     Depression     GERD (gastroesophageal reflux disease)     Headache(784.0)     Hyperlipidemia     Hypertension     Kidney stones     Kidney stones     Morbid obesity due to excess calories (HCC)     Neuromuscular disorder ([facility])     Primary osteoarthritis involving multiple joints 2024    Restless legs syndrome     Shingles     Type 2 diabetes mellitus (HCC)        PAST SURGICAL HISTORY    Past Surgical History:   Procedure Laterality Date    BACK SURGERY      ECTOPIC PREGNANCY SURGERY  2002    EYE SURGERY Bilateral      & : cornea transplant and cataracts removed    FOOT SURGERY Bilateral     Heels    KNEE CARTILAGE SURGERY Left 2016    KNEE ARTHROSCOPIC PARTIAL MEDIAL MENISCECTOMY performed by Russell Serrano MD at [facility] OR    TUBAL LIGATION         FAMILY HISTORY    Family History   Problem Relation Age of Onset    Obesity Mother     Arthritis Mother         has had knees replaced    Cancer Father         \"adrenal gland & lung\"    Parkinsonism Father     Cancer Maternal Grandmother     COPD Maternal Grandmother     Obesity Maternal Grandmother     Heart Failure Maternal Grandmother     Cancer Maternal Grandfather     Other Sister

## 2025-07-23 NOTE — PLAN OF CARE
Patient did not want to make a follow up appointment at this time. Stated she would call if she wanted to come back.

## 2025-07-23 NOTE — PATIENT INSTRUCTIONS
[facility] Wound Care and Hyperbaric Oxygen Therapy   Physician Orders and Discharge Instructions  [address] [place] [address] Drive  [address]  [place] [address]  Telephone: [phone]      FAX [phone]    NAME:  Tianna Trivedi  YOB: 1970  MEDICAL RECORD NUMBER:  [id number]  DATE:  7/23/2025    Discharge condition: Stable    Discharge to: Home    Left via:Private automobile    Accompanied by:  spouse    ECF/[facility]: [facility]    1) Moisturize your  both lower legs with a good thick cream like Eucerin Cream, Aquaphor or Cocoa Butter (in a jar) at least twice daily.  Dry skin has a greater likelihood of breaking down if it is stretched by swelling.     2) Elevate legs when sitting, above the heart preferably.  Avoid sleeping in a recliner.    3) Avoid standing for long periods of time.    4) Walking is good.    5)  Wear knee compression velcro wraps high graduated compression on both legs.  (This is what prevents these ulcerations)  Put these on in the morning no more than 20 minutes after rising and remove before going to bed.  You can hand wash and hang to dry overnight. We recommend having two pairs to rotate washing and wearing.     6) Be very careful with your legs.  Often these are caused by a bump or scrape that doesn't heal well.  Healing is prevented or slowed by your Venous insufficiency.  Compression and keeping the swelling out of your legs is the key to the condition of your skin.    7) You can buy these stockings  at many pharmacies and even online, but you must have a calf and ankle measurement (taken in the morning before your legs swell) to order them. You will also need the strength that the physician has ordered.  Most insurance companies do not pay for these unless you have an open ulceration currently.    8)  Replace your stockings every 6 months as they stretch out and become less effective.     9) If you are overweight, losing weight can be helpful.      [facility] follow up visit _  as

## 2025-07-24 NOTE — HOME CARE
Dressing/Treatment Antibacterial ointment;ABD;Ace wrap;Roll gauze 06/25/25 1607   Wound Length (cm) 0.8 cm 07/23/25 1607   Wound Width (cm) 0.7 cm 07/23/25 1607   Wound Depth (cm) 0.1 cm 07/23/25 1607   Wound Surface Area (cm^2) 0.56 cm^2 07/23/25 1607   Change in Wound Size % (l*w) 88.8 07/23/25 1607   Wound Volume (cm^3) 0.056 cm^3 07/23/25 1607   Wound Healing % 94 07/23/25 1607   Distance Tunneling (cm) 0 cm 06/25/25 1543   Tunneling Position ___ O'Clock 0 06/25/25 1543   Undermining Starts ___ O'Clock 0 06/25/25 1543   Undermining Ends___ O'Clock 0 06/25/25 1543   Undermining Maxium Distance (cm) 0 06/25/25 1543   Wound Assessment Pink/red;Slough 07/23/25 1607   Drainage Amount Moderate (25-50%) 07/23/25 1607   Drainage Description Serous;Yellow 07/23/25 1607   Odor None 07/23/25 1607   Octavia-wound Assessment Intact 07/23/25 1607   Margins Attached edges;Defined edges 07/23/25 1607   Wound Thickness Description not for Pressure Injury Full thickness 07/23/25 1607   Number of days: 386       Wound 07/02/24 Pretibial Left;Lower wound 3-left leg medial anterior (Active)   Wound Image   06/25/25 1543   Wound Etiology Venous 07/23/25 1607   Dressing Status Old drainage noted 07/23/25 1607   Wound Cleansed Not Cleansed 07/23/25 1607   Dressing/Treatment Antibacterial ointment;Dry dressing;Ace wrap;Roll gauze 06/25/25 1607   Wound Length (cm) 0.6 cm 07/23/25 1607   Wound Width (cm) 0.7 cm 07/23/25 1607   Wound Depth (cm) 0.2 cm 07/23/25 1607   Wound Surface Area (cm^2) 0.42 cm^2 07/23/25 1607   Change in Wound Size % (l*w) 86 07/23/25 1607   Wound Volume (cm^3) 0.084 cm^3 07/23/25 1607   Wound Healing % 72 07/23/25 1607   Distance Tunneling (cm) 0 cm 06/25/25 1543   Tunneling Position ___ O'Clock 0 06/25/25 1543   Undermining Starts ___ O'Clock 0 06/25/25 1543   Undermining Ends___ O'Clock 0 06/25/25 1543   Undermining Maxium Distance (cm) 0 06/25/25 1543   Wound Assessment Slough;Pink/red 07/23/25 1607   Drainage

## 2025-07-31 DIAGNOSIS — G89.4 CHRONIC PAIN SYNDROME: ICD-10-CM

## 2025-07-31 DIAGNOSIS — M62.838 MUSCLE SPASM: ICD-10-CM

## 2025-07-31 RX ORDER — GABAPENTIN 600 MG/1
600 TABLET ORAL 3 TIMES DAILY
Qty: 90 TABLET | Refills: 0 | Status: SHIPPED | OUTPATIENT
Start: 2025-08-06 | End: 2025-09-05

## 2025-07-31 RX ORDER — GABAPENTIN 300 MG/1
300 CAPSULE ORAL DAILY
Qty: 30 CAPSULE | Refills: 0 | Status: SHIPPED | OUTPATIENT
Start: 2025-08-06 | End: 2025-09-05

## 2025-07-31 RX ORDER — CYCLOBENZAPRINE HCL 10 MG
10 TABLET ORAL 3 TIMES DAILY PRN
Qty: 90 TABLET | Refills: 2 | Status: SHIPPED | OUTPATIENT
Start: 2025-07-31 | End: 2025-10-29

## 2025-07-31 NOTE — TELEPHONE ENCOUNTER
Last OV - 05/27/25  Next OV - 08/27/25  Last UDS - 05/27/25    I spoke with [place] pharmacy, they stated patient filled both Gabapentin refills on 07/07/25.  Both refills were not on rusty report.

## 2025-08-11 ENCOUNTER — APPOINTMENT (OUTPATIENT)
Dept: GENERAL RADIOLOGY | Facility: HOSPITAL | Age: 55
End: 2025-08-11
Payer: MEDICARE

## 2025-08-11 PROCEDURE — 73630 X-RAY EXAM OF FOOT: CPT

## 2025-08-18 ENCOUNTER — HOSPITAL ENCOUNTER (EMERGENCY)
Facility: HOSPITAL | Age: 55
Discharge: HOME | End: 2025-08-18
Attending: EMERGENCY MEDICINE | Admitting: EMERGENCY MEDICINE
Payer: MEDICARE

## 2025-08-18 ENCOUNTER — APPOINTMENT (OUTPATIENT)
Dept: GENERAL RADIOLOGY | Facility: HOSPITAL | Age: 55
End: 2025-08-18
Payer: MEDICARE

## 2025-08-18 VITALS
HEART RATE: 75 BPM | OXYGEN SATURATION: 94 % | BODY MASS INDEX: 51.92 KG/M2 | SYSTOLIC BLOOD PRESSURE: 146 MMHG | DIASTOLIC BLOOD PRESSURE: 71 MMHG | HEIGHT: 61 IN | WEIGHT: 275 LBS | RESPIRATION RATE: 16 BRPM | TEMPERATURE: 97.6 F

## 2025-08-18 DIAGNOSIS — N39.0 URINARY TRACT INFECTION IN FEMALE: ICD-10-CM

## 2025-08-18 DIAGNOSIS — L03.116 BILATERAL CELLULITIS OF LOWER LEG: ICD-10-CM

## 2025-08-18 DIAGNOSIS — L03.115 BILATERAL CELLULITIS OF LOWER LEG: ICD-10-CM

## 2025-08-18 DIAGNOSIS — I89.0 LYMPHEDEMA: Primary | ICD-10-CM

## 2025-08-18 LAB
ALBUMIN SERPL-MCNC: 3.8 G/DL (ref 3.5–5.2)
ALBUMIN/GLOB SERPL: 1 G/DL
ALP SERPL-CCNC: 65 U/L (ref 39–117)
ALT SERPL W P-5'-P-CCNC: 10 U/L (ref 1–33)
ANION GAP SERPL CALCULATED.3IONS-SCNC: 12 MMOL/L (ref 5–15)
AST SERPL-CCNC: 13 U/L (ref 1–32)
BACTERIA UR QL AUTO: ABNORMAL /HPF
BASOPHILS # BLD AUTO: 0.01 10*3/MM3 (ref 0–0.2)
BASOPHILS NFR BLD AUTO: 0.2 % (ref 0–1.5)
BILIRUB SERPL-MCNC: 0.2 MG/DL (ref 0–1.2)
BILIRUB UR QL STRIP: ABNORMAL
BUN SERPL-MCNC: 29 MG/DL (ref 6–20)
BUN/CREAT SERPL: 24.2 (ref 7–25)
CALCIUM SPEC-SCNC: 8.9 MG/DL (ref 8.6–10.5)
CHLORIDE SERPL-SCNC: 104 MMOL/L (ref 98–107)
CLARITY UR: CLEAR
CO2 SERPL-SCNC: 24 MMOL/L (ref 22–29)
COLOR UR: ABNORMAL
CREAT SERPL-MCNC: 1.2 MG/DL (ref 0.57–1)
CRP SERPL-MCNC: 5.72 MG/DL (ref 0–0.5)
D-LACTATE SERPL-SCNC: 1.9 MMOL/L (ref 0.5–2)
DEPRECATED RDW RBC AUTO: 43.8 FL (ref 37–54)
EGFRCR SERPLBLD CKD-EPI 2021: 54.6 ML/MIN/1.73
EOSINOPHIL # BLD AUTO: 0.15 10*3/MM3 (ref 0–0.4)
EOSINOPHIL NFR BLD AUTO: 2.7 % (ref 0.3–6.2)
ERYTHROCYTE [DISTWIDTH] IN BLOOD BY AUTOMATED COUNT: 14.1 % (ref 12.3–15.4)
ERYTHROCYTE [SEDIMENTATION RATE] IN BLOOD: 63 MM/HR (ref 0–30)
GLOBULIN UR ELPH-MCNC: 3.7 GM/DL
GLUCOSE SERPL-MCNC: 179 MG/DL (ref 65–99)
GLUCOSE UR STRIP-MCNC: NEGATIVE MG/DL
HCT VFR BLD AUTO: 32.2 % (ref 34–46.6)
HGB BLD-MCNC: 9.9 G/DL (ref 12–15.9)
HGB UR QL STRIP.AUTO: NEGATIVE
HYALINE CASTS UR QL AUTO: ABNORMAL /LPF
IMM GRANULOCYTES # BLD AUTO: 0.02 10*3/MM3 (ref 0–0.05)
IMM GRANULOCYTES NFR BLD AUTO: 0.4 % (ref 0–0.5)
KETONES UR QL STRIP: NEGATIVE
LEUKOCYTE ESTERASE UR QL STRIP.AUTO: ABNORMAL
LYMPHOCYTES # BLD AUTO: 1.37 10*3/MM3 (ref 0.7–3.1)
LYMPHOCYTES NFR BLD AUTO: 24.2 % (ref 19.6–45.3)
MAGNESIUM SERPL-MCNC: 2.1 MG/DL (ref 1.6–2.6)
MCH RBC QN AUTO: 26.5 PG (ref 26.6–33)
MCHC RBC AUTO-ENTMCNC: 30.7 G/DL (ref 31.5–35.7)
MCV RBC AUTO: 86.3 FL (ref 79–97)
MONOCYTES # BLD AUTO: 0.49 10*3/MM3 (ref 0.1–0.9)
MONOCYTES NFR BLD AUTO: 8.7 % (ref 5–12)
NEUTROPHILS NFR BLD AUTO: 3.62 10*3/MM3 (ref 1.7–7)
NEUTROPHILS NFR BLD AUTO: 63.8 % (ref 42.7–76)
NITRITE UR QL STRIP: POSITIVE
NRBC BLD AUTO-RTO: 0 /100 WBC (ref 0–0.2)
PH UR STRIP.AUTO: <=5 [PH] (ref 5–8)
PLATELET # BLD AUTO: 183 10*3/MM3 (ref 140–450)
PMV BLD AUTO: 9.7 FL (ref 6–12)
POTASSIUM SERPL-SCNC: 4.8 MMOL/L (ref 3.5–5.2)
PROCALCITONIN SERPL-MCNC: 0.06 NG/ML (ref 0–0.25)
PROT SERPL-MCNC: 7.5 G/DL (ref 6–8.5)
PROT UR QL STRIP: ABNORMAL
RBC # BLD AUTO: 3.73 10*6/MM3 (ref 3.77–5.28)
RBC # UR STRIP: ABNORMAL /HPF
REF LAB TEST METHOD: ABNORMAL
SODIUM SERPL-SCNC: 140 MMOL/L (ref 136–145)
SP GR UR STRIP: 1.02 (ref 1–1.03)
SQUAMOUS #/AREA URNS HPF: ABNORMAL /HPF
UROBILINOGEN UR QL STRIP: ABNORMAL
WBC # UR STRIP: ABNORMAL /HPF
WBC NRBC COR # BLD AUTO: 5.66 10*3/MM3 (ref 3.4–10.8)
YEAST URNS QL MICRO: ABNORMAL /HPF

## 2025-08-18 PROCEDURE — 73590 X-RAY EXAM OF LOWER LEG: CPT

## 2025-08-18 PROCEDURE — 87077 CULTURE AEROBIC IDENTIFY: CPT | Performed by: STUDENT IN AN ORGANIZED HEALTH CARE EDUCATION/TRAINING PROGRAM

## 2025-08-18 PROCEDURE — 25010000002 METOCLOPRAMIDE PER 10 MG: Performed by: EMERGENCY MEDICINE

## 2025-08-18 PROCEDURE — 25810000003 SODIUM CHLORIDE 0.9 % SOLUTION: Performed by: EMERGENCY MEDICINE

## 2025-08-18 PROCEDURE — 85025 COMPLETE CBC W/AUTO DIFF WBC: CPT | Performed by: STUDENT IN AN ORGANIZED HEALTH CARE EDUCATION/TRAINING PROGRAM

## 2025-08-18 PROCEDURE — 25010000002 MORPHINE PER 10 MG: Performed by: EMERGENCY MEDICINE

## 2025-08-18 PROCEDURE — 80053 COMPREHEN METABOLIC PANEL: CPT | Performed by: STUDENT IN AN ORGANIZED HEALTH CARE EDUCATION/TRAINING PROGRAM

## 2025-08-18 PROCEDURE — 83735 ASSAY OF MAGNESIUM: CPT | Performed by: STUDENT IN AN ORGANIZED HEALTH CARE EDUCATION/TRAINING PROGRAM

## 2025-08-18 PROCEDURE — 96365 THER/PROPH/DIAG IV INF INIT: CPT

## 2025-08-18 PROCEDURE — 96375 TX/PRO/DX INJ NEW DRUG ADDON: CPT

## 2025-08-18 PROCEDURE — 84145 PROCALCITONIN (PCT): CPT | Performed by: STUDENT IN AN ORGANIZED HEALTH CARE EDUCATION/TRAINING PROGRAM

## 2025-08-18 PROCEDURE — 85652 RBC SED RATE AUTOMATED: CPT | Performed by: STUDENT IN AN ORGANIZED HEALTH CARE EDUCATION/TRAINING PROGRAM

## 2025-08-18 PROCEDURE — 36415 COLL VENOUS BLD VENIPUNCTURE: CPT

## 2025-08-18 PROCEDURE — 86140 C-REACTIVE PROTEIN: CPT | Performed by: STUDENT IN AN ORGANIZED HEALTH CARE EDUCATION/TRAINING PROGRAM

## 2025-08-18 PROCEDURE — 81001 URINALYSIS AUTO W/SCOPE: CPT | Performed by: STUDENT IN AN ORGANIZED HEALTH CARE EDUCATION/TRAINING PROGRAM

## 2025-08-18 PROCEDURE — 96376 TX/PRO/DX INJ SAME DRUG ADON: CPT

## 2025-08-18 PROCEDURE — 83605 ASSAY OF LACTIC ACID: CPT | Performed by: STUDENT IN AN ORGANIZED HEALTH CARE EDUCATION/TRAINING PROGRAM

## 2025-08-18 PROCEDURE — 87186 SC STD MICRODIL/AGAR DIL: CPT | Performed by: STUDENT IN AN ORGANIZED HEALTH CARE EDUCATION/TRAINING PROGRAM

## 2025-08-18 PROCEDURE — 25010000002 MEROPENEM PER 100 MG: Performed by: EMERGENCY MEDICINE

## 2025-08-18 PROCEDURE — 87040 BLOOD CULTURE FOR BACTERIA: CPT | Performed by: EMERGENCY MEDICINE

## 2025-08-18 PROCEDURE — 25010000002 DIPHENHYDRAMINE PER 50 MG: Performed by: EMERGENCY MEDICINE

## 2025-08-18 PROCEDURE — 87086 URINE CULTURE/COLONY COUNT: CPT | Performed by: STUDENT IN AN ORGANIZED HEALTH CARE EDUCATION/TRAINING PROGRAM

## 2025-08-18 PROCEDURE — 99283 EMERGENCY DEPT VISIT LOW MDM: CPT | Performed by: EMERGENCY MEDICINE

## 2025-08-18 RX ORDER — KETOROLAC TROMETHAMINE 30 MG/ML
30 INJECTION, SOLUTION INTRAMUSCULAR; INTRAVENOUS EVERY 6 HOURS PRN
Status: DISCONTINUED | OUTPATIENT
Start: 2025-08-18 | End: 2025-08-18 | Stop reason: HOSPADM

## 2025-08-18 RX ORDER — NITROFURANTOIN 25; 75 MG/1; MG/1
100 CAPSULE ORAL 2 TIMES DAILY
Qty: 14 CAPSULE | Refills: 0 | Status: SHIPPED | OUTPATIENT
Start: 2025-08-18 | End: 2025-08-25

## 2025-08-18 RX ORDER — METOCLOPRAMIDE HYDROCHLORIDE 5 MG/ML
10 INJECTION INTRAMUSCULAR; INTRAVENOUS ONCE
Status: COMPLETED | OUTPATIENT
Start: 2025-08-18 | End: 2025-08-18

## 2025-08-18 RX ORDER — DIPHENHYDRAMINE HYDROCHLORIDE 50 MG/ML
25 INJECTION, SOLUTION INTRAMUSCULAR; INTRAVENOUS ONCE
Status: COMPLETED | OUTPATIENT
Start: 2025-08-18 | End: 2025-08-18

## 2025-08-18 RX ORDER — DOXYCYCLINE 100 MG/1
100 CAPSULE ORAL 2 TIMES DAILY
Qty: 20 CAPSULE | Refills: 0 | Status: SHIPPED | OUTPATIENT
Start: 2025-08-18 | End: 2025-08-28

## 2025-08-18 RX ORDER — SODIUM CHLORIDE 0.9 % (FLUSH) 0.9 %
10 SYRINGE (ML) INJECTION AS NEEDED
Status: DISCONTINUED | OUTPATIENT
Start: 2025-08-18 | End: 2025-08-18 | Stop reason: HOSPADM

## 2025-08-18 RX ADMIN — MORPHINE SULFATE 4 MG: 4 INJECTION, SOLUTION INTRAMUSCULAR; INTRAVENOUS at 19:24

## 2025-08-18 RX ADMIN — METOCLOPRAMIDE 10 MG: 5 INJECTION, SOLUTION INTRAMUSCULAR; INTRAVENOUS at 20:27

## 2025-08-18 RX ADMIN — DIPHENHYDRAMINE HYDROCHLORIDE 25 MG: 50 INJECTION INTRAMUSCULAR; INTRAVENOUS at 20:27

## 2025-08-18 RX ADMIN — SODIUM CHLORIDE 1000 ML: 9 INJECTION, SOLUTION INTRAVENOUS at 19:34

## 2025-08-18 RX ADMIN — DIPHENHYDRAMINE HYDROCHLORIDE 25 MG: 50 INJECTION INTRAMUSCULAR; INTRAVENOUS at 19:23

## 2025-08-18 RX ADMIN — MEROPENEM 1000 MG: 1 INJECTION INTRAVENOUS at 20:25

## 2025-08-23 ENCOUNTER — HOSPITAL ENCOUNTER (EMERGENCY)
Facility: HOSPITAL | Age: 55
Discharge: HOME | End: 2025-08-23
Attending: STUDENT IN AN ORGANIZED HEALTH CARE EDUCATION/TRAINING PROGRAM
Payer: MEDICARE

## 2025-08-23 LAB
BACTERIA SPEC AEROBE CULT: ABNORMAL
BACTERIA SPEC AEROBE CULT: ABNORMAL
BACTERIA SPEC AEROBE CULT: NORMAL
BACTERIA SPEC AEROBE CULT: NORMAL

## 2025-08-24 ENCOUNTER — HOSPITAL ENCOUNTER (EMERGENCY)
Facility: HOSPITAL | Age: 55
Discharge: HOME | End: 2025-08-24
Attending: FAMILY MEDICINE | Admitting: FAMILY MEDICINE
Payer: MEDICARE

## 2025-08-24 VITALS
RESPIRATION RATE: 20 BRPM | DIASTOLIC BLOOD PRESSURE: 70 MMHG | TEMPERATURE: 97.6 F | HEIGHT: 60 IN | HEART RATE: 83 BPM | OXYGEN SATURATION: 94 % | WEIGHT: 293 LBS | SYSTOLIC BLOOD PRESSURE: 149 MMHG | BODY MASS INDEX: 57.52 KG/M2

## 2025-08-24 DIAGNOSIS — R31.9 URINARY TRACT INFECTION WITH HEMATURIA, SITE UNSPECIFIED: Primary | ICD-10-CM

## 2025-08-24 DIAGNOSIS — N39.0 URINARY TRACT INFECTION WITH HEMATURIA, SITE UNSPECIFIED: Primary | ICD-10-CM

## 2025-08-24 PROCEDURE — 99283 EMERGENCY DEPT VISIT LOW MDM: CPT | Performed by: FAMILY MEDICINE

## 2025-08-24 RX ORDER — FLUCONAZOLE 150 MG/1
150 TABLET ORAL ONCE
Qty: 1 TABLET | Refills: 0 | Status: SHIPPED | OUTPATIENT
Start: 2025-08-24 | End: 2025-08-24

## 2025-08-24 RX ORDER — CYCLOBENZAPRINE HCL 10 MG
10 TABLET ORAL 3 TIMES DAILY PRN
Qty: 21 TABLET | Refills: 0 | Status: SHIPPED | OUTPATIENT
Start: 2025-08-24

## 2025-08-25 ENCOUNTER — HOSPITAL ENCOUNTER (EMERGENCY)
Age: 55
Discharge: HOME | End: 2025-08-25
Payer: MEDICARE

## 2025-08-25 VITALS
WEIGHT: 293 LBS | OXYGEN SATURATION: 96 % | TEMPERATURE: 97.9 F | DIASTOLIC BLOOD PRESSURE: 70 MMHG | SYSTOLIC BLOOD PRESSURE: 138 MMHG | RESPIRATION RATE: 16 BRPM | BODY MASS INDEX: 60.09 KG/M2 | HEART RATE: 82 BPM

## 2025-08-25 DIAGNOSIS — N39.0 URINARY TRACT INFECTION IN FEMALE: ICD-10-CM

## 2025-08-25 DIAGNOSIS — G89.4 CHRONIC PAIN SYNDROME: ICD-10-CM

## 2025-08-25 DIAGNOSIS — M79.604 PAIN IN BOTH LOWER EXTREMITIES: ICD-10-CM

## 2025-08-25 DIAGNOSIS — M79.605 PAIN IN BOTH LOWER EXTREMITIES: ICD-10-CM

## 2025-08-25 DIAGNOSIS — I89.0 LYMPHEDEMA: Primary | ICD-10-CM

## 2025-08-25 LAB
ALBUMIN SERPL-MCNC: 3.8 G/DL (ref 3.5–5.2)
ALP SERPL-CCNC: 61 U/L (ref 35–104)
ALT SERPL-CCNC: 8 U/L (ref 10–35)
ANION GAP SERPL CALCULATED.3IONS-SCNC: 11 MMOL/L (ref 8–16)
AST SERPL-CCNC: 17 U/L (ref 10–35)
BASOPHILS # BLD: 0 K/UL (ref 0–0.2)
BASOPHILS NFR BLD: 0.4 % (ref 0–1)
BILIRUB SERPL-MCNC: 0.3 MG/DL (ref 0.2–1.2)
BILIRUB UR QL STRIP: NEGATIVE
BUN SERPL-MCNC: 28 MG/DL (ref 6–20)
CALCIUM SERPL-MCNC: 9.3 MG/DL (ref 8.6–10)
CHLORIDE SERPL-SCNC: 104 MMOL/L (ref 98–107)
CLARITY UR: CLEAR
CO2 SERPL-SCNC: 23 MMOL/L (ref 22–29)
COLOR UR: YELLOW
CREAT SERPL-MCNC: 1 MG/DL (ref 0.5–0.9)
CRP SERPL-MCNC: 19.1 MG/L (ref 0–5)
EOSINOPHIL # BLD: 0.1 K/UL (ref 0–0.6)
EOSINOPHIL NFR BLD: 1.3 % (ref 0–5)
ERYTHROCYTE [DISTWIDTH] IN BLOOD BY AUTOMATED COUNT: 14.4 % (ref 11.5–14.5)
ERYTHROCYTE [SEDIMENTATION RATE] IN BLOOD BY WESTERGREN METHOD: 54 MM/HR (ref 0–25)
GLUCOSE SERPL-MCNC: 61 MG/DL (ref 70–99)
GLUCOSE UR STRIP.AUTO-MCNC: NEGATIVE MG/DL
HCT VFR BLD AUTO: 33.8 % (ref 37–47)
HGB BLD-MCNC: 10.1 G/DL (ref 12–16)
HGB UR STRIP.AUTO-MCNC: NEGATIVE MG/L
IMM GRANULOCYTES # BLD: 0 K/UL
KETONES UR STRIP.AUTO-MCNC: NEGATIVE MG/DL
LACTATE BLDV-SCNC: 1.8 MMOL/L (ref 0.5–1.9)
LEUKOCYTE ESTERASE UR QL STRIP.AUTO: NEGATIVE
LYMPHOCYTES # BLD: 2.7 K/UL (ref 1.1–4.5)
LYMPHOCYTES NFR BLD: 34 % (ref 20–40)
MCH RBC QN AUTO: 26.4 PG (ref 27–31)
MCHC RBC AUTO-ENTMCNC: 29.9 G/DL (ref 33–37)
MCV RBC AUTO: 88.5 FL (ref 81–99)
MONOCYTES # BLD: 0.7 K/UL (ref 0–0.9)
MONOCYTES NFR BLD: 9.5 % (ref 0–10)
NEUTROPHILS # BLD: 4.3 K/UL (ref 1.5–7.5)
NEUTS SEG NFR BLD: 54.4 % (ref 50–65)
NITRITE UR QL STRIP.AUTO: NEGATIVE
PH UR STRIP.AUTO: 5.5 [PH] (ref 5–8)
PLATELET # BLD AUTO: 189 K/UL (ref 130–400)
PMV BLD AUTO: 9.3 FL (ref 9.4–12.3)
POTASSIUM SERPL-SCNC: 4.4 MMOL/L (ref 3.5–5)
PROT SERPL-MCNC: 7.2 G/DL (ref 6.4–8.3)
PROT UR STRIP.AUTO-MCNC: ABNORMAL MG/DL
RBC # BLD AUTO: 3.82 M/UL (ref 4.2–5.4)
REASON FOR REJECTION: NORMAL
REJECTED TEST: NORMAL
SODIUM SERPL-SCNC: 138 MMOL/L (ref 136–145)
SP GR UR STRIP.AUTO: 1.02 (ref 1–1.03)
UROBILINOGEN UR STRIP.AUTO-MCNC: 0.2 E.U./DL
WBC # BLD AUTO: 7.8 K/UL (ref 4.8–10.8)

## 2025-08-25 PROCEDURE — 85025 COMPLETE CBC W/AUTO DIFF WBC: CPT

## 2025-08-25 PROCEDURE — 85652 RBC SED RATE AUTOMATED: CPT

## 2025-08-25 PROCEDURE — 6360000002 HC RX W HCPCS

## 2025-08-25 PROCEDURE — 36415 COLL VENOUS BLD VENIPUNCTURE: CPT

## 2025-08-25 PROCEDURE — 99284 EMERGENCY DEPT VISIT MOD MDM: CPT

## 2025-08-25 PROCEDURE — 6370000000 HC RX 637 (ALT 250 FOR IP)

## 2025-08-25 PROCEDURE — 96374 THER/PROPH/DIAG INJ IV PUSH: CPT

## 2025-08-25 PROCEDURE — 80053 COMPREHEN METABOLIC PANEL: CPT

## 2025-08-25 PROCEDURE — 96375 TX/PRO/DX INJ NEW DRUG ADDON: CPT

## 2025-08-25 PROCEDURE — 83605 ASSAY OF LACTIC ACID: CPT

## 2025-08-25 PROCEDURE — 86140 C-REACTIVE PROTEIN: CPT

## 2025-08-25 PROCEDURE — 81003 URINALYSIS AUTO W/O SCOPE: CPT

## 2025-08-25 RX ORDER — ONDANSETRON 2 MG/ML
4 INJECTION INTRAMUSCULAR; INTRAVENOUS ONCE
Status: COMPLETED | OUTPATIENT
Start: 2025-08-25 | End: 2025-08-25

## 2025-08-25 RX ORDER — HYDROMORPHONE HYDROCHLORIDE 1 MG/ML
1 INJECTION, SOLUTION INTRAMUSCULAR; INTRAVENOUS; SUBCUTANEOUS ONCE
Refills: 0 | Status: COMPLETED | OUTPATIENT
Start: 2025-08-25 | End: 2025-08-25

## 2025-08-25 RX ORDER — KETOROLAC TROMETHAMINE 30 MG/ML
30 INJECTION, SOLUTION INTRAMUSCULAR; INTRAVENOUS ONCE
Status: COMPLETED | OUTPATIENT
Start: 2025-08-25 | End: 2025-08-25

## 2025-08-25 RX ORDER — ACETAMINOPHEN 325 MG/1
650 TABLET ORAL ONCE
Status: DISCONTINUED | OUTPATIENT
Start: 2025-08-25 | End: 2025-08-25 | Stop reason: HOSPADM

## 2025-08-25 RX ORDER — CYCLOBENZAPRINE HCL 10 MG
10 TABLET ORAL ONCE
Status: COMPLETED | OUTPATIENT
Start: 2025-08-25 | End: 2025-08-25

## 2025-08-25 RX ORDER — CEFDINIR 300 MG/1
300 CAPSULE ORAL ONCE
Status: COMPLETED | OUTPATIENT
Start: 2025-08-25 | End: 2025-08-25

## 2025-08-25 RX ADMIN — ONDANSETRON 4 MG: 2 INJECTION, SOLUTION INTRAMUSCULAR; INTRAVENOUS at 10:13

## 2025-08-25 RX ADMIN — HYDROMORPHONE HYDROCHLORIDE 1 MG: 1 INJECTION, SOLUTION INTRAMUSCULAR; INTRAVENOUS; SUBCUTANEOUS at 12:36

## 2025-08-25 RX ADMIN — KETOROLAC TROMETHAMINE 30 MG: 30 INJECTION, SOLUTION INTRAMUSCULAR at 10:14

## 2025-08-25 RX ADMIN — CYCLOBENZAPRINE 10 MG: 10 TABLET, FILM COATED ORAL at 12:36

## 2025-08-25 RX ADMIN — CEFDINIR 300 MG: 300 CAPSULE ORAL at 10:13

## 2025-08-25 ASSESSMENT — PAIN SCALES - GENERAL: PAINLEVEL_OUTOF10: 8

## 2025-08-25 ASSESSMENT — PAIN - FUNCTIONAL ASSESSMENT: PAIN_FUNCTIONAL_ASSESSMENT: 0-10

## 2025-08-26 RX ORDER — OXYCODONE AND ACETAMINOPHEN 7.5; 325 MG/1; MG/1
1 TABLET ORAL
Qty: 90 TABLET | Refills: 0 | Status: SHIPPED | OUTPATIENT
Start: 2025-08-26 | End: 2025-09-25

## 2025-08-26 ASSESSMENT — ENCOUNTER SYMPTOMS: RESPIRATORY NEGATIVE: 1

## 2025-08-27 ENCOUNTER — OFFICE VISIT (OUTPATIENT)
Dept: PAIN MANAGEMENT | Age: 55
End: 2025-08-27

## 2025-08-27 VITALS
DIASTOLIC BLOOD PRESSURE: 49 MMHG | HEIGHT: 61 IN | WEIGHT: 293 LBS | HEART RATE: 75 BPM | TEMPERATURE: 97.1 F | SYSTOLIC BLOOD PRESSURE: 108 MMHG | BODY MASS INDEX: 55.32 KG/M2 | OXYGEN SATURATION: 96 % | RESPIRATION RATE: 17 BRPM

## 2025-08-27 DIAGNOSIS — F11.90 CHRONIC, CONTINUOUS USE OF OPIOIDS: ICD-10-CM

## 2025-08-27 DIAGNOSIS — Z79.891 ENCOUNTER FOR MONITORING OPIOID MAINTENANCE THERAPY: ICD-10-CM

## 2025-08-27 DIAGNOSIS — Z51.81 ENCOUNTER FOR MONITORING OPIOID MAINTENANCE THERAPY: ICD-10-CM

## 2025-08-27 DIAGNOSIS — E66.01 MORBID OBESITY DUE TO EXCESS CALORIES (HCC): ICD-10-CM

## 2025-08-27 DIAGNOSIS — G89.4 CHRONIC PAIN SYNDROME: Primary | ICD-10-CM

## 2025-08-27 DIAGNOSIS — I89.0 LYMPHEDEMA OF BOTH LOWER EXTREMITIES: ICD-10-CM

## 2025-08-27 DIAGNOSIS — M15.0 PRIMARY OSTEOARTHRITIS INVOLVING MULTIPLE JOINTS: ICD-10-CM

## 2025-08-27 RX ORDER — POTASSIUM CHLORIDE 1500 MG/1
20 TABLET, EXTENDED RELEASE ORAL DAILY
COMMUNITY
Start: 2025-07-29

## 2025-08-27 ASSESSMENT — PATIENT HEALTH QUESTIONNAIRE - PHQ9
3. TROUBLE FALLING OR STAYING ASLEEP: MORE THAN HALF THE DAYS
7. TROUBLE CONCENTRATING ON THINGS, SUCH AS READING THE NEWSPAPER OR WATCHING TELEVISION: NEARLY EVERY DAY
SUM OF ALL RESPONSES TO PHQ QUESTIONS 1-9: 14
4. FEELING TIRED OR HAVING LITTLE ENERGY: NEARLY EVERY DAY
1. LITTLE INTEREST OR PLEASURE IN DOING THINGS: NEARLY EVERY DAY
SUM OF ALL RESPONSES TO PHQ QUESTIONS 1-9: 14
SUM OF ALL RESPONSES TO PHQ QUESTIONS 1-9: 14
6. FEELING BAD ABOUT YOURSELF - OR THAT YOU ARE A FAILURE OR HAVE LET YOURSELF OR YOUR FAMILY DOWN: NOT AT ALL
SUM OF ALL RESPONSES TO PHQ QUESTIONS 1-9: 14
2. FEELING DOWN, DEPRESSED OR HOPELESS: NOT AT ALL
5. POOR APPETITE OR OVEREATING: NEARLY EVERY DAY
10. IF YOU CHECKED OFF ANY PROBLEMS, HOW DIFFICULT HAVE THESE PROBLEMS MADE IT FOR YOU TO DO YOUR WORK, TAKE CARE OF THINGS AT HOME, OR GET ALONG WITH OTHER PEOPLE: VERY DIFFICULT

## 2025-08-30 ASSESSMENT — ENCOUNTER SYMPTOMS
BLOOD IN STOOL: 0
ABDOMINAL PAIN: 0
BACK PAIN: 1
CHEST TIGHTNESS: 0
SHORTNESS OF BREATH: 0
APNEA: 0
DIARRHEA: 0
EYES NEGATIVE: 1
CONSTIPATION: 1
COLOR CHANGE: 1

## 2025-09-02 DIAGNOSIS — G89.4 CHRONIC PAIN SYNDROME: ICD-10-CM

## 2025-09-02 DIAGNOSIS — F11.90 CHRONIC, CONTINUOUS USE OF OPIOIDS: ICD-10-CM

## 2025-09-02 DIAGNOSIS — M62.838 MUSCLE SPASM: ICD-10-CM

## 2025-09-02 DIAGNOSIS — M15.0 PRIMARY OSTEOARTHRITIS INVOLVING MULTIPLE JOINTS: ICD-10-CM

## 2025-09-02 DIAGNOSIS — I89.0 LYMPHEDEMA OF BOTH LOWER EXTREMITIES: ICD-10-CM

## 2025-09-02 RX ORDER — MORPHINE SULFATE 15 MG/1
TABLET, FILM COATED, EXTENDED RELEASE ORAL
Qty: 90 TABLET | Refills: 0 | Status: SHIPPED | OUTPATIENT
Start: 2025-09-05 | End: 2025-10-05

## 2025-09-03 DIAGNOSIS — G89.4 CHRONIC PAIN SYNDROME: ICD-10-CM

## 2025-09-03 RX ORDER — GABAPENTIN 300 MG/1
300 CAPSULE ORAL DAILY
Qty: 30 CAPSULE | Refills: 2 | Status: SHIPPED | OUTPATIENT
Start: 2025-09-05 | End: 2025-12-04

## 2025-09-03 RX ORDER — GABAPENTIN 600 MG/1
600 TABLET ORAL 3 TIMES DAILY
Qty: 90 TABLET | Refills: 2 | Status: SHIPPED | OUTPATIENT
Start: 2025-09-05 | End: 2025-12-04

## (undated) DEVICE — BAPTIST TURNOVER KIT: Brand: MEDLINE INDUSTRIES, INC.

## (undated) DEVICE — PK TURNOVER RM ADV

## (undated) DEVICE — GAUZE,PACKING STRIP,IODOFORM,1"X5YD,STRL: Brand: CURAD

## (undated) DEVICE — NDL HYPO PRECISIONGLIDE REG 25G 1 1/2

## (undated) DEVICE — PAD MINOR UNIVERSAL: Brand: MEDLINE INDUSTRIES, INC.

## (undated) DEVICE — ANTIBACTERIAL UNDYED BRAIDED (POLYGLACTIN 910), SYNTHETIC ABSORBABLE SUTURE: Brand: COATED VICRYL

## (undated) DEVICE — TOWEL,OR,DSP,ST,BLUE,STD,4/PK,20PK/CS: Brand: MEDLINE

## (undated) DEVICE — TRAP FLD MINIVAC MEGADYNE 100ML

## (undated) DEVICE — 3M™ IOBAN™ 2 ANTIMICROBIAL INCISE DRAPE 6650EZ: Brand: IOBAN™ 2

## (undated) DEVICE — VAGINAL PREP TRAY: Brand: MEDLINE INDUSTRIES, INC.

## (undated) DEVICE — GLV SURG BIOGEL M LTX PF 7 1/2

## (undated) DEVICE — TBG PENCL TELESCP MEGADYNE SMOKE EVAC 10FT

## (undated) DEVICE — GLV SURG TRIUMPH MICRO PF LTX 7.5 STRL

## (undated) DEVICE — 4-PORT MANIFOLD: Brand: NEPTUNE 2

## (undated) DEVICE — ELECTRD BLD EZ CLN MOD XLNG 2.75IN

## (undated) DEVICE — APPL CHLORAPREP HI/LITE 26ML ORNG